# Patient Record
Sex: MALE | Race: WHITE | Employment: OTHER | URBAN - METROPOLITAN AREA
[De-identification: names, ages, dates, MRNs, and addresses within clinical notes are randomized per-mention and may not be internally consistent; named-entity substitution may affect disease eponyms.]

---

## 2017-11-06 PROBLEM — I12.9 BENIGN HYPERTENSIVE KIDNEY DISEASE WITH CHRONIC KIDNEY DISEASE: Chronic | Status: ACTIVE | Noted: 2017-11-06

## 2017-11-06 PROBLEM — R73.02 GLUCOSE INTOLERANCE (IMPAIRED GLUCOSE TOLERANCE): Chronic | Status: ACTIVE | Noted: 2017-11-06

## 2017-11-14 PROBLEM — R73.02 GLUCOSE INTOLERANCE (IMPAIRED GLUCOSE TOLERANCE): Chronic | Status: RESOLVED | Noted: 2017-11-06 | Resolved: 2017-11-14

## 2017-11-22 PROBLEM — N18.30 STAGE 3 CHRONIC KIDNEY DISEASE (HCC): Chronic | Status: ACTIVE | Noted: 2017-11-22

## 2017-12-07 ENCOUNTER — TELEPHONE (OUTPATIENT)
Dept: DIABETES SERVICES | Age: 72
End: 2017-12-07

## 2017-12-07 NOTE — TELEPHONE ENCOUNTER
Called about diabetes education. He is concerned about the money as living on social security. Asked if he can afford $20.00 month? He wants to talk to physician and his wife. Instructed if he wants education to let physician know when sees him in January. Currently it will be January before can be seen for education. We are scheduling into January now. Pt wants to talk to above before scheduling assessment. Diabtees file closed until patient notifies us or MD he wants the education.

## 2018-01-05 PROBLEM — E11.42 CONTROLLED TYPE 2 DIABETES MELLITUS WITH DIABETIC POLYNEUROPATHY, WITHOUT LONG-TERM CURRENT USE OF INSULIN (HCC): Chronic | Status: ACTIVE | Noted: 2018-01-05

## 2018-01-05 PROBLEM — E11.21 TYPE 2 DIABETES MELLITUS WITH NEPHROPATHY (HCC): Chronic | Status: ACTIVE | Noted: 2018-01-05

## 2018-01-05 PROBLEM — E11.21 TYPE 2 DIABETES MELLITUS WITH NEPHROPATHY (HCC): Status: ACTIVE | Noted: 2018-01-05

## 2018-01-05 PROBLEM — G62.89 MIXED AXONAL-DEMYELINATING POLYNEUROPATHY: Chronic | Status: ACTIVE | Noted: 2018-01-05

## 2018-04-10 PROBLEM — M21.619 BUNION OF UNSPECIFIED FOOT: Chronic | Status: ACTIVE | Noted: 2018-04-10

## 2018-04-10 PROBLEM — L84 CORNS AND CALLUS: Chronic | Status: ACTIVE | Noted: 2018-04-10

## 2018-04-10 PROBLEM — B35.1 ONYCHOMYCOSIS: Chronic | Status: ACTIVE | Noted: 2018-04-10

## 2018-04-10 PROBLEM — E78.2 MIXED HYPERLIPIDEMIA: Chronic | Status: ACTIVE | Noted: 2018-04-10

## 2018-10-10 PROBLEM — E66.01 SEVERE OBESITY (BMI 35.0-35.9 WITH COMORBIDITY) (HCC): Chronic | Status: ACTIVE | Noted: 2018-10-10

## 2020-06-11 PROBLEM — E66.9 OBESITY (BMI 30-39.9): Status: ACTIVE | Noted: 2020-06-11

## 2021-01-13 PROBLEM — E66.01 MORBID OBESITY WITH BMI OF 40.0-44.9, ADULT (HCC): Status: ACTIVE | Noted: 2020-06-11

## 2021-07-19 ENCOUNTER — APPOINTMENT (OUTPATIENT)
Dept: ULTRASOUND IMAGING | Age: 76
DRG: 308 | End: 2021-07-19
Attending: FAMILY MEDICINE
Payer: MEDICARE

## 2021-07-19 ENCOUNTER — APPOINTMENT (OUTPATIENT)
Dept: GENERAL RADIOLOGY | Age: 76
DRG: 308 | End: 2021-07-19
Attending: EMERGENCY MEDICINE
Payer: MEDICARE

## 2021-07-19 ENCOUNTER — HOSPITAL ENCOUNTER (INPATIENT)
Age: 76
LOS: 8 days | Discharge: HOME HEALTH | DRG: 308 | End: 2021-07-27
Attending: EMERGENCY MEDICINE | Admitting: FAMILY MEDICINE
Payer: MEDICARE

## 2021-07-19 DIAGNOSIS — N17.9 AKI (ACUTE KIDNEY INJURY) (HCC): ICD-10-CM

## 2021-07-19 DIAGNOSIS — I25.10 CAD IN NATIVE ARTERY: ICD-10-CM

## 2021-07-19 DIAGNOSIS — I25.5 ISCHEMIC CARDIOMYOPATHY: ICD-10-CM

## 2021-07-19 DIAGNOSIS — I48.91 ATRIAL FIBRILLATION WITH RVR (HCC): ICD-10-CM

## 2021-07-19 DIAGNOSIS — J96.02 ACUTE RESPIRATORY FAILURE WITH HYPOXIA AND HYPERCAPNIA (HCC): ICD-10-CM

## 2021-07-19 DIAGNOSIS — I48.91 ATRIAL FIBRILLATION, UNSPECIFIED TYPE (HCC): ICD-10-CM

## 2021-07-19 DIAGNOSIS — G93.41 ACUTE METABOLIC ENCEPHALOPATHY: ICD-10-CM

## 2021-07-19 DIAGNOSIS — B95.61 STAPHYLOCOCCUS AUREUS BACTEREMIA: ICD-10-CM

## 2021-07-19 DIAGNOSIS — I25.10 CORONARY ARTERY DISEASE INVOLVING NATIVE CORONARY ARTERY OF NATIVE HEART WITHOUT ANGINA PECTORIS: Chronic | ICD-10-CM

## 2021-07-19 DIAGNOSIS — N18.30 STAGE 3 CHRONIC KIDNEY DISEASE, UNSPECIFIED WHETHER STAGE 3A OR 3B CKD (HCC): Chronic | ICD-10-CM

## 2021-07-19 DIAGNOSIS — J96.01 ACUTE RESPIRATORY FAILURE WITH HYPOXIA AND HYPERCAPNIA (HCC): ICD-10-CM

## 2021-07-19 DIAGNOSIS — I48.91 NEW ONSET ATRIAL FIBRILLATION (HCC): ICD-10-CM

## 2021-07-19 DIAGNOSIS — N18.9 ACUTE KIDNEY INJURY SUPERIMPOSED ON CKD (HCC): ICD-10-CM

## 2021-07-19 DIAGNOSIS — J96.02 ACUTE RESPIRATORY FAILURE WITH HYPERCAPNIA (HCC): ICD-10-CM

## 2021-07-19 DIAGNOSIS — I50.21 ACUTE SYSTOLIC CONGESTIVE HEART FAILURE (HCC): ICD-10-CM

## 2021-07-19 DIAGNOSIS — E66.01 MORBID OBESITY WITH BMI OF 40.0-44.9, ADULT (HCC): ICD-10-CM

## 2021-07-19 DIAGNOSIS — R09.89 SUSPECTED CHF (CONGESTIVE HEART FAILURE): ICD-10-CM

## 2021-07-19 DIAGNOSIS — R78.81 STAPHYLOCOCCUS AUREUS BACTEREMIA: ICD-10-CM

## 2021-07-19 DIAGNOSIS — L03.115 CELLULITIS OF RIGHT FOOT: Primary | ICD-10-CM

## 2021-07-19 DIAGNOSIS — E87.6 HYPOKALEMIA: ICD-10-CM

## 2021-07-19 DIAGNOSIS — R09.02 HYPOXIA: ICD-10-CM

## 2021-07-19 DIAGNOSIS — N17.9 ACUTE KIDNEY INJURY SUPERIMPOSED ON CKD (HCC): ICD-10-CM

## 2021-07-19 PROBLEM — L03.90 CELLULITIS: Status: ACTIVE | Noted: 2021-07-19

## 2021-07-19 LAB
ALBUMIN SERPL-MCNC: 2.9 G/DL (ref 3.2–4.6)
ALBUMIN/GLOB SERPL: 0.6 {RATIO} (ref 1.2–3.5)
ALP SERPL-CCNC: 137 U/L (ref 50–136)
ALT SERPL-CCNC: 17 U/L (ref 12–65)
ANION GAP SERPL CALC-SCNC: 7 MMOL/L (ref 7–16)
APTT PPP: 31.7 SEC (ref 24.1–35.1)
ARTERIAL PATENCY WRIST A: POSITIVE
AST SERPL-CCNC: 23 U/L (ref 15–37)
ATRIAL RATE: 147 BPM
BACTERIA URNS QL MICRO: 0 /HPF
BASE EXCESS BLD CALC-SCNC: 5.3 MMOL/L
BASOPHILS # BLD: 0.1 K/UL (ref 0–0.2)
BASOPHILS # BLD: 0.1 K/UL (ref 0–0.2)
BASOPHILS NFR BLD: 0 % (ref 0–2)
BASOPHILS NFR BLD: 0 % (ref 0–2)
BDY SITE: ABNORMAL
BILIRUB SERPL-MCNC: 1.6 MG/DL (ref 0.2–1.1)
BNP SERPL-MCNC: 1279 PG/ML
BUN SERPL-MCNC: 33 MG/DL (ref 8–23)
CALCIUM SERPL-MCNC: 9.3 MG/DL (ref 8.3–10.4)
CALCULATED R AXIS, ECG10: -36 DEGREES
CALCULATED T AXIS, ECG11: 24 DEGREES
CASTS URNS QL MICRO: NORMAL /LPF
CHLORIDE SERPL-SCNC: 98 MMOL/L (ref 98–107)
CO2 BLD-SCNC: 32 MMOL/L (ref 13–23)
CO2 SERPL-SCNC: 32 MMOL/L (ref 21–32)
CREAT SERPL-MCNC: 2.34 MG/DL (ref 0.8–1.5)
CRP SERPL HS-MCNC: >15 MG/L
DIAGNOSIS, 93000: NORMAL
DIFFERENTIAL METHOD BLD: ABNORMAL
DIFFERENTIAL METHOD BLD: ABNORMAL
EOSINOPHIL # BLD: 0 K/UL (ref 0–0.8)
EOSINOPHIL # BLD: 0.1 K/UL (ref 0–0.8)
EOSINOPHIL NFR BLD: 0 % (ref 0.5–7.8)
EOSINOPHIL NFR BLD: 1 % (ref 0.5–7.8)
EPI CELLS #/AREA URNS HPF: NORMAL /HPF
ERYTHROCYTE [DISTWIDTH] IN BLOOD BY AUTOMATED COUNT: 15 % (ref 11.9–14.6)
ERYTHROCYTE [DISTWIDTH] IN BLOOD BY AUTOMATED COUNT: 15.2 % (ref 11.9–14.6)
ERYTHROCYTE [SEDIMENTATION RATE] IN BLOOD: 72 MM/HR (ref 0–20)
FIO2, L/MIN - FIO2P: 2
GAS FLOW.O2 O2 DELIVERY SYS: ABNORMAL L/MIN
GLOBULIN SER CALC-MCNC: 4.8 G/DL (ref 2.3–3.5)
GLUCOSE BLD STRIP.AUTO-MCNC: 158 MG/DL (ref 65–100)
GLUCOSE SERPL-MCNC: 101 MG/DL (ref 65–100)
HCO3 BLD-SCNC: 31.8 MMOL/L (ref 22–26)
HCT VFR BLD AUTO: 36.7 % (ref 41.1–50.3)
HCT VFR BLD AUTO: 40.5 % (ref 41.1–50.3)
HGB BLD-MCNC: 11.5 G/DL (ref 13.6–17.2)
HGB BLD-MCNC: 13 G/DL (ref 13.6–17.2)
IMM GRANULOCYTES # BLD AUTO: 0.1 K/UL (ref 0–0.5)
IMM GRANULOCYTES # BLD AUTO: 0.2 K/UL (ref 0–0.5)
IMM GRANULOCYTES NFR BLD AUTO: 1 % (ref 0–5)
IMM GRANULOCYTES NFR BLD AUTO: 1 % (ref 0–5)
LACTATE SERPL-SCNC: 1.8 MMOL/L (ref 0.4–2)
LACTATE SERPL-SCNC: 2 MMOL/L (ref 0.4–2)
LYMPHOCYTES # BLD: 1.8 K/UL (ref 0.5–4.6)
LYMPHOCYTES # BLD: 2.1 K/UL (ref 0.5–4.6)
LYMPHOCYTES NFR BLD: 11 % (ref 13–44)
LYMPHOCYTES NFR BLD: 11 % (ref 13–44)
MAGNESIUM SERPL-MCNC: 1.8 MG/DL (ref 1.8–2.4)
MCH RBC QN AUTO: 29.4 PG (ref 26.1–32.9)
MCH RBC QN AUTO: 29.6 PG (ref 26.1–32.9)
MCHC RBC AUTO-ENTMCNC: 31.3 G/DL (ref 31.4–35)
MCHC RBC AUTO-ENTMCNC: 32.1 G/DL (ref 31.4–35)
MCV RBC AUTO: 92.3 FL (ref 79.6–97.8)
MCV RBC AUTO: 93.9 FL (ref 79.6–97.8)
MONOCYTES # BLD: 1.9 K/UL (ref 0.1–1.3)
MONOCYTES # BLD: 2.4 K/UL (ref 0.1–1.3)
MONOCYTES NFR BLD: 12 % (ref 4–12)
MONOCYTES NFR BLD: 12 % (ref 4–12)
NEUTS SEG # BLD: 12.6 K/UL (ref 1.7–8.2)
NEUTS SEG # BLD: 15 K/UL (ref 1.7–8.2)
NEUTS SEG NFR BLD: 75 % (ref 43–78)
NEUTS SEG NFR BLD: 76 % (ref 43–78)
NRBC # BLD: 0 K/UL (ref 0–0.2)
NRBC # BLD: 0 K/UL (ref 0–0.2)
PCO2 BLD: 53.7 MMHG (ref 35–45)
PH BLD: 7.38 [PH] (ref 7.35–7.45)
PLATELET # BLD AUTO: 230 K/UL (ref 150–450)
PLATELET # BLD AUTO: 254 K/UL (ref 150–450)
PMV BLD AUTO: 11.2 FL (ref 9.4–12.3)
PMV BLD AUTO: 11.5 FL (ref 9.4–12.3)
PO2 BLD: 66 MMHG (ref 75–100)
POTASSIUM SERPL-SCNC: 3.7 MMOL/L (ref 3.5–5.1)
PROT SERPL-MCNC: 7.7 G/DL (ref 6.3–8.2)
Q-T INTERVAL, ECG07: 300 MS
QRS DURATION, ECG06: 132 MS
QTC CALCULATION (BEZET), ECG08: 427 MS
RBC # BLD AUTO: 3.91 M/UL (ref 4.23–5.6)
RBC # BLD AUTO: 4.39 M/UL (ref 4.23–5.6)
RBC #/AREA URNS HPF: NORMAL /HPF
SAO2 % BLD: 91.8 % (ref 95–98)
SERVICE CMNT-IMP: ABNORMAL
SERVICE CMNT-IMP: ABNORMAL
SODIUM SERPL-SCNC: 137 MMOL/L (ref 138–145)
SPECIMEN TYPE: ABNORMAL
T4 FREE SERPL-MCNC: 1.4 NG/DL (ref 0.78–1.46)
TROPONIN-HIGH SENSITIVITY: 26.6 PG/ML (ref 0–14)
TROPONIN-HIGH SENSITIVITY: 31.9 PG/ML (ref 0–14)
TSH SERPL DL<=0.005 MIU/L-ACNC: 0.66 UIU/ML (ref 0.36–3.74)
UFH PPP CHRO-ACNC: <0.1 IU/ML (ref 0.3–0.7)
VENTRICULAR RATE, ECG03: 122 BPM
WBC # BLD AUTO: 16.5 K/UL (ref 4.3–11.1)
WBC # BLD AUTO: 19.9 K/UL (ref 4.3–11.1)
WBC URNS QL MICRO: NORMAL /HPF

## 2021-07-19 PROCEDURE — 77010033678 HC OXYGEN DAILY

## 2021-07-19 PROCEDURE — 87150 DNA/RNA AMPLIFIED PROBE: CPT

## 2021-07-19 PROCEDURE — 96375 TX/PRO/DX INJ NEW DRUG ADDON: CPT

## 2021-07-19 PROCEDURE — 84439 ASSAY OF FREE THYROXINE: CPT

## 2021-07-19 PROCEDURE — 96365 THER/PROPH/DIAG IV INF INIT: CPT

## 2021-07-19 PROCEDURE — 86141 C-REACTIVE PROTEIN HS: CPT

## 2021-07-19 PROCEDURE — 83735 ASSAY OF MAGNESIUM: CPT

## 2021-07-19 PROCEDURE — 74011000250 HC RX REV CODE- 250: Performed by: EMERGENCY MEDICINE

## 2021-07-19 PROCEDURE — 93005 ELECTROCARDIOGRAM TRACING: CPT | Performed by: EMERGENCY MEDICINE

## 2021-07-19 PROCEDURE — 87040 BLOOD CULTURE FOR BACTERIA: CPT

## 2021-07-19 PROCEDURE — 74011000258 HC RX REV CODE- 258: Performed by: EMERGENCY MEDICINE

## 2021-07-19 PROCEDURE — 85520 HEPARIN ASSAY: CPT

## 2021-07-19 PROCEDURE — 84484 ASSAY OF TROPONIN QUANT: CPT

## 2021-07-19 PROCEDURE — 87205 SMEAR GRAM STAIN: CPT

## 2021-07-19 PROCEDURE — 65660000000 HC RM CCU STEPDOWN

## 2021-07-19 PROCEDURE — 36600 WITHDRAWAL OF ARTERIAL BLOOD: CPT

## 2021-07-19 PROCEDURE — 82803 BLOOD GASES ANY COMBINATION: CPT

## 2021-07-19 PROCEDURE — 87077 CULTURE AEROBIC IDENTIFY: CPT

## 2021-07-19 PROCEDURE — 99285 EMERGENCY DEPT VISIT HI MDM: CPT

## 2021-07-19 PROCEDURE — 73630 X-RAY EXAM OF FOOT: CPT

## 2021-07-19 PROCEDURE — 87186 SC STD MICRODIL/AGAR DIL: CPT

## 2021-07-19 PROCEDURE — 83605 ASSAY OF LACTIC ACID: CPT

## 2021-07-19 PROCEDURE — 71045 X-RAY EXAM CHEST 1 VIEW: CPT

## 2021-07-19 PROCEDURE — 74011250636 HC RX REV CODE- 250/636: Performed by: EMERGENCY MEDICINE

## 2021-07-19 PROCEDURE — 96366 THER/PROPH/DIAG IV INF ADDON: CPT

## 2021-07-19 PROCEDURE — 85652 RBC SED RATE AUTOMATED: CPT

## 2021-07-19 PROCEDURE — 85025 COMPLETE CBC W/AUTO DIFF WBC: CPT

## 2021-07-19 PROCEDURE — 82962 GLUCOSE BLOOD TEST: CPT

## 2021-07-19 PROCEDURE — 99223 1ST HOSP IP/OBS HIGH 75: CPT | Performed by: INTERNAL MEDICINE

## 2021-07-19 PROCEDURE — 93971 EXTREMITY STUDY: CPT

## 2021-07-19 PROCEDURE — 74011250637 HC RX REV CODE- 250/637: Performed by: INTERNAL MEDICINE

## 2021-07-19 PROCEDURE — 81003 URINALYSIS AUTO W/O SCOPE: CPT

## 2021-07-19 PROCEDURE — 83880 ASSAY OF NATRIURETIC PEPTIDE: CPT

## 2021-07-19 PROCEDURE — 81015 MICROSCOPIC EXAM OF URINE: CPT

## 2021-07-19 PROCEDURE — 80053 COMPREHEN METABOLIC PANEL: CPT

## 2021-07-19 PROCEDURE — 74011636637 HC RX REV CODE- 636/637: Performed by: FAMILY MEDICINE

## 2021-07-19 PROCEDURE — 94762 N-INVAS EAR/PLS OXIMTRY CONT: CPT

## 2021-07-19 PROCEDURE — 36415 COLL VENOUS BLD VENIPUNCTURE: CPT

## 2021-07-19 PROCEDURE — 85730 THROMBOPLASTIN TIME PARTIAL: CPT

## 2021-07-19 PROCEDURE — 84443 ASSAY THYROID STIM HORMONE: CPT

## 2021-07-19 RX ORDER — INSULIN LISPRO 100 [IU]/ML
INJECTION, SOLUTION INTRAVENOUS; SUBCUTANEOUS
Status: DISCONTINUED | OUTPATIENT
Start: 2021-07-19 | End: 2021-07-27 | Stop reason: HOSPADM

## 2021-07-19 RX ORDER — ONDANSETRON 2 MG/ML
4 INJECTION INTRAMUSCULAR; INTRAVENOUS
Status: DISCONTINUED | OUTPATIENT
Start: 2021-07-19 | End: 2021-07-27 | Stop reason: HOSPADM

## 2021-07-19 RX ORDER — HEPARIN SODIUM 5000 [USP'U]/100ML
12-25 INJECTION, SOLUTION INTRAVENOUS
Status: DISCONTINUED | OUTPATIENT
Start: 2021-07-19 | End: 2021-07-24

## 2021-07-19 RX ORDER — ACETAMINOPHEN 325 MG/1
650 TABLET ORAL
Status: DISCONTINUED | OUTPATIENT
Start: 2021-07-19 | End: 2021-07-27 | Stop reason: HOSPADM

## 2021-07-19 RX ORDER — VANCOMYCIN HYDROCHLORIDE
1250 EVERY 24 HOURS
Status: DISCONTINUED | OUTPATIENT
Start: 2021-07-20 | End: 2021-07-21

## 2021-07-19 RX ORDER — ONDANSETRON 4 MG/1
4 TABLET, ORALLY DISINTEGRATING ORAL
Status: DISCONTINUED | OUTPATIENT
Start: 2021-07-19 | End: 2021-07-27 | Stop reason: HOSPADM

## 2021-07-19 RX ORDER — SODIUM CHLORIDE 0.9 % (FLUSH) 0.9 %
5-10 SYRINGE (ML) INJECTION EVERY 8 HOURS
Status: DISCONTINUED | OUTPATIENT
Start: 2021-07-19 | End: 2021-07-27

## 2021-07-19 RX ORDER — FUROSEMIDE 40 MG/1
40 TABLET ORAL DAILY
Status: DISCONTINUED | OUTPATIENT
Start: 2021-07-20 | End: 2021-07-27 | Stop reason: HOSPADM

## 2021-07-19 RX ORDER — POLYETHYLENE GLYCOL 3350 17 G/17G
17 POWDER, FOR SOLUTION ORAL DAILY PRN
Status: DISCONTINUED | OUTPATIENT
Start: 2021-07-19 | End: 2021-07-27 | Stop reason: HOSPADM

## 2021-07-19 RX ORDER — FUROSEMIDE 10 MG/ML
40 INJECTION INTRAMUSCULAR; INTRAVENOUS
Status: COMPLETED | OUTPATIENT
Start: 2021-07-19 | End: 2021-07-19

## 2021-07-19 RX ORDER — ACETAMINOPHEN 650 MG/1
650 SUPPOSITORY RECTAL
Status: DISCONTINUED | OUTPATIENT
Start: 2021-07-19 | End: 2021-07-27 | Stop reason: HOSPADM

## 2021-07-19 RX ORDER — ASPIRIN 81 MG/1
81 TABLET ORAL DAILY
Status: DISCONTINUED | OUTPATIENT
Start: 2021-07-20 | End: 2021-07-27 | Stop reason: HOSPADM

## 2021-07-19 RX ORDER — PREGABALIN 100 MG/1
200 CAPSULE ORAL DAILY
Status: DISCONTINUED | OUTPATIENT
Start: 2021-07-20 | End: 2021-07-27 | Stop reason: HOSPADM

## 2021-07-19 RX ORDER — VANCOMYCIN 2 GRAM/500 ML IN 0.9 % SODIUM CHLORIDE INTRAVENOUS
2000 ONCE
Status: COMPLETED | OUTPATIENT
Start: 2021-07-19 | End: 2021-07-19

## 2021-07-19 RX ORDER — SODIUM CHLORIDE 0.9 % (FLUSH) 0.9 %
5-10 SYRINGE (ML) INJECTION AS NEEDED
Status: DISCONTINUED | OUTPATIENT
Start: 2021-07-19 | End: 2021-07-27 | Stop reason: HOSPADM

## 2021-07-19 RX ORDER — HYDROCHLOROTHIAZIDE 25 MG/1
25 TABLET ORAL DAILY
Status: DISCONTINUED | OUTPATIENT
Start: 2021-07-20 | End: 2021-07-27 | Stop reason: HOSPADM

## 2021-07-19 RX ORDER — HEPARIN SODIUM 5000 [USP'U]/ML
60 INJECTION, SOLUTION INTRAVENOUS; SUBCUTANEOUS ONCE
Status: COMPLETED | OUTPATIENT
Start: 2021-07-19 | End: 2021-07-20

## 2021-07-19 RX ORDER — METOPROLOL TARTRATE 25 MG/1
25 TABLET, FILM COATED ORAL 3 TIMES DAILY
Status: DISCONTINUED | OUTPATIENT
Start: 2021-07-19 | End: 2021-07-20

## 2021-07-19 RX ORDER — ENOXAPARIN SODIUM 100 MG/ML
40 INJECTION SUBCUTANEOUS DAILY
Status: DISCONTINUED | OUTPATIENT
Start: 2021-07-20 | End: 2021-07-19

## 2021-07-19 RX ORDER — DILTIAZEM HYDROCHLORIDE 5 MG/ML
20 INJECTION INTRAVENOUS ONCE
Status: COMPLETED | OUTPATIENT
Start: 2021-07-19 | End: 2021-07-19

## 2021-07-19 RX ADMIN — METOPROLOL TARTRATE 25 MG: 25 TABLET, FILM COATED ORAL at 22:34

## 2021-07-19 RX ADMIN — VANCOMYCIN HYDROCHLORIDE 2000 MG: 10 INJECTION, POWDER, LYOPHILIZED, FOR SOLUTION INTRAVENOUS at 12:38

## 2021-07-19 RX ADMIN — INSULIN LISPRO 2 UNITS: 100 INJECTION, SOLUTION INTRAVENOUS; SUBCUTANEOUS at 22:34

## 2021-07-19 RX ADMIN — FUROSEMIDE 40 MG: 10 INJECTION, SOLUTION INTRAMUSCULAR; INTRAVENOUS at 13:23

## 2021-07-19 RX ADMIN — Medication 10 ML: at 22:34

## 2021-07-19 RX ADMIN — CEFEPIME HYDROCHLORIDE 2 G: 2 INJECTION, POWDER, FOR SOLUTION INTRAVENOUS at 12:02

## 2021-07-19 RX ADMIN — DILTIAZEM HYDROCHLORIDE 20 MG: 5 INJECTION INTRAVENOUS at 13:21

## 2021-07-19 NOTE — ED PROVIDER NOTES
[facility]     Bernardino Murphy is a 77 y.o. male seen on 7/19/2021 in the [facility] EMERGENCY DEPT in room ERF/F. Chief Complaint   Patient presents with    Foot Pain     HPI: 71-year-old  male presented to the emergency department after being seen by his podiatrist this morning. Patient has been on antibiotics (clindamycin) for the past 4 days without improvement. Patient states his infection is actually getting worse. Patient states that he had a fever last night. Patient wears a boot on his foot that has rubbed the outside of his right foot resolving development of an ulcer. Patient is a diabetic. Patient denies any chest pain or shortness of breath. Patient was noted to be tachycardic with new onset A. fib with RVR upon arrival to the emergency department with lower oxygen levels as well. Patient has no history of atrial fibrillation and does not use oxygen at home. He denies any abdominal pain, changes in bowel or bladder habits or any other concerns. Historian: Patient    REVIEW OF SYSTEMS     Review of Systems   Constitutional: Positive for chills, fatigue and fever. HENT: Negative. Respiratory: Negative. Cardiovascular: Negative. Gastrointestinal: Negative. Genitourinary: Negative. Musculoskeletal: Positive for arthralgias. Skin: Positive for color change and wound. Neurological: Negative. Psychiatric/Behavioral: Negative. All other systems reviewed and are negative.       PAST MEDICAL HISTORY     Past Medical History:   Diagnosis Date    CAD (coronary artery disease)     Chronic kidney disease     stage 3    Hypercholesterolemia     Hypertension     Neuropathy      Past Surgical History:   Procedure Laterality Date    CA CARDIAC SURG PROCEDURE UNLIST  1999    stent placement     Social History     Socioeconomic History    Marital status:      Spouse name: Not on file    Number of children: Not on file    Years of education: Not on file    Highest education level: Not on file   Tobacco Use    Smoking status: Never Smoker    Smokeless tobacco: Never Used   Vaping Use    Vaping Use: Never used   Substance and Sexual Activity    Alcohol use: No    Drug use: No    Sexual activity: Not Currently     Partners: Female     Social Determinants of Health     Financial Resource Strain:     Difficulty of Paying Living Expenses:    Food Insecurity:     Worried About Running Out of Food in the Last Year:     Ran Out of Food in the Last Year:    Transportation Needs:     Lack of Transportation (Medical):  Lack of Transportation (Non-Medical):    Physical Activity:     Days of Exercise per Week:     Minutes of Exercise per Session:    Stress:     Feeling of Stress :    Social Connections:     Frequency of Communication with Friends and Family:     Frequency of Social Gatherings with Friends and Family:     Attends Hoahaoism Services:     Active Member of Clubs or Organizations:     Attends Club or Organization Meetings:     Marital Status:      Prior to Admission Medications   Prescriptions Last Dose Informant Patient Reported? Taking? Blood-Glucose Meter monitoring kit   No No   Sig: Check blood sugars BID. E11.9   Lancets misc   No No   Sig: Check blood sugars BID. E11.9   aspirin delayed-release 81 mg tablet   Yes No   atorvastatin (LIPITOR) 40 mg tablet   No No   Sig: TAKE 1 TABLET BY MOUTH DAILY  Indications: treatment to slow progression of coronary artery disease   cholecalciferol, VITAMIN D3, (VITAMIN D3) 5,000 unit tab tablet   Yes No   Sig: Take  by mouth daily. cyanocobalamin 1,000 mcg tablet   No No   Sig: Take 1 Tab by mouth daily.    glucose blood VI test strips (BLOOD GLUCOSE TEST) strip   No No   Sig: Check blood sugars BID. E11.9   hydroCHLOROthiazide (HYDRODIURIL) 25 mg tablet   No No   Sig: TAKE 1 TABLET BY MOUTH ONCE DAILY   metFORMIN ER (GLUCOPHAGE XR) 500 mg tablet   No No   Sig: Take 1 tablet by mouth daily   nystatin (MYCOSTATIN) powder   No No   Sig: Apply  to affected area three (3) times daily. pregabalin (LYRICA) 200 mg capsule   No No   Sig: TAKE ONE CAPSULE BY MOUTH daily      Facility-Administered Medications: None     No Known Allergies     PHYSICAL EXAM       Vitals:    07/19/21 1140 07/19/21 1145 07/19/21 1239 07/19/21 1321   BP: 111/63  118/70 115/76   Pulse: (!) 121 (!) 104 (!) 109 (!) 120   Resp:       Temp:       SpO2:  94% 90%     Vital signs were reviewed. Physical Exam  Vitals and nursing note reviewed. Constitutional:       General: He is not in acute distress. Appearance: Normal appearance. He is not ill-appearing or toxic-appearing. HENT:      Head: Normocephalic and atraumatic. Mouth/Throat:      Mouth: Mucous membranes are dry. Eyes:      Extraocular Movements: Extraocular movements intact. Pupils: Pupils are equal, round, and reactive to light. Cardiovascular:      Rate and Rhythm: Tachycardia present. Rhythm irregular. Pulses: Normal pulses. Heart sounds: Normal heart sounds. Pulmonary:      Effort: Pulmonary effort is normal.      Breath sounds: Normal breath sounds. Abdominal:      Palpations: Abdomen is soft. Tenderness: There is no abdominal tenderness. There is no guarding. Musculoskeletal:         General: Swelling and tenderness present. Cervical back: Normal range of motion. Comments: Diffuse erythema and swelling of right lower extremity to the mid calf with large ulceration to the base of the fifth right metatarsal   Skin:     Findings: Erythema present. Neurological:      General: No focal deficit present. Mental Status: He is alert and oriented to person, place, and time. Psychiatric:         Mood and Affect: Mood normal.         Behavior: Behavior normal.         Thought Content:  Thought content normal.         Judgment: Judgment normal.          MEDICAL DECISION MAKING     ED Course:    Orders Placed This Encounter    BLOOD CULTURE    BLOOD CULTURE    XR FOOT RT MIN 3 V    XR CHEST PORT    LACTIC ACID    LACTIC ACID    CBC WITH DIFF    CMP    CRP, HIGH SENSITIVITY    SED RATE, AUTOMATED    MAGNESIUM    Troponin High Sensitivity    Repeat Troponin at 2 hours    BLOOD GAS, ARTERIAL    NT-PRO BNP    URINE MICROSCOPIC    POC URINE DIPSTICK    NURSING-MISCELLANEOUS: IF PATIENT IS UNABLE TO PROVIDE URINE PLEASE PERFORM STRAIGHT CATHETERIZATION. IF PATIENT IS UNABLE TO PROVIDE URINE PLEASE PERFORM STRAIGHT CATHETERIZATION. ONE TIME    PULSE OXIMETRY CONTINUOUS    BLOOD GAS, ARTERIAL POC    EKG, 12 LEAD, INITIAL    SALINE LOCK IV ONE TIME Routine    sodium chloride (NS) flush 5-10 mL    sodium chloride (NS) flush 5-10 mL    DISCONTD: vancomycin (VANCOCIN) 1,000 mg in 0.9% sodium chloride 250 mL (VIAL-MATE)    cefepime (MAXIPIME) 2 g in 0.9% sodium chloride (MBP/ADV) 100 mL MBP    vancomycin (VANCOCIN) 2000 mg in  ml infusion    dilTIAZem (CARDIZEM) injection 20 mg    furosemide (LASIX) injection 40 mg     Recent Results (from the past 8 hour(s))   LACTIC ACID    Collection Time: 07/19/21 10:54 AM   Result Value Ref Range    Lactic acid 2.0 0.4 - 2.0 MMOL/L   CBC WITH AUTOMATED DIFF    Collection Time: 07/19/21 10:54 AM   Result Value Ref Range    WBC 19.9 (H) 4.3 - 11.1 K/uL    RBC 4.39 4.23 - 5.6 M/uL    HGB 13.0 (L) 13.6 - 17.2 g/dL    HCT 40.5 (L) 41.1 - 50.3 %    MCV 92.3 79.6 - 97.8 FL    MCH 29.6 26.1 - 32.9 PG    MCHC 32.1 31.4 - 35.0 g/dL    RDW 15.0 (H) 11.9 - 14.6 %    PLATELET 119 192 - 948 K/uL    MPV 11.5 9.4 - 12.3 FL    ABSOLUTE NRBC 0.00 0.0 - 0.2 K/uL    DF AUTOMATED      NEUTROPHILS 75 43 - 78 %    LYMPHOCYTES 11 (L) 13 - 44 %    MONOCYTES 12 4.0 - 12.0 %    EOSINOPHILS 1 0.5 - 7.8 %    BASOPHILS 0 0.0 - 2.0 %    IMMATURE GRANULOCYTES 1 0.0 - 5.0 %    ABS. NEUTROPHILS 15.0 (H) 1.7 - 8.2 K/UL    ABS. LYMPHOCYTES 2.1 0.5 - 4.6 K/UL    ABS.  MONOCYTES 2.4 (H) 0.1 - 1.3 K/UL    ABS. EOSINOPHILS 0.1 0.0 - 0.8 K/UL    ABS. BASOPHILS 0.1 0.0 - 0.2 K/UL    ABS. IMM. GRANS. 0.2 0.0 - 0.5 K/UL   METABOLIC PANEL, COMPREHENSIVE    Collection Time: 07/19/21 10:54 AM   Result Value Ref Range    Sodium 137 (L) 138 - 145 mmol/L    Potassium 3.7 3.5 - 5.1 mmol/L    Chloride 98 98 - 107 mmol/L    CO2 32 21 - 32 mmol/L    Anion gap 7 7 - 16 mmol/L    Glucose 101 (H) 65 - 100 mg/dL    BUN 33 (H) 8 - 23 MG/DL    Creatinine 2.34 (H) 0.8 - 1.5 MG/DL    GFR est AA 35 (L) >60 ml/min/1.73m2    GFR est non-AA 29 (L) >60 ml/min/1.73m2    Calcium 9.3 8.3 - 10.4 MG/DL    Bilirubin, total 1.6 (H) 0.2 - 1.1 MG/DL    ALT (SGPT) 17 12 - 65 U/L    AST (SGOT) 23 15 - 37 U/L    Alk. phosphatase 137 (H) 50 - 136 U/L    Protein, total 7.7 6.3 - 8.2 g/dL    Albumin 2.9 (L) 3.2 - 4.6 g/dL    Globulin 4.8 (H) 2.3 - 3.5 g/dL    A-G Ratio 0.6 (L) 1.2 - 3.5     SED RATE, AUTOMATED    Collection Time: 07/19/21 10:54 AM   Result Value Ref Range    Sed rate, automated 72 (H) 0 - 20 mm/hr   MAGNESIUM    Collection Time: 07/19/21 10:54 AM   Result Value Ref Range    Magnesium 1.8 1.8 - 2.4 mg/dL   TROPONIN-HIGH SENSITIVITY    Collection Time: 07/19/21 10:54 AM   Result Value Ref Range    Troponin-High Sensitivity 31.9 (H) 0 - 14 pg/mL   NT-PRO BNP    Collection Time: 07/19/21 10:54 AM   Result Value Ref Range    NT pro-BNP 1,279 (H) <450 PG/ML   EKG, 12 LEAD, INITIAL    Collection Time: 07/19/21 11:00 AM   Result Value Ref Range    Ventricular Rate 122 BPM    Atrial Rate 147 BPM    QRS Duration 132 ms    Q-T Interval 300 ms    QTC Calculation (Bezet) 427 ms    Calculated R Axis -36 degrees    Calculated T Axis 24 degrees    Diagnosis       !! AGE AND GENDER SPECIFIC ECG ANALYSIS !!   Atrial fibrillation with rapid ventricular response  Left axis deviation  Right bundle branch block  Anteroseptal infarct , age undetermined  Abnormal ECG  No previous ECGs available     BLOOD GAS, ARTERIAL POC    Collection Time: 07/19/21 12:08 PM   Result Value Ref Range    Device: NASAL CANNULA      pH (POC) 7.38 7.35 - 7.45      pCO2 (POC) 53.7 (H) 35 - 45 MMHG    pO2 (POC) 66 (L) 75 - 100 MMHG    HCO3 (POC) 31.8 (H) 22 - 26 MMOL/L    sO2 (POC) 91.8 (L) 95 - 98 %    Base excess (POC) 5.3 mmol/L    Allens test (POC) Positive      Site RIGHT RADIAL      Specimen type (POC) ARTERIAL      Performed by Mariella     CO2, POC 32 (H) 13 - 23 MMOL/L    FIO2, L/min 2       XR FOOT RT MIN 3 V    Result Date: 7/19/2021  EXAM:  XR FOOT RT MIN 3 V INDICATION:   wound COMPARISON:  None. FINDINGS:  3 views of the right foot demonstrate severe hammertoe deformity. Diffuse soft tissue swelling. Total destruction of the talus with abnormal navicular bone and anterior calcaneus. No radiographic evidence of osteomyelitis. .      No radiographic evidence of osteomyelitis. Diffuse soft tissue swelling. Severe hammertoe deformity. Total destruction of the talus with markedly abnormal hindfoot joints. XR CHEST PORT    Result Date: 7/19/2021  EXAM: XR CHEST PORT INDICATION: New onset A. fib with RVR with hypoxia COMPARISON: None. FINDINGS: A portable AP radiograph of the chest was obtained at 1252 hours. The patient is on a cardiac monitor. Minimal right basilar airspace disease. . The cardiac and mediastinal contours and pulmonary vascularity are normal.  The bones and soft tissues are grossly within normal limits. Minimal right basilar atelectasis or pneumonia. EKG interpretation: Rate 122. Atrial fibrillation with rapid ventricular response. Left axis deviation. Right bundle branch block.     MDM  Number of Diagnoses or Management Options  Acute systolic congestive heart failure (HCC)  TAMMY (acute kidney injury) (HCC)  Atrial fibrillation with RVR (HCC)  Cellulitis of right foot  Hypoxia  New onset atrial fibrillation [facility])  Diagnosis management comments: 71-year-old male presented to the emergency department with complaints of worsening right lower extremity cellulitis that has failed outpatient treatment with the right foot diabetic ulcer. Patient with elevated white blood cell count. He is also in acute on chronic renal failure. Patient was also noted to be in new onset A. fib with RVR with heart failure. Patient given Lasix and Cardizem as well as vancomycin and cefepime. Patient will be admitted to hospital for further treatment evaluation. Amount and/or Complexity of Data Reviewed  Clinical lab tests: ordered and reviewed  Tests in the radiology section of CPT®: ordered and reviewed  Decide to obtain previous medical records or to obtain history from someone other than the patient: yes  Review and summarize past medical records: yes  Discuss the patient with other providers: yes  Independent visualization of images, tracings, or specimens: yes    Patient Progress  Patient progress: stable        Disposition: Admitted  Diagnosis:     ICD-10-CM ICD-9-CM   1. Cellulitis of right foot  L03.115 682.7   2. New onset atrial fibrillation (HCC)  I48.91 427.31   3. Atrial fibrillation with RVR (HCC)  I48.91 427.31   4. TAMMY (acute kidney injury) ([facility]  N17.9 584.9   5. Hypoxia  R09.02 799.02   6. Acute systolic congestive heart failure (HCC)  I50.21 428.21     428.0     ____________________________________________________________________  A portion of this note was generated using voice recognition dictation software. While the note has been reviewed for accuracy, please note certain words and phrases may not be transcribed as intended and some grammatical and/or typographical errors may be present.

## 2021-07-19 NOTE — ED TRIAGE NOTES
Pt sent here after seeing podiatry this AM. States they were sent here for increasing infection to right foot. States started oral antibiotics on Tuesday but then last night fever 102.

## 2021-07-19 NOTE — PROGRESS NOTES
TRANSFER - IN REPORT:    Verbal report received from [facility] on David Rivas  being received from ED for routine progression of care      Report consisted of patients Situation, Background, Assessment and   Recommendations(SBAR). Information from the following report(s) SBAR was reviewed with the receiving nurse. Opportunity for questions and clarification was provided. Assessment completed upon patients arrival to unit and care assumed.

## 2021-07-19 NOTE — PROGRESS NOTES
07/19/21 1750   Dual Skin Pressure Injury Assessment   Dual Skin Pressure Injury Assessment WDL   Second Care Provider (Based on [address] [place] [address]) Ama GALVIN   Skin Integumentary   Skin Integumentary (WDL) WDL    Pressure  Injury Documentation No Pressure Injury Noted-Pressure Ulcer Prevention Initiated   Wound Prevention and Protection Methods   Orientation of Wound Prevention Posterior   Location of Wound Prevention Sacrum/Coccyx   Dressing Present  No   Wound Offloading (Prevention Methods) Bed, pressure reduction mattress;Pillows;Repositioning     No skin issues noted.

## 2021-07-19 NOTE — ED NOTES
TRANSFER - OUT REPORT:    Verbal report given to [facility], RN (name) on David Rivas  being transferred to 6(unit) for routine progression of care       Report consisted of patients Situation, Background, Assessment and   Recommendations(SBAR). Information from the following report(s) SBAR, Kardex, ED Summary, [facility] and Recent Results was reviewed with the receiving nurse. Lines:   Peripheral IV 07/19/21 Right Hand (Active)   Site Assessment Clean, dry, & intact 07/19/21 1111   Phlebitis Assessment 0 07/19/21 1111   Infiltration Assessment 0 07/19/21 1111   Dressing Status Clean, dry, & intact 07/19/21 1111       Peripheral IV 07/19/21 Anterior;Proximal;Right Forearm (Active)        Opportunity for questions and clarification was provided.       Patient transported with:   [employer]

## 2021-07-19 NOTE — PROGRESS NOTES
Pharmacokinetic Consult to Pharmacist    Michael Gonzalez is a 77 y.o. male being treated with Vancomycin. Height: 5' 11\" (180.3 cm)  Weight: 127 kg (280 lb)  Lab Results   Component Value Date/Time    BUN 33 (H) 07/19/2021 10:54 AM    Creatinine 2.34 (H) 07/19/2021 10:54 AM    WBC 19.9 (H) 07/19/2021 10:54 AM    Lactic acid 2.0 07/19/2021 10:54 AM      Estimated Creatinine Clearance: 37 mL/min (A) (based on SCr of 2.34 mg/dL (H)). No results found for: Daugherty Longs        Day 1 of Vancomycin. Goal Trough 10-20. Patient to be loaded with 2000mg IV x1 of vancomycin. Will initiate maintenance regimen of 1250mg IV q24h starting tomorrow. Will carefully monitor patient's renal function and clinical status. Will continue to follow patient and order levels when clinically indicated.      Thanks,   Oneita Both, PharmD  Clinical Pharmacist  [phone]

## 2021-07-19 NOTE — PROGRESS NOTES
Care Management Interventions  PCP Verified by CM: Yes  Mode of Transport at Discharge: Self  Transition of Care Consult (CM Consult): Discharge Planning  Discharge Durable Medical Equipment: No  Physical Therapy Consult: No  Occupational Therapy Consult: No  Speech Therapy Consult: No  Current Support Network: Lives with Spouse, Own Home  Confirm Follow Up Transport: Family  The Plan for Transition of Care is Related to the Following Treatment Goals : Home with family  The Patient and/or Patient Representative was Provided with a Choice of Provider and Agrees with the Discharge Plan?: Yes  Name of the Patient Representative Who was Provided with a Choice of Provider and Agrees with the Discharge Plan: Patient   Freedom of Choice List was Provided with Basic Dialogue that Supports the Patient's Individualized Plan of Care/Goals, Treatment Preferences and Shares the Quality Data Associated with the Providers?: Yes  Discharge Location  Discharge Placement: Home with family assistance      Pt awaiting bed assignment for admission. CM met with pt to discuss CM needs & DCP. Pt is A&Ox4. Pt is indep at home with all ADLS. Pt lives with spouse & daughter. Pt has cane; no further DME needs. Pt has no difficulty with obtaining medications or transport. Patient denies hx of HH/SNF. DCP home with spouse. CM to continue to monitor.

## 2021-07-19 NOTE — PROGRESS NOTES
07/19/21 1750   Dual Skin Pressure Injury Assessment   Dual Skin Pressure Injury Assessment X   Second Care Provider (Based on [address] [place] [address]) Ama GALVIN   Foot Right  (R foot ulcer)   Skin Integumentary   Skin Integumentary (WDL) X    Pressure  Injury Documentation Pressure Injury Noted-See Wound LDA to Document   Skin Color Appropriate for ethnicity; Red   Skin Condition/Temp Hot;Dry   Skin Integrity Wound (add Wound LDA)  (R foot ulcer)   Wound Prevention and Protection Methods   Orientation of Wound Prevention Posterior   Location of Wound Prevention Sacrum/Coccyx   Dressing Present  No   Wound Offloading (Prevention Methods) Bed, pressure reduction mattress;Pillows;Repositioning     Right foot ulcer with redness extending to R knee. No other skin issues noted.

## 2021-07-19 NOTE — H&P
Hospitalist Admission History and Physical     NAME:  Michael Gonzalez   Age:  77 y.o.  :   1945   MRN:   [id number]  PCP: Nighat Karimi MD  Consulting MD:  Treatment Team: Attending Provider: Jil Carey DO; Primary Nurse: Diane Campos RN    Chief Complaint   Patient presents with    Foot Pain         HPI:   Patient is a 77 y.o. male who presented to the ED from his podiatrist office for right foot cellulitis and edema. Has been on Clindamycin for the past 4 days without improvement. Hx of DM type II, HLD, HTN. Found to be tachcyardic with new onset a fib RVR and fluid overload on chest x ray. Received cardizem bolus and now HR controlled. Vitals - . Labs- WBC 19.9, creatine 2.34 from baseline 1.6. Total bili 1.6, pro BNP 1,279. Troponin 31.9. Minimal right basilar atelectasis or pneumonia. Past Medical History:   Diagnosis Date    CAD (coronary artery disease)     Chronic kidney disease     stage 3    Hypercholesterolemia     Hypertension     Neuropathy         Past Surgical History:   Procedure Laterality Date    [place] CARDIAC SURG PROCEDURE UNLIST      stent placement        Family History   Problem Relation Age of Onset    Cancer Mother     Cancer Father     No Known Problems Maternal Grandmother     No Known Problems Maternal Grandfather     No Known Problems Paternal Grandmother     No Known Problems Paternal Grandfather        Social History     Social History Narrative    Not on file        Social History     Tobacco Use    Smoking status: Never Smoker    Smokeless tobacco: Never Used   Substance Use Topics    Alcohol use: No        Social History     Substance and Sexual Activity   Drug Use No         No Known Allergies    Prior to Admission medications    Medication Sig Start Date End Date Taking?  Authorizing Provider   atorvastatin (LIPITOR) 40 mg tablet TAKE 1 TABLET BY MOUTH DAILY  Indications: treatment to slow progression of coronary artery disease 7/13/21   Joleen Bains MD   hydroCHLOROthiazide (HYDRODIURIL) 25 mg tablet TAKE 1 TABLET BY MOUTH ONCE DAILY 7/13/21   Joleen Bains MD   pregabalin (LYRICA) 200 mg capsule TAKE ONE CAPSULE BY MOUTH daily 7/13/21   Joleen Bains MD   metFORMIN ER (GLUCOPHAGE XR) 500 mg tablet Take 1 tablet by mouth daily 7/13/21   Joleen Bains MD   aspirin delayed-release 81 mg tablet     Provider, Historical   nystatin (MYCOSTATIN) powder Apply  to affected area three (3) times daily. 10/15/19   Joleen Bains MD   Blood-Glucose Meter monitoring kit Check blood sugars BID. E11.9 11/14/17   Homa Owens MD   Lancets misc Check blood sugars BID. E11.9 11/14/17   Homa Owens MD   glucose blood VI test strips (BLOOD GLUCOSE TEST) strip Check blood sugars BID. E11.9 11/14/17   Homa Owens MD   cyanocobalamin 1,000 mcg tablet Take 1 Tab by mouth daily. 10/10/16   Homa Owens MD   cholecalciferol, VITAMIN D3, (VITAMIN D3) 5,000 unit tab tablet Take  by mouth daily. Provider, Historical           Review of Systems    Constitutional: well nourished male in NAD  Eyes:  no change in visual acuity, no photophobia  Ears, nose, mouth, throat, and face: no  Odynphagia, dysphagia, no thrush or exudate, negative for chronic sinus congestion, recurrent headaches  Respiratory: negative for SOB, hemoptysis or cough  Cardiovascular: negative for CP, palpitations, or PND  Gastrointestinal: negative for abdominal pain, no hematemesis, hematochezia or BRBPR  Genitourinary: no urgency, frequency, or dysuria, no nocturia  Integument/breast: negative for skin rash or skin lesions  Hematologic/lymphatic: negative for known bleeding disorder  Musculoskeletal:chronic right leg wound with drainage.    Neurological: negative for lightheadedness, syncope or presyncopal events, no seizure or CVA history  Behavioral/Psych: negative for depression or chronic anxiety,   Endocrine: negative for polydyspia, polyuria or intolerance to heat or cold  Allergic/Immunologic: negative for chronic allergic rhinitis, or known connective tissue disorder      Objective:     Visit Vitals  BP (!) 103/57   Pulse 98   Temp 99.5 °F (37.5 °C)   Resp 16   Ht 5' 11\" (1.803 m)   Wt 127 kg (280 lb)   SpO2 93%   BMI 39.05 kg/m²        No intake/output data recorded. No intake/output data recorded. Data Review:   Recent Results (from the past 24 hour(s))   LACTIC ACID    Collection Time: 07/19/21 10:54 AM   Result Value Ref Range    Lactic acid 2.0 0.4 - 2.0 MMOL/L   CBC WITH AUTOMATED DIFF    Collection Time: 07/19/21 10:54 AM   Result Value Ref Range    WBC 19.9 (H) 4.3 - 11.1 K/uL    RBC 4.39 4.23 - 5.6 M/uL    HGB 13.0 (L) 13.6 - 17.2 g/dL    HCT 40.5 (L) 41.1 - 50.3 %    MCV 92.3 79.6 - 97.8 FL    MCH 29.6 26.1 - 32.9 PG    MCHC 32.1 31.4 - 35.0 g/dL    RDW 15.0 (H) 11.9 - 14.6 %    PLATELET 208 659 - 985 K/uL    MPV 11.5 9.4 - 12.3 FL    ABSOLUTE NRBC 0.00 0.0 - 0.2 K/uL    DF AUTOMATED      NEUTROPHILS 75 43 - 78 %    LYMPHOCYTES 11 (L) 13 - 44 %    MONOCYTES 12 4.0 - 12.0 %    EOSINOPHILS 1 0.5 - 7.8 %    BASOPHILS 0 0.0 - 2.0 %    IMMATURE GRANULOCYTES 1 0.0 - 5.0 %    ABS. NEUTROPHILS 15.0 (H) 1.7 - 8.2 K/UL    ABS. LYMPHOCYTES 2.1 0.5 - 4.6 K/UL    ABS. MONOCYTES 2.4 (H) 0.1 - 1.3 K/UL    ABS. EOSINOPHILS 0.1 0.0 - 0.8 K/UL    ABS. BASOPHILS 0.1 0.0 - 0.2 K/UL    ABS. IMM.  GRANS. 0.2 0.0 - 0.5 K/UL   METABOLIC PANEL, COMPREHENSIVE    Collection Time: 07/19/21 10:54 AM   Result Value Ref Range    Sodium 137 (L) 138 - 145 mmol/L    Potassium 3.7 3.5 - 5.1 mmol/L    Chloride 98 98 - 107 mmol/L    CO2 32 21 - 32 mmol/L    Anion gap 7 7 - 16 mmol/L    Glucose 101 (H) 65 - 100 mg/dL    BUN 33 (H) 8 - 23 MG/DL    Creatinine 2.34 (H) 0.8 - 1.5 MG/DL    GFR est AA 35 (L) >60 ml/min/1.73m2    GFR est non-AA 29 (L) >60 ml/min/1.73m2    Calcium 9.3 8.3 - 10.4 MG/DL    Bilirubin, total 1.6 (H) 0.2 - 1.1 MG/DL    ALT (SGPT) 17 12 - 65 U/L    AST (SGOT) 23 15 - 37 U/L    Alk. phosphatase 137 (H) 50 - 136 U/L    Protein, total 7.7 6.3 - 8.2 g/dL    Albumin 2.9 (L) 3.2 - 4.6 g/dL    Globulin 4.8 (H) 2.3 - 3.5 g/dL    A-G Ratio 0.6 (L) 1.2 - 3.5     SED RATE, AUTOMATED    Collection Time: 07/19/21 10:54 AM   Result Value Ref Range    Sed rate, automated 72 (H) 0 - 20 mm/hr   MAGNESIUM    Collection Time: 07/19/21 10:54 AM   Result Value Ref Range    Magnesium 1.8 1.8 - 2.4 mg/dL   TROPONIN-HIGH SENSITIVITY    Collection Time: 07/19/21 10:54 AM   Result Value Ref Range    Troponin-High Sensitivity 31.9 (H) 0 - 14 pg/mL   NT-PRO BNP    Collection Time: 07/19/21 10:54 AM   Result Value Ref Range    NT pro-BNP 1,279 (H) <450 PG/ML   EKG, 12 LEAD, INITIAL    Collection Time: 07/19/21 11:00 AM   Result Value Ref Range    Ventricular Rate 122 BPM    Atrial Rate 147 BPM    QRS Duration 132 ms    Q-T Interval 300 ms    QTC Calculation (Bezet) 427 ms    Calculated R Axis -36 degrees    Calculated T Axis 24 degrees    Diagnosis       !! AGE AND GENDER SPECIFIC ECG ANALYSIS !!   Atrial fibrillation with rapid ventricular response  Left axis deviation  Right bundle branch block  Anteroseptal infarct , age undetermined  Abnormal ECG  No previous ECGs available     BLOOD GAS, ARTERIAL POC    Collection Time: 07/19/21 12:08 PM   Result Value Ref Range    Device: NASAL CANNULA      pH (POC) 7.38 7.35 - 7.45      pCO2 (POC) 53.7 (H) 35 - 45 MMHG    pO2 (POC) 66 (L) 75 - 100 MMHG    HCO3 (POC) 31.8 (H) 22 - 26 MMOL/L    sO2 (POC) 91.8 (L) 95 - 98 %    Base excess (POC) 5.3 mmol/L    Allens test (POC) Positive      Site RIGHT RADIAL      Specimen type (POC) ARTERIAL      Performed by Mariella     CO2, POC 32 (H) 13 - 23 MMOL/L    FIO2, L/min 2     URINE MICROSCOPIC    Collection Time: 07/19/21  1:26 PM   Result Value Ref Range    WBC 0-3 0 /hpf    RBC 5-10 0 /hpf    Epithelial cells 0-3 0 /hpf    Bacteria 0 0 /hpf    Casts 10-20 0 /lpf       Physical Exam: General:  Alert, cooperative, no distress, appears stated age. Eyes:  Conjunctivae/corneas clear. Ears:  Normal TMs and external ear canals both ears. Nose: Nares normal. Septum midline. Mucosa normal. No drainage or sinus tenderness. Mouth/Throat: Lips, mucosa, and tongue normal. Teeth and gums normal.   Neck:  no JVD. Back:   deferred   Lungs:   Clear to auscultation bilaterally but limited breathe sounds. Heart:  Irregularly irregular    Abdomen:   Soft, non-tender. Bowel sounds normal. No masses,  No organomegaly. Extremities: Small ulcer to left plantar foot, no drainage or erythema. Right LE with 2+ edema and erythema. Draining wound noted. Pulses: 2+ and symmetric all extremities. Skin: As above    Lymph nodes: Cervical, supraclavicular, and axillary nodes normal.   Neurologic: CNII-XII intact. Assessment and Plan     Principal Problem:    Atrial fibrillation with RVR ([address] (7/19/2021)    Active Problems:    CAD (coronary artery disease) ()      Stage 3 chronic kidney disease ([address] (11/22/2017)      Controlled type 2 diabetes mellitus with diabetic polyneuropathy, without long-term current use of insulin ([address] (1/5/2018)      Cellulitis (7/19/2021)      Suspected CHF (congestive heart failure) (7/19/2021)    Right foot cellulitis - Start Vancomycin since failed outpatient clindamycin. No evidence of osteo on x ray. Order wound culture. Order US right leg to ensure no DVT    TAMMY- suspecting from volume overload. Lasix 40mg daily started. Strict Is and Os. HTN- HCTZ    New onset a fib with RVR- Possibly reactive to fluid overload. Consult cards. Check TSH/T4. ASA for now. S/p Cardizem in ER    Possible CHF- Order ECHO. Cards consult. Elevated total bili - Possibly reactive. Trend tomorrow, if still elevated may need RUQ US and hepatitis panel.  Holding statin    Atelectasis - IS    DVT prophylaxis - Lovenox  Signed By: Isiah Pizarro DO   July 19, 2021

## 2021-07-20 ENCOUNTER — APPOINTMENT (OUTPATIENT)
Dept: NON INVASIVE DIAGNOSTICS | Age: 76
DRG: 308 | End: 2021-07-20
Attending: FAMILY MEDICINE
Payer: MEDICARE

## 2021-07-20 LAB
ACC. NO. FROM MICRO ORDER, ACCP: ABNORMAL
ALBUMIN SERPL-MCNC: 2.5 G/DL (ref 3.2–4.6)
ALBUMIN/GLOB SERPL: 0.6 {RATIO} (ref 1.2–3.5)
ALP SERPL-CCNC: 117 U/L (ref 50–136)
ALT SERPL-CCNC: 17 U/L (ref 12–65)
ANION GAP SERPL CALC-SCNC: 6 MMOL/L (ref 7–16)
AST SERPL-CCNC: 20 U/L (ref 15–37)
BASOPHILS # BLD: 0.1 K/UL (ref 0–0.2)
BASOPHILS NFR BLD: 0 % (ref 0–2)
BILIRUB SERPL-MCNC: 1.2 MG/DL (ref 0.2–1.1)
BUN SERPL-MCNC: 43 MG/DL (ref 8–23)
CALCIUM SERPL-MCNC: 8.8 MG/DL (ref 8.3–10.4)
CHLORIDE SERPL-SCNC: 96 MMOL/L (ref 98–107)
CO2 SERPL-SCNC: 34 MMOL/L (ref 21–32)
CREAT SERPL-MCNC: 2.39 MG/DL (ref 0.8–1.5)
DIFFERENTIAL METHOD BLD: ABNORMAL
EOSINOPHIL # BLD: 0.1 K/UL (ref 0–0.8)
EOSINOPHIL NFR BLD: 1 % (ref 0.5–7.8)
ERYTHROCYTE [DISTWIDTH] IN BLOOD BY AUTOMATED COUNT: 15.1 % (ref 11.9–14.6)
GLOBULIN SER CALC-MCNC: 4.4 G/DL (ref 2.3–3.5)
GLUCOSE BLD STRIP.AUTO-MCNC: 114 MG/DL (ref 65–100)
GLUCOSE BLD STRIP.AUTO-MCNC: 139 MG/DL (ref 65–100)
GLUCOSE BLD STRIP.AUTO-MCNC: 183 MG/DL (ref 65–100)
GLUCOSE BLD STRIP.AUTO-MCNC: 234 MG/DL (ref 65–100)
GLUCOSE SERPL-MCNC: 140 MG/DL (ref 65–100)
HCT VFR BLD AUTO: 35.8 % (ref 41.1–50.3)
HGB BLD-MCNC: 11.3 G/DL (ref 13.6–17.2)
IMM GRANULOCYTES # BLD AUTO: 0.1 K/UL (ref 0–0.5)
IMM GRANULOCYTES NFR BLD AUTO: 0 % (ref 0–5)
INTERPRETATION: ABNORMAL
LYMPHOCYTES # BLD: 1.7 K/UL (ref 0.5–4.6)
LYMPHOCYTES NFR BLD: 12 % (ref 13–44)
MCH RBC QN AUTO: 29.4 PG (ref 26.1–32.9)
MCHC RBC AUTO-ENTMCNC: 31.6 G/DL (ref 31.4–35)
MCV RBC AUTO: 93 FL (ref 79.6–97.8)
MECA (METHICILLIN-RESISTANCE GENES), MRGP: NOT DETECTED
MONOCYTES # BLD: 1.6 K/UL (ref 0.1–1.3)
MONOCYTES NFR BLD: 11 % (ref 4–12)
NEUTS SEG # BLD: 10.6 K/UL (ref 1.7–8.2)
NEUTS SEG NFR BLD: 75 % (ref 43–78)
NRBC # BLD: 0 K/UL (ref 0–0.2)
PLATELET # BLD AUTO: 241 K/UL (ref 150–450)
PMV BLD AUTO: 11.6 FL (ref 9.4–12.3)
POTASSIUM SERPL-SCNC: 3.1 MMOL/L (ref 3.5–5.1)
PROT SERPL-MCNC: 6.9 G/DL (ref 6.3–8.2)
RBC # BLD AUTO: 3.85 M/UL (ref 4.23–5.6)
SERVICE CMNT-IMP: ABNORMAL
SODIUM SERPL-SCNC: 136 MMOL/L (ref 136–145)
STAPHYLOCOCCUS AUREUS: DETECTED
STAPHYLOCOCCUS, STAPP: DETECTED
UFH PPP CHRO-ACNC: 0.12 IU/ML (ref 0.3–0.7)
UFH PPP CHRO-ACNC: 0.23 IU/ML (ref 0.3–0.7)
UFH PPP CHRO-ACNC: 0.26 IU/ML (ref 0.3–0.7)
WBC # BLD AUTO: 14.2 K/UL (ref 4.3–11.1)

## 2021-07-20 PROCEDURE — 74011000258 HC RX REV CODE- 258: Performed by: INTERNAL MEDICINE

## 2021-07-20 PROCEDURE — 77030041974 HC CATH SYS PERIPH TELE -B

## 2021-07-20 PROCEDURE — 85520 HEPARIN ASSAY: CPT

## 2021-07-20 PROCEDURE — 99232 SBSQ HOSP IP/OBS MODERATE 35: CPT | Performed by: INTERNAL MEDICINE

## 2021-07-20 PROCEDURE — 80053 COMPREHEN METABOLIC PANEL: CPT

## 2021-07-20 PROCEDURE — 36415 COLL VENOUS BLD VENIPUNCTURE: CPT

## 2021-07-20 PROCEDURE — 97165 OT EVAL LOW COMPLEX 30 MIN: CPT

## 2021-07-20 PROCEDURE — 97162 PT EVAL MOD COMPLEX 30 MIN: CPT

## 2021-07-20 PROCEDURE — 85025 COMPLETE CBC W/AUTO DIFF WBC: CPT

## 2021-07-20 PROCEDURE — 74011250637 HC RX REV CODE- 250/637: Performed by: FAMILY MEDICINE

## 2021-07-20 PROCEDURE — 97530 THERAPEUTIC ACTIVITIES: CPT

## 2021-07-20 PROCEDURE — 74011250636 HC RX REV CODE- 250/636: Performed by: FAMILY MEDICINE

## 2021-07-20 PROCEDURE — 97535 SELF CARE MNGMENT TRAINING: CPT

## 2021-07-20 PROCEDURE — 82962 GLUCOSE BLOOD TEST: CPT

## 2021-07-20 PROCEDURE — 74011636637 HC RX REV CODE- 636/637: Performed by: FAMILY MEDICINE

## 2021-07-20 PROCEDURE — 76937 US GUIDE VASCULAR ACCESS: CPT

## 2021-07-20 PROCEDURE — 65660000000 HC RM CCU STEPDOWN

## 2021-07-20 PROCEDURE — 74011250637 HC RX REV CODE- 250/637: Performed by: INTERNAL MEDICINE

## 2021-07-20 PROCEDURE — 74011250636 HC RX REV CODE- 250/636: Performed by: INTERNAL MEDICINE

## 2021-07-20 PROCEDURE — 77030020847 HC STATLOK BARD -A

## 2021-07-20 RX ORDER — HEPARIN SODIUM 5000 [USP'U]/ML
20 INJECTION, SOLUTION INTRAVENOUS; SUBCUTANEOUS ONCE
Status: COMPLETED | OUTPATIENT
Start: 2021-07-21 | End: 2021-07-21

## 2021-07-20 RX ORDER — ROPINIROLE 0.5 MG/1
0.25 TABLET, FILM COATED ORAL
Status: DISCONTINUED | OUTPATIENT
Start: 2021-07-20 | End: 2021-07-25

## 2021-07-20 RX ORDER — HEPARIN SODIUM 5000 [USP'U]/ML
40 INJECTION, SOLUTION INTRAVENOUS; SUBCUTANEOUS ONCE
Status: COMPLETED | OUTPATIENT
Start: 2021-07-20 | End: 2021-07-20

## 2021-07-20 RX ORDER — METOPROLOL TARTRATE 25 MG/1
25 TABLET, FILM COATED ORAL 2 TIMES DAILY
Status: DISCONTINUED | OUTPATIENT
Start: 2021-07-21 | End: 2021-07-23

## 2021-07-20 RX ORDER — ATORVASTATIN CALCIUM 40 MG/1
40 TABLET, FILM COATED ORAL
Status: DISCONTINUED | OUTPATIENT
Start: 2021-07-20 | End: 2021-07-27 | Stop reason: HOSPADM

## 2021-07-20 RX ORDER — METOPROLOL TARTRATE 25 MG/1
12.5 TABLET, FILM COATED ORAL 3 TIMES DAILY
Status: DISCONTINUED | OUTPATIENT
Start: 2021-07-20 | End: 2021-07-20

## 2021-07-20 RX ORDER — HEPARIN SODIUM 5000 [USP'U]/ML
20 INJECTION, SOLUTION INTRAVENOUS; SUBCUTANEOUS ONCE
Status: COMPLETED | OUTPATIENT
Start: 2021-07-20 | End: 2021-07-20

## 2021-07-20 RX ORDER — POTASSIUM CHLORIDE 20 MEQ/1
40 TABLET, EXTENDED RELEASE ORAL
Status: COMPLETED | OUTPATIENT
Start: 2021-07-20 | End: 2021-07-20

## 2021-07-20 RX ADMIN — HYDROCHLOROTHIAZIDE 25 MG: 25 TABLET ORAL at 09:30

## 2021-07-20 RX ADMIN — POTASSIUM CHLORIDE 40 MEQ: 20 TABLET, EXTENDED RELEASE ORAL at 21:41

## 2021-07-20 RX ADMIN — HEPARIN SODIUM 12 UNITS/KG/HR: 5000 INJECTION, SOLUTION INTRAVENOUS at 00:43

## 2021-07-20 RX ADMIN — SODIUM CHLORIDE, POTASSIUM CHLORIDE, SODIUM LACTATE AND CALCIUM CHLORIDE 250 ML: 600; 310; 30; 20 INJECTION, SOLUTION INTRAVENOUS at 16:40

## 2021-07-20 RX ADMIN — PREGABALIN 200 MG: 100 CAPSULE ORAL at 09:30

## 2021-07-20 RX ADMIN — Medication 10 ML: at 15:08

## 2021-07-20 RX ADMIN — Medication 10 ML: at 05:32

## 2021-07-20 RX ADMIN — ROPINIROLE HYDROCHLORIDE 0.25 MG: 0.5 TABLET, FILM COATED ORAL at 21:41

## 2021-07-20 RX ADMIN — FUROSEMIDE 40 MG: 40 TABLET ORAL at 09:30

## 2021-07-20 RX ADMIN — HEPARIN SODIUM 1900 UNITS: 5000 INJECTION INTRAVENOUS; SUBCUTANEOUS at 16:31

## 2021-07-20 RX ADMIN — HEPARIN SODIUM 5750 UNITS: 5000 INJECTION INTRAVENOUS; SUBCUTANEOUS at 00:39

## 2021-07-20 RX ADMIN — ASPIRIN 81 MG: 81 TABLET ORAL at 09:30

## 2021-07-20 RX ADMIN — ATORVASTATIN CALCIUM 40 MG: 40 TABLET, FILM COATED ORAL at 21:42

## 2021-07-20 RX ADMIN — HEPARIN SODIUM 3850 UNITS: 5000 INJECTION INTRAVENOUS; SUBCUTANEOUS at 09:29

## 2021-07-20 RX ADMIN — METOPROLOL TARTRATE 12.5 MG: 25 TABLET, FILM COATED ORAL at 16:44

## 2021-07-20 RX ADMIN — Medication 10 ML: at 21:42

## 2021-07-20 RX ADMIN — CEFTRIAXONE 1 G: 1 INJECTION, POWDER, FOR SOLUTION INTRAMUSCULAR; INTRAVENOUS at 14:30

## 2021-07-20 RX ADMIN — INSULIN LISPRO 2 UNITS: 100 INJECTION, SOLUTION INTRAVENOUS; SUBCUTANEOUS at 09:30

## 2021-07-20 RX ADMIN — VANCOMYCIN HYDROCHLORIDE 1250 MG: 10 INJECTION, POWDER, LYOPHILIZED, FOR SOLUTION INTRAVENOUS at 14:30

## 2021-07-20 RX ADMIN — HEPARIN SODIUM 18 UNITS/KG/HR: 5000 INJECTION, SOLUTION INTRAVENOUS at 19:10

## 2021-07-20 RX ADMIN — INSULIN LISPRO 4 UNITS: 100 INJECTION, SOLUTION INTRAVENOUS; SUBCUTANEOUS at 21:42

## 2021-07-20 NOTE — PROGRESS NOTES
Throughout the night pt had no changes, needs met, no s/s of distress. Pt in bed low, locked position, call light and personal items in reach, no complaints at this time. Change of shift bedside report to be given to oncoming nurse. Heparin gtt infusion at ordered rate, two nurse hand off verification to be completed and documented in [facility]

## 2021-07-20 NOTE — CONSULTS
[facility]  CONSULTATION    Name:  Mable Covington  MR#:  [id number]  :  1945  ACCOUNT #:  [de-identified]  DATE OF SERVICE:  2021    REFERRING PHYSICIAN:  Cindi Schwab DO    CLINICAL INDICATION:  Atrial fibrillation/congestive heart failure. HISTORY OF PRESENT ILLNESS:  The patient is a 27-year-old  male who was admitted by the hospitalist service for evaluation of right foot cellulitis, atrial fibrillation and possible volume overload. The patient apparently was in his podiatrist office today for evaluation of right foot cellulitis and edema. He has been on clindamycin for the past 4 days without improvement. He was noted to have a rapid heart rate and was subsequently sent to the emergency room. He was found to be in atrial fibrillation with rapid ventricular response rate and the patient's medical record states that his admission chest x-ray showed volume overload. The patient was given IV Cardizem bolus in the emergency room as well as Lasix therapy. The patient's EKG shows atrial fibrillation with a right bundle-branch block and left anterior fascicular block. He has been placed on remote telemetry and his present heart rate is between 95 and 102 beats per minute. The patient states that he was unaware of his irregular rapid heart beat. The patient does admit to a 2-3 week history of increasing dyspnea with minimal exertion. He states he works at [employer] in the [employer] department and over the last 2-3 weeks he is becoming more, more short of breath performing normal routine activities at work. He reports no tachycardia, palpitations or chest pain. The patient states that he has had increasing right foot swelling, as well as pain and redness with increasing warmth. The patient does have a history of coronary artery disease. He states he experienced a myocardial infarction in  and received coronary artery stenting in [facility].   He states he has not seen a cardiologist in many years. He denies any associated history of valvular heart disease, congestive heart failure. He does have a history of hypertension and type 2 diabetes with diabetic neuropathy. ADDITIONAL PAST MEDICAL HISTORY:  1. Upper GI bleed secondary to gastric ulcer due to aspirin use in  requiring transfusion and conservative medical therapy. 2.  Hypertension. 3.  Type 2 diabetes with peripheral neuropathy. 4.  Dyslipidemia. 5.  Surgical history of right ankle surgery. SOCIAL HISTORY:  The patient does not smoke or drink. FAMILY HISTORY:  Mother  at 76years of age secondary to brain cancer. Father  at 66years of age secondary to liver cancer. ALLERGIES:  NO STATED ALLERGIES. PRESENT MEDICATIONS:  Aspirin 81 mg daily, Lasix 40 mg daily, Lovenox 40 mg subcu daily, hydrochlorothiazide 25 mg daily and sliding scale Humalog insulin daily. ADDITIONAL HOME MEDICATIONS:  Include Lipitor 40 mg daily, Glucophage 500 mg daily, HCTZ 25 mg daily, Lyrica 200 mg daily, aspirin 81 mg daily. REVIEW OF SYSTEMS:  SKIN:  The patient denies rash. NEURO:  The patient denies stroke, TIA or seizure. PSYCH:  The patient denies anxiety or depression. ENT:  The patient denies headache. PULMONARY:  The patient complains of increasing dyspnea with exertion over the last 3 weeks. He denies productive cough. CARDIOVASCULAR:  The patient denies chest pain, PND, orthopnea. He does admit to right lower extremity edema. GI:  The patient denies diarrhea or melena. :  The patient denies hematuria, dysuria. MUSCULOSKELETAL:  The patient denies fall or fracture. PHYSICAL EXAMINATION:  VITAL SIGNS:  Blood pressure 119/77, heart rate is 99, respirations 20, O2 saturation on room air is 97%. GENERAL:  The patient is a pleasant elderly obese  male in no acute distress. SKIN:  Warm and dry. NEURO:  Alert and oriented.   PSYCH:  Normal mood and affect. ENT:  Normocephalic, atraumatic. Pupils reactive. NECK:  Supple. 2+ carotid upstroke. CHEST:  Reveals minimal bibasilar crackles. CARDIAC:  Exam reveals an irregularly irregular rate and rhythm. ABDOMEN:  Soft, obese, positive bowel sounds. EXTREMITIES:  Shows 2-3+ edema of the right lower leg involving the ankle and foot with increased warmth and erythema. He also has an ulceration inferior to the right lateral malleolus. LABORATORY DATA:  1.  CBC:  WBC is 19.9, hemoglobin is 13.0, hematocrit is 40.5, platelet count is 830,930.  2.  Electrolytes:  Sodium is 137, potassium is 3.7, chloride is 90, CO2 is 32, creatinine is 2.34, BUN is 33. GFR is 29. NT-proBNP is 1279. High sensitivity troponin is 31.9 with a followup of 26.6. CRP is greater than 15. EKG shows atrial fibrillation with rapid ventricular response rate, right bundle branch block and left anterior fascicular block. Chest x-ray shows minimal right basilar atelectasis or pneumonia. Right foot x-ray shows severe soft tissue swelling, severe hammertoe deformity, total obstruction of the talus with marked abnormal hind foot joint. No evidence of osteomyelitis. IMPRESSION AND PLAN:  1. Atrial fibrillation:  Undetermined duration. Clinically, the patient has symptoms of unexplained dyspnea on exertion for 2-3 weeks which could represent symptomatic atrial fibrillation. He is unaware of his tachycardia or irregular heart rhythm. His duration of atrial fibrillation cannot be readily determined, but could represent greater than 48 hours duration. At this point, I would consider systemic anticoagulation. Given the patient's renal failure may consider heparin IV continuous infusion at this time with consideration of warfarin therapy verses a NOAC pending his renal function. I would also start beta blocker therapy for additional rate control. The patient's heart rate is not significantly elevated at this time. Echocardiogram to assess his left ventricular function and cardiac chamber size, as well as rule out thrombus and further evaluate valvular heart disease. If significant left ventricular dysfunction is noted, future considerations for possible ACE inhibitor therapy could be entertained. However, given the patient's degree of renal failure, I would not institute this therapy at the present time. The the patient's TSH is 0.655 and his T4 is 1.4 at the present time. The patient's atrial fibrillation could be secondary to his underlying right lower extremity cellulitis. Vancomycin has been started by the hospitalist.  A right leg ultrasound has been ordered to exclude deep venous thrombosis. May consider possible blood cultures to rule out occult sepsis/bacteremia per the hospitalist.  2.  Elevated proBNP:  The patient's chest x-ray could represent possible mild volume overload. I will defer adding additional diuretic therapy given the patient's renal failure until stability is assured. He does not appear to be in any acute distress. The patient is not hypoxic at the present time. Echocardiogram has been ordered to further assess left ventricular function. Additional recommendations forthcoming. 3.  Coronary artery disease with distant history of stent placement - presently asymptomatic. The patient has been on aspirin and Lipitor therapy at home. The patient has mild elevation of his total bilirubin and statins have been held at the present time by the hospitalist.  4.  History of hypertension - presently controlled. 5.  History of type 2 diabetes, presently on sliding scale insulin with a history of lower extremity neuropathy per the hospitalist.  6.  Right leg cellulitis per the hospitalist:  He presently had been started on vancomycin. Khai Saravia MD      [id number]  D:  07/19/2021 20:25  T:  07/20/2021 1:37  JOB #:  [id number]  CC:  Maxwell Hickman.  Fahad Stone, [address] [place] [address],[facility] Card[facility]

## 2021-07-20 NOTE — PROGRESS NOTES
Chart reviewed by CM for discharge planning. Per PT/OT eval this day, HH therapy has been recommended. CM met with patient to discuss [place] services. Patient is agreeable to [facility] [place]. CM provided list of [place] agencies. Patient to review [facility] [place] list with daughter. CM will follow up, to obtain [facility] [place] choice. CM continues to follow. Discharge Plan : Home with [place].

## 2021-07-20 NOTE — PROGRESS NOTES
ACUTE PHYSICAL THERAPY GOALS:  (Developed with and agreed upon by patient and/or caregiver.)    (1.) David Rob  will move from supine to sit and sit to supine , scoot up and down and roll side to side with MODIFIED INDEPENDENCE within 7 treatment day(s). (2.) David Rob will transfer from bed to chair and chair to bed with MODIFIED INDEPENDENCE using the least restrictive device within 7 treatment day(s). (3.) David Rivas will ambulate with MODIFIED INDEPENDENCE for 1000 feet with the least restrictive device within 7 treatment day(s). (4.) David Pack will perform standing static and dynamic balance activities x 20 minutes with MODIFIED INDEPENDENCE to improve safety within 7 treatment day(s). (5.) David Rob will ascend and descend 1 stairs using no hand rail(s) with MODIFIED INDEPENDENCE to improve functional mobility and safety within 7 treatment day(s). (6.) David Rob will perform bilateral lower extremity exercises x 20 min for HEP with MODIFIED INDEPENDENCE to improve strength, endurance, and functional mobility within 7 treatment day(s).      PHYSICAL THERAPY ASSESSMENT: Initial Assessment and AM PT Treatment Day # 1      David Rivas is a 77 y.o. male   PRIMARY DIAGNOSIS: Atrial fibrillation with RVR (HCC)  Cellulitis [L03.90]  Atrial fibrillation with RVR (HCC) [I48.91]  Suspected CHF (congestive heart failure) [R09.89]       Reason for Referral:    ICD-10: Treatment Diagnosis: Generalized Muscle Weakness (M62.81)  Other lack of cordination (R27.8)  Difficulty in walking, Not elsewhere classified (R26.2)  Other abnormalities of gait and mobility (R26.89)  Repeated Falls (R29.6)  History of falling (Z91.81)  Abnormal posture (R29.3)  INPATIENT: Payor: [employer] / Plan: Miguel Roy / Product Type: Managed Care Medicare /     ASSESSMENT:     REHAB RECOMMENDATIONS:   Recommendation to date pending progress:  [address] [place] [address] Therapy  Equipment:    Rolling Sarika Gill; pt has quad cane     PRIOR LEVEL OF FUNCTION:  (Prior to Hospitalization) INITIAL/CURRENT LEVEL OF FUNCTION:  (Most Recently Demonstrated)   Bed Mobility:   Modified Independent  Sit to Stand:   Modified Independent  Transfers:   Modified Independent  Gait/Mobility:   Modified Independent Bed Mobility:   Minimal Assistance  Sit to Stand:   min-mod x 2   Transfers:   min-mod x 2  Gait/Mobility:  [employer] Guard Assistance     ASSESSMENT:  Mr. Kaylen Saravia is a 77year old M who presents to hospital from podiatrist w/ new onset Afib RVR. Pt had been seeing his MD for R foot cellulitis and edema; pt has wounds on BLE. This date pt performs mobility including bed mobility, sit to stand, amb in hallway for 220 ft. W/ RW, and stand to sit with min-mod a x 2 for sit to stand and CGA for ambulation. Pt required increased assistance when standing from the chair versus the bed and was somewhat unsteady during ambulation, likely due to neuropathy. Pt works part time at [employer], so he is up on his feet often but is typically holding onto a shopping cart around the store. He reported no pain and was very willing to work w/ therapy. MD was present for 10 min towards th end of treatment session. Pt presents as functioning below his baseline, with deficits in mobility including transfers, gait, balance, and activity tolerance. Pt will benefit from skilled therapy services to address stated deficits to promote return to highest level of function, independence, and safety. Will continue to follow. SUBJECTIVE:   Mr. Kaylen Saravia states, \"I work at [employer]. \"    SOCIAL HISTORY/LIVING ENVIRONMENT: Lives in mobile home w/ spouse and daughter. 1 SARA w/ no HR. Works part time at [employer]. Uses quad cane in the community and no AD in the home. Hx of 5-6 falls in past 6 months. Tub shower w/ grab bars.   Home Environment: Private residence  One/Two Story Residence: One story  Living Alone: No  Support Systems: Child(pat)  OBJECTIVE:     PAIN: VITAL SIGNS: LINES/DRAINS:   Pre Treatment: Pain Screen  Pain Scale 1: Numeric (0 - 10)  Pain Intensity 1: 0  Post Treatment: 0/10   Pineda Catheter and IV  O2 Device: Nasal cannula     GROSS EVALUATION:  B LE Within Functional Limits Abnormal/ Functional Abnormal/ Non-Functional (see comments) Not Tested Comments:   AROM [x] [] [] []    PROM [x] [] [] []    Strength [] [x] [] []    Balance [] [x] [] []    Posture [] [x] [] []    Sensation [] [] [x] [] Neuropathy; decreased distal to knee B LE   Coordination [] [x] [] []    Tone [] [] [] [x]    Edema [] [] [x] [] Cellulitis in R LE   Activity Tolerance [] [x] [] []     [] [] [] []      COGNITION/  PERCEPTION: Intact Impaired   (see comments) Comments:   Orientation [x] []    Vision [x] []    Hearing [x] []    Command Following [x] []    Safety Awareness [x] []     [] []      MOBILITY: I Mod I S SBA CGA Min Mod Max Total  NT x2 Comments:   Bed Mobility    Rolling [] [] [] [] [] [x] [] [] [] [] []    Supine to Sit [] [] [] [] [] [x] [] [] [] [] []    Scooting [] [] [] [] [] [x] [] [] [] [] []    Sit to Supine [] [] [] [] [] [] [] [] [] [x] []    Transfers    Sit to Stand [] [] [] [] [] [x] [x] [] [] [] [x]    Bed to Chair [] [] [] [] [] [x] [x] [] [] [] [x]    Stand to Sit [] [] [] [] [] [x] [] [] [] [] []    I=Independent, Mod I=Modified Independent, S=Supervision, SBA=Standby Assistance, CGA=Contact Guard Assistance,   Min=Minimal Assistance, Mod=Moderate Assistance, Max=Maximal Assistance, Total=Total Assistance, NT=Not Tested  GAIT: I Mod I S SBA CGA Min Mod Max Total  NT x2 Comments:   Level of Assistance [] [] [] [] [x] [] [] [] [] [] []    Distance 220 ft.    DME Rolling Walker and Gait Belt    Gait Quality Decreased heel strike and toe off, some unsteadiness    Weightbearing Status N/A     I=Independent, Mod I=Modified Independent, S=Supervision, SBA=Standby Assistance, CGA=Contact Guard Assistance,   Min=Minimal Assistance, Mod=Moderate Assistance, Max=Maximal Assistance, Total=Total Assistance, NT=Not Tested    [address] [place] [id number] [facility] Form       How much difficulty does the patient currently have. .. Unable A Lot A Little None   1. Turning over in bed (including adjusting bedclothes, sheets and blankets)? [] 1   [] 2   [x] 3   [] 4   2. Sitting down on and standing up from a chair with arms ( e.g., wheelchair, bedside commode, etc.)   [] 1   [] 2   [x] 3   [] 4   3. Moving from lying on back to sitting on the side of the bed? [] 1   [] 2   [x] 3   [] 4   How much help from another person does the patient currently need. .. Total A Lot A Little None   4. Moving to and from a bed to a chair (including a wheelchair)? [] 1   [] 2   [x] 3   [] 4   5. Need to walk in hospital room? [] 1   [] 2   [x] 3   [] 4   6. Climbing 3-5 steps with a railing? [] 1   [x] 2   [] 3   [] 4   © 2007, Trustees of [address] [place] [id number], under license to [employer]. All rights reserved     Score:  Initial: 17 Most Recent: X (Date: -- )    Interpretation of Tool:  Represents activities that are increasingly more difficult (i.e. Bed mobility, Transfers, Gait). PLAN:   FREQUENCY/DURATION: PT Plan of Care: 3 times/week for duration of hospital stay or until stated goals are met, whichever comes first.    PROBLEM LIST:   (Skilled intervention is medically necessary to address:)  1. Decreased ADL/Functional Activities  2. Decreased Activity Tolerance  3. Decreased Balance  4. Decreased Coordination  5. Decreased Gait Ability  6. Decreased Strength  7. Decreased Transfer Abilities   INTERVENTIONS PLANNED:   (Benefits and precautions of physical therapy have been discussed with the patient.)  1. Therapeutic Activity  2. Therapeutic Exercise/HEP  3. Neuromuscular Re-education  4. Gait Training  5. Manual Therapy  6.  Education     TREATMENT:     EVALUATION: Moderate Complexity : (Untimed Charge)    TREATMENT:   ($$ Therapeutic Activity: 53-67 mins    )  Therapeutic Activity (53 Minutes): Therapeutic activity included Rolling, Supine to Sit, Scooting, Transfer Training, Ambulation on level ground, Sitting balance  and Standing balance to improve functional Mobility, Strength, ROM and Activity tolerance.     TREATMENT GRID:  N/A    AFTER TREATMENT POSITION/PRECAUTIONS:  Chair, Needs within reach, RN notified and Visitors at bedside    INTERDISCIPLINARY COLLABORATION:  MD/PA/NP, RN/PCT and PT/PTA    TOTAL TREATMENT DURATION:  PT Patient Time In/Time Out  Time In: 9593  Time Out: STANLEY Acuña

## 2021-07-20 NOTE — PROGRESS NOTES
Progress Note    Patient: Dina Gross MRN: [id number]  SSN: [id number]    YOB: 1945  Age: 77 y.o. Sex: male      Admit Date: 7/19/2021    LOS: 1 day     Assessment and Plan:   76 y/o M with PMH of DM type II, HLD, HTN that presented with R foot wound and swelling found to be on Afib RVR     1. Right foot wound infection with surrounding cellulitis that failed outpatient antibiotic therapy  - US negative for DVT  - Continue vancomycin  - Start ceftriaxone  - Follow wound cultures  - Wound care consult    2. New onset Afib RVR  - Continue metoprolol  - Continue heparin gtt  - Telemetry monitoring   - Cardiology recs    3. TAMMY likely prerenal   - Avoid nephrotoxic agents  - Monitor wagner function     3. ?HF  - Hold diuretics for now in the setting of AK  - Strict I&Os  - Monitor daily weight  - TTE  - Cardiology recs     4. HTN  - Continue metoprolol  - Hold HCTZ in the setting of TAMMY     DVT ppx heparin gtt       Subjective:   76 y/o M with PMH of DM type II, HLD, HTN that presented with R foot wound and swelling found to be on Afib RVR  Patient seen and examined at bedside. This morning still having right foot redness and swelling.      Objective:     Vitals:    07/20/21 0400 07/20/21 0513 07/20/21 0716 07/20/21 1142   BP:  (!) 105/58 90/69 100/80   Pulse: (!) 101 98 (!) 107 96   Resp:  18 20 20   Temp:  98.5 °F (36.9 °C) 98.1 °F (36.7 °C) 97.9 °F (36.6 °C)   SpO2:  94% 96% 96%   Weight:  127.4 kg (280 lb 12.8 oz)     Height:            Intake and Output:  Current Shift: 07/20 0701 - 07/20 1900  In: 240 [P.O.:240]  Out: -   Last three shifts: 07/18 1901 - 07/20 0700  In: -   Out: 1525 [Urine:1525]    ROS  10 ROS negative except from stated on subjective    Physical Exam:   General: Alert, oriented, NAD  HEENT: NC/AT, EOM are intact  Neck: supple, no JVD  Cardiovascular: RRR, S1, S2, no murmurs  Respiratory: Lungs are clear, no wheezes or rales  Abdomen: Soft, NT, ND  Back: No CVA tenderness, no paraspinal tenderness  Extremities: RLE edema and erythema   Neuro: A&O, CN are intact, no focal deficits  Skin: no rash or ulcers  Psych: good mood and affect    Lab/Data Review:  I have personally reviewed patients laboratory data showing  Recent Results (from the past 24 hour(s))   URINE MICROSCOPIC    Collection Time: 07/19/21  1:26 PM   Result Value Ref Range    WBC 0-3 0 /hpf    RBC 5-10 0 /hpf    Epithelial cells 0-3 0 /hpf    Bacteria 0 0 /hpf    Casts 10-20 0 /lpf   TROPONIN-HIGH SENSITIVITY    Collection Time: 07/19/21  1:46 PM   Result Value Ref Range    Troponin-High Sensitivity 26.6 (H) 0 - 14 pg/mL   T4, FREE    Collection Time: 07/19/21  1:46 PM   Result Value Ref Range    T4, Free 1.4 0.78 - 1.46 NG/DL   TSH 3RD GENERATION    Collection Time: 07/19/21  1:46 PM   Result Value Ref Range    TSH 0.655 0.358 - 3.740 uIU/mL   CULTURE, WOUND W GRAM STAIN    Collection Time: 07/19/21  2:45 PM    Specimen: Foot; Wound   Result Value Ref Range    Special Requests: NO SPECIAL REQUESTS      GRAM STAIN 25 TO 54 WBC'S/OIF     GRAM STAIN FEW GRAM POSITIVE COCCI      Culture result:        NO GROWTH AFTER SHORT PERIOD OF INCUBATION. FURTHER RESULTS TO FOLLOW AFTER OVERNIGHT INCUBATION.    LACTIC ACID    Collection Time: 07/19/21  6:27 PM   Result Value Ref Range    Lactic acid 1.8 0.4 - 2.0 MMOL/L   GLUCOSE, POC    Collection Time: 07/19/21  8:43 PM   Result Value Ref Range    Glucose (POC) 158 (H) 65 - 100 mg/dL    Performed by Emilee    PTT    Collection Time: 07/19/21  9:24 PM   Result Value Ref Range    aPTT 31.7 24.1 - 35.1 SEC   CBC WITH AUTOMATED DIFF    Collection Time: 07/19/21  9:24 PM   Result Value Ref Range    WBC 16.5 (H) 4.3 - 11.1 K/uL    RBC 3.91 (L) 4.23 - 5.6 M/uL    HGB 11.5 (L) 13.6 - 17.2 g/dL    HCT 36.7 (L) 41.1 - 50.3 %    MCV 93.9 79.6 - 97.8 FL    MCH 29.4 26.1 - 32.9 PG    MCHC 31.3 (L) 31.4 - 35.0 g/dL    RDW 15.2 (H) 11.9 - 14.6 %    PLATELET 407 886 - 830 K/uL    MPV 11.2 9.4 - 12.3 FL    ABSOLUTE NRBC 0.00 0.0 - 0.2 K/uL    DF AUTOMATED      NEUTROPHILS 76 43 - 78 %    LYMPHOCYTES 11 (L) 13 - 44 %    MONOCYTES 12 4.0 - 12.0 %    EOSINOPHILS 0 (L) 0.5 - 7.8 %    BASOPHILS 0 0.0 - 2.0 %    IMMATURE GRANULOCYTES 1 0.0 - 5.0 %    ABS. NEUTROPHILS 12.6 (H) 1.7 - 8.2 K/UL    ABS. LYMPHOCYTES 1.8 0.5 - 4.6 K/UL    ABS. MONOCYTES 1.9 (H) 0.1 - 1.3 K/UL    ABS. EOSINOPHILS 0.0 0.0 - 0.8 K/UL    ABS. BASOPHILS 0.1 0.0 - 0.2 K/UL    ABS. IMM. GRANS. 0.1 0.0 - 0.5 K/UL   HEPARIN XA UFH    Collection Time: 07/19/21  9:24 PM   Result Value Ref Range    Heparin Xa UFH <0.1 (L) 0.3 - 0.7 IU/mL   CBC WITH AUTOMATED DIFF    Collection Time: 07/20/21  7:06 AM   Result Value Ref Range    WBC 14.2 (H) 4.3 - 11.1 K/uL    RBC 3.85 (L) 4.23 - 5.6 M/uL    HGB 11.3 (L) 13.6 - 17.2 g/dL    HCT 35.8 (L) 41.1 - 50.3 %    MCV 93.0 79.6 - 97.8 FL    MCH 29.4 26.1 - 32.9 PG    MCHC 31.6 31.4 - 35.0 g/dL    RDW 15.1 (H) 11.9 - 14.6 %    PLATELET 662 306 - 376 K/uL    MPV 11.6 9.4 - 12.3 FL    ABSOLUTE NRBC 0.00 0.0 - 0.2 K/uL    DF AUTOMATED      NEUTROPHILS 75 43 - 78 %    LYMPHOCYTES 12 (L) 13 - 44 %    MONOCYTES 11 4.0 - 12.0 %    EOSINOPHILS 1 0.5 - 7.8 %    BASOPHILS 0 0.0 - 2.0 %    IMMATURE GRANULOCYTES 0 0.0 - 5.0 %    ABS. NEUTROPHILS 10.6 (H) 1.7 - 8.2 K/UL    ABS. LYMPHOCYTES 1.7 0.5 - 4.6 K/UL    ABS. MONOCYTES 1.6 (H) 0.1 - 1.3 K/UL    ABS. EOSINOPHILS 0.1 0.0 - 0.8 K/UL    ABS. BASOPHILS 0.1 0.0 - 0.2 K/UL    ABS. IMM.  GRANS. 0.1 0.0 - 0.5 K/UL   METABOLIC PANEL, COMPREHENSIVE    Collection Time: 07/20/21  7:06 AM   Result Value Ref Range    Sodium 136 136 - 145 mmol/L    Potassium 3.1 (L) 3.5 - 5.1 mmol/L    Chloride 96 (L) 98 - 107 mmol/L    CO2 34 (H) 21 - 32 mmol/L    Anion gap 6 (L) 7 - 16 mmol/L    Glucose 140 (H) 65 - 100 mg/dL    BUN 43 (H) 8 - 23 MG/DL    Creatinine 2.39 (H) 0.8 - 1.5 MG/DL    GFR est AA 34 (L) >60 ml/min/1.73m2    GFR est non-AA 28 (L) >60 ml/min/1.73m2    Calcium 8.8 8.3 - 10.4 MG/DL    Bilirubin, total 1.2 (H) 0.2 - 1.1 MG/DL    ALT (SGPT) 17 12 - 65 U/L    AST (SGOT) 20 15 - 37 U/L    Alk. phosphatase 117 50 - 136 U/L    Protein, total 6.9 6.3 - 8.2 g/dL    Albumin 2.5 (L) 3.2 - 4.6 g/dL    Globulin 4.4 (H) 2.3 - 3.5 g/dL    A-G Ratio 0.6 (L) 1.2 - 3.5     HEPARIN XA UFH    Collection Time: 07/20/21  7:06 AM   Result Value Ref Range    Heparin Xa UFH 0.12 (L) 0.3 - 0.7 IU/mL   GLUCOSE, POC    Collection Time: 07/20/21  7:17 AM   Result Value Ref Range    Glucose (POC) 183 (H) 65 - 100 mg/dL    Performed by Skyler    GLUCOSE, POC    Collection Time: 07/20/21 11:10 AM   Result Value Ref Range    Glucose (POC) 139 (H) 65 - 100 mg/dL    Performed by Skyler       All Micro Results     Procedure Component Value Units Date/Time    BLOOD CULTURE [[id number]] Collected: 07/19/21 1211    Order Status: Completed Specimen: Blood Updated: 07/20/21 1237     Special Requests: --        RIGHT  FOREARM       GRAM STAIN       GRAM POS COCCI IN CLUSTERS                  AEROBIC AND ANAEROBIC BOTTLES                  CRITICAL RESULT NOT CALLED DUE TO PREVIOUS NOTIFICATION OF CRITICAL RESULT WITHIN THE LAST 24 HOURS. Culture result:       CULTURE IN [facility] UPDATES TO FOLLOW          Wolfgang CUI STAIN [[id number]] Collected: 07/19/21 1445    Order Status: Completed Specimen: Wound from Foot Updated: 07/20/21 1047     Special Requests: NO SPECIAL REQUESTS        GRAM STAIN 25 TO 54 WBC'S/OIF      FEW GRAM POSITIVE COCCI        Culture result:       NO GROWTH AFTER SHORT PERIOD OF INCUBATION. FURTHER RESULTS TO FOLLOW AFTER OVERNIGHT INCUBATION.           BLOOD CULTURE [[id number]] Collected: 07/19/21 1054    Order Status: Completed Specimen: Blood Updated: 07/20/21 0514     Special Requests: --        RIGHT  HAND       GRAM STAIN       GRAM POS COCCI IN CLUSTERS                  AEROBIC AND ANAEROBIC BOTTLES                  RESULTS VERIFIED, PHONED TO AND READ BACK BY Gwenyth Dance AT 4429 ON 7/20/21, Ned Rodriguez           Culture result:       CULTURE IN [facility] UPDATES TO FOLLOW                  Refer to Blood Culture ID Panel Accession  [id number]      BLOOD CULTURE ID PANEL [[id number]]  (Abnormal) Collected: 07/19/21 1054    Order Status: Completed Specimen: Blood Updated: 07/20/21 0442     Acc. no. from Micro Order [id number]     Staphylococcus Detected        Comment: RESULTS VERIFIED, PHONED TO AND READ BACK BY  Gwenyth Dance AT 4285 ON 7/20/21, LANI          Staphylococcus aureus Detected        Comment: RESULTS VERIFIED, PHONED TO AND READ BACK BY  Gwenyth Dance AT 9424 ON 7/20/21, LANI          mecA (Methicillin-Resistance Genes) NOT DETECTED        INTERPRETATION       Gram positive cocci in clusters, identified in realtime PCR as probable MSSA. Comment: Recommend discontinuing IV vancomycin starting cefazolin or nafcillin if patient not on beta-lactam therapy. Infectious Diseases Consult recommended in adult patients. THIS TEST DOES NOT REPLACE SENSITIVITY TESTING. Image:  I have personally reviewed patients imaging showing  DUPLEX LOWER EXT VENOUS RIGHT   Final Result   No DVT in the deep veins of the right leg. XR FOOT RT MIN 3 V   Final Result   No radiographic evidence of osteomyelitis. Diffuse soft tissue swelling. Severe hammertoe deformity. Total destruction of the talus with markedly abnormal hindfoot joints. XR CHEST PORT   Final Result   Minimal right basilar atelectasis or pneumonia.            Hospital problems     Principal Problem:    Atrial fibrillation with RVR ([facility] [address] (7/19/2021)    Active Problems:    CAD (coronary artery disease) ()      Stage 3 chronic kidney disease ([address] (11/22/2017)      Controlled type 2 diabetes mellitus with diabetic polyneuropathy, without long-term current use of insulin ([address] (1/5/2018)      Cellulitis (7/19/2021)      Suspected CHF (congestive heart failure) (7/19/2021)        I have reviewed, updated, and verified this note's content and spent 38 minutes of my 42 minutes visit performing counseling and coordination of care regarding medical management.        Signed By: Holly Paz MD     July 20, 2021

## 2021-07-20 NOTE — PROGRESS NOTES
ACUTE OT GOALS:  (Developed with and agreed upon by patient and/or caregiver.)  1. Patient will complete lower body bathing and dressing with minimal assistance and adaptive equipment as needed. 2. Patient will complete toileting with modified independence. 3. Patient will tolerate 30 minutes of OT treatment with 1-2 rest breaks to increase activity tolerance for ADLs. 4. Patient will complete functional transfers with modified independence and adaptive equipment as needed. 5. Patient will complete functional mobility for household distances to increase independence for ADL/functional transfers.      Timeframe: 7 visits       OCCUPATIONAL THERAPY ASSESSMENT: Initial Assessment and Daily Note OT Treatment Day # 1    David Rivas is a 77 y.o. male   PRIMARY DIAGNOSIS: Atrial fibrillation with RVR (HCC)  Cellulitis [L03.90]  Atrial fibrillation with RVR (HCC) [I48.91]  Suspected CHF (congestive heart failure) [R09.89]       Reason for Referral:    ICD-10: Treatment Diagnosis: Generalized Muscle Weakness (M62.81)  Other lack of cordination (R27.8)  History of falling (Z91.81)  INPATIENT: Payor: Isiah Patient / Plan: Lamonte Vilchis / Product Type: Managed Care Medicare /   ASSESSMENT:     REHAB RECOMMENDATIONS:   Recommendation to date pending progress:  [address] [place] [address]  Equipment:    To Be Determined     PRIOR LEVEL OF FUNCTION:  (Prior to Hospitalization)  INITIAL/CURRENT LEVEL OF FUNCTION:  (Based on today's evaluation)   Bathing:   Modified Independent  Dressing:   Modified Independent  Feeding/Grooming:   Independent  Toileting:   Independent  Functional Mobility:   Modified Independent Bathing:   Moderate Assistance  Dressing:   Moderate Assistance  Feeding/Grooming:   Contact Guard Assistance  Toileting:   Minimal Assistance  Functional Mobility:   Minimal Assistance     ASSESSMENT:  Mr. Pratt [place] presents to the hospital with a-fib with RVR, suspected CHF, and cellulitis of R LE. Pt is very pleasant and denies any current pain. Daughter at bedside appears very supportive. Pt has increased edema to R foot with wound present on the lateral aspect of his foot. Pt tends to  inversion on the side of his foot due to an old ankle injury. Pt has a special shoe but it reportedly doesn't fit anymore. Pt's hx is significant for neuropathy in hands/legs. Pt very restless with lower body in the bed and is repeatedly rubbing the wound against the end of the bed. Pt encouraged to sit up and work on improving positioning. Pt agreeable to getting up to the bathroom for brushing teeth. Pt required total assistance for donning socks. Pt currently on 2 L of O2 but doesn't wear O2 at home. Pt complete functional mobility using the rolling walker with minimal assistance and tolerated standing sink side for brushing teeth. Pt returned to the bed at end of the session and positioned for comfort. Pt is currently functioning below baseline and will benefit from OT services to address stated goals and plan of care. SUBJECTIVE:   Mr. Korina Barlow states, \"I don't even feel my feet. \"    SOCIAL HISTORY/LIVING ENVIRONMENT: Lives with wife/daughter; typically uses a cane; 3 reported falls; one level home with one step to enter; tub/shower with grab bar   Home Environment: Private residence  One/Two Story Residence: One story  Living Alone: No  Support Systems: Child(pat)    OBJECTIVE:     PAIN: VITAL SIGNS: LINES/DRAINS:   Pre Treatment: Pain Screen  Pain Scale 1: Numeric (0 - 10)  Pain Intensity 1: 0  Post Treatment: 0   Pineda Catheter and IV  O2 Device: Nasal cannula     GROSS EVALUATION:  B UE Within Functional Limits Abnormal/ Functional Abnormal/ Non-Functional (see comments) Not Tested Comments:   AROM [x] [] [] []    PROM [x] [] [] []    Strength [] [x] [] [] B UE   Balance [] [x] [] [] Good sitting; fair standing with RW   Posture [] [x] [] []    Sensation [] [x] [] [] Neuropathy in hands/legs   Coordination [] [x] [] []    Tone [] [] [] [x]    Edema [] [x] [] [] R foot    Activity Tolerance [] [x] [] []     [] [] [] []      COGNITION/  PERCEPTION: Intact Impaired   (see comments) Comments:   Orientation [x] []    Vision [x] []    Hearing [x] []    Judgment/ Insight [] [x]  move quickly at times; a bit impulsive    Attention [] [x]    Memory [x] []    Command Following [x] []    Emotional Regulation [x] []     [] []      ACTIVITIES OF DAILY LIVING: I Mod I S SBA CGA Min Mod Max Total NT Comments   BASIC ADLs:              Bathing/ Showering [] [] [] [] [] [] [] [] [] [x]    Toileting [] [] [] [] [] [] [] [] [] [x]    Dressing [] [] [] [] [] [] [] [] [x] [] Donning socks   Feeding [] [] [] [] [] [] [] [] [] [x]    Grooming [] [] [] [] [x] [] [] [] [] [] Standing sink side for oral care   Personal Device Care [] [] [] [] [] [] [] [] [] [x]    Functional Mobility [] [] [] [] [] [x] [] [] [] []    I=Independent, Mod I=Modified Independent, S=Supervision, SBA=Standby Assistance, CGA=Contact Guard Assistance,   Min=Minimal Assistance, Mod=Moderate Assistance, Max=Maximal Assistance, Total=Total Assistance, NT=Not Tested    MOBILITY: I Mod I S SBA CGA Min Mod Max Total  NT x2 Comments:   Supine to sit [] [] [] [] [x] [] [] [] [] [] []    Sit to supine [] [] [] [] [x] [] [] [] [] [] []    Sit to stand [] [] [] [] [] [x] [] [] [] [] []    Bed to chair [] [] [] [] [] [x] [] [] [] [] []    I=Independent, Mod I=Modified Independent, S=Supervision, SBA=Standby Assistance, CGA=Contact Guard Assistance,   Min=Minimal Assistance, Mod=Moderate Assistance, Max=Maximal Assistance, Total=Total Assistance, NT=Not Tested    [address] [facility] [address] [address] AM-PAC 6 Clicks   Daily Activity Inpatient Short Form        How much help from another person does the patient currently need. .. Total A Lot A Little None   1. Putting on and taking off regular lower body clothing? [] 1   [x] 2   [] 3   [] 4   2.   Bathing (including washing, rinsing, drying)? [] 1   [x] 2   [] 3   [] 4   3. Toileting, which includes using toilet, bedpan or urinal?   [] 1   [] 2   [x] 3   [] 4   4. Putting on and taking off regular upper body clothing? [] 1   [] 2   [x] 3   [] 4   5. Taking care of personal grooming such as brushing teeth? [] 1   [] 2   [x] 3   [] 4   6. Eating meals? [] 1   [] 2   [x] 3   [] 4   © 2007, Trustees [address] [address], under license to [employer]. All rights reserved     Score:  Initial: 16 Most Recent: X (Date: -- )   Interpretation of Tool:  Represents activities that are increasingly more difficult (i.e. Bed mobility, Transfers, Gait). PLAN:   FREQUENCY/DURATION: OT Plan of Care: 3 times/week for duration of hospital stay or until stated goals are met, whichever comes first.    PROBLEM LIST:   (Skilled intervention is medically necessary to address:)  1. Decreased ADL/Functional Activities  2. Decreased Activity Tolerance  3. Decreased AROM/PROM  4. Decreased Balance  5. Decreased Coordination  6. Decreased Gait Ability  7. Decreased Strength  8. Decreased Transfer Abilities   INTERVENTIONS PLANNED:   (Benefits and precautions of occupational therapy have been discussed with the patient.)  1. Self Care Training  2. Therapeutic Activity  3. Therapeutic Exercise/HEP  4. Neuromuscular Re-education  5. Education     TREATMENT:     EVALUATION: Low Complexity : (Untimed Charge)    TREATMENT:   ($$ Self Care/Home Management: 8-22 mins    )  Self Care (10 Minutes): Self care including Lower Body Dressing and Grooming to increase independence and decrease level of assistance required.     TREATMENT GRID:  N/A    AFTER TREATMENT POSITION/PRECAUTIONS:  Bed, Needs within reach, RN notified and Visitors at bedside    INTERDISCIPLINARY COLLABORATION:  RN/PCT and OT/GRAYSON    TOTAL TREATMENT DURATION:  OT Patient Time In/Time Out  Time In: 1345  Time Out: 1645 Jovan Bradshaw OT

## 2021-07-21 ENCOUNTER — APPOINTMENT (OUTPATIENT)
Dept: NON INVASIVE DIAGNOSTICS | Age: 76
DRG: 308 | End: 2021-07-21
Attending: FAMILY MEDICINE
Payer: MEDICARE

## 2021-07-21 PROBLEM — R78.81 STAPHYLOCOCCUS AUREUS BACTEREMIA: Status: ACTIVE | Noted: 2021-07-21

## 2021-07-21 PROBLEM — B95.61 STAPHYLOCOCCUS AUREUS BACTEREMIA: Status: ACTIVE | Noted: 2021-07-21

## 2021-07-21 PROBLEM — N18.9 ACUTE KIDNEY INJURY SUPERIMPOSED ON CKD (HCC): Status: ACTIVE | Noted: 2021-07-21

## 2021-07-21 PROBLEM — N17.9 ACUTE KIDNEY INJURY SUPERIMPOSED ON CKD (HCC): Status: ACTIVE | Noted: 2021-07-21

## 2021-07-21 PROBLEM — R09.02 HYPOXIA: Status: ACTIVE | Noted: 2021-07-21

## 2021-07-21 LAB
ANION GAP SERPL CALC-SCNC: 6 MMOL/L (ref 7–16)
BASOPHILS # BLD: 0.1 K/UL (ref 0–0.2)
BASOPHILS NFR BLD: 0 % (ref 0–2)
BNP SERPL-MCNC: 2999 PG/ML
BUN SERPL-MCNC: 50 MG/DL (ref 8–23)
CALCIUM SERPL-MCNC: 8.6 MG/DL (ref 8.3–10.4)
CHLORIDE SERPL-SCNC: 96 MMOL/L (ref 98–107)
CO2 SERPL-SCNC: 33 MMOL/L (ref 21–32)
CREAT SERPL-MCNC: 2.56 MG/DL (ref 0.8–1.5)
DIFFERENTIAL METHOD BLD: ABNORMAL
ECHO AO ASC DIAM: 3.03 CM
ECHO AO SINUS VALSALVA DIAM: 3.43 CM
ECHO AV AREA PEAK VELOCITY: 2.74 CM2
ECHO AV AREA PEAK VELOCITY: 3.02 CM2
ECHO AV AREA PEAK VELOCITY: 3.12 CM2
ECHO AV AREA PEAK VELOCITY: 3.61 CM2
ECHO AV AREA VTI: 2.57 CM2
ECHO AV AREA VTI: 2.65 CM2
ECHO AV AREA VTI: 2.97 CM2
ECHO AV AREA VTI: 3.54 CM2
ECHO AV MEAN GRADIENT: 3.11 MMHG
ECHO AV PEAK GRADIENT: 5.15 MMHG
ECHO AV PEAK VELOCITY: 113.35 CM/S
ECHO AV VTI: 20.16 CM
ECHO IVC PROX: 1.7 CM
ECHO LA AREA 4C: 22.91 CM2
ECHO LA MAJOR AXIS: 3.5 CM
ECHO LA MINOR AXIS: 1.43 CM
ECHO LA VOL 2C: 76.6 ML (ref 18–58)
ECHO LA VOL 4C: 60.58 ML (ref 18–58)
ECHO LA VOL BP: 74.65 ML (ref 18–58)
ECHO LA VOL/BSA BIPLANE: 30.47 ML/M2 (ref 16–28)
ECHO LA VOLUME INDEX A2C: 31.27 ML/M2 (ref 16–28)
ECHO LA VOLUME INDEX A4C: 24.73 ML/M2 (ref 16–28)
ECHO LV EDV A4C: 120.77 ML
ECHO LV EDV INDEX A4C: 49.3 ML/M2
ECHO LV EJECTION FRACTION A4C: 34 PERCENT
ECHO LV ESV A4C: 79.18 ML
ECHO LV ESV INDEX A4C: 32.3 ML/M2
ECHO LV INTERNAL DIMENSION DIASTOLIC: 5.06 CM (ref 4.2–5.9)
ECHO LV INTERNAL DIMENSION SYSTOLIC: 3.95 CM
ECHO LV IVSD: 1.02 CM (ref 0.6–1)
ECHO LV MASS 2D: 181.9 G (ref 88–224)
ECHO LV MASS INDEX 2D: 74.2 G/M2 (ref 49–115)
ECHO LV POSTERIOR WALL DIASTOLIC: 0.95 CM (ref 0.6–1)
ECHO LVOT DIAM: 2.5 CM
ECHO LVOT PEAK GRADIENT: 2.07 MMHG
ECHO LVOT PEAK VELOCITY: 71.77 CM/S
ECHO LVOT SV: 59.8 ML
ECHO LVOT VTI: 12.16 CM
ECHO RV INTERNAL DIMENSION: 2.73 CM
ECHO RV TAPSE: 1.46 CM (ref 1.5–2)
ECHO TV REGURGITANT MAX VELOCITY: 234.07 CM/S
ECHO TV REGURGITANT PEAK GRADIENT: 22.19 MMHG
EOSINOPHIL # BLD: 0.1 K/UL (ref 0–0.8)
EOSINOPHIL NFR BLD: 1 % (ref 0.5–7.8)
ERYTHROCYTE [DISTWIDTH] IN BLOOD BY AUTOMATED COUNT: 14.6 % (ref 11.9–14.6)
GLUCOSE BLD STRIP.AUTO-MCNC: 130 MG/DL (ref 65–100)
GLUCOSE BLD STRIP.AUTO-MCNC: 135 MG/DL (ref 65–100)
GLUCOSE BLD STRIP.AUTO-MCNC: 139 MG/DL (ref 65–100)
GLUCOSE BLD STRIP.AUTO-MCNC: 141 MG/DL (ref 65–100)
GLUCOSE SERPL-MCNC: 131 MG/DL (ref 65–100)
HCT VFR BLD AUTO: 34.5 % (ref 41.1–50.3)
HGB BLD-MCNC: 11 G/DL (ref 13.6–17.2)
IMM GRANULOCYTES # BLD AUTO: 0.1 K/UL (ref 0–0.5)
IMM GRANULOCYTES NFR BLD AUTO: 1 % (ref 0–5)
LVOT MG: 1.25 MMHG
LYMPHOCYTES # BLD: 2.2 K/UL (ref 0.5–4.6)
LYMPHOCYTES NFR BLD: 17 % (ref 13–44)
MAGNESIUM SERPL-MCNC: 2 MG/DL (ref 1.8–2.4)
MCH RBC QN AUTO: 29.3 PG (ref 26.1–32.9)
MCHC RBC AUTO-ENTMCNC: 31.9 G/DL (ref 31.4–35)
MCV RBC AUTO: 91.8 FL (ref 79.6–97.8)
MONOCYTES # BLD: 1.5 K/UL (ref 0.1–1.3)
MONOCYTES NFR BLD: 12 % (ref 4–12)
NEUTS SEG # BLD: 8.8 K/UL (ref 1.7–8.2)
NEUTS SEG NFR BLD: 69 % (ref 43–78)
NRBC # BLD: 0 K/UL (ref 0–0.2)
PLATELET # BLD AUTO: 262 K/UL (ref 150–450)
PMV BLD AUTO: 11.2 FL (ref 9.4–12.3)
POTASSIUM SERPL-SCNC: 2.9 MMOL/L (ref 3.5–5.1)
RBC # BLD AUTO: 3.76 M/UL (ref 4.23–5.6)
SERVICE CMNT-IMP: ABNORMAL
SODIUM SERPL-SCNC: 135 MMOL/L (ref 138–145)
UFH PPP CHRO-ACNC: 0.4 IU/ML (ref 0.3–0.7)
UFH PPP CHRO-ACNC: 0.62 IU/ML (ref 0.3–0.7)
VANCOMYCIN TROUGH SERPL-MCNC: 14.2 UG/ML (ref 5–20)
WBC # BLD AUTO: 12.7 K/UL (ref 4.3–11.1)

## 2021-07-21 PROCEDURE — 99233 SBSQ HOSP IP/OBS HIGH 50: CPT | Performed by: INTERNAL MEDICINE

## 2021-07-21 PROCEDURE — 74011250636 HC RX REV CODE- 250/636: Performed by: FAMILY MEDICINE

## 2021-07-21 PROCEDURE — 74011250637 HC RX REV CODE- 250/637: Performed by: INTERNAL MEDICINE

## 2021-07-21 PROCEDURE — C8929 TTE W OR WO FOL WCON,DOPPLER: HCPCS

## 2021-07-21 PROCEDURE — 65660000000 HC RM CCU STEPDOWN

## 2021-07-21 PROCEDURE — 83735 ASSAY OF MAGNESIUM: CPT

## 2021-07-21 PROCEDURE — 74011000250 HC RX REV CODE- 250: Performed by: FAMILY MEDICINE

## 2021-07-21 PROCEDURE — 85520 HEPARIN ASSAY: CPT

## 2021-07-21 PROCEDURE — 74011250637 HC RX REV CODE- 250/637: Performed by: FAMILY MEDICINE

## 2021-07-21 PROCEDURE — 82962 GLUCOSE BLOOD TEST: CPT

## 2021-07-21 PROCEDURE — 85025 COMPLETE CBC W/AUTO DIFF WBC: CPT

## 2021-07-21 PROCEDURE — 80048 BASIC METABOLIC PNL TOTAL CA: CPT

## 2021-07-21 PROCEDURE — 74011250636 HC RX REV CODE- 250/636: Performed by: NURSE PRACTITIONER

## 2021-07-21 PROCEDURE — 36415 COLL VENOUS BLD VENIPUNCTURE: CPT

## 2021-07-21 PROCEDURE — 74011250636 HC RX REV CODE- 250/636: Performed by: INTERNAL MEDICINE

## 2021-07-21 PROCEDURE — 80202 ASSAY OF VANCOMYCIN: CPT

## 2021-07-21 PROCEDURE — 83880 ASSAY OF NATRIURETIC PEPTIDE: CPT

## 2021-07-21 PROCEDURE — 74011000250 HC RX REV CODE- 250: Performed by: NURSE PRACTITIONER

## 2021-07-21 PROCEDURE — 74011000258 HC RX REV CODE- 258: Performed by: INTERNAL MEDICINE

## 2021-07-21 RX ORDER — POTASSIUM CHLORIDE 20 MEQ/1
40 TABLET, EXTENDED RELEASE ORAL ONCE
Status: COMPLETED | OUTPATIENT
Start: 2021-07-21 | End: 2021-07-21

## 2021-07-21 RX ORDER — SODIUM CHLORIDE 9 MG/ML
50 INJECTION, SOLUTION INTRAVENOUS CONTINUOUS
Status: DISCONTINUED | OUTPATIENT
Start: 2021-07-21 | End: 2021-07-27 | Stop reason: HOSPADM

## 2021-07-21 RX ORDER — POTASSIUM CHLORIDE 20 MEQ/1
40 TABLET, EXTENDED RELEASE ORAL ONCE
Status: DISCONTINUED | OUTPATIENT
Start: 2021-07-21 | End: 2021-07-21

## 2021-07-21 RX ORDER — POTASSIUM CHLORIDE 20 MEQ/1
40 TABLET, EXTENDED RELEASE ORAL
Status: COMPLETED | OUTPATIENT
Start: 2021-07-21 | End: 2021-07-21

## 2021-07-21 RX ORDER — CEFAZOLIN SODIUM/WATER 2 G/20 ML
2 SYRINGE (ML) INTRAVENOUS EVERY 12 HOURS
Status: DISCONTINUED | OUTPATIENT
Start: 2021-07-21 | End: 2021-07-23

## 2021-07-21 RX ADMIN — ROPINIROLE HYDROCHLORIDE 0.25 MG: 0.5 TABLET, FILM COATED ORAL at 21:41

## 2021-07-21 RX ADMIN — CEFTRIAXONE 1 G: 1 INJECTION, POWDER, FOR SOLUTION INTRAMUSCULAR; INTRAVENOUS at 15:04

## 2021-07-21 RX ADMIN — VANCOMYCIN HYDROCHLORIDE 1250 MG: 10 INJECTION, POWDER, LYOPHILIZED, FOR SOLUTION INTRAVENOUS at 15:04

## 2021-07-21 RX ADMIN — HEPARIN SODIUM 20 UNITS/KG/HR: 5000 INJECTION, SOLUTION INTRAVENOUS at 18:45

## 2021-07-21 RX ADMIN — METOPROLOL TARTRATE 25 MG: 25 TABLET, FILM COATED ORAL at 08:42

## 2021-07-21 RX ADMIN — ASPIRIN 81 MG: 81 TABLET ORAL at 08:42

## 2021-07-21 RX ADMIN — Medication 10 ML: at 15:04

## 2021-07-21 RX ADMIN — SODIUM HYPOCHLORITE: 1.25 SOLUTION TOPICAL at 12:31

## 2021-07-21 RX ADMIN — Medication 10 ML: at 05:35

## 2021-07-21 RX ADMIN — Medication 10 ML: at 21:41

## 2021-07-21 RX ADMIN — CEFAZOLIN SODIUM 2 G: 100 INJECTION, POWDER, LYOPHILIZED, FOR SOLUTION INTRAVENOUS at 17:41

## 2021-07-21 RX ADMIN — POTASSIUM CHLORIDE 40 MEQ: 20 TABLET, EXTENDED RELEASE ORAL at 08:42

## 2021-07-21 RX ADMIN — ATORVASTATIN CALCIUM 40 MG: 40 TABLET, FILM COATED ORAL at 21:41

## 2021-07-21 RX ADMIN — POLYETHYLENE GLYCOL 3350 17 G: 17 POWDER, FOR SOLUTION ORAL at 21:41

## 2021-07-21 RX ADMIN — PREGABALIN 200 MG: 100 CAPSULE ORAL at 08:42

## 2021-07-21 RX ADMIN — SODIUM CHLORIDE 50 ML/HR: 900 INJECTION, SOLUTION INTRAVENOUS at 08:42

## 2021-07-21 RX ADMIN — METOPROLOL TARTRATE 25 MG: 25 TABLET, FILM COATED ORAL at 17:41

## 2021-07-21 RX ADMIN — HEPARIN SODIUM 1900 UNITS: 5000 INJECTION INTRAVENOUS; SUBCUTANEOUS at 00:01

## 2021-07-21 RX ADMIN — HEPARIN SODIUM 20 UNITS/KG/HR: 5000 INJECTION, SOLUTION INTRAVENOUS at 07:00

## 2021-07-21 RX ADMIN — POTASSIUM CHLORIDE 40 MEQ: 20 TABLET, EXTENDED RELEASE ORAL at 12:31

## 2021-07-21 NOTE — PROGRESS NOTES
Throughout the night pt needs met, no s/s of distress. During last round abnormal body movements not noted. Pt in bed low, locked position, call light and personal items in reach, no complaints at this time. Change of shift bedside report to be given to oncoming nurse. Heparin gtt infusing per order in right hand, no signs of bleeding.

## 2021-07-21 NOTE — PROGRESS NOTES
7/21/2021  Attempted to see patient for OT but patient reports he is about to go off the floor to MRI. Will re-attempt as time permits.   Thank you,  Julian Miller, OT

## 2021-07-21 NOTE — PROGRESS NOTES
CM met with patient and patient's daughter Sully Armenta [phone] at bedside to obtain [facility] choice. Daughter requested  CM follow up closer to discharge, to obtain this information. Daughter would to review list. CM was receptive to this request. CM remains available.

## 2021-07-21 NOTE — WOUND CARE
Pt seen for right foot wound documented as present on admission. Pt's daughter at bedside during assessment. Noted on right foot, diffuse swelling, erythema, warmth and deformity. Per pt's daughter pt has severe neuropathy and has broken that right ankle before and has had problems ever since. She states that at home he wears a boot that is poorly fitted to walk and basically walks on the outside of his foot due to deformity. Per daughter patient does not check his blood sugars at home but takes metformin, last A1C documented from 7/13/21 was 6.7. Noted wound on lateral portion of right foot measuring 4X 4 x 4 cm. Due to 4 cm depth and overall presentation and history concerned for possible osteomyelitis? Although no evidence from Xray. Would recommend dakin's packing daily. Due to foot deformity and pt's daughter wanting a new boot for pt upon discharge to help with mobility and offloading of wound may want to consider ortho's help? Pt also has chronic wound to left plantar foot he states has been there for years. No deformity noted to left foot but 1X1 X 0.4 cm wound present to plantar surface. Recommend iodoform gauze packing and bordered foam changed daily. Would also limit pt's walking in the halls due to deformity and wound to keep offloading as much as possible. Wound team will continue to follow while in acute care setting.

## 2021-07-21 NOTE — PROGRESS NOTES
[facility] CARDIOLOGY PROGRESS NOTE           7/21/2021 9:56 AM    Admit Date: 7/19/2021      Subjective: The patient does not report angina, shortness of breath at rest, or palpitations. ROS:  Constitutional:   Negative unexplained weight loss. Eyes:   Negative for unexplained blindness. ENT:   Negative for unexplained hearing loss. Respiratory:   Negative for unexplained hemoptysis. Cardiovascular:   Negative except as noted in HPI. Gastrointestinal:   Negative for unexplained vomiting. Genitourinary:   Negative for unexplained hematuria. Integumentary:   Negative for unexplained rash. Hematologic/Lymphatic:   Negative for unexplained excessive bleeding. Musculoskeletal:  Negative for unexplained joint pain. Neurological:   Negative for stroke. Behavioral/Psych:   Negative for suicidal ideations. Endocrine:   Negative for uncontrolled diabetic symptoms including polyuria, polydipsia. Objective:      Vitals:    07/21/21 0050 07/21/21 0216 07/21/21 0531 07/21/21 0746   BP: (!) 92/48 (!) 103/54 (!) 147/63 112/71   Pulse: (!) 111 100 96 99   Resp: 20 20 18   Temp: 99.1 °F (37.3 °C)  98.2 °F (36.8 °C) 97.9 °F (36.6 °C)   SpO2: 96%  97% 100%   Weight:   285 lb (129.3 kg)    Height:           Physical Exam:  General-No Acute Distress  Neck- supple, no JVD  CV- IRIR, no RG  Lung- clear bilaterally  Abd- soft, nontender, nondistended  Ext- right leg edema and erythema  Skin- warm and dry    Data Review:   Recent Labs     07/21/21  0540 07/20/21  0706 07/19/21  2124 07/19/21  1054   * 136  --  137*   K 2.9* 3.1*  --  3.7   MG 2.0  --   --  1.8   BUN 50* 43*  --  33*   CREA 2.56* 2.39*  --  2.34*   * 140*  --  101*   WBC 12.7* 14.2*   < > 19.9*   HGB 11.0* 11.3*   < > 13.0*   HCT 34.5* 35.8*   < > 40.5*    241   < > 254    < > = values in this interval not displayed.        Assessment/Plan:     Atrial fibrillation  - Heart rate driven by cellulitis  - HR currently <110, occasional borderline blood pressure, w/o symptoms, unknown EF:  Follow up echocardiogram  - Continue with IV UFH and PO Lopressor  - Replace potassium   - Goal K>4 and Mg>2 within normal limits    CAD in native artery  - Continue with baby aspirin and Lipitor    Cellulitis of the right foot  - Hospitalist note reviewed  - Currently on antibiotics    Evelina Vale MD

## 2021-07-21 NOTE — PROGRESS NOTES
Pt having difficulty sleeping d/t spastic/twitching body movements evolving entire body. Pt A&O x4 states movements are not new although not visualized by myself previous night shift. Charge nurse reassessed pt, MD notified, instructed to continue to monitor since this is a known issue for the patient.

## 2021-07-21 NOTE — PROGRESS NOTES
[facility] CARDIOLOGY PROGRESS NOTE           7/20/2021 3:06 PM    Admit Date: 7/19/2021      Subjective:     No overnight events. In atrial fibrillation with mostly controlled ventricular rates. States chronic issues with right lower extremity swelling due to prior reconstructive surgery; acutely worsened recently prior to recent presentation. ROS:  Cardiovascular:  As noted above    Objective:      Vitals:    07/20/21 1142 07/20/21 1509 07/20/21 1631 07/20/21 1929   BP: 100/80 (!) 103/57 118/68 133/67   Pulse: 96  (!) 121 (!) 111   Resp: 20 20 20   Temp: 97.9 °F (36.6 °C)  98.6 °F (37 °C) 99.8 °F (37.7 °C)   SpO2: 96%  95% 96%   Weight:  280 lb (127 kg)     Height:  5' 11\" (1.803 m)         Physical Exam:  General-No Acute Distress  Neck- supple, no JVD  CV- irregular rate and rhythm no MRG  Lung- clear bilaterally  Abd- soft, nontender, nondistended  Ext- 1+ edema RLE; indurated/erythematous. Skin- warm and dry    Data Review:   Recent Labs     07/20/21  0706 07/19/21  2124 07/19/21  1054 07/19/21  1054     --   --  137*   K 3.1*  --   --  3.7   MG  --   --   --  1.8   BUN 43*  --   --  33*   CREA 2.39*  --   --  2.34*   *  --   --  101*   WBC 14.2* 16.5*   < > 19.9*   HGB 11.3* 11.5*   < > 13.0*   HCT 35.8* 36.7*   < > 40.5*    230   < > 254    < > = values in this interval not displayed. Assessment/Plan:     Principal Problem:    Atrial fibrillation with RVR ([address] (0/77/2550)  -Uncertain duration and possibly triggered by underlying acute issues.  -Pending echocardiogram.  -Started on heparin and continue at this time.  -Target rate control therapy currently. Noted on Lopressor 12.5 mg 3 times daily and change over to 25 mg twice daily. Acceptable rate control  -[id number] score of ~5  -replete lytes    Active Problems:    CAD (coronary artery disease) ()  -Remote history of prior stenting per records.   Resume prior to admission high intensity statin      Stage 3 chronic kidney disease ([facility] (11/22/2017)  -Worsening; likely prerenal.  Exam with no significant volume overload. Agree with holding diuretics. Controlled type 2 diabetes mellitus with diabetic polyneuropathy, without long-term current use of insulin ([facility] (1/5/2018)      Cellulitis (7/19/2021)  -Management per primary      Suspected CHF (congestive heart failure) (7/19/2021)  -Exam not consistent with volume overload. Hold diuretics. Worsening renal function.           Mazin Arreola MD  7/20/2021 3:06 PM

## 2021-07-21 NOTE — PROGRESS NOTES
Hospitalist Progress Note     Name:  Lavinia Woodward  Age:76 y.o. Sex:male   :  1945    MRN:  [id number]   Admit Date:  2021    Presenting Complaint: Foot Pain    Initial Admission Diagnosis: Cellulitis [L03.90]  Atrial fibrillation with RVR (HCC) [I48.91]  Suspected CHF (congestive heart failure) [R09.89]     Hospital Course/Interval history:   76 y/o M with PMH of DM type II, HLD, HTN that presented with R foot wound and swelling found to be on Afib RVR and acute on ckd  Pt on oxygen , cxr atelectasis vs infiltrate. On heparin drip for afib. Cardiology follwoing          Subjective (21): Says doing ok  Daughter at bedside  On 3.5 lit/min  Wound care evaluated pt. Assessment & Plan    Right foot wound infection with surrounding cellulitis that failed outpatient antibiotic therapy  - US negative for DVT  - Continue vancomycin  - Start ceftriaxone  - Follow wound cultures  - Wound care consult  2021- Wound lateral aspect of rt foot  Ordered MRI rt foot  Staph aureus bacteremia      New onset Afib RVR  - Continue metoprolol  - Continue heparin gtt  - Telemetry monitoring   - Cardiology recs      TAMMY likely prerenal   - Avoid nephrotoxic agents  - Monitor wagner function   2021- added NS 50 cc/hr    Hypokalemia- replace    Hypoxemia- atelectasis vs infiltrate  cont incentive spirometry    DM type 2- cont sliding scale insulin      ?HF  - Hold diuretics for now in the setting of AK  - Strict I&Os  - Monitor daily weight  - TTE  - Cardiology recs    2021- echo ef 40-45%     HTN  - Continue metoprolol  - Hold HCTZ in the setting of TAMMY     Neuropathy- both lower extremities knee down. Cont lyrica    Chronic rt foot problems     DVT ppx heparin gtt          Diet:  ADULT DIET Regular; 4 carb choices (60 gm/meal);  Low Fat/Low Chol/High Fiber/2 gm Na; 1500 ml  DVT PPx: HEPARIN  Code status: Full Code    Objective:     Patient Vitals for the past 24 hrs:   Temp Pulse Resp BP SpO2 07/21/21 1238    (!) 103/57    07/21/21 1131 98.6 °F (37 °C) 92 18 94/67 99 %   07/21/21 1106    112/71    07/21/21 0746 97.9 °F (36.6 °C) 99 18 112/71 100 %   07/21/21 0531 98.2 °F (36.8 °C) 96 20 (!) 147/63 97 %   07/21/21 0216  100  (!) 103/54    07/21/21 0050 99.1 °F (37.3 °C) (!) 111 20 (!) 92/48 96 %   07/21/21 0000  (!) 112      07/20/21 1929 99.8 °F (37.7 °C) (!) 111 20 133/67 96 %   07/20/21 1631 98.6 °F (37 °C) (!) 121 20 118/68 95 %   07/20/21 1509    (!) 103/57      Oxygen Therapy  O2 Sat (%): 99 % (07/21/21 1131)  Pulse via Oximetry: 92 beats per minute (07/19/21 1640)  O2 Device: Nasal cannula (07/21/21 0249)  O2 Flow Rate (L/min): 3.5 l/min (07/21/21 0249)    Body mass index is 39.75 kg/m². Physical Exam:   General:  No acute distress, speaking in full sentences, no use of accessory muscles. On oxygen   heent- pupils equal reacting to light, neck supple throat normal  Lungs:  Clear to auscultation bilaterally   CV:  Regular rate and rhythm with normal S1 and S2   Abdomen:  Soft, nontender, nondistended, normoactive bowel sounds   Extremities:  No cyanosis clubbing . Rt leg mild swollen, mild erythema  Lower 1/3rd rt leg.  Non tender secondary to neuropathy  Neuro:  Nonfocal, A&O x3   Psych:  Normal affect     Data Review:  I have reviewed all labs, meds, and studies from the last 24 hours:    Labs:    Recent Results (from the past 24 hour(s))   HEPARIN XA UFH    Collection Time: 07/20/21  3:36 PM   Result Value Ref Range    Heparin Xa UFH 0.23 (L) 0.3 - 0.7 IU/mL   GLUCOSE, POC    Collection Time: 07/20/21  4:03 PM   Result Value Ref Range    Glucose (POC) 114 (H) 65 - 100 mg/dL    Performed by Skyler    GLUCOSE, POC    Collection Time: 07/20/21  9:04 PM   Result Value Ref Range    Glucose (POC) 234 (H) 65 - 100 mg/dL    Performed by Emilee    HEPARIN XA UFH    Collection Time: 07/20/21 10:18 PM   Result Value Ref Range    Heparin Xa UFH 0.26 (L) 0.3 - 0.7 IU/mL CBC WITH AUTOMATED DIFF    Collection Time: 07/21/21  5:40 AM   Result Value Ref Range    WBC 12.7 (H) 4.3 - 11.1 K/uL    RBC 3.76 (L) 4.23 - 5.6 M/uL    HGB 11.0 (L) 13.6 - 17.2 g/dL    HCT 34.5 (L) 41.1 - 50.3 %    MCV 91.8 79.6 - 97.8 FL    MCH 29.3 26.1 - 32.9 PG    MCHC 31.9 31.4 - 35.0 g/dL    RDW 14.6 11.9 - 14.6 %    PLATELET 753 006 - 901 K/uL    MPV 11.2 9.4 - 12.3 FL    ABSOLUTE NRBC 0.00 0.0 - 0.2 K/uL    DF AUTOMATED      NEUTROPHILS 69 43 - 78 %    LYMPHOCYTES 17 13 - 44 %    MONOCYTES 12 4.0 - 12.0 %    EOSINOPHILS 1 0.5 - 7.8 %    BASOPHILS 0 0.0 - 2.0 %    IMMATURE GRANULOCYTES 1 0.0 - 5.0 %    ABS. NEUTROPHILS 8.8 (H) 1.7 - 8.2 K/UL    ABS. LYMPHOCYTES 2.2 0.5 - 4.6 K/UL    ABS. MONOCYTES 1.5 (H) 0.1 - 1.3 K/UL    ABS. EOSINOPHILS 0.1 0.0 - 0.8 K/UL    ABS. BASOPHILS 0.1 0.0 - 0.2 K/UL    ABS. IMM.  GRANS. 0.1 0.0 - 0.5 K/UL   METABOLIC PANEL, BASIC    Collection Time: 07/21/21  5:40 AM   Result Value Ref Range    Sodium 135 (L) 138 - 145 mmol/L    Potassium 2.9 (L) 3.5 - 5.1 mmol/L    Chloride 96 (L) 98 - 107 mmol/L    CO2 33 (H) 21 - 32 mmol/L    Anion gap 6 (L) 7 - 16 mmol/L    Glucose 131 (H) 65 - 100 mg/dL    BUN 50 (H) 8 - 23 MG/DL    Creatinine 2.56 (H) 0.8 - 1.5 MG/DL    GFR est AA 32 (L) >60 ml/min/1.73m2    GFR est non-AA 26 (L) >60 ml/min/1.73m2    Calcium 8.6 8.3 - 10.4 MG/DL   HEPARIN XA UFH    Collection Time: 07/21/21  5:40 AM   Result Value Ref Range    Heparin Xa UFH 0.40 0.3 - 0.7 IU/mL   MAGNESIUM    Collection Time: 07/21/21  5:40 AM   Result Value Ref Range    Magnesium 2.0 1.8 - 2.4 mg/dL   GLUCOSE, POC    Collection Time: 07/21/21  7:46 AM   Result Value Ref Range    Glucose (POC) 130 (H) 65 - 100 mg/dL    [facility]    GLUCOSE, POC    Collection Time: 07/21/21 11:24 AM   Result Value Ref Range    Glucose (POC) 139 (H) 65 - 100 mg/dL    Performed by [facility]    ECHO ADULT COMPLETE    Collection Time: 07/21/21 12:39 PM   Result Value Ref Range IVSd 1.02 (A) 0.60 - 1.00 cm    LVIDd 5.06 4.20 - 5.90 cm    LVIDs 3.95 cm    LVOT d 2.50 cm    LVPWd 0.95 0.60 - 1.00 cm    LV Ejection Fraction MOD 4C 34 percent    LV ED Vol A4C 120.77 mL    LV ES Vol A4C 79.18 mL    LVOT Peak Gradient 2.07 mmHg    Left Ventricular Outflow Tract Mean Gradient 1.25 mmHg    LVOT SV 59.8 mL    LVOT Peak Velocity 71.77 cm/s    LVOT VTI 12.16 cm    RVIDd 2.73 cm    Left Atrium Major Axis 3.50 cm    LA Volume 74.65 18.0 - 58.0 mL    LA Area 4C 22.91 cm2    LA Vol 2C 76.60 (A) 18.00 - 58.00 mL    LA Vol 4C 60.58 (A) 18.00 - 58.00 mL    Aortic Valve Area by Continuity of Peak Velocity 3.12 cm2    Aortic Valve Area by Continuity of Peak Velocity 2.74 cm2    Aortic Valve Area by Continuity of Peak Velocity 3.61 cm2    Aortic Valve Area by Continuity of Peak Velocity 3.02 cm2    Aortic Valve Area by Continuity of VTI 2.97 cm2    Aortic Valve Area by Continuity of VTI 2.65 cm2    Aortic Valve Area by Continuity of VTI 3.54 cm2    Aortic Valve Area by Continuity of VTI 2.57 cm2    AoV PG 5.15 mmHg    Aortic Valve Systolic Mean Gradient 9.07 mmHg    Aortic Valve Systolic Peak Velocity 657.78 cm/s    AoV VTI 20.16 cm    Tapse 1.46 (A) 1.50 - 2.00 cm    Triscuspid Valve Regurgitation Peak Gradient 22.19 mmHg    TR Max Velocity 234.07 cm/s    AO ASC D 3.03 cm    IVC proximal 1.70 cm    Aortic Sinus Valsalva 3.43 cm    LV Mass .9 88.0 - 224.0 g    LV Mass AL Index 74.2 49.0 - 115.0 g/m2    Left Atrium Minor Axis 1.43 cm    LA Vol Index 30.47 16.00 - 28.00 ml/m2    LA Vol Index 31.27 16.00 - 28.00 ml/m2    LA Vol Index 24.73 16.00 - 28.00 ml/m2    LVED Vol Index A4C 49.3 mL/m2    LVES Vol Index A4C 32.3 mL/m2       All Micro Results     Procedure Component Value Units Date/Time    CULTURE, Pennington Lessen STAIN [[id number]]  (Abnormal) Collected: 07/19/21 1445    Order Status: Completed Specimen: Wound from Foot Updated: 07/21/21 0998     Special Requests: NO SPECIAL REQUESTS        GRAM STAIN 25 TO 55 WBC'S/OIF      FEW GRAM POSITIVE COCCI        Culture result:       MODERATE STAPHYLOCOCCUS AUREUS SENSITIVITY TO FOLLOW          BLOOD CULTURE [[id number]]  (Abnormal) Collected: 07/19/21 1211    Order Status: Completed Specimen: Blood Updated: 07/21/21 0818     Special Requests: --        RIGHT  FOREARM       GRAM STAIN       GRAM POS COCCI IN CLUSTERS                  AEROBIC AND ANAEROBIC BOTTLES                  CRITICAL RESULT NOT CALLED DUE TO PREVIOUS NOTIFICATION OF CRITICAL RESULT WITHIN THE LAST 24 HOURS. Culture result: STAPHYLOCOCCUS AUREUS               CULTURE IN PROGRESS,FURTHER UPDATES TO FOLLOW          BLOOD CULTURE [[id number]]  (Abnormal) Collected: 07/19/21 1054    Order Status: Completed Specimen: Blood Updated: 07/21/21 0815     Special Requests: --        RIGHT  HAND       GRAM STAIN       GRAM POS COCCI IN CLUSTERS                  AEROBIC AND ANAEROBIC BOTTLES                  RESULTS VERIFIED, PHONED TO AND READ BACK BY Mariaelena Blanco AT 3773 ON 7/20/21, [address]           Culture result:       STAPHYLOCOCCUS AUREUS SENSITIVITY TO FOLLOW                  Refer to Blood Culture ID Panel Accession  [id number]      BLOOD CULTURE ID PANEL [[id number]]  (Abnormal) Collected: 07/19/21 1054    Order Status: Completed Specimen: Blood Updated: 07/20/21 0442     Acc. no. from Micro Order [id number]     Staphylococcus Detected        Comment: RESULTS VERIFIED, PHONED TO AND READ BACK BY  Mariaelena Blanco AT 0148 ON 7/20/21, LANI          Staphylococcus aureus Detected        Comment: RESULTS VERIFIED, PHONED TO AND READ BACK BY  Mariaelena Blanco AT 0724 ON 7/20/21, LANI          mecA (Methicillin-Resistance Genes) NOT DETECTED        INTERPRETATION       Gram positive cocci in clusters, identified in realtime PCR as probable MSSA. Comment: Recommend discontinuing IV vancomycin starting cefazolin or nafcillin if patient not on beta-lactam therapy.  Infectious Diseases Consult recommended in adult patients. THIS TEST DOES NOT REPLACE SENSITIVITY TESTING. EKG Results     Procedure 720 Value Units Date/Time    EKG, 12 LEAD, INITIAL [[id number]] Collected: 07/19/21 1100    Order Status: Completed Updated: 07/19/21 1630     Ventricular Rate 122 BPM      Atrial Rate 147 BPM      QRS Duration 132 ms      Q-T Interval 300 ms      QTC Calculation (Bezet) 427 ms      Calculated R Axis -36 degrees      Calculated T Axis 24 degrees      Diagnosis --     !! AGE AND GENDER SPECIFIC ECG ANALYSIS !! Atrial fibrillation with rapid ventricular response  Left axis deviation  Right bundle branch block  Cannot rule out Anteroseptal infarct , age undetermined  Abnormal ECG  No previous ECGs available  Confirmed by Precious Bond MD (), Guillermo Cyr ([id number]) on 7/19/2021 4:29:33 PM            Other Studies:  No results found.     Current Meds:   Current Facility-Administered Medications   Medication Dose Route Frequency    0.9% sodium chloride infusion  50 mL/hr IntraVENous CONTINUOUS    sodium hypochlorite (QUARTER STRENGTH DAKIN'S) 0.125% irrigation (bottle)   Topical DAILY    cefTRIAXone (ROCEPHIN) 1 g in 0.9% sodium chloride (MBP/ADV) 50 mL MBP  1 g IntraVENous Q24H    rOPINIRole (REQUIP) tablet 0.25 mg  0.25 mg Oral QHS    metoprolol tartrate (LOPRESSOR) tablet 25 mg  25 mg Oral BID    atorvastatin (LIPITOR) tablet 40 mg  40 mg Oral QHS    sodium chloride (NS) flush 5-10 mL  5-10 mL IntraVENous Q8H    sodium chloride (NS) flush 5-10 mL  5-10 mL IntraVENous PRN    insulin lispro (HUMALOG) injection   SubCUTAneous AC&HS    aspirin delayed-release tablet 81 mg  81 mg Oral DAILY    [Held by provider] hydroCHLOROthiazide (HYDRODIURIL) tablet 25 mg  25 mg Oral DAILY    pregabalin (LYRICA) capsule 200 mg  200 mg Oral DAILY    acetaminophen (TYLENOL) tablet 650 mg  650 mg Oral Q6H PRN    Or    acetaminophen (TYLENOL) suppository 650 mg  650 mg Rectal Q6H PRN    polyethylene glycol (MIRALAX) packet 17 g  17 g Oral DAILY PRN    ondansetron (ZOFRAN ODT) tablet 4 mg  4 mg Oral Q8H PRN    Or    ondansetron (ZOFRAN) injection 4 mg  4 mg IntraVENous Q6H PRN    [Held by provider] furosemide (LASIX) tablet 40 mg  40 mg Oral DAILY    vancomycin (VANCOCIN) 1250 mg in  ml infusion  1,250 mg IntraVENous Q24H    heparin 25,000 units in dextrose 500 mL infusion  12-25 Units/kg/hr (Adjusted) IntraVENous TITRATE       Problem List:  Hospital Problems as of 7/21/2021 Date Reviewed: 1/13/2021        Codes Class Noted - Resolved POA    Hypoxia ICD-10-CM: R09.02  ICD-9-CM: 799.02  7/21/2021 - Present Unknown        Acute kidney injury superimposed on CKD ([facility] ICD-10-CM: N17.9, N18.9  ICD-9-CM: 866.00, 585.9  7/21/2021 - Present Unknown        Staphylococcus aureus bacteremia ICD-10-CM: R78.81, B95.61  ICD-9-CM: 790.7, 041.11  7/21/2021 - Present Unknown        Cellulitis ICD-10-CM: L03.90  ICD-9-CM: 682.9  7/19/2021 - Present Unknown        Suspected CHF (congestive heart failure) ICD-10-CM: R09.89  ICD-9-CM: 785.9  7/19/2021 - Present Unknown        * (Principal) Atrial fibrillation with RVR ([facility] ICD-10-CM: I48.91  ICD-9-CM: 427.31  7/19/2021 - Present Unknown        Controlled type 2 diabetes mellitus with diabetic polyneuropathy, without long-term current use of insulin (HCC) (Chronic) ICD-10-CM: E11.42  ICD-9-CM: 250.60, 357.2  1/5/2018 - Present Yes        Stage 3 chronic kidney disease (HCC) (Chronic) ICD-10-CM: N18.30  ICD-9-CM: 585.3  11/22/2017 - Present Yes        CAD (coronary artery disease) (Chronic) ICD-10-CM: I25.10  ICD-9-CM: 414.00  Unknown - Present Yes                   Signed By: Rupinder Shelton MD   [facility] Hospitalist Service    July 21, 2021

## 2021-07-21 NOTE — PROGRESS NOTES
Pharmacokinetic Consult to Pharmacist    Sarbjit Alana is a 77 y.o. male being treated with Vancomycin. Height: 5' 11\" (180.3 cm)  Weight: 129.3 kg (285 lb)  Lab Results   Component Value Date/Time    BUN 50 (H) 07/21/2021 05:40 AM    Creatinine 2.56 (H) 07/21/2021 05:40 AM    WBC 12.7 (H) 07/21/2021 05:40 AM    Lactic acid 1.8 07/19/2021 06:27 PM      Estimated Creatinine Clearance: 34.2 mL/min (A) (based on SCr of 2.56 mg/dL (H)). Lab Results   Component Value Date/Time    Vancomycin,trough 14.2 07/21/2021 02:21 PM           Day 3 of Vancomycin. Goal Trough 10-20. Vancomycin trough resulted at 14.2. As renal function appears slightly worse today, will reduce dose to 1000 mg Q24H. Will continue to follow patient and order levels when clinically indicated.      Thank you,  Hilda Kramer, PharmD, [address]  Clinical Pharmacist  [phone]

## 2021-07-21 NOTE — CONSULTS
Infectious Disease Consult    Today's Date: 7/21/2021   Admit Date: 7/19/2021    Impression:   · MSSA bacteremia (7/19), TTE without vegetation  · R foot wound, swab culture with staph aureus pending  · CHF with fluid overload    Plan:   · Discontinue vancomycin and ceftriaxone and start cefazolin. · Await MRI result and surgical plans. · Repeat BC in the morning. · Will give one dose lasix 20 mg to help with fluid. Anti-infectives:   · Vancomycin  · Ceftriaxone (7/19 -    Subjective:   Date of Consultation:  July 21, 2021  Referring Kelly Campbell Hospitalist    Patient is a 77 y.o. male with an underlying medical history that includes DM II with A1C 6.7, neuropathy, morbid obesity, CKD, and CAD. He has bilateral ankle instability and walks with a cane. He has a history of bilateral foot ulcers, and has had a R great toe wound that has improved. Per patient and family, he has had foot reconstruction surgery about 47 years ago after tearing ligaments, and he has neuropathy and cannot feel his feet. He has been walking on the lateral edge of his foot and developed a blister that opened up about ten days ago. He was seen by podiatry and started on clindamycin on 7/15/21. He spiked a fever at home 7/18/21. He was seen again by podiatry 7/19/21 and was sent to the ED due to worsening cellulitis and edema of the R foot and ankle. Admission blood cultures are positive with MSSA. Swab culture of the R great toe is also positive with staph aureus, pending. Foot xray shows severe bone destruction, but no evidence of osteomyelitis. TTE was done and shows aortic, mitral and tricuspid regurgitation but no mention of vegetation. MRI is pending, and Orthopedics has been consulted. He has been in atrial fibrillation and having increased oxygen requirements. CXR showed minimal R basilar atelectasis or pneumonia.  He has been started on ceftriaxone and vancomycin and ID has been asked for further recommendations. 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    Controlled type 2 diabetes mellitus with diabetic polyneuropathy, without long-term current use of insulin ([facility]) E11.42    Type 2 diabetes mellitus with nephropathy ([facility] E11.21    Bunion of unspecified foot M21.619    Onychomycosis B35.1    Corns and callus L84    Mixed hyperlipidemia E78.2    Severe obesity (BMI 35.0-35.9 with comorbidity) ([facility] E66.01, Z68.35    Morbid obesity with BMI of 40.0-44.9, adult ([facility] E66.01, Z68.41    Cellulitis L03.90    Suspected CHF (congestive heart failure) R09.89    Atrial fibrillation with RVR ([facility]) I48.91    Hypoxia R09.02    Acute kidney injury superimposed on CKD ([facility] N17.9, N18.9    Staphylococcus aureus bacteremia R78.81, B95.61     Past Medical History:   Diagnosis Date    CAD (coronary artery disease)     Chronic kidney disease     stage 3    Hypercholesterolemia     Hypertension     Neuropathy       Family History   Problem Relation Age of Onset    Cancer Mother     Cancer Father     No Known Problems Maternal Grandmother     No Known Problems Maternal Grandfather     No Known Problems Paternal Grandmother     No Known Problems Paternal Grandfather       Social History     Tobacco Use    Smoking status: Never Smoker    Smokeless tobacco: Never Used   Substance Use Topics    Alcohol use: No     Past Surgical History:   Procedure Laterality Date    RI CARDIAC SURG PROCEDURE UNLIST  1999    stent placement      Prior to Admission medications    Medication Sig Start Date End Date Taking?  Authorizing Provider   atorvastatin (LIPITOR) 40 mg tablet TAKE 1 TABLET BY MOUTH DAILY  Indications: treatment to slow progression of coronary artery disease 7/13/21  Yes Natasha Burroughs Aye Howard MD   hydroCHLOROthiazide (HYDRODIURIL) 25 mg tablet TAKE 1 TABLET BY MOUTH ONCE DAILY 21  Yes Jeanine Mehta MD   pregabalin (LYRICA) 200 mg capsule TAKE ONE CAPSULE BY MOUTH daily 21  Yes Jeanine Mehta MD   metFORMIN ER (GLUCOPHAGE XR) 500 mg tablet Take 1 tablet by mouth daily 21  Yes Jeanine Mehta MD   aspirin delayed-release 81 mg tablet    Yes Provider, Historical   Blood-Glucose Meter monitoring kit Check blood sugars BID. E11.17  Yes Sunita Aggarwal MD   Lancets misc Check blood sugars BID. E11.17  Yes Sunita Aggarwal MD   glucose blood VI test strips (BLOOD GLUCOSE TEST) strip Check blood sugars BID. E11.17  Yes Sunita Aggarwal MD   cyanocobalamin 1,000 mcg tablet Take 1 Tab by mouth daily. 10/10/16  Yes Sunita Aggarwal MD   cholecalciferol, VITAMIN D3, (VITAMIN D3) 5,000 unit tab tablet Take  by mouth daily. Yes Provider, Historical   nystatin (MYCOSTATIN) powder Apply  to affected area three (3) times daily. 10/15/19   Jeanine Mehta MD       No Known Allergies     Review of Systems:  Pertinent items are noted in the History of Present Illness.     Objective:     Visit Vitals  BP (!) 103/57   Pulse 92   Temp 98.6 °F (37 °C)   Resp 18   Ht 5' 11\" (1.803 m)   Wt 129.3 kg (285 lb)   SpO2 99%   BMI 39.75 kg/m²     Temp (24hrs), Av.7 °F (37.1 °C), Min:97.9 °F (36.6 °C), Max:99.8 °F (37.7 °C)       Lines:  Peripheral IV:       Physical Exam:    General:  Alert, cooperative, morbidly obese, appears stated age   Eyes:  Sclera anicteric, EOMI   Mouth/Throat: Mucous membranes normal, oral pharynx clear   Neck: Supple   Lungs:   Scattered wheezes throughout, not moving much air, poor inspiratory effort   CV:  Irregular rate and rhythm   Abdomen:   Soft, non-tender   Extremities: No cyanosis; R foot with cellulitis at the ankle, deformity, and lateral wound   Skin: Skin color, texture, turgor normal. no acute rash or lesions Musculoskeletal: Generally weak   Lines/Devices:  Intact, no erythema, drainage or tenderness   Psych: Alert and oriented, appropriate                Data Review:     CBC:  Recent Labs     07/21/21  0540 07/20/21  0706 07/19/21 2124 07/19/21 2124   WBC 12.7* 14.2*  --  16.5*   GRANS 69 75   < > 76   MONOS 12 11   < > 12   EOS 1 1   < > 0*   ANEU 8.8* 10.6*   < > 12.6*   ABL 2.2 1.7   < > 1.8   HGB 11.0* 11.3*  --  11.5*   HCT 34.5* 35.8*  --  36.7*    241  --  230    < > = values in this interval not displayed. BMP:  Recent Labs     07/21/21  0540 07/20/21  0706 07/19/21  1054   CREA 2.56* 2.39* 2.34*   BUN 50* 43* 33*   * 136 137*   K 2.9* 3.1* 3.7   CL 96* 96* 98   CO2 33* 34* 32   AGAP 6* 6* 7   * 140* 101*       LFTS:  Recent Labs     07/20/21  0706 07/19/21  1054   TBILI 1.2* 1.6*   ALT 17 17    137*   TP 6.9 7.7   ALB 2.5* 2.9*       Microbiology:     All Micro Results     Procedure Component Value Units Date/Time    CULTURE, Avinash Colon STAIN [[id number]]  (Abnormal) Collected: 07/19/21 1445    Order Status: Completed Specimen: Wound from Foot Updated: 07/21/21 0948     Special Requests: NO SPECIAL REQUESTS        GRAM STAIN 25 TO 55 WBC'S/OIF      FEW GRAM POSITIVE COCCI        Culture result:       MODERATE STAPHYLOCOCCUS AUREUS SENSITIVITY TO FOLLOW          BLOOD CULTURE [[id number]]  (Abnormal) Collected: 07/19/21 1211    Order Status: Completed Specimen: Blood Updated: 07/21/21 0818     Special Requests: --        RIGHT  FOREARM       GRAM STAIN       GRAM POS COCCI IN CLUSTERS                  AEROBIC AND ANAEROBIC BOTTLES                  CRITICAL RESULT NOT CALLED DUE TO PREVIOUS NOTIFICATION OF CRITICAL RESULT WITHIN THE LAST 24 HOURS.            Culture result: STAPHYLOCOCCUS AUREUS               CULTURE IN PROGRESS,FURTHER UPDATES TO FOLLOW          BLOOD CULTURE [[id number]]  (Abnormal) Collected: 07/19/21 1054    Order Status: Completed Specimen: Blood Updated: 07/21/21 0815     Special Requests: --        RIGHT  HAND       GRAM STAIN       GRAM POS COCCI IN CLUSTERS                  AEROBIC AND ANAEROBIC BOTTLES                  RESULTS VERIFIED, PHONED TO AND READ BACK BY Pia Edgar AT 3447 ON 7/20/21, [address]           Culture result:       STAPHYLOCOCCUS AUREUS SENSITIVITY TO FOLLOW                  Refer to Blood Culture ID Panel Accession  [id number]      BLOOD CULTURE ID PANEL [[id number]]  (Abnormal) Collected: 07/19/21 1054    Order Status: Completed Specimen: Blood Updated: 07/20/21 0442     Acc. no. from Micro Order [id number]     Staphylococcus Detected        Comment: RESULTS VERIFIED, PHONED TO AND READ BACK BY  Pia Edgar AT 9945 ON 7/20/21, LANI          Staphylococcus aureus Detected        Comment: RESULTS VERIFIED, PHONED TO AND READ BACK BY  Pia Edgar AT 7377 ON 7/20/21, J          mecA (Methicillin-Resistance Genes) NOT DETECTED        INTERPRETATION       Gram positive cocci in clusters, identified in realtime PCR as probable MSSA. Comment: Recommend discontinuing IV vancomycin starting cefazolin or nafcillin if patient not on beta-lactam therapy. Infectious Diseases Consult recommended in adult patients. THIS TEST DOES NOT REPLACE SENSITIVITY TESTING. Imaging:   R foot xray 7/19/21 with soft tissue swelling, severe hammertoe deformity and total destruction of the talus   Duplex LE without DVT  MRI pending.     Signed By: Anni Banks NP     July 21, 2021

## 2021-07-22 ENCOUNTER — APPOINTMENT (OUTPATIENT)
Dept: MRI IMAGING | Age: 76
DRG: 308 | End: 2021-07-22
Attending: FAMILY MEDICINE
Payer: MEDICARE

## 2021-07-22 LAB
ANION GAP SERPL CALC-SCNC: 5 MMOL/L (ref 7–16)
BACTERIA SPEC CULT: ABNORMAL
BASOPHILS # BLD: 0.1 K/UL (ref 0–0.2)
BASOPHILS NFR BLD: 1 % (ref 0–2)
BUN SERPL-MCNC: 49 MG/DL (ref 8–23)
CALCIUM SERPL-MCNC: 9.1 MG/DL (ref 8.3–10.4)
CHLORIDE SERPL-SCNC: 99 MMOL/L (ref 98–107)
CO2 SERPL-SCNC: 34 MMOL/L (ref 21–32)
CREAT SERPL-MCNC: 2.25 MG/DL (ref 0.8–1.5)
DIFFERENTIAL METHOD BLD: ABNORMAL
EOSINOPHIL # BLD: 0.2 K/UL (ref 0–0.8)
EOSINOPHIL NFR BLD: 2 % (ref 0.5–7.8)
ERYTHROCYTE [DISTWIDTH] IN BLOOD BY AUTOMATED COUNT: 14.7 % (ref 11.9–14.6)
GLUCOSE BLD STRIP.AUTO-MCNC: 117 MG/DL (ref 65–100)
GLUCOSE BLD STRIP.AUTO-MCNC: 120 MG/DL (ref 65–100)
GLUCOSE BLD STRIP.AUTO-MCNC: 126 MG/DL (ref 65–100)
GLUCOSE BLD STRIP.AUTO-MCNC: 134 MG/DL (ref 65–100)
GLUCOSE SERPL-MCNC: 116 MG/DL (ref 65–100)
GRAM STN SPEC: ABNORMAL
HCT VFR BLD AUTO: 36.7 % (ref 41.1–50.3)
HGB BLD-MCNC: 11.4 G/DL (ref 13.6–17.2)
IMM GRANULOCYTES # BLD AUTO: 0.1 K/UL (ref 0–0.5)
IMM GRANULOCYTES NFR BLD AUTO: 1 % (ref 0–5)
LYMPHOCYTES # BLD: 2.2 K/UL (ref 0.5–4.6)
LYMPHOCYTES NFR BLD: 21 % (ref 13–44)
MCH RBC QN AUTO: 29.4 PG (ref 26.1–32.9)
MCHC RBC AUTO-ENTMCNC: 31.1 G/DL (ref 31.4–35)
MCV RBC AUTO: 94.6 FL (ref 79.6–97.8)
MONOCYTES # BLD: 1.4 K/UL (ref 0.1–1.3)
MONOCYTES NFR BLD: 14 % (ref 4–12)
NEUTS SEG # BLD: 6.5 K/UL (ref 1.7–8.2)
NEUTS SEG NFR BLD: 62 % (ref 43–78)
NRBC # BLD: 0 K/UL (ref 0–0.2)
PLATELET # BLD AUTO: 310 K/UL (ref 150–450)
PMV BLD AUTO: 11.5 FL (ref 9.4–12.3)
POTASSIUM SERPL-SCNC: 3.8 MMOL/L (ref 3.5–5.1)
RBC # BLD AUTO: 3.88 M/UL (ref 4.23–5.6)
SERVICE CMNT-IMP: ABNORMAL
SODIUM SERPL-SCNC: 138 MMOL/L (ref 138–145)
UFH PPP CHRO-ACNC: 0.51 IU/ML (ref 0.3–0.7)
WBC # BLD AUTO: 10.4 K/UL (ref 4.3–11.1)

## 2021-07-22 PROCEDURE — 74011250637 HC RX REV CODE- 250/637: Performed by: FAMILY MEDICINE

## 2021-07-22 PROCEDURE — 74011250637 HC RX REV CODE- 250/637: Performed by: INTERNAL MEDICINE

## 2021-07-22 PROCEDURE — 80048 BASIC METABOLIC PNL TOTAL CA: CPT

## 2021-07-22 PROCEDURE — 99232 SBSQ HOSP IP/OBS MODERATE 35: CPT | Performed by: PHYSICIAN ASSISTANT

## 2021-07-22 PROCEDURE — 65660000000 HC RM CCU STEPDOWN

## 2021-07-22 PROCEDURE — 99233 SBSQ HOSP IP/OBS HIGH 50: CPT | Performed by: INTERNAL MEDICINE

## 2021-07-22 PROCEDURE — 74011000250 HC RX REV CODE- 250: Performed by: NURSE PRACTITIONER

## 2021-07-22 PROCEDURE — 74011250636 HC RX REV CODE- 250/636: Performed by: NURSE PRACTITIONER

## 2021-07-22 PROCEDURE — 73718 MRI LOWER EXTREMITY W/O DYE: CPT

## 2021-07-22 PROCEDURE — 36415 COLL VENOUS BLD VENIPUNCTURE: CPT

## 2021-07-22 PROCEDURE — 85025 COMPLETE CBC W/AUTO DIFF WBC: CPT

## 2021-07-22 PROCEDURE — 82962 GLUCOSE BLOOD TEST: CPT

## 2021-07-22 PROCEDURE — 74011250636 HC RX REV CODE- 250/636: Performed by: INTERNAL MEDICINE

## 2021-07-22 PROCEDURE — 85520 HEPARIN ASSAY: CPT

## 2021-07-22 PROCEDURE — 74011250636 HC RX REV CODE- 250/636: Performed by: FAMILY MEDICINE

## 2021-07-22 RX ADMIN — METOPROLOL TARTRATE 25 MG: 25 TABLET, FILM COATED ORAL at 10:18

## 2021-07-22 RX ADMIN — CEFAZOLIN SODIUM 2 G: 100 INJECTION, POWDER, LYOPHILIZED, FOR SOLUTION INTRAVENOUS at 05:38

## 2021-07-22 RX ADMIN — CEFAZOLIN SODIUM 2 G: 100 INJECTION, POWDER, LYOPHILIZED, FOR SOLUTION INTRAVENOUS at 16:39

## 2021-07-22 RX ADMIN — HEPARIN SODIUM 20 UNITS/KG/HR: 5000 INJECTION, SOLUTION INTRAVENOUS at 06:55

## 2021-07-22 RX ADMIN — Medication 10 ML: at 21:51

## 2021-07-22 RX ADMIN — SODIUM CHLORIDE 50 ML/HR: 900 INJECTION, SOLUTION INTRAVENOUS at 03:39

## 2021-07-22 RX ADMIN — Medication 10 ML: at 13:23

## 2021-07-22 RX ADMIN — Medication 10 ML: at 05:39

## 2021-07-22 RX ADMIN — SODIUM HYPOCHLORITE: 1.25 SOLUTION TOPICAL at 09:00

## 2021-07-22 RX ADMIN — METOPROLOL TARTRATE 25 MG: 25 TABLET, FILM COATED ORAL at 17:04

## 2021-07-22 RX ADMIN — PREGABALIN 200 MG: 100 CAPSULE ORAL at 10:18

## 2021-07-22 RX ADMIN — ASPIRIN 81 MG: 81 TABLET ORAL at 10:18

## 2021-07-22 RX ADMIN — HEPARIN SODIUM 20 UNITS/KG/HR: 5000 INJECTION, SOLUTION INTRAVENOUS at 21:21

## 2021-07-22 NOTE — INTERDISCIPLINARY ROUNDS
Interdisciplinary Rounds completed with Nursing, Case Management, Dietician, Pharmacy,  Physician and PT/OT present. Plan of care reviewed and updated. Consults include PT/OT.  Ortho, cardiology      Remote tele  Continue    Pending MRI    Expected discharge date: TBD     Location: [facility]

## 2021-07-22 NOTE — PROGRESS NOTES
Problem: Diabetes Self-Management  Goal: *Disease process and treatment process  Description: Define diabetes and identify own type of diabetes; list 3 options for treating diabetes. Outcome: Progressing Towards Goal  Goal: *Incorporating nutritional management into lifestyle  Description: Describe effect of type, amount and timing of food on blood glucose; list 3 methods for planning meals. Outcome: Progressing Towards Goal  Goal: *Incorporating physical activity into lifestyle  Description: State effect of exercise on blood glucose levels. Outcome: Progressing Towards Goal  Goal: *Developing strategies to promote health/change behavior  Description: Define the ABC's of diabetes; identify appropriate screenings, schedule and personal plan for screenings. Outcome: Progressing Towards Goal  Goal: *Using medications safely  Description: State effect of diabetes medications on diabetes; name diabetes medication taking, action and side effects. Outcome: Progressing Towards Goal  Goal: *Monitoring blood glucose, interpreting and using results  Description: Identify recommended blood glucose targets  and personal targets. Outcome: Progressing Towards Goal  Goal: *Prevention, detection, treatment of acute complications  Description: List symptoms of hyper- and hypoglycemia; describe how to treat low blood sugar and actions for lowering  high blood glucose level. Outcome: Progressing Towards Goal  Goal: *Prevention, detection and treatment of chronic complications  Description: Define the natural course of diabetes and describe the relationship of blood glucose levels to long term complications of diabetes.   Outcome: Progressing Towards Goal  Goal: *Developing strategies to address psychosocial issues  Description: Describe feelings about living with diabetes; identify support needed and support network  Outcome: Progressing Towards Goal  Goal: *Insulin pump training  Outcome: Progressing Towards Goal  Goal: *Sick day guidelines  Outcome: Progressing Towards Goal  Goal: *Patient Specific Goal (EDIT GOAL, INSERT TEXT)  Outcome: Progressing Towards Goal     Problem: Patient Education: Go to Patient Education Activity  Goal: Patient/Family Education  Outcome: Progressing Towards Goal     Problem: Falls - Risk of  Goal: *Absence of Falls  Description: Document [place] Fall Risk and appropriate interventions in the flowsheet. Outcome: Progressing Towards Goal  Note: Fall Risk Interventions:  Mobility Interventions: Bed/chair exit alarm, Communicate number of staff needed for ambulation/transfer, OT consult for ADLs, Patient to call before getting OOB, PT Consult for mobility concerns, PT Consult for assist device competence         Medication Interventions: Bed/chair exit alarm, Evaluate medications/consider consulting pharmacy, Patient to call before getting OOB, Teach patient to arise slowly    Elimination Interventions: Bed/chair exit alarm, Call light in reach, Patient to call for help with toileting needs, Stay With Me (per policy), Toilet paper/wipes in reach, Toileting schedule/hourly rounds, Urinal in reach    History of Falls Interventions: Bed/chair exit alarm, Consult care management for discharge planning, Door open when patient unattended, Evaluate medications/consider consulting pharmacy, Room close to nurse's station         Problem: Patient Education: Go to Patient Education Activity  Goal: Patient/Family Education  Outcome: Progressing Towards Goal     Problem: Pressure Injury - Risk of  Goal: *Prevention of pressure injury  Description: Document Julio Cesar Scale and appropriate interventions in the flowsheet.   Outcome: Progressing Towards Goal  Note: Pressure Injury Interventions:  Sensory Interventions: Assess changes in LOC, Avoid rigorous massage over bony prominences, Check visual cues for pain, Discuss PT/OT consult with provider, Float heels, Keep linens dry and wrinkle-free, Maintain/enhance activity level, Minimize linen layers, Pad between skin to skin    Moisture Interventions: Absorbent underpads, Apply protective barrier, creams and emollients, Check for incontinence Q2 hours and as needed, Contain wound drainage, Maintain skin hydration (lotion/cream), Minimize layers, Moisture barrier    Activity Interventions: Assess need for specialty bed, Increase time out of bed, Pressure redistribution bed/mattress(bed type), PT/OT evaluation    Mobility Interventions: Assess need for specialty bed, Float heels, HOB 30 degrees or less, Pressure redistribution bed/mattress (bed type), PT/OT evaluation    Nutrition Interventions: Document food/fluid/supplement intake                     Problem: Patient Education: Go to Patient Education Activity  Goal: Patient/Family Education  Outcome: Progressing Towards Goal     Problem: Patient Education: Go to Patient Education Activity  Goal: Patient/Family Education  Outcome: Progressing Towards Goal     Problem: Patient Education: Go to Patient Education Activity  Goal: Patient/Family Education  Outcome: Progressing Towards Goal

## 2021-07-22 NOTE — PROGRESS NOTES
Therapy is continuing to await MRI of RLE; ortho consult is appreciated. Will defer PT/mobiltiy until MRI is complete and resulted.     Thank you,  Devaughn Galeano, PT, DPT

## 2021-07-22 NOTE — PROGRESS NOTES
Infectious Disease Consult    Today's Date: 2021   Admit Date: 2021    Impression:   · MSSA bacteremia (), TTE without vegetation  · R foot wound, swab culture with MSSA  · CHF EF 30-35%    Plan:   · Cont cefazolin - plan for likley 6 weeks given likely osteomyelitis of R foot is source  · Await MRI result  · No ortho intervention planned currently  · Repeat blood cx tomorrow    Anti-infectives:   · Vancomycin  · Ceftriaxone ( -  · Ancef  - current    Subjective: Foot is less swollen and has a little erythema. Still with purulent drainage from ulcer    No Known Allergies     Review of Systems:  Pertinent items are noted in the History of Present Illness.     Objective:     Visit Vitals  /88 (BP 1 Location: Right upper arm, BP Patient Position: Supine)   Pulse (!) 104   Temp 98.4 °F (36.9 °C)   Resp 19   Ht 5' 11\" (1.803 m)   Wt 128.3 kg (282 lb 14.4 oz)   SpO2 97%   BMI 39.46 kg/m²     Temp (24hrs), Av.3 °F (36.8 °C), Min:98 °F (36.7 °C), Max:99 °F (37.2 °C)       Lines:  Peripheral IV:       Physical Exam:    General:  Alert, cooperative, morbidly obese, appears stated age   Eyes:  Sclera anicteric, EOMI   Mouth/Throat: Mucous membranes normal, oral pharynx clear       Lungs:   Did not examine today - pt sleeping   CV:  Irregular rate and rhythm   Abdomen:   Soft, non-tender   Extremities: No cyanosis; R foot with cellulitis at the ankle, deformity, and lateral wound- redness improving   Skin: Skin color, texture, turgor normal. no acute rash or lesions   Musculoskeletal: Generally weak   Lines/Devices:  Intact, no erythema, drainage or tenderness                    Data Review:     CBC:  Recent Labs     21  0433 21  0540 21  0706 21  0706   WBC 10.4 12.7*  --  14.2*   GRANS 62 69   < > 75   MONOS 14* 12   < > 11   EOS 2 1   < > 1   ANEU 6.5 8.8*   < > 10.6*   ABL 2.2 2.2   < > 1.7   HGB 11.4* 11.0*  --  11.3*   HCT 36.7* 34.5*  --  35.8*    262  -- 241    < > = values in this interval not displayed. BMP:  Recent Labs     07/22/21  0433 07/21/21  0540 07/20/21  0706   CREA 2.25* 2.56* 2.39*   BUN 49* 50* 43*    135* 136   K 3.8 2.9* 3.1*   CL 99 96* 96*   CO2 34* 33* 34*   AGAP 5* 6* 6*   * 131* 140*       LFTS:  Recent Labs     07/20/21  0706   TBILI 1.2*   ALT 17      TP 6.9   ALB 2.5*       Microbiology:     All Micro Results     Procedure Component Value Units Date/Time    CULTURE, Mina Pelt STAIN [[id number]]  (Abnormal)  (Susceptibility) Collected: 07/19/21 1445    Order Status: Completed Specimen: Wound from Foot Updated: 07/22/21 0806     Special Requests: NO SPECIAL REQUESTS        GRAM STAIN 25 TO 55 WBC'S/OIF      FEW GRAM POSITIVE COCCI        Culture result:       MODERATE STAPHYLOCOCCUS AUREUS          BLOOD CULTURE [[id number]]  (Abnormal) Collected: 07/19/21 1211    Order Status: Completed Specimen: Blood Updated: 07/22/21 0712     Special Requests: --        RIGHT  FOREARM       GRAM STAIN       GRAM POS COCCI IN CLUSTERS                  AEROBIC AND ANAEROBIC BOTTLES                  CRITICAL RESULT NOT CALLED DUE TO PREVIOUS NOTIFICATION OF CRITICAL RESULT WITHIN THE LAST 24 HOURS.            Culture result: STAPHYLOCOCCUS AUREUS               For Susceptibility Refer to Culture  Accession [id number]      BLOOD CULTURE [[id number]]  (Abnormal)  (Susceptibility) Collected: 07/19/21 1054    Order Status: Completed Specimen: Blood Updated: 07/22/21 0711     Special Requests: --        RIGHT  HAND       GRAM STAIN       GRAM POS COCCI IN CLUSTERS                  AEROBIC AND ANAEROBIC BOTTLES                  RESULTS VERIFIED, PHONED TO AND READ BACK BY Addie Lantigua AT 7094 ON 7/20/21, [id number]           Culture result: STAPHYLOCOCCUS AUREUS               Refer to Blood Culture ID Panel Accession  [id number]      BLOOD CULTURE ID PANEL [[id number]]  (Abnormal) Collected: 07/19/21 1054    Order Status: Completed Specimen: Blood Updated: 07/20/21 0442     Acc. no. from Micro Order [id number]     Staphylococcus Detected        Comment: RESULTS VERIFIED, PHONED TO AND READ BACK BY  Rosamaria Farah AT 2248 ON 7/20/21, LANI          Staphylococcus aureus Detected        Comment: RESULTS VERIFIED, PHONED TO AND READ BACK BY  Rosamaria Farah AT 6285 ON 7/20/21, LANI          mecA (Methicillin-Resistance Genes) NOT DETECTED        INTERPRETATION       Gram positive cocci in clusters, identified in realtime PCR as probable MSSA. Comment: Recommend discontinuing IV vancomycin starting cefazolin or nafcillin if patient not on beta-lactam therapy. Infectious Diseases Consult recommended in adult patients. THIS TEST DOES NOT REPLACE SENSITIVITY TESTING. Imaging:   R foot xray 7/19/21 with soft tissue swelling, severe hammertoe deformity and total destruction of the talus   Duplex LE without DVT  MRI pending.     Signed By: Regis Chin MD     July 22, 2021

## 2021-07-22 NOTE — PROGRESS NOTES
[facility] CARDIOLOGY PROGRESS NOTE           7/22/2021 3:06 PM    Admit Date: 7/19/2021      Subjective:     No overnight events. In atrial fibrillation with controlled ventricular rates. On 2L NC    ROS:  Cardiovascular:  As noted above    Objective:      Vitals:    07/22/21 0506 07/22/21 0733 07/22/21 1126 07/22/21 1553   BP:  116/75 129/88 105/62   Pulse:  (!) 57 (!) 104 98   Resp:  19 19    Temp:  98.1 °F (36.7 °C) 98.4 °F (36.9 °C) 98.3 °F (36.8 °C)   SpO2:  97% 97% 97%   Weight: 282 lb 14.4 oz (128.3 kg)      Height:           Physical Exam:  General-No Acute Distress  Neck- supple, no JVD  CV- irregular rate and rhythm no MRG  Lung- clear bilaterally  Abd- soft, nontender, nondistended  Ext- 1+ edema RLE; indurated/erythematous. Skin- warm and dry    Data Review:   Recent Labs     07/22/21  0433 07/21/21  0540    135*   K 3.8 2.9*   MG  --  2.0   BUN 49* 50*   CREA 2.25* 2.56*   * 131*   WBC 10.4 12.7*   HGB 11.4* 11.0*   HCT 36.7* 34.5*    262       Assessment/Plan:     Principal Problem:    Atrial fibrillation with RVR (HCC) (9/58/9811)  -Uncertain duration and possibly triggered by underlying acute issues.  -Echo with moderate left ventricular dysfunction with wall motion abnormality suggestive of coronary disease. Uncertain duration. Mild left atrial enlargement.  -Started on heparin and continue at this time. Can reassess transition to p.o. if no procedures planned.  -Target rate control therapy currently. On Lopressor 25 mg twice daily. Acceptable rate control; plan transition to Toprol-XL prior to discharge with LV dysfunction. -[id number] score of ~5      Cardiomyopathy  - appears ischemic possible component of tachycardic cardiomyopathy as well.  -See above with plans to transition to Toprol-XL during hospital course. Defer consideration for ACE inhibitor/ARB's with acute on chronic renal failure.   Reassess during hospital course.  -Some low BPs have limited therapy and closely monitor.  -Exam currently with no significant volume overload. Improving creatinine.  -Needs ischemic work-up which can be addressed on an outpatient basis once acute issues resolve. No acute indication for coronary angiogram.  Continue on telemetry. Bacteremia  -MSSA bacteremia with likely source right foot. Pending MRI. -Defer consideration for ANDRE at this time until MRI completed. If 6 weeks needed based on results, would have no ANDRE indication. Active Problems:    CAD (coronary artery disease) ()  -Remote history of prior stenting per records. Continue high intensity statin      Stage 3 chronic kidney disease ([facility] (11/22/2017)  -improving; likely prerenal.  Exam with no significant volume overload. Agree withholding diuretics. Controlled type 2 diabetes mellitus with diabetic polyneuropathy, without long-term current use of insulin ([facility] (1/5/2018)      Cellulitis (7/19/2021)  -Management per primary      Suspected CHF (congestive heart failure) (7/19/2021)  -Exam not consistent with volume overload. Held diuretics with worsening renal function.           Moris Brady MD  7/22/2021 3:06 PM

## 2021-07-22 NOTE — CONSULTS
[id number] [facility]  Consultation Note    Patient ID:  Name: Rani Rain  MRN: [id number]  AGE: 77 y.o.  : 1945    Date of Consultation:  2021  Referring Physician:  Hospitalist    Subjective: Pt complains of right foot wound that he says was from his old foot brace. He has seen a podiatrist prior to presenting to the ER. He was on outpatient oral antibiotics without improvement. They presented to the [address] [place] [address] Emergency Dept on  . They were found to have elevated WBC. They were admitted by the hospitalist. He denies any pain. He has been receiving IV antibiotics and his WBC have returned to normal limits. Patient has a history of CKD, DM.       Past Medical History Includes:   Past Medical History:   Diagnosis Date    CAD (coronary artery disease)     Chronic kidney disease     stage 3    Hypercholesterolemia     Hypertension     Neuropathy    ,   Past Surgical History:   Procedure Laterality Date    AL CARDIAC SURG PROCEDURE UNLIST      stent placement     Family History:   Family History   Problem Relation Age of Onset    Cancer Mother     Cancer Father     No Known Problems Maternal Grandmother     No Known Problems Maternal Grandfather     No Known Problems Paternal Grandmother     No Known Problems Paternal Grandfather       Social History:   Social History     Tobacco Use    Smoking status: Never Smoker    Smokeless tobacco: Never Used   Substance Use Topics    Alcohol use: No       ALLERGIES: No Known Allergies     Patient Medications    Current Facility-Administered Medications   Medication Dose Route Frequency    [Held by provider] 0.9% sodium chloride infusion  50 mL/hr IntraVENous CONTINUOUS    sodium hypochlorite (QUARTER STRENGTH DAKIN'S) 0.125% irrigation (bottle)   Topical DAILY    ceFAZolin (ANCEF) 2 g/20 mL in sterile water IV syringe  2 g IntraVENous Q12H    rOPINIRole (REQUIP) tablet 0.25 mg  0.25 mg Oral QHS    metoprolol tartrate (LOPRESSOR) tablet 25 mg  25 mg Oral BID    atorvastatin (LIPITOR) tablet 40 mg  40 mg Oral QHS    sodium chloride (NS) flush 5-10 mL  5-10 mL IntraVENous Q8H    sodium chloride (NS) flush 5-10 mL  5-10 mL IntraVENous PRN    insulin lispro (HUMALOG) injection   SubCUTAneous AC&HS    aspirin delayed-release tablet 81 mg  81 mg Oral DAILY    [Held by provider] hydroCHLOROthiazide (HYDRODIURIL) tablet 25 mg  25 mg Oral DAILY    pregabalin (LYRICA) capsule 200 mg  200 mg Oral DAILY    acetaminophen (TYLENOL) tablet 650 mg  650 mg Oral Q6H PRN    Or    acetaminophen (TYLENOL) suppository 650 mg  650 mg Rectal Q6H PRN    polyethylene glycol (MIRALAX) packet 17 g  17 g Oral DAILY PRN    ondansetron (ZOFRAN ODT) tablet 4 mg  4 mg Oral Q8H PRN    Or    ondansetron (ZOFRAN) injection 4 mg  4 mg IntraVENous Q6H PRN    [Held by provider] furosemide (LASIX) tablet 40 mg  40 mg Oral DAILY    heparin 25,000 units in dextrose 500 mL infusion  12-25 Units/kg/hr (Adjusted) IntraVENous TITRATE         Review of Systems:  A comprehensive review of systems was negative except for that written in the HPI. Physical Exam:      General: NAD, Alert, Oriented x 3   Mental Status: Appropriate   Psych: Normal Affect, Normal Mood    HEENT: Normal Cephalic/Atraumatic, PERRL   Lungs: Respirations even and unlabored, Breath Sounds were clear, no respiratory distress   Heart: Regular Rate and Rhythm   Vascular: Distal pulses intact, good capillary refill   Skin: quarter size ulcerated wound to the lateral right foot. No ascending redness. No flutuance. Unable to express any purulent fluid. Musculoskeletal: charcot deformity of the right foot. No pain on ROM of the right ankle. Good strength of the right right leg.  deminished sensation bilaterally   Lymphatic: No lympahdenopathy  Neuro: No gross deficits   Abdomen: Soft, Non tender, No distension      VITALS:   Patient Vitals for the past 8 hrs:   BP Temp Pulse Resp SpO2 Weight   21 0733 116/75 98.1 °F (36.7 °C) (!) 57 19 97 %    21 0506      282 lb 14.4 oz (128.3 kg)   21 0455 132/78 98 °F (36.7 °C) 100 20 94 %    21 0400   95       , Temp (24hrs), Av.3 °F (36.8 °C), Min:98 °F (36.7 °C), Max:99 °F (37.2 °C)           Diagnosis   Patient Active Problem List   Diagnosis Code    CAD (coronary artery disease) I25.10    Benign hypertensive kidney disease with chronic kidney disease I12.9    Stage 3 chronic kidney disease ([facility]) N18.30    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    Controlled type 2 diabetes mellitus with diabetic polyneuropathy, without long-term current use of insulin ([facility]) E11.42    Type 2 diabetes mellitus with nephropathy ([facility]) E11.21    Bunion of unspecified foot M21.619    Onychomycosis B35.1    Corns and callus L84    Mixed hyperlipidemia E78.2    Severe obesity (BMI 35.0-35.9 with comorbidity) ([facility]) E66.01, Z68.35    Morbid obesity with BMI of 40.0-44.9, adult ([facility]) E66.01, Z68.41    Cellulitis L03.90    Suspected CHF (congestive heart failure) R09.89    Atrial fibrillation with RVR ([facility]) I48.91    Hypoxia R09.02    Acute kidney injury superimposed on CKD ([facility]) N17.9, N18.9    Staphylococcus aureus bacteremia R78.81, B95.61          Assessment      Charcot arthropathy of the right foot  Diabetic foot wound likely related to the orthosis     Medical Decision Making:     X-rays and chart have been reviewed. The patient was discussed with Dr. Leonel Faye. The patient has Charcot deformity of the right foot. There is no signs of abscess but we are awaiting an MRI to confirm. He may have chronic osteomyeltiis of the right foot. The patient is not interested in a BKA at this time. I do think he would benefit from outpatient management with one of our foot and ankle specialists.  He will require wound care and will benefit from outpatient treatment at the [address] [place] [address] wound center upon discharge.           LATONYA Iglesias  7/22/2021,  10:19 AM

## 2021-07-22 NOTE — PROGRESS NOTES
Hospitalist Progress Note     Name:  Amy John  Age:76 y.o. Sex:male   :  1945    MRN:  [id number]   Admit Date:  2021    Presenting Complaint: Foot Pain    Initial Admission Diagnosis: Cellulitis [L03.90]  Atrial fibrillation with RVR (HCC) [I48.91]  Suspected CHF (congestive heart failure) [R09.89]     Hospital Course/Interval history:   78 y/o M with PMH of DM type II, HLD, HTN that presented with R foot wound and swelling found to be on Afib RVR and acute on ckd  Pt on oxygen , cxr atelectasis vs infiltrate. On heparin drip for afib. Cardiology follwoing          Subjective (21):  2021  Says doing ok  Daughter at bedside  On 3.5 lit/min  Wound care evaluated pt.    2021  Says doing ok  Orthopedic evaluated pt today  No intervention at this point. May need amputation later on. MRI rt foot pending          Assessment & Plan    Right foot wound infection with surrounding cellulitis that failed outpatient antibiotic therapy  - US negative for DVT  - Continue vancomycin  - Start ceftriaxone  - Follow wound cultures  - Wound care consult  2021- Wound lateral aspect of rt foot  Ordered MRI rt foot  Staph aureus bacteremia  2021- MRI rt foot pending      New onset Afib RVR  - Continue metoprolol  - Continue heparin gtt  - Telemetry monitoring   - Cardiology recs  2021- cont heparin for now until MRI rt foot      TAMMY likely prerenal   - Avoid nephrotoxic agents  - Monitor wagner function   2021- added NS 50 cc/hr  2021- did get lasix by ID yesterday.   Fluids held    Hypokalemia- replace  2021- resolved    Hypoxemia- atelectasis vs infiltrate  cont incentive spirometry    DM type 2- cont sliding scale insulin      ?HF  - Hold diuretics for now in the setting of AK  - Strict I&Os  - Monitor daily weight  - TTE  - Cardiology recs    2021- echo ef 40-45%     HTN  - Continue metoprolol  - Hold HCTZ in the setting of TAMMY     Neuropathy- both lower extremities knee down. Cont lyrica    Chronic rt foot problems     DVT ppx heparin gtt          Diet:  ADULT DIET Regular; 4 carb choices (60 gm/meal); Low Fat/Low Chol/High Fiber/2 gm Na; 1500 ml  DVT PPx: HEPARIN  Code status: Full Code    Objective:     Patient Vitals for the past 24 hrs:   Temp Pulse Resp BP SpO2   07/22/21 0733 98.1 °F (36.7 °C) (!) 57 19 116/75 97 %   07/22/21 0455 98 °F (36.7 °C) 100 20 132/78 94 %   07/22/21 0400  95      07/21/21 2343 98 °F (36.7 °C) (!) 102 12 127/89 98 %   07/21/21 1958 99 °F (37.2 °C) 97 26 (!) 99/57 98 %   07/21/21 1613 98.3 °F (36.8 °C) 79 19 107/83    07/21/21 1238    (!) 103/57    07/21/21 1131 98.6 °F (37 °C) 92 18 94/67 99 %   07/21/21 1106    112/71      Oxygen Therapy  O2 Sat (%): 97 % (07/22/21 0733)  Pulse via Oximetry: 92 beats per minute (07/19/21 1640)  O2 Device: Nasal cannula (07/21/21 0249)  O2 Flow Rate (L/min): 3.5 l/min (07/21/21 0249)    Body mass index is 39.46 kg/m². Physical Exam:   General:  No acute distress, speaking in full sentences, no use of accessory muscles. On oxygen   heent- pupils equal reacting to light, neck supple throat normal  Lungs:  Clear to auscultation bilaterally   CV:  Regular rate and rhythm with normal S1 and S2   Abdomen:  Soft, nontender, nondistended, normoactive bowel sounds   Extremities:  No cyanosis clubbing . Rt leg mild swollen, mild erythema  Lower 1/3rd rt leg.  Non tender secondary to neuropathy  Neuro:  Nonfocal, A&O x3   Psych:  Normal affect     Data Review:  I have reviewed all labs, meds, and studies from the last 24 hours:    Labs:    Recent Results (from the past 24 hour(s))   GLUCOSE, POC    Collection Time: 07/21/21 11:24 AM   Result Value Ref Range    Glucose (POC) 139 (H) 65 - 100 mg/dL    Performed by MarcelloLIBERTY    ECHO ADULT COMPLETE    Collection Time: 07/21/21 12:39 PM   Result Value Ref Range    IVSd 1.02 (A) 0.60 - 1.00 cm    LVIDd 5.06 4.20 - 5.90 cm    LVIDs 3.95 cm LVOT d 2.50 cm    LVPWd 0.95 0.60 - 1.00 cm    LV Ejection Fraction MOD 4C 34 percent    LV ED Vol A4C 120.77 mL    LV ES Vol A4C 79.18 mL    LVOT Peak Gradient 2.07 mmHg    Left Ventricular Outflow Tract Mean Gradient 1.25 mmHg    LVOT SV 59.8 mL    LVOT Peak Velocity 71.77 cm/s    LVOT VTI 12.16 cm    RVIDd 2.73 cm    Left Atrium Major Axis 3.50 cm    LA Volume 74.65 18.0 - 58.0 mL    LA Area 4C 22.91 cm2    LA Vol 2C 76.60 (A) 18.00 - 58.00 mL    LA Vol 4C 60.58 (A) 18.00 - 58.00 mL    Aortic Valve Area by Continuity of Peak Velocity 3.12 cm2    Aortic Valve Area by Continuity of Peak Velocity 2.74 cm2    Aortic Valve Area by Continuity of Peak Velocity 3.61 cm2    Aortic Valve Area by Continuity of Peak Velocity 3.02 cm2    Aortic Valve Area by Continuity of VTI 2.97 cm2    Aortic Valve Area by Continuity of VTI 2.65 cm2    Aortic Valve Area by Continuity of VTI 3.54 cm2    Aortic Valve Area by Continuity of VTI 2.57 cm2    AoV PG 5.15 mmHg    Aortic Valve Systolic Mean Gradient 9.52 mmHg    Aortic Valve Systolic Peak Velocity 264.85 cm/s    AoV VTI 20.16 cm    Tapse 1.46 (A) 1.50 - 2.00 cm    Triscuspid Valve Regurgitation Peak Gradient 22.19 mmHg    TR Max Velocity 234.07 cm/s    AO ASC D 3.03 cm    IVC proximal 1.70 cm    Aortic Sinus Valsalva 3.43 cm    LV Mass .9 88.0 - 224.0 g    LV Mass AL Index 74.2 49.0 - 115.0 g/m2    Left Atrium Minor Axis 1.43 cm    LA Vol Index 30.47 16.00 - 28.00 ml/m2    LA Vol Index 31.27 16.00 - 28.00 ml/m2    LA Vol Index 24.73 16.00 - 28.00 ml/m2    LVED Vol Index A4C 49.3 mL/m2    LVES Vol Index A4C 32.3 mL/m2   VANCOMYCIN, TROUGH    Collection Time: 07/21/21  2:21 PM   Result Value Ref Range    Vancomycin,trough 14.2 5 - 20 ug/mL   HEPARIN XA UFH    Collection Time: 07/21/21  2:21 PM   Result Value Ref Range    Heparin Xa UFH 0.62 0.3 - 0.7 IU/mL   NT-PRO BNP    Collection Time: 07/21/21  2:21 PM   Result Value Ref Range    NT pro-BNP 2,999 (H) <450 PG/ML   GLUCOSE, POC    Collection Time: 07/21/21  4:13 PM   Result Value Ref Range    Glucose (POC) 135 (H) 65 - 100 mg/dL    Performed by Sabrina    GLUCOSE, POC    Collection Time: 07/21/21  9:09 PM   Result Value Ref Range    Glucose (POC) 141 (H) 65 - 100 mg/dL    Performed by Elvia    CBC WITH AUTOMATED DIFF    Collection Time: 07/22/21  4:33 AM   Result Value Ref Range    WBC 10.4 4.3 - 11.1 K/uL    RBC 3.88 (L) 4.23 - 5.6 M/uL    HGB 11.4 (L) 13.6 - 17.2 g/dL    HCT 36.7 (L) 41.1 - 50.3 %    MCV 94.6 79.6 - 97.8 FL    MCH 29.4 26.1 - 32.9 PG    MCHC 31.1 (L) 31.4 - 35.0 g/dL    RDW 14.7 (H) 11.9 - 14.6 %    PLATELET 120 595 - 139 K/uL    MPV 11.5 9.4 - 12.3 FL    ABSOLUTE NRBC 0.00 0.0 - 0.2 K/uL    DF AUTOMATED      NEUTROPHILS 62 43 - 78 %    LYMPHOCYTES 21 13 - 44 %    MONOCYTES 14 (H) 4.0 - 12.0 %    EOSINOPHILS 2 0.5 - 7.8 %    BASOPHILS 1 0.0 - 2.0 %    IMMATURE GRANULOCYTES 1 0.0 - 5.0 %    ABS. NEUTROPHILS 6.5 1.7 - 8.2 K/UL    ABS. LYMPHOCYTES 2.2 0.5 - 4.6 K/UL    ABS. MONOCYTES 1.4 (H) 0.1 - 1.3 K/UL    ABS. EOSINOPHILS 0.2 0.0 - 0.8 K/UL    ABS. BASOPHILS 0.1 0.0 - 0.2 K/UL    ABS. IMM.  GRANS. 0.1 0.0 - 0.5 K/UL   METABOLIC PANEL, BASIC    Collection Time: 07/22/21  4:33 AM   Result Value Ref Range    Sodium 138 138 - 145 mmol/L    Potassium 3.8 3.5 - 5.1 mmol/L    Chloride 99 98 - 107 mmol/L    CO2 34 (H) 21 - 32 mmol/L    Anion gap 5 (L) 7 - 16 mmol/L    Glucose 116 (H) 65 - 100 mg/dL    BUN 49 (H) 8 - 23 MG/DL    Creatinine 2.25 (H) 0.8 - 1.5 MG/DL    GFR est AA 37 (L) >60 ml/min/1.73m2    GFR est non-AA 30 (L) >60 ml/min/1.73m2    Calcium 9.1 8.3 - 10.4 MG/DL   HEPARIN XA UFH    Collection Time: 07/22/21  4:33 AM   Result Value Ref Range    Heparin Xa UFH 0.51 0.3 - 0.7 IU/mL   GLUCOSE, POC    Collection Time: 07/22/21  7:33 AM   Result Value Ref Range    Glucose (POC) 117 (H) 65 - 100 mg/dL    Performed by [facility]        All Micro Results     Procedure Component Value Units Date/Time    CULTURE, Lindsay Haywood STAIN [[id number]]  (Abnormal)  (Susceptibility) Collected: 07/19/21 1445    Order Status: Completed Specimen: Wound from Foot Updated: 07/22/21 0806     Special Requests: NO SPECIAL REQUESTS        GRAM STAIN 25 TO 55 WBC'S/OIF      FEW GRAM POSITIVE COCCI        Culture result:       MODERATE STAPHYLOCOCCUS AUREUS          BLOOD CULTURE [[id number]]  (Abnormal) Collected: 07/19/21 1211    Order Status: Completed Specimen: Blood Updated: 07/22/21 0712     Special Requests: --        RIGHT  FOREARM       GRAM STAIN       GRAM POS COCCI IN CLUSTERS                  AEROBIC AND ANAEROBIC BOTTLES                  CRITICAL RESULT NOT CALLED DUE TO PREVIOUS NOTIFICATION OF CRITICAL RESULT WITHIN THE LAST 24 HOURS.            Culture result: STAPHYLOCOCCUS AUREUS               For Susceptibility Refer to Culture  Accession [id number]      BLOOD CULTURE [[id number]]  (Abnormal)  (Susceptibility) Collected: 07/19/21 1054    Order Status: Completed Specimen: Blood Updated: 07/22/21 0711     Special Requests: --        RIGHT  HAND       GRAM STAIN       GRAM POS COCCI IN CLUSTERS                  AEROBIC AND ANAEROBIC BOTTLES                  RESULTS VERIFIED, PHONED TO AND READ BACK BY Clarissa Sidhu AT 1008 ON 7/20/21, [address]           Culture result: STAPHYLOCOCCUS AUREUS               Refer to Blood Culture ID Panel Accession  [id number]      BLOOD CULTURE ID PANEL [[id number]]  (Abnormal) Collected: 07/19/21 1054    Order Status: Completed Specimen: Blood Updated: 07/20/21 0442     Acc. no. from Micro Order [id number]     Staphylococcus Detected        Comment: RESULTS VERIFIED, PHONED TO AND READ BACK BY  BRYN MCCALL AT 6997 ON 7/20/21, LANI          Staphylococcus aureus Detected        Comment: RESULTS VERIFIED, PHONED TO AND READ BACK BY  BRYN MCCALL AT 2748 ON 7/20/21, LAN[place]          mecA (Methicillin-Resistance Genes) NOT DETECTED        INTERPRETATION       Gram positive cocci in clusters, identified in realtime PCR as probable MSSA. Comment: Recommend discontinuing IV vancomycin starting cefazolin or nafcillin if patient not on beta-lactam therapy. Infectious Diseases Consult recommended in adult patients. THIS TEST DOES NOT REPLACE SENSITIVITY TESTING. EKG Results     Procedure 720 Value Units Date/Time    EKG, 12 LEAD, INITIAL [[id number]] Collected: 07/19/21 1100    Order Status: Completed Updated: 07/19/21 1630     Ventricular Rate 122 BPM      Atrial Rate 147 BPM      QRS Duration 132 ms      Q-T Interval 300 ms      QTC Calculation (Bezet) 427 ms      Calculated R Axis -36 degrees      Calculated T Axis 24 degrees      Diagnosis --     !! AGE AND GENDER SPECIFIC ECG ANALYSIS !! Atrial fibrillation with rapid ventricular response  Left axis deviation  Right bundle branch block  Cannot rule out Anteroseptal infarct , age undetermined  Abnormal ECG  No previous ECGs available  Confirmed by [facility]  MD (), Adryan Miramontes ([id number]) on 7/19/2021 4:29:33 PM            Other Studies:  ECHO ADULT COMPLETE    Result Date: 7/21/2021  · Image quality for this study was poor. · Contrast used: DEFINITY. · LV: Estimated LVEF is 30 - 35%. Normal wall thickness. Mildly dilated left ventricle. Moderate-to-severely reduced systolic function. Wall motion abnormalities with suggest coronary artery disease · LA: Mildly dilated left atrium. · RA: Mildly dilated right atrium. · MV: Mild mitral annular calcification. Mild mitral valve regurgitation is present. · TV: Mild tricuspid valve regurgitation is present.         Current Meds:   Current Facility-Administered Medications   Medication Dose Route Frequency    [Held by provider] 0.9% sodium chloride infusion  50 mL/hr IntraVENous CONTINUOUS    sodium hypochlorite (QUARTER STRENGTH DAKIN'S) 0.125% irrigation (bottle)   Topical DAILY    ceFAZolin (ANCEF) 2 g/20 mL in sterile water IV syringe  2 g IntraVENous Q12H    rOPINIRole (REQUIP) tablet 0.25 mg  0.25 mg Oral QHS    metoprolol tartrate (LOPRESSOR) tablet 25 mg  25 mg Oral BID    atorvastatin (LIPITOR) tablet 40 mg  40 mg Oral QHS    sodium chloride (NS) flush 5-10 mL  5-10 mL IntraVENous Q8H    sodium chloride (NS) flush 5-10 mL  5-10 mL IntraVENous PRN    insulin lispro (HUMALOG) injection   SubCUTAneous AC&HS    aspirin delayed-release tablet 81 mg  81 mg Oral DAILY    [Held by provider] hydroCHLOROthiazide (HYDRODIURIL) tablet 25 mg  25 mg Oral DAILY    pregabalin (LYRICA) capsule 200 mg  200 mg Oral DAILY    acetaminophen (TYLENOL) tablet 650 mg  650 mg Oral Q6H PRN    Or    acetaminophen (TYLENOL) suppository 650 mg  650 mg Rectal Q6H PRN    polyethylene glycol (MIRALAX) packet 17 g  17 g Oral DAILY PRN    ondansetron (ZOFRAN ODT) tablet 4 mg  4 mg Oral Q8H PRN    Or    ondansetron (ZOFRAN) injection 4 mg  4 mg IntraVENous Q6H PRN    [Held by provider] furosemide (LASIX) tablet 40 mg  40 mg Oral DAILY    heparin 25,000 units in dextrose 500 mL infusion  12-25 Units/kg/hr (Adjusted) IntraVENous TITRATE       Problem List:  Hospital Problems as of 7/22/2021 Date Reviewed: 1/13/2021        Codes Class Noted - Resolved POA    Hypoxia ICD-10-CM: R09.02  ICD-9-CM: 799.02  7/21/2021 - Present Unknown        [facility] ICD-10-CM: N17.9, N18.9  ICD-9-CM: 866.00, 585.9  7/21/2021 - Present Unknown        Staphylococcus aureus bacteremia ICD-10-CM: R78.81, B95.61  ICD-9-CM: 790.7, 041.11  7/21/2021 - Present Unknown        Cellulitis ICD-10-CM: L03.90  ICD-9-CM: 682.9  7/19/2021 - Present Unknown        Suspected CHF (congestive heart failure) ICD-10-CM: R09.89  ICD-9-CM: 785.9  7/19/2021 - Present Unknown        * (Principal) Atrial fibrillation with RVR (HCC) ICD-10-CM: I48.91  ICD-9-CM: 427.31  7/19/2021 - Present Unknown        Controlled type 2 diabetes mellitus with diabetic polyneuropathy, without long-term current use of insulin (HCC) (Chronic) ICD-10-CM: E11.42  ICD-9-CM: 250.60, 357.2  1/5/2018 - Present Yes        Stage 3 chronic kidney disease ([facility] (Chronic) ICD-10-CM: N18.30  ICD-9-CM: 585.3  11/22/2017 - Present Yes        CAD (coronary artery disease) (Chronic) ICD-10-CM: I25.10  ICD-9-CM: 414.00  Unknown - Present Yes                   Signed By: Katie England MD   [employer] [facility]    July 22, 2021

## 2021-07-22 NOTE — PROGRESS NOTES
Hourly rounds completed this shift. Pt currently off the floor for MRI. Will give report to night shift RN.

## 2021-07-23 ENCOUNTER — APPOINTMENT (OUTPATIENT)
Dept: CT IMAGING | Age: 76
DRG: 308 | End: 2021-07-23
Attending: FAMILY MEDICINE
Payer: MEDICARE

## 2021-07-23 ENCOUNTER — APPOINTMENT (OUTPATIENT)
Dept: GENERAL RADIOLOGY | Age: 76
DRG: 308 | End: 2021-07-23
Attending: FAMILY MEDICINE
Payer: MEDICARE

## 2021-07-23 PROBLEM — G93.41 ACUTE METABOLIC ENCEPHALOPATHY: Status: ACTIVE | Noted: 2021-07-23

## 2021-07-23 PROBLEM — J96.02 ACUTE RESPIRATORY FAILURE WITH HYPOXIA AND HYPERCAPNIA (HCC): Status: ACTIVE | Noted: 2021-07-23

## 2021-07-23 PROBLEM — J96.01 ACUTE RESPIRATORY FAILURE WITH HYPOXIA AND HYPERCAPNIA (HCC): Status: ACTIVE | Noted: 2021-07-23

## 2021-07-23 PROBLEM — J96.02 ACUTE RESPIRATORY FAILURE WITH HYPERCAPNIA (HCC): Status: ACTIVE | Noted: 2021-07-23

## 2021-07-23 LAB
ANION GAP SERPL CALC-SCNC: 4 MMOL/L (ref 7–16)
ARTERIAL PATENCY WRIST A: POSITIVE
ARTERIAL PATENCY WRIST A: POSITIVE
BASE EXCESS BLD CALC-SCNC: 7.5 MMOL/L
BASE EXCESS BLD CALC-SCNC: 7.7 MMOL/L
BASOPHILS # BLD: 0.1 K/UL (ref 0–0.2)
BASOPHILS NFR BLD: 1 % (ref 0–2)
BDY SITE: ABNORMAL
BDY SITE: ABNORMAL
BNP SERPL-MCNC: 3907 PG/ML
BUN SERPL-MCNC: 43 MG/DL (ref 8–23)
CALCIUM SERPL-MCNC: 9.2 MG/DL (ref 8.3–10.4)
CHLORIDE SERPL-SCNC: 102 MMOL/L (ref 98–107)
CO2 SERPL-SCNC: 34 MMOL/L (ref 21–32)
CREAT SERPL-MCNC: 1.9 MG/DL (ref 0.8–1.5)
DIFFERENTIAL METHOD BLD: ABNORMAL
EOSINOPHIL # BLD: 0.2 K/UL (ref 0–0.8)
EOSINOPHIL NFR BLD: 2 % (ref 0.5–7.8)
ERYTHROCYTE [DISTWIDTH] IN BLOOD BY AUTOMATED COUNT: 14.7 % (ref 11.9–14.6)
GAS FLOW.O2 O2 DELIVERY SYS: ABNORMAL L/MIN
GAS FLOW.O2 O2 DELIVERY SYS: ABNORMAL L/MIN
GLUCOSE BLD STRIP.AUTO-MCNC: 115 MG/DL (ref 65–100)
GLUCOSE BLD STRIP.AUTO-MCNC: 119 MG/DL (ref 65–100)
GLUCOSE BLD STRIP.AUTO-MCNC: 125 MG/DL (ref 65–100)
GLUCOSE BLD STRIP.AUTO-MCNC: 131 MG/DL (ref 65–100)
GLUCOSE SERPL-MCNC: 113 MG/DL (ref 65–100)
HCO3 BLD-SCNC: 36 MMOL/L (ref 22–26)
HCO3 BLD-SCNC: 36.8 MMOL/L (ref 22–26)
HCT VFR BLD AUTO: 33.8 % (ref 41.1–50.3)
HGB BLD-MCNC: 10.4 G/DL (ref 13.6–17.2)
IMM GRANULOCYTES # BLD AUTO: 0.1 K/UL (ref 0–0.5)
IMM GRANULOCYTES NFR BLD AUTO: 1 % (ref 0–5)
LYMPHOCYTES # BLD: 1.9 K/UL (ref 0.5–4.6)
LYMPHOCYTES NFR BLD: 26 % (ref 13–44)
MCH RBC QN AUTO: 29.5 PG (ref 26.1–32.9)
MCHC RBC AUTO-ENTMCNC: 30.8 G/DL (ref 31.4–35)
MCV RBC AUTO: 95.8 FL (ref 79.6–97.8)
MONOCYTES # BLD: 1 K/UL (ref 0.1–1.3)
MONOCYTES NFR BLD: 13 % (ref 4–12)
NEUTS SEG # BLD: 4.3 K/UL (ref 1.7–8.2)
NEUTS SEG NFR BLD: 58 % (ref 43–78)
NRBC # BLD: 0 K/UL (ref 0–0.2)
O2/TOTAL GAS SETTING VFR VENT: 33 %
O2/TOTAL GAS SETTING VFR VENT: 35 %
PCO2 BLD: 69.9 MMHG (ref 35–45)
PCO2 BLD: 78.6 MMHG (ref 35–45)
PEEP RESPIRATORY: 7 CMH2O
PH BLD: 7.28 [PH] (ref 7.35–7.45)
PH BLD: 7.32 [PH] (ref 7.35–7.45)
PLATELET # BLD AUTO: 333 K/UL (ref 150–450)
PMV BLD AUTO: 10.6 FL (ref 9.4–12.3)
PO2 BLD: 74 MMHG (ref 75–100)
PO2 BLD: 85 MMHG (ref 75–100)
POTASSIUM SERPL-SCNC: 3.8 MMOL/L (ref 3.5–5.1)
RBC # BLD AUTO: 3.53 M/UL (ref 4.23–5.6)
SAO2 % BLD: 92.6 % (ref 95–98)
SAO2 % BLD: 94.2 % (ref 95–98)
SERVICE CMNT-IMP: ABNORMAL
SODIUM SERPL-SCNC: 140 MMOL/L (ref 138–145)
SPECIMEN TYPE: ABNORMAL
SPECIMEN TYPE: ABNORMAL
UFH PPP CHRO-ACNC: 0.4 IU/ML (ref 0.3–0.7)
VENTILATION MODE VENT: ABNORMAL
WBC # BLD AUTO: 7.5 K/UL (ref 4.3–11.1)

## 2021-07-23 PROCEDURE — 97530 THERAPEUTIC ACTIVITIES: CPT

## 2021-07-23 PROCEDURE — 99232 SBSQ HOSP IP/OBS MODERATE 35: CPT | Performed by: INTERNAL MEDICINE

## 2021-07-23 PROCEDURE — 71045 X-RAY EXAM CHEST 1 VIEW: CPT

## 2021-07-23 PROCEDURE — 82803 BLOOD GASES ANY COMBINATION: CPT

## 2021-07-23 PROCEDURE — 74011250637 HC RX REV CODE- 250/637: Performed by: FAMILY MEDICINE

## 2021-07-23 PROCEDURE — 87040 BLOOD CULTURE FOR BACTERIA: CPT

## 2021-07-23 PROCEDURE — 74011250637 HC RX REV CODE- 250/637: Performed by: INTERNAL MEDICINE

## 2021-07-23 PROCEDURE — 80048 BASIC METABOLIC PNL TOTAL CA: CPT

## 2021-07-23 PROCEDURE — 97110 THERAPEUTIC EXERCISES: CPT

## 2021-07-23 PROCEDURE — 74011250636 HC RX REV CODE- 250/636: Performed by: INTERNAL MEDICINE

## 2021-07-23 PROCEDURE — 85025 COMPLETE CBC W/AUTO DIFF WBC: CPT

## 2021-07-23 PROCEDURE — 65660000000 HC RM CCU STEPDOWN

## 2021-07-23 PROCEDURE — 83880 ASSAY OF NATRIURETIC PEPTIDE: CPT

## 2021-07-23 PROCEDURE — 74011250636 HC RX REV CODE- 250/636: Performed by: FAMILY MEDICINE

## 2021-07-23 PROCEDURE — 70450 CT HEAD/BRAIN W/O DYE: CPT

## 2021-07-23 PROCEDURE — 74011250637 HC RX REV CODE- 250/637: Performed by: EMERGENCY MEDICINE

## 2021-07-23 PROCEDURE — 36415 COLL VENOUS BLD VENIPUNCTURE: CPT

## 2021-07-23 PROCEDURE — 99223 1ST HOSP IP/OBS HIGH 75: CPT | Performed by: INTERNAL MEDICINE

## 2021-07-23 PROCEDURE — 85520 HEPARIN ASSAY: CPT

## 2021-07-23 PROCEDURE — 74011000250 HC RX REV CODE- 250: Performed by: NURSE PRACTITIONER

## 2021-07-23 PROCEDURE — 36600 WITHDRAWAL OF ARTERIAL BLOOD: CPT

## 2021-07-23 PROCEDURE — 74011250636 HC RX REV CODE- 250/636: Performed by: NURSE PRACTITIONER

## 2021-07-23 PROCEDURE — 82962 GLUCOSE BLOOD TEST: CPT

## 2021-07-23 PROCEDURE — 97535 SELF CARE MNGMENT TRAINING: CPT

## 2021-07-23 RX ORDER — FUROSEMIDE 10 MG/ML
40 INJECTION INTRAMUSCULAR; INTRAVENOUS ONCE
Status: COMPLETED | OUTPATIENT
Start: 2021-07-23 | End: 2021-07-23

## 2021-07-23 RX ORDER — METOPROLOL SUCCINATE 25 MG/1
25 TABLET, EXTENDED RELEASE ORAL 2 TIMES DAILY
Status: DISCONTINUED | OUTPATIENT
Start: 2021-07-23 | End: 2021-07-27 | Stop reason: HOSPADM

## 2021-07-23 RX ORDER — CEFAZOLIN SODIUM/WATER 2 G/20 ML
2 SYRINGE (ML) INTRAVENOUS EVERY 8 HOURS
Status: DISCONTINUED | OUTPATIENT
Start: 2021-07-23 | End: 2021-07-27 | Stop reason: HOSPADM

## 2021-07-23 RX ORDER — DIAZEPAM 5 MG/1
5 TABLET ORAL ONCE
Status: COMPLETED | OUTPATIENT
Start: 2021-07-23 | End: 2021-07-23

## 2021-07-23 RX ADMIN — METOPROLOL SUCCINATE 25 MG: 25 TABLET, EXTENDED RELEASE ORAL at 12:20

## 2021-07-23 RX ADMIN — CEFAZOLIN SODIUM 2 G: 100 INJECTION, POWDER, LYOPHILIZED, FOR SOLUTION INTRAVENOUS at 15:44

## 2021-07-23 RX ADMIN — CEFAZOLIN SODIUM 2 G: 100 INJECTION, POWDER, LYOPHILIZED, FOR SOLUTION INTRAVENOUS at 05:44

## 2021-07-23 RX ADMIN — Medication 10 ML: at 21:50

## 2021-07-23 RX ADMIN — DIAZEPAM 5 MG: 5 TABLET ORAL at 03:31

## 2021-07-23 RX ADMIN — Medication 10 ML: at 15:44

## 2021-07-23 RX ADMIN — ROPINIROLE HYDROCHLORIDE 0.25 MG: 0.5 TABLET, FILM COATED ORAL at 21:50

## 2021-07-23 RX ADMIN — Medication 10 ML: at 05:44

## 2021-07-23 RX ADMIN — HEPARIN SODIUM 20 UNITS/KG/HR: 5000 INJECTION, SOLUTION INTRAVENOUS at 23:06

## 2021-07-23 RX ADMIN — METOPROLOL SUCCINATE 25 MG: 25 TABLET, EXTENDED RELEASE ORAL at 19:01

## 2021-07-23 RX ADMIN — PREGABALIN 200 MG: 100 CAPSULE ORAL at 09:50

## 2021-07-23 RX ADMIN — SODIUM HYPOCHLORITE: 1.25 SOLUTION TOPICAL at 09:47

## 2021-07-23 RX ADMIN — ASPIRIN 81 MG: 81 TABLET ORAL at 08:51

## 2021-07-23 RX ADMIN — CEFAZOLIN SODIUM 2 G: 100 INJECTION, POWDER, LYOPHILIZED, FOR SOLUTION INTRAVENOUS at 21:50

## 2021-07-23 RX ADMIN — FUROSEMIDE 40 MG: 10 INJECTION, SOLUTION INTRAMUSCULAR; INTRAVENOUS at 08:51

## 2021-07-23 RX ADMIN — ATORVASTATIN CALCIUM 40 MG: 40 TABLET, FILM COATED ORAL at 21:50

## 2021-07-23 RX ADMIN — HEPARIN SODIUM 20 UNITS/KG/HR: 5000 INJECTION, SOLUTION INTRAVENOUS at 10:57

## 2021-07-23 NOTE — PROGRESS NOTES
Rounding done every hour and PRN while patient in room. Patient resting in room. No signs or symptoms of distress. No changes in status. Patient denies any further needs or pain at this time. Patient got tearful again at shift change worried about messing up the machine last night. Family and nurse tried to comfort patient.

## 2021-07-23 NOTE — PROGRESS NOTES
ACUTE PHYSICAL THERAPY GOALS:  (Developed with and agreed upon by patient and/or caregiver.)  (1.) David Rob  will move from supine to sit and sit to supine , scoot up and down and roll side to side with MODIFIED INDEPENDENCE within 7 treatment day(s). (2.) David Pack will transfer from bed to chair and chair to bed with MODIFIED INDEPENDENCE using the least restrictive device within 7 treatment day(s). (3.) David Rob will ambulate with MODIFIED INDEPENDENCE for 1000 feet with the least restrictive device within 7 treatment day(s). (4.) David Pack will perform standing static and dynamic balance activities x 20 minutes with MODIFIED INDEPENDENCE to improve safety within 7 treatment day(s). (5.) David Pack will ascend and descend 1 stairs using no hand rail(s) with MODIFIED INDEPENDENCE to improve functional mobility and safety within 7 treatment day(s). (6.) David Pack will perform bilateral lower extremity exercises x 20 min for HEP with MODIFIED INDEPENDENCE to improve strength, endurance, and functional mobility within 7 treatment day(s). PHYSICAL THERAPY: Daily Note and PM Treatment Day # 2    David Rivas is a 77 y.o. male   PRIMARY DIAGNOSIS: Atrial fibrillation with RVR (HCC)  Cellulitis [L03.90]  Atrial fibrillation with RVR (HCC) [I48.91]  Suspected CHF (congestive heart failure) [R09.89]         ASSESSMENT:     REHAB RECOMMENDATIONS: CURRENT LEVEL OF FUNCTION:  (Most Recently Demonstrated)   Recommendation to date pending progress:  [address] [place] [address]  Equipment:    Rolling Walker; pt has quad cane Bed Mobility:   Minimal Assistance  Sit to Stand:   Contact Guard Assistance  Transfers:   Contact Guard Assistance  Gait/Mobility:   Contact Guard Assistance     ASSESSMENT:  Mr. Warden Navarrete is a 77year old M who presents to hospital from podiatrist w/ new onset Afib RVR.  Pt has been seen by ortho with no immediate plans for intervention on RLE; wound care recommends limiting weight bearing. Thus, any significant weight bearing/out of bed mobility was deferred this session. Today, pt performed bed mobility, sit to stand, amb. from bed to chair for 5 ft. and was instructed in performance of therapeutic exercise in chair. Pt demonstrated good activity tolerance as evidenced by his O2 remaining at 98% on 2 L throughout exercise. Son was present for treatment session and educated on limiting weight bearing on R LE. Pt presents as functioning below his baseline, with deficits in mobility including transfers, gait, balance, and activity tolerance. Pt will benefit from skilled therapy services to address stated deficits to promote return to highest level of function, independence, and safety. Will continue to follow. SUBJECTIVE:   Mr. Casper Herrera states, \"I feel good. \"    SOCIAL HISTORY/ LIVING ENVIRONMENT: see initial eval  Home Environment: Private residence  One/Two Story Residence: One story  Living Alone: No  Support Systems: Child(pat)  OBJECTIVE:     PAIN: VITAL SIGNS: LINES/DRAINS:   Pre Treatment: Pain Screen  Pain Scale 1: Numeric (0 - 10)  Pain Intensity 1: 0  Post Treatment: 0/10   IV  O2 Device: Nasal cannula     MOBILITY: I Mod I S SBA CGA Min Mod Max Total  NT x2 Comments:   Bed Mobility    Rolling [] [] [] [] [] [x] [] [] [] [] []    Supine to Sit [] [] [] [] [] [x] [] [] [] [] []    Scooting [] [] [] [] [] [x] [] [] [] [] []    Sit to Supine [] [] [] [] [] [] [] [] [] [x] []    Transfers    Sit to Stand [] [] [] [] [x] [] [] [] [] [] []    Bed to Chair [] [] [] [] [x] [] [] [] [] [] []    Stand to Sit [] [] [] [] [x] [] [] [] [] [] []    I=Independent, Mod I=Modified Independent, S=Supervision, SBA=Standby Assistance, CGA=Contact Guard Assistance,   Min=Minimal Assistance, Mod=Moderate Assistance, Max=Maximal Assistance, Total=Total Assistance, NT=Not Tested    BALANCE: Good Fair+ Fair Fair- Poor NT Comments   Sitting Static [x] [] [] [] [] []    Sitting Dynamic [] [x] [] [] [] []              Standing Static [] [x] [] [] [] []    Standing Dynamic [] [x] [x] [] [] []      GAIT: I Mod I S SBA CGA Min Mod Max Total  NT x2 Comments:   Level of Assistance [] [] [] [] [x] [] [] [] [] [] []    Distance 5 ft. DME Rolling Walker and Gait Belt    Gait Quality Small steps and slow pace    Weightbearing  Status Per wound care, limit RLE weight bearing     I=Independent, Mod I=Modified Independent, S=Supervision, SBA=Standby Assistance, CGA=Contact Guard Assistance,   Min=Minimal Assistance, Mod=Moderate Assistance, Max=Maximal Assistance, Total=Total Assistance, NT=Not Tested    PLAN:   FREQUENCY/DURATION: PT Plan of Care: 3 times/week for duration of hospital stay or until stated goals are met, whichever comes first.  TREATMENT:     TREATMENT:   (     )  Therapeutic Activity (29 Minutes): Therapeutic activity included Rolling, Supine to Sit, Scooting, Transfer Training, Ambulation on level ground, Sitting balance  and Standing balance to improve functional Mobility, Strength, ROM and Activity tolerance. Therapeutic Exercise (10 Minutes): Therapeutic exercises noted below to improve functional activity tolerance, AROM, strength and mobility.      TREATMENT GRID:   Date:  7-23-21 Date:   Date:     Activity/Exercise Parameters Parameters Parameters   Knee ext 15x B A     Seated hip flex 15x B A     Seated hip abd/add 15x B A                                   AFTER TREATMENT POSITION/PRECAUTIONS:  Chair, Needs within reach, RN notified and Visitors at bedside    INTERDISCIPLINARY COLLABORATION:  RN/PCT and PT/PTA    TOTAL TREATMENT DURATION:  PT Patient Time In/Time Out  Time In: 1332  Time Out: 302 [facility]

## 2021-07-23 NOTE — PROGRESS NOTES
[facility] CARDIOLOGY PROGRESS NOTE           7/23/2021 8:53 AM    Admit Date: 7/19/2021      Subjective:    no complaints     Review of Systems   Constitutional: Negative for fever. Cardiovascular: Negative for chest pain. Genitourinary: Negative for dysuria. Musculoskeletal: Positive for myalgias. Skin: Negative for rash. Neurological: Negative for dizziness. Psychiatric/Behavioral: Negative for depression. Objective:      Vitals:    07/23/21 0014 07/23/21 0437 07/23/21 0448 07/23/21 0728   BP: (!) 145/70 (!) 142/67  103/80   Pulse: 72 68  84   Resp: 18 16  18   Temp: 98.4 °F (36.9 °C) 98.2 °F (36.8 °C)  98.3 °F (36.8 °C)   SpO2: 95% 97%  98%   Weight:   282 lb (127.9 kg)    Height:             Physical Exam  Constitutional:       Appearance: He is well-developed. HENT:      Head: Normocephalic and atraumatic. Eyes:      Pupils: Pupils are equal, round, and reactive to light. Cardiovascular:      Rate and Rhythm: Normal rate. Heart sounds: No murmur heard. Pulmonary:      Effort: Pulmonary effort is normal.   Abdominal:      General: There is no distension. Palpations: Abdomen is soft. Tenderness: There is no abdominal tenderness. Musculoskeletal:      Cervical back: Normal range of motion. Right foot: Swelling and tenderness present. Skin:     General: Skin is warm and dry. Findings: No erythema or rash. Neurological:      Mental Status: He is alert and oriented to person, place, and time. Cranial Nerves: No cranial nerve deficit.    Psychiatric:         Behavior: Behavior normal.         Data Review:   Recent Labs     07/23/21  0627 07/22/21  0433 07/21/21  0540 07/21/21  0540    138   < > 135*   K 3.8 3.8   < > 2.9*   MG  --   --   --  2.0   BUN 43* 49*   < > 50*   CREA 1.90* 2.25*   < > 2.56*   * 116*   < > 131*   WBC 7.5 10.4   < > 12.7*   HGB 10.4* 11.4*   < > 11.0*   HCT 33.8* 36.7*   < > 34.5*    310   < > 262 < > = values in this interval not displayed. Intake/Output Summary (Last 24 hours) at 7/23/2021 0853  Last data filed at 7/23/2021 0851  Gross per 24 hour   Intake 0 ml   Output 525 ml   Net -525 ml     Current Facility-Administered Medications   Medication Dose Route Frequency    metoprolol succinate (TOPROL-XL) XL tablet 25 mg  25 mg Oral BID    [Held by provider] 0.9% sodium chloride infusion  50 mL/hr IntraVENous CONTINUOUS    sodium hypochlorite (QUARTER STRENGTH DAKIN'S) 0.125% irrigation (bottle)   Topical DAILY    ceFAZolin (ANCEF) 2 g/20 mL in sterile water IV syringe  2 g IntraVENous Q12H    rOPINIRole (REQUIP) tablet 0.25 mg  0.25 mg Oral QHS    atorvastatin (LIPITOR) tablet 40 mg  40 mg Oral QHS    sodium chloride (NS) flush 5-10 mL  5-10 mL IntraVENous Q8H    sodium chloride (NS) flush 5-10 mL  5-10 mL IntraVENous PRN    insulin lispro (HUMALOG) injection   SubCUTAneous AC&HS    aspirin delayed-release tablet 81 mg  81 mg Oral DAILY    [Held by provider] hydroCHLOROthiazide (HYDRODIURIL) tablet 25 mg  25 mg Oral DAILY    pregabalin (LYRICA) capsule 200 mg  200 mg Oral DAILY    acetaminophen (TYLENOL) tablet 650 mg  650 mg Oral Q6H PRN    Or    acetaminophen (TYLENOL) suppository 650 mg  650 mg Rectal Q6H PRN    polyethylene glycol (MIRALAX) packet 17 g  17 g Oral DAILY PRN    ondansetron (ZOFRAN ODT) tablet 4 mg  4 mg Oral Q8H PRN    Or    ondansetron (ZOFRAN) injection 4 mg  4 mg IntraVENous Q6H PRN    [Held by provider] furosemide (LASIX) tablet 40 mg  40 mg Oral DAILY    heparin 25,000 units in dextrose 500 mL infusion  12-25 Units/kg/hr (Adjusted) IntraVENous TITRATE       · LV: Estimated LVEF is 30 - 35%. Normal wall thickness. Mildly dilated left ventricle. Moderate-to-severely reduced systolic function. Wall motion abnormalities with suggest coronary artery disease  · LA: Mildly dilated left atrium. · RA: Mildly dilated right atrium.   · MV: Mild mitral annular calcification. Mild mitral valve regurgitation is present. · TV: Mild tricuspid valve regurgitation is present. Assessment/Plan:     77 y.o. male history of atrial fibrillation stage III chronic kidney disease diminished left ventricular systolic function by echocardiogram Staph bacteremia    Atrial fibrillation  · Currently rate controlled  · On anticoagulation therapy with heparin drip. · Transition to warfarin or novel anticoagulant as clinical status improves  · Metoprolol succinate 25 mg p.o. twice daily    Cardiomyopathy  · Etiology unknown  · Differential diagnosis includes ischemic heart disease idiopathic congestive heart failure or tachycardia induced cardiomyopathy.   · Holding ACE/ARB due to acute renal failure which is improving  · Change metoprolol tartrate to extended release metoprolol for CHF management and rate control  · Ischemic work-up discussed in previous notes planning for a later time due to acute illness  · Holding lasix at this time     CAD  · Remote history of coronary disease documented    Staph bacteremia  · Initial cultures positive  · Afebrile  · On antibiotics  · Infectious disease following  · ANDRE discussed in previous notes  · No vegetations noted on previous surface study  · Serial blood cultures pending          Jenni Salinas MD  7/23/2021 8:53 AM

## 2021-07-23 NOTE — PROGRESS NOTES
Consult reviewed, patient seen    RUQ pain radiating to back. Patient states pain is relieved with prayer and worse with rotation.   Never had pain like this before per her report to me  Labs essentially normal  US showing duct dilation and ?pancreatic cysts    Recs:  -HIDA will not conclusively clear duct, thus recommend MRCP for certain and if possible also obtain MRI abdomen for further characterization of pancreatic cysts.    Discussed with Dr. Chuckie Ríos  GI fellow   Hourly rounds completed this shift. All needs met at this time. Bed low/locked. Call light within reach. Will continue to monitor and give bedside report to oncoming nurse. Pt still wearing BiPAP at this time. ABG completed x2.

## 2021-07-23 NOTE — PROGRESS NOTES
Hospitalist Progress Note     Name:  Lavinia Woodward  Age:76 y.o. Sex:male   :  1945    MRN:  [id number]   Admit Date:  2021    Presenting Complaint: Foot Pain    Initial Admission Diagnosis: Cellulitis [L03.90]  Atrial fibrillation with RVR (HCC) [I48.91]  Suspected CHF (congestive heart failure) [R09.89]     Hospital Course/Interval history:   78 y/o M with PMH of DM type II, HLD, HTN that presented with R foot wound and swelling found to be on Afib RVR and acute on ckd  Pt on oxygen , cxr atelectasis vs infiltrate. On heparin drip for afib. Cardiology follwoing          Subjective (21):  2021  Says doing ok  Daughter at bedside  On 3.5 lit/min  Wound care evaluated pt.    2021  Says doing ok  Orthopedic evaluated pt today  No intervention at this point. May need amputation later on. MRI rt foot pending    2021  Mild disinhibition  Appears frustated, says only person who listens to him is his daughter  Mi Azar that his his oxygen was 30% last night and he unhooked the bipap. Spoke to daughter this am, said on her way      Assessment & Plan    Acute hypoxic and hypercapnic resp failure- was on bipap, presently on oxygen nasal cannula  Pulmonary consutled    Acute metabolic encephalopathy- prob sec to hypercapnia. Will r/o any intracranial etiology as pt is on heparin drip. Will order ct head        Right foot wound infection with surrounding cellulitis that failed outpatient antibiotic therapy  - US negative for DVT  - Continue vancomycin  - Start ceftriaxone  - Follow wound cultures  - Wound care consult  2021- Wound lateral aspect of rt foot  Ordered MRI rt foot  Staph aureus bacteremia  2021- MRI rt foot pending  2021- done by results pending      New onset Afib RVR  - Continue metoprolol  - Continue heparin gtt  - Telemetry monitoring   - Cardiology recs  2021- cont heparin for now until MRI rt foot  2021- afib rate controlled on heparin.  Still in afib      TAMMY likely prerenal   - Avoid nephrotoxic agents  - Monitor wagner function   7/21/2021- added NS 50 cc/hr  7/22/2021- did get lasix by ID yesterday. Fluids held  7/23/2021- improving creatinine      Hypokalemia- replace  7/22/2021- resolved    Hypoxemia- atelectasis vs infiltrate  cont incentive spirometry    DM type 2- cont sliding scale insulin      ?HF  - Hold diuretics for now in the setting of AK  - Strict I&Os  - Monitor daily weight  - TTE  - Cardiology recs    7/23/2021- echo ef 30-35%  Ordered a dose of lasix     HTN  - Continue metoprolol  - Hold HCTZ in the setting of TAMMY     Neuropathy- both lower extremities knee down. Cont lyrica    Chronic rt foot problems     DVT ppx heparin gtt          Diet:  ADULT DIET Regular; 4 carb choices (60 gm/meal); Low Fat/Low Chol/High Fiber/2 gm Na; 1500 ml  DVT PPx: HEPARIN  Code status: Full Code    Objective:     Patient Vitals for the past 24 hrs:   Temp Pulse Resp BP SpO2   07/23/21 0728 98.3 °F (36.8 °C) 84 18 103/80 98 %   07/23/21 0437 98.2 °F (36.8 °C) 68 16 (!) 142/67 97 %   07/23/21 0014 98.4 °F (36.9 °C) 72 18 (!) 145/70 95 %   07/22/21 2028 98.2 °F (36.8 °C) 88 21 (!) 152/105 93 %   07/22/21 1553 98.3 °F (36.8 °C) 98  105/62 97 %   07/22/21 1126 98.4 °F (36.9 °C) (!) 104 19 129/88 97 %     Oxygen Therapy  O2 Sat (%): 98 % (07/23/21 0728)  Pulse via Oximetry: 92 beats per minute (07/19/21 1640)  O2 Device: Nasal cannula (07/21/21 0249)  O2 Flow Rate (L/min): 3.5 l/min (07/21/21 0249)    Body mass index is 39.33 kg/m². Physical Exam:   General:  Awake, mild disinhibition, thinks that his oxygen is low though is oxygen saturation is 96% which I showed it to him. Fixated about low oxygen last night  Frustated.   Awake,alert, oriented timesx3  heent- pupils equal reacting to light, neck supple throat normal  Lungs:  Coarse breath sounds  CV:  Irregularly irregular normal S1 and S2   Abdomen:  Soft, nontender, nondistended, normoactive bowel sounds Extremities:  No cyanosis clubbing . Rt leg mild swollen, mild erythema  Lower 1/3rd rt leg.  Non tender secondary to neuropathy  Neuro:  Nonfocal, A&O x3   Psych:  Mild restless, anxious and appears frustated  Data Review:  I have reviewed all labs, meds, and studies from the last 24 hours:    Labs:    Recent Results (from the past 24 hour(s))   GLUCOSE, POC    Collection Time: 07/22/21 11:25 AM   Result Value Ref Range    Glucose (POC) 134 (H) 65 - 100 mg/dL    Performed by [facility]    GLUCOSE, POC    Collection Time: 07/22/21  4:12 PM   Result Value Ref Range    Glucose (POC) 120 (H) 65 - 100 mg/dL    Performed by [facility]    GLUCOSE, POC    Collection Time: 07/22/21  9:08 PM   Result Value Ref Range    Glucose (POC) 126 (H) 65 - 100 mg/dL    Performed by [facility]    BLOOD GAS, ARTERIAL POC    Collection Time: 07/23/21  3:20 AM   Result Value Ref Range    Device: NASAL CANNULA      FIO2 (POC) 33 %    pH (POC) 7.28 (L) 7.35 - 7.45      pCO2 (POC) 78.6 (HH) 35 - 45 MMHG    pO2 (POC) 85 75 - 100 MMHG    HCO3 (POC) 36.8 (H) 22 - 26 MMOL/L    sO2 (POC) 94.2 (L) 95 - 98 %    Base excess (POC) 7.5 mmol/L    Allens test (POC) Positive      Site RIGHT RADIAL      Specimen type (POC) ARTERIAL      Performed by [employer]     Critical value read back [facility]    BLOOD GAS, ARTERIAL POC    Collection Time: 07/23/21  5:48 AM   Result Value Ref Range    Device: BIPAP MASK      FIO2 (POC) 35 %    pH (POC) 7.32 (L) 7.35 - 7.45      pCO2 (POC) 69.9 (HH) 35 - 45 MMHG    pO2 (POC) 74 (L) 75 - 100 MMHG    HCO3 (POC) 36.0 (H) 22 - 26 MMOL/L    sO2 (POC) 92.6 (L) 95 - 98 %    Base excess (POC) 7.7 mmol/L    Mode CPAP/PS      PEEP/CPAP (POC) 7 cmH2O    Allens test (POC) Positive      Site RIGHT RADIAL      Specimen type (POC) ARTERIAL      Performed by [employer]     Critical value read back [facility]    CBC WITH AUTOMATED DIFF    Collection Time: 07/23/21  6:27 AM   Result Value Ref Range    WBC 7.5 4.3 - 11.1 K/uL    RBC 3.53 (L) 4.23 - 5.6 M/uL    HGB 10.4 (L) 13.6 - 17.2 g/dL    HCT 33.8 (L) 41.1 - 50.3 %    MCV 95.8 79.6 - 97.8 FL    MCH 29.5 26.1 - 32.9 PG    MCHC 30.8 (L) 31.4 - 35.0 g/dL    RDW 14.7 (H) 11.9 - 14.6 %    PLATELET 377 642 - 219 K/uL    MPV 10.6 9.4 - 12.3 FL    ABSOLUTE NRBC 0.00 0.0 - 0.2 K/uL    DF AUTOMATED      NEUTROPHILS 58 43 - 78 %    LYMPHOCYTES 26 13 - 44 %    MONOCYTES 13 (H) 4.0 - 12.0 %    EOSINOPHILS 2 0.5 - 7.8 %    BASOPHILS 1 0.0 - 2.0 %    IMMATURE GRANULOCYTES 1 0.0 - 5.0 %    ABS. NEUTROPHILS 4.3 1.7 - 8.2 K/UL    ABS. LYMPHOCYTES 1.9 0.5 - 4.6 K/UL    ABS. MONOCYTES 1.0 0.1 - 1.3 K/UL    ABS. EOSINOPHILS 0.2 0.0 - 0.8 K/UL    ABS. BASOPHILS 0.1 0.0 - 0.2 K/UL    ABS. IMM.  GRANS. 0.1 0.0 - 0.5 K/UL   METABOLIC PANEL, BASIC    Collection Time: 07/23/21  6:27 AM   Result Value Ref Range    Sodium 140 138 - 145 mmol/L    Potassium 3.8 3.5 - 5.1 mmol/L    Chloride 102 98 - 107 mmol/L    CO2 34 (H) 21 - 32 mmol/L    Anion gap 4 (L) 7 - 16 mmol/L    Glucose 113 (H) 65 - 100 mg/dL    BUN 43 (H) 8 - 23 MG/DL    Creatinine 1.90 (H) 0.8 - 1.5 MG/DL    GFR est AA 45 (L) >60 ml/min/1.73m2    GFR est non-AA 37 (L) >60 ml/min/1.73m2    Calcium 9.2 8.3 - 10.4 MG/DL   HEPARIN XA UFH    Collection Time: 07/23/21  6:27 AM   Result Value Ref Range    Heparin Xa UFH 0.40 0.3 - 0.7 IU/mL   GLUCOSE, POC    Collection Time: 07/23/21  8:10 AM   Result Value Ref Range    Glucose (POC) 119 (H) 65 - 100 mg/dL    Performed by [facility]        All Micro Results     Procedure Component Value Units Date/Time    CULTURE, BLOOD [[id number]] Collected: 07/23/21 0627    Order Status: Completed Specimen: Blood Updated: 07/23/21 0659    CULTURE, BLOOD [[id number]] Collected: 07/23/21 0633    Order Status: Completed Specimen: Blood Updated: 07/23/21 0659    CULTURE, Linh Escobar STAIN [[id number]]  (Abnormal)  (Susceptibility) Collected: 07/19/21 1445    Order Status: Completed Specimen: Wound from Foot Updated: 07/22/21 0806     Special Requests: NO SPECIAL REQUESTS        GRAM STAIN 25 TO 55 WBC'S/OIF      FEW GRAM POSITIVE COCCI        Culture result:       MODERATE STAPHYLOCOCCUS AUREUS          BLOOD CULTURE [[id number]]  (Abnormal) Collected: 07/19/21 1211    Order Status: Completed Specimen: Blood Updated: 07/22/21 0712     Special Requests: --        RIGHT  FOREARM       GRAM STAIN       GRAM POS COCCI IN CLUSTERS                  AEROBIC AND ANAEROBIC BOTTLES                  CRITICAL RESULT NOT CALLED DUE TO PREVIOUS NOTIFICATION OF CRITICAL RESULT WITHIN THE LAST 24 HOURS. Culture result: STAPHYLOCOCCUS AUREUS               For Susceptibility Refer to Culture  Accession [id number]      BLOOD CULTURE [[id number]]  (Abnormal)  (Susceptibility) Collected: 07/19/21 1054    Order Status: Completed Specimen: Blood Updated: 07/22/21 0711     Special Requests: --        RIGHT  HAND       GRAM STAIN       GRAM POS COCCI IN CLUSTERS                  AEROBIC AND ANAEROBIC BOTTLES                  RESULTS VERIFIED, PHONED TO AND READ BACK BY Deja Todd AT 3971 ON 7/20/21, [address]           Culture result: STAPHYLOCOCCUS AUREUS               Refer to Blood Culture ID Panel Accession  [id number]      BLOOD CULTURE ID PANEL [[id number]]  (Abnormal) Collected: 07/19/21 1054    Order Status: Completed Specimen: Blood Updated: 07/20/21 0442     Acc. no. from Micro Order [id number]     Staphylococcus Detected        Comment: RESULTS VERIFIED, PHONED TO AND READ BACK BY  Deja Todd AT 6947 ON 7/20/21, LANI          Staphylococcus aureus Detected        Comment: RESULTS VERIFIED, PHONED TO AND READ BACK BY  Deja Todd AT 6396 ON 7/20/21, JJC          mecA (Methicillin-Resistance Genes) NOT DETECTED        INTERPRETATION       Gram positive cocci in clusters, identified in realtime PCR as probable MSSA.            Comment: Recommend discontinuing IV vancomycin starting cefazolin or nafcillin if patient not on beta-lactam therapy. Infectious Diseases Consult recommended in adult patients. THIS TEST DOES NOT REPLACE SENSITIVITY TESTING. EKG Results     Procedure 720 Value Units Date/Time    EKG, 12 LEAD, INITIAL [[id number]] Collected: 07/19/21 1100    Order Status: Completed Updated: 07/19/21 1630     Ventricular Rate 122 BPM      Atrial Rate 147 BPM      QRS Duration 132 ms      Q-T Interval 300 ms      QTC Calculation (Bezet) 427 ms      Calculated R Axis -36 degrees      Calculated T Axis 24 degrees      Diagnosis --     !! AGE AND GENDER SPECIFIC ECG ANALYSIS !! Atrial fibrillation with rapid ventricular response  Left axis deviation  Right bundle branch block  Cannot rule out Anteroseptal infarct , age undetermined  Abnormal ECG  No previous ECGs available  Confirmed by Hollie Ray MD (), Viviane Armas ([id number]) on 7/19/2021 4:29:33 PM            Other Studies:  XR CHEST SNGL V    Result Date: 7/23/2021  EXAM: XR CHEST SNGL V INDICATION: RESP FAILURE ON BIPAP COMPARISON: 7/18/2021 FINDINGS: A portable AP radiograph of the chest was obtained at 0808 hours. The patient is on a cardiac monitor. Minimal right basilar airspace disease unchanged. . The cardiac and mediastinal contours and pulmonary vascularity are normal.  The bones and soft tissues are grossly within normal limits. Minimal right basilar atelectasis unchanged.       Current Meds:   Current Facility-Administered Medications   Medication Dose Route Frequency    furosemide (LASIX) injection 40 mg  40 mg IntraVENous ONCE    [Held by provider] 0.9% sodium chloride infusion  50 mL/hr IntraVENous CONTINUOUS    sodium hypochlorite (QUARTER STRENGTH DAKIN'S) 0.125% irrigation (bottle)   Topical DAILY    ceFAZolin (ANCEF) 2 g/20 mL in sterile water IV syringe  2 g IntraVENous Q12H    rOPINIRole (REQUIP) tablet 0.25 mg  0.25 mg Oral QHS    metoprolol tartrate (LOPRESSOR) tablet 25 mg  25 mg Oral BID    atorvastatin (LIPITOR) tablet 40 mg  40 mg Oral QHS    sodium chloride (NS) flush 5-10 mL  5-10 mL IntraVENous Q8H    sodium chloride (NS) flush 5-10 mL  5-10 mL IntraVENous PRN    insulin lispro (HUMALOG) injection   SubCUTAneous AC&HS    aspirin delayed-release tablet 81 mg  81 mg Oral DAILY    [Held by provider] hydroCHLOROthiazide (HYDRODIURIL) tablet 25 mg  25 mg Oral DAILY    pregabalin (LYRICA) capsule 200 mg  200 mg Oral DAILY    acetaminophen (TYLENOL) tablet 650 mg  650 mg Oral Q6H PRN    Or    acetaminophen (TYLENOL) suppository 650 mg  650 mg Rectal Q6H PRN    polyethylene glycol (MIRALAX) packet 17 g  17 g Oral DAILY PRN    ondansetron (ZOFRAN ODT) tablet 4 mg  4 mg Oral Q8H PRN    Or    ondansetron (ZOFRAN) injection 4 mg  4 mg IntraVENous Q6H PRN    [Held by provider] furosemide (LASIX) tablet 40 mg  40 mg Oral DAILY    heparin 25,000 units in dextrose 500 mL infusion  12-25 Units/kg/hr (Adjusted) IntraVENous TITRATE       Problem List:  Hospital Problems as of 7/23/2021 Date Reviewed: 1/13/2021        Codes Class Noted - Resolved POA    Acute respiratory failure with hypercapnia (HCC) ICD-10-CM: J96.02  ICD-9-CM: 518.81  7/23/2021 - Present Unknown        Acute respiratory failure with hypoxia and hypercapnia (HCC) ICD-10-CM: J96.01, J96.02  ICD-9-CM: 518.81  7/23/2021 - Present Unknown        Acute metabolic encephalopathy [id number]-[id number]: G93.41  ICD-9-CM: 348.31  7/23/2021 - Present Unknown        Hypoxia ICD-10-CM: R09.02  ICD-9-CM: 799.02  7/21/2021 - Present Unknown        Acute kidney injury superimposed on CKD (HCC) ICD-10-CM: N17.9, N18.9  ICD-9-CM: 866.00, 585.9  7/21/2021 - Present Unknown        Staphylococcus aureus bacteremia ICD-10-CM: R78.81, B95.61  ICD-9-CM: 790.7, 041.11  7/21/2021 - Present Unknown        Cellulitis ICD-10-CM: L03.90  ICD-9-CM: 682.9  7/19/2021 - Present Unknown        Suspected CHF (congestive heart failure) ICD-10-CM: R09.89  ICD-9-CM: 785.9  7/19/2021 - Present Unknown        * (Principal) Atrial fibrillation with RVR ([facility] ICD-10-CM: I48.91  ICD-9-CM: 427.31  7/19/2021 - Present Unknown        Controlled type 2 diabetes mellitus with diabetic polyneuropathy, without long-term current use of insulin (HCC) (Chronic) ICD-10-CM: E11.42  ICD-9-CM: 250.60, 357.2  1/5/2018 - Present Yes        Stage 3 chronic kidney disease ([facility] (Chronic) ICD-10-CM: N18.30  ICD-9-CM: 585.3  11/22/2017 - Present Yes        CAD (coronary artery disease) (Chronic) ICD-10-CM: I25.10  ICD-9-CM: 414.00  Unknown - Present Yes                   Signed By: Ignacio Barlow MD   [employer] [facility]    July 23, 2021

## 2021-07-23 NOTE — H&P
Pulmonary Initial Encounter     Lorena Rivas    7/23/2021    Date of Admission:  7/19/2021    The patient's chart is reviewed and the patient is discussed with the staff. Subjective:     Patient is a 77 y.o.  male seen and evaluated at the request of Dr. Kvng Garcia for hypercapneic respiratory failure on Bipap. Chronic medical problems include DM2, HLD, HTN. CAD with PCI x 1. Patient present to ER with right foot wound and edema. Wound culture and blood cultures with staph aureus. He was also in Afib RVR and TAMMY. He is treated with Ancef for staph bacteremia. Remains on heparin gtt for Afib. Echo 7/21 with EF 30-35 %, mildly dilated left ventricle, moderate to severely reduced systolic function, mildly dilated left atrium right atrium, milf MV and TV regurg. Cardiology following and planning ischemic work up outpatient. Pro BNP 2999 and he was diuresed with lasix. Creatinine now down to 1.90. Overnight, patient became confused. ABG revealed hypercapnia with pCO2 78. He was placed on Bipap for a few hours and then weaned back to 3L NC. Head CT completed this morning revealing no acute abnormalities. WBC 7.5. CXR with right basilar atelectasis vs pneumonia. His wife and daughter are at beside and assist with history as well. Patient is alert and oriented x 3. He denies any known lung disease, never smoker, never used inhalers. He reports wheezing a few days ago which resolved with diuresis. He reports some shortness of breath but feels this has improved since this morning. He is on 2L NC. He has no left extremity edema but right foot with edema and is wrapped. He has a cough with occasional yellow/green sputum. He does snore. Denies witnessed apneas, nocturnal gasping or choking. He has a narrow airway with Mallampati 4. He feels partially rested in the mornings. Needs outpatient sleep evaluation for ELAINA.           Review of Systems  A comprehensive review of systems was negative except for that written in the HPI. 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    Controlled type 2 diabetes mellitus with diabetic polyneuropathy, without long-term current use of insulin ([facility]) E11.42    Type 2 diabetes mellitus with nephropathy ([facility] E11.21    Bunion of unspecified foot M21.619    Onychomycosis B35.1    Corns and callus L84    Mixed hyperlipidemia E78.2    Severe obesity (BMI 35.0-35.9 with comorbidity) ([facility] E66.01, Z68.35    Morbid obesity with BMI of 40.0-44.9, adult ([facility]) E66.01, Z68.41    Cellulitis L03.90    Suspected CHF (congestive heart failure) R09.89    Atrial fibrillation with RVR ([facility]) I48.91    Hypoxia R09.02    Acute kidney injury superimposed on CKD ([facility]) N17.9, N18.9    Staphylococcus aureus bacteremia R78.81, B95.61    Acute respiratory failure with hypercapnia ([facility]) J96.02    Acute respiratory failure with hypoxia and hypercapnia ([facility]) J96.01, J96.02    Acute metabolic encephalopathy K67.49       Prior to Admission Medications   Prescriptions Last Dose Informant Patient Reported? Taking? Blood-Glucose Meter monitoring kit 7/19/2021 at Unknown time  No Yes   Sig: Check blood sugars BID. E11.9   Lancets misc 7/19/2021 at Unknown time  No Yes   Sig: Check blood sugars BID. E11.9   aspirin delayed-release 81 mg tablet 7/19/2021 at Unknown time  Yes Yes   atorvastatin (LIPITOR) 40 mg tablet 7/18/2021 at Unknown time  No Yes   Sig: TAKE 1 TABLET BY MOUTH DAILY  Indications: treatment to slow progression of coronary artery disease   cholecalciferol, VITAMIN D3, (VITAMIN D3) 5,000 unit tab tablet 7/19/2021 at Unknown time  Yes Yes   Sig: Take  by mouth daily.    cyanocobalamin 1,000 mcg tablet 7/19/2021 at Unknown time  No Yes   Sig: Take 1 Tab by mouth daily. glucose blood VI test strips (BLOOD GLUCOSE TEST) strip 7/19/2021 at Unknown time  No Yes   Sig: Check blood sugars BID. E11.9   hydroCHLOROthiazide (HYDRODIURIL) 25 mg tablet 7/19/2021 at Unknown time  No Yes   Sig: TAKE 1 TABLET BY MOUTH ONCE DAILY   metFORMIN ER (GLUCOPHAGE XR) 500 mg tablet 7/19/2021 at Unknown time  No Yes   Sig: Take 1 tablet by mouth daily   nystatin (MYCOSTATIN) powder Unknown at Unknown time  No No   Sig: Apply  to affected area three (3) times daily. pregabalin (LYRICA) 200 mg capsule 7/19/2021 at Unknown time  No Yes   Sig: TAKE ONE CAPSULE BY MOUTH daily      Facility-Administered Medications: None       Past Medical History:   Diagnosis Date    CAD (coronary artery disease)     Chronic kidney disease     stage 3    Hypercholesterolemia     Hypertension     Neuropathy      Past Surgical History:   Procedure Laterality Date    [facility] CARDIAC SURG PROCEDURE UNLIST  1999    stent placement     Social History     Socioeconomic History    Marital status:      Spouse name: Not on file    Number of children: Not on file    Years of education: Not on file    Highest education level: Not on file   Occupational History    Not on file   Tobacco Use    Smoking status: Never Smoker    Smokeless tobacco: Never Used   Vaping Use    Vaping Use: Never used   Substance and Sexual Activity    Alcohol use: No    Drug use: No    Sexual activity: Not Currently     Partners: Female   Other Topics Concern    Not on file   Social History Narrative    Not on file     Social Determinants of Health     Financial Resource Strain:     Difficulty of Paying Living Expenses:    Food Insecurity:     Worried About Running Out of Food in the Last Year:     Ran Out of Food in the Last Year:    Transportation Needs:     Lack of Transportation (Medical):      Lack of Transportation (Non-Medical):    Physical Activity:     Days of Exercise per Week:     Minutes of Exercise per Session:    Stress:     Feeling of Stress :    Social Connections:     Frequency of Communication with Friends and Family:     Frequency of Social Gatherings with Friends and Family:     Attends Christian Services:     Active Member of Clubs or Organizations:     Attends Club or Organization Meetings:     Marital Status:    Intimate Partner Violence:     Fear of Current or Ex-Partner:     Emotionally Abused:     Physically Abused:     Sexually Abused:      Family History   Problem Relation Age of Onset    Cancer Mother     Cancer Father     No Known Problems Maternal Grandmother     No Known Problems Maternal Grandfather     No Known Problems Paternal Grandmother     No Known Problems Paternal Grandfather      No Known Allergies    Current Facility-Administered Medications   Medication Dose Route Frequency    metoprolol succinate (TOPROL-XL) XL tablet 25 mg  25 mg Oral BID    [Held by provider] 0.9% sodium chloride infusion  50 mL/hr IntraVENous CONTINUOUS    sodium hypochlorite (QUARTER STRENGTH DAKIN'S) 0.125% irrigation (bottle)   Topical DAILY    ceFAZolin (ANCEF) 2 g/20 mL in sterile water IV syringe  2 g IntraVENous Q12H    rOPINIRole (REQUIP) tablet 0.25 mg  0.25 mg Oral QHS    atorvastatin (LIPITOR) tablet 40 mg  40 mg Oral QHS    sodium chloride (NS) flush 5-10 mL  5-10 mL IntraVENous Q8H    sodium chloride (NS) flush 5-10 mL  5-10 mL IntraVENous PRN    insulin lispro (HUMALOG) injection   SubCUTAneous AC&HS    aspirin delayed-release tablet 81 mg  81 mg Oral DAILY    [Held by provider] hydroCHLOROthiazide (HYDRODIURIL) tablet 25 mg  25 mg Oral DAILY    pregabalin (LYRICA) capsule 200 mg  200 mg Oral DAILY    acetaminophen (TYLENOL) tablet 650 mg  650 mg Oral Q6H PRN    Or    acetaminophen (TYLENOL) suppository 650 mg  650 mg Rectal Q6H PRN    polyethylene glycol (MIRALAX) packet 17 g  17 g Oral DAILY PRN    ondansetron (ZOFRAN ODT) tablet 4 mg  4 mg Oral Q8H PRN    Or    ondansetron (ZOFRAN) injection 4 mg  4 mg IntraVENous Q6H PRN    [Held by provider] furosemide (LASIX) tablet 40 mg  40 mg Oral DAILY    heparin 25,000 units in dextrose 500 mL infusion  12-25 Units/kg/hr (Adjusted) IntraVENous TITRATE         Objective:     Vitals:    07/23/21 0014 07/23/21 0437 07/23/21 0448 07/23/21 0728   BP: (!) 145/70 (!) 142/67  103/80   Pulse: 72 68  84   Resp: 18 16  18   Temp: 98.4 °F (36.9 °C) 98.2 °F (36.8 °C)  98.3 °F (36.8 °C)   SpO2: 95% 97%  98%   Weight:   282 lb (127.9 kg)    Height:           PHYSICAL EXAM     Constitutional:  the patient is well developed and in no acute distress, on 2L NC   EENMT:  Sclera clear, pupils equal, oral mucosa moist  Respiratory: R base crackles, otherwise clear, no wheezing  Cardiovascular:  RRR without M,G,R  Gastrointestinal: soft and non-tender; with positive bowel sounds. Musculoskeletal: warm without cyanosis. There is right foot edema, foot is wrapped. Skin:  no jaundice or rashes, right foot wound covered   Neurologic: no gross neuro deficits     Psychiatric:  alert and oriented x 3     CXR:    7/23 vs 7/19         CT Chest    Recent Labs     07/23/21  0627 07/22/21  0433 07/21/21  0540   WBC 7.5 10.4 12.7*   HGB 10.4* 11.4* 11.0*   HCT 33.8* 36.7* 34.5*    310 262     Recent Labs     07/23/21  0627 07/22/21  0433 07/21/21  0540    138 135*   K 3.8 3.8 2.9*    99 96*   * 116* 131*   CO2 34* 34* 33*   BUN 43* 49* 50*   CREA 1.90* 2.25* 2.56*   MG  --   --  2.0   CA 9.2 9.1 8.6     Recent Labs     07/23/21  0548 07/23/21  0320   PHI 7.32* 7.28*   PCO2I 69.9* 78.6*   PO2I 74* 85   HCO3I 36.0* 36.8*     No results for input(s): LCAD, LAC in the last 72 hours.     Cultures:  Blood x2 drawn today, 7/19 2/2 blood culture + staph aureus   Urine-  Sputum-  Wound- +staph aureus                Inpat Anti-Infectives (From admission, onward)     Start     Ordered Stop    07/21/21 1700  ceFAZolin (ANCEF) 2 g/20 mL in sterile water IV syringe  2 g,   IntraVENous,   EVERY 12 HOURS      07/21/21 1609 --                  Echo: 7/21          MRI right foot 7/22      Right venous duplex 7/19        Assessment:  (Medical Decision Making)     Hospital Problems  Date Reviewed: 1/13/2021        Codes Class Noted POA    Acute respiratory failure with hypercapnia ([facility] ICD-10-CM: J96.02  ICD-9-CM: 518.81  7/23/2021 Unknown    Improved with bipap this morning. Weaned to 2L NC and sats acceptable. Acute respiratory failure with hypoxia and hypercapnia (HCC) ICD-10-CM: J96.01, J96.02  ICD-9-CM: 518.81  7/23/2021 Unknown        Acute metabolic encephalopathy [facility]: G93.41  ICD-9-CM: 348.31  7/23/2021 Unknown        Hypoxia ICD-10-CM: R09.02  ICD-9-CM: 799.02  7/21/2021 Unknown    On 2L NC     Acute kidney injury superimposed on CKD ([facility] ICD-10-CM: N17.9, N18.9  ICD-9-CM: 866.00, 585.9  7/21/2021 Unknown        Staphylococcus aureus bacteremia ICD-10-CM: R78.81, B95.61  ICD-9-CM: 790.7, 041.11  7/21/2021 Unknown    On Ancef     Cellulitis ICD-10-CM: L03.90  ICD-9-CM: 682.9  7/19/2021 Unknown        Suspected CHF (congestive heart failure) ICD-10-CM: R09.89  ICD-9-CM: 785.9  7/19/2021 Unknown    EF 30-35 %     * (Principal) Atrial fibrillation with RVR (HCC) ICD-10-CM: I48.91  ICD-9-CM: 427.31  7/19/2021 Unknown    On heparin gtt     Controlled type 2 diabetes mellitus with diabetic polyneuropathy, without long-term current use of insulin (HCC) (Chronic) ICD-10-CM: E11.42  ICD-9-CM: 250.60, 357.2  1/5/2018 Yes        Stage 3 chronic kidney disease ([facility] (Chronic) ICD-10-CM: N18.30  ICD-9-CM: 585.3  11/22/2017 Yes        CAD (coronary artery disease) (Chronic) ICD-10-CM: I25.10  ICD-9-CM: 414.00  Unknown Yes            Patient admitted with staph bacteremia, right foot wound with staph aureus. We were consulted for acute respiratory failure with hypercapnia in the setting of bacteremia, Afib, cardiomyopathy.  He was placed on Bipap for a couple hours this morning and now weaned to 2L NC. Delirium has improved. He does have occasional sputum production with CXR with right basilar atelectasis vs pneumonia. Crackles in right base. Plan:  (Medical Decision Making)     --Continue oxygen, wean as able. Appears comfortable on 2L NC.   --Heparin gtt for AFib   --on Ancef    WBC down to 7.5   --high suspicion for ELAINA. Will need outpatient sleep study for evaluation. Can check DAVE inpatient once he is weaned to room air.   --check sputum culture given green/yellow sputum production  --recheck pro-BNP. Diuretics on hold due to CKD. Valerie Sanchez, LEIGHA     Lungs: decreased BS in the bases  Heart:  RRR with no Murmur/Rubs/Gallops    Additional Comments: Will wean O2 and consider empiric CPAP with sleep. Will arrange op PSG later and then sleep follow up. ID treating his MSSA bacteremia     I have spoken with and examined the patient. I agree with the above assessment and plan as documented. More than 50% of the time documented was spent in face-to-face contact with the patient and in the care of the patient on the floor/unit where the patient is located. Thank you very much for this referral.  We appreciate the opportunity to participate in this patient's care. Will follow along with above stated plan.       Celia Mahoney MD

## 2021-07-23 NOTE — PROGRESS NOTES
MD notified for patient's change in mental status. Patient has been changed over to 3L NC from bipap.   O2 sat 94-97

## 2021-07-23 NOTE — PROGRESS NOTES
MD put in orders for ABG and one time dose for valium for sleep and restlessness. Will notify MD of ABG results and continue to monitor pt.

## 2021-07-23 NOTE — PROGRESS NOTES
ACUTE OT GOALS:  (Developed with and agreed upon by patient and/or caregiver.)  1. Patient will complete lower body bathing and dressing with minimal assistance and adaptive equipment as needed. 2. Patient will complete toileting with modified independence. 3. Patient will tolerate 30 minutes of OT treatment with 1-2 rest breaks to increase activity tolerance for ADLs. 4. Patient will complete functional transfers with modified independence and adaptive equipment as needed. 5. Patient will complete functional mobility for household distances to increase independence for ADL/functional transfers.      Timeframe: 7 visits     OCCUPATIONAL THERAPY: Daily Note OT Treatment Day # 2    Angie Rivas is a 77 y.o. male   PRIMARY DIAGNOSIS: Atrial fibrillation with RVR (HCC)  Cellulitis [L03.90]  Atrial fibrillation with RVR (HCC) [I48.91]  Suspected CHF (congestive heart failure) [R09.89]       Payor: Vertell Claude / Plan: Vickye Half / Product Type: Lat[employer] Medicare /   ASSESSMENT:     REHAB RECOMMENDATIONS: CURRENT LEVEL OF FUNCTION:  (Most Recently Demonstrated)   Recommendation to date pending progress:  [address] [place] [address] Therapy  Equipment:    To Be Determined Bathing:   Minimal Assistance at seated level  Dressing:   Minimal Assistance  Feeding/Grooming:   Standby Assistance  Toileting:   Not tested  Functional Mobility:   Not tested     ASSESSMENT:  Mr. Deejay Taylor presented to the hospital with a-fib with RVR, suspected CHF, and cellulitis of R LE. Pt is pleasant and denies any current pain. Son, Reyes Braver, was present at bedside. Pt was agreeable to OT tx. At seated level, Pt completed bathing using bath pack and tooth brushing. Pt benefited from verbal cues to maintain attention on task. Completed hair washing using shower cap with therapist completing. Pt declined getting out of the recliner stating he just got here with PT.  Pt continues to benefit from skilled occupational therapy to address strength, coordination, ADLs, activity tolerance, and functional mobility. SUBJECTIVE:   Mr. Rhonda Hutchison states, \"I was trying to grow a beard to look like [facility]. \"    SOCIAL HISTORY/LIVING ENVIRONMENT: Lives with wife/daughter; typically uses a cane; 3 reported falls; one level home with one step to enter; tub/shower with grab bar   Home Environment: Private residence  One/Two Story Residence: One story  Living Alone: No  Support Systems: Child(pat)    OBJECTIVE:     PAIN: VITAL SIGNS: LINES/DRAINS:   Pre Treatment: Pain Screen  Pain Scale 1: Numeric (0 - 10)  Pain Intensity 1: 0  Post Treatment: 0   IV  O2 Device: Nasal cannula     ACTIVITIES OF DAILY LIVING: I Mod I S SBA CGA Min Mod Max Total NT Comments   BASIC ADLs:              Bathing/ Showering [] [] [] [] [] [x] [] [] [] [] secondary to verbal cues to maintain attention   Toileting [] [] [] [] [] [] [] [] [] [x]    Dressing [] [] [] [] [] [x] [] [] [] []    Feeding [] [] [] [] [] [] [] [] [] [x]    Grooming [] [] [] [x] [] [] [] [] [] [] At seated level   Personal Device Care [] [] [] [] [] [] [] [] [] [x]    Functional Mobility [] [] [] [] [] [] [] [] [] [x]    I=Independent, Mod I=Modified Independent, S=Supervision, SBA=Standby Assistance, CGA=Contact Guard Assistance,   Min=Minimal Assistance, Mod=Moderate Assistance, Max=Maximal Assistance, Total=Total Assistance, NT=Not Tested    MOBILITY: I Mod I S SBA CGA Min Mod Max Total  NT x2 Comments:   Supine to sit [] [] [] [] [] [] [] [] [] [x] []    Sit to supine [] [] [] [] [] [] [] [] [] [x] []    Sit to stand [] [] [] [] [] [] [] [] [] [x] []    Bed to chair [] [] [] [] [] [] [] [] [] [x] []    I=Independent, Mod I=Modified Independent, S=Supervision, SBA=Standby Assistance, CGA=Contact Guard Assistance,   Min=Minimal Assistance, Mod=Moderate Assistance, Max=Maximal Assistance, Total=Total Assistance, NT=Not Tested    BALANCE: Good Fair+ Fair Fair- Poor NT Comments   Sitting Static [] [x] [] [] [] []    Sitting Dynamic [] [] [x] [] [] []              Standing Static [] [] [] [] [] [x]    Standing Dynamic [] [] [] [] [] [x]      PLAN:   FREQUENCY/DURATION: OT Plan of Care: 3 times/week for duration of hospital stay or until stated goals are met, whichever comes first.    TREATMENT:   TREATMENT:   ($$ Self Care/Home Management: 23-37 mins    )  Self Care (32 Minutes): Self care including Upper Body Bathing, Lower Body Bathing, Upper Body Dressing and Grooming to increase independence.     TREATMENT GRID:  N/A    AFTER TREATMENT POSITION/PRECAUTIONS:  Chair, Needs within reach and Visitors at bedside    INTERDISCIPLINARY COLLABORATION:  RN/PCT and PT/PTA    TOTAL TREATMENT DURATION:  OT Patient Time In/Time Out  Time In: [id number]  Time Out: 2600 Bakari French MS OTR/L  7/23/2021

## 2021-07-23 NOTE — PROGRESS NOTES
Infectious Disease Progress Note    Today's Date: 2021   Admit Date: 2021    Impression:   · MSSA (R-clinda) bacteremia (), TTE without vegetation  · R foot wound, swab culture with MSSA  · CHF EF 30-35%    Plan:   · Will plan for six week course of cefazolin; creatinine clearance now 45 so will change dose to 2 g IV Q8 hrs. EOT will be decided once cultures clear. Follow pending cultures and any surgical plans. Per Ortho notes, patient has declined BKA. Anti-infectives:   · Vancomycin  · Ceftriaxone ( -  · Ancef  - current    Subjective: Interval History: MRI R foot with severe Charcot arthropathy, large amount of fluid in the midfoot and hindfoot; thick walled 2.7 cm fluid collection at the 5th MT head, small 1.4 cm fluid collection adjacent to lateral ulceration. Remains afebrile. Denies nausea, vomiting, diarrhea, fevers, chills, sweats. Family and PT bedside. No Known Allergies     Review of Systems:  Pertinent items are noted in the History of Present Illness.     Objective:     Visit Vitals  /83 (BP 1 Location: Right upper arm, BP Patient Position: Other (Comment))   Pulse (!) 108   Temp 98.4 °F (36.9 °C)   Resp 20   Ht 5' 11\" (1.803 m)   Wt 127.9 kg (282 lb)   SpO2 98%   BMI 39.33 kg/m²     Temp (24hrs), Av.3 °F (36.8 °C), Min:98.2 °F (36.8 °C), Max:98.4 °F (36.9 °C)     General:  Alert, no acute distress, appears stated age, obese  Head:    Normocephalic, atraumatic  Eyes:   Anicteric sclerae, no drainage, not injected, EOMI  Mouth:  Moist mucosa  Neck:   Supple, symmetrical, trachea midline, no JVD  Lungs:   Clear without increased work of breathing or audible wheezes  CV:   Regular rate and rhythm without audible murmur  Abdomen:  Soft, non tender, not distended, active bowel sounds  Extremities:  No cyanosis; R Charcot foot, edema, wound wrapped  Musculoskeletal: Moves all extremities with equal strength  Skin:   No acute rash   Psych:  Alert, oriented and appropriate without evidence of thought disorder  Lines:    benign      Data Review:     CBC:  Recent Labs     07/23/21 0627 07/22/21 0433 07/21/21  0540 07/21/21  0540   WBC 7.5 10.4  --  12.7*   GRANS 58 62   < > 69   MONOS 13* 14*   < > 12   EOS 2 2   < > 1   ANEU 4.3 6.5   < > 8.8*   ABL 1.9 2.2   < > 2.2   HGB 10.4* 11.4*  --  11.0*   HCT 33.8* 36.7*  --  34.5*    310  --  262    < > = values in this interval not displayed. BMP:  Recent Labs     07/23/21 0627 07/22/21 0433 07/21/21  0540   CREA 1.90* 2.25* 2.56*   BUN 43* 49* 50*    138 135*   K 3.8 3.8 2.9*    99 96*   CO2 34* 34* 33*   AGAP 4* 5* 6*   * 116* 131*       LFTS:  No results for input(s): TBILI, ALT, AP, TP, ALB in the last 72 hours.     No lab exists for component: SGOT    Microbiology:     All Micro Results     Procedure Component Value Units Date/Time    CULTURE, RESPIRATORY/SPUTUM/BRONCH Todd Blind [[id number]]     Order Status: Sent Specimen: Sputum     CULTURE, BLOOD [[id number]] Collected: 07/23/21 0627    Order Status: Completed Specimen: Blood Updated: 07/23/21 0659    CULTURE, BLOOD [[id number]] Collected: 07/23/21 0633    Order Status: Completed Specimen: Blood Updated: 07/23/21 0659    CULTURE, WOUND Pankaj Lull STAIN [[id number]]  (Abnormal)  (Susceptibility) Collected: 07/19/21 1445    Order Status: Completed Specimen: Wound from Foot Updated: 07/22/21 0806     Special Requests: NO SPECIAL REQUESTS        GRAM STAIN 25 TO 55 WBC'S/OIF      FEW GRAM POSITIVE COCCI        Culture result:       MODERATE STAPHYLOCOCCUS AUREUS          BLOOD CULTURE [[id number]]  (Abnormal) Collected: 07/19/21 1211    Order Status: Completed Specimen: Blood Updated: 07/22/21 0712     Special Requests: --        RIGHT  FOREARM       GRAM STAIN       GRAM POS COCCI IN CLUSTERS                  AEROBIC AND ANAEROBIC BOTTLES                  CRITICAL RESULT NOT CALLED DUE TO PREVIOUS NOTIFICATION OF CRITICAL RESULT WITHIN THE LAST 24 HOURS. Culture result: STAPHYLOCOCCUS AUREUS               For Susceptibility Refer to Culture  Accession [id number]      BLOOD CULTURE [[id number]]  (Abnormal)  (Susceptibility) Collected: 07/19/21 1054    Order Status: Completed Specimen: Blood Updated: 07/22/21 0711     Special Requests: --        RIGHT  HAND       GRAM STAIN       GRAM POS COCCI IN CLUSTERS                  AEROBIC AND ANAEROBIC BOTTLES                  RESULTS VERIFIED, PHONED TO AND READ BACK BY Lior Khan AT 1329 ON 7/20/21, [address]           Culture result: STAPHYLOCOCCUS AUREUS               Refer to Blood Culture ID Panel Accession  [id number]      BLOOD CULTURE ID PANEL [[id number]]  (Abnormal) Collected: 07/19/21 1054    Order Status: Completed Specimen: Blood Updated: 07/20/21 0442     Acc. no. from Micro Order [id number]     Staphylococcus Detected        Comment: RESULTS VERIFIED, PHONED TO AND READ BACK BY  Lior Khan AT 1440 ON 7/20/21, LANI          Staphylococcus aureus Detected        Comment: RESULTS VERIFIED, PHONED TO AND READ BACK BY  Lior Khan AT 1359 ON 7/20/21, ZEJC          mecA (Methicillin-Resistance Genes) NOT DETECTED        INTERPRETATION       Gram positive cocci in clusters, identified in realtime PCR as probable MSSA. Comment: Recommend discontinuing IV vancomycin starting cefazolin or nafcillin if patient not on beta-lactam therapy. Infectious Diseases Consult recommended in adult patients. THIS TEST DOES NOT REPLACE SENSITIVITY TESTING. Imaging:   R foot xray 7/19/21 with soft tissue swelling, severe hammertoe deformity and total destruction of the talus   Duplex LE without DVT  MRI pending.     Signed By: Linette Talavera NP     July 23, 2021

## 2021-07-23 NOTE — PROGRESS NOTES
Pt now on BiPAP per MD order. Will repeat ABG at 0700 to see if pt still requires BiPAP. Will continue to monitor.

## 2021-07-23 NOTE — PROGRESS NOTES
Pt is becoming restless and extremely fidgety trying to get OOB, continues to pull out his NC, confused at this time only oriented to person and place. O2 remains normal at 93-95%. MD notified via [email or web address] (formerly [email or web address]).

## 2021-07-23 NOTE — PROGRESS NOTES
Physical Therapy Note:    Attempted to see patient for physical therapy treatment session. At time of attempt patient off floor for CT; also of note had been on BiPAP this AM. Will follow and re-attempt as schedule permits/patient available pending pt medical status.     Thank you,  Levar Mckineny, PT, DPT

## 2021-07-24 LAB
ANION GAP SERPL CALC-SCNC: 2 MMOL/L (ref 7–16)
BASOPHILS # BLD: 0 K/UL (ref 0–0.2)
BASOPHILS NFR BLD: 1 % (ref 0–2)
BUN SERPL-MCNC: 41 MG/DL (ref 8–23)
CALCIUM SERPL-MCNC: 9.5 MG/DL (ref 8.3–10.4)
CHLORIDE SERPL-SCNC: 98 MMOL/L (ref 98–107)
CO2 SERPL-SCNC: 40 MMOL/L (ref 21–32)
CREAT SERPL-MCNC: 1.93 MG/DL (ref 0.8–1.5)
DIFFERENTIAL METHOD BLD: ABNORMAL
EOSINOPHIL # BLD: 0.3 K/UL (ref 0–0.8)
EOSINOPHIL NFR BLD: 3 % (ref 0.5–7.8)
ERYTHROCYTE [DISTWIDTH] IN BLOOD BY AUTOMATED COUNT: 14.7 % (ref 11.9–14.6)
GLUCOSE BLD STRIP.AUTO-MCNC: 106 MG/DL (ref 65–100)
GLUCOSE BLD STRIP.AUTO-MCNC: 108 MG/DL (ref 65–100)
GLUCOSE BLD STRIP.AUTO-MCNC: 123 MG/DL (ref 65–100)
GLUCOSE BLD STRIP.AUTO-MCNC: 126 MG/DL (ref 65–100)
GLUCOSE SERPL-MCNC: 124 MG/DL (ref 65–100)
HCT VFR BLD AUTO: 36.2 % (ref 41.1–50.3)
HGB BLD-MCNC: 11.1 G/DL (ref 13.6–17.2)
IMM GRANULOCYTES # BLD AUTO: 0.1 K/UL (ref 0–0.5)
IMM GRANULOCYTES NFR BLD AUTO: 1 % (ref 0–5)
LYMPHOCYTES # BLD: 2.1 K/UL (ref 0.5–4.6)
LYMPHOCYTES NFR BLD: 28 % (ref 13–44)
MCH RBC QN AUTO: 29.2 PG (ref 26.1–32.9)
MCHC RBC AUTO-ENTMCNC: 30.7 G/DL (ref 31.4–35)
MCV RBC AUTO: 95.3 FL (ref 79.6–97.8)
MONOCYTES # BLD: 1 K/UL (ref 0.1–1.3)
MONOCYTES NFR BLD: 14 % (ref 4–12)
NEUTS SEG # BLD: 4.2 K/UL (ref 1.7–8.2)
NEUTS SEG NFR BLD: 54 % (ref 43–78)
NRBC # BLD: 0 K/UL (ref 0–0.2)
PLATELET # BLD AUTO: 345 K/UL (ref 150–450)
PMV BLD AUTO: 10.1 FL (ref 9.4–12.3)
POTASSIUM SERPL-SCNC: 3.6 MMOL/L (ref 3.5–5.1)
RBC # BLD AUTO: 3.8 M/UL (ref 4.23–5.6)
SERVICE CMNT-IMP: ABNORMAL
SODIUM SERPL-SCNC: 140 MMOL/L (ref 138–145)
UFH PPP CHRO-ACNC: 0.52 IU/ML (ref 0.3–0.7)
WBC # BLD AUTO: 7.6 K/UL (ref 4.3–11.1)

## 2021-07-24 PROCEDURE — 74011250637 HC RX REV CODE- 250/637: Performed by: INTERNAL MEDICINE

## 2021-07-24 PROCEDURE — 99218 PR INITIAL OBSERVATION CARE/DAY 30 MINUTES: CPT | Performed by: PHYSICIAN ASSISTANT

## 2021-07-24 PROCEDURE — 65660000000 HC RM CCU STEPDOWN

## 2021-07-24 PROCEDURE — 74011250637 HC RX REV CODE- 250/637: Performed by: FAMILY MEDICINE

## 2021-07-24 PROCEDURE — 99232 SBSQ HOSP IP/OBS MODERATE 35: CPT | Performed by: INTERNAL MEDICINE

## 2021-07-24 PROCEDURE — 80048 BASIC METABOLIC PNL TOTAL CA: CPT

## 2021-07-24 PROCEDURE — 74011250636 HC RX REV CODE- 250/636: Performed by: INTERNAL MEDICINE

## 2021-07-24 PROCEDURE — 74011000250 HC RX REV CODE- 250: Performed by: NURSE PRACTITIONER

## 2021-07-24 PROCEDURE — 74011250636 HC RX REV CODE- 250/636: Performed by: NURSE PRACTITIONER

## 2021-07-24 PROCEDURE — 36415 COLL VENOUS BLD VENIPUNCTURE: CPT

## 2021-07-24 PROCEDURE — 85520 HEPARIN ASSAY: CPT

## 2021-07-24 PROCEDURE — 85025 COMPLETE CBC W/AUTO DIFF WBC: CPT

## 2021-07-24 PROCEDURE — 82962 GLUCOSE BLOOD TEST: CPT

## 2021-07-24 RX ADMIN — METOPROLOL SUCCINATE 25 MG: 25 TABLET, EXTENDED RELEASE ORAL at 08:30

## 2021-07-24 RX ADMIN — ASPIRIN 81 MG: 81 TABLET ORAL at 08:30

## 2021-07-24 RX ADMIN — SODIUM HYPOCHLORITE: 1.25 SOLUTION TOPICAL at 10:11

## 2021-07-24 RX ADMIN — ATORVASTATIN CALCIUM 40 MG: 40 TABLET, FILM COATED ORAL at 22:06

## 2021-07-24 RX ADMIN — ROPINIROLE HYDROCHLORIDE 0.25 MG: 0.5 TABLET, FILM COATED ORAL at 22:06

## 2021-07-24 RX ADMIN — APIXABAN 5 MG: 5 TABLET, FILM COATED ORAL at 13:37

## 2021-07-24 RX ADMIN — CEFAZOLIN SODIUM 2 G: 100 INJECTION, POWDER, LYOPHILIZED, FOR SOLUTION INTRAVENOUS at 06:36

## 2021-07-24 RX ADMIN — CEFAZOLIN SODIUM 2 G: 100 INJECTION, POWDER, LYOPHILIZED, FOR SOLUTION INTRAVENOUS at 22:11

## 2021-07-24 RX ADMIN — METOPROLOL SUCCINATE 25 MG: 25 TABLET, EXTENDED RELEASE ORAL at 17:32

## 2021-07-24 RX ADMIN — APIXABAN 5 MG: 5 TABLET, FILM COATED ORAL at 22:06

## 2021-07-24 RX ADMIN — Medication 10 ML: at 06:36

## 2021-07-24 RX ADMIN — Medication 10 ML: at 22:09

## 2021-07-24 RX ADMIN — Medication 10 ML: at 13:35

## 2021-07-24 RX ADMIN — HEPARIN SODIUM 20 UNITS/KG/HR: 5000 INJECTION, SOLUTION INTRAVENOUS at 12:15

## 2021-07-24 RX ADMIN — CEFAZOLIN SODIUM 2 G: 100 INJECTION, POWDER, LYOPHILIZED, FOR SOLUTION INTRAVENOUS at 13:37

## 2021-07-24 RX ADMIN — PREGABALIN 200 MG: 100 CAPSULE ORAL at 08:30

## 2021-07-24 NOTE — PROGRESS NOTES
[facility] CARDIOLOGY PROGRESS NOTE           7/24/2021 11:06 AM    Admit Date: 7/19/2021      Subjective:     No overnight events. In atrial fibrillation with controlled ventricular rates. On 3L NC    ROS:  Cardiovascular:  As noted above    Objective:      Vitals:    07/24/21 0443 07/24/21 0728 07/24/21 0800 07/24/21 1156   BP:  122/83  102/63   Pulse:  93 90 88   Resp:  18  19   Temp:  98.4 °F (36.9 °C)  98.2 °F (36.8 °C)   SpO2:  97%  95%   Weight: 278 lb 3.2 oz (126.2 kg)      Height:           Physical Exam:  General-No Acute Distress  Neck- supple, no JVD  CV- irregular rate and rhythm no MRG  Lung- min basilar rales  Abd- soft, nontender, nondistended  Ext- 1+ edema RLE; indurated/erythematous. Skin- warm and dry    Data Review:   Recent Labs     07/24/21  0640 07/23/21  0627    140   K 3.6 3.8   BUN 41* 43*   CREA 1.93* 1.90*   * 113*   WBC 7.6 7.5   HGB 11.1* 10.4*   HCT 36.2* 33.8*    333       Assessment/Plan:     Principal Problem:    Atrial fibrillation with RVR (HCC) (2/18/7936)  -Uncertain duration and possibly triggered by underlying acute issues.  -Echo with moderate left ventricular dysfunction with wall motion abnormality suggestive of coronary disease. Uncertain duration. Mild left atrial enlargement.  -Started on heparin and transition to po meds with no planned procedures per primary team after discussion with Ortho.  -Target rate control therapy currently. On Toprol-XL 25 mg twice daily. -[id number] score of ~5      Cardiomyopathy  - appears ischemic with possible component of tachycardic cardiomyopathy as well. - Defer consideration for ACE inhibitor/ARB's with acute on chronic renal failure. Reassess during hospital course.  -Some low BPs have limited therapy and closely monitor.  -Exam currently with no significant volume overload.   Improving creatinine.  -Needs ischemic work-up which can be addressed on an outpatient basis once acute issues resolve. No acute indication for coronary angiogram.  Continue on telemetry. Bacteremia  -MSSA bacteremia with likely source right foot. Reviewed MRI with no evidence of osteomyelitis. -Defer consideration for ANDRE with 6 weeks of antibiotics planned per ID    Active Problems:    CAD (coronary artery disease) ()  -Remote history of prior stenting per records. Continue high intensity statin      Stage 3 chronic kidney disease ([facility] [address] (11/22/2017)  -improving; likely prerenal.  Exam with no significant volume overload. -Resume p.o. Lasix      Controlled type 2 diabetes mellitus with diabetic polyneuropathy, without long-term current use of insulin ([facility] [address] (1/5/2018)      Cellulitis (7/19/2021)  -Management per primary      Suspected CHF (congestive heart failure) (7/19/2021)  -Exam not consistent with volume overload.   Held diuretics with worsening renal function; see above and resume po lasix in am          Enrique Meza MD  7/24/2021 11:06 AM

## 2021-07-24 NOTE — PROGRESS NOTES
HPI:   Pt with charcot foot deformity and right foot wound. Offered BKA by Dr. Leonel Faye but declined 2 days ago. MRI performed shows multiple fluid collections. Exam:   78 y/o male NAD, afebrile overnight, normal WBC  Charcot deformity right foot. moving foot spontaneously. On ancef    MRI report:     EXAMINATION: MRI of the right foot     INDICATION: rt foot wound- r/o osteomyelitis     COMPARISON: Right foot radiographs, 7/19/2021     TECHNIQUE: Multiplanar multisequence MRI of the right foot without contrast     FINDINGS:  There is an interest marker placed along the lateral plantar aspect of the  midfoot. At the marker, there is an area of skin ulceration with underlying  infiltration of the subcutaneous soft tissues which extends to the underlying  fifth metatarsal base and tuberosity. There is no abnormal marrow signal at this  location to suggest acute osteomyelitis.     There are findings of Charcot arthropathy with severe fragmentation and  disorganization involving the midfoot and hindfoot. There are extensive chronic  erosive and sclerotic changes throughout the midfoot and hindfoot and there is a  large fluid collection insinuating throughout the midfoot and hindfoot  articulations.      There is likely a small fluid collection in the soft tissues adjacent to the  lateral plantar ulceration, measuring 1.4 x 1.1 x 0.6 cm, though lack of  intravenous contrast limits full evaluation.      There is a thick-walled fluid collection measuring 2.7 x 0.9 x 1.9 cm along the  plantar aspect of the fifth metatarsal head.      There is near diffuse subcutaneous edema. Fatty infiltration and mild edema  throughout the intrinsic foot muscles compatible with denervation change.        IMPRESSION     1. Lateral plantar ulceration extending to contact the fifth metatarsal base  without evidence of acute osteomyelitis.  A small probable fluid collection in  the subcutaneous tissues adjacent to the ulceration measures up to 1.4 cm,  though lack of IV contrast limits full evaluation.     2. Severe Charcot arthropathy throughout the midfoot and hindfoot with a large  amount of fluid insinuating throughout the midfoot and hindfoot articulations. While possibly reactive sterile joint fluid to the severe arthropathy, sterility  is indeterminate based upon imaging alone.     3. Thick-walled 2.7 cm fluid collection along the plantar aspect of the fifth  metatarsal head, likely adventitial bursa formation. Plan:     Updated Dr. Eva Ovalles with MRI study, he states this reinforces recommendation for BKA. Pt's wife present states pt not ready for BKA, would like to see foot/ankle in follow up for possible brace. Pt can see Dr. Thu Solis or Dr. Danilo Costello in follow up.        LATONYA Doe

## 2021-07-24 NOTE — PROGRESS NOTES
Problem: Diabetes Self-Management  Goal: *Disease process and treatment process  Description: Define diabetes and identify own type of diabetes; list 3 options for treating diabetes. Outcome: Progressing Towards Goal  Goal: *Incorporating nutritional management into lifestyle  Description: Describe effect of type, amount and timing of food on blood glucose; list 3 methods for planning meals. Outcome: Progressing Towards Goal  Goal: *Incorporating physical activity into lifestyle  Description: State effect of exercise on blood glucose levels. Outcome: Progressing Towards Goal  Goal: *Developing strategies to promote health/change behavior  Description: Define the ABC's of diabetes; identify appropriate screenings, schedule and personal plan for screenings. Outcome: Progressing Towards Goal  Goal: *Using medications safely  Description: State effect of diabetes medications on diabetes; name diabetes medication taking, action and side effects. Outcome: Progressing Towards Goal  Goal: *Monitoring blood glucose, interpreting and using results  Description: Identify recommended blood glucose targets  and personal targets. Outcome: Progressing Towards Goal  Goal: *Prevention, detection, treatment of acute complications  Description: List symptoms of hyper- and hypoglycemia; describe how to treat low blood sugar and actions for lowering  high blood glucose level. Outcome: Progressing Towards Goal  Goal: *Prevention, detection and treatment of chronic complications  Description: Define the natural course of diabetes and describe the relationship of blood glucose levels to long term complications of diabetes.   Outcome: Progressing Towards Goal  Goal: *Developing strategies to address psychosocial issues  Description: Describe feelings about living with diabetes; identify support needed and support network  Outcome: Progressing Towards Goal  Goal: *Insulin pump training  Outcome: Progressing Towards Goal  Goal: *Sick day guidelines  Outcome: Progressing Towards Goal  Goal: *Patient Specific Goal (EDIT GOAL, INSERT TEXT)  Outcome: Progressing Towards Goal     Problem: Falls - Risk of  Goal: *Absence of Falls  Description: Document Beronica Fall Risk and appropriate interventions in the flowsheet. Outcome: Progressing Towards Goal  Note: Fall Risk Interventions:  Mobility Interventions: Bed/chair exit alarm    Mentation Interventions: Adequate sleep, hydration, pain control    Medication Interventions: Patient to call before getting OOB, Teach patient to arise slowly    Elimination Interventions: Patient to call for help with toileting needs    History of Falls Interventions: Bed/chair exit alarm, Investigate reason for fall         Problem: Pressure Injury - Risk of  Goal: *Prevention of pressure injury  Description: Document Julio Cesar Scale and appropriate interventions in the flowsheet.   Outcome: Progressing Towards Goal  Note: Pressure Injury Interventions:  Sensory Interventions: Assess changes in LOC    Moisture Interventions: Absorbent underpads    Activity Interventions: Increase time out of bed, PT/OT evaluation    Mobility Interventions: Assess need for specialty bed, PT/OT evaluation    Nutrition Interventions: Document food/fluid/supplement intake    Friction and Shear Interventions: Apply protective barrier, creams and emollients                Problem: Cellulitis Care Plan (Adult)  Goal: *Control of acute pain  Outcome: Progressing Towards Goal  Goal: *Skin integrity maintained  Outcome: Progressing Towards Goal  Goal: *Absence of infection signs and symptoms  Outcome: Progressing Towards Goal

## 2021-07-24 NOTE — PROGRESS NOTES
Hospitalist Progress Note     Name:  Melisa Etienne  Age:76 y.o. Sex:male   :  1945    MRN:  [id number]   Admit Date:  2021    Presenting Complaint: Foot Pain    Initial Admission Diagnosis: Cellulitis [L03.90]  Atrial fibrillation with RVR (HCC) [I48.91]  Suspected CHF (congestive heart failure) [R09.89]     Hospital Course/Interval history:   78 y/o M with PMH of DM type II, HLD, HTN that presented with R foot wound and swelling found to be on Afib RVR and acute on ckd  Pt on oxygen , cxr atelectasis vs infiltrate. On heparin drip for afib. Cardiology follwoing          Subjective (21):  2021  Says doing ok  Daughter at bedside  On 3.5 lit/min  Wound care evaluated pt.    2021  Says doing ok  Orthopedic evaluated pt today  No intervention at this point. May need amputation later on. MRI rt foot pending    2021  Mild disinhibition  Appears frustated, says only person who listens to him is his daughter  Azul Falling that his his oxygen was 30% last night and he unhooked the bipap. Spoke to daughter this am, said on her way    2021  Sitting in the chair, family at the bedside. On 3 days of oxygen nasal cannula. Says breathing okay. Did discuss with Ortho team regarding MRI right foot findings. Recommended amputation. Patient not ready for amputation at this point, would want to follow-up with the foot and ankle physician as an outpatient. Repeat blood cultures ordered on 2021, no growth till now. Assessment & Plan    Acute hypoxic and hypercapnic resp failure- was on bipap, presently on oxygen nasal cannula  Pulmonary consulted  2021  Presently on 3 days of oxygen nasal cannula    Acute metabolic encephalopathy- prob sec to hypercapnia. Will r/o any intracranial etiology as pt is on heparin drip. Will order ct head    Metabolic encephalopathy probably secondary to hypercapnia, resolved.   CT head no acute findings    Right foot wound infection with surrounding cellulitis that failed outpatient antibiotic therapy. Charcot deformity of foot. - US negative for DVT  - Continue vancomycin  - Start ceftriaxone  - Follow wound cultures  - Wound care consult  7/21/2021- Wound lateral aspect of rt foot  Ordered MRI rt foot  Staph aureus bacteremia  7/22/2021- MRI rt foot pending  7/23/2021- done by results pending  7/24/2021-MRI findings are reviewed by orthopedic physician, recommending amputation, patient not ready for amputation, would want to follow-up with foot ankle physician as an outpatient. Continue cefazolin for staph aureus bacteremia, repeat blood cultures done on 7/23/2021      New onset Afib RVR  - Continue metoprolol  - Continue heparin gtt  - Telemetry monitoring   - Cardiology recs  7/22/2021- cont heparin for now until MRI rt foot  7/23/2021- afib rate controlled on heparin. Still in afib  7/24/2021-patient on heparin drip, with no intervention planned by orthopedic, will DC heparin drip and start patient on Eliquis.      TAMMY likely prerenal   - Avoid nephrotoxic agents  - Monitor wagner function   7/21/2021- added NS 50 cc/hr  7/22/2021- did get lasix by ID yesterday. Fluids held  7/23/2021- improving creatinine  7/24/2021-mild increase in creatinine. Hypokalemia- replace  7/22/2021- resolved    Hypoxemia- atelectasis vs infiltrate  cont incentive spirometry  7/24/2021-patient on 3 L of oxygen nasal cannula    DM type 2- cont sliding scale insulin      ?HF  - Hold diuretics for now in the setting of AK  - Strict I&Os  - Monitor daily weight  - TTE  - Cardiology recs    7/23/2021- echo ef 30-35%  Ordered a dose of lasix  7/24/2021-with mild increase in creatinine, Lasix held. HTN  - Continue metoprolol  - Hold HCTZ in the setting of TAMMY     Neuropathy- both lower extremities knee down. Cont lyrica    DVT - dced heparin and changed to eliquis         Diet:  ADULT DIET Regular; 4 carb choices (60 gm/meal);  Low Fat/Low Chol/High Fiber/2 gm Na; 1500 ml  DVT PPx: HEPARIN  Code status: Full Code    Objective:     Patient Vitals for the past 24 hrs:   Temp Pulse Resp BP SpO2   07/24/21 0800  90      07/24/21 0728 98.4 °F (36.9 °C) 93 18 122/83 97 %   07/24/21 0416 98.1 °F (36.7 °C) 100 18 (!) 129/57 97 %   07/24/21 0400  78      07/23/21 2355  88      07/23/21 2340 98.1 °F (36.7 °C) 90 18 92/63 95 %   07/23/21 2056  100      07/23/21 1954 98.1 °F (36.7 °C) (!) 130 20 121/79 94 %   07/23/21 1901  94  123/73    07/23/21 1527 97.5 °F (36.4 °C) (!) 104 20 111/71 98 %   07/23/21 1215 98.4 °F (36.9 °C) (!) 108 20 113/83 98 %     Oxygen Therapy  O2 Sat (%): 97 % (07/24/21 0728)  Pulse via Oximetry: 92 beats per minute (07/19/21 1640)  O2 Device: Nasal cannula (07/24/21 0710)  O2 Flow Rate (L/min): 3 l/min (07/24/21 0710)    Body mass index is 38.8 kg/m². Physical Exam:   General:  Awake, alert oriented x3, mild respite distress, on 3 days of oxygen nasal cannula   HEENT- pupils equal reacting to light, neck supple throat normal  Lungs:  Bilateral coarse breath sounds  CV:  Irregularly irregular normal S1 and S2   Abdomen:  Soft, nontender, nondistended, normoactive bowel sounds   Extremities:  No cyanosis clubbing . Rt leg mild swollen, mild erythema  Lower 1/3rd rt leg.  Non tender secondary to neuropathy  Neuro:  Nonfocal, A&O x3   Psych:  Calm, cooperative  Data Review:  I have reviewed all labs, meds, and studies from the last 24 hours:    Labs:    Recent Results (from the past 24 hour(s))   GLUCOSE, POC    Collection Time: 07/23/21 12:12 PM   Result Value Ref Range    Glucose (POC) 131 (H) 65 - 100 mg/dL    Performed by Rupert    NT-PRO BNP    Collection Time: 07/23/21  2:16 PM   Result Value Ref Range    NT pro-BNP 3,907 (H) <450 PG/ML   GLUCOSE, POC    Collection Time: 07/23/21  3:23 PM   Result Value Ref Range    Glucose (POC) 125 (H) 65 - 100 mg/dL    Performed by Rupert    GLUCOSE, POC    Collection Time: 07/23/21  8:41 PM   Result Value Ref Range    Glucose (POC) 115 (H) 65 - 100 mg/dL    Performed by Isatu    CBC WITH AUTOMATED DIFF    Collection Time: 07/24/21  6:40 AM   Result Value Ref Range    WBC 7.6 4.3 - 11.1 K/uL    RBC 3.80 (L) 4.23 - 5.6 M/uL    HGB 11.1 (L) 13.6 - 17.2 g/dL    HCT 36.2 (L) 41.1 - 50.3 %    MCV 95.3 79.6 - 97.8 FL    MCH 29.2 26.1 - 32.9 PG    MCHC 30.7 (L) 31.4 - 35.0 g/dL    RDW 14.7 (H) 11.9 - 14.6 %    PLATELET 855 283 - 515 K/uL    MPV 10.1 9.4 - 12.3 FL    ABSOLUTE NRBC 0.00 0.0 - 0.2 K/uL    DF AUTOMATED      NEUTROPHILS 54 43 - 78 %    LYMPHOCYTES 28 13 - 44 %    MONOCYTES 14 (H) 4.0 - 12.0 %    EOSINOPHILS 3 0.5 - 7.8 %    BASOPHILS 1 0.0 - 2.0 %    IMMATURE GRANULOCYTES 1 0.0 - 5.0 %    ABS. NEUTROPHILS 4.2 1.7 - 8.2 K/UL    ABS. LYMPHOCYTES 2.1 0.5 - 4.6 K/UL    ABS. MONOCYTES 1.0 0.1 - 1.3 K/UL    ABS. EOSINOPHILS 0.3 0.0 - 0.8 K/UL    ABS. BASOPHILS 0.0 0.0 - 0.2 K/UL    ABS. IMM.  GRANS. 0.1 0.0 - 0.5 K/UL   HEPARIN XA UFH    Collection Time: 07/24/21  6:40 AM   Result Value Ref Range    Heparin Xa UFH 0.52 0.3 - 0.7 IU/mL   METABOLIC PANEL, BASIC    Collection Time: 07/24/21  6:40 AM   Result Value Ref Range    Sodium 140 138 - 145 mmol/L    Potassium 3.6 3.5 - 5.1 mmol/L    Chloride 98 98 - 107 mmol/L    CO2 40 (H) 21 - 32 mmol/L    Anion gap 2 (L) 7 - 16 mmol/L    Glucose 124 (H) 65 - 100 mg/dL    BUN 41 (H) 8 - 23 MG/DL    Creatinine 1.93 (H) 0.8 - 1.5 MG/DL    GFR est AA 44 (L) >60 ml/min/1.73m2    GFR est non-AA 36 (L) >60 ml/min/1.73m2    Calcium 9.5 8.3 - 10.4 MG/DL   GLUCOSE, POC    Collection Time: 07/24/21  7:28 AM   Result Value Ref Range    Glucose (POC) 123 (H) 65 - 100 mg/dL    Performed by MarcelloLIBERTY        All Micro Results     Procedure Component Value Units Date/Time    CULTURE, BLOOD [[id number]] Collected: 07/23/21 5337    Order Status: Completed Specimen: Blood Updated: 07/24/21 1051     Special Requests: --        RIGHT  HAND Culture result: NO GROWTH 1 DAY       CULTURE, BLOOD [[id number]] Collected: 07/23/21 0627    Order Status: Completed Specimen: Blood Updated: 07/24/21 1051     Special Requests: --        LEFT  HAND       Culture result: NO GROWTH 1 DAY       CULTURE, RESPIRATORY/SPUTUM/BRONCH Glennallen Snooks STAIN [[id number]]     Order Status: Sent Specimen: Sputum     CULTURE, Vista Lessen STAIN [[id number]]  (Abnormal)  (Susceptibility) Collected: 07/19/21 1445    Order Status: Completed Specimen: Wound from Foot Updated: 07/22/21 0806     Special Requests: NO SPECIAL REQUESTS        GRAM STAIN 25 TO 55 WBC'S/OIF      FEW GRAM POSITIVE COCCI        Culture result:       MODERATE STAPHYLOCOCCUS AUREUS          BLOOD CULTURE [[id number]]  (Abnormal) Collected: 07/19/21 1211    Order Status: Completed Specimen: Blood Updated: 07/22/21 0712     Special Requests: --        RIGHT  FOREARM       GRAM STAIN       GRAM POS COCCI IN CLUSTERS                  AEROBIC AND ANAEROBIC BOTTLES                  CRITICAL RESULT NOT CALLED DUE TO PREVIOUS NOTIFICATION OF CRITICAL RESULT WITHIN THE LAST 24 HOURS.            Culture result: STAPHYLOCOCCUS AUREUS               For Susceptibility Refer to Culture  Accession [id number]      BLOOD CULTURE [[id number]]  (Abnormal)  (Susceptibility) Collected: 07/19/21 1054    Order Status: Completed Specimen: Blood Updated: 07/22/21 0711     Special Requests: --        RIGHT  HAND       GRAM STAIN       GRAM POS COCCI IN CLUSTERS                  AEROBIC AND ANAEROBIC BOTTLES                  RESULTS VERIFIED, PHONED TO AND READ BACK BY Pia Edgar AT 5190 ON 7/20/21, [address]           Culture result: STAPHYLOCOCCUS AUREUS               Refer to Blood Culture ID Panel Accession  [id number]      BLOOD CULTURE ID PANEL [[id number]]  (Abnormal) Collected: 07/19/21 1054    Order Status: Completed Specimen: Blood Updated: 07/20/21 0442     Acc. no. from Micro Order [id number]     Staphylococcus Detected        Comment: RESULTS VERIFIED, PHONED TO AND READ BACK BY  Tucker Payne AT 1858 ON 7/20/21, LANI          Staphylococcus aureus Detected        Comment: RESULTS VERIFIED, PHONED TO AND READ BACK BY  Tucker Payne AT 8853 ON 7/20/21, LANI          mecA (Methicillin-Resistance Genes) NOT DETECTED        INTERPRETATION       Gram positive cocci in clusters, identified in realtime PCR as probable MSSA. Comment: Recommend discontinuing IV vancomycin starting cefazolin or nafcillin if patient not on beta-lactam therapy. Infectious Diseases Consult recommended in adult patients. THIS TEST DOES NOT REPLACE SENSITIVITY TESTING. EKG Results     Procedure 720 Value Units Date/Time    EKG, 12 LEAD, INITIAL [[id number]] Collected: 07/19/21 1100    Order Status: Completed Updated: 07/19/21 1630     Ventricular Rate 122 BPM      Atrial Rate 147 BPM      QRS Duration 132 ms      Q-T Interval 300 ms      QTC Calculation (Bezet) 427 ms      Calculated R Axis -36 degrees      Calculated T Axis 24 degrees      Diagnosis --     !! AGE AND GENDER SPECIFIC ECG ANALYSIS !! Atrial fibrillation with rapid ventricular response  Left axis deviation  Right bundle branch block  Cannot rule out Anteroseptal infarct , age undetermined  Abnormal ECG  No previous ECGs available  Confirmed by Terri Pierce MD (), Inez Medrano ([id number]) on 7/19/2021 4:29:33 PM            Other Studies:  No results found.     Current Meds:   Current Facility-Administered Medications   Medication Dose Route Frequency    metoprolol succinate (TOPROL-XL) XL tablet 25 mg  25 mg Oral BID    ceFAZolin (ANCEF) 2 g/20 mL in sterile water IV syringe  2 g IntraVENous Q8H    [Held by provider] 0.9% sodium chloride infusion  50 mL/hr IntraVENous CONTINUOUS    sodium hypochlorite (QUARTER STRENGTH DAKIN'S) 0.125% irrigation (bottle)   Topical DAILY    rOPINIRole (REQUIP) tablet 0.25 mg  0.25 mg Oral QHS    atorvastatin (LIPITOR) tablet 40 mg  40 mg Oral QHS    sodium chloride (NS) flush 5-10 mL  5-10 mL IntraVENous Q8H    sodium chloride (NS) flush 5-10 mL  5-10 mL IntraVENous PRN    insulin lispro (HUMALOG) injection   SubCUTAneous AC&HS    aspirin delayed-release tablet 81 mg  81 mg Oral DAILY    [Held by provider] hydroCHLOROthiazide (HYDRODIURIL) tablet 25 mg  25 mg Oral DAILY    pregabalin (LYRICA) capsule 200 mg  200 mg Oral DAILY    acetaminophen (TYLENOL) tablet 650 mg  650 mg Oral Q6H PRN    Or    acetaminophen (TYLENOL) suppository 650 mg  650 mg Rectal Q6H PRN    polyethylene glycol (MIRALAX) packet 17 g  17 g Oral DAILY PRN    ondansetron (ZOFRAN ODT) tablet 4 mg  4 mg Oral Q8H PRN    Or    ondansetron (ZOFRAN) injection 4 mg  4 mg IntraVENous Q6H PRN    [Held by provider] furosemide (LASIX) tablet 40 mg  40 mg Oral DAILY    heparin 25,000 units in dextrose 500 mL infusion  12-25 Units/kg/hr (Adjusted) IntraVENous TITRATE       Problem List:  Hospital Problems as of 7/24/2021 Date Reviewed: 1/13/2021        Codes Class Noted - Resolved POA    Acute respiratory failure with hypercapnia (HCC) ICD-10-CM: J96.02  ICD-9-CM: 518.81  7/23/2021 - Present Unknown        Acute respiratory failure with hypoxia and hypercapnia (HCC) ICD-10-CM: J96.01, J96.02  ICD-9-CM: 518.81  7/23/2021 - Present Unknown        Acute metabolic encephalopathy YNT-53-XD: G93.41  ICD-9-CM: 348.31  7/23/2021 - Present Unknown        Hypoxia ICD-10-CM: R09.02  ICD-9-CM: 799.02  7/21/2021 - Present Unknown        Acute kidney injury superimposed on CKD (HCC) ICD-10-CM: N17.9, N18.9  ICD-9-CM: 866.00, 585.9  7/21/2021 - Present Unknown        Staphylococcus aureus bacteremia ICD-10-CM: R78.81, B95.61  ICD-9-CM: 790.7, 041.11  7/21/2021 - Present Unknown        Cellulitis ICD-10-CM: L03.90  ICD-9-CM: 682.9  7/19/2021 - Present Unknown        Suspected CHF (congestive heart failure) ICD-10-CM: R09.89  ICD-9-CM: 785.9  7/19/2021 - Present Unknown        * (Principal) Atrial fibrillation with RVR (HCC) ICD-10-CM: I48.91  ICD-9-CM: 427.31  7/19/2021 - Present Unknown        Controlled type 2 diabetes mellitus with diabetic polyneuropathy, without long-term current use of insulin (HCC) (Chronic) ICD-10-CM: E11.42  ICD-9-CM: 250.60, 357.2  1/5/2018 - Present Yes        Stage 3 chronic kidney disease ([facility] [address] (Chronic) ICD-10-CM: N18.30  ICD-9-CM: 585.3  11/22/2017 - Present Yes        CAD (coronary artery disease) (Chronic) ICD-10-CM: I25.10  ICD-9-CM: 414.00  Unknown - Present Yes                   Signed By: Portillo Miramontes MD   [facility] Hospitalist Service    July 24, 2021

## 2021-07-24 NOTE — PROGRESS NOTES
Pulmonary follow-up visit Progress Note    Uday Perez Pack    7/24/2021    Date of Admission:  7/19/2021    The patient's chart is reviewed and the patient is discussed with the staff. Patient is a 77 y.o.  male seen and evaluated at the request of Dr. Sherif Brewer for hypercapneic respiratory failure on Bipap. Chronic medical problems include DM2, HLD, HTN. CAD with PCI x 1. Patient present to ER with right foot wound and edema. Wound culture and blood cultures with staph aureus. He was also in Afib RVR and TAMMY. He is treated with Ancef for staph bacteremia. Remains on heparin gtt for Afib. Echo 7/21 with EF 30-35 %, mildly dilated left ventricle, moderate to severely reduced systolic function, mildly dilated left atrium right atrium, milf MV and TV regurg. Cardiology following and planning ischemic work up outpatient. Pro BNP 2999 and he was diuresed with lasix. Creatinine now down to 1.90. Overnight, patient became confused. ABG revealed hypercapnia with pCO2 78. He was placed on Bipap for a few hours and then weaned back to 3L NC. Head CT completed this morning revealing no acute abnormalities. WBC 7.5. CXR with right basilar atelectasis vs pneumonia. His wife and daughter are at beside and assist with history as well. Patient is alert and oriented x 3. He denies any known lung disease, never smoker, never used inhalers. He reports wheezing a few days ago which resolved with diuresis. He reports some shortness of breath but feels this has improved since this morning. He is on 2L NC. He has no left extremity edema but right foot with edema and is wrapped. He has a cough with occasional yellow/green sputum. He does snore. Denies witnessed apneas, nocturnal gasping or choking. He has a narrow airway with Mallampati 4. He feels partially rested in the mornings. Needs outpatient sleep evaluation for ELAINA. Subjective:     Patient today is awake and alert.   Indicates he did not use his CPAP, did not like the humidity. Did not have a problem with the pressure. Currently on 2 L/min oxygen    Review of Systems  A comprehensive review of systems was negative except for that written in the HPI.     No Known Allergies    Current Facility-Administered Medications   Medication Dose Route Frequency    metoprolol succinate (TOPROL-XL) XL tablet 25 mg  25 mg Oral BID    ceFAZolin (ANCEF) 2 g/20 mL in sterile water IV syringe  2 g IntraVENous Q8H    [Held by provider] 0.9% sodium chloride infusion  50 mL/hr IntraVENous CONTINUOUS    sodium hypochlorite (QUARTER STRENGTH DAKIN'S) 0.125% irrigation (bottle)   Topical DAILY    rOPINIRole (REQUIP) tablet 0.25 mg  0.25 mg Oral QHS    atorvastatin (LIPITOR) tablet 40 mg  40 mg Oral QHS    sodium chloride (NS) flush 5-10 mL  5-10 mL IntraVENous Q8H    sodium chloride (NS) flush 5-10 mL  5-10 mL IntraVENous PRN    insulin lispro (HUMALOG) injection   SubCUTAneous AC&HS    aspirin delayed-release tablet 81 mg  81 mg Oral DAILY    [Held by provider] hydroCHLOROthiazide (HYDRODIURIL) tablet 25 mg  25 mg Oral DAILY    pregabalin (LYRICA) capsule 200 mg  200 mg Oral DAILY    acetaminophen (TYLENOL) tablet 650 mg  650 mg Oral Q6H PRN    Or    acetaminophen (TYLENOL) suppository 650 mg  650 mg Rectal Q6H PRN    polyethylene glycol (MIRALAX) packet 17 g  17 g Oral DAILY PRN    ondansetron (ZOFRAN ODT) tablet 4 mg  4 mg Oral Q8H PRN    Or    ondansetron (ZOFRAN) injection 4 mg  4 mg IntraVENous Q6H PRN    [Held by provider] furosemide (LASIX) tablet 40 mg  40 mg Oral DAILY    heparin 25,000 units in dextrose 500 mL infusion  12-25 Units/kg/hr (Adjusted) IntraVENous TITRATE         Objective:     Vitals:    07/24/21 0443 07/24/21 0728 07/24/21 0800 07/24/21 1156   BP:  122/83  102/63   Pulse:  93 90 88   Resp:  18  19   Temp:  98.4 °F (36.9 °C)  98.2 °F (36.8 °C)   SpO2:  97%  95%   Weight: 278 lb 3.2 oz (126.2 kg)      Height:           PHYSICAL EXAM     Constitutional:  the patient is well developed and in no acute distress, on 2L NC   EENMT:  Sclera clear, pupils equal, oral mucosa moist  Respiratory: R base crackles, otherwise clear, no wheezing  Cardiovascular:  RRR without M,G,R  Gastrointestinal: soft and non-tender; with positive bowel sounds. Musculoskeletal: warm without cyanosis. There is right foot edema, foot is wrapped. Restless feet, keeps moving them. Skin:  no jaundice or rashes, right foot wound covered   Neurologic: no gross neuro deficits     Psychiatric:  alert and oriented x 3     CXR:    7/23 vs 7/19         CT Chest    Recent Labs     07/24/21  0640 07/23/21  0627 07/22/21  0433   WBC 7.6 7.5 10.4   HGB 11.1* 10.4* 11.4*   HCT 36.2* 33.8* 36.7*    333 310     Recent Labs     07/24/21  0640 07/23/21  0627 07/22/21  0433    140 138   K 3.6 3.8 3.8   CL 98 102 99   * 113* 116*   CO2 40* 34* 34*   BUN 41* 43* 49*   CREA 1.93* 1.90* 2.25*   CA 9.5 9.2 9.1     Recent Labs     07/23/21  0548 07/23/21  0320   PHI 7.32* 7.28*   PCO2I 69.9* 78.6*   PO2I 74* 85   HCO3I 36.0* 36.8*     No results for input(s): LCAD, LAC in the last 72 hours. Cultures:  Blood x2 drawn today, 7/19 2/2 blood culture + staph aureus   Urine-  Sputum-  Wound- +staph aureus                Inpat Anti-Infectives (From admission, onward)     Start     Ordered Stop    07/21/21 1700  ceFAZolin (ANCEF) 2 g/20 mL in sterile water IV syringe  2 g,   IntraVENous,   EVERY 12 HOURS      07/21/21 1609 --                  Echo: 7/21          MRI right foot 7/22      Right venous duplex 7/19        Assessment:  (Medical Decision Making)     Hospital Problems  Date Reviewed: 1/13/2021        Codes Class Noted POA    Acute respiratory failure with hypercapnia ([facility] [address] ICD-10-CM: J96.02  ICD-9-CM: 518.81  7/23/2021 Unknown    Improved with bipap this morning. Weaned to 2L NC and sats acceptable.        Acute respiratory failure with hypoxia and hypercapnia (HCC) ICD-10-CM: J96.01, J96.02  ICD-9-CM: 518.81  7/23/2021 Unknown        Acute metabolic encephalopathy TGH-89-XD: G93.41  ICD-9-CM: 348.31  7/23/2021 Unknown        Hypoxia ICD-10-CM: R09.02  ICD-9-CM: 799.02  7/21/2021 Unknown    On 2L NC     Acute kidney injury superimposed on CKD ([facility] ICD-10-CM: N17.9, N18.9  ICD-9-CM: 866.00, 585.9  7/21/2021 Unknown        Staphylococcus aureus bacteremia ICD-10-CM: R78.81, B95.61  ICD-9-CM: 790.7, 041.11  7/21/2021 Unknown    On Ancef     Cellulitis ICD-10-CM: L03.90  ICD-9-CM: 682.9  7/19/2021 Unknown        Suspected CHF (congestive heart failure) ICD-10-CM: R09.89  ICD-9-CM: 785.9  7/19/2021 Unknown    EF 30-35 %     * (Principal) Atrial fibrillation with RVR (HCC) ICD-10-CM: I48.91  ICD-9-CM: 427.31  7/19/2021 Unknown    On heparin gtt     Controlled type 2 diabetes mellitus with diabetic polyneuropathy, without long-term current use of insulin (HCC) (Chronic) ICD-10-CM: E11.42  ICD-9-CM: 250.60, 357.2  1/5/2018 Yes        Stage 3 chronic kidney disease ([facility] (Chronic) ICD-10-CM: N18.30  ICD-9-CM: 585.3  11/22/2017 Yes        CAD (coronary artery disease) (Chronic) ICD-10-CM: I25.10  ICD-9-CM: 414.00  Unknown Yes            Patient admitted with staph bacteremia, right foot wound with staph aureus. We were consulted for acute respiratory failure with hypercapnia in the setting of bacteremia, Afib, cardiomyopathy. He was placed on Bipap for a couple hours this morning and now weaned to 2L NC. Delirium has improved. He does have occasional sputum production with CXR with right basilar atelectasis vs pneumonia. Crackles in right base. Plan:  (Medical Decision Making)     --Continue oxygen, wean as able. On 2 L and taper as tolerated. --Heparin gtt for AFib   --on Ancef and no fevers. WBC in normal range. .  Follow-up cultureswound culture with moderate staph aureus. Final sensitivity pending.  --high suspicion for ELAINA.   We will try CPAP and I adjusted the pressure to 8 with adding in a ramp. Will have staff try during the daytime to see if able to tolerate, otherwise doubt he will be able to do so because of claustrophobia. Also told respiratory see if they can decrease or eliminate humidity, which was another concern to his. --BNP elevated, in the setting of chronic kidney disease. Diuretics per PMD.  Continue remaining treatment.     Rsos Zaidi MD

## 2021-07-24 NOTE — PROGRESS NOTES
spoke with patient's daughter, offered support, empathetic presence, assurance of spiritual care staff's prayers.     Odalys STANLEY

## 2021-07-24 NOTE — PROGRESS NOTES
Rounding completed per protocol. Wife at bedside. No complaints at this time.   Will give report to oncoming nurse

## 2021-07-24 NOTE — PROGRESS NOTES
Hourly rounds complete this shift. Patient alert and oriented to person and place. No new complaints at this time. Bed in low, locked position, call light and bedside table within reach. Patient in bed resting. Family at bedside. BLE dressings changed. Prescribed medications given. IV clean, dry, and intact. Will continue to monitor. Report given to night shift nurse.

## 2021-07-24 NOTE — PROGRESS NOTES
Pt ordered to wear CPAP @ HS. Pt unable to tolerate, educated patient and wife on importance of being compliant with CPAP. Respiratory notified and CPAP removed and placed back on NC. Pt oxygen sats 97% on 2L. Will continue to monitor.

## 2021-07-25 LAB
ANION GAP SERPL CALC-SCNC: 3 MMOL/L (ref 7–16)
BASOPHILS # BLD: 0.1 K/UL (ref 0–0.2)
BASOPHILS NFR BLD: 1 % (ref 0–2)
BUN SERPL-MCNC: 33 MG/DL (ref 8–23)
CALCIUM SERPL-MCNC: 9.5 MG/DL (ref 8.3–10.4)
CHLORIDE SERPL-SCNC: 101 MMOL/L (ref 98–107)
CO2 SERPL-SCNC: 38 MMOL/L (ref 21–32)
CREAT SERPL-MCNC: 1.65 MG/DL (ref 0.8–1.5)
DIFFERENTIAL METHOD BLD: ABNORMAL
EOSINOPHIL # BLD: 0.3 K/UL (ref 0–0.8)
EOSINOPHIL NFR BLD: 3 % (ref 0.5–7.8)
ERYTHROCYTE [DISTWIDTH] IN BLOOD BY AUTOMATED COUNT: 14.6 % (ref 11.9–14.6)
GLUCOSE BLD STRIP.AUTO-MCNC: 120 MG/DL (ref 65–100)
GLUCOSE BLD STRIP.AUTO-MCNC: 127 MG/DL (ref 65–100)
GLUCOSE BLD STRIP.AUTO-MCNC: 133 MG/DL (ref 65–100)
GLUCOSE BLD STRIP.AUTO-MCNC: 137 MG/DL (ref 65–100)
GLUCOSE SERPL-MCNC: 119 MG/DL (ref 65–100)
HCT VFR BLD AUTO: 37.4 % (ref 41.1–50.3)
HGB BLD-MCNC: 11.5 G/DL (ref 13.6–17.2)
IMM GRANULOCYTES # BLD AUTO: 0.1 K/UL (ref 0–0.5)
IMM GRANULOCYTES NFR BLD AUTO: 1 % (ref 0–5)
LYMPHOCYTES # BLD: 1.9 K/UL (ref 0.5–4.6)
LYMPHOCYTES NFR BLD: 24 % (ref 13–44)
MCH RBC QN AUTO: 29.2 PG (ref 26.1–32.9)
MCHC RBC AUTO-ENTMCNC: 30.7 G/DL (ref 31.4–35)
MCV RBC AUTO: 94.9 FL (ref 79.6–97.8)
MONOCYTES # BLD: 1 K/UL (ref 0.1–1.3)
MONOCYTES NFR BLD: 12 % (ref 4–12)
NEUTS SEG # BLD: 4.6 K/UL (ref 1.7–8.2)
NEUTS SEG NFR BLD: 59 % (ref 43–78)
NRBC # BLD: 0 K/UL (ref 0–0.2)
PLATELET # BLD AUTO: 366 K/UL (ref 150–450)
PMV BLD AUTO: 10.1 FL (ref 9.4–12.3)
POTASSIUM SERPL-SCNC: 4 MMOL/L (ref 3.5–5.1)
RBC # BLD AUTO: 3.94 M/UL (ref 4.23–5.6)
SERVICE CMNT-IMP: ABNORMAL
SODIUM SERPL-SCNC: 142 MMOL/L (ref 136–145)
UFH PPP CHRO-ACNC: >1.1 IU/ML (ref 0.3–0.7)
WBC # BLD AUTO: 7.9 K/UL (ref 4.3–11.1)

## 2021-07-25 PROCEDURE — 80048 BASIC METABOLIC PNL TOTAL CA: CPT

## 2021-07-25 PROCEDURE — 65660000000 HC RM CCU STEPDOWN

## 2021-07-25 PROCEDURE — 85520 HEPARIN ASSAY: CPT

## 2021-07-25 PROCEDURE — 99232 SBSQ HOSP IP/OBS MODERATE 35: CPT | Performed by: INTERNAL MEDICINE

## 2021-07-25 PROCEDURE — 74011000250 HC RX REV CODE- 250: Performed by: NURSE PRACTITIONER

## 2021-07-25 PROCEDURE — 74011250637 HC RX REV CODE- 250/637: Performed by: FAMILY MEDICINE

## 2021-07-25 PROCEDURE — 74011250636 HC RX REV CODE- 250/636: Performed by: NURSE PRACTITIONER

## 2021-07-25 PROCEDURE — 82962 GLUCOSE BLOOD TEST: CPT

## 2021-07-25 PROCEDURE — 36415 COLL VENOUS BLD VENIPUNCTURE: CPT

## 2021-07-25 PROCEDURE — 85025 COMPLETE CBC W/AUTO DIFF WBC: CPT

## 2021-07-25 PROCEDURE — 74011250637 HC RX REV CODE- 250/637: Performed by: INTERNAL MEDICINE

## 2021-07-25 RX ORDER — CHOLECALCIFEROL (VITAMIN D3) 125 MCG
5 CAPSULE ORAL
Status: DISCONTINUED | OUTPATIENT
Start: 2021-07-25 | End: 2021-07-27 | Stop reason: HOSPADM

## 2021-07-25 RX ADMIN — METOPROLOL SUCCINATE 25 MG: 25 TABLET, EXTENDED RELEASE ORAL at 08:36

## 2021-07-25 RX ADMIN — ATORVASTATIN CALCIUM 40 MG: 40 TABLET, FILM COATED ORAL at 21:31

## 2021-07-25 RX ADMIN — CEFAZOLIN SODIUM 2 G: 100 INJECTION, POWDER, LYOPHILIZED, FOR SOLUTION INTRAVENOUS at 21:31

## 2021-07-25 RX ADMIN — ASPIRIN 81 MG: 81 TABLET ORAL at 08:36

## 2021-07-25 RX ADMIN — Medication 10 ML: at 21:32

## 2021-07-25 RX ADMIN — CEFAZOLIN SODIUM 2 G: 100 INJECTION, POWDER, LYOPHILIZED, FOR SOLUTION INTRAVENOUS at 13:57

## 2021-07-25 RX ADMIN — CEFAZOLIN SODIUM 2 G: 100 INJECTION, POWDER, LYOPHILIZED, FOR SOLUTION INTRAVENOUS at 05:55

## 2021-07-25 RX ADMIN — METOPROLOL SUCCINATE 25 MG: 25 TABLET, EXTENDED RELEASE ORAL at 17:19

## 2021-07-25 RX ADMIN — PREGABALIN 200 MG: 100 CAPSULE ORAL at 08:36

## 2021-07-25 RX ADMIN — APIXABAN 5 MG: 5 TABLET, FILM COATED ORAL at 08:36

## 2021-07-25 RX ADMIN — Medication 10 ML: at 13:57

## 2021-07-25 RX ADMIN — Medication 5 MG: at 21:31

## 2021-07-25 RX ADMIN — APIXABAN 5 MG: 5 TABLET, FILM COATED ORAL at 21:31

## 2021-07-25 RX ADMIN — Medication 10 ML: at 05:07

## 2021-07-25 RX ADMIN — SODIUM HYPOCHLORITE: 1.25 SOLUTION TOPICAL at 08:37

## 2021-07-25 RX ADMIN — FUROSEMIDE 40 MG: 40 TABLET ORAL at 08:36

## 2021-07-25 NOTE — PROGRESS NOTES
[facility] CARDIOLOGY PROGRESS NOTE           7/25/2021 1:06 PM    Admit Date: 7/19/2021      Subjective:     No overnight events. In atrial fibrillation with controlled ventricular rates. On 2L NC. ~3L net neg    ROS:  Cardiovascular:  As noted above    Objective:      Vitals:    07/25/21 0637 07/25/21 0758 07/25/21 0800 07/25/21 1118   BP:  100/70  136/77   Pulse:  95 84 85   Resp:  16  18   Temp:  97.9 °F (36.6 °C)  98.9 °F (37.2 °C)   SpO2:  94%  96%   Weight: 276 lb 1.6 oz (125.2 kg)      Height:           Physical Exam:  General-No Acute Distress  Neck- supple, no JVD  CV- irregular rate and rhythm no MRG  Lung- min basilar rales  Abd- soft, nontender, nondistended  Ext- 1+ edema RLE; indurated/erythematous. Skin- warm and dry    Data Review:   Recent Labs     07/25/21  0633 07/24/21  0640    140   K 4.0 3.6   BUN 33* 41*   CREA 1.65* 1.93*   * 124*   WBC 7.9 7.6   HGB 11.5* 11.1*   HCT 37.4* 36.2*    345       Assessment/Plan:     Principal Problem:    Atrial fibrillation with RVR (HCC) (8/30/6553)  -Uncertain duration and possibly triggered by underlying acute issues.  -Echo with moderate left ventricular dysfunction with wall motion abnormality suggestive of coronary disease. Uncertain duration. Mild left atrial enlargement.  -Transitioned to Eliquis with no planned procedures per primary team after discussion with Ortho.  -Target rate control therapy currently. On Toprol-XL 25 mg twice daily. -[id number] score of ~5      Cardiomyopathy  - appears ischemic with possible component of tachycardic cardiomyopathy as well. - Defer consideration for ACE inhibitor/ARB's with acute on chronic renal failure. Reassess during hospital course.  -Some low BPs have limited therapy and closely monitor.  -Exam currently with no significant volume overload. Improving creatinine.   Restarted maintenance p.o. diuretics  -Needs ischemic work-up which can be addressed on an outpatient basis once acute issues resolve. No acute indication for coronary angiogram.  Continue on telemetry. Bacteremia  -MSSA bacteremia with likely source right foot. Reviewed MRI with no evidence of osteomyelitis. -Defer consideration for ANDRE with 6 weeks of antibiotics planned per ID    Active Problems:    CAD (coronary artery disease) ()  -Remote history of prior stenting per records. Continue high intensity statin      Stage 3 chronic kidney disease ([facility] (11/22/2017)  -improving; likely prerenal.  Exam with no significant volume overload. -Resumed p.o. Lasix      Controlled type 2 diabetes mellitus with diabetic polyneuropathy, without long-term current use of insulin ([facility] (1/5/2018)      Cellulitis (7/19/2021)  -Management per primary      Suspected CHF (congestive heart failure) (7/19/2021)  -Exam not consistent with volume overload. Held diuretics initially with worsening renal function and maintenance dose restarted this a.m.       Sanjuanita Meek MD  7/25/2021 1:06 PM

## 2021-07-25 NOTE — PROGRESS NOTES
Hospitalist Progress Note     Name:  Jamie Dietrich  Age:76 y.o. Sex:male   :  1945    MRN:  [id number]   Admit Date:  2021    Presenting Complaint: Foot Pain    Initial Admission Diagnosis: Cellulitis [L03.90]  Atrial fibrillation with RVR (HCC) [I48.91]  Suspected CHF (congestive heart failure) [R09.89]     Hospital Course/Interval history:   78 y/o M with PMH of DM type II, HLD, HTN that presented with R foot wound and swelling found to be on Afib RVR and acute on ckd  Pt on oxygen , cxr atelectasis vs infiltrate. On heparin drip for afib. Cardiology follwoing          Subjective (21):  2021  Says doing ok  Daughter at bedside  On 3.5 lit/min  Wound care evaluated pt.    2021  Says doing ok  Orthopedic evaluated pt today  No intervention at this point. May need amputation later on. MRI rt foot pending    2021  Mild disinhibition  Appears frustated, says only person who listens to him is his daughter  Genet Minor that his his oxygen was 30% last night and he unhooked the bipap. Spoke to daughter this am, said on her way    2021  Sitting in the chair, family at the bedside. On 3 days of oxygen nasal cannula. Says breathing okay. Did discuss with Ortho team regarding MRI right foot findings. Recommended amputation. Patient not ready for amputation at this point, would want to follow-up with the foot and ankle physician as an outpatient. Repeat blood cultures ordered on 2021, no growth till now.    2021  Wife and daughter at the bedside, complaining of decreased sleep, patient awake alert oriented x3, on 3 L/min nasal cannula, improving creatinine. Assessment & Plan    Acute hypoxic and hypercapnic resp failure- was on bipap, presently on oxygen nasal cannula  Pulmonary consulted  2021  Presently on 3 li oxygen nasal cannula  2021-on 3 L of oxygen nasal cannula    Acute metabolic encephalopathy- prob sec to hypercapnia.  Will r/o any intracranial etiology as pt is on heparin drip. Will order ct head  2/67/1802  Metabolic encephalopathy probably secondary to hypercapnia, resolved. CT head no acute findings  7/25/2021-resolved    Right foot wound infection with surrounding cellulitis that failed outpatient antibiotic therapy. Charcot deformity of foot. - US negative for DVT  - Continue vancomycin  - Start ceftriaxone  - Follow wound cultures  - Wound care consult  7/21/2021- Wound lateral aspect of rt foot  Ordered MRI rt foot  Staph aureus bacteremia  7/22/2021- MRI rt foot pending  7/23/2021- done by results pending  7/24/2021-MRI findings are reviewed by orthopedic physician, recommending amputation, patient not ready for amputation, would want to follow-up with foot ankle physician as an outpatient. Continue cefazolin for staph aureus bacteremia, repeat blood cultures done on 7/23/2021      New onset Afib RVR  - Continue metoprolol  - Continue heparin gtt  - Telemetry monitoring   - Cardiology recs  7/22/2021- cont heparin for now until MRI rt foot  7/23/2021- afib rate controlled on heparin. Still in afib  7/24/2021-patient on heparin drip, with no intervention planned by orthopedic, will DC heparin drip and start patient on Eliquis. 7/25/2021-continue Eliquis      TAMMY likely prerenal   - Avoid nephrotoxic agents  - Monitor wagner function   7/21/2021- added NS 50 cc/hr  7/22/2021- did get lasix by ID yesterday. Fluids held  7/23/2021- improving creatinine  7/24/2021-mild increase in creatinine. 7/25/2021-improved creatinine      Hypokalemia- replace  7/22/2021- resolved    DM type 2- cont sliding scale insulin      ?HF  - Hold diuretics for now in the setting of AK  - Strict I&Os  - Monitor daily weight  - TTE  - Cardiology recs    7/23/2021- echo ef 30-35%  Ordered a dose of lasix  7/24/2021-with mild increase in creatinine, Lasix held.      HTN  - Continue metoprolol  - Hold HCTZ in the setting of TAMMY     Neuropathy- both lower extremities knee down.  Cont lyrica    Mild insomnia  will add melatonin    DVT -  eliquis         Diet:  ADULT DIET Regular; 4 carb choices (60 gm/meal); Low Fat/Low Chol/High Fiber/2 gm Na; 1500 ml  DVT PPx: HEPARIN  Code status: Full Code    Objective:     Patient Vitals for the past 24 hrs:   Temp Pulse Resp BP SpO2   07/25/21 1118 98.9 °F (37.2 °C) 85 18 136/77 96 %   07/25/21 0800  84      07/25/21 0758 97.9 °F (36.6 °C) 95 16 100/70 94 %   07/25/21 0530 98 °F (36.7 °C) 91 18 105/74 90 %   07/25/21 0018 98.6 °F (37 °C) 92 18 (!) 105/55 90 %   07/24/21 2056 97.8 °F (36.6 °C) 83 18 106/79 93 %   07/24/21 1518 98.3 °F (36.8 °C) 88 18 107/75 97 %     Oxygen Therapy  O2 Sat (%): 96 % (07/25/21 1118)  Pulse via Oximetry: 92 beats per minute (07/19/21 1640)  O2 Device: Nasal cannula (07/25/21 0725)  O2 Flow Rate (L/min): 3 l/min (07/25/21 0725)    Body mass index is 38.51 kg/m². Physical Exam:   General:  Awake, alert oriented x3, mild respite distress, on 3 days of oxygen nasal cannula   HEENT- pupils equal reacting to light, neck supple throat normal  Lungs:  Bilateral coarse breath sounds  CV:  Irregularly irregular normal S1 and S2   Abdomen:  Soft, nontender, nondistended, normoactive bowel sounds   Extremities:  No cyanosis clubbing . Rt leg mild swollen-improving, mild erythema  Lower 1/3rd rt leg.  Non tender secondary to neuropathy  Neuro:  Nonfocal, A&O x3   Psych:  Calm, cooperative  Data Review:  I have reviewed all labs, meds, and studies from the last 24 hours:    Labs:    Recent Results (from the past 24 hour(s))   GLUCOSE, POC    Collection Time: 07/24/21  4:40 PM   Result Value Ref Range    Glucose (POC) 106 (H) 65 - 100 mg/dL    Performed by [facility]    GLUCOSE, POC    Collection Time: 07/24/21  8:55 PM   Result Value Ref Range    Glucose (POC) 126 (H) 65 - 100 mg/dL    Performed by [facility]    CBC WITH AUTOMATED DIFF    Collection Time: 07/25/21  6:33 AM   Result Value Ref Range    WBC 7.9 4.3 - 11.1 K/uL    RBC 3.94 (L) 4.23 - 5.6 M/uL    HGB 11.5 (L) 13.6 - 17.2 g/dL    HCT 37.4 (L) 41.1 - 50.3 %    MCV 94.9 79.6 - 97.8 FL    MCH 29.2 26.1 - 32.9 PG    MCHC 30.7 (L) 31.4 - 35.0 g/dL    RDW 14.6 11.9 - 14.6 %    PLATELET 257 047 - 751 K/uL    MPV 10.1 9.4 - 12.3 FL    ABSOLUTE NRBC 0.00 0.0 - 0.2 K/uL    DF AUTOMATED      NEUTROPHILS 59 43 - 78 %    LYMPHOCYTES 24 13 - 44 %    MONOCYTES 12 4.0 - 12.0 %    EOSINOPHILS 3 0.5 - 7.8 %    BASOPHILS 1 0.0 - 2.0 %    IMMATURE GRANULOCYTES 1 0.0 - 5.0 %    ABS. NEUTROPHILS 4.6 1.7 - 8.2 K/UL    ABS. LYMPHOCYTES 1.9 0.5 - 4.6 K/UL    ABS. MONOCYTES 1.0 0.1 - 1.3 K/UL    ABS. EOSINOPHILS 0.3 0.0 - 0.8 K/UL    ABS. BASOPHILS 0.1 0.0 - 0.2 K/UL    ABS. IMM.  GRANS. 0.1 0.0 - 0.5 K/UL   HEPARIN XA UFH    Collection Time: 07/25/21  6:33 AM   Result Value Ref Range    Heparin Xa UFH >1.10 (H) 0.3 - 0.7 IU/mL   METABOLIC PANEL, BASIC    Collection Time: 07/25/21  6:33 AM   Result Value Ref Range    Sodium 142 136 - 145 mmol/L    Potassium 4.0 3.5 - 5.1 mmol/L    Chloride 101 98 - 107 mmol/L    CO2 38 (H) 21 - 32 mmol/L    Anion gap 3 (L) 7 - 16 mmol/L    Glucose 119 (H) 65 - 100 mg/dL    BUN 33 (H) 8 - 23 MG/DL    Creatinine 1.65 (H) 0.8 - 1.5 MG/DL    GFR est AA 53 (L) >60 ml/min/1.73m2    GFR est non-AA 43 (L) >60 ml/min/1.73m2    Calcium 9.5 8.3 - 10.4 MG/DL   GLUCOSE, POC    Collection Time: 07/25/21  7:58 AM   Result Value Ref Range    Glucose (POC) 137 (H) 65 - 100 mg/dL    Performed by Danyel Ellis    GLUCOSE, POC    Collection Time: 07/25/21 11:22 AM   Result Value Ref Range    Glucose (POC) 127 (H) 65 - 100 mg/dL    Performed by Danyel Ellis        All Micro Results     Procedure Component Value Units Date/Time    CULTURE, BLOOD [[id number]] Collected: 07/23/21 7331    Order Status: Completed Specimen: Blood Updated: 07/25/21 1102     Special Requests: --        LEFT  HAND       Culture result: NO GROWTH 2 DAYS       CULTURE, BLOOD [[id number]] Collected: 07/23/21 2058    Order Status: Completed Specimen: Blood Updated: 07/25/21 1102     Special Requests: --        RIGHT  HAND       Culture result: NO GROWTH 2 DAYS       CULTURE, RESPIRATORY/SPUTUM/BRONCH Destiny Little STAIN [[id number]]     Order Status: Sent Specimen: Sputum     CULTURE, Ulphillip Kramer STAIN [[id number]]  (Abnormal)  (Susceptibility) Collected: 07/19/21 1445    Order Status: Completed Specimen: Wound from Foot Updated: 07/22/21 0806     Special Requests: NO SPECIAL REQUESTS        GRAM STAIN 25 TO 55 WBC'S/OIF      FEW GRAM POSITIVE COCCI        Culture result:       MODERATE STAPHYLOCOCCUS AUREUS          BLOOD CULTURE [[id number]]  (Abnormal) Collected: 07/19/21 1211    Order Status: Completed Specimen: Blood Updated: 07/22/21 0712     Special Requests: --        RIGHT  FOREARM       GRAM STAIN       GRAM POS COCCI IN CLUSTERS                  AEROBIC AND ANAEROBIC BOTTLES                  CRITICAL RESULT NOT CALLED DUE TO PREVIOUS NOTIFICATION OF CRITICAL RESULT WITHIN THE LAST 24 HOURS.            Culture result: STAPHYLOCOCCUS AUREUS               For Susceptibility Refer to Culture  Accession [id number]      BLOOD CULTURE [[id number]]  (Abnormal)  (Susceptibility) Collected: 07/19/21 1054    Order Status: Completed Specimen: Blood Updated: 07/22/21 0711     Special Requests: --        RIGHT  HAND       GRAM STAIN       GRAM POS COCCI IN CLUSTERS                  AEROBIC AND ANAEROBIC BOTTLES                  RESULTS VERIFIED, PHONED TO AND READ BACK BY Marrian List AT 0072 ON 7/20/21, [address]           Culture result: STAPHYLOCOCCUS AUREUS               Refer to Blood Culture ID Panel Accession  [id number]      BLOOD CULTURE ID PANEL [[id number]]  (Abnormal) Collected: 07/19/21 1054    Order Status: Completed Specimen: Blood Updated: 07/20/21 0442     Acc. no. from Micro Order [id number]     Staphylococcus Detected        Comment: RESULTS VERIFIED, PHONED TO AND READ BACK BY  [employer] AT [address] ON 7/20/21, [place]          Staphylococcus aureus Detected        Comment: RESULTS VERIFIED, PHONED TO AND READ BACK BY  Guzman Arias AT 6760 ON 7/20/21, [place]          mecA (Methicillin-Resistance Genes) NOT DETECTED        INTERPRETATION       Gram positive cocci in clusters, identified in realtime PCR as probable MSSA. Comment: Recommend discontinuing IV vancomycin starting cefazolin or nafcillin if patient not on beta-lactam therapy. Infectious Diseases Consult recommended in adult patients. THIS TEST DOES NOT REPLACE SENSITIVITY TESTING. EKG Results     Procedure 720 Value Units Date/Time    EKG, 12 LEAD, INITIAL [[id number]] Collected: 07/19/21 1100    Order Status: Completed Updated: 07/19/21 1630     Ventricular Rate 122 BPM      Atrial Rate 147 BPM      QRS Duration 132 ms      Q-T Interval 300 ms      QTC Calculation (Bezet) 427 ms      Calculated R Axis -36 degrees      Calculated T Axis 24 degrees      Diagnosis --     !! AGE AND GENDER SPECIFIC ECG ANALYSIS !! Atrial fibrillation with rapid ventricular response  Left axis deviation  Right bundle branch block  Cannot rule out Anteroseptal infarct , age undetermined  Abnormal ECG  No previous ECGs available  Confirmed by Jose North MD (), Sharri Guillory ([id number]) on 7/19/2021 4:29:33 PM            Other Studies:  No results found.     Current Meds:   Current Facility-Administered Medications   Medication Dose Route Frequency    apixaban (ELIQUIS) tablet 5 mg  5 mg Oral BID    metoprolol succinate (TOPROL-XL) XL tablet 25 mg  25 mg Oral BID    ceFAZolin (ANCEF) 2 g/20 mL in sterile water IV syringe  2 g IntraVENous Q8H    [Held by provider] 0.9% sodium chloride infusion  50 mL/hr IntraVENous CONTINUOUS    sodium hypochlorite (QUARTER STRENGTH DAKIN'S) 0.125% irrigation (bottle)   Topical DAILY    atorvastatin (LIPITOR) tablet 40 mg  40 mg Oral QHS    sodium chloride (NS) flush 5-10 mL  5-10 mL IntraVENous Q8H    sodium chloride (NS) flush 5-10 mL  5-10 mL IntraVENous PRN    insulin lispro (HUMALOG) injection   SubCUTAneous AC&HS    aspirin delayed-release tablet 81 mg  81 mg Oral DAILY    [Held by provider] hydroCHLOROthiazide (HYDRODIURIL) tablet 25 mg  25 mg Oral DAILY    pregabalin (LYRICA) capsule 200 mg  200 mg Oral DAILY    acetaminophen (TYLENOL) tablet 650 mg  650 mg Oral Q6H PRN    Or    acetaminophen (TYLENOL) suppository 650 mg  650 mg Rectal Q6H PRN    polyethylene glycol (MIRALAX) packet 17 g  17 g Oral DAILY PRN    ondansetron (ZOFRAN ODT) tablet 4 mg  4 mg Oral Q8H PRN    Or    ondansetron (ZOFRAN) injection 4 mg  4 mg IntraVENous Q6H PRN    furosemide (LASIX) tablet 40 mg  40 mg Oral DAILY       Problem List:  Hospital Problems as of 7/25/2021 Date Reviewed: 1/13/2021        Codes Class Noted - Resolved POA    Acute respiratory failure with hypercapnia (HCC) ICD-10-CM: J96.02  ICD-9-CM: 518.81  7/23/2021 - Present Unknown        Acute respiratory failure with hypoxia and hypercapnia (HCC) ICD-10-CM: J96.01, J96.02  ICD-9-CM: 518.81  7/23/2021 - Present Unknown        Acute metabolic encephalopathy PIS-24-QH: G93.41  ICD-9-CM: 348.31  7/23/2021 - Present Unknown        Hypoxia ICD-10-CM: R09.02  ICD-9-CM: 799.02  7/21/2021 - Present Unknown        Acute kidney injury superimposed on CKD ([facility] ICD-10-CM: N17.9, N18.9  ICD-9-CM: 866.00, 585.9  7/21/2021 - Present Unknown        Staphylococcus aureus bacteremia ICD-10-CM: R78.81, B95.61  ICD-9-CM: 790.7, 041.11  7/21/2021 - Present Unknown        Cellulitis ICD-10-CM: L03.90  ICD-9-CM: 682.9  7/19/2021 - Present Unknown        Suspected CHF (congestive heart failure) ICD-10-CM: R09.89  ICD-9-CM: 785.9  7/19/2021 - Present Unknown        * (Principal) Atrial fibrillation with RVR (HCC) ICD-10-CM: I48.91  ICD-9-CM: 427.31  7/19/2021 - Present Unknown        Controlled type 2 diabetes mellitus with diabetic polyneuropathy, without long-term current use of insulin (HCC) (Chronic) ICD-10-CM: E11.42  ICD-9-CM: 250.60, 357.2  1/5/2018 - Present Yes        Stage 3 chronic kidney disease ([facility] (Chronic) ICD-10-CM: N18.30  ICD-9-CM: 585.3  11/22/2017 - Present Yes        CAD (coronary artery disease) (Chronic) ICD-10-CM: I25.10  ICD-9-CM: 414.00  Unknown - Present Yes                   Signed By: Katie England MD   [employer] [facility]    July 25, 2021

## 2021-07-25 NOTE — PROGRESS NOTES
Hourly rounding completed on this shift. All needs met. Pt did ambulate with bathroom. Pt educated on importance of staying off foot but continued to refused BSC and bedpan. Assistance to bathroom with reminder to keep pressure of effected wound. Pt voiced understanding. Pt is currently resting in bed. Will continue to monitor and give report to oncoming nurse.

## 2021-07-25 NOTE — PROGRESS NOTES
Problem: Diabetes Self-Management  Goal: *Disease process and treatment process  Description: Define diabetes and identify own type of diabetes; list 3 options for treating diabetes. Outcome: Progressing Towards Goal  Goal: *Incorporating nutritional management into lifestyle  Description: Describe effect of type, amount and timing of food on blood glucose; list 3 methods for planning meals. Outcome: Progressing Towards Goal  Goal: *Incorporating physical activity into lifestyle  Description: State effect of exercise on blood glucose levels. Outcome: Progressing Towards Goal  Goal: *Developing strategies to promote health/change behavior  Description: Define the ABC's of diabetes; identify appropriate screenings, schedule and personal plan for screenings. Outcome: Progressing Towards Goal  Goal: *Using medications safely  Description: State effect of diabetes medications on diabetes; name diabetes medication taking, action and side effects. Outcome: Progressing Towards Goal  Goal: *Monitoring blood glucose, interpreting and using results  Description: Identify recommended blood glucose targets  and personal targets. Outcome: Progressing Towards Goal  Goal: *Prevention, detection, treatment of acute complications  Description: List symptoms of hyper- and hypoglycemia; describe how to treat low blood sugar and actions for lowering  high blood glucose level. Outcome: Progressing Towards Goal  Goal: *Prevention, detection and treatment of chronic complications  Description: Define the natural course of diabetes and describe the relationship of blood glucose levels to long term complications of diabetes.   Outcome: Progressing Towards Goal  Goal: *Developing strategies to address psychosocial issues  Description: Describe feelings about living with diabetes; identify support needed and support network  Outcome: Progressing Towards Goal  Goal: *Insulin pump training  Outcome: Progressing Towards Goal  Goal: *Sick day guidelines  Outcome: Progressing Towards Goal  Goal: *Patient Specific Goal (EDIT GOAL, INSERT TEXT)  Outcome: Progressing Towards Goal     Problem: Falls - Risk of  Goal: *Absence of Falls  Description: Document Beronica Fall Risk and appropriate interventions in the flowsheet. Outcome: Progressing Towards Goal  Note: Fall Risk Interventions:  Mobility Interventions: Patient to call before getting OOB, Bed/chair exit alarm    Mentation Interventions: Bed/chair exit alarm, Adequate sleep, hydration, pain control    Medication Interventions: Patient to call before getting OOB, Teach patient to arise slowly    Elimination Interventions: Patient to call for help with toileting needs    History of Falls Interventions: Bed/chair exit alarm, Investigate reason for fall         Problem: Pressure Injury - Risk of  Goal: *Prevention of pressure injury  Description: Document Julio Cesar Scale and appropriate interventions in the flowsheet.   Outcome: Progressing Towards Goal  Note: Pressure Injury Interventions:  Sensory Interventions: Assess changes in LOC    Moisture Interventions: Absorbent underpads    Activity Interventions: Assess need for specialty bed    Mobility Interventions: Assess need for specialty bed    Nutrition Interventions: Document food/fluid/supplement intake    Friction and Shear Interventions: Apply protective barrier, creams and emollients                Problem: Cellulitis Care Plan (Adult)  Goal: *Control of acute pain  Outcome: Progressing Towards Goal  Goal: *Skin integrity maintained  Outcome: Progressing Towards Goal  Goal: *Absence of infection signs and symptoms  Outcome: Progressing Towards Goal

## 2021-07-25 NOTE — PROGRESS NOTES
Hourly rounds complete this shift. Patient alert and oriented to person and place. No new complaints at this time. Bed in low, locked position, call light and bedside table within reach. Patient in chair resting. Family at bedside. Prescribed medications given. Bilateral dressings changed. CPAP at bedside. IVs clean, dry, and intact. Will continue to monitor. Report given to night shift nurse.

## 2021-07-25 NOTE — PROGRESS NOTES
Pulmonary follow-up visit Progress Note    Tyson Rivas    7/25/2021    Date of Admission:  7/19/2021    The patient's chart is reviewed and the patient is discussed with the staff. Patient is a 77 y.o.  male seen and evaluated at the request of Dr. Jose Guadalupe Stubbs for hypercapneic respiratory failure on Bipap. Chronic medical problems include DM2, HLD, HTN. CAD with PCI x 1. Patient present to ER with right foot wound and edema. Wound culture and blood cultures with staph aureus. He was also in Afib RVR and TAMMY. He is treated with Ancef for staph bacteremia. Remains on heparin gtt for Afib. Echo 7/21 with EF 30-35 %, mildly dilated left ventricle, moderate to severely reduced systolic function, mildly dilated left atrium right atrium, milf MV and TV regurg. Cardiology following and planning ischemic work up outpatient. Pro BNP 2999 and he was diuresed with lasix. Creatinine now down to 1.90. Overnight, patient became confused. ABG revealed hypercapnia with pCO2 78. He was placed on Bipap for a few hours and then weaned back to 3L NC. Head CT completed this morning revealing no acute abnormalities. WBC 7.5. CXR with right basilar atelectasis vs pneumonia. His wife and daughter are at beside and assist with history as well. Patient is alert and oriented x 3. He denies any known lung disease, never smoker, never used inhalers. He reports wheezing a few days ago which resolved with diuresis. He reports some shortness of breath but feels this has improved since this morning. He is on 2L NC. He has no left extremity edema but right foot with edema and is wrapped. He has a cough with occasional yellow/green sputum. He does snore. Denies witnessed apneas, nocturnal gasping or choking. He has a narrow airway with Mallampati 4. He feels partially rested in the mornings. Needs outpatient sleep evaluation for ELAINA. Subjective:     Patient is awake and diuresing.   Leg edema in the right leg is coming down. Indicates no one put him on his CPAP yesterday, but respiratory told me that they tried yesterday afternoon as well yesterday evening. Patient's wife and daughter in the room and also indicated no one used it on him yesterday. Unclear exactly what is happening    Review of Systems  A comprehensive review of systems was negative except for that written in the HPI.     No Known Allergies    Current Facility-Administered Medications   Medication Dose Route Frequency    apixaban (ELIQUIS) tablet 5 mg  5 mg Oral BID    metoprolol succinate (TOPROL-XL) XL tablet 25 mg  25 mg Oral BID    ceFAZolin (ANCEF) 2 g/20 mL in sterile water IV syringe  2 g IntraVENous Q8H    [Held by provider] 0.9% sodium chloride infusion  50 mL/hr IntraVENous CONTINUOUS    sodium hypochlorite (QUARTER STRENGTH DAKIN'S) 0.125% irrigation (bottle)   Topical DAILY    atorvastatin (LIPITOR) tablet 40 mg  40 mg Oral QHS    sodium chloride (NS) flush 5-10 mL  5-10 mL IntraVENous Q8H    sodium chloride (NS) flush 5-10 mL  5-10 mL IntraVENous PRN    insulin lispro (HUMALOG) injection   SubCUTAneous AC&HS    aspirin delayed-release tablet 81 mg  81 mg Oral DAILY    [Held by provider] hydroCHLOROthiazide (HYDRODIURIL) tablet 25 mg  25 mg Oral DAILY    pregabalin (LYRICA) capsule 200 mg  200 mg Oral DAILY    acetaminophen (TYLENOL) tablet 650 mg  650 mg Oral Q6H PRN    Or    acetaminophen (TYLENOL) suppository 650 mg  650 mg Rectal Q6H PRN    polyethylene glycol (MIRALAX) packet 17 g  17 g Oral DAILY PRN    ondansetron (ZOFRAN ODT) tablet 4 mg  4 mg Oral Q8H PRN    Or    ondansetron (ZOFRAN) injection 4 mg  4 mg IntraVENous Q6H PRN    furosemide (LASIX) tablet 40 mg  40 mg Oral DAILY         Objective:     Vitals:    07/25/21 0530 07/25/21 0637 07/25/21 0758 07/25/21 0800   BP: 105/74  100/70    Pulse: 91  95 84   Resp: 18  16    Temp: 98 °F (36.7 °C)  97.9 °F (36.6 °C)    SpO2: 90%  94%    Weight:  276 lb 1.6 oz (125.2 kg)     Height:           PHYSICAL EXAM     Constitutional:  the patient is well developed and in no acute distress, on 2L NC   EENMT:  Sclera clear, pupils equal, oral mucosa moist  Respiratory: R base crackles, otherwise clear, no wheezing  Cardiovascular:  RRR without M,G,R  Gastrointestinal: soft and non-tender; with positive bowel sounds. Musculoskeletal: warm without cyanosis. There is right foot edema, foot is wrapped. Restless feet, keeps moving them. Skin:  no jaundice or rashes, right foot wound covered   Neurologic: no gross neuro deficits     Psychiatric:  alert and oriented x 3     CXR:    7/23 vs 7/19         CT Chest    Recent Labs     07/25/21  0633 07/24/21  0640 07/23/21  0627   WBC 7.9 7.6 7.5   HGB 11.5* 11.1* 10.4*   HCT 37.4* 36.2* 33.8*    345 333     Recent Labs     07/25/21  0633 07/24/21  0640 07/23/21  0627    140 140   K 4.0 3.6 3.8    98 102   * 124* 113*   CO2 38* 40* 34*   BUN 33* 41* 43*   CREA 1.65* 1.93* 1.90*   CA 9.5 9.5 9.2     Recent Labs     07/23/21  0548 07/23/21  0320   PHI 7.32* 7.28*   PCO2I 69.9* 78.6*   PO2I 74* 85   HCO3I 36.0* 36.8*     No results for input(s): LCAD, LAC in the last 72 hours. Cultures:  Blood x2 drawn today, 7/19 2/2 blood culture + staph aureus   Urine-  Sputum-  Wound- +staph aureus                Inpat Anti-Infectives (From admission, onward)     Start     Ordered Stop    07/21/21 1700  ceFAZolin (ANCEF) 2 g/20 mL in sterile water IV syringe  2 g,   IntraVENous,   EVERY 12 HOURS      07/21/21 1609 --                  Echo: 7/21          MRI right foot 7/22      Right venous duplex 7/19        Assessment:  (Medical Decision Making)     Hospital Problems  Date Reviewed: 1/13/2021        Codes Class Noted POA    Acute respiratory failure with hypercapnia ([facility] [address] ICD-10-CM: J96.02  ICD-9-CM: 518.81  7/23/2021 Unknown    Improved with bipap this morning. Weaned to 2L NC and sats acceptable.        Acute respiratory failure with hypoxia and hypercapnia (HCC) ICD-10-CM: J96.01, J96.02  ICD-9-CM: 518.81  7/23/2021 Unknown        Acute metabolic encephalopathy [facility] [place]: G93.41  ICD-9-CM: 348.31  7/23/2021 Unknown        Hypoxia ICD-10-CM: R09.02  ICD-9-CM: 799.02  7/21/2021 Unknown    On 2L NC     Acute kidney injury superimposed on CKD ([facility] ICD-10-CM: N17.9, N18.9  ICD-9-CM: 866.00, 585.9  7/21/2021 Unknown        Staphylococcus aureus bacteremia ICD-10-CM: R78.81, B95.61  ICD-9-CM: 790.7, 041.11  7/21/2021 Unknown    On Ancef     Cellulitis ICD-10-CM: L03.90  ICD-9-CM: 682.9  7/19/2021 Unknown        Suspected CHF (congestive heart failure) ICD-10-CM: R09.89  ICD-9-CM: 785.9  7/19/2021 Unknown    EF 30-35 %     * (Principal) Atrial fibrillation with RVR (HCC) ICD-10-CM: I48.91  ICD-9-CM: 427.31  7/19/2021 Unknown    On heparin gtt     Controlled type 2 diabetes mellitus with diabetic polyneuropathy, without long-term current use of insulin (HCC) (Chronic) ICD-10-CM: E11.42  ICD-9-CM: 250.60, 357.2  1/5/2018 Yes        Stage 3 chronic kidney disease ([facility] (Chronic) ICD-10-CM: N18.30  ICD-9-CM: 585.3  11/22/2017 Yes        CAD (coronary artery disease) (Chronic) ICD-10-CM: I25.10  ICD-9-CM: 414.00  Unknown Yes            Patient admitted with staph bacteremia, right foot wound with staph aureus. We were consulted for acute respiratory failure with hypercapnia in the setting of bacteremia, Afib, cardiomyopathy. He was placed on Bipap for a couple hours this morning and now weaned to 2L NC. Delirium has improved. He does have occasional sputum production with CXR with right basilar atelectasis vs pneumonia. Crackles in right base. Diuresing and leg edema decreasing. Patient also having some problems using CPAP    Plan:  (Medical Decision Making)     --Continue oxygen taper as tolerated. Down to 2 L/min. Did adjust his CPAP yesterday. See HPI. Less respiratory to make sure she can try to get on him.   Hopefully they can try it again at 2 PM today. He has a lot of family coming in now. He has high probability of sleep apnea. Continue Ancef no fevers. WBC normal range. Initial blood cultures and wound cultures with staph aureus with is pansensitive. New blood cultures negative. --Heparin gtt for AFib   Diuresis per PMD.  Leg edema is decreasing. Continue remaining treatment. All questions answered for the patient and his wife.     Renetta Rivera MD

## 2021-07-26 ENCOUNTER — APPOINTMENT (OUTPATIENT)
Dept: GENERAL RADIOLOGY | Age: 76
DRG: 308 | End: 2021-07-26
Attending: FAMILY MEDICINE
Payer: MEDICARE

## 2021-07-26 ENCOUNTER — APPOINTMENT (OUTPATIENT)
Dept: GENERAL RADIOLOGY | Age: 76
DRG: 308 | End: 2021-07-26
Attending: INTERNAL MEDICINE
Payer: MEDICARE

## 2021-07-26 LAB
ANION GAP SERPL CALC-SCNC: 1 MMOL/L (ref 7–16)
BASOPHILS # BLD: 0.1 K/UL (ref 0–0.2)
BASOPHILS NFR BLD: 1 % (ref 0–2)
BUN SERPL-MCNC: 35 MG/DL (ref 8–23)
CALCIUM SERPL-MCNC: 9.6 MG/DL (ref 8.3–10.4)
CHLORIDE SERPL-SCNC: 103 MMOL/L (ref 98–107)
CO2 SERPL-SCNC: 39 MMOL/L (ref 21–32)
CREAT SERPL-MCNC: 1.69 MG/DL (ref 0.8–1.5)
DIFFERENTIAL METHOD BLD: ABNORMAL
EOSINOPHIL # BLD: 0.3 K/UL (ref 0–0.8)
EOSINOPHIL NFR BLD: 3 % (ref 0.5–7.8)
ERYTHROCYTE [DISTWIDTH] IN BLOOD BY AUTOMATED COUNT: 14.7 % (ref 11.9–14.6)
FERRITIN SERPL-MCNC: 122 NG/ML (ref 8–388)
GLUCOSE BLD STRIP.AUTO-MCNC: 109 MG/DL (ref 65–100)
GLUCOSE BLD STRIP.AUTO-MCNC: 118 MG/DL (ref 65–100)
GLUCOSE BLD STRIP.AUTO-MCNC: 124 MG/DL (ref 65–100)
GLUCOSE BLD STRIP.AUTO-MCNC: 132 MG/DL (ref 65–100)
GLUCOSE SERPL-MCNC: 109 MG/DL (ref 65–100)
HCT VFR BLD AUTO: 36.8 % (ref 41.1–50.3)
HGB BLD-MCNC: 11.5 G/DL (ref 13.6–17.2)
IMM GRANULOCYTES # BLD AUTO: 0.1 K/UL (ref 0–0.5)
IMM GRANULOCYTES NFR BLD AUTO: 1 % (ref 0–5)
LYMPHOCYTES # BLD: 1.9 K/UL (ref 0.5–4.6)
LYMPHOCYTES NFR BLD: 20 % (ref 13–44)
MCH RBC QN AUTO: 29.6 PG (ref 26.1–32.9)
MCHC RBC AUTO-ENTMCNC: 31.3 G/DL (ref 31.4–35)
MCV RBC AUTO: 94.6 FL (ref 79.6–97.8)
MONOCYTES # BLD: 1 K/UL (ref 0.1–1.3)
MONOCYTES NFR BLD: 10 % (ref 4–12)
NEUTS SEG # BLD: 6.1 K/UL (ref 1.7–8.2)
NEUTS SEG NFR BLD: 65 % (ref 43–78)
NRBC # BLD: 0 K/UL (ref 0–0.2)
PLATELET # BLD AUTO: 376 K/UL (ref 150–450)
PMV BLD AUTO: 10 FL (ref 9.4–12.3)
POTASSIUM SERPL-SCNC: 4.1 MMOL/L (ref 3.5–5.1)
RBC # BLD AUTO: 3.89 M/UL (ref 4.23–5.6)
SERVICE CMNT-IMP: ABNORMAL
SODIUM SERPL-SCNC: 143 MMOL/L (ref 136–145)
UFH PPP CHRO-ACNC: >1.1 IU/ML (ref 0.3–0.7)
WBC # BLD AUTO: 9.4 K/UL (ref 4.3–11.1)

## 2021-07-26 PROCEDURE — 82728 ASSAY OF FERRITIN: CPT

## 2021-07-26 PROCEDURE — 36573 INSJ PICC RS&I 5 YR+: CPT | Performed by: NURSE PRACTITIONER

## 2021-07-26 PROCEDURE — 85520 HEPARIN ASSAY: CPT

## 2021-07-26 PROCEDURE — 74011000250 HC RX REV CODE- 250: Performed by: NURSE PRACTITIONER

## 2021-07-26 PROCEDURE — 71045 X-RAY EXAM CHEST 1 VIEW: CPT

## 2021-07-26 PROCEDURE — 02HV33Z INSERTION OF INFUSION DEVICE INTO SUPERIOR VENA CAVA, PERCUTANEOUS APPROACH: ICD-10-PCS | Performed by: INTERNAL MEDICINE

## 2021-07-26 PROCEDURE — 74011250637 HC RX REV CODE- 250/637: Performed by: FAMILY MEDICINE

## 2021-07-26 PROCEDURE — 74011250637 HC RX REV CODE- 250/637: Performed by: INTERNAL MEDICINE

## 2021-07-26 PROCEDURE — 80048 BASIC METABOLIC PNL TOTAL CA: CPT

## 2021-07-26 PROCEDURE — 99232 SBSQ HOSP IP/OBS MODERATE 35: CPT | Performed by: INTERNAL MEDICINE

## 2021-07-26 PROCEDURE — 36415 COLL VENOUS BLD VENIPUNCTURE: CPT

## 2021-07-26 PROCEDURE — 85025 COMPLETE CBC W/AUTO DIFF WBC: CPT

## 2021-07-26 PROCEDURE — 94660 CPAP INITIATION&MGMT: CPT

## 2021-07-26 PROCEDURE — C1751 CATH, INF, PER/CENT/MIDLINE: HCPCS

## 2021-07-26 PROCEDURE — B548ZZA ULTRASONOGRAPHY OF SUPERIOR VENA CAVA, GUIDANCE: ICD-10-PCS | Performed by: INTERNAL MEDICINE

## 2021-07-26 PROCEDURE — 97530 THERAPEUTIC ACTIVITIES: CPT

## 2021-07-26 PROCEDURE — 82962 GLUCOSE BLOOD TEST: CPT

## 2021-07-26 PROCEDURE — 74011250636 HC RX REV CODE- 250/636: Performed by: NURSE PRACTITIONER

## 2021-07-26 PROCEDURE — 97110 THERAPEUTIC EXERCISES: CPT

## 2021-07-26 PROCEDURE — 65660000000 HC RM CCU STEPDOWN

## 2021-07-26 RX ORDER — SODIUM CHLORIDE 0.9 % (FLUSH) 0.9 %
10 SYRINGE (ML) INJECTION EVERY 8 HOURS
Status: DISCONTINUED | OUTPATIENT
Start: 2021-07-26 | End: 2021-07-27 | Stop reason: HOSPADM

## 2021-07-26 RX ADMIN — CEFAZOLIN SODIUM 2 G: 100 INJECTION, POWDER, LYOPHILIZED, FOR SOLUTION INTRAVENOUS at 14:26

## 2021-07-26 RX ADMIN — APIXABAN 5 MG: 5 TABLET, FILM COATED ORAL at 09:24

## 2021-07-26 RX ADMIN — Medication 10 ML: at 13:58

## 2021-07-26 RX ADMIN — Medication 10 ML: at 05:21

## 2021-07-26 RX ADMIN — APIXABAN 5 MG: 5 TABLET, FILM COATED ORAL at 21:39

## 2021-07-26 RX ADMIN — Medication 10 ML: at 21:40

## 2021-07-26 RX ADMIN — ATORVASTATIN CALCIUM 40 MG: 40 TABLET, FILM COATED ORAL at 21:40

## 2021-07-26 RX ADMIN — ASPIRIN 81 MG: 81 TABLET ORAL at 09:24

## 2021-07-26 RX ADMIN — CEFAZOLIN SODIUM 2 G: 100 INJECTION, POWDER, LYOPHILIZED, FOR SOLUTION INTRAVENOUS at 05:21

## 2021-07-26 RX ADMIN — Medication 5 MG: at 21:39

## 2021-07-26 RX ADMIN — CEFAZOLIN SODIUM 2 G: 100 INJECTION, POWDER, LYOPHILIZED, FOR SOLUTION INTRAVENOUS at 21:39

## 2021-07-26 RX ADMIN — Medication 10 ML: at 17:37

## 2021-07-26 RX ADMIN — SODIUM HYPOCHLORITE: 1.25 SOLUTION TOPICAL at 09:24

## 2021-07-26 RX ADMIN — PREGABALIN 200 MG: 100 CAPSULE ORAL at 09:24

## 2021-07-26 RX ADMIN — Medication 10 ML: at 21:39

## 2021-07-26 RX ADMIN — METOPROLOL SUCCINATE 25 MG: 25 TABLET, EXTENDED RELEASE ORAL at 09:24

## 2021-07-26 RX ADMIN — METOPROLOL SUCCINATE 25 MG: 25 TABLET, EXTENDED RELEASE ORAL at 17:37

## 2021-07-26 NOTE — PROGRESS NOTES
[facility] PROGRESS NOTE           7/26/2021 2:06 PM    Admit Date: 7/19/2021      Subjective:     No overnight events. In atrial fibrillation with controlled ventricular rates. On 2L NC. ~2.3 L net neg. More alert    ROS:  Cardiovascular:  As noted above    Objective:      Vitals:    07/26/21 0459 07/26/21 0722 07/26/21 1112 07/26/21 1617   BP:  (!) 140/70 (!) 131/99 129/84   Pulse:  (!) 101 82 80   Resp:  18 18 18   Temp:  97.8 °F (36.6 °C) 98 °F (36.7 °C) 98.3 °F (36.8 °C)   SpO2:  95% 92% 94%   Weight: 274 lb 8 oz (124.5 kg)      Height:           Physical Exam:  General-No Acute Distress  Neck- supple, no JVD  CV- irregular rate and rhythm no MRG  Lung- min basilar rales  Abd- soft, nontender, nondistended  Ext- 1+ edema RLE; indurated/erythematous. Skin- warm and dry    Data Review:   Recent Labs     07/26/21  0601 07/25/21  0633    142   K 4.1 4.0   BUN 35* 33*   CREA 1.69* 1.65*   * 119*   WBC 9.4 7.9   HGB 11.5* 11.5*   HCT 36.8* 37.4*    366       Assessment/Plan:     Principal Problem:    Atrial fibrillation with RVR (HCC) (3/72/2599)  -Uncertain duration and possibly triggered by underlying acute issues.  -Echo with moderate left ventricular dysfunction with wall motion abnormality suggestive of coronary disease. Uncertain duration. Mild left atrial enlargement.  -Transitioned to Eliquis with no planned procedures per primary team after discussion with Ortho.  -Target rate control therapy currently. On Toprol-XL 25 mg twice daily. -[id number]-RENr score of ~5      Cardiomyopathy  - appears ischemic with possible component of tachycardic cardiomyopathy as well. - Defer consideration for ACE inhibitor/ARB's with acute on chronic renal failure. Consider adding low dose in am  -Some low BPs have limited therapy and closely monitor.  -Exam currently with no significant volume overload. Stable creatinine.   Restarted maintenance p.o. diuretics  -Needs ischemic work-up which can be addressed on an outpatient basis once acute issues resolve. No acute indication for coronary angiogram.  Continue on telemetry. Bacteremia  -MSSA bacteremia with likely source right foot. Reviewed MRI with no evidence of osteomyelitis. -Defer consideration for ANDRE with 6 weeks of antibiotics planned per ID    Active Problems:    CAD (coronary artery disease) ()  -Remote history of prior stenting per records. Continue high intensity statin      Stage 3 chronic kidney disease ([facility] [address] (11/22/2017)  -improving; likely prerenal.  Exam with no significant volume overload. -Resumed p.o. Lasix      Controlled type 2 diabetes mellitus with diabetic polyneuropathy, without long-term current use of insulin ([facility] [address] (1/5/2018)      Cellulitis (7/19/2021)  -Management per primary      Suspected CHF (congestive heart failure) (7/19/2021)  -Exam not consistent with volume overload. Held diuretics initially with worsening renal function and maintenance dose restarted.       Sanju Juárez MD  7/26/2021 2:06 PM

## 2021-07-26 NOTE — PROGRESS NOTES
Infectious Disease Progress Note    Today's Date: 2021   Admit Date: 2021    Impression:   · MSSA (R-clinda) bacteremia (), Blood CX () NGTD; TTE without vegetation  · R foot wound, swab culture with MSSA  · CHF EF 30-35%    Plan:   · Continue Ancef 2 g IV Q8 hrs  · BKA recommended by Ortho; patient declined  · Place PICC today; Reached out to Dr. Dori Hay pending reply  · ID OPAT Orders:  · Ancef 2 gm IV Q 8 hrs X 6 weeks with EOT 9/3/21  · Routine PICC care  · Q Monday CBC with diff, ESR, CRP, Scr, and LFT's  · Please fax results to [phone]  · ID office follow-up will be scheduled for mid and EOT; TBA    Anti-infectives:   · Vancomycin  · Ceftriaxone ( -  · Ancef  - current    Subjective: Interval History: Denies N/V/D/F/C. Wants to go home. No Known Allergies     Review of Systems:  Pertinent items are noted in the History of Present Illness.     Objective:     Visit Vitals  BP (!) 140/70 (BP 1 Location: Right arm, BP Patient Position: At rest)   Pulse (!) 101   Temp 97.8 °F (36.6 °C)   Resp 18   Ht 5' 11\" (1.803 m)   Wt 124.5 kg (274 lb 8 oz)   SpO2 95%   BMI 38.28 kg/m²     Temp (24hrs), Av °F (36.7 °C), Min:97.5 °F (36.4 °C), Max:98.9 °F (37.2 °C)     General:  Alert, no acute distress, appears stated age, obese  Head:    Normocephalic, atraumatic  Eyes:   Anicteric sclerae, no drainage, not injected, EOMI  Mouth:  Moist mucosa  Neck:   Supple, symmetrical, trachea midline, no JVD  Lungs:   Clear without increased work of breathing or audible wheezes  CV:   Regular rate and rhythm without audible murmur  Abdomen:  Soft, non tender, not distended, active bowel sounds  Extremities:  No cyanosis; R Charcot foot, edema, wound wrapped  Musculoskeletal: Moves all extremities with equal strength  Skin:   No acute rash   Psych:  Alert, oriented and appropriate without evidence of thought disorder  Lines:    benign      Data Review:     CBC:  Recent Labs     21  0601 07/25/21  5091 07/24/21  0640 07/24/21  0640   WBC 9.4 7.9  --  7.6   GRANS 65 59   < > 54   MONOS 10 12   < > 14*   EOS 3 3   < > 3   ANEU 6.1 4.6   < > 4.2   ABL 1.9 1.9   < > 2.1   HGB 11.5* 11.5*  --  11.1*   HCT 36.8* 37.4*  --  36.2*    366  --  345    < > = values in this interval not displayed. BMP:  Recent Labs     07/26/21  0601 07/25/21  0633 07/24/21  0640   CREA 1.69* 1.65* 1.93*   BUN 35* 33* 41*    142 140   K 4.1 4.0 3.6    101 98   CO2 39* 38* 40*   AGAP 1* 3* 2*   * 119* 124*       LFTS:  No results for input(s): TBILI, ALT, AP, TP, ALB in the last 72 hours.     No lab exists for component: SGOT    Microbiology:     All Micro Results     Procedure Component Value Units Date/Time    CULTURE, BLOOD [[id number]] Collected: 07/23/21 0627    Order Status: Completed Specimen: Blood Updated: 07/26/21 0715     Special Requests: --        LEFT  HAND       Culture result: NO GROWTH 3 DAYS       CULTURE, BLOOD [[id number]] Collected: 07/23/21 7129    Order Status: Completed Specimen: Blood Updated: 07/26/21 0715     Special Requests: --        RIGHT  HAND       Culture result: NO GROWTH 3 DAYS       CULTURE, RESPIRATORY/SPUTUM/BRONCH Marget Flicker STAIN [[id number]] Collected: 07/23/21 1215    Order Status: Canceled Specimen: Sputum     CULTURE, WOUND Marget Flicker STAIN [[id number]]  (Abnormal)  (Susceptibility) Collected: 07/19/21 1445    Order Status: Completed Specimen: Wound from Foot Updated: 07/22/21 0806     Special Requests: NO SPECIAL REQUESTS        GRAM STAIN 25 TO 55 WBC'S/OIF      FEW GRAM POSITIVE COCCI        Culture result:       MODERATE STAPHYLOCOCCUS AUREUS          BLOOD CULTURE [[id number]]  (Abnormal) Collected: 07/19/21 1211    Order Status: Completed Specimen: Blood Updated: 07/22/21 0712     Special Requests: --        RIGHT  FOREARM       GRAM STAIN       GRAM POS COCCI IN CLUSTERS                  AEROBIC AND ANAEROBIC BOTTLES                  CRITICAL RESULT NOT CALLED DUE TO PREVIOUS NOTIFICATION OF CRITICAL RESULT WITHIN THE LAST 24 HOURS. Culture result: STAPHYLOCOCCUS AUREUS               For Susceptibility Refer to Culture  Accession [id number]      BLOOD CULTURE [[id number]]  (Abnormal)  (Susceptibility) Collected: 07/19/21 1054    Order Status: Completed Specimen: Blood Updated: 07/22/21 0711     Special Requests: --        RIGHT  HAND       GRAM STAIN       GRAM POS COCCI IN CLUSTERS                  AEROBIC AND ANAEROBIC BOTTLES                  RESULTS VERIFIED, PHONED TO AND READ BACK BY Addie Lantigua AT 4039 ON 7/20/21, [id number]           Culture result: STAPHYLOCOCCUS AUREUS               Refer to Blood Culture ID Panel Accession  [id number]      BLOOD CULTURE ID PANEL [[id number]]  (Abnormal) Collected: 07/19/21 1054    Order Status: Completed Specimen: Blood Updated: 07/20/21 0442     Acc. no. from Micro Order [id number]     Staphylococcus Detected        Comment: RESULTS VERIFIED, PHONED TO AND READ BACK BY  Addie Lantigua AT 9409 ON 7/20/21, [facility]          Staphylococcus aureus Detected        Comment: RESULTS VERIFIED, PHONED TO AND READ BACK BY  Addie Lantigua AT 1752 ON 7/20/21, JJ[facility]          mecA (Methicillin-Resistance Genes) NOT DETECTED        INTERPRETATION       Gram positive cocci in clusters, identified in realtime PCR as probable MSSA. Comment: Recommend discontinuing IV vancomycin starting cefazolin or nafcillin if patient not on beta-lactam therapy. Infectious Diseases Consult recommended in adult patients. THIS TEST DOES NOT REPLACE SENSITIVITY TESTING. Imaging:   R foot xray 7/19/21 with soft tissue swelling, severe hammertoe deformity and total destruction of the talus   Duplex LE without DVT  MRI pending.     Signed By: Wilda De Leon NP     July 26, 2021

## 2021-07-26 NOTE — PROGRESS NOTES
David Rivas  Admission Date: 7/19/2021             Daily Progress Note: 7/26/2021    The patient's chart is reviewed and the patient is discussed with the staff. Pt is a 76 yo  male with a history of CAD s/p PCI x 1, HTN, HLD, and DM2. Pt presented to the ER on 7/19/21 with a R foot wound and LE edema. He was admitted by hospitalist service. His wound and BC x 2 grew out Staph aureus. He was started on Ancef. Pt developed afib RVR and TAMMY. He was started on heparin gtt and echo revealed EF 30-35%, mildly dilated LA/RA and mild TR and MR. Pt became confused on 7/22 and ABG revealed pCO2 78. He was placed on BiPAP and then weaned back to NC. His head CT was unremarkable. An APAP was ordered for bedside after family reported symptoms of ELAINA. Subjective:     Pt sitting up in bed. His NC was not in his nose and his RA sat 97%. His O2 was taken off and his PCT was asked to recheck him in about 10 mins. Pt admits to cough but reports that he is swallowing his phlegm. He denies shortness of breath. He denies pain.      Current Facility-Administered Medications   Medication Dose Route Frequency    melatonin tablet 5 mg  5 mg Oral QHS    apixaban (ELIQUIS) tablet 5 mg  5 mg Oral BID    metoprolol succinate (TOPROL-XL) XL tablet 25 mg  25 mg Oral BID    ceFAZolin (ANCEF) 2 g/20 mL in sterile water IV syringe  2 g IntraVENous Q8H    [Held by provider] 0.9% sodium chloride infusion  50 mL/hr IntraVENous CONTINUOUS    sodium hypochlorite (QUARTER STRENGTH DAKIN'S) 0.125% irrigation (bottle)   Topical DAILY    atorvastatin (LIPITOR) tablet 40 mg  40 mg Oral QHS    sodium chloride (NS) flush 5-10 mL  5-10 mL IntraVENous Q8H    sodium chloride (NS) flush 5-10 mL  5-10 mL IntraVENous PRN    insulin lispro (HUMALOG) injection   SubCUTAneous AC&HS    aspirin delayed-release tablet 81 mg  81 mg Oral DAILY    [Held by provider] hydroCHLOROthiazide (HYDRODIURIL) tablet 25 mg  25 mg Oral DAILY    pregabalin (LYRICA) capsule 200 mg  200 mg Oral DAILY    acetaminophen (TYLENOL) tablet 650 mg  650 mg Oral Q6H PRN    Or    acetaminophen (TYLENOL) suppository 650 mg  650 mg Rectal Q6H PRN    polyethylene glycol (MIRALAX) packet 17 g  17 g Oral DAILY PRN    ondansetron (ZOFRAN ODT) tablet 4 mg  4 mg Oral Q8H PRN    Or    ondansetron (ZOFRAN) injection 4 mg  4 mg IntraVENous Q6H PRN    [Held by provider] furosemide (LASIX) tablet 40 mg  40 mg Oral DAILY       Review of Systems  +cough  Constitutional: negative for fever, chills, sweats  Cardiovascular: negative for chest pain, palpitations, syncope, edema  Gastrointestinal:  negative for dysphagia, reflux, vomiting, diarrhea, abdominal pain, or melena  Neurologic:  negative for focal weakness, numbness, headache    Objective:     Vitals:    07/26/21 0400 07/26/21 0406 07/26/21 0459 07/26/21 0722   BP:  121/76  (!) 140/70   Pulse: 84 (!) 102  (!) 101   Resp:  18  18   Temp:  97.5 °F (36.4 °C)  97.8 °F (36.6 °C)   SpO2:  93%  95%   Weight:   274 lb 8 oz (124.5 kg)    Height:             Intake/Output Summary (Last 24 hours) at 7/26/2021 1042  Last data filed at 7/25/2021 1118  Gross per 24 hour   Intake    Output 250 ml   Net -250 ml       Physical Exam:   Constitution:  the patient is well developed and in no acute distress, on RA  EENMT:  Sclera clear, pupils equal, oral mucosa moist  Respiratory: diminished, few crackles but overall fairly clear  Cardiovascular:  RRR without M,G,R  Gastrointestinal: soft and non-tender; with positive bowel sounds. Musculoskeletal: warm without cyanosis. There is 1+ R>L lower extremity edema.   Skin:  no jaundice or rashes   Neurologic: no gross neuro deficits     Psychiatric:  alert and oriented x 3    CXR:           LAB  Recent Labs     07/26/21  0724 07/25/21  2020 07/25/21  1632 07/25/21  1122 07/25/21  0758   GLUCPOC 132* 120* 133* 127* 137*      Recent Labs     07/26/21  0601 07/25/21  8304 07/24/21  5954 WBC 9.4 7.9 7.6   HGB 11.5* 11.5* 11.1*   HCT 36.8* 37.4* 36.2*    366 345     Recent Labs     07/26/21  0601 07/25/21  0633 07/24/21  0640    142 140   K 4.1 4.0 3.6    101 98   CO2 39* 38* 40*   * 119* 124*   BUN 35* 33* 41*   CREA 1.69* 1.65* 1.93*   CA 9.6 9.5 9.5     No results for input(s): PH, PCO2, PO2, HCO3, PHI, PCO2I, PO2I, HCO3I in the last 72 hours. No results for input(s): LCAD, LAC in the last 72 hours.       Assessment:  (Medical Decision Making)     Hospital Problems  Date Reviewed: 7/26/2021        Codes Class Noted POA    Acute respiratory failure with hypercapnia (HCC) ICD-10-CM: J96.02  ICD-9-CM: 518.81  7/23/2021 Unknown    Wean O2 as tolerated     Acute respiratory failure with hypoxia and hypercapnia (HCC) ICD-10-CM: J96.01, J96.02  ICD-9-CM: 518.81  7/23/2021 Unknown    Weaning O2, continuing APAP with sleep    Acute metabolic encephalopathy KYLER-78-TC: G93.41  ICD-9-CM: 348.31  7/23/2021 Unknown    improved    Hypoxia ICD-10-CM: R09.02  ICD-9-CM: 799.02  7/21/2021 Unknown    Resolving     Acute kidney injury superimposed on CKD ([facility] [address] ICD-10-CM: N17.9, N18.9  ICD-9-CM: 866.00, 585.9  7/21/2021 Unknown    Improving, Cr 1.69 now    Staphylococcus aureus bacteremia ICD-10-CM: R78.81, B95.61  ICD-9-CM: 790.7, 041.11  7/21/2021 Unknown    On Ancef, ID consulted     Cellulitis ICD-10-CM: L03.90  ICD-9-CM: 682.9  7/19/2021 Unknown    Wound care following     Suspected CHF (congestive heart failure) ICD-10-CM: R09.89  ICD-9-CM: 785.9  7/19/2021 Unknown    Has been diuresed     * (Principal) Atrial fibrillation with RVR ([facility] [address] ICD-10-CM: I48.91  ICD-9-CM: 427.31  7/19/2021 Unknown    Now on Eliquis and BB    Controlled type 2 diabetes mellitus with diabetic polyneuropathy, without long-term current use of insulin (HCC) (Chronic) ICD-10-CM: E11.42  ICD-9-CM: 250.60, 357.2  1/5/2018 Yes    SSI    Stage 3 chronic kidney disease (HCC) (Chronic) ICD-10-CM: N18.30  ICD-9-CM: 585.3 11/22/2017 Yes    Chronic     CAD (coronary artery disease) (Chronic) ICD-10-CM: I25.10  ICD-9-CM: 414.00  Unknown Yes    Chronic          Plan:  (Medical Decision Making)     --wean O2 as tolerated  --continue Ancef 2g q 8*  --continue Eliquis 5mg BID  --pt will need a split night PSG ordered at discharge. Pt did wear APAP during the day yesterday but not at night. Unclear why he is not using at night. He reports that he does feel better after using it. --pt reports that he may go home tomorrow    More than 50% of the time documented was spent in face-to-face contact with the patient and in the care of the patient on the floor/unit where the patient is located. LATONYA Ndiaye     Lungs: decreased BS bilaterally  Heart:  RRR with no Murmur/Rubs/Gallops  MS: legs in constant motion    Additional Comments:  Slept better on APAP mode yesterday afternoon. Still not using at night. He has RLS and was taken off Requip due to AMS. Need to check ferritin and if low supplement with ferrous sulfate. He needs split night PSG soon after discharge. I have spoken with and examined the patient. I agree with the above assessment and plan as documented.     Timothy Chinchilla MD

## 2021-07-26 NOTE — PROGRESS NOTES
CM met with patient and patient's daughter Lanny Delacruz at bedside, to discuss discharge planning. Per ID, patient will require Ancef 2g IV Q 8 hrs x 6 weeks with EOT 9/3/2021. CM sent referral to [facility] to obtain pricing for home infusion services. Cary Hewitter with [facility] is aware of referral.  Daughter also requested SW CM identify, if attending MD would be willing to assist with patient ordering a wheelchair. CM will verify this information with attending MD.    Gaudencio Rogers with [place] [phone], confirmed staff can assist with order. CM was receptive. CM remains available.

## 2021-07-26 NOTE — PROGRESS NOTES
ACUTE PHYSICAL THERAPY GOALS:  (Developed with and agreed upon by patient and/or caregiver.)  (1.) David Pack  will move from supine to sit and sit to supine , scoot up and down and roll side to side with MODIFIED INDEPENDENCE within 7 treatment day(s). (2.) David Pack will transfer from bed to chair and chair to bed with MODIFIED INDEPENDENCE using the least restrictive device within 7 treatment day(s). (3.) David Pack will ambulate with MODIFIED INDEPENDENCE for 1000 feet with the least restrictive device within 7 treatment day(s). (4.) David Pack will perform standing static and dynamic balance activities x 20 minutes with MODIFIED INDEPENDENCE to improve safety within 7 treatment day(s). (5.) David Pack will ascend and descend 1 stairs using no hand rail(s) with MODIFIED INDEPENDENCE to improve functional mobility and safety within 7 treatment day(s). (6.) David Pack will perform bilateral lower extremity exercises x 20 min for HEP with MODIFIED INDEPENDENCE to improve strength, endurance, and functional mobility within 7 treatment day(s). PHYSICAL THERAPY: Daily Note and PM Treatment Day # 3    David Rivas is a 77 y.o. male   PRIMARY DIAGNOSIS: Atrial fibrillation with RVR (HCC)  Cellulitis [L03.90]  Atrial fibrillation with RVR (HCC) [I48.91]  Suspected CHF (congestive heart failure) [R09.89]     ASSESSMENT:     REHAB RECOMMENDATIONS: CURRENT LEVEL OF FUNCTION:  (Most Recently Demonstrated)   Recommendation to date pending progress:  [address] [place] [address]  Equipment:   [employer] for offloading for transfers  [facility] for mobility Bed Mobility:   Minimal Assistance  Sit to Stand:  [employer] Assistance  Transfers:   Contact Guard Assistance  Gait/Mobility:   Contact Guard Assistance     ASSESSMENT:  Mr. Ana Laura Keller is a 77year old M who presents to hospital from podiatrist w/ new onset Afib RVR.  Continuing to limit weight bearing RLE per ortho and wound care Today, pt performed bed mobility, sit to stand, amb. from bed to chair for 5 ft. and was instructed in performance of therapeutic exercise in chair. On room air, pt found to be 88%. Discussed with RN, 2 L of O2 via NC donned to maintain sats during mobility/exertion. Daughter was present at end of treatment session and educated on limiting weight bearing on R LE during transfers and ambulation. Pt was able to ambulate the same distance as last session and performed additional exercise. Same number of reps performed as last session w/ O2 monitoring throughout exercises. Pt presents as functioning below his baseline, with deficits in mobility including transfers, gait, balance, and activity tolerance. Pt will benefit from skilled therapy services to address stated deficits to promote return to highest level of function, independence, and safety. Will continue to follow. SUBJECTIVE:   Mr. Sarbjit Gates states, \"I got a PICC line today. \"    SOCIAL HISTORY/ LIVING ENVIRONMENT: see initial eval  Home Environment: Private residence  One/Two Story Residence: One story  Living Alone: No  Support Systems: Child(pat)  OBJECTIVE:     PAIN: VITAL SIGNS: LINES/DRAINS:   Pre Treatment: Pain Screen  Pain Scale 1: Numeric (0 - 10)  Pain Intensity 1: 0  Post Treatment: 0/10   PICC  O2 Device: Nasal cannula     MOBILITY: I Mod I S SBA CGA Min Mod Max Total  NT x2 Comments:   Bed Mobility    Rolling [] [] [] [] [] [x] [] [] [] [] []    Supine to Sit [] [] [] [] [] [x] [] [] [] [] []    Scooting [] [] [] [] [] [x] [] [] [] [] []    Sit to Supine [] [] [] [] [] [] [] [] [] [x] []    Transfers    Sit to Stand [] [] [] [] [x] [] [] [] [] [] []    Bed to Chair [] [] [] [] [x] [] [] [] [] [] []    Stand to Sit [] [] [] [] [x] [] [] [] [] [] []    I=Independent, Mod I=Modified Independent, S=Supervision, SBA=Standby Assistance, CGA=Contact Guard Assistance,   Min=Minimal Assistance, Mod=Moderate Assistance, Max=Maximal Assistance, Total=Total Assistance, NT=Not Tested    BALANCE: Good Fair+ Fair Fair- Poor NT Comments   Sitting Static [x] [] [] [] [] []    Sitting Dynamic [] [x] [] [] [] []              Standing Static [] [x] [] [] [] []    Standing Dynamic [] [x] [x] [] [] []      GAIT: I Mod I S SBA CGA Min Mod Max Total  NT x2 Comments:   Level of Assistance [] [] [] [] [x] [] [] [] [] [] []    Distance 5 ft. DME Rolling Walker and Gait Belt    Gait Quality Small steps and slow pace    Weightbearing  Status Per wound care and ortho, limit RLE weight bearing     I=Independent, Mod I=Modified Independent, S=Supervision, SBA=Standby Assistance, CGA=Contact Guard Assistance,   Min=Minimal Assistance, Mod=Moderate Assistance, Max=Maximal Assistance, Total=Total Assistance, NT=Not Tested    PLAN:   FREQUENCY/DURATION: PT Plan of Care: 3 times/week for duration of hospital stay or until stated goals are met, whichever comes first.  TREATMENT:     TREATMENT:   (     )  Therapeutic Activity (17 Minutes): Therapeutic activity included Rolling, Supine to Sit, Scooting, Transfer Training, Ambulation on level ground, Sitting balance  and Standing balance to improve functional Mobility, Strength, ROM and Activity tolerance. Therapeutic Exercise (16 Minutes): Therapeutic exercises noted below to improve functional activity tolerance, AROM, strength and mobility.      TREATMENT GRID:   Date:  7-23-21 Date:  7-26-21 Date:     Activity/Exercise Parameters Parameters Parameters   Knee ext 15x B A 15x B A    Seated hip flex 15x B A 15x B A    Seated hip abd/add 15x B A 15x B A    Ankle pumps  20x B A                            AFTER TREATMENT POSITION/PRECAUTIONS:  Chair, Needs within reach, RN notified and Visitors at bedside    INTERDISCIPLINARY COLLABORATION:  RN/PCT and PT/PTA    TOTAL TREATMENT DURATION:  PT Patient Time In/Time Out  Time In: 1444  Time Out: [address], [facility]

## 2021-07-26 NOTE — PROGRESS NOTES
Hourly rounds completed throughout this shift. Dressing change performed to right foot wound. Pt tolerated well. Pt resting in bed; denies needs at this time. Will continue to monitor and report to oncoming night shift nurse.

## 2021-07-26 NOTE — CONSULTS
David Rivas  Admission Date: 7/19/2021             Renal Daily Progress Note: 7/26/2021    The patient's chart and labs reviewed- ok to place PICC line from nephrology standpoint. Follow up with Nephrology outpt. Please call with additional Nephrology questions. Objective:     Vitals:    07/26/21 0406 07/26/21 0459 07/26/21 0722 07/26/21 1112   BP: 121/76  (!) 140/70 (!) 131/99   Pulse: (!) 102  (!) 101 82   Resp: 18  18 18   Temp: 97.5 °F (36.4 °C)  97.8 °F (36.6 °C) 98 °F (36.7 °C)   SpO2: 93%  95% 92%   Weight:  124.5 kg (274 lb 8 oz)     Height:         Intake and Output:   07/24 1901 - 07/26 0700  In: -   Out: 1050 [Urine:1050]  No intake/output data recorded.     LAB  Recent Labs     07/26/21  0601 07/25/21  0633 07/24/21  0640   WBC 9.4 7.9 7.6   HGB 11.5* 11.5* 11.1*   HCT 36.8* 37.4* 36.2*    366 345     Recent Labs     07/26/21  0601 07/25/21  0633 07/24/21  0640    142 140   K 4.1 4.0 3.6    101 98   CO2 39* 38* 40*   * 119* 124*   BUN 35* 33* 41*   CREA 1.69* 1.65* 1.93*         Tamsen Holter, NP

## 2021-07-26 NOTE — PROGRESS NOTES
Problem: Diabetes Self-Management  Goal: *Disease process and treatment process  Description: Define diabetes and identify own type of diabetes; list 3 options for treating diabetes. Outcome: Progressing Towards Goal  Goal: *Incorporating nutritional management into lifestyle  Description: Describe effect of type, amount and timing of food on blood glucose; list 3 methods for planning meals. Outcome: Progressing Towards Goal  Goal: *Incorporating physical activity into lifestyle  Description: State effect of exercise on blood glucose levels. Outcome: Progressing Towards Goal  Goal: *Developing strategies to promote health/change behavior  Description: Define the ABC's of diabetes; identify appropriate screenings, schedule and personal plan for screenings. Outcome: Progressing Towards Goal  Goal: *Using medications safely  Description: State effect of diabetes medications on diabetes; name diabetes medication taking, action and side effects. Outcome: Progressing Towards Goal  Goal: *Monitoring blood glucose, interpreting and using results  Description: Identify recommended blood glucose targets  and personal targets. Outcome: Progressing Towards Goal  Goal: *Prevention, detection, treatment of acute complications  Description: List symptoms of hyper- and hypoglycemia; describe how to treat low blood sugar and actions for lowering  high blood glucose level. Outcome: Progressing Towards Goal  Goal: *Prevention, detection and treatment of chronic complications  Description: Define the natural course of diabetes and describe the relationship of blood glucose levels to long term complications of diabetes.   Outcome: Progressing Towards Goal  Goal: *Developing strategies to address psychosocial issues  Description: Describe feelings about living with diabetes; identify support needed and support network  Outcome: Progressing Towards Goal  Goal: *Insulin pump training  Outcome: Progressing Towards Goal  Goal: *Sick day guidelines  Outcome: Progressing Towards Goal  Goal: *Patient Specific Goal (EDIT GOAL, INSERT TEXT)  Outcome: Progressing Towards Goal     Problem: Patient Education: Go to Patient Education Activity  Goal: Patient/Family Education  Outcome: Progressing Towards Goal     Problem: Falls - Risk of  Goal: *Absence of Falls  Description: Document Karla Paul Fall Risk and appropriate interventions in the flowsheet. Outcome: Progressing Towards Goal  Note: Fall Risk Interventions:  Mobility Interventions: Patient to call before getting OOB    Mentation Interventions: Adequate sleep, hydration, pain control, Door open when patient unattended    Medication Interventions: Patient to call before getting OOB, Teach patient to arise slowly    Elimination Interventions: Patient to call for help with toileting needs    History of Falls Interventions: Bed/chair exit alarm         Problem: Patient Education: Go to Patient Education Activity  Goal: Patient/Family Education  Outcome: Progressing Towards Goal     Problem: Pressure Injury - Risk of  Goal: *Prevention of pressure injury  Description: Document Julio Cesar Scale and appropriate interventions in the flowsheet.   Outcome: Progressing Towards Goal  Note: Pressure Injury Interventions:  Sensory Interventions: Assess changes in LOC    Moisture Interventions: Absorbent underpads    Activity Interventions: Chair cushion, Increase time out of bed    Mobility Interventions: Assess need for specialty bed, Chair cushion    Nutrition Interventions: Document food/fluid/supplement intake    Friction and Shear Interventions: Apply protective barrier, creams and emollients                Problem: Patient Education: Go to Patient Education Activity  Goal: Patient/Family Education  Outcome: Progressing Towards Goal     Problem: Patient Education: Go to Patient Education Activity  Goal: Patient/Family Education  Outcome: Progressing Towards Goal     Problem: Patient Education: Go to Patient Education Activity  Goal: Patient/Family Education  Outcome: Progressing Towards Goal     Problem: Cellulitis Care Plan (Adult)  Goal: *Control of acute pain  Outcome: Progressing Towards Goal  Goal: *Skin integrity maintained  Outcome: Progressing Towards Goal  Goal: *Absence of infection signs and symptoms  Outcome: Progressing Towards Goal     Problem: Patient Education: Go to Patient Education Activity  Goal: Patient/Family Education  Outcome: Progressing Towards Goal

## 2021-07-26 NOTE — PROGRESS NOTES
PICC Placement Note    PRE-PROCEDURE VERIFICATION  Correct Procedure: yes. Time out completed with assistant Aydee Sanon RN, VAT, and all persons present in agreement with time out. Correct Site:  yes  Temperature: Temp: 98 °F (36.7 °C), Temperature Source: Temp Source: Oral  Recent Labs     07/26/21  0601   BUN 35*   CREA 1.69*      WBC 9.4     Allergies: Patient has no known allergies. Education materials for [facility] given to patient or family. PROCEDURE DETAIL  Time out completed. All persons present in agreement with time out. A single lumen PICC line was started for long term antibiotics. The following documentation is in addition to the PICC properties in the lines/airways flowsheet :  Lot #: [id number]  xylocaine used: yes  Mid-Arm Circumference: 38 (cm)  Internal Catheter Length: 44 (cm)  Catheter Total Length: 44 (cm)  Vein Selection for PICC: Right basilic  Central Line Bundle followed yes  Complication Related to Insertion: none  Both the insertion guidewire and sherlock guidewire were removed intact all ports have positive blood return and were flushed well with normal saline. The placement was verified by X-ray: yes. \"Portable chest x-ray.     CLINICAL INDICATION: Status post right-sided PICC catheter placement.     FINDINGS: Single AP view the chest compared to similar exam dated 7/26/2021  shows interval placement of a right-sided PICC catheter with its terminus at the  cava atrial junction.  No pneumothorax noted.     IMPRESSION  Status post placement of a right-sided PICC catheter in satisfactory  position without obvious complication\"    Line is okay to use: yes    Diego Banegas RN, PCCN, VAT

## 2021-07-26 NOTE — PROGRESS NOTES
Hospitalist Progress Note     Name:  Jose Land  Age:76 y.o. Sex:male   :  1945    MRN:  [id number]   Admit Date:  2021    Presenting Complaint: Foot Pain    Initial Admission Diagnosis: Cellulitis [L03.90]  Atrial fibrillation with RVR (HCC) [I48.91]  Suspected CHF (congestive heart failure) [R09.89]     Hospital Course/Interval history:   78 y/o M with PMH of DM type II, HLD, HTN that presented with R foot wound and swelling found to be on Afib RVR and acute on ckd  Pt on oxygen , cxr atelectasis vs infiltrate. On heparin drip for afib. Cardiology follwoing          Subjective (21):  2021  Says doing ok  Daughter at bedside  On 3.5 lit/min  Wound care evaluated pt.    2021  Says doing ok  Orthopedic evaluated pt today  No intervention at this point. May need amputation later on. MRI rt foot pending    2021  Mild disinhibition  Appears frustated, says only person who listens to him is his daughter  Florencio Simpson that his his oxygen was 30% last night and he unhooked the bipap. Spoke to daughter this am, said on her way    2021  Sitting in the chair, family at the bedside. On 3 days of oxygen nasal cannula. Says breathing okay. Did discuss with Ortho team regarding MRI right foot findings. Recommended amputation. Patient not ready for amputation at this point, would want to follow-up with the foot and ankle physician as an outpatient. Repeat blood cultures ordered on 2021, no growth till now.    2021  Wife and daughter at the bedside, complaining of decreased sleep, patient awake alert oriented x3, on 3 L/min nasal cannula, improving creatinine. 2021  Wife and daughter at the bedside  Said patient did sleep until 4 AM today. Wife says patient was bit confused, felt that the whole place was robbed and the place has changed. Patient presently sleeping, arousable awake alert, not confused.       Assessment & Plan    Acute hypoxic and hypercapnic resp failure- was on bipap, presently on oxygen nasal cannula  Pulmonary consulted  7/24/2021  Presently on 3 li oxygen nasal cannula  7/26/2021-on 3 L of oxygen nasal cannula  Patient used to CPAP yesterday afternoon, family family felt patient was bit confused after being on CPAP. Explained family that it could be more than carbon dioxide causing him to have problems with confusion. May need a psychiatric evaluation as an outpatient if the symptoms persist.    Acute metabolic encephalopathy- prob sec to hypercapnia. Will r/o any intracranial etiology as pt is on heparin drip. Will order ct head  6/82/1975  Metabolic encephalopathy probably secondary to hypercapnia, resolved. CT head no acute findings  7/25/2021-resolved  7/26/2021-episode of confusion this morning, resolved at the time of my examination. Right foot wound infection with surrounding cellulitis that failed outpatient antibiotic therapy. Charcot deformity of foot. - US negative for DVT  - Continue vancomycin  - Start ceftriaxone  - Follow wound cultures  - Wound care consult  7/21/2021- Wound lateral aspect of rt foot  Ordered MRI rt foot  Staph aureus bacteremia  7/22/2021- MRI rt foot pending  7/23/2021- done by results pending  7/24/2021-MRI findings are reviewed by orthopedic physician, recommending amputation, patient not ready for amputation, would want to follow-up with foot ankle physician as an outpatient. Continue cefazolin for staph aureus bacteremia, repeat blood cultures done on 7/23/2021      New onset Afib RVR  - Continue metoprolol  - Continue heparin gtt  - Telemetry monitoring   - Cardiology recs  7/22/2021- cont heparin for now until MRI rt foot  7/23/2021- afib rate controlled on heparin. Still in afib  7/24/2021-patient on heparin drip, with no intervention planned by orthopedic, will DC heparin drip and start patient on Eliquis.   7/25/2021-continue Eliquis      TAMMY likely prerenal   - Avoid nephrotoxic agents  - Monitor wagner function   7/21/2021- added NS 50 cc/hr  7/22/2021- did get lasix by ID yesterday. Fluids held  7/23/2021- improving creatinine  7/24/2021-mild increase in creatinine. 7/25/2021-improved creatinine  7/26/2021-creatinine of 1.69, held Lasix      Hypokalemia- replace  7/22/2021- resolved    DM type 2- cont sliding scale insulin      ?HF  - Hold diuretics for now in the setting of AK  - Strict I&Os  - Monitor daily weight  - TTE  - Cardiology recs    7/23/2021- echo ef 30-35%  Ordered a dose of lasix  7/24/2021-with mild increase in creatinine, Lasix held. HTN  - Continue metoprolol  - Hold HCTZ in the setting of TAMMY     Neuropathy- both lower extremities knee down. Cont lyrica    Mild insomnia  Continue melatonin    DVT -  eliquis         Diet:  ADULT DIET Regular; 4 carb choices (60 gm/meal); Low Fat/Low Chol/High Fiber/2 gm Na; 1500 ml  DVT PPx: HEPARIN  Code status: Full Code    Objective:     Patient Vitals for the past 24 hrs:   Temp Pulse Resp BP SpO2   07/26/21 0722 97.8 °F (36.6 °C) (!) 101 18 (!) 140/70 95 %   07/26/21 0406 97.5 °F (36.4 °C) (!) 102 18 121/76 93 %   07/26/21 0400  84      07/26/21 0000  76      07/25/21 2259 98 °F (36.7 °C) 93 18 (!) 102/49 90 %   07/25/21 2000  74      07/25/21 1825 98.3 °F (36.8 °C) 84 20 114/74 94 %   07/25/21 1644     98 %   07/25/21 1628 97.6 °F (36.4 °C) 94 20 105/73 97 %   07/25/21 1600  85      07/25/21 1412     94 %   07/25/21 1118 98.9 °F (37.2 °C) 85 18 136/77 96 %     Oxygen Therapy  O2 Sat (%): 95 % (07/26/21 0722)  Pulse via Oximetry: 90 beats per minute (07/25/21 1412)  O2 Device: Nasal cannula (07/25/21 7359)  O2 Flow Rate (L/min): 3 l/min (07/25/21 1644)    Body mass index is 38.28 kg/m².     Physical Exam:   General:  Awake, alert oriented x3, mild respite distress, on 3 days of oxygen nasal cannula   HEENT- pupils equal reacting to light, neck supple throat normal  Lungs:  Bilateral coarse breath sounds  CV:  Irregularly irregular normal S1 and S2   Abdomen:  Soft, nontender, nondistended, normoactive bowel sounds   Extremities:  No cyanosis clubbing . Rt leg mild swollen-improving, mild erythema  Lower 1/3rd rt leg. Non tender secondary to neuropathy  Neuro:  Nonfocal, A&O x3   Psych:  Calm, cooperative  Data Review:  I have reviewed all labs, meds, and studies from the last 24 hours:    Labs:    Recent Results (from the past 24 hour(s))   GLUCOSE, POC    Collection Time: 07/25/21 11:22 AM   Result Value Ref Range    Glucose (POC) 127 (H) 65 - 100 mg/dL    Performed by [employer] Supportskylar GL 2ours    GLUCOSE, POC    Collection Time: 07/25/21  4:32 PM   Result Value Ref Range    Glucose (POC) 133 (H) 65 - 100 mg/dL    Performed by [employer]ar GL 2ours    GLUCOSE, POC    Collection Time: 07/25/21  8:20 PM   Result Value Ref Range    Glucose (POC) 120 (H) 65 - 100 mg/dL    Performed by Ekaterina    CBC WITH AUTOMATED DIFF    Collection Time: 07/26/21  6:01 AM   Result Value Ref Range    WBC 9.4 4.3 - 11.1 K/uL    RBC 3.89 (L) 4.23 - 5.6 M/uL    HGB 11.5 (L) 13.6 - 17.2 g/dL    HCT 36.8 (L) 41.1 - 50.3 %    MCV 94.6 79.6 - 97.8 FL    MCH 29.6 26.1 - 32.9 PG    MCHC 31.3 (L) 31.4 - 35.0 g/dL    RDW 14.7 (H) 11.9 - 14.6 %    PLATELET 539 601 - 116 K/uL    MPV 10.0 9.4 - 12.3 FL    ABSOLUTE NRBC 0.00 0.0 - 0.2 K/uL    DF AUTOMATED      NEUTROPHILS 65 43 - 78 %    LYMPHOCYTES 20 13 - 44 %    MONOCYTES 10 4.0 - 12.0 %    EOSINOPHILS 3 0.5 - 7.8 %    BASOPHILS 1 0.0 - 2.0 %    IMMATURE GRANULOCYTES 1 0.0 - 5.0 %    ABS. NEUTROPHILS 6.1 1.7 - 8.2 K/UL    ABS. LYMPHOCYTES 1.9 0.5 - 4.6 K/UL    ABS. MONOCYTES 1.0 0.1 - 1.3 K/UL    ABS. EOSINOPHILS 0.3 0.0 - 0.8 K/UL    ABS. BASOPHILS 0.1 0.0 - 0.2 K/UL    ABS. IMM.  GRANS. 0.1 0.0 - 0.5 K/UL   HEPARIN XA UFH    Collection Time: 07/26/21  6:01 AM   Result Value Ref Range    Heparin Xa UFH >1.1 (H) 0.3 - 0.7 IU/mL   METABOLIC PANEL, BASIC    Collection Time: 07/26/21  6:01 AM   Result Value Ref Range Sodium 143 136 - 145 mmol/L    Potassium 4.1 3.5 - 5.1 mmol/L    Chloride 103 98 - 107 mmol/L    CO2 39 (H) 21 - 32 mmol/L    Anion gap 1 (L) 7 - 16 mmol/L    Glucose 109 (H) 65 - 100 mg/dL    BUN 35 (H) 8 - 23 MG/DL    Creatinine 1.69 (H) 0.8 - 1.5 MG/DL    GFR est AA 51 (L) >60 ml/min/1.73m2    GFR est non-AA 42 (L) >60 ml/min/1.73m2    Calcium 9.6 8.3 - 10.4 MG/DL   GLUCOSE, POC    Collection Time: 07/26/21  7:24 AM   Result Value Ref Range    Glucose (POC) 132 (H) 65 - 100 mg/dL    Performed by Marilin Pedro        All Micro Results     Procedure Component Value Units Date/Time    CULTURE, BLOOD [[id number]] Collected: 07/23/21 7189    Order Status: Completed Specimen: Blood Updated: 07/26/21 0715     Special Requests: --        LEFT  HAND       Culture result: NO GROWTH 3 DAYS       CULTURE, BLOOD [[id number]] Collected: 07/23/21 2121    Order Status: Completed Specimen: Blood Updated: 07/26/21 0715     Special Requests: --        RIGHT  HAND       Culture result: NO GROWTH 3 DAYS       CULTURE, RESPIRATORY/SPUTUM/BRONCH Jessenia Men STAIN [[id number]] Collected: 07/23/21 1215    Order Status: Canceled Specimen: Sputum     CULTURE, WOUND Jessenia Men STAIN [[id number]]  (Abnormal)  (Susceptibility) Collected: 07/19/21 1445    Order Status: Completed Specimen: Wound from Foot Updated: 07/22/21 0806     Special Requests: NO SPECIAL REQUESTS        GRAM STAIN 25 TO 55 WBC'S/OIF      FEW GRAM POSITIVE COCCI        Culture result:       MODERATE STAPHYLOCOCCUS AUREUS          BLOOD CULTURE [[id number]]  (Abnormal) Collected: 07/19/21 1211    Order Status: Completed Specimen: Blood Updated: 07/22/21 0712     Special Requests: --        RIGHT  FOREARM       GRAM STAIN       GRAM POS COCCI IN CLUSTERS                  AEROBIC AND ANAEROBIC BOTTLES                  CRITICAL RESULT NOT CALLED DUE TO PREVIOUS NOTIFICATION OF CRITICAL RESULT WITHIN THE LAST 24 HOURS.            Culture result: STAPHYLOCOCCUS AUREUS               For Susceptibility Refer to Culture  Accession [id number]      BLOOD CULTURE [[id number]]  (Abnormal)  (Susceptibility) Collected: 07/19/21 1054    Order Status: Completed Specimen: Blood Updated: 07/22/21 0711     Special Requests: --        RIGHT  HAND       GRAM STAIN       GRAM POS COCCI IN CLUSTERS                  AEROBIC AND ANAEROBIC BOTTLES                  RESULTS VERIFIED, PHONED TO AND READ BACK BY Lior Khan AT 8695 ON 7/20/21, [id number]           Culture result: STAPHYLOCOCCUS AUREUS               Refer to Blood Culture ID Panel Accession  [id number]      BLOOD CULTURE ID PANEL [[id number]]  (Abnormal) Collected: 07/19/21 1054    Order Status: Completed Specimen: Blood Updated: 07/20/21 0442     Acc. no. from Micro Order [id number]     Staphylococcus Detected        Comment: RESULTS VERIFIED, PHONED TO AND READ BACK BY  Lior Khan AT 2242 ON 7/20/21, LANI          Staphylococcus aureus Detected        Comment: RESULTS VERIFIED, PHONED TO AND READ BACK BY  Lior Khan AT 1026 ON 7/20/21, LANI          mecA (Methicillin-Resistance Genes) NOT DETECTED        INTERPRETATION       Gram positive cocci in clusters, identified in realtime PCR as probable MSSA. Comment: Recommend discontinuing IV vancomycin starting cefazolin or nafcillin if patient not on beta-lactam therapy. Infectious Diseases Consult recommended in adult patients. THIS TEST DOES NOT REPLACE SENSITIVITY TESTING. EKG Results     Procedure 720 Value Units Date/Time    EKG, 12 LEAD, INITIAL [[id number]] Collected: 07/19/21 1100    Order Status: Completed Updated: 07/19/21 1630     Ventricular Rate 122 BPM      Atrial Rate 147 BPM      QRS Duration 132 ms      Q-T Interval 300 ms      QTC Calculation (Bezet) 427 ms      Calculated R Axis -36 degrees      Calculated T Axis 24 degrees      Diagnosis --     !! AGE AND GENDER SPECIFIC ECG ANALYSIS !!   Atrial fibrillation with rapid ventricular response  Left axis deviation  Right bundle branch block  Cannot rule out Anteroseptal infarct , age undetermined  Abnormal ECG  No previous ECGs available  Confirmed by Vivienne Pineda MD ()[email or web address] ([id number]) on 7/19/2021 4:29:33 PM            Other Studies:  XR CHEST SNGL V    Result Date: 7/26/2021  EXAM: Chest x-ray. INDICATION: Dyspnea. COMPARISON: Prior chest x-ray on July 23, 2021. TECHNIQUE: Frontal view chest x-ray. FINDINGS: Cardiomegaly and minimal right lung base atelectasis are unchanged. No pneumothorax or effusion is seen. Unchanged cardiomegaly and right basilar atelectasis.       Current Meds:   Current Facility-Administered Medications   Medication Dose Route Frequency    melatonin tablet 5 mg  5 mg Oral QHS    apixaban (ELIQUIS) tablet 5 mg  5 mg Oral BID    metoprolol succinate (TOPROL-XL) XL tablet 25 mg  25 mg Oral BID    ceFAZolin (ANCEF) 2 g/20 mL in sterile water IV syringe  2 g IntraVENous Q8H    [Held by provider] 0.9% sodium chloride infusion  50 mL/hr IntraVENous CONTINUOUS    sodium hypochlorite (QUARTER STRENGTH DAKIN'S) 0.125% irrigation (bottle)   Topical DAILY    atorvastatin (LIPITOR) tablet 40 mg  40 mg Oral QHS    sodium chloride (NS) flush 5-10 mL  5-10 mL IntraVENous Q8H    sodium chloride (NS) flush 5-10 mL  5-10 mL IntraVENous PRN    insulin lispro (HUMALOG) injection   SubCUTAneous AC&HS    aspirin delayed-release tablet 81 mg  81 mg Oral DAILY    [Held by provider] hydroCHLOROthiazide (HYDRODIURIL) tablet 25 mg  25 mg Oral DAILY    pregabalin (LYRICA) capsule 200 mg  200 mg Oral DAILY    acetaminophen (TYLENOL) tablet 650 mg  650 mg Oral Q6H PRN    Or    acetaminophen (TYLENOL) suppository 650 mg  650 mg Rectal Q6H PRN    polyethylene glycol (MIRALAX) packet 17 g  17 g Oral DAILY PRN    ondansetron (ZOFRAN ODT) tablet 4 mg  4 mg Oral Q8H PRN    Or    ondansetron (ZOFRAN) injection 4 mg  4 mg IntraVENous Q6H PRN    [Held by provider] furosemide (LASIX) tablet 40 mg  40 mg Oral DAILY       Problem List:  Hospital Problems as of 7/26/2021 Date Reviewed: 1/13/2021        Codes Class Noted - Resolved POA    Acute respiratory failure with hypercapnia (HCC) ICD-10-CM: J96.02  ICD-9-CM: 518.81  7/23/2021 - Present Unknown        Acute respiratory failure with hypoxia and hypercapnia (HCC) ICD-10-CM: J96.01, J96.02  ICD-9-CM: 518.81  7/23/2021 - Present Unknown        Acute metabolic encephalopathy ETJ-40-ZH: G93.41  ICD-9-CM: 348.31  7/23/2021 - Present Unknown        Hypoxia ICD-10-CM: R09.02  ICD-9-CM: 799.02  7/21/2021 - Present Unknown        Acute kidney injury superimposed on CKD ([facility] ICD-10-CM: N17.9, N18.9  ICD-9-CM: 866.00, 585.9  7/21/2021 - Present Unknown        Staphylococcus aureus bacteremia ICD-10-CM: R78.81, B95.61  ICD-9-CM: 790.7, 041.11  7/21/2021 - Present Unknown        Cellulitis ICD-10-CM: L03.90  ICD-9-CM: 682.9  7/19/2021 - Present Unknown        Suspected CHF (congestive heart failure) ICD-10-CM: R09.89  ICD-9-CM: 785.9  7/19/2021 - Present Unknown        * (Principal) Atrial fibrillation with RVR ([facility] ICD-10-CM: I48.91  ICD-9-CM: 427.31  7/19/2021 - Present Unknown        Controlled type 2 diabetes mellitus with diabetic polyneuropathy, without long-term current use of insulin (HCC) (Chronic) ICD-10-CM: E11.42  ICD-9-CM: 250.60, 357.2  1/5/2018 - Present Yes        Stage 3 chronic kidney disease ([facility] (Chronic) ICD-10-CM: N18.30  ICD-9-CM: 585.3  11/22/2017 - Present Yes        CAD (coronary artery disease) (Chronic) ICD-10-CM: I25.10  ICD-9-CM: 414.00  Unknown - Present Yes                   Signed By: Monique Welch MD   [facility] Hospitalist Service    July 26, 2021

## 2021-07-26 NOTE — PROGRESS NOTES
Hourly rounds performed throughout shift, pt denies needs at this time. Pt is alert and oriented x 4. Bed in low position, locked and call light/personal items within reach. Will continue to monitor and report to shift nurse.

## 2021-07-27 VITALS
BODY MASS INDEX: 38.56 KG/M2 | SYSTOLIC BLOOD PRESSURE: 100 MMHG | OXYGEN SATURATION: 96 % | WEIGHT: 275.4 LBS | DIASTOLIC BLOOD PRESSURE: 48 MMHG | HEART RATE: 80 BPM | HEIGHT: 71 IN | RESPIRATION RATE: 18 BRPM | TEMPERATURE: 98.3 F

## 2021-07-27 LAB
ARTERIAL PATENCY WRIST A: POSITIVE
BASE EXCESS BLD CALC-SCNC: 12.4 MMOL/L
BASOPHILS # BLD: 0.1 K/UL (ref 0–0.2)
BASOPHILS NFR BLD: 1 % (ref 0–2)
BDY SITE: ABNORMAL
DIFFERENTIAL METHOD BLD: ABNORMAL
EOSINOPHIL # BLD: 0.3 K/UL (ref 0–0.8)
EOSINOPHIL NFR BLD: 3 % (ref 0.5–7.8)
ERYTHROCYTE [DISTWIDTH] IN BLOOD BY AUTOMATED COUNT: 15 % (ref 11.9–14.6)
GAS FLOW.O2 O2 DELIVERY SYS: ABNORMAL L/MIN
GLUCOSE BLD STRIP.AUTO-MCNC: 103 MG/DL (ref 65–100)
GLUCOSE BLD STRIP.AUTO-MCNC: 115 MG/DL (ref 65–100)
GLUCOSE BLD STRIP.AUTO-MCNC: 99 MG/DL (ref 65–100)
HCO3 BLD-SCNC: 39.2 MMOL/L (ref 22–26)
HCT VFR BLD AUTO: 37.1 % (ref 41.1–50.3)
HGB BLD-MCNC: 11.3 G/DL (ref 13.6–17.2)
IMM GRANULOCYTES # BLD AUTO: 0.1 K/UL (ref 0–0.5)
IMM GRANULOCYTES NFR BLD AUTO: 1 % (ref 0–5)
LYMPHOCYTES # BLD: 2 K/UL (ref 0.5–4.6)
LYMPHOCYTES NFR BLD: 26 % (ref 13–44)
MCH RBC QN AUTO: 29.2 PG (ref 26.1–32.9)
MCHC RBC AUTO-ENTMCNC: 30.5 G/DL (ref 31.4–35)
MCV RBC AUTO: 95.9 FL (ref 79.6–97.8)
MONOCYTES # BLD: 0.8 K/UL (ref 0.1–1.3)
MONOCYTES NFR BLD: 10 % (ref 4–12)
NEUTS SEG # BLD: 4.5 K/UL (ref 1.7–8.2)
NEUTS SEG NFR BLD: 59 % (ref 43–78)
NRBC # BLD: 0 K/UL (ref 0–0.2)
O2/TOTAL GAS SETTING VFR VENT: 28 %
PCO2 BLD: 59.5 MMHG (ref 35–45)
PH BLD: 7.43 [PH] (ref 7.35–7.45)
PLATELET # BLD AUTO: 345 K/UL (ref 150–450)
PMV BLD AUTO: 9.8 FL (ref 9.4–12.3)
PO2 BLD: 77 MMHG (ref 75–100)
RBC # BLD AUTO: 3.87 M/UL (ref 4.23–5.6)
SAO2 % BLD: 95 % (ref 95–98)
SERVICE CMNT-IMP: ABNORMAL
SERVICE CMNT-IMP: NORMAL
SPECIMEN TYPE: ABNORMAL
WBC # BLD AUTO: 7.7 K/UL (ref 4.3–11.1)

## 2021-07-27 PROCEDURE — 94760 N-INVAS EAR/PLS OXIMETRY 1: CPT

## 2021-07-27 PROCEDURE — 82962 GLUCOSE BLOOD TEST: CPT

## 2021-07-27 PROCEDURE — 74011250637 HC RX REV CODE- 250/637: Performed by: INTERNAL MEDICINE

## 2021-07-27 PROCEDURE — 74011250636 HC RX REV CODE- 250/636: Performed by: NURSE PRACTITIONER

## 2021-07-27 PROCEDURE — 85025 COMPLETE CBC W/AUTO DIFF WBC: CPT

## 2021-07-27 PROCEDURE — 74011000250 HC RX REV CODE- 250: Performed by: NURSE PRACTITIONER

## 2021-07-27 PROCEDURE — 74011250637 HC RX REV CODE- 250/637: Performed by: FAMILY MEDICINE

## 2021-07-27 PROCEDURE — 82803 BLOOD GASES ANY COMBINATION: CPT

## 2021-07-27 PROCEDURE — 77010033678 HC OXYGEN DAILY

## 2021-07-27 PROCEDURE — 36600 WITHDRAWAL OF ARTERIAL BLOOD: CPT

## 2021-07-27 PROCEDURE — 97110 THERAPEUTIC EXERCISES: CPT

## 2021-07-27 PROCEDURE — 99232 SBSQ HOSP IP/OBS MODERATE 35: CPT | Performed by: INTERNAL MEDICINE

## 2021-07-27 RX ORDER — GLIPIZIDE 5 MG/1
2.5 TABLET ORAL DAILY
Qty: 30 TABLET | Refills: 0 | Status: SHIPPED | OUTPATIENT
Start: 2021-07-27 | End: 2021-09-15

## 2021-07-27 RX ORDER — METOPROLOL SUCCINATE 25 MG/1
25 TABLET, EXTENDED RELEASE ORAL 2 TIMES DAILY
Qty: 60 TABLET | Refills: 0 | Status: SHIPPED | OUTPATIENT
Start: 2021-07-27 | End: 2021-09-15

## 2021-07-27 RX ORDER — FUROSEMIDE 20 MG/1
20 TABLET ORAL AS NEEDED
Qty: 30 TABLET | Refills: 0 | Status: SHIPPED | OUTPATIENT
Start: 2021-07-27 | End: 2021-09-15

## 2021-07-27 RX ADMIN — Medication 10 ML: at 14:39

## 2021-07-27 RX ADMIN — APIXABAN 5 MG: 5 TABLET, FILM COATED ORAL at 08:50

## 2021-07-27 RX ADMIN — CEFAZOLIN SODIUM 2 G: 100 INJECTION, POWDER, LYOPHILIZED, FOR SOLUTION INTRAVENOUS at 05:22

## 2021-07-27 RX ADMIN — METOPROLOL SUCCINATE 25 MG: 25 TABLET, EXTENDED RELEASE ORAL at 08:50

## 2021-07-27 RX ADMIN — CEFAZOLIN SODIUM 2 G: 100 INJECTION, POWDER, LYOPHILIZED, FOR SOLUTION INTRAVENOUS at 14:39

## 2021-07-27 RX ADMIN — ASPIRIN 81 MG: 81 TABLET ORAL at 08:50

## 2021-07-27 RX ADMIN — METOPROLOL SUCCINATE 25 MG: 25 TABLET, EXTENDED RELEASE ORAL at 16:37

## 2021-07-27 RX ADMIN — PREGABALIN 200 MG: 100 CAPSULE ORAL at 08:50

## 2021-07-27 RX ADMIN — Medication 10 ML: at 05:21

## 2021-07-27 RX ADMIN — SODIUM HYPOCHLORITE: 1.25 SOLUTION TOPICAL at 08:49

## 2021-07-27 NOTE — PROGRESS NOTES
Pt woke up and threw the CPAP on floor was confused and did not know what happen. Wife stated he woke and just threw it off.pt had difficulty when I tried to replace and seemed to start panicking so I left it off for his comfort and put nasal cannula back on at 2L. He was visible upset and confused.

## 2021-07-27 NOTE — PROGRESS NOTES
Problem: Diabetes Self-Management  Goal: *Disease process and treatment process  Description: Define diabetes and identify own type of diabetes; list 3 options for treating diabetes. Outcome: Progressing Towards Goal  Goal: *Incorporating nutritional management into lifestyle  Description: Describe effect of type, amount and timing of food on blood glucose; list 3 methods for planning meals. Outcome: Progressing Towards Goal  Goal: *Incorporating physical activity into lifestyle  Description: State effect of exercise on blood glucose levels. Outcome: Progressing Towards Goal  Goal: *Developing strategies to promote health/change behavior  Description: Define the ABC's of diabetes; identify appropriate screenings, schedule and personal plan for screenings. Outcome: Progressing Towards Goal  Goal: *Using medications safely  Description: State effect of diabetes medications on diabetes; name diabetes medication taking, action and side effects. Outcome: Progressing Towards Goal  Goal: *Monitoring blood glucose, interpreting and using results  Description: Identify recommended blood glucose targets  and personal targets. Outcome: Progressing Towards Goal  Goal: *Prevention, detection, treatment of acute complications  Description: List symptoms of hyper- and hypoglycemia; describe how to treat low blood sugar and actions for lowering  high blood glucose level. Outcome: Progressing Towards Goal  Goal: *Prevention, detection and treatment of chronic complications  Description: Define the natural course of diabetes and describe the relationship of blood glucose levels to long term complications of diabetes.   Outcome: Progressing Towards Goal  Goal: *Developing strategies to address psychosocial issues  Description: Describe feelings about living with diabetes; identify support needed and support network  Outcome: Progressing Towards Goal  Goal: *Insulin pump training  Outcome: Progressing Towards Goal  Goal: *Sick day guidelines  Outcome: Progressing Towards Goal  Goal: *Patient Specific Goal (EDIT GOAL, INSERT TEXT)  Outcome: Progressing Towards Goal     Problem: Falls - Risk of  Goal: *Absence of Falls  Description: Document Beronica Fall Risk and appropriate interventions in the flowsheet. Outcome: Progressing Towards Goal  Note: Fall Risk Interventions:  Mobility Interventions: Patient to call before getting OOB    Mentation Interventions: Adequate sleep, hydration, pain control, Increase mobility    Medication Interventions: Patient to call before getting OOB, Teach patient to arise slowly    Elimination Interventions: Call light in reach    History of Falls Interventions: Investigate reason for fall, Bed/chair exit alarm         Problem: Pressure Injury - Risk of  Goal: *Prevention of pressure injury  Description: Document Julio Cesar Scale and appropriate interventions in the flowsheet.   Outcome: Progressing Towards Goal  Note: Pressure Injury Interventions:  Sensory Interventions: Assess changes in LOC    Moisture Interventions: Absorbent underpads    Activity Interventions: Increase time out of bed, PT/OT evaluation    Mobility Interventions: PT/OT evaluation    Nutrition Interventions: Document food/fluid/supplement intake    Friction and Shear Interventions: Apply protective barrier, creams and emollients                Problem: Cellulitis Care Plan (Adult)  Goal: *Control of acute pain  Outcome: Progressing Towards Goal  Goal: *Skin integrity maintained  Outcome: Progressing Towards Goal  Goal: *Absence of infection signs and symptoms  Outcome: Progressing Towards Goal

## 2021-07-27 NOTE — DISCHARGE SUMMARY
Hospitalist Discharge Summary     Admit Date:  2021 10:53 AM   Name:  Rosalba Resendiz   Age:  77 y.o.  :  1945   MRN:  [id number]   PCP:  Keena Huertas MD  Treatment Team: Attending Provider: Tone Hoffman MD; Consulting Provider: Andres Cordova MD; Care Manager: Tuyet Winston; Utilization Review: Cosme Montez, RN; Utilization Review: Jag Ni RN; Consulting Provider: Sisi Soliz MD; Consulting Provider: Altaf Guardado MD; Utilization Review: Carmen Acosta, GLENIS; Hospitalist: Tone Hoffman MD; Consulting Provider: Vinita Dominguez MD; Primary Nurse: Vladimir Barr RN; Occupational Therapy Assistant: Mariah Carvalho;  Therapy Student: BEATRIZ Santamaria    Problem List for this Hospitalization:  Hospital Problems as of 2021 Date Reviewed: 2021        Codes Class Noted - Resolved POA    Acute respiratory failure with hypercapnia ([facility] ICD-10-CM: J96.02  ICD-9-CM: 518.81  2021 - Present Unknown        Acute respiratory failure with hypoxia and hypercapnia (HCC) ICD-10-CM: J96.01, J96.02  ICD-9-CM: 518.81  2021 - Present Unknown        Acute metabolic encephalopathy ZFB-[id number]: G93.41  ICD-9-CM: 348.31  2021 - Present Unknown        Hypoxia ICD-10-CM: R09.02  ICD-9-CM: 799.02  2021 - Present Unknown        Acute kidney injury superimposed on CKD ([facility] ICD-10-CM: N17.9, N18.9  ICD-9-CM: 866.00, 585.9  2021 - Present Unknown        Staphylococcus aureus bacteremia ICD-10-CM: R78.81, B95.61  ICD-9-CM: 790.7, 041.11  2021 - Present Unknown        Cellulitis ICD-10-CM: L03.90  ICD-9-CM: 682.9  2021 - Present Unknown        Suspected CHF (congestive heart failure) ICD-10-CM: R09.89  ICD-9-CM: 785.9  2021 - Present Unknown        * (Principal) Atrial fibrillation with RVR (HCC) ICD-10-CM: I48.91  ICD-9-CM: 427.31  2021 - Present Unknown        Controlled type 2 diabetes mellitus with diabetic polyneuropathy, without long-term current use of insulin (HCC) (Chronic) ICD-10-CM: E11.42  ICD-9-CM: 250.60, 357.2  1/5/2018 - Present Yes        Stage 3 chronic kidney disease ([facility] (Chronic) ICD-10-CM: N18.30  ICD-9-CM: 585.3  11/22/2017 - Present Yes        CAD (coronary artery disease) (Chronic) ICD-10-CM: I25.10  ICD-9-CM: 414.00  Unknown - Present Yes                Admission HPI from 7/19/2021:    Patient is a 77 y.o. male who presented to the ED from his podiatrist office for right foot cellulitis and edema. Has been on Clindamycin for the past 4 days without improvement. Hx of DM type II, HLD, HTN. Found to be tachcyardic with new onset a fib RVR and fluid overload on chest x ray. Received cardizem bolus and now HR controlled.      Vitals - .      Labs- WBC 19.9, creatine 2.34 from baseline 1.6. Total bili 1.6, pro BNP 1,279. Troponin 31.9.      Minimal right basilar atelectasis or pneumonia. Hospital Course:  Pt was admitted for     New afib with rvr- was given cardizem in er. At discharge on metoprolol  Heparin drip changed to Eliquis. Systolic chf - echo ef of 30-35%. Did get few doses of lasix , held secondary to worsening creatinine. Discharge on lasix, take only if weight increase greater then 2lb/day or 5 lb/week. Other chf medications could not be added secondary to pts low noraml bp. Pt will follow up cardiology as out pt. Staph bacteremia/ left foot wound/ charcot foot. ID and Ortho consulted during the stay. Ortho recommended amputation which pt refused. Recommended to f/u with foot and ankle surgeon as out pt. ID - PICC line rt  Arm. · ID OPAT Orders:  · Ancef 2 gm IV Q 8 hrs X 6 weeks with EOT 9/3/21  · Routine PICC care  · Q Monday CBC with diff, ESR, CRP, Scr, and LFT's  · Please fax results to [phone]  · ID office follow-up will be scheduled for mid and EOT; TBA    Acute hypoxic and hypercapnic resp failure   Pt was placed in bipap. Pulmonary was consulted.   Pt was on cpap intermittently, would become confused after using it. Pulmonary recommended sleep study as out pt. Going home on oxygen 2 lit/min a rest and 3 lit/min on ambulation. Doesn't ambulate much- home health ordered. Acute on ckd- improving. At discharge creatinine of 1.69, gfr of 42- puts pt in ckd 3. DM type 2- on metformin at home. On insulin during the stay. Didnt require much insulin. A1c 6.7  Discharged on glipizide    Bilateral lower extremity neuropathy- below knee. Pt is clinically stable for discharge. Follow up instructions below. Plan was discussed with pt and daughter. All questions answered. Patient was stable at time of discharge and was instructed to call or return if there are any concerns or recurrence of symptoms. Diagnostic Imaging/Tests:   XR FOOT RT MIN 3 V    Result Date: 7/19/2021  EXAM:  XR FOOT RT MIN 3 V INDICATION:   wound COMPARISON:  None. FINDINGS:  3 views of the right foot demonstrate severe hammertoe deformity. Diffuse soft tissue swelling. Total destruction of the talus with abnormal navicular bone and anterior calcaneus. No radiographic evidence of osteomyelitis. .      No radiographic evidence of osteomyelitis. Diffuse soft tissue swelling. Severe hammertoe deformity. Total destruction of the talus with markedly abnormal hindfoot joints. XR CHEST PORT    Result Date: 7/19/2021  EXAM: XR CHEST PORT INDICATION: New onset A. fib with RVR with hypoxia COMPARISON: None. FINDINGS: A portable AP radiograph of the chest was obtained at 1252 hours. The patient is on a cardiac monitor. Minimal right basilar airspace disease. . The cardiac and mediastinal contours and pulmonary vascularity are normal.  The bones and soft tissues are grossly within normal limits. Minimal right basilar atelectasis or pneumonia.     DUPLEX LOWER EXT VENOUS RIGHT    Result Date: 7/19/2021  RIGHT LOWER EXTREMITY DOPPLER ULTRASOUND 7/19/2021  HISTORY:    edema to right leg with wound. . DVT? Technique: Sonographic imaging of the deep veins of the right leg was performed FINDINGS:    The common femoral vein, femoral vein, popliteal vein, posterior tibial veins and peroneal veins compress appropriately. There is normal color Doppler flow within these vessels. There is normal phasic, variable pulse wave Doppler flow from the popliteal vein to the common femoral vein. No DVT in the deep veins of the right leg. Echocardiogram results:  No results found for this visit on 07/19/21. All Micro Results     Procedure Component Value Units Date/Time    CULTURE, BLOOD [[id number]] Collected: 07/23/21 0627    Order Status: Completed Specimen: Blood Updated: 07/27/21 0654     Special Requests: --        LEFT  HAND       Culture result: NO GROWTH 4 DAYS       CULTURE, BLOOD [[id number]] Collected: 07/23/21 0633    Order Status: Completed Specimen: Blood Updated: 07/27/21 0654     Special Requests: --        RIGHT  HAND       Culture result: NO GROWTH 4 DAYS       CULTURE, RESPIRATORY/SPUTUM/BRONCH Derek Sensing STAIN [[id number]] Collected: 07/23/21 1215    Order Status: Canceled Specimen: Sputum     CULTURE, Miller Current STAIN [[id number]]  (Abnormal)  (Susceptibility) Collected: 07/19/21 1445    Order Status: Completed Specimen: Wound from Foot Updated: 07/22/21 0806     Special Requests: NO SPECIAL REQUESTS        GRAM STAIN 25 TO 55 WBC'S/OIF      FEW GRAM POSITIVE COCCI        Culture result:       MODERATE STAPHYLOCOCCUS AUREUS          BLOOD CULTURE [[id number]]  (Abnormal) Collected: 07/19/21 1211    Order Status: Completed Specimen: Blood Updated: 07/22/21 0712     Special Requests: --        RIGHT  FOREARM       GRAM STAIN       GRAM POS COCCI IN CLUSTERS                  AEROBIC AND ANAEROBIC BOTTLES                  CRITICAL RESULT NOT CALLED DUE TO PREVIOUS NOTIFICATION OF CRITICAL RESULT WITHIN THE LAST 24 HOURS.            Culture result: STAPHYLOCOCCUS AUREUS               For Susceptibility Refer to Culture  Accession [id number]      BLOOD CULTURE [[id number]]  (Abnormal)  (Susceptibility) Collected: 07/19/21 1054    Order Status: Completed Specimen: Blood Updated: 07/22/21 0711     Special Requests: --        RIGHT  HAND       GRAM STAIN       GRAM POS COCCI IN CLUSTERS                  AEROBIC AND ANAEROBIC BOTTLES                  RESULTS VERIFIED, PHONED TO AND READ BACK BY Diane Michel AT 1082 ON 7/20/21, [id number]           Culture result: STAPHYLOCOCCUS AUREUS               Refer to Blood Culture ID Panel Accession  [id number]      BLOOD CULTURE ID PANEL [[id number]]  (Abnormal) Collected: 07/19/21 1054    Order Status: Completed Specimen: Blood Updated: 07/20/21 0442     Acc. no. from Micro Order [id number]     Staphylococcus Detected        Comment: RESULTS VERIFIED, PHONED TO AND READ BACK BY  Diane Michel AT 1976 ON 7/20/21, LANI          Staphylococcus aureus Detected        Comment: RESULTS VERIFIED, PHONED TO AND READ BACK BY  Diane Michel AT 8287 ON 7/20/21, LANI          mecA (Methicillin-Resistance Genes) NOT DETECTED        INTERPRETATION       Gram positive cocci in clusters, identified in realtime PCR as probable MSSA. Comment: Recommend discontinuing IV vancomycin starting cefazolin or nafcillin if patient not on beta-lactam therapy. Infectious Diseases Consult recommended in adult patients. THIS TEST DOES NOT REPLACE SENSITIVITY TESTING.              Labs: Results:       BMP, Mg, Phos Recent Labs     07/26/21  0601 07/25/21  0633    142   K 4.1 4.0    101   CO2 39* 38*   AGAP 1* 3*   BUN 35* 33*   CREA 1.69* 1.65*   CA 9.6 9.5   * 119*      CBC Recent Labs     07/27/21  0625 07/26/21  0601 07/25/21  0633   WBC 7.7 9.4 7.9   RBC 3.87* 3.89* 3.94*   HGB 11.3* 11.5* 11.5*   HCT 37.1* 36.8* 37.4*    376 366   GRANS 59 65 59   LYMPH 26 20 24   EOS 3 3 3   MONOS 10 10 12   BASOS 1 1 1   IG 1 1 1   ANEU 4.5 6.1 4.6   ABL 2.0 1.9 1.9   TIERNEY 0.3 0.3 0.3 ABM 0.8 1.0 1.0   ABB 0.1 0.1 0.1   AIG 0.1 0.1 0.1      LFT No results for input(s): ALT, TBIL, AP, TP, ALB, GLOB, AGRAT in the last 72 hours.     No lab exists for component: SGOT, GPT   Cardiac Testing No results found for: BNPP, BNP, CPK, RCK1, RCK2, RCK3, RCK4, CKMB, CKNDX, CKND1, TROPT, TROIQ   Coagulation Tests Lab Results   Component Value Date/Time    aPTT 31.7 07/19/2021 09:24 PM      A1c Lab Results   Component Value Date/Time    Hemoglobin A1c 6.7 (H) 07/13/2021 08:34 AM    Hemoglobin A1c 6.2 (H) 01/13/2021 08:27 AM    Hemoglobin A1c 6.1 (H) 06/11/2020 09:16 AM      Lipid Panel Lab Results   Component Value Date/Time    Cholesterol, total 156 07/13/2021 08:34 AM    HDL Cholesterol 39 (L) 07/13/2021 08:34 AM    LDL, calculated 83 07/13/2021 08:34 AM    LDL, calculated 83 06/11/2020 09:16 AM    VLDL, calculated 34 07/13/2021 08:34 AM    VLDL, calculated 42 (H) 06/11/2020 09:16 AM    Triglyceride 202 (H) 07/13/2021 08:34 AM      Thyroid Panel Lab Results   Component Value Date/Time    TSH 0.655 07/19/2021 01:46 PM    TSH 2.230 01/05/2018 08:25 AM    TSH 2.090 10/03/2016 09:12 AM        Most Recent UA No results found for: COLOR, APPRN, REFSG, AGNES, PROTU, GLUCU, KETU, BILU, BLDU, UROU, MANE, LEUKU     No Known Allergies  Immunization History   Administered Date(s) Administered    COVID-19, PFIZER, MRNA, LNP-S, PF, 30MCG/0.3ML DOSE 02/27/2021, 03/02/2021    Influenza High Dose Vaccine PF 12/30/2015, 11/04/2016, 11/06/2017, 10/10/2018, 12/15/2020    Influenza Vaccine (Tri) Adjuvanted (>65 Yrs FLUAD TRI 01127) 10/15/2019    Pneumococcal Conjugate (PCV-13) 10/30/2015    Pneumococcal Polysaccharide (PPSV-23) 11/04/2016       All Labs from Last 24 Hrs:  Recent Results (from the past 24 hour(s))   GLUCOSE, POC    Collection Time: 07/26/21  4:19 PM   Result Value Ref Range    Glucose (POC) 109 (H) 65 - 100 mg/dL    Performed by Freida Rajput    GLUCOSE, POC    Collection Time: 07/26/21  8:49 PM   Result Value Ref Range    Glucose (POC) 124 (H) 65 - 100 mg/dL    Performed by Raquel    CBC WITH AUTOMATED DIFF    Collection Time: 07/27/21  6:25 AM   Result Value Ref Range    WBC 7.7 4.3 - 11.1 K/uL    RBC 3.87 (L) 4.23 - 5.6 M/uL    HGB 11.3 (L) 13.6 - 17.2 g/dL    HCT 37.1 (L) 41.1 - 50.3 %    MCV 95.9 79.6 - 97.8 FL    MCH 29.2 26.1 - 32.9 PG    MCHC 30.5 (L) 31.4 - 35.0 g/dL    RDW 15.0 (H) 11.9 - 14.6 %    PLATELET 633 125 - 740 K/uL    MPV 9.8 9.4 - 12.3 FL    ABSOLUTE NRBC 0.00 0.0 - 0.2 K/uL    DF AUTOMATED      NEUTROPHILS 59 43 - 78 %    LYMPHOCYTES 26 13 - 44 %    MONOCYTES 10 4.0 - 12.0 %    EOSINOPHILS 3 0.5 - 7.8 %    BASOPHILS 1 0.0 - 2.0 %    IMMATURE GRANULOCYTES 1 0.0 - 5.0 %    ABS. NEUTROPHILS 4.5 1.7 - 8.2 K/UL    ABS. LYMPHOCYTES 2.0 0.5 - 4.6 K/UL    ABS. MONOCYTES 0.8 0.1 - 1.3 K/UL    ABS. EOSINOPHILS 0.3 0.0 - 0.8 K/UL    ABS. BASOPHILS 0.1 0.0 - 0.2 K/UL    ABS. IMM.  GRANS. 0.1 0.0 - 0.5 K/UL   GLUCOSE, POC    Collection Time: 07/27/21  7:04 AM   Result Value Ref Range    Glucose (POC) 115 (H) 65 - 100 mg/dL    Performed by [facility]    BLOOD GAS, ARTERIAL POC    Collection Time: 07/27/21  9:56 AM   Result Value Ref Range    Device: NASAL CANNULA      FIO2 (POC) 28 %    pH (POC) 7.43 7.35 - 7.45      pCO2 (POC) 59.5 (H) 35 - 45 MMHG    pO2 (POC) 77 75 - 100 MMHG    HCO3 (POC) 39.2 (H) 22 - 26 MMOL/L    sO2 (POC) 95.0 95 - 98 %    Base excess (POC) 12.4 mmol/L    Allens test (POC) Positive      Site LEFT RADIAL      Specimen type (POC) ARTERIAL      Performed by Yady    GLUCOSE, POC    Collection Time: 07/27/21 11:16 AM   Result Value Ref Range    Glucose (POC) 103 (H) 65 - 100 mg/dL    Performed by Slim Contreras        Discharge Exam:  Patient Vitals for the past 24 hrs:   Temp Pulse Resp BP SpO2   07/27/21 1109 98.1 °F (36.7 °C) 78 18 101/63 95 %   07/27/21 0857     92 %   07/27/21 0800  76      07/27/21 0701 98.1 °F (36.7 °C) 95 18 103/60 92 %   07/27/21 0400  81    07/27/21 0344 98.3 °F (36.8 °C) 87 18 115/60 90 %   07/27/21 0000  72      07/26/21 2326     95 %   07/26/21 2251 98 °F (36.7 °C) (!) 101 18 (!) 106/55 94 %   07/26/21 2000  84      07/26/21 1835 98 °F (36.7 °C) 79 20 130/77 95 %   07/26/21 1617 98.3 °F (36.8 °C) 80 18 129/84 94 %     Oxygen Therapy  O2 Sat (%): 95 % (07/27/21 1109)  Pulse via Oximetry: 70 beats per minute (07/27/21 0857)  O2 Device: Nasal cannula (07/27/21 0857)  O2 Flow Rate (L/min): 2 l/min (07/27/21 0857)  FIO2 (%): 35 % (07/26/21 2326)    Intake/Output Summary (Last 24 hours) at 7/27/2021 1350  Last data filed at 7/27/2021 1318  Gross per 24 hour   Intake 840 ml   Output    Net 840 ml       General:    Well nourished. Alert. No distress. on oxygen 2lit/min  Eyes:   Normal sclera. Extraocular movements intact. ENT:  Normocephalic, atraumatic. Moist mucous membranes  CV:   Irregular irregular  No murmur, rub, or gallop. Lungs:  Clear to auscultation bilaterally. No wheezing, rhonchi, or rales. Abdomen: Soft, nontender, nondistended. Bowel sounds normal. obese  Extremities: Warm and dry. No cyanosis or edema. Neurologic: CN II-XII grossly intact. Neuropathy below knee both extremities  Skin:     Wound rt foot lateral aspect. Psych:  Normal mood and affect. Discharge Info:   Current Discharge Medication List      START taking these medications    Details   apixaban (ELIQUIS) 5 mg tablet Take 1 Tablet by mouth two (2) times a day. Qty: 60 Tablet, Refills: 0  Start date: 7/27/2021      metoprolol succinate (TOPROL-XL) 25 mg XL tablet Take 1 Tablet by mouth two (2) times a day. Qty: 60 Tablet, Refills: 0  Start date: 7/27/2021      furosemide (LASIX) 20 mg tablet Take 1 Tablet by mouth as needed for Other (WEIGHT INCREASE GREATER THEN 2LB/DAY OR 5LB/DAY). Qty: 30 Tablet, Refills: 0  Start date: 7/27/2021      glipiZIDE (GLUCOTROL) 5 mg tablet Take 0.5 Tablets by mouth daily.   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 E11.9  Qty: 1 Kit, Refills: 0    Associated Diagnoses: Controlled type 2 diabetes mellitus with complication, without long-term current use of insulin ([facility])      Lancets misc Check blood sugars BID. E11.9  Qty: 100 Each, Refills: 11    Associated Diagnoses: Controlled type 2 diabetes mellitus with complication, without long-term current use of insulin ([facility])      glucose blood VI test strips (BLOOD GLUCOSE TEST) strip Check blood sugars BID. E11.9  Qty: 100 Strip, Refills: 11    Associated Diagnoses: Controlled type 2 diabetes mellitus with complication, without long-term current use of insulin ([facility])      cyanocobalamin 1,000 mcg tablet Take 1 Tab by mouth daily. Qty: 30 Tab, Refills: 5    Associated Diagnoses: Peripheral polyneuropathy; Pernicious anemia      cholecalciferol, VITAMIN D3, (VITAMIN D3) 5,000 unit tab tablet Take  by mouth daily. nystatin (MYCOSTATIN) powder Apply  to affected area three (3) times daily. Qty: 60 g, Refills: 1    Associated Diagnoses: Intertrigo         STOP taking these medications       hydroCHLOROthiazide (HYDRODIURIL) 25 mg tablet Comments:   Reason for Stopping:         metFORMIN ER (GLUCOPHAGE XR) 500 mg tablet Comments:   Reason for Stopping:                 Disposition: Home with home health  Activity: As per physical therapy. Diet: ADULT DIET Regular; 4 carb choices (60 gm/meal);  Low Fat/Low Chol/High Fiber/2 gm Na; 1500 ml    Follow-up Appointments   Procedures    FOLLOW UP VISIT Appointment in: One Week Follow-up with PCP in a week with CBC BMP and BNP. Follow-up with cardiology in 2 weeks. Follow-up with foot and ankle orthopedic surgeon in 1 to 2 weeks. Follow-up with pulmonary in 2 weeks for sleep evalu. .. Follow-up with PCP in a week with CBC BMP and BNP. Follow-up with cardiology in 2 weeks. Follow-up with foot and ankle orthopedic surgeon in 1 to 2 weeks. Follow-up with pulmonary in 2 weeks for sleep evaluation/PSG. If any new joint swelling or persistent gum bleeding or hemoptysis or hematemesis or melena or rectal bleeding- Hold Eliquis and Come back to the emergency room or call primary care physician. Take Lasix only if weight gain more than 2 pounds per day or 5 pounds per week. Hold Lasix if systolic blood pressure less than 100 mmHg. Patient is going on 2 L of oxygen nasal cannula at rest and 3 L on ambulation. ID OPAT Orders: Ancef 2 gm IV Q 8 hrs X 6 weeks with EOT 9/3/21  Routine PICC care  Q Monday CBC with diff, ESR, CRP, Scr, and LFT's  Please fax results to [phone]     Standing Status:   Standing     Number of Occurrences:   1     Order Specific Question:   Appointment in     Answer: One Week         Follow-up Information     Follow up With Specialties Details Why Contact Info    Miguel Pérez MD Family Medicine On 8/4/2021 at [place] [address]  [phone]      Isabel Miller MD Cardiology On 8/23/2021 at 11:30 [address] [facility]      Seymour Aguilar MD Orthopedic Surgery On 8/13/2021 at 9:20 [facility] [address]  [phone]            Time spent in patient discharge planning and coordination 35 minutes.     Signed:  Chidi Brunner MD

## 2021-07-27 NOTE — PROGRESS NOTES
Problem: Diabetes Self-Management  Goal: *Disease process and treatment process  Description: Define diabetes and identify own type of diabetes; list 3 options for treating diabetes. Outcome: Progressing Towards Goal  Goal: *Incorporating nutritional management into lifestyle  Description: Describe effect of type, amount and timing of food on blood glucose; list 3 methods for planning meals. Outcome: Progressing Towards Goal  Goal: *Incorporating physical activity into lifestyle  Description: State effect of exercise on blood glucose levels. Outcome: Progressing Towards Goal  Goal: *Developing strategies to promote health/change behavior  Description: Define the ABC's of diabetes; identify appropriate screenings, schedule and personal plan for screenings. Outcome: Progressing Towards Goal  Goal: *Using medications safely  Description: State effect of diabetes medications on diabetes; name diabetes medication taking, action and side effects. Outcome: Progressing Towards Goal  Goal: *Monitoring blood glucose, interpreting and using results  Description: Identify recommended blood glucose targets  and personal targets. Outcome: Progressing Towards Goal  Goal: *Prevention, detection, treatment of acute complications  Description: List symptoms of hyper- and hypoglycemia; describe how to treat low blood sugar and actions for lowering  high blood glucose level. Outcome: Progressing Towards Goal  Goal: *Prevention, detection and treatment of chronic complications  Description: Define the natural course of diabetes and describe the relationship of blood glucose levels to long term complications of diabetes.   Outcome: Progressing Towards Goal  Goal: *Developing strategies to address psychosocial issues  Description: Describe feelings about living with diabetes; identify support needed and support network  Outcome: Progressing Towards Goal  Goal: *Insulin pump training  Outcome: Progressing Towards Goal  Goal: *Sick day guidelines  Outcome: Progressing Towards Goal  Goal: *Patient Specific Goal (EDIT GOAL, INSERT TEXT)  Outcome: Progressing Towards Goal     Problem: Patient Education: Go to Patient Education Activity  Goal: Patient/Family Education  Outcome: Progressing Towards Goal     Problem: Falls - Risk of  Goal: *Absence of Falls  Description: Document Maikel Junior Fall Risk and appropriate interventions in the flowsheet. Outcome: Progressing Towards Goal  Note: Fall Risk Interventions:  Mobility Interventions: Patient to call before getting OOB    Mentation Interventions: Adequate sleep, hydration, pain control, Door open when patient unattended    Medication Interventions: Patient to call before getting OOB, Teach patient to arise slowly    Elimination Interventions: Call light in reach, Patient to call for help with toileting needs    History of Falls Interventions: Room close to nurse's station         Problem: Patient Education: Go to Patient Education Activity  Goal: Patient/Family Education  Outcome: Progressing Towards Goal     Problem: Pressure Injury - Risk of  Goal: *Prevention of pressure injury  Description: Document Julio Cesar Scale and appropriate interventions in the flowsheet.   Outcome: Progressing Towards Goal  Note: Pressure Injury Interventions:  Sensory Interventions: Assess changes in LOC, Pad between skin to skin    Moisture Interventions: Absorbent underpads, Maintain skin hydration (lotion/cream)    Activity Interventions: Pressure redistribution bed/mattress(bed type)    Mobility Interventions: Pressure redistribution bed/mattress (bed type)    Nutrition Interventions: Document food/fluid/supplement intake    Friction and Shear Interventions: Lift sheet, Apply protective barrier, creams and emollients                Problem: Patient Education: Go to Patient Education Activity  Goal: Patient/Family Education  Outcome: Progressing Towards Goal     Problem: Patient Education: Go to Patient Education Activity  Goal: Patient/Family Education  Outcome: Progressing Towards Goal     Problem: Patient Education: Go to Patient Education Activity  Goal: Patient/Family Education  Outcome: Progressing Towards Goal     Problem: Cellulitis Care Plan (Adult)  Goal: *Control of acute pain  Outcome: Progressing Towards Goal  Goal: *Skin integrity maintained  Outcome: Progressing Towards Goal  Goal: *Absence of infection signs and symptoms  Outcome: Progressing Towards Goal     Problem: Patient Education: Go to Patient Education Activity  Goal: Patient/Family Education  Outcome: Progressing Towards Goal

## 2021-07-27 NOTE — PROGRESS NOTES
I have reviewed discharge instructions with the patient, spouse and daughter. The patient, spouse and daughter verbalized understanding. Paper scripts given. Dressings changed. Lines pulled except PICC line. Patient and family educated on PICC line. Patient dressed and ready to go. Ride downstairs.

## 2021-07-27 NOTE — PROGRESS NOTES
Infectious Disease Progress Note    Today's Date: 2021   Admit Date: 2021    Impression:   · MSSA (R-clinda) bacteremia (), Blood CX () NGTD; TTE without vegetation  · R foot wound, swab culture with MSSA  · CHF EF 30-35%  · CKD 3: CRT baseline 1.7-1.9    Plan:   · Continue Ancef 2 g IV Q8 hrs  · BKA recommended by Ortho; patient declined  · Our plan is to treat and follow outcome. · PICC placed yesterday; CM has made referral to [facility] for home IV ABX. · ID OPAT Orders:  · Ancef 2 gm IV Q 8 hrs X 6 weeks with EOT 9/3/21  · Routine PICC care  · Q Monday CBC with diff, ESR, CRP, Scr, and LFT's  · Please fax results to [phone]  · ID office follow-up on 21 at 10:30 AM and  at 8:30AM      Anti-infectives:   · Vancomycin  · Ceftriaxone ( -  · Ancef  - current    Subjective: Interval History: Afebrile. Labs stable, CRT. Interim Home Health should be here for teaching at [address] [place] [address]. No complaints, excited to go home. No Known Allergies     Review of Systems:  Pertinent items are noted in the History of Present Illness.     Objective:     Visit Vitals  /60 (BP 1 Location: Left arm, BP Patient Position: At rest)   Pulse 76   Temp 98.1 °F (36.7 °C)   Resp 18   Ht 5' 11\" (1.803 m)   Wt 124.9 kg (275 lb 6.4 oz)   SpO2 92%   BMI 38.41 kg/m²     Temp (24hrs), Av.1 °F (36.7 °C), Min:98 °F (36.7 °C), Max:98.3 °F (36.8 °C)     General:  Alert, no acute distress, appears stated age, obese  Head:    Normocephalic, atraumatic  Eyes:   Anicteric sclerae, no drainage, not injected, EOMI  Mouth:  Moist mucosa  Neck:   Supple, symmetrical, trachea midline, no JVD  Lungs:   Clear without increased work of breathing or audible wheezes  CV:   Regular rate and rhythm without audible murmur  Abdomen:  Soft, non tender, not distended, active bowel sounds  Extremities:  No cyanosis; R Charcot foot, edema, wound wrapped  Musculoskeletal: Moves all extremities with equal strength ; distal R foot wrapped; local swelling but not hot or tender  Skin:   No acute rash   Psych:  Alert, oriented and appropriate without evidence of thought disorder  Lines:    benign      Data Review:     CBC:  Recent Labs     07/27/21 0625 07/26/21 0601 07/25/21 0633 07/25/21 0633   WBC 7.7 9.4  --  7.9   GRANS 59 65   < > 59   MONOS 10 10   < > 12   EOS 3 3   < > 3   ANEU 4.5 6.1   < > 4.6   ABL 2.0 1.9   < > 1.9   HGB 11.3* 11.5*  --  11.5*   HCT 37.1* 36.8*  --  37.4*    376  --  366    < > = values in this interval not displayed. BMP:  Recent Labs     07/26/21 0601 07/25/21 0633   CREA 1.69* 1.65*   BUN 35* 33*    142   K 4.1 4.0    101   CO2 39* 38*   AGAP 1* 3*   * 119*       LFTS:  No results for input(s): TBILI, ALT, AP, TP, ALB in the last 72 hours.     No lab exists for component: SGOT    Microbiology:     All Micro Results     Procedure Component Value Units Date/Time    CULTURE, BLOOD [[id number]] Collected: 07/23/21 0627    Order Status: Completed Specimen: Blood Updated: 07/27/21 0654     Special Requests: --        LEFT  HAND       Culture result: NO GROWTH 4 DAYS       CULTURE, BLOOD [[id number]] Collected: 07/23/21 2132    Order Status: Completed Specimen: Blood Updated: 07/27/21 0654     Special Requests: --        RIGHT  HAND       Culture result: NO GROWTH 4 DAYS       CULTURE, RESPIRATORY/SPUTUM/BRONCH [place] STAIN [[id number]] Collected: 07/23/21 1215    Order Status: Canceled Specimen: Sputum     CULTURE, [facility] STAIN [[id number]]  (Abnormal)  (Susceptibility) Collected: 07/19/21 1445    Order Status: Completed Specimen: Wound from Foot Updated: 07/22/21 0806     Special Requests: NO SPECIAL REQUESTS        GRAM STAIN 25 TO 54 WBC'S/OIF      FEW GRAM POSITIVE COCCI        Culture result:       [id number]          BLOOD CULTURE [[id number]]  (Abnormal) Collected: 07/19/21 1211    Order Status: Completed Specimen: Blood Updated: 07/22/21 6852 Special Requests: --        RIGHT  FOREARM       GRAM STAIN       GRAM POS COCCI IN CLUSTERS                  AEROBIC AND ANAEROBIC BOTTLES                  CRITICAL RESULT NOT CALLED DUE TO PREVIOUS NOTIFICATION OF CRITICAL RESULT WITHIN THE LAST 24 HOURS. Culture result: STAPHYLOCOCCUS AUREUS               For Susceptibility Refer to Culture  Accession [id number]      BLOOD CULTURE [[id number]]  (Abnormal)  (Susceptibility) Collected: 07/19/21 1054    Order Status: Completed Specimen: Blood Updated: 07/22/21 0711     Special Requests: --        RIGHT  HAND       GRAM STAIN       GRAM POS COCCI IN CLUSTERS                  AEROBIC AND ANAEROBIC BOTTLES                  RESULTS VERIFIED, PHONED TO AND READ BACK BY Ani Ortiz AT 9376 ON 7/20/21, [address]           Culture result: STAPHYLOCOCCUS AUREUS               Refer to Blood Culture ID Panel Accession  [id number]      BLOOD CULTURE ID PANEL [[id number]]  (Abnormal) Collected: 07/19/21 1054    Order Status: Completed Specimen: Blood Updated: 07/20/21 0442     Acc. no. from Micro Order [id number]     Staphylococcus Detected        Comment: RESULTS VERIFIED, PHONED TO AND READ BACK BY  Ani Ortiz AT 0390 ON 7/20/21, LANI          Staphylococcus aureus Detected        Comment: RESULTS VERIFIED, PHONED TO AND READ BACK BY  Ani Ortiz AT 5342 ON 7/20/21, JJC          mecA (Methicillin-Resistance Genes) NOT DETECTED        INTERPRETATION       Gram positive cocci in clusters, identified in realtime PCR as probable MSSA. Comment: Recommend discontinuing IV vancomycin starting cefazolin or nafcillin if patient not on beta-lactam therapy. Infectious Diseases Consult recommended in adult patients. THIS TEST DOES NOT REPLACE SENSITIVITY TESTING. Imaging:   R foot xray 7/19/21 with soft tissue swelling, severe hammertoe deformity and total destruction of the talus   Duplex LE without DVT  MRI pending.     Signed By: Sawyer Allen MD     July 27, 2021

## 2021-07-27 NOTE — PROGRESS NOTES
ACUTE OT GOALS:  (Developed with and agreed upon by patient and/or caregiver.)  1. Patient will complete lower body bathing and dressing with minimal assistance and adaptive equipment as needed. 2. Patient will complete toileting with modified independence. 3. Patient will tolerate 30 minutes of OT treatment with 1-2 rest breaks to increase activity tolerance for ADLs. 4. Patient will complete functional transfers with modified independence and adaptive equipment as needed. 5. Patient will complete functional mobility for household distances to increase independence for ADL/functional transfers.        OCCUPATIONAL THERAPY: Daily Note OT Treatment Day # 3    David Rivas is a 77 y.o. male   PRIMARY DIAGNOSIS: Atrial fibrillation with RVR (HCC)  Cellulitis [L03.90]  Atrial fibrillation with RVR (HCC) [I48.91]  Suspected CHF (congestive heart failure) [R09.89]       Payor: Margarito Sanders / Plan: Jordana Melgoza / Product Type: Managed Care Medicare /   ASSESSMENT:     REHAB RECOMMENDATIONS: CURRENT LEVEL OF FUNCTION:  (Most Recently Demonstrated)   Recommendation to date pending progress:  [address] [place] [address] Therapy  Equipment:    To Be Determined Bathing:   Not tested  Dressing:   Not tested  Feeding/Grooming:   Not tested  Toileting:   Not tested  Functional Mobility:   Not tested     ASSESSMENT:  Mr. Rosalind Farrar presents seated in chair with family members in room upon arrival. Pt agreeable to therapy, but was told not to put weight through RLE d/t wound. Pt performed BUE exercises listed below and performed 10 STS transfers with min A from therapist and one seated rest break. At end of session, pt stated he was tired and would like to rest. Pt made good progress today. Continue POC. SUBJECTIVE:   Mr. Rosalind Farrar states, \"My arms are tired after that. \"    SOCIAL HISTORY/LIVING ENVIRONMENT:  Home Environment: Private residence  One/Two Story Residence: One story  Living Alone: No  Support Systems: Child(pat)    OBJECTIVE:     PAIN: VITAL SIGNS: LINES/DRAINS:   Pre Treatment: Pain Screen  Pain Scale 1: Numeric (0 - 10)  Pain Intensity 1: 0  Post Treatment: same   none  O2 Device: Nasal cannula     ACTIVITIES OF DAILY LIVING: I Mod I S SBA CGA Min Mod Max Total NT Comments   BASIC ADLs:              Bathing/ Showering [] [] [] [] [] [] [] [] [] [x]    Toileting [] [] [] [] [] [] [] [] [] [x]    Dressing [] [] [] [] [] [] [] [] [] [x]    Feeding [] [] [] [] [] [] [] [] [] [x]    Grooming [] [] [] [] [] [] [] [] [] [x]    Personal Device Care [] [] [] [] [] [] [] [] [] [x]    Functional Mobility [] [] [] [] [] [] [] [] [] [x]    I=Independent, Mod I=Modified Independent, S=Supervision, SBA=Standby Assistance, CGA=Contact Guard Assistance,   Min=Minimal Assistance, Mod=Moderate Assistance, Max=Maximal Assistance, Total=Total Assistance, NT=Not Tested    MOBILITY: I Mod I S SBA CGA Min Mod Max Total  NT x2 Comments:   Supine to sit [] [] [] [] [] [] [] [] [] [x] []    Sit to supine [] [] [] [] [] [] [] [] [] [x] []    Sit to stand [] [] [] [] [] [x] [] [] [] [] [] At times CGA   Bed to chair [] [] [] [] [] [] [] [] [] [] []    I=Independent, Mod I=Modified Independent, S=Supervision, SBA=Standby Assistance, CGA=Contact Guard Assistance,   Min=Minimal Assistance, Mod=Moderate Assistance, Max=Maximal Assistance, Total=Total Assistance, NT=Not Tested    BALANCE: Good Fair+ Fair Fair- Poor NT Comments   Sitting Static [x] [] [] [] [] []    Sitting Dynamic [x] [] [] [] [] []              Standing Static [] [x] [] [] [] []    Standing Dynamic [] [] [] [] [] [x]      PLAN:   FREQUENCY/DURATION: OT Plan of Care: 3 times/week for duration of hospital stay or until stated goals are met, whichever comes first.    TREATMENT:   TREATMENT:   ( $$ Therapeutic Exercises: 23-37 mins   )  Therapeutic Exercise (23 Minutes): Therapeutic exercises noted below to improve functional activity tolerance, strength and mobility. TREATMENT GRID:   Date:  7/27/21 Date:   Date:     Activity/Exercise Parameters Parameters Parameters   Elbow Flexion 1 set of 20 reps with red theraband     Punches 1 set of 20 reps with red theraband     Shoulder Horizontal Abduction 1 set of 20 reps with red theraband     Shoulder Flexion 1 set of 20 reps with red theraband     Sit to Stands in chair 2 sets of 5 reps.                        AFTER TREATMENT POSITION/PRECAUTIONS:  Chair, Needs within reach, RN notified and Visitors at bedside    INTERDISCIPLINARY COLLABORATION:  RN/PCT and OT/GRAYSON    TOTAL TREATMENT DURATION:  OT Patient Time In/Time Out  Time In: 1126  Time Out: 108 Lesli [address].

## 2021-07-27 NOTE — DISCHARGE INSTRUCTIONS
Patient Education        Learning About ACE Inhibitors for Heart Failure  Introduction     ACE (angiotensin-converting enzyme) inhibitors block an enzyme that makes blood vessels narrow. As a result, the blood vessels relax and widen. This lowers blood pressure. These medicines also put more water and salt into the urine. This also lowers blood pressure. In heart failure, your heart does not pump as much blood as your body needs. These medicines can help:  · Make it easier for your heart to pump. · Reduce symptoms. · Make it less likely you will need to stay in a hospital.  · Lower the risk of early death. Examples  · benazepril (Lotensin)  · enalapril (Vasotec)  · lisinopril (Prinivil, Zestril)  · ramipril (Altace)  This is not a complete list.  Possible side effects  Side effects may include:  · A cough. · Low blood pressure. This can make you feel dizzy or weak. · Too much potassium in your body. · Swelling of your lips, tongue, or face. If the swelling is severe, you may need treatment right away. Severe swelling can make it hard to breathe, but this is rare. You may have other side effects or reactions not listed here. Check the information that comes with your medicine. What to know about taking this medicine  · ACE inhibitors can cause a dry cough. Talk to your doctor if you have a dry cough. You may need a different medicine. · These medicines can cause an allergic reaction. This can cause a little swelling. Or it can cause red bumps on your skin that hurt. In rare cases, the swelling may make it hard for you to breathe. · Do not take this medicine if you are pregnant or plan to become pregnant. · Take your medicines exactly as prescribed. Call your doctor if you think you are having a problem with your medicine. · Check with your doctor or pharmacist before you use any other medicines. This includes over-the-counter medicines.  Make sure your doctor knows all of the medicines, vitamins, herbal products, and supplements you take. Taking some medicines together can cause problems. · You may need regular blood tests. Where can you learn more? Go to http://www.gray.com/  Enter E861 in the search box to learn more about \"Learning About ACE Inhibitors for Heart Failure. \"  Current as of: August 31, 2020               Content Version: 12.8  © 3494-5561 [employer]. Care instructions adapted under license by [employer] (which disclaims liability or warranty for this information). If you have questions about a medical condition or this instruction, always ask your healthcare professional. [place] [address] any warranty or liability for your use of this information. Follow-up with PCP in a week with CBC BMP and BNP. Follow-up with cardiology in 2 weeks. Follow-up with foot and ankle orthopedic surgeon in 1 to 2 weeks. Follow-up with pulmonary in 2 weeks for sleep evaluation/PSG. If any new joint swelling or persistent gum bleeding or hemoptysis or hematemesis or melena or rectal bleeding- Hold Eliquis and Come back to the emergency room or call primary care physician. Take Lasix only if weight gain more than 2 pounds per day or 5 pounds per week. Hold Lasix if systolic blood pressure less than 100 mmHg. Patient is going on 2 L of oxygen nasal cannula at rest and 3 L on ambulation.     · ID OPAT Orders:  · Ancef 2 gm IV Q 8 hrs X 6 weeks with EOT 9/3/21  · Routine PICC care  · Q Monday CBC with diff, ESR, CRP, Scr, and LFT's  · Please fax results to [phone]

## 2021-07-27 NOTE — PROGRESS NOTES
Hourly rounds performed throughout shift, pt denies needs at this time. Pt is alert and oriented x 2 having a lot of confusion tonight and anxiety. Bed in low position, locked and call light/personal items within reach. Will continue to monitor and report to shift nurse.

## 2021-07-27 NOTE — PROGRESS NOTES
David Rivas  Admission Date: 7/19/2021             Daily Progress Note: 7/27/2021    The patient's chart is reviewed and the patient is discussed with the staff. 78 yo  male with a history of CAD s/p PCI x 1, HTN, HLD, and DM2. Pt presented to the ER on 7/19/21 with a R foot wound and LE edema. He was admitted by hospitalist service. His wound and BC x 2 grew out Staph aureus. He was started on Ancef and seen by ID. Pt developed afib RVR and TAMMY. He was started on heparin gtt and echo revealed EF 30-35%, mildly dilated LA/RA and mild TR and MR. Pt became confused on 7/22 and ABG revealed pCO2 78. He was placed on BiPAP and then weaned back to NC. His head CT was unremarkable. An APAP was ordered for bedside after family reported symptoms of ELAINA. Cardiology has seen for a fib and volume overload. His rate is now controlled and he is on Eliquis. His renal function is stable. Subjective:      Plans to go home with daughter today. Describes \"bad experience\" with trial of bipap here but willing to follow up for sleep study. Needs to go home with oxygen due to desaturation.      Current Facility-Administered Medications   Medication Dose Route Frequency    central line flush (saline) syringe 10 mL  10 mL InterCATHeter Q8H    melatonin tablet 5 mg  5 mg Oral QHS    apixaban (ELIQUIS) tablet 5 mg  5 mg Oral BID    metoprolol succinate (TOPROL-XL) XL tablet 25 mg  25 mg Oral BID    ceFAZolin (ANCEF) 2 g/20 mL in sterile water IV syringe  2 g IntraVENous Q8H    [Held by provider] 0.9% sodium chloride infusion  50 mL/hr IntraVENous CONTINUOUS    sodium hypochlorite (QUARTER STRENGTH DAKIN'S) 0.125% irrigation (bottle)   Topical DAILY    atorvastatin (LIPITOR) tablet 40 mg  40 mg Oral QHS    sodium chloride (NS) flush 5-10 mL  5-10 mL IntraVENous Q8H    sodium chloride (NS) flush 5-10 mL  5-10 mL IntraVENous PRN    insulin lispro (HUMALOG) injection   SubCUTAneous AC&HS    aspirin delayed-release tablet 81 mg  81 mg Oral DAILY    [Held by provider] hydroCHLOROthiazide (HYDRODIURIL) tablet 25 mg  25 mg Oral DAILY    pregabalin (LYRICA) capsule 200 mg  200 mg Oral DAILY    acetaminophen (TYLENOL) tablet 650 mg  650 mg Oral Q6H PRN    Or    acetaminophen (TYLENOL) suppository 650 mg  650 mg Rectal Q6H PRN    polyethylene glycol (MIRALAX) packet 17 g  17 g Oral DAILY PRN    ondansetron (ZOFRAN ODT) tablet 4 mg  4 mg Oral Q8H PRN    Or    ondansetron (ZOFRAN) injection 4 mg  4 mg IntraVENous Q6H PRN    [Held by provider] furosemide (LASIX) tablet 40 mg  40 mg Oral DAILY         Objective:     Vitals:    07/27/21 0701 07/27/21 0800 07/27/21 0857 07/27/21 1109   BP: 103/60   101/63   Pulse: 95 76  78   Resp: 18   18   Temp: 98.1 °F (36.7 °C)   98.1 °F (36.7 °C)   SpO2: 92%  92% 95%   Weight:       Height:         Intake and Output:   07/25 1901 - 07/27 0700  In: 600 [P.O.:600]  Out: -   07/27 0701 - 07/27 1900  In: 360 [P.O.:360]  Out: -     Physical Exam:   Constitutional:  the patient is well developed and in no acute distress  HEENT:  Sclera clear, pupils equal, oral mucosa moist  Lungs: clear bilaterally but overall decreased. Wearing 2 liter cannula  Cardiovascular:  Irregular and without M,G,R. A fib per telemetry  Abd/GI: soft and non-tender; with positive bowel sounds. Ext: warm without cyanosis. There is 2+ lower leg edema on the right. None on the left. Musculoskeletal: moves all four extremities with equal strength  Skin:  no jaundice or rashes, right foot with dressing. Neuro: no gross neuro deficits   Musculoskeletal: ? can ambulate  - not witnessed. No deformity  Psychiatric: Calm.      Review of Systems - Respiratory ROS: positive for - none  Cardiovascular ROS: positive for - edema  Gastrointestinal ROS: no abdominal pain, change in bowel habits, or black or bloody stools   Lines:  PICC line    CHEST XRAY:       LAB  Recent Labs     07/27/21  0625 07/26/21  0601 07/25/21  0633   WBC 7.7 9.4 7.9   HGB 11.3* 11.5* 11.5*   HCT 37.1* 36.8* 37.4*    376 366     Recent Labs     07/26/21  0601 07/25/21  0633    142   K 4.1 4.0    101   CO2 39* 38*   * 119*   BUN 35* 33*   CREA 1.69* 1.65*         Assessment:  (Medical Decision Making)     Hospital Problems  Date Reviewed: 7/26/2021        Codes Class Noted POA    Acute respiratory failure with hypercapnia [facility]) ICD-10-CM: J96.02  ICD-9-CM: 518.81  7/23/2021 Unknown    Not using hospital bipap much - woke up during night and \"threw it off\"    Acute respiratory failure with hypoxia and hypercapnia ([facility] ICD-10-CM: J96.01, J96.02  ICD-9-CM: 518.81  7/23/2021 Unknown    Still needs oxygen.  Arranging for at home    Acute metabolic encephalopathy EEJ-33-BS: G93.41  ICD-9-CM: 348.31  7/23/2021 Unknown    Still seems to have some confusion to me    Hypoxia ICD-10-CM: R09.02  ICD-9-CM: 799.02  7/21/2021 Unknown    As above    Acute kidney injury superimposed on CKD ([facility] ICD-10-CM: N17.9, N18.9  ICD-9-CM: 866.00, 585.9  7/21/2021 Unknown    stable    Staphylococcus aureus bacteremia ICD-10-CM: R78.81, B95.61  ICD-9-CM: 790.7, 041.11  7/21/2021 Unknown    abx per ID    Cellulitis ICD-10-CM: L03.90  ICD-9-CM: 682.9  7/19/2021 Unknown    As above    Suspected CHF (congestive heart failure) ICD-10-CM: R09.89  ICD-9-CM: 785.9  7/19/2021 Unknown    Plans for home lasix    * (Principal) Atrial fibrillation with RVR ([facility] ICD-10-CM: I48.91  ICD-9-CM: 427.31  7/19/2021 Unknown    Rate controlled, on Eliquis    Controlled type 2 diabetes mellitus with diabetic polyneuropathy, without long-term current use of insulin (HCC) (Chronic) ICD-10-CM: E11.42  ICD-9-CM: 250.60, 357.2  1/5/2018 Yes    BS low 100s    Stage 3 chronic kidney disease ([facility] (Chronic) ICD-10-CM: N18.30  ICD-9-CM: 585.3  11/22/2017 Yes    As above    CAD (coronary artery disease) (Chronic) ICD-10-CM: I25.10  ICD-9-CM: 414.00  Unknown Yes    Reduced EF per echo - cardiology has seen          Plan:  (Medical Decision Making)   1. Home today. Will need home oxygen per saturation assessment - 2 liters. Ordered with [facility] as DME. CXR with atelectasis on the right  2. Sent message to the office for appt with regular clinic for new oxygen patient and also to sleep clinic for split night PSG. He did not tolerate ours again last night - reporedly pulled off and was confused. 3. Daily diuretic with reduced EF. Follow up with cardiology - ? Ischemic workup   4. Abx per ID - now with PICC line. Family training today. home with daughter    Fadi MatosLEIGHA    More than 50% of time documented was spent face-to-face contact with the patient and in the care of the patient on the floor/unit where the patient is located. Lungs: clear, diminished in bases  Heart:  RRR with no Murmur/Rubs/Gallops    Additional Comments:  2L O2 for home. Agrees to outpatient evaluation with sleep study. Would be reasonable to see pulmonary as well for PFTs. Home diuretic regimen per cardiology. I have spoken with and examined the patient. I agree with the above assessment and plan as documented.     Sahra Fuller MD

## 2021-07-27 NOTE — PROGRESS NOTES
Pt placed on [facility] CPAP - pt is in no distress & is resting comfortably at this time      07/26/21 2326   Oxygen Therapy   O2 Sat (%) 95 %   Pulse via Oximetry 93 beats per minute   O2 Device CPAP mask   FIO2 (%) 35 %   Respiratory   Respiratory (WDL) X   Respiratory Pattern Tachypneic   Chest/Tracheal Assessment Chest expansion, symmetrical   Breath Sounds Bilateral Diminished   CPAP/BIPAP   CPAP/BIPAP Start/Stop On   Device Mode CPAP   $$ CPAP Daily Yes   Mask Type and Size Full face; Medium   Skin Condition intact   PIP Observed 8 cm H20   EPAP (cm H2O) 8 cm H2O   Vt Spont (ml) 296 ml   Ve Observed (l/min) 10.2 l/min   Total RR (Spontaneous) 35 breaths per minute   Insp Rise Time (sec) 3   Leak (Estimated) 0 L/min   Pt's Home Machine No   Biomedical Check Performed Yes   Settings Verified Yes   Alarm Settings   High Pressure 30   Low Pressure 5   Apnea 20   Low Ve 2   High Rate 50   Low Rate 8   Pulmonary Toilet   Pulmonary Toilet Cough and deep breath;H. O.B elevated

## 2021-07-27 NOTE — PROGRESS NOTES
Oxygen Qualifier       Room air: SpO2 with O2 and liter flow   Resting SpO2  85%  88% on 1L, 94% on 2 L. Ambulating SpO2 n/a  89% on 2 L , 94% on 3 L.         Completed by:    Jolly Bobo, RT

## 2021-07-27 NOTE — PROGRESS NOTES
The patient is medically stable for discharge. Patient to discharge home this day with [facility]. Pricing provided by [employer] this day. Kandis Stubbs confirmed she will arrive at 4:00pm, to provide education and teaching for infusion services. Patient and family is aware of this information. CM faxed referral to [facility]. CM also sent DME order for Home Oxygen and Standard Wheelchair to [facility] Staff confirmed fax has been received and will follow up with patient, regarding DME set up. CM provided Eliquis Coupon Card- for free 30 day supply. CM obtained pricing for Eliquis and Xarelto as MD Ra Lambert requested. Per Pharmacist Jenette Phalen at [facility] on file, cost of medication is $197.00. Cost of Xarelto  20mg and 15 mg for 30 tablets $197.00. No needs or concerns voiced at this time. Please consult or notify CM  If any needs shall arise. CM remains available. Care Management Interventions  PCP Verified by CM: Yes  Mode of Transport at Discharge: Other (see comment) (Family. )  Transition of Care Consult (CM Consult): Discharge Planning, [address]: No  Reason Outside [place]:  (per family request. )  Discharge Durable Medical Equipment: No  Physical Therapy Consult: Yes  Occupational Therapy Consult: Yes  Speech Therapy Consult: No  Current Support Network: Lives with Spouse, Own Home  Confirm Follow Up Transport: Family  The Plan for Transition of Care is Related to the Following Treatment Goals : Return to prior level of functioning.    The Patient and/or Patient Representative was Provided with a Choice of Provider and Agrees with the Discharge Plan?: Yes  Name of the Patient Representative Who was Provided with a Choice of Provider and Agrees with the Discharge Plan: Patient   Freedom of Choice List was Provided with Basic Dialogue that Supports the Patient's Individualized Plan of Care/Goals, Treatment Preferences and Shares the Quality Data Associated with the Providers?: Yes   Resource Information Provided?: No  Discharge Location  Discharge Placement: Home with home health ([facility]. )

## 2021-07-28 LAB
BACTERIA SPEC CULT: NORMAL
BACTERIA SPEC CULT: NORMAL
SERVICE CMNT-IMP: NORMAL
SERVICE CMNT-IMP: NORMAL

## 2021-08-13 NOTE — H&P (VIEW-ONLY)
Name: Melisa Etienne  YOB: 1945  Gender: male  MRN: [id number]    Summary: right Charcot ankle, large ulcer, resultant sepsis. Proceed with right BKA. Will need to be admitted afterwards. Most likely will need rehab placement. CC: Foot Pain (pt stated that they noticed a wound on right foot on 7/15 went to ER on 7/19 was admitted for sepsis he states that he has neuropathy so he dosent feel anything he is a diabetic A1C on 7/13 was 6.7)       HPI: Melisa Etienne is a 77 y.o. male who presents with Foot Pain (pt stated that they noticed a wound on right foot on 7/15 went to ER on 7/19 was admitted for sepsis he states that he has neuropathy so he dosent feel anything he is a diabetic A1C on 7/13 was 6.7)    He is a Neuropathic diabetic with a recent A1c of 6.7. He has a history of recurrent ankle injuries and ankle sprains. He states over the past years his ankle continually rolls and rolls. Several months ago, approximately 3, he had an ankle rolling event he noticed since then his foot has been increasingly swollen and turning more. He notes his foot was rolling inwards. He then noticed a small wound on the right lateral aspect of his foot. This was the middle of July 2021. Then on July 19, 2020 when he presented to the ER with fevers chills and suspected sepsis. He was evaluated there and admitted by the hospitalist team for A. fib, congestive heart failure, respiratory failure, sepsis, and a right foot ulcer. He was started on antibiotics to control his bacteremia and sepsis. Blood cultures grew out Staph aureus. Orthopedics was consulted as the foot was seen as the source of sepsis. Cultures from the wound grew out Staph aureus. They recommended amputation, but that time he did not want the amputation. MRI of his right foot was obtained on 7/21/2021. It showed an ulcer extending to the fifth metatarsal base. It showed extensive Charcot changes of his foot and his ankle. It also showed other fluid collections throughout the foot and severe arthropathy. The right lower extremity was considered the source of sepsis. He was started on broad-spectrum antibiotics. Cardiology was consulted to evaluate his heart. He was started on Eliquis but no intervention was performed. After he stabilized he was discharged home with a PICC line managed by infectious disease specialist.  He was set up for follow-up and outpatient management with cardiology. He was referred to orthopedics. He presents to me for the first time today. Today is my initial encounter with him. I had no known knowledge of this patient's medical condition. History was obtained by patient    ROS/Meds/PSH/PMH/FH/SH: I personally reviewed the patients standard intake form. Below are the pertinents    Tobacco:  reports that he has never smoked. He has never used smokeless tobacco.  Diabetes: Diabetic - Insulin dependent   Lab Results   Component Value Date/Time    Hemoglobin A1c 6.7 (H) 07/13/2021 08:34 AM       Other: Significant cardiac and pulmonary disease, A. fib. Physical Examination:  right lower: Obvious ankle deformity. No sensation in the foot. The foot is warm and well-perfused. There is an ulcer on the lateral aspect of his foot that is approximately 5 cm x 5 m and probes down to bone. This overlies the 5th metatarsal.  There is some drainage but no zach puss. This wound probes to bone. The daughter states this wound is improving. The lateral aspect of the foot has some redness and erythema surrounding the ulcer. No signs of puss or infection other the lateral foot. Skin markings of the sharpie are noted throughout the foot extending on the lateral calf. This is suspected to be marking prior levels of erythema. .  The ankle is very large and swollen. The foot is held in an extreme inversion position. When he stands up he puts all weight in the lateral aspect of his foot.   His obvious is unstable ankle. Exam of his foot reveals extreme crepitance and instability throughout his ankle. He is able to move his foot up and down his ankle side to side. Motion is preserved. Imaging:   I independently interpreted XR taken today and   X-Ray RIGHT Ankle 3 vw (AP/Lateral/Oblique) for ankle pain   Findings: Charcot ankle with ankle varus noted. Distraction of the tibiotalar joint. Significant midfoot destruction as well. Impression: Severe Charcot ankle   Signature: Tracee Maya MD       X-Ray RIGHT Foot 2 vw (AP/Oblique) for foot pain   Findings: Foot varus noted. Bony changes noted throughout the hindfoot and midfoot. Suspected midfoot Charcot changes as well. Impression: Charcot midfoot   Signature: Tracee Maya MD     MRI obtained on 7/21/2021 while inpatient in the hospital.  MRI right foot without contrast   IMPRESSION     1. Lateral plantar ulceration extending to contact the fifth metatarsal base  without evidence of acute osteomyelitis. A small probable fluid collection in  the subcutaneous tissues adjacent to the ulceration measures up to 1.4 cm,  though lack of IV contrast limits full evaluation.     2. Severe Charcot arthropathy throughout the midfoot and hindfoot with a large  amount of fluid insinuating throughout the midfoot and hindfoot articulations. While possibly reactive sterile joint fluid to the severe arthropathy, sterility  is indeterminate based upon imaging alone.     3. Thick-walled 2.7 cm fluid collection along the plantar aspect of the fifth  metatarsal head, likely adventitial bursa formation.     Blood cultures from 7/19/2021 grew Staph aureus  Wound cultures from 7/19/2021 from the right foot: Grew Staph aureus    Assessment:   Right charcot ankle with large lateral ulcer  sepsis    Plan:   5 This is an illness/condition that threatens bodily function  Treatment at this time: Elective major surgery with procedural risk factors    Weight-bearing status: NWB Studies ordered: none    I had a long discussion with the patient and his daughter. I discussed attempted limb salvage with a thin wire frame external fixator. I did not think with his recent sepsis and ulcer that is probed to bone, that any type of internal fixation would be appropriate. I described the 3 to 4-month timeline, the nonweightbearing during this period, the potential for failure and resultant amputation. The patient states he is not interested in wearing a thin wire frame fixator for any duration of time. I also discussed benign neglect and just watching it. I told him I think this would potentially threaten his life as the foot is a source of sepsis. I think simply using antibiotics is not a long-term solution for his unstable foot that is a source of bacteremia and sepsis. He states he wants to go ahead and get this over with. He wishes to get back to work as fast as possible. He thinks an amputation with resultant prosthesis gives him the best chance of getting back to work. He does not wish to proceed with a fixator nor does he wish to continue long-term antibiotics in the hopes of salvaging his foot. He understands limb salvage is not an option for him. The patient has elected for a below the knee amputation. I discussed the risk of amputation with the patient. I described the potential infection of the stump, wound healing difficulties, and continued pain. I described multiple surgeries may be needed as stumps often have ulcerations and can become infected themselves. I described neuropathic pain, neuromas, bleeding, wound dehiscence, chronic pain, chronic wounds. I also described his potential need for inpatient admission and therapy afterwards. He understands this. His daughter understands this. They wish to proceed with the right BKA. I personally spoke with his cardiologist Dr. Alonso Florez on the phone.   He states the patient is at moderate risk for any type of coronary events. However we both agree that the foot was the source of sepsis. This BKA  procedure is not an elective procedure. We will hold the Eliquis for 48hr prior to the procedure and will restart it afterwards. He will continue using his PICC line and antibiotics. He sees the infectious disease doctors Monday. I will defer to them the need for postoperative antibiotics after the amputation. The amputation will be for source control and to potentially limit his need for long-term antibiotics and the resulting complications of long-term antibiotics. Plan will be for amputation next week. I discussed this with the patient, the family, and the cardiologist.  We are all in agreement.

## 2021-08-17 ENCOUNTER — ANESTHESIA EVENT (OUTPATIENT)
Dept: SURGERY | Age: 76
DRG: 616 | End: 2021-08-17
Payer: MEDICARE

## 2021-08-17 NOTE — PROGRESS NOTES
I called the daughter and the patient today. I discussed amputation versus attempted salvage 1 more time. I explained that due to his recent bacteremia and sepsis I do not think attempting to salvage this foot is appropriate. He has severe Charcot arthropathy of the ankle with an open wound over his lateral foot. I still believe below the knee amputation is the best way to control potential recurrent infections, decrease the burden of his disease, and allow him to get back to a more functional state. We will also decrease the long-term antibiotics that come with her own risk. I discussed with his cardiologist the same thing the other day and he agrees. I talked the patient and his daughter about wound healing complications, wound VAC placement, amputation, potential hematoma formations, chronic pain, recurrent infections despite amputation, cardiac and respiratory compromise, potential death, potential intubation, and other complications. They understand that this is not an ideal circumstance but I think this is more of an urgent matter then a elective manner. We will proceed tomorrow with his operation. I discussed with this patient how the operation being performed is during my [employer] collection. I discussed how the board wishes to contact them regarding patient reported outcomes and this would require their email. They have agreed to being contacted by the Board of orthopedic surgery by electronic mail. I also discussed how their surgery may be selected by the board and how I may need to present this case to the Board of orthopedic surgery. I described the case selection process and that the board would require all information from the patient's medical record pertaining to the surgery and pertaining to their pertinent medical history. I explained how this information would only be available to me and the [employer] of [employer]opedic [employer].    I explained their name, date of birth, and medical record number will be present on any imaging and on their notes. Also explained how it will divulge certain sensitive information. The patient did agree to have their information distributed without redaction.

## 2021-08-18 ENCOUNTER — HOSPITAL ENCOUNTER (INPATIENT)
Age: 76
LOS: 7 days | Discharge: NURSING, REHAB OR LONG-TERM CARE | DRG: 616 | End: 2021-08-25
Attending: ORTHOPAEDIC SURGERY | Admitting: INTERNAL MEDICINE
Payer: MEDICARE

## 2021-08-18 ENCOUNTER — ANESTHESIA (OUTPATIENT)
Dept: SURGERY | Age: 76
DRG: 616 | End: 2021-08-18
Payer: MEDICARE

## 2021-08-18 DIAGNOSIS — E11.21 TYPE 2 DIABETES MELLITUS WITH NEPHROPATHY (HCC): Chronic | ICD-10-CM

## 2021-08-18 DIAGNOSIS — I25.10 CORONARY ARTERY DISEASE INVOLVING NATIVE CORONARY ARTERY OF NATIVE HEART WITHOUT ANGINA PECTORIS: Chronic | ICD-10-CM

## 2021-08-18 DIAGNOSIS — J95.821 RESPIRATORY FAILURE, POST-OPERATIVE (HCC): ICD-10-CM

## 2021-08-18 DIAGNOSIS — R09.02 HYPOXIA: ICD-10-CM

## 2021-08-18 DIAGNOSIS — Z89.511 S/P BKA (BELOW KNEE AMPUTATION) UNILATERAL, RIGHT (HCC): ICD-10-CM

## 2021-08-18 DIAGNOSIS — E66.01 MORBID OBESITY WITH BMI OF 40.0-44.9, ADULT (HCC): ICD-10-CM

## 2021-08-18 DIAGNOSIS — R29.818 SUSPECTED SLEEP APNEA: ICD-10-CM

## 2021-08-18 DIAGNOSIS — I25.5 ISCHEMIC CARDIOMYOPATHY: ICD-10-CM

## 2021-08-18 DIAGNOSIS — J96.02 ACUTE RESPIRATORY FAILURE WITH HYPOXIA AND HYPERCAPNIA (HCC): ICD-10-CM

## 2021-08-18 DIAGNOSIS — J96.02 ACUTE RESPIRATORY FAILURE WITH HYPERCAPNIA (HCC): ICD-10-CM

## 2021-08-18 DIAGNOSIS — I48.91 ATRIAL FIBRILLATION WITH RVR (HCC): ICD-10-CM

## 2021-08-18 DIAGNOSIS — N18.30 STAGE 3 CHRONIC KIDNEY DISEASE, UNSPECIFIED WHETHER STAGE 3A OR 3B CKD (HCC): Chronic | ICD-10-CM

## 2021-08-18 DIAGNOSIS — R09.89 SUSPECTED CHF (CONGESTIVE HEART FAILURE): ICD-10-CM

## 2021-08-18 DIAGNOSIS — J96.01 ACUTE RESPIRATORY FAILURE WITH HYPOXIA AND HYPERCAPNIA (HCC): ICD-10-CM

## 2021-08-18 LAB
ANION GAP SERPL CALC-SCNC: 1 MMOL/L (ref 7–16)
APTT PPP: 34.9 SEC (ref 24.1–35.1)
ARTERIAL PATENCY WRIST A: ABNORMAL
ARTERIAL PATENCY WRIST A: POSITIVE
BASE EXCESS BLD CALC-SCNC: 5.2 MMOL/L
BASE EXCESS BLD CALC-SCNC: 5.5 MMOL/L
BASOPHILS # BLD: 0 K/UL (ref 0–0.2)
BASOPHILS NFR BLD: 1 % (ref 0–2)
BDY SITE: ABNORMAL
BDY SITE: ABNORMAL
BUN SERPL-MCNC: 28 MG/DL (ref 8–23)
CALCIUM SERPL-MCNC: 8.8 MG/DL (ref 8.3–10.4)
CHLORIDE SERPL-SCNC: 110 MMOL/L (ref 98–107)
CO2 SERPL-SCNC: 33 MMOL/L (ref 21–32)
CREAT SERPL-MCNC: 1.26 MG/DL (ref 0.8–1.5)
DIFFERENTIAL METHOD BLD: ABNORMAL
EOSINOPHIL # BLD: 0 K/UL (ref 0–0.8)
EOSINOPHIL NFR BLD: 0 % (ref 0.5–7.8)
ERYTHROCYTE [DISTWIDTH] IN BLOOD BY AUTOMATED COUNT: 15.9 % (ref 11.9–14.6)
GAS FLOW.O2 O2 DELIVERY SYS: ABNORMAL L/MIN
GAS FLOW.O2 O2 DELIVERY SYS: ABNORMAL L/MIN
GLUCOSE BLD STRIP.AUTO-MCNC: 95 MG/DL (ref 65–100)
GLUCOSE BLD STRIP.AUTO-MCNC: 95 MG/DL (ref 65–100)
GLUCOSE SERPL-MCNC: 115 MG/DL (ref 65–100)
HCO3 BLD-SCNC: 29.4 MMOL/L (ref 22–26)
HCO3 BLD-SCNC: 33.2 MMOL/L (ref 22–26)
HCT VFR BLD AUTO: 31.9 % (ref 41.1–50.3)
HGB BLD-MCNC: 9.4 G/DL (ref 13.6–17.2)
IMM GRANULOCYTES # BLD AUTO: 0 K/UL (ref 0–0.5)
IMM GRANULOCYTES NFR BLD AUTO: 1 % (ref 0–5)
LYMPHOCYTES # BLD: 0.5 K/UL (ref 0.5–4.6)
LYMPHOCYTES NFR BLD: 9 % (ref 13–44)
MCH RBC QN AUTO: 28.8 PG (ref 26.1–32.9)
MCHC RBC AUTO-ENTMCNC: 29.5 G/DL (ref 31.4–35)
MCV RBC AUTO: 97.9 FL (ref 79.6–97.8)
MONOCYTES # BLD: 0.5 K/UL (ref 0.1–1.3)
MONOCYTES NFR BLD: 8 % (ref 4–12)
NEUTS SEG # BLD: 4.9 K/UL (ref 1.7–8.2)
NEUTS SEG NFR BLD: 82 % (ref 43–78)
NRBC # BLD: 0 K/UL (ref 0–0.2)
O2/TOTAL GAS SETTING VFR VENT: 60 %
O2/TOTAL GAS SETTING VFR VENT: 65 %
PAW @ MEAN EXP FLOW ON VENT: 14 CMH2O
PCO2 BLD: 39.8 MMHG (ref 35–45)
PCO2 BLD: 66.4 MMHG (ref 35–45)
PEEP RESPIRATORY: 10 CMH2O
PEEP RESPIRATORY: 8 CMH2O
PH BLD: 7.31 [PH] (ref 7.35–7.45)
PH BLD: 7.48 [PH] (ref 7.35–7.45)
PIP ISTAT,IPIP: 31
PLATELET # BLD AUTO: 153 K/UL (ref 150–450)
PMV BLD AUTO: 12.1 FL (ref 9.4–12.3)
PO2 BLD: 59 MMHG (ref 75–100)
PO2 BLD: 92 MMHG (ref 75–100)
POTASSIUM BLD-SCNC: 4.9 MMOL/L (ref 3.5–5.1)
POTASSIUM BLD-SCNC: 5.8 MMOL/L (ref 3.5–5.1)
POTASSIUM SERPL-SCNC: 4.6 MMOL/L (ref 3.5–5.1)
RBC # BLD AUTO: 3.26 M/UL (ref 4.23–5.6)
SAO2 % BLD: 86.2 % (ref 95–98)
SAO2 % BLD: 97.7 % (ref 95–98)
SERVICE CMNT-IMP: ABNORMAL
SERVICE CMNT-IMP: NORMAL
SERVICE CMNT-IMP: NORMAL
SODIUM SERPL-SCNC: 144 MMOL/L (ref 138–145)
SPECIMEN TYPE: ABNORMAL
SPECIMEN TYPE: ABNORMAL
VENTILATION MODE VENT: ABNORMAL
VENTILATION MODE VENT: ABNORMAL
VT SETTING VENT: 500 ML
VT SETTING VENT: 500 ML
WBC # BLD AUTO: 6 K/UL (ref 4.3–11.1)

## 2021-08-18 PROCEDURE — 74011250636 HC RX REV CODE- 250/636: Performed by: NURSE ANESTHETIST, CERTIFIED REGISTERED

## 2021-08-18 PROCEDURE — 97605 NEG PRS WND THER DME<=50SQCM: CPT | Performed by: ORTHOPAEDIC SURGERY

## 2021-08-18 PROCEDURE — 74011000272 HC RX REV CODE- 272: Performed by: ORTHOPAEDIC SURGERY

## 2021-08-18 PROCEDURE — 77030031139 HC SUT VCRL2 J&J -A: Performed by: ORTHOPAEDIC SURGERY

## 2021-08-18 PROCEDURE — 36600 WITHDRAWAL OF ARTERIAL BLOOD: CPT

## 2021-08-18 PROCEDURE — 74011250636 HC RX REV CODE- 250/636: Performed by: INTERNAL MEDICINE

## 2021-08-18 PROCEDURE — 77030002916 HC SUT ETHLN J&J -A: Performed by: ORTHOPAEDIC SURGERY

## 2021-08-18 PROCEDURE — 74011250637 HC RX REV CODE- 250/637: Performed by: INTERNAL MEDICINE

## 2021-08-18 PROCEDURE — 74011000250 HC RX REV CODE- 250: Performed by: INTERNAL MEDICINE

## 2021-08-18 PROCEDURE — 77030003028 HC SUT VCRL J&J -A: Performed by: ORTHOPAEDIC SURGERY

## 2021-08-18 PROCEDURE — 84132 ASSAY OF SERUM POTASSIUM: CPT

## 2021-08-18 PROCEDURE — 80048 BASIC METABOLIC PNL TOTAL CA: CPT

## 2021-08-18 PROCEDURE — 74011250636 HC RX REV CODE- 250/636: Performed by: ORTHOPAEDIC SURGERY

## 2021-08-18 PROCEDURE — 85025 COMPLETE CBC W/AUTO DIFF WBC: CPT

## 2021-08-18 PROCEDURE — 88307 TISSUE EXAM BY PATHOLOGIST: CPT

## 2021-08-18 PROCEDURE — 77030006835 HC BLD SAW SAG STRY -B: Performed by: ORTHOPAEDIC SURGERY

## 2021-08-18 PROCEDURE — 74011000250 HC RX REV CODE- 250: Performed by: NURSE ANESTHETIST, CERTIFIED REGISTERED

## 2021-08-18 PROCEDURE — 0Y6H0Z1 DETACHMENT AT RIGHT LOWER LEG, HIGH, OPEN APPROACH: ICD-10-PCS | Performed by: ORTHOPAEDIC SURGERY

## 2021-08-18 PROCEDURE — 74011250637 HC RX REV CODE- 250/637: Performed by: NURSE ANESTHETIST, CERTIFIED REGISTERED

## 2021-08-18 PROCEDURE — 74011636637 HC RX REV CODE- 636/637: Performed by: INTERNAL MEDICINE

## 2021-08-18 PROCEDURE — 77030019908 HC STETH ESOPH SIMS -A: Performed by: ANESTHESIOLOGY

## 2021-08-18 PROCEDURE — 27880 AMPUTATION OF LOWER LEG: CPT | Performed by: ORTHOPAEDIC SURGERY

## 2021-08-18 PROCEDURE — 76060000040 HC ANESTHESIA 4.5 TO 5 HR: Performed by: ORTHOPAEDIC SURGERY

## 2021-08-18 PROCEDURE — 82803 BLOOD GASES ANY COMBINATION: CPT

## 2021-08-18 PROCEDURE — 77030019934 HC DRSG VAC ASST KCON -B: Performed by: ORTHOPAEDIC SURGERY

## 2021-08-18 PROCEDURE — 77030002996 HC SUT SLK J&J -A: Performed by: ORTHOPAEDIC SURGERY

## 2021-08-18 PROCEDURE — 77030002933 HC SUT MCRYL J&J -A: Performed by: ORTHOPAEDIC SURGERY

## 2021-08-18 PROCEDURE — 99223 1ST HOSP IP/OBS HIGH 75: CPT | Performed by: INTERNAL MEDICINE

## 2021-08-18 PROCEDURE — 77030003029 HC SUT VCRL J&J -B: Performed by: ORTHOPAEDIC SURGERY

## 2021-08-18 PROCEDURE — 76210000000 HC OR PH I REC 2 TO 2.5 HR: Performed by: ORTHOPAEDIC SURGERY

## 2021-08-18 PROCEDURE — 65270000029 HC RM PRIVATE

## 2021-08-18 PROCEDURE — 74011250636 HC RX REV CODE- 250/636: Performed by: ANESTHESIOLOGY

## 2021-08-18 PROCEDURE — 94002 VENT MGMT INPAT INIT DAY: CPT

## 2021-08-18 PROCEDURE — 77030008462 HC STPLR SKN PROX J&J -A: Performed by: ORTHOPAEDIC SURGERY

## 2021-08-18 PROCEDURE — 82962 GLUCOSE BLOOD TEST: CPT

## 2021-08-18 PROCEDURE — 2709999900 HC NON-CHARGEABLE SUPPLY: Performed by: ORTHOPAEDIC SURGERY

## 2021-08-18 PROCEDURE — 77030037088 HC TUBE ENDOTRACH ORAL NSL COVD-A: Performed by: ANESTHESIOLOGY

## 2021-08-18 PROCEDURE — 76010000175 HC OR TIME 4 TO 4.5 HR INTENSV-TIER 1: Performed by: ORTHOPAEDIC SURGERY

## 2021-08-18 PROCEDURE — 77030040922 HC BLNKT HYPOTHRM STRY -A: Performed by: ANESTHESIOLOGY

## 2021-08-18 PROCEDURE — 77030021678 HC GLIDESCP STAT DISP VERT -B: Performed by: ANESTHESIOLOGY

## 2021-08-18 PROCEDURE — 85730 THROMBOPLASTIN TIME PARTIAL: CPT

## 2021-08-18 RX ORDER — SODIUM CHLORIDE 0.9 % (FLUSH) 0.9 %
5-40 SYRINGE (ML) INJECTION AS NEEDED
Status: DISCONTINUED | OUTPATIENT
Start: 2021-08-18 | End: 2021-08-25 | Stop reason: HOSPADM

## 2021-08-18 RX ORDER — ONDANSETRON 2 MG/ML
INJECTION INTRAMUSCULAR; INTRAVENOUS AS NEEDED
Status: DISCONTINUED | OUTPATIENT
Start: 2021-08-18 | End: 2021-08-18 | Stop reason: HOSPADM

## 2021-08-18 RX ORDER — FENTANYL CITRATE 50 UG/ML
INJECTION, SOLUTION INTRAMUSCULAR; INTRAVENOUS AS NEEDED
Status: DISCONTINUED | OUTPATIENT
Start: 2021-08-18 | End: 2021-08-18 | Stop reason: HOSPADM

## 2021-08-18 RX ORDER — LIDOCAINE HYDROCHLORIDE 20 MG/ML
INJECTION, SOLUTION EPIDURAL; INFILTRATION; INTRACAUDAL; PERINEURAL AS NEEDED
Status: DISCONTINUED | OUTPATIENT
Start: 2021-08-18 | End: 2021-08-18 | Stop reason: HOSPADM

## 2021-08-18 RX ORDER — MIDAZOLAM HYDROCHLORIDE 1 MG/ML
2 INJECTION, SOLUTION INTRAMUSCULAR; INTRAVENOUS
Status: DISCONTINUED | OUTPATIENT
Start: 2021-08-18 | End: 2021-08-18 | Stop reason: HOSPADM

## 2021-08-18 RX ORDER — SODIUM CHLORIDE, SODIUM LACTATE, POTASSIUM CHLORIDE, CALCIUM CHLORIDE 600; 310; 30; 20 MG/100ML; MG/100ML; MG/100ML; MG/100ML
75 INJECTION, SOLUTION INTRAVENOUS CONTINUOUS
Status: DISCONTINUED | OUTPATIENT
Start: 2021-08-18 | End: 2021-08-19 | Stop reason: HOSPADM

## 2021-08-18 RX ORDER — CEFAZOLIN SODIUM/WATER 2 G/20 ML
2 SYRINGE (ML) INTRAVENOUS ONCE
Status: DISCONTINUED | OUTPATIENT
Start: 2021-08-18 | End: 2021-08-18 | Stop reason: DRUGHIGH

## 2021-08-18 RX ORDER — VANCOMYCIN HYDROCHLORIDE 1 G/20ML
INJECTION, POWDER, LYOPHILIZED, FOR SOLUTION INTRAVENOUS AS NEEDED
Status: DISCONTINUED | OUTPATIENT
Start: 2021-08-18 | End: 2021-08-18 | Stop reason: HOSPADM

## 2021-08-18 RX ORDER — ALBUTEROL SULFATE 0.83 MG/ML
2.5 SOLUTION RESPIRATORY (INHALATION)
Status: DISCONTINUED | OUTPATIENT
Start: 2021-08-18 | End: 2021-08-19 | Stop reason: HOSPADM

## 2021-08-18 RX ORDER — PROPOFOL 10 MG/ML
0-50 VIAL (ML) INTRAVENOUS
Status: DISCONTINUED | OUTPATIENT
Start: 2021-08-18 | End: 2021-08-19

## 2021-08-18 RX ORDER — METOPROLOL SUCCINATE 25 MG/1
25 TABLET, EXTENDED RELEASE ORAL 2 TIMES DAILY
Status: DISCONTINUED | OUTPATIENT
Start: 2021-08-18 | End: 2021-08-19

## 2021-08-18 RX ORDER — ALBUTEROL SULFATE 90 UG/1
AEROSOL, METERED RESPIRATORY (INHALATION) AS NEEDED
Status: DISCONTINUED | OUTPATIENT
Start: 2021-08-18 | End: 2021-08-18 | Stop reason: HOSPADM

## 2021-08-18 RX ORDER — HYDROMORPHONE HYDROCHLORIDE 2 MG/ML
0.5 INJECTION, SOLUTION INTRAMUSCULAR; INTRAVENOUS; SUBCUTANEOUS
Status: DISCONTINUED | OUTPATIENT
Start: 2021-08-18 | End: 2021-08-25 | Stop reason: HOSPADM

## 2021-08-18 RX ORDER — NALOXONE HYDROCHLORIDE 0.4 MG/ML
0.4 INJECTION, SOLUTION INTRAMUSCULAR; INTRAVENOUS; SUBCUTANEOUS AS NEEDED
Status: DISCONTINUED | OUTPATIENT
Start: 2021-08-18 | End: 2021-08-25 | Stop reason: HOSPADM

## 2021-08-18 RX ORDER — SODIUM CHLORIDE 9 MG/ML
75 INJECTION, SOLUTION INTRAVENOUS CONTINUOUS
Status: DISCONTINUED | OUTPATIENT
Start: 2021-08-18 | End: 2021-08-18 | Stop reason: HOSPADM

## 2021-08-18 RX ORDER — PREGABALIN 50 MG/1
200 CAPSULE ORAL 2 TIMES DAILY
Status: DISCONTINUED | OUTPATIENT
Start: 2021-08-18 | End: 2021-08-25 | Stop reason: HOSPADM

## 2021-08-18 RX ORDER — ATORVASTATIN CALCIUM 40 MG/1
40 TABLET, FILM COATED ORAL
Status: DISCONTINUED | OUTPATIENT
Start: 2021-08-18 | End: 2021-08-25 | Stop reason: HOSPADM

## 2021-08-18 RX ORDER — DEXTROSE 50 % IN WATER (D50W) INTRAVENOUS SYRINGE
25-50 AS NEEDED
Status: DISCONTINUED | OUTPATIENT
Start: 2021-08-18 | End: 2021-08-25 | Stop reason: HOSPADM

## 2021-08-18 RX ORDER — PROPOFOL 10 MG/ML
INJECTION, EMULSION INTRAVENOUS AS NEEDED
Status: DISCONTINUED | OUTPATIENT
Start: 2021-08-18 | End: 2021-08-18 | Stop reason: HOSPADM

## 2021-08-18 RX ORDER — SODIUM CHLORIDE 0.9 % (FLUSH) 0.9 %
5-40 SYRINGE (ML) INJECTION AS NEEDED
Status: DISCONTINUED | OUTPATIENT
Start: 2021-08-18 | End: 2021-08-18 | Stop reason: HOSPADM

## 2021-08-18 RX ORDER — SODIUM CHLORIDE, SODIUM LACTATE, POTASSIUM CHLORIDE, CALCIUM CHLORIDE 600; 310; 30; 20 MG/100ML; MG/100ML; MG/100ML; MG/100ML
75 INJECTION, SOLUTION INTRAVENOUS CONTINUOUS
Status: DISCONTINUED | OUTPATIENT
Start: 2021-08-18 | End: 2021-08-18

## 2021-08-18 RX ORDER — SODIUM CHLORIDE 0.9 % (FLUSH) 0.9 %
5-40 SYRINGE (ML) INJECTION EVERY 8 HOURS
Status: DISCONTINUED | OUTPATIENT
Start: 2021-08-18 | End: 2021-08-25 | Stop reason: HOSPADM

## 2021-08-18 RX ORDER — MIDAZOLAM HYDROCHLORIDE 1 MG/ML
4 INJECTION, SOLUTION INTRAMUSCULAR; INTRAVENOUS ONCE
Status: DISCONTINUED | OUTPATIENT
Start: 2021-08-18 | End: 2021-08-18 | Stop reason: HOSPADM

## 2021-08-18 RX ORDER — NALOXONE HYDROCHLORIDE 0.4 MG/ML
INJECTION, SOLUTION INTRAMUSCULAR; INTRAVENOUS; SUBCUTANEOUS AS NEEDED
Status: DISCONTINUED | OUTPATIENT
Start: 2021-08-18 | End: 2021-08-18 | Stop reason: HOSPADM

## 2021-08-18 RX ORDER — INSULIN LISPRO 100 [IU]/ML
INJECTION, SOLUTION INTRAVENOUS; SUBCUTANEOUS
Status: DISCONTINUED | OUTPATIENT
Start: 2021-08-18 | End: 2021-08-19

## 2021-08-18 RX ORDER — CEFAZOLIN SODIUM/WATER 2 G/20 ML
2 SYRINGE (ML) INTRAVENOUS EVERY 8 HOURS
Status: DISCONTINUED | OUTPATIENT
Start: 2021-08-18 | End: 2021-08-20

## 2021-08-18 RX ORDER — SODIUM CHLORIDE 0.9 % (FLUSH) 0.9 %
5-40 SYRINGE (ML) INJECTION EVERY 8 HOURS
Status: DISCONTINUED | OUTPATIENT
Start: 2021-08-18 | End: 2021-08-18 | Stop reason: HOSPADM

## 2021-08-18 RX ORDER — DEXTROSE 50 % IN WATER (D50W) INTRAVENOUS SYRINGE
25 ONCE
Status: COMPLETED | OUTPATIENT
Start: 2021-08-18 | End: 2021-08-19

## 2021-08-18 RX ORDER — PROPOFOL 10 MG/ML
0-50 VIAL (ML) INTRAVENOUS
Status: DISCONTINUED | OUTPATIENT
Start: 2021-08-18 | End: 2021-08-18

## 2021-08-18 RX ORDER — HYDROCODONE BITARTRATE AND ACETAMINOPHEN 5; 325 MG/1; MG/1
2 TABLET ORAL AS NEEDED
Status: DISCONTINUED | OUTPATIENT
Start: 2021-08-18 | End: 2021-08-19 | Stop reason: HOSPADM

## 2021-08-18 RX ORDER — EPHEDRINE SULFATE/0.9% NACL/PF 50 MG/5 ML
SYRINGE (ML) INTRAVENOUS AS NEEDED
Status: DISCONTINUED | OUTPATIENT
Start: 2021-08-18 | End: 2021-08-18 | Stop reason: HOSPADM

## 2021-08-18 RX ORDER — ROCURONIUM BROMIDE 10 MG/ML
INJECTION, SOLUTION INTRAVENOUS AS NEEDED
Status: DISCONTINUED | OUTPATIENT
Start: 2021-08-18 | End: 2021-08-18 | Stop reason: HOSPADM

## 2021-08-18 RX ORDER — FENTANYL CITRATE 50 UG/ML
100 INJECTION, SOLUTION INTRAMUSCULAR; INTRAVENOUS ONCE
Status: DISCONTINUED | OUTPATIENT
Start: 2021-08-18 | End: 2021-08-18 | Stop reason: HOSPADM

## 2021-08-18 RX ORDER — HYDROMORPHONE HYDROCHLORIDE 2 MG/ML
0.5 INJECTION, SOLUTION INTRAMUSCULAR; INTRAVENOUS; SUBCUTANEOUS
Status: DISCONTINUED | OUTPATIENT
Start: 2021-08-18 | End: 2021-08-19 | Stop reason: HOSPADM

## 2021-08-18 RX ORDER — TRAZODONE HYDROCHLORIDE 50 MG/1
50 TABLET ORAL
Status: DISCONTINUED | OUTPATIENT
Start: 2021-08-18 | End: 2021-08-25 | Stop reason: HOSPADM

## 2021-08-18 RX ORDER — NYSTATIN 100000 [USP'U]/G
POWDER TOPICAL AS NEEDED
Status: DISCONTINUED | OUTPATIENT
Start: 2021-08-18 | End: 2021-08-25 | Stop reason: HOSPADM

## 2021-08-18 RX ORDER — DEXTROSE 40 %
15 GEL (GRAM) ORAL AS NEEDED
Status: DISCONTINUED | OUTPATIENT
Start: 2021-08-18 | End: 2021-08-25 | Stop reason: HOSPADM

## 2021-08-18 RX ORDER — SODIUM BICARBONATE 1 MEQ/ML
50 SYRINGE (ML) INTRAVENOUS ONCE
Status: COMPLETED | OUTPATIENT
Start: 2021-08-18 | End: 2021-08-18

## 2021-08-18 RX ORDER — ENOXAPARIN SODIUM 150 MG/ML
120 INJECTION SUBCUTANEOUS EVERY 12 HOURS
Status: DISCONTINUED | OUTPATIENT
Start: 2021-08-18 | End: 2021-08-21

## 2021-08-18 RX ORDER — FUROSEMIDE 20 MG/1
20 TABLET ORAL AS NEEDED
Status: DISCONTINUED | OUTPATIENT
Start: 2021-08-18 | End: 2021-08-19

## 2021-08-18 RX ORDER — OXYCODONE HYDROCHLORIDE 5 MG/1
5 TABLET ORAL
Status: DISCONTINUED | OUTPATIENT
Start: 2021-08-18 | End: 2021-08-19 | Stop reason: HOSPADM

## 2021-08-18 RX ORDER — SODIUM CHLORIDE 0.9 G/100ML
IRRIGANT IRRIGATION AS NEEDED
Status: DISCONTINUED | OUTPATIENT
Start: 2021-08-18 | End: 2021-08-18 | Stop reason: HOSPADM

## 2021-08-18 RX ORDER — LIDOCAINE HYDROCHLORIDE 10 MG/ML
0.1 INJECTION INFILTRATION; PERINEURAL AS NEEDED
Status: DISCONTINUED | OUTPATIENT
Start: 2021-08-18 | End: 2021-08-18 | Stop reason: HOSPADM

## 2021-08-18 RX ADMIN — SODIUM ZIRCONIUM CYCLOSILICATE 15 G: 10 POWDER, FOR SUSPENSION ORAL at 10:12

## 2021-08-18 RX ADMIN — PROPOFOL 100 MG: 10 INJECTION, EMULSION INTRAVENOUS at 12:14

## 2021-08-18 RX ADMIN — PHENYLEPHRINE HYDROCHLORIDE 100 MCG: 10 INJECTION INTRAVENOUS at 12:36

## 2021-08-18 RX ADMIN — HYDROMORPHONE HYDROCHLORIDE 0.5 MG: 2 INJECTION INTRAMUSCULAR; INTRAVENOUS; SUBCUTANEOUS at 23:45

## 2021-08-18 RX ADMIN — DEXTROSE MONOHYDRATE 25 G: 25 INJECTION, SOLUTION INTRAVENOUS at 10:33

## 2021-08-18 RX ADMIN — SODIUM CHLORIDE: 900 INJECTION, SOLUTION INTRAVENOUS at 16:00

## 2021-08-18 RX ADMIN — CEFAZOLIN 3 G: 1 INJECTION, POWDER, FOR SOLUTION INTRAVENOUS at 12:32

## 2021-08-18 RX ADMIN — ENOXAPARIN SODIUM 120 MG: 120 INJECTION SUBCUTANEOUS at 21:48

## 2021-08-18 RX ADMIN — PHENYLEPHRINE HYDROCHLORIDE 200 MCG: 10 INJECTION INTRAVENOUS at 12:23

## 2021-08-18 RX ADMIN — Medication 10 MG: at 12:36

## 2021-08-18 RX ADMIN — NALXONE HYDROCHLORIDE 0.1 MG: 0.4 INJECTION INTRAMUSCULAR; INTRAVENOUS; SUBCUTANEOUS at 15:51

## 2021-08-18 RX ADMIN — Medication 10 MG: at 12:21

## 2021-08-18 RX ADMIN — PHENYLEPHRINE HYDROCHLORIDE 100 MCG: 10 INJECTION INTRAVENOUS at 14:20

## 2021-08-18 RX ADMIN — ROCURONIUM BROMIDE 50 MG: 10 INJECTION, SOLUTION INTRAVENOUS at 12:14

## 2021-08-18 RX ADMIN — HYDROMORPHONE HYDROCHLORIDE 0.5 MG: 2 INJECTION INTRAMUSCULAR; INTRAVENOUS; SUBCUTANEOUS at 17:40

## 2021-08-18 RX ADMIN — PHENYLEPHRINE HYDROCHLORIDE 100 MCG: 10 INJECTION INTRAVENOUS at 12:15

## 2021-08-18 RX ADMIN — SUGAMMADEX 200 MG: 100 INJECTION, SOLUTION INTRAVENOUS at 15:52

## 2021-08-18 RX ADMIN — INSULIN HUMAN 10 UNITS: 100 INJECTION, SOLUTION PARENTERAL at 10:29

## 2021-08-18 RX ADMIN — Medication 10 MG: at 12:32

## 2021-08-18 RX ADMIN — Medication 10 MG: at 14:33

## 2021-08-18 RX ADMIN — ALBUTEROL SULFATE 3 PUFF: 90 AEROSOL, METERED RESPIRATORY (INHALATION) at 16:00

## 2021-08-18 RX ADMIN — FAMOTIDINE 20 MG: 10 INJECTION INTRAVENOUS at 23:14

## 2021-08-18 RX ADMIN — PHENYLEPHRINE HYDROCHLORIDE 50 MCG: 10 INJECTION INTRAVENOUS at 12:14

## 2021-08-18 RX ADMIN — FENTANYL CITRATE 50 MCG: 50 INJECTION INTRAMUSCULAR; INTRAVENOUS at 12:14

## 2021-08-18 RX ADMIN — SODIUM CHLORIDE 75 ML/HR: 900 INJECTION, SOLUTION INTRAVENOUS at 08:15

## 2021-08-18 RX ADMIN — PHENYLEPHRINE HYDROCHLORIDE 100 MCG: 10 INJECTION INTRAVENOUS at 12:19

## 2021-08-18 RX ADMIN — SUGAMMADEX 200 MG: 100 INJECTION, SOLUTION INTRAVENOUS at 15:31

## 2021-08-18 RX ADMIN — Medication 10 ML: at 22:35

## 2021-08-18 RX ADMIN — CEFAZOLIN 2 G: 10 INJECTION, POWDER, FOR SOLUTION INTRAVENOUS at 22:59

## 2021-08-18 RX ADMIN — PHENYLEPHRINE HYDROCHLORIDE 100 MCG: 10 INJECTION INTRAVENOUS at 12:32

## 2021-08-18 RX ADMIN — SODIUM CHLORIDE: 900 INJECTION, SOLUTION INTRAVENOUS at 12:08

## 2021-08-18 RX ADMIN — LIDOCAINE HYDROCHLORIDE 60 MG: 20 INJECTION, SOLUTION EPIDURAL; INFILTRATION; INTRACAUDAL; PERINEURAL at 12:14

## 2021-08-18 RX ADMIN — PHENYLEPHRINE HYDROCHLORIDE 120 MCG: 10 INJECTION INTRAVENOUS at 13:53

## 2021-08-18 RX ADMIN — PHENYLEPHRINE HYDROCHLORIDE 100 MCG: 10 INJECTION INTRAVENOUS at 14:33

## 2021-08-18 RX ADMIN — PROPOFOL 15 MCG/KG/MIN: 10 INJECTION, EMULSION INTRAVENOUS at 17:49

## 2021-08-18 RX ADMIN — HYDROMORPHONE HYDROCHLORIDE 0.5 MG: 2 INJECTION INTRAMUSCULAR; INTRAVENOUS; SUBCUTANEOUS at 17:25

## 2021-08-18 RX ADMIN — PHENYLEPHRINE HYDROCHLORIDE 100 MCG: 10 INJECTION INTRAVENOUS at 15:00

## 2021-08-18 RX ADMIN — NALXONE HYDROCHLORIDE 0.1 MG: 0.4 INJECTION INTRAMUSCULAR; INTRAVENOUS; SUBCUTANEOUS at 15:58

## 2021-08-18 RX ADMIN — PHENYLEPHRINE HYDROCHLORIDE 100 MCG: 10 INJECTION INTRAVENOUS at 13:36

## 2021-08-18 RX ADMIN — SODIUM BICARBONATE 50 MEQ: 84 INJECTION, SOLUTION INTRAVENOUS at 10:22

## 2021-08-18 RX ADMIN — Medication 30 ML: at 22:00

## 2021-08-18 RX ADMIN — ONDANSETRON 4 MG: 2 INJECTION INTRAMUSCULAR; INTRAVENOUS at 15:42

## 2021-08-18 NOTE — INTERVAL H&P NOTE
Update History & Physical    The Patient's History and Physical was reviewed   I discussed the surgery and patients medical condition with the patient. The chart was reviewed with the patient and I examined the patient. After discussion with anesthesia and hospitalist we feel if potassium gets below 5.5 then we can proceed with R BKA. All physicians agree the sooner the better for the patients health. At 11:11 AM.  Potassium redraw at 4.9. It is safe to proceed. CV: Afib but NOT RVR  RESP: CTAB    Plan:  The risk, benefits, expected outcome, and alternative to the recommended procedure have been discussed with the patient. Patient understands and wants to proceed with the procedure.     Electronically signed by Aldo Bolanos MD on 08/18/21 11:10 AM

## 2021-08-18 NOTE — PROGRESS NOTES
Hospitalist (Dr. Dae Roman) called regarding hospital consult. Informed MD about patients potassium, medical history, and why patient was here and needed hospital admission. MD also aware that there are currently no orders to correct potassium. [facility] primary RN aware.

## 2021-08-18 NOTE — PROGRESS NOTES
Patient needs a Right BKA. He has recent sepsis with R foot ulcer and exposed bone. Today we planned to do the BKA however his pre op potassium is 5.8. Anesthesia feels it is unsafe to intubate and go under anesthesia. Thefore Patient will need medical admittance for optimization. He needs this potassium corrected. He has CKD stage III, CHF and coronary disease. Infectious disease will need to be involved as he has current PICC line. Cardiology should be involved as well. Plan is to resume anticoagulation (as he has recent afib w/ RVR). He stopped Eliquis 48 hours ago. Hopefully we can get him on Lovenox renal dosing so surgery can be scheduled with less delay. If we restarted Eliquis he would have be off for 48 hours again prior to surgery. This is logistically difficult. Heparin and lovenox provide an easier route to the OR. Hopefully he can improve and stabilize over the next 48 hours, we can hold is anticoagulation, and proceed with BKA next Monday. I discussed this plan with the family who understands. I discussed that my partner may have to perform the surgery as logistically I may not have time to fit his amputation into the time frame in which is cleared for surgery.      Ortho will follow while in the Hospital.

## 2021-08-18 NOTE — ANESTHESIA PREPROCEDURE EVALUATION
Relevant Problems   CARDIOVASCULAR   (+) Atrial fibrillation with RVR (HCC)   (+) CAD (coronary artery disease)      RENAL FAILURE   (+) Acute kidney injury superimposed on CKD (HCC)   (+) Benign hypertensive kidney disease with chronic kidney disease   (+) Stage 3 chronic kidney disease (HCC)      ENDOCRINE   (+) Controlled type 2 diabetes mellitus with diabetic polyneuropathy, without long-term current use of insulin (HCC)   (+) Severe obesity (BMI 35.0-35.9 with comorbidity) (HCC)   (+) Type 2 diabetes mellitus with nephropathy (HCC)       Anesthetic History   No history of anesthetic complications            Review of Systems / Medical History  Patient summary reviewed and pertinent labs reviewed    Pulmonary  Within defined limits              Comments: On home oxygen   Neuro/Psych   Within defined limits           Cardiovascular    Hypertension: well controlled      CHF  Dysrhythmias (on eliquis) : atrial fibrillation  CAD and cardiac stents (1999)    Exercise tolerance[de-identified] Limited by foot  Comments: Echo 7/21 - LV: Estimated LVEF is 30 - 35%. Normal wall thickness. Mildly dilated left ventricle. Moderate-to-severely reduced systolic function.  Wall motion abnormalities with suggest coronary artery disease   GI/Hepatic/Renal         Renal disease: CRI       Endo/Other    Diabetes: well controlled    Morbid obesity     Other Findings              Physical Exam    Airway  Mallampati: II  TM Distance: 4 - 6 cm  Neck ROM: normal range of motion   Mouth opening: Normal     Cardiovascular  Regular rate and rhythm,  S1 and S2 normal,  no murmur, click, rub, or gallop             Dental    Dentition: Poor dentition     Pulmonary  Breath sounds clear to auscultation               Abdominal         Other Findings            Anesthetic Plan    ASA: 4  Anesthesia type: general          Induction: Intravenous  Anesthetic plan and risks discussed with: Patient

## 2021-08-18 NOTE — CONSULTS
[facility] Hospitalist Consult   Admit Date:  2021  7:31 AM   Name:  Eleazar Gilliam   Age:  77 y.o. Sex:  male  :  1945   MRN:  [id number]     Presenting Complaint: No chief complaint on file. Reason(s) for Admission: Charcot's joint of foot, right [M14.671]  Infected wound [T14. Denice Thurston, L08.9]     Hospitalists consulted by Riddhi Hughes MD for: Medical management    History of Presenting Illness:   Eleazar Gilliam is a 77 y.o. male with history of diabetes type 2, CHF, atrial fibrillation, stage III chronic kidney disease and coronary artery disease with morbid obesity who presents to the hospital for elective BKA. Patient seen in the preoperative area of the hospital prior to BKA for hyperkalemia. Patient currently denies fever chills nausea vomiting chest pain or shortness of breath. Patient currently has no acute process and was consulted for medical management status post BKA. Review of Systems:  10 systems reviewed and negative except as noted in HPI. Assessment & Plan:   Hyperkalemia-potassium preop noted to be 5.7. IV insulin with D5W and local male 15 mg p.o. x1 as well as bicarbonate administered. Repeat potassium noted to be 4.9. Patient now immediate postoperative will check BMP. Right Charcot ankle with large lateral ulcer and sepsis-orthopedic surgery admitting and plans for BKA today. Patient previously receiving Ancef 2 g every 8 hours on an outpatient basis per recommendations of infectious disease. Infectious disease outpatient note reviewed and will consult infectious disease. Given BKA likely can discontinue antibiotics in 24 to 48 hours. No current cultures      Atrial fibrillation-patient's home metoprolol has been resumed. However this may be converted to IV metoprolol given the patient is currently intubated in recovery and having difficulty with extubation.   Cardiology notes on an outpatient reviewed and cardiology recommended the patient not have anticoagulation discontinued for more than 24 to 48 hours. Patient had anticoagulation held 48 hours prior to surgery. Spoke with orthopedic surgery regarding this matter and elected for heparin drip to be initiated. Will consult cardiology for monitoring of anticoagulation. CHF-patient had echo performed on 7/21/2021 demonstrating ejection fraction of 30 to 35%. Patient metoprolol and oral Lasix have been resumed. Patient may need conversion to IV if unable to wean from ventilator immediately postoperative. Cardiology consulted. Patient noted to not be on ACE inhibitor on an outpatient basis and may benefit from initiation provided patient's blood pressure and renal function will tolerate. Diabetes-patient noted to have a hemoglobin A1c of 6.7 and would be considered to goal given the patient's advanced age of 77. Patient's oral regimen of glipizide has been held during hospitalization and would recommend discontinuing at discharge as glipizide in the elderly is known to cause episodes of hypoglycemia and hypoglycemia would be a much more profound risk in this elderly gentleman. Will start patient on sliding scale. Morbid obesity-  Noted      4:45 PM-received call from Dr. Karthikeyan Thacker orthopedic surgeon stating anesthesiology having a difficult time with extubating patient postoperatively. Patient may require mechanical ventilation through the night. I spoken with hospitalist covering this evening and made them aware. There are no active hospital problems to display for this patient. Past Medical History:   Diagnosis Date    A-fib [facility]) 07/19/2021    developed AFID after hospital admission for wound infection- started on Eliquis    CAD (coronary artery disease)     [facility].     Chronic kidney disease     stage 3- improved    COVID-19 vaccine series completed     Moderna Vaccine completed X2 doses    Current use of long term anticoagulation     Eliquis and Aspirin  H/O heart artery stent     X1 placed in 1999    History of MI (myocardial infarction) 1999    stent placed X1    Hypercholesterolemia     Hypertension     Left ventricular dysfunction     echo revealed EF 30-35%, mildly dilated LA/RA and mild TR and MR.    Neuropathy     severe    Oxygen dependent     recently started on 2L NC continous- Sleep study to be scheduled-questionable ELAINA    PICC (peripherally inserted central catheter) in place     PICC line in place to R arm    Type 2 diabetes mellitus ([facility] [address]     oral agent/Pt does not monitor BS/no s.s of hypoglycemia/Last A1c: 6.7 on 7/13/21 per daughter      Past Surgical History:   Procedure Laterality Date    DC CARDIAC SURG PROCEDURE UNLIST  1999    stent placement      No Known Allergies   Social History     Tobacco Use    Smoking status: Never Smoker    Smokeless tobacco: Never Used   Substance Use Topics    Alcohol use: No      Family History   Problem Relation Age of Onset    Cancer Mother     Cancer Father     No Known Problems Maternal Grandmother     No Known Problems Maternal Grandfather     No Known Problems Paternal Grandmother     No Known Problems Paternal Grandfather       Family history reviewed and noncontributory to patient's acute condition; no relevant family history unless otherwise noted above.   Immunization History   Administered Date(s) Administered    COVID-19, PFIZER, MRNA, LNP-S, PF, 30MCG/0.3ML DOSE 02/27/2021, 03/02/2021    Influenza High Dose Vaccine PF 12/30/2015, 11/04/2016, 11/06/2017, 10/10/2018, 12/15/2020    Influenza Vaccine (Tri) Adjuvanted (>65 Yrs FLUAD TRI 45054) 10/15/2019    Pneumococcal Conjugate (PCV-13) 10/30/2015    Pneumococcal Polysaccharide (PPSV-23) 11/04/2016       Objective:     Patient Vitals for the past 24 hrs:   Temp Pulse Resp BP SpO2   08/18/21 0823 97.7 °F (36.5 °C) 82 18 110/63 100 %     Oxygen Therapy  O2 Sat (%): 100 % (08/18/21 0823)  O2 Device: Nasal cannula (08/18/21 0823)  O2 Flow Rate (L/min): 3 l/min (08/18/21 0823)    Estimated body mass index is 40.18 kg/m² as calculated from the following:    Height as of this encounter: 5' 10\" (1.778 m). Weight as of this encounter: 127 kg (280 lb). Intake/Output Summary (Last 24 hours) at 8/18/2021 1628  Last data filed at 8/18/2021 1600  Gross per 24 hour   Intake 1000 ml   Output 100 ml   Net 900 ml         Physical Exam:    General:    Well nourished. No overt distress  Head:  Normocephalic, atraumatic  Eyes:  Sclerae appear normal.  Pupils equally round. ENT:  Nares appear normal, no drainage. Moist oral mucosa   Neck:  No restricted ROM. Trachea midline   CV:   RRR. No m/r/g. No JVD. No edema  Lungs:   CTAB. No wheezing, rhonchi, or rales. Respirations even, unlabored  Abdomen: Bowel sounds present. Soft, nontender, nondistended. Extremities: Warm and dry. No cyanosis or clubbing. Right lower extremity edematous with wound currently not seen secondary to bandage  Skin:     No rashes. Normal coloration. Normal turgor. Neuro:  Cranial nerves II-XII grossly intact. Sensation intact  Psych:  Normal mood and affect. Alert and oriented x3    I have reviewed ordered lab tests and independently visualized imaging below:    Last 24hr Labs:  Recent Results (from the past 24 hour(s))   GLUCOSE, POC    Collection Time: 08/18/21  8:09 AM   Result Value Ref Range    Glucose (POC) 95 65 - 100 mg/dL    Performed by Sarah    POC SODIUM-POTASSIUM    Collection Time: 08/18/21  8:11 AM   Result Value Ref Range    Potassium, POC 5.8 (H) 3.5 - 5.1 MMOL/L   POC SODIUM-POTASSIUM    Collection Time: 08/18/21 11:07 AM   Result Value Ref Range    Potassium, POC 4.9 3.5 - 5.1 MMOL/L       All Micro Results     None          Other Studies:  No results found.     Current Meds:  Current Facility-Administered Medications   Medication Dose Route Frequency    lidocaine (XYLOCAINE) 10 mg/mL (1 %) injection 0.1 mL  0.1 mL SubCUTAneous PRN  fentaNYL citrate (PF) injection 100 mcg  100 mcg IntraVENous ONCE    midazolam (VERSED) injection 2 mg  2 mg IntraVENous ONCE PRN    midazolam (VERSED) injection 4 mg  4 mg IntraVENous ONCE    lactated Ringers infusion  75 mL/hr IntraVENous CONTINUOUS    oxyCODONE IR (ROXICODONE) tablet 5 mg  5 mg Oral ONCE PRN    HYDROcodone-acetaminophen (NORCO) 5-325 mg per tablet 2 Tablet  2 Tablet Oral PRN    HYDROmorphone (DILAUDID) injection 0.5 mg  0.5 mg IntraVENous Multiple    sodium chloride (NS) flush 5-40 mL  5-40 mL IntraVENous Q8H    sodium chloride (NS) flush 5-40 mL  5-40 mL IntraVENous PRN    sodium chloride (NS) flush 5-40 mL  5-40 mL IntraVENous Q8H    sodium chloride (NS) flush 5-40 mL  5-40 mL IntraVENous PRN    enoxaparin (LOVENOX) injection 120 mg  120 mg SubCUTAneous Q12H    0.9% sodium chloride infusion  75 mL/hr IntraVENous CONTINUOUS    vancomycin (VANCOCIN) injection    PRN    sodium chloride irrigation 0.9 % irrigation    PRN    atorvastatin (LIPITOR) tablet 40 mg  40 mg Oral QHS    furosemide (LASIX) tablet 20 mg  20 mg Oral PRN    metoprolol succinate (TOPROL-XL) XL tablet 25 mg  25 mg Oral BID    nystatin (MYCOSTATIN) 100,000 unit/gram powder   Topical PRN    pregabalin (LYRICA) capsule 200 mg  200 mg Oral BID    traZODone (DESYREL) tablet 50 mg  50 mg Oral QHS    insulin lispro (HUMALOG) injection   SubCUTAneous AC&HS    dextrose 40% (GLUTOSE) oral gel 1 Tube  15 g Oral PRN    glucagon (GLUCAGEN) injection 1 mg  1 mg IntraMUSCular PRN    dextrose (D50W) injection syrg 12.5-25 g  25-50 mL IntraVENous PRN     Facility-Administered Medications Ordered in Other Encounters   Medication Dose Route Frequency    PHENYLephrine 100 mcg/mL 10 mL syringe (ONE-STEP)   IntraVENous CONTINUOUS    ePHEDrine in NS (PF) (MISTOLE) 10 mg/mL in NS syringe   IntraVENous PRN    fentaNYL citrate (PF) injection   IntraVENous PRN    lidocaine (PF) (XYLOCAINE) 20 mg/mL (2 %) injection IntraVENous PRN    propofoL (DIPRIVAN) 10 mg/mL injection   IntraVENous PRN    rocuronium injection   IntraVENous PRN    sugammadex (BRIDION) 100 mg/mL injection   IntraVENous PRN    naloxone (NARCAN) injection   IntraVENous PRN    albuterol (PROVENTIL HFA, VENTOLIN HFA, PROAIR HFA) inhaler   Inhalation PRN       Signed:  Maxim Atkins DO    Part of this note may have been written by using a voice dictation software. The note has been proof read but may still contain some grammatical/other typographical errors.

## 2021-08-18 NOTE — PROGRESS NOTES
's pre-procedure visit requested by patient. Conveyed care and concern for patient and family. Offered prayer as requested for patient, family, and staff. Later, the procedure was cancelled.      Luis Hager MDiv, BS  Board Certified

## 2021-08-18 NOTE — PROGRESS NOTES
Ventilator check complete; patient has a #8. 0 ET tube secured at the 25 at the lip. Breath sounds are diminished. Trachea is midline, Negative for subcutaneous air, and chest excursion is symmetric. Patient is also Negative for cyanosis and is Negative for pitting edema. All alarms are set and audible. Resuscitation bag is at the head of the bed. Ventilator Settings  Mode FIO2 Rate Tidal Volume Pressure PEEP I:E Ratio   PRVC  60 %    500 ml     8 cm H20  1:3.3      Peak airway pressure: 27 cm H2O   Minute ventilation: 8 l/min     ABG: No results for input(s): PH, PCO2, PO2, HCO3 in the last 72 hours.       Makayla Span, RT

## 2021-08-18 NOTE — OP NOTES
Operative Note    Patient:David Dawson  MRN: [id number]    Date Of Surgery: 8/18/2021    Surgeon: Jamee Brooks MD    Assistant Surgeon: None    Procedure Performed:   Right Below the Knee (transtibial) amputation  Right Negative pressure wound therapy application    Pre Op Diagnosis:  Right charcot ankle with open wound    Post Op Diagnosis:   same    Implants:   * No implants in log *    Anesthesia:  General    Blood Loss:  100cc    Tourniquet:  Estimated thigh tourniquet @ 250 mmHg for 75 minutes    Complications:  none    Pre Operative Abx:   Ancef 3g    Specimens/Cultures:  Leg sent to path    Significant Findings:  Anterior compartment muscle with significant fatty degeneration. The lateral foot wound tracked to bone and the bone appeared diseased by direct examination. After amputation the wound edges and remaining tissue was viable with no signs of necrosis or infection. An incisional wound vac was placed at the end of the case. The length of the wound was 20 cm. Pre Operative Course:  Laurie Rutledge is a 77 y.o. male who is a neuropathic, diabetic with charcot ankle. He developed a deformity and ulcer on the lateral foot. This ulcer tracked to bone. This resulted in an infection, sepsis, bacteremia, Afib w/ RVR, CHF and respiratory distress. He was stabilized in the hospital.  He refused an amputation of the leg at the time. Therefor he was placed on long to antibiotics. He presented to my office last week. I discussed that salvage was not an option. I thought BKA was the best way to decrease his risk for future infections, to decrease the side effects of long term IV ABX, and to potentially get him back to activity. Any salvage would means multiple months of non weight bearing, multiple surgeries, and potential repeat infections. He and the family agreed to [facility]. Operation In Detail:    Patient was evaluated in the preoperative area. The Right  lower extremity was marked by me. We had a long discussion about the procedure and postoperative protocols. The patient was then brought back to the operating room suite and placed in the operating room table. A timeout was taken to identify the patient, procedure being performed, and laterality. I examined the lateral foot wound. It probed to bone with a q tip. .  I could see bone at the base of the ulcer after the necrotic tissue was brushed away. We then proceeded to prep and drape. After this the patient was prepped and draped in the normal sterile fashion using a Betadine solution and/or a ChloraPrep solution. A timeout was then taken to identify the patient his name, date of birth, laterality, and procedure being performed. We also identified allergies and any concerns about the operation. Attention was then placed to the operative extremity. Antibiotics, 3g Ancfef given Intravenously prior to incision. The thigh tourniquet had been placed prior to prepping. It was inflated to 250 mmHg. Identified the joint line of the right knee, tibial plateau. I also identified the tibial tubercle. I took a ruler and measured 15 cm below the tibial plateau . I marked this level of the anterior skin. I then made on the skin barron over the anterior two thirds of the calf at this level. This was  15 centimeters below the tibial plateau  I then willa a long posterior flap that extended down to the Achilles region. This was on the posterior 1/3 of the calf. I examined my potential incision and felt that this would provide adequate coverage and it was at a good level. I took my 15 blade knife and incised the transverse anterior skin marking. I went through the skin down to the fascia. I then took a knife and incised the anterior compartment fascia. I carefully spread with hemostats and identified the anterior neurovascular bundle. I first grabbed the artery and vein. I clamped both with 2 hemostats each.   I then took 2 separate 2-0 silk ties and tied off the vessel proximal to this level. I then removed the hemostats and cut the vessels distal to the silk ties. There is no signs of excessive bleeding. Next I identified the nerve,  I pulled traction on it and cut it proximally. It retracted to the soft tissues. I then worked over into the lateral compartment. I incised the fascia with a knife and then carefully dissected out the neurovascular bundle. Identified the peroneal nerve and artery. I clamped the peroneal artery with 2 hemostats. I then took 2 separate 2-0 silk ties and tied off the vessel just proximal to the hemostat. I then removed the hemostats and cut the vessels distal to the silks. There is no signs of excessive bleeding. I next identified the nerve and  pulled traction on it and cut it proximally. It retracted to the soft tissues. I then used the Bovie to cut to the musculature of the anterior lateral compartments down to the tibia itself. This exposed  the tibia bone. Any small vessels I cauterized. I then took a cob and elevated the soft tissues and periosteum off the anterior tibia circumferentially. I did this approximately 3 cm above the soft tissue envelope edge. I removed the soft tissues off the tibia bone using the Fair. I planned my tibial cut proximal to my skin cut. I took an oscillating saw to begin my tibia cut. I then angled 45 degrees distally, and cut the tibia. I cut the tibia starting the tibial crest and angled this distally at 45 degrees. I cut the tibia fully. I had placed a retractor behind the posterior tibia to protect the soft tissues and posterior neurovascular bundle. I then worked over to the fibula. I used a Fair to remove all the soft tissue off the fibula. I then worked proximally to 3 cm above the tibial cut. I cut my fibula at this level using the oscillating saw. I used danay retractors around the fibula to protect the soft tissues.   This was proximal to the  tibia cut by approximately 3 cm. I cut it more of a 30 degree angle angled towards the tibia. Now with both bone cuts completed I paid attention of the posterior compartment. I dissected out the posterior neurovascular bundle using a hemostat. The tibial nerve with associated artery and vein were identified. I exposed them very well. I first pulled traction of the tibial nerve, cut it with a 15 blade, and then watched it retract back to the soft tissues. I then dissected out the artery. I clamped it distally. I then used for 2-0 silk ties and tied off the artery 4 separate places. I then removed the clamps and severed the artery distal to the silk ties. I repeated the same step with the vein. There is no signs of bleeding. Now with the posterior neurovascular bundle controlled I worked through the posterior soft tissues. I then took my 10 blade and incised the skin over my posterior incision site. I took this down to the posterior fascia. I took a cake knife and starting behind the tibia and fibula cut all the soft tissue off the back of the tibia I one big cut. I then completed my cut and transected all the soft tissues at the same level as my posterior incision site. I removed all of the deep posterior compartment musculature and removed the leg. The leg was sent for a specimen. This left my stump, gastroc muscles and soleus muscle. I felt that there was too much muscle and would create too bulky of the stump. Therefore I debrided the soleus muscle off of the gastroc muscles. I left the fascia over the gastroc and the fascia over the soleus. Using a rasp and saw, I smooth the edges of the tibia and fibula to make the edges smoother. I then took a knife and scissor and debulked my soleus muscle, leaving the gastroc muscle. I then identified all vessels and bleeders circumferentially throughout the wound. I grabbed them with a pickup and cauterize them all.   I felt that this provided good hemostasis. I then placed packing into the wound with lap sponges. I with the tourniquet down held pressure for a few minutes with a lap sponge. A couple vessels were identified so I cauterized them. I held pressure for a few more minutes. There is no signs of excessive bleeding. The skin edges were well perfused. The muscle was viable. I then reinflated the tourniquet. He was down for approximately 15 minutes. I then took the gastroc muscle and folded it over the tibia from posterior to anterior. It provided good coverage and provided good bulky stump piece. I took the posterior skin flap that is associated with it and trimmed it using a 10 blade knife. I also trimmed some the gastroc muscle to debulk the stump end. I was able to get a good level of skin closure closure with the incision slightly anterior to the end of the stump. Therefore the incision would not be on the weightbearing portion of his leg. There were no dogears and I felt I can get good end in apposition without too much tension. I then irrigated out the wound copiously again. I placed 1g vancomycin powder in the deep wound. I took 0 vicryl sutures and sewed the posterior gastroc fascia to the anterior leg fascia. This provided a good deep layer without any dead space and with minimal tension. I then took 2-0 vicryl and closed the deeper layers of the posterior skin the anterior skin. I then closed the sub cutaneous skin with 3-0 monocryl. The top layer of skin was closed with a 3-0 nylon interrupted vertical mattress suture. I used a 15 blade knife too trim the skin edges. After a good closure I cleaned the wound off. I then measured the wound. It was 20cm. I cut a strip of a WVAC sponge the same length. I placed xeroform on the wound, the sponge on top of the xeroform, and then sealed the sponge with the adhesive. We hooked it up to the [facility] [place] machine.   We got a good seal with no leaks.  This was an incisional wound vac. All counts were correct. A very bulky and well padded sterile dressing was then applied to the leg and Posterior slab splint. The splint went above the knee. The patient was then awoken from anesthesia. He wound wake up and follow commands, however he had a difficult time breathing on his own. Therefore he was transferred to PACU on the ventilator. Post Operative Plan:   1- WB status: Non-Weight Bearing  RLE   2- Admit to medicine for medical management  3- PT/OT eval  4- DVT px: Lovenox  5- Pain Medication: Norco 5mg/325 q6 PRN pain #30  6 - He will be admitted for medical management and placement. 7- continue antibiotics x 24 hrs (ancef 2 doses)  8- wound vac to be removed prior to discharge. Hopefully a prevena can be placed at the time of discharge. WE can remove the splint and transition into a knee immobilizer at that time.

## 2021-08-18 NOTE — ANESTHESIA POSTPROCEDURE EVALUATION
Procedure(s):  Procedure Cancelled - Not Performed. general    Anesthesia Post Evaluation      Multimodal analgesia: multimodal analgesia used between 6 hours prior to anesthesia start to PACU discharge  Patient location during evaluation: PACU  Patient participation: complete - patient participated  Post-procedure mental status: sedated. Pain management: adequate  Airway patency: patent  Anesthetic complications: yes (failure to wean from Vent-)  Cardiovascular status: acceptable  Respiratory status: acceptable, ETT and ventilator  Hydration status: acceptable  Comments: Unable to extubate at the end of the case due to inadequate tidal volume and minute ventilation. Neuromuscular blockers fully reversed with 400 mg suggamadex. Only received 50mcg fentanyl >3hrs prior to emergence, but gave 0.2mg narcan anyway to rule out opioids as source of his respiratory failure. Would not consistently pull Vt >100 cc despite above measures, so he was left intubated and taken to PACU. Case was discussed and care relinquished to Dr. Jason Phillips with [facility]. Post anesthesia nausea and vomiting:  none      INITIAL Post-op Vital signs:   Vitals Value Taken Time   /61 08/18/21 1942   Temp     Pulse 92 08/18/21 1951   Resp     SpO2 97 % 08/18/21 1951   Vitals shown include unvalidated device data.

## 2021-08-18 NOTE — CONSULTS
CONSULT NOTE    Neel Rivas    8/18/2021    Date of Admission:  8/18/2021    The patient's chart is reviewed and the patient is discussed with the staff. Subjective: The patient is a 77 y.o.  male seen and evaluated at the request of Dr. Eric Wilson for respiratory management post op. He underwent a R BKA today and has not been able to be weaned from the ventilator in the PACU. He has a history of CHF, afib, respiratory failure, and sepsis felt due to a R foot ulcer. He was admitted on 7/19/21 with a R foot cellulitis on clindamycin therapy without improvement. Amputation was recommended but he initially  Refused. He was found to be in afib with RVR and volume overloaded. Blood cx revealed staph for which he was treated with Ancef after initial Vanc therapy. He is ordered Ancef 2 gm q 8rs with EOT scheduled for 9/3/21. An echo revealed a LVEF only 30-35%. He was also in renal failure with a creatinine up to 2.34 from a baseline of 1.6. By the time of discharge his creatinine had improved to 1.69. He required BIPAP therapy transiently and was changed to CPAP with variable compliance. He was felt to need a PSG as an OP. He was discharged on 2L at rest and 3L with exertion. He was also discharged on eliquis and metoprolol for afib. He was seen in OP follow up by Dr. Eric Wilson on 8/13 and he was given options for future management and he ultimately agree to proceed with amputation which was done today. Eliquis was held 48 Hrs. He still has a single lumen PICC Line. Review of Systems    Review of systems not obtained due to patient factors.     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    Controlled type 2 diabetes mellitus with diabetic polyneuropathy, without long-term current use of insulin ([facility]) E11.42    Type 2 diabetes mellitus with nephropathy ([facility]) E11.21    Bunion of unspecified foot M21.619    Onychomycosis B35.1    Corns and callus L84    Mixed hyperlipidemia E78.2    Severe obesity (BMI 35.0-35.9 with comorbidity) ([facility]) E66.01, Z68.35    Morbid obesity with BMI of 40.0-44.9, adult ([facility]) E66.01, Z68.41    Cellulitis L03.90    Suspected CHF (congestive heart failure) R09.89    Atrial fibrillation with RVR ([facility]) I48.91    Hypoxia R09.02    Acute kidney injury superimposed on CKD ([facility]) N17.9, N18.9    Staphylococcus aureus bacteremia R78.81, B95.61    Acute respiratory failure with hypercapnia ([facility]) J96.02    Acute respiratory failure with hypoxia and hypercapnia ([facility]) J96.01, J96.02    Acute metabolic encephalopathy J53.74    S/P BKA (below knee amputation) unilateral, right ([address] Z89.511         Prior to Admission Medications   Prescriptions Last Dose Informant Patient Reported? Taking?   aspirin delayed-release 81 mg tablet 2021 at Unknown time  Yes Yes   Sig: Take 81 mg by mouth daily. atorvastatin (LIPITOR) 40 mg tablet 2021 at Unknown time  No Yes   Sig: TAKE 1 TABLET BY MOUTH DAILY  Indications: treatment to slow progression of coronary artery disease   cefazolin sodium (CEFAZOLIN IV) 2021 at Unknown time  Yes Yes   Si g by IntraVENous route three (3) times daily. cholecalciferol, VITAMIN D3, (VITAMIN D3) 5,000 unit tab tablet 8/15/2021 at Unknown time  Yes Yes   Sig: Take  by mouth daily. cyanocobalamin 1,000 mcg tablet 8/15/2021 at Unknown time  No Yes   Sig: Take 1 Tab by mouth daily. furosemide (LASIX) 20 mg tablet 2021 at Unknown time  No Yes   Sig: Take 1 Tablet by mouth as needed for Other (WEIGHT INCREASE GREATER THEN 2LB/DAY OR 5LB/DAY). glipiZIDE (GLUCOTROL) 5 mg tablet 2021 at Unknown time  No Yes   Sig: Take 0.5 Tablets by mouth daily.    metoprolol succinate (TOPROL-XL) 25 mg XL tablet 8/18/2021 at 0530  No Yes   Sig: Take 1 Tablet by mouth two (2) times a day. multivitamin (ONE A DAY) tablet 8/11/2021 at Unknown time  Yes Yes   Sig: Take 1 Tablet by mouth daily. nystatin (MYCOSTATIN) powder Unknown at Unknown time  Yes No   Sig: Apply  to affected area as needed. pregabalin (Lyrica) 200 mg capsule 8/18/2021 at Unknown time  Yes Yes   Sig: Take 200 mg by mouth two (2) times a day. traZODone (DESYREL) 50 mg tablet 8/17/2021 at Unknown time  No Yes   Sig: Take 1 Tablet by mouth nightly. Facility-Administered Medications: None       Past Medical History:   Diagnosis Date    [facility]fib [facility] 07/19/2021    developed AFID after hospital admission for wound infection- started on Eliquis    CAD (coronary artery disease)     [facility]     Chronic kidney disease     stage 3- improved    COVID-19 vaccine series completed     Moderna Vaccine completed X2 doses    Current use of long term anticoagulation     Eliquis and Aspirin    H/O heart artery stent     X1 placed in 1999    History of MI (myocardial infarction) 1999    stent placed X1    Hypercholesterolemia     Hypertension     Left ventricular dysfunction     echo revealed EF 30-35%, mildly dilated LA/RA and mild TR and MR.    Neuropathy     severe    Oxygen dependent     recently started on 2L NC continous- Sleep study to be scheduled-questionable ELAINA    PICC (peripherally inserted central catheter) in place     PICC line in place to R arm    Type 2 diabetes mellitus ([facility]     oral agent/Pt does not monitor BS/no s.s of hypoglycemia/Last A1c: 6.7 on 7/13/21 per daughter     Past Surgical History:   Procedure Laterality Date    TX CARDIAC SURG PROCEDURE UNLIST  1999    stent placement     Social History     Socioeconomic History    Marital status:      Spouse name: Not on file    Number of children: Not on file    Years of education: Not on file    Highest education level: Not on file   Occupational History    Not on file   Tobacco Use    Smoking status: Never Smoker    Smokeless tobacco: Never Used   Vaping Use    Vaping Use: Never used   Substance and Sexual Activity    Alcohol use: No    Drug use: No    Sexual activity: Not Currently     Partners: Female   Other Topics Concern    Not on file   Social History Narrative    Not on file     Social Determinants of Health     Financial Resource Strain:     Difficulty of Paying Living Expenses:    Food Insecurity:     Worried About Running Out of Food in the Last Year:     Ran Out of Food in the Last Year:    Transportation Needs:     Lack of Transportation (Medical):      Lack of Transportation (Non-Medical):    Physical Activity:     Days of Exercise per Week:     Minutes of Exercise per Session:    Stress:     Feeling of Stress :    Social Connections:     Frequency of Communication with Friends and Family:     Frequency of Social Gatherings with Friends and Family:     Attends Mandaen Services:     Active Member of Clubs or Organizations:     Attends Club or Organization Meetings:     Marital Status:    Intimate Partner Violence:     Fear of Current or Ex-Partner:     Emotionally Abused:     Physically Abused:     Sexually Abused:      Family History   Problem Relation Age of Onset    Cancer Mother     Cancer Father     No Known Problems Maternal Grandmother     No Known Problems Maternal Grandfather     No Known Problems Paternal Grandmother     No Known Problems Paternal Grandfather      No Known Allergies    Current Facility-Administered Medications   Medication Dose Route Frequency    lactated Ringers infusion  75 mL/hr IntraVENous CONTINUOUS    oxyCODONE IR (ROXICODONE) tablet 5 mg  5 mg Oral ONCE PRN    HYDROcodone-acetaminophen (NORCO) 5-325 mg per tablet 2 Tablet  2 Tablet Oral PRN    HYDROmorphone (DILAUDID) injection 0.5 mg  0.5 mg IntraVENous Multiple    sodium chloride (NS) flush 5-40 mL  5-40 mL IntraVENous Q8H    sodium chloride (NS) flush 5-40 mL  5-40 mL IntraVENous PRN    enoxaparin (LOVENOX) injection 120 mg  120 mg SubCUTAneous Q12H    atorvastatin (LIPITOR) tablet 40 mg  40 mg Oral QHS    furosemide (LASIX) tablet 20 mg  20 mg Oral PRN    metoprolol succinate (TOPROL-XL) XL tablet 25 mg  25 mg Oral BID    nystatin (MYCOSTATIN) 100,000 unit/gram powder   Topical PRN    pregabalin (LYRICA) capsule 200 mg  200 mg Oral BID    traZODone (DESYREL) tablet 50 mg  50 mg Oral QHS    insulin lispro (HUMALOG) injection   SubCUTAneous AC&HS    dextrose 40% (GLUTOSE) oral gel 1 Tube  15 g Oral PRN    glucagon (GLUCAGEN) injection 1 mg  1 mg IntraMUSCular PRN    dextrose (D50W) injection syrg 12.5-25 g  25-50 mL IntraVENous PRN    ceFAZolin (ANCEF) 2 g/20 mL in sterile water IV syringe  2 g IntraVENous Q8H    propofol (DIPRIVAN) 10 mg/mL infusion  0-50 mcg/kg/min IntraVENous TITRATE         Objective:     Vitals:    08/18/21 1810 08/18/21 1827 08/18/21 1842 08/18/21 1857   BP:  (!) 109/59 (!) 97/53 (!) 111/58   Pulse: 79 85 81 94   Resp: 20 22 20 22   Temp:    98 °F (36.7 °C)   SpO2: 95% 94% 100% 90%   Weight:       Height:           PHYSICAL EXAM     Constitutional:  the patient is obese and in no acute distress  EENMT:  Sclera clear, pupils equal, oral mucosa moist  Respiratory: few scattered rhonchi  Cardiovascular:  RRR without M,G,R  Gastrointestinal: soft and non-tender; with positive bowel sounds. Musculoskeletal: warm without cyanosis. There is no L lower extremity edema.  R BKA with dry dressing in place  Skin:  no jaundice or rashes  Neurologic: no gross neuro deficits     Psychiatric:  Arouses a little on propofol    CXR:          CT Chest    Recent Labs     08/18/21  1856   WBC 6.0   HGB 9.4*   HCT 31.9*        Recent Labs     08/18/21  1827      K 4.6   *   *   CO2 33*   BUN 28*   CREA 1.26   CA 8.8     Recent Labs     08/18/21  1651   PHI 7.31*   PCO2I 66.4*   PO2I 59*   HCO3I 33.2* No results for input(s): LCAD, LAC in the last 72 hours. Cultures:  Blood x2  Urine-  Sputum-  Wound-  Pleural fluid  Abdominal fluid-    Inpat Anti-Infectives (From admission, onward)     Start     Ordered Stop    08/18/21 1700  ceFAZolin (ANCEF) 2 g/20 mL in sterile water IV syringe  2 g,   IntraVENous,   EVERY 8 HOURS      08/18/21 1635 --    08/18/21 1625  nystatin (MYCOSTATIN) 100,000 unit/gram powder  Topical,   AS NEEDED     Note to Pharmacy: OP SIG:Apply  to affected area as needed. 08/18/21 1626 --                  Assessment:  (Medical Decision Making)     Hospital Problems  Date Reviewed: 7/26/2021        Codes Class Noted POA    S/P BKA (below knee amputation) unilateral, right ([facility] ICD-10-CM: Z89.511  ICD-9-CM: V49.75  8/18/2021 Unknown    Per ortho    Acute respiratory failure with hypoxia and hypercapnia (HCC) ICD-10-CM: J96.01, J96.02  ICD-9-CM: 518.81  7/23/2021 Yes    Unable to be weaned from vent. He is retaining CO2 and may have an element of OHS. Needs current CXR. Suspected CHF (congestive heart failure) ICD-10-CM: R09.89  ICD-9-CM: 785.9  7/19/2021 Yes    LVEF 30-35% last admit.      Atrial fibrillation with RVR (HCC) ICD-10-CM: I48.91  ICD-9-CM: 427.31  7/19/2021 Yes    CVR at present    Morbid obesity with BMI of 40.0-44.9, adult [facility]) ICD-10-CM: E66.01, Z68.41  ICD-9-CM: 278.01, V85.41  6/11/2020 Yes        Mixed hyperlipidemia (Chronic) ICD-10-CM: F71.1  ICD-9-CM: 272.2  4/10/2018 Yes        Controlled type 2 diabetes mellitus with diabetic polyneuropathy, without long-term current use of insulin (HCC) (Chronic) ICD-10-CM: E11.42  ICD-9-CM: 250.60, 357.2  1/5/2018 Yes     this AM    Stage 3 chronic kidney disease ([facility] (Chronic) ICD-10-CM: N18.30  ICD-9-CM: 585.3  11/22/2017 Yes    Cr 1.26 on admit    CAD (coronary artery disease) (Chronic) ICD-10-CM: I25.10  ICD-9-CM: 414.00  Unknown Yes              Plan:  (Medical Decision Making)     Hospital Problems  Date Reviewed: 7/26/2021        Codes Class Noted POA    S/P BKA (below knee amputation) unilateral, right ([facility] ICD-10-CM: Z89.511  ICD-9-CM: V49.75  8/18/2021 Unknown    Pain control needed but cannot be too aggressive to depress respirations. Acute respiratory failure with hypoxia and hypercapnia (HCC) ICD-10-CM: J96.01, J96.02  ICD-9-CM: 518.81  7/23/2021 Yes    PCO2 66 with pH only 7.31. Now on vent and will need to be weaned gradually. Start bronchodilators. Needs to go to ICU for now. Suspected CHF (congestive heart failure) ICD-10-CM: R09.89  ICD-9-CM: 785.9  7/19/2021 Yes    Needs current CXR     Atrial fibrillation with RVR ([facility] ICD-10-CM: I48.91  ICD-9-CM: 427.31  7/19/2021 Yes    Follow VR    Morbid obesity with BMI of 40.0-44.9, adult [facility]) ICD-10-CM: E66.01, Z68.41  ICD-9-CM: 278.01, V85.41  6/11/2020 Yes        Mixed hyperlipidemia (Chronic) ICD-10-CM: C01.0  ICD-9-CM: 272.2  4/10/2018 Yes        Controlled type 2 diabetes mellitus with diabetic polyneuropathy, without long-term current use of insulin (HCC) (Chronic) ICD-10-CM: E11.42  ICD-9-CM: 250.60, 357.2  1/5/2018 Yes        Stage 3 chronic kidney disease ([facility] (Chronic) ICD-10-CM: N18.30  ICD-9-CM: 585.3  11/22/2017 Yes        CAD (coronary artery disease) (Chronic) ICD-10-CM: I25.10  ICD-9-CM: 414.00  Unknown Yes              --    More than 50% of the time documented was spent in face-to-face contact with the patient and in the care of the patient on the floor/unit where the patient is located. Thank you very much for this referral.  We appreciate the opportunity to participate in this patient's care. Will follow along with above stated plan.     Radha Serrano MD

## 2021-08-19 ENCOUNTER — APPOINTMENT (OUTPATIENT)
Dept: GENERAL RADIOLOGY | Age: 76
DRG: 616 | End: 2021-08-19
Attending: INTERNAL MEDICINE
Payer: MEDICARE

## 2021-08-19 PROBLEM — I50.23 SYSTOLIC CHF, ACUTE ON CHRONIC (HCC): Status: ACTIVE | Noted: 2021-07-19

## 2021-08-19 LAB
ANION GAP SERPL CALC-SCNC: 4 MMOL/L (ref 7–16)
ANION GAP SERPL CALC-SCNC: 6 MMOL/L (ref 7–16)
ARTERIAL PATENCY WRIST A: POSITIVE
BASE EXCESS BLD CALC-SCNC: 8 MMOL/L
BASOPHILS # BLD: 0 K/UL (ref 0–0.2)
BASOPHILS NFR BLD: 1 % (ref 0–2)
BDY SITE: ABNORMAL
BNP SERPL-MCNC: 1077 PG/ML
BUN SERPL-MCNC: 29 MG/DL (ref 8–23)
BUN SERPL-MCNC: 29 MG/DL (ref 8–23)
CALCIUM SERPL-MCNC: 8.6 MG/DL (ref 8.3–10.4)
CALCIUM SERPL-MCNC: 8.8 MG/DL (ref 8.3–10.4)
CHLORIDE SERPL-SCNC: 108 MMOL/L (ref 98–107)
CHLORIDE SERPL-SCNC: 109 MMOL/L (ref 98–107)
CO2 SERPL-SCNC: 28 MMOL/L (ref 21–32)
CO2 SERPL-SCNC: 32 MMOL/L (ref 21–32)
CREAT SERPL-MCNC: 1.36 MG/DL (ref 0.8–1.5)
CREAT SERPL-MCNC: 1.49 MG/DL (ref 0.8–1.5)
DIFFERENTIAL METHOD BLD: ABNORMAL
EOSINOPHIL # BLD: 0.1 K/UL (ref 0–0.8)
EOSINOPHIL NFR BLD: 1 % (ref 0.5–7.8)
ERYTHROCYTE [DISTWIDTH] IN BLOOD BY AUTOMATED COUNT: 16 % (ref 11.9–14.6)
GAS FLOW.O2 O2 DELIVERY SYS: ABNORMAL L/MIN
GLUCOSE BLD STRIP.AUTO-MCNC: 109 MG/DL (ref 65–100)
GLUCOSE BLD STRIP.AUTO-MCNC: 111 MG/DL (ref 65–100)
GLUCOSE BLD STRIP.AUTO-MCNC: 98 MG/DL (ref 65–100)
GLUCOSE SERPL-MCNC: 101 MG/DL (ref 65–100)
GLUCOSE SERPL-MCNC: 84 MG/DL (ref 65–100)
HCO3 BLD-SCNC: 30.5 MMOL/L (ref 22–26)
HCT VFR BLD AUTO: 32.3 % (ref 41.1–50.3)
HGB BLD-MCNC: 9.5 G/DL (ref 13.6–17.2)
IMM GRANULOCYTES # BLD AUTO: 0 K/UL (ref 0–0.5)
IMM GRANULOCYTES NFR BLD AUTO: 0 % (ref 0–5)
LYMPHOCYTES # BLD: 1.5 K/UL (ref 0.5–4.6)
LYMPHOCYTES NFR BLD: 25 % (ref 13–44)
MCH RBC QN AUTO: 28.6 PG (ref 26.1–32.9)
MCHC RBC AUTO-ENTMCNC: 29.4 G/DL (ref 31.4–35)
MCV RBC AUTO: 97.3 FL (ref 79.6–97.8)
MONOCYTES # BLD: 1 K/UL (ref 0.1–1.3)
MONOCYTES NFR BLD: 16 % (ref 4–12)
NEUTS SEG # BLD: 3.5 K/UL (ref 1.7–8.2)
NEUTS SEG NFR BLD: 57 % (ref 43–78)
NRBC # BLD: 0 K/UL (ref 0–0.2)
O2/TOTAL GAS SETTING VFR VENT: 50 %
PCO2 BLD: 33.2 MMHG (ref 35–45)
PEEP RESPIRATORY: 10 CMH2O
PH BLD: 7.57 [PH] (ref 7.35–7.45)
PLATELET # BLD AUTO: 130 K/UL (ref 150–450)
PMV BLD AUTO: 12.8 FL (ref 9.4–12.3)
PO2 BLD: 56 MMHG (ref 75–100)
POTASSIUM SERPL-SCNC: 4.1 MMOL/L (ref 3.5–5.1)
POTASSIUM SERPL-SCNC: 5.4 MMOL/L (ref 3.5–5.1)
RBC # BLD AUTO: 3.32 M/UL (ref 4.23–5.6)
SAO2 % BLD: 92.8 % (ref 95–98)
SERVICE CMNT-IMP: ABNORMAL
SERVICE CMNT-IMP: NORMAL
SODIUM SERPL-SCNC: 143 MMOL/L (ref 138–145)
SODIUM SERPL-SCNC: 144 MMOL/L (ref 138–145)
SPECIMEN TYPE: ABNORMAL
TOTAL RESP. RATE, ITRR: 20
VENTILATION MODE VENT: ABNORMAL
VT SETTING VENT: 500 ML
WBC # BLD AUTO: 6.2 K/UL (ref 4.3–11.1)

## 2021-08-19 PROCEDURE — 80048 BASIC METABOLIC PNL TOTAL CA: CPT

## 2021-08-19 PROCEDURE — 74011250637 HC RX REV CODE- 250/637: Performed by: INTERNAL MEDICINE

## 2021-08-19 PROCEDURE — P9047 ALBUMIN (HUMAN), 25%, 50ML: HCPCS | Performed by: INTERNAL MEDICINE

## 2021-08-19 PROCEDURE — 85025 COMPLETE CBC W/AUTO DIFF WBC: CPT

## 2021-08-19 PROCEDURE — 71045 X-RAY EXAM CHEST 1 VIEW: CPT

## 2021-08-19 PROCEDURE — 36415 COLL VENOUS BLD VENIPUNCTURE: CPT

## 2021-08-19 PROCEDURE — 74011250636 HC RX REV CODE- 250/636: Performed by: ANESTHESIOLOGY

## 2021-08-19 PROCEDURE — 87040 BLOOD CULTURE FOR BACTERIA: CPT

## 2021-08-19 PROCEDURE — 99233 SBSQ HOSP IP/OBS HIGH 50: CPT | Performed by: INTERNAL MEDICINE

## 2021-08-19 PROCEDURE — 74011250636 HC RX REV CODE- 250/636: Performed by: INTERNAL MEDICINE

## 2021-08-19 PROCEDURE — 74011000250 HC RX REV CODE- 250: Performed by: INTERNAL MEDICINE

## 2021-08-19 PROCEDURE — 82962 GLUCOSE BLOOD TEST: CPT

## 2021-08-19 PROCEDURE — 74011636637 HC RX REV CODE- 636/637: Performed by: HOSPITALIST

## 2021-08-19 PROCEDURE — 82803 BLOOD GASES ANY COMBINATION: CPT

## 2021-08-19 PROCEDURE — 36600 WITHDRAWAL OF ARTERIAL BLOOD: CPT

## 2021-08-19 PROCEDURE — 94003 VENT MGMT INPAT SUBQ DAY: CPT

## 2021-08-19 PROCEDURE — 74011000258 HC RX REV CODE- 258: Performed by: INTERNAL MEDICINE

## 2021-08-19 PROCEDURE — 99222 1ST HOSP IP/OBS MODERATE 55: CPT | Performed by: INTERNAL MEDICINE

## 2021-08-19 PROCEDURE — 74011000250 HC RX REV CODE- 250: Performed by: HOSPITALIST

## 2021-08-19 PROCEDURE — 65610000006 HC RM INTENSIVE CARE

## 2021-08-19 PROCEDURE — 83880 ASSAY OF NATRIURETIC PEPTIDE: CPT

## 2021-08-19 RX ORDER — FUROSEMIDE 10 MG/ML
40 INJECTION INTRAMUSCULAR; INTRAVENOUS EVERY 12 HOURS
Status: DISCONTINUED | OUTPATIENT
Start: 2021-08-19 | End: 2021-08-21

## 2021-08-19 RX ORDER — DEXMEDETOMIDINE HYDROCHLORIDE 4 UG/ML
.1-1.5 INJECTION, SOLUTION INTRAVENOUS
Status: DISCONTINUED | OUTPATIENT
Start: 2021-08-19 | End: 2021-08-20

## 2021-08-19 RX ORDER — INSULIN LISPRO 100 [IU]/ML
INJECTION, SOLUTION INTRAVENOUS; SUBCUTANEOUS EVERY 4 HOURS
Status: DISCONTINUED | OUTPATIENT
Start: 2021-08-19 | End: 2021-08-20

## 2021-08-19 RX ORDER — ALBUMIN HUMAN 250 G/1000ML
25 SOLUTION INTRAVENOUS EVERY 12 HOURS
Status: COMPLETED | OUTPATIENT
Start: 2021-08-19 | End: 2021-08-20

## 2021-08-19 RX ORDER — METOPROLOL TARTRATE 5 MG/5ML
2.5 INJECTION INTRAVENOUS EVERY 6 HOURS
Status: DISCONTINUED | OUTPATIENT
Start: 2021-08-19 | End: 2021-08-20

## 2021-08-19 RX ORDER — DEXTROSE 50 % IN WATER (D50W) INTRAVENOUS SYRINGE
25 ONCE
Status: COMPLETED | OUTPATIENT
Start: 2021-08-19 | End: 2021-08-19

## 2021-08-19 RX ADMIN — CEFAZOLIN 2 G: 10 INJECTION, POWDER, FOR SOLUTION INTRAVENOUS at 07:33

## 2021-08-19 RX ADMIN — FUROSEMIDE 40 MG: 10 INJECTION, SOLUTION INTRAMUSCULAR; INTRAVENOUS at 09:00

## 2021-08-19 RX ADMIN — DEXMEDETOMIDINE HYDROCHLORIDE 0.2 MCG/KG/HR: 100 INJECTION, SOLUTION INTRAVENOUS at 23:09

## 2021-08-19 RX ADMIN — CEFAZOLIN 2 G: 10 INJECTION, POWDER, FOR SOLUTION INTRAVENOUS at 13:17

## 2021-08-19 RX ADMIN — TRAZODONE HYDROCHLORIDE 50 MG: 50 TABLET ORAL at 21:33

## 2021-08-19 RX ADMIN — FAMOTIDINE 20 MG: 10 INJECTION INTRAVENOUS at 08:14

## 2021-08-19 RX ADMIN — INSULIN HUMAN 10 UNITS: 100 INJECTION, SOLUTION PARENTERAL at 13:15

## 2021-08-19 RX ADMIN — Medication 10 ML: at 21:33

## 2021-08-19 RX ADMIN — CEFAZOLIN 2 G: 10 INJECTION, POWDER, FOR SOLUTION INTRAVENOUS at 21:33

## 2021-08-19 RX ADMIN — HYDROMORPHONE HYDROCHLORIDE 0.5 MG: 2 INJECTION INTRAMUSCULAR; INTRAVENOUS; SUBCUTANEOUS at 07:39

## 2021-08-19 RX ADMIN — Medication 10 ML: at 07:28

## 2021-08-19 RX ADMIN — ALBUMIN (HUMAN) 25 G: 0.25 INJECTION, SOLUTION INTRAVENOUS at 08:39

## 2021-08-19 RX ADMIN — ENOXAPARIN SODIUM 120 MG: 120 INJECTION SUBCUTANEOUS at 08:18

## 2021-08-19 RX ADMIN — Medication 10 ML: at 13:17

## 2021-08-19 RX ADMIN — PREGABALIN 200 MG: 150 CAPSULE ORAL at 17:15

## 2021-08-19 RX ADMIN — FUROSEMIDE 40 MG: 10 INJECTION, SOLUTION INTRAMUSCULAR; INTRAVENOUS at 21:32

## 2021-08-19 RX ADMIN — ENOXAPARIN SODIUM 120 MG: 120 INJECTION SUBCUTANEOUS at 21:33

## 2021-08-19 RX ADMIN — METOPROLOL TARTRATE 2.5 MG: 5 INJECTION INTRAVENOUS at 17:15

## 2021-08-19 RX ADMIN — DEXMEDETOMIDINE HYDROCHLORIDE 0.2 MCG/KG/HR: 100 INJECTION, SOLUTION INTRAVENOUS at 08:47

## 2021-08-19 RX ADMIN — FAMOTIDINE 20 MG: 10 INJECTION INTRAVENOUS at 21:32

## 2021-08-19 RX ADMIN — DEXTROSE MONOHYDRATE 25 G: 25 INJECTION, SOLUTION INTRAVENOUS at 13:15

## 2021-08-19 RX ADMIN — ALBUMIN (HUMAN) 25 G: 0.25 INJECTION, SOLUTION INTRAVENOUS at 21:32

## 2021-08-19 RX ADMIN — Medication 10 ML: at 21:34

## 2021-08-19 RX ADMIN — ATORVASTATIN CALCIUM 40 MG: 40 TABLET, FILM COATED ORAL at 21:33

## 2021-08-19 RX ADMIN — PROPOFOL 15 MCG/KG/MIN: 10 INJECTION, EMULSION INTRAVENOUS at 01:18

## 2021-08-19 NOTE — PROGRESS NOTES
Ventilator check complete; patient has a #8. 0 ET tube secured at the 25 at the teeth. Patient is sedated. Patient is not able to follow commands. Breath sounds are clear and diminished. Trachea is midline, Negative for subcutaneous air, and chest excursion is symmetric. Patient is also Negative for cyanosis and is Negative for pitting edema. All alarms are set and audible. Resuscitation bag is at the head of the bed. Ventilator Settings  Mode FIO2 Rate Tidal Volume Pressure PEEP I:E Ratio   PRVC  60 %    500 ml     10 cm H20  1:3.3      Peak airway pressure: 26 cm H2O   Minute ventilation: 7.5 l/min     ABG: No results for input(s): PH, PCO2, PO2, HCO3 in the last 72 hours.       Krzysztof Martinez, RT

## 2021-08-19 NOTE — PROGRESS NOTES
[facility]  Admission Date: 8/18/2021             Daily Progress Note: 8/19/2021    The patient's chart is reviewed and the patient is discussed with the staff. Background: The patient is a 77 y.o.  male seen and evaluated at the request of Dr. Benjamin Dodson for respiratory management post op. He underwent a R BKA today and has not been able to be weaned from the ventilator in the PACU.     He has a history of CHF, afib, respiratory failure, and sepsis felt due to a R foot ulcer. He was admitted on 7/19/21 with a R foot cellulitis on clindamycin therapy without improvement. Amputation was recommended but he initially  Refused. He was found to be in afib with RVR and volume overloaded. Blood cx revealed staph for which he was treated with Ancef after initial Vanc therapy. He is ordered Ancef 2 gm q 8rs with EOT scheduled for 9/3/21. An echo revealed a LVEF only 30-35%. He was also in renal failure with a creatinine up to 2.34 from a baseline of 1.6. By the time of discharge his creatinine had improved to 1.69.      He required BIPAP therapy transiently and was changed to CPAP with variable compliance. He was felt to need a PSG as an OP. He was discharged on 2L at rest and 3L with exertion. He was also discharged on eliquis and metoprolol for afib.      He was seen in OP follow up by Dr. Benjamin Dodson on 8/13 and he was given options for future management and he ultimately agree to proceed with amputation which was done today. Eliquis was held 48 Hrs.      He still has a single lumen PICC Line. Assessment and Plan:  (Medical Decision Making)   Principal Problem:    S/P BKA (below knee amputation) unilateral, right ([address] (8/18/2021)    Post op per surgery    Active Problems:    Acute on Chronic respiratory failure with hypoxemia and hypercapnea:      FiO2 up to 60%, though sat is 100%, CXR suggests ongoing pulmonary edema and will try to diurese. Add albumin given soft blood pressures.  Change to PSV when more awake. Change propofol to precedex to facilitate Vent weaning. CAD (coronary artery disease) ()    Stable, no chest pain      Stage 3 chronic kidney disease ([facility] (11/22/2017)    Stable, will try to diurese, BPs are soft, but will give Lasix Q12, albumin x 2 and stop propofol      Controlled type 2 diabetes mellitus with diabetic polyneuropathy, without long-term current use of insulin ([facility] (1/5/2018)    SSI      Morbid obesity with BMI of 40.0-44.9, adult ([facility] (6/39/4959)    Complicates care      Suspected CHF (congestive heart failure) (7/19/2021)    FiO2 up to 60%, though sat is 100%, CXR suggests ongoing pulmonary edema and will try to diurese. Add albumin given soft blood pressures. Change to PSV when more awake. Change propofol to precedex to facilitate Vent weaning. Atrial fibrillation with RVR ([facility] (7/19/2021)    On Toprol BID, HR borderline around 100    More than 50% of the time documented was spent in face-to-face contact with the patient and in the care of the patient on the floor/unit where the patient is located. Sami Cowan MD    Subjective:     Left intubated overnight post op due to apnea and hypoxemia. He is on Propofol 10 currently and just received Dilaudid push as well. He is currently apneic when not stimulated on PSV. He does wake up and is redirectable and calm, nods head, but easily falls back to sleep.      Current Facility-Administered Medications   Medication Dose Route Frequency    sodium chloride (NS) flush 5-40 mL  5-40 mL IntraVENous Q8H    sodium chloride (NS) flush 5-40 mL  5-40 mL IntraVENous PRN    enoxaparin (LOVENOX) injection 120 mg  120 mg SubCUTAneous Q12H    atorvastatin (LIPITOR) tablet 40 mg  40 mg Oral QHS    furosemide (LASIX) tablet 20 mg  20 mg Oral PRN    metoprolol succinate (TOPROL-XL) XL tablet 25 mg  25 mg Oral BID    nystatin (MYCOSTATIN) 100,000 unit/gram powder   Topical PRN    pregabalin (LYRICA) capsule 200 mg  200 mg Oral BID    traZODone (DESYREL) tablet 50 mg  50 mg Oral QHS    insulin lispro (HUMALOG) injection   SubCUTAneous AC&HS    dextrose 40% (GLUTOSE) oral gel 1 Tube  15 g Oral PRN    glucagon (GLUCAGEN) injection 1 mg  1 mg IntraMUSCular PRN    dextrose (D50W) injection syrg 12.5-25 g  25-50 mL IntraVENous PRN    ceFAZolin (ANCEF) 2 g/20 mL in sterile water IV syringe  2 g IntraVENous Q8H    propofol (DIPRIVAN) 10 mg/mL infusion  0-50 mcg/kg/min IntraVENous TITRATE    sodium chloride (NS) flush 5-40 mL  5-40 mL IntraVENous Q8H    sodium chloride (NS) flush 5-40 mL  5-40 mL IntraVENous PRN    naloxone (NARCAN) injection 0.4 mg  0.4 mg IntraVENous PRN    HYDROmorphone (DILAUDID) injection 0.5 mg  0.5 mg IntraVENous Q4H PRN    famotidine (PF) (PEPCID) 20 mg in 0.9% sodium chloride 10 mL injection  20 mg IntraVENous Q12H     Review of Systems  Unobtainable due to patient status. Objective:     Vitals:    08/19/21 0630 08/19/21 0700 08/19/21 0739 08/19/21 0740   BP: 98/60 (!) 96/54 (!) 87/63    Pulse: 79 90 86 83   Resp: 16 16 16    Temp:       SpO2: 100% 98%  100%   Weight:       Height:           Intake/Output Summary (Last 24 hours) at 8/19/2021 0810  Last data filed at 8/19/2021 0630  Gross per 24 hour   Intake 1000 ml   Output 1025 ml   Net -25 ml     Physical Exam:   Constitution:  the patient is obese and in no acute distress  EENMT:  Sclera clear, pupils equal, oral mucosa moist  Respiratory: mild rales, 60% PRVC  Cardiovascular:  RRR without M,G,R  Gastrointestinal: soft and non-tender; with positive bowel sounds. Musculoskeletal: warm without cyanosis. There is trace lower extremity edema.   Skin:  no jaundice or rashes, BKA wounds   Neurologic: no gross neuro deficits     Psychiatric:  alert and follows commands, but sleepy    CXR: cardiomegaly, pulmonary edema      LAB  Recent Labs     08/19/21  0721 08/18/21  2142 08/18/21  0809   GLUCPOC 98 95 95      Recent Labs     08/19/21  0329 08/18/21  2591 WBC 6.2 6.0   HGB 9.5* 9.4*   HCT 32.3* 31.9*   * 153     Recent Labs     08/19/21  0329 08/18/21  1827    144   K 5.4* 4.6   * 110*   CO2 28 33*   GLU 84 115*   BUN 29* 28*   CREA 1.36 1.26   CA 8.8 8.8     Recent Labs     08/19/21  0534 08/18/21  2206 08/18/21  1651   PHI 7.57* 7.48* 7.31*   PCO2I 33.2* 39.8 66.4*   PO2I 56* 92 59*   HCO3I 30.5* 29.4* 33.2*     No results for input(s): LCAD, LAC in the last 72 hours.

## 2021-08-19 NOTE — PROGRESS NOTES
Patient now awake and following commands. Vent has been weaned to PSV 8/8 45% and RR looks great at 17. His RSBI is 40s. Will extubate to AirVo. Continue diuresis.      Allison Hill MD

## 2021-08-19 NOTE — H&P
[facility] Cardiology Initial Cardiac Evaluation                 Date of  Admission: 8/18/2021  7:31 AM     Primary Care Physician:  Dr. Bhupendra Howard  Primary Cardiologist:  None  Referring Physician:  Dr. Tony Fitzpatrick  Attending Physician:  Dr. Russell Bell    CC/Reason for consult:  Anticoagulation management       Yunior Arango is a 77 y.o. male with PMH of CAD with history of stent (no records available), chronic respiratory failure on 2L, a fib(on Eliquis), CKD st III, DM, and chronic systolic heart failure (Echo 7/21 EF 30-35%), who presented to [facility] for right BKA. Patient was hospitalized in July with right foot cellulitis and sepsis secondary to right foot ulcer. Amputation was recommended initially but he refused at that time and was treated with abx. During that hospitalization he was found to be in a fib with RVR for which toprol and Eliquis for [address] [place] [address] Road. Echo showed EF or 30-35%. After discharge he was seen in follow up by ortho and the decision to proceed with amputation was made. Ortho contacted us regarding [address], Eliquis stopped for 48hrs prior to surgery with plan to resume ASAP post OP. After surgery he had difficulty with extubated and remained on vent overnight. This AM he remains intubated. He is in a fib in 76s. Patient was started on therapeutic dose lovenox for [facility], [place]. Past Medical History:   Diagnosis Date    A-fib [facility]) 07/19/2021    developed AFID after hospital admission for wound infection- started on Eliquis    CAD (coronary artery disease)     [facility].     Chronic kidney disease     stage 3- improved    COVID-19 vaccine series completed     Moderna Vaccine completed X2 doses    Current use of long term anticoagulation     Eliquis and Aspirin    H/O heart artery stent     X1 placed in 1999    History of MI (myocardial infarction) 1999    stent placed X1    Hypercholesterolemia     Hypertension     Left ventricular dysfunction     echo revealed EF 30-35%, mildly dilated LA/RA and mild TR and MR.    Neuropathy     severe    Oxygen dependent     recently started on 2L NC continous- Sleep study to be scheduled-questionable ELAINA    PICC (peripherally inserted central catheter) in place     PICC line in place to R arm    Type 2 diabetes mellitus ([address]     oral agent/Pt does not monitor BS/no s.s of hypoglycemia/Last A1c: 6.7 on 7/13/21 per daughter      Past Surgical History:   Procedure Laterality Date    VT CARDIAC SURG PROCEDURE UNLIST  1999    stent placement     No Known Allergies   Family History   Problem Relation Age of Onset    Cancer Mother     Cancer Father     No Known Problems Maternal Grandmother     No Known Problems Maternal Grandfather     No Known Problems Paternal Grandmother     No Known Problems Paternal Grandfather         Current Facility-Administered Medications   Medication Dose Route Frequency    sodium chloride (NS) flush 5-40 mL  5-40 mL IntraVENous Q8H    sodium chloride (NS) flush 5-40 mL  5-40 mL IntraVENous PRN    enoxaparin (LOVENOX) injection 120 mg  120 mg SubCUTAneous Q12H    atorvastatin (LIPITOR) tablet 40 mg  40 mg Oral QHS    furosemide (LASIX) tablet 20 mg  20 mg Oral PRN    metoprolol succinate (TOPROL-XL) XL tablet 25 mg  25 mg Oral BID    nystatin (MYCOSTATIN) 100,000 unit/gram powder   Topical PRN    pregabalin (LYRICA) capsule 200 mg  200 mg Oral BID    traZODone (DESYREL) tablet 50 mg  50 mg Oral QHS    insulin lispro (HUMALOG) injection   SubCUTAneous AC&HS    dextrose 40% (GLUTOSE) oral gel 1 Tube  15 g Oral PRN    glucagon (GLUCAGEN) injection 1 mg  1 mg IntraMUSCular PRN    dextrose (D50W) injection syrg 12.5-25 g  25-50 mL IntraVENous PRN    ceFAZolin (ANCEF) 2 g/20 mL in sterile water IV syringe  2 g IntraVENous Q8H    propofol (DIPRIVAN) 10 mg/mL infusion  0-50 mcg/kg/min IntraVENous TITRATE    sodium chloride (NS) flush 5-40 mL  5-40 mL IntraVENous Q8H    sodium chloride (NS) flush 5-40 mL  5-40 mL IntraVENous PRN  naloxone (NARCAN) injection 0.4 mg  0.4 mg IntraVENous PRN    HYDROmorphone (DILAUDID) injection 0.5 mg  0.5 mg IntraVENous Q4H PRN    famotidine (PF) (PEPCID) 20 mg in 0.9% sodium chloride 10 mL injection  20 mg IntraVENous Q12H       Review of Systems   Unable to perform ROS: Intubated        Physical Exam  Vitals:    08/19/21 0630 08/19/21 0700 08/19/21 0739 08/19/21 0740   BP: 98/60 (!) 96/54 (!) 87/63    Pulse: 79 90 86 83   Resp: 16 16 16    Temp:       SpO2: 100% 98%  100%   Weight:       Height:           Physical Exam:  Physical Exam  Constitutional:       Interventions: He is sedated and intubated. Eyes:      Pupils: Pupils are equal, round, and reactive to light. Cardiovascular:      Rate and Rhythm: Normal rate. Rhythm irregular. Pulmonary:      Effort: Pulmonary effort is normal. He is intubated. Breath sounds: Normal breath sounds. Abdominal:      General: Bowel sounds are normal.   Musculoskeletal:         General: Normal range of motion. Right Lower Extremity: Right leg is amputated below knee. Skin:     General: Skin is warm and dry. Neurological:      Comments: Intubated and sedated    Psychiatric:      Comments: Intubated and sedated          Cardiographics    Telemetry: [facility]  Echocardiogram: 7/2021  · Image quality for this study was poor. · Contrast used: DEFINITY. · LV: Estimated LVEF is 30 - 35%. Normal wall thickness. Mildly dilated left ventricle. Moderate-to-severely reduced systolic function. Wall motion abnormalities with suggest coronary artery disease  · LA: Mildly dilated left atrium. · RA: Mildly dilated right atrium. · MV: Mild mitral annular calcification. Mild mitral valve regurgitation is present. · TV: Mild tricuspid valve regurgitation is present.         Labs:   Recent Labs     08/19/21  0329 08/18/21  1856 08/18/21  1827     --  144   K 5.4*  --  4.6   BUN 29*  --  28*   CREA 1.36  --  1.26   GLU 84  --  115*   WBC 6.2 6.0  --    HGB 9.5* 9.4*  --    HCT 32.3* 31.9*  --    * 153  --         Assessment/Plan:     Assessment:      Principal Problem:    S/P BKA (below knee amputation) unilateral, right -- per ortho    Active Problems:    CAD (coronary artery disease) -- continue BB and statin      Stage 3 chronic kidney disease -- stable. Controlled type 2 diabetes mellitus with diabetic polyneuropathy, without long-term current use of insulin -- SSI ordered       Mixed hyperlipidemia -- on statin      Morbid obesity with BMI of 40.0-44.9, adult       Chronic systolic heart failure -- chest xray with pulmonary edema and bilateral pleural effusions. Continue lasix 40 mg IVP. Continue BB, consider adding ACE/ARB when can tolerate from BP standpoint and if creatinine stable. Strict I/O and daily weight. Atrial fibrillation -- rate is controlled. If remains intubated will need to consider converting BB to IVP. On lovenox 1 mg/kg for AC. Respiratory failure, post-operative -- remains on vent this AM, pulmonary followng      Thank you very much for this referral. We appreciate the opportunity to participate in this patient's care. We will follow along with above stated plan. Louie Wood NP  Attending MD: Jose C Da Silva have personally seen and examined patient and agree with above assessment. I agree and confirm with findings with additional details/exceptions as listed below:    Prior history of coronary disease with no prior records, chronic respiratory failure on 2 L nasal cannula, persistent atrial fibrillation initially diagnosed on recent hospital admission from 7/2021. During that visit, patient hospitalized with right foot cellulitis and sepsis. An amputation was recommended at the time which patient declined. Uncertain duration of atrial fibrillation during that visit and patient was managed with rate control/anticoagulation.   Echo with moderate left ventricular dysfunction during that visit with ejection fraction of 30 to 35%. Patient was seen by orthopedic surgery in the outpatient setting and decision made to proceed with amputation. Cardiology currently consulted regarding management for anticoagulation. Some postop issues with extubation and has been maintained on the ventilator overnight. During the time of my evaluation, patient currently extubated and on Airvo. Has no complaints. In atrial fibrillation with mostly controlled ventricular rates. Physical exam-  Cardiovascular irregularly irregular with variable S1, lungs clear to oscillation bilaterally, extremities with right BKA    Plan-    Atrial fibrillation   -Uncertain duration and noted during last hospital stay with sepsis/cellulitis.  -Plan continued rate control/anticoagulation. Usually on Toprol-XL 25 mg twice daily and restart when oral intake hospital.  May need to use IV beta-blocker therapy in the interim if needed.  -Echo with moderate left ventricular dysfunction with wall motion abnormality suggestive of coronary disease. Uncertain duration. Mild left atrial enlargement.  -Currently on therapeutic Lovenox and resume oral anticoagulation when possible. -[id number] score of ~5       Cardiomyopathy  - appears ischemic with possible component of tachycardic cardiomyopathy as well.  -Prior deferred consideration for ACE inhibitor/ARB's with acute on chronic renal failure. Improved currently and add on as blood pressures tolerate.  -See above with planned low-dose Toprol-XL  -Needs ischemic work-up which can be addressed on an outpatient basis         CAD (coronary artery disease) ()  -Remote history of prior stenting per records.   Continue high intensity statin      Further recommendations pending clinical course    Simon Lan MD  8/19/2021

## 2021-08-19 NOTE — PERIOP NOTES
Bedside shift report received from Loretta Minor, Critical access hospital[facility] at this time. Patient currently resting comfortably on 15mcg/kg/min of propofol. Opens eyes to voice. Follows commands and nods appropriately. Currently on 65% FiO2 on the vent. Wound vac in place. Dressing for R BKA is clean, dry and intact. Currently in A fib - controlled rate. BP stable. Afebrile at this time.

## 2021-08-19 NOTE — PROGRESS NOTES
Pt trying to mouth words and getting very frustrated that he is not able to be understood. Pt is on PS and needs reminded to breath at times when vent alarms.

## 2021-08-19 NOTE — PROGRESS NOTES
Bedside and Verbal shift change report given to GLENIS Burkett  (oncoming nurse) by Jaswinder Hawkins RN  (offgoing nurse). Report included the following information SBAR, Kardex, ED Summary, Procedure Summary, Intake/Output, Recent Results, Cardiac Rhythm AFIB- 81 and Alarm Parameters .

## 2021-08-19 NOTE — WOUND CARE
Patient seen for left plantar foot wound. Has surrounding callous. Open wound portion to center 1x0.6x0.5 cm. Clean and dry. Cleansed with saline and covered with silicone foam for now. Is on vent in ICU overflow. Patient awake and updated. Primary nurse made aware as well. Will continue to monitor and adjust treatment as needed.

## 2021-08-19 NOTE — PROGRESS NOTES
Pt given ice chips. No difficulty with swallowing or coughing after swallowing. Pt given water both by cup and straw. Again, no difficulty noted. Knee immobilizer here. Metal bars removed per MD order. Immobilizer placed on right leg.

## 2021-08-19 NOTE — PERIOP NOTES
Patient moved from stretcher to bed with assistance from RT Sadi and other ICU RN. Patient tolerated well. VSS. Patient moved from [place] [address] #2 to PACU bay #11 (ICU Overflow bed #20).

## 2021-08-19 NOTE — CONSULTS
Infectious Disease Consult    Today's Date: 8/19/2021   Admit Date: 8/18/2021    Impression:   · MSSA R foot infection/ankle abscess from complicated Charcot foot  · S/p (8/18) R BKA with wound vac placement  · Recent MSSA bacteremia 7/19/21, negative BC 7/23/21. Source foot TTE negative, on IV antbx outpatient  · DM, well controlled  · Difficultly vent weaning post op  · L foot ulcer, appears benign  · CKD 3    Plan:   · Discontinue Cefazolin today, he has completed about 4 weeks and ~24hrs post op: he has been adequately treated for the bacteremia, and the R foot with a BKA  · Have the wound care team eval left foot ulcer  · Pull PICC before DC  · ID will sign off, please call with questions or concerns  · No ID follow up indicated, will cancel appts made during last admission      Anti-infectives:   · Cefazolin 7/21-8/19    Subjective:   Date of Consultation:  August 19, 2021  Referring Physician: Dr Keenan Choudhary    Patient is a 77 y.o. male who is well-known to infectious disease for recent admission for MSSA bacteremia and right foot abscess/osteomyelitis complicated by Charcot foot. At that time a BKA was recommended but the patient declined. His blood cultures were eventually negative on 7/23/2021, TTE negative, he was discharged to complete 6 weeks of cefazolin EOT 9/3/2021. He was admitted yesterday to undergo a right BKA with wound VAC placement, no path or cultures. He is currently on cefazolin and ID is consulted to make recommendations. He is presently intubated secondary to difficulty weaning off post anesthesia. He is alert and able to communicate to simple questions, and denies having any acute complaints. Past medical history significant for diabetes that is well controlled, CHF, atrial fibrillation, CKD 3, morbid obesity.        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    Controlled type 2 diabetes mellitus with diabetic polyneuropathy, without long-term current use of insulin ([facility]) E11.42    Type 2 diabetes mellitus with nephropathy ([facility]) E11.21    Bunion of unspecified foot M21.619    Onychomycosis B35.1    Corns and callus L84    Mixed hyperlipidemia E78.2    Severe obesity (BMI 35.0-35.9 with comorbidity) ([facility]) E66.01, Z68.35    Morbid obesity with BMI of 40.0-44.9, adult ([facility]) E66.01, Z68.41    Cellulitis L03.90    Suspected CHF (congestive heart failure) R09.89    Atrial fibrillation with RVR ([facility]) I48.91    Hypoxia R09.02    Acute kidney injury superimposed on CKD ([facility]) N17.9, N18.9    Staphylococcus aureus bacteremia R78.81, B95.61    Acute respiratory failure with hypercapnia ([facility]) J96.02    Acute respiratory failure with hypoxia and hypercapnia ([facility]) J96.01, J96.02    Acute metabolic encephalopathy B85.31    S/P BKA (below knee amputation) unilateral, right ([facility]) Z89.511    Respiratory failure, post-operative ([facility]) U30.160     Past Medical History:   Diagnosis Date    [facility]    developed AFID after hospital admission for wound infection- started on Eliquis    CAD (coronary artery disease)     [facility].     Chronic kidney disease     stage 3- improved    COVID-19 vaccine series completed     Moderna Vaccine completed X2 doses    Current use of long term anticoagulation     Eliquis and Aspirin    H/O heart artery stent     X1 placed in 1999    History of MI (myocardial infarction) 1999    stent placed X1    Hypercholesterolemia     Hypertension     Left ventricular dysfunction     echo revealed EF 30-35%, mildly dilated LA/RA and mild TR and MR.    Neuropathy     severe    Oxygen dependent     recently started on 2L NC continous- Sleep study to be scheduled-questionable ELAINA    PICC (peripherally inserted central catheter) in place PICC line in place to R arm    Type 2 diabetes mellitus ([facility]     oral agent/Pt does not monitor BS/no s.s of hypoglycemia/Last A1c: 6.7 on 7/13/21 per daughter      Family History   Problem Relation Age of Onset    Cancer Mother     Cancer Father     No Known Problems Maternal Grandmother     No Known Problems Maternal Grandfather     No Known Problems Paternal Grandmother     No Known Problems Paternal Grandfather       Social History     Tobacco Use    Smoking status: Never Smoker    Smokeless tobacco: Never Used   Substance Use Topics    Alcohol use: No     Past Surgical History:   Procedure Laterality Date    [place] CARDIAC SURG PROCEDURE UNLIST  1999    stent placement      Prior to Admission medications    Medication Sig Start Date End Date Taking? Authorizing Provider   pregabalin (Lyrica) 200 mg capsule Take 200 mg by mouth two (2) times a day. Yes Provider, Historical   multivitamin (ONE A DAY) tablet Take 1 Tablet by mouth daily. Yes Provider, Historical   cefazolin sodium (CEFAZOLIN IV) 2 g by IntraVENous route three (3) times daily. Yes Provider, Historical   traZODone (DESYREL) 50 mg tablet Take 1 Tablet by mouth nightly. 8/9/21  Yes Krysten Ceron PA-C   metoprolol succinate (TOPROL-XL) 25 mg XL tablet Take 1 Tablet by mouth two (2) times a day. 7/27/21  Yes Rosalind Rooney MD   furosemide (LASIX) 20 mg tablet Take 1 Tablet by mouth as needed for Other (WEIGHT INCREASE GREATER THEN 2LB/DAY OR 5LB/DAY). 7/27/21  Yes Rosalind Rooney MD   glipiZIDE (GLUCOTROL) 5 mg tablet Take 0.5 Tablets by mouth daily. 7/27/21  Yes Rosalind Rooney MD   atorvastatin (LIPITOR) 40 mg tablet TAKE 1 TABLET BY MOUTH DAILY  Indications: treatment to slow progression of coronary artery disease 7/13/21  Yes Mignon Cleary MD   aspirin delayed-release 81 mg tablet Take 81 mg by mouth daily. Yes Provider, Historical   cyanocobalamin 1,000 mcg tablet Take 1 Tab by mouth daily.  10/10/16  Yes Miryam Bland, Aubrie Belle MD   cholecalciferol, VITAMIN D3, (VITAMIN D3) 5,000 unit tab tablet Take  by mouth daily. Yes Provider, Historical   nystatin (MYCOSTATIN) powder Apply  to affected area as needed. Provider, Historical       No Known Allergies     Review of Systems:  Pertinent items are noted in the History of Present Illness. Objective:     Visit Vitals  BP (!) 100/58   Pulse 91   Temp 98.2 °F (36.8 °C)   Resp 16   Ht 5' 10\" (1.778 m)   Wt 127 kg (280 lb)   SpO2 100%   BMI 40.18 kg/m²     Temp (24hrs), Av.9 °F (36.6 °C), Min:97.5 °F (36.4 °C), Max:98.2 °F (36.8 °C)       Lines:  PICC:   RUE    Physical Exam:    General:  Sedated and on the ventilator. No acute distress. Eyes:  Sclera anicteric. Pupils equal, round, reactive to light. Mouth/Throat: Orotracheal tube in place. Neck: Supple. Lungs:   Clear to auscultation bilaterally, good effort. Cardiovascular:  Regular rate and rhythm, no murmur, click, rub, or gallop. Abdomen:   Soft, non-tender, bowel sounds normal, non-distended. Extremities: R BKA op site covered, Left foot ulcer appears benign, no cellulitis   Skin: No acute rash or lesions. Musculoskeletal:  No swelling or deformity. Lines/Devices:  Intact, no erythema, drainage, or tenderness. Psychiatric: Awake, interactive and appears comfortable on ventilator. Data Review:     CBC:  Recent Labs     21  0329 21  1856   WBC 6.2 6.0   GRANS 57 82*   MONOS 16* 8   EOS 1 0*   ANEU 3.5 4.9   ABL 1.5 0.5   HGB 9.5* 9.4*   HCT 32.3* 31.9*   * 153       BMP:  Recent Labs     21  0329 21  1827   CREA 1.36 1.26   BUN 29* 28*    144   K 5.4* 4.6   * 110*   CO2 28 33*   AGAP 6* 1*   GLU 84 115*       LFTS:  No results for input(s): TBILI, ALT, AP, TP, ALB in the last 72 hours.     No lab exists for component: SGOT    Microbiology:     All Micro Results     None          Imaging:   Reviewed     Signed By: Rosario Davila NP     , 2021

## 2021-08-19 NOTE — PROGRESS NOTES
Chart reviewed. Pt moved from PACU/ICU overflow to 330. BSN, CM met with pt at bedside with appropriate PPE and social distancing. Pt currently still intubated. Contacted daughter, Georgi Scott, and daughter verified demographic information. PCP verified as LATONYA Bliss and pt was last seen by PCP within last month. Dr. Annemarie Klein left office recently and pt now sees PA. Pt lives in 1 story home with spouse and daughter, 1 steps to enter into the home. Pt was independent with ADLs prior to admission on 7/19, working at [employer], and driving. Pt reports having DMEs such as walker, wheelchair, oxygen (from [employer]), glucometer, and shower chair. Pt family able to transport home, to doctor apt,  medications, and get groceries when pt able to be home. Pt primary pharmacy is [facility] [address] in [place]. Pt able to afford medications. Daughter reports plans for pt to go to [facility] when able and if recommended. Pt has never used HH or been to SNF. Readmission assessment complete. Once pt able will need PT/OT eval. CM to continue to follow and monitor for any needs that may occur. Care Management Interventions  PCP Verified by CM:  Yes Valencia Hay, [address])  Mode of Transport at Discharge: BLS  Transition of Care Consult (CM Consult): Discharge Planning  Discharge Durable Medical Equipment: No  Physical Therapy Consult: No  Occupational Therapy Consult: No  Speech Therapy Consult: No  Current Support Network: Lives with Spouse, Other, Own Home (lives with wife and daughter )  Confirm Follow Up Transport: Family  The Plan for Transition of Care is Related to the Following Treatment Goals : Be able to get around in w/c and regain fuctional care of self   The Patient and/or Patient Representative was Provided with a Choice of Provider and Agrees with the Discharge Plan?: Yes  Name of the Patient Representative Who was Provided with a Choice of Provider and Agrees with the Discharge Plan: daughterShashi of Choice List was Provided with Basic Dialogue that Supports the Patient's Individualized Plan of Care/Goals, Treatment Preferences and Shares the Quality Data Associated with the Providers?: Yes   Resource Information Provided?: No  Discharge Location  Discharge Placement: Unable to determine at this time (family hopeful for STR )     Readmission Assessment  Number of days since last admission?: 8-30 days  Previous disposition: Home with Family  Who is being interviewed?: Caregiver (daughter, Sully Armenta)  What was the patient's/caregiver's perception as to why they think they needed to return back to the hospital?: Other (Comment) (surgery, Right BKA, after no inprovement of cellulitis)  Did you visit your Primary Care Physician after you left the hospital, before you returned this time?: Yes  Did you see a specialist, such as Cardiac, Pulmonary, Orthopedic Physician, etc. after you left the hospital?: Yes  Who advised the patient to return to the hospital?: Physician  Does the patient report anything that got in the way of taking their medications?: No  In our efforts to provide the best possible care to you and others like you, can you think of anything that we could have done to help you after you left the hospital the first time, so that you might not have needed to return so soon?: Other (Comment) (daughter Rose Session in ampuattion discussion would have been discussed more \"compassionately\" pt may have been agreeable on last admission)

## 2021-08-19 NOTE — PROGRESS NOTES
Respiratory Mechanics completed and are as follows:  Weaning Parameters  Hubbard Agitation Sedation Scale (RASS): Drowsy  Patient extubated to optiflow 45lpm 45%L NC. Patient is able to communicate and is negative for stridor. Breath sounds are diminished. No complications with extubation.      Colt Adams, RT

## 2021-08-19 NOTE — PROGRESS NOTES
[facility] Hospitalist Consult   Admit Date:  2021  7:31 AM   Name:  Eleazar Gilliam   Age:  77 y.o. Sex:  male  :  1945   MRN:  [id number]     Presenting Complaint: No chief complaint on file. Reason(s) for Admission: Charcot's joint of foot, right [M14.671]  Infected wound [T14. 8XXA, L08.9]  S/P BKA (below knee amputation) unilateral, right [facility]) [Z89.511]  Respiratory failure, post-operative [facility] [M00.048]     Hospitalists consulted by Erlinda Johnson MD for: Medical management    History of Presenting Illness:   Eleazar Gilliam is a 77 y.o. male with history of CAD status post PCI, chronic respiratory failure on 2 L via NC, A. fib (on Eliquis), CKD stage III, DM 2, chronic systolic CHF EF 30 to 28%, status post right BKA on  seen by hospitalist team on  on request of Dr. More Miller for medical management. Patient is currently on vent with difficulty to wean him off. He was switched from propofol to Precedex this morning. Patient was hospitalized in July with right foot cellulitis and sepsis secondary to right foot ulcer. Ortho initially recommended amputation, however patient refused. Patient was discharged on long-term IV antibiotics with EOT 9/3/2021. Patient was readmitted due to worsening right lower leg wound and underwent right below-knee amputation on .    :  Patient is currently sedated on vent. He opens his eyes nods to questions but falls back asleep. No overnight events including fever, diarrhea. Review of Systems:  10 systems reviewed and negative except as noted above. Assessment & Plan:   Acute respiratory failure with hypoxia requiring intubation:  : Patient continues to be on vent, sedation switched from propofol to Precedex. Currently on FiO2 of 60%. Intensivist on board to assist with vent management. Continue diuretics IV Lasix 40 mg every 12 hours along with albumin. Monitor strict ANNA's.   Chest x-ray continues to show pulmonary edema.    Hyperkalemia-  8/19: 5.4 today. We will give 1 amp of D50 followed by 10 units of IV regular insulin. With recheck BMP at 1600 hrs. Status post right below-knee amputation:  8/19: POD #1  Patient is on empiric IV cefazolin, cultures negative so far. Patient likely has clear margins and will not require long-term IV antibiotics. PT and OT to evaluate once patient is stable from respiratory standpoint. Wound care consult. Ordered blood cultures today x2 sets      A. fib:  8/19: Heart rate is optimally controlled,we will switch Toprol-XL to IV Lopressor 2.5 mg every 6 hours as patient is currently n.p.o.  Eliquis has been held 2 days prior to surgery and is currently on therapeutic Lovenox. May consider switching back to Eliquis in the next 24 to 48 hours. Cardiology on board appreciate their recommendations. CHF-patient had echo performed on 7/21/2021 demonstrating ejection fraction of 30 to 35%. 8/19:  Check BNP  Respiratory status no change since yesterday, patient continues to be on vent. Chest x-ray this morning showed bilateral lung infiltrates versus pulmonary edema with small pleural effusions. IV Lasix 40 mg every 12 hours. Monitor intake and output. Diabetes-patient noted to have a hemoglobin A1c of 6.7  8/19:  Blood glucose is optimally controlled, continue Humalog sliding scale every 4 hours. Monitor POC glucose every 4 hours. Morbid obesity-  Noted    Disposition: Patient is currently critically ill, requiring mechanical ventilation to assist with respiratory failure. Patient continues to be at high risk for further complications.   We will continue to manage with intensivist and the primary care team.            Objective:     Patient Vitals for the past 24 hrs:   Temp Pulse Resp BP SpO2   08/19/21 0630  79 16 98/60 100 %   08/19/21 0540  78 16  96 %   08/19/21 0530  84 20 (!) 93/57 98 %   08/19/21 0430 98.2 °F (36.8 °C) 80 20 98/62 96 %   08/19/21 0330  69 20 105/71 96 %   08/19/21 0230  74 20 98/70 97 %   08/19/21 0211  72  (!) 111/56 96 %   08/19/21 0138  87 20 (!) 158/78 96 %   08/19/21 0128  76 20 96/60 96 %   08/19/21 0057  84 20 96/60 96 %   08/19/21 0027  79 20 98/62 96 %   08/19/21 0011  92 20  96 %   08/18/21 2356  87 20 98/65 97 %   08/18/21 2329 98.2 °F (36.8 °C) 92 20 (!) 100/56 100 %   08/18/21 2328  92 20 (!) 99/58 (!) 89 %   08/18/21 2257  93 20 (!) 99/58 96 %   08/18/21 2227  88 20 (!) 99/57 97 %   08/18/21 2157  (!) 101 20 112/63 (!) 80 %   08/18/21 2127  88 20 101/62 100 %   08/18/21 2115  92 20  97 %   08/18/21 2112  (!) 106 20 111/62 92 %   08/18/21 2057  92 20 (!) 99/52 100 %   08/18/21 2042  90 22 (!) 109/55 91 %   08/18/21 2027  90 20 100/66 100 %   08/18/21 2012  90 22 (!) 96/59 100 %   08/18/21 2003  (!) 103 20 101/64 (!) 85 %   08/18/21 1957  89 22 101/64 (!) 78 %   08/18/21 1942  76 20 100/61 97 %   08/18/21 1927  81 22 96/67 97 %   08/18/21 1912  90 20 106/62 100 %   08/18/21 1857 98 °F (36.7 °C) 94 22 (!) 111/58 90 %   08/18/21 1842  81 20 (!) 97/53 100 %   08/18/21 1827  85 22 (!) 109/59 94 %   08/18/21 1810  79 20  95 %   08/18/21 1808  87 20  98 %   08/18/21 1758  97 20 109/61 98 %   08/18/21 1748  89 20 (!) 143/69 98 %   08/18/21 1728  97 20 134/75 96 %   08/18/21 1724  100 20 134/75    08/18/21 1708  93 20 (!) 129/95 93 %   08/18/21 1646  (!) 113 17 (!) 115/56 94 %   08/18/21 1640  (!) 110 18 (!) 103/59 92 %   08/18/21 1639 97.5 °F (36.4 °C) 98 12 (!) 112/55 96 %   08/18/21 1636  100 18  91 %   08/18/21 1635  (!) 107 18 (!) 112/55 94 %   08/18/21 1630 97.5 °F (36.4 °C) (!) 101 18 108/62 98 %   08/18/21 0823 97.7 °F (36.5 °C) 82 18 110/63 100 %     Oxygen Therapy  O2 Sat (%): 100 % (08/19/21 0630)  Pulse via Oximetry: 80 beats per minute (08/19/21 0630)  O2 Device: Ventilator;Endotracheal tube (08/19/21 0630)  Skin Assessment: Clean, dry, & intact (08/19/21 0630)  Skin Protection for O2 Device: Yes (08/19/21 0630)  O2 Flow Rate (L/min): 3 l/min (08/18/21 0823)  FIO2 (%): 60 % (08/19/21 0630)    Estimated body mass index is 40.18 kg/m² as calculated from the following:    Height as of this encounter: 5' 10\" (1.778 m). Weight as of this encounter: 127 kg (280 lb). Intake/Output Summary (Last 24 hours) at 8/19/2021 0710  Last data filed at 8/19/2021 0630  Gross per 24 hour   Intake 1000 ml   Output 900 ml   Net 100 ml         Physical Exam:    General:    Obese BMI 40, sedated and on vent, nonverbal, would open eyes to verbal and physical stimuli. HEENT:           Head NCAT, PERRLA positive  CV:   RRR. No m/r/g. No JVD. No edema  Lungs:   Crackles bilaterally, on vent  Abdomen: Bowel sounds present. Soft, nontender, nondistended. Extremities: Warm and dry. No cyanosis or clubbing. Right BKA with surgical dressing on  Skin:     No rashes. Normal coloration. Normal turgor.    Neuro:  Unable to assess as patient is nonverbal and sedated  Psych:  Unable to assess as patient is sedated and on vent    I have reviewed ordered lab tests and independently visualized imaging below:    Last 24hr Labs:  Recent Results (from the past 24 hour(s))   GLUCOSE, POC    Collection Time: 08/18/21  8:09 AM   Result Value Ref Range    Glucose (POC) 95 65 - 100 mg/dL    Performed by Sarah    POC SODIUM-POTASSIUM    Collection Time: 08/18/21  8:11 AM   Result Value Ref Range    Potassium, POC 5.8 (H) 3.5 - 5.1 MMOL/L   POC SODIUM-POTASSIUM    Collection Time: 08/18/21 11:07 AM   Result Value Ref Range    Potassium, POC 4.9 3.5 - 5.1 MMOL/L   BLOOD GAS, ARTERIAL POC    Collection Time: 08/18/21  4:51 PM   Result Value Ref Range    Device: ET TUBE      FIO2 (POC) 60 %    pH (POC) 7.31 (L) 7.35 - 7.45      pCO2 (POC) 66.4 (HH) 35 - 45 MMHG    pO2 (POC) 59 (L) 75 - 100 MMHG    HCO3 (POC) 33.2 (H) 22 - 26 MMOL/L    sO2 (POC) 86.2 (L) 95 - 98 %    Base excess (POC) 5.2 mmol/L    Mode Pressure regulated volume control      Tidal volume 500 ml    PEEP/CPAP (POC) 8 cmH2O    Allens test (POC) Positive      Site RIGHT RADIAL      Specimen type (POC) ARTERIAL      Performed by Yoli Ayala     Critical value read back RN    METABOLIC PANEL, BASIC    Collection Time: 08/18/21  6:27 PM   Result Value Ref Range    Sodium 144 138 - 145 mmol/L    Potassium 4.6 3.5 - 5.1 mmol/L    Chloride 110 (H) 98 - 107 mmol/L    CO2 33 (H) 21 - 32 mmol/L    Anion gap 1 (L) 7 - 16 mmol/L    Glucose 115 (H) 65 - 100 mg/dL    BUN 28 (H) 8 - 23 MG/DL    Creatinine 1.26 0.8 - 1.5 MG/DL    GFR est AA >60 >60 ml/min/1.73m2    GFR est non-AA 59 (L) >60 ml/min/1.73m2    Calcium 8.8 8.3 - 10.4 MG/DL   PTT    Collection Time: 08/18/21  6:56 PM   Result Value Ref Range    aPTT 34.9 24.1 - 35.1 SEC   CBC WITH AUTOMATED DIFF    Collection Time: 08/18/21  6:56 PM   Result Value Ref Range    WBC 6.0 4.3 - 11.1 K/uL    RBC 3.26 (L) 4.23 - 5.6 M/uL    HGB 9.4 (L) 13.6 - 17.2 g/dL    HCT 31.9 (L) 41.1 - 50.3 %    MCV 97.9 (H) 79.6 - 97.8 FL    MCH 28.8 26.1 - 32.9 PG    MCHC 29.5 (L) 31.4 - 35.0 g/dL    RDW 15.9 (H) 11.9 - 14.6 %    PLATELET 170 355 - 618 K/uL    MPV 12.1 9.4 - 12.3 FL    ABSOLUTE NRBC 0.00 0.0 - 0.2 K/uL    DF AUTOMATED      NEUTROPHILS 82 (H) 43 - 78 %    LYMPHOCYTES 9 (L) 13 - 44 %    MONOCYTES 8 4.0 - 12.0 %    EOSINOPHILS 0 (L) 0.5 - 7.8 %    BASOPHILS 1 0.0 - 2.0 %    IMMATURE GRANULOCYTES 1 0.0 - 5.0 %    ABS. NEUTROPHILS 4.9 1.7 - 8.2 K/UL    ABS. LYMPHOCYTES 0.5 0.5 - 4.6 K/UL    ABS. MONOCYTES 0.5 0.1 - 1.3 K/UL    ABS. EOSINOPHILS 0.0 0.0 - 0.8 K/UL    ABS. BASOPHILS 0.0 0.0 - 0.2 K/UL    ABS. IMM.  GRANS. 0.0 0.0 - 0.5 K/UL   GLUCOSE, POC    Collection Time: 08/18/21  9:42 PM   Result Value Ref Range    Glucose (POC) 95 65 - 100 mg/dL    Performed by [facility]    BLOOD GAS, ARTERIAL POC    Collection Time: 08/18/21 10:06 PM   Result Value Ref Range    Device: ADULT VENT      FIO2 (POC) 65 %    pH (POC) 7.48 (H) 7.35 - 7.45      pCO2 (POC) 39.8 35 - 45 MMHG    pO2 (POC) 92 75 - 100 MMHG    HCO3 (POC) 29.4 (H) 22 - 26 MMOL/L    sO2 (POC) 97.7 95 - 98 %    Base excess (POC) 5.5 mmol/L    Mode Pressure regulated volume control      Tidal volume 500 ml    PEEP/CPAP (POC) 10 cmH2O    Mean Airway Pressure 14 cmH2O    PIP (POC) 31      Allens test (POC) NOT APPLICABLE      Site RIGHT RADIAL      Specimen type (POC) ARTERIAL      Performed by Yessi     Respiratory comment: [email or web address]    CBC WITH AUTOMATED DIFF    Collection Time: 08/19/21  3:29 AM   Result Value Ref Range    WBC 6.2 4.3 - 11.1 K/uL    RBC 3.32 (L) 4.23 - 5.6 M/uL    HGB 9.5 (L) 13.6 - 17.2 g/dL    HCT 32.3 (L) 41.1 - 50.3 %    MCV 97.3 79.6 - 97.8 FL    MCH 28.6 26.1 - 32.9 PG    MCHC 29.4 (L) 31.4 - 35.0 g/dL    RDW 16.0 (H) 11.9 - 14.6 %    PLATELET 347 (L) 145 - 450 K/uL    MPV 12.8 (H) 9.4 - 12.3 FL    ABSOLUTE NRBC 0.00 0.0 - 0.2 K/uL    DF AUTOMATED      NEUTROPHILS 57 43 - 78 %    LYMPHOCYTES 25 13 - 44 %    MONOCYTES 16 (H) 4.0 - 12.0 %    EOSINOPHILS 1 0.5 - 7.8 %    BASOPHILS 1 0.0 - 2.0 %    IMMATURE GRANULOCYTES 0 0.0 - 5.0 %    ABS. NEUTROPHILS 3.5 1.7 - 8.2 K/UL    ABS. LYMPHOCYTES 1.5 0.5 - 4.6 K/UL    ABS. MONOCYTES 1.0 0.1 - 1.3 K/UL    ABS. EOSINOPHILS 0.1 0.0 - 0.8 K/UL    ABS. BASOPHILS 0.0 0.0 - 0.2 K/UL    ABS. IMM.  GRANS. 0.0 0.0 - 0.5 K/UL   METABOLIC PANEL, BASIC    Collection Time: 08/19/21  3:29 AM   Result Value Ref Range    Sodium 143 138 - 145 mmol/L    Potassium 5.4 (H) 3.5 - 5.1 mmol/L    Chloride 109 (H) 98 - 107 mmol/L    CO2 28 21 - 32 mmol/L    Anion gap 6 (L) 7 - 16 mmol/L    Glucose 84 65 - 100 mg/dL    BUN 29 (H) 8 - 23 MG/DL    Creatinine 1.36 0.8 - 1.5 MG/DL    GFR est AA >60 >60 ml/min/1.73m2    GFR est non-AA 54 (L) >60 ml/min/1.73m2    Calcium 8.8 8.3 - 10.4 MG/DL   BLOOD GAS, ARTERIAL POC    Collection Time: 08/19/21  5:34 AM   Result Value Ref Range    Device: ADULT VENT      FIO2 (POC) 50 %    pH (POC) 7.57 (HH) 7.35 - 7.45      pCO2 (POC) 33.2 (L) 35 - 45 MMHG    pO2 (POC) 56 (L) 75 - 100 MMHG    HCO3 (POC) 30.5 (H) 22 - 26 MMOL/L    sO2 (POC) 92.8 (L) 95 - 98 %    Base excess (POC) 8.0 mmol/L    Mode Pressure regulated volume control      Tidal volume 500 ml    PEEP/CPAP (POC) 10 cmH2O    Allens test (POC) Positive      Total resp. rate 20      Site RIGHT RADIAL      Specimen type (POC) ARTERIAL      Performed by Sil     Critical value read back SAMAN     Respiratory comment: [id number]        All Micro Results     None          Other Studies:  XR CHEST SNGL V    Result Date: 8/19/2021  EXAM: Chest x-ray. INDICATION: Dyspnea. COMPARISON: July 26, 2021. TECHNIQUE: Frontal view chest x-ray. FINDINGS: There is new hazy bilateral lung edema or infiltrates and small bilateral pleural effusions. The heart is borderline enlarged. No pneumothorax is seen. The endotracheal tube tip lies 3.7 cm above the tayla. The right arm PICC line tip is at the cavoatrial junction. Bilateral lung edema or infiltrates and small pleural effusions.       Current Meds:  Current Facility-Administered Medications   Medication Dose Route Frequency    sodium chloride (NS) flush 5-40 mL  5-40 mL IntraVENous Q8H    sodium chloride (NS) flush 5-40 mL  5-40 mL IntraVENous PRN    enoxaparin (LOVENOX) injection 120 mg  120 mg SubCUTAneous Q12H    atorvastatin (LIPITOR) tablet 40 mg  40 mg Oral QHS    furosemide (LASIX) tablet 20 mg  20 mg Oral PRN    metoprolol succinate (TOPROL-XL) XL tablet 25 mg  25 mg Oral BID    nystatin (MYCOSTATIN) 100,000 unit/gram powder   Topical PRN    pregabalin (LYRICA) capsule 200 mg  200 mg Oral BID    traZODone (DESYREL) tablet 50 mg  50 mg Oral QHS    insulin lispro (HUMALOG) injection   SubCUTAneous AC&HS    dextrose 40% (GLUTOSE) oral gel 1 Tube  15 g Oral PRN    glucagon (GLUCAGEN) injection 1 mg  1 mg IntraMUSCular PRN    dextrose (D50W) injection syrg 12.5-25 g  25-50 mL IntraVENous PRN    ceFAZolin (ANCEF) 2 g/20 mL in sterile water IV syringe  2 g IntraVENous Q8H    propofol (DIPRIVAN) 10 mg/mL infusion  0-50 mcg/kg/min IntraVENous TITRATE    sodium chloride (NS) flush 5-40 mL  5-40 mL IntraVENous Q8H    sodium chloride (NS) flush 5-40 mL  5-40 mL IntraVENous PRN    naloxone (NARCAN) injection 0.4 mg  0.4 mg IntraVENous PRN    HYDROmorphone (DILAUDID) injection 0.5 mg  0.5 mg IntraVENous Q4H PRN    famotidine (PF) (PEPCID) 20 mg in 0.9% sodium chloride 10 mL injection  20 mg IntraVENous Q12H       Signed:  Mago Ochoa MD    Part of this note may have been written by using a voice dictation software. The note has been proof read but may still contain some grammatical/other typographical errors.

## 2021-08-19 NOTE — PERIOP NOTES
Report and transfer of care to [facility], ICU RN. Patient repositioned in the bed. Pillow replaced under bony prominences.

## 2021-08-19 NOTE — PROGRESS NOTES
Pt extubated per RT at Dr Rajiv Isaacs request. Pt transitioned to airvo 45 liters and 45%. Sat 94%.

## 2021-08-19 NOTE — PROGRESS NOTES
Problem: Falls - Risk of  Goal: *Absence of Falls  Description: Document Bella Kelley Fall Risk and appropriate interventions in the flowsheet. Outcome: Progressing Towards Goal  Note: Fall Risk Interventions:       Mentation Interventions: Adequate sleep, hydration, pain control, Bed/chair exit alarm, Door open when patient unattended, Evaluate medications/consider consulting pharmacy, Eyeglasses and hearing aids, Increase mobility, More frequent rounding, Reorient patient, Room close to nurse's station, Toileting rounds, Update white board    Medication Interventions: Assess postural VS orthostatic hypotension, Bed/chair exit alarm, Evaluate medications/consider consulting pharmacy, Patient to call before getting OOB, Teach patient to arise slowly    Elimination Interventions: Bed/chair exit alarm, Call light in reach, Patient to call for help with toileting needs, Stay With Me (per policy), Urinal in reach              Problem: Patient Education: Go to Patient Education Activity  Goal: Patient/Family Education  Outcome: Progressing Towards Goal     Problem: Pressure Injury - Risk of  Goal: *Prevention of pressure injury  Description: Document Julio Cesar Scale and appropriate interventions in the flowsheet. Outcome: Progressing Towards Goal  Note: Pressure Injury Interventions:  Sensory Interventions: Assess changes in LOC, Avoid rigorous massage over bony prominences, Check visual cues for pain, Discuss PT/OT consult with provider, Float heels, Keep linens dry and wrinkle-free, Maintain/enhance activity level, Minimize linen layers, Monitor skin under medical devices, Turn and reposition approx.  every two hours (pillows and wedges if needed)    Moisture Interventions: Absorbent underpads, Apply protective barrier, creams and emollients, Check for incontinence Q2 hours and as needed, Internal/External urinary devices, Moisture barrier, Maintain skin hydration (lotion/cream), Minimize layers    Activity Interventions: Increase time out of bed, Pressure redistribution bed/mattress(bed type), PT/OT evaluation    Mobility Interventions: Float heels, HOB 30 degrees or less, Pressure redistribution bed/mattress (bed type), PT/OT evaluation, Turn and reposition approx.  every two hours(pillow and wedges)    Nutrition Interventions: Document food/fluid/supplement intake    Friction and Shear Interventions: Apply protective barrier, creams and emollients, Feet elevated on foot rest, Foam dressings/transparent film/skin sealants, HOB 30 degrees or less, Minimize layers                Problem: Patient Education: Go to Patient Education Activity  Goal: Patient/Family Education  Outcome: Progressing Towards Goal     Problem: Ventilator Management  Goal: *Adequate oxygenation and ventilation  Outcome: Progressing Towards Goal  Goal: *Patient maintains clear airway/free of aspiration  Outcome: Progressing Towards Goal  Goal: *Absence of infection signs and symptoms  Outcome: Progressing Towards Goal  Goal: *Normal spontaneous ventilation  Outcome: Progressing Towards Goal     Problem: Patient Education: Go to Patient Education Activity  Goal: Patient/Family Education  Outcome: Progressing Towards Goal     Problem: Delirium Treatment  Goal: *Level of consciousness restored to baseline  Outcome: Progressing Towards Goal  Goal: *Level of environmental perceptions restored to baseline  Outcome: Progressing Towards Goal  Goal: *Sensory perception restored to baseline  Outcome: Progressing Towards Goal  Goal: *Emotional stability restored to baseline  Outcome: Progressing Towards Goal  Goal: *Functional assessment restored to baseline  Outcome: Progressing Towards Goal  Goal: *Absence of falls  Outcome: Progressing Towards Goal  Goal: *Will remain free of delirium, CAM Score negative  Outcome: Progressing Towards Goal  Goal: *Cognitive status will be restored to baseline  Outcome: Progressing Towards Goal  Goal: Interventions  Outcome: Progressing Towards Goal     Problem: Patient Education: Go to Patient Education Activity  Goal: Patient/Family Education  Outcome: Progressing Towards Goal

## 2021-08-19 NOTE — PROGRESS NOTES
TRANSFER - IN REPORT:    Verbal report received from Kianna Bridges RN (name) on Fuad Barnard  being received from PACU (unit) for routine progression of care      Report consisted of patients Situation, Background, Assessment and   Recommendations(SBAR). Information from the following report(s) SBAR, Kardex, Intake/Output, MAR, Recent Results, Cardiac Rhythm a fib, Alarm Parameters  and Quality Measures was reviewed with the receiving nurse. Opportunity for questions and clarification was provided. Assessment completed upon patients arrival to unit and care assumed.

## 2021-08-19 NOTE — PROGRESS NOTES
Dr Sahara Bass here to see pt. Unwrapped ace and removed splint. Rewrapped ace bandage to right leg. MD asked for knee immobilizer to be placed on right leg, but remove hard splints from brace before applying to pt.

## 2021-08-19 NOTE — PROGRESS NOTES
[facility]   Progress Note    Patient: Forrestine Sacks MRN: [id number]  SSN: [id number]    YOB: 1945  Age: 77 y.o. Sex: male      Admit Date: 8/18/2021    LOS: 1 day     Subjective:     Postop day 1 right below the knee amputation for open wound and Charcot ankle. He remains intubated in the stepdown ICU. Objective:     Vitals:    08/19/21 1201 08/19/21 1217 08/19/21 1232 08/19/21 1246   BP: 109/61 115/65 (!) 116/58 (!) 109/57   Pulse: (!) 101 96 (!) 107 (!) 103   Resp: 25 17 15 22   Temp:       SpO2: 93% 96% 99% 99%   Weight:       Height:            Intake and Output:  Current Shift: No intake/output data recorded. Last three shifts: 08/17 1901 - 08/19 0700  In: 1000 [I.V.:1000]  Out: 1025 [Urine:925]    Physical Exam:   Right lower extremity: I removed the fiberglass splint as I am concerned it may result in ulcer. Dressing clean dry and intact. Wound VAC with good seal. Nothing in the canister. No drainage.     Lab/Data Review:  CBC:   Lab Results   Component Value Date/Time    WBC 6.2 08/19/2021 03:29 AM    HGB 9.5 (L) 08/19/2021 03:29 AM    HCT 32.3 (L) 08/19/2021 03:29 AM     (L) 08/19/2021 03:29 AM          Assessment:     Principal Problem:    S/P BKA (below knee amputation) unilateral, right ([facility] [address] (8/18/2021)    Active Problems:    CAD (coronary artery disease) ()      Stage 3 chronic kidney disease ([facility] (11/22/2017)      Controlled type 2 diabetes mellitus with diabetic polyneuropathy, without long-term current use of insulin ([facility] [address] (1/5/2018)      Mixed hyperlipidemia (4/10/2018)      Morbid obesity with BMI of 40.0-44.9, adult ([facility] [address] (5/04/5430)      Systolic CHF, acute on chronic (HCC) (7/19/2021)      Atrial fibrillation with RVR ([facility] [address] (7/19/2021)      Acute respiratory failure with hypoxia and hypercapnia ([facility]) (7/23/2021)      Respiratory failure, post-operative ([facility] (8/18/2021)        Plan:     1: WB status - NWB RLE  2: DVT px - Lovenox as of now. I defer to cardiology the appropriate anticoagulant  3: ABX -Ancef 2 g every 8 hours  4: At this point his amputation is his final surgical procedure. Wound VAC with good seal. We will keep wound VAC on as long as necessary to aid in wound healing. 5: [address] cardiology, pulmonology, and medicine evaluation and management of his respiratory and medical problems. Hopefully we can extubate over the next 24 to 48 hours however this is pulmonology/medicine decision.     Signed By: Wil Hunter MD     August 19, 2021

## 2021-08-19 NOTE — PROGRESS NOTES
Ventilator check complete; patient has a #8. 0 ET tube secured at the 25 at the teeth. Patient is sedated. Patient is not able to follow commands. Breath sounds are clear and diminished. Trachea is midline, Negative for subcutaneous air, and chest excursion is symmetric. Patient is also Negative for cyanosis and is Negative for pitting edema. All alarms are set and audible. Resuscitation bag is at the head of the bed.       Ventilator Settings  Mode FIO2 Rate Tidal Volume Pressure PEEP I:E Ratio   PRVC  65 %   20 500 ml     8 cm H20  1:3.3      Peak airway pressure: 28 cm H2O   Minute ventilation: 8 l/min         Avinash Welsh RT

## 2021-08-19 NOTE — PROGRESS NOTES
Physician Progress Note      Alexandra Catalan  CSN #:                  [id number]  :                       1945  ADMIT DATE:       2021 7:31 AM  DISCH DATE:  RESPONDING  PROVIDER #:        ANG WILSON MD          QUERY TEXT:    Pt admitted with hyperkalemia and scheduled Right BKA. Noted documentation of post-operative respiratory failure in on going progress notes. Please document in progress notes and discharge summary if you are evaluating/treating any of the following: The medical record reflects the following:  Risk Factors: morbid obesity, CHF, \" recently started on 2L NC continuous- Sleep study to be scheduled-questionable ELAINA\"  Clinical Indicators: \"Left intubated overnight post op due to apnea and hypoxemia\". \"Only received 50mcg fentanyl >3hrs prior to emergence, but gave 0.2mg narcan anyway to rule out opioids as source of his respiratory failure\". CXR: \"Bilateral lung edema or infiltrates and small pleural effusions\". Treatment: vented, Lasix IV BID, Albumin Q 12 X 2 doses, labs, ABGS, CXR  Options provided:  -- Respiratory failure is due to chronic underlying condition and is not a complication of the procedure, please specify and is not a complication of the procedure. -- Respiratory failure is not a complication of the procedure but was due to, Please specify.   -- Acute Postoperative Pulmonary Insufficiency, Postoperative Respiratory failure is ruled out  -- Postoperative Respiratory failure is a complication of the procedure  -- Postoperative Respiratory failure ruled out after study  -- Other - I will add my own diagnosis  -- Disagree - Not applicable / Not valid  -- Disagree - Clinically unable to determine / Unknown  -- Refer to Clinical Documentation Reviewer    PROVIDER RESPONSE TEXT:    Patient with respiratory failure due to chronic underlying condition of decompensated CHF, COPD    Query created by: Almaz Freeman on 2021 11:51 AM      Electronically signed by:  Angus Jamison MD 8/19/2021 12:08 PM

## 2021-08-20 ENCOUNTER — APPOINTMENT (OUTPATIENT)
Dept: GENERAL RADIOLOGY | Age: 76
DRG: 616 | End: 2021-08-20
Attending: INTERNAL MEDICINE
Payer: MEDICARE

## 2021-08-20 PROBLEM — J96.21 ACUTE ON CHRONIC RESPIRATORY FAILURE WITH HYPOXIA AND HYPERCAPNIA (HCC): Status: ACTIVE | Noted: 2021-07-23

## 2021-08-20 PROBLEM — R29.818 SUSPECTED SLEEP APNEA: Status: ACTIVE | Noted: 2021-08-20

## 2021-08-20 PROBLEM — J96.22 ACUTE ON CHRONIC RESPIRATORY FAILURE WITH HYPOXIA AND HYPERCAPNIA (HCC): Status: ACTIVE | Noted: 2021-07-23

## 2021-08-20 LAB
ANION GAP SERPL CALC-SCNC: 3 MMOL/L (ref 7–16)
BASOPHILS # BLD: 0 K/UL (ref 0–0.2)
BASOPHILS NFR BLD: 1 % (ref 0–2)
BUN SERPL-MCNC: 29 MG/DL (ref 8–23)
CALCIUM SERPL-MCNC: 9 MG/DL (ref 8.3–10.4)
CHLORIDE SERPL-SCNC: 106 MMOL/L (ref 98–107)
CO2 SERPL-SCNC: 35 MMOL/L (ref 21–32)
CREAT SERPL-MCNC: 1.51 MG/DL (ref 0.8–1.5)
DIFFERENTIAL METHOD BLD: ABNORMAL
EOSINOPHIL # BLD: 0.1 K/UL (ref 0–0.8)
EOSINOPHIL NFR BLD: 2 % (ref 0.5–7.8)
ERYTHROCYTE [DISTWIDTH] IN BLOOD BY AUTOMATED COUNT: 16.3 % (ref 11.9–14.6)
GLUCOSE BLD STRIP.AUTO-MCNC: 106 MG/DL (ref 65–100)
GLUCOSE BLD STRIP.AUTO-MCNC: 108 MG/DL (ref 65–100)
GLUCOSE BLD STRIP.AUTO-MCNC: 108 MG/DL (ref 65–100)
GLUCOSE BLD STRIP.AUTO-MCNC: 111 MG/DL (ref 65–100)
GLUCOSE BLD STRIP.AUTO-MCNC: 95 MG/DL (ref 65–100)
GLUCOSE BLD STRIP.AUTO-MCNC: 95 MG/DL (ref 65–100)
GLUCOSE SERPL-MCNC: 96 MG/DL (ref 65–100)
HCT VFR BLD AUTO: 30 % (ref 41.1–50.3)
HGB BLD-MCNC: 8.9 G/DL (ref 13.6–17.2)
IMM GRANULOCYTES # BLD AUTO: 0 K/UL (ref 0–0.5)
IMM GRANULOCYTES NFR BLD AUTO: 0 % (ref 0–5)
LYMPHOCYTES # BLD: 1 K/UL (ref 0.5–4.6)
LYMPHOCYTES NFR BLD: 17 % (ref 13–44)
MCH RBC QN AUTO: 29.2 PG (ref 26.1–32.9)
MCHC RBC AUTO-ENTMCNC: 29.7 G/DL (ref 31.4–35)
MCV RBC AUTO: 98.4 FL (ref 79.6–97.8)
MONOCYTES # BLD: 0.8 K/UL (ref 0.1–1.3)
MONOCYTES NFR BLD: 14 % (ref 4–12)
NEUTS SEG # BLD: 3.8 K/UL (ref 1.7–8.2)
NEUTS SEG NFR BLD: 65 % (ref 43–78)
NRBC # BLD: 0 K/UL (ref 0–0.2)
PLATELET # BLD AUTO: 141 K/UL (ref 150–450)
PMV BLD AUTO: 11.7 FL (ref 9.4–12.3)
POTASSIUM SERPL-SCNC: 4.4 MMOL/L (ref 3.5–5.1)
RBC # BLD AUTO: 3.05 M/UL (ref 4.23–5.6)
SERVICE CMNT-IMP: ABNORMAL
SERVICE CMNT-IMP: NORMAL
SERVICE CMNT-IMP: NORMAL
SODIUM SERPL-SCNC: 144 MMOL/L (ref 136–145)
WBC # BLD AUTO: 5.8 K/UL (ref 4.3–11.1)

## 2021-08-20 PROCEDURE — 85025 COMPLETE CBC W/AUTO DIFF WBC: CPT

## 2021-08-20 PROCEDURE — 74011250637 HC RX REV CODE- 250/637: Performed by: INTERNAL MEDICINE

## 2021-08-20 PROCEDURE — 71045 X-RAY EXAM CHEST 1 VIEW: CPT

## 2021-08-20 PROCEDURE — 65660000000 HC RM CCU STEPDOWN

## 2021-08-20 PROCEDURE — 74011250636 HC RX REV CODE- 250/636: Performed by: HOSPITALIST

## 2021-08-20 PROCEDURE — 80048 BASIC METABOLIC PNL TOTAL CA: CPT

## 2021-08-20 PROCEDURE — 74011250636 HC RX REV CODE- 250/636: Performed by: INTERNAL MEDICINE

## 2021-08-20 PROCEDURE — 74011000250 HC RX REV CODE- 250: Performed by: ORTHOPAEDIC SURGERY

## 2021-08-20 PROCEDURE — 86580 TB INTRADERMAL TEST: CPT | Performed by: ORTHOPAEDIC SURGERY

## 2021-08-20 PROCEDURE — P9047 ALBUMIN (HUMAN), 25%, 50ML: HCPCS | Performed by: HOSPITALIST

## 2021-08-20 PROCEDURE — 36415 COLL VENOUS BLD VENIPUNCTURE: CPT

## 2021-08-20 PROCEDURE — 77030025869: Performed by: ANESTHESIOLOGY

## 2021-08-20 PROCEDURE — 82962 GLUCOSE BLOOD TEST: CPT

## 2021-08-20 PROCEDURE — 74011000250 HC RX REV CODE- 250: Performed by: INTERNAL MEDICINE

## 2021-08-20 PROCEDURE — 74011250637 HC RX REV CODE- 250/637: Performed by: HOSPITALIST

## 2021-08-20 PROCEDURE — 99232 SBSQ HOSP IP/OBS MODERATE 35: CPT | Performed by: INTERNAL MEDICINE

## 2021-08-20 PROCEDURE — 77010033711 HC HIGH FLOW OXYGEN

## 2021-08-20 PROCEDURE — 99233 SBSQ HOSP IP/OBS HIGH 50: CPT | Performed by: INTERNAL MEDICINE

## 2021-08-20 RX ORDER — ALBUMIN HUMAN 250 G/1000ML
25 SOLUTION INTRAVENOUS EVERY 12 HOURS
Status: COMPLETED | OUTPATIENT
Start: 2021-08-20 | End: 2021-08-20

## 2021-08-20 RX ORDER — INSULIN LISPRO 100 [IU]/ML
0-10 INJECTION, SOLUTION INTRAVENOUS; SUBCUTANEOUS
Status: DISCONTINUED | OUTPATIENT
Start: 2021-08-20 | End: 2021-08-25 | Stop reason: HOSPADM

## 2021-08-20 RX ORDER — METOPROLOL SUCCINATE 50 MG/1
50 TABLET, EXTENDED RELEASE ORAL 2 TIMES DAILY
Status: DISCONTINUED | OUTPATIENT
Start: 2021-08-20 | End: 2021-08-21

## 2021-08-20 RX ORDER — METOPROLOL SUCCINATE 25 MG/1
25 TABLET, EXTENDED RELEASE ORAL 2 TIMES DAILY
Status: DISCONTINUED | OUTPATIENT
Start: 2021-08-20 | End: 2021-08-20

## 2021-08-20 RX ORDER — METOPROLOL TARTRATE 5 MG/5ML
2.5 INJECTION INTRAVENOUS
Status: DISCONTINUED | OUTPATIENT
Start: 2021-08-20 | End: 2021-08-25 | Stop reason: HOSPADM

## 2021-08-20 RX ORDER — OXYCODONE HYDROCHLORIDE 5 MG/1
5 TABLET ORAL
Status: DISCONTINUED | OUTPATIENT
Start: 2021-08-20 | End: 2021-08-25 | Stop reason: HOSPADM

## 2021-08-20 RX ORDER — MIDODRINE HYDROCHLORIDE 5 MG/1
2.5 TABLET ORAL
Status: DISCONTINUED | OUTPATIENT
Start: 2021-08-20 | End: 2021-08-21

## 2021-08-20 RX ORDER — ACETAMINOPHEN 325 MG/1
650 TABLET ORAL
Status: DISCONTINUED | OUTPATIENT
Start: 2021-08-20 | End: 2021-08-25 | Stop reason: HOSPADM

## 2021-08-20 RX ADMIN — ATORVASTATIN CALCIUM 40 MG: 40 TABLET, FILM COATED ORAL at 21:19

## 2021-08-20 RX ADMIN — FAMOTIDINE 20 MG: 10 INJECTION INTRAVENOUS at 08:38

## 2021-08-20 RX ADMIN — TUBERCULIN PURIFIED PROTEIN DERIVATIVE 5 UNITS: 5 INJECTION, SOLUTION INTRADERMAL at 13:13

## 2021-08-20 RX ADMIN — PREGABALIN 200 MG: 150 CAPSULE ORAL at 18:53

## 2021-08-20 RX ADMIN — FUROSEMIDE 40 MG: 10 INJECTION, SOLUTION INTRAMUSCULAR; INTRAVENOUS at 08:38

## 2021-08-20 RX ADMIN — Medication 10 ML: at 21:25

## 2021-08-20 RX ADMIN — MIDODRINE HYDROCHLORIDE 2.5 MG: 5 TABLET ORAL at 18:53

## 2021-08-20 RX ADMIN — FAMOTIDINE 20 MG: 10 INJECTION INTRAVENOUS at 21:19

## 2021-08-20 RX ADMIN — TRAZODONE HYDROCHLORIDE 50 MG: 50 TABLET ORAL at 21:19

## 2021-08-20 RX ADMIN — ENOXAPARIN SODIUM 120 MG: 120 INJECTION SUBCUTANEOUS at 21:43

## 2021-08-20 RX ADMIN — Medication 10 ML: at 05:37

## 2021-08-20 RX ADMIN — ENOXAPARIN SODIUM 120 MG: 120 INJECTION SUBCUTANEOUS at 08:37

## 2021-08-20 RX ADMIN — OXYCODONE HYDROCHLORIDE 5 MG: 5 TABLET ORAL at 09:31

## 2021-08-20 RX ADMIN — PREGABALIN 200 MG: 150 CAPSULE ORAL at 08:37

## 2021-08-20 RX ADMIN — MIDODRINE HYDROCHLORIDE 2.5 MG: 5 TABLET ORAL at 08:38

## 2021-08-20 RX ADMIN — Medication 10 ML: at 13:14

## 2021-08-20 RX ADMIN — ALBUMIN (HUMAN) 25 G: 0.25 INJECTION, SOLUTION INTRAVENOUS at 21:18

## 2021-08-20 RX ADMIN — METOPROLOL SUCCINATE 25 MG: 25 TABLET, EXTENDED RELEASE ORAL at 08:38

## 2021-08-20 RX ADMIN — MIDODRINE HYDROCHLORIDE 2.5 MG: 5 TABLET ORAL at 13:12

## 2021-08-20 RX ADMIN — ALBUMIN (HUMAN) 25 G: 0.25 INJECTION, SOLUTION INTRAVENOUS at 08:37

## 2021-08-20 RX ADMIN — METOPROLOL SUCCINATE 50 MG: 50 TABLET, EXTENDED RELEASE ORAL at 18:53

## 2021-08-20 RX ADMIN — FUROSEMIDE 40 MG: 10 INJECTION, SOLUTION INTRAMUSCULAR; INTRAVENOUS at 21:19

## 2021-08-20 RX ADMIN — CEFAZOLIN 2 G: 10 INJECTION, POWDER, FOR SOLUTION INTRAVENOUS at 05:43

## 2021-08-20 NOTE — PROGRESS NOTES
[facility] Critical Care Note[de-identified] 8/20/2021  Margarito Common Pack  Admission Date: 8/18/2021     Length of Stay: 2 days    Background: 77 y.o. y/o male with a history of CAD s/p PC x 1, HTN, HLD, DM2, and obesity. Pt presented to the ER on 7/19 with R foot wound and LE edema. He was admitted by hospitalist service. His wound cx/blood cx x 2 grew out Staph aureus and ID was consulted. He was started on Ancef. Pt developed afib RVR and TAMMY. He was started on heparin gtt. Echo revealed EF 30-35%, mild LILY, mild TR/MR. He developed confusion and ABG revealed pCO2 78mmgHg and he was placed on BiPAP. He improved and was diuresed. He was discharged 7/27 with plans for outpt PSG and continued on Cefazolin x 4 weeks. Notable PMH: Pt was seen by Dr. Leslie Bustos, orthopedic surgery, 8/13 for R Charcot ankle and Eliquis was held. Pt was admitted on 8/18 for R BKA. Pt extubated on 8/19. He was seen by ID and Cefazolin was discontinued. 24 Hour events: Pt extubated on 8/19. Pt now on Airvo 45L/45%. Pt denies shortness of breath. He reports that he is breathing the best he has in a long time. He is coughing up clear phlegm. He complains of pain at surgical site. ROS: Review of Systems   Constitutional: Positive for malaise/fatigue. HENT: Negative. Eyes: Negative. Respiratory: Positive for cough and sputum production. Cardiovascular: Negative. Gastrointestinal: Negative. Musculoskeletal: Positive for back pain. Surgical site pain   Skin: Negative. Neurological: Positive for loss of consciousness. .     Lines: (insertion date)   Pineda: (8/18)  Peripheral L wrist: (8/18)      Drips none       Pertinent Exam:         Blood pressure (!) 110/54, pulse (!) 101, temperature 97.4 °F (36.3 °C), resp. rate 21, height 5' 10\" (1.778 m), weight 295 lb 9.6 oz (134.1 kg), SpO2 90 %.      Intake/Output Summary (Last 24 hours) at 8/20/2021 0854  Last data filed at 8/20/2021 0803  Gross per 24 hour Intake 478.23 ml   Output 5950 ml   Net -5471.77 ml     Constitutional:  no acute distress, on Airvo 45L @ 45%  EENMT:  Sclera clear, pupils equal, oral mucosa moist  Respiratory: fairly clear  Cardiovascular:  RRR  Gastrointestinal:  soft with no tenderness; positive bowel sounds present  Musculoskeletal:  warm with no cyanosis, R BKA, trace LLE edema  Skin:  no jaundice or ecchymosis  Neurologic:neurologically intact  Psychiatric: A&Ox3    CXR:     Pertinent Labs:   Recent Labs     08/20/21  0411 08/19/21  0329 08/18/21  1856   WBC 5.8 6.2 6.0   HGB 8.9* 9.5* 9.4*   HCT 30.0* 32.3* 31.9*   * 130* 153     Recent Labs     08/20/21  0411 08/19/21  1539 08/19/21  0329    144 143   K 4.4 4.1 5.4*    108* 109*   CO2 35* 32 28   GLU 96 101* 84   BUN 29* 29* 29*   CREA 1.51* 1.49 1.36   CA 9.0 8.6 8.8     Recent Labs     08/19/21  1156   BNPNT 1,077*     Recent Labs     08/20/21  0452 08/19/21  2117 08/19/21  1131   GLUCPOC 108* 111* 109*     ECHO: Results from [place] encounter on 07/19/21    ECHO ADULT COMPLETE    Interpretation Summary  · Image quality for this study was poor. · Contrast used: DEFINITY. · LV: Estimated LVEF is 30 - 35%. Normal wall thickness. Mildly dilated left ventricle. Moderate-to-severely reduced systolic function. Wall motion abnormalities with suggest coronary artery disease  · LA: Mildly dilated left atrium. · RA: Mildly dilated right atrium. · MV: Mild mitral annular calcification. Mild mitral valve regurgitation is present. · TV: Mild tricuspid valve regurgitation is present.      MICRO  Recent Labs     08/19/21  1227 08/19/21  1156   CULT NO GROWTH AFTER 18 HOURS NO GROWTH AFTER 18 HOURS       Anti-infectives (start date):    Ventilator Settings:  Ideal body weight: 73 kg (160 lb 15 oz)   Mode FIO2 Rate Tidal Volume Pressure PEEP   Pressure support (weaning)  45 %    500 ml  8 cm H2O  8 cm H20    Peak airway pressure: 16 cm H2O       Minute ventilation: 6.6 l/min  ABG:  Recent Labs     08/19/21  0534 08/18/21  2206 08/18/21  1651   PHI 7.57* 7.48* 7.31*   PCO2I 33.2* 39.8 66.4*   PO2I 56* 92 59*   HCO3I 30.5* 29.4* 33.2*     Assessment and Plan:  (Medical Decision Making)   Impression: 77 y.o. y/o male with probable ELAINA, CAD, HTN, HLD, and DM2. Pt recently admitted for foot wound and charcot's foot. Blood and wound cx grew out Staph and she was started on Cefazolin x 4 weeks. He did not improve and underwent R BKA on 8/18. Pt extubated on 8/19. He is now on Airvo with plans to transfer to the floor. NEURO:   Sedation: none  Analgesia: oxycodone, dilaudid  Paralytics: n/a  CV:   Volume Status: -5.496L   Septic shock: n/a  NSTEMI: na/  PULM:   Acute hypoxemic/hypercapneic respiratory failure: Wean Airvo as tolerated   Suspected ELAINA:  Use CPAP in hospital and refer to sleep center upon discharge. RENAL:  TAMMY: monitor   Lactic acidosis: n/a  Electrolytes: normal  GI: NPO, PPI prophy  Nutrition: cardiac/diabetic diet   HEME:   Anemia: monitor  Thrombocytopenia: monitor  Anticoagulation: lovenox   ID:   COVID-19: vaccinated  ENDO: not on chronic steroids  DM: SSI/LA insulin   Skin: no decub, turns, preventive care  Prophy: Pepcid/Lovenox      Full Code    LATONYA Rios     I have spoken with and examined the patient. I agree with the above assessment and plan as documented. Gen: awake, alert   Lungs:  CTA anteriorly  Heart:  RRR with no Murmur/Rubs/Gallops  Abd:NTND  Ext: + edema, wound painful    Wean to nasal cannula today  Add IS  Mobilize   Refer to sleep center for sleep study as outpatient please. CPAP at night while here  Transfer to floor  Will sign off. Let us know if there are further needs.       Jenni Devine MD

## 2021-08-20 NOTE — PROGRESS NOTES
[facility] Hospitalist Consult   Admit Date:  2021  7:31 AM   Name:  Eleazar Gilliam   Age:  77 y.o. Sex:  male  :  1945   MRN:  [id number]     Presenting Complaint: No chief complaint on file. Reason(s) for Admission: Charcot's joint of foot, right [M14.671]  Infected wound [T14. 8XXA, L08.9]  S/P BKA (below knee amputation) unilateral, right [facility]) [Z89.511]  Respiratory failure, post-operative [facility] [P55.713]     Hospitalists consulted by Page Cee MD for: Medical management    History of Presenting Illness:   Eleazar Gilliam is a 77 y.o. male with history of CAD status post PCI, chronic respiratory failure on 2 L via NC, A. fib (on Eliquis), CKD stage III, DM 2, chronic systolic CHF EF 30 to 50%, status post right BKA on  seen by hospitalist team on  on request of Dr. More Miller for medical management. Patient is currently on vent with difficulty to wean him off. He was switched from propofol to Precedex this morning. Patient was hospitalized in July with right foot cellulitis and sepsis secondary to right foot ulcer. Ortho initially recommended amputation, however patient refused. Patient was discharged on long-term IV antibiotics with EOT 9/3/2021. Patient was readmitted due to worsening right lower leg wound and underwent right below-knee amputation on .    :  Patient seen at bedside, resting in bed comfortably. Reports feeling tired and lethargic. He is currently on air Vo 45 L 45%. Reports left for pain control and right lower extremity. Denies any chest pain, coughing, shortness of breath, palpitations, abdominal pain. Review of Systems:  10 systems reviewed and negative except as noted above.         Assessment & Plan:  Hospital Problems as of 2021 Date Reviewed: 2021        Codes Class Noted - Resolved POA    Suspected sleep apnea ICD-10-CM: R29.818  ICD-9-CM: 781.99  2021 - Present Unknown        * (Principal) S/P BKA (below knee amputation) unilateral, right [facility]) ICD-10-CM: Z89.511  ICD-9-CM: V49.75  8/18/2021 - Present Unknown        Respiratory failure, post-operative ([email or web address] 75.) ICD-10-CM: J24.746  ICD-9-CM: 518.51  8/18/2021 - Present Unknown        Acute on chronic respiratory failure with hypoxia and hypercapnia (HCC) ICD-10-CM: J96.21, J96.22  ICD-9-CM: 518.84, 786.09, 799.02  7/23/2021 - Present Yes        Systolic CHF, acute on chronic [facility]) ICD-10-CM: I50.23  ICD-9-CM: 428.23, 428.0  7/19/2021 - Present Yes        Atrial fibrillation with RVR (HCC) ICD-10-CM: I48.91  ICD-9-CM: 427.31  7/19/2021 - Present Yes        Morbid obesity with BMI of 40.0-44.9, adult [facility]) ICD-10-CM: E66.01, Z68.41  ICD-9-CM: 278.01, V85.41  6/11/2020 - Present Yes        Mixed hyperlipidemia (Chronic) ICD-10-CM: M70.3  ICD-9-CM: 272.2  4/10/2018 - Present Yes        Controlled type 2 diabetes mellitus with diabetic polyneuropathy, without long-term current use of insulin (HCC) (Chronic) ICD-10-CM: E11.42  ICD-9-CM: 250.60, 357.2  1/5/2018 - Present Yes        Stage 3 chronic kidney disease ([facility] [[email or web address] 75.) (Chronic) ICD-10-CM: N18.30  ICD-9-CM: 585.3  11/22/2017 - Present Yes        CAD (coronary artery disease) (Chronic) ICD-10-CM: I25.10  ICD-9-CM: 414.00  Unknown - Present Yes             Acute on chronic respiratory failure with hypoxia/hypercapnia requiring intubation:  8/20: Status post extubation on 8/19. Currently on air Vo 45 L 45%. Continues to be with high oxygen requirement. At baseline patient uses 2 L during daytime and 3 L at bedtime. Titrate for SPO2 greater than 92%, wean off as able. Patient continues with high risk of rapid respiratory decompensation. IV Lasix 40 mg every 12 hours along with albumin. Net fluid balance -6.5 L  Repeat chest x-ray tomorrow morning. Hyperkalemia-  8/20: Improving, down to 4.4. Recheck BMP in morning. 8/19: 5.4 today. We will give 1 amp of D50 followed by 10 units of IV regular insulin.   With recheck BMP at 1600 hrs. Status post right below-knee amputation:  8/20: POD #2  Blood cultures negative so far stopped IV cefazolin today. Pain control with Dilaudid 0.5 mg every 4 hours as needed. Added oxycodone 5 mg p.o. every 4 hours for moderate pain. Adding MiraLAX to prevent with opioid-induced constipation. PT and OT on board        A. Fib:  8/20: Heart rate continues to be optimally controlled. Switch IV Lopressor to Toprol-XL 25 mg p.o. twice daily. Continue IV Lopressor 2.5 mg every 6 hours as needed for heart rate of greater than 115  Currently on therapeutic Lovenox, will switch to Eliquis tomorrow. CHF-patient had echo performed on 7/21/2021 demonstrating ejection fraction of 30 to 35%. 8/20: BNP 1077  Need to optimize BP. Continue Toprol-XL 25 mg p.o. twice daily, Lasix 40 mg every 12 hours. Patient is near euvolemic. Net fluid balance -6.5 L      Diabetes-patient noted to have a hemoglobin A1c of 6.7  8/20: Blood glucose is optimally controlled. Patient is on oral diet now. Monitor POC glucose QA CHS. Continue Humalog sliding scale q. ACH S per        Morbid obesity-  Noted    Disposition: Patient is on air Vo, attempting to be weaned off to nasal cannula (baseline 2 L at daytime and 3 L at nighttime).     Disposition: Defer to primary team.            Objective:     Patient Vitals for the past 24 hrs:   Temp Pulse Resp BP SpO2   08/20/21 0617  (!) 101 21 (!) 110/54 90 %   08/20/21 0601  90 19 109/61 93 %   08/20/21 0546  (!) 102 25 (!) 106/58 93 %   08/20/21 0531  (!) 105 23 108/63 92 %   08/20/21 0517  (!) 101 27 106/60 91 %   08/20/21 0501  (!) 102 19 (!) 111/54 96 %   08/20/21 0446  (!) 103 20 (!) 109/58 96 %   08/20/21 0431  (!) 102 22 (!) 104/58 93 %   08/20/21 0417  (!) 102 24 100/68 98 %   08/20/21 0401  94 18 (!) 96/59 91 %   08/20/21 0400  97      08/20/21 0346  100 19 (!) 108/57 98 %   08/20/21 0331 97.9 °F (36.6 °C) 97 21 (!) 115/57 97 %   08/20/21 0317  99 14 (!) 103/52 (!) 85 %   08/20/21 0301  94 19 (!) 104/58 91 %   08/20/21 0246  97 17 97/63 92 %   08/20/21 0231  (!) 101 19 102/60 99 %   08/20/21 0217  99 24 106/65 94 %   08/20/21 0201  100 19 110/61 92 %   08/20/21 0146  98 19 (!) 82/53 99 %   08/20/21 0131  97 17 (!) 89/57 92 %   08/20/21 0117  98 16 (!) 93/55 92 %   08/20/21 0101  96 16 (!) 93/52 93 %   08/20/21 0046  85 16 (!) 86/52 94 %   08/20/21 0031  91 16 (!) 87/54 93 %   08/20/21 0017  97 17 101/62 94 %   08/20/21 0001  93 22 (!) 86/53 93 %   08/20/21 0000  100      08/19/21 2346  95 16 (!) 94/51 93 %   08/19/21 2331  96 22 (!) 94/53 95 %   08/19/21 2317 98 °F (36.7 °C) 95 16 (!) 94/55 94 %   08/19/21 2301  88 16 (!) 96/53 93 %   08/19/21 2246  98 15 (!) 90/54 94 %   08/19/21 2231  (!) 102 18 (!) 84/51 93 %   08/19/21 2217  96 15 (!) 94/59 96 %   08/19/21 2201  91 16 (!) 91/57 97 %   08/19/21 2146  87 17 102/60 95 %   08/19/21 2131  88 20 95/64 99 %   08/19/21 2118  98 26 (!) 98/57 93 %   08/19/21 2101  92 16 (!) 96/56 96 %   08/19/21 2031  96 22 (!) 94/53 93 %   08/19/21 2018  95 16 (!) 99/51 94 %   08/19/21 2002  98 22 (!) 97/52 93 %   08/19/21 2000  85      08/19/21 1932  98 15 (!) 87/52 93 %   08/19/21 1916  98 21 (!) 90/51 94 %   08/19/21 1901  95 16 (!) 89/53 94 %   08/19/21 1832  99 15 (!) 89/55 95 %   08/19/21 1816  (!) 101 20 (!) 107/56 98 %   08/19/21 1801  (!) 102 16 (!) 99/56 95 %   08/19/21 1746  98 27 (!) 95/58 92 %   08/19/21 1732  99 18 (!) 97/55 90 %   08/19/21 1716  (!) 107 26 (!) 104/55 93 %   08/19/21 1701  (!) 104 18 (!) 117/58 92 %   08/19/21 1646  (!) 103 18 (!) 104/58 92 %   08/19/21 1632  (!) 116 15 (!) 107/56 90 %   08/19/21 1616  (!) 102 27 (!) 97/53 91 %   08/19/21 1601  (!) 105 14 (!) 98/58 91 %   08/19/21 1547  (!) 106 16 102/60 99 %   08/19/21 1532  (!) 105 16 (!) 99/58 (!) 87 %   08/19/21 1516  100 15 (!) 104/55 94 %   08/19/21 1501 97.7 °F (36.5 °C) (!) 103 27 (!) 104/53 93 %   08/19/21 1446  (!) 105 13 (!) 115/59 94 %   08/19/21 1432  (!) 104 17 (!) 109/55 94 %   08/19/21 1416  (!) 105 18 107/60 92 %   08/19/21 1401  (!) 107 (!) 35 (!) 118/57 92 %   08/19/21 1346  (!) 104 23 (!) 98/52 91 %   08/19/21 1333  98 17  96 %   08/19/21 1332  99 22 (!) 115/56 96 %   08/19/21 1316  (!) 102 17 (!) 94/56 98 %   08/19/21 1301  99 (!) 44 97/62 97 %   08/19/21 1246  (!) 103 22 (!) 109/57 99 %   08/19/21 1232  (!) 107 15 (!) 116/58 99 %   08/19/21 1217  96 17 115/65 96 %   08/19/21 1201  (!) 101 25 109/61 93 %   08/19/21 1200  (!) 102      08/19/21 1147  99 26 (!) 92/52 97 %   08/19/21 1133  87  102/66 96 %   08/19/21 1127  88   98 %   08/19/21 1126  81   97 %   08/19/21 1124  75   96 %   08/19/21 1117  78  (!) 103/50 97 %   08/19/21 1101 98.7 °F (37.1 °C) 89 15 (!) 90/50 97 %   08/19/21 1047  93  (!) 105/52 97 %   08/19/21 1045  81  (!) 105/59 96 %   08/19/21 1000  95   98 %   08/19/21 0923  93  112/65 97 %   08/19/21 0830  91  (!) 100/58    08/19/21 0800  83      08/19/21 0740  83   100 %   08/19/21 0739  86 16 (!) 87/63      Oxygen Therapy  O2 Sat (%): 90 % (08/20/21 0617)  Pulse via Oximetry: 112 beats per minute (08/20/21 0517)  O2 Device: Heated; Hi flow nasal cannula (08/20/21 0331)  Skin Assessment: Clean, dry, & intact (08/20/21 0331)  Skin Protection for O2 Device: No (08/20/21 0331)  O2 Flow Rate (L/min): 50 l/min (08/20/21 0331)  FIO2 (%): 45 % (08/20/21 0331)    Estimated body mass index is 42.41 kg/m² as calculated from the following:    Height as of this encounter: 5' 10\" (1.778 m). Weight as of this encounter: 134.1 kg (295 lb 9.6 oz). Intake/Output Summary (Last 24 hours) at 8/20/2021 0722  Last data filed at 8/20/2021 0602  Gross per 24 hour   Intake 478.23 ml   Output 5800 ml   Net -5321.77 ml         Physical Exam:    General:    Alert, awake, on air Vo, lethargic  HEENT:           Head NCAT, PERRLA positive  CV:   RRR. No m/r/g. No JVD. No edema  Lungs:   Clear to auscultation bilaterally  Abdomen: Bowel sounds present. Soft, nontender, nondistended. Extremities: Warm and dry. No cyanosis or clubbing. Right BKA with surgical dressing on  Skin:     No rashes. Normal coloration. Normal turgor. Neuro:  GCS 15, cranial nerves II 12 grossly intact  Psych:  AOx3, mood and affect appropriate.     I have reviewed ordered lab tests and independently visualized imaging below:    Last 24hr Labs:  Recent Results (from the past 24 hour(s))   GLUCOSE, POC    Collection Time: 08/19/21 11:31 AM   Result Value Ref Range    Glucose (POC) 109 (H) 65 - 100 mg/dL    Performed by Devyn    CULTURE, BLOOD    Collection Time: 08/19/21 11:56 AM    Specimen: Blood   Result Value Ref Range    Special Requests: LEFT  HAND        Culture result: NO GROWTH AFTER 18 HOURS     NT-PRO BNP    Collection Time: 08/19/21 11:56 AM   Result Value Ref Range    NT pro-BNP 1,077 (H) <450 PG/ML   CULTURE, BLOOD    Collection Time: 08/19/21 12:27 PM    Specimen: Blood   Result Value Ref Range    Special Requests: LEFT  HAND        Culture result: NO GROWTH AFTER 18 HOURS     METABOLIC PANEL, BASIC    Collection Time: 08/19/21  3:39 PM   Result Value Ref Range    Sodium 144 138 - 145 mmol/L    Potassium 4.1 3.5 - 5.1 mmol/L    Chloride 108 (H) 98 - 107 mmol/L    CO2 32 21 - 32 mmol/L    Anion gap 4 (L) 7 - 16 mmol/L    Glucose 101 (H) 65 - 100 mg/dL    BUN 29 (H) 8 - 23 MG/DL    Creatinine 1.49 0.8 - 1.5 MG/DL    GFR est AA 59 (L) >60 ml/min/1.73m2    GFR est non-AA 49 (L) >60 ml/min/1.73m2    Calcium 8.6 8.3 - 10.4 MG/DL   GLUCOSE, POC    Collection Time: 08/19/21  9:17 PM   Result Value Ref Range    Glucose (POC) 111 (H) 65 - 100 mg/dL    Performed by Irvin    CBC WITH AUTOMATED DIFF    Collection Time: 08/20/21  4:11 AM   Result Value Ref Range    WBC 5.8 4.3 - 11.1 K/uL    RBC 3.05 (L) 4.23 - 5.6 M/uL    HGB 8.9 (L) 13.6 - 17.2 g/dL HCT 30.0 (L) 41.1 - 50.3 %    MCV 98.4 (H) 79.6 - 97.8 FL    MCH 29.2 26.1 - 32.9 PG    MCHC 29.7 (L) 31.4 - 35.0 g/dL    RDW 16.3 (H) 11.9 - 14.6 %    PLATELET 657 (L) 607 - 450 K/uL    MPV 11.7 9.4 - 12.3 FL    ABSOLUTE NRBC 0.00 0.0 - 0.2 K/uL    DF AUTOMATED      NEUTROPHILS 65 43 - 78 %    LYMPHOCYTES 17 13 - 44 %    MONOCYTES 14 (H) 4.0 - 12.0 %    EOSINOPHILS 2 0.5 - 7.8 %    BASOPHILS 1 0.0 - 2.0 %    IMMATURE GRANULOCYTES 0 0.0 - 5.0 %    ABS. NEUTROPHILS 3.8 1.7 - 8.2 K/UL    ABS. LYMPHOCYTES 1.0 0.5 - 4.6 K/UL    ABS. MONOCYTES 0.8 0.1 - 1.3 K/UL    ABS. EOSINOPHILS 0.1 0.0 - 0.8 K/UL    ABS. BASOPHILS 0.0 0.0 - 0.2 K/UL    ABS. IMM. GRANS. 0.0 0.0 - 0.5 K/UL   METABOLIC PANEL, BASIC    Collection Time: 08/20/21  4:11 AM   Result Value Ref Range    Sodium 144 136 - 145 mmol/L    Potassium 4.4 3.5 - 5.1 mmol/L    Chloride 106 98 - 107 mmol/L    CO2 35 (H) 21 - 32 mmol/L    Anion gap 3 (L) 7 - 16 mmol/L    Glucose 96 65 - 100 mg/dL    BUN 29 (H) 8 - 23 MG/DL    Creatinine 1.51 (H) 0.8 - 1.5 MG/DL    GFR est AA 58 (L) >60 ml/min/1.73m2    GFR est non-AA 48 (L) >60 ml/min/1.73m2    Calcium 9.0 8.3 - 10.4 MG/DL   GLUCOSE, POC    Collection Time: 08/20/21  4:52 AM   Result Value Ref Range    Glucose (POC) 108 (H) 65 - 100 mg/dL    Performed by [facility]        All Micro Results     Procedure Component Value Units Date/Time    CULTURE, BLOOD [[id number]] Collected: 08/19/21 1156    Order Status: Completed Specimen: Blood Updated: 08/20/21 0712     Special Requests: --        LEFT  HAND       Culture result: NO GROWTH AFTER 18 HOURS       CULTURE, BLOOD [[id number]] Collected: 08/19/21 1227    Order Status: Completed Specimen: Blood Updated: 08/20/21 2264     Special Requests: --        LEFT  HAND       Culture result: NO GROWTH AFTER 18 HOURS             Other Studies:  XR CHEST SNGL V    Result Date: 8/20/2021  EXAM: Chest x-ray. INDICATION: Dyspnea. COMPARISON: Yesterday's chest x-ray.  TECHNIQUE: Frontal view chest x-ray. FINDINGS: Bilateral lung edema or infiltrates have mildly improved, and pleural effusions have resolved. The heart remains enlarged. No pneumothorax is seen. The endotracheal tube is been removed. A right arm PICC line remains in place, with its tip at the cavoatrial junction. 1. Interval extubation. 2. Improving bilateral lung edema or infiltrates and resolved small pleural effusions.       Current Meds:  Current Facility-Administered Medications   Medication Dose Route Frequency    albumin human 25% (BUMINATE) solution 25 g  25 g IntraVENous Q12H    furosemide (LASIX) injection 40 mg  40 mg IntraVENous Q12H    dexmedeTOMidine in 0.9 % NaCl (PRECEDEX) 400 mcg/100 mL (4 mcg/mL) infusion soln  0.1-1.5 mcg/kg/hr IntraVENous TITRATE    metoprolol (LOPRESSOR) injection 2.5 mg  2.5 mg IntraVENous Q6H    insulin lispro (HUMALOG) injection   SubCUTAneous Q4H    sodium chloride (NS) flush 5-40 mL  5-40 mL IntraVENous Q8H    sodium chloride (NS) flush 5-40 mL  5-40 mL IntraVENous PRN    enoxaparin (LOVENOX) injection 120 mg  120 mg SubCUTAneous Q12H    atorvastatin (LIPITOR) tablet 40 mg  40 mg Oral QHS    nystatin (MYCOSTATIN) 100,000 unit/gram powder   Topical PRN    pregabalin (LYRICA) capsule 200 mg  200 mg Oral BID    traZODone (DESYREL) tablet 50 mg  50 mg Oral QHS    dextrose 40% (GLUTOSE) oral gel 1 Tube  15 g Oral PRN    glucagon (GLUCAGEN) injection 1 mg  1 mg IntraMUSCular PRN    dextrose (D50W) injection syrg 12.5-25 g  25-50 mL IntraVENous PRN    ceFAZolin (ANCEF) 2 g/20 mL in sterile water IV syringe  2 g IntraVENous Q8H    sodium chloride (NS) flush 5-40 mL  5-40 mL IntraVENous Q8H    sodium chloride (NS) flush 5-40 mL  5-40 mL IntraVENous PRN    naloxone (NARCAN) injection 0.4 mg  0.4 mg IntraVENous PRN    HYDROmorphone (DILAUDID) injection 0.5 mg  0.5 mg IntraVENous Q4H PRN    famotidine (PF) (PEPCID) 20 mg in 0.9% sodium chloride 10 mL injection  20 mg IntraVENous Q12H       Signed:  Dana Johnson MD    Part of this note may have been written by using a voice dictation software. The note has been proof read but may still contain some grammatical/other typographical errors.

## 2021-08-20 NOTE — PROGRESS NOTES
[facility] CARDIOLOGY PROGRESS NOTE           8/20/2021 4:10 PM    Admit Date: 8/18/2021      Subjective:   No cp or sob    ROS:  Cardiovascular:  As noted above    Objective:      Vitals:    08/20/21 1115 08/20/21 1130 08/20/21 1140 08/20/21 1228   BP:  (!) 110/57  102/63   Pulse: (!) 101 (!) 103  79   Resp: 25 22  20   Temp:   99.2 °F (37.3 °C) 98.5 °F (36.9 °C)   SpO2: 96% 98%  93%   Weight:       Height:           Physical Exam:  General-No Acute Distress  Neck- supple, no JVD  CV- irregular rate and rhythm no MRG  Lung- clear bilaterally  Abd- soft, nontender, nondistended  Ext- no edema bilaterally. Skin- warm and dry    Data Review:   Recent Labs     08/20/21  0411 08/19/21  1539 08/19/21  0329 08/19/21  0329    144   < > 143   K 4.4 4.1   < > 5.4*   BUN 29* 29*   < > 29*   CREA 1.51* 1.49   < > 1.36   GLU 96 101*   < > 84   WBC 5.8  --   --  6.2   HGB 8.9*  --   --  9.5*   HCT 30.0*  --   --  32.3*   *  --   --  130*    < > = values in this interval not displayed.        Assessment/Plan:     Principal Problem:    S/P BKA (below knee amputation) unilateral, right ([facility] (8/18/2021)        Active Problems:    CAD (coronary artery disease) ()          Stage 3 chronic kidney disease ([facility] (11/22/2017)          Controlled type 2 diabetes mellitus with diabetic polyneuropathy, without long-term current use of insulin ([facility] (1/5/2018)          Mixed hyperlipidemia (4/10/2018)          Morbid obesity with BMI of 40.0-44.9, adult ([facility] (5/92/1564)          Systolic CHF, acute on chronic (HCC) (7/19/2021)          Atrial fibrillation with RVR ([facility] (7/19/2021)          Acute on chronic respiratory failure with hypoxia and hypercapnia (HCC) (7/23/2021)          Respiratory failure, post-operative ([address] (8/18/2021)          Suspected sleep apnea (8/20/2021)      ////    Stable  Toprol inc to 50 bid        Nena Quesada MD  8/20/2021 4:10 PM

## 2021-08-20 NOTE — PROGRESS NOTES
Care Management Interventions  PCP Verified by CM: Yes Yann TinocomaLeandro  Mode of Transport at Discharge: BLS  Transition of Care Consult (CM Consult): Discharge Planning  Discharge Durable Medical Equipment: No  Physical Therapy Consult: No  Occupational Therapy Consult: No  Speech Therapy Consult: No  Current Support Network: Lives with Spouse, Other, Own Home (lives with wife and daughter )  Confirm Follow Up Transport: Family  The Plan for Transition of Care is Related to the Following Treatment Goals : Be able to get around in w/c and regain fuctional care of self   The Patient and/or Patient Representative was Provided with a Choice of Provider and Agrees with the Discharge Plan?: Yes  Name of the Patient Representative Who was Provided with a Choice of Provider and Agrees with the Discharge Plan: Harsha serna of Choice List was Provided with Basic Dialogue that Supports the Patient's Individualized Plan of Care/Goals, Treatment Preferences and Shares the Quality Data Associated with the Providers?: Yes  The [employer] Information Provided?: No  Discharge Location  Discharge Placement: Unable to determine at this time (family hopeful for STR )  CM met with pt this AM to discuss DCP. Pt is s/p(R) BKA 8/18. Pt has wound vac in place. Per Dr José Trammell note, pt will need rehab placement. In anticipation of rehab, PPD,PT, OT ordered. Pt denies any preference for rehab and asks this CM to discuss with his daughter, Endy Jones. Pt's spouse, he said \"is too weak\". PTA pt worked in [employer] [facility] [employer]. Pt with orders to transfer to the floor. Update 1225: CM contacted pt's daughter, Endy Jones [phone] with preferences for  Rehab. Endy Jones prefers pt to go to rehab and she feels pt should be able to tolerate [facility] re[facility] ( location near pt's spouse) if recommended by PT/OT. Evaluations pending. Endy Jones informs this CM that pt \"doesn't do well\" be hospitalized.  Pt doesn't like to be left alone. Pt does better with explanations. CM past on this information to pt's primary nurse.

## 2021-08-20 NOTE — PROGRESS NOTES
TRANSFER - IN REPORT:    Verbal report received from [address] (name) on Forrestine Sacks  being received from ICU (unit) for routine progression of care      Report consisted of patients Situation, Background, Assessment and   Recommendations(SBAR). Information from the following report(s) SBAR, Intake/Output, MAR, Recent Results and Cardiac Rhythm a fib was reviewed with the receiving nurse. Opportunity for questions and clarification was provided. Assessment completed upon patients arrival to unit and care assumed.

## 2021-08-20 NOTE — PROGRESS NOTES
Pt transferred to 9th floor Rm 915 at this time via bed. Sent with belongings including glasses, cell phone, ipad and chargers. Report called to [facility] staff. Pt on 3L n/c, wound vac WNL and IV access patent prior to transfer.

## 2021-08-20 NOTE — PROGRESS NOTES
[facility]   Progress Note    Patient: Lee Benites MRN: [id number]  SSN: [id number]    YOB: 1945  Age: 77 y.o. Sex: male      Admit Date: 8/18/2021    LOS: 2 days     Subjective:     Postop day 2 right below the knee amputation for open wound and Charcot ankle. Now extubated in ICU. AAOx3 - mildly confused about the past couple days, but in good spirits  States he feels he can breathe better    Objective:     Vitals:    08/20/21 0531 08/20/21 0546 08/20/21 0601 08/20/21 0617   BP: 108/63 (!) 106/58 109/61 (!) 110/54   Pulse: (!) 105 (!) 102 90 (!) 101   Resp: 23 25 19 21   Temp:       SpO2: 92% 93% 93% 90%   Weight:       Height:            Intake and Output:  Current Shift: No intake/output data recorded. Last three shifts: 08/18 1901 - 08/20 0700  In: 478.2 [P.O.:60; I.V.:418.2]  Out: 3631 [Urine:6725]    Physical Exam:   Right lower extremity: Knee immobilizer in place. Metal stays have been removed. Dressing clean dry and intact. Wound VAC with good seal. Nothing in the canister.   Wound VAC at 125 mmHg continuous low pressure    Lab/Data Review:  CBC:   Lab Results   Component Value Date/Time    WBC 5.8 08/20/2021 04:11 AM    HGB 8.9 (L) 08/20/2021 04:11 AM    HCT 30.0 (L) 08/20/2021 04:11 AM     (L) 08/20/2021 04:11 AM          Assessment:     Principal Problem:    S/P BKA (below knee amputation) unilateral, right ([address] (8/18/2021)    Active Problems:    CAD (coronary artery disease) ()      Stage 3 chronic kidney disease ([facility] (11/22/2017)      Controlled type 2 diabetes mellitus with diabetic polyneuropathy, without long-term current use of insulin ([facility] (1/5/2018)      Mixed hyperlipidemia (4/10/2018)      Morbid obesity with BMI of 40.0-44.9, adult ([facility] (1/38/5599)      Systolic CHF, acute on chronic (HCC) (7/19/2021)      Atrial fibrillation with RVR ([facility] (7/19/2021)      Acute respiratory failure with hypoxia and hypercapnia ([facility] (7/23/2021)      Respiratory failure, post-operative ([address] (8/18/2021)        Plan:     1: WB status - NWB RLE  2: DVT px - Lovenox as of now. I defer to cardiology the appropriate anticoagulant  3: ABX -defer to medicine and infectious disease specialist concerning need for antibiotics. From my standpoint the amputation site does not need antibiotics. 4: At this point his amputation is his final surgical procedure. Wound VAC with good seal. We will keep wound VAC on as long as necessary to aid in wound healing. May need to transition to [address] [place] [address] prior to discharge  5: [address] cardiology, pulmonology, and medicine evaluation and management of his respiratory and medical problems. Hopefully we can extubate over the next 24 to 48 hours however this is pulmonology/medicine decision. 6: Once out of the ICU I want physical therapy to evaluate  7: We will need placement postoperatively.       Signed By: Frank Maddox MD     August 20, 2021

## 2021-08-21 ENCOUNTER — APPOINTMENT (OUTPATIENT)
Dept: GENERAL RADIOLOGY | Age: 76
DRG: 616 | End: 2021-08-21
Attending: INTERNAL MEDICINE
Payer: MEDICARE

## 2021-08-21 LAB
ANION GAP SERPL CALC-SCNC: 2 MMOL/L (ref 7–16)
BASOPHILS # BLD: 0 K/UL (ref 0–0.2)
BASOPHILS NFR BLD: 1 % (ref 0–2)
BUN SERPL-MCNC: 33 MG/DL (ref 8–23)
CALCIUM SERPL-MCNC: 8.7 MG/DL (ref 8.3–10.4)
CHLORIDE SERPL-SCNC: 100 MMOL/L (ref 98–107)
CO2 SERPL-SCNC: 39 MMOL/L (ref 21–32)
CREAT SERPL-MCNC: 1.99 MG/DL (ref 0.8–1.5)
DIFFERENTIAL METHOD BLD: ABNORMAL
EOSINOPHIL # BLD: 0.2 K/UL (ref 0–0.8)
EOSINOPHIL NFR BLD: 4 % (ref 0.5–7.8)
ERYTHROCYTE [DISTWIDTH] IN BLOOD BY AUTOMATED COUNT: 16 % (ref 11.9–14.6)
GLUCOSE BLD STRIP.AUTO-MCNC: 103 MG/DL (ref 65–100)
GLUCOSE BLD STRIP.AUTO-MCNC: 114 MG/DL (ref 65–100)
GLUCOSE BLD STRIP.AUTO-MCNC: 123 MG/DL (ref 65–100)
GLUCOSE BLD STRIP.AUTO-MCNC: 127 MG/DL (ref 65–100)
GLUCOSE SERPL-MCNC: 126 MG/DL (ref 65–100)
HCT VFR BLD AUTO: 29.2 % (ref 41.1–50.3)
HGB BLD-MCNC: 8.9 G/DL (ref 13.6–17.2)
IMM GRANULOCYTES # BLD AUTO: 0 K/UL (ref 0–0.5)
IMM GRANULOCYTES NFR BLD AUTO: 0 % (ref 0–5)
LYMPHOCYTES # BLD: 1.7 K/UL (ref 0.5–4.6)
LYMPHOCYTES NFR BLD: 27 % (ref 13–44)
MCH RBC QN AUTO: 29.4 PG (ref 26.1–32.9)
MCHC RBC AUTO-ENTMCNC: 30.5 G/DL (ref 31.4–35)
MCV RBC AUTO: 96.4 FL (ref 79.6–97.8)
MM INDURATION POC: 0 MM (ref 0–5)
MONOCYTES # BLD: 0.9 K/UL (ref 0.1–1.3)
MONOCYTES NFR BLD: 14 % (ref 4–12)
NEUTS SEG # BLD: 3.5 K/UL (ref 1.7–8.2)
NEUTS SEG NFR BLD: 55 % (ref 43–78)
NRBC # BLD: 0 K/UL (ref 0–0.2)
PLATELET # BLD AUTO: 143 K/UL (ref 150–450)
PMV BLD AUTO: 12.8 FL (ref 9.4–12.3)
POTASSIUM SERPL-SCNC: 3.3 MMOL/L (ref 3.5–5.1)
PPD POC: NEGATIVE NEGATIVE
RBC # BLD AUTO: 3.03 M/UL (ref 4.23–5.6)
SERVICE CMNT-IMP: ABNORMAL
SODIUM SERPL-SCNC: 141 MMOL/L (ref 138–145)
WBC # BLD AUTO: 6.3 K/UL (ref 4.3–11.1)

## 2021-08-21 PROCEDURE — 80048 BASIC METABOLIC PNL TOTAL CA: CPT

## 2021-08-21 PROCEDURE — 74011250636 HC RX REV CODE- 250/636: Performed by: INTERNAL MEDICINE

## 2021-08-21 PROCEDURE — 99232 SBSQ HOSP IP/OBS MODERATE 35: CPT | Performed by: INTERNAL MEDICINE

## 2021-08-21 PROCEDURE — 85025 COMPLETE CBC W/AUTO DIFF WBC: CPT

## 2021-08-21 PROCEDURE — 74011250637 HC RX REV CODE- 250/637: Performed by: HOSPITALIST

## 2021-08-21 PROCEDURE — 74011250637 HC RX REV CODE- 250/637: Performed by: INTERNAL MEDICINE

## 2021-08-21 PROCEDURE — 71045 X-RAY EXAM CHEST 1 VIEW: CPT

## 2021-08-21 PROCEDURE — 97530 THERAPEUTIC ACTIVITIES: CPT

## 2021-08-21 PROCEDURE — 97162 PT EVAL MOD COMPLEX 30 MIN: CPT

## 2021-08-21 PROCEDURE — 82962 GLUCOSE BLOOD TEST: CPT

## 2021-08-21 PROCEDURE — 65660000000 HC RM CCU STEPDOWN

## 2021-08-21 RX ORDER — MIDODRINE HYDROCHLORIDE 5 MG/1
5 TABLET ORAL
Status: DISCONTINUED | OUTPATIENT
Start: 2021-08-21 | End: 2021-08-22

## 2021-08-21 RX ORDER — PANTOPRAZOLE SODIUM 40 MG/1
40 TABLET, DELAYED RELEASE ORAL
Status: DISCONTINUED | OUTPATIENT
Start: 2021-08-21 | End: 2021-08-25 | Stop reason: HOSPADM

## 2021-08-21 RX ORDER — FUROSEMIDE 40 MG/1
40 TABLET ORAL DAILY
Status: DISCONTINUED | OUTPATIENT
Start: 2021-08-21 | End: 2021-08-25 | Stop reason: HOSPADM

## 2021-08-21 RX ORDER — METOPROLOL SUCCINATE 50 MG/1
50 TABLET, EXTENDED RELEASE ORAL 2 TIMES DAILY
Status: DISCONTINUED | OUTPATIENT
Start: 2021-08-21 | End: 2021-08-25 | Stop reason: HOSPADM

## 2021-08-21 RX ORDER — METOPROLOL SUCCINATE 25 MG/1
25 TABLET, EXTENDED RELEASE ORAL 2 TIMES DAILY
Status: DISCONTINUED | OUTPATIENT
Start: 2021-08-21 | End: 2021-08-21

## 2021-08-21 RX ADMIN — MIDODRINE HYDROCHLORIDE 5 MG: 5 TABLET ORAL at 08:55

## 2021-08-21 RX ADMIN — Medication 10 ML: at 05:33

## 2021-08-21 RX ADMIN — Medication 10 ML: at 22:20

## 2021-08-21 RX ADMIN — PREGABALIN 200 MG: 150 CAPSULE ORAL at 17:00

## 2021-08-21 RX ADMIN — METOPROLOL SUCCINATE 50 MG: 50 TABLET, EXTENDED RELEASE ORAL at 08:55

## 2021-08-21 RX ADMIN — MIDODRINE HYDROCHLORIDE 5 MG: 5 TABLET ORAL at 16:58

## 2021-08-21 RX ADMIN — PANTOPRAZOLE SODIUM 40 MG: 40 TABLET, DELAYED RELEASE ORAL at 08:55

## 2021-08-21 RX ADMIN — APIXABAN 5 MG: 5 TABLET, FILM COATED ORAL at 22:16

## 2021-08-21 RX ADMIN — PREGABALIN 200 MG: 150 CAPSULE ORAL at 08:55

## 2021-08-21 RX ADMIN — ATORVASTATIN CALCIUM 40 MG: 40 TABLET, FILM COATED ORAL at 22:16

## 2021-08-21 RX ADMIN — ENOXAPARIN SODIUM 120 MG: 120 INJECTION SUBCUTANEOUS at 08:59

## 2021-08-21 RX ADMIN — Medication 10 ML: at 14:26

## 2021-08-21 RX ADMIN — Medication 10 ML: at 22:19

## 2021-08-21 RX ADMIN — MIDODRINE HYDROCHLORIDE 5 MG: 5 TABLET ORAL at 12:22

## 2021-08-21 RX ADMIN — FUROSEMIDE 40 MG: 40 TABLET ORAL at 08:55

## 2021-08-21 RX ADMIN — TRAZODONE HYDROCHLORIDE 50 MG: 50 TABLET ORAL at 22:16

## 2021-08-21 NOTE — PROGRESS NOTES
Hourly rounds completed throughout this shift. Bed low and locked. Personal items and call light within reach. Pt resting in bed; denies needs at this time. Will continue to monitor and report to oncoming night shift nurse.

## 2021-08-21 NOTE — PROGRESS NOTES
[facility] Hospitalist Consult   Admit Date:  2021  7:31 AM   Name:  Olga Resendiz   Age:  77 y.o. Sex:  male  :  1945   MRN:  [id number]     Presenting Complaint: No chief complaint on file. Reason(s) for Admission: Charcot's joint of foot, right [M14.671]  Infected wound [T14. 8XXA, L08.9]  S/P BKA (below knee amputation) unilateral, right (HCC) [Z89.511]  Respiratory failure, post-operative [facility]) [H32.489]     Hospitalists consulted by Dewayne Nayak MD for: Medical management    History of Presenting Illness:   Olga Resendiz is a 77 y.o. male with history of CAD status post PCI, chronic respiratory failure on 2 L via NC, A. fib (on Eliquis), CKD stage III, DM 2, chronic systolic CHF EF 30 to 88%, status post right BKA on  seen by hospitalist team on  on request of Dr. Kelly Whittington for medical management. Patient is currently on vent with difficulty to wean him off. He was switched from propofol to Precedex this morning. Patient was hospitalized in July with right foot cellulitis and sepsis secondary to right foot ulcer. Ortho initially recommended amputation, however patient refused. Patient was discharged on long-term IV antibiotics with EOT 9/3/2021. Patient was readmitted due to worsening right lower leg wound and underwent right below-knee amputation on .    :  Patient seen at bedside, resting in bed comfortably. He is alert and awake/coherent. He is weaned off of air Vo to 2 L via NC which is his baseline. Patient denies having any chest pain, palpitations, cough, worsening shortness of breath. No overnight events including fever, diarrhea vomiting. Review of Systems:  10 systems reviewed and negative except as noted above.         Assessment & Plan:  Hospital Problems as of 2021 Date Reviewed: 2021        Codes Class Noted - Resolved POA    Suspected sleep apnea ICD-10-CM: R29.818  ICD-9-CM: 781.99  2021 - Present Unknown        * (Principal) S/P BKA (below knee amputation) unilateral, right ([facility] ICD-10-CM: Z89.511  ICD-9-CM: V49.75  8/18/2021 - Present Unknown        Respiratory failure, post-operative ([facility] ICD-10-CM: J41.789  ICD-9-CM: 518.51  8/18/2021 - Present Unknown        Acute on chronic respiratory failure with hypoxia and hypercapnia (HCC) ICD-10-CM: J96.21, J96.22  ICD-9-CM: 518.84, 786.09, 799.02  7/23/2021 - Present Yes        Systolic CHF, acute on chronic [facility]) ICD-10-CM: I50.23  ICD-9-CM: 428.23, 428.0  7/19/2021 - Present Yes        Atrial fibrillation with RVR (HCC) ICD-10-CM: I48.91  ICD-9-CM: 427.31  7/19/2021 - Present Yes        Morbid obesity with BMI of 40.0-44.9, adult [facility]) ICD-10-CM: E66.01, Z68.41  ICD-9-CM: 278.01, V85.41  6/11/2020 - Present Yes        Mixed hyperlipidemia (Chronic) ICD-10-CM: U72.2  ICD-9-CM: 272.2  4/10/2018 - Present Yes        Controlled type 2 diabetes mellitus with diabetic polyneuropathy, without long-term current use of insulin (HCC) (Chronic) ICD-10-CM: E11.42  ICD-9-CM: 250.60, 357.2  1/5/2018 - Present Yes        Stage 3 chronic kidney disease ([facility] (Chronic) ICD-10-CM: N18.30  ICD-9-CM: 585.3  11/22/2017 - Present Yes        CAD (coronary artery disease) (Chronic) ICD-10-CM: I25.10  ICD-9-CM: 414.00  Unknown - Present Yes             Acute on chronic respiratory failure with hypoxia/hypercapnia requiring intubation:  8/21: Status post extubation on 8/19  Patient weaned off of AirVo to nasal cannula, 2 L currently which is at his baseline. 3 L at bedtime. IV Lasix switched to oral, 40 mg daily from today. Net fluid balance - 8.3 L  Chest x-ray this morning showed bibasilar opacities likely atelectasis, patient encouraged to use incentive spirometry. Hyperkalemia-  8/21: Today's BMP is pending, last potassium from yesterday 4.4.  8/19: 5.4 today. We will give 1 amp of D50 followed by 10 units of IV regular insulin. With recheck BMP at 1600 hrs.       Status post right below-knee amputation:  8/21: POD #3  Dilaudid 0.5 mg every 4 hours as needed and oxycodone 5 mg p.o. every 4 hours for moderate pain. MiraLAX to prevent with opioid-induced constipation. PT and OT eval is pending, but patient may require short-term rehab on discharge    A. Fib:  8/21: Heart rate suboptimally controlled, increase Toprol XL from 25 mg to 50 mg p.o. twice daily however holding this morning dose in view of borderline BP. Continue IV Lopressor 2.5 mg every 6 hours as needed for heart rate of greater than 115  Switched from Lovenox to Eliquis today      CHF- pt had echo performed on 7/21/2021 demontrating ejection fraction of 30 to 35%. 8/21:  BNP 1077  Need to optimize BP. Increase Toprol-XL to 50 mg p.o. twice daily  Patient is euvolemic. Net fluid balance -8.3 L      Diabetes-patient noted to have a hemoglobin A1c of 6.7  8/21: Blood glucose is optimally controlled. Monitor POC glucose QA CHS. Continue Humalog sliding scale q. ACH S per        Morbid obesity-  Noted    Disposition: Patient is on air Vo, attempting to be weaned off to nasal cannula (baseline 2 L at daytime and 3 L at nighttime).     Disposition: Defer to primary team.            Objective:     Patient Vitals for the past 24 hrs:   Temp Pulse Resp BP SpO2   08/21/21 0320 99 °F (37.2 °C) 92 22 98/65 93 %   08/21/21 0010     91 %   08/21/21 0004 98.5 °F (36.9 °C) (!) 107 22 (!) 96/51 (!) 86 %   08/20/21 2112    (!) 100/51    08/20/21 2007  (!) 104      08/20/21 1933 99 °F (37.2 °C) (!) 111 22 96/61 94 %   08/20/21 1732 98.2 °F (36.8 °C) 62 20 (!) 101/59 97 %   08/20/21 1228 98.5 °F (36.9 °C) 79 20 102/63 93 %   08/20/21 1140 99.2 °F (37.3 °C)       08/20/21 1130  (!) 103 22 (!) 110/57 98 %   08/20/21 1115  (!) 101 25  96 %   08/20/21 1100  (!) 105 23 (!) 103/57 92 %   08/20/21 1045  (!) 108 (!) 35  98 %   08/20/21 1030  (!) 103 25 (!) 108/55 96 %   08/20/21 1015  (!) 107 19  97 %   08/20/21 1000  99 24 106/63 96 % 08/20/21 0945  (!) 107 (!) 44  99 %   08/20/21 0930  (!) 104 30 (!) 113/56 98 %   08/20/21 0915  (!) 106 27  97 %   08/20/21 0900  (!) 105 19 (!) 130/59 94 %   08/20/21 0845  (!) 104 24  96 %   08/20/21 0830  97 (!) 52 121/61 98 %   08/20/21 0815  100 20  96 %   08/20/21 0800  (!) 103 18 (!) 114/59 94 %   08/20/21 0745  100 (!) 31  98 %   08/20/21 0730 97.4 °F (36.3 °C) (!) 102 21 109/81 95 %     Oxygen Therapy  O2 Sat (%): 93 % (08/21/21 0320)  Pulse via Oximetry: 98 beats per minute (08/20/21 1130)  O2 Device: Nasal cannula (08/21/21 0010)  Skin Assessment: Clean, dry, & intact (08/21/21 0010)  Skin Protection for O2 Device: No (08/20/21 0331)  O2 Flow Rate (L/min): 3 l/min (08/21/21 0010)  FIO2 (%): 45 % (08/20/21 0836)    Estimated body mass index is 42.73 kg/m² as calculated from the following:    Height as of this encounter: 5' 10\" (1.778 m). Weight as of this encounter: 135.1 kg (297 lb 12.8 oz). Intake/Output Summary (Last 24 hours) at 8/21/2021 0720  Last data filed at 8/21/2021 0327  Gross per 24 hour   Intake 458 ml   Output 3450 ml   Net -2992 ml         Physical Exam:    General:    Alisa alert, awake, obese with BMI 42, currently on 2 L via NC, no increased work of breathing  HEENT:           Head NCAT, PERRLA positive  CV:   RRR. No m/r/g. No JVD. No edema  Lungs:   Clear to auscultation bilaterally  Abdomen: Bowel sounds present. Soft, nontender, nondistended. Extremities: Warm and dry. No cyanosis or clubbing. Right BKA with surgical dressing on  Skin:     No rashes. Normal coloration. Normal turgor. Neuro:  GCS 15, cranial nerves II 12 grossly intact  Psych:  AOx3, mood and affect appropriate.     I have reviewed ordered lab tests and independently visualized imaging below:    Last 24hr Labs:  Recent Results (from the past 24 hour(s))   GLUCOSE, POC    Collection Time: 08/20/21  8:44 AM   Result Value Ref Range    Glucose (POC) 95 65 - 100 mg/dL    Performed by Devyn    GLUCOSE, POC    Collection Time: 08/20/21 11:37 AM   Result Value Ref Range    Glucose (POC) 106 (H) 65 - 100 mg/dL    Performed by Fay    GLUCOSE, POC    Collection Time: 08/20/21 12:28 PM   Result Value Ref Range    Glucose (POC) 95 65 - 100 mg/dL    Performed by Rupert    GLUCOSE, POC    Collection Time: 08/20/21  5:29 PM   Result Value Ref Range    Glucose (POC) 108 (H) 65 - 100 mg/dL    Performed by Rupert    GLUCOSE, POC    Collection Time: 08/20/21  9:05 PM   Result Value Ref Range    Glucose (POC) 111 (H) 65 - 100 mg/dL    Performed by Elvia    GLUCOSE, POC    Collection Time: 08/21/21  7:00 AM   Result Value Ref Range    Glucose (POC) 114 (H) 65 - 100 mg/dL    Performed by [employer]        All Micro Results     Procedure Component Value Units Date/Time    CULTURE, BLOOD [[id number]] Collected: 08/19/21 1156    Order Status: Completed Specimen: Blood Updated: 08/20/21 0712     Special Requests: --        LEFT  HAND       Culture result: NO GROWTH AFTER 18 HOURS       CULTURE, BLOOD [[id number]] Collected: 08/19/21 1227    Order Status: Completed Specimen: Blood Updated: 08/20/21 6444     Special Requests: --        LEFT  HAND       Culture result: NO GROWTH AFTER 18 HOURS             Other Studies:  XR CHEST SNGL V    Result Date: 8/21/2021   Portable view of the chest COMPARISON: Yesterday CLINICAL HISTORY: Postop, respiratory failure. FINDINGS: Stable right-sided PICC. Cardiac mediastinal silhouette is prominent. Bibasilar opacities, likely atelectasis or consolidation. No pneumothorax. Surrounding bones are stable. 1. Bibasilar opacities, likely atelectasis or consolidation.  2. No significant change compared to prior exam.      Current Meds:  Current Facility-Administered Medications   Medication Dose Route Frequency    furosemide (LASIX) tablet 40 mg  40 mg Oral DAILY    metoprolol succinate (TOPROL-XL) XL tablet 25 mg  25 mg Oral BID    pantoprazole (PROTONIX) tablet 40 mg  40 mg Oral ACB    midodrine (PROAMATINE) tablet 2.5 mg  2.5 mg Oral TID WITH MEALS    metoprolol (LOPRESSOR) injection 2.5 mg  2.5 mg IntraVENous Q6H PRN    oxyCODONE IR (ROXICODONE) tablet 5 mg  5 mg Oral Q4H PRN    acetaminophen (TYLENOL) tablet 650 mg  650 mg Oral Q6H PRN    insulin lispro (HUMALOG) injection 0-10 Units  0-10 Units SubCUTAneous AC&HS    tuberculin injection 5 Units  5 Units IntraDERMal ONCE    sodium chloride (NS) flush 5-40 mL  5-40 mL IntraVENous Q8H    sodium chloride (NS) flush 5-40 mL  5-40 mL IntraVENous PRN    enoxaparin (LOVENOX) injection 120 mg  120 mg SubCUTAneous Q12H    atorvastatin (LIPITOR) tablet 40 mg  40 mg Oral QHS    nystatin (MYCOSTATIN) 100,000 unit/gram powder   Topical PRN    pregabalin (LYRICA) capsule 200 mg  200 mg Oral BID    traZODone (DESYREL) tablet 50 mg  50 mg Oral QHS    dextrose 40% (GLUTOSE) oral gel 1 Tube  15 g Oral PRN    glucagon (GLUCAGEN) injection 1 mg  1 mg IntraMUSCular PRN    dextrose (D50W) injection syrg 12.5-25 g  25-50 mL IntraVENous PRN    sodium chloride (NS) flush 5-40 mL  5-40 mL IntraVENous Q8H    sodium chloride (NS) flush 5-40 mL  5-40 mL IntraVENous PRN    naloxone (NARCAN) injection 0.4 mg  0.4 mg IntraVENous PRN    HYDROmorphone (DILAUDID) injection 0.5 mg  0.5 mg IntraVENous Q4H PRN       Signed:  Lindy Dailey MD    Part of this note may have been written by using a voice dictation software. The note has been proof read but may still contain some grammatical/other typographical errors.

## 2021-08-21 NOTE — PROGRESS NOTES
ACUTE PHYSICAL THERAPY GOALS:  (Developed with and agreed upon by patient and/or caregiver.)  1. Mr. Shirlene Otero will perform supine to sit and sit to supine with supervision in 7 days  2. Mr. Shirlene Otero will perform sit to stand and bed to chair with min assist using rolling walker in 7 days. 3.  Mr. Shirlene Otero will perform therex to right LE x 15 reps AROM in 7 days. PHYSICAL THERAPY ASSESSMENT: Initial Assessment and Daily Note PT Treatment Day # 1      David Spencer is a 77 y.o. male   PRIMARY DIAGNOSIS: S/P BKA (below knee amputation) unilateral, right (HCC)  Charcot's joint of foot, right [M14.671]  Infected wound [T14. 8XXA, L08.9]  S/P BKA (below knee amputation) unilateral, right (HCC) [Z89.511]  Respiratory failure, post-operative ([facility] [address] [address] [J95.821]  Procedure(s) (LRB):  RIGHT LEG AMPUTATION BELOW KNEE (Right)  3 Days Post-Op  Reason for Referral:    ICD-10: Treatment Diagnosis: Generalized Muscle Weakness (M62.81)  Difficulty in walking, Not elsewhere classified (R26.2)  INPATIENT: Payor: Rolanda Gibbs / Plan: Duane Torres / Product Type: [employer] /     ASSESSMENT:     REHAB RECOMMENDATIONS:   Recommendation to date pending progress:  [address] [place] [address]  Equipment:    To Be Determined     PRIOR LEVEL OF FUNCTION:  (Prior to Hospitalization) INITIAL/CURRENT LEVEL OF FUNCTION:  (Most Recently Demonstrated)   Bed Mobility:   Independent  Sit to Stand:   Independent  Transfers:   Independent  Gait/Mobility:   Modified Independent Bed Mobility:   Moderate Assistance x 2  Sit to Stand:   Moderate Assistance x 2  Transfers:   Maximal Assistance x 2  Gait/Mobility:   Not tested     ASSESSMENT:  Mr. Shirlene Otero presents after right BKA with decreased mobility and decreased gait. It was hard for him today to mobilize requiring significant assist of 2 people. Even to sit edge of bed was difficult. He was certain it was the bed that was messing with his balance.   He was so determined to try and stand though and he did with mod to max of 2. He did realize how weak he had gotten and that his left leg was not that strong to hold him. Mr. 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 Although today was hard his motivation and desire to get better is so strong and the fact that he lives with wife and daughter in 1 story house with 1 step to get in. He was driving before. He would benefit from inpatient rehab stay. Mr. Fahad Lopez is functioning below baseline and is therefore appropriate for skilled PT to maximize his rehab potential.       SUBJECTIVE:   Mr. Fahad Lopez states, \"I want to go back to work. \"    SOCIAL HISTORY/LIVING ENVIRONMENT: lives with wife, and daughter. 1 story house 1 step to enter. Has tub with seat in it. Has been using a walker lately to help reduce pressure off of foot.      OBJECTIVE:     PAIN: VITAL SIGNS: LINES/DRAINS:   Pre Treatment: Pain Screen  Pain Scale 1: Numeric (0 - 10)  Pain Intensity 1: 0  Post Treatment: 0   Wound Vac  O2 Device: Nasal cannula     GROSS EVALUATION:   Within Functional Limits Abnormal/ Functional Abnormal/ Non-Functional (see comments) Not Tested Comments:   AROM [] [x] [] [] Generally weak   PROM [] [] [] []    Strength [] [x] [] [] Generally weak   Balance [] [x] [] [] Poor sitting balance and poor standing balance   Posture [] [] [] []    Sensation [] [x] [] [] Decreased sensation left foot   Coordination [] [] [] []    Tone [] [] [] []    Edema [] [] [] []    Activity Tolerance [] [] [] []     [] [] [] []      COGNITION/  PERCEPTION: Intact Impaired   (see comments) Comments:   Orientation [x] []    Vision [x] [] glasses   Hearing [x] []    Command Following [x] []    Safety Awareness [x] []     [] []      MOBILITY: I Mod I S SBA CGA Min Mod Max Total  NT x2 Comments:   Bed Mobility    Rolling [] [] [] [] [] [x] [] [] [] [] []    Supine to Sit [] [] [] [] [] [] [x] [x] [] [] []    Scooting [] [] [] [] [] [] [x] [] [] [] []    Sit to Supine [] [] [] [] [] [] [x] [] [] [] [x]    Transfers    Sit to Stand [] [] [] [] [] [] [x] [x] [] [] [x]    Bed to Chair [] [] [] [] [] [] [] [] [] [x] []    Stand to Sit [] [] [] [] [] [] [x] [x] [] [] [x]    I=Independent, Mod I=Modified Independent, S=Supervision, SBA=Standby Assistance, CGA=Contact Guard Assistance,   Min=Minimal Assistance, Mod=Moderate Assistance, Max=Maximal Assistance, Total=Total Assistance, NT=Not Tested  GAIT: I Mod I S SBA CGA Min Mod Max Total  NT x2 Comments:   Level of Assistance [] [] [] [] [] [] [] [] [] [x] []    Distance     DME     Gait Quality     Weightbearing Status      I=Independent, Mod I=Modified Independent, S=Supervision, SBA=Standby Assistance, CGA=Contact Guard Assistance,   Min=Minimal Assistance, Mod=Moderate Assistance, Max=Maximal Assistance, Total=Total Assistance, NT=Not Tested    [address] [facility] [address] [address] AM-[facility]       How much difficulty does the patient currently have. .. Unable A Lot A Little None   1. Turning over in bed (including adjusting bedclothes, sheets and blankets)? [] 1   [x] 2   [] 3   [] 4   2. Sitting down on and standing up from a chair with arms ( e.g., wheelchair, bedside commode, etc.)   [] 1   [x] 2   [] 3   [] 4   3. Moving from lying on back to sitting on the side of the bed? [] 1   [x] 2   [] 3   [] 4   How much help from another person does the patient currently need. .. Total A Lot A Little None   4. Moving to and from a bed to a chair (including a wheelchair)? [x] 1   [] 2   [] 3   [] 4   5. Need to walk in hospital room? [x] 1   [] 2   [] 3   [] 4   6. Climbing 3-5 steps with a railing? [x] 1   [] 2   [] 3   [] 4   © 2007, Trustees of [address] [place] [id number] [address] [id number], under license to [facility].  All rights reserved     Score:  Initial: 9 Most Recent: X (Date: -- )    Interpretation of Tool:  Represents activities that are increasingly more difficult (i.e. Bed mobility, Transfers, Gait). PLAN:   FREQUENCY/DURATION: PT Plan of Care: 3 times/week for duration of hospital stay or until stated goals are met, whichever comes first.    PROBLEM LIST:   (Skilled intervention is medically necessary to address:)  1. Decreased AROM/PROM  2. Decreased Balance  3. Decreased Gait Ability  4. Decreased Strength  5. Decreased Transfer Abilities   INTERVENTIONS PLANNED:   (Benefits and precautions of physical therapy have been discussed with the patient.)  1. Therapeutic Activity  2. Therapeutic Exercise/HEP  3. Neuromuscular Re-education  4. Gait Training  5. Education     TREATMENT:     EVALUATION: Moderate Complexity : (Untimed Charge)    TREATMENT:   ($$ Therapeutic Activity: 23-37 mins    )  Therapeutic Activity (25 Minutes): Therapeutic activity included Supine to Sit, Sit to Supine, Scooting, Transfer Training, Sitting balance  and Standing balance to improve functional Mobility.     TREATMENT GRID:  N/A    AFTER TREATMENT POSITION/PRECAUTIONS:  Bed, Needs within reach and RN notified    INTERDISCIPLINARY COLLABORATION:  RN/PCT and PT/PTA    TOTAL TREATMENT DURATION:  PT Patient Time In/Time Out  Time In: 1140  Time Out: [address] PT

## 2021-08-21 NOTE — PROGRESS NOTES
[facility] CARDIOLOGY PROGRESS NOTE           8/21/2021 11:09 AM    Admit Date: 8/18/2021      Subjective:   Feeling better, rate better. No CP, SOB.      ROS:  Cardiovascular:  As noted above    Objective:      Vitals:    08/21/21 0010 08/21/21 0320 08/21/21 0638 08/21/21 0730   BP:  98/65  (!) 93/51   Pulse:  92  84   Resp:  22  20   Temp:  99 °F (37.2 °C)  98.1 °F (36.7 °C)   SpO2: 91% 93%  93%   Weight:   297 lb 12.8 oz (135.1 kg)    Height:           Physical Exam:  General-No Acute Distress  Neck- supple, no JVD  CV- regular rate and rhythm no MRG  Lung- clear bilaterally with dec BS in the bases  Abd- soft, nontender, nondistended  Ext- no edema bilaterally. Skin- warm and dry    Data Review:   Recent Labs     08/20/21  0411 08/19/21  1539 08/19/21  0329 08/19/21  0329    144   < > 143   K 4.4 4.1   < > 5.4*   BUN 29* 29*   < > 29*   CREA 1.51* 1.49   < > 1.36   GLU 96 101*   < > 84   WBC 5.8  --   --  6.2   HGB 8.9*  --   --  9.5*   HCT 30.0*  --   --  32.3*   *  --   --  130*    < > = values in this interval not displayed. Assessment/Plan:     Principal Problem:    S/P BKA (below knee amputation) unilateral, right ([address] (8/18/2021)  Per main team    Active Problems:    CAD (coronary artery disease) ()  Reported prior stent placement. No CP. Systolic CHF, acute on chronic ([address] (7/19/2021)    Remain on meds for GDMT. - appears ischemic with possible component of tachycardic cardiomyopathy as well.  -Prior deferred consideration for ACE inhibitor/ARB's with acute on chronic renal failure.      -Needs ischemic work-up which can be addressed on an outpatient basis        Atrial fibrillation with RVR ([address] (9/76/2762)  -Uncertain duration and noted during last hospital stay with sepsis/cellulitis. -BB increased and rate better now.   Plan for rate control strategy.   -Echo with moderate left ventricular dysfunction with wall motion abnormality suggestive of coronary disease.  Uncertain duration.  Mild left atrial enlargement.  -Currently on therapeutic Lovenox and resume oral anticoagulation when possible. Follow CBC and anemia. We will be on standby, please call as needed.   Thanks          Tamiko Isabel DO  8/21/2021 11:09 AM

## 2021-08-21 NOTE — PROGRESS NOTES
Hourly rounding completed on this shift. All needs met. Pt oxygen increased to 3L as oxygen dropped during night. Pt is currently resting in bed. Will continue to monitor and give report to oncoming nurse.

## 2021-08-22 LAB
ANION GAP SERPL CALC-SCNC: 4 MMOL/L (ref 7–16)
BASOPHILS # BLD: 0 K/UL (ref 0–0.2)
BASOPHILS NFR BLD: 1 % (ref 0–2)
BUN SERPL-MCNC: 35 MG/DL (ref 8–23)
CALCIUM SERPL-MCNC: 9 MG/DL (ref 8.3–10.4)
CHLORIDE SERPL-SCNC: 100 MMOL/L (ref 98–107)
CO2 SERPL-SCNC: 39 MMOL/L (ref 21–32)
CREAT SERPL-MCNC: 1.84 MG/DL (ref 0.8–1.5)
DIFFERENTIAL METHOD BLD: ABNORMAL
EOSINOPHIL # BLD: 0.2 K/UL (ref 0–0.8)
EOSINOPHIL NFR BLD: 4 % (ref 0.5–7.8)
ERYTHROCYTE [DISTWIDTH] IN BLOOD BY AUTOMATED COUNT: 16.1 % (ref 11.9–14.6)
GLUCOSE BLD STRIP.AUTO-MCNC: 117 MG/DL (ref 65–100)
GLUCOSE BLD STRIP.AUTO-MCNC: 132 MG/DL (ref 65–100)
GLUCOSE BLD STRIP.AUTO-MCNC: 140 MG/DL (ref 65–100)
GLUCOSE BLD STRIP.AUTO-MCNC: 98 MG/DL (ref 65–100)
GLUCOSE SERPL-MCNC: 95 MG/DL (ref 65–100)
HCT VFR BLD AUTO: 29.2 % (ref 41.1–50.3)
HGB BLD-MCNC: 8.8 G/DL (ref 13.6–17.2)
IMM GRANULOCYTES # BLD AUTO: 0 K/UL (ref 0–0.5)
IMM GRANULOCYTES NFR BLD AUTO: 0 % (ref 0–5)
LYMPHOCYTES # BLD: 1.6 K/UL (ref 0.5–4.6)
LYMPHOCYTES NFR BLD: 29 % (ref 13–44)
MCH RBC QN AUTO: 29.1 PG (ref 26.1–32.9)
MCHC RBC AUTO-ENTMCNC: 30.1 G/DL (ref 31.4–35)
MCV RBC AUTO: 96.7 FL (ref 79.6–97.8)
MONOCYTES # BLD: 0.9 K/UL (ref 0.1–1.3)
MONOCYTES NFR BLD: 16 % (ref 4–12)
NEUTS SEG # BLD: 2.9 K/UL (ref 1.7–8.2)
NEUTS SEG NFR BLD: 51 % (ref 43–78)
NRBC # BLD: 0.02 K/UL (ref 0–0.2)
PLATELET # BLD AUTO: 170 K/UL (ref 150–450)
PMV BLD AUTO: 11.3 FL (ref 9.4–12.3)
POTASSIUM SERPL-SCNC: 3.3 MMOL/L (ref 3.5–5.1)
RBC # BLD AUTO: 3.02 M/UL (ref 4.23–5.6)
SERVICE CMNT-IMP: ABNORMAL
SERVICE CMNT-IMP: NORMAL
SODIUM SERPL-SCNC: 143 MMOL/L (ref 136–145)
WBC # BLD AUTO: 5.7 K/UL (ref 4.3–11.1)

## 2021-08-22 PROCEDURE — 97530 THERAPEUTIC ACTIVITIES: CPT

## 2021-08-22 PROCEDURE — 74011250636 HC RX REV CODE- 250/636: Performed by: HOSPITALIST

## 2021-08-22 PROCEDURE — 97535 SELF CARE MNGMENT TRAINING: CPT

## 2021-08-22 PROCEDURE — 65660000000 HC RM CCU STEPDOWN

## 2021-08-22 PROCEDURE — 82962 GLUCOSE BLOOD TEST: CPT

## 2021-08-22 PROCEDURE — 80048 BASIC METABOLIC PNL TOTAL CA: CPT

## 2021-08-22 PROCEDURE — 97166 OT EVAL MOD COMPLEX 45 MIN: CPT

## 2021-08-22 PROCEDURE — 85025 COMPLETE CBC W/AUTO DIFF WBC: CPT

## 2021-08-22 PROCEDURE — 74011250637 HC RX REV CODE- 250/637: Performed by: HOSPITALIST

## 2021-08-22 PROCEDURE — 74011250637 HC RX REV CODE- 250/637: Performed by: INTERNAL MEDICINE

## 2021-08-22 PROCEDURE — 77010033678 HC OXYGEN DAILY

## 2021-08-22 RX ORDER — POTASSIUM CHLORIDE 20 MEQ/1
40 TABLET, EXTENDED RELEASE ORAL 2 TIMES DAILY
Status: DISCONTINUED | OUTPATIENT
Start: 2021-08-22 | End: 2021-08-24

## 2021-08-22 RX ORDER — MIDODRINE HYDROCHLORIDE 5 MG/1
10 TABLET ORAL
Status: DISCONTINUED | OUTPATIENT
Start: 2021-08-22 | End: 2021-08-23

## 2021-08-22 RX ORDER — FLUDROCORTISONE ACETATE 0.1 MG/1
0.1 TABLET ORAL DAILY
Status: DISCONTINUED | OUTPATIENT
Start: 2021-08-22 | End: 2021-08-25 | Stop reason: HOSPADM

## 2021-08-22 RX ORDER — HYDROCORTISONE SODIUM SUCCINATE 100 MG/2ML
100 INJECTION, POWDER, FOR SOLUTION INTRAMUSCULAR; INTRAVENOUS ONCE
Status: COMPLETED | OUTPATIENT
Start: 2021-08-22 | End: 2021-08-22

## 2021-08-22 RX ADMIN — MIDODRINE HYDROCHLORIDE 10 MG: 5 TABLET ORAL at 17:21

## 2021-08-22 RX ADMIN — Medication 10 ML: at 21:48

## 2021-08-22 RX ADMIN — POTASSIUM CHLORIDE 40 MEQ: 20 TABLET, EXTENDED RELEASE ORAL at 09:36

## 2021-08-22 RX ADMIN — Medication 10 ML: at 05:50

## 2021-08-22 RX ADMIN — Medication 10 ML: at 17:28

## 2021-08-22 RX ADMIN — HYDROCORTISONE SODIUM SUCCINATE 100 MG: 100 INJECTION, POWDER, FOR SOLUTION INTRAMUSCULAR; INTRAVENOUS at 09:39

## 2021-08-22 RX ADMIN — PREGABALIN 200 MG: 150 CAPSULE ORAL at 09:36

## 2021-08-22 RX ADMIN — TRAZODONE HYDROCHLORIDE 50 MG: 50 TABLET ORAL at 21:47

## 2021-08-22 RX ADMIN — APIXABAN 5 MG: 5 TABLET, FILM COATED ORAL at 09:37

## 2021-08-22 RX ADMIN — MIDODRINE HYDROCHLORIDE 10 MG: 5 TABLET ORAL at 12:10

## 2021-08-22 RX ADMIN — FLUDROCORTISONE ACETATE 0.1 MG: 0.1 TABLET ORAL at 12:10

## 2021-08-22 RX ADMIN — APIXABAN 5 MG: 5 TABLET, FILM COATED ORAL at 21:47

## 2021-08-22 RX ADMIN — PREGABALIN 200 MG: 150 CAPSULE ORAL at 17:20

## 2021-08-22 RX ADMIN — PANTOPRAZOLE SODIUM 40 MG: 40 TABLET, DELAYED RELEASE ORAL at 05:50

## 2021-08-22 RX ADMIN — FUROSEMIDE 40 MG: 40 TABLET ORAL at 09:39

## 2021-08-22 RX ADMIN — ATORVASTATIN CALCIUM 40 MG: 40 TABLET, FILM COATED ORAL at 21:47

## 2021-08-22 RX ADMIN — POTASSIUM CHLORIDE 40 MEQ: 20 TABLET, EXTENDED RELEASE ORAL at 17:21

## 2021-08-22 NOTE — PROGRESS NOTES
[facility] Hospitalist Consult   Admit Date:  2021  7:31 AM   Name:  Hermilo Oscar   Age:  77 y.o. Sex:  male  :  1945   MRN:  [id number]     Presenting Complaint: No chief complaint on file. Reason(s) for Admission: Charcot's joint of foot, right [M14.671]  Infected wound [T14. 8XXA, L08.9]  S/P BKA (below knee amputation) unilateral, right (HCC) [Z89.511]  Respiratory failure, post-operative [facility]) [R52.021]     Hospitalists consulted by Fili Abel MD for: Medical management    History of Presenting Illness:   Hermilo Oscar is a 77 y.o. male with history of CAD status post PCI, chronic respiratory failure on 2 L via NC, A. fib (on Eliquis), CKD stage III, DM 2, chronic systolic CHF EF 30 to 53%, status post right BKA on  seen by hospitalist team on  on request of Dr. Angelica Bowers for medical management. Patient is currently on vent with difficulty to wean him off. He was switched from propofol to Precedex this morning. Patient was hospitalized in July with right foot cellulitis and sepsis secondary to right foot ulcer. Ortho initially recommended amputation, however patient refused. Patient was discharged on long-term IV antibiotics with EOT 9/3/2021. Patient was readmitted due to worsening right lower leg wound and underwent right below-knee amputation on .    :  Patient at bedside, resting in bed comfortably. He is currently on 2 L via NC. Denies having any chest pain, worsening shortness of breath, palpitations, headache, nausea vomiting or diarrhea. Discussed about potential discharge planning to rehab. Review of Systems:  10 systems reviewed and negative except as noted above.         Assessment & Plan:  Hospital Problems as of 2021 Date Reviewed: 2021        Codes Class Noted - Resolved POA    Suspected sleep apnea ICD-10-CM: R29.818  ICD-9-CM: 781.99  2021 - Present Unknown        * (Principal) S/P BKA (below knee amputation) unilateral, right [facility]) ICD-10-CM: Z89.511  ICD-9-CM: V49.75  8/18/2021 - Present Unknown        Respiratory failure, post-operative ([facility] ICD-10-CM: F35.653  ICD-9-CM: 518.51  8/18/2021 - Present Unknown        Acute on chronic respiratory failure with hypoxia and hypercapnia (HCC) ICD-10-CM: J96.21, J96.22  ICD-9-CM: 518.84, 786.09, 799.02  7/23/2021 - Present Yes        Systolic CHF, acute on chronic [facility]) ICD-10-CM: I50.23  ICD-9-CM: 428.23, 428.0  7/19/2021 - Present Yes        Atrial fibrillation with RVR (HCC) ICD-10-CM: I48.91  ICD-9-CM: 427.31  7/19/2021 - Present Yes        Morbid obesity with BMI of 40.0-44.9, adult [facility]) ICD-10-CM: E66.01, Z68.41  ICD-9-CM: 278.01, V85.41  6/11/2020 - Present Yes        Mixed hyperlipidemia (Chronic) ICD-10-CM: E14.4  ICD-9-CM: 272.2  4/10/2018 - Present Yes        Controlled type 2 diabetes mellitus with diabetic polyneuropathy, without long-term current use of insulin (HCC) (Chronic) ICD-10-CM: E11.42  ICD-9-CM: 250.60, 357.2  1/5/2018 - Present Yes        Stage 3 chronic kidney disease ([facility] (Chronic) ICD-10-CM: N18.30  ICD-9-CM: 585.3  11/22/2017 - Present Yes        CAD (coronary artery disease) (Chronic) ICD-10-CM: I25.10  ICD-9-CM: 414.00  Unknown - Present Yes               Hypertension: BP of 86/56  8/22: Likely secondary to diuresis  Ordered for 1 dose of IV Solu-Cortef 100 mg along with one-time dose of Florinef 0.1 mg.  Holding Toprol-XL    Acute on chronic respiratory failure with hypoxia/hypercapnia requiring intubation:  8/22: Status post extubation on 8/19  Patient weaned off of AirVo to nasal cannula, 2 L currently which is at his baseline. 3 L at bedtime. Continue Lasix 40 mg p.o. daily  Net fluid balance - 10 L  Chest x-ray on 8/21 showed bibasilar opacities likely atelectasis, patient encouraged to use incentive spirometry.     Hypokalemia-  8/22: 3.3      Status post right below-knee amputation:  8/22: POD #4  Dilaudid 0.5 mg every 4 hours as needed and oxycodone 5 mg p.o. every 4 hours for moderate pain. MiraLAX to prevent with opioid-induced constipation. Inpatient rehab recommended by PT and OT. A. Fib:  8/22: Heart rate suboptimally controlled. Holding Toprol-XL in view of borderline BP  Continue IV Lopressor 2.5 mg every 6 hours as needed for heart rate of greater than 115  Eliquis 5 mg p.o. twice daily      CHF- pt had echo performed on 7/21/2021 demontrating ejection fraction of 30 to 35%. 8/22: BNP 1077  Need to optimize BP. BP is borderline, holding Toprol-XL for now. Patient is euvolemic. Net fluid balance -10 L      Diabetes-patient noted to have a hemoglobin A1c of 6.7  8/22: Blood glucose is optimally controlled. Monitor POC glucose QA CHS. Continue Humalog sliding scale q. ACH S per        Morbid obesity-  Noted    Disposition: Patient is on air Vo, attempting to be weaned off to nasal cannula (baseline 2 L at daytime and 3 L at nighttime). Disposition: Defer to primary team.            Objective:     Patient Vitals for the past 24 hrs:   Temp Pulse Resp BP SpO2   08/22/21 0715 97.9 °F (36.6 °C) 85 16 (!) 86/56 90 %   08/22/21 0406    104/60    08/22/21 0337 98.1 °F (36.7 °C) 95 16 (!) 87/57 96 %   08/21/21 2308 98.4 °F (36.9 °C) 90 20 (!) 107/51 95 %   08/21/21 2110 98.7 °F (37.1 °C) 84 22 (!) 99/53 94 %   08/21/21 1556 98.6 °F (37 °C) (!) 109 16 (!) 91/57 96 %   08/21/21 1139 98.3 °F (36.8 °C) 90 18 (!) 92/58 95 %     Oxygen Therapy  O2 Sat (%): 90 % (08/22/21 0715)  Pulse via Oximetry: 98 beats per minute (08/20/21 1130)  O2 Device: Nasal cannula (08/21/21 0010)  Skin Assessment: Clean, dry, & intact (08/21/21 0010)  Skin Protection for O2 Device: No (08/20/21 0331)  O2 Flow Rate (L/min): 3 l/min (08/21/21 0010)  FIO2 (%): 45 % (08/20/21 0836)    Estimated body mass index is 41.25 kg/m² as calculated from the following:    Height as of this encounter: 5' 10\" (1.778 m).     Weight as of this encounter: 130.4 kg (287 lb 8 oz).    Intake/Output Summary (Last 24 hours) at 8/22/2021 0824  Last data filed at 8/22/2021 0406  Gross per 24 hour   Intake 540 ml   Output 2300 ml   Net -1760 ml         Physical Exam:    General:    alert, awake, BMI 42, currently on 2 L via NC  HEENT:           Head NCAT, PERRLA positive  CV:   RRR. No m/r/g. No JVD. No edema  Lungs:   Clear to auscultation bilaterally  Abdomen: Bowel sounds present. Soft, nontender, nondistended. Extremities: Warm and dry. No cyanosis or clubbing. Right BKA with surgical dressing on  Skin:     No rashes. Normal coloration. Normal turgor. Neuro:  GCS 15, cranial nerves II 12 grossly intact  Psych:  AOx3, mood and affect appropriate. I have reviewed ordered lab tests and independently visualized imaging below:    Last 24hr Labs:  Recent Results (from the past 24 hour(s))   GLUCOSE, POC    Collection Time: 08/21/21 11:11 AM   Result Value Ref Range    Glucose (POC) 103 (H) 65 - 100 mg/dL    Performed by [employer]    PLEASE READ & DOCUMENT PPD TEST IN 24 HRS    Collection Time: 08/21/21 12:58 PM   Result Value Ref Range    PPD Negative Negative    mm Induration 0 0 - 5 mm   CBC WITH AUTOMATED DIFF    Collection Time: 08/21/21  2:10 PM   Result Value Ref Range    WBC 6.3 4.3 - 11.1 K/uL    RBC 3.03 (L) 4.23 - 5.6 M/uL    HGB 8.9 (L) 13.6 - 17.2 g/dL    HCT 29.2 (L) 41.1 - 50.3 %    MCV 96.4 79.6 - 97.8 FL    MCH 29.4 26.1 - 32.9 PG    MCHC 30.5 (L) 31.4 - 35.0 g/dL    RDW 16.0 (H) 11.9 - 14.6 %    PLATELET 121 (L) 572 - 450 K/uL    MPV 12.8 (H) 9.4 - 12.3 FL    ABSOLUTE NRBC 0.00 0.0 - 0.2 K/uL    DF AUTOMATED      NEUTROPHILS 55 43 - 78 %    LYMPHOCYTES 27 13 - 44 %    MONOCYTES 14 (H) 4.0 - 12.0 %    EOSINOPHILS 4 0.5 - 7.8 %    BASOPHILS 1 0.0 - 2.0 %    IMMATURE GRANULOCYTES 0 0.0 - 5.0 %    ABS. NEUTROPHILS 3.5 1.7 - 8.2 K/UL    ABS. LYMPHOCYTES 1.7 0.5 - 4.6 K/UL    ABS. MONOCYTES 0.9 0.1 - 1.3 K/UL    ABS. EOSINOPHILS 0.2 0.0 - 0.8 K/UL    ABS. BASOPHILS 0.0 0.0 - 0.2 K/UL    ABS. IMM. GRANS. 0.0 0.0 - 0.5 K/UL   METABOLIC PANEL, BASIC    Collection Time: 08/21/21  2:10 PM   Result Value Ref Range    Sodium 141 138 - 145 mmol/L    Potassium 3.3 (L) 3.5 - 5.1 mmol/L    Chloride 100 98 - 107 mmol/L    CO2 39 (H) 21 - 32 mmol/L    Anion gap 2 (L) 7 - 16 mmol/L    Glucose 126 (H) 65 - 100 mg/dL    BUN 33 (H) 8 - 23 MG/DL    Creatinine 1.99 (H) 0.8 - 1.5 MG/DL    GFR est AA 42 (L) >60 ml/min/1.73m2    GFR est non-AA 35 (L) >60 ml/min/1.73m2    Calcium 8.7 8.3 - 10.4 MG/DL   GLUCOSE, POC    Collection Time: 08/21/21  3:36 PM   Result Value Ref Range    Glucose (POC) 123 (H) 65 - 100 mg/dL    Performed by [employer]    GLUCOSE, POC    Collection Time: 08/21/21  9:30 PM   Result Value Ref Range    Glucose (POC) 127 (H) 65 - 100 mg/dL    Performed by [employer]    CBC WITH AUTOMATED DIFF    Collection Time: 08/22/21  4:29 AM   Result Value Ref Range    WBC 5.7 4.3 - 11.1 K/uL    RBC 3.02 (L) 4.23 - 5.6 M/uL    HGB 8.8 (L) 13.6 - 17.2 g/dL    HCT 29.2 (L) 41.1 - 50.3 %    MCV 96.7 79.6 - 97.8 FL    MCH 29.1 26.1 - 32.9 PG    MCHC 30.1 (L) 31.4 - 35.0 g/dL    RDW 16.1 (H) 11.9 - 14.6 %    PLATELET 812 800 - 924 K/uL    MPV 11.3 9.4 - 12.3 FL    ABSOLUTE NRBC 0.02 0.0 - 0.2 K/uL    DF AUTOMATED      NEUTROPHILS 51 43 - 78 %    LYMPHOCYTES 29 13 - 44 %    MONOCYTES 16 (H) 4.0 - 12.0 %    EOSINOPHILS 4 0.5 - 7.8 %    BASOPHILS 1 0.0 - 2.0 %    IMMATURE GRANULOCYTES 0 0.0 - 5.0 %    ABS. NEUTROPHILS 2.9 1.7 - 8.2 K/UL    ABS. LYMPHOCYTES 1.6 0.5 - 4.6 K/UL    ABS. MONOCYTES 0.9 0.1 - 1.3 K/UL    ABS. EOSINOPHILS 0.2 0.0 - 0.8 K/UL    ABS. BASOPHILS 0.0 0.0 - 0.2 K/UL    ABS. IMM.  GRANS. 0.0 0.0 - 0.5 K/UL   METABOLIC PANEL, BASIC    Collection Time: 08/22/21  4:29 AM   Result Value Ref Range    Sodium 143 136 - 145 mmol/L    Potassium 3.3 (L) 3.5 - 5.1 mmol/L    Chloride 100 98 - 107 mmol/L    CO2 39 (H) 21 - 32 mmol/L    Anion gap 4 (L) 7 - 16 mmol/L Glucose 95 65 - 100 mg/dL    BUN 35 (H) 8 - 23 MG/DL    Creatinine 1.84 (H) 0.8 - 1.5 MG/DL    GFR est AA 46 (L) >60 ml/min/1.73m2    GFR est non-AA 38 (L) >60 ml/min/1.73m2    Calcium 9.0 8.3 - 10.4 MG/DL   GLUCOSE, POC    Collection Time: 08/22/21  7:50 AM   Result Value Ref Range    Glucose (POC) 98 65 - 100 mg/dL    Performed by [employer]        All Micro Results     Procedure Component Value Units Date/Time    CULTURE, BLOOD [[id number]] Collected: 08/19/21 1156    Order Status: Completed Specimen: Blood Updated: 08/21/21 1144     Special Requests: --        LEFT  HAND       Culture result: NO GROWTH 2 DAYS       CULTURE, BLOOD [[id number]] Collected: 08/19/21 1227    Order Status: Completed Specimen: Blood Updated: 08/21/21 1144     Special Requests: --        LEFT  HAND       Culture result: NO GROWTH 2 DAYS             Other Studies:  No results found.     Current Meds:  Current Facility-Administered Medications   Medication Dose Route Frequency    midodrine (PROAMATINE) tablet 10 mg  10 mg Oral TID WITH MEALS    hydrocortisone Sod Succ (PF) (SOLU-CORTEF) injection 100 mg  100 mg IntraVENous ONCE    fludrocortisone (FLORINEF) tablet 0.1 mg  0.1 mg Oral DAILY    furosemide (LASIX) tablet 40 mg  40 mg Oral DAILY    pantoprazole (PROTONIX) tablet 40 mg  40 mg Oral ACB    [Held by provider] metoprolol succinate (TOPROL-XL) XL tablet 50 mg  50 mg Oral BID    apixaban (ELIQUIS) tablet 5 mg  5 mg Oral Q12H    metoprolol (LOPRESSOR) injection 2.5 mg  2.5 mg IntraVENous Q6H PRN    oxyCODONE IR (ROXICODONE) tablet 5 mg  5 mg Oral Q4H PRN    acetaminophen (TYLENOL) tablet 650 mg  650 mg Oral Q6H PRN    insulin lispro (HUMALOG) injection 0-10 Units  0-10 Units SubCUTAneous AC&HS    sodium chloride (NS) flush 5-40 mL  5-40 mL IntraVENous Q8H    sodium chloride (NS) flush 5-40 mL  5-40 mL IntraVENous PRN    atorvastatin (LIPITOR) tablet 40 mg  40 mg Oral QHS    nystatin (MYCOSTATIN) 100,000 unit/gram powder   Topical PRN    pregabalin (LYRICA) capsule 200 mg  200 mg Oral BID    traZODone (DESYREL) tablet 50 mg  50 mg Oral QHS    dextrose 40% (GLUTOSE) oral gel 1 Tube  15 g Oral PRN    glucagon (GLUCAGEN) injection 1 mg  1 mg IntraMUSCular PRN    dextrose (D50W) injection syrg 12.5-25 g  25-50 mL IntraVENous PRN    sodium chloride (NS) flush 5-40 mL  5-40 mL IntraVENous Q8H    sodium chloride (NS) flush 5-40 mL  5-40 mL IntraVENous PRN    naloxone (NARCAN) injection 0.4 mg  0.4 mg IntraVENous PRN    HYDROmorphone (DILAUDID) injection 0.5 mg  0.5 mg IntraVENous Q4H PRN       Signed:  Azra Davidson MD    Part of this note may have been written by using a voice dictation software. The note has been proof read but may still contain some grammatical/other typographical errors.

## 2021-08-22 NOTE — PROGRESS NOTES
ACUTE PHYSICAL THERAPY GOALS:  (Developed with and agreed upon by patient and/or caregiver.)  1. Mr. Rhonda Hutchison will perform supine to sit and sit to supine with supervision in 7 days  2. Mr. Rhonda Hutchison will perform sit to stand and bed to chair with min assist using rolling walker in 7 days. 3.  Mr. Rhonda Hutchison will perform therex to right LE x 15 reps AROM in 7 days    PHYSICAL THERAPY: Daily Note and PM Treatment Day # 2    Lisa Wood is a 77 y.o. male   PRIMARY DIAGNOSIS: S/P BKA (below knee amputation) unilateral, right ([address]  Charcot's joint of foot, right [M14.671]  Infected wound [T14. 8XXA, L08.9]  S/P BKA (below knee amputation) unilateral, right (HCC) [Z89.511]  Respiratory failure, post-operative ([address] [J95.821]  Procedure(s) (LRB):  RIGHT LEG AMPUTATION BELOW KNEE (Right)  4 Days Post-Op    ASSESSMENT:     REHAB RECOMMENDATIONS: CURRENT LEVEL OF FUNCTION:  (Most Recently Demonstrated)   Recommendation to date pending progress:  [address] [place] [address]  Equipment:    To Be Determined Bed Mobility:   Moderate Assistance x 2  Sit to Stand:   Maximal Assistance x 2  Transfers:   Unable to perform  Gait/Mobility:   Not tested     ASSESSMENT:  Mr. Rhonda Hutchison is eager for therapy. Saw him with OT today due to complexity of his case. Did not mention yesterday that he seems to have a lot of ataxic movement with all 4 extremities also he leans to the right when laying in the bed and significantly when standing. Seated balance was better today but still not great. He tends to lean back. Max of 2 to stand and very very hard. He leans a lot to the right and his left leg slides, he has no feeling in that foot so even when PT is trying to block it and to not let it slide he seems to move it all around and make positioning difficult. He wants so badly to be getting out of bed and going back to work. may need to try slide board. Stand pivot just not safe. He does have good range in his right knee.   Performed therex to right LE. SUBJECTIVE:   Mr. Chencho Honeycutt states, \"I thought I would do better than I did. \"    SOCIAL HISTORY/ LIVING ENVIRONMENT: see initial     OBJECTIVE:     PAIN: VITAL SIGNS: LINES/DRAINS:   Pre Treatment:  0  Post Treatment: 0     O2 Device: Nasal cannula     MOBILITY: I Mod I S SBA CGA Min Mod Max Total  NT x2 Comments:   Bed Mobility    Rolling [] [] [] [] [] [] [x] [] [] [] [x]    Supine to Sit [] [] [] [] [] [] [x] [] [] [] [x]    Scooting [] [] [] [] [] [] [x] [] [] [] []    Sit to Supine [] [] [] [] [] [] [x] [] [] [] [x]    Transfers    Sit to Stand [] [] [] [] [] [] [] [x] [] [] [x]    Bed to Chair [] [] [] [] [] [] [] [] [] [x] []    Stand to Sit [] [] [] [] [] [] [] [x] [] [] [x]    I=Independent, Mod I=Modified Independent, S=Supervision, SBA=Standby Assistance, CGA=Contact Guard Assistance,   Min=Minimal Assistance, Mod=Moderate Assistance, Max=Maximal Assistance, Total=Total Assistance, NT=Not Tested    BALANCE: Good Fair+ Fair Fair- Poor NT Comments   Sitting Static [] [] [] [x] [] []    Sitting Dynamic [] [] [] [x] [] []              Standing Static [] [] [] [] [x] []    Standing Dynamic [] [] [] [] [x] []      GAIT: I Mod I S SBA CGA Min Mod Max Total  NT x2 Comments:   Level of Assistance [] [] [] [] [] [] [] [] [] [x] []    Distance     DME     Gait Quality     Weightbearing  Status      I=Independent, Mod I=Modified Independent, S=Supervision, SBA=Standby Assistance, CGA=Contact Guard Assistance,   Min=Minimal Assistance, Mod=Moderate Assistance, Max=Maximal Assistance, Total=Total Assistance, NT=Not Tested    PLAN:   FREQUENCY/DURATION: PT Plan of Care: 3 times/week for duration of hospital stay or until stated goals are met, whichever comes first.  TREATMENT:     TREATMENT:   ($$ Therapeutic Activity: 23-37 mins    )  Co-Treatment PT/OT necessary due to patient's decreased overall endurance/tolerance levels, as well as need for high level skilled assistance to complete functional transfers/mobility and functional tasks  Therapeutic Activity (24 Minutes): Therapeutic activity included Rolling, Supine to Sit, Sit to Supine, Scooting, Transfer Training, Sitting balance  and Standing balance to improve functional Mobility.     TREATMENT GRID:  N/A    AFTER TREATMENT POSITION/PRECAUTIONS:  Bed, Needs within reach and RN notified    INTERDISCIPLINARY COLLABORATION:  RN/PCT, PT/PTA and OT/GRAYSON    TOTAL TREATMENT DURATION:  PT Patient Time In/Time Out  Time In: 1306  Time Out: [address], PT

## 2021-08-22 NOTE — PROGRESS NOTES
August 22, 2021         Post Op day: 4 Days Post-Op Procedure(s) (LRB):  RIGHT LEG AMPUTATION BELOW KNEE (Right)      Admit Date: 8/18/2021  Admit Diagnosis: Charcot's joint of foot, right [M14.671]  Infected wound [T14. 8XXA, L08.9]  S/P BKA (below knee amputation) unilateral, right ([facility]) [Z89.511]  Respiratory failure, post-operative ([facility] [C21.245]       Principle Problem: S/P BKA (below knee amputation) unilateral, right ([facility]            Subjective: Doing well, No complaints, No SOB, No Chest Pain, No Nausea or Vomiting     Objective:   Vital Signs are Stable, No Acute Distress, Alert,  Dressing is Dry,  Neurovascular exam is normal.     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    Controlled type 2 diabetes mellitus with diabetic polyneuropathy, without long-term current use of insulin ([facility]) E11.42    Type 2 diabetes mellitus with nephropathy ([facility]) E11.21    Bunion of unspecified foot M21.619    Onychomycosis B35.1    Corns and callus L84    Mixed hyperlipidemia E78.2    Severe obesity (BMI 35.0-35.9 with comorbidity) ([facility] E66.01, Z68.35    Morbid obesity with BMI of 40.0-44.9, adult ([facility]) E66.01, Z68.41    Cellulitis R68.96    Systolic CHF, acute on chronic ([facility]) I50.23    Atrial fibrillation with RVR ([facility]) I48.91    Hypoxia R09.02    Acute kidney injury superimposed on CKD ([facility]) N17.9, N18.9    Staphylococcus aureus bacteremia R78.81, B95.61    Acute respiratory failure with hypercapnia ([facility]) J96.02    Acute on chronic respiratory failure with hypoxia and hypercapnia ([facility]) J96.21, J96.22    Acute metabolic encephalopathy W31.10    S/P BKA (below knee amputation) unilateral, right ([facility]) Z89.511    Respiratory failure, post-operative ([facility] J95.821    Suspected sleep apnea R29.818      Patient Vitals for the past 8 hrs:   BP Temp Pulse Resp SpO2 Weight   21 0939 93/64  98      21 0715 (!) 86/56 97.9 °F (36.6 °C) 85 16 90 %    21 0456      287 lb 8 oz (130.4 kg)   21 0406 104/60        21 0337 (!) 87/57 98.1 °F (36.7 °C) 95 16 96 %     Temp (24hrs), Av.3 °F (36.8 °C), Min:97.9 °F (36.6 °C), Max:98.7 °F (37.1 °C)    Body mass index is 41.25 kg/m².     Lab Results   Component Value Date/Time    HGB 8.8 (L) 2021 04:29 AM          S/P Procedure(s) (LRB):  RIGHT LEG AMPUTATION BELOW KNEE (Right) by Dr. Natalia Polanco     Cardiology mgmt for CHF and AFIB w/ RVR: signed off, will work up as outpatient  Medical Mgmt per hospitalist  Anticoagulation plan: Eliquis 5mg  Continue PT  Fall Precautions  DC disp: rehab placement  F/U: 2 weeks postop for wound check and staple removal        Signed By: LATONYA Connors  2021,  10:17 AM

## 2021-08-22 NOTE — PROGRESS NOTES
ACUTE OT GOALS:  (Developed with and agreed upon by patient and/or caregiver.)  1. Patient will complete upper body bathing and dressing with set up and adaptive equipment as needed. 2. Patient will complete self-grooming tasks in unsupported sitting with set-up and adaptive equipment as needed. 3. Patient will tolerate 25 minutes of OT treatment with 1-2 rest breaks to increase activity tolerance for ADLs. 4. Patient will complete functional transfers with min A x2 and adaptive equipment as needed. 5. Patient will complete functional activity while seated edge of bed with set-up and adaptive equipment as needed. 6. Patient will tolerate 15 minutes unsupported sitting balance with SBA in preparation for ADL performance. 7. Patient will tolerate 15 minutes BUE therapeutic activities to increase use of BUE during ADL performance. Timeframe: 7 visits      OCCUPATIONAL THERAPY ASSESSMENT: Initial Assessment and Daily Note OT Treatment Day # 1    David Pink is a 77 y.o. male   PRIMARY DIAGNOSIS: S/P BKA (below knee amputation) unilateral, right (HCC)  Charcot's joint of foot, right [M14.671]  Infected wound [T14. 8XXA, L08.9]  S/P BKA (below knee amputation) unilateral, right (HCC) [Z89.511]  [facility], [facility] [J95.821]  Procedure(s) (LRB):  RIGHT LEG AMPUTATION BELOW KNEE (Right)  4 Days Post-Op  Reason for Referral:    ICD-10: Treatment Diagnosis: Difficulty in walking, Not elsewhere classified (R26.2)  Other abnormalities of gait and mobility (R26.89)  Other lack of cordination (R27.8)  INPATIENT: Payor: [employer] / Plan: [employer] / Product Type: Managed Care Medicare /   ASSESSMENT:     REHAB RECOMMENDATIONS:   Recommendation to date pending progress:  [address] [place] [address]  Equipment:    None (pt has RW, SC, BSC and SPC at home)     PRIOR LEVEL OF FUNCTION:  (Prior to Hospitalization)    Pt reported recent mod I with ADLs secondary to weeks of limited WB status prior to amputation. INITIAL/CURRENT LEVEL OF FUNCTION:  (Based on today's evaluation)   Bathing:   Independent  Dressing:   Independent  Feeding/Grooming:   Independent  Toileting:   Independent  Functional Mobility:   Independent Bathing:   Maximal Assistance  Dressing:   Moderate Assistance  Feeding/Grooming:   Set Up  Toileting:   Maximal Assistance x 2  Functional Mobility:   Maximal Assistance x 2     ASSESSMENT:  Mr. Jumana Rowe is a 78 y/o male presents with 4 days post op R BKA and has had a complicated hospital stay since sx with intubation. Pt is on 4L O2 NC today with sats at 90%. Today pt presents with decreased activity tolerance, balance, and strength impacting ADLs. Pt completed functional transfers in prep for ADLs and UE dressing EOB today. Pt requries mod A for sitting balance EOB with posterior lean noted. Pt attempted to stand x2 trials today and was max-total A x2 with heavy R lateral lean. Pt is currently functioning below baseline and would benefit from skilled OT services to address deficits and goals. SUBJECTIVE:   Mr. Jumana Rowe states, \"I was as strong as an ox before this. \"    SOCIAL HISTORY/LIVING ENVIRONMENT: lives with wife, one level home, tub shower w/ SC, has all DME needed at home, was working at [employer] in [place] and would like to get back to work       OBJECTIVE:     PAIN: VITAL SIGNS: LINES/DRAINS:   Pre Treatment: Pain Screen  Pain Scale 1: Numeric (0 - 10)  Pain Intensity 1: 0  Post Treatment: 0 Vital Signs  O2 Sat (%): 90 %  O2 Device: Nasal cannula  O2 Flow Rate (L/min): 4 l/min Wound Vac  O2 Device: Nasal cannula     GROSS EVALUATION:  BUE Within Functional Limits Abnormal/ Functional Abnormal/ Non-Functional (see comments) Not Tested Comments:   AROM [x] [] [] []    PROM [] [] [] [x]    Strength [x] [] [] []    Balance [] [] [x] [] Mod A sitting balance EOB   Posture [] [x] [] [] Rounded shoulders   Sensation [x] [] [] [] Coordination [x] [] [] []    Tone [] [] [] [x]    Edema [] [] [] [x]    Activity Tolerance [] [x] [] [] 4L O2 NC    [] [] [] []      COGNITION/  PERCEPTION: Intact Impaired   (see comments) Comments:   Orientation [x] []    Vision [x] []    Hearing [x] []    Judgment/ Insight [] [x] Slight decrease insight into deficits/level of assist needed to stand   Attention [x] []    Memory [x] []    Command Following [x] []    Emotional Regulation [x] []     [] []      ACTIVITIES OF DAILY LIVING: I Mod I S SBA CGA Min Mod Max Total NT Comments   BASIC ADLs:              Bathing/ Showering [] [] [] [] [] [] [] [] [] [x]    Toileting [] [] [] [] [] [] [] [] [] [x]    Dressing [] [] [] [] [] [x] [] [] [] [] Donning/doffing gown EOB   Feeding [] [] [] [] [] [] [] [] [] [x]    Grooming [] [] [] [] [] [] [] [] [] [x]    Personal Device Care [x] [] [] [] [] [] [] [] [] [] Phone use   Functional Mobility [] [] [] [] [] [] [] [x] [x] [] x2    I=Independent, Mod I=Modified Independent, S=Supervision, SBA=Standby Assistance, CGA=Contact Guard Assistance,   Min=Minimal Assistance, Mod=Moderate Assistance, Max=Maximal Assistance, Total=Total Assistance, NT=Not Tested    MOBILITY: I Mod I S SBA CGA Min Mod Max Total  NT x2 Comments:   Supine to sit [] [] [] [] [] [] [] [x] [] [] [x]    Sit to supine [] [] [] [] [] [] [] [x] [] [] [x]    Sit to stand [] [] [] [] [] [] [] [x] [x] [] [x] Blocking L knee and supporting R side, R lateral lean   Bed to chair [] [] [] [] [] [] [] [] [] [x] []    I=Independent, Mod I=Modified Independent, S=Supervision, SBA=Standby Assistance, CGA=Contact Guard Assistance,   Min=Minimal Assistance, Mod=Moderate Assistance, Max=Maximal Assistance, Total=Total Assistance, NT=Not Tested    [address] [facility] [address] [address] AM-PAC 6 Clicks   Daily Activity Inpatient Short Form        How much help from another person does the patient currently need. .. Total A Lot A Little None   1.   Putting on and taking off regular lower body clothing? [] 1   [x] 2   [] 3   [] 4   2. Bathing (including washing, rinsing, drying)? [] 1   [x] 2   [] 3   [] 4   3. Toileting, which includes using toilet, bedpan or urinal?   [] 1   [x] 2   [] 3   [] 4   4. Putting on and taking off regular upper body clothing? [] 1   [] 2   [x] 3   [] 4   5. Taking care of personal grooming such as brushing teeth? [] 1   [] 2   [x] 3   [] 4   6. Eating meals? [] 1   [] 2   [] 3   [x] 4   © 2007, Trustees of [facility] [employer], under license to [employer]. All rights reserved     Score:  Initial: 16 Most Recent: X (Date: -- )   Interpretation of Tool:  Represents activities that are increasingly more difficult (i.e. Bed mobility, Transfers, Gait). PLAN:   FREQUENCY/DURATION: OT Plan of Care: 3 times/week for duration of hospital stay or until stated goals are met, whichever comes first.    PROBLEM LIST:   (Skilled intervention is medically necessary to address:)  1. Decreased ADL/Functional Activities  2. Decreased Activity Tolerance  3. Decreased Balance  4. Decreased Coordination  5. Decreased Strength  6. Decreased Transfer Abilities   INTERVENTIONS PLANNED:   (Benefits and precautions of occupational therapy have been discussed with the patient.)  1. Self Care Training  2. Therapeutic Activity  3. Therapeutic Exercise/HEP  4. Neuromuscular Re-education  5. Education     TREATMENT:     EVALUATION: Moderate Complexity : (Untimed Charge)    TREATMENT:   ($$ Self Care/Home Management: 8-22 mins    )  Co-Treatment PT/OT necessary due to patient's decreased overall endurance/tolerance levels, as well as need for high level skilled assistance to complete functional transfers/mobility and functional tasks  Self Care (12 Minutes): Self care including Upper Body Dressing and functional transfers in prep for ADLs to increase independence and decrease level of assistance required.     TREATMENT GRID:  N/A    AFTER TREATMENT POSITION/PRECAUTIONS:  Bed, Needs within reach and RN notified    INTERDISCIPLINARY COLLABORATION:  RN/PCT, PT/PTA and OT/GRAYSON    TOTAL TREATMENT DURATION:  OT Patient Time In/Time Out  Time In: 1306  Time Out: [address], OT

## 2021-08-23 LAB
ANION GAP SERPL CALC-SCNC: ABNORMAL MMOL/L (ref 7–16)
BASOPHILS # BLD: 0 K/UL (ref 0–0.2)
BASOPHILS NFR BLD: 0 % (ref 0–2)
BUN SERPL-MCNC: 40 MG/DL (ref 8–23)
CALCIUM SERPL-MCNC: 9.1 MG/DL (ref 8.3–10.4)
CHLORIDE SERPL-SCNC: 103 MMOL/L (ref 98–107)
CO2 SERPL-SCNC: 38 MMOL/L (ref 21–32)
CREAT SERPL-MCNC: 1.97 MG/DL (ref 0.8–1.5)
DIFFERENTIAL METHOD BLD: ABNORMAL
EOSINOPHIL # BLD: 0.3 K/UL (ref 0–0.8)
EOSINOPHIL NFR BLD: 4 % (ref 0.5–7.8)
ERYTHROCYTE [DISTWIDTH] IN BLOOD BY AUTOMATED COUNT: 15.8 % (ref 11.9–14.6)
GLUCOSE BLD STRIP.AUTO-MCNC: 114 MG/DL (ref 65–100)
GLUCOSE BLD STRIP.AUTO-MCNC: 115 MG/DL (ref 65–100)
GLUCOSE BLD STRIP.AUTO-MCNC: 144 MG/DL (ref 65–100)
GLUCOSE SERPL-MCNC: 104 MG/DL (ref 65–100)
HCT VFR BLD AUTO: 30.6 % (ref 41.1–50.3)
HGB BLD-MCNC: 9.2 G/DL (ref 13.6–17.2)
IMM GRANULOCYTES # BLD AUTO: 0 K/UL (ref 0–0.5)
IMM GRANULOCYTES NFR BLD AUTO: 1 % (ref 0–5)
LYMPHOCYTES # BLD: 2.7 K/UL (ref 0.5–4.6)
LYMPHOCYTES NFR BLD: 37 % (ref 13–44)
MCH RBC QN AUTO: 28.5 PG (ref 26.1–32.9)
MCHC RBC AUTO-ENTMCNC: 30.1 G/DL (ref 31.4–35)
MCV RBC AUTO: 94.7 FL (ref 79.6–97.8)
MONOCYTES # BLD: 0.9 K/UL (ref 0.1–1.3)
MONOCYTES NFR BLD: 13 % (ref 4–12)
NEUTS SEG # BLD: 3.3 K/UL (ref 1.7–8.2)
NEUTS SEG NFR BLD: 45 % (ref 43–78)
NRBC # BLD: 0 K/UL (ref 0–0.2)
PLATELET # BLD AUTO: 198 K/UL (ref 150–450)
PMV BLD AUTO: 11.3 FL (ref 9.4–12.3)
POTASSIUM SERPL-SCNC: 3.7 MMOL/L (ref 3.5–5.1)
RBC # BLD AUTO: 3.23 M/UL (ref 4.23–5.6)
SERVICE CMNT-IMP: ABNORMAL
SODIUM SERPL-SCNC: 140 MMOL/L (ref 136–145)
WBC # BLD AUTO: 7.2 K/UL (ref 4.3–11.1)

## 2021-08-23 PROCEDURE — 97112 NEUROMUSCULAR REEDUCATION: CPT

## 2021-08-23 PROCEDURE — 85025 COMPLETE CBC W/AUTO DIFF WBC: CPT

## 2021-08-23 PROCEDURE — 99222 1ST HOSP IP/OBS MODERATE 55: CPT | Performed by: PHYSICAL MEDICINE & REHABILITATION

## 2021-08-23 PROCEDURE — 74011250637 HC RX REV CODE- 250/637: Performed by: NURSE PRACTITIONER

## 2021-08-23 PROCEDURE — 97605 NEG PRS WND THER DME<=50SQCM: CPT

## 2021-08-23 PROCEDURE — 82962 GLUCOSE BLOOD TEST: CPT

## 2021-08-23 PROCEDURE — 80048 BASIC METABOLIC PNL TOTAL CA: CPT

## 2021-08-23 PROCEDURE — 74011250637 HC RX REV CODE- 250/637: Performed by: INTERNAL MEDICINE

## 2021-08-23 PROCEDURE — 51798 US URINE CAPACITY MEASURE: CPT

## 2021-08-23 PROCEDURE — 77010033678 HC OXYGEN DAILY

## 2021-08-23 PROCEDURE — 65660000000 HC RM CCU STEPDOWN

## 2021-08-23 PROCEDURE — 2W1QX6Z COMPRESSION OF RIGHT LOWER LEG USING PRESSURE DRESSING: ICD-10-PCS | Performed by: ORTHOPAEDIC SURGERY

## 2021-08-23 PROCEDURE — 74011250637 HC RX REV CODE- 250/637: Performed by: HOSPITALIST

## 2021-08-23 PROCEDURE — 74011250636 HC RX REV CODE- 250/636: Performed by: HOSPITALIST

## 2021-08-23 PROCEDURE — 94760 N-INVAS EAR/PLS OXIMETRY 1: CPT

## 2021-08-23 RX ORDER — MIDODRINE HYDROCHLORIDE 5 MG/1
5 TABLET ORAL
Status: DISCONTINUED | OUTPATIENT
Start: 2021-08-23 | End: 2021-08-24

## 2021-08-23 RX ORDER — POLYETHYLENE GLYCOL 3350 17 G/17G
17 POWDER, FOR SOLUTION ORAL DAILY
Status: DISCONTINUED | OUTPATIENT
Start: 2021-08-23 | End: 2021-08-25 | Stop reason: HOSPADM

## 2021-08-23 RX ORDER — DOCUSATE SODIUM 100 MG/1
100 CAPSULE, LIQUID FILLED ORAL 2 TIMES DAILY
Status: DISCONTINUED | OUTPATIENT
Start: 2021-08-23 | End: 2021-08-25 | Stop reason: HOSPADM

## 2021-08-23 RX ADMIN — POTASSIUM CHLORIDE 40 MEQ: 20 TABLET, EXTENDED RELEASE ORAL at 09:42

## 2021-08-23 RX ADMIN — Medication 10 ML: at 15:34

## 2021-08-23 RX ADMIN — APIXABAN 5 MG: 5 TABLET, FILM COATED ORAL at 22:52

## 2021-08-23 RX ADMIN — MIDODRINE HYDROCHLORIDE 5 MG: 5 TABLET ORAL at 17:19

## 2021-08-23 RX ADMIN — MIDODRINE HYDROCHLORIDE 10 MG: 5 TABLET ORAL at 09:42

## 2021-08-23 RX ADMIN — PREGABALIN 200 MG: 150 CAPSULE ORAL at 17:19

## 2021-08-23 RX ADMIN — APIXABAN 5 MG: 5 TABLET, FILM COATED ORAL at 09:42

## 2021-08-23 RX ADMIN — PANTOPRAZOLE SODIUM 40 MG: 40 TABLET, DELAYED RELEASE ORAL at 04:49

## 2021-08-23 RX ADMIN — Medication 10 ML: at 07:37

## 2021-08-23 RX ADMIN — FLUDROCORTISONE ACETATE 0.1 MG: 0.1 TABLET ORAL at 09:42

## 2021-08-23 RX ADMIN — MIDODRINE HYDROCHLORIDE 10 MG: 5 TABLET ORAL at 12:31

## 2021-08-23 RX ADMIN — POLYETHYLENE GLYCOL 3350 17 G: 17 POWDER, FOR SOLUTION ORAL at 15:34

## 2021-08-23 RX ADMIN — DOCUSATE SODIUM 100 MG: 100 CAPSULE, LIQUID FILLED ORAL at 17:19

## 2021-08-23 RX ADMIN — ATORVASTATIN CALCIUM 40 MG: 40 TABLET, FILM COATED ORAL at 22:52

## 2021-08-23 RX ADMIN — SODIUM CHLORIDE, SODIUM LACTATE, POTASSIUM CHLORIDE, AND CALCIUM CHLORIDE 250 ML: 600; 310; 30; 20 INJECTION, SOLUTION INTRAVENOUS at 09:44

## 2021-08-23 RX ADMIN — TRAZODONE HYDROCHLORIDE 50 MG: 50 TABLET ORAL at 22:52

## 2021-08-23 RX ADMIN — POTASSIUM CHLORIDE 40 MEQ: 20 TABLET, EXTENDED RELEASE ORAL at 17:19

## 2021-08-23 RX ADMIN — PREGABALIN 200 MG: 150 CAPSULE ORAL at 09:42

## 2021-08-23 NOTE — PROGRESS NOTES
ACUTE OT GOALS:  (Developed with and agreed upon by patient and/or caregiver.)  1. Patient will complete upper body bathing and dressing with set up and adaptive equipment as needed. 2. Patient will complete self-grooming tasks in unsupported sitting with set-up and adaptive equipment as needed. 3. Patient will tolerate 25 minutes of OT treatment with 1-2 rest breaks to increase activity tolerance for ADLs. 4. Patient will complete functional transfers with min A x2 and adaptive equipment as needed. 5. Patient will complete functional activity while seated edge of bed with set-up and adaptive equipment as needed. 6. Patient will tolerate 15 minutes unsupported sitting balance with SBA in preparation for ADL performance. 7. Patient will tolerate 15 minutes BUE therapeutic activities to increase use of BUE during ADL performance.      Timeframe: 7 visits    OCCUPATIONAL THERAPY: Daily Note OT Treatment Day # 2    Ronnie Darrall Lefort is a 77 y.o. male   PRIMARY DIAGNOSIS: S/P BKA (below knee amputation) unilateral, right (HCC)  Charcot's joint of foot, right [M14.671]  Infected wound [T14. 8XXA, L08.9]  S/P BKA (below knee amputation) unilateral, right (HCC) [Z89.511]  Respiratory failure, [facility]-operative [facility] [J95.821]  Procedure(s) (LRB):  RIGHT LEG AMPUTATION BELOW KNEE (Right)  5 Days Post-Op   Reason for Referral:    ICD-10: Treatment Diagnosis: Difficulty in walking, Not elsewhere classified (R26.2)  Other abnormalities of gait and mobility (R26.89)  Other lack of cordination (R27.8)  Payor: [employer] / Plan: Magdiel Castaneda / Product Type: Managed Care Medicare /   ASSESSMENT:     REHAB RECOMMENDATIONS: CURRENT LEVEL OF FUNCTION:  (Most Recently Demonstrated)   Recommendation to date pending progress:  [address] [place] [address]  Equipment:    To Be Determined Bathing:   Moderate Assistance  Dressing:   Not tested  Feeding/Grooming:   Minimal Assistance  Toileting:   Maximal Assistance  Functional Mobility:   Moderate Assistance to MaxA     ASSESSMENT:  Mr. Rosalind Farrar presents s/p R BKA with wound vac placement. At baseline pt lives with wife, is fully independent with I/ADLs, ambulation, driving, and working at [employer]. Pt with new R BKA, decreased balance and endurance impacting mobility and ADLs. Pt practiced bed mobility with Min-ModA, multimodal cueing for technique. Pt with decreased sitting balance, posterior and R lean. Practiced midline positioning/balance, weight shifting, weightbearing through each elbow. Practiced forward and lateral cueing with ModA/cueing for sitting balance and trunk control. Practiced sit > stand x3 trials with bed elevated, heavy ModA/RW, cues to move trunk forward and L (pt with heavy R lean), assist extending LLE. Once pt upright he is able to stand for 10-20s trials, cues for posture and BUE support on RW. Grooming in sitting with Min-ModA for sitting balance. Pt with great effort and good progress towards goals. SUBJECTIVE:   Mr. Rosalind Farrar states, \"Everybody on 6th floor knows me. \"    SOCIAL HISTORY/LIVING ENVIRONMENT: lives with wife, one level home, tub shower w/ SC, has all DME needed at home, was working at [employer] in [place] and would like to get back to work       OBJECTIVE:     PAIN: VITAL SIGNS: LINES/DRAINS:   Pre Treatment: Pain Screen  Pain Scale 1: Numeric (0 - 10)  Pain Intensity 1: 0  Post Treatment: same Vital Signs  O2 Device: Hi flow nasal cannula  O2 Flow Rate (L/min): 3 l/min Wound Vac  O2 Device: Hi flow nasal cannula     ACTIVITIES OF DAILY LIVING: I Mod I S SBA CGA Min Mod Max Total NT Comments   BASIC ADLs:              Bathing/ Showering [] [] [] [] [] [] [] [] [] [x]    Toileting [] [] [] [] [] [] [] [] [x] [] Wash bottom standing   Dressing [] [] [] [] [] [] [] [] [] [x]    Feeding [] [] [] [] [] [] [] [] [] [x]    Grooming [] [] [] [] [] [x] [] [] [] [] Shave in sitting, poor sitting balance with ADLs requiring Max-total assist    Personal Device Care [] [] [] [] [] [] [] [] [] [x]    Functional Mobility [] [] [] [] [] [] [x] [x] [] []    I=Independent, Mod I=Modified Independent, S=Supervision, SBA=Standby Assistance, CGA=Contact Guard Assistance,   Min=Minimal Assistance, Mod=Moderate Assistance, Max=Maximal Assistance, Total=Total Assistance, NT=Not Tested    MOBILITY: I Mod I S SBA CGA Min Mod Max Total  NT x2 Comments:   Supine to sit [] [] [] [] [] [x] [x] [] [] [] []    Sit to supine [] [] [] [] [] [] [] [] [] [] []    Sit to stand [] [] [] [] [] [] [x] [] [] [] [] RW   Bed to chair [] [] [] [] [] [] [] [] [] [x] []    I=Independent, Mod I=Modified Independent, S=Supervision, SBA=Standby Assistance, CGA=Contact Guard Assistance,   Min=Minimal Assistance, Mod=Moderate Assistance, Max=Maximal Assistance, Total=Total Assistance, NT=Not Tested    BALANCE: Good Fair+ Fair Fair- Poor NT Comments   Sitting Static [] [] [x] [x] [] []    Sitting Dynamic [] [] [] [x] [] []              Standing Static [] [] [] [x] [] []    Standing Dynamic [] [] [] [] [] [x]      PLAN:   FREQUENCY/DURATION: OT Plan of Care: 3 times/week for duration of hospital stay or until stated goals are met, whichever comes first.    TREATMENT:   TREATMENT:   ( $$ Neuromuscular Re-Education: 53-67 mins   )  Neuromuscular Re-education (53 Minutes): Neuromuscular Re-education included Balance Training, Coordination training, Functional mobility with facilitation, Postural training, Sitting balance training and Standing balance training to improve Balance, Coordination, Functional Mobility, Postural Control and Proprioception.     TREATMENT GRID:  N/A    AFTER TREATMENT POSITION/PRECAUTIONS:  Bed and Needs within reach    INTERDISCIPLINARY COLLABORATION:  RN/PCT and OT/GRAYSON    TOTAL TREATMENT DURATION:  OT Patient Time In/Time Out  Time In: 9279  Time Out: 250 Marium Alvarez, OTR/L

## 2021-08-23 NOTE — PROGRESS NOTES
Problem: Falls - Risk of  Goal: *Absence of Falls  Description: Document Kriss Pillow Fall Risk and appropriate interventions in the flowsheet. Outcome: Progressing Towards Goal  Note: Fall Risk Interventions:       Mentation Interventions: Adequate sleep, hydration, pain control, Bed/chair exit alarm    Medication Interventions: Bed/chair exit alarm, Patient to call before getting OOB, Teach patient to arise slowly    Elimination Interventions: Bed/chair exit alarm, Call light in reach              Problem: Patient Education: Go to Patient Education Activity  Goal: Patient/Family Education  Outcome: Progressing Towards Goal     Problem: Pressure Injury - Risk of  Goal: *Prevention of pressure injury  Description: Document Julio Cesar Scale and appropriate interventions in the flowsheet.   Outcome: Progressing Towards Goal  Note: Pressure Injury Interventions:  Sensory Interventions: Assess changes in LOC, Discuss PT/OT consult with provider, Float heels, Minimize linen layers    Moisture Interventions: Absorbent underpads    Activity Interventions: Chair cushion, PT/OT evaluation    Mobility Interventions: Chair cushion, Float heels, HOB 30 degrees or less, PT/OT evaluation    Nutrition Interventions: Document food/fluid/supplement intake    Friction and Shear Interventions: Apply protective barrier, creams and emollients                Problem: Patient Education: Go to Patient Education Activity  Goal: Patient/Family Education  Outcome: Progressing Towards Goal     Problem: Ventilator Management  Goal: *Adequate oxygenation and ventilation  Outcome: Progressing Towards Goal  Goal: *Patient maintains clear airway/free of aspiration  Outcome: Progressing Towards Goal  Goal: *Absence of infection signs and symptoms  Outcome: Progressing Towards Goal  Goal: *Normal spontaneous ventilation  Outcome: Progressing Towards Goal     Problem: Patient Education: Go to Patient Education Activity  Goal: Patient/Family Education  Outcome: Progressing Towards Goal     Problem: Delirium Treatment  Goal: *Level of consciousness restored to baseline  Outcome: Progressing Towards Goal  Goal: *Level of environmental perceptions restored to baseline  Outcome: Progressing Towards Goal  Goal: *Sensory perception restored to baseline  Outcome: Progressing Towards Goal  Goal: *Emotional stability restored to baseline  Outcome: Progressing Towards Goal  Goal: *Functional assessment restored to baseline  Outcome: Progressing Towards Goal  Goal: *Absence of falls  Outcome: Progressing Towards Goal  Goal: *Will remain free of delirium, CAM Score negative  Outcome: Progressing Towards Goal  Goal: *Cognitive status will be restored to baseline  Outcome: Progressing Towards Goal  Goal: Interventions  Outcome: Progressing Towards Goal     Problem: Patient Education: Go to Patient Education Activity  Goal: Patient/Family Education  Outcome: Progressing Towards Goal     Problem: Patient Education: Go to Patient Education Activity  Goal: Patient/Family Education  Outcome: Progressing Towards Goal

## 2021-08-23 NOTE — PROGRESS NOTES
Hospitalist Progress Note   Admit Date:  2021  7:31 AM   Name:  Marcus Orona   Age:  77 y.o. Sex:  male  :  1945   MRN:  [id number]     Presenting Complaint: No chief complaint on file. Reason(s) for Admission: Charcot's joint of foot, right [M14.671]  Infected wound [T14. 8XXA, L08.9]  S/P BKA (below knee amputation) unilateral, right ([address] [Z89.511]  Respiratory failure, post-operative [facility] Course & Interval History:   Mr. Rosalind Farrar is a 77 y.o. CM with history of CAD status post PCI, chronic respiratory failure on 2 L via NC, A. fib (on Eliquis), CKD stage III, DM 2, chronic systolic CHF EF 30 to 32%, s/p right BKA on  seen by hospitalist team on  on request of Dr. Miguel Low for medical management. Patient was hospitalized in July with right foot cellulitis and sepsis secondary to right foot ulcer. Ortho initially recommended amputation, however patient refused. Patient was discharged on long-term IV antibiotics with EOT 9/3/2021. Patient was readmitted due to worsening right lower leg wound and underwent R BKA on . Subjective (21):  No AEO. Mr. Rosalind Farrar denies CP/SOB at rest/N/V/D/chills/sweats. Plan is for him to go to [facility]; Physiatrist has been consulted. F/u a.m. labs, monitor renal function. Assessment & Plan:     Status post right below-knee amputation  Aug/23: POD 5   - Analgesia per Ortho   - Physiatrist consulted    Acute on chronic respiratory failure with hypoxia/hypercapnia requiring intubation  : Status post extubation on   Patient weaned off of AirVo to nasal cannula, 2 L currently which is at his baseline. 3 L at bedtime. Continue Lasix 40 mg p.o. daily  Net fluid balance - 10 L  Chest x-ray on  showed bibasilar opacities likely atelectasis, patient encouraged to use incentive spirometry.   Aug/23: Stable   - Asked nursing to give patient a spirometer     Hypertension  Hypotension  : Likely secondary to diuresis  Ordered for 1 dose of IV Solu-Cortef 100 mg along with one-time dose of Florinef 0.1 mg.  Holding Toprol-XL  Aug/23: Reduce midodrine dosing today based on low LVEF and monitor response   - Look to re-start BB at lower dose as soon as able    HFrEF  - pt had echo performed on 7/21/2021 demontrating ejection fraction of 30 to 35%. 8/22: BNP 1077  Need to optimize BP. BP is borderline, holding Toprol-XL for now. Aug/23:  Look to re-start BB at lower dose as soon as able    CKD Stage III  Aug/23: Trend labs    Atrial fibrillation  8/22: Heart rate suboptimally controlled. Holding Toprol-XL in view of borderline BP  Continue IV Lopressor 2.5 mg every 6 hours as needed for heart rate of greater than 115  Eliquis 5 mg p.o. twice daily     DM Type 2  -patient noted to have a hemoglobin A1c of 6.7  8/22: Blood glucose is optimally controlled  Monitor POC glucose AC/HS  Continue Humalog correction scale    Constipation  Aug/23: Patient states no bowel mvmt since surgery   - Start docusate; Give MiraLAX today    Hypokalemia, resolved      Dispo/Discharge Planning: Inpatient Rehab; hopefully 1-2 midnights    Diet:  ADULT DIET Regular;  Low Sodium (2 gm); 1200 ml  DVT PPx: apixaban  Code status: Full Code    Active Hospital Problems    Diagnosis Date Noted    Suspected sleep apnea 08/20/2021    S/P BKA (below knee amputation) unilateral, right ([facility] 08/18/2021    Respiratory failure, post-operative ([facility] 08/18/2021    Acute on chronic respiratory failure with hypoxia and hypercapnia (HCC) 07/23/2021    Atrial fibrillation with RVR ([facility] 21/20/4058    Systolic CHF, acute on chronic ([facility] 07/19/2021    Morbid obesity with BMI of 40.0-44.9, adult ([facility] 06/11/2020    Mixed hyperlipidemia 04/10/2018    Controlled type 2 diabetes mellitus with diabetic polyneuropathy, without long-term current use of insulin ([facility] 01/05/2018    Stage 3 chronic kidney disease ([facility] 11/22/2017    CAD (coronary artery disease) Objective:     Patient Vitals for the past 24 hrs:   Temp Pulse Resp BP SpO2   08/23/21 1020 97.7 °F (36.5 °C) 92 20 98/68 95 %   08/23/21 0827 97.8 °F (36.6 °C) 100 20 (!) 99/56 95 %   08/23/21 0440 97.8 °F (36.6 °C)  18 101/70 92 %   08/22/21 2324 98.4 °F (36.9 °C) 85  (!) 96/42    08/22/21 1945 98 °F (36.7 °C) 98 16 (!) 92/53    08/22/21 1625 98.2 °F (36.8 °C) 96 16 102/71 96 %   08/22/21 1306     90 %     Oxygen Therapy  O2 Sat (%): 95 % (08/23/21 1020)  Pulse via Oximetry: 98 beats per minute (08/20/21 1130)  O2 Device: Nasal cannula (08/22/21 1306)  Skin Assessment: Clean, dry, & intact (08/21/21 0010)  Skin Protection for O2 Device: No (08/20/21 0331)  O2 Flow Rate (L/min): 4 l/min (08/22/21 1306)  FIO2 (%): 45 % (08/20/21 0836)    Estimated body mass index is 41.25 kg/m² as calculated from the following:    Height as of this encounter: 5' 10\" (1.778 m). Weight as of this encounter: 130.4 kg (287 lb 8 oz). Intake/Output Summary (Last 24 hours) at 8/23/2021 1248  Last data filed at 8/23/2021 0620  Gross per 24 hour   Intake    Output 1000 ml   Net -1000 ml         Physical Exam:   General:    No overt distress  Head:  Normocephalic, atraumatic  Eyes:  Sclerae appear normal.  Pupils equally round. ENT:  Nares appear normal, no drainage. Moist oral mucosa  Neck:  No restricted ROM. Trachea midline   CV:   RRR. No m/r/g. Lungs:   CTAB anteriorly. No wheezing, rhonchi, or rales. Respirations even, unlabored  Abdomen:  Soft, obese, non-tender to palpation, nondistended. Extremities: No cyanosis or clubbing. No edema  Skin:     No rashes and normal coloration. Warm and dry. Neuro:  Cranial nerves II-XII grossly intact. Psych:  Normal mood and affect.   Alert and oriented x3    I have reviewed ordered lab tests and independently visualized imaging below:    Last 24hr Labs:  Recent Results (from the past 24 hour(s))   GLUCOSE, POC    Collection Time: 08/22/21  5:20 PM   Result Value Ref Range    Glucose (POC) 132 (H) 65 - 100 mg/dL    Performed by Manoj    GLUCOSE, POC    Collection Time: 08/22/21 11:20 PM   Result Value Ref Range    Glucose (POC) 140 (H) 65 - 100 mg/dL    Performed by Kelly    CBC WITH AUTOMATED DIFF    Collection Time: 08/23/21  4:53 AM   Result Value Ref Range    WBC 7.2 4.3 - 11.1 K/uL    RBC 3.23 (L) 4.23 - 5.6 M/uL    HGB 9.2 (L) 13.6 - 17.2 g/dL    HCT 30.6 (L) 41.1 - 50.3 %    MCV 94.7 79.6 - 97.8 FL    MCH 28.5 26.1 - 32.9 PG    MCHC 30.1 (L) 31.4 - 35.0 g/dL    RDW 15.8 (H) 11.9 - 14.6 %    PLATELET 453 377 - 592 K/uL    MPV 11.3 9.4 - 12.3 FL    ABSOLUTE NRBC 0.00 0.0 - 0.2 K/uL    DF AUTOMATED      NEUTROPHILS 45 43 - 78 %    LYMPHOCYTES 37 13 - 44 %    MONOCYTES 13 (H) 4.0 - 12.0 %    EOSINOPHILS 4 0.5 - 7.8 %    BASOPHILS 0 0.0 - 2.0 %    IMMATURE GRANULOCYTES 1 0.0 - 5.0 %    ABS. NEUTROPHILS 3.3 1.7 - 8.2 K/UL    ABS. LYMPHOCYTES 2.7 0.5 - 4.6 K/UL    ABS. MONOCYTES 0.9 0.1 - 1.3 K/UL    ABS. EOSINOPHILS 0.3 0.0 - 0.8 K/UL    ABS. BASOPHILS 0.0 0.0 - 0.2 K/UL    ABS. IMM.  GRANS. 0.0 0.0 - 0.5 K/UL   METABOLIC PANEL, BASIC    Collection Time: 08/23/21  4:53 AM   Result Value Ref Range    Sodium 140 136 - 145 mmol/L    Potassium 3.7 3.5 - 5.1 mmol/L    Chloride 103 98 - 107 mmol/L    CO2 38 (H) 21 - 32 mmol/L    Anion gap Cannot be calculated 7 - 16 mmol/L    Glucose 104 (H) 65 - 100 mg/dL    BUN 40 (H) 8 - 23 MG/DL    Creatinine 1.97 (H) 0.8 - 1.5 MG/DL    GFR est AA 43 (L) >60 ml/min/1.73m2    GFR est non-AA 35 (L) >60 ml/min/1.73m2    Calcium 9.1 8.3 - 10.4 MG/DL   GLUCOSE, POC    Collection Time: 08/23/21 12:32 PM   Result Value Ref Range    Glucose (POC) 115 (H) 65 - 100 mg/dL    Performed by Malcolm        All Micro Results     Procedure Component Value Units Date/Time    CULTURE, BLOOD [[id number]] Collected: 08/19/21 1227    Order Status: Completed Specimen: Blood Updated: 08/23/21 0647     Special Requests: -- LEFT  HAND       Culture result: NO GROWTH 4 DAYS       CULTURE, BLOOD [[id number]] Collected: 08/19/21 1156    Order Status: Completed Specimen: Blood Updated: 08/23/21 0647     Special Requests: --        LEFT  HAND       Culture result: NO GROWTH 4 DAYS             Other Studies:  No results found.     Current Meds:  Current Facility-Administered Medications   Medication Dose Route Frequency    midodrine (PROAMATINE) tablet 5 mg  5 mg Oral TID WITH MEALS    fludrocortisone (FLORINEF) tablet 0.1 mg  0.1 mg Oral DAILY    potassium chloride (K-DUR, KLOR-CON) SR tablet 40 mEq  40 mEq Oral BID    [Held by provider] furosemide (LASIX) tablet 40 mg  40 mg Oral DAILY    pantoprazole (PROTONIX) tablet 40 mg  40 mg Oral ACB    [Held by provider] metoprolol succinate (TOPROL-XL) XL tablet 50 mg  50 mg Oral BID    apixaban (ELIQUIS) tablet 5 mg  5 mg Oral Q12H    metoprolol (LOPRESSOR) injection 2.5 mg  2.5 mg IntraVENous Q6H PRN    oxyCODONE IR (ROXICODONE) tablet 5 mg  5 mg Oral Q4H PRN    acetaminophen (TYLENOL) tablet 650 mg  650 mg Oral Q6H PRN    insulin lispro (HUMALOG) injection 0-10 Units  0-10 Units SubCUTAneous AC&HS    sodium chloride (NS) flush 5-40 mL  5-40 mL IntraVENous Q8H    sodium chloride (NS) flush 5-40 mL  5-40 mL IntraVENous PRN    atorvastatin (LIPITOR) tablet 40 mg  40 mg Oral QHS    nystatin (MYCOSTATIN) 100,000 unit/gram powder   Topical PRN    pregabalin (LYRICA) capsule 200 mg  200 mg Oral BID    traZODone (DESYREL) tablet 50 mg  50 mg Oral QHS    dextrose 40% (GLUTOSE) oral gel 1 Tube  15 g Oral PRN    glucagon (GLUCAGEN) injection 1 mg  1 mg IntraMUSCular PRN    dextrose (D50W) injection syrg 12.5-25 g  25-50 mL IntraVENous PRN    sodium chloride (NS) flush 5-40 mL  5-40 mL IntraVENous Q8H    sodium chloride (NS) flush 5-40 mL  5-40 mL IntraVENous PRN    naloxone (NARCAN) injection 0.4 mg  0.4 mg IntraVENous PRN    HYDROmorphone (DILAUDID) injection 0.5 mg  0.5 mg IntraVENous Q4H PRN       Signed:  David Browne NP    Part of this note may have been written by using a voice dictation software. The note has been proof read but may still contain some grammatical/other typographical errors.

## 2021-08-23 NOTE — PROGRESS NOTES
[facility]   Progress Note    Patient: Pola Contreras MRN: [id number]  SSN: [id number]    YOB: 1945  Age: 77 y.o. Sex: male      Admit Date: 8/18/2021    LOS: 5 days     Subjective:     Postop day 5 right below the knee amputation for open wound and Charcot ankle. Resting well on floor. States no pain. PT/OT has been working with him. Objective:     Vitals:    08/22/21 1945 08/22/21 2324 08/23/21 0440 08/23/21 0827   BP: (!) 92/53 (!) 96/42 101/70 (!) 99/56   Pulse: 98 85  100   Resp: 16  18 20   Temp: 98 °F (36.7 °C) 98.4 °F (36.9 °C) 97.8 °F (36.6 °C) 97.8 °F (36.6 °C)   SpO2:   92% 95%   Weight:       Height:            Intake and Output:  Current Shift: No intake/output data recorded. Last three shifts: 08/21 1901 - 08/23 0700  In: -   Out: 1600 [Urine:1600]    Physical Exam:   Right lower extremity: Knee immobilizer in place. Metal stays have been removed. Dressing clean dry and intact. Wound VAC with good seal. Nothing in the canister.   Wound VAC at 125 mmHg continuous low pressure    Lab/Data Review:  CBC:   Lab Results   Component Value Date/Time    WBC 7.2 08/23/2021 04:53 AM    HGB 9.2 (L) 08/23/2021 04:53 AM    HCT 30.6 (L) 08/23/2021 04:53 AM     08/23/2021 04:53 AM          Assessment:     Principal Problem:    S/P BKA (below knee amputation) unilateral, right ([facility] (8/18/2021)    Active Problems:    CAD (coronary artery disease) ()      Stage 3 chronic kidney disease ([facility] (11/22/2017)      Controlled type 2 diabetes mellitus with diabetic polyneuropathy, without long-term current use of insulin ([facility] [address] (1/5/2018)      Mixed hyperlipidemia (4/10/2018)      Morbid obesity with BMI of 40.0-44.9, adult ([facility] (8/57/0779)      Systolic CHF, acute on chronic (HCC) (7/19/2021)      Atrial fibrillation with RVR ([facility] (7/19/2021)      Acute on chronic respiratory failure with hypoxia and hypercapnia ([facility] (7/23/2021)      Respiratory failure, post-operative ([facility] (8/18/2021)      Suspected sleep apnea (8/20/2021)        Plan:     1: WB status - NWB RLE  2: DVT px - Eliquis 5mg BID  3: ABX - none   4: At this point his amputation is his final surgical procedure. Wound VAC with good seal. Will consult wound care nurses to change incisional wound vac to BOLD Guidance prior to discharge. Plan will be to keep BOLD Guidance on until first follow up.  5: [address] cardiology, pulmonology, and medicine evaluation and management of his respiratory and medical problems. 6:continue PT/OT -  We will need placement postoperatively. 7: f/u in 2 weeks with Me in office for wound check and dressing change.        Signed By: Frank Maddox MD     August 23, 2021

## 2021-08-23 NOTE — WOUND CARE
Nanette Pea obtained from [address] [place] [address]. Old bandage removed, staples x 2 removed to remove old bandage, wound vac. Prevena applied, connected to Nanette Pea setting in [place] machine. Prevena disposable machine in wound seal for connecting for discharge.  notified of plan for Nanette Pea to be in place with out changing until follow up visit. Once at rehab If the Nanette Pea alarms before then Dr. Raulito Noel would need to be called for dressing orders. Because the battery is intended to last only about a week and the follow up is about 2 weeks will use hospital machine until discharge to preserve Prevena battery. Will monitor.

## 2021-08-23 NOTE — PROGRESS NOTES
Chart reviewed. PT/OT recommending [facility]. Met with pt and pt agreeable and reports determination to get more independent to get home. Contacted daughter, Colette Calle, and daughter agreeable and would like [facility]. Reviewed choice list and pt would like [facility] as first choice and [facility] as second choice. Consult placed to [facility] and Dr. Boo Layyue aware of consult. Awaiting decision. CM to continue to follow and monitor for any further needs.

## 2021-08-23 NOTE — CONSULTS
Ban Colon MD  Medical Director  [address] [place] [address], [address] [facility]  Tel: [phone]       Physical Medicine & Rehabilitation Consult    Subjective:     Date of Consultation:  August 23, 2021  Referring Provider: Dr Latrell Barnard  Seen in consultation by Pulmonary anc Cards    Stefani Puentes is a 77 y.o. male who is being evaluated at the request of the Ortho service for consideration for inpatient rehabilitation following a R. BKA. HPI: The patient is a r-hand dominant, previously functionally independent 78yo male with a PMH of CHF, afib, peripheral neuropathy, DM2 with nephropathy, morbid obesity, chronic respiratory failure on 2-4L O2 at home and cellulitis of the RLE (charcot ankle )who has failed outpt long term abx who was recently hospitalized in July with sepsis due to a right foot ulcer. Amputation was recommended initially but he refused at that time and was treated with abx. During that hospitalization he was found to be in a fib with RVR for which toprol and Eliquis for [address] [place] [address]. Echo showed EF or 30-35%. After discharge he was seen in follow up by ortho and the decision to proceed with amputation was made. He underwent a R BKA on 8/18. Post op they had difficulty extubating in the PACU, thus he was admitted to the ICU. He was noted to be in afib, rate controlled. His Eliquis had been held for 48hr preop, and he was placed on therapeutic Lovenox post on. He was extubated to [place] on POD #1. The patient told Pulm Medicine that his breathing felt better than it ever had. He transferred to the floor on POD 2. He is currently on 3L O2 per [place]. He has blood loss anemia with hx of chronic anemia due to CKD. hgb 9.2 (11.9 preop), creat 1.97 today, up from 1.26 preop. Baseline appears to be about 1.4. ID has dc'd abx post op. He had received about 4weeks of Ancef.    Functionally, he requires moderate assist with bathing, maximal assist with toileting; mod to max assist for functional mobility. PT reqports pt is moderate assist of 2 for bed mobility, STS max assist of 2, unable to transfer. Principal Problem:    S/P BKA (below knee amputation) unilateral, right ([address] (8/18/2021)    Active Problems:    CAD (coronary artery disease) ()      Stage 3 chronic kidney disease ([address] (11/22/2017)      Controlled type 2 diabetes mellitus with diabetic polyneuropathy, without long-term current use of insulin ([address] (1/5/2018)      Mixed hyperlipidemia (4/10/2018)      Morbid obesity with BMI of 40.0-44.9, adult ([address] (3/34/3955)      Systolic CHF, acute on chronic (HCC) (7/19/2021)      Atrial fibrillation with RVR ([address] (7/19/2021)      Acute on chronic respiratory failure with hypoxia and hypercapnia ([facility]) (7/23/2021)      Respiratory failure, post-operative ([address] (8/18/2021)      Suspected sleep apnea (8/20/2021)      Past Medical History:   Diagnosis Date    A-fib ([address] 07/19/2021    developed AFID after hospital admission for wound infection- started on Eliquis    CAD (coronary artery disease)     [facility].     Chronic kidney disease     stage 3- improved    COVID-19 vaccine series completed     Moderna Vaccine completed X2 doses    Current use of long term anticoagulation     Eliquis and Aspirin    H/O heart artery stent     X1 placed in 1999    History of MI (myocardial infarction) 1999    stent placed X1    Hypercholesterolemia     Hypertension     Left ventricular dysfunction     echo revealed EF 30-35%, mildly dilated LA/RA and mild TR and MR.    Neuropathy     severe    Oxygen dependent     recently started on 2L NC continous- Sleep study to be scheduled-questionable ELAINA    PICC (peripherally inserted central catheter) in place     PICC line in place to R arm    Type 2 diabetes mellitus ([address]     oral agent/Pt does not monitor BS/no s.s of hypoglycemia/Last A1c: 6.7 on 7/13/21 per daughter      Past Surgical History:   Procedure Laterality Date    NE CARDIAC SURG PROCEDURE UNLIST  1999    stent placement      Family History   Problem Relation Age of Onset    Cancer Mother     Cancer Father     No Known Problems Maternal Grandmother     No Known Problems Maternal Grandfather     No Known Problems Paternal Grandmother     No Known Problems Paternal Grandfather       Social History     Tobacco Use    Smoking status: Never Smoker    Smokeless tobacco: Never Used   Substance Use Topics    Alcohol use: No   lives with wife, one level home, tub shower w/ SC, has all DME needed at home, 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 Authorizing Provider   pregabalin (Lyrica) 200 mg capsule Take 200 mg by mouth two (2) times a day. Yes Provider, Historical   multivitamin (ONE A DAY) tablet Take 1 Tablet by mouth daily. Yes Provider, Historical   cefazolin sodium (CEFAZOLIN IV) 2 g by IntraVENous route three (3) times daily. Yes Provider, Historical   traZODone (DESYREL) 50 mg tablet Take 1 Tablet by mouth nightly. 8/9/21  Yes Noah Becerra PA-C   metoprolol succinate (TOPROL-XL) 25 mg XL tablet Take 1 Tablet by mouth two (2) times a day. 7/27/21  Yes Lula Boswell MD   furosemide (LASIX) 20 mg tablet Take 1 Tablet by mouth as needed for Other (WEIGHT INCREASE GREATER THEN 2LB/DAY OR 5LB/DAY). 7/27/21  Yes Lula Boswell MD   glipiZIDE (GLUCOTROL) 5 mg tablet Take 0.5 Tablets by mouth daily. 7/27/21  Yes Lula Boswell MD   atorvastatin (LIPITOR) 40 mg tablet TAKE 1 TABLET BY MOUTH DAILY  Indications: treatment to slow progression of coronary artery disease 7/13/21  Yes Jose Alejandro Ball MD   aspirin delayed-release 81 mg tablet Take 81 mg by mouth daily. Yes Provider, Historical   cyanocobalamin 1,000 mcg tablet Take 1 Tab by mouth daily.  10/10/16  Yes Frida Rueda MD   cholecalciferol, VITAMIN D3, (VITAMIN D3) 5,000 unit tab tablet Take  by mouth daily. Yes Provider, Historical   nystatin (MYCOSTATIN) powder Apply  to affected area as needed. Provider, Historical     No Known Allergies     Review of Systems:  A comprehensive review of systems was negative except for: Constitutional: positive for fatigue  Respiratory: positive for cough  Cardiovascular: positive for irregular heart beats, fatigue  Gastrointestinal: positive for dyspepsia and constipation  Musculoskeletal: positive for back pain  Neurological: positive for paresthesia, coordination problems, gait problems and weakness    Objective:     Vitals:  Blood pressure 98/68, pulse 92, temperature 97.7 °F (36.5 °C), resp. rate 20, height 5' 10\" (1.778 m), weight 287 lb 8 oz (130.4 kg), SpO2 95 %. Temp (24hrs), Av °F (36.7 °C), Min:97.7 °F (36.5 °C), Max:98.4 °F (36.9 °C)      Intake and Output:   1901 -  0700  In: -   Out: 1600 [Urine:1600]    Physical Exam:  General:  Alert, oriented and mood affect appropriate   Lungs:   Clear to auscultation bilaterally. Heart:  Regular rate irreg rhythm, S1, S2 stable, no murmur, click, rub or gallop. Abdomen:   Soft, non-tender. Bowel sounds present. No masses,  No organomegaly. Morbidly obese   Genitourinary: defer   Neuro Muscular: UEs 5/5. RLE hip flexion3+, KI one, BKA ace wrapped with wound vac applies  LLE hip flexion 3-, distally 4+  Charcot ankle left foot with ulceration on plantar aspect; dry, no erythema  Pt lacks sensation and proprioception of the left foot. Skin:  No rashes, lesions, or signs/symptoms or infection.        Labs/Studies:  Recent Results (from the past 72 hour(s))   GLUCOSE, POC    Collection Time: 21  5:29 PM   Result Value Ref Range    Glucose (POC) 108 (H) 65 - 100 mg/dL    Performed by Rupert    GLUCOSE, POC    Collection Time: 21  9:05 PM   Result Value Ref Range    Glucose (POC) 111 (H) 65 - 100 mg/dL    Performed by Elvia PATEL, POC    Collection Time: 08/21/21  7:00 AM   Result Value Ref Range    Glucose (POC) 114 (H) 65 - 100 mg/dL    Performed by [employer]    GLUCOSE, POC    Collection Time: 08/21/21 11:11 AM   Result Value Ref Range    Glucose (POC) 103 (H) 65 - 100 mg/dL    Performed by [employer]    PLEASE READ & DOCUMENT PPD TEST IN 24 HRS    Collection Time: 08/21/21 12:58 PM   Result Value Ref Range    PPD Negative Negative    mm Induration 0 0 - 5 mm   CBC WITH AUTOMATED DIFF    Collection Time: 08/21/21  2:10 PM   Result Value Ref Range    WBC 6.3 4.3 - 11.1 K/uL    RBC 3.03 (L) 4.23 - 5.6 M/uL    HGB 8.9 (L) 13.6 - 17.2 g/dL    HCT 29.2 (L) 41.1 - 50.3 %    MCV 96.4 79.6 - 97.8 FL    MCH 29.4 26.1 - 32.9 PG    MCHC 30.5 (L) 31.4 - 35.0 g/dL    RDW 16.0 (H) 11.9 - 14.6 %    PLATELET 648 (L) 407 - 450 K/uL    MPV 12.8 (H) 9.4 - 12.3 FL    ABSOLUTE NRBC 0.00 0.0 - 0.2 K/uL    DF AUTOMATED      NEUTROPHILS 55 43 - 78 %    LYMPHOCYTES 27 13 - 44 %    MONOCYTES 14 (H) 4.0 - 12.0 %    EOSINOPHILS 4 0.5 - 7.8 %    BASOPHILS 1 0.0 - 2.0 %    IMMATURE GRANULOCYTES 0 0.0 - 5.0 %    ABS. NEUTROPHILS 3.5 1.7 - 8.2 K/UL    ABS. LYMPHOCYTES 1.7 0.5 - 4.6 K/UL    ABS. MONOCYTES 0.9 0.1 - 1.3 K/UL    ABS. EOSINOPHILS 0.2 0.0 - 0.8 K/UL    ABS. BASOPHILS 0.0 0.0 - 0.2 K/UL    ABS. IMM.  GRANS. 0.0 0.0 - 0.5 K/UL   METABOLIC PANEL, BASIC    Collection Time: 08/21/21  2:10 PM   Result Value Ref Range    Sodium 141 138 - 145 mmol/L    Potassium 3.3 (L) 3.5 - 5.1 mmol/L    Chloride 100 98 - 107 mmol/L    CO2 39 (H) 21 - 32 mmol/L    Anion gap 2 (L) 7 - 16 mmol/L    Glucose 126 (H) 65 - 100 mg/dL    BUN 33 (H) 8 - 23 MG/DL    Creatinine 1.99 (H) 0.8 - 1.5 MG/DL    GFR est AA 42 (L) >60 ml/min/1.73m2    GFR est non-AA 35 (L) >60 ml/min/1.73m2    Calcium 8.7 8.3 - 10.4 MG/DL   GLUCOSE, POC    Collection Time: 08/21/21  3:36 PM   Result Value Ref Range    Glucose (POC) 123 (H) 65 - 100 mg/dL    Performed by [employer] GLUCOSE, POC    Collection Time: 08/21/21  9:30 PM   Result Value Ref Range    Glucose (POC) 127 (H) 65 - 100 mg/dL    Performed by [facility]    CBC WITH AUTOMATED DIFF    Collection Time: 08/22/21  4:29 AM   Result Value Ref Range    WBC 5.7 4.3 - 11.1 K/uL    RBC 3.02 (L) 4.23 - 5.6 M/uL    HGB 8.8 (L) 13.6 - 17.2 g/dL    HCT 29.2 (L) 41.1 - 50.3 %    MCV 96.7 79.6 - 97.8 FL    MCH 29.1 26.1 - 32.9 PG    MCHC 30.1 (L) 31.4 - 35.0 g/dL    RDW 16.1 (H) 11.9 - 14.6 %    PLATELET 487 287 - 139 K/uL    MPV 11.3 9.4 - 12.3 FL    ABSOLUTE NRBC 0.02 0.0 - 0.2 K/uL    DF AUTOMATED      NEUTROPHILS 51 43 - 78 %    LYMPHOCYTES 29 13 - 44 %    MONOCYTES 16 (H) 4.0 - 12.0 %    EOSINOPHILS 4 0.5 - 7.8 %    BASOPHILS 1 0.0 - 2.0 %    IMMATURE GRANULOCYTES 0 0.0 - 5.0 %    ABS. NEUTROPHILS 2.9 1.7 - 8.2 K/UL    ABS. LYMPHOCYTES 1.6 0.5 - 4.6 K/UL    ABS. MONOCYTES 0.9 0.1 - 1.3 K/UL    ABS. EOSINOPHILS 0.2 0.0 - 0.8 K/UL    ABS. BASOPHILS 0.0 0.0 - 0.2 K/UL    ABS. IMM.  GRANS. 0.0 0.0 - 0.5 K/UL   METABOLIC PANEL, BASIC    Collection Time: 08/22/21  4:29 AM   Result Value Ref Range    Sodium 143 136 - 145 mmol/L    Potassium 3.3 (L) 3.5 - 5.1 mmol/L    Chloride 100 98 - 107 mmol/L    CO2 39 (H) 21 - 32 mmol/L    Anion gap 4 (L) 7 - 16 mmol/L    Glucose 95 65 - 100 mg/dL    BUN 35 (H) 8 - 23 MG/DL    Creatinine 1.84 (H) 0.8 - 1.5 MG/DL    GFR est AA 46 (L) >60 ml/min/1.73m2    GFR est non-AA 38 (L) >60 ml/min/1.73m2    Calcium 9.0 8.3 - 10.4 MG/DL   GLUCOSE, POC    Collection Time: 08/22/21  7:50 AM   Result Value Ref Range    Glucose (POC) 98 65 - 100 mg/dL    Performed by AllenRNMelissa    GLUCOSE, POC    Collection Time: 08/22/21 12:09 PM   Result Value Ref Range    Glucose (POC) 117 (H) 65 - 100 mg/dL    Performed by AllenRNMelissa    GLUCOSE, POC    Collection Time: 08/22/21  5:20 PM   Result Value Ref Range    Glucose (POC) 132 (H) 65 - 100 mg/dL    Performed by AllenRNMelissa    GLUCOSE, POC    Collection Time: 08/22/21 11:20 PM   Result Value Ref Range    Glucose (POC) 140 (H) 65 - 100 mg/dL    Performed by [facility]samia    CBC WITH AUTOMATED DIFF    Collection Time: 08/23/21  4:53 AM   Result Value Ref Range    WBC 7.2 4.3 - 11.1 K/uL    RBC 3.23 (L) 4.23 - 5.6 M/uL    HGB 9.2 (L) 13.6 - 17.2 g/dL    HCT 30.6 (L) 41.1 - 50.3 %    MCV 94.7 79.6 - 97.8 FL    MCH 28.5 26.1 - 32.9 PG    MCHC 30.1 (L) 31.4 - 35.0 g/dL    RDW 15.8 (H) 11.9 - 14.6 %    PLATELET 967 231 - 650 K/uL    MPV 11.3 9.4 - 12.3 FL    ABSOLUTE NRBC 0.00 0.0 - 0.2 K/uL    DF AUTOMATED      NEUTROPHILS 45 43 - 78 %    LYMPHOCYTES 37 13 - 44 %    MONOCYTES 13 (H) 4.0 - 12.0 %    EOSINOPHILS 4 0.5 - 7.8 %    BASOPHILS 0 0.0 - 2.0 %    IMMATURE GRANULOCYTES 1 0.0 - 5.0 %    ABS. NEUTROPHILS 3.3 1.7 - 8.2 K/UL    ABS. LYMPHOCYTES 2.7 0.5 - 4.6 K/UL    ABS. MONOCYTES 0.9 0.1 - 1.3 K/UL    ABS. EOSINOPHILS 0.3 0.0 - 0.8 K/UL    ABS. BASOPHILS 0.0 0.0 - 0.2 K/UL    ABS. IMM.  GRANS. 0.0 0.0 - 0.5 K/UL   METABOLIC PANEL, BASIC    Collection Time: 08/23/21  4:53 AM   Result Value Ref Range    Sodium 140 136 - 145 mmol/L    Potassium 3.7 3.5 - 5.1 mmol/L    Chloride 103 98 - 107 mmol/L    CO2 38 (H) 21 - 32 mmol/L    Anion gap Cannot be calculated 7 - 16 mmol/L    Glucose 104 (H) 65 - 100 mg/dL    BUN 40 (H) 8 - 23 MG/DL    Creatinine 1.97 (H) 0.8 - 1.5 MG/DL    GFR est AA 43 (L) >60 ml/min/1.73m2    GFR est non-AA 35 (L) >60 ml/min/1.73m2    Calcium 9.1 8.3 - 10.4 MG/DL   GLUCOSE, POC    Collection Time: 08/23/21 12:32 PM   Result Value Ref Range    Glucose (POC) 115 (H) 65 - 100 mg/dL    Performed by Malcolm                   Ambulation:  Activity and Safety  Activity Level: Bed Rest  Ambulate: No (Comment)  Sonia's Egress Test: Fail  Activity: In bed, Watching t.HihoCoder  Activity Assistance: Complete care  Weight Bearing Status: NWB (Non Weight Bearing)  NWB (Non Weight Bearing extremities: RLE (Right Lower Extremity)  Mode of Transportation: Stretcher, IV equipment  Repositioned: Head of bed elevated (degrees) (45)  Patient Turned: Supine  Head of Bed Elevated: Self regulated  Activity Response: Dyspnea at rest  Assistive Device: Fall prevention device  Safety Measures: Bed/Chair-Wheels locked, Bed in low position, Call light within reach, Side rails X2     Impression / Assessment:     Principal Problem:    S/P BKA (below knee amputation) unilateral, right ([facility]) (8/18/2021)    Active Problems:    CAD (coronary artery disease) ()      Stage 3 chronic kidney disease ([facility] (11/22/2017)      Controlled type 2 diabetes mellitus with diabetic polyneuropathy, without long-term current use of insulin ([facility] (1/5/2018)      Mixed hyperlipidemia (4/10/2018)      Morbid obesity with BMI of 40.0-44.9, adult ([facility] (5/70/1129)      Systolic CHF, acute on chronic ([facility]) (7/19/2021)      Atrial fibrillation with RVR ([facility] (7/19/2021)      Acute on chronic respiratory failure with hypoxia and hypercapnia ([facility]) (7/23/2021)      Respiratory failure, post-operative ([facility] (8/18/2021)      Suspected sleep apnea (8/20/2021)     Charcot Ankle with cellulitis/sepsis now s/p R BKA    Plan / Recommendations / Medical Decision Making:     Recommendations:   Continue Acute Rehab Program  Coordination of rehab / medical care  Counseling of PM&R care issues management  Monitoring and management of medical conditions per plan of care / orders   -Pt appropriate for IRC. 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 He was functionally independent. I believe that he could actively participate in and tolerate 3hr/therapy at least 5d/ week. I believe that he has the medical necessity to warrant rehab nursing and the involvement of a PM&R physician given multiple medical comorbidities and risk of decompensation.  -cont PT/OT  -will need wound care to continue to follow regarding wound vac.  Must f/u with Dr Trudi Shelby in 2 weeks  -Prosthetic candidate; difficult given severe neuropathy with loss of sensation and proprioception in the left foot  -will begin INSURANCE precert needed for admission  Discussion with pt/ Staff  Reviewed Therapies / Florencia Vogel / Stephani Soni / Records      Thank you very much for this referral. We appreciate the opportunity to participate in this patient's care. We will continue to follow. Eugenia Bender MD, Medical Director  [address] [place] [address]

## 2021-08-23 NOTE — PROGRESS NOTES
Patient has not voided since neil discontinued at 1300  . At present, patient voided 75 ml urine in his urinal. Bladder scan PVR was 0 ml urine. Patient has not drank much. Patient in no distress.

## 2021-08-24 LAB
ANION GAP SERPL CALC-SCNC: 1 MMOL/L (ref 7–16)
BACTERIA SPEC CULT: NORMAL
BACTERIA SPEC CULT: NORMAL
BUN SERPL-MCNC: 35 MG/DL (ref 8–23)
CALCIUM SERPL-MCNC: 9 MG/DL (ref 8.3–10.4)
CHLORIDE SERPL-SCNC: 106 MMOL/L (ref 98–107)
CO2 SERPL-SCNC: 36 MMOL/L (ref 21–32)
CREAT SERPL-MCNC: 1.57 MG/DL (ref 0.8–1.5)
ERYTHROCYTE [DISTWIDTH] IN BLOOD BY AUTOMATED COUNT: 15.7 % (ref 11.9–14.6)
GLUCOSE BLD STRIP.AUTO-MCNC: 107 MG/DL (ref 65–100)
GLUCOSE BLD STRIP.AUTO-MCNC: 111 MG/DL (ref 65–100)
GLUCOSE BLD STRIP.AUTO-MCNC: 134 MG/DL (ref 65–100)
GLUCOSE SERPL-MCNC: 96 MG/DL (ref 65–100)
HCT VFR BLD AUTO: 29.9 % (ref 41.1–50.3)
HGB BLD-MCNC: 8.9 G/DL (ref 13.6–17.2)
MCH RBC QN AUTO: 29.1 PG (ref 26.1–32.9)
MCHC RBC AUTO-ENTMCNC: 29.8 G/DL (ref 31.4–35)
MCV RBC AUTO: 97.7 FL (ref 79.6–97.8)
NRBC # BLD: 0 K/UL (ref 0–0.2)
PLATELET # BLD AUTO: 189 K/UL (ref 150–450)
PMV BLD AUTO: 11 FL (ref 9.4–12.3)
POTASSIUM SERPL-SCNC: 4.6 MMOL/L (ref 3.5–5.1)
RBC # BLD AUTO: 3.06 M/UL (ref 4.23–5.6)
SERVICE CMNT-IMP: ABNORMAL
SERVICE CMNT-IMP: NORMAL
SERVICE CMNT-IMP: NORMAL
SODIUM SERPL-SCNC: 143 MMOL/L (ref 136–145)
WBC # BLD AUTO: 6.1 K/UL (ref 4.3–11.1)

## 2021-08-24 PROCEDURE — 74011250637 HC RX REV CODE- 250/637: Performed by: HOSPITALIST

## 2021-08-24 PROCEDURE — 82962 GLUCOSE BLOOD TEST: CPT

## 2021-08-24 PROCEDURE — 74011250637 HC RX REV CODE- 250/637: Performed by: NURSE PRACTITIONER

## 2021-08-24 PROCEDURE — 65660000000 HC RM CCU STEPDOWN

## 2021-08-24 PROCEDURE — 74011250637 HC RX REV CODE- 250/637: Performed by: INTERNAL MEDICINE

## 2021-08-24 PROCEDURE — 80048 BASIC METABOLIC PNL TOTAL CA: CPT

## 2021-08-24 PROCEDURE — 97530 THERAPEUTIC ACTIVITIES: CPT

## 2021-08-24 PROCEDURE — 36415 COLL VENOUS BLD VENIPUNCTURE: CPT

## 2021-08-24 PROCEDURE — 97112 NEUROMUSCULAR REEDUCATION: CPT

## 2021-08-24 PROCEDURE — 85027 COMPLETE CBC AUTOMATED: CPT

## 2021-08-24 RX ORDER — MIDODRINE HYDROCHLORIDE 5 MG/1
2.5 TABLET ORAL
Status: DISCONTINUED | OUTPATIENT
Start: 2021-08-24 | End: 2021-08-25 | Stop reason: HOSPADM

## 2021-08-24 RX ADMIN — FLUDROCORTISONE ACETATE 0.1 MG: 0.1 TABLET ORAL at 09:08

## 2021-08-24 RX ADMIN — MIDODRINE HYDROCHLORIDE 2.5 MG: 5 TABLET ORAL at 12:50

## 2021-08-24 RX ADMIN — APIXABAN 5 MG: 5 TABLET, FILM COATED ORAL at 09:08

## 2021-08-24 RX ADMIN — APIXABAN 5 MG: 5 TABLET, FILM COATED ORAL at 21:31

## 2021-08-24 RX ADMIN — PANTOPRAZOLE SODIUM 40 MG: 40 TABLET, DELAYED RELEASE ORAL at 06:05

## 2021-08-24 RX ADMIN — POLYETHYLENE GLYCOL 3350 17 G: 17 POWDER, FOR SOLUTION ORAL at 09:08

## 2021-08-24 RX ADMIN — Medication 10 ML: at 15:06

## 2021-08-24 RX ADMIN — DOCUSATE SODIUM 100 MG: 100 CAPSULE, LIQUID FILLED ORAL at 17:39

## 2021-08-24 RX ADMIN — Medication 10 ML: at 22:35

## 2021-08-24 RX ADMIN — PREGABALIN 200 MG: 150 CAPSULE ORAL at 09:08

## 2021-08-24 RX ADMIN — PREGABALIN 200 MG: 150 CAPSULE ORAL at 17:39

## 2021-08-24 RX ADMIN — TRAZODONE HYDROCHLORIDE 50 MG: 50 TABLET ORAL at 21:31

## 2021-08-24 RX ADMIN — MIDODRINE HYDROCHLORIDE 5 MG: 5 TABLET ORAL at 09:08

## 2021-08-24 RX ADMIN — MIDODRINE HYDROCHLORIDE 2.5 MG: 5 TABLET ORAL at 17:39

## 2021-08-24 RX ADMIN — POTASSIUM CHLORIDE 40 MEQ: 20 TABLET, EXTENDED RELEASE ORAL at 09:08

## 2021-08-24 RX ADMIN — Medication 10 ML: at 15:07

## 2021-08-24 RX ADMIN — DOCUSATE SODIUM 100 MG: 100 CAPSULE, LIQUID FILLED ORAL at 09:08

## 2021-08-24 RX ADMIN — ATORVASTATIN CALCIUM 40 MG: 40 TABLET, FILM COATED ORAL at 21:31

## 2021-08-24 NOTE — PROGRESS NOTES
ACUTE OT GOALS:  (Developed with and agreed upon by patient and/or caregiver.)  1. Patient will complete upper body bathing and dressing with set up and adaptive equipment as needed. 2. Patient will complete self-grooming tasks in unsupported sitting with set-up and adaptive equipment as needed. 3. Patient will tolerate 25 minutes of OT treatment with 1-2 rest breaks to increase activity tolerance for ADLs. 4. Patient will complete functional transfers with min A x2 and adaptive equipment as needed. 5. Patient will complete functional activity while seated edge of bed with set-up and adaptive equipment as needed. 6. Patient will tolerate 15 minutes unsupported sitting balance with SBA in preparation for ADL performance. 7. Patient will tolerate 15 minutes BUE therapeutic activities to increase use of BUE during ADL performance.      Timeframe: 7 visits RLE KI on in bed     OCCUPATIONAL THERAPY: Daily Note OT Treatment Day # 3    David Killian is a 77 y.o. male   PRIMARY DIAGNOSIS: S/P BKA (below knee amputation) unilateral, right (HCC)  Charcot's joint of foot, right [M14.671]  Infected wound [T14. 8XXA, L08.9]  S/P BKA (below knee amputation) unilateral, right (HCC) [Z89.511]  Respiratory failure, post-operative ([facility] [J95.821]  Procedure(s) (LRB):  RIGHT LEG AMPUTATION BELOW KNEE (Right)  6 Days Post-Op   Reason for Referral:    ICD-10: Treatment Diagnosis: Difficulty in walking, Not elsewhere classified (R26.2)  Other abnormalities of gait and mobility (R26.89)  Other lack of cordination (R27.8)  Payor: [employer] / Plan: Shania Lat / Product Type: Managed Care Medicare /   ASSESSMENT:     REHAB RECOMMENDATIONS: CURRENT LEVEL OF FUNCTION:  (Most Recently Demonstrated)   Recommendation to date pending progress:  [address] [place] [address]  Equipment:    To Be Determined Bathing:   Moderate Assistance  Dressing:   Moderate Assistance  Feeding/Grooming:   Not tested  Toileting:   Maximal Assistance  Functional Mobility:   Minimal Assistance x 2 to ModAx2     ASSESSMENT:  Mr. Breanna Villa presents s/p R BKA with wound vac placement. At baseline pt lives with wife, is fully independent with I/ADLs, ambulation, driving, and working at [employer]. Pt with improved sitting balance and mobility this date. Practiced supine > sit with MinAx1-2, cues for technique. Practiced forward and lateral scooting with MinAx2, multimodal cueing for technique, weight shifting, balance, coordination. Practiced functional transfers with Min-ModAx2, cues for technique, hand/foot placement, weight shifting. Pt cued for R hand on walker and L on the bed for sit > stand, pt with less R lean during transfer this session. Practiced standing balance/tolearnce, cues for posture, breathing, BUE support on RW. Total assist to wash bottom in standing. Rest breaks throughout 2° high HR per RN. Good progress this date. SUBJECTIVE:   Mr. Breanna Villa states, \"I've bough [phone] of crickets. 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       OBJECTIVE:     PAIN: VITAL SIGNS: LINES/DRAINS:   Pre Treatment: Pain Screen  Pain Scale 1: Numeric (0 - 10)  Pain Intensity 1: 0  Post Treatment: same Vital Signs  O2 Device: Hi flow nasal cannula  O2 Flow Rate (L/min): 3 l/min Wound Vac   RLE KI  O2 Device: Hi flow nasal cannula     ACTIVITIES OF DAILY LIVING: I Mod I S SBA CGA Min Mod Max Total NT Comments   BASIC ADLs:              Bathing/ Showering [] [] [] [] [] [] [] [] [] [x]    Toileting [] [] [] [] [] [] [] [] [x] [] Wash bottom standing   Dressing [] [] [] [] [] [] [x] [] [] [] TA don L sock, pt with good effort    Feeding [] [] [] [] [] [] [] [] [] [x]    Grooming [] [] [] [] [] [] [] [] [] [x]      Personal Device Care [] [] [] [] [] [] [] [] [] [x]    Functional Mobility [] [] [] [] [] [x] [x] [] [] [] x2   I=Independent, Mod I=Modified Independent, S=Supervision, SBA=Standby Assistance, CGA=Contact Guard Assistance,   Min=Minimal Assistance, Mod=Moderate Assistance, Max=Maximal Assistance, Total=Total Assistance, NT=Not Tested    MOBILITY: I Mod I S SBA CGA Min Mod Max Total  NT x2 Comments:   Supine to sit [] [] [] [] [] [x] [] [] [] [] [x]    Sit to supine [] [] [] [x] [] [] [] [] [] [] []    Sit to stand [] [] [] [] [] [x] [x] [] [] [] [x] RW   Bed to chair [] [] [] [] [] [] [] [] [] [x] []    I=Independent, Mod I=Modified Independent, S=Supervision, SBA=Standby Assistance, CGA=Contact Guard Assistance,   Min=Minimal Assistance, Mod=Moderate Assistance, Max=Maximal Assistance, Total=Total Assistance, NT=Not Tested    BALANCE: Good Fair+ Fair Fair- Poor NT Comments   Sitting Static [] [x] [] [] [] []    Sitting Dynamic [] [x] [x] [] [] []              Standing Static [] [] [] [x] [] []    Standing Dynamic [] [] [] [] [] [x]      PLAN:   FREQUENCY/DURATION: OT Plan of Care: 3 times/week for duration of hospital stay or until stated goals are met, whichever comes first.    TREATMENT:   TREATMENT:   ( $$ Neuromuscular Re-Education: 23-37 mins   )  Co-Treatment PT/OT necessary due to patient's decreased overall endurance/tolerance levels, as well as need for high level skilled assistance to complete functional transfers/mobility and functional tasks  Neuromuscular Re-education (30 Minutes): Neuromuscular Re-education included Balance Training, Coordination training, Functional mobility with facilitation, Postural training, Sitting balance training and Standing balance training to improve Balance, Coordination, Functional Mobility, Postural Control and Proprioception.     TREATMENT GRID:  N/A    AFTER TREATMENT POSITION/PRECAUTIONS:  Bed and Needs within reach    INTERDISCIPLINARY COLLABORATION:  RN/PCT, PT/PTA and OT/GRAYSON    TOTAL TREATMENT DURATION:  OT Patient Time In/Time Out  Time In: 1108  Time Out: Deana Avilez 70, OTR/L

## 2021-08-24 NOTE — PROGRESS NOTES
Problem: Falls - Risk of  Goal: *Absence of Falls  Description: Document Trav Flores Fall Risk and appropriate interventions in the flowsheet. Outcome: Progressing Towards Goal  Note: Fall Risk Interventions:       Mentation Interventions: Adequate sleep, hydration, pain control, Bed/chair exit alarm    Medication Interventions: Assess postural VS orthostatic hypotension, Patient to call before getting OOB, Teach patient to arise slowly    Elimination Interventions: Call light in reach, Patient to call for help with toileting needs, Toileting schedule/hourly rounds              Problem: Pressure Injury - Risk of  Goal: *Prevention of pressure injury  Description: Document Julio Cesar Scale and appropriate interventions in the flowsheet.   Outcome: Progressing Towards Goal  Note: Pressure Injury Interventions:  Sensory Interventions: Assess changes in LOC, Assess need for specialty bed, Avoid rigorous massage over bony prominences, Check visual cues for pain    Moisture Interventions: Absorbent underpads    Activity Interventions: PT/OT evaluation, Pressure redistribution bed/mattress(bed type), Increase time out of bed    Mobility Interventions: PT/OT evaluation, Pressure redistribution bed/mattress (bed type)    Nutrition Interventions: Offer support with meals,snacks and hydration    Friction and Shear Interventions: Apply protective barrier, creams and emollients, Foam dressings/transparent film/skin sealants, Minimize layers, Feet elevated on foot rest                Problem: Delirium Treatment  Goal: *Level of consciousness restored to baseline  Outcome: Progressing Towards Goal  Goal: *Level of environmental perceptions restored to baseline  Outcome: Progressing Towards Goal  Goal: *Sensory perception restored to baseline  Outcome: Progressing Towards Goal  Goal: *Emotional stability restored to baseline  Outcome: Progressing Towards Goal  Goal: *Functional assessment restored to baseline  Outcome: Progressing Towards Goal  Goal: *Absence of falls  Outcome: Progressing Towards Goal  Goal: *Will remain free of delirium, CAM Score negative  Outcome: Progressing Towards Goal  Goal: *Cognitive status will be restored to baseline  Outcome: Progressing Towards Goal  Goal: Interventions  Outcome: Progressing Towards Goal

## 2021-08-24 NOTE — PROGRESS NOTES
Pt accepted to [facility] with [employer]. Pre-cert started 0/43 per Dr. Marcelino Conklin consult. Waiting decision. CM to continue to follow and monitor for any further needs. Update 1422: Will follow close with wound care as pt has Prevena wound vac and will stay on till follow up with Dr. Domingo Malone. Follow up needs to be 7 days from time Prevena battery starts as this wound vac is disposable and battery will only last 7 days. Will assure this follow up apt aligns with Tamera Keith if pt accepted to [facility].

## 2021-08-24 NOTE — PROGRESS NOTES
ORTH FRACTURE PROGRESS NOTE    2021  Admit Date:   2021    Post Op day: 6 Days Post-Op    Subjective:    Oris Kail Pack     PT/OT:   Gait:                    Vital Signs:    Patient Vitals for the past 8 hrs:   BP Temp Pulse Resp SpO2   21 1020 108/70 98.2 °F (36.8 °C) 86 20 92 %   21 0804 (!) 132/93 98 °F (36.7 °C) 91 20 97 %     Temp (24hrs), Av.9 °F (36.6 °C), Min:97.2 °F (36.2 °C), Max:98.4 °F (36.9 °C)      Pain Control:   Pain Assessment  Pain Scale 1: Numeric (0 - 10)  Pain Intensity 1: 0  Pain Onset 1: acute sx/incisional  Pain Location 1: Leg  Pain Orientation 1: Left, Right  Pain Description 1: Tingling, Burning (\"neuropathy\")  Pain Intervention(s) 1: Medication (see MAR), Rest, Repositioned    Meds:    Current Facility-Administered Medications   Medication Dose Route Frequency    midodrine (PROAMATINE) tablet 2.5 mg  2.5 mg Oral TID WITH MEALS    polyethylene glycol (MIRALAX) packet 17 g  17 g Oral DAILY    docusate sodium (COLACE) capsule 100 mg  100 mg Oral BID    fludrocortisone (FLORINEF) tablet 0.1 mg  0.1 mg Oral DAILY    [Held by provider] furosemide (LASIX) tablet 40 mg  40 mg Oral DAILY    pantoprazole (PROTONIX) tablet 40 mg  40 mg Oral ACB    [Held by provider] metoprolol succinate (TOPROL-XL) XL tablet 50 mg  50 mg Oral BID    apixaban (ELIQUIS) tablet 5 mg  5 mg Oral Q12H    metoprolol (LOPRESSOR) injection 2.5 mg  2.5 mg IntraVENous Q6H PRN    oxyCODONE IR (ROXICODONE) tablet 5 mg  5 mg Oral Q4H PRN    acetaminophen (TYLENOL) tablet 650 mg  650 mg Oral Q6H PRN    insulin lispro (HUMALOG) injection 0-10 Units  0-10 Units SubCUTAneous AC&HS    sodium chloride (NS) flush 5-40 mL  5-40 mL IntraVENous Q8H    sodium chloride (NS) flush 5-40 mL  5-40 mL IntraVENous PRN    atorvastatin (LIPITOR) tablet 40 mg  40 mg Oral QHS    nystatin (MYCOSTATIN) 100,000 unit/gram powder   Topical PRN    pregabalin (LYRICA) capsule 200 mg  200 mg Oral BID    traZODone (DESYREL) tablet 50 mg  50 mg Oral QHS    dextrose 40% (GLUTOSE) oral gel 1 Tube  15 g Oral PRN    glucagon (GLUCAGEN) injection 1 mg  1 mg IntraMUSCular PRN    dextrose (D50W) injection syrg 12.5-25 g  25-50 mL IntraVENous PRN    sodium chloride (NS) flush 5-40 mL  5-40 mL IntraVENous Q8H    sodium chloride (NS) flush 5-40 mL  5-40 mL IntraVENous PRN    naloxone (NARCAN) injection 0.4 mg  0.4 mg IntraVENous PRN    HYDROmorphone (DILAUDID) injection 0.5 mg  0.5 mg IntraVENous Q4H PRN       LAB:    Recent Labs     08/24/21  0422   HCT 29.9*   HGB 8.9*       24 Hour Assessment Issues:    Oriented    Discharge Planning: SNF    Transfuse PRBC's:      Assessment & Physician's Comment:  Dressing is clean, dry, and intact    Principal Problem:    S/P BKA (below knee amputation) unilateral, right ([address] (8/18/2021)    Active Problems:    CAD (coronary artery disease) ()      Stage 3 chronic kidney disease ([address] (11/22/2017)      Controlled type 2 diabetes mellitus with diabetic polyneuropathy, without long-term current use of insulin ([address] (1/5/2018)      Mixed hyperlipidemia (4/10/2018)      Morbid obesity with BMI of 40.0-44.9, adult ([address] (6/13/7173)      Systolic CHF, acute on chronic ([address] (7/19/2021)      Atrial fibrillation with RVR ([address] (7/19/2021)      Acute on chronic respiratory failure with hypoxia and hypercapnia ([address] (7/23/2021)      Respiratory failure, post-operative ([address] (8/18/2021)      Suspected sleep apnea (8/20/2021)        Plan:   To rehab when bed available    Gayatri Izaguirre MD

## 2021-08-24 NOTE — PROGRESS NOTES
Hospitalist Progress Note     Admit Date:  2021  7:31 AM   Name:  Jesus Fuller   Age:  77 y.o.  :  1945   MRN:  [id number]     Presenting Complaint: No chief complaint on file. Initial Admission Diagnosis: Charcot's joint of foot, right [M14.671]  Infected wound [T14. 8XXA, L08.9]  S/P BKA (below knee amputation) unilateral, right (HCC) [Z89.511]  Respiratory failure, post-operative ([address] [Q84.565]     Assessment and Plan:   # Acute on chronic hypoxemic respiratory failure   - Home O2 new at discharge last month. Uses 2L chronically since then, currently on 2L. Net negative fluid balance. D/w RN to get spirometer. Lasix held due to hypotension yesterday. Currently he is on his baseline dose of oxygen. # Hypotension   - Lasix held  and midodrine started. Improved today, wean midodrine to 2.5 TID. Resume lasix tomorrow if BPs can tolerate it. Other medications on hold. # R foot wound   - Failed outpatient abx, re-admitted and s/p R BKA on . No further abx warranted per ID. # AFib   - BB held as above, con't Eliquis. # HypoK   - Resolved. Stop supp. # Chronic sCHF   - BB held with HoTN as above. # DM2   - Con't insulin. # CKD3   - Stable. Discharge Planning: IRC, waiting on insurance approval.  Diet:  ADULT DIET Regular;  Low Sodium (2 gm); 1200 ml  DVT PPx:   Code status: Full Code    Active Hospital Problems    Diagnosis Date Noted    Suspected sleep apnea 2021    S/P BKA (below knee amputation) unilateral, right ([address] 2021    Respiratory failure, post-operative ([address] 2021    Acute on chronic respiratory failure with hypoxia and hypercapnia (HCC) 2021    Atrial fibrillation with RVR ([address]     Systolic CHF, acute on chronic ([address] 2021    Morbid obesity with BMI of 40.0-44.9, adult ([address] 2020    Mixed hyperlipidemia 04/10/2018    Controlled type 2 diabetes mellitus with diabetic polyneuropathy, without long-term current use of insulin ([facility] 01/05/2018    Stage 3 chronic kidney disease ([facility] 11/22/2017    CAD (coronary artery disease)        Hospital Course:   Mr. Fournier Flight a 76 y/o WM with a h/o CAD s/p PCI, chronic respiratory failure on 2 L via NC, A. fib (on Eliquis), CKD3, DM 2, chronic sCHF (EF 30 to 35%), s/p right BKA on 8/18 seen by our service in consultation on 8/18 for medical management. He was hospitalized in July with right foot cellulitis and sepsis secondary to right foot ulcer. Ortho initially recommended amputation, however he refused and was ultimately discharged on IV abx wit EOT 9/3/21. On 8/18 he was re-admitted due to worsening right lower leg wound. He underwent R BKA. He had difficulty weaning form the ventilator in PACU so was sent to ICU and Pulm was consulted. He was extubated on 8/19. Abx have been discontinued. Fluid balance is net negative. Repeat CXR 8/21 with atelectasis, needs spirometer. Lasix held and midodrine started 8/23 for HoTN which has improved. Stable on 2L NC O2. Plans for IRC at discharge pending insurance approval.    24hr Events/Subjective (08/24/21):   8/24: In bed, no complaints, breathing well, no pain to RLE, no chest pain, N/V/D. On 2L O2 (home dose). No complaints otherwise.      Objective:     Patient Vitals for the past 24 hrs:   Temp Pulse Resp BP SpO2   08/24/21 0804 98 °F (36.7 °C) 91 20 (!) 132/93 97 %   08/24/21 0429 97.2 °F (36.2 °C) 91 20 (!) 135/94 95 %   08/24/21 0028 98.4 °F (36.9 °C) 95 18 108/62 97 %   08/23/21 2040 97.7 °F (36.5 °C) 88 20 99/61 97 %   08/23/21 1513 98.1 °F (36.7 °C) 99 20 103/68 97 %   08/23/21 1050     98 %   08/23/21 1020 97.7 °F (36.5 °C) 92 20 98/68 95 %     Oxygen Therapy  O2 Sat (%): 97 % (08/24/21 0804)  Pulse via Oximetry: 94 beats per minute (08/23/21 1050)  O2 Device: Nasal cannula (08/23/21 2015)  Skin Assessment: Clean, dry, & intact (08/21/21 0010)  Skin Protection for O2 Device: No (08/20/21 0331)  O2 Flow Rate (L/min): 4 l/min (08/23/21 2015)  FIO2 (%): 45 % (08/20/21 0836)    Estimated body mass index is 41.25 kg/m² as calculated from the following:    Height as of this encounter: 5' 10\" (1.778 m). Weight as of this encounter: 130.4 kg (287 lb 8 oz). Intake/Output Summary (Last 24 hours) at 8/24/2021 1004  Last data filed at 8/24/2021 0949  Gross per 24 hour   Intake 347 ml   Output 2200 ml   Net -1853 ml         General:    Well nourished. No overt distress. Obes. Head:  Normocephalic, atraumatic  Eyes:  Sclerae appear normal.  Pupils equally round. HENT:  Nares appear normal, no drainage. Moist mucous membranes  Neck:  No restricted ROM. Trachea midline  CV:   Irreg irreg. No m/r/g. No JVD  Lungs:   Mild bb rales, 2L NC O2. No wheezes. Abdomen: Bowel sounds present. Soft, nontender, nondistended. Extremities: Warm and dry. No cyanosis or clubbing. No edema. R BKA. Skin:     No rashes. Normal turgor. Normal coloration  Neuro:  Cranial nerves II-XII grossly intact. Sensation intact  Psych:  Normal mood and affect.   Alert and oriented x3    Data Ordered and Personally Reviewed:    Last 24hr Labs:  Recent Results (from the past 24 hour(s))   GLUCOSE, POC    Collection Time: 08/23/21 12:32 PM   Result Value Ref Range    Glucose (POC) 115 (H) 65 - 100 mg/dL    Performed by [address] [place] [address], POC    Collection Time: 08/23/21  3:45 PM   Result Value Ref Range    Glucose (POC) 114 (H) 65 - 100 mg/dL    [facility]    GLUCOSE, POC    Collection Time: 08/23/21  9:04 PM   Result Value Ref Range    Glucose (POC) 144 (H) 65 - 100 mg/dL    Performed by [facility]    METABOLIC PANEL, BASIC    Collection Time: 08/24/21  4:22 AM   Result Value Ref Range    Sodium 143 136 - 145 mmol/L    Potassium 4.6 3.5 - 5.1 mmol/L    Chloride 106 98 - 107 mmol/L    CO2 36 (H) 21 - 32 mmol/L    Anion gap 1 (L) 7 - 16 mmol/L    Glucose 96 65 - 100 mg/dL    BUN 35 (H) 8 - 23 MG/DL    Creatinine 1.57 (H) 0.8 - 1.5 MG/DL    GFR est AA 56 (L) >60 ml/min/1.73m2    GFR est non-AA 46 (L) >60 ml/min/1.73m2    Calcium 9.0 8.3 - 10.4 MG/DL   CBC W/O DIFF    Collection Time: 08/24/21  4:22 AM   Result Value Ref Range    WBC 6.1 4.3 - 11.1 K/uL    RBC 3.06 (L) 4.23 - 5.6 M/uL    HGB 8.9 (L) 13.6 - 17.2 g/dL    HCT 29.9 (L) 41.1 - 50.3 %    MCV 97.7 79.6 - 97.8 FL    MCH 29.1 26.1 - 32.9 PG    MCHC 29.8 (L) 31.4 - 35.0 g/dL    RDW 15.7 (H) 11.9 - 14.6 %    PLATELET 402 124 - 388 K/uL    MPV 11.0 9.4 - 12.3 FL    ABSOLUTE NRBC 0.00 0.0 - 0.2 K/uL       All Micro Results     Procedure Component Value Units Date/Time    CULTURE, BLOOD [[id number]] Collected: 08/19/21 1156    Order Status: Completed Specimen: Blood Updated: 08/24/21 0656     Special Requests: --        LEFT  HAND       Culture result: NO GROWTH 5 DAYS       CULTURE, BLOOD [[id number]] Collected: 08/19/21 1227    Order Status: Completed Specimen: Blood Updated: 08/24/21 0656     Special Requests: --        LEFT  HAND       Culture result: NO GROWTH 5 DAYS             Current Meds:  Current Facility-Administered Medications   Medication Dose Route Frequency    midodrine (PROAMATINE) tablet 2.5 mg  2.5 mg Oral TID WITH MEALS    polyethylene glycol (MIRALAX) packet 17 g  17 g Oral DAILY    docusate sodium (COLACE) capsule 100 mg  100 mg Oral BID    fludrocortisone (FLORINEF) tablet 0.1 mg  0.1 mg Oral DAILY    potassium chloride (K-DUR, KLOR-CON) SR tablet 40 mEq  40 mEq Oral BID    [Held by provider] furosemide (LASIX) tablet 40 mg  40 mg Oral DAILY    pantoprazole (PROTONIX) tablet 40 mg  40 mg Oral ACB    [Held by provider] metoprolol succinate (TOPROL-XL) XL tablet 50 mg  50 mg Oral BID    apixaban (ELIQUIS) tablet 5 mg  5 mg Oral Q12H    metoprolol (LOPRESSOR) injection 2.5 mg  2.5 mg IntraVENous Q6H PRN    oxyCODONE IR (ROXICODONE) tablet 5 mg  5 mg Oral Q4H PRN    acetaminophen (TYLENOL) tablet 650 mg  650 mg Oral Q6H PRN    insulin lispro (HUMALOG) injection 0-10 Units  0-10 Units SubCUTAneous AC&HS    sodium chloride (NS) flush 5-40 mL  5-40 mL IntraVENous Q8H    sodium chloride (NS) flush 5-40 mL  5-40 mL IntraVENous PRN    atorvastatin (LIPITOR) tablet 40 mg  40 mg Oral QHS    nystatin (MYCOSTATIN) 100,000 unit/gram powder   Topical PRN    pregabalin (LYRICA) capsule 200 mg  200 mg Oral BID    traZODone (DESYREL) tablet 50 mg  50 mg Oral QHS    dextrose 40% (GLUTOSE) oral gel 1 Tube  15 g Oral PRN    glucagon (GLUCAGEN) injection 1 mg  1 mg IntraMUSCular PRN    dextrose (D50W) injection syrg 12.5-25 g  25-50 mL IntraVENous PRN    sodium chloride (NS) flush 5-40 mL  5-40 mL IntraVENous Q8H    sodium chloride (NS) flush 5-40 mL  5-40 mL IntraVENous PRN    naloxone (NARCAN) injection 0.4 mg  0.4 mg IntraVENous PRN    HYDROmorphone (DILAUDID) injection 0.5 mg  0.5 mg IntraVENous Q4H PRN       Other Studies:    No results found. Signed:  Silas Carey MD    Part of this note may have been written by using a voice dictation software. The note has been proof read but may still contain some grammatical/other typographical errors.

## 2021-08-24 NOTE — PROGRESS NOTES
ACUTE PHYSICAL THERAPY GOALS:  (Developed with and agreed upon by patient and/or caregiver.)  1. Mr. Rosalind Farrar will perform supine to sit and sit to supine with supervision in 7 days  2. Mr. Rosalind Farrar will perform sit to stand and bed to chair with min assist using rolling walker in 7 days. 3.  Mr. Rosalind Farrar will perform therex to right LE x 15 reps AROM in 7 days    PHYSICAL THERAPY: Daily Note and AM Treatment Day # 3    David Sky is a 77 y.o. male   PRIMARY DIAGNOSIS: S/P BKA (below knee amputation) unilateral, right (HCC)  Charcot's joint of foot, right [M14.671]  Infected wound [T14. 8XXA, L08.9]  S/P BKA (below knee amputation) unilateral, right (HCC) [Z89.511]  Respiratory failure, post-operative ([facility]) [J95.821]  Procedure(s) (LRB):  RIGHT LEG AMPUTATION BELOW KNEE (Right)  6 Days Post-Op    ASSESSMENT:     REHAB RECOMMENDATIONS: CURRENT LEVEL OF FUNCTION:  (Most Recently Demonstrated)   Recommendation to date pending progress:  [address] [place] [address]  Equipment:    To Be Determined Bed Mobility:   Minimal Assistance  Sit to Stand:   Moderate Assistance x 2  Transfers:   Not tested  Gait/Mobility:   Not tested     ASSESSMENT:  Mr. Rosalind Farrar is making steady progress towards PT goals. Patient is very motivated to participate with physical therapy. Patient performed supine to sit with min assist and cues for technique. Patient had difficulty scooting towards EOB needing max cues for proper technique. Patient performed 2 reps of sit to stand with mod assist x 2 and cues for hand placement technique. Once standing patient demonstrates poor standing balance and posture needing cues to improve. Patient returned to supine with min assist. Will continue efforts.      SUBJECTIVE:   Mr. Rosalind Farrar states, \"I was working 4 weeks ago\"    SOCIAL HISTORY/ LIVING ENVIRONMENT: see initial     OBJECTIVE:     PAIN: VITAL SIGNS: LINES/DRAINS:   Pre Treatment: Pain Screen  Pain Scale 1: FLACC  Pain Intensity 1: 00  Post Treatment: 0     O2 Device: Hi flow nasal cannula     MOBILITY: I Mod I S SBA CGA Min Mod Max Total  NT x2 Comments:   Bed Mobility    Rolling [] [] [] [] [] [] [] [] [] [] []    Supine to Sit [] [] [] [] [] [x] [] [] [] [] []    Scooting [] [] [] [] [] [] [] [] [] [] []    Sit to Supine [] [] [] [] [] [x] [] [] [] [] []    Transfers    Sit to Stand [] [] [] [] [] [] [x] [] [] [] []    Bed to Chair [] [] [] [] [] [] [] [] [] [x] []    Stand to Sit [] [] [] [] [] [] [x] [] [] [] []    I=Independent, Mod I=Modified Independent, S=Supervision, SBA=Standby Assistance, CGA=Contact Guard Assistance,   Min=Minimal Assistance, Mod=Moderate Assistance, Max=Maximal Assistance, Total=Total Assistance, NT=Not Tested    BALANCE: Good Fair+ Fair Fair- Poor NT Comments   Sitting Static [] [] [x] [] [] []    Sitting Dynamic [] [] [] [] [] []              Standing Static [] [] [] [] [x] []    Standing Dynamic [] [] [] [] [x] []      GAIT: I Mod I S SBA CGA Min Mod Max Total  NT x2 Comments:   Level of Assistance [] [] [] [] [] [] [] [] [] [x] []    Distance     DME     Gait Quality     Weightbearing  Status      I=Independent, Mod I=Modified Independent, S=Supervision, SBA=Standby Assistance, CGA=Contact Guard Assistance,   Min=Minimal Assistance, Mod=Moderate Assistance, Max=Maximal Assistance, Total=Total Assistance, NT=Not Tested    PLAN:   FREQUENCY/DURATION: PT Plan of Care: 3 times/week for duration of hospital stay or until stated goals are met, whichever comes first.  TREATMENT:     TREATMENT:   ($$ Therapeutic Activity: 23-37 mins    )  Co-Treatment PT/OT necessary due to patient's decreased overall endurance/tolerance levels, as well as need for high level skilled assistance to complete functional transfers/mobility and functional tasks  Therapeutic Activity (30 Minutes):  Therapeutic activity included Supine to Sit, Sit to Supine, Scooting, Lateral Scooting, Transfer Training, Sitting balance  and Standing balance to improve functional Mobility, Strength and Activity tolerance.     TREATMENT GRID:  N/A    AFTER TREATMENT POSITION/PRECAUTIONS:  Bed, Needs within reach and RN notified    INTERDISCIPLINARY COLLABORATION:  RN/PCT, PT/PTA and OT/GRAYSON    TOTAL TREATMENT DURATION:  PT Patient Time In/Time Out  Time In: 1108  Time Out: 1710 [facility]

## 2021-08-24 NOTE — PROGRESS NOTES
Problem: Falls - Risk of  Goal: *Absence of Falls  Description: Document Anthony Martinez Fall Risk and appropriate interventions in the flowsheet. Outcome: Progressing Towards Goal  Note: Fall Risk Interventions:       Mentation Interventions: Adequate sleep, hydration, pain control, Bed/chair exit alarm    Medication Interventions: Assess postural VS orthostatic hypotension, Bed/chair exit alarm, Patient to call before getting OOB, Teach patient to arise slowly    Elimination Interventions: Call light in reach              Problem: Patient Education: Go to Patient Education Activity  Goal: Patient/Family Education  Outcome: Progressing Towards Goal     Problem: Pressure Injury - Risk of  Goal: *Prevention of pressure injury  Description: Document Julio Cesar Scale and appropriate interventions in the flowsheet.   Outcome: Progressing Towards Goal  Note: Pressure Injury Interventions:  Sensory Interventions: Assess changes in LOC, Float heels, Minimize linen layers    Moisture Interventions: Absorbent underpads, Minimize layers    Activity Interventions: PT/OT evaluation, Increase time out of bed    Mobility Interventions: HOB 30 degrees or less, Float heels, PT/OT evaluation    Nutrition Interventions: Offer support with meals,snacks and hydration    Friction and Shear Interventions: Apply protective barrier, creams and emollients, HOB 30 degrees or less, Lift sheet, Minimize layers                Problem: Patient Education: Go to Patient Education Activity  Goal: Patient/Family Education  Outcome: Progressing Towards Goal     Problem: Ventilator Management  Goal: *Adequate oxygenation and ventilation  Outcome: Progressing Towards Goal  Goal: *Patient maintains clear airway/free of aspiration  Outcome: Progressing Towards Goal  Goal: *Absence of infection signs and symptoms  Outcome: Progressing Towards Goal  Goal: *Normal spontaneous ventilation  Outcome: Progressing Towards Goal     Problem: Patient Education: Go to Patient Education Activity  Goal: Patient/Family Education  Outcome: Progressing Towards Goal     Problem: Delirium Treatment  Goal: *Level of consciousness restored to baseline  Outcome: Progressing Towards Goal  Goal: *Level of environmental perceptions restored to baseline  Outcome: Progressing Towards Goal  Goal: *Sensory perception restored to baseline  Outcome: Progressing Towards Goal  Goal: *Emotional stability restored to baseline  Outcome: Progressing Towards Goal  Goal: *Functional assessment restored to baseline  Outcome: Progressing Towards Goal  Goal: *Absence of falls  Outcome: Progressing Towards Goal  Goal: *Will remain free of delirium, CAM Score negative  Outcome: Progressing Towards Goal  Goal: *Cognitive status will be restored to baseline  Outcome: Progressing Towards Goal  Goal: Interventions  Outcome: Progressing Towards Goal     Problem: Patient Education: Go to Patient Education Activity  Goal: Patient/Family Education  Outcome: Progressing Towards Goal     Problem: Patient Education: Go to Patient Education Activity  Goal: Patient/Family Education  Outcome: Progressing Towards Goal

## 2021-08-25 ENCOUNTER — HOSPITAL ENCOUNTER (INPATIENT)
Age: 76
LOS: 21 days | Discharge: NURSING, REHAB OR LONG-TERM CARE | DRG: 559 | End: 2021-09-15
Attending: PHYSICAL MEDICINE & REHABILITATION | Admitting: PHYSICAL MEDICINE & REHABILITATION
Payer: MEDICARE

## 2021-08-25 VITALS
HEIGHT: 70 IN | DIASTOLIC BLOOD PRESSURE: 52 MMHG | TEMPERATURE: 98.3 F | HEART RATE: 84 BPM | OXYGEN SATURATION: 95 % | WEIGHT: 287.5 LBS | SYSTOLIC BLOOD PRESSURE: 106 MMHG | RESPIRATION RATE: 20 BRPM | BODY MASS INDEX: 41.16 KG/M2

## 2021-08-25 DIAGNOSIS — E11.21 TYPE 2 DIABETES MELLITUS WITH NEPHROPATHY (HCC): Chronic | ICD-10-CM

## 2021-08-25 DIAGNOSIS — I48.91 ATRIAL FIBRILLATION WITH RVR (HCC): ICD-10-CM

## 2021-08-25 DIAGNOSIS — E66.01 MORBID OBESITY WITH BMI OF 40.0-44.9, ADULT (HCC): ICD-10-CM

## 2021-08-25 DIAGNOSIS — G93.41 ACUTE METABOLIC ENCEPHALOPATHY: ICD-10-CM

## 2021-08-25 DIAGNOSIS — Z89.511 S/P BKA (BELOW KNEE AMPUTATION) UNILATERAL, RIGHT (HCC): ICD-10-CM

## 2021-08-25 DIAGNOSIS — G62.89 MIXED AXONAL-DEMYELINATING POLYNEUROPATHY: Chronic | ICD-10-CM

## 2021-08-25 DIAGNOSIS — N18.9 ACUTE KIDNEY INJURY SUPERIMPOSED ON CKD (HCC): ICD-10-CM

## 2021-08-25 DIAGNOSIS — G47.00 INSOMNIA, UNSPECIFIED TYPE: ICD-10-CM

## 2021-08-25 DIAGNOSIS — J96.22 ACUTE ON CHRONIC RESPIRATORY FAILURE WITH HYPOXIA AND HYPERCAPNIA (HCC): ICD-10-CM

## 2021-08-25 DIAGNOSIS — N17.9 ACUTE KIDNEY INJURY SUPERIMPOSED ON CKD (HCC): ICD-10-CM

## 2021-08-25 DIAGNOSIS — I12.9 BENIGN HYPERTENSIVE KIDNEY DISEASE WITH CHRONIC KIDNEY DISEASE STAGE I THROUGH STAGE IV, OR UNSPECIFIED: Chronic | ICD-10-CM

## 2021-08-25 DIAGNOSIS — J96.21 ACUTE ON CHRONIC RESPIRATORY FAILURE WITH HYPOXIA AND HYPERCAPNIA (HCC): ICD-10-CM

## 2021-08-25 DIAGNOSIS — S88.111S UNILATERAL COMPLETE BKA, RIGHT, SEQUELA (HCC): ICD-10-CM

## 2021-08-25 LAB
ANION GAP SERPL CALC-SCNC: 0 MMOL/L (ref 7–16)
BUN SERPL-MCNC: 28 MG/DL (ref 8–23)
CALCIUM SERPL-MCNC: 9 MG/DL (ref 8.3–10.4)
CHLORIDE SERPL-SCNC: 106 MMOL/L (ref 98–107)
CO2 SERPL-SCNC: 35 MMOL/L (ref 21–32)
CREAT SERPL-MCNC: 1.49 MG/DL (ref 0.8–1.5)
GLUCOSE BLD STRIP.AUTO-MCNC: 105 MG/DL (ref 65–100)
GLUCOSE BLD STRIP.AUTO-MCNC: 123 MG/DL (ref 65–100)
GLUCOSE BLD STRIP.AUTO-MCNC: 142 MG/DL (ref 65–100)
GLUCOSE BLD STRIP.AUTO-MCNC: 95 MG/DL (ref 65–100)
GLUCOSE SERPL-MCNC: 98 MG/DL (ref 65–100)
POTASSIUM SERPL-SCNC: 5.1 MMOL/L (ref 3.5–5.1)
SERVICE CMNT-IMP: ABNORMAL
SERVICE CMNT-IMP: NORMAL
SODIUM SERPL-SCNC: 141 MMOL/L (ref 138–145)

## 2021-08-25 PROCEDURE — 74011250637 HC RX REV CODE- 250/637: Performed by: INTERNAL MEDICINE

## 2021-08-25 PROCEDURE — 65310000000 HC RM PRIVATE REHAB

## 2021-08-25 PROCEDURE — 82962 GLUCOSE BLOOD TEST: CPT

## 2021-08-25 PROCEDURE — 97162 PT EVAL MOD COMPLEX 30 MIN: CPT

## 2021-08-25 PROCEDURE — 99223 1ST HOSP IP/OBS HIGH 75: CPT | Performed by: PHYSICAL MEDICINE & REHABILITATION

## 2021-08-25 PROCEDURE — 80048 BASIC METABOLIC PNL TOTAL CA: CPT

## 2021-08-25 PROCEDURE — 36592 COLLECT BLOOD FROM PICC: CPT

## 2021-08-25 PROCEDURE — 74011250637 HC RX REV CODE- 250/637: Performed by: PHYSICIAN ASSISTANT

## 2021-08-25 PROCEDURE — 74011250637 HC RX REV CODE- 250/637: Performed by: HOSPITALIST

## 2021-08-25 PROCEDURE — 97166 OT EVAL MOD COMPLEX 45 MIN: CPT

## 2021-08-25 PROCEDURE — 74011250637 HC RX REV CODE- 250/637: Performed by: NURSE PRACTITIONER

## 2021-08-25 PROCEDURE — 97535 SELF CARE MNGMENT TRAINING: CPT

## 2021-08-25 PROCEDURE — 97530 THERAPEUTIC ACTIVITIES: CPT

## 2021-08-25 RX ORDER — TRAMADOL HYDROCHLORIDE 50 MG/1
50 TABLET ORAL
Status: CANCELLED | OUTPATIENT
Start: 2021-08-25

## 2021-08-25 RX ORDER — DOCUSATE SODIUM 100 MG/1
100 CAPSULE, LIQUID FILLED ORAL 2 TIMES DAILY
Qty: 60 CAPSULE | Refills: 2 | Status: SHIPPED | OUTPATIENT
Start: 2021-08-25 | End: 2021-11-23

## 2021-08-25 RX ORDER — ATORVASTATIN CALCIUM 40 MG/1
40 TABLET, FILM COATED ORAL
Status: DISCONTINUED | OUTPATIENT
Start: 2021-08-25 | End: 2021-09-15 | Stop reason: HOSPADM

## 2021-08-25 RX ORDER — TRAMADOL HYDROCHLORIDE 50 MG/1
50 TABLET ORAL
Status: DISCONTINUED | OUTPATIENT
Start: 2021-08-25 | End: 2021-09-15 | Stop reason: HOSPADM

## 2021-08-25 RX ORDER — DEXTROSE 40 %
15 GEL (GRAM) ORAL AS NEEDED
Status: DISCONTINUED | OUTPATIENT
Start: 2021-08-25 | End: 2021-09-03

## 2021-08-25 RX ORDER — DEXTROSE 50 % IN WATER (D50W) INTRAVENOUS SYRINGE
25-50 AS NEEDED
Status: DISCONTINUED | OUTPATIENT
Start: 2021-08-25 | End: 2021-09-03

## 2021-08-25 RX ORDER — MIDODRINE HYDROCHLORIDE 5 MG/1
2.5 TABLET ORAL
Status: CANCELLED | OUTPATIENT
Start: 2021-08-25

## 2021-08-25 RX ORDER — MIDODRINE HYDROCHLORIDE 2.5 MG/1
2.5 TABLET ORAL
Qty: 90 TABLET | Refills: 0 | Status: SHIPPED | OUTPATIENT
Start: 2021-08-25 | End: 2021-09-24

## 2021-08-25 RX ORDER — ATORVASTATIN CALCIUM 40 MG/1
40 TABLET, FILM COATED ORAL
Qty: 60 TABLET | Refills: 0 | Status: SHIPPED | OUTPATIENT
Start: 2021-08-25 | End: 2021-11-08 | Stop reason: SDUPTHER

## 2021-08-25 RX ORDER — TRAZODONE HYDROCHLORIDE 50 MG/1
50 TABLET ORAL
Status: CANCELLED | OUTPATIENT
Start: 2021-08-25

## 2021-08-25 RX ORDER — DOCUSATE SODIUM 100 MG/1
100 CAPSULE, LIQUID FILLED ORAL 2 TIMES DAILY
Status: CANCELLED | OUTPATIENT
Start: 2021-08-25

## 2021-08-25 RX ORDER — OXYCODONE HYDROCHLORIDE 5 MG/1
5 TABLET ORAL
Status: DISCONTINUED | OUTPATIENT
Start: 2021-08-25 | End: 2021-09-15 | Stop reason: HOSPADM

## 2021-08-25 RX ORDER — INSULIN LISPRO 100 [IU]/ML
INJECTION, SOLUTION INTRAVENOUS; SUBCUTANEOUS
Qty: 1 VIAL | Refills: 0 | Status: SHIPPED
Start: 2021-08-25 | End: 2021-09-15

## 2021-08-25 RX ORDER — PANTOPRAZOLE SODIUM 40 MG/1
40 TABLET, DELAYED RELEASE ORAL
Status: CANCELLED | OUTPATIENT
Start: 2021-08-26

## 2021-08-25 RX ORDER — NYSTATIN 100000 [USP'U]/G
POWDER TOPICAL AS NEEDED
Status: CANCELLED | OUTPATIENT
Start: 2021-08-25

## 2021-08-25 RX ORDER — NYSTATIN 100000 [USP'U]/G
POWDER TOPICAL AS NEEDED
Status: DISCONTINUED | OUTPATIENT
Start: 2021-08-25 | End: 2021-09-15 | Stop reason: HOSPADM

## 2021-08-25 RX ORDER — PANTOPRAZOLE SODIUM 40 MG/1
40 TABLET, DELAYED RELEASE ORAL
Qty: 30 TABLET | Refills: 0 | Status: SHIPPED | OUTPATIENT
Start: 2021-08-26 | End: 2021-09-27

## 2021-08-25 RX ORDER — ATORVASTATIN CALCIUM 40 MG/1
40 TABLET, FILM COATED ORAL
Status: CANCELLED | OUTPATIENT
Start: 2021-08-25

## 2021-08-25 RX ORDER — NALOXONE HYDROCHLORIDE 0.4 MG/ML
0.4 INJECTION, SOLUTION INTRAMUSCULAR; INTRAVENOUS; SUBCUTANEOUS AS NEEDED
Status: CANCELLED | OUTPATIENT
Start: 2021-08-25

## 2021-08-25 RX ORDER — NALOXONE HYDROCHLORIDE 0.4 MG/ML
0.4 INJECTION, SOLUTION INTRAMUSCULAR; INTRAVENOUS; SUBCUTANEOUS AS NEEDED
Status: DISCONTINUED | OUTPATIENT
Start: 2021-08-25 | End: 2021-09-15 | Stop reason: HOSPADM

## 2021-08-25 RX ORDER — FLUDROCORTISONE ACETATE 0.1 MG/1
0.1 TABLET ORAL DAILY
Status: CANCELLED | OUTPATIENT
Start: 2021-08-26

## 2021-08-25 RX ORDER — PREGABALIN 100 MG/1
200 CAPSULE ORAL 2 TIMES DAILY
Status: DISCONTINUED | OUTPATIENT
Start: 2021-08-25 | End: 2021-09-15 | Stop reason: HOSPADM

## 2021-08-25 RX ORDER — TRAZODONE HYDROCHLORIDE 50 MG/1
50 TABLET ORAL
Status: DISCONTINUED | OUTPATIENT
Start: 2021-08-25 | End: 2021-09-15 | Stop reason: HOSPADM

## 2021-08-25 RX ORDER — DOCUSATE SODIUM 100 MG/1
100 CAPSULE, LIQUID FILLED ORAL 2 TIMES DAILY
Status: DISCONTINUED | OUTPATIENT
Start: 2021-08-25 | End: 2021-09-15 | Stop reason: HOSPADM

## 2021-08-25 RX ORDER — POLYETHYLENE GLYCOL 3350 17 G/17G
17 POWDER, FOR SOLUTION ORAL DAILY
Status: CANCELLED | OUTPATIENT
Start: 2021-08-26

## 2021-08-25 RX ORDER — MIDODRINE HYDROCHLORIDE 5 MG/1
2.5 TABLET ORAL
Status: DISCONTINUED | OUTPATIENT
Start: 2021-08-25 | End: 2021-09-15 | Stop reason: HOSPADM

## 2021-08-25 RX ORDER — POLYETHYLENE GLYCOL 3350 17 G/17G
17 POWDER, FOR SOLUTION ORAL DAILY
Status: DISCONTINUED | OUTPATIENT
Start: 2021-08-26 | End: 2021-09-15 | Stop reason: HOSPADM

## 2021-08-25 RX ORDER — FLUDROCORTISONE ACETATE 0.1 MG/1
0.1 TABLET ORAL DAILY
Status: DISCONTINUED | OUTPATIENT
Start: 2021-08-26 | End: 2021-09-15 | Stop reason: HOSPADM

## 2021-08-25 RX ORDER — ACETAMINOPHEN 325 MG/1
650 TABLET ORAL
Status: CANCELLED | OUTPATIENT
Start: 2021-08-25

## 2021-08-25 RX ORDER — PANTOPRAZOLE SODIUM 40 MG/1
40 TABLET, DELAYED RELEASE ORAL
Status: DISCONTINUED | OUTPATIENT
Start: 2021-08-26 | End: 2021-09-15 | Stop reason: HOSPADM

## 2021-08-25 RX ORDER — INSULIN LISPRO 100 [IU]/ML
0-10 INJECTION, SOLUTION INTRAVENOUS; SUBCUTANEOUS
Status: CANCELLED | OUTPATIENT
Start: 2021-08-25

## 2021-08-25 RX ORDER — ACETAMINOPHEN 325 MG/1
650 TABLET ORAL
Status: DISCONTINUED | OUTPATIENT
Start: 2021-08-25 | End: 2021-09-15 | Stop reason: HOSPADM

## 2021-08-25 RX ORDER — OXYCODONE HYDROCHLORIDE 5 MG/1
5 TABLET ORAL
Status: CANCELLED | OUTPATIENT
Start: 2021-08-25

## 2021-08-25 RX ORDER — PREGABALIN 50 MG/1
200 CAPSULE ORAL 2 TIMES DAILY
Status: CANCELLED | OUTPATIENT
Start: 2021-08-25

## 2021-08-25 RX ORDER — DEXTROSE 40 %
15 GEL (GRAM) ORAL AS NEEDED
Status: CANCELLED | OUTPATIENT
Start: 2021-08-25

## 2021-08-25 RX ORDER — DEXTROSE 50 % IN WATER (D50W) INTRAVENOUS SYRINGE
25-50 AS NEEDED
Status: CANCELLED | OUTPATIENT
Start: 2021-08-25

## 2021-08-25 RX ORDER — FLUDROCORTISONE ACETATE 0.1 MG/1
0.1 TABLET ORAL DAILY
Qty: 60 TABLET | Refills: 0 | Status: SHIPPED | OUTPATIENT
Start: 2021-08-26 | End: 2022-03-30 | Stop reason: SDUPTHER

## 2021-08-25 RX ORDER — INSULIN LISPRO 100 [IU]/ML
0-10 INJECTION, SOLUTION INTRAVENOUS; SUBCUTANEOUS
Status: DISCONTINUED | OUTPATIENT
Start: 2021-08-25 | End: 2021-09-03

## 2021-08-25 RX ADMIN — MIDODRINE HYDROCHLORIDE 2.5 MG: 5 TABLET ORAL at 08:26

## 2021-08-25 RX ADMIN — ATORVASTATIN CALCIUM 40 MG: 40 TABLET, FILM COATED ORAL at 21:31

## 2021-08-25 RX ADMIN — DOCUSATE SODIUM 100 MG: 100 CAPSULE, LIQUID FILLED ORAL at 21:31

## 2021-08-25 RX ADMIN — FLUDROCORTISONE ACETATE 0.1 MG: 0.1 TABLET ORAL at 08:27

## 2021-08-25 RX ADMIN — POLYETHYLENE GLYCOL 3350 17 G: 17 POWDER, FOR SOLUTION ORAL at 08:27

## 2021-08-25 RX ADMIN — PREGABALIN 200 MG: 100 CAPSULE ORAL at 21:31

## 2021-08-25 RX ADMIN — PANTOPRAZOLE SODIUM 40 MG: 40 TABLET, DELAYED RELEASE ORAL at 05:33

## 2021-08-25 RX ADMIN — APIXABAN 5 MG: 5 TABLET, FILM COATED ORAL at 08:26

## 2021-08-25 RX ADMIN — Medication 10 ML: at 05:32

## 2021-08-25 RX ADMIN — DOCUSATE SODIUM 100 MG: 100 CAPSULE, LIQUID FILLED ORAL at 08:26

## 2021-08-25 RX ADMIN — APIXABAN 5 MG: 5 TABLET, FILM COATED ORAL at 21:31

## 2021-08-25 RX ADMIN — PREGABALIN 200 MG: 150 CAPSULE ORAL at 08:26

## 2021-08-25 RX ADMIN — MIDODRINE HYDROCHLORIDE 2.5 MG: 5 TABLET ORAL at 16:30

## 2021-08-25 RX ADMIN — TRAZODONE HYDROCHLORIDE 50 MG: 50 TABLET ORAL at 21:31

## 2021-08-25 NOTE — H&P
Denisa Sarmiento MD  Medical Director  [address] [facility], [address] [facility]  Tel: [phone]       Admission History and Physical Exam  Ericka Mccarthy Date: 8/25/2021  Surgeon: Dr Fatemeh Braga  Primary Care Provider: Aubrey Araujo MD  Specialty Group / Referring Service: orthopedics, cardiology, pulmonary, ID    Chief Complaint : \"the sooner I get started, the faster I will get better\"  Admitting Diagnosis:   Unilateral complete BKA, right, sequela ([facility] [Y39.757K]    Active Problems:    Unilateral complete BKA, right, sequela ([facility] (8/25/2021)    post op anemia secondary to blood loss  afib  CAD  CKD stg3  DM2  Diabetic polyneuropathy  Mixed HLD  HTN  sCHF  Hypoxia  Staph aureus wound infxn  Morbid obesity    Acute Rehab Dx:  Gait impairment / gait dysfunction  Debility  Deconditioning  Mobility and ambulation deficits  Self care / ADL deficits    Medical Dx:  Past Medical History:   Diagnosis Date    A-fib ([facility] 07/19/2021    developed AFID after hospital admission for wound infection- started on Eliquis    CAD (coronary artery disease)     [facility].     Chronic kidney disease     stage 3- improved    COVID-19 vaccine series completed     Moderna Vaccine completed X2 doses    Current use of long term anticoagulation     Eliquis and Aspirin    H/O heart artery stent     X1 placed in 1999    History of MI (myocardial infarction) 1999    stent placed X1    Hypercholesterolemia     Hypertension     Left ventricular dysfunction     echo revealed EF 30-35%, mildly dilated LA/RA and mild TR and MR.    Neuropathy     severe    Oxygen dependent     recently started on 2L NC continous- Sleep study to be scheduled-questionable ELAINA    PICC (peripherally inserted central catheter) in place     PICC line in place to R arm    Type 2 diabetes mellitus ([facility]     oral agent/Pt does not monitor BS/no s.s of hypoglycemia/Last A1c: 6.7 on 7/13/21 per daughter       Date of Evaluation: August 25, 2021    Candy Kiser is a 77 y.o. male patient at [facility] who was admitted to [address] [place] [address] on 8/25/2021 by Gisela Whiteside MD of Physical Medicine and Rehabilitation service with below-mentioned medical history. HPI: The patient is a r-hand dominant, previously functionally independent 78yo male with a PMH of CHF, afib, peripheral neuropathy, DM2 with nephropathy, morbid obesity, chronic respiratory failure on 2-4L O2 at home and cellulitis of the RLE (charcot ankle )who has failed outpt long term abx who was recently hospitalized in July with sepsis due to a right foot ulcer.   Amputation was recommended initially but he refused at that time and was treated with abx.  During that hospitalization he was found to be in a fib with RVR for which toprol and Eliquis for [facility].  Echo showed EF or 30-35%.  After discharge he was seen in follow up by ortho and the decision to proceed with amputation was made. He underwent a R BKA on 8/18. Post op they had difficulty extubating in the PACU, thus he was admitted to the ICU. He was noted to be in afib, rate controlled. His Eliquis had been held for 48hr preop, and he was placed on therapeutic Lovenox post on. He was extubated to [place] on POD #1. The patient told Pulm Medicine that his breathing felt better than it ever had. He transferred to the floor on POD 2. He is currently on 3L O2 per NC. He has blood loss anemia with hx of chronic anemia due to CKD. 8/23 hgb 9.2 (11.9 preop), creat 1.97 today, up from 1.26 preop. Baseline appears to be about 1.4. ID has dc'd abx post op. He had received about 4weeks of Ancef  Current labs; hgb 8.9, BUN 28, creat 1.49 from 1.57 yesterday    Physical Medicine and Rehabilitation service was consulted, and patient was initially evaluated by Dr. Shanae Christiansen on 8/23.      During reevaluation earlier today, patient shows improvement from initial consult but still shows significant functional deficits. We recommend inpatient hospital rehabilitation as reasonable and necessary due to ongoing acute medical issues which have not changed since initial pre-admission evaluation. We reviewed the preadmission screening and have approved the patient for admission to the [facility]. Attending service cleared patient for transfer to rehab today. Patient continues to have ongoing medical issues outlined above requiring physician / PA medical supervision and has functional deficits which would benefit from continued intensive therapies. Current Level of Function: (evaluated by acute therapy staff, with bed mobility, transfers, balance personally observed post-admission in the IRF setting minutes prior to submission of document)   OT; moderate assist with bathing/dressing; max assist with toileting and min/mod assist of 2 with functional mobility  PT; bed mobility min assist, mod assist of 2 STS. Poor standing balance. 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     Chronic kidney disease     stage 3- improved    COVID-19 vaccine series completed     Moderna Vaccine completed X2 doses    Current use of long term anticoagulation     Eliquis and Aspirin    H/O heart artery stent     X1 placed in 1999    History of MI (myocardial infarction) 1999    stent placed X1    Hypercholesterolemia     Hypertension     Left ventricular dysfunction     echo revealed EF 30-35%, mildly dilated LA/RA and mild TR and MR.    Neuropathy     severe    Oxygen dependent     recently started on 2L NC continous- Sleep study to be scheduled-questionable ELAINA    PICC (peripherally inserted central catheter) in place     PICC line in place to R arm    Type 2 diabetes mellitus ([facility] [address]     oral agent/Pt does not monitor BS/no s.s of hypoglycemia/Last A1c: 6.7 on 7/13/21 per daughter      Past Surgical History:   Procedure Laterality Date    NM CARDIAC SURG PROCEDURE UNLIST  1999    stent placement     No Known Allergies   Family History   Problem Relation Age of Onset    Cancer Mother     Cancer Father     No Known Problems Maternal Grandmother     No Known Problems Maternal Grandfather     No Known Problems Paternal Grandmother     No Known Problems Paternal Grandfather       Social History     Tobacco Use    Smoking status: Never Smoker    Smokeless tobacco: Never Used   Substance Use Topics    Alcohol use: No      Prior to Admission Medications   Prescriptions Last Dose Informant Patient Reported? Taking? apixaban (ELIQUIS) 5 mg tablet   No No   Sig: Take 1 Tablet by mouth every twelve (12) hours. atorvastatin (LIPITOR) 40 mg tablet   No No   Sig: Take 1 Tablet by mouth nightly. Indications: treatment to slow progression of coronary artery disease   cyanocobalamin 1,000 mcg tablet   No No   Sig: Take 1 Tab by mouth daily. docusate sodium (COLACE) 100 mg capsule   No No   Sig: Take 1 Capsule by mouth two (2) times a day for 90 days. fludrocortisone (FLORINEF) 0.1 mg tablet   No No   Sig: Take 1 Tablet by mouth daily. furosemide (LASIX) 20 mg tablet   No No   Sig: Take 1 Tablet by mouth as needed for Other (WEIGHT INCREASE GREATER THEN 2LB/DAY OR 5LB/DAY). glipiZIDE (GLUCOTROL) 5 mg tablet   No No   Sig: Take 0.5 Tablets by mouth daily. insulin lispro (HUMALOG) 100 unit/mL injection   No No   Sig: a  Indications: type 2 diabetes mellitus   metoprolol succinate (TOPROL-XL) 25 mg XL tablet   No No   Sig: Take 1 Tablet by mouth two (2) times a day.    midodrine (PROAMATINE) 2.5 mg tablet   No No Sig: Take 1 Tablet by mouth three (3) times daily (with meals) for 30 days. pantoprazole (PROTONIX) 40 mg tablet   No No   Sig: Take 1 Tablet by mouth Daily (before breakfast). pregabalin (Lyrica) 200 mg capsule   Yes No   Sig: Take 200 mg by mouth two (2) times a day.       Facility-Administered Medications: None     Current Facility-Administered Medications   Medication Dose Route Frequency    naloxone (NARCAN) injection 0.4 mg  0.4 mg IntraVENous PRN    dextrose (D50W) injection syrg 12.5-25 g  25-50 mL IntraVENous PRN    dextrose 40% (GLUTOSE) oral gel 1 Tube  15 g Oral PRN    glucagon (GLUCAGEN) injection 1 mg  1 mg IntraMUSCular PRN    insulin lispro (HUMALOG) injection 0-10 Units  0-10 Units SubCUTAneous AC&HS    acetaminophen (TYLENOL) tablet 650 mg  650 mg Oral Q6H PRN    apixaban (ELIQUIS) tablet 5 mg  5 mg Oral Q12H    atorvastatin (LIPITOR) tablet 40 mg  40 mg Oral QHS    docusate sodium (COLACE) capsule 100 mg  100 mg Oral BID    [START ON 8/26/2021] fludrocortisone (FLORINEF) tablet 0.1 mg  0.1 mg Oral DAILY    midodrine (PROAMATINE) tablet 2.5 mg  2.5 mg Oral TID WITH MEALS    nystatin (MYCOSTATIN) 100,000 unit/gram powder   Topical PRN    oxyCODONE IR (ROXICODONE) tablet 5 mg  5 mg Oral Q4H PRN    [START ON 8/26/2021] pantoprazole (PROTONIX) tablet 40 mg  40 mg Oral ACB    [START ON 8/26/2021] polyethylene glycol (MIRALAX) packet 17 g  17 g Oral DAILY    pregabalin (LYRICA) capsule 200 mg  200 mg Oral BID    traMADoL (ULTRAM) tablet 50 mg  50 mg Oral Q6H PRN    traZODone (DESYREL) tablet 50 mg  50 mg Oral QHS       Review of Systems:  General: Denies: fevers, chills, sweats, fatigue, malaise, anorexia, weight loss   Eyes:  Denies: blurry vision, loss of vision, eye pain, photophobia   HENT:  Denies: hearing loss, tinnitus, earache, epistaxis, sore throat, hoarseness  Lungs: Denies: cough, wheeze, asthma, hemoptysis, dyspnea + O2 dependence, + KAPADIA  CV:  Denies: chest pain, palpitations, syncope, orthopnea, paroxysmal nocturnal dyspnea, claudication   GI:  Denies: dysphagia, odynophagia, nausea, vomiting, diarrhea, constipation, abdominal pain, jaundice, melena   :  Denies: frequency, dysuria, nocturia, urinary incontinence, stones, hematuria   Endocrine: Denies: polydipsia/polyuria, skin changes, temperature intolerance, unexpected weight gain or loss  MSK:  Denies: back pain, joint pain, joint swelling, muscle pain, muscle weakness   Heme:  Denies: bleeding problems, blood transfusions, bruising, pallor, swollen lymph nodes   Neuro:  Denies: headache, dysarthria, blurred vision, diplopia, seizure, focal deficits      Objective: There were no vitals taken for this visit. Intake and Output:  No intake/output data recorded. Physical Exam:   General:  Alert, appears stated age. No acute distress. HEENT:  Normocephalic, EOM intact, facial symmetry noted. Nares patent, oral mucosa moist without lesions. Lungs:  Clear to auscultation bilaterally but dec at bases  Respirations even and unlabored. Heart:  Regular rate and underlying rhythm, S1, S2. No obvious murmur, click, rub or gallop. Genitourinary: Deferred. Abdomen:  Soft, non-tender, not distended. Bowel sounds normoactive. obese  No obvious masses or organomegaly palpated. Neuromuscular:  Conversant, tangential  UEs 4+  RLE hip flexion 3+, KI in place, residual limb wrapped in ACE wrap  LLE hip flexion 4, distal 5-  No lt touch sensation from distal LLE up to mid shin. Lack proprioception      Skin:  Intact, dry, good skin turgor, age related changes present   Edema: none   Incision:  covered, clean, dry, and intact     Psych: Patient was oriented to person, place, and time. Without hallucinations, abnormal affect or abnormal behaviors during the examination.   Pt with unrealistic goals     Recent Results (from the past 72 hour(s))   GLUCOSE, POC    Collection Time: 08/22/21  5:20 PM   Result Value Ref Range    Glucose (POC) 132 (H) 65 - 100 mg/dL    Performed by Manoj    GLUCOSE, POC    Collection Time: 08/22/21 11:20 PM   Result Value Ref Range    Glucose (POC) 140 (H) 65 - 100 mg/dL    Performed by Kelly    CBC WITH AUTOMATED DIFF    Collection Time: 08/23/21  4:53 AM   Result Value Ref Range    WBC 7.2 4.3 - 11.1 K/uL    RBC 3.23 (L) 4.23 - 5.6 M/uL    HGB 9.2 (L) 13.6 - 17.2 g/dL    HCT 30.6 (L) 41.1 - 50.3 %    MCV 94.7 79.6 - 97.8 FL    MCH 28.5 26.1 - 32.9 PG    MCHC 30.1 (L) 31.4 - 35.0 g/dL    RDW 15.8 (H) 11.9 - 14.6 %    PLATELET 427 337 - 585 K/uL    MPV 11.3 9.4 - 12.3 FL    ABSOLUTE NRBC 0.00 0.0 - 0.2 K/uL    DF AUTOMATED      NEUTROPHILS 45 43 - 78 %    LYMPHOCYTES 37 13 - 44 %    MONOCYTES 13 (H) 4.0 - 12.0 %    EOSINOPHILS 4 0.5 - 7.8 %    BASOPHILS 0 0.0 - 2.0 %    IMMATURE GRANULOCYTES 1 0.0 - 5.0 %    ABS. NEUTROPHILS 3.3 1.7 - 8.2 K/UL    ABS. LYMPHOCYTES 2.7 0.5 - 4.6 K/UL    ABS. MONOCYTES 0.9 0.1 - 1.3 K/UL    ABS. EOSINOPHILS 0.3 0.0 - 0.8 K/UL    ABS. BASOPHILS 0.0 0.0 - 0.2 K/UL    ABS. IMM.  GRANS. 0.0 0.0 - 0.5 K/UL   METABOLIC PANEL, BASIC    Collection Time: 08/23/21  4:53 AM   Result Value Ref Range    Sodium 140 136 - 145 mmol/L    Potassium 3.7 3.5 - 5.1 mmol/L    Chloride 103 98 - 107 mmol/L    CO2 38 (H) 21 - 32 mmol/L    Anion gap Cannot be calculated 7 - 16 mmol/L    Glucose 104 (H) 65 - 100 mg/dL    BUN 40 (H) 8 - 23 MG/DL    Creatinine 1.97 (H) 0.8 - 1.5 MG/DL    GFR est AA 43 (L) >60 ml/min/1.73m2    GFR est non-AA 35 (L) >60 ml/min/1.73m2    Calcium 9.1 8.3 - 10.4 MG/DL   GLUCOSE, POC    Collection Time: 08/23/21 12:32 PM   Result Value Ref Range    Glucose (POC) 115 (H) 65 - 100 mg/dL    Performed by Malcolm    GLUCOSE, POC    Collection Time: 08/23/21  3:45 PM   Result Value Ref Range    Glucose (POC) 114 (H) 65 - 100 mg/dL    Performed by Jean Claude    GLUCOSE, POC    Collection Time: 08/23/21  9:04 PM   Result Value Ref Range Glucose (POC) 144 (H) 65 - 100 mg/dL    Performed by [facility]    METABOLIC PANEL, BASIC    Collection Time: 08/24/21  4:22 AM   Result Value Ref Range    Sodium 143 136 - 145 mmol/L    Potassium 4.6 3.5 - 5.1 mmol/L    Chloride 106 98 - 107 mmol/L    CO2 36 (H) 21 - 32 mmol/L    Anion gap 1 (L) 7 - 16 mmol/L    Glucose 96 65 - 100 mg/dL    BUN 35 (H) 8 - 23 MG/DL    Creatinine 1.57 (H) 0.8 - 1.5 MG/DL    GFR est AA 56 (L) >60 ml/min/1.73m2    GFR est non-AA 46 (L) >60 ml/min/1.73m2    Calcium 9.0 8.3 - 10.4 MG/DL   CBC W/O DIFF    Collection Time: 08/24/21  4:22 AM   Result Value Ref Range    WBC 6.1 4.3 - 11.1 K/uL    RBC 3.06 (L) 4.23 - 5.6 M/uL    HGB 8.9 (L) 13.6 - 17.2 g/dL    HCT 29.9 (L) 41.1 - 50.3 %    MCV 97.7 79.6 - 97.8 FL    MCH 29.1 26.1 - 32.9 PG    MCHC 29.8 (L) 31.4 - 35.0 g/dL    RDW 15.7 (H) 11.9 - 14.6 %    PLATELET 807 692 - 787 K/uL    MPV 11.0 9.4 - 12.3 FL    ABSOLUTE NRBC 0.00 0.0 - 0.2 K/uL   GLUCOSE, POC    Collection Time: 08/24/21 12:31 PM   Result Value Ref Range    Glucose (POC) 111 (H) 65 - 100 mg/dL    Performed by Malcolm    GLUCOSE, POC    Collection Time: 08/24/21  5:46 PM   Result Value Ref Range    Glucose (POC) 134 (H) 65 - 100 mg/dL    Performed by Malcolm    GLUCOSE, POC    Collection Time: 08/24/21  8:34 PM   Result Value Ref Range    Glucose (POC) 107 (H) 65 - 100 mg/dL    Per[facility] by [facility]    METABOLIC PANEL, BASIC    Collection Time: 08/25/21  5:45 AM   Result Value Ref Range    Sodium 141 138 - 145 mmol/L    Potassium 5.1 3.5 - 5.1 mmol/L    Chloride 106 98 - 107 mmol/L    CO2 35 (H) 21 - 32 mmol/L    Anion gap 0 (L) 7 - 16 mmol/L    Glucose 98 65 - 100 mg/dL    BUN 28 (H) 8 - 23 MG/DL    Creatinine 1.49 0.8 - 1.5 MG/DL    GFR est AA 59 (L) >60 ml/min/1.73m2    GFR est non-AA 49 (L) >60 ml/min/1.73m2    Calcium 9.0 8.3 - 10.4 MG/DL   GLUCOSE, POC    Collection Time: 08/25/21  7:25 AM   Result Value Ref Range    Glucose (POC) 105 (H) 65 - 100 mg/dL    Performed by Earlene    GLUCOSE, POC    Collection Time: 08/25/21 11:39 AM   Result Value Ref Range    Glucose (POC) 123 (H) 65 - 100 mg/dL    Performed by Jude          Condition on Admission: Good        Assessment:     The Post Assessment Physician Evaluation (CHRISTIANO) found the current functional status to be comparable with the pre-admission screening. The patient is a good candidate for acute inpatient rehabilitation. Nothing since the pre-admission screen has changed that determination. Rehabilitation Plan  The patient has shown the ability to tolerate and benefit from 3 hours of therapy daily and is being admitted to a comprehensive acute inpatient rehabilitation program consisting of at least 3 hours of combined physical and occupational therapies. - Begin intensive Physical Therapy for a minimum of 1.5 hours a day, at least 5 out of 7 days per week to address bed mobility, transfers, ambulation, strengthening, balance, and endurance. - Begin intensive Occupational Therapy for a minimum of 1.5 hours a day, at least 5 out of 7 days per week to address ADLs (bathing, LE dressing, toileting) and adaptive equipment as needed. Each of these therapies will be continued as above for the duration of the inpatient rehab stay. The patient will also require 24-hour skilled rehabilitation nursing for bowel and bladder management, skin care for decubitus ulcer prevention, pain management and ongoing medication administration. S/p R BKA secondary to cellulitis     Plan / Recommendations / Medical Decision Making:     Daily physician / PA medical management:    Unilateral complete BKA, right, sequela ([facility] [S88.111S] -   -NWB RLE; prosthetics involved. Will be difficullt for pt to mobilize with a prosthesis given he lacks sensation and proprioception in the right foot , body habitus, chronic resp failure and CHF    Hypotension - BP fluctuating, managed medically.  Has been hypotensive and is on florinef and midodrine. Dehydration thught to be contributing as can diuretics    Acute on chronic respiratory failure. Cont 3L o2, wean to 2L if possible. Enc IS. Afib; cont eliquis 5mg bid. Rate controlled. toprol XL has loly held due to hypotensive at time. Acute on chronic anemia; anemia of chronic kidney dz and post op pain due to blood loss. hgb trending down. Follow closely. No evidence of active bleed    CKD stg 3; improving. Monitor. Creatine 1.49 from 1.57    Diabetes mellitus - uncontrolled / poor glycemic control. Will require daily, close fasting glucose monitoring and medication adjustment to optimize glycemic control in setting of acute illness and hospitalization.   -takes glucotrol 5mg at home. Currently on SSI; add glucotrol    Pain control - stable, mild-to-moderate joint symptoms intermittently, reasonably well controlled by PRN meds. Will require regular pain assessment and comprehensive pain management. Has crhronic mixed axonaldemyelinating poly neuropathy; on Lyrica 200mg bid. Denies residual limb pain but has tramadol and rikki ordered prn    Pneumonia prophylaxis - incentive spirometer every hour while awake. DVT risk / DVT prophylaxis - will require daily physician / PA exam to assess for signs and symptoms as patient is at increased risk for of thromboembolism. Mobilize as tolerated. Sequential pneumatic compression devices (SCDs) when in bed; thigh-high or knee-high thromboembolic deterrent hose when out of bed. ON ELIQUIS     CAD; cont eliquis and statin    GI prophylaxis - resume PPI. At times may need additional antacids, Maalox prn. .  General skin care / wound prevention - monitor BKA  incision and general skin wound status daily per staff and physician / PA. At risk for failure. Will require 24/7 rehab nursing.  Keep wound clean and dry, Reinforce dressing PRN and Ice to area for comfort; he is to fu with Dr Danilo Costello in 10-14 d for staple remove    Urinary retention / neurogenic bladder - schedule voids q 6-8 hrs. Check post-void residual every shift; in and out catheter if post-void residual is more than 400ml. Bowel program - at risk for constipation as a side effect of opioids, other medications, impaired mobility, etc. MiraLAX daily for regularity, Ivis-Colace for stool softener. PRN MOM, bisacodyl suppository or tablets for constipation. Disposition: The patient's prognosis for significant practical improvement within a reasonable period of time appears good and the estimated length of stay is 21 days; patient is expected to return home with spouse / family supervision and continued rehabilitation with home health therapy. Given the patient's complex neurologic / medical condition, risks of further medical complications include but are not limited to: thromboembolism / pulmonary embolism, skin breakdown, pneumonia due to decreased mobility, CVA, MI, cardiac arrhythmias due to HTN, postural hypotension. For these ongoing medical issues, rehabilitation services could not be safely provided at a lower level of care such as a skilled nursing facility or nursing home.         Signed By: Dimitrios Gil MD     August 25, 2021

## 2021-08-25 NOTE — PROGRESS NOTES
PHYSICAL THERAPY EXAMINATION  [id number]    Patient Name: Forrestine Sacks  Patient Age: 77 y.o. Past Medical History:   Past Medical History:   Diagnosis Date    A-fib [facility]) 07/19/2021    developed AFID after hospital admission for wound infection- started on Eliquis    CAD (coronary artery disease)     [facility].     Chronic kidney disease     stage 3- improved    COVID-19 vaccine series completed     Moderna Vaccine completed X2 doses    Current use of long term anticoagulation     Eliquis and Aspirin    H/O heart artery stent     X1 placed in 1999    History of MI (myocardial infarction) 1999    stent placed X1    Hypercholesterolemia     Hypertension     Left ventricular dysfunction     echo revealed EF 30-35%, mildly dilated LA/RA and mild TR and MR.    Neuropathy     severe    Oxygen dependent     recently started on 2L NC continous- Sleep study to be scheduled-questionable ELAINA    PICC (peripherally inserted central catheter) in place     PICC line in place to R arm    Type 2 diabetes mellitus ([facility] [address]     oral agent/Pt does not monitor BS/no s.s of hypoglycemia/Last A1c: 6.7 on 7/13/21 per daughter       Medical Diagnosis:  Unilateral complete BKA, right, sequela ([facility] [address] [S88.111S] <principal problem not specified>    Precautions at Admission: Other (comment) (fall)    Therapy Diagnosis:   Difficulty with bed mobility  [x]     Difficulty with functional transfers  [x]     Difficulty with ambulation  [x]     Difficulty with stair negotiations  [x]       Problem List:    Decreased strength B LE  [x]     Decreased strength trunk/core  [x]     Decreased AROM   [x]     Decreased PROM  []    Decreased endurance  [x]     Decreased balance sitting  [x]     Decreased balance standing  [x]     Pain   [x]     Slow ambulation velocity  [x]    Decreased coordination  [x]    Decreased safety awareness  [x]      Functional Limitations:   Decreased independence with bed mobility  [x]     Decreased independence with functional transfers  [x]     Decreased independence with ambulation  [x]     Decreased independence with stair negotiation  [x]       Previous Functional Level: 3 months ago pt was working and I with all tasks. Pt was hospitalized 2 months ago and put on oxygen. Pt has needed A for ADL since then. Pt was using a walker at home. Used w/c for community mobility. Home Environment: Home Environment: Private residence  # Steps to Enter: 1  Rails to Enter: No  Wheelchair Ramp: No  One/Two Story Residence: One story  Living Alone: No  Support Systems: Family member(s)  Patient Expects to be Discharged to[de-identified] [employer]  Current DME Used/Available at Home: Tub transfer bench, Walker, rolling, Wheelchair  Tub or Shower Type: Tub/Shower combination         Outcome Measures: Vital Signs: /61   Pulse 94   Temp 98.3 °F (36.8 °C)   Resp 18   SpO2 96%     Pain level: 3/10  Pain location: R residual limb    Patient education: role of PT, orientation to [facility], sliding board transfer, need for protection for L foot, expectations for d/c    Interdisciplinary Communication: collaborated w/ OT regarding pt's POC    Cognition: Pt. Alert & follows commands.   Decreased insight      MMT Initial Asssessment   Right Lower Extremity Left Lower Extremity   Hip Flexion 4 4   Knee Extension 5 5   Knee Flexion 4 (limited by bandages) 5   Ankle Dorsiflexion N/A 5   0/5 No palpable muscle contraction  1/5 Palpable muscle contraction, no joint movement  2-/5 Less than full range of motion in gravity eliminated position  2/5 Able to complete full range of motion in gravity eliminated position  2+/5 Able to initiate movement against gravity  3-/5 More than half but not full range of motion against gravity  3/5 Able to complete full range of motion against gravity  3+/5 Completes full range of motion against gravity with minimal resistance  4-/5 Completes full range of motion against gravity with minimal-moderate resistance  4/5 Completes full range of motion against gravity with moderate resistance  4+/5 Completes full range of motion against gravity with moderate-maximum resistance  5/5 Completes full range of motion against gravity with maximum resistance     AROM: R LE limited by post-op bandages;  L LE WFL    PRIMARY MODE OF LOCOMOTION: w/c      BED/CHAIR/WHEELCHAIR TRANSFERS Initial Assessment   Rolling Right 5 (Supervision)   Rolling Left 5 (Supervision)   Supine to Sit 4 (Minimal assistance)   Sit to Stand Unable at this time   Sit to Supine 5 (Supervision)   Transfer Type Lateral with transfer board   Comments Pt. required mod-max A x2 for sliding board transfer due to increased posterior lean & decreased UE strength to assist w/ scooting   Car Transfer Not tested   Car Type         WHEELCHAIR MOBILITY/MANAGEMENT Initial Assessment   Able to Propel 0 feet   W/C Assistance Dependent  Pt.  Too fatigues after transfer to attempt self-propulsion   Curbs/ramps assistance required NT   Wheelchair set up assistance required 1 (Dependent/total assistance)   Wheelchair management         WALKING INDEPENDENCE Initial Assessment   Assistive device NT   Ambulation assistance - level surface 0 (Not tested)   Distance 0 Feet (ft)   Comments Deferred standing or walking secondary to plantar ulcer on L LE   Ambulation assistance - unlevel surface 0 (Not tested)       STEPS/STAIRS Initial Assessment   Steps/Stairs ambulated 0   Stairs Assistance NT   Rail Use NT   Comments NT   Curbs/Ramps 0 (Not tested)       QUALITY INDICATOR ASSIST COMMENTS   Roll right (&return to back) 4: Supervision or touching A To assist w/ managing lines   Roll left (& return to back) 4: Supervision or touching A To assist w/ managing lines   Supine to sit 3: Partial/Moderate A    Sit to stand Not Tested: Not attempted due to medical concerns Deferred secondary to wound on L foot, plantar surface   Chair/bed-to-chair transfer 1: Dependent    Walk 10 feet Not Tested: Not attempted due to medical concerns Deferred secondary to wound on L foot, plantar surface   Walk 50 feet with 2 turns Not Tested: Not attempted due to medical concerns Deferred secondary to wound on L foot, plantar surface   Walk 150 feet Not Tested: Not attempted due to medical concerns Deferred secondary to wound on L foot, plantar surface   Walk 10 feet on uneven  Not Tested: Not attempted due to medical concerns    1 step/curb Not Tested: Not attempted due to medical concerns Deferred secondary to wound on L foot, plantar surface   4 steps Not Tested: Not attempted due to medical concerns and safety concerns    12 steps Not Tested: Not attempted due to medical concerns and safety concerns     object Not Tested: Not attempted due to safety concerns    Wheel 48' w/2 turns Not Tested: Not attempted due to medical concerns Pt. Too fatigued after transfer to attempt   Wheel 150' Not Tested: Not attempted due to medical concerns    Car Transfer Not Tested: Not attempted due to safety concerns      Pt. Left supine in NAD, call bell in reach. PHYSICAL THERAPY PLAN OF CARE    Therapy Diagnosis:   Please see table above    Order received from MD for physical therapy services and chart reviewed. Pt to be seen at least 5 times per week for at least 1.5 hours of physical therapy per day for 2 weeks. Thank you for the referral.    LTGs:  LTG 1. Patient transfer supine<>sit with mod I in 2 weeks  LTG 2. Patient transfer bed to/from w/c w/ supervision in 2 weeks  LTG 3. Patient demonstrate sit to/from stand w/ RW and min A in 2 weeks  LTG 4. Patient will be mod I w/ HEP in 2 weeks  LTG 5. Patient propel wheelchair 100ft with supervision & set up w/c for transfer w/ supervision in 2 weeks    Pt would benefit from skilled physical therapy in order to improve independent functional mobility within the home.  Interventions may include range of motion (AROM, PROM B LE/trunk), motor function (B LE/trunk strengthening/coordination), activity tolerance (vitals, oxygen saturation levels), bed mobility training, balance activities, gait training (progressive ambulation program), and functional transfer training. Please see IRC; Interdisciplinary Eval, Care Plan, and Patient Education for further information regarding physical therapy examination and plan of care.      Addis Irene, PT  8/25/2021

## 2021-08-25 NOTE — PROGRESS NOTES
[facility] OCCUPATIONAL THERAPY INITIAL EVALUATION    Time In: 1300  Time Out: 1400    Precautions: Falls and WB: NWB RLE    Pain: Patient had no complaint of pain. History of Presenting Illness (per previous reports): The patient is a r-hand dominant, previously functionally independent 76yo male with a PMH of CHF, afib, peripheral neuropathy, DM2 with nephropathy, morbid obesity, chronic respiratory failure on 2-4L O2 at home and cellulitis of the RLE (charcot ankle) who has failed outpt long term abx who was recently hospitalized in July with sepsis due to a right foot ulcer.   Amputation was recommended initially but he refused at that time and was treated with abx.  During that hospitalization he was found to be in a fib with RVR for which toprol and Eliquis for [facility].  Echo showed EF or 30-35%.  After discharge he was seen in follow up by ortho and the decision to proceed with amputation was [facility] underwent a R BKA on 8/18. Post op they had difficulty extubating in the PACU, thus he was admitted to the ICU. He was noted to be in afib, rate controlled. His Eliquis had been held for 48hr preop, and he was placed on therapeutic Lovenox post on. He was extubated to [place] on POD #1. The patient told Pulm Medicine that his breathing felt better than it ever had. He transferred to the floor on POD 2. He is currently on 3L O2 per NC. He has blood loss anemia with hx of chronic anemia due to CKD. 8/23 hgb 9.2 (11.9 preop), creat 1.97 today, up from 1.26 preop. Baseline appears to be about 1.4. ID has dc'd abx post op. He had received about 4weeks of Ancef. Past Medical History/ Co-morbidities:   Past Medical History:   Diagnosis Date    A-fib ([facility] [address] 07/19/2021    developed AFID after hospital admission for wound infection- started on Eliquis    CAD (coronary artery disease)     [facility].     Chronic kidney disease     stage 3- improved    COVID-19 vaccine series completed     Moderna Vaccine completed X2 doses  Current use of long term anticoagulation     Eliquis and Aspirin    H/O heart artery stent     X1 placed in 1999    History of MI (myocardial infarction) 1999    stent placed X1    Hypercholesterolemia     Hypertension     Left ventricular dysfunction     echo revealed EF 30-35%, mildly dilated LA/RA and mild TR and MR.    Neuropathy     severe    Oxygen dependent     recently started on 2L NC continous- Sleep study to be scheduled-questionable ELAINA    PICC (peripherally inserted central catheter) in place     PICC line in place to R arm    Type 2 diabetes mellitus ([facility] [address]     oral agent/Pt does not monitor BS/no s.s of hypoglycemia/Last A1c: 6.7 on 7/13/21 per daughter       Patient's Goal: \"get as far and good as I can get\"    Previous Level of Function: 3 months ago pt was working and I with all tasks. Pt was hospitalized 2 months ago and put on oxygen. Pt has needed A for ADL since then. Pt was using a walker at home. [address] Situation Private residence   Lives Alone No   Support Systems Family member(s)   Shower Situation Tub/Shower combination   Current DME Walker, rolling, Wheelchair, Tub transfer bench   Stairs to Enter 1      W/C Ramp No     Upper Extremity Function   LUE RUE   [address] Intact Intact   [facility] Intact Intact         Functional Mobility   Score Comments   Supine to Sit 4: Supervision or touching A S   Sit to Stand 1: Dependent Ax2   Transfer Assist 1: Dependent Transfer Type: SPT   Equipment: Rolling Walker   Comments: Ax2     Activities of Daily Living    Score Comments   Lower Body Dressing 1: Dependent Items Applied: Underwear  Position: Standing PRN and Unsupported Sitting  Adaptive Equipment: N/A  Comments: Ax2     Cognition: No issues noted    Vision/Perception: No deficits noted. Session: Pt was in bed and agreeable to tx. Pt's performance with ADL is reflected in above chart. Pt was transferred to w/c.  Pt was left in w/c with alarm on and daughter present. Interdisciplinary Communication: PT Elysia Singh on pt's performance     Patient/Family Education: Patient, Family and Caregivers was/were educated On the role of OT, On POC and On IRC expectations. Problem List: Activity Tolerance, Safety Awareness and Standing Balance    Functional Limitations: ADL, IADL, Functional Transfers and Functional Mobility    Goals: Please see Care Plan    OT order received and chart reviewed. OT orders have been acknowledged. Patient will benefit from skilled OT services to address ADL, functional transfers, UE strength, balance and activity tolerance to maximize functional performance with daily self-care tasks and functional mobility. Treatment is likely to include ADL, Balance, Strength, Activity tolerance, DME, AE, Family  and Safety awareness training to increase independence with self-care. Patient will be seen for 1.5-2 hours of skilled OT services 5-6 days a week as appropriate. Initate POC.      Gus Farrar OT  8/25/2021

## 2021-08-25 NOTE — PROGRESS NOTES
CM following pt while in acute care. Chart reviewed. BSN, DORIAN met with pt and daughter, Emerson Gomez, at bedside with appropriate PPE and social distancing. Pt sitting in wheelchair. Pt alert and oriented to person, place, time, and situation. Assessment completed on admission to [facility]. Pt verified demographic information. PCP verified as LATONYA Jin and pt was last seen by Alvin Amador last month. Dr. Kandi Wood left office recently and pt now sees PA. Pt lives in [facility] home with spouse and daughter, 1 steps to enter into the home. Pt was independent with ADLs prior to admission on 7/19, working at [employer], wheelchair, oxygen (from [facility]), glucometer, and shower chair. Pt family able to transport home, to doctor apt,  medications, and get groceries when pt able to be home. Pt primary pharmacy is [facility] on [address] in [facility]. Pt able to afford medications. Pt reports availability for family to be at home with pt 24 hours as needed. Daughter and pt reports plans for pt to return home when able. Pt has never used HH or been to SNF. Pt aware of Wednesday Team meetings and would like family updated about meeting. Daughter confirms all information. Pt has on Prevena wound vac. Pt needs to see Dr. Bayron Clifford in 7-14 days. Contacted [facility] with follow up scheduled for Tuesday September 7 at 1400 at [place] [address]. Will touch base with wound care tomorrow as wound vac battery will only last 7 days and will need to started on or around 8/31/21. Will transport pt to apt if pt still at [facility]. CM to continue to follow and monitor for any needs that may occur. Care Management Interventions  PCP Verified by CM: Yes John Negrete, [id number])  Mode of Transport at Discharge:  Other (see comment) (family )  Transition of Care Consult (CM Consult): Discharge Planning  Discharge Durable Medical Equipment: No  Physical Therapy Consult: Yes  Occupational Therapy Consult: Yes  Speech Therapy Consult: No  Current Support Network: Own Home, Lives with Spouse  Confirm Follow Up Transport: Family  The Plan for Transition of Care is Related to the Following Treatment Goals : Return to baseline   The Patient and/or Patient Representative was Provided with a Choice of Provider and Agrees with the Discharge Plan?: Yes  Name of the Patient Representative Who was Provided with a Choice of Provider and Agrees with the Discharge Plan: pt/daughter   Freedom of Choice List was Provided with Basic Dialogue that Supports the Patient's Individualized Plan of Care/Goals, Treatment Preferences and Shares the Quality Data Associated with the Providers?: Yes  [place] Resource Information Provided?: No  Discharge Location  Discharge Placement: Home with home health (anticiapted plan )

## 2021-08-25 NOTE — PROGRESS NOTES
Liaison with [facility] reports pt accepted to [facility] and can d/c today. Dr. Chelsea Nava aware and will d/c pt today. Transport set for [employer] with [facility]. Primary RN to call report [phone]. Primary RN getting clarification on need for PICC line as ancef d/c. If unable to clarify PICC can be removed at [facility]. Covering CM to complete transport packet for Jeny Roper. Pt and charles made aware of d/c today by [facility] liaison. Confirmed with DON no need for repeat COVID test. All need met. This CM will follow pt at [facility] as well. CM available if any new needs arise.

## 2021-08-25 NOTE — PROGRESS NOTES
Problem: Self Care Deficits Care Plan (Adult)  Goal: *Therapy Goal (Edit Goal, Insert Text)  Note: STG 1: Pt will be moderate A with toileting by 9/1/21 to prevent skin breakdown. LTG 1: Pt will be independent with toileting by 9/8/21 to prevent skin breakdown. Goal: *Therapy Goal (Edit Goal, Insert Text)  Note: STG 2: Pt will be moderate A with LB dressing by 9/1/21 to reduce risk of falls. LTG 2: Pt will be set-up with LB dressing by 9/8/21 to reduce risk of falls. Goal: *Therapy Goal (Edit Goal, Insert Text)  Note: STG 3: Pt will be touching A with bathing by 9/1/21 to promote good skin integrity. LTG 3: Pt will be set-up with bathing by 9/8/21 to promote good skin integrity. Goal: *Therapy Goal (Edit Goal, Insert Text)  Note: STG 4: Pt will be able to get a snack from the fridge with supervision  by 9/1/21 to promote proper nutrition and hydration. LTG 4: Pt will be able to get a snack from the fridge with independent by 9/8/21 to promote proper nutrition and hydration. Goal: *Therapy Goal (Edit Goal, Insert Text)  Note: LTG 5: Pt/caregiver will verbalize  understanding of OT recommendations regarding ADL status, functional transfer status, home safety, DME, AE, energy conservation techniques, safety awareness, activity tolerance, and/or follow-up therapy to increase safety with functional tasks upon discharge. Goal: *Therapy Goal (Edit Goal, Insert Text)  Note: LTG 6: Pt will complete amputation notebook by discharge to improve recovery outcomes.

## 2021-08-25 NOTE — PROGRESS NOTES
Problem: Falls - Risk of  Goal: *Absence of Falls  Description: Document Kraig Posey Fall Risk and appropriate interventions in the flowsheet. Outcome: Progressing Towards Goal  Note: Fall Risk Interventions:       Mentation Interventions: Adequate sleep, hydration, pain control, Bed/chair exit alarm, Door open when patient unattended    Medication Interventions: Bed/chair exit alarm, Evaluate medications/consider consulting pharmacy, Patient to call before getting OOB    Elimination Interventions: Bed/chair exit alarm, Call light in reach, Patient to call for help with toileting needs, Toileting schedule/hourly rounds, Urinal in reach              Problem: Pressure Injury - Risk of  Goal: *Prevention of pressure injury  Description: Document Julio Cesar Scale and appropriate interventions in the flowsheet.   Outcome: Progressing Towards Goal  Note: Pressure Injury Interventions:  Sensory Interventions: Assess changes in LOC, Assess need for specialty bed, Check visual cues for pain, Keep linens dry and wrinkle-free, Minimize linen layers    Moisture Interventions: Absorbent underpads, Apply protective barrier, creams and emollients, Check for incontinence Q2 hours and as needed    Activity Interventions: Chair cushion, Increase time out of bed, Pressure redistribution bed/mattress(bed type)    Mobility Interventions: HOB 30 degrees or less, Pressure redistribution bed/mattress (bed type)    Nutrition Interventions: Document food/fluid/supplement intake, Offer support with meals,snacks and hydration    Friction and Shear Interventions: Apply protective barrier, creams and emollients, HOB 30 degrees or less, Minimize layers

## 2021-08-25 NOTE — PROGRESS NOTES
Problem: Falls - Risk of  Goal: *Absence of Falls  Description: Document Maikel Junior Fall Risk and appropriate interventions in the flowsheet. Outcome: Progressing Towards Goal  Note: Fall Risk Interventions:       Mentation Interventions: Adequate sleep, hydration, pain control, Bed/chair exit alarm    Medication Interventions: Bed/chair exit alarm, Patient to call before getting OOB, Teach patient to arise slowly    Elimination Interventions: Call light in reach, Bed/chair exit alarm              Problem: Patient Education: Go to Patient Education Activity  Goal: Patient/Family Education  Outcome: Progressing Towards Goal     Problem: Pressure Injury - Risk of  Goal: *Prevention of pressure injury  Description: Document Julio Cesar Scale and appropriate interventions in the flowsheet.   Outcome: Progressing Towards Goal  Note: Pressure Injury Interventions:  Sensory Interventions: Assess changes in LOC, Discuss PT/OT consult with provider, Float heels    Moisture Interventions: Absorbent underpads    Activity Interventions: Increase time out of bed, PT/OT evaluation    Mobility Interventions: HOB 30 degrees or less, Float heels, PT/OT evaluation    Nutrition Interventions: Document food/fluid/supplement intake    Friction and Shear Interventions: Apply protective barrier, creams and emollients, Feet elevated on foot rest, HOB 30 degrees or less, Lift sheet, Minimize layers                Problem: Patient Education: Go to Patient Education Activity  Goal: Patient/Family Education  Outcome: Progressing Towards Goal     Problem: Ventilator Management  Goal: *Adequate oxygenation and ventilation  Outcome: Progressing Towards Goal  Goal: *Patient maintains clear airway/free of aspiration  Outcome: Progressing Towards Goal  Goal: *Absence of infection signs and symptoms  Outcome: Progressing Towards Goal  Goal: *Normal spontaneous ventilation  Outcome: Progressing Towards Goal     Problem: Patient Education: Go to Patient Education Activity  Goal: Patient/Family Education  Outcome: Progressing Towards Goal     Problem: Delirium Treatment  Goal: *Level of consciousness restored to baseline  Outcome: Progressing Towards Goal  Goal: *Level of environmental perceptions restored to baseline  Outcome: Progressing Towards Goal  Goal: *Sensory perception restored to baseline  Outcome: Progressing Towards Goal  Goal: *Emotional stability restored to baseline  Outcome: Progressing Towards Goal  Goal: *Functional assessment restored to baseline  Outcome: Progressing Towards Goal  Goal: *Absence of falls  Outcome: Progressing Towards Goal  Goal: *Will remain free of delirium, CAM Score negative  Outcome: Progressing Towards Goal  Goal: *Cognitive status will be restored to baseline  Outcome: Progressing Towards Goal  Goal: Interventions  Outcome: Progressing Towards Goal     Problem: Patient Education: Go to Patient Education Activity  Goal: Patient/Family Education  Outcome: Progressing Towards Goal     Problem: Patient Education: Go to Patient Education Activity  Goal: Patient/Family Education  Outcome: Progressing Towards Goal

## 2021-08-25 NOTE — PROGRESS NOTES
Hospitalist Progress Note   Admit Date:  2021  7:31 AM   Name:  Fuad Barnard   Age:  77 y.o. Sex:  male  :  1945   MRN:  [id number]     Presenting Complaint: No chief complaint on file. Reason(s) for Admission: Charcot's joint of foot, right [M14.671]  Infected wound [T14. 8XXA, L08.9]  S/P BKA (below knee amputation) unilateral, right ([facility] [address] [Z89.511]  Respiratory failure, post-operative [facility] Course & Interval History:   Mr. Rivas is a 78 y/o WM with a h/o CAD s/p PCI, chronic respiratory failure on 2 L via NC, A. fib (on Eliquis), CKD3, DM 2, chronic sCHF (EF 30 to 35%), s/p right BKA on  seen by our service in consultation on  for medical management. He was hospitalized in July with right foot cellulitis and sepsis secondary to right foot ulcer. Ortho initially recommended amputation, however he refused and was ultimately discharged on IV abx wit EOT 9/3/21. On  he was re-admitted due to worsening right lower leg wound. He underwent R BKA. He had difficulty weaning form the ventilator in PACU so was sent to ICU and Pulm was consulted. He was extubated on . Abx have been discontinued. Fluid balance is net negative. Repeat CXR  with atelectasis, needs spirometer. Lasix held and midodrine started  for HoTN which has improved. Stable on 2L NC O2. Plans for IRC at discharge pending insurance approval.    Subjective (21):  No AEO. Mr. Yesy Wood has no new complaints today. He expects to go to rehab later today. Expect his midodrine to be discontinued tomorrow as BP trend is improving. Can then look to resume metoprolol and furosemide if he tolerates. Renal function has improved.     Assessment & Plan:     Status post right below-knee amputation  Aug/25: POD 7              - Analgesia per Ortho              - Physiatrist consulted and hopeful discharge to [facility] later today     Acute on chronic respiratory failure with hypoxia/hypercapnia requiring intubation  8/22: Status post extubation on 8/19  Patient weaned off of AirVo to nasal cannula, 2 L currently which is at his baseline. 3 L at bedtime. Continue Lasix 40 mg p.o. daily  Net fluid balance - 10 L  Chest x-ray on 8/21 showed bibasilar opacities likely atelectasis, patient encouraged to use incentive spirometry. Aug/23: Stable              - Asked nursing to give patient a spirometer  Aug/25: Stable     Hypertension  Hypotension  8/22: Likely secondary to diuresis  Ordered for 1 dose of IV Solu-Cortef 100 mg along with one-time dose of Florinef 0.1 mg.  Holding Toprol-XL  Aug/23: Reduce midodrine dosing today based on low LVEF and monitor response              - Look to re-start BB at lower dose as soon as able  Aug/25: Improving trend; midodrine 2.5mg TID; look to stop in day or two and resume BB and loop.     HFrEF  - pt had echo performed on 7/21/2021 demontrating ejection fraction of 30 to 35%.    8/22: BNP 1077  Need to optimize BP.   BP is borderline, holding Toprol-XL for now. Aug/23:  Look to re-start BB at lower dose as soon as able     CKD Stage III  Aug/25: sCr 1.49 from 1.97 on Aug/23     Atrial fibrillation  8/22: Heart rate suboptimally controlled. Holding Toprol-XL in view of borderline BP  Continue IV Lopressor 2.5 mg every 6 hours as needed for heart rate of greater than 115  Eliquis 5 mg p.o. twice daily     DM Type 2  -patient noted to have a hemoglobin A1c of 6.7  8/22: Blood glucose is optimally controlled  Monitor POC glucose AC/HS  Continue Humalog correction scale     Constipation  Aug/25: MiraLAX daily     Hypokalemia, resolved      Dispo/Discharge Planning: Hopefully today to [facility]    Diet:  ADULT DIET Regular;  Low Sodium (2 gm); 1200 ml  DVT PPx: apixaban  Code status: Full Code    Active Hospital Problems    Diagnosis Date Noted    Suspected sleep apnea 08/20/2021    S/P BKA (below knee amputation) unilateral, right ([address] 08/18/2021    Respiratory failure, post-operative ([facility] 08/18/2021    Acute on chronic respiratory failure with hypoxia and hypercapnia ([facility]) 07/23/2021    Atrial fibrillation with RVR ([facility]) 31/40/2469    Systolic CHF, acute on chronic ([facility]) 07/19/2021    Morbid obesity with BMI of 40.0-44.9, adult ([facility] 06/11/2020    Mixed hyperlipidemia 04/10/2018    Controlled type 2 diabetes mellitus with diabetic polyneuropathy, without long-term current use of insulin ([facility] 01/05/2018    Stage 3 chronic kidney disease ([facility] 11/22/2017    CAD (coronary artery disease)        Objective:     Patient Vitals for the past 24 hrs:   Temp Pulse Resp BP SpO2   08/25/21 0727 98.3 °F (36.8 °C) 84 20 (!) 106/52 95 %   08/25/21 0410 98 °F (36.7 °C) 92 20 (!) 91/59 93 %   08/25/21 0014 97.7 °F (36.5 °C) (!) 101 18 102/63 92 %   08/24/21 2021 97.9 °F (36.6 °C) 99 18 97/64 96 %   08/24/21 1640 97.9 °F (36.6 °C) 87 20 (!) 92/53 94 %   08/24/21 1020 98.2 °F (36.8 °C) 86 20 108/70 92 %     Oxygen Therapy  O2 Sat (%): 95 % (08/25/21 0727)  Pulse via Oximetry: 94 beats per minute (08/23/21 1050)  O2 Device: Hi flow nasal cannula (08/24/21 1138)  Skin Assessment: Clean, dry, & intact (08/21/21 0010)  Skin Protection for O2 Device: No (08/20/21 0331)  O2 Flow Rate (L/min): 3 l/min (08/24/21 1138)  FIO2 (%): 45 % (08/20/21 0836)    Estimated body mass index is 41.25 kg/m² as calculated from the following:    Height as of this encounter: 5' 10\" (1.778 m). Weight as of this encounter: 130.4 kg (287 lb 8 oz). Intake/Output Summary (Last 24 hours) at 8/25/2021 1007  Last data filed at 8/25/2021 0800  Gross per 24 hour   Intake 840 ml   Output 805 ml   Net 35 ml         Physical Exam:   General:    No overt distress  Head:  Normocephalic, atraumatic  Eyes:  Sclerae appear normal.  Pupils equally round. ENT:  Nares appear normal, no drainage. Moist oral mucosa  Neck:  No restricted ROM. Trachea midline   CV:   RRR. No m/r/g. Lungs:   CTAB anteriorly.   No wheezing, rhonchi, or rales. Respirations even, unlabored on NC. Abdomen: Bowel sounds present. Soft, obese, nontender, nondistended. Extremities: No cyanosis or clubbing. R BKA  Skin:     No rashes and normal coloration. Warm and dry. Neuro:  Cranial nerves II-XII grossly intact. Psych:  Normal mood and affect.   Alert and oriented x3    I have reviewed ordered lab tests and independently visualized imaging below:    Last 24hr Labs:  Recent Results (from the past 24 hour(s))   GLUCOSE, POC    Collection Time: 08/24/21 12:31 PM   Result Value Ref Range    Glucose (POC) 111 (H) 65 - 100 mg/dL    Performed by [address] [place] [address], POC    Collection Time: 08/24/21  5:46 PM   Result Value Ref Range    Glucose (POC) 134 (H) 65 - 100 mg/dL    Performed by [address] [place] [address], POC    Collection Time: 08/24/21  8:34 PM   Result Value Ref Range    Glucose (POC) 107 (H) 65 - 100 mg/dL    Performed by [facility]    METABOLIC PANEL, BASIC    Collection Time: 08/25/21  5:45 AM   Result Value Ref Range    Sodium 141 138 - 145 mmol/L    Potassium 5.1 3.5 - 5.1 mmol/L    Chloride 106 98 - 107 mmol/L    CO2 35 (H) 21 - 32 mmol/L    Anion gap 0 (L) 7 - 16 mmol/L    Glucose 98 65 - 100 mg/dL    BUN 28 (H) 8 - 23 MG/DL    Creatinine 1.49 0.8 - 1.5 MG/DL    GFR est AA 59 (L) >60 ml/min/1.73m2    GFR est non-AA 49 (L) >60 ml/min/1.73m2    Calcium 9.0 8.3 - 10.4 MG/DL   GLUCOSE, POC    Collection Time: 08/25/21  7:25 AM   Result Value Ref Range    Glucose (POC) 105 (H) 65 - 100 mg/dL    Performed by [facility]        All Micro Results     Procedure Component Value Units Date/Time    CULTURE, BLOOD [[id number]] Collected: 08/19/21 1156    Order Status: Completed Specimen: Blood Updated: 08/24/21 0656     Special Requests: --        LEFT  HAND       Culture result: NO GROWTH 5 DAYS       CULTURE, BLOOD [[id number]] Collected: 08/19/21 1227    Order Status: Completed Specimen: Blood Updated: 08/24/21 0042 Special Requests: --        LEFT  HAND       Culture result: NO GROWTH 5 DAYS             Other Studies:  No results found. Current Meds:  Current Facility-Administered Medications   Medication Dose Route Frequency    midodrine (PROAMATINE) tablet 2.5 mg  2.5 mg Oral TID WITH MEALS    polyethylene glycol (MIRALAX) packet 17 g  17 g Oral DAILY    docusate sodium (COLACE) capsule 100 mg  100 mg Oral BID    fludrocortisone (FLORINEF) tablet 0.1 mg  0.1 mg Oral DAILY    [Held by provider] furosemide (LASIX) tablet 40 mg  40 mg Oral DAILY    pantoprazole (PROTONIX) tablet 40 mg  40 mg Oral ACB    [Held by provider] metoprolol succinate (TOPROL-XL) XL tablet 50 mg  50 mg Oral BID    apixaban (ELIQUIS) tablet 5 mg  5 mg Oral Q12H    metoprolol (LOPRESSOR) injection 2.5 mg  2.5 mg IntraVENous Q6H PRN    oxyCODONE IR (ROXICODONE) tablet 5 mg  5 mg Oral Q4H PRN    acetaminophen (TYLENOL) tablet 650 mg  650 mg Oral Q6H PRN    insulin lispro (HUMALOG) injection 0-10 Units  0-10 Units SubCUTAneous AC&HS    sodium chloride (NS) flush 5-40 mL  5-40 mL IntraVENous Q8H    sodium chloride (NS) flush 5-40 mL  5-40 mL IntraVENous PRN    atorvastatin (LIPITOR) tablet 40 mg  40 mg Oral QHS    nystatin (MYCOSTATIN) 100,000 unit/gram powder   Topical PRN    pregabalin (LYRICA) capsule 200 mg  200 mg Oral BID    traZODone (DESYREL) tablet 50 mg  50 mg Oral QHS    dextrose 40% (GLUTOSE) oral gel 1 Tube  15 g Oral PRN    glucagon (GLUCAGEN) injection 1 mg  1 mg IntraMUSCular PRN    dextrose (D50W) injection syrg 12.5-25 g  25-50 mL IntraVENous PRN    sodium chloride (NS) flush 5-40 mL  5-40 mL IntraVENous Q8H    sodium chloride (NS) flush 5-40 mL  5-40 mL IntraVENous PRN    naloxone (NARCAN) injection 0.4 mg  0.4 mg IntraVENous PRN    HYDROmorphone (DILAUDID) injection 0.5 mg  0.5 mg IntraVENous Q4H PRN       Signed:  Lucille Trotter NP    Part of this note may have been written by using a voice dictation software. The note has been proof read but may still contain some grammatical/other typographical errors.

## 2021-08-25 NOTE — PROGRESS NOTES
L plantar foot wound cleaned with wound cleanser, small amount of iodoform gauze packed into wound, dry 4x4 applied, kerlex wrap applied.

## 2021-08-25 NOTE — PROGRESS NOTES
Patient discharged to [facility] at this time via EMS. A&Ox4. No complaints of acute pain or discomfort. Peripheral IV taken out and no tele on. Discharge packet given to EMS and report called to Emili Gold RN at rehab. Dr. Paty Hendrickson just saw patient and stated ok to leave IV PICC line in. Safety precautions maintained. Discharged to rehab at this time.

## 2021-08-25 NOTE — PROGRESS NOTES
Pt is transferring to [facility] today at 1015. CM messaged the MD and was aware of pts transport. No other d/c needs were identified. Tx goals have been met. Care Management Interventions  PCP Verified by CM: Yes Gera Jalloh)  Mode of Transport at Discharge: [address]  [address] Time of Discharge: [address] (CM Consult):  Other JERRELL ROPER [facility])  Discharge Durable Medical Equipment: No  Physical Therapy Consult: Yes  Occupational Therapy Consult: Yes  Speech Therapy Consult: No  Current Support Network: Family Lives [place], Own Home  Confirm Follow Up Transport: Family  The Plan for Transition of Care is Related to the Following Treatment Goals : Return home and back to his baseline  The Patient and/or Patient Representative was Provided with a Choice of Provider and Agrees with the Discharge Plan?: Yes  Name of the Patient Representative Who was Provided with a Choice of Provider and Agrees with the Discharge Plan: Patient  Freedom of Choice List was Provided with Basic Dialogue that Supports the Patient's Individualized Plan of Care/Goals, Treatment Preferences and Shares the Quality Data Associated with the Providers?: Yes  [employer] Resource Information Provided?: No  Discharge Location  Discharge Placement: Other: (Transfering to [facility])

## 2021-08-25 NOTE — DISCHARGE INSTRUCTIONS
Patient Education   Below-the-Knee Leg Amputation: What to Expect at Home  Your Recovery  A below-the-knee amputation is surgery to remove your leg below the knee. Your doctor removed the leg while keeping as much healthy bone, skin, blood vessel, and nerve tissue as possible. After the surgery, you will probably have bandages, a rigid dressing, or a cast over the remaining part of your leg (remaining limb). The leg may be swollen for at least 4 weeks after your surgery. If you have a rigid dressing or cast, your doctor will set up regular visits to change the dressing or cast and check the healing. If you have elastic bandages, your doctor will tell you how to change them. You may have pain in your remaining limb. You also may think you have feeling or pain where your leg was. This is called phantom pain. It is common and may come and go for a year or longer. Your doctor can give you medicine for both types of pain. You may have already started a rehabilitation program (rehab). You will continue this under the guidance of your doctor or physical therapist. Darlin Gallardo will need to do a lot of work to recondition your muscles and relearn activities, balance, and coordination. The rehab can last as long as a year. You may have been fitted with a temporary artificial leg while you were still in the hospital. If this is the case, your doctor will teach you how to care for it. If you are getting an artificial leg, you may need to get used to it before you go back to work and your other activities. You will probably not wear it all the time, so you will need to learn how to use a wheelchair, crutches, or other device. You will have to make changes in your home. Your workplace may be able to make allowances for you. Having your leg amputated can be traumatic. And learning to live with new limits can be hard and frustrating. Many people feel depressed and may grieve for their former lifestyle.  It's important to understand these feelings. Talking with your family, friends, and health professionals about your frustrations is an important part of your recovery. You may also find that it helps to talk with a person who has had an amputation. Remember that even though you've lost a limb, it doesn't change who you are or prevent you from enjoying life. You'll have to adapt and learn new ways to do things. But you can still work and take part in sports and activities. And you can still learn, love, play, and live life to its fullest.  Many organizations can help you adjust to your new life. For example, you can go to amputee-coalition. org for information and support. This care sheet gives you a general idea about how long it will take for you to recover. But each person recovers at a different pace. Follow the steps below to get better as quickly as possible. How can you care for yourself at home? Activity    · Be active. Talk to your doctor about what you can do. If you are active and use your remaining limb, it will heal faster.     · You may shower when your doctor okays it. Wash the remaining limb with soap and water, and pat it dry. You may need help doing this at first.     · You may need to adapt your car to your situation before you drive.     · You will probably be able to return to work and your usual routine when your remaining limb heals. This can be as soon as 4 to 8 weeks after surgery, but it may take longer. Diet    · You can eat your normal diet. If your stomach is upset, try bland, low-fat foods like plain rice, broiled chicken, toast, and yogurt.     · You may notice that your bowel movements are not regular right after your surgery. This is common. Try to avoid constipation and straining with bowel movements. Take a fiber supplement every day. If you have not had a bowel movement after a couple of days, ask your doctor about taking a mild laxative.    Medicines    · Your doctor will tell you if and when you can restart your medicines. He or she will also give you instructions about taking any new medicines.     · If you take aspirin or some other blood thinner, ask your doctor if and when to start taking it again. Make sure that you understand exactly what your doctor wants you to do.     · Be safe with medicines. Take pain medicines exactly as directed. ? If the doctor gave you a prescription medicine for pain, take it as prescribed. ? If you are not taking a prescription pain medicine, ask your doctor if you can take an over-the-counter medicine.     · If you think your pain medicine is making you sick to your stomach:  ? Take your medicine after meals (unless your doctor has told you not to). ? Ask your doctor for a different pain medicine.     · If your doctor prescribed antibiotics, take them as directed. Do not stop taking them just because you feel better. You need to take the full course of antibiotics. Remaining limb care    · You may have bandages, a rigid dressing, or a cast on your remaining limb. Your doctor will tell you how to take care of it. Depending on your dressing and the doctor's instructions:  ? Check your remaining limb daily for irritation, skin breaks, and redness. Tell your doctor about any problems you see. ? Wash your remaining limb with mild soap and warm water every night. Pat it dry.     · If you have a temporary artificial leg, remove it before you go to sleep. Exercise    · Rehabilitation is a series of exercises you do after your surgery. This helps you learn to use your remaining limb and artificial leg. You will work with your doctor and physical therapist to plan this exercise program. To get the best results, you need to do the exercises correctly and as often and as long as your doctor tells you. Your rehab program will give you a number of exercises to do. Always do them as your therapist tells you. Other instructions    · Preventing contractures is very important.  A contracture occurs when a joint becomes stuck in one position. If this happens, it may be hard or impossible to straighten your remaining limb and use an artificial leg.  ? Make sure you put equal weight on both hips when you sit. Use firm chairs, and sit up straight. ? Keep your remaining limb flat with your knees straight and your legs together while you are lying on your back. ? Lie on your stomach as much as possible to stretch your hip joint. ? Do not sit for more than an hour or two. Stand, or lie on your stomach now and then. ? Do not put pillows under your hips or knees or between your thighs. ? Do not rest your remaining limb on crutch handles or a wheelchair. Follow-up care is a key part of your treatment and safety. Be sure to make and go to all appointments, and call your doctor if you are having problems. It's also a good idea to know your test results and keep a list of the medicines you take. When should you call for help? Call 911 anytime you think you may need emergency care. For example, call if:    · You passed out (lost consciousness).     · You have chest pain, are short of breath, or you cough up blood. Call your doctor now or seek immediate medical care if:    · You have pain that does not get better after you take pain medicine.     · You are sick to your stomach or cannot drink fluids.     · You have loose stitches, or your incision comes open.     · You have signs of a blood clot in your leg (called a deep vein thrombosis), such as:  ? Pain in your calf, back of the knee, thigh, or groin. ? Redness or swelling in your leg.     · You have signs of infection, such as:  ? Increased pain, swelling, warmth, or redness. ? Red streaks leading from the incision. ? Pus draining from the incision. ? A fever.     · You bleed through your bandage. Watch closely for any changes in your health, and be sure to contact your doctor if you have any problems. Where can you learn more?   Go to http://www.gray.com/  Enter R554 in the search box to learn more about \"Below-the-Knee Leg Amputation: What to Expect at Home. \"  Current as of: November 16, 2020               Content Version: 12.8  © 2006-2021 [employer]. Care instructions adapted under license by [employer] (which disclaims liability or warranty for this information). If you have questions about a medical condition or this instruction, always ask your healthcare professional. [address] any warranty or liability for your use of this information. Patient Education        Vacuum-Assisted Closure for Wound Healing: Care Instructions  Your Care Instructions  When you have a wound that is hard to close, your doctor may treat it with vacuum-assisted closure (VAC). VAC uses negative pressure (suction) to help bring the edges of your wound together. It also removes fluid and dead tissue from the wound area. A machine is used to do this. A special covering is put over the wound. Then a tube connects the covering to the machine. The machine creates the suction. VAC does not hurt. You may feel a mild pulling on the wound when treatment first starts. How long you need VAC depends on the size and type of wound you have and how well the Cloud[employer] works. You will be limited in what you can do while the wound heals. You will use VAC 24 hours a day. Follow-up care is a key part of your treatment and safety. Be sure to make and go to all appointments, and call your doctor if you are having problems. It's also a good idea to know your test results and keep a list of the medicines you take. How can you care for yourself at home? · A home health care worker will come to your home a few times a week to change the bandage and check the machine. You may need it changed more often if there is a lot of drainage. · Your doctor will give you information on what you can and can't do.  This depends on where your wound is located. Your activities may be limited during the time you're using vacuum-assisted closure. · You will be able to take sponge baths. Don't shower or take baths unless your doctor says it is okay. · Take pain medicines exactly as directed. ? If the doctor gave you a prescription medicine for pain, take it as prescribed. ? If you are not taking a prescription pain medicine, ask your doctor if you can take an over-the-counter medicine. · If your doctor prescribed antibiotics, take them as directed. Do not stop taking them just because you feel better. You need to take the full course of antibiotics. When should you call for help? Call 911 anytime you think you may need emergency care. For example, call if:    · You have a lot of bleeding or see a sudden change in the color or texture of the drainage.     · The wound splits open and organs under the skin can be seen (evisceration). Call your doctor now or seek immediate medical care if:    · The wound starts bleeding.     · The bandage comes off. Cover the area with a sterile bandage until you can see your doctor or your home health care worker comes by.     · You have signs of infection, such as:  ? Increased pain, swelling, warmth, or redness around the wound. ? Red streaks leading from the wound. ? Pus draining from the wound. ? A fever. Watch closely for changes in your health, and be sure to contact your doctor if:    · The noise the machine makes changes or gets very loud. This may mean the seal is broken or the machine is not producing enough suction. Where can you learn more? Go to http://www.gray.com/  Enter T444 in the search box to learn more about \"Vacuum-Assisted Closure for Wound Healing: Care Instructions. \"  Current as of: March 4, 2020               Content Version: 12.8  © 4830-7575 [employer], [employer]    Care instructions adapted under license by [employer] (which disclaims liability or warranty for this information). If you have questions about a medical condition or this instruction, always ask your healthcare professional. [address] any warranty or liability for your use of this information.

## 2021-08-25 NOTE — DISCHARGE SUMMARY
[address] [place] [address]  Orthopaedic Discharge Summary      Patient ID:  Moisés Hubbard  [id number]  54 y.o.  1945    Admit date: 8/18/2021  Discharge date and time:    Admitting Physician: Yvon Skinner MD  Surgeon: Same  Admission Diagnoses: Pre op diagnosis: Charcot's joint of foot, right [M14.671]  Infected wound [T14. 8XXA, L08.9]. Prior to surgery the patient was seen for consultation in the office and a complete history and physical was taken as it pertained to their condition. Mid July 2021 he was admitted for sepsis due to Right LE charcot ankle with ulcer. He developed severe cardiac and respiratory disease. He was discharged after not wanting an amputation. He was on IV abx per infectious disease physician. I saw him for the first time in my office. I felt an amputation was needed to control potential sepsis. Patient agreed and family agreed. The patient underwent Procedure(s) (LRB):  RIGHT LEG AMPUTATION BELOW KNEE (Right) for this. Principle Problem: S/P BKA (below knee amputation) unilateral, right ([address]      Discharge Diagnoses: Chronic and Acute problems addressed during this hospital stay include: Principal Problem:    S/P BKA (below knee amputation) unilateral, right ([address] (8/18/2021)    Active Problems:    CAD (coronary artery disease) ()      Stage 3 chronic kidney disease ([address] (11/22/2017)      Controlled type 2 diabetes mellitus with diabetic polyneuropathy, without long-term current use of insulin ([address] (1/5/2018)      Mixed hyperlipidemia (4/10/2018)      Morbid obesity with BMI of 40.0-44.9, adult ([address] (4/57/0865)      Systolic CHF, acute on chronic (HCC) (7/19/2021)      Atrial fibrillation with RVR ([address] (7/19/2021)      Acute on chronic respiratory failure with hypoxia and hypercapnia (HCC) (7/23/2021)      Respiratory failure, post-operative ([address] (8/18/2021)      Suspected sleep apnea (8/20/2021)                                Perioperative Antibiotics: Just prior to surgery Ancef 3g was given depending on patients Weight and allergies.  If allergic to Ancef or due to other indications, patient was given Vancomycin      Hospital Medications:   Current Facility-Administered Medications   Medication Dose Route Frequency    midodrine (PROAMATINE) tablet 2.5 mg  2.5 mg Oral TID WITH MEALS    polyethylene glycol (MIRALAX) packet 17 g  17 g Oral DAILY    docusate sodium (COLACE) capsule 100 mg  100 mg Oral BID    fludrocortisone (FLORINEF) tablet 0.1 mg  0.1 mg Oral DAILY    [Held by provider] furosemide (LASIX) tablet 40 mg  40 mg Oral DAILY    pantoprazole (PROTONIX) tablet 40 mg  40 mg Oral ACB    [Held by provider] metoprolol succinate (TOPROL-XL) XL tablet 50 mg  50 mg Oral BID    apixaban (ELIQUIS) tablet 5 mg  5 mg Oral Q12H    metoprolol (LOPRESSOR) injection 2.5 mg  2.5 mg IntraVENous Q6H PRN    oxyCODONE IR (ROXICODONE) tablet 5 mg  5 mg Oral Q4H PRN    acetaminophen (TYLENOL) tablet 650 mg  650 mg Oral Q6H PRN    insulin lispro (HUMALOG) injection 0-10 Units  0-10 Units SubCUTAneous AC&HS    sodium chloride (NS) flush 5-40 mL  5-40 mL IntraVENous Q8H    sodium chloride (NS) flush 5-40 mL  5-40 mL IntraVENous PRN    atorvastatin (LIPITOR) tablet 40 mg  40 mg Oral QHS    nystatin (MYCOSTATIN) 100,000 unit/gram powder   Topical PRN    pregabalin (LYRICA) capsule 200 mg  200 mg Oral BID    traZODone (DESYREL) tablet 50 mg  50 mg Oral QHS    dextrose 40% (GLUTOSE) oral gel 1 Tube  15 g Oral PRN    glucagon (GLUCAGEN) injection 1 mg  1 mg IntraMUSCular PRN    dextrose (D50W) injection syrg 12.5-25 g  25-50 mL IntraVENous PRN    sodium chloride (NS) flush 5-40 mL  5-40 mL IntraVENous Q8H    sodium chloride (NS) flush 5-40 mL  5-40 mL IntraVENous PRN    naloxone (NARCAN) injection 0.4 mg  0.4 mg IntraVENous PRN    HYDROmorphone (DILAUDID) injection 0.5 mg  0.5 mg IntraVENous Q4H PRN           Additional DVT Prophylaxis:  FRANCISCO Hose,Plexi-Pulse    Postoperative Blood Report: If the patient received blood products during their admission they are listed below:     No results found for: PCTEXX  No results found for: PCTABR, ABORH  No results found for: ABORH, ABORHEXT  No results found for: PCTUN  No results found for: PCTCT  No results found for: PCTUDIV  No results found for: PCTXM    Post Op complications: none    Hemoglobin at discharge:   Lab Results   Component Value Date/Time    HGB 8.9 (L) 08/24/2021 04:22 AM         Physical Therapy started on the day of surgery and progressed. PT/OT:       Activity Response: Dyspnea on exertion  Assistive Device: Fall prevention device                Discharged to: @ScancellLINK(EPT,[id number],,1)    Hospital Course: Patient underwent Right BKA on 8/18. Procedure was uneventful. However he was unable to be extubated after the procedure. He went to the ICU AAOx3 but intubated. The following day he was extubated. Medicine, pulmonology and cardiology all were consulted. After he stabilized all medical teams signed off stating his BKA was his main problem. He worked with PT/OT on the floor and was felt stable to go to inpatient rehab. The incisional wound vac remained in tact. From a Cardio-Respiratory standpoint he stabilized well. Physical Exam at discharge:   CV: Afib  Resp: diminished BS bilaterally  RLE: dsg c/d/i. [facility] [place] w[facility] good [facility]. No drainage in canister.      Discharge instructions:  - NWB RLE  - Continue Eliquis per cardiology - 5mg BID  - Krystyna Edge to remain on until follow up in office  - F/u with me in office 7-14 days for wound check  -  Needs f/u with cardiology, pulmonology and infectious disease    Signed:  Fercho Silverio MD  8/25/2021  9:54 AM

## 2021-08-25 NOTE — PROGRESS NOTES
08/25/21 1245   Dual Skin Pressure Injury Assessment   Dual Skin Pressure Injury Assessment WDL   Second Care Provider (Based on [address] [place] [address]) GLENIS Nelson   Skin Integumentary   Skin Integumentary (WDL) X  (wound vac R BKA intact)    Pressure  Injury Documentation No Pressure Injury Noted-Pressure Ulcer Prevention Initiated   Skin Color Appropriate for ethnicity; Ecchymosis (comment)  (Redness sacrum, blanchable)   Skin Condition/Temp Dry; Warm   Skin Integrity Abrasion; Incision (comment)  (RLE R BKA, abrasion LLE, L plantarr foot wound)   Wound Prevention and Protection Methods   Orientation of Wound Prevention Posterior   Location of Wound Prevention Sacrum/Coccyx   Dressing Present  No   Wound Offloading (Prevention Methods) Bed, pressure reduction mattress;Repositioning   Dual skin assessment with GLENIS Nelson, no pressure areas observed, red area sacrum, blanchable.

## 2021-08-25 NOTE — PROGRESS NOTES
Call to [facility], [place], informed of pt's daughter reporting dressing change home health was doing prior to admission, new order received to clean L plantar foot wound daily with wound cleanser, pack with iodoform guaze, wrap with carmen.  New order received for LLE abrasion, clean with wound cleanser, apply meplix daily,

## 2021-08-26 LAB
GLUCOSE BLD STRIP.AUTO-MCNC: 111 MG/DL (ref 65–100)
GLUCOSE BLD STRIP.AUTO-MCNC: 117 MG/DL (ref 65–100)
GLUCOSE BLD STRIP.AUTO-MCNC: 126 MG/DL (ref 65–100)
GLUCOSE BLD STRIP.AUTO-MCNC: 133 MG/DL (ref 65–100)
SERVICE CMNT-IMP: ABNORMAL

## 2021-08-26 PROCEDURE — 97110 THERAPEUTIC EXERCISES: CPT

## 2021-08-26 PROCEDURE — 99232 SBSQ HOSP IP/OBS MODERATE 35: CPT | Performed by: PHYSICAL MEDICINE & REHABILITATION

## 2021-08-26 PROCEDURE — 82962 GLUCOSE BLOOD TEST: CPT

## 2021-08-26 PROCEDURE — 65310000000 HC RM PRIVATE REHAB

## 2021-08-26 PROCEDURE — 74011250637 HC RX REV CODE- 250/637: Performed by: PHYSICIAN ASSISTANT

## 2021-08-26 PROCEDURE — 97530 THERAPEUTIC ACTIVITIES: CPT

## 2021-08-26 PROCEDURE — 97535 SELF CARE MNGMENT TRAINING: CPT

## 2021-08-26 RX ADMIN — MIDODRINE HYDROCHLORIDE 2.5 MG: 5 TABLET ORAL at 16:38

## 2021-08-26 RX ADMIN — FLUDROCORTISONE ACETATE 0.1 MG: 0.1 TABLET ORAL at 08:42

## 2021-08-26 RX ADMIN — POLYETHYLENE GLYCOL 3350 17 G: 17 POWDER, FOR SOLUTION ORAL at 08:43

## 2021-08-26 RX ADMIN — MIDODRINE HYDROCHLORIDE 2.5 MG: 5 TABLET ORAL at 08:42

## 2021-08-26 RX ADMIN — APIXABAN 5 MG: 5 TABLET, FILM COATED ORAL at 21:22

## 2021-08-26 RX ADMIN — PREGABALIN 200 MG: 100 CAPSULE ORAL at 08:41

## 2021-08-26 RX ADMIN — DOCUSATE SODIUM 100 MG: 100 CAPSULE, LIQUID FILLED ORAL at 08:40

## 2021-08-26 RX ADMIN — PANTOPRAZOLE SODIUM 40 MG: 40 TABLET, DELAYED RELEASE ORAL at 08:42

## 2021-08-26 RX ADMIN — APIXABAN 5 MG: 5 TABLET, FILM COATED ORAL at 08:42

## 2021-08-26 RX ADMIN — PREGABALIN 200 MG: 100 CAPSULE ORAL at 21:22

## 2021-08-26 RX ADMIN — DOCUSATE SODIUM 100 MG: 100 CAPSULE, LIQUID FILLED ORAL at 21:22

## 2021-08-26 RX ADMIN — MIDODRINE HYDROCHLORIDE 2.5 MG: 5 TABLET ORAL at 11:44

## 2021-08-26 RX ADMIN — TRAZODONE HYDROCHLORIDE 50 MG: 50 TABLET ORAL at 21:22

## 2021-08-26 RX ADMIN — ATORVASTATIN CALCIUM 40 MG: 40 TABLET, FILM COATED ORAL at 21:22

## 2021-08-26 NOTE — PROGRESS NOTES
Anthony Benjamin MD  Medical Director  [address] [facility], [address] [facility]  Tel: [phone] [facility] PROGRESS NOTE    Mago Rivas  Admit Date: 8/25/2021  Admit Diagnosis:   Unilateral complete BKA, right, sequela ([address] [S88.111S]    Subjective     Patient seen and examined. Patient with poor STM, cannot recall staff's names, whether his dressing was changed yesterday etc. Denies pain. Slept well    Objective:     Current Facility-Administered Medications   Medication Dose Route Frequency    naloxone (NARCAN) injection 0.4 mg  0.4 mg IntraVENous PRN    dextrose (D50W) injection syrg 12.5-25 g  25-50 mL IntraVENous PRN    dextrose 40% (GLUTOSE) oral gel 1 Tube  15 g Oral PRN    glucagon (GLUCAGEN) injection 1 mg  1 mg IntraMUSCular PRN    insulin lispro (HUMALOG) injection 0-10 Units  0-10 Units SubCUTAneous AC&HS    acetaminophen (TYLENOL) tablet 650 mg  650 mg Oral Q6H PRN    apixaban (ELIQUIS) tablet 5 mg  5 mg Oral Q12H    atorvastatin (LIPITOR) tablet 40 mg  40 mg Oral QHS    docusate sodium (COLACE) capsule 100 mg  100 mg Oral BID    fludrocortisone (FLORINEF) tablet 0.1 mg  0.1 mg Oral DAILY    midodrine (PROAMATINE) tablet 2.5 mg  2.5 mg Oral TID WITH MEALS    nystatin (MYCOSTATIN) 100,000 unit/gram powder   Topical PRN    oxyCODONE IR (ROXICODONE) tablet 5 mg  5 mg Oral Q4H PRN    pantoprazole (PROTONIX) tablet 40 mg  40 mg Oral ACB    polyethylene glycol (MIRALAX) packet 17 g  17 g Oral DAILY    pregabalin (LYRICA) capsule 200 mg  200 mg Oral BID    traMADoL (ULTRAM) tablet 50 mg  50 mg Oral Q6H PRN    traZODone (DESYREL) tablet 50 mg  50 mg Oral QHS       Review of Systems:   Denies chest pain, shortness of breath, cough, headache, visual problems, abdominal pain, dysuria, calf pain. Pertinent positives are as noted in the HPI, ROS unremarkable otherwise.      Visit Vitals  /71 (BP 1 Location: Left upper arm, BP Patient Position: Semi fowlers)   Pulse 94   Temp 98.1 °F (36.7 °C)   Resp 18   SpO2 95%        Physical Exam:   General: Alert and age appropriately oriented. No acute cardiorespiratory distress. HEENT: Normocephalic, no scleral icterus. Oral mucosa moist without cyanosis. Lungs: Clear to auscultation bilaterally, decreased at bases  Respiration even and unlabored. Heart: Regular rate and rhythm, S1, S2. No murmurs, clicks, rub or gallops. Abdomen: Soft, non-tender, not distended. Bowel sounds normoactive. No organomegaly. obese   Genitourinary: Deferred. Neuromuscular:      Conversant, tangential  UEs 4+  RLE hip flexion 3+, KI in place, residual limb wrapped in ACE wrap  LLE hip flexion 4, distal 5-  No lt touch sensation from distal LLE up to mid shin. Lack proprioception    Skin/extremity: No rashes, no erythema. No calf tenderness B LE. Wound covered R BKA  Left plantar wound calloused with center cavitation of about 2mm deep and diameter of pencil eraser. No drainage                                                                              Functional Assessment:          Balance  Sitting - Static: Fair (occasional) (08/25/21 1700)  Sitting - Dynamic: Poor (constant support) (08/25/21 1700)  Standing - Static: Not tested (08/25/21 1700)                     Leonard Poon Fall Risk Assessment:  Leonard Poon Fall Risk  Mobility: Unable to ambulate or transfer (08/26/21 0705)  Mentation: Alert, oriented x 3 (08/26/21 0705)  Mentation Interventions: Adequate sleep, hydration, pain control;Bed/chair exit alarm; Door open when patient unattended;Evaluate medications/consider consulting pharmacy; Toileting rounds;Update white board (08/26/21 4806)  Medication: Patient receiving anticonvulsants, sedatives(tranquilizers), psychotropics or hypnotics, hypoglycemics, narcotics, sleep aids, antihypertensives, laxatives, or diuretics (08/26/21 0705)  Medication Interventions: Bed/chair exit alarm; Patient to call before getting OOB; Teach patient to arise slowly (08/26/21 0866)  Elimination: Needs assistance with toileting (08/26/21 0705)  Elimination Interventions: Call light in reach; Patient to call for help with toileting needs;Urinal in reach (08/26/21 0705)  Prior Fall History: No (08/26/21 0705)  Total Score: 2 (08/26/21 0705)  Standard Fall Precautions: Yes (08/26/21 0705)     Speech Assessment:         Ambulation:  Gait  Distance (ft): 0 Feet (ft) (08/25/21 1700)     Labs/Studies:  Recent Results (from the past 72 hour(s))   GLUCOSE, POC    Collection Time: 08/23/21 12:32 PM   Result Value Ref Range    Glucose (POC) 115 (H) 65 - 100 mg/dL    Performed by Malcolm    GLUCOSE, POC    Collection Time: 08/23/21  3:45 PM   Result Value Ref Range    Glucose (POC) 114 (H) 65 - 100 mg/dL    Performed by Jean Claude    GLUCOSE, POC    Collection Time: 08/23/21  9:04 PM   Result Value Ref Range    Glucose (POC) 144 (H) 65 - 100 mg/dL    Performed by [facility]    METABOLIC PANEL, BASIC    Collection Time: 08/24/21  4:22 AM   Result Value Ref Range    Sodium 143 136 - 145 mmol/L    Potassium 4.6 3.5 - 5.1 mmol/L    Chloride 106 98 - 107 mmol/L    CO2 36 (H) 21 - 32 mmol/L    Anion gap 1 (L) 7 - 16 mmol/L    Glucose 96 65 - 100 mg/dL    BUN 35 (H) 8 - 23 MG/DL    Creatinine 1.57 (H) 0.8 - 1.5 MG/DL    GFR est AA 56 (L) >60 ml/min/1.73m2    GFR est non-AA 46 (L) >60 ml/min/1.73m2    Calcium 9.0 8.3 - 10.4 MG/DL   CBC W/O DIFF    Collection Time: 08/24/21  4:22 AM   Result Value Ref Range    WBC 6.1 4.3 - 11.1 K/uL    RBC 3.06 (L) 4.23 - 5.6 M/uL    HGB 8.9 (L) 13.6 - 17.2 g/dL    HCT 29.9 (L) 41.1 - 50.3 %    MCV 97.7 79.6 - 97.8 FL    MCH 29.1 26.1 - 32.9 PG    MCHC 29.8 (L) 31.4 - 35.0 g/dL    RDW 15.7 (H) 11.9 - 14.6 %    PLATELET 681 660 - 430 K/uL    MPV 11.0 9.4 - 12.3 FL    ABSOLUTE NRBC 0.00 0.0 - 0.2 K/uL   GLUCOSE, POC    Collection Time: 08/24/21 12:31 PM   Result Value Ref Range    Glucose (POC) 111 (H) 65 - 100 mg/dL    Performed by Havery Bamberger, POC    Collection Time: 08/24/21  5:46 PM   Result Value Ref Range    Glucose (POC) 134 (H) 65 - 100 mg/dL    Performed by Havery Bamberger, POC    Collection Time: 08/24/21  8:34 PM   Result Value Ref Range    Glucose (POC) 107 (H) 65 - 100 mg/dL    Performed by [facility]    METABOLIC PANEL, BASIC    Collection Time: 08/25/21  5:45 AM   Result Value Ref Range    Sodium 141 138 - 145 mmol/L    Potassium 5.1 3.5 - 5.1 mmol/L    Chloride 106 98 - 107 mmol/L    CO2 35 (H) 21 - 32 mmol/L    Anion gap 0 (L) 7 - 16 mmol/L    Glucose 98 65 - 100 mg/dL    BUN 28 (H) 8 - 23 MG/DL    Creatinine 1.49 0.8 - 1.5 MG/DL    GFR est AA 59 (L) >60 ml/min/1.73m2    GFR est non-AA 49 (L) >60 ml/min/1.73m2    Calcium 9.0 8.3 - 10.4 MG/DL   GLUCOSE, POC    Collection Time: 08/25/21  7:25 AM   Result Value Ref Range    Glucose (POC) 105 (H) 65 - 100 mg/dL    Performed by Earlene    GLUCOSE, POC    Collection Time: 08/25/21 11:39 AM   Result Value Ref Range    Glucose (POC) 123 (H) 65 - 100 mg/dL    Performed by BeesHao    GLUCOSE, POC    Collection Time: 08/25/21  4:20 PM   Result Value Ref Range    Glucose (POC) 95 65 - 100 mg/dL    Performed by BeesHao    GLUCOSE, POC    Collection Time: 08/25/21  8:51 PM   Result Value Ref Range    Glucose (POC) 142 (H) 65 - 100 mg/dL    Performed by [employer]    GLUCOSE, POC    Collection Time: 08/26/21  6:55 AM   Result Value Ref Range    Glucose (POC) 111 (H) 65 - 100 mg/dL    Performed by Sandee(Parris)CNA        Assessment:     Problem List as of 8/26/2021 Date Reviewed: 8/20/2021        Codes Class Noted - Resolved    Unilateral complete BKA, right, sequela ([email or web address] ICD-10-CM: R13.498J  ICD-9-CM: 905.9  8/25/2021 - Present        Suspected sleep apnea ICD-10-CM: R29.818  ICD-9-CM: 781.99  8/20/2021 - Present        S/P BKA (below knee amputation) unilateral, right (HCC) ICD-10-CM: Z89.511  ICD-9-CM: V49.75  8/18/2021 - Present        Respiratory failure, post-operative ([facility] ICD-10-CM: J53.699  ICD-9-CM: 518.51  8/18/2021 - Present        Acute respiratory failure with hypercapnia (HCC) ICD-10-CM: J96.02  ICD-9-CM: 518.81  7/23/2021 - Present        Acute on chronic respiratory failure with hypoxia and hypercapnia (HCC) ICD-10-CM: J96.21, J96.22  ICD-9-CM: 518.84, 786.09, 799.02  7/23/2021 - Present        Acute metabolic encephalopathy IVW-50-AG: G93.41  ICD-9-CM: 348.31  7/23/2021 - Present        Hypoxia ICD-10-CM: R09.02  ICD-9-CM: 799.02  7/21/2021 - Present        Acute kidney injury superimposed on CKD [facility]) ICD-10-CM: N17.9, N18.9  ICD-9-CM: 866.00, 585.9  7/21/2021 - Present        Staphylococcus aureus bacteremia ICD-10-CM: R78.81, B95.61  ICD-9-CM: 790.7, 041.11  7/21/2021 - Present        Cellulitis ICD-10-CM: L03.90  ICD-9-CM: 682.9  7/19/2021 - Present        Systolic CHF, acute on chronic (HCC) ICD-10-CM: I50.23  ICD-9-CM: 428.23, 428.0  7/19/2021 - Present        Atrial fibrillation with RVR (HCC) ICD-10-CM: I48.91  ICD-9-CM: 427.31  7/19/2021 - Present        Morbid obesity with BMI of 40.0-44.9, adult ([facility] ICD-10-CM: E66.01, Z68.41  ICD-9-CM: 278.01, V85.41  6/11/2020 - Present        Severe obesity (BMI 35.0-35.9 with comorbidity) (HCC) (Chronic) ICD-10-CM: E66.01, Z68.35  ICD-9-CM: 278.01, V85.35  10/10/2018 - Present        Bunion of unspecified foot (Chronic) ICD-10-CM: M21.619  ICD-9-CM: 727.1  4/10/2018 - Present        Onychomycosis (Chronic) ICD-10-CM: B35.1  ICD-9-CM: 110.1  4/10/2018 - Present        Corns and callus (Chronic) ICD-10-CM: L84  ICD-9-CM: 700  4/10/2018 - Present        Mixed hyperlipidemia (Chronic) ICD-10-CM: D30.7  ICD-9-CM: 272.2  4/10/2018 - Present        Mixed axonal-demyelinating polyneuropathy (Chronic) ICD-10-CM: G62.89  ICD-9-CM: 355.9  1/5/2018 - Present        Controlled type 2 diabetes mellitus with diabetic polyneuropathy, without long-term current use of insulin (HCC) (Chronic) ICD-10-CM: E11.42  ICD-9-CM: 250.60, 357.2  1/5/2018 - Present        Type 2 diabetes mellitus with nephropathy (HCC) (Chronic) ICD-10-CM: E11.21  ICD-9-CM: 250.40, 583.81  1/5/2018 - Present        Stage 3 chronic kidney disease ([facility] (Chronic) ICD-10-CM: N18.30  ICD-9-CM: 585.3  11/22/2017 - Present        Benign hypertensive kidney disease with chronic kidney disease (Chronic) ICD-10-CM: I12.9  ICD-9-CM: 403.10  11/6/2017 - Present        CAD (coronary artery disease) (Chronic) ICD-10-CM: I25.10  ICD-9-CM: 414.00  Unknown - Present        RESOLVED: Glucose intolerance (impaired glucose tolerance) (Chronic) ICD-10-CM: R73.02  ICD-9-CM: 790.22  11/6/2017 - 11/14/2017        RESOLVED: BMI 40.0-44.9, adult (HCC) (Chronic) ICD-10-CM: Z68.41  ICD-9-CM: V85.41  11/4/2016 - 10/10/2018        RESOLVED: Hypertension ICD-10-CM: I10  ICD-9-CM: 401.9  Unknown - 10/10/2016        RESOLVED: Hypercholesterolemia ICD-10-CM: E78.00  ICD-9-CM: 272.0  Unknown - 10/10/2016        RESOLVED: Chronic kidney disease ICD-10-CM: N18.9  ICD-9-CM: 954. 9  Unknown - 10/10/2016    Overview Signed 10/10/2016 11:28 AM by Merlin Sharp, MD     stage 3             RESOLVED: Neuropathy ICD-10-CM: G62.9  ICD-9-CM: 355.9  Unknown - 10/10/2016        RESOLVED: Peripheral polyneuropathy (Chronic) ICD-10-CM: G62.9  ICD-9-CM: 356.9  10/10/2016 - 1/5/2018        RESOLVED: CKD (chronic kidney disease) stage 3, GFR 30-59 ml/min (HCC) (Chronic) ICD-10-CM: N18.30  ICD-9-CM: 585.3  10/10/2016 - 11/6/2017        RESOLVED: Hyperlipidemia (Chronic) ICD-10-CM: E78.5  ICD-9-CM: 272.4  10/10/2016 - 4/10/2018        RESOLVED: Hyperkalemia ICD-10-CM: E87.5  ICD-9-CM: 276.7  10/10/2016 - 11/14/2017        RESOLVED: Essential hypertension (Chronic) ICD-10-CM: I10  ICD-9-CM: 401.9  10/10/2016 - 11/22/2017              S/p R BKA secondary to cellulitis      Plan / Recommendations / Medical Decision Making:    Daily physician / PA medical management:     Unilateral complete BKA, right, sequela ([address] [S88.111S] -   -NWB RLE; prosthetics involved. Will be difficullt for pt to mobilize with a prosthesis given he lacks sensation and proprioception in the right foot , body habitus, chronic resp failure and CHF     Hypotension - BP fluctuating, managed medically. Has been hypotensive and is on florinef and midodrine. Dehydration thught to be contributing as can diuretics     Acute on chronic respiratory failure. Cont 3L o2, wean to 2L if possible. Enc IS.      Afib; cont eliquis 5mg bid. Rate controlled. toprol XL has loly held due to hypotensive at time.      Acute on chronic anemia; anemia of chronic kidney dz and post op pain due to blood loss. hgb trending down. Follow closely. No evidence of active bleed     CKD stg 3; improving. Monitor. Creatine 1.49 from 1.57     Diabetes mellitus - uncontrolled / poor glycemic control. Will require daily, close fasting glucose monitoring and medication adjustment to optimize glycemic control in setting of acute illness and hospitalization.   -takes glucotrol 5mg at home. Currently on SSI; add glucotrol  -8/26 bs controlled     Pain control - stable, mild-to-moderate joint symptoms intermittently, reasonably well controlled by PRN meds. Will require regular pain assessment and comprehensive pain management. Has crhronic mixed axonaldemyelinating poly neuropathy; on Lyrica 200mg bid. Denies residual limb pain but has tramadol and rikki ordered prn     Pneumonia prophylaxis - incentive spirometer every hour while awake.     DVT risk / DVT prophylaxis - will require daily physician / PA exam to assess for signs and symptoms as patient is at increased risk for of thromboembolism. Mobilize as tolerated. Sequential pneumatic compression devices (SCDs) when in bed; thigh-high or knee-high thromboembolic deterrent hose when out of bed.  ON ELIQUIS     CAD; cont eliquis and statin     GI prophylaxis - resume PPI. At times may need additional antacids, Maalox prn. .  General skin care / wound prevention - monitor BKA  incision and general skin wound status daily per staff and physician / PA. At risk for failure. Will require 24/7 rehab nursing. Keep wound clean and dry, Reinforce dressing PRN and Ice to area for comfort; he is to fu with Dr Angelica Bowers in 10-14 d for staple remove  -will cont wound vac until f/u with dr Angelica Bowers     Urinary retention / neurogenic bladder - schedule voids q 6-8 hrs. Check post-void residual every shift; in and out catheter if post-void residual is more than 400ml.     Bowel program - at risk for constipation as a side effect of opioids, other medications, impaired mobility, etc. MiraLAX daily for regularity, Ivis-Colace for stool softener. PRN MOM, bisacodyl suppository or tablets for constipation.       Time spent was 25 minutes with over 1/2 in direct patient care/examination, consultation and coordination of care.      Signed By: David Rivera MD     August 26, 2021

## 2021-08-26 NOTE — PROGRESS NOTES
Problem: Falls - Risk of  Goal: *Absence of Falls  Description: Document Maikel Junior Fall Risk and appropriate interventions in the flowsheet. Outcome: Progressing Towards Goal  Note: Fall Risk Interventions:       Mentation Interventions: Adequate sleep, hydration, pain control, Bed/chair exit alarm, Door open when patient unattended, Evaluate medications/consider consulting pharmacy, Toileting rounds, Update white board    Medication Interventions: Bed/chair exit alarm, Patient to call before getting OOB, Teach patient to arise slowly    Elimination Interventions: Call light in reach, Patient to call for help with toileting needs, Urinal in reach              Problem: Patient Education: Go to Patient Education Activity  Goal: Patient/Family Education  Outcome: Progressing Towards Goal     Problem: Pressure Injury - Risk of  Goal: *Prevention of pressure injury  Description: Document Julio Cesar Scale and appropriate interventions in the flowsheet.   Outcome: Progressing Towards Goal  Note: Pressure Injury Interventions:  Sensory Interventions: Assess changes in LOC, Chair cushion, Check visual cues for pain, Discuss PT/OT consult with provider, Keep linens dry and wrinkle-free    Moisture Interventions: Absorbent underpads, Minimize layers, Moisture barrier, Offer toileting Q_hr    Activity Interventions: Increase time out of bed, Pressure redistribution bed/mattress(bed type), PT/OT evaluation    Mobility Interventions: Pressure redistribution bed/mattress (bed type), PT/OT evaluation    Nutrition Interventions: Document food/fluid/supplement intake    Friction and Shear Interventions: Apply protective barrier, creams and emollients, Lift sheet                Problem: Patient Education: Go to Patient Education Activity  Goal: Patient/Family Education  Outcome: Progressing Towards Goal     Problem: Patient Education: Go to Patient Education Activity  Goal: Patient/Family Education  Description: Patient / Patient's family will verbalize understanding of PT safety recommendations, demonstrate appropriate assist for current functional mobility status, safety, and home exercise program by time of discharge.    Outcome: Progressing Towards Goal

## 2021-08-26 NOTE — PROGRESS NOTES
Time In 0743   Time Out 0835     Mobility   Score Comments   Supine to Sit 4: Supervision or touching A S   Sit to Stand 1: Dependent Ax2   Transfer Assist 1: Dependent Transfer Type: Both  Equipment: Sliding Board   Comments: Ax2     Activities of Daily Living    Score Comments   Eating 6: Independent I   Oral Hygiene 6: Independent I   Bathing 3: Partial/Moderate A Type of Shower: Bath Pack  Position: Supported sitting   Adaptive  Equipment: N/A  Comments: A for LLE   Upper Body  Dressing 5: S/U or clean-up assist Items Applied: Pullover  Position: Supported Sitting  Comments: S/U   Lower Body Dressing 1: Dependent Items Applied: Underwear and Elastic pants  Position: Supported sitting and Standing PRN  Adaptive Equipment: Grab Bars  Comments: Ax2; A for all parts   Donning/[place] Footwear 1: Dependent Items Applied: Socks  Adaptive Equipment: N/A  Comments: A for all parts   Toilet Transfer 1: Dependent Transfer Type: Both  Equipment: Grab Bars and Sliding Board   Comments: Ax2   Toileting Hygiene 1: Dependent Output: Urine  Comments: Ax2   Education  Body mechanics for mobility     Pt was in bed and agreeable to tx. Pt's performance with ADL is reflected in above chart. Pt was left in w/c with all needs within reach.      Yara Aviles OT   8/26/2021

## 2021-08-26 NOTE — PROGRESS NOTES
PHYSICAL THERAPY DAILY NOTE  Time In: (P) 1004  Time Out: (P) 1119  Patient Seen For: (P) AM;Gait training; Other (see progress notes); Therapeutic exercise;Transfer training    Subjective: Patient had no complaints. Objective: O2 at 2 liters. O2 sats remained >90% during treatment. Other (comment) (fall)  GROSS ASSESSMENT Daily Assessment            COGNITION Daily Assessment           BED/MAT MOBILITY Daily Assessment    Supine to Sit : (P) 2 (Maximal assistance)  Sit to Supine : (P) 2 (Maximal assistance)       TRANSFERS Daily Assessment    Transfer Type: (P) Lateral with transfer board  Transfer Assistance : (P) 3 (Moderate assistance )  Sit to Stand Assistance: (P) Unable at this time  Car Transfers: (P) Not tested       GAIT Daily Assessment    Amount of Assistance: (P) NA (Not applicable)       STEPS or STAIRS Daily Assessment    Level of Assist : (P) NA (Not applicable)       BALANCE Daily Assessment            WHEELCHAIR MOBILITY Daily Assessment            LOWER EXTREMITY EXERCISES Daily Assessment    Extremity: (P) Both  Exercise Type #1: (P) Supine lower extremity strengthening  Sets Performed: (P) 1  Reps Performed: (P) 10  Level of Assist: (P) Maximum assistance          Assessment: Patient making good progress. Plan of Care: Continue with plan of care.     Jimmy Olivas, PTA  8/26/2021

## 2021-08-26 NOTE — PROGRESS NOTES
OT Daily Note  Time In 1121   Time Out 1202     Subjective: \"I really like this exercise session. \" Pt was agreeable to tx. Pain: No pain expressed. Patient Vitals for the past 8 hrs:   Temp Pulse Resp BP SpO2   08/26/21 0836 97.4 °F (36.3 °C) 89 24 (!) 114/59 94 %        Activity Tolerance, Coordination, and Strengthening   Pt engaged with medium soft (red) putty with x18 items to find and remove to increase bilateral hand strength and coordination for integration into IADL and ADL tasks. Pt demonstrated good quality during activity. Activity Tolerance and Strengthening   Pt completed the following exercises to promote UB strength, activity tolerance, and shoulder stabilization for integration into functional transfers and ADLs:  Exercise Sets/Reps Device Comments   Overhead Press 2x10 5lb dumbbell    Chest Press 2x10 3lb dumbbell    Bicep Curls 2x10 3lb dumbbell           Education   benefits of OT and energy conservation     Interdisciplinary Communication: Communicated with PT to ensure progress towards POC and goals. Summary of session: Pt demonstrated good participation in session. Pt demonstrated good UE strength and activity tolerance during the tasks. Pt was left seated in wheelchair with call bell within reach and all needs met. Plan: Continue with POC.      Andreia Chadwick MS OTR/L  8/26/2021

## 2021-08-26 NOTE — PROGRESS NOTES
PHYSICAL THERAPY DAILY NOTE  Time In: (P) 1408  Time Out: (P) 1516  Patient Seen For: (P) PM;Other (see progress notes);Gait training; Therapeutic exercise;Transfer training    Subjective: Patient complained of fatigue. Objective: No pain noted. Other (comment) (fall)  GROSS ASSESSMENT Daily Assessment            COGNITION Daily Assessment           BED/MAT MOBILITY Daily Assessment    Supine to Sit : (P) 3 (Moderate assistance)  Sit to Supine : (P) 3 (Moderate assistance)       TRANSFERS Daily Assessment    Transfer Type: (P) Lateral with transfer board  Transfer Assistance : (P) 3 (Moderate assistance )  Sit to Stand Assistance: (P) Unable at this time  Car Transfers: (P) Not tested       GAIT Daily Assessment    Amount of Assistance: (P) NA (Not applicable)       STEPS or STAIRS Daily Assessment    Level of Assist : (P) NA (Not applicable)       BALANCE Daily Assessment            WHEELCHAIR MOBILITY Daily Assessment            LOWER EXTREMITY EXERCISES Daily Assessment    Extremity: (P) Both  Exercise Type #1: (P) Supine lower extremity strengthening  Sets Performed: (P) 1  Reps Performed: (P) 10  Level of Assist: (P) Moderate assistance          Assessment: Patient seemed very motivated with therapy. Plan of Care: Continue with plan of care.     Jenet Osgood, PTA  8/26/2021

## 2021-08-27 LAB
GLUCOSE BLD STRIP.AUTO-MCNC: 111 MG/DL (ref 65–100)
GLUCOSE BLD STRIP.AUTO-MCNC: 114 MG/DL (ref 65–100)
GLUCOSE BLD STRIP.AUTO-MCNC: 115 MG/DL (ref 65–100)
GLUCOSE BLD STRIP.AUTO-MCNC: 94 MG/DL (ref 65–100)
SERVICE CMNT-IMP: ABNORMAL
SERVICE CMNT-IMP: NORMAL

## 2021-08-27 PROCEDURE — 65310000000 HC RM PRIVATE REHAB

## 2021-08-27 PROCEDURE — 97110 THERAPEUTIC EXERCISES: CPT

## 2021-08-27 PROCEDURE — 74011250637 HC RX REV CODE- 250/637: Performed by: PHYSICIAN ASSISTANT

## 2021-08-27 PROCEDURE — 97535 SELF CARE MNGMENT TRAINING: CPT

## 2021-08-27 PROCEDURE — 99232 SBSQ HOSP IP/OBS MODERATE 35: CPT | Performed by: PHYSICAL MEDICINE & REHABILITATION

## 2021-08-27 PROCEDURE — 97530 THERAPEUTIC ACTIVITIES: CPT

## 2021-08-27 PROCEDURE — 97150 GROUP THERAPEUTIC PROCEDURES: CPT

## 2021-08-27 PROCEDURE — 82962 GLUCOSE BLOOD TEST: CPT

## 2021-08-27 RX ADMIN — ATORVASTATIN CALCIUM 40 MG: 40 TABLET, FILM COATED ORAL at 21:33

## 2021-08-27 RX ADMIN — PREGABALIN 200 MG: 100 CAPSULE ORAL at 08:28

## 2021-08-27 RX ADMIN — DOCUSATE SODIUM 100 MG: 100 CAPSULE, LIQUID FILLED ORAL at 21:32

## 2021-08-27 RX ADMIN — APIXABAN 5 MG: 5 TABLET, FILM COATED ORAL at 08:30

## 2021-08-27 RX ADMIN — MIDODRINE HYDROCHLORIDE 2.5 MG: 5 TABLET ORAL at 08:29

## 2021-08-27 RX ADMIN — MIDODRINE HYDROCHLORIDE 2.5 MG: 5 TABLET ORAL at 16:18

## 2021-08-27 RX ADMIN — TRAZODONE HYDROCHLORIDE 50 MG: 50 TABLET ORAL at 21:33

## 2021-08-27 RX ADMIN — PANTOPRAZOLE SODIUM 40 MG: 40 TABLET, DELAYED RELEASE ORAL at 05:30

## 2021-08-27 RX ADMIN — POLYETHYLENE GLYCOL 3350 17 G: 17 POWDER, FOR SOLUTION ORAL at 08:30

## 2021-08-27 RX ADMIN — DOCUSATE SODIUM 100 MG: 100 CAPSULE, LIQUID FILLED ORAL at 08:29

## 2021-08-27 RX ADMIN — MIDODRINE HYDROCHLORIDE 2.5 MG: 5 TABLET ORAL at 11:20

## 2021-08-27 RX ADMIN — PREGABALIN 200 MG: 100 CAPSULE ORAL at 21:32

## 2021-08-27 RX ADMIN — APIXABAN 5 MG: 5 TABLET, FILM COATED ORAL at 21:33

## 2021-08-27 RX ADMIN — FLUDROCORTISONE ACETATE 0.1 MG: 0.1 TABLET ORAL at 08:30

## 2021-08-27 NOTE — PROGRESS NOTES
PHYSICAL THERAPY DAILY NOTE  Time In: 0915  Time Out: 1003  Patient Seen For: AM;Wheelchair mobility;Transfer training; Therapeutic exercise;Balance activities    Subjective: Pt. eager to get better & get back to his job @ [address] [place] [address]. Objective: Other (comment) (fall)  GROSS ASSESSMENT Daily Assessment    Removed outer ace wrap to inspect skin. Noticed area of non-blanchable redness over patellar tendon. Left out ace wrap off this AM.       COGNITION Daily Assessment    Pt. exhibits mild impulsivity & decreased insight into deficits. BED/MAT MOBILITY Daily Assessment    Rolling Right : 0 (Not tested)  Rolling Left : 0 (Not tested)  Supine to Sit : 4 (Minimal assistance)  Sit to Supine : 5 (Supervision)       TRANSFERS Daily Assessment   Min A to assist w/ balance as well as to guide transfer to prevent anterior translation on the board    Mat to w/c performed on unlevel surfaces & also performed w/ min A Transfer Type: Lateral with transfer board  Transfer Assistance : 4 (Minimal assistance)  Sit to Stand Assistance: Unable at this time  Car Transfers: Not tested       GAIT Daily Assessment   Deferred secondary to Elbert mensah not yet arrived. Amount of Assistance: 0 (Not tested)  Distance (ft): 0 Feet (ft)       STEPS or STAIRS Daily Assessment    Steps/Stairs Ambulated (#): 0  Level of Assist : NA (Not applicable)       BALANCE Daily Assessment   Worked on maintaining postural control during LE dynamic activities.   Pt. Eulogio Vasquez increased posterior lean w/ decreased inititaiton of correction Sitting - Static: Good (unsupported)  Sitting - Dynamic: Fair (occasional)  Standing - Static: Not tested  Standing - Dynamic : Impaired       WHEELCHAIR MOBILITY Daily Assessment    Able to Propel (ft): 50 feet  supervision  Curbs/Ramps Assist Required (FIM Score): 0 (Not tested)  Wheelchair Setup Assist Required : 0 (Not tested)       LOWER EXTREMITY EXERCISES Daily Assessment    Extremity: Both  Exercise Type #1: Supine lower extremity strengthening  Sets Performed: 1  Reps Performed: 10  Level of Assist: Contact guard assistance     SUPINE EXERCISES Sets Reps Comments   Quad Sets 1 10    Glut Sets 1 10    bridging 1 10    Short Arc Quad 1 10    LAQ 1 10    Seated isometric hip adduction 1 10    Straight Leg Raise 1 10 AAROM R LE     Vital Signs:/63 (BP 1 Location: Left upper arm)   Pulse 94   Temp 97.6 °F (36.4 °C)   Resp 18   SpO2 94%     Pain level: no c/o pain    Patient education: Pt. Educated on proper form & technique for exercises on residual limb    Interdisciplinary Communication: collaborated w/ OT regarding pt's progress    Pt. Left in w/c in room in NAD, call bell in reach. Assessment: Pt. Able to perform sliding board transfers w/ less assistance today & demonstrated improved control w/ LE exercises. Cont. To demonstrate decreased strength w/ rapid fatigue during exercises on both LEs. Pt. Also cont. To require assistance for mobility & was previously independently without device. Therefore, pt. Cont. To benefit from cont. PT to address. Plan of Care: Continue with POC and progress as tolerated.      Honorio Mccoy, PT  8/27/2021

## 2021-08-27 NOTE — PROGRESS NOTES
OT Daily Note  Time In 1120   Time Out 1214     Pain: Patient had no complaint of pain. Strengthening   Pt completed the following exercises with 10 lb alexander to promote UB strength, activity tolerance, and shoulder stabilization for integration into functional mobility:  Exercise Reps Comments   Protraction/Retraction 20    Abduction/Adduction 20    Circles 10 1/2 CW, 1/2 CCW   Vs 20    Pt demonstrated good quality during all exercises. Pt completed anterior alphabet with 3 lb dumbbell with RUE with fair quality. Education   Peer education completed with another pt in a similar situation     Interdisciplinary Communication: PT Kane Chavez and PTAS Adalgisa Pinto on pt's performance    Plan: Continue with POC. Pt was left in room with all needs within reach.      Erendira Appl OTR/L  8/27/2021

## 2021-08-27 NOTE — PROGRESS NOTES
Time In 0835   Time Out 0920     Mobility   Score Comments   Supine to Sit 4: Supervision or touching A S   Transfer Assist 1: Dependent Transfer Type: LPT  Equipment: Sliding Board   Comments: Ax2 for safety; pt demonstrates poor mechanics making him a high risk for falls; requires constant cueing     Activities of Daily Living    Score Comments   Oral Hygiene 6: Independent I   Bathing 4: Supervision or touching A Type of Shower: Bath Pack  Position: Unsupported Sitting   Adaptive  Equipment: N/A  Comments: Cueing for safety and to get all parts; poor pacing   Upper Body  Dressing 4: Supervision or touching A Items Applied: Pullover  Position: Unsupported Sitting  Comments: Cueing for O2   Lower Body Dressing 4: Supervision or touching A Items Applied: Underwear and Elastic pants  Position: Unsupported Sitting  Adaptive Equipment: N/A  Comments: Cueing for pacing and techniques    Donning/[place] Footwear 4: Supervision or touching A Items Applied: Socks  Adaptive Equipment: Sock Aide  Comments: Trained on soc aide with good demonstration    Education  O2 managament     Pt was in bed and agreeable to tx. Pt's performance with ADL is reflected in above chart. Pt was left with PTAS Suly Matos.      Joy Conde, OT   8/27/2021

## 2021-08-27 NOTE — PROGRESS NOTES
Kayden Ruiz MD  Medical Director  [address] [facility], [address] [facility]  Tel: [phone] Kwasi Frandy PROGRESS NOTE    [facility]  Admit Date: 8/25/2021  Admit Diagnosis:   Unilateral complete BKA, right, sequela ([address] [S88.111S]    Subjective     Patient seen and examined before AM therapies. No BM since BKA 8/18, patient admits to decreased appetite but denies abdominal pain, bloating or cramping. Otherwise, no complaints: denies CP, palpitations, dyspnea, dysuria. Discussed left foot plantar ulceration that family has been dressing; wound too shallow at this point for any packing. Wound quite calloused at base and rim; orthotics provider contacted for making off-loading boot.     Objective:     Current Facility-Administered Medications   Medication Dose Route Frequency    naloxone (NARCAN) injection 0.4 mg  0.4 mg IntraVENous PRN    dextrose (D50W) injection syrg 12.5-25 g  25-50 mL IntraVENous PRN    dextrose 40% (GLUTOSE) oral gel 1 Tube  15 g Oral PRN    glucagon (GLUCAGEN) injection 1 mg  1 mg IntraMUSCular PRN    insulin lispro (HUMALOG) injection 0-10 Units  0-10 Units SubCUTAneous AC&HS    acetaminophen (TYLENOL) tablet 650 mg  650 mg Oral Q6H PRN    apixaban (ELIQUIS) tablet 5 mg  5 mg Oral Q12H    atorvastatin (LIPITOR) tablet 40 mg  40 mg Oral QHS    docusate sodium (COLACE) capsule 100 mg  100 mg Oral BID    fludrocortisone (FLORINEF) tablet 0.1 mg  0.1 mg Oral DAILY    midodrine (PROAMATINE) tablet 2.5 mg  2.5 mg Oral TID WITH MEALS    nystatin (MYCOSTATIN) 100,000 unit/gram powder   Topical PRN    oxyCODONE IR (ROXICODONE) tablet 5 mg  5 mg Oral Q4H PRN    pantoprazole (PROTONIX) tablet 40 mg  40 mg Oral ACB    polyethylene glycol (MIRALAX) packet 17 g  17 g Oral DAILY    pregabalin (LYRICA) capsule 200 mg  200 mg Oral BID    traMADoL (ULTRAM) tablet 50 mg  50 mg Oral Q6H PRN    traZODone (DESYREL) tablet 50 mg  50 mg Oral QHS       Review of Systems:   Denies cough, headache, visual problems, calf pain. Pertinent positives are as noted in the HPI, ROS unremarkable otherwise. Visit Vitals  /63 (BP 1 Location: Left upper arm)   Pulse 94   Temp 97.6 °F (36.4 °C)   Resp 18   SpO2 94%        Physical Exam:   General: Alert, appropriately oriented. Sitting up in bed after breakfast.   HEENT: Normocephalic, EOM intact. Oral mucosa moist.   Lungs: Clear but decreased breath sounds at bases. Respirations even and unlabored. Heart: Regular rate, mostly regular underlying rhythm. No appreciable murmur, but heart sounds muffled. Abdomen: Soft, non-tender, obese and protuberant but not distended. Bowel sounds hypoactive but of normal pitch and character. Genitourinary: Deferred. Neuromuscular:      Conversant, tangential; limited insight into health deficits. UE strength generally 4+/5 and symmetric. R LE strength at HF 3+/5, KI, ACE wrap in place. L LE strength at HF 4/5, distally 5-/5. Absent L LE light touch sensation to mid shin, absent proprioception. Skin/extremity: No rashes, no erythema. Left calf soft, non-tender. Right BKA inspected yesterday while trouble-shooting Prevena wound vac (vac functioning correctly); skin surrounding without erythema / induration, no drainage in vac cassette. Left plantar wound calloused with central concavity ~2mm x 6mm diameter. Scant serosanguineous drainage on gauze.        Functional Assessment:  Balance  Sitting - Static: Fair (occasional) (08/25/21 1700)  Sitting - Dynamic: Poor (constant support) (08/25/21 1700)  Standing - Static: Not tested (08/25/21 1700)       [facility] Fall Risk Assessment:  [facility] Fall Risk  Mobility: Unable to ambulate or transfer (08/27/21 0705)  Mentation: Alert, oriented x 3 (08/27/21 0705)  Mentation Interventions: Adequate sleep, hydration, pain control;Door open when patient unattended;Evaluate medications/consider consulting pharmacy; Reorient patient;Room close to nurse's station;Update white board (08/27/21 0705)  Medication: Patient receiving anticonvulsants, sedatives(tranquilizers), psychotropics or hypnotics, hypoglycemics, narcotics, sleep aids, antihypertensives, laxatives, or diuretics (08/27/21 0705)  Medication Interventions: Evaluate medications/consider consulting pharmacy; Patient to call before getting OOB; Teach patient to arise slowly (08/27/21 4130)  Elimination: Needs assistance with toileting (08/27/21 0705)  Elimination Interventions: Call light in reach; Patient to call for help with toileting needs;Urinal in reach (08/27/21 0705)  Prior Fall History: No (08/27/21 0705)  Total Score: 2 (08/27/21 0705)  Standard Fall Precautions: Yes (08/27/21 0705)     Ambulation:  Gait  Distance (ft): 0 Feet (ft) (08/25/21 1700)     Labs/Studies:  Recent Results (from the past 72 hour(s))   GLUCOSE, POC    Collection Time: 08/24/21 12:31 PM   Result Value Ref Range    Glucose (POC) 111 (H) 65 - 100 mg/dL    Performed by [address] [place] [address], POC    Collection Time: 08/24/21  5:46 PM   Result Value Ref Range    Glucose (POC) 134 (H) 65 - 100 mg/dL    Performed by Malcolm    GLUCOSE, POC    Collection Time: 08/24/21  8:34 PM   Result Value Ref Range    Glucose (POC) 107 (H) 65 - 100 mg/dL    Performed by [facility]    METABOLIC PANEL, BASIC    Collection Time: 08/25/21  5:45 AM   Result Value Ref Range    Sodium 141 138 - 145 mmol/L    Potassium 5.1 3.5 - 5.1 mmol/L    Chloride 106 98 - 107 mmol/L    CO2 35 (H) 21 - 32 mmol/L    Anion gap 0 (L) 7 - 16 mmol/L    Glucose 98 65 - 100 mg/dL    BUN 28 (H) 8 - 23 MG/DL    Creatinine 1.49 0.8 - 1.5 MG/DL    GFR est AA 59 (L) >60 ml/min/1.73m2    GFR est non-AA 49 (L) >60 ml/min/1.73m2    Calcium 9.0 8.3 - 10.4 MG/DL   GLUCOSE, POC    Collection Time: 08/25/21  7:25 AM   Result Value Ref Range    Glucose (POC) 105 (H) 65 - 100 mg/dL    Performed by Earlene    GLUCOSE, POC    Collection Time: 08/25/21 11:39 AM   Result Value Ref Range    Glucose (POC) 123 (H) 65 - 100 mg/dL    Performed by Jude    GLUCOSE, POC    Collection Time: 08/25/21  4:20 PM   Result Value Ref Range    Glucose (POC) 95 65 - 100 mg/dL    Performed by Jdue    GLUCOSE, POC    Collection Time: 08/25/21  8:51 PM   Result Value Ref Range    Glucose (POC) 142 (H) 65 - 100 mg/dL    Performed by Wan    GLUCOSE, POC    Collection Time: 08/26/21  6:55 AM   Result Value Ref Range    Glucose (POC) 111 (H) 65 - 100 mg/dL    Performed by Chavarria ([place])    GLUCOSE, POC    Collection Time: 08/26/21 11:07 AM   Result Value Ref Range    Glucose (POC) 133 (H) 65 - 100 mg/dL    Performed by Chavarria ([place])    GLUCOSE, POC    Collection Time: 08/26/21  4:22 PM   Result Value Ref Range    Glucose (POC) 126 (H) 65 - 100 mg/dL    Performed by Julee    GLUCOSE, POC    Collection Time: 08/26/21  8:38 PM   Result Value Ref Range    Glucose (POC) 117 (H) 65 - 100 mg/dL    Performed by Clara    GLUCOSE, POC    Collection Time: 08/27/21  6:46 AM   Result Value Ref Range    Glucose (POC) 111 (H) 65 - 100 mg/dL    Performed by Julee        Assessment:     Problem List as of 8/27/2021 Date Reviewed: 8/27/2021        Codes Class Noted - Resolved    * (Principal) Unilateral complete BKA, right, sequela ([facility] ICD-10-CM: E26.737V  ICD-9-CM: 905.9  8/25/2021 - Present        Suspected sleep apnea ICD-10-CM: R29.818  ICD-9-CM: 781.99  8/20/2021 - Present        S/P BKA (below knee amputation) unilateral, right ([facility] ICD-10-CM: Z89.511  ICD-9-CM: V49.75  8/18/2021 - Present        Respiratory failure, post-operative ([address] ICD-10-CM: F01.946  ICD-9-CM: 518.51  8/18/2021 - Present        Acute respiratory failure with hypercapnia (HCC) ICD-10-CM: J96.02  ICD-9-CM: 518.81  7/23/2021 - Present        Acute on chronic respiratory failure with hypoxia and hypercapnia (HCC) ICD-10-CM: J96.21, J96.22  ICD-9-CM: 518.84, 786.09, 799.02  7/23/2021 - Present        Acute metabolic encephalopathy [facility]-CD: G93.41  ICD-9-CM: 348.31  7/23/2021 - Present        Hypoxia ICD-10-CM: R09.02  ICD-9-CM: 799.02  7/21/2021 - Present        Acute kidney injury superimposed on CKD [facility]) ICD-10-CM: N17.9, N18.9  ICD-9-CM: 866.00, 585.9  7/21/2021 - Present        Staphylococcus aureus bacteremia ICD-10-CM: R78.81, B95.61  ICD-9-CM: 790.7, 041.11  7/21/2021 - Present        Cellulitis ICD-10-CM: L03.90  ICD-9-CM: 682.9  7/19/2021 - Present        Systolic CHF, acute on chronic [facility]) ICD-10-CM: I50.23  ICD-9-CM: 428.23, 428.0  7/19/2021 - Present        Atrial fibrillation with RVR (HCC) ICD-10-CM: I48.91  ICD-9-CM: 427.31  7/19/2021 - Present        Morbid obesity with BMI of 40.0-44.9, adult ([facility] ICD-10-CM: E66.01, Z68.41  ICD-9-CM: 278.01, V85.41  6/11/2020 - Present        Severe obesity (BMI 35.0-35.9 with comorbidity) (HCC) (Chronic) ICD-10-CM: E66.01, Z68.35  ICD-9-CM: 278.01, V85.35  10/10/2018 - Present        Bunion of unspecified foot (Chronic) ICD-10-CM: M21.619  ICD-9-CM: 727.1  4/10/2018 - Present        Onychomycosis (Chronic) ICD-10-CM: B35.1  ICD-9-CM: 110.1  4/10/2018 - Present        Corns and callus (Chronic) ICD-10-CM: L84  ICD-9-CM: 700  4/10/2018 - Present        Mixed hyperlipidemia (Chronic) ICD-10-CM: N22.1  ICD-9-CM: 272.2  4/10/2018 - Present        Mixed axonal-demyelinating polyneuropathy (Chronic) ICD-10-CM: G62.89  ICD-9-CM: 355.9  1/5/2018 - Present        Controlled type 2 diabetes mellitus with diabetic polyneuropathy, without long-term current use of insulin (HCC) (Chronic) ICD-10-CM: E11.42  ICD-9-CM: 250.60, 357.2  1/5/2018 - Present        Type 2 diabetes mellitus with nephropathy (HCC) (Chronic) ICD-10-CM: E11.21  ICD-9-CM: 250.40, 583.81  1/5/2018 - Present        Stage 3 chronic kidney disease ([facility] (Chronic) ICD-10-CM: N18.30  ICD-9-CM: 585.3  11/22/2017 - Present        Benign hypertensive kidney disease with chronic kidney disease (Chronic) ICD-10-CM: I12.9  ICD-9-CM: 403.10  11/6/2017 - Present        CAD (coronary artery disease) (Chronic) ICD-10-CM: I25.10  ICD-9-CM: 414.00  Unknown - Present        RESOLVED: Glucose intolerance (impaired glucose tolerance) (Chronic) ICD-10-CM: R73.02  ICD-9-CM: 790.22  11/6/2017 - 11/14/2017        RESOLVED: BMI 40.0-44.9, adult (HCC) (Chronic) ICD-10-CM: Z68.41  ICD-9-CM: V85.41  11/4/2016 - 10/10/2018        RESOLVED: Hypertension ICD-10-CM: I10  ICD-9-CM: 401.9  Unknown - 10/10/2016        RESOLVED: Hypercholesterolemia ICD-10-CM: E78.00  ICD-9-CM: 272.0  Unknown - 10/10/2016        RESOLVED: Chronic kidney disease ICD-10-CM: N18.9  ICD-9-CM: 983. 9  Unknown - 10/10/2016    Overview Signed 10/10/2016 11:28 AM by Ayesha Parr MD     stage 3             RESOLVED: Neuropathy ICD-10-CM: G62.9  ICD-9-CM: 355.9  Unknown - 10/10/2016        RESOLVED: Peripheral polyneuropathy (Chronic) ICD-10-CM: G62.9  ICD-9-CM: 356.9  10/10/2016 - 1/5/2018        RESOLVED: CKD (chronic kidney disease) stage 3, GFR 30-59 ml/min (HCC) (Chronic) ICD-10-CM: N18.30  ICD-9-CM: 585.3  10/10/2016 - 11/6/2017        RESOLVED: Hyperlipidemia (Chronic) ICD-10-CM: E78.5  ICD-9-CM: 272.4  10/10/2016 - 4/10/2018        RESOLVED: Hyperkalemia ICD-10-CM: E87.5  ICD-9-CM: 276.7  10/10/2016 - 11/14/2017        RESOLVED: Essential hypertension (Chronic) ICD-10-CM: I10  ICD-9-CM: 401.9  10/10/2016 - 11/22/2017              S/p right below-knee amputation secondary to cellulitis (8/18/2021, Erin Ziegler MD)     Plan / Recommendations / Medical Decision Making:      Daily physician / PA medical management:     Unilateral complete BKA, right - NWB R LE; prosthetics involved.  Will be difficult for patient to mobilize with a prosthesis given he lacks sensation and proprioception in the right foot, has obese body habitus, chronic respiratory failure and CHF.     Pain control - stable, mild-to-moderate joint symptoms intermittently, reasonably well controlled by PRN meds. Will require regular pain assessment and comprehensive pain management. Has chronic mixed axonal-demyelinating polyneuropathy; on Lyrica 200mg BID. Denies residual limb pain but has tramadol and oxycodone ordered PRN. Hypotension - BP fluctuating, managed medically. Has been hypotensive and is on Florinef and midodrine. Dehydration thought to be contributing, also possibly diuretics. -8/27 /63, HR 94 this AM; patient denies lightheadedness during therapies or while resting in room     Acute-on-chronic respiratory failure - continue 3L O2, wean to 2L if possible. Encourage IS. Atrial fibrillation - continue Eliquis 5mg BID; rate controlled. Toprol XL has loly held due to hypotensive at times. Acute-on-chronic anemia - anemia of chronic kidney disease and post-op due to blood loss. Hgb trending down; follow closely. No evidence of active bleeding.  -8/27 CBC ordered for tomorrow     Chronic kidney disease, CKD3 - improving, monitor. Creatine 1.49 from 1.57.  -8/27 BMP ordered for tomorrow    Diabetes mellitus - HgbA1c 6.7% (7/13/2021), moderate glycemic control. Will require daily, close fasting glucose monitoring and medication adjustment to optimize glycemic control in setting of acute illness and hospitalization. Takes Glucotrol 5mg at home. Currently on SSI; add Glucotrol.  -8/26 BS controlled  -8/27 BS this , all BS since admission <145, most in low 100s     Pneumonia prophylaxis - incentive spirometer every hour while awake.     DVT risk / DVT prophylaxis - will require daily physician / PA exam to assess for signs and symptoms as patient is at increased risk for of thromboembolism. Mobilize as tolerated. Sequential pneumatic compression devices (SCDs) when in bed; thigh-high or knee-high thromboembolic deterrent hose when out of bed.  On Eliquis. CAD - continue Eliquis and statin.     GI prophylaxis - resume PPI. At times may need additional antacids, Maalox prn. General skin care / wound prevention - monitor BKA  incision and general skin wound status daily per staff and physician / PA. At risk for failure. Will require 24/7 rehab nursing. Keep BKA incision and left plantar foot wound clean and dry, reinforce dressing PRN and ice to area for comfort; he is to f/u with Dr Kathleen Gonzalez in 10-14d for staple removal.   -8/25 will cont wound vac until f/u with dr Tatianna Coker retention / neurogenic bladder - schedule voids q 6-8 hrs. Check post-void residual every shift; in and out catheter if post-void residual is more than 400ml.     Bowel program - at risk for constipation as a side effect of opioids, other medications, impaired mobility, etc. MiraLAX daily for regularity, Ivis-Colace for stool softener. PRN MOM, bisacodyl suppository or tablets for constipation.  -8/27 constipated, no BM with daily gentle agents (MiraLAX, Colace), will try more aggressively with lactulose after therapies; may require MOM, bisacodyl suppository or disimpaction        Time spent was 25 minutes with over 1/2 in direct patient care/examination, consultation and coordination of care. Signed By: Regis Hebert PA-C    August 27, 2021      Physician Assistant with [facility]  Eugenia Aguirre MD, Medical Director

## 2021-08-27 NOTE — PROGRESS NOTES
PHYSICAL THERAPY DAILY NOTE  Time In: 2630  Time Out: 0833  Patient Seen For: PM;Therapeutic exercise; Wheelchair mobility;Transfer training;Balance activities    Subjective: While performing standing trials, pt stated \"oh, I don't need to hold on. \"         Objective: Other (comment) (fall)  GROSS ASSESSMENT Daily Assessment            COGNITION Daily Assessment    Decreased insight, impaired judgement       BED/MAT MOBILITY Daily Assessment    Rolling Right : 0 (Not tested)  Rolling Left : 0 (Not tested)  Supine to Sit : 0 (Not tested)  Sit to Supine : 0 (Not tested)       TRANSFERS Daily Assessment   Mod a x2 for slide board transfer <>Nustep    Sit<>stand Max a x 2 with BUE support on grab bar Transfer Type: Lateral with transfer board  Transfer Assistance : 1 (Total assistance)  Sit to Stand Assistance: Maximum assistance  Car Transfers: Not tested       GAIT Daily Assessment    Amount of Assistance: 0 (Not tested)  Distance (ft): 0 Feet (ft)       STEPS or STAIRS Daily Assessment    Steps/Stairs Ambulated (#): 0  Level of Assist : NA (Not applicable)       BALANCE Daily Assessment   Pt stood with Elbert boot on LLE and BUE support and Mod Ax1/Min Ax 1 for 2 1 min intervals. Sitting - Static: Good (unsupported)  Sitting - Dynamic: Fair (occasional)  Standing - Static: Constant support  Standing - Dynamic : Impaired       WHEELCHAIR MOBILITY Daily Assessment    Able to Propel (ft): 50 feet  With Supervision  Curbs/Ramps Assist Required (FIM Score): 0 (Not tested)  Wheelchair Setup Assist Required : 0 (Not tested)       LOWER EXTREMITY EXERCISES Daily Assessment    Nustep for 5 min. Resistance level 1 to increase cardiovascular endurance     Vital Signs: HR ranged from  on the Nustep, SpO2 remainded at >92%. SpO2 dropped to 90% after first standing trial and did not recover in 1 min. Bumped O2 to 1.5. After 2nd standing trial, SpO2 dropped to 90% but recovered within 15 seconds.     Pain level: no complaint of pain    Patient education:I purpose and goal of the Nustep in rehab. Interdisciplinary Communication: Worked with orthotist regarding fitting of Elbert boot    Left pt seated in w/c with call bell and necessities in reach             Assessment: Pt demonstrates improved endurance as demonstrated by 5 minutes on the Nustep with 1 seated rest break. Pt able to progress to standing trials since Elbert boot arrived. Pt required more help for transfers this afternoon due to fatigue, needs continued PT. Plan of Care: Continue with POC and progress as tolerated.        Jacklyn Yusuf, PT  8/27/2021

## 2021-08-27 NOTE — PROGRESS NOTES
Chart reviewed. No new needs at this time. Will follow up with wound care on 8/31 to turn disposable Prevena on and return battery in place. CM to continue to follow and monitor for any further needs.

## 2021-08-27 NOTE — PROGRESS NOTES
Problem: Falls - Risk of  Goal: *Absence of Falls  Description: Document Leonard Poon Fall Risk and appropriate interventions in the flowsheet. Outcome: Progressing Towards Goal  Note: Fall Risk Interventions:       Mentation Interventions: Adequate sleep, hydration, pain control, Door open when patient unattended, Evaluate medications/consider consulting pharmacy, Reorient patient, Room close to nurse's station, Update white board    Medication Interventions: Evaluate medications/consider consulting pharmacy, Patient to call before getting OOB, Teach patient to arise slowly    Elimination Interventions: Call light in reach, Patient to call for help with toileting needs, Urinal in reach              Problem: Patient Education: Go to Patient Education Activity  Goal: Patient/Family Education  Outcome: Progressing Towards Goal     Problem: Pressure Injury - Risk of  Goal: *Prevention of pressure injury  Description: Document Julio Cesar Scale and appropriate interventions in the flowsheet.   Outcome: Progressing Towards Goal  Note: Pressure Injury Interventions:  Sensory Interventions: Assess changes in LOC, Chair cushion, Check visual cues for pain, Discuss PT/OT consult with provider, Keep linens dry and wrinkle-free    Moisture Interventions: Absorbent underpads, Maintain skin hydration (lotion/cream), Moisture barrier, Offer toileting Q_hr    Activity Interventions: Increase time out of bed, Pressure redistribution bed/mattress(bed type), PT/OT evaluation    Mobility Interventions: Chair cushion, Pressure redistribution bed/mattress (bed type), PT/OT evaluation    Nutrition Interventions: Document food/fluid/supplement intake    Friction and Shear Interventions: Apply protective barrier, creams and emollients, Foam dressings/transparent film/skin sealants, Lift sheet, Minimize layers                Problem: Patient Education: Go to Patient Education Activity  Goal: Patient/Family Education  Outcome: Progressing Towards Goal     Problem: Patient Education: Go to Patient Education Activity  Goal: Patient/Family Education  Description: Patient / Patient's family will verbalize understanding of PT safety recommendations, demonstrate appropriate assist for current functional mobility status, safety, and home exercise program by time of discharge.    Outcome: Progressing Towards Goal

## 2021-08-28 LAB
ANION GAP SERPL CALC-SCNC: ABNORMAL MMOL/L (ref 7–16)
BUN SERPL-MCNC: 23 MG/DL (ref 8–23)
CALCIUM SERPL-MCNC: 8.9 MG/DL (ref 8.3–10.4)
CHLORIDE SERPL-SCNC: 106 MMOL/L (ref 98–107)
CO2 SERPL-SCNC: 37 MMOL/L (ref 21–32)
CREAT SERPL-MCNC: 1.51 MG/DL (ref 0.8–1.5)
ERYTHROCYTE [DISTWIDTH] IN BLOOD BY AUTOMATED COUNT: 15.9 % (ref 11.9–14.6)
GLUCOSE BLD STRIP.AUTO-MCNC: 106 MG/DL (ref 65–100)
GLUCOSE BLD STRIP.AUTO-MCNC: 116 MG/DL (ref 65–100)
GLUCOSE BLD STRIP.AUTO-MCNC: 123 MG/DL (ref 65–100)
GLUCOSE BLD STRIP.AUTO-MCNC: 98 MG/DL (ref 65–100)
GLUCOSE SERPL-MCNC: 97 MG/DL (ref 65–100)
HCT VFR BLD AUTO: 22.2 % (ref 41.1–50.3)
HGB BLD-MCNC: 6.6 G/DL (ref 13.6–17.2)
MCH RBC QN AUTO: 29.3 PG (ref 26.1–32.9)
MCHC RBC AUTO-ENTMCNC: 29.7 G/DL (ref 31.4–35)
MCV RBC AUTO: 98.7 FL (ref 79.6–97.8)
NRBC # BLD: 0 K/UL (ref 0–0.2)
PLATELET # BLD AUTO: 250 K/UL (ref 150–450)
PMV BLD AUTO: 11.9 FL (ref 9.4–12.3)
POTASSIUM SERPL-SCNC: 4.7 MMOL/L (ref 3.5–5.1)
RBC # BLD AUTO: 2.25 M/UL (ref 4.23–5.6)
SERVICE CMNT-IMP: ABNORMAL
SERVICE CMNT-IMP: NORMAL
SODIUM SERPL-SCNC: 142 MMOL/L (ref 136–145)
WBC # BLD AUTO: 6.6 K/UL (ref 4.3–11.1)

## 2021-08-28 PROCEDURE — 74011250637 HC RX REV CODE- 250/637: Performed by: PHYSICIAN ASSISTANT

## 2021-08-28 PROCEDURE — 65310000000 HC RM PRIVATE REHAB

## 2021-08-28 PROCEDURE — 97110 THERAPEUTIC EXERCISES: CPT

## 2021-08-28 PROCEDURE — 99232 SBSQ HOSP IP/OBS MODERATE 35: CPT | Performed by: PHYSICAL MEDICINE & REHABILITATION

## 2021-08-28 PROCEDURE — 85027 COMPLETE CBC AUTOMATED: CPT

## 2021-08-28 PROCEDURE — 97535 SELF CARE MNGMENT TRAINING: CPT

## 2021-08-28 PROCEDURE — 82962 GLUCOSE BLOOD TEST: CPT

## 2021-08-28 PROCEDURE — 97530 THERAPEUTIC ACTIVITIES: CPT

## 2021-08-28 PROCEDURE — 80048 BASIC METABOLIC PNL TOTAL CA: CPT

## 2021-08-28 RX ADMIN — APIXABAN 5 MG: 5 TABLET, FILM COATED ORAL at 21:26

## 2021-08-28 RX ADMIN — APIXABAN 5 MG: 5 TABLET, FILM COATED ORAL at 08:28

## 2021-08-28 RX ADMIN — PANTOPRAZOLE SODIUM 40 MG: 40 TABLET, DELAYED RELEASE ORAL at 04:59

## 2021-08-28 RX ADMIN — DOCUSATE SODIUM 100 MG: 100 CAPSULE, LIQUID FILLED ORAL at 21:25

## 2021-08-28 RX ADMIN — ATORVASTATIN CALCIUM 40 MG: 40 TABLET, FILM COATED ORAL at 21:26

## 2021-08-28 RX ADMIN — POLYETHYLENE GLYCOL 3350 17 G: 17 POWDER, FOR SOLUTION ORAL at 08:28

## 2021-08-28 RX ADMIN — PREGABALIN 200 MG: 100 CAPSULE ORAL at 21:25

## 2021-08-28 RX ADMIN — DOCUSATE SODIUM 100 MG: 100 CAPSULE, LIQUID FILLED ORAL at 08:28

## 2021-08-28 RX ADMIN — MIDODRINE HYDROCHLORIDE 2.5 MG: 5 TABLET ORAL at 08:27

## 2021-08-28 RX ADMIN — PREGABALIN 200 MG: 100 CAPSULE ORAL at 08:27

## 2021-08-28 RX ADMIN — FLUDROCORTISONE ACETATE 0.1 MG: 0.1 TABLET ORAL at 08:28

## 2021-08-28 RX ADMIN — TRAZODONE HYDROCHLORIDE 50 MG: 50 TABLET ORAL at 21:25

## 2021-08-28 NOTE — PROGRESS NOTES
OT Daily Note    Time In 0714   Time Out 0740     Subjective: \"I slept pretty good. \" Pt was agreeable to tx. Pain:  No pain expressed. Patient Vitals for the past 8 hrs:   Temp Pulse Resp BP SpO2   08/28/21 0747 98 °F (36.7 °C) 97 18 (!) 95/53 94 %           Mobility   Score Comments   Supine to Sit 4: Supervision or touching A SBA   Transfer Assist 1: Dependent Transfer Type: Sliding Board  Equipment: Sliding Board   Comments: Ax2 for safety     Activities of Daily Living    Score Comments   Bathing Not Tested: Pt refused    Upper Body  Dressing 4: Supervision or touching A Items Applied: Pullover  Position: Supported Sitting  Comments: A to adjust shirt   Lower Body Dressing 3: Partial/Moderate A Items Applied: Underwear and Elastic pants  Position: Supported sitting  Adaptive Equipment: N/A  Comments: MOD A to pull up   Education  benefits of OT, energy conservation, safety awareness and functional transfers     Interdisciplinary Communication: NA  Summary of Session: Pt demonstrated good participation in OT tasks during this session. Pt's performance with ADL is reflected in above chart. Pt was left seated in wheelchair with call bell within reach and all needs met. Plan: Continue per OT POC.        Fidel Amaro MS OTR/L  8/28/2021

## 2021-08-28 NOTE — PROGRESS NOTES
Problem: Falls - Risk of  Goal: *Absence of Falls  Description: Document Alma Locket Fall Risk and appropriate interventions in the flowsheet. Outcome: Progressing Towards Goal  Note: Fall Risk Interventions:       Mentation Interventions: Adequate sleep, hydration, pain control, Bed/chair exit alarm, Door open when patient unattended, Evaluate medications/consider consulting pharmacy, Increase mobility    Medication Interventions: Bed/chair exit alarm, Evaluate medications/consider consulting pharmacy, Patient to call before getting OOB, Utilize gait belt for transfers/ambulation    Elimination Interventions: Bed/chair exit alarm, Call light in reach, Patient to call for help with toileting needs, Toileting schedule/hourly rounds, Urinal in reach              Problem: Pressure Injury - Risk of  Goal: *Prevention of pressure injury  Description: Document Julio Cesar Scale and appropriate interventions in the flowsheet.   Outcome: Progressing Towards Goal  Note: Pressure Injury Interventions:  Sensory Interventions: Assess changes in LOC, Assess need for specialty bed, Check visual cues for pain, Keep linens dry and wrinkle-free, Minimize linen layers    Moisture Interventions: Absorbent underpads, Apply protective barrier, creams and emollients, Check for incontinence Q2 hours and as needed, Minimize layers    Activity Interventions: Chair cushion, Increase time out of bed, Pressure redistribution bed/mattress(bed type)    Mobility Interventions: Assess need for specialty bed, Chair cushion, HOB 30 degrees or less, Pressure redistribution bed/mattress (bed type)    Nutrition Interventions: Document food/fluid/supplement intake, Offer support with meals,snacks and hydration    Friction and Shear Interventions: Apply protective barrier, creams and emollients, HOB 30 degrees or less, Minimize layers

## 2021-08-28 NOTE — PROGRESS NOTES
LLE wound cleaned with wound cleanser, small mepilex applied. No drainage observed. L plantar foot wound cleaned with wound cleanser, small amount of iodoform gauze placed into wound, dry 4x4 applied, wrapped with kerlex. No drainage observed.

## 2021-08-28 NOTE — REHAB NOTE
PHYSICAL THERAPY DAILY NOTE  Time In: 0232  Time Out: 4508  Patient Seen For: AM;Other (see progress notes); Patient education; Therapeutic exercise;Transfer training    Subjective: Pt agreeable for therapy. \"I'll try anything that will get me better. \"           Objective: Other (comment) (fall)  GROSS ASSESSMENT Daily Assessment            COGNITION Daily Assessment    Pt alert and able to follow all therapy commands. TRANSFERS Daily Assessment    Transfer Type: Lateral with transfer board  Transfer Assistance : 4 (Minimal assistance)  Car Transfers: Not tested       BALANCE Daily Assessment    Sitting - Static: Good (unsupported)  Sitting - Dynamic: Fair (occasional)  Standing - Static: Not tested       [facility] MOBILITY Daily Assessment    Wheelchair Setup Assist Required : 3 (Moderate assistance)  Wheelchair Management: Manages left brake;Manages right brake;Manages right footrest;Manages right armrest;Manages left footrest       LOWER EXTREMITY EXERCISES Daily Assessment    Extremity: Left  Exercise Type #1: Seated lower extremity strengthening  Sets Performed: 3  Reps Performed: 10  Level of Assist: Contact guard assistance     SEATED EXERCISES Sets Reps Comments   Ankle Pumps 3 10    Hip Flexion 3 10    Long Arc Quads 3 10    Hip Adduction/Ball Squeeze 3 10    Hip Abduction with green Theraband 3 10    Hamstring Curls with green Theraband 3 10      Pt returned to room and left seated in wc with needs and call light in reach. Elbert Nunes unavailable on this date due to having adjustments completed. Assessment:  Improving strength and endurance with transfers and functional mobility. Plan of Care: Continue with POC and progress as tolerated. Nereida Russell  8/28/2021

## 2021-08-28 NOTE — PROGRESS NOTES
Martha Garcia MD  Medical Director  [address] [facility], [address] [facility]  Tel: [phone] [facility] PROGRESS NOTE    Paloma Quintanilla Pack  Admit Date: 8/25/2021  Admit Diagnosis:   Unilateral complete BKA, right, sequela ([address] [S88.111S]    Subjective     Patient seen and examined. Feels well . In very good spirits. Denies pain . No cp, sob, dizziness, fatigue. Finally had large bm after 8d of constipation     Objective:     Current Facility-Administered Medications   Medication Dose Route Frequency    lactulose (CHRONULAC) 10 gram/15 mL solution 30 mL  30 mL Oral DAILY PRN    naloxone (NARCAN) injection 0.4 mg  0.4 mg IntraVENous PRN    dextrose (D50W) injection syrg 12.5-25 g  25-50 mL IntraVENous PRN    dextrose 40% (GLUTOSE) oral gel 1 Tube  15 g Oral PRN    glucagon (GLUCAGEN) injection 1 mg  1 mg IntraMUSCular PRN    insulin lispro (HUMALOG) injection 0-10 Units  0-10 Units SubCUTAneous AC&HS    acetaminophen (TYLENOL) tablet 650 mg  650 mg Oral Q6H PRN    apixaban (ELIQUIS) tablet 5 mg  5 mg Oral Q12H    atorvastatin (LIPITOR) tablet 40 mg  40 mg Oral QHS    docusate sodium (COLACE) capsule 100 mg  100 mg Oral BID    fludrocortisone (FLORINEF) tablet 0.1 mg  0.1 mg Oral DAILY    midodrine (PROAMATINE) tablet 2.5 mg  2.5 mg Oral TID WITH MEALS    nystatin (MYCOSTATIN) 100,000 unit/gram powder   Topical PRN    oxyCODONE IR (ROXICODONE) tablet 5 mg  5 mg Oral Q4H PRN    pantoprazole (PROTONIX) tablet 40 mg  40 mg Oral ACB    polyethylene glycol (MIRALAX) packet 17 g  17 g Oral DAILY    pregabalin (LYRICA) capsule 200 mg  200 mg Oral BID    traMADoL (ULTRAM) tablet 50 mg  50 mg Oral Q6H PRN    traZODone (DESYREL) tablet 50 mg  50 mg Oral QHS       Review of Systems:   Denies chest pain, shortness of breath, cough, headache, visual problems, abdominal pain, dysuria, calf pain.  Pertinent positives are as noted in the HPI, ROS unremarkable otherwise. Visit Vitals  BP (!) 95/53   Pulse 97   Temp 98 °F (36.7 °C)   Resp 18   SpO2 94%        Physical Exam:   General: Alert and age appropriately oriented. No acute cardiorespiratory distress. HEENT: Normocephalic, no scleral icterus. Oral mucosa moist without cyanosis. Lungs: Clear to auscultation bilaterally. Respiration even and unlabored. Heart: Regular rate and rhythm, S1, S2. No murmurs, clicks, rub or gallops. Abdomen: Soft, non-tender, not distended. Bowel sounds normoactive. No organomegaly. Genitourinary: Deferred. Neuromuscular:      UE strength generally 4+/5 and symmetric. R LE strength at HF 3+/5, KI, ACE wrap in place. L LE strength at HF 4/5, distally 5-/5. Absent L LE light touch sensation to mid shin, absent proprioception   Skin/extremity: No rashes, no erythema. No calf tenderness B LE. Right bka dressing. ace wrapped, wound vac  Left plantar wound calloused with central ulcer;small,. Scant serosanguineous drainage on gauze. Functional Assessment:          Balance  Sitting - Static: Good (unsupported) (08/27/21 1500)  Sitting - Dynamic: Fair (occasional) (08/27/21 1500)  Standing - Static: Constant support (08/27/21 1500)  Standing - Dynamic : Impaired (08/27/21 1500)                     Verneice Frock Fall Risk Assessment:  Verneice Frock Fall Risk  Mobility: Unable to ambulate or transfer (08/28/21 0318)  Mentation: Alert, oriented x 3 (08/28/21 0318)  Mentation Interventions: Adequate sleep, hydration, pain control;Door open when patient unattended;Evaluate medications/consider consulting pharmacy; Increase mobility (08/28/21 0318)  Medication: Patient receiving anticonvulsants, sedatives(tranquilizers), psychotropics or hypnotics, hypoglycemics, narcotics, sleep aids, antihypertensives, laxatives, or diuretics (08/28/21 0318)  Medication Interventions: Evaluate medications/consider consulting pharmacy; Patient to call before getting OOB; Teach patient to arise slowly (08/28/21 5253)  Elimination: Needs assistance with toileting (08/28/21 0318)  Elimination Interventions: Call light in reach; Patient to call for help with toileting needs; Toilet paper/wipes in reach (08/28/21 0318)  Prior Fall History: No (08/28/21 0318)  Total Score: 2 (08/28/21 0318)  Standard Fall Precautions: Yes (08/28/21 0318)     Speech Assessment:         Ambulation:  Gait  Distance (ft): 0 Feet (ft) (08/27/21 1500)     Labs/Studies:  Recent Results (from the past 72 hour(s))   GLUCOSE, POC    Collection Time: 08/25/21 11:39 AM   Result Value Ref Range    Glucose (POC) 123 (H) 65 - 100 mg/dL    Performed by Jude    GLUCOSE, POC    Collection Time: 08/25/21  4:20 PM   Result Value Ref Range    Glucose (POC) 95 65 - 100 mg/dL    Performed by Jude    GLUCOSE, POC    Collection Time: 08/25/21  8:51 PM   Result Value Ref Range    Glucose (POC) 142 (H) 65 - 100 mg/dL    Performed by [address], POC    Collection Time: 08/26/21  6:55 AM   Result Value Ref Range    Glucose (POC) 111 (H) 65 - 100 mg/dL    Performed by Chavarria ([place])    GLUCOSE, POC    Collection Time: 08/26/21 11:07 AM   Result Value Ref Range    Glucose (POC) 133 (H) 65 - 100 mg/dL    Performed by Chav[place]ria ([place])    GLUCOSE, POC    Collection Time: 08/26/21  4:22 PM   Result Value Ref Range    Glucose (POC) 126 (H) 65 - 100 mg/dL    Performed by Julee    GLUCOSE, POC    Collection Time: 08/26/21  8:38 PM   Result Value Ref Range    Glucose (POC) 117 (H) 65 - 100 mg/dL    Performed by Clara    GLUCOSE, POC    Collection Time: 08/27/21  6:46 AM   Result Value Ref Range    Glucose (POC) 111 (H) 65 - 100 mg/dL    Performed by Julee    GLUCOSE, POC    Collection Time: 08/27/21 11:10 AM   Result Value Ref Range    Glucose (POC) 114 (H) 65 - 100 mg/dL    Performed by [facility] GLUCOSE, POC    Collection Time: 08/27/21  4:09 PM   Result Value Ref Range    Glucose (POC) 94 65 - 100 mg/dL    Performed by Julee    GLUCOSE, POC    Collection Time: 08/27/21  9:37 PM   Result Value Ref Range    Glucose (POC) 115 (H) 65 - 100 mg/dL    Performed by Kelly    CBC W/O DIFF    Collection Time: 08/28/21  5:00 AM   Result Value Ref Range    WBC 6.6 4.3 - 11.1 K/uL    RBC 2.25 (L) 4.23 - 5.6 M/uL    HGB 6.6 (LL) 13.6 - 17.2 g/dL    HCT 22.2 (L) 41.1 - 50.3 %    MCV 98.7 (H) 79.6 - 97.8 FL    MCH 29.3 26.1 - 32.9 PG    MCHC 29.7 (L) 31.4 - 35.0 g/dL    RDW 15.9 (H) 11.9 - 14.6 %    PLATELET 542 434 - 239 K/uL    MPV 11.9 9.4 - 12.3 FL    ABSOLUTE NRBC 0.00 0.0 - 0.2 K/uL   GLUCOSE, POC    Collection Time: 08/28/21  7:04 AM   Result Value Ref Range    Glucose (POC) 106 (H) 65 - 100 mg/dL    Performed by [facility]CNA        Assessment:     Problem List as of 8/28/2021 Date Reviewed: 8/27/2021        Codes Class Noted - Resolved    * (Principal) Unilateral complete BKA, right, sequela ([facility] ICD-10-CM: Z90.390D  ICD-9-CM: 905.9  8/25/2021 - Present        Suspected sleep apnea ICD-10-CM: R29.818  ICD-9-CM: 781.99  8/20/2021 - Present        S/P BKA (below knee amputation) unilateral, right ([facility] ICD-10-CM: Z89.511  ICD-9-CM: V49.75  8/18/2021 - Present        Respiratory failure, post-operative ([facility] ICD-10-CM: F59.234  ICD-9-CM: 518.51  8/18/2021 - Present        Acute respiratory failure with hypercapnia (HCC) ICD-10-CM: J96.02  ICD-9-CM: 518.81  7/23/2021 - Present        Acute on chronic respiratory failure with hypoxia and hypercapnia (HCC) ICD-10-CM: J96.21, J96.22  ICD-9-CM: 518.84, 786.09, 799.02  7/23/2021 - Present        Acute metabolic encephalopathy YWE-31-TR: G93.41  ICD-9-CM: 348.31  7/23/2021 - Present        Hypoxia ICD-10-CM: R09.02  ICD-9-CM: 799.02  7/21/2021 - Present        Acute kidney injury superimposed on CKD ([facility] ICD-10-CM: N17.9, N18.9  ICD-9-CM: 866.00, 585.9  7/21/2021 - Present        Staphylococcus aureus bacteremia ICD-10-CM: R78.81, B95.61  ICD-9-CM: 790.7, 041.11  7/21/2021 - Present        Cellulitis ICD-10-CM: L03.90  ICD-9-CM: 682.9  7/19/2021 - Present        Systolic CHF, acute on chronic [facility]) ICD-10-CM: I50.23  ICD-9-CM: 428.23, 428.0  7/19/2021 - Present        Atrial fibrillation with RVR (HCC) ICD-10-CM: I48.91  ICD-9-CM: 427.31  7/19/2021 - Present        Morbid obesity with BMI of 40.0-44.9, adult [facility]) ICD-10-CM: E66.01, Z68.41  ICD-9-CM: 278.01, V85.41  6/11/2020 - Present        Severe obesity (BMI 35.0-35.9 with comorbidity) (HCC) (Chronic) ICD-10-CM: E66.01, Z68.35  ICD-9-CM: 278.01, V85.35  10/10/2018 - Present        Bunion of unspecified foot (Chronic) ICD-10-CM: M21.619  ICD-9-CM: 727.1  4/10/2018 - Present        Onychomycosis (Chronic) ICD-10-CM: B35.1  ICD-9-CM: 110.1  4/10/2018 - Present        Corns and callus (Chronic) ICD-10-CM: L84  ICD-9-CM: 700  4/10/2018 - Present        Mixed hyperlipidemia (Chronic) ICD-10-CM: V05.7  ICD-9-CM: 272.2  4/10/2018 - Present        Mixed axonal-demyelinating polyneuropathy (Chronic) ICD-10-CM: G62.89  ICD-9-CM: 355.9  1/5/2018 - Present        Controlled type 2 diabetes mellitus with diabetic polyneuropathy, without long-term current use of insulin (HCC) (Chronic) ICD-10-CM: E11.42  ICD-9-CM: 250.60, 357.2  1/5/2018 - Present        Type 2 diabetes mellitus with nephropathy (HCC) (Chronic) ICD-10-CM: E11.21  ICD-9-CM: 250.40, 583.81  1/5/2018 - Present        Stage 3 chronic kidney disease ([address] (Chronic) ICD-10-CM: N18.30  ICD-9-CM: 585.3  11/22/2017 - Present        Benign hypertensive kidney disease with chronic kidney disease (Chronic) ICD-10-CM: I12.9  ICD-9-CM: 403.10  11/6/2017 - Present        CAD (coronary artery disease) (Chronic) ICD-10-CM: I25.10  ICD-9-CM: 414.00  Unknown - Present        RESOLVED: Glucose intolerance (impaired glucose tolerance) (Chronic) ICD-10-CM: R73.02  ICD-9-CM: 790.22  11/6/2017 - 11/14/2017        RESOLVED: BMI 40.0-44.9, adult (HCC) (Chronic) ICD-10-CM: Z68.41  ICD-9-CM: V85.41  11/4/2016 - 10/10/2018        RESOLVED: Hypertension ICD-10-CM: I10  ICD-9-CM: 401.9  Unknown - 10/10/2016        RESOLVED: Hypercholesterolemia ICD-10-CM: E78.00  ICD-9-CM: 272.0  Unknown - 10/10/2016        RESOLVED: Chronic kidney disease ICD-10-CM: N18.9  ICD-9-CM: 611. 9  Unknown - 10/10/2016    Overview Signed 10/10/2016 11:28 AM by Ale Diggs MD     stage 3             RESOLVED: Neuropathy ICD-10-CM: G62.9  ICD-9-CM: 355.9  Unknown - 10/10/2016        RESOLVED: Peripheral polyneuropathy (Chronic) ICD-10-CM: G62.9  ICD-9-CM: 356.9  10/10/2016 - 1/5/2018        RESOLVED: CKD (chronic kidney disease) stage 3, GFR 30-59 ml/min (HCC) (Chronic) ICD-10-CM: N18.30  ICD-9-CM: 585.3  10/10/2016 - 11/6/2017        RESOLVED: Hyperlipidemia (Chronic) ICD-10-CM: E78.5  ICD-9-CM: 272.4  10/10/2016 - 4/10/2018        RESOLVED: Hyperkalemia ICD-10-CM: E87.5  ICD-9-CM: 276.7  10/10/2016 - 11/14/2017        RESOLVED: Essential hypertension (Chronic) ICD-10-CM: I10  ICD-9-CM: 401.9  10/10/2016 - 11/22/2017              S/p right below-knee amputation secondary to cellulitis (8/18/2021, Dayami Joe MD)     Plan / Recommendations / Medical Decision Making:      Daily physician / PA medical management:     Unilateral complete BKA, right - NWB R LE; prosthetics involved. Will be difficult for patient to mobilize with a prosthesis given he lacks sensation and proprioception in the right foot, has obese body habitus, chronic respiratory failure and CHF.     Pain control - stable, mild-to-moderate joint symptoms intermittently, reasonably well controlled by PRN meds. Will require regular pain assessment and comprehensive pain management. Has chronic mixed axonal-demyelinating polyneuropathy; on Lyrica 200mg BID.  Denies residual limb pain but has tramadol and oxycodone ordered PRN.     Hypotension - BP fluctuating, managed medically. Has been hypotensive and is on Florinef and midodrine. Dehydration thought to be contributing, also possibly diuretics. -8/27 /63, HR 94 this AM; patient denies lightheadedness during therapies or while resting in room  -8/28 95/53; asymptomatic. HR 97; on proamantine and florinef. Up to 117/65 max     Acute-on-chronic respiratory failure - continue 3L O2, wean to 2L if possible. Encourage IS.      Atrial fibrillation - continue Eliquis 5mg BID; rate controlled. Toprol XL has loly held due to hypotensive at times.     Acute-on-chronic anemia - anemia of chronic kidney disease and post-op due to blood loss. Hgb trending down; follow closely. No evidence of active bleeding.  -8/27 CBC ordered for tomorrow  -8/28 hgb 6.6 from 8.9 8/24; no sign of active bleeding source. Stool was not melenic. Little drainage from amputation ; hold Eliquis and recheck in a.m. If true value; will need to transfer downtown for a blood transfusion     Chronic kidney disease, CKD3 - improving, monitor. Creatine 1.49 from 1.57.  -8/27 BMP ordered for tomorrow; 8/28 creat 1.51, stable     Diabetes mellitus - HgbA1c 6.7% (7/13/2021), moderate glycemic control. Will require daily, close fasting glucose monitoring and medication adjustment to optimize glycemic control in setting of acute illness and hospitalization. Takes Glucotrol 5mg at home. Currently on SSI; add Glucotrol.  -8/26 BS controlled  -8/27 BS this , all BS since admission <145, most in low 100s  -bs controlled     Pneumonia prophylaxis - incentive spirometer every hour while awake.     DVT risk / DVT prophylaxis - will require daily physician / PA exam to assess for signs and symptoms as patient is at increased risk for of thromboembolism. Mobilize as tolerated.  Sequential pneumatic compression devices (SCDs) when in bed; thigh-high or knee-high thromboembolic deterrent hose when out of bed. On Eliquis.     CAD - continue Eliquis and statin.     GI prophylaxis - resume PPI. At times may need additional antacids, Maalox prn.     General skin care / wound prevention - monitor BKA  incision and general skin wound status daily per staff and physician / PA. At risk for failure. Will require 24/7 rehab nursing. Keep BKA incision and left plantar foot wound clean and dry, reinforce dressing PRN and ice to area for comfort; he is to f/u with Dr Eric Wilson in 10-14d for staple removal.   -8/25 will cont wound vac until f/u with dr Reyes Part  -8/28 wound looked good at time of wound vac change per notes     Urinary retention / neurogenic bladder - schedule voids q 6-8 hrs. Check post-void residual every shift; in and out catheter if post-void residual is more than 400ml.     Bowel program - at risk for constipation as a side effect of opioids, other medications, impaired mobility, etc. MiraLAX daily for regularity, Ivis-Colace for stool softener. PRN MOM, bisacodyl suppository or tablets for constipation.  -8/27 constipated, no BM with daily gentle agents (MiraLAX, Colace), will try more aggressively with lactulose after therapies; may require MOM, bisacodyl suppository or disimpaction  -8/28 excellent results yesterday          Time spent was 25 minutes with over 1/2 in direct patient care/examination, consultation and coordination of care.      Signed By: Gail Fitzgerald MD     August 28, 2021

## 2021-08-28 NOTE — PROGRESS NOTES
Hourly rounds completed this shift, will give report to oncoming nurse. Pt resting in bed, call light within reach, family at bedside.

## 2021-08-28 NOTE — PROGRESS NOTES
Problem: Falls - Risk of  Goal: *Absence of Falls  Description: Document [place] Fall Risk and appropriate interventions in the flowsheet. Outcome: Progressing Towards Goal  Note: Fall Risk Interventions:       Mentation Interventions: Adequate sleep, hydration, pain control, Bed/chair exit alarm, Door open when patient unattended, Evaluate medications/consider consulting pharmacy, Eyeglasses and hearing aids, Increase mobility    Medication Interventions: Evaluate medications/consider consulting pharmacy, Patient to call before getting OOB, Teach patient to arise slowly    Elimination Interventions: Call light in reach, Patient to call for help with toileting needs, Toilet paper/wipes in reach, Urinal in reach              Problem: Pressure Injury - Risk of  Goal: *Prevention of pressure injury  Description: Document Julio Cesar Scale and appropriate interventions in the flowsheet.   Outcome: Progressing Towards Goal  Note: Pressure Injury Interventions:  Sensory Interventions: Assess changes in LOC, Chair cushion, Check visual cues for pain, Float heels, Keep linens dry and wrinkle-free, Maintain/enhance activity level, Minimize linen layers, Pressure redistribution bed/mattress (bed type)    Moisture Interventions: Absorbent underpads, Apply protective barrier, creams and emollients, Check for incontinence Q2 hours and as needed, Maintain skin hydration (lotion/cream), Minimize layers, Moisture barrier    Activity Interventions: Increase time out of bed, Pressure redistribution bed/mattress(bed type)    Mobility Interventions: Chair cushion, HOB 30 degrees or less, Pressure redistribution bed/mattress (bed type)    Nutrition Interventions: Document food/fluid/supplement intake    Friction and Shear Interventions: Apply protective barrier, creams and emollients, Foam dressings/transparent film/skin sealants, HOB 30 degrees or less, Lift sheet, Minimize layers                Problem: Pressure Injury - Risk of  Goal: *Prevention of pressure injury  Description: Document Julio Cesar Scale and appropriate interventions in the flowsheet. Outcome: Progressing Towards Goal  Note: Pressure Injury Interventions:  Sensory Interventions: Assess changes in LOC, Chair cushion, Check visual cues for pain, Float heels, Keep linens dry and wrinkle-free, Maintain/enhance activity level, Minimize linen layers, Pressure redistribution bed/mattress (bed type)    Moisture Interventions: Absorbent underpads, Apply protective barrier, creams and emollients, Check for incontinence Q2 hours and as needed, Maintain skin hydration (lotion/cream), Minimize layers, Moisture barrier    Activity Interventions: Increase time out of bed, Pressure redistribution bed/mattress(bed type)    Mobility Interventions: Chair cushion, HOB 30 degrees or less, Pressure redistribution bed/mattress (bed type)    Nutrition Interventions: Document food/fluid/supplement intake    Friction and Shear Interventions: Apply protective barrier, creams and emollients, Foam dressings/transparent film/skin sealants, HOB 30 degrees or less, Lift sheet, Minimize layers                Problem: Pressure Injury - Risk of  Goal: *Prevention of pressure injury  Description: Document Julio Cesar Scale and appropriate interventions in the flowsheet.   Outcome: Progressing Towards Goal  Note: Pressure Injury Interventions:  Sensory Interventions: Assess changes in LOC, Chair cushion, Check visual cues for pain, Float heels, Keep linens dry and wrinkle-free, Maintain/enhance activity level, Minimize linen layers, Pressure redistribution bed/mattress (bed type)    Moisture Interventions: Absorbent underpads, Apply protective barrier, creams and emollients, Check for incontinence Q2 hours and as needed, Maintain skin hydration (lotion/cream), Minimize layers, Moisture barrier    Activity Interventions: Increase time out of bed, Pressure redistribution bed/mattress(bed type)    Mobility Interventions: Chair cushion, HOB 30 degrees or less, Pressure redistribution bed/mattress (bed type)    Nutrition Interventions: Document food/fluid/supplement intake    Friction and Shear Interventions: Apply protective barrier, creams and emollients, Foam dressings/transparent film/skin sealants, HOB 30 degrees or less, Lift sheet, Minimize layers                Problem: Pressure Injury - Risk of  Goal: *Prevention of pressure injury  Description: Document Julio Cesar Scale and appropriate interventions in the flowsheet.   Outcome: Progressing Towards Goal  Note: Pressure Injury Interventions:  Sensory Interventions: Assess changes in LOC, Chair cushion, Check visual cues for pain, Float heels, Keep linens dry and wrinkle-free, Maintain/enhance activity level, Minimize linen layers, Pressure redistribution bed/mattress (bed type)    Moisture Interventions: Absorbent underpads, Apply protective barrier, creams and emollients, Check for incontinence Q2 hours and as needed, Maintain skin hydration (lotion/cream), Minimize layers, Moisture barrier    Activity Interventions: Increase time out of bed, Pressure redistribution bed/mattress(bed type)    Mobility Interventions: Chair cushion, HOB 30 degrees or less, Pressure redistribution bed/mattress (bed type)    Nutrition Interventions: Document food/fluid/supplement intake    Friction and Shear Interventions: Apply protective barrier, creams and emollients, Foam dressings/transparent film/skin sealants, HOB 30 degrees or less, Lift sheet, Minimize layers

## 2021-08-29 LAB
GLUCOSE BLD STRIP.AUTO-MCNC: 100 MG/DL (ref 65–100)
GLUCOSE BLD STRIP.AUTO-MCNC: 103 MG/DL (ref 65–100)
GLUCOSE BLD STRIP.AUTO-MCNC: 110 MG/DL (ref 65–100)
GLUCOSE BLD STRIP.AUTO-MCNC: 96 MG/DL (ref 65–100)
HCT VFR BLD AUTO: 28.5 % (ref 41.1–50.3)
HGB BLD-MCNC: 8.5 G/DL (ref 13.6–17.2)
SERVICE CMNT-IMP: ABNORMAL
SERVICE CMNT-IMP: ABNORMAL
SERVICE CMNT-IMP: NORMAL
SERVICE CMNT-IMP: NORMAL

## 2021-08-29 PROCEDURE — 74011250637 HC RX REV CODE- 250/637: Performed by: PHYSICIAN ASSISTANT

## 2021-08-29 PROCEDURE — 85018 HEMOGLOBIN: CPT

## 2021-08-29 PROCEDURE — 65310000000 HC RM PRIVATE REHAB

## 2021-08-29 PROCEDURE — 82962 GLUCOSE BLOOD TEST: CPT

## 2021-08-29 RX ADMIN — MIDODRINE HYDROCHLORIDE 2.5 MG: 5 TABLET ORAL at 16:50

## 2021-08-29 RX ADMIN — APIXABAN 5 MG: 5 TABLET, FILM COATED ORAL at 21:50

## 2021-08-29 RX ADMIN — DOCUSATE SODIUM 100 MG: 100 CAPSULE, LIQUID FILLED ORAL at 21:51

## 2021-08-29 RX ADMIN — ACETAMINOPHEN 650 MG: 325 TABLET ORAL at 18:04

## 2021-08-29 RX ADMIN — PREGABALIN 200 MG: 100 CAPSULE ORAL at 08:15

## 2021-08-29 RX ADMIN — POLYETHYLENE GLYCOL 3350 17 G: 17 POWDER, FOR SOLUTION ORAL at 08:16

## 2021-08-29 RX ADMIN — APIXABAN 5 MG: 5 TABLET, FILM COATED ORAL at 08:16

## 2021-08-29 RX ADMIN — TRAZODONE HYDROCHLORIDE 50 MG: 50 TABLET ORAL at 21:50

## 2021-08-29 RX ADMIN — PANTOPRAZOLE SODIUM 40 MG: 40 TABLET, DELAYED RELEASE ORAL at 04:38

## 2021-08-29 RX ADMIN — FLUDROCORTISONE ACETATE 0.1 MG: 0.1 TABLET ORAL at 08:16

## 2021-08-29 RX ADMIN — PREGABALIN 200 MG: 100 CAPSULE ORAL at 21:50

## 2021-08-29 RX ADMIN — MIDODRINE HYDROCHLORIDE 2.5 MG: 5 TABLET ORAL at 11:40

## 2021-08-29 RX ADMIN — ACETAMINOPHEN 650 MG: 325 TABLET ORAL at 21:53

## 2021-08-29 RX ADMIN — DOCUSATE SODIUM 100 MG: 100 CAPSULE, LIQUID FILLED ORAL at 08:15

## 2021-08-29 RX ADMIN — ATORVASTATIN CALCIUM 40 MG: 40 TABLET, FILM COATED ORAL at 21:50

## 2021-08-29 NOTE — PROGRESS NOTES
Problem: Falls - Risk of  Goal: *Absence of Falls  Description: Document Gómez Peoples Fall Risk and appropriate interventions in the flowsheet. Outcome: Progressing Towards Goal  Note: Fall Risk Interventions:       Mentation Interventions: Adequate sleep, hydration, pain control, Bed/chair exit alarm, Door open when patient unattended    Medication Interventions: Bed/chair exit alarm, Evaluate medications/consider consulting pharmacy, Patient to call before getting OOB, Utilize gait belt for transfers/ambulation    Elimination Interventions: Bed/chair exit alarm, Call light in reach, Patient to call for help with toileting needs, Toileting schedule/hourly rounds, Urinal in reach              Problem: Pressure Injury - Risk of  Goal: *Prevention of pressure injury  Description: Document Julio Cesar Scale and appropriate interventions in the flowsheet.   Outcome: Progressing Towards Goal  Note: Pressure Injury Interventions:  Sensory Interventions: Assess changes in LOC, Assess need for specialty bed, Chair cushion, Check visual cues for pain, Keep linens dry and wrinkle-free, Minimize linen layers    Moisture Interventions: Absorbent underpads, Apply protective barrier, creams and emollients, Minimize layers    Activity Interventions: Chair cushion, Increase time out of bed, Pressure redistribution bed/mattress(bed type)    Mobility Interventions: Assess need for specialty bed, HOB 30 degrees or less, Pressure redistribution bed/mattress (bed type)    Nutrition Interventions: Document food/fluid/supplement intake, Offer support with meals,snacks and hydration    Friction and Shear Interventions: Apply protective barrier, creams and emollients, HOB 30 degrees or less, Minimize layers

## 2021-08-29 NOTE — PROGRESS NOTES
Dressing removed LLE, no drainage observed. Site cleaned with wound cleanser, mepilex applied. L plantar wound dressing removed, no drainage observed. Site cleaned with wound cleanser, small amount of iodoform gauze placed into wound, dry 4x4 applied, wrapped with kerlex. Pt c/o pain RLE, therapist in to see pt, knee immobilizer removed, pt voices decreased discomfort. Tylenol 650mg po adm.

## 2021-08-30 LAB
GLUCOSE BLD STRIP.AUTO-MCNC: 102 MG/DL (ref 65–100)
GLUCOSE BLD STRIP.AUTO-MCNC: 104 MG/DL (ref 65–100)
GLUCOSE BLD STRIP.AUTO-MCNC: 121 MG/DL (ref 65–100)
GLUCOSE BLD STRIP.AUTO-MCNC: 127 MG/DL (ref 65–100)
SERVICE CMNT-IMP: ABNORMAL

## 2021-08-30 PROCEDURE — 97110 THERAPEUTIC EXERCISES: CPT

## 2021-08-30 PROCEDURE — 74011250637 HC RX REV CODE- 250/637: Performed by: PHYSICIAN ASSISTANT

## 2021-08-30 PROCEDURE — 65310000000 HC RM PRIVATE REHAB

## 2021-08-30 PROCEDURE — 99232 SBSQ HOSP IP/OBS MODERATE 35: CPT | Performed by: PHYSICAL MEDICINE & REHABILITATION

## 2021-08-30 PROCEDURE — 97530 THERAPEUTIC ACTIVITIES: CPT

## 2021-08-30 PROCEDURE — 97542 WHEELCHAIR MNGMENT TRAINING: CPT

## 2021-08-30 PROCEDURE — 82962 GLUCOSE BLOOD TEST: CPT

## 2021-08-30 PROCEDURE — 97535 SELF CARE MNGMENT TRAINING: CPT

## 2021-08-30 RX ADMIN — PREGABALIN 200 MG: 100 CAPSULE ORAL at 08:47

## 2021-08-30 RX ADMIN — DOCUSATE SODIUM 100 MG: 100 CAPSULE, LIQUID FILLED ORAL at 08:48

## 2021-08-30 RX ADMIN — PANTOPRAZOLE SODIUM 40 MG: 40 TABLET, DELAYED RELEASE ORAL at 05:31

## 2021-08-30 RX ADMIN — FLUDROCORTISONE ACETATE 0.1 MG: 0.1 TABLET ORAL at 08:48

## 2021-08-30 RX ADMIN — POLYETHYLENE GLYCOL 3350 17 G: 17 POWDER, FOR SOLUTION ORAL at 08:47

## 2021-08-30 RX ADMIN — APIXABAN 5 MG: 5 TABLET, FILM COATED ORAL at 08:48

## 2021-08-30 RX ADMIN — ATORVASTATIN CALCIUM 40 MG: 40 TABLET, FILM COATED ORAL at 20:33

## 2021-08-30 RX ADMIN — TRAZODONE HYDROCHLORIDE 50 MG: 50 TABLET ORAL at 20:33

## 2021-08-30 RX ADMIN — DOCUSATE SODIUM 100 MG: 100 CAPSULE, LIQUID FILLED ORAL at 20:33

## 2021-08-30 RX ADMIN — APIXABAN 5 MG: 5 TABLET, FILM COATED ORAL at 20:34

## 2021-08-30 RX ADMIN — PREGABALIN 200 MG: 100 CAPSULE ORAL at 20:33

## 2021-08-30 RX ADMIN — MIDODRINE HYDROCHLORIDE 2.5 MG: 5 TABLET ORAL at 08:48

## 2021-08-30 RX ADMIN — MIDODRINE HYDROCHLORIDE 2.5 MG: 5 TABLET ORAL at 16:30

## 2021-08-30 RX ADMIN — MIDODRINE HYDROCHLORIDE 2.5 MG: 5 TABLET ORAL at 12:11

## 2021-08-30 NOTE — PROGRESS NOTES
[facility] PHYSICAL THERAPY DAILY NOTE  Time In: 1030  Time Out: 1120  Patient Seen For: AM;Balance activities; Wheelchair mobility;Transfer training; Therapeutic exercise;Equipment assessment    Subjective: Pt said \"I think I'm getting my wound vac out tomorrow\"         Objective: Other (comment) (fall)  GROSS ASSESSMENT Daily Assessment            COGNITION Daily Assessment    Decreased insight, impaired judgement       BED/MAT MOBILITY Daily Assessment    Rolling Right : 0 (Not tested)  Rolling Left : 0 (Not tested)  Supine to Sit : 0 (Not tested)  Sit to Supine : 0 (Not tested)       TRANSFERS Daily Assessment   Mod Ax2 for transfer to Nustep    Max Ax2 for standing trials with BUE support on back of emelyn ayon. Mirror used in front of pt to be able to self correct posture. Transfer Type: Lateral with transfer board  Transfer Assistance : Mod A for transfer with transfer board. Max Ax2 for standing  Car Transfers: Not tested         GAIT Daily Assessment            STEPS or STAIRS Daily Assessment            BALANCE Daily Assessment   Pt stood with Elbert boot on LLE and BUE support with Yeyo Muro stedy and Max Ax2 for 2 standing trials. First trial was 1 min. Second was 1min 45 sec. Sitting - Static: Good (unsupported)  Sitting - Dynamic: Fair (occasional)  Standing - Static: Fair       WHEELCHAIR MOBILITY Daily Assessment    Able to propel (ft): 50 feet with supervision  Wheelchair Setup Assist Required : 3 (Moderate assistance)       LOWER EXTREMITY EXERCISES Daily Assessment     Nustep for 5 min. Resistance level 1 to increase cardiovascular endurance     SpO2 taken on Nustep midway through. Remained above 95%.     Pain level: no complaint of pain     Patient education:I purpose and goal of the Nustep in rehab.     Interdisciplinary Communication: Worked with OT about wound vac.   Nursing will change the battery in wound vac and it will be taken off next week at follow up appointment.      Left pt seated in w/c ready for OT       Assessment: Pt showed improved standing balance as demonstrated by increased length of time. Pt continues to need improvement with transfers. Plan of Care: . Continue with POC to increase improvement with transfers and bed mobility.     Nilda Mehta, PTA  8/30/2021

## 2021-08-30 NOTE — PROGRESS NOTES
Problem: Falls - Risk of  Goal: *Absence of Falls  Description: Document Savannah Christie Fall Risk and appropriate interventions in the flowsheet. Outcome: Progressing Towards Goal  Note: Fall Risk Interventions:       Mentation Interventions: Adequate sleep, hydration, pain control, Bed/chair exit alarm, Door open when patient unattended, Evaluate medications/consider consulting pharmacy, Eyeglasses and hearing aids, Increase mobility    Medication Interventions: Bed/chair exit alarm, Evaluate medications/consider consulting pharmacy, Patient to call before getting OOB, Teach patient to arise slowly    Elimination Interventions: Bed/chair exit alarm, Call light in reach, Patient to call for help with toileting needs, Toilet paper/wipes in reach              Problem: Pressure Injury - Risk of  Goal: *Prevention of pressure injury  Description: Document Julio Cesar Scale and appropriate interventions in the flowsheet.   Outcome: Progressing Towards Goal  Note: Pressure Injury Interventions:  Sensory Interventions: Assess need for specialty bed    Moisture Interventions: Absorbent underpads, Apply protective barrier, creams and emollients, Check for incontinence Q2 hours and as needed    Activity Interventions: Chair cushion, Increase time out of bed, Pressure redistribution bed/mattress(bed type)    Mobility Interventions: Chair cushion, HOB 30 degrees or less, Pressure redistribution bed/mattress (bed type)    Nutrition Interventions: Document food/fluid/supplement intake    Friction and Shear Interventions: Apply protective barrier, creams and emollients, HOB 30 degrees or less, Lift sheet, Lift team/patient mobility team, Minimize layers

## 2021-08-30 NOTE — PROGRESS NOTES
Lorraine Madrigal MD  Medical Director  [address] [facility], [address] [facility]  Tel: [phone] [facility] PROGRESS NOTE    Madelin Rivas  Admit Date: 8/25/2021  Admit Diagnosis:   Unilateral complete BKA, right, sequela ([address] [S88.111S]    Subjective     Patient seen and examined before / during breakfast. Admits to R BKA discomfort with KI, has been wearing intermittently as tolerated. Large BM this AM, states he was constipated x 6mo before amputation. Admits to phantom sensation but denies phantom pain. Denies CP, palpitations, dyspnea or abdominal pain.      Objective:     Current Facility-Administered Medications   Medication Dose Route Frequency    lactulose (CHRONULAC) 10 gram/15 mL solution 30 mL  30 mL Oral DAILY PRN    naloxone (NARCAN) injection 0.4 mg  0.4 mg IntraVENous PRN    dextrose (D50W) injection syrg 12.5-25 g  25-50 mL IntraVENous PRN    dextrose 40% (GLUTOSE) oral gel 1 Tube  15 g Oral PRN    glucagon (GLUCAGEN) injection 1 mg  1 mg IntraMUSCular PRN    insulin lispro (HUMALOG) injection 0-10 Units  0-10 Units SubCUTAneous AC&HS    acetaminophen (TYLENOL) tablet 650 mg  650 mg Oral Q6H PRN    apixaban (ELIQUIS) tablet 5 mg  5 mg Oral Q12H    atorvastatin (LIPITOR) tablet 40 mg  40 mg Oral QHS    docusate sodium (COLACE) capsule 100 mg  100 mg Oral BID    fludrocortisone (FLORINEF) tablet 0.1 mg  0.1 mg Oral DAILY    midodrine (PROAMATINE) tablet 2.5 mg  2.5 mg Oral TID WITH MEALS    nystatin (MYCOSTATIN) 100,000 unit/gram powder   Topical PRN    oxyCODONE IR (ROXICODONE) tablet 5 mg  5 mg Oral Q4H PRN    pantoprazole (PROTONIX) tablet 40 mg  40 mg Oral ACB    polyethylene glycol (MIRALAX) packet 17 g  17 g Oral DAILY    pregabalin (LYRICA) capsule 200 mg  200 mg Oral BID    traMADoL (ULTRAM) tablet 50 mg  50 mg Oral Q6H PRN    traZODone (DESYREL) tablet 50 mg  50 mg Oral QHS       Review of Systems: Denies cough, headache, visual problems, dysuria, calf pain. Pertinent positives are as noted in the HPI, ROS unremarkable otherwise. Visit Vitals  /72 (BP 1 Location: Left upper arm, BP Patient Position: Semi fowlers)   Pulse 96   Temp 97.4 °F (36.3 °C)   Resp 18   Wt 275 lb 6.4 oz (124.9 kg)   SpO2 96%   BMI 39.52 kg/m²        Physical Exam:   General: Alert, appropriately oriented. OOB in wheelchair with R LE elevated. HEENT: Normocephalic, EOM intact. Oral mucosa moist.   Lungs: Clear to auscultation bilaterally. Respirations even and unlabored. Heart: Regular rate and rhythm. No appreciable murmur. Abdomen: Soft, non-tender, obese and protuberant but not distended. Bowel sounds normoactive, no organomegaly. Genitourinary: Deferred. Neuromuscular:      UE strength generally 4+/5 and symmetric. R LE strength at HF 3+/5, dressing removed down to 50 [place]. L LE strength at HF 4/5, distally 5-/5. Absent L LE light touch sensation to mid shin, absent proprioception. Skin/extremity: No rashes, no erythema. Left calf soft, non-tender. Right BKA dressing removed, wound vac in place and functioning, cannister empty. Left plantar wound calloused with small central ulcer. Functional Assessment:  Balance  Sitting - Static: Good (unsupported) (08/28/21 1216)  Sitting - Dynamic: Fair (occasional) (08/28/21 1216)  Standing - Static: Not tested (08/28/21 1216)  Standing - Dynamic : Impaired (08/27/21 1500)       [facility] Fall Risk Assessment:  [facility] Fall Risk  Mobility: Unable to ambulate or transfer (08/30/21 0301)  Mentation: Alert, oriented x 3 (08/30/21 0301)  Mentation Interventions: Adequate sleep, hydration, pain control;Bed/chair exit alarm; Door open when patient unattended;Evaluate medications/consider consulting pharmacy; Eyeglasses and hearing aids; Increase mobility (08/30/21 0301)  Medication: Patient receiving anticonvulsants, sedatives(tranquilizers), psychotropics or hypnotics, hypoglycemics, narcotics, sleep aids, antihypertensives, laxatives, or diuretics (08/30/21 0301)  Medication Interventions: Bed/chair exit alarm;Evaluate medications/consider consulting pharmacy; Patient to call before getting OOB; Teach patient to arise slowly (08/30/21 0301)  Elimination: Needs assistance with toileting (08/30/21 0301)  Elimination Interventions: Bed/chair exit alarm;Call light in reach; Patient to call for help with toileting needs; Toilet paper/wipes in reach (08/30/21 0301)  Prior Fall History: No (08/30/21 0301)  Total Score: 2 (08/30/21 0301)  Standard Fall Precautions: Yes (08/30/21 0301)     Ambulation:  Gait  Distance (ft): 0 Feet (ft) (08/27/21 1500)     Labs/Studies:  Recent Results (from the past 67 hour(s))   GLUCOSE, POC    Collection Time: 08/27/21 11:10 AM   Result Value Ref Range    Glucose (POC) 114 (H) 65 - 100 mg/dL    Performed by Julee    GLUCOSE, POC    Collection Time: 08/27/21  4:09 PM   Result Value Ref Range    Glucose (POC) 94 65 - 100 mg/dL    Performed by Julee    GLUCOSE, POC    Collection Time: 08/27/21  9:37 PM   Result Value Ref Range    Glucose (POC) 115 (H) 65 - 100 mg/dL    Performed by Kelly    CBC W/O DIFF    Collection Time: 08/28/21  5:00 AM   Result Value Ref Range    WBC 6.6 4.3 - 11.1 K/uL    RBC 2.25 (L) 4.23 - 5.6 M/uL    HGB 6.6 (LL) 13.6 - 17.2 g/dL    HCT 22.2 (L) 41.1 - 50.3 %    MCV 98.7 (H) 79.6 - 97.8 FL    MCH 29.3 26.1 - 32.9 PG    MCHC 29.7 (L) 31.4 - 35.0 g/dL    RDW 15.9 (H) 11.9 - 14.6 %    PLATELET 802 908 - 548 K/uL    MPV 11.9 9.4 - 12.3 FL    ABSOLUTE NRBC 0.00 0.0 - 0.2 K/uL   METABOLIC PANEL, BASIC    Collection Time: 08/28/21  5:00 AM   Result Value Ref Range    Sodium 142 136 - 145 mmol/L    Potassium 4.7 3.5 - 5.1 mmol/L    Chloride 106 98 - 107 mmol/L    CO2 37 (H) 21 - 32 mmol/L    Anion gap Cannot be calculated 7 - 16 mmol/L    Glucose 97 65 - 100 mg/dL    BUN 23 8 - 23 MG/DL    Creatinine 1.51 (H) 0.8 - 1.5 MG/DL    GFR est AA 58 (L) >60 ml/min/1.73m2    GFR est non-AA 48 (L) >60 ml/min/1.73m2    Calcium 8.9 8.3 - 10.4 MG/DL   GLUCOSE, POC    Collection Time: 08/28/21  7:04 AM   Result Value Ref Range    Glucose (POC) 106 (H) 65 - 100 mg/dL    Performed by Luis E)CNA    GLUCOSE, POC    Collection Time: 08/28/21 11:47 AM   Result Value Ref Range    Glucose (POC) 98 65 - 100 mg/dL    Performed by Luis E)CNA    GLUCOSE, POC    Collection Time: 08/28/21  4:40 PM   Result Value Ref Range    Glucose (POC) 116 (H) 65 - 100 mg/dL    Performed by Luis E)CNA    GLUCOSE, POC    Collection Time: 08/28/21  9:24 PM   Result Value Ref Range    Glucose (POC) 123 (H) 65 - 100 mg/dL    Performed by Kelly    HGB & HCT    Collection Time: 08/29/21  4:45 AM   Result Value Ref Range    HGB 8.5 (L) 13.6 - 17.2 g/dL    HCT 28.5 (L) 41.1 - 50.3 %   GLUCOSE, POC    Collection Time: 08/29/21  7:00 AM   Result Value Ref Range    Glucose (POC) 110 (H) 65 - 100 mg/dL    Performed by Shanell    GLUCOSE, POC    Collection Time: 08/29/21 11:35 AM   Result Value Ref Range    Glucose (POC) 96 65 - 100 mg/dL    Performed by Joanna Beatty [id number], POC    Collection Time: 08/29/21  4:22 PM   Result Value Ref Range    Glucose (POC) 103 (H) 65 - 100 mg/dL    Performed by Clara    GLUCOSE, POC    Collection Time: 08/29/21  8:40 PM   Result Value Ref Range    Glucose (POC) 100 65 - 100 mg/dL    Performed by Kirill Box    GLUCOSE, POC    Collection Time: 08/30/21  6:54 AM   Result Value Ref Range    Glucose (POC) 102 (H) 65 - 100 mg/dL    Performed by Luis E)SHEA        Assessment:     Problem List as of 8/30/2021 Date Reviewed: 8/27/2021        Codes Class Noted - Resolved    * (Principal) Unilateral complete BKA, right, sequela ([facility] ICD-10-CM: L96.878B  ICD-9-CM: 905.9  8/25/2021 - Present        Suspected sleep apnea ICD-10-CM: R29.818  ICD-9-CM: 781.99  8/20/2021 - Present        S/P BKA (below knee amputation) unilateral, right ([facility] ICD-10-CM: Z89.511  ICD-9-CM: V49.75  8/18/2021 - Present        Respiratory failure, post-operative ([facility] ICD-10-CM: J08.132  ICD-9-CM: 518.51  8/18/2021 - Present        Acute respiratory failure with hypercapnia (HCC) ICD-10-CM: J96.02  ICD-9-CM: 518.81  7/23/2021 - Present        Acute on chronic respiratory failure with hypoxia and hypercapnia (HCC) ICD-10-CM: J96.21, J96.22  ICD-9-CM: 518.84, 786.09, 799.02  7/23/2021 - Present        Acute metabolic encephalopathy X-96-FP: G93.41  ICD-9-CM: 348.31  7/23/2021 - Present        Hypoxia ICD-10-CM: R09.02  ICD-9-CM: 799.02  7/21/2021 - Present        Acute kidney injury superimposed on CKD [facility]) ICD-10-CM: N17.9, N18.9  ICD-9-CM: 866.00, 585.9  7/21/2021 - Present        Staphylococcus aureus bacteremia ICD-10-CM: R78.81, B95.61  ICD-9-CM: 790.7, 041.11  7/21/2021 - Present        Cellulitis ICD-10-CM: L03.90  ICD-9-CM: 682.9  7/19/2021 - Present        Systolic CHF, acute on chronic (HCC) ICD-10-CM: I50.23  ICD-9-CM: 428.23, 428.0  7/19/2021 - Present        Atrial fibrillation with RVR (HCC) ICD-10-CM: I48.91  ICD-9-CM: 427.31  7/19/2021 - Present        Morbid obesity with BMI of 40.0-44.9, adult ([facility] ICD-10-CM: E66.01, Z68.41  ICD-9-CM: 278.01, V85.41  6/11/2020 - Present        Severe obesity (BMI 35.0-35.9 with comorbidity) (HCC) (Chronic) ICD-10-CM: E66.01, Z68.35  ICD-9-CM: 278.01, V85.35  10/10/2018 - Present        Bunion of unspecified foot (Chronic) ICD-10-CM: M21.619  ICD-9-CM: 727.1  4/10/2018 - Present        Onychomycosis (Chronic) ICD-10-CM: B35.1  ICD-9-CM: 110.1  4/10/2018 - Present        Corns and callus (Chronic) ICD-10-CM: L84  ICD-9-CM: 700  4/10/2018 - Present        Mixed hyperlipidemia (Chronic) ICD-10-CM: T03.4  ICD-9-CM: 272.2  4/10/2018 - Present        Mixed axonal-demyelinating polyneuropathy (Chronic) ICD-10-CM: G62.89  ICD-9-CM: 355.9 1/5/2018 - Present        Controlled type 2 diabetes mellitus with diabetic polyneuropathy, without long-term current use of insulin (HCC) (Chronic) ICD-10-CM: E11.42  ICD-9-CM: 250.60, 357.2  1/5/2018 - Present        Type 2 diabetes mellitus with nephropathy (HCC) (Chronic) ICD-10-CM: E11.21  ICD-9-CM: 250.40, 583.81  1/5/2018 - Present        Stage 3 chronic kidney disease ([facility] (Chronic) ICD-10-CM: N18.30  ICD-9-CM: 585.3  11/22/2017 - Present        Benign hypertensive kidney disease with chronic kidney disease (Chronic) ICD-10-CM: I12.9  ICD-9-CM: 403.10  11/6/2017 - Present        CAD (coronary artery disease) (Chronic) ICD-10-CM: I25.10  ICD-9-CM: 414.00  Unknown - Present        RESOLVED: Glucose intolerance (impaired glucose tolerance) (Chronic) ICD-10-CM: R73.02  ICD-9-CM: 790.22  11/6/2017 - 11/14/2017        RESOLVED: BMI 40.0-44.9, adult (HCC) (Chronic) ICD-10-CM: Z68.41  ICD-9-CM: V85.41  11/4/2016 - 10/10/2018        RESOLVED: Hypertension ICD-10-CM: I10  ICD-9-CM: 401.9  Unknown - 10/10/2016        RESOLVED: Hypercholesterolemia ICD-10-CM: E78.00  ICD-9-CM: 272.0  Unknown - 10/10/2016        RESOLVED: Chronic kidney disease ICD-10-CM: N18.9  ICD-9-CM: 099. 9  Unknown - 10/10/2016    Overview Signed 10/10/2016 11:28 AM by Dallas Gallegos MD     stage 3             RESOLVED: Neuropathy ICD-10-CM: G62.9  ICD-9-CM: 355.9  Unknown - 10/10/2016        RESOLVED: Peripheral polyneuropathy (Chronic) ICD-10-CM: G62.9  ICD-9-CM: 356.9  10/10/2016 - 1/5/2018        RESOLVED: CKD (chronic kidney disease) stage 3, GFR 30-59 ml/min (HCC) (Chronic) ICD-10-CM: N18.30  ICD-9-CM: 585.3  10/10/2016 - 11/6/2017        RESOLVED: Hyperlipidemia (Chronic) ICD-10-CM: E78.5  ICD-9-CM: 272.4  10/10/2016 - 4/10/2018        RESOLVED: Hyperkalemia ICD-10-CM: E87.5  ICD-9-CM: 276.7  10/10/2016 - 11/14/2017        RESOLVED: Essential hypertension (Chronic) ICD-10-CM: I10  ICD-9-CM: 401.9  10/10/2016 - 11/22/2017 S/p right below-knee amputation secondary to cellulitis (8/18/2021, Ozzie Nicholas MD)     Plan / Recommendations / Medical Decision Making:      Daily physician / PA medical management:     Unilateral complete BKA, right - NWB R LE; prosthetics involved. Will be difficult for patient to mobilize with a prosthesis given he lacks sensation and proprioception in the right foot, has obese body habitus, chronic respiratory failure and CHF.     Pain control - stable, mild-to-moderate joint symptoms intermittently, reasonably well controlled by PRN meds. Will require regular pain assessment and comprehensive pain management. Has chronic mixed axonal-demyelinating polyneuropathy; on Lyrica 200mg BID. Denies residual limb pain but has tramadol and oxycodone ordered PRN.     Hypotension - BP fluctuating, managed medically. Has been hypotensive and is on Florinef and midodrine. Dehydration thought to be contributing, also possibly diuretics. -8/27 /63, HR 94 this AM; patient denies lightheadedness during therapies or while resting in room  -8/28 95/53; asymptomatic, HR 97; on Florinef and midodrine; up to 117/65 max  -8/30 /72, HR 85 this AM     Acute-on-chronic respiratory failure - continue 3L O2, wean to 2L if possible. Encourage IS.      Atrial fibrillation - continue Eliquis 5mg BID; rate controlled. Toprol XL has loly held due to hypotensive at times.     Acute-on-chronic anemia - anemia of chronic kidney disease and post-op due to blood loss. Hgb trending down; follow closely. No evidence of active bleeding.  -8/27 CBC ordered for tomorrow  -8/28 Hgb 6.6 from 8.9 (8/24); no sign of active bleeding source: stool was not melenic, little drainage from amputation; hold Eliquis and recheck in AM. If true value, will need to transfer downtown for a blood transfusion  -8/30 recheck H&H with Hgb 8.5; Eliquis restarted (yesterday)     Chronic kidney disease, CKD3 - improving, monitor.  Creatine 1.49 from 1.57.  -8/27 BMP ordered for tomorrow  -8/28 Cr 1.51, stable     Diabetes mellitus - HgbA1c 6.7% (7/13/2021), moderate glycemic control. Will require daily, close fasting glucose monitoring and medication adjustment to optimize glycemic control in setting of acute illness and hospitalization. Takes Glucotrol 5mg at home. Currently on SSI; add Glucotrol.  -8/26 BS controlled  -8/27 BS this , all BS since admission <145, most in low 100s  -8/28 BS controlled  -8/30  this AM, all BS yesterday <110     Pneumonia prophylaxis - incentive spirometer every hour while awake.     DVT risk / DVT prophylaxis - will require daily physician / PA exam to assess for signs and symptoms as patient is at increased risk for of thromboembolism. Mobilize as tolerated. Sequential pneumatic compression devices (SCDs) when in bed; thigh-high or knee-high thromboembolic deterrent hose when out of bed. On Eliquis.     CAD - continue Eliquis and statin.     GI prophylaxis - resume PPI. At times may need additional antacids, Maalox prn.     General skin care / wound prevention - monitor BKA  incision and general skin wound status daily per staff and physician / PA. At risk for failure. Will require 24/7 rehab nursing. Keep BKA incision and left plantar foot wound clean and dry, reinforce dressing PRN and ice to area for comfort; he is to f/u with Dr Benjamin Dodson in 10-14d for staple removal.   -8/25 will cont wound vac until f/u with Dr. Benjamin Dodson  -8/28 wound looked good at time of wound vac check per notes  -8/30 wound vac in place and functioning, skin surrounding without erythema or rash     Urinary retention / neurogenic bladder - schedule voids q 6-8 hrs. Check post-void residual every shift; in and out catheter if post-void residual is more than 400ml.     Bowel program - at risk for constipation as a side effect of opioids, other medications, impaired mobility, etc. MiraLAX daily for regularity, Ivis-Colace for stool softener.  PRN MOM, bisacodyl suppository or tablets for constipation.  -8/27 constipated, no BM with daily gentle agents (MiraLAX, Colace), will try more aggressively with lactulose after therapies; may require MOM, bisacodyl suppository or disimpaction  -8/28 excellent results yesterday  -8/30 large, normal BM this AM; continue daily MiraLAX and stool softener          Time spent was 25 minutes with over 1/2 in direct patient care/examination, consultation and coordination of care. Signed By: Mathew Urrutia PA-C    August 30, 2021      Physician Assistant with [facility]  Eugenia Waters MD, Medical Director

## 2021-08-30 NOTE — PROGRESS NOTES
Problem: Falls - Risk of  Goal: *Absence of Falls  Description: Document Gómez Peoples Fall Risk and appropriate interventions in the flowsheet. Outcome: Progressing Towards Goal  Note: Fall Risk Interventions:       Mentation Interventions: Adequate sleep, hydration, pain control, Door open when patient unattended, Evaluate medications/consider consulting pharmacy    Medication Interventions: Bed/chair exit alarm, Evaluate medications/consider consulting pharmacy, Patient to call before getting OOB, Teach patient to arise slowly    Elimination Interventions: Call light in reach, Patient to call for help with toileting needs, Urinal in reach              Problem: Patient Education: Go to Patient Education Activity  Goal: Patient/Family Education  Outcome: Progressing Towards Goal     Problem: Pressure Injury - Risk of  Goal: *Prevention of pressure injury  Description: Document Julio Cesar Scale and appropriate interventions in the flowsheet.   Outcome: Progressing Towards Goal  Note: Pressure Injury Interventions:  Sensory Interventions: Assess need for specialty bed, Chair cushion, Check visual cues for pain, Discuss PT/OT consult with provider, Keep linens dry and wrinkle-free    Moisture Interventions: Absorbent underpads, Apply protective barrier, creams and emollients    Activity Interventions: Chair cushion, Increase time out of bed, Pressure redistribution bed/mattress(bed type), PT/OT evaluation    Mobility Interventions: Chair cushion, Float heels, Pressure redistribution bed/mattress (bed type), PT/OT evaluation    Nutrition Interventions: Document food/fluid/supplement intake    Friction and Shear Interventions: Apply protective barrier, creams and emollients, HOB 30 degrees or less, Lift sheet, Lift team/patient mobility team, Minimize layers                Problem: Patient Education: Go to Patient Education Activity  Goal: Patient/Family Education  Outcome: Progressing Towards Goal     Problem: Patient Education: Go to Patient Education Activity  Goal: Patient/Family Education  Description: Patient / Patient's family will verbalize understanding of PT safety recommendations, demonstrate appropriate assist for current functional mobility status, safety, and home exercise program by time of discharge.    Outcome: Progressing Towards Goal

## 2021-08-30 NOTE — PROGRESS NOTES
OT Daily Note  Time In 1121   Time Out 1200     Pain: Patient had no complaint of pain. Strengthening   Pt completed the following exercises with 10 lb alexander to promote UB strength, activity tolerance, and shoulder stabilization for integration into transfers:  Exercise Reps Comments   Protraction/Retraction 20    Abduction/Adduction 20    Circles 40 1/2 CW, 1/2 CCW   Vs 20    Pt demonstrated good quality during all exercises. Weight could be increased next time. Pt engaged in lateral and anterior task while donning 1.5 B distal wrist to improve B shoulder stabilization for transfers. Pt demonstrated good activity tolerance, but required min cueing for sequencing. Education   Purpose of exercises     Interdisciplinary Communication: BEN Foster on pt's appointment. Plan: Continue with POC. Pt was left in room with all needs within reach.      Manish Loco OTR/L  8/30/2021

## 2021-08-30 NOTE — PROGRESS NOTES
2  Time In 0745   Time Out 0836     Mobility   Score Comments   Supine to Sit 6: Independent I   Sit to Stand 1: Dependent Ax2 for safety; mod A to stand   Transfer Assist 1: Dependent Transfer Type: SPT   Equipment: Grab Bars   Comments: Ax2 for safety     Activities of Daily Living    Score Comments   Eating 6: Independent I   Oral Hygiene 6: Independent I   Bathing 1: Dependent Type of Shower: Bath Pack  Position: Supported sitting and Standing PRN   Adaptive  Equipment: Grab Bars  Comments: Ax2 for buttocks   Upper Body  Dressing 5: S/U or clean-up assist Items Applied: Pullover  Position: Supported Sitting  Comments: S/U   Lower Body Dressing 1: Dependent Items Applied: Underwear and Elastic pants  Position: Supported sitting and Standing PRN  Adaptive Equipment: Grab Bars  Comments: Ax2 to pull over waist   Donning/[place] Footwear 1: Dependent Items Applied: Socks  Adaptive Equipment: Sock Aide  Comments: Attempted sock aide, but got stuck on dressing   Toilet Transfer 1: Dependent Transfer Type: SPT   Equipment: Grab Bars   Comments: Ax2 for safety   Toileting Hygiene 1: Dependent Output: Urine and BM  Comments: Ax2 for safety   Education  Hand placement for transfers     Pt was in bed and agreeable to tx. Pt's performance with ADL is reflected in above chart. Pt wanted to use the toilet vs BSC to better spread legs. Pt was left in w/c with all needs within reach.      Samara Castro, OT   8/30/2021

## 2021-08-30 NOTE — PROGRESS NOTES
PHYSICAL THERAPY DAILY NOTE  Time In: 6611  Time Out: 1544  Patient Seen For: AM;Patient education;Transfer training; Wheelchair mobility    Subjective: Pt repeatedly mentioned \"I am not a diabetic, they say I'm a diabetic, but I ain't. \"         Objective: Other (comment) (fall)  GROSS ASSESSMENT Daily Assessment            COGNITION Daily Assessment    Decreased insight, impaired judgement       BED/MAT MOBILITY Daily Assessment   Pt able to initiate and complete rolling with CGA. Supine<>sit required Min A to manage oxygen/wound vac lines and to improve posture. Rolling Right : 4 (Contact guard assistance)  Rolling Left : 4 (Contact guard assistance)  Supine to Sit : 4 (Minimal assistance)  Sit to Supine : 4 (Minimal assistance)       TRANSFERS Daily Assessment   Min A for line management and verbal cues to slow down Transfer Type: Lateral with transfer board  Transfer Assistance : 4 (Minimal assistance)  Car Transfers: Not tested       GAIT Daily Assessment            STEPS or STAIRS Daily Assessment            BALANCE Daily Assessment   Pt able to maintain sitting balance for 10 min. with verbal cues. Sitting - Static: Good (unsupported)  Sitting - Dynamic: Fair (occasional)  Standing - Static: Fair       WHEELCHAIR MOBILITY Daily Assessment    Wheelchair Setup Assist Required : 3 (Moderate assistance)       LOWER EXTREMITY EXERCISES Daily Assessment          SpO2 was on 1.5L throughout session     Pain level: no complaint of pain     Patient education: The importance of moving slowly and deliberately through the exercises and transfers. The importance of frequently checking skin, back of legs and feet with mirror.       Interdisciplinary Communication: Worked with Corky Leon with [employer] to measure/size new w/c     Left pt in bed with call bell in reach and nursing in room. Assessment: Pt showed improved ability with transfers.   Pt continues to need strengthening in BLE to facilitate transfers and bed mobility. Plan of Care: Continue with POC to increase improvement with transfers and bed mobility.     Momo Casper, PTA  8/30/2021

## 2021-08-31 LAB
GLUCOSE BLD STRIP.AUTO-MCNC: 105 MG/DL (ref 65–100)
GLUCOSE BLD STRIP.AUTO-MCNC: 109 MG/DL (ref 65–100)
GLUCOSE BLD STRIP.AUTO-MCNC: 119 MG/DL (ref 65–100)
GLUCOSE BLD STRIP.AUTO-MCNC: 98 MG/DL (ref 65–100)
SERVICE CMNT-IMP: ABNORMAL
SERVICE CMNT-IMP: NORMAL

## 2021-08-31 PROCEDURE — 97535 SELF CARE MNGMENT TRAINING: CPT

## 2021-08-31 PROCEDURE — 97530 THERAPEUTIC ACTIVITIES: CPT

## 2021-08-31 PROCEDURE — 97110 THERAPEUTIC EXERCISES: CPT

## 2021-08-31 PROCEDURE — 74011250637 HC RX REV CODE- 250/637: Performed by: PHYSICIAN ASSISTANT

## 2021-08-31 PROCEDURE — 65310000000 HC RM PRIVATE REHAB

## 2021-08-31 PROCEDURE — 99232 SBSQ HOSP IP/OBS MODERATE 35: CPT | Performed by: PHYSICAL MEDICINE & REHABILITATION

## 2021-08-31 PROCEDURE — 82962 GLUCOSE BLOOD TEST: CPT

## 2021-08-31 RX ADMIN — POLYETHYLENE GLYCOL 3350 17 G: 17 POWDER, FOR SOLUTION ORAL at 08:10

## 2021-08-31 RX ADMIN — APIXABAN 5 MG: 5 TABLET, FILM COATED ORAL at 08:13

## 2021-08-31 RX ADMIN — MIDODRINE HYDROCHLORIDE 2.5 MG: 5 TABLET ORAL at 12:02

## 2021-08-31 RX ADMIN — PREGABALIN 200 MG: 100 CAPSULE ORAL at 21:18

## 2021-08-31 RX ADMIN — DOCUSATE SODIUM 100 MG: 100 CAPSULE, LIQUID FILLED ORAL at 08:13

## 2021-08-31 RX ADMIN — MIDODRINE HYDROCHLORIDE 2.5 MG: 5 TABLET ORAL at 08:12

## 2021-08-31 RX ADMIN — APIXABAN 5 MG: 5 TABLET, FILM COATED ORAL at 21:18

## 2021-08-31 RX ADMIN — ATORVASTATIN CALCIUM 40 MG: 40 TABLET, FILM COATED ORAL at 21:19

## 2021-08-31 RX ADMIN — DOCUSATE SODIUM 100 MG: 100 CAPSULE, LIQUID FILLED ORAL at 21:19

## 2021-08-31 RX ADMIN — MIDODRINE HYDROCHLORIDE 2.5 MG: 5 TABLET ORAL at 16:05

## 2021-08-31 RX ADMIN — PREGABALIN 200 MG: 100 CAPSULE ORAL at 08:11

## 2021-08-31 RX ADMIN — TRAZODONE HYDROCHLORIDE 50 MG: 50 TABLET ORAL at 21:18

## 2021-08-31 RX ADMIN — PANTOPRAZOLE SODIUM 40 MG: 40 TABLET, DELAYED RELEASE ORAL at 05:41

## 2021-08-31 RX ADMIN — FLUDROCORTISONE ACETATE 0.1 MG: 0.1 TABLET ORAL at 08:13

## 2021-08-31 NOTE — PROGRESS NOTES
Faxed updated clinicals to insurance as requested. Contacted wound care about wound vac and some clarification needed on the machine pt currently on. Wound care to address and reach CM if any further assistance is needed. CM to continue to follow and monitor for any further needs.

## 2021-08-31 NOTE — PROGRESS NOTES
Coral Joseph MD  Medical Director  [address] [facility], [address] [facility]  Tel: [phone] [facility] PROGRESS NOTE    Ronaldo [place] [employer]  Admit Date: 8/25/2021  Admit Diagnosis:   Unilateral complete BKA, right, sequela ([address] [S88.111S]    Subjective     Patient seen and examined. Feeling well. Denies pain. sittiing EOB working with OT using small wts. Good spirits. + BM    Objective:     Current Facility-Administered Medications   Medication Dose Route Frequency    lactulose (CHRONULAC) 10 gram/15 mL solution 30 mL  30 mL Oral DAILY PRN    naloxone (NARCAN) injection 0.4 mg  0.4 mg IntraVENous PRN    dextrose (D50W) injection syrg 12.5-25 g  25-50 mL IntraVENous PRN    dextrose 40% (GLUTOSE) oral gel 1 Tube  15 g Oral PRN    glucagon (GLUCAGEN) injection 1 mg  1 mg IntraMUSCular PRN    insulin lispro (HUMALOG) injection 0-10 Units  0-10 Units SubCUTAneous AC&HS    acetaminophen (TYLENOL) tablet 650 mg  650 mg Oral Q6H PRN    apixaban (ELIQUIS) tablet 5 mg  5 mg Oral Q12H    atorvastatin (LIPITOR) tablet 40 mg  40 mg Oral QHS    docusate sodium (COLACE) capsule 100 mg  100 mg Oral BID    fludrocortisone (FLORINEF) tablet 0.1 mg  0.1 mg Oral DAILY    midodrine (PROAMATINE) tablet 2.5 mg  2.5 mg Oral TID WITH MEALS    nystatin (MYCOSTATIN) 100,000 unit/gram powder   Topical PRN    oxyCODONE IR (ROXICODONE) tablet 5 mg  5 mg Oral Q4H PRN    pantoprazole (PROTONIX) tablet 40 mg  40 mg Oral ACB    polyethylene glycol (MIRALAX) packet 17 g  17 g Oral DAILY    pregabalin (LYRICA) capsule 200 mg  200 mg Oral BID    traMADoL (ULTRAM) tablet 50 mg  50 mg Oral Q6H PRN    traZODone (DESYREL) tablet 50 mg  50 mg Oral QHS       Review of Systems:   Denies chest pain, shortness of breath, cough, headache, visual problems, abdominal pain, dysuria, calf pain. Pertinent positives are as noted in the HPI, ROS unremarkable otherwise. Visit Vitals  BP (!) 124/56 (BP 1 Location: Left upper arm, BP Patient Position: Semi fowlers)   Pulse 75   Temp 97.7 °F (36.5 °C)   Resp 18   Wt 275 lb 6.4 oz (124.9 kg)   SpO2 (!) 78%   BMI 39.52 kg/m²        Physical Exam:   General: Alert and age appropriately oriented. No acute cardiorespiratory distress. HEENT: Normocephalic, no scleral icterus. Oral mucosa moist without cyanosis. Lungs: Clear to auscultation bilaterally. Respiration even and unlabored. Heart: Regular rate and rhythm, S1, S2. No murmurs, clicks, rub or gallops. Abdomen: Soft, non-tender, not distended. Bowel sounds normoactive. No organomegaly. obese   Genitourinary: Deferred. Neuromuscular:      UE strength grossly 5- and symm  Right hip flexion  3+ 4- hindered by pain  LLE hip flexion 4/5, distally 5/5  Dec lt touch and proprioception distally LLE  R BKA dressing and wound vac in place   Skin/extremity: No rashes, no erythema. No calf tenderness B LE. Wound covered.                                                                               Functional Assessment:          Balance  Sitting - Static: Good (unsupported) (08/30/21 1600)  Sitting - Dynamic: Fair (occasional) (08/30/21 1600)  Standing - Static: Fair (08/30/21 1200)  Standing - Dynamic : Impaired (08/27/21 1500)                     Leyla Vargas Fall Risk Assessment:  Leyla Vargas Fall Risk  Mobility: Ambulates or transfers with assist devices or assistance (08/30/21 0710)  Mentation: Alert, oriented x 3 (08/30/21 0710)  Mentation Interventions: Adequate sleep, hydration, pain control;Door open when patient unattended;Evaluate medications/consider consulting pharmacy (08/30/21 0710)  Medication: Patient receiving anticonvulsants, sedatives(tranquilizers), psychotropics or hypnotics, hypoglycemics, narcotics, sleep aids, antihypertensives, laxatives, or diuretics (08/30/21 0710)  Medication Interventions: Bed/chair exit alarm;Evaluate medications/consider consulting pharmacy; Patient to call before getting OOB; Teach patient to arise slowly (08/30/21 0710)  Elimination: Needs assistance with toileting (08/30/21 0710)  Elimination Interventions: Call light in reach; Patient to call for help with toileting needs;Urinal in reach (08/30/21 0710)  Prior Fall History: No (08/30/21 0710)  Total Score: 3 (08/30/21 0710)  Standard Fall Precautions: Yes (08/30/21 0710)     Speech Assessment:         Ambulation:  Gait  Distance (ft): 0 Feet (ft) (08/27/21 1500)     Labs/Studies:  Recent Results (from the past 72 hour(s))   GLUCOSE, POC    Collection Time: 08/28/21 11:47 AM   Result Value Ref Range    Glucose (POC) 98 65 - 100 mg/dL    Performed by Luis E)KACYA    GLUCOSE, POC    Collection Time: 08/28/21  4:40 PM   Result Value Ref Range    Glucose (POC) 116 (H) 65 - 100 mg/dL    Performed by Luis E)CNA    GLUCOSE, POC    Collection Time: 08/28/21  9:24 PM   Result Value Ref Range    Glucose (POC) 123 (H) 65 - 100 mg/dL    Performed by Kelly    HGB & HCT    Collection Time: 08/29/21  4:45 AM   Result Value Ref Range    HGB 8.5 (L) 13.6 - 17.2 g/dL    HCT 28.5 (L) 41.1 - 50.3 %   GLUCOSE, POC    Collection Time: 08/29/21  7:00 AM   Result Value Ref Range    Glucose (POC) 110 (H) 65 - 100 mg/dL    Performed by Joanna Beatty [id number], POC    Collection Time: 08/29/21 11:35 AM   Result Value Ref Range    Glucose (POC) 96 65 - 100 mg/dL    Performed by [email or web address] Beatty [id number], POC    Collection Time: 08/29/21  4:22 PM   Result Value Ref Range    Glucose (POC) 103 (H) 65 - 100 mg/dL    Performed by Clara    GLUCOSE, POC    Collection Time: 08/29/21  8:40 PM   Result Value Ref Range    Glucose (POC) 100 65 - 100 mg/dL    Performed by Katharine Johnson    GLUCOSE, POC    Collection Time: 08/30/21  6:54 AM   Result Value Ref Range    Glucose (POC) 102 (H) 65 - 100 mg/dL    Performed by [facility]    GLUCOSE, POC    Collection Time: 08/30/21 11:24 AM   Result Value Ref Range    Glucose (POC) 104 (H) 65 - 100 mg/dL    Performed by Chavarria ([place])    GLUCOSE, POC    Collection Time: 08/30/21  4:14 PM   Result Value Ref Range    Glucose (POC) 127 (H) 65 - 100 mg/dL    Performed by Julee    GLUCOSE, POC    Collection Time: 08/30/21  8:31 PM   Result Value Ref Range    Glucose (POC) 121 (H) 65 - 100 mg/dL    Performed by Zi    GLUCOSE, POC    Collection Time: 08/31/21  6:59 AM   Result Value Ref Range    Glucose (POC) 109 (H) 65 - 100 mg/dL    Performed by Sunita        Assessment:     Problem List as of 8/31/2021 Date Reviewed: 8/27/2021        Codes Class Noted - Resolved    * (Principal) Unilateral complete BKA, right, sequela ([facility] ICD-10-CM: X15.281S  ICD-9-CM: 905.9  8/25/2021 - Present        Suspected sleep apnea ICD-10-CM: R29.818  ICD-9-CM: 781.99  8/20/2021 - Present        S/P BKA (below knee amputation) unilateral, right ([facility] ICD-10-CM: Z89.511  ICD-9-CM: V49.75  8/18/2021 - Present        Respiratory failure, post-operative ([facility] ICD-10-CM: F53.756  ICD-9-CM: 518.51  8/18/2021 - Present        Acute respiratory failure with hypercapnia (HCC) ICD-10-CM: J96.02  ICD-9-CM: 518.81  7/23/2021 - Present        Acute on chronic respiratory failure with hypoxia and hypercapnia (HCC) ICD-10-CM: J96.21, J96.22  ICD-9-CM: 518.84, 786.09, 799.02  7/23/2021 - Present        Acute metabolic encephalopathy [id number]: G93.41  ICD-9-CM: 348.31  7/23/2021 - Present        Hypoxia ICD-10-CM: R09.02  ICD-9-CM: 799.02  7/21/2021 - Present        Acute kidney injury superimposed on CKD ([facility] ICD-10-CM: N17.9, N18.9  ICD-9-CM: 866.00, 585.9  7/21/2021 - Present        Staphylococcus aureus bacteremia ICD-10-CM: R78.81, B95.61  ICD-9-CM: 790.7, 041.11  7/21/2021 - Present        Cellulitis ICD-10-CM: L03.90  ICD-9-CM: 682.9  7/19/2021 - Present        Systolic CHF, acute on chronic (HCC) ICD-10-CM: I50.23  ICD-9-CM: 428.23, 428.0  7/19/2021 - Present        Atrial fibrillation with RVR (HCC) ICD-10-CM: I48.91  ICD-9-CM: 427.31  7/19/2021 - Present        Morbid obesity with BMI of 40.0-44.9, adult [facility]) ICD-10-CM: E66.01, Z68.41  ICD-9-CM: 278.01, V85.41  6/11/2020 - Present        Severe obesity (BMI 35.0-35.9 with comorbidity) (HCC) (Chronic) ICD-10-CM: E66.01, Z68.35  ICD-9-CM: 278.01, V85.35  10/10/2018 - Present        Bunion of unspecified foot (Chronic) ICD-10-CM: M21.619  ICD-9-CM: 727.1  4/10/2018 - Present        Onychomycosis (Chronic) ICD-10-CM: B35.1  ICD-9-CM: 110.1  4/10/2018 - Present        Corns and callus (Chronic) ICD-10-CM: L84  ICD-9-CM: 700  4/10/2018 - Present        Mixed hyperlipidemia (Chronic) ICD-10-CM: B53.2  ICD-9-CM: 272.2  4/10/2018 - Present        Mixed axonal-demyelinating polyneuropathy (Chronic) ICD-10-CM: G62.89  ICD-9-CM: 355.9  1/5/2018 - Present        Controlled type 2 diabetes mellitus with diabetic polyneuropathy, without long-term current use of insulin (HCC) (Chronic) ICD-10-CM: E11.42  ICD-9-CM: 250.60, 357.2  1/5/2018 - Present        Type 2 diabetes mellitus with nephropathy (HCC) (Chronic) ICD-10-CM: E11.21  ICD-9-CM: 250.40, 583.81  1/5/2018 - Present        Stage 3 chronic kidney disease ([address] (Chronic) ICD-10-CM: N18.30  ICD-9-CM: 585.3  11/22/2017 - Present        Benign hypertensive kidney disease with chronic kidney disease (Chronic) ICD-10-CM: I12.9  ICD-9-CM: 403.10  11/6/2017 - Present        CAD (coronary artery disease) (Chronic) ICD-10-CM: I25.10  ICD-9-CM: 414.00  Unknown - Present        RESOLVED: Glucose intolerance (impaired glucose tolerance) (Chronic) ICD-10-CM: R73.02  ICD-9-CM: 790.22  11/6/2017 - 11/14/2017        RESOLVED: BMI 40.0-44.9, adult (HCC) (Chronic) ICD-10-CM: Z68.41  ICD-9-CM: V85.41  11/4/2016 - 10/10/2018        RESOLVED: Hypertension ICD-10-CM: I10  ICD-9-CM: 401.9  Unknown - 10/10/2016        RESOLVED: Hypercholesterolemia ICD-10-CM: E78.00  ICD-9-CM: 272.0  Unknown - 10/10/2016        RESOLVED: Chronic kidney disease ICD-10-CM: N18.9  ICD-9-CM: 724. 9  Unknown - 10/10/2016    Overview Signed 10/10/2016 11:28 AM by Ale Diggs MD     stage 3             RESOLVED: Neuropathy ICD-10-CM: G62.9  ICD-9-CM: 355.9  Unknown - 10/10/2016        RESOLVED: Peripheral polyneuropathy (Chronic) ICD-10-CM: G62.9  ICD-9-CM: 356.9  10/10/2016 - 1/5/2018        RESOLVED: CKD (chronic kidney disease) stage 3, GFR 30-59 ml/min (HCC) (Chronic) ICD-10-CM: N18.30  ICD-9-CM: 585.3  10/10/2016 - 11/6/2017        RESOLVED: Hyperlipidemia (Chronic) ICD-10-CM: E78.5  ICD-9-CM: 272.4  10/10/2016 - 4/10/2018        RESOLVED: Hyperkalemia ICD-10-CM: E87.5  ICD-9-CM: 276.7  10/10/2016 - 11/14/2017        RESOLVED: Essential hypertension (Chronic) ICD-10-CM: I10  ICD-9-CM: 401.9  10/10/2016 - 11/22/2017            S/p right below-knee amputation secondary to cellulitis (8/18/2021, Dayami Joe MD)     Plan / Recommendations / Medical Decision Making:      Daily physician / PA medical management:     Unilateral complete BKA, right - NWB R LE; prosthetics involved. Will be difficult for patient to mobilize with a prosthesis given he lacks sensation and proprioception in the right foot, has obese body habitus, chronic respiratory failure and CHF.     Pain control - stable, mild-to-moderate joint symptoms intermittently, reasonably well controlled by PRN meds. Will require regular pain assessment and comprehensive pain management. Has chronic mixed axonal-demyelinating polyneuropathy; on Lyrica 200mg BID. Denies residual limb pain but has tramadol and oxycodone ordered PRN.     Hypotension - BP fluctuating, managed medically. Has been hypotensive and is on Florinef and midodrine. Dehydration thought to be contributing, also possibly diuretics.   -8/27 /63, HR 94 this AM; patient denies lightheadedness during therapies or while resting in room  -8/28 95/53; asymptomatic, HR 97; on Florinef and midodrine; up to 117/65 max  -8/30 /72, HR 85 this AM  -8/31 VSS     Acute-on-chronic respiratory failure - continue 3L O2, wean to 2L if possible. Encourage IS.   -8/31 sats reportedly 78%, pt asymptomatic. Rechecked 98% 3L     Atrial fibrillation - continue Eliquis 5mg BID; rate controlled. Toprol XL has been held due to hypotensive at times.  -8/31 NSR this a.m.     Acute-on-chronic anemia - anemia of chronic kidney disease and post-op due to blood loss. Hgb trending down; follow closely. No evidence of active bleeding.  -8/27 CBC ordered for tomorrow  -8/28 Hgb 6.6 from 8.9 (8/24); no sign of active bleeding source: stool was not melenic, little drainage from amputation; hold Eliquis and recheck in AM. If true value, will need to transfer downtown for a blood transfusion  -8/30 recheck H&H with Hgb 8.5; Eliquis restarted (yesterday)     Chronic kidney disease, CKD3 - improving, monitor. Creatine 1.49 from 1.57.  -8/27 BMP ordered for tomorrow  -8/28 Cr 1.51, stable     Diabetes mellitus - HgbA1c 6.7% (7/13/2021), moderate glycemic control. Will require daily, close fasting glucose monitoring and medication adjustment to optimize glycemic control in setting of acute illness and hospitalization. Takes Glucotrol 5mg at home. Currently on SSI; add Glucotrol.  -8/26 BS controlled  -8/27 BS this , all BS since admission <145, most in low 100s  -8/28 BS controlled  -8/30  this AM, all BS yesterday <110  -well controlled     Pneumonia prophylaxis - incentive spirometer every hour while awake.     DVT risk / DVT prophylaxis - will require daily physician / PA exam to assess for signs and symptoms as patient is at increased risk for of thromboembolism. Mobilize as tolerated.  Sequential pneumatic compression devices (SCDs) when in bed; thigh-high or knee-high thromboembolic deterrent hose when out of bed. On Eliquis.     CAD - continue Eliquis and statin.     GI prophylaxis - resume PPI. At times may need additional antacids, Maalox prn.     General skin care / wound prevention - monitor BKA  incision and general skin wound status daily per staff and physician / PA. At risk for failure. Will require 24/7 rehab nursing. Keep BKA incision and left plantar foot wound clean and dry, reinforce dressing PRN and ice to area for comfort; he is to f/u with Dr Catrachito Obrien for staple removal.   -8/25 will cont wound vac until f/u with Dr. Ryne Owens  -8/28 wound looked good at time of wound vac check per notes  -8/30 wound vac in place and functioning, skin surrounding without erythema or rash     Urinary retention / neurogenic bladder - schedule voids q 6-8 hrs. Check post-void residual every shift; in and out catheter if post-void residual is more than 400ml.     Bowel program - at risk for constipation as a side effect of opioids, other medications, impaired mobility, etc. MiraLAX daily for regularity, Ivis-Colace for stool softener. PRN MOM, bisacodyl suppository or tablets for constipation.  -8/27 constipated, no BM with daily gentle agents (MiraLAX, Colace), will try more aggressively with lactulose after therapies; may require MOM, bisacodyl suppository or disimpaction  -8/28 excellent results yesterday  -8/30 large, normal BM this AM; continue daily MiraLAX and stool softener              Time spent was 25 minutes with over 1/2 in direct patient care/examination, consultation and coordination of care.      Signed By: Dominik Rahman MD     August 31, 2021

## 2021-08-31 NOTE — PROGRESS NOTES
OT Daily Note  Time In 0915   Time Out 1010     Pain: Patient had no complaint of pain. Functional Mobility      Score Comments   Supine to Sit 4: Supervision or touching A S   Transfer Assist 4: Supervision or touching A Transfer Type: LPT  Equipment: Sliding Board   Comments: S        Self-Care   Pt brushed teeth and hair I. Pt was educated on donning leg rest with successful demo of 1 leg rest.      Education   Completed amputation notebook with pt engaging in good conversation. Pt was unaware of several dx listed in chart. Pt does not believe he has DM, but did acknowledge that neuropathy was a contributing factor. Interdisciplinary Communication: BEN Pillai on amputation notebook    Plan: Continue with POC.  Pt was left awaiting PT.     Ana Claros OTR/L  8/31/2021

## 2021-08-31 NOTE — PROGRESS NOTES
Problem: Falls - Risk of  Goal: *Absence of Falls  Description: Document Marsha Escamilla Fall Risk and appropriate interventions in the flowsheet. Outcome: Progressing Towards Goal  Note: Fall Risk Interventions:       Mentation Interventions: Adequate sleep, hydration, pain control, Door open when patient unattended, Evaluate medications/consider consulting pharmacy    Medication Interventions: Bed/chair exit alarm, Evaluate medications/consider consulting pharmacy, Patient to call before getting OOB, Teach patient to arise slowly    Elimination Interventions: Call light in reach, Patient to call for help with toileting needs, Urinal in reach              Problem: Patient Education: Go to Patient Education Activity  Goal: Patient/Family Education  Outcome: Progressing Towards Goal     Problem: Pressure Injury - Risk of  Goal: *Prevention of pressure injury  Description: Document Julio Cesar Scale and appropriate interventions in the flowsheet.   Outcome: Progressing Towards Goal  Note: Pressure Injury Interventions:  Sensory Interventions: Assess need for specialty bed, Chair cushion, Check visual cues for pain, Discuss PT/OT consult with provider, Keep linens dry and wrinkle-free    Moisture Interventions: Absorbent underpads, Apply protective barrier, creams and emollients    Activity Interventions: Chair cushion, Increase time out of bed, Pressure redistribution bed/mattress(bed type), PT/OT evaluation    Mobility Interventions: Chair cushion, Float heels, Pressure redistribution bed/mattress (bed type), PT/OT evaluation    Nutrition Interventions: Document food/fluid/supplement intake    Friction and Shear Interventions: Apply protective barrier, creams and emollients, HOB 30 degrees or less, Lift sheet, Lift team/patient mobility team, Minimize layers                Problem: Patient Education: Go to Patient Education Activity  Goal: Patient/Family Education  Outcome: Progressing Towards Goal     Problem: Patient Education: Go to Patient Education Activity  Goal: Patient/Family Education  Description: Patient / Patient's family will verbalize understanding of PT safety recommendations, demonstrate appropriate assist for current functional mobility status, safety, and home exercise program by time of discharge.    Outcome: Progressing Towards Goal

## 2021-08-31 NOTE — PROGRESS NOTES
Time In 0658   Time Out 0745     Mobility   Score Comments   Supine to Sit 4: Supervision or touching A S   Sit to Supine 4: Supervision or touching A S     Activities of Daily Living    Score Comments   Bathing 4: Supervision or touching A Type of Shower: Shower  Position: Supported sitting   Adaptive  Equipment: N/A  Comments: S   Upper Body  Dressing 5: S/U or clean-up assist Items Applied: Pullover  Position: Unsupported Sitting  Comments: S/U   Lower Body Dressing 4: Supervision or touching A Items Applied: Underwear and Elastic pants  Position: Unsupported Sitting  Adaptive Equipment: N/A  Comments: S   Donning/[place] Footwear 3: Partial/Moderate A Items Applied: Socks and Shoes with fasteners   Adaptive Equipment: Sock Aide  Comments: A for [employer] of scooting     Pt was in bed and agreeable to tx. Pt's performance with ADL is reflected in above chart. Pt completed the following exercises B with 1 lb weights to promote UB strength, activity tolerance, and shoulder stabilization for integration into functional transfers:    Exercise Sets Reps   Lateral Raises 1 15   Front Raises 1 15   Anterior Alphabet 1     LUE stronger than RUE. Pt struggled to scoot towards head of bed. Pt was left in bed with all needs within reach.      Aminah Villarreal OT   8/31/2021

## 2021-08-31 NOTE — PROGRESS NOTES
PHYSICAL THERAPY DAILY NOTE  Time In: (P) 1436  Time Out: (P) 1532  Patient Seen For: (P) PM;Therapeutic exercise;Transfer training; Wheelchair mobility    Subjective: Patient had no complaints. Objective: No pain noted . Discussed pressure relief while in w/c . Instructed in pressure relief but required assistance with w/c push uos. Other (comment) (fall)  GROSS ASSESSMENT Daily Assessment            COGNITION Daily Assessment           BED/MAT MOBILITY Daily Assessment    Rolling Right : (P) 4 (Minimal assistance)  Rolling Left : (P) 4 (Minimal assistance)  Supine to Sit : (P) 4 (Minimal assistance)  Sit to Supine : (P) 4 (Minimal assistance)       TRANSFERS Daily Assessment    Transfer Type: (P) Lateral with transfer board  Transfer Assistance : (P) 4 (Minimal assistance)  Car Transfers: (P) Not tested       GAIT Daily Assessment    Amount of Assistance: (P) 0 (Not tested)  Distance (ft): 0 Feet (ft)       STEPS or STAIRS Daily Assessment    Steps/Stairs Ambulated (#): 0  Level of Assist : (P) 0 (Not tested)       BALANCE Daily Assessment    Sitting - Static: Good (unsupported)  Sitting - Dynamic: Fair (occasional)  Standing - Static: Not tested       WHEELCHAIR MOBILITY Daily Assessment    Able to Propel (ft): (P) 80 feet  Curbs/Ramps Assist Required (FIM Score): 0 (Not tested)  Wheelchair Setup Assist Required : (P) 5 (Stand-by assistance)  Wheelchair Management: (P) Manages left brake;Manages right brake;Manages left armrest       LOWER EXTREMITY EXERCISES Daily Assessment    Extremity: Both  Exercise Type #1: Other (comment) (worked on standing using emelyn life for a good height  sturdy object to hang on to [employer] on left LE)  Sets Performed: 2  Reps Performed: 0  Level of Assist: Moderate assistance          Assessment: Patient is impulsive. Plan of Care: Continue with plan of care.     Derek Marshall, PTA  8/31/2021

## 2021-08-31 NOTE — WOUND CARE
Patient is on Prevena bandage and machine for wound, may need a new machine if current machine battery runs out. Patient is to see Dr. More Miller next week, likely Doris Denton will be discontinued at that time. Working on plan to get new machine to the facility if needed.

## 2021-09-01 LAB
ANION GAP SERPL CALC-SCNC: 1 MMOL/L (ref 7–16)
BUN SERPL-MCNC: 21 MG/DL (ref 8–23)
CALCIUM SERPL-MCNC: 9.2 MG/DL (ref 8.3–10.4)
CHLORIDE SERPL-SCNC: 106 MMOL/L (ref 98–107)
CO2 SERPL-SCNC: 37 MMOL/L (ref 21–32)
CREAT SERPL-MCNC: 1.58 MG/DL (ref 0.8–1.5)
ERYTHROCYTE [DISTWIDTH] IN BLOOD BY AUTOMATED COUNT: 15.9 % (ref 11.9–14.6)
GLUCOSE BLD STRIP.AUTO-MCNC: 107 MG/DL (ref 65–100)
GLUCOSE BLD STRIP.AUTO-MCNC: 115 MG/DL (ref 65–100)
GLUCOSE BLD STRIP.AUTO-MCNC: 141 MG/DL (ref 65–100)
GLUCOSE BLD STRIP.AUTO-MCNC: 96 MG/DL (ref 65–100)
GLUCOSE SERPL-MCNC: 99 MG/DL (ref 65–100)
HCT VFR BLD AUTO: 31.8 % (ref 41.1–50.3)
HGB BLD-MCNC: 9.3 G/DL (ref 13.6–17.2)
MCH RBC QN AUTO: 29 PG (ref 26.1–32.9)
MCHC RBC AUTO-ENTMCNC: 29.2 G/DL (ref 31.4–35)
MCV RBC AUTO: 99.1 FL (ref 79.6–97.8)
NRBC # BLD: 0 K/UL (ref 0–0.2)
PLATELET # BLD AUTO: 229 K/UL (ref 150–450)
PMV BLD AUTO: 12 FL (ref 9.4–12.3)
POTASSIUM SERPL-SCNC: 4.7 MMOL/L (ref 3.5–5.1)
RBC # BLD AUTO: 3.21 M/UL (ref 4.23–5.6)
SERVICE CMNT-IMP: ABNORMAL
SERVICE CMNT-IMP: NORMAL
SODIUM SERPL-SCNC: 144 MMOL/L (ref 136–145)
WBC # BLD AUTO: 4.1 K/UL (ref 4.3–11.1)

## 2021-09-01 PROCEDURE — 97530 THERAPEUTIC ACTIVITIES: CPT

## 2021-09-01 PROCEDURE — 82962 GLUCOSE BLOOD TEST: CPT

## 2021-09-01 PROCEDURE — 65310000000 HC RM PRIVATE REHAB

## 2021-09-01 PROCEDURE — 85027 COMPLETE CBC AUTOMATED: CPT

## 2021-09-01 PROCEDURE — 97110 THERAPEUTIC EXERCISES: CPT

## 2021-09-01 PROCEDURE — 74011250637 HC RX REV CODE- 250/637: Performed by: PHYSICIAN ASSISTANT

## 2021-09-01 PROCEDURE — 97535 SELF CARE MNGMENT TRAINING: CPT

## 2021-09-01 PROCEDURE — 80048 BASIC METABOLIC PNL TOTAL CA: CPT

## 2021-09-01 PROCEDURE — 99232 SBSQ HOSP IP/OBS MODERATE 35: CPT | Performed by: PHYSICAL MEDICINE & REHABILITATION

## 2021-09-01 RX ADMIN — APIXABAN 5 MG: 5 TABLET, FILM COATED ORAL at 08:10

## 2021-09-01 RX ADMIN — PREGABALIN 200 MG: 100 CAPSULE ORAL at 08:10

## 2021-09-01 RX ADMIN — MIDODRINE HYDROCHLORIDE 2.5 MG: 5 TABLET ORAL at 16:45

## 2021-09-01 RX ADMIN — DOCUSATE SODIUM 100 MG: 100 CAPSULE, LIQUID FILLED ORAL at 08:10

## 2021-09-01 RX ADMIN — MIDODRINE HYDROCHLORIDE 2.5 MG: 5 TABLET ORAL at 08:10

## 2021-09-01 RX ADMIN — MIDODRINE HYDROCHLORIDE 2.5 MG: 5 TABLET ORAL at 11:59

## 2021-09-01 RX ADMIN — POLYETHYLENE GLYCOL 3350 17 G: 17 POWDER, FOR SOLUTION ORAL at 08:11

## 2021-09-01 RX ADMIN — PANTOPRAZOLE SODIUM 40 MG: 40 TABLET, DELAYED RELEASE ORAL at 05:53

## 2021-09-01 RX ADMIN — PREGABALIN 200 MG: 100 CAPSULE ORAL at 21:18

## 2021-09-01 RX ADMIN — FLUDROCORTISONE ACETATE 0.1 MG: 0.1 TABLET ORAL at 08:10

## 2021-09-01 RX ADMIN — TRAZODONE HYDROCHLORIDE 50 MG: 50 TABLET ORAL at 21:19

## 2021-09-01 RX ADMIN — APIXABAN 5 MG: 5 TABLET, FILM COATED ORAL at 21:19

## 2021-09-01 RX ADMIN — ATORVASTATIN CALCIUM 40 MG: 40 TABLET, FILM COATED ORAL at 21:19

## 2021-09-01 RX ADMIN — DOCUSATE SODIUM 100 MG: 100 CAPSULE, LIQUID FILLED ORAL at 21:19

## 2021-09-01 NOTE — WOUND CARE
for [employer] called, battery in [place] can be changed, 3  AA batteries, needed, nurse Ruthie Velasquez, RN updated to change batteries and call if seal is not maintained and the dressing/ machine would be obtained and changed.

## 2021-09-01 NOTE — PROGRESS NOTES
Marsha Lane MD  Medical Director  [address] [facility], [address] [facility]  Tel: [phone] [facility] PROGRESS NOTE    Rodolfo Rivas  Admit Date: 8/25/2021  Admit Diagnosis:   Unilateral complete BKA, right, sequela ([address] [S88.111S]    Subjective     Patient seen and examined. Feeling well. No pain. Feels like he can have a bm this morning. Objective:     Current Facility-Administered Medications   Medication Dose Route Frequency    lactulose (CHRONULAC) 10 gram/15 mL solution 30 mL  30 mL Oral DAILY PRN    naloxone (NARCAN) injection 0.4 mg  0.4 mg IntraVENous PRN    dextrose (D50W) injection syrg 12.5-25 g  25-50 mL IntraVENous PRN    dextrose 40% (GLUTOSE) oral gel 1 Tube  15 g Oral PRN    glucagon (GLUCAGEN) injection 1 mg  1 mg IntraMUSCular PRN    insulin lispro (HUMALOG) injection 0-10 Units  0-10 Units SubCUTAneous AC&HS    acetaminophen (TYLENOL) tablet 650 mg  650 mg Oral Q6H PRN    apixaban (ELIQUIS) tablet 5 mg  5 mg Oral Q12H    atorvastatin (LIPITOR) tablet 40 mg  40 mg Oral QHS    docusate sodium (COLACE) capsule 100 mg  100 mg Oral BID    fludrocortisone (FLORINEF) tablet 0.1 mg  0.1 mg Oral DAILY    midodrine (PROAMATINE) tablet 2.5 mg  2.5 mg Oral TID WITH MEALS    nystatin (MYCOSTATIN) 100,000 unit/gram powder   Topical PRN    oxyCODONE IR (ROXICODONE) tablet 5 mg  5 mg Oral Q4H PRN    pantoprazole (PROTONIX) tablet 40 mg  40 mg Oral ACB    polyethylene glycol (MIRALAX) packet 17 g  17 g Oral DAILY    pregabalin (LYRICA) capsule 200 mg  200 mg Oral BID    traMADoL (ULTRAM) tablet 50 mg  50 mg Oral Q6H PRN    traZODone (DESYREL) tablet 50 mg  50 mg Oral QHS       Review of Systems:   Denies chest pain, shortness of breath, cough, headache, visual problems, abdominal pain, dysuria, calf pain. Pertinent positives are as noted in the HPI, ROS unremarkable otherwise.      Visit Vitals  /64 (BP 1 Location: Left upper arm, BP Patient Position: Supine)   Pulse 60   Temp 97.5 °F (36.4 °C)   Resp 18   Wt 275 lb 6.4 oz (124.9 kg)   SpO2 95%   BMI 39.52 kg/m²        Physical Exam:   General: Alert and age appropriately oriented. No acute cardiorespiratory distress. HEENT: Normocephalic, no scleral icterus. Oral mucosa moist without cyanosis. Lungs: Clear to auscultation bilaterally. Respiration even and unlabored. Heart: Regular rate and rhythm, S1, S2. No murmurs, clicks, rub or gallops. Abdomen: Soft, non-tender, not distended. Bowel sounds normoactive. No organomegaly. obese   Genitourinary: Deferred. Neuromuscular:      Left hip flexion 4- KE 4, DF 4 PF 4+  RLE hip flexion 4; full extension at knee  Dec sensation and proprioception L distal LE   Skin/extremity: No rashes, no erythema. No calf tenderness B LE.   Wound covered; R BKA dressing and wound vac in place                                                                              Functional Assessment:          Balance  Sitting - Static: Good (unsupported) (08/31/21 1223)  Sitting - Dynamic: Fair (occasional) (08/31/21 1223)  Standing - Static: Not tested (08/31/21 1223)  Standing - Dynamic : Impaired (08/27/21 1500)                     Nataliia Salgado Fall Risk Assessment:  Nataliia Salgado Fall Risk  Mobility: Ambulates or transfers with assist devices or assistance (08/31/21 1949)  Mobility Interventions: Patient to call before getting OOB;Utilize walker, cane, or other assistive device;Utilize gait belt for transfers/ambulation (08/31/21 1949)  Mentation: Alert, oriented x 3 (08/30/21 0710)  Mentation Interventions: Adequate sleep, hydration, pain control;Door open when patient unattended;Evaluate medications/consider consulting pharmacy (08/30/21 0710)  Medication: Patient receiving anticonvulsants, sedatives(tranquilizers), psychotropics or hypnotics, hypoglycemics, narcotics, sleep aids, antihypertensives, laxatives, or diuretics (08/30/21 0710)  Medication Interventions: Bed/chair exit alarm;Evaluate medications/consider consulting pharmacy; Patient to call before getting OOB; Teach patient to arise slowly (08/30/21 0710)  Elimination: Needs assistance with toileting (08/30/21 0710)  Elimination Interventions: Call light in reach; Patient to call for help with toileting needs;Urinal in reach (08/30/21 0710)  Prior Fall History: No (08/30/21 0710)  Total Score: 3 (08/30/21 0710)  Standard Fall Precautions: Yes (08/30/21 0710)     Speech Assessment:         Ambulation:  Gait  Distance (ft): 0 Feet (ft) (08/31/21 1223)     Labs/Studies:  Recent Results (from the past 72 hour(s))   GLUCOSE, POC    Collection Time: 08/29/21  7:00 AM   Result Value Ref Range    Glucose (POC) 110 (H) 65 - 100 mg/dL    Performed by [employer] [id number], POC    Collection Time: 08/29/21 11:35 AM   Result Value Ref Range    Glucose (POC) 96 65 - 100 mg/dL    Performed by Pascollette [id number], POC    Collection Time: 08/29/21  4:22 PM   Result Value Ref Range    Glucose (POC) 103 (H) 65 - 100 mg/dL    Performed by Clara    GLUCOSE, POC    Collection Time: 08/29/21  8:40 PM   Result Value Ref Range    Glucose (POC) 100 65 - 100 mg/dL    Performed by Tanya Castellanos    GLUCOSE, POC    Collection Time: 08/30/21  6:54 AM   Result Value Ref Range    Glucose (POC) 102 (H) 65 - 100 mg/dL    Performed by Chavarria ([place])    GLUCOSE, POC    Collection Time: 08/30/21 11:24 AM   Result Value Ref Range    Glucose (POC) 104 (H) 65 - 100 mg/dL    Performed by Ignacio ([place])CNA    GLUCOSE, POC    Collection Time: 08/30/21  4:14 PM   Result Value Ref Range    Glucose (POC) 127 (H) 65 - 100 mg/dL    Performed by Julee    GLUCOSE, POC    Collection Time: 08/30/21  8:31 PM   Result Value Ref Range    Glucose (POC) 121 (H) 65 - 100 mg/dL    Performed by [address], POC    Collection Time: 08/31/21  6:59 AM   Result Value Ref Range Glucose (POC) 109 (H) 65 - 100 mg/dL    Performed by Sunita    GLUCOSE, POC    Collection Time: 08/31/21 11:45 AM   Result Value Ref Range    Glucose (POC) 105 (H) 65 - 100 mg/dL    Performed by Julee    GLUCOSE, POC    Collection Time: 08/31/21  3:29 PM   Result Value Ref Range    Glucose (POC) 98 65 - 100 mg/dL    Performed by Julee    GLUCOSE, POC    Collection Time: 08/31/21  9:18 PM   Result Value Ref Range    Glucose (POC) 119 (H) 65 - 100 mg/dL    Performed by Amie Helton        Assessment:     Problem List as of 9/1/2021 Date Reviewed: 8/27/2021        Codes Class Noted - Resolved    * (Principal) Unilateral complete BKA, right, sequela ([facility] ICD-10-CM: O26.343P  ICD-9-CM: 905.9  8/25/2021 - Present        Suspected sleep apnea ICD-10-CM: R29.818  ICD-9-CM: 781.99  8/20/2021 - Present        S/P BKA (below knee amputation) unilateral, right ([facility] ICD-10-CM: Z89.511  ICD-9-CM: V49.75  8/18/2021 - Present        Respiratory failure, post-operative ([facility] ICD-10-CM: Y85.178  ICD-9-CM: 518.51  8/18/2021 - Present        Acute respiratory failure with hypercapnia (HCC) ICD-10-CM: J96.02  ICD-9-CM: 518.81  7/23/2021 - Present        Acute on chronic respiratory failure with hypoxia and hypercapnia (HCC) ICD-10-CM: J96.21, J96.22  ICD-9-CM: 518.84, 786.09, 799.02  7/23/2021 - Present        Acute metabolic encephalopathy B-75-LA: G93.41  ICD-9-CM: 348.31  7/23/2021 - Present        Hypoxia ICD-10-CM: R09.02  ICD-9-CM: 799.02  7/21/2021 - Present        Acute kidney injury superimposed on CKD ([facility] ICD-10-CM: N17.9, N18.9  ICD-9-CM: 866.00, 585.9  7/21/2021 - Present        Staphylococcus aureus bacteremia ICD-10-CM: R78.81, B95.61  ICD-9-CM: 790.7, 041.11  7/21/2021 - Present        Cellulitis ICD-10-CM: L03.90  ICD-9-CM: 682.9  7/19/2021 - Present        Systolic CHF, acute on chronic (HCC) ICD-10-CM: I50.23  ICD-9-CM: 428.23, 428.0  7/19/2021 - Present        Atrial fibrillation with RVR ([facility] ICD-10-CM: I48.91  ICD-9-CM: 427.31  7/19/2021 - Present        Morbid obesity with BMI of 40.0-44.9, adult [facility]) ICD-10-CM: E66.01, Z68.41  ICD-9-CM: 278.01, V85.41  6/11/2020 - Present        Severe obesity (BMI 35.0-35.9 with comorbidity) (HCC) (Chronic) ICD-10-CM: E66.01, Z68.35  ICD-9-CM: 278.01, V85.35  10/10/2018 - Present        Bunion of unspecified foot (Chronic) ICD-10-CM: M21.619  ICD-9-CM: 727.1  4/10/2018 - Present        Onychomycosis (Chronic) ICD-10-CM: B35.1  ICD-9-CM: 110.1  4/10/2018 - Present        Corns and callus (Chronic) ICD-10-CM: L84  ICD-9-CM: 700  4/10/2018 - Present        Mixed hyperlipidemia (Chronic) ICD-10-CM: Z59.2  ICD-9-CM: 272.2  4/10/2018 - Present        Mixed axonal-demyelinating polyneuropathy (Chronic) ICD-10-CM: G62.89  ICD-9-CM: 355.9  1/5/2018 - Present        Controlled type 2 diabetes mellitus with diabetic polyneuropathy, without long-term current use of insulin (HCC) (Chronic) ICD-10-CM: E11.42  ICD-9-CM: 250.60, 357.2  1/5/2018 - Present        Type 2 diabetes mellitus with nephropathy (HCC) (Chronic) ICD-10-CM: E11.21  ICD-9-CM: 250.40, 583.81  1/5/2018 - Present        Stage 3 chronic kidney disease ([facility] (Chronic) ICD-10-CM: N18.30  ICD-9-CM: 585.3  11/22/2017 - Present        Benign hypertensive kidney disease with chronic kidney disease (Chronic) ICD-10-CM: I12.9  ICD-9-CM: 403.10  11/6/2017 - Present        CAD (coronary artery disease) (Chronic) ICD-10-CM: I25.10  ICD-9-CM: 414.00  Unknown - Present        RESOLVED: Glucose intolerance (impaired glucose tolerance) (Chronic) ICD-10-CM: R73.02  ICD-9-CM: 790.22  11/6/2017 - 11/14/2017        RESOLVED: BMI 40.0-44.9, adult (HCC) (Chronic) ICD-10-CM: Z68.41  ICD-9-CM: V85.41  11/4/2016 - 10/10/2018        RESOLVED: Hypertension ICD-10-CM: I10  ICD-9-CM: 401.9  Unknown - 10/10/2016        RESOLVED: Hypercholesterolemia ICD-10-CM: E78.00  ICD-9-CM: 272.0  Unknown - 10/10/2016        RESOLVED: Chronic kidney disease ICD-10-CM: N18.9  ICD-9-CM: 445. 9  Unknown - 10/10/2016    Overview Signed 10/10/2016 11:28 AM by Trav Christiansen MD     stage 3             RESOLVED: Neuropathy ICD-10-CM: G62.9  ICD-9-CM: 355.9  Unknown - 10/10/2016        RESOLVED: Peripheral polyneuropathy (Chronic) ICD-10-CM: G62.9  ICD-9-CM: 356.9  10/10/2016 - 1/5/2018        RESOLVED: CKD (chronic kidney disease) stage 3, GFR 30-59 ml/min (HCC) (Chronic) ICD-10-CM: N18.30  ICD-9-CM: 585.3  10/10/2016 - 11/6/2017        RESOLVED: Hyperlipidemia (Chronic) ICD-10-CM: E78.5  ICD-9-CM: 272.4  10/10/2016 - 4/10/2018        RESOLVED: Hyperkalemia ICD-10-CM: E87.5  ICD-9-CM: 276.7  10/10/2016 - 11/14/2017        RESOLVED: Essential hypertension (Chronic) ICD-10-CM: I10  ICD-9-CM: 401.9  10/10/2016 - 11/22/2017            S/p right below-knee amputation secondary to cellulitis (8/18/2021, Corinne Poole MD)     Plan / Recommendations / Medical Decision Making:      Daily physician / PA medical management:     Unilateral complete BKA, right - NWB R LE; prosthetics involved. Will be difficult for patient to mobilize with a prosthesis given he lacks sensation and proprioception in the right foot, has obese body habitus, chronic respiratory failure and CHF.     Pain control - stable, mild-to-moderate joint symptoms intermittently, reasonably well controlled by PRN meds. Will require regular pain assessment and comprehensive pain management. Has chronic mixed axonal-demyelinating polyneuropathy; on Lyrica 200mg BID. Denies residual limb pain but has tramadol and oxycodone ordered PRN.     Hypotension - BP fluctuating, managed medically. Has been hypotensive and is on Florinef and midodrine. Dehydration thought to be contributing, also possibly diuretics.   -8/27 /63, HR 94 this AM; patient denies lightheadedness during therapies or while resting in room  -8/28 95/53; asymptomatic, HR 97; on Florinef and midodrine; up to 117/65 max  -8/30 BP 110/72, HR 85 this AM  -8/31 VSS  -9/1 124/64 HR 60     Acute-on-chronic respiratory failure - continue 3L O2, wean to 2L if possible. Encourage IS.   -8/31 sats reportedly 78%, pt asymptomatic. Rechecked 98% 3L  -9/1 encouraged use of IS     Atrial fibrillation - continue Eliquis 5mg BID; rate controlled. Toprol XL has been held due to hypotensive at times.  -9/1 NSR this a.m.     Acute-on-chronic anemia - anemia of chronic kidney disease and post-op due to blood loss. Hgb trending down; follow closely. No evidence of active bleeding.  -8/27 CBC ordered for tomorrow  -8/28 Hgb 6.6 from 8.9 (8/24); no sign of active bleeding source: stool was not melenic, little drainage from amputation; hold Eliquis and recheck in AM. If true value, will need to transfer downtown for a blood transfusion  -8/30 recheck H&H with Hgb 8.5; Eliquis restarted (yesterday)  -9/1 labs pending     Chronic kidney disease, CKD3 - improving, monitor. Creatine 1.49 from 1.57.  -8/27 BMP ordered for tomorrow  -8/28 Cr 1.51, stable  -9/1 labs pending     Diabetes mellitus - HgbA1c 6.7% (7/13/2021), moderate glycemic control. Will require daily, close fasting glucose monitoring and medication adjustment to optimize glycemic control in setting of acute illness and hospitalization. Takes Glucotrol 5mg at home. Currently on SSI; add Glucotrol.  -8/26 BS controlled  -8/27 BS this , all BS since admission <145, most in low 100s  -8/28 BS controlled  -8/30  this AM, all BS yesterday <110  -well controlled     Pneumonia prophylaxis - incentive spirometer every hour while awake.     DVT risk / DVT prophylaxis - will require daily physician / PA exam to assess for signs and symptoms as patient is at increased risk for of thromboembolism. Mobilize as tolerated.  Sequential pneumatic compression devices (SCDs) when in bed; thigh-high or knee-high thromboembolic deterrent hose when out of bed. On Eliquis.     CAD - continue Eliquis and statin.     GI prophylaxis - resume PPI. At times may need additional antacids, Maalox prn.     General skin care / wound prevention - monitor BKA  incision and general skin wound status daily per staff and physician / PA. At risk for failure. Will require 24/7 rehab nursing. Keep BKA incision and left plantar foot wound clean and dry, reinforce dressing PRN and ice to area for comfort; he is to f/u with Dr Jose Manuel Cole for staple removal.   -8/25 will cont wound vac until f/u with Dr. Pendleton  -8/28 wound looked good at time of wound vac check per notes  -8/30 wound vac in place and functioning, skin surrounding without erythema or rash  -9/1 wound vac change today     Urinary retention / neurogenic bladder - schedule voids q 6-8 hrs. Check post-void residual every shift; in and out catheter if post-void residual is more than 400ml.     Bowel program - at risk for constipation as a side effect of opioids, other medications, impaired mobility, etc. MiraLAX daily for regularity, Ivis-Colace for stool softener. PRN MOM, bisacodyl suppository or tablets for constipation.  -8/27 constipated, no BM with daily gentle agents (MiraLAX, Colace), will try more aggressively with lactulose after therapies; may require MOM, bisacodyl suppository or disimpaction  -8/28 excellent results yesterday  -8/30 large, normal BM this AM; continue daily MiraLAX and stool softener             Time spent was 25 minutes with over 1/2 in direct patient care/examination, consultation and coordination of care.      Signed By: Allen Braxton MD     September 1, 2021

## 2021-09-01 NOTE — PROGRESS NOTES
OT Daily Note  Time In 1300   Time Out 1345     Pain: Patient had no complaint of pain. Functional Mobility      Score Comments   Sit to Stand 1: Dependent Ax2   Transfer Assist 1: Dependent Transfer Type: SPT   Equipment: Grab Bars   Comments: Ax2   Pt demonstrated poor set-up and ability to communicate care. Pt had a strong lean L and could not get upright. Pt is insistent on not using the bedside commode because he can't get his penis aimed down. Pt was told he needs to use BSC from now on so he can use sliding board. Pt was exhausted after wiping. Pt could not recall how to wipe from last time. Strengthening   Pt completed various core exercises to improve core strength for transfers. Pt completed 2x5 of w/c pushups with min A. Pt has poor problem solving with where to put his foot. When set-up correctly pt demonstrates good form. Education   Safety with toileting     Interdisciplinary Communication: GLENIS Pedraza on toileting    Plan: Continue with POC. Pt was left with PTA Dc Gómez with all needs within reach.      Quiana Ruiz OTR/L  9/1/2021

## 2021-09-01 NOTE — PROGRESS NOTES
OT WEEKLY PROGRESS NOTE    Time In: 9311  Time Out: 7300    Mobility   Score Comments   Supine to Sit 4: Supervision or touching A S   Sit to Supine 4: Supervision or touching A S; cueing for positioning   Sit to Stand 3: Partial/Moderate A Lifting A   Transfer Assist 4: Supervision or touching A Transfer Type: LPT  Equipment: Sliding Board   Comments: Cueing     Activities of Daily Living    Score Comments   Eating 6: Independent I   Oral Hyigene 6: Independent I   Bathing 4: Supervision or touching A Type of Shower: Bath Pack  Position: Unsupported Sitting   Adaptive  Equipment: N/A  Comments: S   Upper Body  Dressing 4: Supervision or touching A Items Applied: Pullover  Position: Unsupported Sitting  Comments: S/U   Lower Body Dressing 4: Supervision or touching A Items Applied: Underwear and Elastic pants  Position: Unsupported Sitting  Adaptive Equipment: N/A  Comments: Cueing for pacing and sequencing   Donning/Adin Footwear 5: S/U or clean-up assist Items Applied: Socks  Adaptive Equipment: Sock Aide  Comments: S/U   Toilet Transfer 1: Dependent Transfer Type: SPT   Equipment: [employer]   Comments: Ax2   Toileting Hygiene 1: Dependent Comments: Ax2   Education  Pacing       Plan of Care: Please see Care Plan for updated LTGs. Family Training: To be scheduled    Summary of Progress: Pt is making good progress with compensatory techniques, balance, and activity tolerance allowing for improvements with bathing, dressing, and toileting. Pt's biggest barrier is poor insight, core weakness, and decreased activity tolerance. Pt would benefit from ongoing therapy to address deficits. Summary of Session: Pt is in bed and agreeable to tx. Pt's performance with ADL is reflected in above chart. Pt is requiring cueing for sequencing, but is demonstrating fair carryover. Pt was left in bed with all needs within reach.      Can Troncoso OT  9/1/2021

## 2021-09-01 NOTE — PROGRESS NOTES
OT Daily Note  Time In 1115   Time Out 1200     Pain: Patient had no complaint of pain. Functional Mobility      Score Comments   Transfer Assist 4: Supervision or touching A Transfer Type: LPT  Equipment: Grab Bars   Comments: CGA; cueing for hand placement     Pt completed TTB transfer. Quality was poor. Pt has poor mechanics. Strengthening   Pt completed 5 minutes on the ergometer frontwards and backwards with mod resistance to increase UB strength and activity tolerance for integration into functional mobility. Cognition   Pt engaged with Blink. Pt demonstrated decreased processing speed. Pt reported his thinking was decreased. Education   Benefits of activity     Interdisciplinary Communication: BEN Chan on pt's performance. Plan: Continue with POC. Pt was left in room with all needs within reach.      Anoop Banda OTR/L  9/1/2021

## 2021-09-01 NOTE — PROGRESS NOTES
Problem: Falls - Risk of  Goal: *Absence of Falls  Description: Document Kate Sotelo Fall Risk and appropriate interventions in the flowsheet.   Outcome: Progressing Towards Goal  Note: Fall Risk Interventions:  Mobility Interventions: Patient to call before getting OOB, Utilize walker, cane, or other assistive device    Mentation Interventions: Door open when patient unattended, More frequent rounding, Toileting rounds    Medication Interventions: Bed/chair exit alarm, Patient to call before getting OOB    Elimination Interventions: Call light in reach, Patient to call for help with toileting needs, Toileting schedule/hourly rounds, Urinal in reach              Problem: Patient Education: Go to Patient Education Activity  Goal: Patient/Family Education  Outcome: Progressing Towards Goal

## 2021-09-01 NOTE — PROGRESS NOTES
PHYSICAL THERAPY DAILY NOTE  Time In: 0919  Time Out: 1010  Patient Seen For: AM;Balance activities; Equipment assessment;Transfer training; Wheelchair mobility    Subjective: Pt is eager to work hard, says \"I will do what I have to do, I want to get back to work\" and still denies having diabetes         Objective: Other (comment) (fall)  GROSS ASSESSMENT Daily Assessment            COGNITION Daily Assessment    Decreased insight and impulsive       BED/MAT MOBILITY Daily Assessment    Rolling Right : 0 (Not tested)  Rolling Left : 0 (Not tested)  Supine to Sit : Mod A   Sit to Supine : 0 (Not tested)       TRANSFERS Daily Assessment   Sit<>stand for standing trials. Mod A x2 to stand. Min A and verbal cues when standing. Sit to Stand Assistance: Moderate assistance  Car Transfers: Not tested       GAIT Daily Assessment    Amount of Assistance: 0 (Not tested)       STEPS or STAIRS Daily Assessment            BALANCE Daily Assessment   Mod A x 2 to stand, Min A and verbal cues to correct posture when standing. RW, Elbert boot and mirror used for standing trials. First and second for 30 sec. Third and forth for about 50 sec Standing - Static: Fair       WHEELCHAIR MOBILITY Daily Assessment   W/c trials of 30 ft.   1. In current chair: 20 strokes/16:80 sec  2. In blue chair: 18 strokes/16.05 sec Able to Propel (ft): 70 feet  Wheelchair Setup Assist Required : 4 (Minimal assistance)  Wheelchair Management: Manages left brake;Manages right brake       LOWER EXTREMITY EXERCISES Daily Assessment          Pain level: no pain indicated    Patient education: the importance of better habits at home, especially eating healthier and watching blood sugar. When standing patient seemed short of breath . Checked O2 sats on 1 1/2 liters and remained 95% thru out.   Interdisciplinary Communication:     Left pt in room in w/c with call bell and all needs in reach         Assessment: Pt continues to show improvement with standing trials and static standing balance. Pt needs continued PT services to improve transfers and standing balance. Plan of Care: Continue with POC and progress as tolerated.        Sindi Pena, SPT  9/1/2021

## 2021-09-01 NOTE — PROGRESS NOTES
STG 1: Pt will be moderate A with toileting by 9/1/21 to prevent skin breakdown. 9/1/2021 Met with urine, dep with bowel   LTG 1: Pt will be independent with toileting by 9/8/21 to prevent skin breakdown. 9/1/2021 Reduce to mod A with bowel movements    STG 2: Pt will be moderate A with LB dressing by 9/1/21 to reduce risk of falls. 9/1/2021 Goal met  LTG 2: Pt will be set-up with LB dressing by 9/8/21 to reduce risk of falls. 9/1/2021 Goal met     STG 3: Pt will be touching A with bathing by 9/1/21 to promote good skin integrity. 9/1/2021 Goal met  LTG 3: Pt will be set-up with bathing by 9/8/21 to promote good skin integrity. STG 4: Pt will be able to get a snack from the fridge with supervision  by 9/1/21 to promote proper nutrition and hydration. 9/1/2021 Goal met  LTG 4: Pt will be able to get a snack from the fridge with independent by 9/8/21 to promote proper nutrition and hydration. LTG 5: Pt/caregiver will verbalize  understanding of OT recommendations regarding ADL status, functional transfer status, home safety, DME, AE, energy conservation techniques, safety awareness, activity tolerance, and/or follow-up therapy to increase safety with functional tasks upon discharge. 9/1/2021 Goal ongoing    LTG 6: Pt will complete amputation notebook by discharge to improve recovery outcomes.    9/1/2021 Goal met

## 2021-09-01 NOTE — PROGRESS NOTES
PHYSICAL THERAPY DAILY NOTE  Time In: 1351  Time Out: 1450  Patient Seen For: PM;Balance activities; Therapeutic exercise;Transfer training; Wheelchair mobility    Subjective: Pt stated that he is tired/worn out from working out in [employer]. Objective: Other (comment) (fall)  GROSS ASSESSMENT Daily Assessment            COGNITION Daily Assessment    Decreased insight, impulsive       BED/MAT MOBILITY Daily Assessment   Min A for rolling to manage O2 line and wound vac. Mod A required for supine>sit at end of tx session.   Rolling Right : 4 (Minimal assistance)  Rolling Left : 4 (Minimal assistance)  Supine to Sit : 3 (Moderate assistance)  Sit to Supine : 5 (Supervision)       TRANSFERS Daily Assessment    Transfer Type: Lateral with transfer board  Transfer Assistance : 4 (Contact guard assistance)  Sit to Stand Assistance: NT  Car Transfers: Not tested       GAIT Daily Assessment    Amount of Assistance: 0 (Not tested)     STEPS or STAIRS Daily Assessment            BALANCE Daily Assessment   Requires BUE support for dynamic sitting  Sitting - Static: Good (unsupported)  Sitting - Dynamic: Fair (occasional)         WHEELCHAIR MOBILITY Daily Assessment   Min A to set up w/c before transfer to mat and for foot rest management Able to Propel (ft): 15 feet  Wheelchair Setup Assist Required : 4 (Minimal assistance)  Wheelchair Management: Manages left brake;Manages right brake       LOWER EXTREMITY EXERCISES Daily Assessment   See below       LE EXERCISES Sets Reps Comments   Prone hip extension 1 15    Prone knee flexion  1 15    Prone flexor stretch   Total time prone = 15 min    Supine Glut Sets 1 15    Supine Heel Slides 1 15 Heel on green ball   Supine Hip Abduction 1 15 with red resistance band   Supine Short Arc Quad 1 15    Supine Knee extension  1 15 With green ball under knee   Supine adduction 1 15 With green ball between knees   Supine Straight Leg Raise 1 15      Vital Signs: SpO2 remained above 92% on 1.5 L O2. Pt recovered within 1 min when dropped to 92%. Pain level: pt reported no pain    Patient education:I Worked through mygola with pt. Interdisciplinary Communication: Communicated with OT about pts progress. Pt left seated in w/c with son and daughter in law in room. Assessment: Pt able to tolerate 15 min laying prone and improved with LE control when performing LE exercises. Pt continues to need PT services to improve strength and endurance in BLE. Plan of Care: Continue with POC and progress as indicated.      Oren Deleon, SPT  9/1/2021

## 2021-09-02 LAB
GLUCOSE BLD STRIP.AUTO-MCNC: 104 MG/DL (ref 65–100)
GLUCOSE BLD STRIP.AUTO-MCNC: 115 MG/DL (ref 65–100)
GLUCOSE BLD STRIP.AUTO-MCNC: 118 MG/DL (ref 65–100)
GLUCOSE BLD STRIP.AUTO-MCNC: 89 MG/DL (ref 65–100)
SERVICE CMNT-IMP: ABNORMAL
SERVICE CMNT-IMP: NORMAL

## 2021-09-02 PROCEDURE — 97530 THERAPEUTIC ACTIVITIES: CPT

## 2021-09-02 PROCEDURE — 82962 GLUCOSE BLOOD TEST: CPT

## 2021-09-02 PROCEDURE — 74011250637 HC RX REV CODE- 250/637: Performed by: PHYSICIAN ASSISTANT

## 2021-09-02 PROCEDURE — 97110 THERAPEUTIC EXERCISES: CPT

## 2021-09-02 PROCEDURE — 65310000000 HC RM PRIVATE REHAB

## 2021-09-02 PROCEDURE — 99232 SBSQ HOSP IP/OBS MODERATE 35: CPT | Performed by: PHYSICAL MEDICINE & REHABILITATION

## 2021-09-02 PROCEDURE — 97535 SELF CARE MNGMENT TRAINING: CPT

## 2021-09-02 RX ADMIN — MIDODRINE HYDROCHLORIDE 2.5 MG: 5 TABLET ORAL at 08:25

## 2021-09-02 RX ADMIN — ATORVASTATIN CALCIUM 40 MG: 40 TABLET, FILM COATED ORAL at 21:05

## 2021-09-02 RX ADMIN — TRAZODONE HYDROCHLORIDE 50 MG: 50 TABLET ORAL at 21:05

## 2021-09-02 RX ADMIN — POLYETHYLENE GLYCOL 3350 17 G: 17 POWDER, FOR SOLUTION ORAL at 08:23

## 2021-09-02 RX ADMIN — DOCUSATE SODIUM 100 MG: 100 CAPSULE, LIQUID FILLED ORAL at 08:23

## 2021-09-02 RX ADMIN — MIDODRINE HYDROCHLORIDE 2.5 MG: 5 TABLET ORAL at 12:13

## 2021-09-02 RX ADMIN — PREGABALIN 200 MG: 100 CAPSULE ORAL at 08:24

## 2021-09-02 RX ADMIN — PREGABALIN 200 MG: 100 CAPSULE ORAL at 21:05

## 2021-09-02 RX ADMIN — MIDODRINE HYDROCHLORIDE 2.5 MG: 5 TABLET ORAL at 17:55

## 2021-09-02 RX ADMIN — APIXABAN 5 MG: 5 TABLET, FILM COATED ORAL at 21:05

## 2021-09-02 RX ADMIN — PANTOPRAZOLE SODIUM 40 MG: 40 TABLET, DELAYED RELEASE ORAL at 06:12

## 2021-09-02 RX ADMIN — FLUDROCORTISONE ACETATE 0.1 MG: 0.1 TABLET ORAL at 08:24

## 2021-09-02 RX ADMIN — APIXABAN 5 MG: 5 TABLET, FILM COATED ORAL at 08:24

## 2021-09-02 RX ADMIN — DOCUSATE SODIUM 100 MG: 100 CAPSULE, LIQUID FILLED ORAL at 21:05

## 2021-09-02 NOTE — PROGRESS NOTES
Dimitrios Gil MD  Medical Director  [address] [facility], [address] [facility]  Tel: [phone] [facility] PROGRESS NOTE    Idelia Minors Pack  Admit Date: 8/25/2021  Admit Diagnosis:   Unilateral complete BKA, right, sequela ([address] [S88.111S]    Subjective     Patient seen and examined. Good nights sleep. No cp, sob, n. No pain. Objective:     Current Facility-Administered Medications   Medication Dose Route Frequency    lactulose (CHRONULAC) 10 gram/15 mL solution 30 mL  30 mL Oral DAILY PRN    naloxone (NARCAN) injection 0.4 mg  0.4 mg IntraVENous PRN    dextrose (D50W) injection syrg 12.5-25 g  25-50 mL IntraVENous PRN    dextrose 40% (GLUTOSE) oral gel 1 Tube  15 g Oral PRN    glucagon (GLUCAGEN) injection 1 mg  1 mg IntraMUSCular PRN    insulin lispro (HUMALOG) injection 0-10 Units  0-10 Units SubCUTAneous AC&HS    acetaminophen (TYLENOL) tablet 650 mg  650 mg Oral Q6H PRN    apixaban (ELIQUIS) tablet 5 mg  5 mg Oral Q12H    atorvastatin (LIPITOR) tablet 40 mg  40 mg Oral QHS    docusate sodium (COLACE) capsule 100 mg  100 mg Oral BID    fludrocortisone (FLORINEF) tablet 0.1 mg  0.1 mg Oral DAILY    midodrine (PROAMATINE) tablet 2.5 mg  2.5 mg Oral TID WITH MEALS    nystatin (MYCOSTATIN) 100,000 unit/gram powder   Topical PRN    oxyCODONE IR (ROXICODONE) tablet 5 mg  5 mg Oral Q4H PRN    pantoprazole (PROTONIX) tablet 40 mg  40 mg Oral ACB    polyethylene glycol (MIRALAX) packet 17 g  17 g Oral DAILY    pregabalin (LYRICA) capsule 200 mg  200 mg Oral BID    traMADoL (ULTRAM) tablet 50 mg  50 mg Oral Q6H PRN    traZODone (DESYREL) tablet 50 mg  50 mg Oral QHS       Review of Systems:   Denies chest pain, shortness of breath, cough, headache, visual problems, abdominal pain, dysuria, calf pain. Pertinent positives are as noted in the HPI, ROS unremarkable otherwise.      Visit Vitals  /75 (BP 1 Location: Left upper arm)   Pulse 94   Temp 97.8 °F (36.6 °C)   Resp 18   Wt 275 lb 6.4 oz (124.9 kg)   SpO2 96%   BMI 39.52 kg/m²        Physical Exam:   General: Alert and age appropriately oriented. No acute cardiorespiratory distress. HEENT: Normocephalic, no scleral icterus. Oral mucosa moist without cyanosis. Lungs: Clear to auscultation bilaterally. Respiration even and unlabored. Heart: Regular rate and rhythm, S1, S2. No murmurs, clicks, rub or gallops. Abdomen: Soft, non-tender, not distended. Bowel sounds normoactive. No organomegaly. obese   Genitourinary: Deferred. Neuromuscular:      Left hip flexion 4- KE 4, DF 4 PF 4+  RLE hip flexion 4; full extension at knee  Dec sensation and proprioception L distal LE   Skin/extremity: No rashes, no erythema. No calf tenderness B LE.  R BKA dressing c/d/i                                                                              Functional Assessment:          Balance  Sitting - Static: Good (unsupported) (09/01/21 1500)  Sitting - Dynamic: Fair (occasional) (09/01/21 1500)  Standing - Static: Fair (09/01/21 1100)  Standing - Dynamic : Impaired (08/27/21 1500)                     [facility] Fall Risk Assessment:  [facility] Fall Risk  Mobility: Ambulates or transfers with assist devices or assistance (09/01/21 1943)  Mobility Interventions: Patient to call before getting OOB (09/01/21 1943)  Mentation: Alert, oriented x 3 (09/01/21 1943)  Mentation Interventions: Door open when patient unattended (09/01/21 1943)  Medication: Patient receiving anticonvulsants, sedatives(tranquilizers), psychotropics or hypnotics, hypoglycemics, narcotics, sleep aids, antihypertensives, laxatives, or diuretics (09/01/21 1943)  Medication Interventions: Patient to call before getting OOB (09/01/21 1943)  Elimination: Needs assistance with toileting (09/01/21 1943)  Elimination Interventions: Call light in reach; Patient to call for help with toileting needs (09/01/21 1943)  Prior Fall History: No (09/01/21 1943)  Total Score: 3 (09/01/21 1943)  Standard Fall Precautions: Yes (08/30/21 0710)  High Fall Risk: Yes (09/01/21 1943)     Speech Assessment:         Ambulation:  Gait  Distance (ft): 0 Feet (ft) (08/31/21 1223)     Labs/Studies:  Recent Results (from the past 72 hour(s))   GLUCOSE, POC    Collection Time: 08/30/21 11:24 AM   Result Value Ref Range    Glucose (POC) 104 (H) 65 - 100 mg/dL    Performed by Chavarria ([place])    GLUCOSE, POC    Collection Time: 08/30/21  4:14 PM   Result Value Ref Range    Glucose (POC) 127 (H) 65 - 100 mg/dL    Performed by Julee    GLUCOSE, POC    Collection Time: 08/30/21  8:31 PM   Result Value Ref Range    Glucose (POC) 121 (H) 65 - 100 mg/dL    Performed by Elen Bradshaw, POC    Collection Time: 08/31/21  6:59 AM   Result Value Ref Range    Glucose (POC) 109 (H) 65 - 100 mg/dL    Performed by Sunita    GLUCOSE, POC    Collection Time: 08/31/21 11:45 AM   Result Value Ref Range    Glucose (POC) 105 (H) 65 - 100 mg/dL    Performed by Julee    GLUCOSE, POC    Collection Time: 08/31/21  3:29 PM   Result Value Ref Range    Glucose (POC) 98 65 - 100 mg/dL    Performed by Julee    GLUCOSE, POC    Collection Time: 08/31/21  9:18 PM   Result Value Ref Range    Glucose (POC) 119 (H) 65 - 100 mg/dL    Performed by [facility]    METABOLIC PANEL, BASIC    Collection Time: 09/01/21  6:05 AM   Result Value Ref Range    Sodium 144 136 - 145 mmol/L    Potassium 4.7 3.5 - 5.1 mmol/L    Chloride 106 98 - 107 mmol/L    CO2 37 (H) 21 - 32 mmol/L    Anion gap 1 (L) 7 - 16 mmol/L    Glucose 99 65 - 100 mg/dL    BUN 21 8 - 23 MG/DL    Creatinine 1.58 (H) 0.8 - 1.5 MG/DL    GFR est AA 55 (L) >60 ml/min/1.73m2    GFR est non-AA 46 (L) >60 ml/min/1.73m2    Calcium 9.2 8.3 - 10.4 MG/DL   CBC W/O DIFF    Collection Time: 09/01/21  6:05 AM   Result Value Ref Range    WBC 4.1 (L) 4.3 - 11.1 K/uL    RBC 3.21 (L) 4.23 - 5.6 M/uL    HGB 9.3 (L) 13.6 - 17.2 g/dL    HCT 31.8 (L) 41.1 - 50.3 %    MCV 99.1 (H) 79.6 - 97.8 FL    MCH 29.0 26.1 - 32.9 PG    MCHC 29.2 (L) 31.4 - 35.0 g/dL    RDW 15.9 (H) 11.9 - 14.6 %    PLATELET 820 538 - 848 K/uL    MPV 12.0 9.4 - 12.3 FL    ABSOLUTE NRBC 0.00 0.0 - 0.2 K/uL   GLUCOSE, POC    Collection Time: 09/01/21  7:53 AM   Result Value Ref Range    Glucose (POC) 96 65 - 100 mg/dL    Performed by Luis E)CNA    GLUCOSE, POC    Collection Time: 09/01/21 11:06 AM   Result Value Ref Range    Glucose (POC) 141 (H) 65 - 100 mg/dL    Performed by Ignacio ([place])CNA    GLUCOSE, POC    Collection Time: 09/01/21  4:19 PM   Result Value Ref Range    Glucose (POC) 107 (H) 65 - 100 mg/dL    Performed by Jude    GLUCOSE, POC    Collection Time: 09/01/21  8:50 PM   Result Value Ref Range    Glucose (POC) 115 (H) 65 - 100 mg/dL    Performed by Bella        Assessment:     Problem List as of 9/2/2021 Date Reviewed: 8/27/2021        Codes Class Noted - Resolved    * (Principal) Unilateral complete BKA, right, sequela ([facility] ICD-10-CM: N32.259A  ICD-9-CM: 905.9  8/25/2021 - Present        Suspected sleep apnea ICD-10-CM: R29.818  ICD-9-CM: 781.99  8/20/2021 - Present        S/P BKA (below knee amputation) unilateral, right ([facility] ICD-10-CM: Z89.511  ICD-9-CM: V49.75  8/18/2021 - Present        Respiratory failure, post-operative ([facility] ICD-10-CM: A66.700  ICD-9-CM: 518.51  8/18/2021 - Present        Acute respiratory failure with hypercapnia (HCC) ICD-10-CM: J96.02  ICD-9-CM: 518.81  7/23/2021 - Present        Acute on chronic respiratory failure with hypoxia and hypercapnia (HCC) ICD-10-CM: J96.21, J96.22  ICD-9-CM: 518.84, 786.09, 799.02  7/23/2021 - Present        Acute metabolic encephalopathy MVV-37-NB: G93.41  ICD-9-CM: 348.31  7/23/2021 - Present        Hypoxia ICD-10-CM: R09.02  ICD-9-CM: 799.02  7/21/2021 - Present        Acute kidney injury superimposed on CKD ([facility] ICD-10-CM: N17.9, N18.9  ICD-9-CM: 866.00, 585.9  7/21/2021 - Present        Staphylococcus aureus bacteremia ICD-10-CM: R78.81, B95.61  ICD-9-CM: 790.7, 041.11  7/21/2021 - Present        Cellulitis ICD-10-CM: L03.90  ICD-9-CM: 682.9  7/19/2021 - Present        Systolic CHF, acute on chronic [facility]) ICD-10-CM: I50.23  ICD-9-CM: 428.23, 428.0  7/19/2021 - Present        Atrial fibrillation with RVR (HCC) ICD-10-CM: I48.91  ICD-9-CM: 427.31  7/19/2021 - Present        Morbid obesity with BMI of 40.0-44.9, adult [facility]) ICD-10-CM: E66.01, Z68.41  ICD-9-CM: 278.01, V85.41  6/11/2020 - Present        Severe obesity (BMI 35.0-35.9 with comorbidity) (HCC) (Chronic) ICD-10-CM: E66.01, Z68.35  ICD-9-CM: 278.01, V85.35  10/10/2018 - Present        Bunion of unspecified foot (Chronic) ICD-10-CM: M21.619  ICD-9-CM: 727.1  4/10/2018 - Present        Onychomycosis (Chronic) ICD-10-CM: B35.1  ICD-9-CM: 110.1  4/10/2018 - Present        Corns and callus (Chronic) ICD-10-CM: L84  ICD-9-CM: 700  4/10/2018 - Present        Mixed hyperlipidemia (Chronic) ICD-10-CM: M49.6  ICD-9-CM: 272.2  4/10/2018 - Present        Mixed axonal-demyelinating polyneuropathy (Chronic) ICD-10-CM: G62.89  ICD-9-CM: 355.9  1/5/2018 - Present        Controlled type 2 diabetes mellitus with diabetic polyneuropathy, without long-term current use of insulin (HCC) (Chronic) ICD-10-CM: E11.42  ICD-9-CM: 250.60, 357.2  1/5/2018 - Present        Type 2 diabetes mellitus with nephropathy (HCC) (Chronic) ICD-10-CM: E11.21  ICD-9-CM: 250.40, 583.81  1/5/2018 - Present        Stage 3 chronic kidney disease ([facility] (Chronic) ICD-10-CM: N18.30  ICD-9-CM: 585.3  11/22/2017 - Present        Benign hypertensive kidney disease with chronic kidney disease (Chronic) ICD-10-CM: I12.9  ICD-9-CM: 403.10  11/6/2017 - Present        CAD (coronary artery disease) (Chronic) ICD-10-CM: I25.10  ICD-9-CM: 414.00  Unknown - Present        RESOLVED: Glucose intolerance (impaired glucose tolerance) (Chronic) ICD-10-CM: R73.02  ICD-9-CM: 790.22  11/6/2017 - 11/14/2017        RESOLVED: BMI 40.0-44.9, adult (HCC) (Chronic) ICD-10-CM: Z68.41  ICD-9-CM: V85.41  11/4/2016 - 10/10/2018        RESOLVED: Hypertension ICD-10-CM: I10  ICD-9-CM: 401.9  Unknown - 10/10/2016        RESOLVED: Hypercholesterolemia ICD-10-CM: E78.00  ICD-9-CM: 272.0  Unknown - 10/10/2016        RESOLVED: Chronic kidney disease ICD-10-CM: N18.9  ICD-9-CM: 893. 9  Unknown - 10/10/2016    Overview Signed 10/10/2016 11:28 AM by Pablo Garsia MD     stage 3             RESOLVED: Neuropathy ICD-10-CM: G62.9  ICD-9-CM: 355.9  Unknown - 10/10/2016        RESOLVED: Peripheral polyneuropathy (Chronic) ICD-10-CM: G62.9  ICD-9-CM: 356.9  10/10/2016 - 1/5/2018        RESOLVED: CKD (chronic kidney disease) stage 3, GFR 30-59 ml/min (HCC) (Chronic) ICD-10-CM: N18.30  ICD-9-CM: 585.3  10/10/2016 - 11/6/2017        RESOLVED: Hyperlipidemia (Chronic) ICD-10-CM: E78.5  ICD-9-CM: 272.4  10/10/2016 - 4/10/2018        RESOLVED: Hyperkalemia ICD-10-CM: E87.5  ICD-9-CM: 276.7  10/10/2016 - 11/14/2017        RESOLVED: Essential hypertension (Chronic) ICD-10-CM: I10  ICD-9-CM: 401.9  10/10/2016 - 11/22/2017            S/p right below-knee amputation secondary to cellulitis (8/18/2021, Sudha Samuels MD)     Plan / Recommendations / Medical Decision Making:      Daily physician / PA medical management:     Unilateral complete BKA, right - NWB R LE; prosthetics involved. Will be difficult for patient to mobilize with a prosthesis given he lacks sensation and proprioception in the right foot, has obese body habitus, chronic respiratory failure and CHF.     Pain control - stable, mild-to-moderate joint symptoms intermittently, reasonably well controlled by PRN meds. Will require regular pain assessment and comprehensive pain management. Has chronic mixed axonal-demyelinating polyneuropathy; on Lyrica 200mg BID.  Denies residual limb pain but has tramadol and oxycodone ordered PRN.     Hypotension - BP fluctuating, managed medically. Has been hypotensive and is on Florinef and midodrine. Dehydration thought to be contributing, also possibly diuretics. -8/27 /63, HR 94 this AM; patient denies lightheadedness during therapies or while resting in room  -8/28 95/53; asymptomatic, HR 97; on Florinef and midodrine; up to 117/65 max  -8/30 /72, HR 85 this AM  -8/31 VSS  -9/1 124/64 HR 60     Acute-on-chronic respiratory failure - continue 3L O2, wean to 2L if possible. Encourage IS.    -8/31 sats reportedly 78%, pt asymptomatic. Rechecked 98% 3L  -9/1 encouraged use of IS     Atrial fibrillation - continue Eliquis 5mg BID; rate controlled. Toprol XL has been held due to hypotensive at times.  -9/1 NSR this a.m.     Acute-on-chronic anemia - anemia of chronic kidney disease and post-op due to blood loss. Hgb trending down; follow closely. No evidence of active bleeding.  -8/27 CBC ordered for tomorrow  -8/28 Hgb 6.6 from 8.9 (8/24); no sign of active bleeding source: stool was not melenic, little drainage from amputation; hold Eliquis and recheck in AM. If true value, will need to transfer downtown for a blood transfusion  -8/30 recheck H&H with Hgb 8.5; Eliquis restarted (yesterday)  -9/2 hgb 9.3 improved     Chronic kidney disease, CKD3 - improving, monitor. Creatine 1.49 from 1.57.  -8/27 BMP ordered for tomorrow  -8/28 Cr 1.51, stable  -9/2 Cr 1.58 , bun 21; baseline     Diabetes mellitus - HgbA1c 6.7% (7/13/2021), moderate glycemic control. Will require daily, close fasting glucose monitoring and medication adjustment to optimize glycemic control in setting of acute illness and hospitalization. Takes Glucotrol 5mg at home. Currently on SSI; add Glucotrol ? .  -8/26 BS controlled  -8/27 BS this , all BS since admission <145, most in low 100s  -8/28 BS controlled  -8/30  this AM, all BS yesterday <110  -well controlled  -9/2 bs well controlled.  Diet controlled     Pneumonia prophylaxis - incentive spirometer every hour while awake.     DVT risk / DVT prophylaxis - will require daily physician / PA exam to assess for signs and symptoms as patient is at increased risk for of thromboembolism. Mobilize as tolerated. Sequential pneumatic compression devices (SCDs) when in bed; thigh-high or knee-high thromboembolic deterrent hose when out of bed. On Eliquis.     CAD - continue Eliquis and statin.     GI prophylaxis - resume PPI. At times may need additional antacids, Maalox prn.     General skin care / wound prevention - monitor BKA  incision and general skin wound status daily per staff and physician / PA. At risk for failure. Will require 24/7 rehab nursing. Keep BKA incision and left plantar foot wound clean and dry, reinforce dressing PRN and ice to area for comfort; he is to f/u with Dr Tasha Beltran for staple removal.   -8/25 will cont wound vac until f/u with Dr. Pendleton  -8/28 wound looked good at time of wound vac check per notes  -8/30 wound vac in place and functioning, skin surrounding without erythema or rash  -9/2 wound vac will remain intact until f/u with Dr Franklin Rosario next week     Urinary retention / neurogenic bladder - schedule voids q 6-8 hrs. Check post-void residual every shift; in and out catheter if post-void residual is more than 400ml.     Bowel program - at risk for constipation as a side effect of opioids, other medications, impaired mobility, etc. MiraLAX daily for regularity, Ivis-Colace for stool softener.  PRN MOM, bisacodyl suppository or tablets for constipation.  -8/27 constipated, no BM with daily gentle agents (MiraLAX, Colace), will try more aggressively with lactulose after therapies; may require MOM, bisacodyl suppository or disimpaction  -8/28 excellent results yesterday  -8/30 large, normal BM this AM; continue daily MiraLAX and stool softener     Time spent was 25 minutes with over 1/2 in direct patient care/examination, consultation and coordination of care.      Signed By: Edgardo Monterroso MD     September 2, 2021

## 2021-09-02 NOTE — PROGRESS NOTES
Problem: Falls - Risk of  Goal: *Absence of Falls  Description: Document Jaelyn Haydenwil Fall Risk and appropriate interventions in the flowsheet. Outcome: Progressing Towards Goal  Note: Fall Risk Interventions:  Mobility Interventions: Patient to call before getting OOB    Mentation Interventions: Door open when patient unattended, Evaluate medications/consider consulting pharmacy, Toileting rounds, Update white board    Medication Interventions: Patient to call before getting OOB    Elimination Interventions: Call light in reach, Patient to call for help with toileting needs, Urinal in reach              Problem: Patient Education: Go to Patient Education Activity  Goal: Patient/Family Education  Outcome: Progressing Towards Goal     Problem: Pressure Injury - Risk of  Goal: *Prevention of pressure injury  Description: Document Julio Cesar Scale and appropriate interventions in the flowsheet.   Outcome: Progressing Towards Goal  Note: Pressure Injury Interventions:  Sensory Interventions: Assess changes in LOC, Check visual cues for pain, Discuss PT/OT consult with provider, Float heels, Keep linens dry and wrinkle-free, Minimize linen layers    Moisture Interventions: Absorbent underpads, Assess need for specialty bed, Moisture barrier, Offer toileting Q_hr    Activity Interventions: Increase time out of bed, Pressure redistribution bed/mattress(bed type), PT/OT evaluation    Mobility Interventions: Pressure redistribution bed/mattress (bed type), PT/OT evaluation    Nutrition Interventions: Document food/fluid/supplement intake    Friction and Shear Interventions: Lift sheet, Apply protective barrier, creams and emollients                Problem: Patient Education: Go to Patient Education Activity  Goal: Patient/Family Education  Outcome: Progressing Towards Goal     Problem: Patient Education: Go to Patient Education Activity  Goal: Patient/Family Education  Description: Patient / Patient's family will verbalize understanding of PT safety recommendations, demonstrate appropriate assist for current functional mobility status, safety, and home exercise program by time of discharge.    Outcome: Progressing Towards Goal

## 2021-09-02 NOTE — PROGRESS NOTES
PHYSICAL THERAPY DAILY NOTE  Time In: 1242  Time Out: 1200  Patient Seen For: AM;Balance activities; Therapeutic exercise; Wheelchair mobility    Subjective: Pt was talking about how he has had a good life and spent a while reminiscing          Objective: Other (comment) (fall)  GROSS ASSESSMENT Daily Assessment            COGNITION Daily Assessment    Impulsive, decreased insight, decreased recall/learning of previously used skills        BED/MAT MOBILITY Daily Assessment    Rolling Right : 0 (Not tested)  Rolling Left : 0 (Not tested)  Supine to Sit : 0 (Not tested)  Sit to Supine : 0 (Not tested)       TRANSFERS Daily Assessment   Mod Ax 2 for initial stand, Min A to CGA for standing trials with BUE support on porch rail and Elbert boot on LLE. Standing trails x 2 at 5 min each. As pt fatigues, pt leans to the left. Sit to Stand Assistance: Moderate assistance       GAIT Daily Assessment            STEPS or STAIRS Daily Assessment            BALANCE Daily Assessment    Sitting - Static: Good (unsupported)  Sitting - Dynamic: Fair (occasional)       WHEELCHAIR MOBILITY Daily Assessment   Able to self-propel outside to back porch Able to Propel (ft): 15 feet  Wheelchair Setup Assist Required : 4 (Minimal assistance)  Wheelchair Management: Manages left brake;Manages right brake       LOWER EXTREMITY EXERCISES Daily Assessment          SEATED EXERCISES Sets Reps Comments   Hip Flexion (each) 1 10 with 3 sec. hold    Long Arc Quads (each) 1 10 with 3 sec. hold    Hip Adduction 1 10 with 5 sec. hold Squeeze pillow   Hip Abduction  1 10 with 5 sec. hold With manual resistance     Vital Signs: SpO2 on 2 L with no SOB during session     Pain level: pt had no complaints of pain. Patient education:I Continue to educate pt about importance of building strength in BLE. Interdisciplinary Communication: Communicated with OT about pts progress.                 Assessment: Pt showed improved control of BLE through LE exercises and was able to tolerate standing trials with no SOB. Pt will benefit from continued skilled PT to improve endurance and balance when standing. Plan of Care: Continue with POC and progress as tolerated.        Dulcy Boeck, SPT  9/2/2021

## 2021-09-02 NOTE — PROGRESS NOTES
PHYSICAL THERAPY DAILY NOTE  Time In: 8491  Time Out: 6462  Patient Seen For: PM;Therapeutic exercise; Wheelchair mobility    Subjective: Pt stated \"I need to get stronger, this is good, I feel like I'm getting stronger\"         Objective: Other (comment) (fall)  GROSS ASSESSMENT Daily Assessment            COGNITION Daily Assessment    Decreased insight, decreased recall       BED/MAT MOBILITY Daily Assessment    Rolling Right : 0 (Not tested)  Rolling Left : 0 (Not tested)  Supine to Sit : 0 (Not tested)  Sit to Supine : 0 (Not tested)       TRANSFERS Daily Assessment    Sit to Stand Assistance: NT       BALANCE Daily Assessment    Sitting - Static: Good (unsupported)  Sitting - Dynamic: Fair (occasional)       WHEELCHAIR MOBILITY Daily Assessment   Performed 30ft x 4 with rest breaks between  15ft level surface with appx 15 ft of ramp. .  Able to Propel (ft): 30 feet  Curbs/Ramps Assist Required (FIM Score): 3 (Moderate assistance)  Wheelchair Setup Assist Required : 4 (Minimal assistance)  Wheelchair Management: Manages left brake;Manages right brake       LOWER EXTREMITY EXERCISES Daily Assessment          SEATED EXERCISES Sets Reps Comments   Marches 1 15 With red band   Hip Adduction 1 15 With folded pillow   Hip Abduction 1 15 With red band   Hamstring Curls with  1 15 With red band   Knee extensions 1 15 With red band   *All LE exercises performed with Elbert boot on. W/C trials:   Pt self-propelled appx 30 ft (15 ft on level surface and 15 ft on ramp), 4 times for a total of appx. 120ft with rest breaks in between. Self-propelled up/down ramp for first trial with Mod A and verbal cues. Pt educated on proper management of w/c on uneven surfaces. Second trial, pt able to maintain slightly more control by using foot as a break on the way down and propelled up the ramp in reverse with Min A.     Vital Signs: O2 on 1.5L.   Pt showed no signs of SOB even after w/c mobility trials    Pain level: No pain indicated      Patient education: Pressure relief and w/c management    Interdisciplinary Communication: Communicated with OT. Pt left seated in w/c with all needs and call bell in reach. Assessment: Pt was able to self-propel in w/c for multiple trials and longer distances with no noticeable SOB on 1.5L of O2. Pt will benefit from continued PT services to continue to build BLE strength and endurance. Removed Elbert boot after session and inspected L foot wound bandage (remained in tact) and heel. Heel is slightly pink but blanchable. Will continue to monitor after each session. Plan of Care: Continue with POC and progress as tolerated.        Shaneka Barlow, SPT  9/2/2021

## 2021-09-02 NOTE — PROGRESS NOTES
Time In 0832   Time Out 0957     Mobility   Score Comments   Supine to Sit 6: Independent I   Transfer Assist 1: Dependent Transfer Type: LPT  Equipment: Sliding Board   Comments: Ax2 for out of the shower     Activities of Daily Living    Score Comments   Oral Hygiene 6: Independent I   Bathing 4: Supervision or touching A Type of Shower: Shower  Position: Supported sitting   Adaptive  Equipment: Tub Transfer Bench, Grab Bars and W/C  Comments: Cueing to not stand   Upper Body  Dressing 5: S/U or clean-up assist Items Applied: Pullover  Position: Supported Sitting  Comments: S/U   Lower Body Dressing 1: Dependent Items Applied: Underwear and Elastic pants  Position: Supported sitting  Adaptive Equipment: N/A  Comments: Ax2 to pull over waist   Donning/[place] Footwear 3: Partial/Moderate A Items Applied: Socks and Shoes with fasteners   Adaptive Equipment: Sock Aide  Comments: A with [facility]  Body mechanics     Pt was in bed and agreeable to tx. Pt's performance with ADL is reflected in above chart. Pt took his first shower, but demonstrates decreased safety awareness. Pt completed woodchops, trunk rotation, and anterior alphabet to improve strength and activity tolerance. Pt is demonstrating improving quality with anterior alphabet, but has poor trunk rotation. Pt uses compensatory movement. Pt was left in room with all needs within reach.      Aminah Villarreal OT   9/2/2021

## 2021-09-03 LAB
GLUCOSE BLD STRIP.AUTO-MCNC: 118 MG/DL (ref 65–100)
SERVICE CMNT-IMP: ABNORMAL

## 2021-09-03 PROCEDURE — 97535 SELF CARE MNGMENT TRAINING: CPT

## 2021-09-03 PROCEDURE — 97110 THERAPEUTIC EXERCISES: CPT

## 2021-09-03 PROCEDURE — 99232 SBSQ HOSP IP/OBS MODERATE 35: CPT | Performed by: PHYSICAL MEDICINE & REHABILITATION

## 2021-09-03 PROCEDURE — 82962 GLUCOSE BLOOD TEST: CPT

## 2021-09-03 PROCEDURE — 65310000000 HC RM PRIVATE REHAB

## 2021-09-03 PROCEDURE — 97530 THERAPEUTIC ACTIVITIES: CPT

## 2021-09-03 PROCEDURE — 74011250637 HC RX REV CODE- 250/637: Performed by: PHYSICIAN ASSISTANT

## 2021-09-03 RX ADMIN — PANTOPRAZOLE SODIUM 40 MG: 40 TABLET, DELAYED RELEASE ORAL at 05:43

## 2021-09-03 RX ADMIN — PREGABALIN 200 MG: 100 CAPSULE ORAL at 21:01

## 2021-09-03 RX ADMIN — DOCUSATE SODIUM 100 MG: 100 CAPSULE, LIQUID FILLED ORAL at 21:02

## 2021-09-03 RX ADMIN — APIXABAN 5 MG: 5 TABLET, FILM COATED ORAL at 08:44

## 2021-09-03 RX ADMIN — FLUDROCORTISONE ACETATE 0.1 MG: 0.1 TABLET ORAL at 08:44

## 2021-09-03 RX ADMIN — APIXABAN 5 MG: 5 TABLET, FILM COATED ORAL at 21:02

## 2021-09-03 RX ADMIN — DOCUSATE SODIUM 100 MG: 100 CAPSULE, LIQUID FILLED ORAL at 08:44

## 2021-09-03 RX ADMIN — PREGABALIN 200 MG: 100 CAPSULE ORAL at 08:44

## 2021-09-03 RX ADMIN — TRAZODONE HYDROCHLORIDE 50 MG: 50 TABLET ORAL at 21:02

## 2021-09-03 RX ADMIN — ATORVASTATIN CALCIUM 40 MG: 40 TABLET, FILM COATED ORAL at 21:07

## 2021-09-03 NOTE — PROGRESS NOTES
PHYSICAL THERAPY DAILY NOTE  Time In: 1450  Time Out: 1543  Patient Seen For: PM;Balance activities; Wheelchair mobility    Subjective: Pt stated that he \"couldn't shift his weight\" to be able to stand upright with a RW. Objective: Other (comment) (fall)    COGNITION Daily Assessment    Decreased insight to ability, impulsive. BED/MAT MOBILITY Daily Assessment   Min A for management of wound vac and O2 line. Verbal cues needed for safety awareness Sit to Supine : 4 (Minimal assistance)       TRANSFERS Daily Assessment   Sit<>stand trials, Mod A on L side and Max A on R side with BUE support on RW. Transfer Type: Lateral with transfer board  Transfer Assistance : 5 (Stand-by assistance)  Sit to Stand Assistance: Total assistance  Car Transfers: Not tested       GAIT Daily Assessment    NT       STEPS or STAIRS Daily Assessment    NT       BALANCE Daily Assessment   Mod Ax1 & Max Ax1 required for standing trials, with BUE support on RW. Sitting - Static: Good (unsupported)  Sitting - Dynamic: Fair (occasional)  Standing - Static: Constant support;Poor       WHEELCHAIR MOBILITY Daily Assessment   Pt able to propel 40 ft x 3 with rest breaks in between. Able to Propel (ft): 40 feet   Manages L/R brakes  Able to manage raised doorway threshold       LOWER EXTREMITY EXERCISES Daily Assessment    Pt completed standing trials as follows: Attempted 3 standing trials of about 30 sec. Each with Elbert boot on LLE and BUE support on RW. Max Ax2 to move from sit<>stand, Mod A on L and Max A on R for standing. Pt was unable to stand erect, weight was shifted to the R and posteriorly at the pelvis. Vital Signs: SpO2 on 1.5 L with no SOB observed. Pain level: no pain indicated  Pain location: NA  Pain interventions: NA    Patient education: Talked about home measurements and the possibility for a temporary hospital bed and to re-measure height of car seat.       Interdisciplinary Communication: Worked with nursing to look at/change batteries in wound vac. It appeared to have lost suction. Assessment: Pt attempted standing trials with Elbert boot and BUE support on a RW with decreased ability than initial attempt earlier in the week. With fluctuating abilities throughout the day and week, stand pivot transfers may not be safe transfer method. Based on the home measurements returned, we recommend a hospital bed to be able to utilize sliding board lateral transfer safely and we need to establish a safe mode of transportation. Plan of Care: continue with POC and progress as necessary.      Rosalba Natarajan, SPT  9/3/2021

## 2021-09-03 NOTE — PROGRESS NOTES
Hourly rounds completed this shift, will give report to oncoming nurse, pt resting in bed, call light within reach.

## 2021-09-03 NOTE — PROGRESS NOTES
Time In 0750   Time Out 0825     Mobility   Score Comments   Supine to Sit 4: Supervision or touching A S   Transfer Assist 4: Supervision or touching A Transfer Type: LPT  Equipment: Sliding Board   Comments: Cueing     Activities of Daily Living    Score Comments   Eating 6: Independent I   Oral Hygiene 6: Independent I   Bathing 4: Supervision or touching A Type of Shower: Bath Pack  Position: Supported sitting   Adaptive  Equipment: N/A  Comments: Cueing to get all parts   Upper Body  Dressing 5: S/U or clean-up assist Items Applied: Pullover  Position: Unsupported Sitting  Comments: S/U   Lower Body Dressing 4: Supervision or touching A Items Applied: Underwear and Elastic pants  Position: Unsupported Sitting  Adaptive Equipment: N/A  Comments: S   Donning/[place] Footwear 3: Partial/Moderate A Items Applied: Socks and Shoes with fasteners   Adaptive Equipment: Sock Aide  Comments: A for [employer] for transfers     Pt was in bed and agreeable to tx. Pt's performance with ADL is reflected in above chart. Pt demonstrating better line management. Pt was left in w/c with all needs within reach.      Romana Rees OT   9/3/2021

## 2021-09-03 NOTE — PROGRESS NOTES
PHYSICAL THERAPY DAILY NOTE  Time In: 1125  Time Out: 7220  Patient Seen For: AM;Therapeutic exercise;Transfer training; Wheelchair mobility    Subjective: Pt stated \"I always have bad posture, I'm always a little twisted. \"         Objective: Other (comment) (fall)    COGNITION Daily Assessment    Decreased insight, decreased safety awareness of O2 line/wound vac       BED/MAT MOBILITY Daily Assessment   Min A needed to sit upright to improve position on mat/readjust pillows Sit to Supine: Min A   Supine to Sit: SBA       TRANSFERS Daily Assessment    Transfer Type: Lateral with transfer board  Transfer Assistance : 5 (Stand-by assistance)       GAIT Daily Assessment    NT       STEPS or STAIRS Daily Assessment    NT       BALANCE Daily Assessment    Sitting - Static: Good (unsupported)  Sitting - Dynamic: Fair (occasional)       WHEELCHAIR MOBILITY Daily Assessment    Pt able to propel 50' from room to mat w/ supervision  Managed both brakes independently  Min A needed to manage foot rests       LOWER EXTREMITY EXERCISES Daily Assessment     LE exercises performed in supine as follows:      SUPINE EXERCISES Sets Reps Comments   Isometric Hip extension  2 10 With green ball   Isometric Hip Adduction 2 10 With green ball   Short Arc Quad 2 10 With bolster under knees and red band for 2nd set   Straight Leg Raise 2 10 Red band for 2nd set   * Pt following/showing comprehension of HEP. Manual and verbal cues required to ensure correct motions being performed and no compensation or momentum being utilized. Vital Signs: O2 on 1.5L, no SOB observed    Pain level: pt reported no pain    Patient education: comprehension of HEP. Interdisciplinary Communication:  Collaboration with OT about pts progress. Assessment: Pt continues to show improved tolerance to LE exercises as demonstrated by increased sets this session. Pt will benefit from continued PT services to improve BLE strength and control. Plan of Care: Continue with POC and progress as tolerated.        Shaneka Barlow, SPT  9/3/2021

## 2021-09-03 NOTE — PROGRESS NOTES
Janna Almanza MD  Medical Director  [address] [facility], [address] [facility]  Tel: [phone] [facility] PROGRESS NOTE    [employer]  Admit Date: 8/25/2021  Admit Diagnosis:   Unilateral complete BKA, right, sequela ([address] [S88.111S]    Subjective     Patient seen and examined between AM therapies. No complaints, expressing mild regrets about not pursuing BKA sooner \"because I'd be further down the road to getting better. \" Denies incisional pain, generalized pain, CP or palpitations. Denies dyspnea but exhibits increased work of breathing during long conversation. Objective:     Current Facility-Administered Medications   Medication Dose Route Frequency    lactulose (CHRONULAC) 10 gram/15 mL solution 30 mL  30 mL Oral DAILY PRN    naloxone (NARCAN) injection 0.4 mg  0.4 mg IntraVENous PRN    acetaminophen (TYLENOL) tablet 650 mg  650 mg Oral Q6H PRN    apixaban (ELIQUIS) tablet 5 mg  5 mg Oral Q12H    atorvastatin (LIPITOR) tablet 40 mg  40 mg Oral QHS    docusate sodium (COLACE) capsule 100 mg  100 mg Oral BID    fludrocortisone (FLORINEF) tablet 0.1 mg  0.1 mg Oral DAILY    midodrine (PROAMATINE) tablet 2.5 mg  2.5 mg Oral TID WITH MEALS    nystatin (MYCOSTATIN) 100,000 unit/gram powder   Topical PRN    oxyCODONE IR (ROXICODONE) tablet 5 mg  5 mg Oral Q4H PRN    pantoprazole (PROTONIX) tablet 40 mg  40 mg Oral ACB    polyethylene glycol (MIRALAX) packet 17 g  17 g Oral DAILY    pregabalin (LYRICA) capsule 200 mg  200 mg Oral BID    traMADoL (ULTRAM) tablet 50 mg  50 mg Oral Q6H PRN    traZODone (DESYREL) tablet 50 mg  50 mg Oral QHS       Review of Systems:   Denies chest pain, shortness of breath, cough, headache, visual problems, abdominal pain, dysuria, calf pain. Pertinent positives are as noted in the HPI, ROS unremarkable otherwise.      Visit Vitals  /71 (BP 1 Location: Left upper arm)   Pulse 79   Temp 98.2 °F (36.8 °C)   Resp 18   Wt 275 lb 6.4 oz (124.9 kg)   SpO2 93%   BMI 39.52 kg/m²        Physical Exam:   General: Alert, appropriately oriented. OOB in wheelchair with R BKA supported, knee extended. HEENT: Normocephalic, EOM intact. Oral mucosa moist.   Lungs: Clear breath sounds on right, diminished on left. Respirations even, become mildly labored as he talks extensively. Heart: Regular rate, irregularly irregular rhythm, S1, S2. No appreciable murmur. Abdomen: Soft, non-tender, obese and protuberant but not distended. Bowel sounds normoactive. Genitourinary: Deferred. Neuromuscular:      UE strength generally 4+/5 and symmetric. R LE strength at HF 4-/5, remnant wrapped in ACE. L LE strength at HF 4/5, distally 5-/5. Absent L LE light touch sensation to mid shin, absent proprioception. Skin/extremity: No rashes, no erythema. Right BKA ACE intact, Prevena wound vac auto-terminated but dressing remains over incision. Left clamshell off-loading boot in place. Functional Assessment:  Balance  Sitting - Static: Good (unsupported) (09/02/21 1400)  Sitting - Dynamic: Fair (occasional) (09/02/21 1400)  Standing - Static: Fair (09/01/21 1100)  Standing - Dynamic : Impaired (08/27/21 1500)       Chip Armor Fall Risk Assessment:  Chip Armor Fall Risk  Mobility: Ambulates or transfers with assist devices or assistance (09/03/21 0706)  Mobility Interventions: Patient to call before getting OOB (09/03/21 0706)  Mentation: Alert, oriented x 3 (09/03/21 0706)  Mentation Interventions: Adequate sleep, hydration, pain control;Door open when patient unattended;Gait belt with transfers/ambulation (09/03/21 0706)  Medication: Patient receiving anticonvulsants, sedatives(tranquilizers), psychotropics or hypnotics, hypoglycemics, narcotics, sleep aids, antihypertensives, laxatives, or diuretics (09/03/21 0706)  Medication Interventions: Patient to call before getting OOB; Teach patient to arise slowly;Utilize gait belt for transfers/ambulation (09/03/21 0706)  Elimination: Needs assistance with toileting (09/03/21 0706)  Elimination Interventions: Call light in reach; Patient to call for help with toileting needs; Toileting schedule/hourly rounds;Urinal in reach (09/03/21 0706)  Prior Fall History: Unknown (09/03/21 0706)  Total Score: 3 (09/03/21 0706)  Standard Fall Precautions: Yes (08/30/21 0710)  High Fall Risk: Yes (09/03/21 0706)     Ambulation:  Gait  Distance (ft): 0 Feet (ft) (08/31/21 1223)     Labs/Studies:  Recent Results (from the past 72 hour(s))   GLUCOSE, POC    Collection Time: 08/31/21 11:45 AM   Result Value Ref Range    Glucose (POC) 105 (H) 65 - 100 mg/dL    Performed by Reeher    GLUCOSE, POC    Collection Time: 08/31/21  3:29 PM   Result Value Ref Range    Glucose (POC) 98 65 - 100 mg/dL    Performed by Reeher    GLUCOSE, POC    Collection Time: 08/31/21  9:18 PM   Result Value Ref Range    Glucose (POC) 119 (H) 65 - 100 mg/dL    Performed by [email or web address]    METABOLIC PANEL, BASIC    Collection Time: 09/01/21  6:05 AM   Result Value Ref Range    Sodium 144 136 - 145 mmol/L    Potassium 4.7 3.5 - 5.1 mmol/L    Chloride 106 98 - 107 mmol/L    CO2 37 (H) 21 - 32 mmol/L    Anion gap 1 (L) 7 - 16 mmol/L    Glucose 99 65 - 100 mg/dL    BUN 21 8 - 23 MG/DL    Creatinine 1.58 (H) 0.8 - 1.5 MG/DL    GFR est AA 55 (L) >60 ml/min/1.73m2    GFR est non-AA 46 (L) >60 ml/min/1.73m2    Calcium 9.2 8.3 - 10.4 MG/DL   CBC W/O DIFF    Collection Time: 09/01/21  6:05 AM   Result Value Ref Range    WBC 4.1 (L) 4.3 - 11.1 K/uL    RBC 3.21 (L) 4.23 - 5.6 M/uL    HGB 9.3 (L) 13.6 - 17.2 g/dL    HCT 31.8 (L) 41.1 - 50.3 %    MCV 99.1 (H) 79.6 - 97.8 FL    MCH 29.0 26.1 - 32.9 PG    MCHC 29.2 (L) 31.4 - 35.0 g/dL    RDW 15.9 (H) 11.9 - 14.6 %    PLATELET 664 205 - 029 K/uL    MPV 12.0 9.4 - 12.3 FL    ABSOLUTE NRBC 0.00 0.0 - 0.2 K/uL   GLUCOSE, POC    Collection Time: 09/01/21  7:53 AM Result Value Ref Range    Glucose (POC) 96 65 - 100 mg/dL    Performed by Luis E)CNA    GLUCOSE, POC    Collection Time: 09/01/21 11:06 AM   Result Value Ref Range    Glucose (POC) 141 (H) 65 - 100 mg/dL    Performed by Luis E)CNA    GLUCOSE, POC    Collection Time: 09/01/21  4:19 PM   Result Value Ref Range    Glucose (POC) 107 (H) 65 - 100 mg/dL    Performed by Jude    GLUCOSE, POC    Collection Time: 09/01/21  8:50 PM   Result Value Ref Range    Glucose (POC) 115 (H) 65 - 100 mg/dL    Performed by Bella    GLUCOSE, POC    Collection Time: 09/02/21  7:24 AM   Result Value Ref Range    Glucose (POC) 118 (H) 65 - 100 mg/dL    Performed by Jude    GLUCOSE, POC    Collection Time: 09/02/21 12:01 PM   Result Value Ref Range    Glucose (POC) 115 (H) 65 - 100 mg/dL    Performed by BeesHao    GLUCOSE, POC    Collection Time: 09/02/21  4:46 PM   Result Value Ref Range    Glucose (POC) 89 65 - 100 mg/dL    Performed by [facility]    GLUCOSE, POC    Collection Time: 09/02/21  9:04 PM   Result Value Ref Range    Glucose (POC) 104 (H) 65 - 100 mg/dL    Performed by Luis Segovia        Assessment:     Problem List as of 9/3/2021 Date Reviewed: 8/27/2021        Codes Class Noted - Resolved    * (Principal) Unilateral complete BKA, right, sequela ([facility] ICD-10-CM: R72.189N  ICD-9-CM: 905.9  8/25/2021 - Present        Suspected sleep apnea ICD-10-CM: R29.818  ICD-9-CM: 781.99  8/20/2021 - Present        S/P BKA (below knee amputation) unilateral, right ([facility] ICD-10-CM: Z89.511  ICD-9-CM: V49.75  8/18/2021 - Present        Respiratory failure, post-operative ([facility] ICD-10-CM: A37.789  ICD-9-CM: 518.51  8/18/2021 - Present        Acute respiratory failure with hypercapnia (HCC) ICD-10-CM: J96.02  ICD-9-CM: 518.81  7/23/2021 - Present        Acute on chronic respiratory failure with hypoxia and hypercapnia (HCC) ICD-10-CM: J96.21, J96.22  ICD-9-CM: 518.84, 786.09, 799.02  7/23/2021 - Present        Acute metabolic encephalopathy VKX-61-KL: G93.41  ICD-9-CM: 348.31  7/23/2021 - Present        Hypoxia ICD-10-CM: R09.02  ICD-9-CM: 799.02  7/21/2021 - Present        Acute kidney injury superimposed on CKD [facility]) ICD-10-CM: N17.9, N18.9  ICD-9-CM: 866.00, 585.9  7/21/2021 - Present        Staphylococcus aureus bacteremia ICD-10-CM: R78.81, B95.61  ICD-9-CM: 790.7, 041.11  7/21/2021 - Present        Cellulitis ICD-10-CM: L03.90  ICD-9-CM: 682.9  7/19/2021 - Present        Systolic CHF, acute on chronic [facility]) ICD-10-CM: I50.23  ICD-9-CM: 428.23, 428.0  7/19/2021 - Present        Atrial fibrillation with RVR (HCC) ICD-10-CM: I48.91  ICD-9-CM: 427.31  7/19/2021 - Present        Morbid obesity with BMI of 40.0-44.9, adult [facility]) ICD-10-CM: E66.01, Z68.41  ICD-9-CM: 278.01, V85.41  6/11/2020 - Present        Severe obesity (BMI 35.0-35.9 with comorbidity) (HCC) (Chronic) ICD-10-CM: E66.01, Z68.35  ICD-9-CM: 278.01, V85.35  10/10/2018 - Present        Bunion of unspecified foot (Chronic) ICD-10-CM: M21.619  ICD-9-CM: 727.1  4/10/2018 - Present        Onychomycosis (Chronic) ICD-10-CM: B35.1  ICD-9-CM: 110.1  4/10/2018 - Present        Corns and callus (Chronic) ICD-10-CM: L84  ICD-9-CM: 700  4/10/2018 - Present        Mixed hyperlipidemia (Chronic) ICD-10-CM: W52.3  ICD-9-CM: 272.2  4/10/2018 - Present        Mixed axonal-demyelinating polyneuropathy (Chronic) ICD-10-CM: G62.89  ICD-9-CM: 355.9  1/5/2018 - Present        Controlled type 2 diabetes mellitus with diabetic polyneuropathy, without long-term current use of insulin (HCC) (Chronic) ICD-10-CM: E11.42  ICD-9-CM: 250.60, 357.2  1/5/2018 - Present        Type 2 diabetes mellitus with nephropathy (HCC) (Chronic) ICD-10-CM: E11.21  ICD-9-CM: 250.40, 583.81  1/5/2018 - Present        Stage 3 chronic kidney disease ([facility] (Chronic) ICD-10-CM: N18.30  ICD-9-CM: 585.3  11/22/2017 - Present        Benign hypertensive kidney disease with chronic kidney disease (Chronic) ICD-10-CM: I12.9  ICD-9-CM: 403.10  11/6/2017 - Present        CAD (coronary artery disease) (Chronic) ICD-10-CM: I25.10  ICD-9-CM: 414.00  Unknown - Present        RESOLVED: Glucose intolerance (impaired glucose tolerance) (Chronic) ICD-10-CM: R73.02  ICD-9-CM: 790.22  11/6/2017 - 11/14/2017        RESOLVED: BMI 40.0-44.9, adult (HCC) (Chronic) ICD-10-CM: Z68.41  ICD-9-CM: V85.41  11/4/2016 - 10/10/2018        RESOLVED: Hypertension ICD-10-CM: I10  ICD-9-CM: 401.9  Unknown - 10/10/2016        RESOLVED: Hypercholesterolemia ICD-10-CM: E78.00  ICD-9-CM: 272.0  Unknown - 10/10/2016        RESOLVED: Chronic kidney disease ICD-10-CM: N18.9  ICD-9-CM: 311. 9  Unknown - 10/10/2016    Overview Signed 10/10/2016 11:28 AM by Erie Essex, MD     stage 3             RESOLVED: Neuropathy ICD-10-CM: G62.9  ICD-9-CM: 355.9  Unknown - 10/10/2016        RESOLVED: Peripheral polyneuropathy (Chronic) ICD-10-CM: G62.9  ICD-9-CM: 356.9  10/10/2016 - 1/5/2018        RESOLVED: CKD (chronic kidney disease) stage 3, GFR 30-59 ml/min (HCC) (Chronic) ICD-10-CM: N18.30  ICD-9-CM: 585.3  10/10/2016 - 11/6/2017        RESOLVED: Hyperlipidemia (Chronic) ICD-10-CM: E78.5  ICD-9-CM: 272.4  10/10/2016 - 4/10/2018        RESOLVED: Hyperkalemia ICD-10-CM: E87.5  ICD-9-CM: 276.7  10/10/2016 - 11/14/2017        RESOLVED: Essential hypertension (Chronic) ICD-10-CM: I10  ICD-9-CM: 401.9  10/10/2016 - 11/22/2017            S/p right below-knee amputation secondary to cellulitis (8/18/2021, Liseth Brandt MD)     Plan / Recommendations / Medical Decision Making:      Daily physician / PA medical management:     Unilateral complete BKA, right - NWB R LE; prosthetics involved.  Will be difficult for patient to mobilize with a prosthesis given he lacks sensation and proprioception in the right foot, has obese body habitus, chronic respiratory failure and CHF.     Pain control - stable, mild-to-moderate joint symptoms intermittently, reasonably well controlled by PRN meds. Will require regular pain assessment and comprehensive pain management. Has chronic mixed axonal-demyelinating polyneuropathy; on Lyrica 200mg BID. Denies residual limb pain but has tramadol and oxycodone ordered PRN.     Hypotension - BP fluctuating, managed medically. Has been hypotensive and is on Florinef and midodrine. Dehydration thought to be contributing, also possibly diuretics. -8/27 /63, HR 94 this AM; patient denies lightheadedness during therapies or while resting in room  -8/28 95/53; asymptomatic, HR 97; on Florinef and midodrine; up to 117/65 max  -8/30 /72, HR 85 this AM  -8/31 VSS  -9/1 124/64 HR 60  -9/3 /71, HR 79     Acute-on-chronic respiratory failure - continue 3L O2, wean to 2L if possible. Encourage IS.    -8/31 sats reportedly 78%, pt asymptomatic. Rechecked 98% 3L  -9/1 encouraged use of IS     Atrial fibrillation - continue Eliquis 5mg BID; rate controlled. Toprol XL has been held due to hypotensive at times.  -9/1 NSR this AM  -9/3 IIR this AM, rate controlled     Acute-on-chronic anemia - anemia of chronic kidney disease and post-op due to blood loss. Hgb trending down; follow closely. No evidence of active bleeding.  -8/27 CBC ordered for tomorrow  -8/28 Hgb 6.6 from 8.9 (8/24); no sign of active bleeding source: stool was not melenic, little drainage from amputation; hold Eliquis and recheck in AM. If true value, will need to transfer downtown for a blood transfusion  -8/30 recheck H&H with Hgb 8.5; Eliquis restarted (yesterday)  -9/2 Hgb 9.3 improved     Chronic kidney disease, CKD3 - improving, monitor. Creatine 1.49 from 1.57.  -8/27 BMP ordered for tomorrow  -8/28 Cr 1.51, stable  -9/2 Cr 1.58, BUN 21; baseline     Diabetes mellitus - HgbA1c 6.7% (7/13/2021), moderate glycemic control.  Will require daily, close fasting glucose monitoring and medication adjustment to optimize glycemic control in setting of acute illness and hospitalization. Takes Glucotrol 5mg at home. Currently on SSI; add Glucotrol ? .  -8/26 BS controlled  -8/27 BS this , all BS since admission <145, most in low 100s  -8/28 BS controlled  -8/30  this AM, all BS yesterday <110  -well controlled  -9/2 BS well controlled; diet controlled  -9/3  this AM, yesterday all values <120     Pneumonia prophylaxis - incentive spirometer every hour while awake.     DVT risk / DVT prophylaxis - will require daily physician / PA exam to assess for signs and symptoms as patient is at increased risk for of thromboembolism. Mobilize as tolerated. Sequential pneumatic compression devices (SCDs) when in bed; thigh-high or knee-high thromboembolic deterrent hose when out of bed. On Eliquis.     CAD - continue Eliquis and statin.     GI prophylaxis - resume PPI. At times may need additional antacids, Maalox prn.     General skin care / wound prevention - monitor BKA  incision and general skin wound status daily per staff and physician / PA. At risk for failure. Will require 24/7 rehab nursing. Keep BKA incision and left plantar foot wound clean and dry, reinforce dressing PRN and ice to area for comfort; he is to f/u with Dr Sweta Louie for staple removal.   -8/25 will cont wound vac until f/u with Dr. Pendleton  -8/28 wound looked good at time of wound vac check per notes  -8/30 wound vac in place and functioning, skin surrounding without erythema or rash  -9/2 wound vac will remain intact until f/u with Dr Kristy De Santiago next week  -9/3 [facility] wound vac has auto-terminated, incision remains covered with vac dressing     Urinary retention / neurogenic bladder - schedule voids q 6-8 hrs.  Check post-void residual every shift; in and out catheter if post-void residual is more than 400ml.     Bowel program - at risk for constipation as a side effect of opioids, other medications, impaired mobility, etc. MiraLAX daily for regularity, Ivis-Colace for stool softener. PRN MOM, bisacodyl suppository or tablets for constipation.  -8/27 constipated, no BM with daily gentle agents (MiraLAX, Colace), will try more aggressively with lactulose after therapies; may require MOM, bisacodyl suppository or disimpaction  -8/28 excellent results yesterday  -8/30 large, normal BM this AM; continue daily MiraLAX and stool softener         Time spent was 25 minutes with over 1/2 in direct patient care/examination, consultation and coordination of care. Signed By: Marta Chauhan PA-C    September 3, 2021      Physician Assistant with [facility]  Eugenia Macias MD, Medical Director

## 2021-09-03 NOTE — PROGRESS NOTES
Problem: Falls - Risk of  Goal: *Absence of Falls  Description: Document Dar Arcadio Fall Risk and appropriate interventions in the flowsheet. Outcome: Progressing Towards Goal  Note: Fall Risk Interventions:  Mobility Interventions: Patient to call before getting OOB    Mentation Interventions: Adequate sleep, hydration, pain control, Door open when patient unattended, Gait belt with transfers/ambulation    Medication Interventions: Patient to call before getting OOB, Teach patient to arise slowly, Utilize gait belt for transfers/ambulation    Elimination Interventions: Call light in reach, Patient to call for help with toileting needs, Toileting schedule/hourly rounds, Urinal in reach              Problem: Pressure Injury - Risk of  Goal: *Prevention of pressure injury  Description: Document Julio Cesar Scale and appropriate interventions in the flowsheet.   Outcome: Progressing Towards Goal  Note: Pressure Injury Interventions:  Sensory Interventions: Assess changes in LOC    Moisture Interventions: Absorbent underpads    Activity Interventions: Chair cushion, Increase time out of bed, Pressure redistribution bed/mattress(bed type)    Mobility Interventions: HOB 30 degrees or less, Pressure redistribution bed/mattress (bed type)    Nutrition Interventions: Document food/fluid/supplement intake    Friction and Shear Interventions: Feet elevated on foot rest, HOB 30 degrees or less, Minimize layers

## 2021-09-03 NOTE — PROGRESS NOTES
Verbal order received from Dr. Millie Good, d/c Humalog SSI, blood sugar checks and hypoglycemia order set d/c'd earlier this morning.

## 2021-09-03 NOTE — PROGRESS NOTES
OT Daily Note  Time In 1303   Time Out 1348   \"I feel these\"  Pain: Patient had no complaint of pain. Strengthening     Pt completed the following exercises B with 3 lb weights to promote UB strength, activity tolerance, and shoulder stabilization for integration into ADL:  Exercise Sets Reps Comments   Chest Press 1 15    Chest Pull 1 15    Lateral Raises 1 15    Front Raises 1 15    Upright Row 1 15    Biceps Curls 1 30    Overhead Press 1 15    Triceps Extension 1 15    Anterior Alphabet 1  Improved quality than on admission     Pt completed the following exercises to promote core strength and activity tolerance for integration into ADL:    Exercise Sets Reps   Woodchops 1 15   Twists 1 15   Cross Crawls 1 15   Lean and Reach 1 15      Pt completed 2x5 of w/c pushups with good quality. He did not recall doing them before. Education   Benefits of exercises     Interdisciplinary Communication: PTAS Nikko Briceño on pt's performance    Plan: Continue with POC. Pt was left in room with all needs within reach.      Carmine Motta OTR/L  9/3/2021

## 2021-09-04 LAB
GLUCOSE BLD STRIP.AUTO-MCNC: 94 MG/DL (ref 65–100)
SERVICE CMNT-IMP: NORMAL

## 2021-09-04 PROCEDURE — 97535 SELF CARE MNGMENT TRAINING: CPT

## 2021-09-04 PROCEDURE — 82962 GLUCOSE BLOOD TEST: CPT

## 2021-09-04 PROCEDURE — 97110 THERAPEUTIC EXERCISES: CPT

## 2021-09-04 PROCEDURE — 65310000000 HC RM PRIVATE REHAB

## 2021-09-04 PROCEDURE — 74011250637 HC RX REV CODE- 250/637: Performed by: PHYSICIAN ASSISTANT

## 2021-09-04 PROCEDURE — 97530 THERAPEUTIC ACTIVITIES: CPT

## 2021-09-04 PROCEDURE — 99232 SBSQ HOSP IP/OBS MODERATE 35: CPT | Performed by: PHYSICIAN ASSISTANT

## 2021-09-04 RX ADMIN — APIXABAN 5 MG: 5 TABLET, FILM COATED ORAL at 21:39

## 2021-09-04 RX ADMIN — FLUDROCORTISONE ACETATE 0.1 MG: 0.1 TABLET ORAL at 08:26

## 2021-09-04 RX ADMIN — TRAZODONE HYDROCHLORIDE 50 MG: 50 TABLET ORAL at 21:39

## 2021-09-04 RX ADMIN — ATORVASTATIN CALCIUM 40 MG: 40 TABLET, FILM COATED ORAL at 21:39

## 2021-09-04 RX ADMIN — MIDODRINE HYDROCHLORIDE 2.5 MG: 5 TABLET ORAL at 16:10

## 2021-09-04 RX ADMIN — APIXABAN 5 MG: 5 TABLET, FILM COATED ORAL at 08:26

## 2021-09-04 RX ADMIN — PREGABALIN 200 MG: 100 CAPSULE ORAL at 21:39

## 2021-09-04 RX ADMIN — MIDODRINE HYDROCHLORIDE 2.5 MG: 5 TABLET ORAL at 08:27

## 2021-09-04 RX ADMIN — MIDODRINE HYDROCHLORIDE 2.5 MG: 5 TABLET ORAL at 12:20

## 2021-09-04 RX ADMIN — PANTOPRAZOLE SODIUM 40 MG: 40 TABLET, DELAYED RELEASE ORAL at 06:18

## 2021-09-04 RX ADMIN — PREGABALIN 200 MG: 100 CAPSULE ORAL at 08:26

## 2021-09-04 NOTE — PROGRESS NOTES
Problem: Falls - Risk of  Goal: *Absence of Falls  Description: Document Savannah Soriano Fall Risk and appropriate interventions in the flowsheet. Outcome: Progressing Towards Goal  Note: Fall Risk Interventions:  Mobility Interventions: Communicate number of staff needed for ambulation/transfer, Patient to call before getting OOB, Strengthening exercises (ROM-active/passive), Utilize walker, cane, or other assistive device    Mentation Interventions: Adequate sleep, hydration, pain control, Door open when patient unattended, Evaluate medications/consider consulting pharmacy, Increase mobility    Medication Interventions: Evaluate medications/consider consulting pharmacy, Patient to call before getting OOB, Teach patient to arise slowly    Elimination Interventions: Call light in reach, Patient to call for help with toileting needs, Toilet paper/wipes in reach, Urinal in reach              Problem: Pressure Injury - Risk of  Goal: *Prevention of pressure injury  Description: Document Julio Cesar Scale and appropriate interventions in the flowsheet.   Outcome: Progressing Towards Goal  Note: Pressure Injury Interventions:  Sensory Interventions: Assess changes in LOC, Chair cushion, Check visual cues for pain, Keep linens dry and wrinkle-free, Maintain/enhance activity level, Minimize linen layers    Moisture Interventions: Absorbent underpads, Apply protective barrier, creams and emollients, Check for incontinence Q2 hours and as needed, Maintain skin hydration (lotion/cream), Minimize layers, Moisture barrier    Activity Interventions: Chair cushion, Increase time out of bed, Pressure redistribution bed/mattress(bed type)    Mobility Interventions: Chair cushion, Float heels, HOB 30 degrees or less, Pressure redistribution bed/mattress (bed type)    Nutrition Interventions: Document food/fluid/supplement intake    Friction and Shear Interventions: Apply protective barrier, creams and emollients, Feet elevated on foot rest, HOB 30 degrees or less, Minimize layers

## 2021-09-04 NOTE — PROGRESS NOTES
Problem: Falls - Risk of  Goal: *Absence of Falls  Description: Document Karla Paul Fall Risk and appropriate interventions in the flowsheet. Outcome: Progressing Towards Goal  Note: Fall Risk Interventions:  Mobility Interventions: Communicate number of staff needed for ambulation/transfer, Patient to call before getting OOB, PT Consult for assist device competence, Utilize walker, cane, or other assistive device    Mentation Interventions: Adequate sleep, hydration, pain control, Door open when patient unattended, Eyeglasses and hearing aids, Increase mobility, More frequent rounding, Room close to nurse's station, Toileting rounds, Update white board    Medication Interventions: Evaluate medications/consider consulting pharmacy, Patient to call before getting OOB, Teach patient to arise slowly    Elimination Interventions: Call light in reach, Patient to call for help with toileting needs, Urinal in reach              Problem: Patient Education: Go to Patient Education Activity  Goal: Patient/Family Education  Outcome: Progressing Towards Goal     Problem: Pressure Injury - Risk of  Goal: *Prevention of pressure injury  Description: Document Julio Cesar Scale and appropriate interventions in the flowsheet.   Outcome: Progressing Towards Goal  Note: Pressure Injury Interventions:  Sensory Interventions: Assess changes in LOC, Chair cushion, Check visual cues for pain, Discuss PT/OT consult with provider, Keep linens dry and wrinkle-free    Moisture Interventions: Absorbent underpads, Apply protective barrier, creams and emollients, Maintain skin hydration (lotion/cream), Moisture barrier    Activity Interventions: Chair cushion, Increase time out of bed, Pressure redistribution bed/mattress(bed type), PT/OT evaluation    Mobility Interventions: Chair cushion, Float heels, Pressure redistribution bed/mattress (bed type), PT/OT evaluation    Nutrition Interventions: Document food/fluid/supplement intake    Friction and Shear Interventions: Apply protective barrier, creams and emollients, Feet elevated on foot rest, HOB 30 degrees or less, Minimize layers                Problem: Patient Education: Go to Patient Education Activity  Goal: Patient/Family Education  Outcome: Progressing Towards Goal     Problem: Patient Education: Go to Patient Education Activity  Goal: Patient/Family Education  Description: Patient / Patient's family will verbalize understanding of PT safety recommendations, demonstrate appropriate assist for current functional mobility status, safety, and home exercise program by time of discharge.    Outcome: Progressing Towards Goal

## 2021-09-04 NOTE — PROGRESS NOTES
Jose Dykes MD  Medical Director  [address] [facility], [address] [facility] [facility]  Tel: [phone] [facility] PROGRESS NOTE    Bia Rivas  Admit Date: 8/25/2021  Admit Diagnosis:   Unilateral complete BKA, right, sequela ([address] [S88.111S]    Subjective     Patient seen and examined during AM therapy. No complaints, states he enjoys being here and working with therapy. Denies incisional pain, generalized pain, CP or palpitations. Sleeping well, appetite good, voiding / bowels moving without issue. Prevena wound vac auto-terminated and lost suction 3d ago. Maintaining BP, BS all <120. Objective:     Current Facility-Administered Medications   Medication Dose Route Frequency    lactulose (CHRONULAC) 10 gram/15 mL solution 30 mL  30 mL Oral DAILY PRN    naloxone (NARCAN) injection 0.4 mg  0.4 mg IntraVENous PRN    acetaminophen (TYLENOL) tablet 650 mg  650 mg Oral Q6H PRN    apixaban (ELIQUIS) tablet 5 mg  5 mg Oral Q12H    atorvastatin (LIPITOR) tablet 40 mg  40 mg Oral QHS    docusate sodium (COLACE) capsule 100 mg  100 mg Oral BID    fludrocortisone (FLORINEF) tablet 0.1 mg  0.1 mg Oral DAILY    midodrine (PROAMATINE) tablet 2.5 mg  2.5 mg Oral TID WITH MEALS    nystatin (MYCOSTATIN) 100,000 unit/gram powder   Topical PRN    oxyCODONE IR (ROXICODONE) tablet 5 mg  5 mg Oral Q4H PRN    pantoprazole (PROTONIX) tablet 40 mg  40 mg Oral ACB    polyethylene glycol (MIRALAX) packet 17 g  17 g Oral DAILY    pregabalin (LYRICA) capsule 200 mg  200 mg Oral BID    traMADoL (ULTRAM) tablet 50 mg  50 mg Oral Q6H PRN    traZODone (DESYREL) tablet 50 mg  50 mg Oral QHS       Review of Systems:   Denies chest pain, shortness of breath, cough, headache, visual problems, abdominal pain, dysuria, calf pain. Pertinent positives are as noted in the HPI, ROS unremarkable otherwise.      Visit Vitals  /76 (BP 1 Location: Left upper arm, BP Patient Position: At rest)   Pulse 98   Temp 98.1 °F (36.7 °C)   Resp 18   Wt 275 lb 6.4 oz (124.9 kg)   SpO2 97%   BMI 39.52 kg/m²        Physical Exam:   General: Alert, appropriately oriented. In gym on mat doing LB exercises. HEENT: Normocephalic, EOM intact. Oral mucosa moist.   Lungs: Clear breath sounds on right, diminished on left. Respirations even, breathing unlabored, maintaining sats in PT. Heart: Regular rate, irregularly irregular rhythm, S1, S2.  Muffles heart sounds, no appreciable murmur. Abdomen: Soft, non-tender, obese and protuberant but not distended. Bowel sounds normoactive. Genitourinary: Deferred. Neuromuscular:      UE strength generally 4+/5 and symmetric. R LE strength at HF 4-/5. L LE strength at HF 4/5, distally 5-/5. Absent L LE light touch sensation to mid shin, absent proprioception. Skin/extremity: No rashes, no erythema. Right BKA Prevena removed, incision intact with sutures, no erythema, induration or drainage; wound redressed. Functional Assessment:  Balance  Sitting - Static: Good (unsupported) (09/03/21 1500)  Sitting - Dynamic: Fair (occasional) (09/03/21 1500)  Standing - Static: Constant support;Poor (09/03/21 1500)  Standing - Dynamic : Impaired (08/27/21 1500)       Prudence Roth Fall Risk Assessment:  Prudence Roth Fall Risk  Mobility: Ambulates or transfers with assist devices or assistance (09/04/21 0327)  Mobility Interventions: Communicate number of staff needed for ambulation/transfer;Patient to call before getting OOB; Strengthening exercises (ROM-active/passive); Utilize walker, cane, or other assistive device (09/04/21 0327)  Mentation: Alert, oriented x 3 (09/04/21 0327)  Mentation Interventions: Adequate sleep, hydration, pain control;Door open when patient unattended;Evaluate medications/consider consulting pharmacy; Increase mobility (09/04/21 0327)  Medication: Patient receiving anticonvulsants, sedatives(tranquilizers), psychotropics or hypnotics, hypoglycemics, narcotics, sleep aids, antihypertensives, laxatives, or diuretics (09/04/21 0327)  Medication Interventions: Evaluate medications/consider consulting pharmacy; Patient to call before getting OOB; Teach patient to arise slowly (09/04/21 0327)  Elimination: Needs assistance with toileting (09/04/21 0327)  Elimination Interventions: Call light in reach; Patient to call for help with toileting needs; Toilet paper/wipes in reach;Urinal in reach (09/04/21 0327)  Prior Fall History: Unknown (09/04/21 0327)  Total Score: 3 (09/04/21 0327)  Standard Fall Precautions: Yes (08/30/21 0710)  High Fall Risk: Yes (09/04/21 0327)     Ambulation:  Gait  Distance (ft): 0 Feet (ft) (08/31/21 1223)     Labs/Studies:  Recent Results (from the past 72 hour(s))   GLUCOSE, POC    Collection Time: 09/01/21 11:06 AM   Result Value Ref Range    Glucose (POC) 141 (H) 65 - 100 mg/dL    Performed by Chavarria ([place])    GLUCOSE, POC    Collection Time: 09/01/21  4:19 PM   Result Value Ref Range    Glucose (POC) 107 (H) 65 - 100 mg/dL    Performed by Jude    GLUCOSE, POC    Collection Time: 09/01/21  8:50 PM   Result Value Ref Range    Glucose (POC) 115 (H) 65 - 100 mg/dL    Performed by Bella    GLUCOSE, POC    Collection Time: 09/02/21  7:24 AM   Result Value Ref Range    Glucose (POC) 118 (H) 65 - 100 mg/dL    Performed by Jude    GLUCOSE, POC    Collection Time: 09/02/21 12:01 PM   Result Value Ref Range    Glucose (POC) 115 (H) 65 - 100 mg/dL    Performed by Jude    GLUCOSE, POC    Collection Time: 09/02/21  4:46 PM   Result Value Ref Range    Glucose (POC) 89 65 - 100 mg/dL    Performed by Jude    GLUCOSE, POC    Collection Time: 09/02/21  9:04 PM   Result Value Ref Range    Glucose (POC) 104 (H) 65 - 100 mg/dL    Performed by TerahBSN    GLUCOSE, POC    Collection Time: 09/03/21  9:13 PM   Result Value Ref Range    Glucose (POC) 118 (H) 65 - 100 mg/dL    Performed by Raphael Burks POC    Collection Time: 09/04/21  7:08 AM   Result Value Ref Range    Glucose (POC) 94 65 - 100 mg/dL    Performed by Julee        Assessment:     Problem List as of 9/4/2021 Date Reviewed: 8/27/2021        Codes Class Noted - Resolved    * (Principal) Unilateral complete BKA, right, sequela ([facility] ICD-10-CM: L35.066A  ICD-9-CM: 905.9  8/25/2021 - Present        Suspected sleep apnea ICD-10-CM: R29.818  ICD-9-CM: 781.99  8/20/2021 - Present        S/P BKA (below knee amputation) unilateral, right ([facility] ICD-10-CM: Z89.511  ICD-9-CM: V49.75  8/18/2021 - Present        Respiratory failure, post-operative ([facility] ICD-10-CM: S67.880  ICD-9-CM: 518.51  8/18/2021 - Present        Acute respiratory failure with hypercapnia (HCC) ICD-10-CM: J96.02  ICD-9-CM: 518.81  7/23/2021 - Present        Acute on chronic respiratory failure with hypoxia and hypercapnia (HCC) ICD-10-CM: J96.21, J96.22  ICD-9-CM: 518.84, 786.09, 799.02  7/23/2021 - Present        Acute metabolic encephalopathy UUF-51-: G93.41  ICD-9-CM: 348.31  7/23/2021 - Present        Hypoxia ICD-10-CM: R09.02  ICD-9-CM: 799.02  7/21/2021 - Present        Acute kidney injury superimposed on CKD ([facility] ICD-10-CM: N17.9, N18.9  ICD-9-CM: 866.00, 585.9  7/21/2021 - Present        Staphylococcus aureus bacteremia ICD-10-CM: R78.81, B95.61  ICD-9-CM: 790.7, 041.11  7/21/2021 - Present        Cellulitis ICD-10-CM: L03.90  ICD-9-CM: 682.9  7/19/2021 - Present        Systolic CHF, acute on chronic (HCC) ICD-10-CM: I50.23  ICD-9-CM: 428.23, 428.0  7/19/2021 - Present        Atrial fibrillation with RVR (HCC) ICD-10-CM: I48.91  ICD-9-CM: 427.31  7/19/2021 - Present        Morbid obesity with BMI of 40.0-44.9, adult (HCC) ICD-10-CM: E66.01, Z68.41  ICD-9-CM: 278.01, V85.41  6/11/2020 - Present        Severe obesity (BMI 35.0-35.9 with comorbidity) (HCC) (Chronic) ICD-10-CM: E66.01, Z68.35  ICD-9-CM: 278.01, V85.35 10/10/2018 - Present        Bunion of unspecified foot (Chronic) ICD-10-CM: M21.619  ICD-9-CM: 727.1  4/10/2018 - Present        Onychomycosis (Chronic) ICD-10-CM: B35.1  ICD-9-CM: 110.1  4/10/2018 - Present        Corns and callus (Chronic) ICD-10-CM: L84  ICD-9-CM: 700  4/10/2018 - Present        Mixed hyperlipidemia (Chronic) ICD-10-CM: Y16.8  ICD-9-CM: 272.2  4/10/2018 - Present        Mixed axonal-demyelinating polyneuropathy (Chronic) ICD-10-CM: G62.89  ICD-9-CM: 355.9  1/5/2018 - Present        Controlled type 2 diabetes mellitus with diabetic polyneuropathy, without long-term current use of insulin (HCC) (Chronic) ICD-10-CM: E11.42  ICD-9-CM: 250.60, 357.2  1/5/2018 - Present        Type 2 diabetes mellitus with nephropathy (HCC) (Chronic) ICD-10-CM: E11.21  ICD-9-CM: 250.40, 583.81  1/5/2018 - Present        Stage 3 chronic kidney disease ([facility] (Chronic) ICD-10-CM: N18.30  ICD-9-CM: 585.3  11/22/2017 - Present        Benign hypertensive kidney disease with chronic kidney disease (Chronic) ICD-10-CM: I12.9  ICD-9-CM: 403.10  11/6/2017 - Present        CAD (coronary artery disease) (Chronic) ICD-10-CM: I25.10  ICD-9-CM: 414.00  Unknown - Present        RESOLVED: Glucose intolerance (impaired glucose tolerance) (Chronic) ICD-10-CM: R73.02  ICD-9-CM: 790.22  11/6/2017 - 11/14/2017        RESOLVED: BMI 40.0-44.9, adult (HCC) (Chronic) ICD-10-CM: Z68.41  ICD-9-CM: V85.41  11/4/2016 - 10/10/2018        RESOLVED: Hypertension ICD-10-CM: I10  ICD-9-CM: 401.9  Unknown - 10/10/2016        RESOLVED: Hypercholesterolemia ICD-10-CM: E78.00  ICD-9-CM: 272.0  Unknown - 10/10/2016        RESOLVED: Chronic kidney disease ICD-10-CM: N18.9  ICD-9-CM: 703. 9  Unknown - 10/10/2016    Overview Signed 10/10/2016 11:28 AM by Jorge Vidales MD     stage 3             RESOLVED: Neuropathy ICD-10-CM: G62.9  ICD-9-CM: 355.9  Unknown - 10/10/2016        RESOLVED: Peripheral polyneuropathy (Chronic) ICD-10-CM: G62.9  ICD-9-CM: 356.9  10/10/2016 - 1/5/2018        RESOLVED: CKD (chronic kidney disease) stage 3, GFR 30-59 ml/min (HCC) (Chronic) ICD-10-CM: N18.30  ICD-9-CM: 585.3  10/10/2016 - 11/6/2017        RESOLVED: Hyperlipidemia (Chronic) ICD-10-CM: E78.5  ICD-9-CM: 272.4  10/10/2016 - 4/10/2018        RESOLVED: Hyperkalemia ICD-10-CM: E87.5  ICD-9-CM: 276.7  10/10/2016 - 11/14/2017        RESOLVED: Essential hypertension (Chronic) ICD-10-CM: I10  ICD-9-CM: 401.9  10/10/2016 - 11/22/2017            S/p right below-knee amputation secondary to cellulitis (8/18/2021, Suhail French MD)     Plan / Recommendations / Medical Decision Making:      Daily physician / PA medical management:     Unilateral complete BKA, right - NWB R LE; prosthetics involved. Will be difficult for patient to mobilize with a prosthesis given he lacks sensation and proprioception in the right foot, has obese body habitus, chronic respiratory failure and CHF.     Pain control - stable, mild-to-moderate joint symptoms intermittently, reasonably well controlled by PRN meds. Will require regular pain assessment and comprehensive pain management. Has chronic mixed axonal-demyelinating polyneuropathy; on Lyrica 200mg BID. Denies residual limb pain but has tramadol and oxycodone ordered PRN.     Hypotension - BP fluctuating, managed medically. Has been hypotensive and is on Florinef and midodrine. Dehydration thought to be contributing, also possibly diuretics. -8/27 /63, HR 94 this AM; patient denies lightheadedness during therapies or while resting in room  -8/28 95/53; asymptomatic, HR 97; on Florinef and midodrine; up to 117/65 max  -8/30 /72, HR 85 this AM  -8/31 VSS  -9/1 124/64 HR 60  -9/3 /71, HR 79  -9/4 VSS     Acute-on-chronic respiratory failure - continue 3L O2, wean to 2L if possible. Encourage IS.    -8/31 sats reportedly 78%, pt asymptomatic.  Rechecked 98% 3L  -9/1 encouraged use of IS     Atrial fibrillation - continue Eliquis 5mg BID; rate controlled. Toprol XL has been held due to hypotensive at times.  -9/1 NSR this AM  -9/3 IIR this AM, rate controlled  -9/4 IIR in PT, rate controlled     Acute-on-chronic anemia - anemia of chronic kidney disease and post-op due to blood loss. Hgb trending down; follow closely. No evidence of active bleeding.  -8/27 CBC ordered for tomorrow  -8/28 Hgb 6.6 from 8.9 (8/24); no sign of active bleeding source: stool was not melenic, little drainage from amputation; hold Eliquis and recheck in AM. If true value, will need to transfer downtown for a blood transfusion  -8/30 recheck H&H with Hgb 8.5; Eliquis restarted (yesterday)  -9/2 Hgb 9.3 improved     Chronic kidney disease, CKD3 - improving, monitor. Creatine 1.49 from 1.57.  -8/27 BMP ordered for tomorrow  -8/28 Cr 1.51, stable  -9/2 Cr 1.58, BUN 21; baseline     Diabetes mellitus - HgbA1c 6.7% (7/13/2021), moderate glycemic control. Will require daily, close fasting glucose monitoring and medication adjustment to optimize glycemic control in setting of acute illness and hospitalization. Takes Glucotrol 5mg at home. Currently on SSI; add Glucotrol ? .  -8/26 BS controlled  -8/27 BS this , all BS since admission <145, most in low 100s  -8/28 BS controlled  -8/30  this AM, all BS yesterday <110  -well controlled  -9/2 BS well controlled; diet controlled  -9/3  this AM, yesterday all values <120  -9/4 BS 94 this AM, all BS <120 since 8/30     Pneumonia prophylaxis - incentive spirometer every hour while awake.     DVT risk / DVT prophylaxis - will require daily physician / PA exam to assess for signs and symptoms as patient is at increased risk for of thromboembolism. Mobilize as tolerated. Sequential pneumatic compression devices (SCDs) when in bed; thigh-high or knee-high thromboembolic deterrent hose when out of bed. On Eliquis.     CAD - continue Eliquis and statin.     GI prophylaxis - resume PPI.  At times may need additional antacids, Maalox prn.     General skin care / wound prevention - monitor BKA  incision and general skin wound status daily per staff and physician / PA. At risk for failure. Will require 24/7 rehab nursing. Keep BKA incision and left plantar foot wound clean and dry, reinforce dressing PRN and ice to area for comfort; he is to f/u with Dr Rolly Tiwari for staple removal.   -8/25 will cont wound vac until f/u with Dr. Pendleton  -8/28 wound looked good at time of wound vac check per notes  -8/30 wound vac in place and functioning, skin surrounding without erythema or rash  -9/2 wound vac will remain intact until f/u with Dr More Miller next week  -9/3 Doris Denton wound vac has auto-terminated, incision remains covered with vac dressing  -9/4 wound vac and dressing removed, incision intact with sutures, no erythema, induration or drainage; wound cleaned with wound cleanser and redressed with Telfa, 4x4s, Kerlix and ACE.     Urinary retention / neurogenic bladder - schedule voids q 6-8 hrs. Check post-void residual every shift; in and out catheter if post-void residual is more than 400ml.     Bowel program - at risk for constipation as a side effect of opioids, other medications, impaired mobility, etc. MiraLAX daily for regularity, Ivis-Colace for stool softener. PRN MOM, bisacodyl suppository or tablets for constipation.  -8/27 constipated, no BM with daily gentle agents (MiraLAX, Colace), will try more aggressively with lactulose after therapies; may require MOM, bisacodyl suppository or disimpaction  -8/28 excellent results yesterday  -8/30 large, normal BM this AM; continue daily MiraLAX and stool softener         Time spent was 25 minutes with over 1/2 in direct patient care/examination, consultation and coordination of care. Signed By: Virginie Montana PA-C    September 4, 2021      Physician Assistant with [facility]  Eugenia Mendoza MD, Medical Director

## 2021-09-04 NOTE — PROGRESS NOTES
Time In 0740   Time Out 0803     Mobility   Score Comments   Supine to Sit 4: Supervision or touching A S     Activities of Daily Living    Score Comments   Upper Body  Dressing 5: S/U or clean-up assist Items Applied: Pullover  Position: Unsupported Sitting  Comments: S/U   Lower Body Dressing 4: Supervision or touching A Items Applied: Underwear and Elastic pants  Position: Unsupported Sitting  Adaptive Equipment: N/A  Comments: S   Education  Managing oxygen     Pt was in bed and agreeable to tx. Pt's performance with ADL is reflected in above chart. Pt required cueing to manage wound vac today. Pt does not recall to reapply O2 after UB dressing. Educated pt on the difference when he does and does not have oxygen.       Toñito Medrano, OT   9/4/2021

## 2021-09-04 NOTE — PROGRESS NOTES
PHYSICAL THERAPY DAILY NOTE  Time In: 1185  Time Out: 6064  Patient Seen For: AM;Balance activities; Patient education; Therapeutic exercise;Transfer training; Wheelchair mobility; Other (see progress notes)    Subjective: patient reporting he feels OK. Reports no pain in RLE. Reports he goes to see his surgeon next week. Objective:Vital Signs:  Patient Vitals for the past 12 hrs:   Temp Pulse Resp BP SpO2   09/04/21 0710 98.1 °F (36.7 °C) 98 18 122/76 97 %     Pain level:No c/o pain during treatment  Pain location:NA  Pain interventions:NA    Patient education:Bed mobility training,transfer training, balance training,fall precautions, body mechanics,activity pacing, NWB RLE precautions, w/c mobility and parts management, Patient verbalizing understanding and demonstrating partial understanding of patient education. Recommend follow up education. Interdisciplinary Communication:PA in to assess R BKA during PT treatment. PA removed wound vac from BKA residual limb. Cleaned incisional area and redressed with telfa 4x4s and kerlix. Wrapped with ace wrap. PT assisting PA    Other (comment) (fall)  GROSS ASSESSMENT Daily Assessment     Dependent assist to DON/DOFF LLE CROW boot       COGNITION Daily Assessment    Impulsive at times with decreased safety awareness and full awareness of deficits during functional mobility       BED/MAT MOBILITY Daily Assessment   Increased time and effort to complete with cues for body mechanics   Rolling Right : 5 (Supervision)  Rolling Left : 5 (Supervision)  Supine to Sit : 5 (Supervision)  Sit to Supine : 5 (Supervision)       TRANSFERS Daily Assessment   Increased time and effort to complete with cues for body mechanics   Transfer Type: Lateral with transfer board bed to w/c and w/c<>mat level surfaces  Other: w/c to [facility] over commode using sliding board with min assist and cues. dependent assist with clothing.  RN and OT to assist patient out of bathroom  Transfer Assistance : 5 (Stand-by assistance)  Sit to Stand Assistance:  (NT)  Car Transfers: Not tested       GAIT Daily Assessment    Amount of Assistance: 0 (Not tested)  Distance (ft): 0 Feet (ft)       STEPS or STAIRS Daily Assessment    Steps/Stairs Ambulated (#): 0  Level of Assist : 0 (Not tested)       BALANCE Daily Assessment    Sitting - Static: Good (unsupported)  Sitting - Dynamic: Fair (occasional)  Standing - Static: Not tested       [facility] MOBILITY Daily Assessment   Increased time and effort to complete with SOB noted 02 sat 90 to 92% after propelling w/c 60 ft on 02 at 1.5 lpm Able to Propel (ft): 60 feet (60ft x 2)  Curbs/Ramps Assist Required (FIM Score): 0 (Not tested)  Wheelchair Setup Assist Required : 5 (Supervision/setup) (with cues)  Wheelchair Management: Manages left brake;Manages right brake;Manages left armrest;Manages right armrest;Manages left footrest;Manages right footrest       LOWER EXTREMITY EXERCISES Daily Assessment   Increased time and effort to complete with multiple and frequent rest breaks. Cues for correct form   Extremity: Both  Exercise Type #1: Supine lower extremity strengthening  Sets Performed: 3  Reps Performed: 10  Level of Assist: Supervision (set up assist with cues)  Exercise Type #2: Other (comment) (left sidelying RLE ABD)  Sets Performed: 3  Reps Performed: 10  Level of Assist: Supervision (with cues)  Exercise Type #3: Other (comment) (prone knee flex)  Sets Performed: 3  Reps Performed: 10  Level of Assist: Supervision (set up assist with cues)     SUPINE EXERCISES Sets Reps Comments   Ankle Pumps 3 10 LLE only   Quad Sets 3 10    Modified bridging with bolster 3 10    Hip+knee flex<>ext 3 10    Hip Abduction 3 10    Short Arc Quad 3 10    Straight Leg Raise 3 10             Assessment: Progressing towards goals. Improving with ability to perform BKA HEP.  Decreased functional endurance with SOB noted during all functional mobility, 02 sat stable on 02 at 1.5 LPM       Patient remained on [facility] over commode in bathroom at end of treatment and OT and RN to assist patient out of bathroom    Plan of Care: Continue with POC and progress as tolerated.      Sergo French, PT  9/4/2021

## 2021-09-05 PROCEDURE — 99232 SBSQ HOSP IP/OBS MODERATE 35: CPT | Performed by: PHYSICIAN ASSISTANT

## 2021-09-05 PROCEDURE — 74011250637 HC RX REV CODE- 250/637: Performed by: PHYSICIAN ASSISTANT

## 2021-09-05 PROCEDURE — 65310000000 HC RM PRIVATE REHAB

## 2021-09-05 RX ADMIN — APIXABAN 5 MG: 5 TABLET, FILM COATED ORAL at 08:21

## 2021-09-05 RX ADMIN — MIDODRINE HYDROCHLORIDE 2.5 MG: 5 TABLET ORAL at 11:14

## 2021-09-05 RX ADMIN — PREGABALIN 200 MG: 100 CAPSULE ORAL at 20:40

## 2021-09-05 RX ADMIN — MIDODRINE HYDROCHLORIDE 2.5 MG: 5 TABLET ORAL at 16:36

## 2021-09-05 RX ADMIN — FLUDROCORTISONE ACETATE 0.1 MG: 0.1 TABLET ORAL at 08:21

## 2021-09-05 RX ADMIN — APIXABAN 5 MG: 5 TABLET, FILM COATED ORAL at 20:41

## 2021-09-05 RX ADMIN — PREGABALIN 200 MG: 100 CAPSULE ORAL at 08:20

## 2021-09-05 RX ADMIN — ATORVASTATIN CALCIUM 40 MG: 40 TABLET, FILM COATED ORAL at 20:41

## 2021-09-05 RX ADMIN — TRAZODONE HYDROCHLORIDE 50 MG: 50 TABLET ORAL at 20:40

## 2021-09-05 RX ADMIN — MIDODRINE HYDROCHLORIDE 2.5 MG: 5 TABLET ORAL at 08:19

## 2021-09-05 RX ADMIN — PANTOPRAZOLE SODIUM 40 MG: 40 TABLET, DELAYED RELEASE ORAL at 05:06

## 2021-09-05 NOTE — PROGRESS NOTES
Problem: Falls - Risk of  Goal: *Absence of Falls  Description: Document Tab Sites Fall Risk and appropriate interventions in the flowsheet. Outcome: Progressing Towards Goal  Note: Fall Risk Interventions:  Mobility Interventions: Communicate number of staff needed for ambulation/transfer, OT consult for ADLs, Patient to call before getting OOB, Utilize walker, cane, or other assistive device    Mentation Interventions: Adequate sleep, hydration, pain control, Evaluate medications/consider consulting pharmacy, Toileting rounds, Update white board    Medication Interventions: Evaluate medications/consider consulting pharmacy, Teach patient to arise slowly    Elimination Interventions: Call light in reach, Patient to call for help with toileting needs              Problem: Patient Education: Go to Patient Education Activity  Goal: Patient/Family Education  Outcome: Progressing Towards Goal     Problem: Pressure Injury - Risk of  Goal: *Prevention of pressure injury  Description: Document Julio Cesar Scale and appropriate interventions in the flowsheet.   Outcome: Progressing Towards Goal  Note: Pressure Injury Interventions:  Sensory Interventions: Assess changes in LOC, Chair cushion, Check visual cues for pain, Discuss PT/OT consult with provider    Moisture Interventions: Absorbent underpads, Minimize layers, Moisture barrier, Offer toileting Q_hr    Activity Interventions: Assess need for specialty bed, Increase time out of bed, Pressure redistribution bed/mattress(bed type), PT/OT evaluation    Mobility Interventions: Float heels, HOB 30 degrees or less, Pressure redistribution bed/mattress (bed type), PT/OT evaluation    Nutrition Interventions: Document food/fluid/supplement intake    Friction and Shear Interventions: Apply protective barrier, creams and emollients, Feet elevated on foot rest, HOB 30 degrees or less, Minimize layers                Problem: Patient Education: Go to Patient Education Activity  Goal: Patient/Family Education  Outcome: Progressing Towards Goal     Problem: Patient Education: Go to Patient Education Activity  Goal: Patient/Family Education  Description: Patient / Patient's family will verbalize understanding of PT safety recommendations, demonstrate appropriate assist for current functional mobility status, safety, and home exercise program by time of discharge.    Outcome: Progressing Towards Goal

## 2021-09-05 NOTE — PROGRESS NOTES
Harsha Chu MD  Medical Director  [address] [facility], [address] [facility]  Tel: [phone] Kwasi Frandy PROGRESS NOTE    Arabella Rivas  Admit Date: 8/25/2021  Admit Diagnosis:   Unilateral complete BKA, right, sequela ([address] [S88.111S]    Subjective     Patient seen and examined after lunch, daughter in room. No complaints, denies all pain, dyspnea. States he's bored, ready to get back into therapies tomorrow. Maintaining BP, BS all <120. Objective:     Current Facility-Administered Medications   Medication Dose Route Frequency    lactulose (CHRONULAC) 10 gram/15 mL solution 30 mL  30 mL Oral DAILY PRN    naloxone (NARCAN) injection 0.4 mg  0.4 mg IntraVENous PRN    acetaminophen (TYLENOL) tablet 650 mg  650 mg Oral Q6H PRN    apixaban (ELIQUIS) tablet 5 mg  5 mg Oral Q12H    atorvastatin (LIPITOR) tablet 40 mg  40 mg Oral QHS    docusate sodium (COLACE) capsule 100 mg  100 mg Oral BID    fludrocortisone (FLORINEF) tablet 0.1 mg  0.1 mg Oral DAILY    midodrine (PROAMATINE) tablet 2.5 mg  2.5 mg Oral TID WITH MEALS    nystatin (MYCOSTATIN) 100,000 unit/gram powder   Topical PRN    oxyCODONE IR (ROXICODONE) tablet 5 mg  5 mg Oral Q4H PRN    pantoprazole (PROTONIX) tablet 40 mg  40 mg Oral ACB    polyethylene glycol (MIRALAX) packet 17 g  17 g Oral DAILY    pregabalin (LYRICA) capsule 200 mg  200 mg Oral BID    traMADoL (ULTRAM) tablet 50 mg  50 mg Oral Q6H PRN    traZODone (DESYREL) tablet 50 mg  50 mg Oral QHS       Review of Systems:   Denies chest pain, shortness of breath, cough, headache, visual problems, abdominal pain, dysuria, calf pain. Pertinent positives are as noted in the HPI, ROS unremarkable otherwise.      Visit Vitals  /79 (BP 1 Location: Left upper arm, BP Patient Position: At rest)   Pulse 80   Temp 97.6 °F (36.4 °C)   Resp 20   Wt 275 lb 6.4 oz (124.9 kg)   SpO2 96%   BMI 39.52 kg/m²        Physical Exam:   General: Alert, appropriately oriented. Resting in bed, visiting with family. HEENT: Normocephalic, EOM intact. Oral mucosa moist.   Lungs: Clear breath sounds on right, diminished on left. Respirations even, breathing unlabored. Heart: Regular rate, irregularly irregular rhythm, S1, S2.  Muffles heart sounds, no appreciable murmur. Abdomen: Soft, non-tender, obese and protuberant but not distended. Bowel sounds normoactive. Genitourinary: Deferred. Neuromuscular:      UE strength generally 4+/5 and symmetric. R LE strength at HF 4-/5. L LE strength at HF 4/5, distally 5-/5. Absent L LE light touch sensation to mid shin, absent proprioception. Skin/extremity: No rashes, no erythema. Right BKA dressing removed and incision cleaned with wound cleanser, intact with sutures, no erythema, induration or drainage; wound redressed. Functional Assessment:  Balance  Sitting - Static: Good (unsupported) (09/04/21 1200)  Sitting - Dynamic: Fair (occasional) (09/04/21 1200)  Standing - Static: Not tested (09/04/21 1200)  Standing - Dynamic : Impaired (08/27/21 1500)       [facility] Fall Risk Assessment:  [facility] Fall Risk  Mobility: Ambulates or transfers with assist devices or assistance (09/05/21 0710)  Mobility Interventions: Communicate number of staff needed for ambulation/transfer;OT consult for ADLs; Patient to call before getting OOB;Utilize walker, cane, or other assistive device (09/05/21 0710)  Mentation: Alert, oriented x 3 (09/05/21 0710)  Mentation Interventions: Adequate sleep, hydration, pain control;Evaluate medications/consider consulting pharmacy; Toileting rounds;Update white board (09/05/21 0710)  Medication: Patient receiving anticonvulsants, sedatives(tranquilizers), psychotropics or hypnotics, hypoglycemics, narcotics, sleep aids, antihypertensives, laxatives, or diuretics (09/05/21 0710)  Medication Interventions: Evaluate medications/consider consulting pharmacy; Teach patient to arise slowly (09/05/21 0710)  Elimination: Needs assistance with toileting (09/05/21 0710)  Elimination Interventions: Call light in reach; Patient to call for help with toileting needs (09/05/21 0710)  Prior Fall History: No (09/05/21 0710)  Total Score: 3 (09/05/21 0710)  Standard Fall Precautions: Yes (09/05/21 0710)  High Fall Risk: Yes (09/05/21 0710)     Ambulation:  Gait  Distance (ft): 0 Feet (ft) (09/04/21 1200)     Labs/Studies:  Recent Results (from the past 72 hour(s))   GLUCOSE, POC    Collection Time: 09/02/21  4:46 PM   Result Value Ref Range    Glucose (POC) 89 65 - 100 mg/dL    Performed by Jude    GLUCOSE, POC    Collection Time: 09/02/21  9:04 PM   Result Value Ref Range    Glucose (POC) 104 (H) 65 - 100 mg/dL    Performed by Wan    GLUCOSE, POC    Collection Time: 09/03/21  9:13 PM   Result Value Ref Range    Glucose (POC) 118 (H) 65 - 100 mg/dL    Performed by Zi    GLUCOSE, POC    Collection Time: 09/04/21  7:08 AM   Result Value Ref Range    Glucose (POC) 94 65 - 100 mg/dL    Performed by Julee        Assessment:     Problem List as of 9/5/2021 Date Reviewed: 8/27/2021        Codes Class Noted - Resolved    * (Principal) Unilateral complete BKA, right, sequela ([facility] ICD-10-CM: L61.187M  ICD-9-CM: 905.9  8/25/2021 - Present        Suspected sleep apnea ICD-10-CM: R29.818  ICD-9-CM: 781.99  8/20/2021 - Present        S/P BKA (below knee amputation) unilateral, right ([facility] ICD-10-CM: Z89.511  ICD-9-CM: V49.75  8/18/2021 - Present        Respiratory failure, post-operative ([facility] ICD-10-CM: S36.307  ICD-9-CM: 518.51  8/18/2021 - Present        Acute respiratory failure with hypercapnia (HCC) ICD-10-CM: J96.02  ICD-9-CM: 518.81  7/23/2021 - Present        Acute on chronic respiratory failure with hypoxia and hypercapnia (HCC) ICD-10-CM: J96.21, J96.22  ICD-9-CM: 518.84, 786.09, 799.02  7/23/2021 - Present        Acute metabolic encephalopathy ICD-10-CM: G93.41  ICD-9-CM: 348.31  7/23/2021 - Present        Hypoxia ICD-10-CM: R09.02  ICD-9-CM: 799.02  7/21/2021 - Present        Acute kidney injury superimposed on CKD [facility]) ICD-10-CM: N17.9, N18.9  ICD-9-CM: 866.00, 585.9  7/21/2021 - Present        Staphylococcus aureus bacteremia ICD-10-CM: R78.81, B95.61  ICD-9-CM: 790.7, 041.11  7/21/2021 - Present        Cellulitis ICD-10-CM: L03.90  ICD-9-CM: 682.9  7/19/2021 - Present        Systolic CHF, acute on chronic [facility]) ICD-10-CM: I50.23  ICD-9-CM: 428.23, 428.0  7/19/2021 - Present        Atrial fibrillation with RVR (HCC) ICD-10-CM: I48.91  ICD-9-CM: 427.31  7/19/2021 - Present        Morbid obesity with BMI of 40.0-44.9, adult [facility]) ICD-10-CM: E66.01, Z68.41  ICD-9-CM: 278.01, V85.41  6/11/2020 - Present        Severe obesity (BMI 35.0-35.9 with comorbidity) (HCC) (Chronic) ICD-10-CM: E66.01, Z68.35  ICD-9-CM: 278.01, V85.35  10/10/2018 - Present        Bunion of unspecified foot (Chronic) ICD-10-CM: M21.619  ICD-9-CM: 727.1  4/10/2018 - Present        Onychomycosis (Chronic) ICD-10-CM: B35.1  ICD-9-CM: 110.1  4/10/2018 - Present        Corns and callus (Chronic) ICD-10-CM: L84  ICD-9-CM: 700  4/10/2018 - Present        Mixed hyperlipidemia (Chronic) ICD-10-CM: V66.1  ICD-9-CM: 272.2  4/10/2018 - Present        Mixed axonal-demyelinating polyneuropathy (Chronic) ICD-10-CM: G62.89  ICD-9-CM: 355.9  1/5/2018 - Present        Controlled type 2 diabetes mellitus with diabetic polyneuropathy, without long-term current use of insulin (HCC) (Chronic) ICD-10-CM: E11.42  ICD-9-CM: 250.60, 357.2  1/5/2018 - Present        Type 2 diabetes mellitus with nephropathy (HCC) (Chronic) ICD-10-CM: E11.21  ICD-9-CM: 250.40, 583.81  1/5/2018 - Present        Stage 3 chronic kidney disease (HCC) (Chronic) ICD-10-CM: N18.30  ICD-9-CM: 585.3  11/22/2017 - Present        Benign hypertensive kidney disease with chronic kidney disease (Chronic) ICD-10-CM: I12.9  ICD-9-CM: 403.10 11/6/2017 - Present        CAD (coronary artery disease) (Chronic) ICD-10-CM: I25.10  ICD-9-CM: 414.00  Unknown - Present        RESOLVED: Glucose intolerance (impaired glucose tolerance) (Chronic) ICD-10-CM: R73.02  ICD-9-CM: 790.22  11/6/2017 - 11/14/2017        RESOLVED: BMI 40.0-44.9, adult (HCC) (Chronic) ICD-10-CM: Z68.41  ICD-9-CM: V85.41  11/4/2016 - 10/10/2018        RESOLVED: Hypertension ICD-10-CM: I10  ICD-9-CM: 401.9  Unknown - 10/10/2016        RESOLVED: Hypercholesterolemia ICD-10-CM: E78.00  ICD-9-CM: 272.0  Unknown - 10/10/2016        RESOLVED: Chronic kidney disease ICD-10-CM: N18.9  ICD-9-CM: 815. 9  Unknown - 10/10/2016    Overview Signed 10/10/2016 11:28 AM by Jacklyn Schaefer MD     stage 3             RESOLVED: Neuropathy ICD-10-CM: G62.9  ICD-9-CM: 355.9  Unknown - 10/10/2016        RESOLVED: Peripheral polyneuropathy (Chronic) ICD-10-CM: G62.9  ICD-9-CM: 356.9  10/10/2016 - 1/5/2018        RESOLVED: CKD (chronic kidney disease) stage 3, GFR 30-59 ml/min (HCC) (Chronic) ICD-10-CM: N18.30  ICD-9-CM: 585.3  10/10/2016 - 11/6/2017        RESOLVED: Hyperlipidemia (Chronic) ICD-10-CM: E78.5  ICD-9-CM: 272.4  10/10/2016 - 4/10/2018        RESOLVED: Hyperkalemia ICD-10-CM: E87.5  ICD-9-CM: 276.7  10/10/2016 - 11/14/2017        RESOLVED: Essential hypertension (Chronic) ICD-10-CM: I10  ICD-9-CM: 401.9  10/10/2016 - 11/22/2017            S/p right below-knee amputation secondary to cellulitis (8/18/2021, Cornelia Humphries MD)     Plan / Recommendations / Medical Decision Making:      Daily physician / PA medical management:     Unilateral complete BKA, right - NWB R LE; prosthetics involved. Will be difficult for patient to mobilize with a prosthesis given he lacks sensation and proprioception in the right foot, has obese body habitus, chronic respiratory failure and CHF.     Pain control - stable, mild-to-moderate joint symptoms intermittently, reasonably well controlled by PRN meds.  Will require regular pain assessment and comprehensive pain management. Has chronic mixed axonal-demyelinating polyneuropathy; on Lyrica 200mg BID. Denies residual limb pain but has tramadol and oxycodone ordered PRN.     Hypotension - BP fluctuating, managed medically. Has been hypotensive and is on Florinef and midodrine. Dehydration thought to be contributing, also possibly diuretics. -8/27 /63, HR 94 this AM; patient denies lightheadedness during therapies or while resting in room  -8/28 95/53; asymptomatic, HR 97; on Florinef and midodrine; up to 117/65 max  -8/30 /72, HR 85 this AM  -8/31 VSS  -9/1 124/64 HR 60  -9/3 /71, HR 79  -9/4 VSS  -9/5 /79, HR 80     Acute-on-chronic respiratory failure - continue 3L O2, wean to 2L if possible. Encourage IS.    -8/31 sats reportedly 78%, pt asymptomatic. Rechecked 98% 3L  -9/1 encouraged use of IS     Atrial fibrillation - continue Eliquis 5mg BID; rate controlled. Toprol XL has been held due to hypotensive at times.  -9/1 NSR this AM  -9/3 IIR this AM, rate controlled  -9/4 IIR in PT, rate controlled  -9/5 IIR today, rate controlled     Acute-on-chronic anemia - anemia of chronic kidney disease and post-op due to blood loss. Hgb trending down; follow closely. No evidence of active bleeding.  -8/27 CBC ordered for tomorrow  -8/28 Hgb 6.6 from 8.9 (8/24); no sign of active bleeding source: stool was not melenic, little drainage from amputation; hold Eliquis and recheck in AM. If true value, will need to transfer downtown for a blood transfusion  -8/30 recheck H&H with Hgb 8.5; Eliquis restarted (yesterday)  -9/2 Hgb 9.3 improved  -9/5 will check CBC tomorrow     Chronic kidney disease, CKD3 - improving, monitor. Creatine 1.49 from 1.57.  -8/27 BMP ordered for tomorrow  -8/28 Cr 1.51, stable  -9/2 Cr 1.58, BUN 21; baseline  -9/5 will check BMP tomorrow     Diabetes mellitus - HgbA1c 6.7% (7/13/2021), moderate glycemic control.  Will require daily, close fasting glucose monitoring and medication adjustment to optimize glycemic control in setting of acute illness and hospitalization. Takes Glucotrol 5mg at home. Currently on SSI; add Glucotrol ? .  -8/26 BS controlled  -8/27 BS this , all BS since admission <145, most in low 100s  -8/28 BS controlled  -8/30  this AM, all BS yesterday <110  -well controlled  -9/2 BS well controlled; diet controlled  -9/3  this AM, yesterday all values <120  -9/4 BS 94 this AM, all BS <120 since 8/30; d/c POC glucose     Pneumonia prophylaxis - incentive spirometer every hour while awake.     DVT risk / DVT prophylaxis - will require daily physician / PA exam to assess for signs and symptoms as patient is at increased risk for of thromboembolism. Mobilize as tolerated. Sequential pneumatic compression devices (SCDs) when in bed; thigh-high or knee-high thromboembolic deterrent hose when out of bed. On Eliquis.     CAD - continue Eliquis and statin.     GI prophylaxis - resume PPI. At times may need additional antacids, Maalox prn.     General skin care / wound prevention - monitor BKA  incision and general skin wound status daily per staff and physician / PA. At risk for failure. Will require 24/7 rehab nursing.  Keep BKA incision and left plantar foot wound clean and dry, reinforce dressing PRN and ice to area for comfort; he is to f/u with Dr Tasha Beltran for staple removal.   -8/25 will cont wound vac until f/u with Dr. Pendleton  -8/28 wound looked good at time of wound vac check per notes  -8/30 wound vac in place and functioning, skin surrounding without erythema or rash  -9/2 wound vac will remain intact until f/u with Dr Franklin Rosario next week  -9/3 [employer] wound vac has auto-terminated, incision remains covered with vac dressing  -9/4 wound vac and dressing removed, incision intact with sutures, no erythema, induration or drainage; wound cleaned with wound cleanser and redressed with Telfa, 4x4s, Kerlix and ACE  -9/5 BKA incision inspected and redressed; left foot plantar wound inspected, stable, Meplex replaced     Urinary retention / neurogenic bladder - schedule voids q 6-8 hrs. Check post-void residual every shift; in and out catheter if post-void residual is more than 400ml.     Bowel program - at risk for constipation as a side effect of opioids, other medications, impaired mobility, etc. MiraLAX daily for regularity, Ivis-Colace for stool softener. PRN MOM, bisacodyl suppository or tablets for constipation.  -8/27 constipated, no BM with daily gentle agents (MiraLAX, Colace), will try more aggressively with lactulose after therapies; may require MOM, bisacodyl suppository or disimpaction  -8/28 excellent results yesterday  -8/30 large, normal BM this AM; continue daily MiraLAX and stool softener         Time spent was 25 minutes with over 1/2 in direct patient care/examination, consultation and coordination of care. Signed By: La Yi PA-C    September 5, 2021      Physician Assistant with [facility]  Eugenia Gonzalez MD, Medical Director

## 2021-09-05 NOTE — PROGRESS NOTES
Problem: Falls - Risk of  Goal: *Absence of Falls  Description: Document Kate Englebernardo Fall Risk and appropriate interventions in the flowsheet. Outcome: Progressing Towards Goal  Note: Fall Risk Interventions:  Mobility Interventions: Communicate number of staff needed for ambulation/transfer, Patient to call before getting OOB, OT consult for ADLs, PT Consult for mobility concerns, PT Consult for assist device competence, Strengthening exercises (ROM-active/passive), Utilize walker, cane, or other assistive device    Mentation Interventions: Adequate sleep, hydration, pain control, Door open when patient unattended, Eyeglasses and hearing aids, Increase mobility, More frequent rounding, Room close to nurse's station, Toileting rounds, Update white board    Medication Interventions: Assess postural VS orthostatic hypotension, Evaluate medications/consider consulting pharmacy, Patient to call before getting OOB, Teach patient to arise slowly    Elimination Interventions: Call light in reach, Patient to call for help with toileting needs, Stay With Me (per policy), Urinal in reach              Problem: Pressure Injury - Risk of  Goal: *Prevention of pressure injury  Description: Document Julio Cesar Scale and appropriate interventions in the flowsheet. Outcome: Progressing Towards Goal  Note: Pressure Injury Interventions:  Sensory Interventions: Assess changes in LOC, Assess need for specialty bed, Avoid rigorous massage over bony prominences, Chair cushion, Check visual cues for pain, Discuss PT/OT consult with provider, Float heels, Keep linens dry and wrinkle-free, Maintain/enhance activity level, Minimize linen layers, Pressure redistribution bed/mattress (bed type), Turn and reposition approx.  every two hours (pillows and wedges if needed)    Moisture Interventions: Absorbent underpads, Check for incontinence Q2 hours and as needed    Activity Interventions: Assess need for specialty bed, Chair cushion, Increase time out of bed, Pressure redistribution bed/mattress(bed type), PT/OT evaluation    Mobility Interventions: Assess need for specialty bed, Float heels, Chair cushion, HOB 30 degrees or less, Pressure redistribution bed/mattress (bed type), PT/OT evaluation, Turn and reposition approx.  every two hours(pillow and wedges)    Nutrition Interventions: Document food/fluid/supplement intake    Friction and Shear Interventions: Apply protective barrier, creams and emollients, Feet elevated on foot rest, HOB 30 degrees or less, Minimize layers

## 2021-09-06 LAB
ANION GAP SERPL CALC-SCNC: 1 MMOL/L (ref 7–16)
BUN SERPL-MCNC: 22 MG/DL (ref 8–23)
CALCIUM SERPL-MCNC: 9.2 MG/DL (ref 8.3–10.4)
CHLORIDE SERPL-SCNC: 106 MMOL/L (ref 98–107)
CO2 SERPL-SCNC: 38 MMOL/L (ref 21–32)
CREAT SERPL-MCNC: 1.5 MG/DL (ref 0.8–1.5)
ERYTHROCYTE [DISTWIDTH] IN BLOOD BY AUTOMATED COUNT: 16 % (ref 11.9–14.6)
GLUCOSE BLD STRIP.AUTO-MCNC: 114 MG/DL (ref 65–100)
GLUCOSE SERPL-MCNC: 86 MG/DL (ref 65–100)
HCT VFR BLD AUTO: 34.6 % (ref 41.1–50.3)
HGB BLD-MCNC: 10.2 G/DL (ref 13.6–17.2)
MCH RBC QN AUTO: 29.5 PG (ref 26.1–32.9)
MCHC RBC AUTO-ENTMCNC: 29.5 G/DL (ref 31.4–35)
MCV RBC AUTO: 100 FL (ref 79.6–97.8)
NRBC # BLD: 0 K/UL (ref 0–0.2)
PLATELET # BLD AUTO: 195 K/UL (ref 150–450)
PMV BLD AUTO: 11.6 FL (ref 9.4–12.3)
POTASSIUM SERPL-SCNC: 4.6 MMOL/L (ref 3.5–5.1)
RBC # BLD AUTO: 3.46 M/UL (ref 4.23–5.6)
SERVICE CMNT-IMP: ABNORMAL
SODIUM SERPL-SCNC: 145 MMOL/L (ref 136–145)
WBC # BLD AUTO: 4.1 K/UL (ref 4.3–11.1)

## 2021-09-06 PROCEDURE — 65310000000 HC RM PRIVATE REHAB

## 2021-09-06 PROCEDURE — 99232 SBSQ HOSP IP/OBS MODERATE 35: CPT | Performed by: PHYSICIAN ASSISTANT

## 2021-09-06 PROCEDURE — 97110 THERAPEUTIC EXERCISES: CPT

## 2021-09-06 PROCEDURE — 97530 THERAPEUTIC ACTIVITIES: CPT

## 2021-09-06 PROCEDURE — 85027 COMPLETE CBC AUTOMATED: CPT

## 2021-09-06 PROCEDURE — 80048 BASIC METABOLIC PNL TOTAL CA: CPT

## 2021-09-06 PROCEDURE — 74011250637 HC RX REV CODE- 250/637: Performed by: PHYSICIAN ASSISTANT

## 2021-09-06 PROCEDURE — 97542 WHEELCHAIR MNGMENT TRAINING: CPT

## 2021-09-06 PROCEDURE — 97535 SELF CARE MNGMENT TRAINING: CPT

## 2021-09-06 PROCEDURE — 82962 GLUCOSE BLOOD TEST: CPT

## 2021-09-06 RX ADMIN — DOCUSATE SODIUM 100 MG: 100 CAPSULE, LIQUID FILLED ORAL at 20:55

## 2021-09-06 RX ADMIN — ATORVASTATIN CALCIUM 40 MG: 40 TABLET, FILM COATED ORAL at 20:54

## 2021-09-06 RX ADMIN — PREGABALIN 200 MG: 100 CAPSULE ORAL at 08:24

## 2021-09-06 RX ADMIN — PREGABALIN 200 MG: 100 CAPSULE ORAL at 20:54

## 2021-09-06 RX ADMIN — PANTOPRAZOLE SODIUM 40 MG: 40 TABLET, DELAYED RELEASE ORAL at 05:11

## 2021-09-06 RX ADMIN — APIXABAN 5 MG: 5 TABLET, FILM COATED ORAL at 08:24

## 2021-09-06 RX ADMIN — FLUDROCORTISONE ACETATE 0.1 MG: 0.1 TABLET ORAL at 08:24

## 2021-09-06 RX ADMIN — DOCUSATE SODIUM 100 MG: 100 CAPSULE, LIQUID FILLED ORAL at 08:24

## 2021-09-06 RX ADMIN — TRAZODONE HYDROCHLORIDE 50 MG: 50 TABLET ORAL at 20:54

## 2021-09-06 RX ADMIN — APIXABAN 5 MG: 5 TABLET, FILM COATED ORAL at 20:54

## 2021-09-06 RX ADMIN — MIDODRINE HYDROCHLORIDE 2.5 MG: 5 TABLET ORAL at 12:12

## 2021-09-06 NOTE — PROGRESS NOTES
PHYSICAL THERAPY DAILY NOTE  Time In: 4523  Time Out: 1210  Patient Seen For: AM;Balance activities;Transfer training; Wheelchair mobility    Subjective: Pt expressed concerns about his Dr visit tomorrow, he doesn't \"want to hear any bad news. \"         Objective: Other (comment) (fall)  GROSS ASSESSMENT Daily Assessment     None taken       COGNITION Daily Assessment    Decreased insight, impulsive, decreased awareness of O2 line. BED/MAT MOBILITY Daily Assessment    Rolling Right : 0 (Not tested)  Rolling Left : 0 (Not tested)  Supine to Sit : 0 (Not tested)  Sit to Supine : 0 (Not tested)       TRANSFERS Daily Assessment   Sit<>Stand trials: total assist due to   Mod A x 2 with BUE support with porch railing. Transfer Type: Lateral with transfer board  Transfer Assistance : 5 (Stand-by assistance)  Sit to Stand Assistance: Total assistance  Car Transfers: Not tested       GAIT Daily Assessment    Amount of Assistance: 0 (Not tested)  Distance (ft): 0 Feet (ft)       STEPS or STAIRS Daily Assessment    Steps/Stairs Ambulated (#): 0  Level of Assist : 0 (Not tested)       BALANCE Daily Assessment    Sitting - Static: Good (unsupported)  Sitting - Dynamic: Fair (occasional)  Standing - Static: Fair       WHEELCHAIR MOBILITY Daily Assessment   Propelled in/out around 4 bolsters set up in a 15' space     30 ft x 3 with rest breaks. Pt able to propel up w/c ramp backwards using LLE to assist.  Able to Propel (ft): 30 feet  Curbs/Ramps Assist Required (FIM Score): 4 (Minimal assistance)  Wheelchair Setup Assist Required : 5 (Supervision/setup)  Wheelchair Management: Manages left brake;Manages right brake;Manages left footrest;Manages right footrest     Vital Signs: SpO2 on 2 L, tank went empty during session, pt sats dropped to 79% but recovered to 100% when O2 reapplied within 1 min. Pain level: No pain reported.    Pain location: NA  Pain interventions: NA    Patient education: Importance of checking skin/incision when returns to home. Interdisciplinary Communication: Talked with RN about pt concerns with appointment tomorrow. Pt performed W/C mobility:   4 bolsters in 15' on level surface, maneuvering in and out of the bolsters there and back with Supervision. Pt able to propel 30' outside on uneven surface, rested, propelled another 30', rested, then propelled backwards up the ramp with LLE assistance 15' with Min A and verbal cues for safety. Standing trials: Mod A x 2 for initial sit>stand and Leydi for standing. BUE support required, pt able to perform reaching trials within RAVINDRA with BUE one at a time. 1st trial - 3 min 30 sec. 2nd trial - 2 min 30 sec. Assessment: Pt able to perform sit<>stand trials with improved ability this session. BUE support was required to pull up and gain upright posture, but pt able to perform alternating hand releases. Pt will benefit from continued PT to increase LE strength and endurance and transfer ability. Plan of Care: Continue with POC and progress as tolerated.      Trever Daugherty, SPT  9/6/2021

## 2021-09-06 NOTE — PROGRESS NOTES
Abdirahman Medina MD  Medical Director  [address] [facility], [address] [facility]  Tel: [phone] PROGRESS NOTE    [employer]  Admit Date: 8/25/2021  Admit Diagnosis:   Unilateral complete BKA, right, sequela ([address] [S88.111S]    Subjective     Patient seen and examined before lunch. Patient anxious about appt with Ortho surgeon tomorrow, worried that he is not progressing as he should and there will be delay in eventually obtaining his prosthesis. Reassurance given; his BKA is healing well and looks perfect. Reviewed labs from this AM, Hgb improved to 10.3, BS excellent at 86. Maintaining BP. Participating eagerly in therapies. Objective:     Current Facility-Administered Medications   Medication Dose Route Frequency    lactulose (CHRONULAC) 10 gram/15 mL solution 30 mL  30 mL Oral DAILY PRN    naloxone (NARCAN) injection 0.4 mg  0.4 mg IntraVENous PRN    acetaminophen (TYLENOL) tablet 650 mg  650 mg Oral Q6H PRN    apixaban (ELIQUIS) tablet 5 mg  5 mg Oral Q12H    atorvastatin (LIPITOR) tablet 40 mg  40 mg Oral QHS    docusate sodium (COLACE) capsule 100 mg  100 mg Oral BID    fludrocortisone (FLORINEF) tablet 0.1 mg  0.1 mg Oral DAILY    midodrine (PROAMATINE) tablet 2.5 mg  2.5 mg Oral TID WITH MEALS    nystatin (MYCOSTATIN) 100,000 unit/gram powder   Topical PRN    oxyCODONE IR (ROXICODONE) tablet 5 mg  5 mg Oral Q4H PRN    pantoprazole (PROTONIX) tablet 40 mg  40 mg Oral ACB    polyethylene glycol (MIRALAX) packet 17 g  17 g Oral DAILY    pregabalin (LYRICA) capsule 200 mg  200 mg Oral BID    traMADoL (ULTRAM) tablet 50 mg  50 mg Oral Q6H PRN    traZODone (DESYREL) tablet 50 mg  50 mg Oral QHS       Review of Systems:   Denies chest pain, shortness of breath, cough, headache, visual problems, abdominal pain, dysuria, calf pain. Pertinent positives are as noted in the HPI, ROS unremarkable otherwise. Visit Vitals  /73 (BP 1 Location: Left upper arm, BP Patient Position: At rest)   Pulse 62   Temp 97.8 °F (36.6 °C)   Resp 18   Wt 275 lb 6.4 oz (124.9 kg)   SpO2 94%   BMI 39.52 kg/m²        Physical Exam:   General: Alert, appropriately oriented. In wheelchair working with PT. HEENT: Normocephalic, EOM intact. Oral mucosa moist.   Lungs: Clear breath sounds to auscultation. Respirations even, breathing unlabored. Heart: Regular rate, irregularly irregular rhythm, S1, S2. No appreciable murmur. Abdomen: Soft, non-tender, obese and protuberant but not distended. Bowel sounds normoactive. Genitourinary: Deferred. Neuromuscular:      UE strength generally 4+/5 and symmetric. R LE strength at HF 4-/5. L LE strength at HF 4/5, distally 5-/5. Absent L LE light touch sensation to mid shin, absent proprioception. Skin/extremity: No rashes, no erythema. Right BKA ACE wrap intact. Functional Assessment:  Balance  Sitting - Static: Good (unsupported) (09/06/21 1000)  Sitting - Dynamic: Fair (occasional) (09/06/21 1000)  Standing - Static: Not tested (09/06/21 1000)  Standing - Dynamic : Impaired (08/27/21 1500)       Dinorah Gunter Fall Risk Assessment:  Dinorah Gunter Fall Risk  Mobility: Ambulates or transfers with assist devices or assistance (09/06/21 0725)  Mobility Interventions: Bed/chair exit alarm;Communicate number of staff needed for ambulation/transfer;Patient to call before getting OOB;PT Consult for mobility concerns;PT Consult for assist device competence;Strengthening exercises (ROM-active/passive); Utilize walker, cane, or other assistive device;Utilize gait belt for transfers/ambulation (09/06/21 0725)  Mentation: Alert, oriented x 3 (09/06/21 0725)  Mentation Interventions: Adequate sleep, hydration, pain control;Evaluate medications/consider consulting pharmacy; Toileting rounds;Update white board (09/05/21 0710)  Medication: Patient receiving anticonvulsants, sedatives(tranquilizers), psychotropics or hypnotics, hypoglycemics, narcotics, sleep aids, antihypertensives, laxatives, or diuretics (09/06/21 0725)  Medication Interventions: Bed/chair exit alarm;Evaluate medications/consider consulting pharmacy; Patient to call before getting OOB; Teach patient to arise slowly;Utilize gait belt for transfers/ambulation (09/06/21 0725)  Elimination: Needs assistance with toileting (09/06/21 0725)  Elimination Interventions: Call light in reach; Patient to call for help with toileting needs; Toileting schedule/hourly rounds;Urinal in reach (09/06/21 0725)  Prior Fall History: No (09/06/21 0725)  Total Score: 3 (09/06/21 0725)  Standard Fall Precautions: Yes (09/05/21 2047)  High Fall Risk: Yes (09/06/21 0725)     Ambulation:  Gait  Distance (ft): 0 Feet (ft) (09/06/21 1000)     Labs/Studies:  Recent Results (from the past 72 hour(s))   GLUCOSE, POC    Collection Time: 09/03/21  9:13 PM   Result Value Ref Range    Glucose (POC) 118 (H) 65 - 100 mg/dL    Performed by Elen Bradshaw, POC    Collection Time: 09/04/21  7:08 AM   Result Value Ref Range    Glucose (POC) 94 65 - 100 mg/dL    Performed by Julee    CBC W/O DIFF    Collection Time: 09/06/21  5:10 AM   Result Value Ref Range    WBC 4.1 (L) 4.3 - 11.1 K/uL    RBC 3.46 (L) 4.23 - 5.6 M/uL    HGB 10.2 (L) 13.6 - 17.2 g/dL    HCT 34.6 (L) 41.1 - 50.3 %    .0 (H) 79.6 - 97.8 FL    MCH 29.5 26.1 - 32.9 PG    MCHC 29.5 (L) 31.4 - 35.0 g/dL    RDW 16.0 (H) 11.9 - 14.6 %    PLATELET 207 465 - 318 K/uL    MPV 11.6 9.4 - 12.3 FL    ABSOLUTE NRBC 0.00 0.0 - 0.2 K/uL   METABOLIC PANEL, BASIC    Collection Time: 09/06/21  5:10 AM   Result Value Ref Range    Sodium 145 136 - 145 mmol/L    Potassium 4.6 3.5 - 5.1 mmol/L    Chloride 106 98 - 107 mmol/L    CO2 38 (H) 21 - 32 mmol/L    Anion gap 1 (L) 7 - 16 mmol/L    Glucose 86 65 - 100 mg/dL    BUN 22 8 - 23 MG/DL    Creatinine 1.50 0.8 - 1.5 MG/DL    GFR est AA 59 (L) >60 ml/min/1.73m2 GFR est non-AA 48 (L) >60 ml/min/1.73m2    Calcium 9.2 8.3 - 10.4 MG/DL       Assessment:     Problem List as of 9/6/2021 Date Reviewed: 8/27/2021        Codes Class Noted - Resolved    * (Principal) Unilateral complete BKA, right, sequela ([facility] ICD-10-CM: S67.699H  ICD-9-CM: 905.9  8/25/2021 - Present        Suspected sleep apnea ICD-10-CM: R29.818  ICD-9-CM: 781.99  8/20/2021 - Present        S/P BKA (below knee amputation) unilateral, right ([facility] ICD-10-CM: Z89.511  ICD-9-CM: V49.75  8/18/2021 - Present        Respiratory failure, post-operative ([facility] ICD-10-CM: E58.233  ICD-9-CM: 518.51  8/18/2021 - Present        Acute respiratory failure with hypercapnia (HCC) ICD-10-CM: J96.02  ICD-9-CM: 518.81  7/23/2021 - Present        Acute on chronic respiratory failure with hypoxia and hypercapnia (HCC) ICD-10-CM: J96.21, J96.22  ICD-9-CM: 518.84, 786.09, 799.02  7/23/2021 - Present        Acute metabolic encephalopathy CKX-13-YB: G93.41  ICD-9-CM: 348.31  7/23/2021 - Present        Hypoxia ICD-10-CM: R09.02  ICD-9-CM: 799.02  7/21/2021 - Present        Acute kidney injury superimposed on CKD ([facility] ICD-10-CM: N17.9, N18.9  ICD-9-CM: 866.00, 585.9  7/21/2021 - Present        Staphylococcus aureus bacteremia ICD-10-CM: R78.81, B95.61  ICD-9-CM: 790.7, 041.11  7/21/2021 - Present        Cellulitis ICD-10-CM: L03.90  ICD-9-CM: 682.9  7/19/2021 - Present        Systolic CHF, acute on chronic (HCC) ICD-10-CM: I50.23  ICD-9-CM: 428.23, 428.0  7/19/2021 - Present        Atrial fibrillation with RVR (HCC) ICD-10-CM: I48.91  ICD-9-CM: 427.31  7/19/2021 - Present        Morbid obesity with BMI of 40.0-44.9, adult ([facility] ICD-10-CM: E66.01, Z68.41  ICD-9-CM: 278.01, V85.41  6/11/2020 - Present        Severe obesity (BMI 35.0-35.9 with comorbidity) (HCC) (Chronic) ICD-10-CM: E66.01, Z68.35  ICD-9-CM: 278.01, V85.35  10/10/2018 - Present        Bunion of unspecified foot (Chronic) ICD-10-CM: M21.619  ICD-9-CM: 727.1  4/10/2018 - Present        Onychomycosis (Chronic) ICD-10-CM: B35.1  ICD-9-CM: 110.1  4/10/2018 - Present        Corns and callus (Chronic) ICD-10-CM: L84  ICD-9-CM: 700  4/10/2018 - Present        Mixed hyperlipidemia (Chronic) ICD-10-CM: D95.8  ICD-9-CM: 272.2  4/10/2018 - Present        Mixed axonal-demyelinating polyneuropathy (Chronic) ICD-10-CM: G62.89  ICD-9-CM: 355.9  1/5/2018 - Present        Controlled type 2 diabetes mellitus with diabetic polyneuropathy, without long-term current use of insulin (HCC) (Chronic) ICD-10-CM: E11.42  ICD-9-CM: 250.60, 357.2  1/5/2018 - Present        Type 2 diabetes mellitus with nephropathy (HCC) (Chronic) ICD-10-CM: E11.21  ICD-9-CM: 250.40, 583.81  1/5/2018 - Present        Stage 3 chronic kidney disease (HCC) (Chronic) ICD-10-CM: N18.30  ICD-9-CM: 585.3  11/22/2017 - Present        Benign hypertensive kidney disease with chronic kidney disease (Chronic) ICD-10-CM: I12.9  ICD-9-CM: 403.10  11/6/2017 - Present        CAD (coronary artery disease) (Chronic) ICD-10-CM: I25.10  ICD-9-CM: 414.00  Unknown - Present        RESOLVED: Glucose intolerance (impaired glucose tolerance) (Chronic) ICD-10-CM: R73.02  ICD-9-CM: 790.22  11/6/2017 - 11/14/2017        RESOLVED: BMI 40.0-44.9, adult (HCC) (Chronic) ICD-10-CM: Z68.41  ICD-9-CM: V85.41  11/4/2016 - 10/10/2018        RESOLVED: Hypertension ICD-10-CM: I10  ICD-9-CM: 401.9  Unknown - 10/10/2016        RESOLVED: Hypercholesterolemia ICD-10-CM: E78.00  ICD-9-CM: 272.0  Unknown - 10/10/2016        RESOLVED: Chronic kidney disease ICD-10-CM: N18.9  ICD-9-CM: 503. 9  Unknown - 10/10/2016    Overview Signed 10/10/2016 11:28 AM by Ayesha Parr MD     stage 3             RESOLVED: Neuropathy ICD-10-CM: G62.9  ICD-9-CM: 355.9  Unknown - 10/10/2016        RESOLVED: Peripheral polyneuropathy (Chronic) ICD-10-CM: G62.9  ICD-9-CM: 356.9  10/10/2016 - 1/5/2018        RESOLVED: CKD (chronic kidney disease) stage 3, GFR 30-59 ml/min (HCC) (Chronic) ICD-10-CM: N18.30  ICD-9-CM: 585.3  10/10/2016 - 11/6/2017        RESOLVED: Hyperlipidemia (Chronic) ICD-10-CM: E78.5  ICD-9-CM: 272.4  10/10/2016 - 4/10/2018        RESOLVED: Hyperkalemia ICD-10-CM: E87.5  ICD-9-CM: 276.7  10/10/2016 - 11/14/2017        RESOLVED: Essential hypertension (Chronic) ICD-10-CM: I10  ICD-9-CM: 401.9  10/10/2016 - 11/22/2017            S/p right below-knee amputation secondary to cellulitis (8/18/2021, Irma Zaidi MD)     Plan / Recommendations / Medical Decision Making:      Daily physician / PA medical management:     Unilateral complete BKA, right - NWB R LE; prosthetics involved. Will be difficult for patient to mobilize with a prosthesis given he lacks sensation and proprioception in the right foot, has obese body habitus, chronic respiratory failure and CHF.     Pain control - stable, mild-to-moderate joint symptoms intermittently, reasonably well controlled by PRN meds. Will require regular pain assessment and comprehensive pain management. Has chronic mixed axonal-demyelinating polyneuropathy; on Lyrica 200mg BID. Denies residual limb pain but has tramadol and oxycodone ordered PRN.     Hypotension - BP fluctuating, managed medically. Has been hypotensive and is on Florinef and midodrine. Dehydration thought to be contributing, also possibly diuretics. -8/27 /63, HR 94 this AM; patient denies lightheadedness during therapies or while resting in room  -8/28 95/53; asymptomatic, HR 97; on Florinef and midodrine; up to 117/65 max  -8/30 /72, HR 85 this AM  -8/31 VSS  -9/1 124/64 HR 60  -9/3 /71, HR 79  -9/4 VSS  -9/5 /79, HR 80  -9/6 /73, HR 62     Acute-on-chronic respiratory failure - continue 3L O2, wean to 2L if possible. Encourage IS.    -8/31 sats reportedly 78%, pt asymptomatic.  Rechecked 98% 3L  -9/1 encouraged use of IS     Atrial fibrillation - continue Eliquis 5mg BID; rate controlled. Toprol XL has been held due to hypotensive at times.  -9/1 NSR this AM  -9/3 IIR this AM, rate controlled  -9/4 IIR in PT, rate controlled  -9/5 IIR today, rate controlled  -9/6 IIR with additional respiratory irregularity, rate controlled     Acute-on-chronic anemia - anemia of chronic kidney disease and post-op due to blood loss. Hgb trending down; follow closely. No evidence of active bleeding.  -8/27 CBC ordered for tomorrow  -8/28 Hgb 6.6 from 8.9 (8/24); no sign of active bleeding source: stool was not melenic, little drainage from amputation; hold Eliquis and recheck in AM. If true value, will need to transfer downtown for a blood transfusion  -8/30 recheck H&H with Hgb 8.5; Eliquis restarted (yesterday)  -9/2 Hgb 9.3 improved  -9/5 will check CBC tomorrow  -9/6 Hgb 10.2, continues to rebound     Chronic kidney disease, CKD3 - improving, monitor. Creatine 1.49 from 1.57.  -8/27 BMP ordered for tomorrow  -8/28 Cr 1.51, stable  -9/2 Cr 1.58, BUN 21; baseline  -9/5 will check BMP tomorrow  -9/6 Cr 1.50, BUN 22 - normal     Diabetes mellitus - HgbA1c 6.7% (7/13/2021), moderate glycemic control. Will require daily, close fasting glucose monitoring and medication adjustment to optimize glycemic control in setting of acute illness and hospitalization. Takes Glucotrol 5mg at home. Currently on SSI; add Glucotrol ? .  -8/26 BS controlled  -8/27 BS this , all BS since admission <145, most in low 100s  -8/28 BS controlled  -8/30  this AM, all BS yesterday <110  -well controlled  -9/2 BS well controlled; diet controlled  -9/3  this AM, yesterday all values <120  -9/4 BS 94 this AM, all BS <120 since 8/30; d/c POC glucose  -9/6 BS 86 in BMP this AM     Pneumonia prophylaxis - incentive spirometer every hour while awake.     DVT risk / DVT prophylaxis - will require daily physician / PA exam to assess for signs and symptoms as patient is at increased risk for of thromboembolism. Mobilize as tolerated.  Sequential pneumatic compression devices (SCDs) when in bed; thigh-high or knee-high thromboembolic deterrent hose when out of bed. On Eliquis.     CAD - continue Eliquis and statin.     GI prophylaxis - resume PPI. At times may need additional antacids, Maalox prn.     General skin care / wound prevention - monitor BKA  incision and general skin wound status daily per staff and physician / PA. At risk for failure. Will require 24/7 rehab nursing. Keep BKA incision and left plantar foot wound clean and dry, reinforce dressing PRN and ice to area for comfort; he is to f/u with Dr Elvia Dalton for staple removal.   -8/25 will cont wound vac until f/u with Dr. Pendleton  -8/28 wound looked good at time of wound vac check per notes  -8/30 wound vac in place and functioning, skin surrounding without erythema or rash  -9/2 wound vac will remain intact until f/u with Dr Plascencia Shown next week  -9/3 Maximino Huff wound vac has auto-terminated, incision remains covered with vac dressing  -9/4 wound vac and dressing removed, incision intact with sutures, no erythema, induration or drainage; wound cleaned with wound cleanser and redressed with Telfa, 4x4s, Kerlix and ACE  -9/5 BKA incision inspected and redressed; left foot plantar wound inspected, stable, Meplex replaced     Urinary retention / neurogenic bladder - schedule voids q 6-8 hrs. Check post-void residual every shift; in and out catheter if post-void residual is more than 400ml.     Bowel program - at risk for constipation as a side effect of opioids, other medications, impaired mobility, etc. MiraLAX daily for regularity, Ivis-Colace for stool softener.  PRN MOM, bisacodyl suppository or tablets for constipation.  -8/27 constipated, no BM with daily gentle agents (MiraLAX, Colace), will try more aggressively with lactulose after therapies; may require MOM, bisacodyl suppository or disimpaction  -8/28 excellent results yesterday  -8/30 large, normal BM this AM; continue daily MiraLAX and stool softener         Time spent was 25 minutes with over 1/2 in direct patient care/examination, consultation and coordination of care. Signed By: Connor Mishra PA-C    September 6, 2021      Physician Assistant with [facility]  Eugenia Cano MD, Medical Director

## 2021-09-06 NOTE — PROGRESS NOTES
PHYSICAL THERAPY DAILY NOTE  Time In: 4317  Time Out: 8302  Patient Seen For: AM;Balance activities; Therapeutic exercise;Transfer training; Wheelchair mobility    Subjective: Pt was in a good mood and stated he was glad for it to be Monday to be able to workout. Objective: Other (comment) (fall)  GROSS ASSESSMENT Daily Assessment     NT       COGNITION Daily Assessment    Decreased insight, impulsive       BED/MAT MOBILITY Daily Assessment    Rolling Right : 0 (Not tested)  Rolling Left : 0 (Not tested)  Supine to Sit : 0 (Not tested)  Sit to Supine : 0 (Not tested)       TRANSFERS Daily Assessment   Attempted a SPT with RW from w/c to mat raised to 28\" (height of bed/car seat), pt able to stand with Mod A x2, but showed difficulty pivoting foot to turn to mat. Able to sit about USP across, then scoot the remaining distance. Sliding board transfer from mat back to w/c at level surface. Verbal cues needed for technique. Transfer Type: Lateral with transfer board  Transfer Assistance : 5 (Stand-by assistance)  Car Transfers: Not tested       GAIT Daily Assessment    Amount of Assistance: 0 (Not tested)  Distance (ft): 0 Feet (ft)       STEPS or STAIRS Daily Assessment    Steps/Stairs Ambulated (#): 0  Level of Assist : 0 (Not tested)       BALANCE Daily Assessment   LE exercises performed in seated position. BUE support required to maintain upright posture. Sitting - Static: Good (unsupported)  Sitting - Dynamic: Fair (occasional)  Standing - Static: Not tested       [facility] MOBILITY Daily Assessment    Able to Propel (ft): 50 feet  Curbs/Ramps Assist Required (FIM Score): 0 (Not tested)  Wheelchair Setup Assist Required : 5 (Supervision/setup)  Wheelchair Management: Manages left brake;Manages right brake;Manages left footrest;Manages right footrest       LOWER EXTREMITY EXERCISES Daily Assessment     Performed LE exercises as follows:       All exercises performed with Red theraband:  SEATED EXERCISES Sets Reps Comments   Hip Flexion 2 15    Long Arc Quads 2 15 Verbal cues needed to maintain upright posture   Hip Adduction/Ball Squeeze 1 15 With 5 sec LIBIA hold   Hip Abduction 2 15 Verbal cues needed to maintain upright posture   Hamstring Curls  2 15            Vital Signs: Sp02 on 2 L throughout session, no SOB observed. Pain level: no pain indicated   Pain location:NA  Pain interventions:NA    Patient education: The need for SPT due to height of bed/car. And the importance of performing controlled motions to help build strength. Interdisciplinary Communication: Communicated with OT about pts progress. Pt left in room in w/c with call bell and necessities within reach. Assessment: Pt continues to show improved LE strength and endurance as demonstrated by increased sets of LE exercises with red resistance band and no SOB. Pt will benefit from continued PT services to improve transfer ability due to w/c to bed/car height differences. Plan of Care: Continue with POC and progress as tolerated.      Isabela Woody, SPT  9/6/2021

## 2021-09-06 NOTE — PROGRESS NOTES
Time In 0740   Time Out 0813     Mobility   Score Comments   Supine to Sit 4: Supervision or touching A S   Transfer Assist 4: Supervision or touching A Transfer Type: LPT  Equipment: Sliding Board   Comments: Cueing     Activities of Daily Living    Score Comments   Eating 6: Independent I   Bathing 4: Supervision or touching A Type of Shower: Shower  Position: Unsupported Sitting   Adaptive  Equipment: N/A  Comments: Cueing   Upper Body  Dressing 5: S/U or clean-up assist Items Applied: Pullover  Position: Unsupported Sitting  Comments: S/U   Lower Body Dressing 4: Supervision or touching A Items Applied: Underwear and Elastic pants  Position: Unsupported Sitting  Adaptive Equipment: N/A  Comments: Cueing   Donning/[place] Footwear 3: Partial/Moderate A Items Applied: Socks and Shoes with fasteners   Adaptive Equipment: Sock Aide  Comments: A with [facility] with dressing     Pt was in bed and agreeable to tx. Pt's performance with ADL is reflected in above chart. Pt was left eating breakfast with all needs within reach.      Can Troncoso OT   9/6/2021

## 2021-09-06 NOTE — PROGRESS NOTES
OT Daily Note  Time In 1300   Time Out 1356     Pain: Patient had no complaint of pain. Functional Mobility      Score Comments   Sit to Supine 4: Supervision or touching A S   Transfer Assist 4: Supervision or touching A Transfer Type: LPT  Equipment: Sliding Board   Comments: Cueing; s/u   Pt need mod A to remove pants. Pt did not demonstrate good use of strategies. Cognition   Engaged with Skip-Oniel with pt demonstrating good learning of novel task needing occasional cueing, but no different than a typical person learning the new task. Pt demonstrated good strategic play. Education   Body mechanics with transfers     Plan: Continue with POC. Pt was left in bed with all needs within reach.      Erendira Appl OTR/L  9/6/2021

## 2021-09-06 NOTE — PROGRESS NOTES
Hourly rounds completed this shift, will give report to oncoming nurse. Pt resting in bed, call light within reach. Dressings c/d/i.

## 2021-09-06 NOTE — PROGRESS NOTES
Problem: Falls - Risk of  Goal: *Absence of Falls  Description: Document [place] Fall Risk and appropriate interventions in the flowsheet. Outcome: Progressing Towards Goal  Note: Fall Risk Interventions:  Mobility Interventions: Communicate number of staff needed for ambulation/transfer, OT consult for ADLs, Patient to call before getting OOB, PT Consult for mobility concerns, PT Consult for assist device competence, Utilize walker, cane, or other assistive device, Strengthening exercises (ROM-active/passive)    Mentation Interventions: Adequate sleep, hydration, pain control, Evaluate medications/consider consulting pharmacy, Toileting rounds, Update white board    Medication Interventions: Evaluate medications/consider consulting pharmacy, Patient to call before getting OOB, Teach patient to arise slowly, Assess postural VS orthostatic hypotension    Elimination Interventions: Call light in reach, Elevated toilet seat, Patient to call for help with toileting needs, Stay With Me (per policy), Urinal in reach              Problem: Pressure Injury - Risk of  Goal: *Prevention of pressure injury  Description: Document Julio Cesar Scale and appropriate interventions in the flowsheet. Outcome: Progressing Towards Goal  Note: Pressure Injury Interventions:  Sensory Interventions: Assess changes in LOC, Assess need for specialty bed, Avoid rigorous massage over bony prominences, Chair cushion, Check visual cues for pain, Discuss PT/OT consult with provider, Float heels, Keep linens dry and wrinkle-free, Maintain/enhance activity level, Minimize linen layers, Monitor skin under medical devices, Pad between skin to skin, Suspension boots, Turn and reposition approx.  every two hours (pillows and wedges if needed)    Moisture Interventions: Absorbent underpads, Minimize layers, Moisture barrier, Offer toileting Q_hr    Activity Interventions: Assess need for specialty bed, Chair cushion, Increase time out of bed, Pressure redistribution bed/mattress(bed type), PT/OT evaluation    Mobility Interventions: Assess need for specialty bed, Chair cushion, Float heels, HOB 30 degrees or less, Pressure redistribution bed/mattress (bed type), PT/OT evaluation, Suspension boots    Nutrition Interventions: Document food/fluid/supplement intake    Friction and Shear Interventions: Apply protective barrier, creams and emollients, Feet elevated on foot rest, HOB 30 degrees or less, Minimize layers

## 2021-09-06 NOTE — PROGRESS NOTES
OT Daily Note  Time In 1032   Time Out 1115     Pain: Patient had no complaint of pain. Strengthening   Pt completed the following exercises to promote core strength and activity tolerance for integration into ADL:  Exercise Sets Reps Comments   Woodchops 1 15 3 lb medicine ball   Twists 1 15 3 lb medicine ball   Cross Crawls 1 15    Lean and Reach 1 15 3 lb medicine ball      Pt completed 5 minutes on the ergometer frontwards and backwards with moderate resistance to increase UB strength and activity tolerance for integration into functional mobility. Pt demonstrated good activity tolerance. Pt completed prolonged shoulder stabilization task to promote UB strength and activity tolerance for integration into functional mobility. Education   Purpose of exercises     Interdisciplinary Communication: BEN Jane on pt's performance    Plan: Continue with POC. Pt was left with GIOVANI Menon.      Michael Zapata OTR/L  9/6/2021

## 2021-09-06 NOTE — PROGRESS NOTES
Problem: Falls - Risk of  Goal: *Absence of Falls  Description: Document Rhonda Cavanaugh Fall Risk and appropriate interventions in the flowsheet. Outcome: Progressing Towards Goal  Note: Fall Risk Interventions:  Mobility Interventions: Bed/chair exit alarm, Communicate number of staff needed for ambulation/transfer, Patient to call before getting OOB, PT Consult for mobility concerns, PT Consult for assist device competence, Strengthening exercises (ROM-active/passive), Utilize walker, cane, or other assistive device, Utilize gait belt for transfers/ambulation    Mentation Interventions: Adequate sleep, hydration, pain control, Evaluate medications/consider consulting pharmacy, Toileting rounds, Update white board    Medication Interventions: Bed/chair exit alarm, Evaluate medications/consider consulting pharmacy, Patient to call before getting OOB, Teach patient to arise slowly, Utilize gait belt for transfers/ambulation    Elimination Interventions: Call light in reach, Patient to call for help with toileting needs, Toileting schedule/hourly rounds, Urinal in reach              Problem: Pressure Injury - Risk of  Goal: *Prevention of pressure injury  Description: Document Julio Ecsar Scale and appropriate interventions in the flowsheet. Outcome: Progressing Towards Goal  Note: Pressure Injury Interventions:  Sensory Interventions: Assess changes in LOC, Keep linens dry and wrinkle-free, Minimize linen layers, Pressure redistribution bed/mattress (bed type), Turn and reposition approx.  every two hours (pillows and wedges if needed)    Moisture Interventions: Absorbent underpads, Minimize layers, Moisture barrier, Offer toileting Q_hr    Activity Interventions: Assess need for specialty bed, Chair cushion, Increase time out of bed, Pressure redistribution bed/mattress(bed type), PT/OT evaluation    Mobility Interventions: Assess need for specialty bed, HOB 30 degrees or less, Pressure redistribution bed/mattress (bed type), PT/OT evaluation    Nutrition Interventions: Document food/fluid/supplement intake, Offer support with meals,snacks and hydration    Friction and Shear Interventions: Apply protective barrier, creams and emollients, Feet elevated on foot rest, HOB 30 degrees or less, Minimize layers

## 2021-09-07 PROCEDURE — 97535 SELF CARE MNGMENT TRAINING: CPT

## 2021-09-07 PROCEDURE — 97110 THERAPEUTIC EXERCISES: CPT

## 2021-09-07 PROCEDURE — 99232 SBSQ HOSP IP/OBS MODERATE 35: CPT | Performed by: PHYSICAL MEDICINE & REHABILITATION

## 2021-09-07 PROCEDURE — 74011250637 HC RX REV CODE- 250/637: Performed by: PHYSICIAN ASSISTANT

## 2021-09-07 PROCEDURE — 65310000000 HC RM PRIVATE REHAB

## 2021-09-07 PROCEDURE — 97530 THERAPEUTIC ACTIVITIES: CPT

## 2021-09-07 RX ADMIN — DOCUSATE SODIUM 100 MG: 100 CAPSULE, LIQUID FILLED ORAL at 21:25

## 2021-09-07 RX ADMIN — FLUDROCORTISONE ACETATE 0.1 MG: 0.1 TABLET ORAL at 08:26

## 2021-09-07 RX ADMIN — PANTOPRAZOLE SODIUM 40 MG: 40 TABLET, DELAYED RELEASE ORAL at 06:19

## 2021-09-07 RX ADMIN — APIXABAN 5 MG: 5 TABLET, FILM COATED ORAL at 21:25

## 2021-09-07 RX ADMIN — TRAZODONE HYDROCHLORIDE 50 MG: 50 TABLET ORAL at 21:25

## 2021-09-07 RX ADMIN — DOCUSATE SODIUM 100 MG: 100 CAPSULE, LIQUID FILLED ORAL at 08:26

## 2021-09-07 RX ADMIN — MIDODRINE HYDROCHLORIDE 2.5 MG: 5 TABLET ORAL at 12:10

## 2021-09-07 RX ADMIN — PREGABALIN 200 MG: 100 CAPSULE ORAL at 21:25

## 2021-09-07 RX ADMIN — POLYETHYLENE GLYCOL 3350 17 G: 17 POWDER, FOR SOLUTION ORAL at 08:26

## 2021-09-07 RX ADMIN — ATORVASTATIN CALCIUM 40 MG: 40 TABLET, FILM COATED ORAL at 21:25

## 2021-09-07 RX ADMIN — APIXABAN 5 MG: 5 TABLET, FILM COATED ORAL at 08:26

## 2021-09-07 RX ADMIN — PREGABALIN 200 MG: 100 CAPSULE ORAL at 08:26

## 2021-09-07 NOTE — PROGRESS NOTES
Time In 0739   Time Out 0823     Mobility   Score Comments   Supine to Sit 4: Supervision or touching A S   Transfer Assist 4: Supervision or touching A Transfer Type: LPT  Equipment: N/A   Comments: CGA     Activities of Daily Living    Score Comments   Eating 6: Independent I   Bathing 4: Supervision or touching A Type of Shower: Bath Pack  Position: Supported sitting   Adaptive  Equipment: N/A  Comments: Cueing; CGA at times   Upper Body  Dressing 5: S/U or clean-up assist Items Applied: Pullover  Position: Supported Sitting  Comments: S/U   Lower Body Dressing 4: Supervision or touching A Items Applied: Underwear and Elastic pants  Position: Supported sitting  Adaptive Equipment: N/A  Comments: CGA; A to adjust waist   Donning/[place] Footwear 3: Partial/Moderate A Items Applied: Socks and Shoes with fasteners   Adaptive Equipment: Sock Aide  Comments: A for shoe   Toilet Transfer 4: Supervision or touching A Transfer Type: LPT  Equipment: Sliding Board   Comments: [address] Hygiene 4: Supervision or touching A Output: BM  Comments: Cueing   Education  Body mechanics for transfers     Pt was in bed and agreeable to tx. Pt's performance with ADL is reflected in above chart. Pt had poor safety awareness with movement today and needing max cueing to properly position. Pt was demonstrating improved sequencing with dressing today. Pt was left in room with all needs within reach.      David Blue OT   9/7/2021

## 2021-09-07 NOTE — PROGRESS NOTES
Faxed updated clinicals to insurance as requested. Pt to transport to follow up today with Ortho with transport set for 1300. CM to continue to follow and monitor for any further needs. Update 0906: DME sliding board ordered from [address] [place] [address] and to be delivered Friday 9/10 at pt bedside.

## 2021-09-07 NOTE — PROGRESS NOTES
OT Daily Note  Time In 0928   Time Out 1010     Pain: Patient had no complaint of pain. Functional Mobility   Pt required cueing for brake management of w/c. Pt was able to manage leg rests independently. Self-Care   Pt brushed teeth, brushed hair, and shaved I, but required increased time. Strengthening   Pt completed the following exercises with 10 lb alexander to promote UB strength, activity tolerance, and shoulder stabilization for integration into functional mobility:  Exercise Reps Comments   Protraction/Retraction 20    Abduction/Adduction 20    Circles 40 1/2 CW, 1/2 CCW   Vs 20    Pt demonstrated fair quality during all exercises. He required cueing for pace at times. Education   Brake management     Plan: Continue with POC. Pt was left in room with all needs within reach.      Anoop Banda OTR/L  9/7/2021

## 2021-09-07 NOTE — PROGRESS NOTES
Problem: Falls - Risk of  Goal: *Absence of Falls  Description: Document Cornell Taty Fall Risk and appropriate interventions in the flowsheet. Outcome: Progressing Towards Goal  Note: Fall Risk Interventions:  Mobility Interventions: Bed/chair exit alarm, Communicate number of staff needed for ambulation/transfer, Patient to call before getting OOB, Strengthening exercises (ROM-active/passive), Utilize walker, cane, or other assistive device, Utilize gait belt for transfers/ambulation    Mentation Interventions: Adequate sleep, hydration, pain control, Door open when patient unattended    Medication Interventions: Bed/chair exit alarm, Evaluate medications/consider consulting pharmacy, Patient to call before getting OOB, Teach patient to arise slowly, Utilize gait belt for transfers/ambulation    Elimination Interventions: Call light in reach, Patient to call for help with toileting needs, Stay With Me (per policy), Toilet paper/wipes in reach, Toileting schedule/hourly rounds, Urinal in reach              Problem: Pressure Injury - Risk of  Goal: *Prevention of pressure injury  Description: Document Julio Cesar Scale and appropriate interventions in the flowsheet.   Outcome: Progressing Towards Goal  Note: Pressure Injury Interventions:  Sensory Interventions: Assess changes in LOC, Assess need for specialty bed, Check visual cues for pain, Keep linens dry and wrinkle-free, Minimize linen layers    Moisture Interventions: Absorbent underpads, Minimize layers, Moisture barrier, Offer toileting Q_hr    Activity Interventions: Assess need for specialty bed, Increase time out of bed, Pressure redistribution bed/mattress(bed type), PT/OT evaluation    Mobility Interventions: Assess need for specialty bed, HOB 30 degrees or less, Pressure redistribution bed/mattress (bed type), PT/OT evaluation    Nutrition Interventions: Document food/fluid/supplement intake, Offer support with meals,snacks and hydration    Friction and Shear Interventions: Apply protective barrier, creams and emollients, Feet elevated on foot rest, HOB 30 degrees or less, Minimize layers

## 2021-09-07 NOTE — PROGRESS NOTES
Dressing removed L plantar wound, no drainage observed. Site cleaned with wound cleanser, unable to pack iodoform gauze per order, mepilex applied. Mepilex LLE removed, site clear.

## 2021-09-07 NOTE — PROGRESS NOTES
PHYSICAL THERAPY DAILY NOTE  Time In: 1030  Time Out: 1463  Patient Seen For: AM;Balance activities; Therapeutic exercise;Transfer training; Wheelchair mobility    Subjective: Pt stated he's \"anxious or something, you mentioned the doctor and I shivered\" in reference to his afternoon appointment with the surgeon. Objective: Other (comment) (fall)  GROSS ASSESSMENT Daily Assessment     Increased time required for w/c management       COGNITION Daily Assessment    Decreased insight, impulsive behaviors (especially with transfers)       BED/MAT MOBILITY Daily Assessment   Verbal cues needed to assist pt with rolling into side-lying and prone. Rolling Right : 5 (Stand-by assistance)  Rolling Left : 5 (Stand-by assistance)  Supine to Sit : 5 (Supervision)  Sit to Supine : 5 (Supervision)       TRANSFERS Daily Assessment   Total assist required for sit<>stand due to 2x Leydi. Pt performed standing trials with Elbert boot on LLE and RW for BUE support. Transfer Type: Lateral with transfer board  Transfer Assistance : 5 (Supervision/setup)  Sit to Stand Assistance:  Total assistance  Car Transfers: Not tested       GAIT Daily Assessment    Amount of Assistance: 0 (Not tested)  Distance (ft): 0 Feet (ft)       STEPS or STAIRS Daily Assessment    Steps/Stairs Ambulated (#): 0  Level of Assist : 0 (Not tested)       BALANCE Daily Assessment    Sitting - Static: Good (unsupported)  Sitting - Dynamic: Fair (occasional)  Standing - Static: Fair  Standing - Dynamic : Impaired       WHEELCHAIR MOBILITY Daily Assessment    Able to Propel (ft): 40 feet  Curbs/Ramps Assist Required (FIM Score): 0 (Not tested)  Wheelchair Setup Assist Required : 5 (Supervision/setup)  Wheelchair Management: Manages left brake;Manages right brake;Manages left footrest;Manages right footrest       LOWER EXTREMITY EXERCISES Daily Assessment     LE exercises performed as followed:      SUPINE EXERCISES Sets Reps Comments   Hip Flexion 1 15    Hip Abduction 1 15 With red resistance band and 5 sec LIBIA hold   Short Arc Quad 1 20    Straight Leg Raise 1 20          Side-lying Hip extension stretch 1 30 sec    Sidelying hip extension 1 10    Sidelying abduction 1 10 With support to prevent hips from rolling back         Prone lying    10 min   Prone hip extension 2 5    Prone Knee flexion/extension 1 15            Standing Trials:   2 x 1:30 min trials with RW and Elbert boot on LLE. Pt required Leydi x 2 to stand and CGA-Leydi with verbal cues to maintain upright posture. Pt attempted to perform a hop in each standing trial.  Pt able to perform squat but was unable to lift LLE with Elbert boot off the ground. 2 x Sliding board w/c <>bed transfers performed in room with bed at 23\" to mimic home setting. Pt able to complete the uneven transfer with SBA and verbal cues to safety. Vital Signs: SpO2 started at 1.5L and put on 2.0L when prone lying. Pain level: no pain indicated    Pain location: NA  Pain interventions: NA    Patient education: Talked with pt about doctor visit today and purpose of uneven transfers    Interdisciplinary Communication: Worked with nursing during session to change wound dressing on LLE as pt expressed concerns about it. Assessment: Pt was is showing improved strength as demonstrated by being able to perform standing trials with a RW for BUE support and Elbert boot on LLE with Leydi x. Pt will benefit from continued PT services to further improve LLE strength and transfer ability. Plan of Care: Continue with POC and progress as able.      Giselle Kevin, SPT  9/7/2021

## 2021-09-07 NOTE — PROGRESS NOTES
Problem: Falls - Risk of  Goal: *Absence of Falls  Description: Document Wilmer Moctezuma Fall Risk and appropriate interventions in the flowsheet. Outcome: Progressing Towards Goal  Note: Fall Risk Interventions:  Mobility Interventions: Bed/chair exit alarm, Communicate number of staff needed for ambulation/transfer, Patient to call before getting OOB, Strengthening exercises (ROM-active/passive), Utilize walker, cane, or other assistive device    Mentation Interventions: Adequate sleep, hydration, pain control, Bed/chair exit alarm, Door open when patient unattended, Evaluate medications/consider consulting pharmacy    Medication Interventions: Bed/chair exit alarm, Evaluate medications/consider consulting pharmacy, Patient to call before getting OOB, Teach patient to arise slowly    Elimination Interventions: Call light in reach, Patient to call for help with toileting needs, Toilet paper/wipes in reach, Urinal in reach              Problem: Pressure Injury - Risk of  Goal: *Prevention of pressure injury  Description: Document Julio Cesar Scale and appropriate interventions in the flowsheet. Outcome: Progressing Towards Goal  Note: Pressure Injury Interventions:  Sensory Interventions: Assess changes in LOC, Keep linens dry and wrinkle-free, Minimize linen layers, Pressure redistribution bed/mattress (bed type), Turn and reposition approx.  every two hours (pillows and wedges if needed)    Moisture Interventions: Absorbent underpads, Minimize layers, Moisture barrier, Offer toileting Q_hr    Activity Interventions: Assess need for specialty bed, Chair cushion, Increase time out of bed, Pressure redistribution bed/mattress(bed type), PT/OT evaluation    Mobility Interventions: Assess need for specialty bed, HOB 30 degrees or less, Pressure redistribution bed/mattress (bed type), PT/OT evaluation    Nutrition Interventions: Document food/fluid/supplement intake, Offer support with meals,snacks and hydration    Friction and Shear Interventions: Apply protective barrier, creams and emollients, Feet elevated on foot rest, HOB 30 degrees or less, Minimize layers

## 2021-09-07 NOTE — PROGRESS NOTES
[facility] here to transport pt to Dr. Charlie Avila office at [facility].

## 2021-09-07 NOTE — PROGRESS NOTES
Pt returned to room 129 from appointment with Dr. Kristy De Santiago at [facility].

## 2021-09-07 NOTE — PROGRESS NOTES
Hourly rounds completed this shift, will give report to oncoming nurse. Pt resting in bed, call light within reach. Dressing c/d/i R BKA site changed by Dr. Penny Pena today, dressing placed L plantar by Dr. Penny Pena today c/d/i.

## 2021-09-07 NOTE — PROGRESS NOTES
Cinthia Fournier MD  Medical Director  [address] [facility], [address] [facility]  Tel: [phone] [facility] PROGRESS NOTE    Angella Pineda Pack  Admit Date: 8/25/2021  Admit Diagnosis:   Unilateral complete BKA, right, sequela ([address] [S88.111S]    Subjective     Patient seen and examined. Feels well. No complaints. Denies pain. Has some anxiety about f/u with Dr Trudi Shelby today    Objective:     Current Facility-Administered Medications   Medication Dose Route Frequency    lactulose (CHRONULAC) 10 gram/15 mL solution 30 mL  30 mL Oral DAILY PRN    naloxone (NARCAN) injection 0.4 mg  0.4 mg IntraVENous PRN    acetaminophen (TYLENOL) tablet 650 mg  650 mg Oral Q6H PRN    apixaban (ELIQUIS) tablet 5 mg  5 mg Oral Q12H    atorvastatin (LIPITOR) tablet 40 mg  40 mg Oral QHS    docusate sodium (COLACE) capsule 100 mg  100 mg Oral BID    fludrocortisone (FLORINEF) tablet 0.1 mg  0.1 mg Oral DAILY    midodrine (PROAMATINE) tablet 2.5 mg  2.5 mg Oral TID WITH MEALS    nystatin (MYCOSTATIN) 100,000 unit/gram powder   Topical PRN    oxyCODONE IR (ROXICODONE) tablet 5 mg  5 mg Oral Q4H PRN    pantoprazole (PROTONIX) tablet 40 mg  40 mg Oral ACB    polyethylene glycol (MIRALAX) packet 17 g  17 g Oral DAILY    pregabalin (LYRICA) capsule 200 mg  200 mg Oral BID    traMADoL (ULTRAM) tablet 50 mg  50 mg Oral Q6H PRN    traZODone (DESYREL) tablet 50 mg  50 mg Oral QHS       Review of Systems:   Denies chest pain, shortness of breath, cough, headache, visual problems, abdominal pain, dysuria, calf pain. Pertinent positives are as noted in the HPI, ROS unremarkable otherwise. Visit Vitals  BP (!) 130/57 (BP 1 Location: Right upper arm)   Pulse 88   Temp 97.7 °F (36.5 °C)   Resp 18   Wt 280 lb 9.6 oz (127.3 kg)   SpO2 98%   BMI 40.26 kg/m²        Physical Exam:   General: Alert and age appropriately oriented. No acute cardiorespiratory distress.  Fidgety this morning   HEENT: Normocephalic, no scleral icterus. Oral mucosa moist without cyanosis. Lungs: Clear to auscultation bilaterally. Respiration even and unlabored. Heart: Regular rate w/ irreg rhythm, S1, S2. No murmurs, clicks, rub or gallops. Abdomen: Soft, non-tender, not distended. Bowel sounds normoactive. No organomegaly. obese   Genitourinary: Deferred. Neuromuscular:      Hip  Flexion RLE 4-, 4 on the left  Distally 5- on the left  Right knee -10 extension   Skin/extremity: No rashes, no erythema. No calf tenderness B LE. Wound covered. Functional Assessment:          Balance  Sitting - Static: Good (unsupported) (09/06/21 1200)  Sitting - Dynamic: Fair (occasional) (09/06/21 1200)  Standing - Static: Fair (09/06/21 1200)  Standing - Dynamic : Impaired (08/27/21 1500)                     Karla Paul Fall Risk Assessment:  Karla Paul Fall Risk  Mobility: Ambulates or transfers with assist devices or assistance (09/07/21 0724)  Mobility Interventions: Bed/chair exit alarm;Communicate number of staff needed for ambulation/transfer;Patient to call before getting OOB; Strengthening exercises (ROM-active/passive); Utilize walker, cane, or other assistive device;Utilize gait belt for transfers/ambulation (09/07/21 0724)  Mentation: Alert, oriented x 3 (09/07/21 0724)  Mentation Interventions: Adequate sleep, hydration, pain control;Door open when patient unattended (09/07/21 0724)  Medication: Patient receiving anticonvulsants, sedatives(tranquilizers), psychotropics or hypnotics, hypoglycemics, narcotics, sleep aids, antihypertensives, laxatives, or diuretics (09/07/21 0724)  Medication Interventions: Bed/chair exit alarm;Evaluate medications/consider consulting pharmacy; Patient to call before getting OOB; Teach patient to arise slowly;Utilize gait belt for transfers/ambulation (09/07/21 0724)  Elimination: Needs assistance with toileting (09/07/21 0724)  Elimination Interventions: Call light in reach; Patient to call for help with toileting needs;Stay With Me (per policy); Toilet paper/wipes in reach; Toileting schedule/hourly rounds;Urinal in reach (09/07/21 0724)  Prior Fall History: No (09/07/21 0724)  Total Score: 3 (09/07/21 0724)  Standard Fall Precautions: Yes (09/05/21 2047)  High Fall Risk: Yes (09/07/21 0724)     Speech Assessment:         Ambulation:  Gait  Distance (ft): 0 Feet (ft) (09/06/21 1200)     Labs/Studies:  Recent Results (from the past 72 hour(s))   CBC W/O DIFF    Collection Time: 09/06/21  5:10 AM   Result Value Ref Range    WBC 4.1 (L) 4.3 - 11.1 K/uL    RBC 3.46 (L) 4.23 - 5.6 M/uL    HGB 10.2 (L) 13.6 - 17.2 g/dL    HCT 34.6 (L) 41.1 - 50.3 %    .0 (H) 79.6 - 97.8 FL    MCH 29.5 26.1 - 32.9 PG    MCHC 29.5 (L) 31.4 - 35.0 g/dL    RDW 16.0 (H) 11.9 - 14.6 %    PLATELET 011 627 - 425 K/uL    MPV 11.6 9.4 - 12.3 FL    ABSOLUTE NRBC 0.00 0.0 - 0.2 K/uL   METABOLIC PANEL, BASIC    Collection Time: 09/06/21  5:10 AM   Result Value Ref Range    Sodium 145 136 - 145 mmol/L    Potassium 4.6 3.5 - 5.1 mmol/L    Chloride 106 98 - 107 mmol/L    CO2 38 (H) 21 - 32 mmol/L    Anion gap 1 (L) 7 - 16 mmol/L    Glucose 86 65 - 100 mg/dL    BUN 22 8 - 23 MG/DL    Creatinine 1.50 0.8 - 1.5 MG/DL    GFR est AA 59 (L) >60 ml/min/1.73m2    GFR est non-AA 48 (L) >60 ml/min/1.73m2    Calcium 9.2 8.3 - 10.4 MG/DL   GLUCOSE, POC    Collection Time: 09/06/21  8:56 PM   Result Value Ref Range    Glucose (POC) 114 (H) 65 - 100 mg/dL    Performed by Jude        Assessment:     Problem List as of 9/7/2021 Date Reviewed: 8/27/2021        Codes Class Noted - Resolved    * (Principal) Unilateral complete BKA, right, sequela ([facility] ICD-10-CM: B90.791U  ICD-9-CM: 905.9  8/25/2021 - Present        Suspected sleep apnea ICD-10-CM: R29.818  ICD-9-CM: 781.99  8/20/2021 - Present        S/P BKA (below knee amputation) unilateral, right [facility]) ICD-10-CM: Z89.511  ICD-9-CM: V49.75  8/18/2021 - Present        Respiratory failure, post-operative ([facility] ICD-10-CM: J01.045  ICD-9-CM: 518.51  8/18/2021 - Present        Acute respiratory failure with hypercapnia (HCC) ICD-10-CM: J96.02  ICD-9-CM: 518.81  7/23/2021 - Present        Acute on chronic respiratory failure with hypoxia and hypercapnia (HCC) ICD-10-CM: J96.21, J96.22  ICD-9-CM: 518.84, 786.09, 799.02  7/23/2021 - Present        Acute metabolic encephalopathy KEQ-52-LV: G93.41  ICD-9-CM: 348.31  7/23/2021 - Present        Hypoxia ICD-10-CM: R09.02  ICD-9-CM: 799.02  7/21/2021 - Present        Acute kidney injury superimposed on CKD [facility]) ICD-10-CM: N17.9, N18.9  ICD-9-CM: 866.00, 585.9  7/21/2021 - Present        Staphylococcus aureus bacteremia ICD-10-CM: R78.81, B95.61  ICD-9-CM: 790.7, 041.11  7/21/2021 - Present        Cellulitis ICD-10-CM: L03.90  ICD-9-CM: 682.9  7/19/2021 - Present        Systolic CHF, acute on chronic (HCC) ICD-10-CM: I50.23  ICD-9-CM: 428.23, 428.0  7/19/2021 - Present        Atrial fibrillation with RVR (HCC) ICD-10-CM: I48.91  ICD-9-CM: 427.31  7/19/2021 - Present        Morbid obesity with BMI of 40.0-44.9, adult ([facility] ICD-10-CM: E66.01, Z68.41  ICD-9-CM: 278.01, V85.41  6/11/2020 - Present        Severe obesity (BMI 35.0-35.9 with comorbidity) (HCC) (Chronic) ICD-10-CM: E66.01, Z68.35  ICD-9-CM: 278.01, V85.35  10/10/2018 - Present        Bunion of unspecified foot (Chronic) ICD-10-CM: M21.619  ICD-9-CM: 727.1  4/10/2018 - Present        Onychomycosis (Chronic) ICD-10-CM: B35.1  ICD-9-CM: 110.1  4/10/2018 - Present        Corns and callus (Chronic) ICD-10-CM: L84  ICD-9-CM: 700  4/10/2018 - Present        Mixed hyperlipidemia (Chronic) ICD-10-CM: W27.8  ICD-9-CM: 272.2  4/10/2018 - Present        Mixed axonal-demyelinating polyneuropathy (Chronic) ICD-10-CM: G62.89  ICD-9-CM: 355.9  1/5/2018 - Present        Controlled type 2 diabetes mellitus with diabetic polyneuropathy, without long-term current use of insulin (HCC) (Chronic) ICD-10-CM: E11.42  ICD-9-CM: 250.60, 357.2  1/5/2018 - Present        Type 2 diabetes mellitus with nephropathy (HCC) (Chronic) ICD-10-CM: E11.21  ICD-9-CM: 250.40, 583.81  1/5/2018 - Present        Stage 3 chronic kidney disease ([facility] (Chronic) ICD-10-CM: N18.30  ICD-9-CM: 585.3  11/22/2017 - Present        Benign hypertensive kidney disease with chronic kidney disease (Chronic) ICD-10-CM: I12.9  ICD-9-CM: 403.10  11/6/2017 - Present        CAD (coronary artery disease) (Chronic) ICD-10-CM: I25.10  ICD-9-CM: 414.00  Unknown - Present        RESOLVED: Glucose intolerance (impaired glucose tolerance) (Chronic) ICD-10-CM: R73.02  ICD-9-CM: 790.22  11/6/2017 - 11/14/2017        RESOLVED: BMI 40.0-44.9, adult (HCC) (Chronic) ICD-10-CM: Z68.41  ICD-9-CM: V85.41  11/4/2016 - 10/10/2018        RESOLVED: Hypertension ICD-10-CM: I10  ICD-9-CM: 401.9  Unknown - 10/10/2016        RESOLVED: Hypercholesterolemia ICD-10-CM: E78.00  ICD-9-CM: 272.0  Unknown - 10/10/2016        RESOLVED: Chronic kidney disease ICD-10-CM: N18.9  ICD-9-CM: 872. 9  Unknown - 10/10/2016    Overview Signed 10/10/2016 11:28 AM by Elizabeth Rosado MD     stage 3             RESOLVED: Neuropathy ICD-10-CM: G62.9  ICD-9-CM: 355.9  Unknown - 10/10/2016        RESOLVED: Peripheral polyneuropathy (Chronic) ICD-10-CM: G62.9  ICD-9-CM: 356.9  10/10/2016 - 1/5/2018        RESOLVED: CKD (chronic kidney disease) stage 3, GFR 30-59 ml/min (HCC) (Chronic) ICD-10-CM: N18.30  ICD-9-CM: 585.3  10/10/2016 - 11/6/2017        RESOLVED: Hyperlipidemia (Chronic) ICD-10-CM: E78.5  ICD-9-CM: 272.4  10/10/2016 - 4/10/2018        RESOLVED: Hyperkalemia ICD-10-CM: E87.5  ICD-9-CM: 276.7  10/10/2016 - 11/14/2017        RESOLVED: Essential hypertension (Chronic) ICD-10-CM: I10  ICD-9-CM: 401.9  10/10/2016 - 11/22/2017              S/p right below-knee amputation secondary to cellulitis (8/18/2021, Nomi Sweeney Maxcine Skiff, MD)     Plan / Recommendations / Medical Decision Making:      Daily physician / PA medical management:     Unilateral complete BKA, right - NWB R LE; prosthetics involved. Will be difficult for patient to mobilize with a prosthesis given he lacks sensation and proprioception in the right foot, has obese body habitus, chronic respiratory failure and CHF.     Pain control - stable, mild-to-moderate joint symptoms intermittently, reasonably well controlled by PRN meds. Will require regular pain assessment and comprehensive pain management. Has chronic mixed axonal-demyelinating polyneuropathy; on Lyrica 200mg BID. Denies residual limb pain but has tramadol and oxycodone ordered PRN.     Hypotension - BP fluctuating, managed medically. Has been hypotensive and is on Florinef and midodrine. Dehydration thought to be contributing, also possibly diuretics. -8/27 /63, HR 94 this AM; patient denies lightheadedness during therapies or while resting in room  -8/28 95/53; asymptomatic, HR 97; on Florinef and midodrine; up to 117/65 max  -8/30 /72, HR 85 this AM  -8/31 VSS  -9/1 124/64 HR 60  -9/3 /71, HR 79  -9/4 VSS  -9/5 /79, HR 80  -9/6 /73, HR 62  -9/7 /57 this a.m HR 88     Acute-on-chronic respiratory failure - continue 3L O2, wean to 2L if possible. Encourage IS.    -8/31 sats reportedly 78%, pt asymptomatic. Rechecked 98% 3L  -9/1 encouraged use of IS     Atrial fibrillation - continue Eliquis 5mg BID; rate controlled. Toprol XL has been held due to hypotensive at times.  -9/1 NSR this AM  -9/3 IIR this AM, rate controlled  -9/4 IIR in PT, rate controlled  -9/5 IIR today, rate controlled  -9/6 IIR with additional respiratory irregularity, rate controlled     Acute-on-chronic anemia - anemia of chronic kidney disease and post-op due to blood loss. Hgb trending down; follow closely.  No evidence of active bleeding.  -8/27 CBC ordered for tomorrow  -8/28 Hgb 6.6 from 8.9 (8/24); no sign of active bleeding source: stool was not melenic, little drainage from amputation; hold Eliquis and recheck in AM. If true value, will need to transfer downtown for a blood transfusion  -8/30 recheck H&H with Hgb 8.5; Eliquis restarted (yesterday)  -9/2 Hgb 9.3 improved  -9/5 will check CBC tomorrow  -9/6 Hgb 10.2, continues to rebound     Chronic kidney disease, CKD3 - improving, monitor. Creatine 1.49 from 1.57.  -8/27 BMP ordered for tomorrow  -8/28 Cr 1.51, stable  -9/2 Cr 1.58, BUN 21; baseline  -9/5 will check BMP tomorrow  -9/6 Cr 1.50, BUN 22 - normal     Diabetes mellitus - HgbA1c 6.7% (7/13/2021), moderate glycemic control. Will require daily, close fasting glucose monitoring and medication adjustment to optimize glycemic control in setting of acute illness and hospitalization. Takes Glucotrol 5mg at home. Currently on SSI; add Glucotrol ? .  -8/26 BS controlled  -8/27 BS this , all BS since admission <145, most in low 100s  -8/28 BS controlled  -8/30  this AM, all BS yesterday <110  -well controlled  -9/2 BS well controlled; diet controlled  -9/3  this AM, yesterday all values <120  -9/4 BS 94 this AM, all BS <120 since 8/30; d/c POC glucose  -9/6 BS 86 in BMP this AM     Pneumonia prophylaxis - incentive spirometer every hour while awake.     DVT risk / DVT prophylaxis - will require daily physician / PA exam to assess for signs and symptoms as patient is at increased risk for of thromboembolism. Mobilize as tolerated. Sequential pneumatic compression devices (SCDs) when in bed; thigh-high or knee-high thromboembolic deterrent hose when out of bed. On Eliquis.     CAD - continue Eliquis and statin.     GI prophylaxis - resume PPI. At times may need additional antacids, Maalox prn.     General skin care / wound prevention - monitor BKA  incision and general skin wound status daily per staff and physician / PA. At risk for failure. Will require 24/7 rehab nursing.  Keep BKA incision and left plantar foot wound clean and dry, reinforce dressing PRN and ice to area for comfort; he is to f/u with Dr Mitzy Andrade for staple removal.   -8/25 will cont wound vac until f/u with Dr. Pendleton  -8/28 wound looked good at time of wound vac check per notes  -8/30 wound vac in place and functioning, skin surrounding without erythema or rash  -9/2 wound vac will remain intact until f/u with Dr Nusrat Stanton next week  -9/3 Reymundo Shields wound vac has auto-terminated, incision remains covered with vac dressing  -9/4 wound vac and dressing removed, incision intact with sutures, no erythema, induration or drainage; wound cleaned with wound cleanser and redressed with Telfa, 4x4s, Kerlix and ACE  -9/5 BKA incision inspected and redressed; left foot plantar wound inspected, stable, Meplex replaced  -9/7 dressing not removed this a.m; will see Dr Nusrat Stanton today.      Urinary retention / neurogenic bladder - schedule voids q 6-8 hrs. Check post-void residual every shift; in and out catheter if post-void residual is more than 400ml.     Bowel program - at risk for constipation as a side effect of opioids, other medications, impaired mobility, etc. MiraLAX daily for regularity, Ivis-Colace for stool softener. PRN MOM, bisacodyl suppository or tablets for constipation.  -8/27 constipated, no BM with daily gentle agents (MiraLAX, Colace), will try more aggressively with lactulose after therapies; may require MOM, bisacodyl suppository or disimpaction  -8/28 excellent results yesterday  -8/30 large, normal BM this AM; continue daily MiraLAX and stool softener       Time spent was 25 minutes with over 1/2 in direct patient care/examination, consultation and coordination of care.      Signed By: Altagracia Brooks MD     September 7, 2021

## 2021-09-07 NOTE — PROGRESS NOTES
Nutrition Note (Disaster Mode)      Recommendations/Plan: Continue diet. Consider add NCS feature to diet d/t h/o elevated HgA1C     Assessment:   Pt unavailable at time of RD call. Out of room for MD visit. Pt eating % of 19 recorded meals during both [facility] and [facility] admissions      Reason for Visit: Initial, RD nutrition re-screen/LOS    ADULT DIET Regular; Low Sodium (2 gm); 1200 ml         Diagnosis: No nutrition diagnosis at this time. Monitoring and Evaluation: Patient will be monitored per nutrition standards of care. Consult Dietitian if nutrition intervention essential to patient care is needed.      Discharge Planning: No needs    Marcellus Alba RD on 9/7/2021 at 3:51 PM    Contact: [phone]

## 2021-09-08 PROCEDURE — 99232 SBSQ HOSP IP/OBS MODERATE 35: CPT | Performed by: PHYSICAL MEDICINE & REHABILITATION

## 2021-09-08 PROCEDURE — 65310000000 HC RM PRIVATE REHAB

## 2021-09-08 PROCEDURE — 74011250637 HC RX REV CODE- 250/637: Performed by: PHYSICIAN ASSISTANT

## 2021-09-08 PROCEDURE — 97530 THERAPEUTIC ACTIVITIES: CPT

## 2021-09-08 PROCEDURE — 97535 SELF CARE MNGMENT TRAINING: CPT

## 2021-09-08 PROCEDURE — 97110 THERAPEUTIC EXERCISES: CPT

## 2021-09-08 RX ADMIN — DOCUSATE SODIUM 100 MG: 100 CAPSULE, LIQUID FILLED ORAL at 08:42

## 2021-09-08 RX ADMIN — DOCUSATE SODIUM 100 MG: 100 CAPSULE, LIQUID FILLED ORAL at 21:05

## 2021-09-08 RX ADMIN — PREGABALIN 200 MG: 100 CAPSULE ORAL at 21:05

## 2021-09-08 RX ADMIN — ATORVASTATIN CALCIUM 40 MG: 40 TABLET, FILM COATED ORAL at 21:05

## 2021-09-08 RX ADMIN — MIDODRINE HYDROCHLORIDE 2.5 MG: 5 TABLET ORAL at 08:41

## 2021-09-08 RX ADMIN — PREGABALIN 200 MG: 100 CAPSULE ORAL at 08:40

## 2021-09-08 RX ADMIN — PANTOPRAZOLE SODIUM 40 MG: 40 TABLET, DELAYED RELEASE ORAL at 05:32

## 2021-09-08 RX ADMIN — APIXABAN 5 MG: 5 TABLET, FILM COATED ORAL at 08:41

## 2021-09-08 RX ADMIN — TRAZODONE HYDROCHLORIDE 50 MG: 50 TABLET ORAL at 21:06

## 2021-09-08 RX ADMIN — MIDODRINE HYDROCHLORIDE 2.5 MG: 5 TABLET ORAL at 16:06

## 2021-09-08 RX ADMIN — APIXABAN 5 MG: 5 TABLET, FILM COATED ORAL at 21:05

## 2021-09-08 RX ADMIN — FLUDROCORTISONE ACETATE 0.1 MG: 0.1 TABLET ORAL at 08:41

## 2021-09-08 RX ADMIN — MIDODRINE HYDROCHLORIDE 2.5 MG: 5 TABLET ORAL at 11:08

## 2021-09-08 NOTE — PROGRESS NOTES
PHYSICAL THERAPY DAILY NOTE  Time In: 4658  Time Out: 1125  Patient Seen For: AM;Balance activities; Therapeutic exercise;Transfer training    Subjective: Pt stated his appointment with the surgeon was \"better than good yesterday\". Objective: Other (comment) (fall)  GROSS ASSESSMENT Daily Assessment     Increased time required for w/c management       COGNITION Daily Assessment    Impulsive, decreased insight       BED/MAT MOBILITY Daily Assessment    Rolling Right : 0 (Not tested)  Rolling Left : 0 (Not tested)  Supine to Sit : 0 (Not tested)  Sit to Supine : 0 (Not tested)       TRANSFERS Daily Assessment    Transfer Type: Lateral with transfer board  Transfer Assistance : 5 (Supervision/setup)  Car Transfers: Not tested       GAIT Daily Assessment    Amount of Assistance: 0 (Not tested)  Distance (ft): 0 Feet (ft)       STEPS or STAIRS Daily Assessment    Steps/Stairs Ambulated (#): 0  Level of Assist : 0 (Not tested)       BALANCE Daily Assessment   BUE support required when performing LE exercises in seated position. Verbal cues needed to maintain posture Sitting - Static: Good (unsupported)  Sitting - Dynamic: Fair (occasional)       WHEELCHAIR MOBILITY Daily Assessment   Pt given new w/c, able to manage arm rests and foot rests easier than previous w/c Able to Propel (ft): 40 feet  Curbs/Ramps Assist Required (FIM Score): 0 (Not tested)  Wheelchair Setup Assist Required : 5 (Supervision/setup)  Wheelchair Management: Manages left brake;Manages right brake;Manages left armrest;Manages right armrest;Manages left footrest;Manages right footrest       LOWER EXTREMITY EXERCISES Daily Assessment     LE exercises performed on edge of mat as follows:       All seated exercises performed with red theraband:     SEATED EXERCISES Sets Reps Comments   Hip Flexion 2 10 BUE support required   Long Arc Quads 2 10    Hip Adduction/Ball Squeeze 2 10 With 5 sec LIBIA hold   Hip Abduction  2 10    Hamstring Curls  2 10 Vital Signs: SpO2 on 2.0L, no SOB noted    Pain level: no pain indicated  Pain location:NA  Pain interventions:NA    Patient education: The importance of slowing movements/transfers down for safety     Interdisciplinary Communication: Communicated with OT about new d/c plans and family training needs. Assessment: Pt is showing improvement in BLE strength and endurance as indicated by increased sets of exercises with improved posture and no SOB. Pt will benefit from continued PT to improve dynamic sitting balance for improved transfer ability. Plan of Care: Continue with POC and progress as able.      Almaz Keith, SPT  9/8/2021

## 2021-09-08 NOTE — PROGRESS NOTES
Team conference today with discharge changed to Tuesday, 9/14. Pt has follow up with Ortho at 9/14/2021 at 0900. Pt will d/c home after apt. BSN, CM met with pt and updated on d/c date. HH needs include RN/PT/OT/aide/CM. Pt and family would like [facility]. Pt has portable ramp. DME needs now include sliding board drop arm bedside commode, w/c, and possibly hospital bed. W/c being arranged by therapy with [facility]. Sliding board ordered with Raul. WIll add hospital bed and drop arm BSC after family training tomorrow to confirm need. Contacted daughter, Yola Chen, and updated on d/c date. Family training set for daughter and spouse on 9/9 at 5. Daughter has lots of concerns about taking care of pt at home and aware of pt limited options for STR is unlikely but can apply if needed. Daughter would like to see how training goes before making decision. Daughter aware LTC costs. Daughter would like to start medicaid application. Contacted [employer] for assistance. Pt does not own a home and only has 1 car with lease. Pt makes $1200 per month in SS benefits. Will arrange transport to apt and home if needed after family completes family training. Discussed possible need for hospital bed and decision to be made after family training. Will follow up.  CM to continue to follow and monitor for any further needs.

## 2021-09-08 NOTE — PROGRESS NOTES
Denisa Sarmiento MD  Medical Director  [address] [facility], [address] [facility]  Tel: [phone] Kwasi Frandy PROGRESS NOTE    Manpreet Rivas  Admit Date: 8/25/2021  Admit Diagnosis:   Unilateral complete BKA, right, sequela ([address] [S88.111S]    Subjective     Patient seen and examined. Feeling well. No  Pain. Eating well. No cp/sob/n/v. Very happy with his appt with Dr Fatemeh Braga yesterday. Says staples will come out in f/u next week. Objective:     Current Facility-Administered Medications   Medication Dose Route Frequency    lactulose (CHRONULAC) 10 gram/15 mL solution 30 mL  30 mL Oral DAILY PRN    naloxone (NARCAN) injection 0.4 mg  0.4 mg IntraVENous PRN    acetaminophen (TYLENOL) tablet 650 mg  650 mg Oral Q6H PRN    apixaban (ELIQUIS) tablet 5 mg  5 mg Oral Q12H    atorvastatin (LIPITOR) tablet 40 mg  40 mg Oral QHS    docusate sodium (COLACE) capsule 100 mg  100 mg Oral BID    fludrocortisone (FLORINEF) tablet 0.1 mg  0.1 mg Oral DAILY    midodrine (PROAMATINE) tablet 2.5 mg  2.5 mg Oral TID WITH MEALS    nystatin (MYCOSTATIN) 100,000 unit/gram powder   Topical PRN    oxyCODONE IR (ROXICODONE) tablet 5 mg  5 mg Oral Q4H PRN    pantoprazole (PROTONIX) tablet 40 mg  40 mg Oral ACB    polyethylene glycol (MIRALAX) packet 17 g  17 g Oral DAILY    pregabalin (LYRICA) capsule 200 mg  200 mg Oral BID    traMADoL (ULTRAM) tablet 50 mg  50 mg Oral Q6H PRN    traZODone (DESYREL) tablet 50 mg  50 mg Oral QHS       Review of Systems:   Denies chest pain, shortness of breath, cough, headache, visual problems, abdominal pain, dysuria, calf pain. Pertinent positives are as noted in the HPI, ROS unremarkable otherwise. Visit Vitals  /75   Pulse 89   Temp 97.8 °F (36.6 °C)   Resp 20   Wt 280 lb 9.6 oz (127.3 kg)   SpO2 100%   BMI 40.26 kg/m²        Physical Exam:   General: Alert and age appropriately oriented.   No acute cardiorespiratory distress. HEENT: Normocephalic, no scleral icterus. Oral mucosa moist without cyanosis. Lungs: Clear to auscultation bilaterally. Respiration even and unlabored. Heart: Regular rate and rhythm, S1, S2. No murmurs, clicks, rub or gallops. Abdomen: Soft, non-tender, not distended. Bowel sounds normoactive. No organomegaly. obese   Genitourinary: Deferred. Neuromuscular:      Right knee -10 extension  Hip  Flexion RLE 4-, 4 on the left  Distally 5- on the left   Skin/extremity: No rashes, no erythema. No calf tenderness LLE. Inc  Covered/ r BKA                                                                              Functional Assessment:          Balance  Sitting - Static: Good (unsupported) (09/07/21 1200)  Sitting - Dynamic: Fair (occasional) (09/07/21 1200)  Standing - Static: Fair (09/07/21 1200)  Standing - Dynamic : Impaired (09/07/21 1200)                     [facility] Fall Risk Assessment:  [facility]  Mobility: Ambulates or transfers with assist devices or assistance (09/07/21 1951)  Mobility Interventions: Bed/chair exit alarm; Patient to call before getting OOB (09/07/21 1951)  Mentation: Alert, oriented x 3 (09/07/21 1951)  Mentation Interventions: Adequate sleep, hydration, pain control;Door open when patient unattended (09/07/21 0724)  Medication: Patient receiving anticonvulsants, sedatives(tranquilizers), psychotropics or hypnotics, hypoglycemics, narcotics, sleep aids, antihypertensives, laxatives, or diuretics (09/07/21 1951)  Medication Interventions: Bed/chair exit alarm; Teach patient to arise slowly (09/07/21 1951)  Elimination: Needs assistance with toileting (09/07/21 1951)  Elimination Interventions: Call light in reach (09/07/21 1951)  Prior Fall History: No (09/07/21 1951)  Total Score: 3 (09/07/21 1951)  Standard Fall Precautions: Yes (09/05/21 2047)  High Fall Risk: Yes (09/07/21 1951)     Speech Assessment:         Ambulation:  Gait  Distance (ft): 0 Feet (ft) (09/07/21 1200)     Labs/Studies:  Recent Results (from the past 72 hour(s))   CBC W/O DIFF    Collection Time: 09/06/21  5:10 AM   Result Value Ref Range    WBC 4.1 (L) 4.3 - 11.1 K/uL    RBC 3.46 (L) 4.23 - 5.6 M/uL    HGB 10.2 (L) 13.6 - 17.2 g/dL    HCT 34.6 (L) 41.1 - 50.3 %    .0 (H) 79.6 - 97.8 FL    MCH 29.5 26.1 - 32.9 PG    MCHC 29.5 (L) 31.4 - 35.0 g/dL    RDW 16.0 (H) 11.9 - 14.6 %    PLATELET 464 049 - 404 K/uL    MPV 11.6 9.4 - 12.3 FL    ABSOLUTE NRBC 0.00 0.0 - 0.2 K/uL   METABOLIC PANEL, BASIC    Collection Time: 09/06/21  5:10 AM   Result Value Ref Range    Sodium 145 136 - 145 mmol/L    Potassium 4.6 3.5 - 5.1 mmol/L    Chloride 106 98 - 107 mmol/L    CO2 38 (H) 21 - 32 mmol/L    Anion gap 1 (L) 7 - 16 mmol/L    Glucose 86 65 - 100 mg/dL    BUN 22 8 - 23 MG/DL    Creatinine 1.50 0.8 - 1.5 MG/DL    GFR est AA 59 (L) >60 ml/min/1.73m2    GFR est non-AA 48 (L) >60 ml/min/1.73m2    Calcium 9.2 8.3 - 10.4 MG/DL   GLUCOSE, POC    Collection Time: 09/06/21  8:56 PM   Result Value Ref Range    Glucose (POC) 114 (H) 65 - 100 mg/dL    Performed by Jude        Assessment:     Problem List as of 9/8/2021 Date Reviewed: 8/27/2021        Codes Class Noted - Resolved    * (Principal) Unilateral complete BKA, right, sequela ([facility] ICD-10-CM: O45.385G  ICD-9-CM: 905.9  8/25/2021 - Present        Suspected sleep apnea ICD-10-CM: R29.818  ICD-9-CM: 781.99  8/20/2021 - Present        S/P BKA (below knee amputation) unilateral, right ([facility] ICD-10-CM: Z89.511  ICD-9-CM: V49.75  8/18/2021 - Present        Respiratory failure, post-operative ([facility] ICD-10-CM: W78.222  ICD-9-CM: 518.51  8/18/2021 - Present        Acute respiratory failure with hypercapnia (HCC) ICD-10-CM: J96.02  ICD-9-CM: 518.81  7/23/2021 - Present        Acute on chronic respiratory failure with hypoxia and hypercapnia (HCC) ICD-10-CM: J96.21, J96.22  ICD-9-CM: 518.84, 786.09, 799.02  7/23/2021 - Present        Acute metabolic encephalopathy [address]: G93.41  ICD-9-CM: 348.31  7/23/2021 - Present        Hypoxia ICD-10-CM: R09.02  ICD-9-CM: 799.02  7/21/2021 - Present        Acute kidney injury superimposed on CKD [facility]) ICD-10-CM: N17.9, N18.9  ICD-9-CM: 866.00, 585.9  7/21/2021 - Present        Staphylococcus aureus bacteremia ICD-10-CM: R78.81, B95.61  ICD-9-CM: 790.7, 041.11  7/21/2021 - Present        Cellulitis ICD-10-CM: L03.90  ICD-9-CM: 682.9  7/19/2021 - Present        Systolic CHF, acute on chronic [facility]) ICD-10-CM: I50.23  ICD-9-CM: 428.23, 428.0  7/19/2021 - Present        Atrial fibrillation with RVR (HCC) ICD-10-CM: I48.91  ICD-9-CM: 427.31  7/19/2021 - Present        Morbid obesity with BMI of 40.0-44.9, adult [facility]) ICD-10-CM: E66.01, Z68.41  ICD-9-CM: 278.01, V85.41  6/11/2020 - Present        Severe obesity (BMI 35.0-35.9 with comorbidity) (HCC) (Chronic) ICD-10-CM: E66.01, Z68.35  ICD-9-CM: 278.01, V85.35  10/10/2018 - Present        Bunion of unspecified foot (Chronic) ICD-10-CM: M21.619  ICD-9-CM: 727.1  4/10/2018 - Present        Onychomycosis (Chronic) ICD-10-CM: B35.1  ICD-9-CM: 110.1  4/10/2018 - Present        Corns and callus (Chronic) ICD-10-CM: L84  ICD-9-CM: 700  4/10/2018 - Present        Mixed hyperlipidemia (Chronic) ICD-10-CM: U07.8  ICD-9-CM: 272.2  4/10/2018 - Present        Mixed axonal-demyelinating polyneuropathy (Chronic) ICD-10-CM: G62.89  ICD-9-CM: 355.9  1/5/2018 - Present        Controlled type 2 diabetes mellitus with diabetic polyneuropathy, without long-term current use of insulin (HCC) (Chronic) ICD-10-CM: E11.42  ICD-9-CM: 250.60, 357.2  1/5/2018 - Present        Type 2 diabetes mellitus with nephropathy (HCC) (Chronic) ICD-10-CM: E11.21  ICD-9-CM: 250.40, 583.81  1/5/2018 - Present        Stage 3 chronic kidney disease (HCC) (Chronic) ICD-10-CM: N18.30  ICD-9-CM: 585.3  11/22/2017 - Present        Benign hypertensive kidney disease with chronic kidney disease (Chronic) ICD-10-CM: I12.9  ICD-9-CM: 403.10  11/6/2017 - Present        CAD (coronary artery disease) (Chronic) ICD-10-CM: I25.10  ICD-9-CM: 414.00  Unknown - Present        RESOLVED: Glucose intolerance (impaired glucose tolerance) (Chronic) ICD-10-CM: R73.02  ICD-9-CM: 790.22  11/6/2017 - 11/14/2017        RESOLVED: BMI 40.0-44.9, adult (HCC) (Chronic) ICD-10-CM: Z68.41  ICD-9-CM: V85.41  11/4/2016 - 10/10/2018        RESOLVED: Hypertension ICD-10-CM: I10  ICD-9-CM: 401.9  Unknown - 10/10/2016        RESOLVED: Hypercholesterolemia ICD-10-CM: E78.00  ICD-9-CM: 272.0  Unknown - 10/10/2016        RESOLVED: Chronic kidney disease ICD-10-CM: N18.9  ICD-9-CM: 915. 9  Unknown - 10/10/2016    Overview Signed 10/10/2016 11:28 AM by Varun García MD     stage 3             RESOLVED: Neuropathy ICD-10-CM: G62.9  ICD-9-CM: 355.9  Unknown - 10/10/2016        RESOLVED: Peripheral polyneuropathy (Chronic) ICD-10-CM: G62.9  ICD-9-CM: 356.9  10/10/2016 - 1/5/2018        RESOLVED: CKD (chronic kidney disease) stage 3, GFR 30-59 ml/min (HCC) (Chronic) ICD-10-CM: N18.30  ICD-9-CM: 585.3  10/10/2016 - 11/6/2017        RESOLVED: Hyperlipidemia (Chronic) ICD-10-CM: E78.5  ICD-9-CM: 272.4  10/10/2016 - 4/10/2018        RESOLVED: Hyperkalemia ICD-10-CM: E87.5  ICD-9-CM: 276.7  10/10/2016 - 11/14/2017        RESOLVED: Essential hypertension (Chronic) ICD-10-CM: I10  ICD-9-CM: 401.9  10/10/2016 - 11/22/2017            S/p right below-knee amputation secondary to cellulitis (8/18/2021, Lucia Mathis MD)     Plan / Recommendations / Medical Decision Making:      Daily physician / PA medical management:     Unilateral complete BKA, right - NWB R LE; prosthetics involved.  Will be difficult for patient to mobilize with a prosthesis given he lacks sensation and proprioception in the right foot, has obese body habitus, chronic respiratory failure and CHF.     Pain control - stable, mild-to-moderate joint symptoms intermittently, reasonably well controlled by PRN meds. Will require regular pain assessment and comprehensive pain management. Has chronic mixed axonal-demyelinating polyneuropathy; on Lyrica 200mg BID. Denies residual limb pain but has tramadol and oxycodone ordered PRN.     Hypotension - BP fluctuating, managed medically. Has been hypotensive and is on Florinef and midodrine. Dehydration thought to be contributing, also possibly diuretics. -8/27 /63, HR 94 this AM; patient denies lightheadedness during therapies or while resting in room  -8/28 95/53; asymptomatic, HR 97; on Florinef and midodrine; up to 117/65 max  -8/30 /72, HR 85 this AM  -8/31 VSS  -9/1 124/64 HR 60  -9/3 /71, HR 79  -9/4 VSS  -9/5 /79, HR 80  -9/6 /73, HR 62  -9/7 /57 this a.m HR 88  -9/8 /75, improved     Acute-on-chronic respiratory failure - continue 3L O2, wean to 2L if possible. Encourage IS.    -8/31 sats reportedly 78%, pt asymptomatic. Rechecked 98% 3L  -9/1 encouraged use of IS     Atrial fibrillation - continue Eliquis 5mg BID; rate controlled. Toprol XL has been held due to hypotensive at times.  -9/1 NSR this AM  -9/3 IIR this AM, rate controlled  -9/4 IIR in PT, rate controlled  -9/5 IIR today, rate controlled  -9/6 IIR with additional respiratory irregularity, rate controlled  -9/8 nsr this a.m.     Acute-on-chronic anemia - anemia of chronic kidney disease and post-op due to blood loss. Hgb trending down; follow closely. No evidence of active bleeding.  -8/27 CBC ordered for tomorrow  -8/28 Hgb 6.6 from 8.9 (8/24); no sign of active bleeding source: stool was not melenic, little drainage from amputation; hold Eliquis and recheck in AM. If true value, will need to transfer downtown for a blood transfusion  -8/30 recheck H&H with Hgb 8.5; Eliquis restarted (yesterday)  -9/2 Hgb 9.3 improved  -9/5 will check CBC tomorrow  -9/6 Hgb 10.2, continues to rebound  -9/8 f/u 9/10      Chronic kidney disease, CKD3 - improving, monitor. Creatine 1.49 from 1.57.  -8/27 BMP ordered for tomorrow  -8/28 Cr 1.51, stable  -9/2 Cr 1.58, BUN 21; baseline  -9/5 will check BMP tomorrow  -9/6 Cr 1.50, BUN 22 - normal     Diabetes mellitus - HgbA1c 6.7% (7/13/2021), moderate glycemic control. Will require daily, close fasting glucose monitoring and medication adjustment to optimize glycemic control in setting of acute illness and hospitalization. Takes Glucotrol 5mg at home. Currently on SSI; add Glucotrol ? .  -8/26 BS controlled  -8/27 BS this , all BS since admission <145, most in low 100s  -8/28 BS controlled  -8/30  this AM, all BS yesterday <110  -well controlled  -9/2 BS well controlled; diet controlled  -9/3  this AM, yesterday all values <120  -9/4 BS 94 this AM, all BS <120 since 8/30; d/c POC glucose  -9/6 BS 86 in BMP this AM     Pneumonia prophylaxis - incentive spirometer every hour while awake.     DVT risk / DVT prophylaxis - will require daily physician / PA exam to assess for signs and symptoms as patient is at increased risk for of thromboembolism. Mobilize as tolerated. Sequential pneumatic compression devices (SCDs) when in bed; thigh-high or knee-high thromboembolic deterrent hose when out of bed. On Eliquis.     CAD - continue Eliquis and statin.     GI prophylaxis - resume PPI. At times may need additional antacids, Maalox prn.     General skin care / wound prevention - monitor BKA  incision and general skin wound status daily per staff and physician / PA. At risk for failure. Will require 24/7 rehab nursing.  Keep BKA incision and left plantar foot wound clean and dry, reinforce dressing PRN and ice to area for comfort; he is to f/u with Dr Lidya Russo for staple removal.   -8/25 will cont wound vac until f/u with Dr. Pendleton  -8/28 wound looked good at time of wound vac check per notes  -8/30 wound vac in place and functioning, skin surrounding without erythema or rash  -9/2 wound vac will remain intact until f/u with Dr Jonas Shelton next week  -9/3 Prevena wound vac has auto-terminated, incision remains covered with vac dressing  -9/4 wound vac and dressing removed, incision intact with sutures, no erythema, induration or drainage; wound cleaned with wound cleanser and redressed with Telfa, 4x4s, Kerlix and ACE  -9/5 BKA incision inspected and redressed; left foot plantar wound inspected, stable, Meplex replaced  -9/7 dressing not removed this a.m; will see Dr Jonas Shelton today. 9/8 Dr Jonas Shelton pleased with inc and residual limb. He will continue off loading left foot ulcer with Crow walker boot; healing; need stump      Urinary retention / neurogenic bladder - schedule voids q 6-8 hrs. Check post-void residual every shift; in and out catheter if post-void residual is more than 400ml.     Bowel program - at risk for constipation as a side effect of opioids, other medications, impaired mobility, etc. MiraLAX daily for regularity, Ivis-Colace for stool softener. PRN MOM, bisacodyl suppository or tablets for constipation.  -8/27 constipated, no BM with daily gentle agents (MiraLAX, Colace), will try more aggressively with lactulose after therapies; may require MOM, bisacodyl suppository or disimpaction  -8/28 excellent results yesterday  -8/30 large, normal BM this AM; continue daily MiraLAX and stool softener     Time spent was 25 minutes with over 1/2 in direct patient care/examination, consultation and coordination of care.      Signed By: Yesi Gonzalez MD     September 8, 2021

## 2021-09-08 NOTE — PROGRESS NOTES
Problem: Falls - Risk of  Goal: *Absence of Falls  Description: Document [facility] Fall Risk and appropriate interventions in the flowsheet. Outcome: Progressing Towards Goal  Note: Fall Risk Interventions:  Mobility Interventions: Bed/chair exit alarm, OT consult for ADLs, Patient to call before getting OOB, PT Consult for mobility concerns, Utilize walker, cane, or other assistive device    Mentation Interventions: Adequate sleep, hydration, pain control, Evaluate medications/consider consulting pharmacy, Increase mobility, Reorient patient, Toileting rounds, Update white board    Medication Interventions: Evaluate medications/consider consulting pharmacy, Patient to call before getting OOB, Teach patient to arise slowly    Elimination Interventions: Call light in reach, Patient to call for help with toileting needs, Urinal in reach              Problem: Patient Education: Go to Patient Education Activity  Goal: Patient/Family Education  Outcome: Progressing Towards Goal     Problem: Pressure Injury - Risk of  Goal: *Prevention of pressure injury  Description: Document Julio Cesar Scale and appropriate interventions in the flowsheet.   Outcome: Progressing Towards Goal  Note: Pressure Injury Interventions:  Sensory Interventions: Assess changes in LOC, Avoid rigorous massage over bony prominences, Check visual cues for pain, Discuss PT/OT consult with provider, Keep linens dry and wrinkle-free, Minimize linen layers, Pressure redistribution bed/mattress (bed type)    Moisture Interventions: Absorbent underpads, Apply protective barrier, creams and emollients, Minimize layers, Moisture barrier, Offer toileting Q_hr    Activity Interventions: Chair cushion, Increase time out of bed, Pressure redistribution bed/mattress(bed type), PT/OT evaluation    Mobility Interventions: Pressure redistribution bed/mattress (bed type), PT/OT evaluation    Nutrition Interventions: Document food/fluid/supplement intake    Friction and Shear Interventions: Apply protective barrier, creams and emollients, Feet elevated on foot rest, HOB 30 degrees or less, Minimize layers                Problem: Patient Education: Go to Patient Education Activity  Goal: Patient/Family Education  Outcome: Progressing Towards Goal     Problem: Patient Education: Go to Patient Education Activity  Goal: Patient/Family Education  Description: Patient / Patient's family will verbalize understanding of PT safety recommendations, demonstrate appropriate assist for current functional mobility status, safety, and home exercise program by time of discharge.    Outcome: Progressing Towards Goal

## 2021-09-08 NOTE — PROGRESS NOTES
PHYSICAL THERAPY DAILY NOTE/WEEKLY PROGRESS NOTE  Time In: 7808  Time Out: 7449  Patient Seen For: PM;Balance activities;Transfer training; Wheelchair mobility    Subjective: Pt stated \"either I'm weaker or this chair is harder to push, but its good. \"         Objective: Other (comment) (fall)  GROSS ASSESSMENT Daily Assessment     Increased time for w/c mobility        COGNITION Daily Assessment    Decreased insight, impulsive with transfers       BED/MAT MOBILITY Daily Assessment   Pt able to return to bed with Supervision. Min A needed for Elbert boot removal. Rolling Right : 0 (Not tested)  Rolling Left : 0 (Not tested)  Supine to Sit : 0 (Not tested)  Sit to Supine : 5 (Supervision)       TRANSFERS Daily Assessment   Sit to stand trials, see below for assistance required for each. Transfer Type: Lateral with transfer board  Transfer Assistance : 5 (Supervision/setup)  Sit to Stand Assistance: Total assistance  Car Transfers: Not tested       GAIT Daily Assessment    Amount of Assistance: 0 (Not tested)  Distance (ft): 0 Feet (ft)       STEPS or STAIRS Daily Assessment    Steps/Stairs Ambulated (#): 0  Level of Assist : 0 (Not tested)       BALANCE Daily Assessment    Sitting - Static: Good (unsupported)  Sitting - Dynamic: Fair (occasional)       WHEELCHAIR MOBILITY Daily Assessment   Pt able to propel ~ [de-identified]' but at a slow scooby. Pt took rest break then continued another 20'. Able to Propel (ft): 80 feet  Curbs/Ramps Assist Required (FIM Score): 0 (Not tested)  Wheelchair Setup Assist Required : 5 (Supervision/setup)  Wheelchair Management: Manages left brake;Manages right brake;Manages left armrest;Manages right armrest;Manages left footrest;Manages right footrest     Pt able to perform w/c mobilty on level surface for 80', took a rest break, then completed 20' additional.    Pt performed sit<>stand trials with Elbert boot and RW for BUE support:   1. MaxAx2 for 30sec.   Pt unable to attain an upright posture during trial.   2. ModAx2 with 1:30 min. Pt able to attain upright posture/balance easier  3. ModAx2 with 2 min stand. Pt took ~20 sec. To stand upright. 4. MinAx2 with 2 min stand. Pt stood upright right away. 3x Tricep Dip with mini squat on LLE with 5 sec. Hold. Vital Signs: SpO2 on 2.0L - no SOB observed. Pain level: No pain indicated. Pain location: NA  Pain interventions: NA    Patient education: How to perform proper sit<>stands and options for car transfers to be practiced tomorrow during family training. Interdisciplinary Communication: Talked with OT about family training in the morning. Assessment: Pt showed improved strength and endurance during w/c mobility and sit<>stands with RW and Elbert boot on LLE. Based on standing trials, still some concerns with transfers at home due to the inconsistencies in performance. Family training tomorrow am will provide further insight. Pt will benefit from continued PT to improve LE strength and transfer ability. Plan of Care: Continue with POC and progress as tolerated. Leti Welch, STANLEY  9/8/2021    Addendum:   Pt. Observed during therapy session. Pt. Has met 2/5 STGs. Goals modified to reflect pt's progress. Pt. Cont. To mobilize below his baseline & benefits from cont. PT services to maximize functional mobility s/p BKA. Cont. PT per POC.

## 2021-09-08 NOTE — PROGRESS NOTES
OT Daily Note  Time In 0918   Time Out 1031     Subjective: \"I really like these exercises. \" Pt was agreeable to tx. Pain: No pain expressed. Patient Vitals for the past 8 hrs:   Temp Pulse Resp BP SpO2   09/08/21 0710 98 °F (36.7 °C) 66 20 (!) 126/56 98 %           Functional Mobility      Score Comments   Transfer Assist 4: Supervision or touching A Transfer Type: sliding board  Equipment: Sliding Board   Comments: CGA; verbal cues for speed and L foot placement         Strengthening   Pt completed the following exercises with 13 lb alexander to promote UB strength, activity tolerance, and shoulder stabilization for integration into transfers and ADLs:  Exercise Reps Comments   Protraction/Retraction 2x10    Abduction/Adduction 2x10    Circles 2x10 1/2 CW, 1/2 CCW   Vs 2x10    Pt demonstrated good quality during all exercises. Pt completed the following exercises to promote UB strength, activity tolerance, and shoulder stabilization for integration into functional transfers and ADLs:  Exercise Sets/Reps Device Comments   Chair Push Ups 1x2 1.5lb wrist weights    Anterior Punches 1x8 1.5lb wrist weights    Marches 1x13     Arm Circles 1x10          Activity Tolerance, Visual-Perceptual and Strengthening   While in seated position, pt engaged in sequence board game working on visual perceptual skills, visual scanning, functional reaching skills, attention to detail, and problem solving skills. Pt was able to visually scan board appropriately with minimal verbal cues, additionally able to follow rules of game no prompting. Pt required no assistance for problem solving skills while dual tasking throughout activity. Completed game within appropriate time parameters. Pt completed visual perceptual, reaching, and bilateral hand coordination task utilizing pipe tree while following visual design. Pt with good coordination skills noted while connecting pipe tree pieces onto design at midline.  Pt with good reaching skills as well. Pt required no verbal and visual cues throughout for accuracy in matching visual design to constructed design. Pt completed activity in seated position at table top. Education   benefits of OT     Interdisciplinary Communication: Team Conference  Summary of session: Pt demonstrated good participation in session. Pt demonstrated good strength and activity tolerance during the session. Pt was left seated in wheelchair with PT assuming care. Plan: Continue with POC.      Vipul Salmeron MS OTR/L  9/8/2021

## 2021-09-09 PROCEDURE — 65310000000 HC RM PRIVATE REHAB

## 2021-09-09 PROCEDURE — 97535 SELF CARE MNGMENT TRAINING: CPT

## 2021-09-09 PROCEDURE — 74011250637 HC RX REV CODE- 250/637: Performed by: PHYSICIAN ASSISTANT

## 2021-09-09 PROCEDURE — 97110 THERAPEUTIC EXERCISES: CPT

## 2021-09-09 PROCEDURE — 99232 SBSQ HOSP IP/OBS MODERATE 35: CPT | Performed by: PHYSICAL MEDICINE & REHABILITATION

## 2021-09-09 PROCEDURE — 97530 THERAPEUTIC ACTIVITIES: CPT

## 2021-09-09 RX ADMIN — APIXABAN 5 MG: 5 TABLET, FILM COATED ORAL at 21:06

## 2021-09-09 RX ADMIN — MIDODRINE HYDROCHLORIDE 2.5 MG: 5 TABLET ORAL at 12:13

## 2021-09-09 RX ADMIN — ATORVASTATIN CALCIUM 40 MG: 40 TABLET, FILM COATED ORAL at 21:06

## 2021-09-09 RX ADMIN — PREGABALIN 200 MG: 100 CAPSULE ORAL at 21:06

## 2021-09-09 RX ADMIN — DOCUSATE SODIUM 100 MG: 100 CAPSULE, LIQUID FILLED ORAL at 21:06

## 2021-09-09 RX ADMIN — PREGABALIN 200 MG: 100 CAPSULE ORAL at 08:13

## 2021-09-09 RX ADMIN — PANTOPRAZOLE SODIUM 40 MG: 40 TABLET, DELAYED RELEASE ORAL at 05:50

## 2021-09-09 RX ADMIN — MIDODRINE HYDROCHLORIDE 2.5 MG: 5 TABLET ORAL at 17:45

## 2021-09-09 RX ADMIN — DOCUSATE SODIUM 100 MG: 100 CAPSULE, LIQUID FILLED ORAL at 08:12

## 2021-09-09 RX ADMIN — FLUDROCORTISONE ACETATE 0.1 MG: 0.1 TABLET ORAL at 08:13

## 2021-09-09 RX ADMIN — APIXABAN 5 MG: 5 TABLET, FILM COATED ORAL at 08:14

## 2021-09-09 RX ADMIN — MIDODRINE HYDROCHLORIDE 2.5 MG: 5 TABLET ORAL at 08:13

## 2021-09-09 RX ADMIN — TRAZODONE HYDROCHLORIDE 50 MG: 50 TABLET ORAL at 21:06

## 2021-09-09 NOTE — PROGRESS NOTES
OT Daily Note    Time In 0920   Time Out 1002     Subjective: \"I get my sutures out on Tuesday. \" Pt was agreeable to tx. Pain:  No pain expressed. Patient Vitals for the past 8 hrs:   Temp Pulse Resp BP SpO2   09/09/21 0656 97.9 °F (36.6 °C) 62 20 113/68 98 %           Mobility   Score Comments   Supine to Sit 4: Supervision or touching A S   Transfer Assist 4: Supervision or touching A Transfer Type:  sliding board  Equipment: Sliding Board   Comments: SBA; verbal cues     Activities of Daily Living    Score Comments   Eating 6: Independent    Bathing Not Tested: Pt refused    Upper Body  Dressing 5: S/U or clean-up assist Items Applied: Pullover  Position: Unsupported Sitting   Lower Body Dressing 4: Supervision or touching A Items Applied: Underwear and Elastic pants  Position: Standing PRN and Unsupported Sitting  Adaptive Equipment: N/A  Comments: SBA   Donning/[place] Footwear 1: Dependent Items Applied: Shoes with fasteners   Adaptive Equipment:  [email or web address]   Education  benefits of OT, energy conservation, breathing techniques, family training, and safety awareness     Interdisciplinary Communication: Communicated with PT to ensure progress towards POC and goals. Summary of Session: Pt demonstrated good participation in OT tasks during this session. Pt's performance with ADL is reflected in above chart. Changed residual limb wrap and Kerlix. Taught daughter how to stump wrap. Pt was left seated in wheelchair with PT assuming care. Plan: Continue per OT POC.        Ghulam Bautista MS OTR/L  9/9/2021

## 2021-09-09 NOTE — PROGRESS NOTES
OT Daily Note  Time In 1116   Time Out 1158     Subjective: \"I am just so tired. \" Pt was agreeable to tx. Pain: No pain expressed. Patient Vitals for the past 8 hrs:   Temp Pulse Resp BP SpO2   09/09/21 0656 97.9 °F (36.6 °C) 62 20 113/68 98 %           Functional Mobility      Score Comments   Sit to Supine 6: Independent    Transfer Assist 3: Partial/Moderate A Transfer Type: LPT  Equipment: Sliding Board   Comments: A to place board         Self-Care and functional mobility   Pt's daughter was educated regarding BSC transfers using sliding board for increased safety and independence with toileting. Educated regarding setup and safe transfer technique. Patient and family verbalized and demonstrated understanding, transferred <> w/c and BSC. Strengthening   Pt completed the following exercises to promote UB strength, activity tolerance, and shoulder stabilization for integration into functional transfers and ADLs:  Exercise Sets/Reps Device Comments   Overhead Press 1x10 5lb weighted dowel    Chest Press 2x10 5lb weighted dowel    Bicep Curls 2x10 5lb weighted dowel          Education   benefits of OT     Interdisciplinary Communication: Collaborated with PT to ensure progress towards POC and goals. Summary of session: Pt demonstrated good participation in session. Pt was left supine in bed with call bell within reach and all needs met. Plan: Continue with POC.      Zoltan Sr MS OTR/JACQUELIN  9/9/2021

## 2021-09-09 NOTE — PROGRESS NOTES
Lara Nava MD  Medical Director  [address] [facility], [address] [facility]  Tel: [phone] [facility] PROGRESS NOTE    Floridalma Rivas  Admit Date: 8/25/2021  Admit Diagnosis:   Unilateral complete BKA, right, sequela ([address] [S88.111S]    Subjective     Patient seen and examined. No complaints. Bowels regular. Pain controlled; little if any pain. Overall feels he is progressing well. Spoke with pt regarding impulsiveness and safety. Expressed understanding    Objective:     Current Facility-Administered Medications   Medication Dose Route Frequency    lactulose (CHRONULAC) 10 gram/15 mL solution 30 mL  30 mL Oral DAILY PRN    naloxone (NARCAN) injection 0.4 mg  0.4 mg IntraVENous PRN    acetaminophen (TYLENOL) tablet 650 mg  650 mg Oral Q6H PRN    apixaban (ELIQUIS) tablet 5 mg  5 mg Oral Q12H    atorvastatin (LIPITOR) tablet 40 mg  40 mg Oral QHS    docusate sodium (COLACE) capsule 100 mg  100 mg Oral BID    fludrocortisone (FLORINEF) tablet 0.1 mg  0.1 mg Oral DAILY    midodrine (PROAMATINE) tablet 2.5 mg  2.5 mg Oral TID WITH MEALS    nystatin (MYCOSTATIN) 100,000 unit/gram powder   Topical PRN    oxyCODONE IR (ROXICODONE) tablet 5 mg  5 mg Oral Q4H PRN    pantoprazole (PROTONIX) tablet 40 mg  40 mg Oral ACB    polyethylene glycol (MIRALAX) packet 17 g  17 g Oral DAILY    pregabalin (LYRICA) capsule 200 mg  200 mg Oral BID    traMADoL (ULTRAM) tablet 50 mg  50 mg Oral Q6H PRN    traZODone (DESYREL) tablet 50 mg  50 mg Oral QHS       Review of Systems:   Denies chest pain, shortness of breath, cough, headache, visual problems, abdominal pain, dysuria, calf pain. Pertinent positives are as noted in the HPI, ROS unremarkable otherwise.      Visit Vitals  /68 (BP 1 Location: Left upper arm, BP Patient Position: At rest)   Pulse 62   Temp 97.9 °F (36.6 °C)   Resp 20   Wt 280 lb 9.6 oz (127.3 kg)   SpO2 98%   BMI 40.26 kg/m²        Physical Exam:   General: Alert and age appropriately oriented. No acute cardiorespiratory distress. HEENT: Normocephalic, no scleral icterus. Oral mucosa moist without cyanosis. Lungs: Clear to auscultation bilaterally. Respiration even and unlabored. Heart: Regular rate and rhythm, S1, S2. No murmurs, clicks, rub or gallops. Abdomen: Soft, non-tender, not distended. Bowel sounds normoactive. No organomegaly. Genitourinary: Deferred. Neuromuscular:      Bulky ACE wrap to right BKA; difficult to measure extension  Hip flexion 3+ 4- on the right  4/5 on the left   Skin/extremity: No rashes, no erythema. No calf tenderness B LE.  R BKA inc dressing in place, dry  L plantar foot ulcer, debrided by Dr Karthikeyan Thacker on9/7, callous removed, scabbed over, healing                                                                              Functional Assessment:          Balance  Sitting - Static: Good (unsupported) (09/08/21 1400)  Sitting - Dynamic: Fair (occasional) (09/08/21 1400)  Standing - Static: Fair (09/07/21 1200)  Standing - Dynamic : Impaired (09/07/21 1200)                     Chip Armor Fall Risk Assessment:  Chip Armor Fall Risk  Mobility: Ambulates or transfers with assist devices or assistance (09/08/21 1953)  Mobility Interventions: Bed/chair exit alarm; Patient to call before getting OOB (09/08/21 1953)  Mentation: Alert, oriented x 3 (09/08/21 1953)  Mentation Interventions: Adequate sleep, hydration, pain control;Evaluate medications/consider consulting pharmacy; Increase mobility; Reorient patient; Toileting rounds;Update white board (09/08/21 0710)  Medication: Patient receiving anticonvulsants, sedatives(tranquilizers), psychotropics or hypnotics, hypoglycemics, narcotics, sleep aids, antihypertensives, laxatives, or diuretics (09/08/21 1953)  Medication Interventions: Bed/chair exit alarm; Patient to call before getting OOB (09/08/21 1953)  Elimination: Needs assistance with toileting (09/08/21 1953)  Elimination Interventions: Call light in reach;Urinal in reach (09/08/21 1953)  Prior Fall History: No (09/08/21 1953)  Total Score: 3 (09/08/21 1953)  Standard Fall Precautions: Yes (09/05/21 2047)  High Fall Risk: Yes (09/08/21 1953)     Speech Assessment:         Ambulation:  Gait  Distance (ft): 0 Feet (ft) (09/08/21 1400)     Labs/Studies:  Recent Results (from the past 72 hour(s))   GLUCOSE, POC    Collection Time: 09/06/21  8:56 PM   Result Value Ref Range    Glucose (POC) 114 (H) 65 - 100 mg/dL    Performed by Jude        Assessment:     Problem List as of 9/9/2021 Date Reviewed: 8/27/2021        Codes Class Noted - Resolved    * (Principal) Unilateral complete BKA, right, sequela ([facility] ICD-10-CM: H68.858E  ICD-9-CM: 905.9  8/25/2021 - Present        Suspected sleep apnea ICD-10-CM: R29.818  ICD-9-CM: 781.99  8/20/2021 - Present        S/P BKA (below knee amputation) unilateral, right ([facility] ICD-10-CM: Z89.511  ICD-9-CM: V49.75  8/18/2021 - Present        Respiratory failure, post-operative ([facility] ICD-10-CM: U93.712  ICD-9-CM: 518.51  8/18/2021 - Present        Acute respiratory failure with hypercapnia (HCC) ICD-10-CM: J96.02  ICD-9-CM: 518.81  7/23/2021 - Present        Acute on chronic respiratory failure with hypoxia and hypercapnia (HCC) ICD-10-CM: J96.21, J96.22  ICD-9-CM: 518.84, 786.09, 799.02  7/23/2021 - Present        Acute metabolic encephalopathy FVB-48-WW: G93.41  ICD-9-CM: 348.31  7/23/2021 - Present        Hypoxia ICD-10-CM: R09.02  ICD-9-CM: 799.02  7/21/2021 - Present        Acute kidney injury superimposed on CKD ([facility] ICD-10-CM: N17.9, N18.9  ICD-9-CM: 866.00, 585.9  7/21/2021 - Present        Staphylococcus aureus bacteremia ICD-10-CM: R78.81, B95.61  ICD-9-CM: 790.7, 041.11  7/21/2021 - Present        Cellulitis ICD-10-CM: L03.90  ICD-9-CM: 682.9  7/19/2021 - Present        Systolic CHF, acute on chronic (HCC) ICD-10-CM: I50.23  ICD-9-CM: 428.23, 428.0 7/19/2021 - Present        Atrial fibrillation with RVR (HCC) ICD-10-CM: I48.91  ICD-9-CM: 427.31  7/19/2021 - Present        Morbid obesity with BMI of 40.0-44.9, adult [facility]) ICD-10-CM: E66.01, Z68.41  ICD-9-CM: 278.01, V85.41  6/11/2020 - Present        Severe obesity (BMI 35.0-35.9 with comorbidity) (HCC) (Chronic) ICD-10-CM: E66.01, Z68.35  ICD-9-CM: 278.01, V85.35  10/10/2018 - Present        Bunion of unspecified foot (Chronic) ICD-10-CM: M21.619  ICD-9-CM: 727.1  4/10/2018 - Present        Onychomycosis (Chronic) ICD-10-CM: B35.1  ICD-9-CM: 110.1  4/10/2018 - Present        Corns and callus (Chronic) ICD-10-CM: L84  ICD-9-CM: 700  4/10/2018 - Present        Mixed hyperlipidemia (Chronic) ICD-10-CM: L11.0  ICD-9-CM: 272.2  4/10/2018 - Present        Mixed axonal-demyelinating polyneuropathy (Chronic) ICD-10-CM: G62.89  ICD-9-CM: 355.9  1/5/2018 - Present        Controlled type 2 diabetes mellitus with diabetic polyneuropathy, without long-term current use of insulin (HCC) (Chronic) ICD-10-CM: E11.42  ICD-9-CM: 250.60, 357.2  1/5/2018 - Present        Type 2 diabetes mellitus with nephropathy (HCC) (Chronic) ICD-10-CM: E11.21  ICD-9-CM: 250.40, 583.81  1/5/2018 - Present        Stage 3 chronic kidney disease ([facility] (Chronic) ICD-10-CM: N18.30  ICD-9-CM: 585.3  11/22/2017 - Present        Benign hypertensive kidney disease with chronic kidney disease (Chronic) ICD-10-CM: I12.9  ICD-9-CM: 403.10  11/6/2017 - Present        CAD (coronary artery disease) (Chronic) ICD-10-CM: I25.10  ICD-9-CM: 414.00  Unknown - Present        RESOLVED: Glucose intolerance (impaired glucose tolerance) (Chronic) ICD-10-CM: R73.02  ICD-9-CM: 790.22  11/6/2017 - 11/14/2017        RESOLVED: BMI 40.0-44.9, adult (HCC) (Chronic) ICD-10-CM: Z68.41  ICD-9-CM: V85.41  11/4/2016 - 10/10/2018        RESOLVED: Hypertension ICD-10-CM: I10  ICD-9-CM: 401.9  Unknown - 10/10/2016        RESOLVED: Hypercholesterolemia ICD-10-CM: E78.00  ICD-9-CM: 272.0  Unknown - 10/10/2016        RESOLVED: Chronic kidney disease ICD-10-CM: N18.9  ICD-9-CM: 163. 9  Unknown - 10/10/2016    Overview Signed 10/10/2016 11:28 AM by Erie Essex, MD     stage 3             RESOLVED: Neuropathy ICD-10-CM: G62.9  ICD-9-CM: 355.9  Unknown - 10/10/2016        RESOLVED: Peripheral polyneuropathy (Chronic) ICD-10-CM: G62.9  ICD-9-CM: 356.9  10/10/2016 - 1/5/2018        RESOLVED: CKD (chronic kidney disease) stage 3, GFR 30-59 ml/min (HCC) (Chronic) ICD-10-CM: N18.30  ICD-9-CM: 585.3  10/10/2016 - 11/6/2017        RESOLVED: Hyperlipidemia (Chronic) ICD-10-CM: E78.5  ICD-9-CM: 272.4  10/10/2016 - 4/10/2018        RESOLVED: Hyperkalemia ICD-10-CM: E87.5  ICD-9-CM: 276.7  10/10/2016 - 11/14/2017        RESOLVED: Essential hypertension (Chronic) ICD-10-CM: I10  ICD-9-CM: 401.9  10/10/2016 - 11/22/2017              S/p right below-knee amputation secondary to cellulitis (8/18/2021, Liseth Brandt MD)     Plan / Recommendations / Medical Decision Making:      Daily physician / PA medical management:     Unilateral complete BKA, right - NWB R LE; prosthetics involved. Will be difficult for patient to mobilize with a prosthesis given he lacks sensation and proprioception in the right foot, has obese body habitus, chronic respiratory failure and CHF.     Pain control - stable, mild-to-moderate joint symptoms intermittently, reasonably well controlled by PRN meds. Will require regular pain assessment and comprehensive pain management. Has chronic mixed axonal-demyelinating polyneuropathy; on Lyrica 200mg BID. Denies residual limb pain but has tramadol and oxycodone ordered PRN.     Hypotension - BP fluctuating, managed medically. Has been hypotensive and is on Florinef and midodrine. Dehydration thought to be contributing, also possibly diuretics.   -8/27 /63, HR 94 this AM; patient denies lightheadedness during therapies or while resting in room  -8/28 95/53; asymptomatic, HR 97; on Florinef and midodrine; up to 117/65 max  -8/30 /72, HR 85 this AM  -8/31 VSS  -9/1 124/64 HR 60  -9/3 /71, HR 79  -9/4 VSS  -9/5 /79, HR 80  -9/6 /73, HR 62  -9/7 /57 this a.m HR 88  -9/8 /75, improved  -9/9 113/68      Acute-on-chronic respiratory failure - continue 3L O2, wean to 2L if possible. Encourage IS.    -8/31 sats reportedly 78%, pt asymptomatic. Rechecked 98% 3L  -9/1 encouraged use of IS     Atrial fibrillation - continue Eliquis 5mg BID; rate controlled. Toprol XL has been held due to hypotensive at times.  -9/1 NSR this AM  -9/3 IIR this AM, rate controlled  -9/4 IIR in PT, rate controlled  -9/5 IIR today, rate controlled  -9/6 IIR with additional respiratory irregularity, rate controlled  -9/9 nsr this a.m.     Acute-on-chronic anemia - anemia of chronic kidney disease and post-op due to blood loss. Hgb trending down; follow closely. No evidence of active bleeding.  -8/27 CBC ordered for tomorrow  -8/28 Hgb 6.6 from 8.9 (8/24); no sign of active bleeding source: stool was not melenic, little drainage from amputation; hold Eliquis and recheck in AM. If true value, will need to transfer downtown for a blood transfusion  -8/30 recheck H&H with Hgb 8.5; Eliquis restarted (yesterday)  -9/2 Hgb 9.3 improved  -9/5 will check CBC tomorrow  -9/6 Hgb 10.2, continues to rebound  -9/8 f/u 9/10      Chronic kidney disease, CKD3 - improving, monitor. Creatine 1.49 from 1.57.  -8/27 BMP ordered for tomorrow  -8/28 Cr 1.51, stable  -9/2 Cr 1.58, BUN 21; baseline  -9/5 will check BMP tomorrow  -9/6 Cr 1.50, BUN 22 - normal  -9/9 f/u labs in am     Diabetes mellitus - HgbA1c 6.7% (7/13/2021), moderate glycemic control. Will require daily, close fasting glucose monitoring and medication adjustment to optimize glycemic control in setting of acute illness and hospitalization. Takes Glucotrol 5mg at home. Currently on SSI; add Glucotrol ? .  -8/26 BS controlled  -8/27 BS this , all BS since admission <145, most in low 100s  -8/28 BS controlled  -8/30  this AM, all BS yesterday <110  -well controlled  -9/2 BS well controlled; diet controlled  -9/3  this AM, yesterday all values <120  -9/4 BS 94 this AM, all BS <120 since 8/30; d/c POC glucose  -9/6 BS 86 in BMP this AM  -well controlled     Pneumonia prophylaxis - incentive spirometer every hour while awake.     DVT risk / DVT prophylaxis - will require daily physician / PA exam to assess for signs and symptoms as patient is at increased risk for of thromboembolism. Mobilize as tolerated. Sequential pneumatic compression devices (SCDs) when in bed; thigh-high or knee-high thromboembolic deterrent hose when out of bed. On Eliquis.     CAD - continue Eliquis and statin.     GI prophylaxis - resume PPI. At times may need additional antacids, Maalox prn.     General skin care / wound prevention - monitor BKA  incision and general skin wound status daily per staff and physician / PA. At risk for failure. Will require 24/7 rehab nursing.  Keep BKA incision and left plantar foot wound clean and dry, reinforce dressing PRN and ice to area for comfort; he is to f/u with Dr Jaycob Yi for staple removal.   -8/25 will cont wound vac until f/u with Dr. Pendleton  -8/28 wound looked good at time of wound vac check per notes  -8/30 wound vac in place and functioning, skin surrounding without erythema or rash  -9/2 wound vac will remain intact until f/u with Dr Naima Johnson next week  -9/3 Mindy Young wound vac has auto-terminated, incision remains covered with vac dressing  -9/4 wound vac and dressing removed, incision intact with sutures, no erythema, induration or drainage; wound cleaned with wound cleanser and redressed with Telfa, 4x4s, Kerlix and ACE  -9/5 BKA incision inspected and redressed; left foot plantar wound inspected, stable, Meplex replaced  -9/7 dressing not removed this a.m; will see Dr Indira Johnson pleased with inc and residual limb. He will continue off loading left foot ulcer with Crow walker boot; healing; need stump      Urinary retention / neurogenic bladder - schedule voids q 6-8 hrs. Check post-void residual every shift; in and out catheter if post-void residual is more than 400ml.     Bowel program - at risk for constipation as a side effect of opioids, other medications, impaired mobility, etc. MiraLAX daily for regularity, Ivis-Colace for stool softener. PRN MOM, bisacodyl suppository or tablets for constipation.  -8/27 constipated, no BM with daily gentle agents (MiraLAX, Colace), will try more aggressively with lactulose after therapies; may require MOM, bisacodyl suppository or disimpaction  -8/28 excellent results yesterday  -8/30 large, normal BM this AM; continue daily MiraLAX and stool softener  -9/9 no bowel issues     Statement of Medical Necessity - K5 Wheelchair  Mr Casper Herrera has been evaluated face-to-face on a daily basis by the Physiatry MD / PA for the above-noted diagnoses since admission to our rehabilitation program. It is medically necessary for this patient to have a K5 wheelchair for home and community use at discharge from inpatient rehab. He is s/p R BKA and has diabetic ulcer on plantar aspect of his left foot. The patient has specific neurologic/orthopedic needs that will require its lightweight, adjustable to facilitate his mobility, enhance his safety and decrease the caregivers burden of care. This will allow him to mobilize easily within the home additionally. A standard wheelchair is too heavy and large, making home maneuvering difficult or impossible; standard wheelchair also would not provide support or proper positioning. With his right BKA, left plantar ulcer on his left foot and severe diabetic neuropathy with a charcot foot, he cannot utilize a walker or any other assist device for safe mobility.      Time spent was 25 minutes with over 1/2 in direct patient care/examination, consultation and coordination of care.      Signed By: Chicho Jeter MD     September 9, 2021

## 2021-09-09 NOTE — PROGRESS NOTES
PHYSICAL THERAPY DAILY NOTE  Time In: 1002  Time Out: 1115  Patient Seen For: AM;Balance activities; Family training;Patient education;Transfer training    Subjective: Pt eager to start family training, requesting \"what are we Laura Ink do\"         Objective: Daughter given gait belt for home and instructed in use to assist patient. Other (comment) (fall)  GROSS ASSESSMENT Daily Assessment     Impulsive        COGNITION Daily Assessment    Decreased insight and memory recall       BED/MAT MOBILITY Daily Assessment    Rolling Right : 0 (Not tested)  Rolling Left : 0 (Not tested)  Supine to Sit : 0 (Not tested)  Sit to Supine : 0 (Not tested)       TRANSFERS Daily Assessment   Mod A required for car transfer for safety of residual limb. Sit<>stand Max Ax2 due to safety and to achieve standing balance. Transfer Type: Lateral with transfer board  Transfer Assistance : 5 (Supervision/setup)  Sit to Stand Assistance: Total assistance  Car Transfers: Moderate assistance  Car Type: Ford Escape       GAIT Daily Assessment    Amount of Assistance: 0 (Not tested)  Distance (ft): 0 Feet (ft)       STEPS or STAIRS Daily Assessment    Steps/Stairs Ambulated (#): 0  Level of Assist : 0 (Not tested)       BALANCE Daily Assessment    Sitting - Static: Good (unsupported)  Sitting - Dynamic: Fair (occasional)  Standing - Static: Fair       WHEELCHAIR MOBILITY Daily Assessment     Pt able to manage both brakes, foot rests and arm rests. Pt participated in family training this morning with daughter. W/c <> Car transfers x2 performed with sliding board and gait belt and Elbert boot on LLE. Mod A was required. Verbal cues required to problem solve how to achieve the transfer due to height difference and safety of residual limb. Sit<>stand trial performed with BUE support on RW. Max Ax2 required. W/c<>mat sliding board transfers x2 with supervision with mat at level surface to w/c.         Vital Signs: SpO2 on 2.0L throughout session, SOB observed after sit<>stand trial and lateral transfer to mat. O2 dropped to 90% but recovered to 98% after 1 min. Pain level: No pain indicated   Pain location: NA  Pain interventions: NA    Patient education: Importance of protecting residual limb, pressure relief strategies/ulcer prevention. Interdisciplinary Communication: Communicated with OT about pts status after family training/car transfers. Pt left in w/c in gym with daughter, OT family training immediately following. Assessment: Pt was able to perform 2 successful car transfers with Mod A and verbal cues for safety. Pt will continue to benefit from PT services to continue to increase strength and endurance to improve transfer ability. Plan of Care: Continue with POC and progress as tolerated.      Indiana Mejia, SPT  9/9/2021

## 2021-09-10 LAB
ANION GAP SERPL CALC-SCNC: 1 MMOL/L (ref 7–16)
BUN SERPL-MCNC: 22 MG/DL (ref 8–23)
CALCIUM SERPL-MCNC: 9.4 MG/DL (ref 8.3–10.4)
CHLORIDE SERPL-SCNC: 106 MMOL/L (ref 98–107)
CO2 SERPL-SCNC: 38 MMOL/L (ref 21–32)
CREAT SERPL-MCNC: 1.48 MG/DL (ref 0.8–1.5)
ERYTHROCYTE [DISTWIDTH] IN BLOOD BY AUTOMATED COUNT: 15.8 % (ref 11.9–14.6)
FERRITIN SERPL-MCNC: 91 NG/ML (ref 8–388)
GLUCOSE SERPL-MCNC: 95 MG/DL (ref 65–100)
HCT VFR BLD AUTO: 30.3 % (ref 41.1–50.3)
HGB BLD-MCNC: 8.8 G/DL (ref 13.6–17.2)
IRON SATN MFR SERPL: 10 %
IRON SERPL-MCNC: 24 UG/DL (ref 35–150)
MCH RBC QN AUTO: 28.7 PG (ref 26.1–32.9)
MCHC RBC AUTO-ENTMCNC: 29 G/DL (ref 31.4–35)
MCV RBC AUTO: 98.7 FL (ref 79.6–97.8)
NRBC # BLD: 0 K/UL (ref 0–0.2)
PLATELET # BLD AUTO: 141 K/UL (ref 150–450)
PMV BLD AUTO: 12.4 FL (ref 9.4–12.3)
POTASSIUM SERPL-SCNC: 4.7 MMOL/L (ref 3.5–5.1)
RBC # BLD AUTO: 3.07 M/UL (ref 4.23–5.6)
SODIUM SERPL-SCNC: 145 MMOL/L (ref 136–145)
TIBC SERPL-MCNC: 240 UG/DL (ref 250–450)
TRANSFERRIN SERPL-MCNC: 181 MG/DL (ref 202–364)
WBC # BLD AUTO: 5 K/UL (ref 4.3–11.1)

## 2021-09-10 PROCEDURE — 74011250637 HC RX REV CODE- 250/637: Performed by: PHYSICIAN ASSISTANT

## 2021-09-10 PROCEDURE — 80048 BASIC METABOLIC PNL TOTAL CA: CPT

## 2021-09-10 PROCEDURE — 85027 COMPLETE CBC AUTOMATED: CPT

## 2021-09-10 PROCEDURE — 97530 THERAPEUTIC ACTIVITIES: CPT

## 2021-09-10 PROCEDURE — 97110 THERAPEUTIC EXERCISES: CPT

## 2021-09-10 PROCEDURE — 65310000000 HC RM PRIVATE REHAB

## 2021-09-10 PROCEDURE — 99232 SBSQ HOSP IP/OBS MODERATE 35: CPT | Performed by: PHYSICAL MEDICINE & REHABILITATION

## 2021-09-10 PROCEDURE — 97535 SELF CARE MNGMENT TRAINING: CPT

## 2021-09-10 PROCEDURE — 82728 ASSAY OF FERRITIN: CPT

## 2021-09-10 PROCEDURE — 83540 ASSAY OF IRON: CPT

## 2021-09-10 RX ADMIN — APIXABAN 5 MG: 5 TABLET, FILM COATED ORAL at 08:01

## 2021-09-10 RX ADMIN — APIXABAN 5 MG: 5 TABLET, FILM COATED ORAL at 21:19

## 2021-09-10 RX ADMIN — PREGABALIN 200 MG: 100 CAPSULE ORAL at 08:00

## 2021-09-10 RX ADMIN — TRAZODONE HYDROCHLORIDE 50 MG: 50 TABLET ORAL at 21:19

## 2021-09-10 RX ADMIN — MIDODRINE HYDROCHLORIDE 2.5 MG: 5 TABLET ORAL at 12:09

## 2021-09-10 RX ADMIN — PREGABALIN 200 MG: 100 CAPSULE ORAL at 21:19

## 2021-09-10 RX ADMIN — DOCUSATE SODIUM 100 MG: 100 CAPSULE, LIQUID FILLED ORAL at 21:18

## 2021-09-10 RX ADMIN — MIDODRINE HYDROCHLORIDE 2.5 MG: 5 TABLET ORAL at 08:00

## 2021-09-10 RX ADMIN — ATORVASTATIN CALCIUM 40 MG: 40 TABLET, FILM COATED ORAL at 21:19

## 2021-09-10 RX ADMIN — FLUDROCORTISONE ACETATE 0.1 MG: 0.1 TABLET ORAL at 08:01

## 2021-09-10 RX ADMIN — DOCUSATE SODIUM 100 MG: 100 CAPSULE, LIQUID FILLED ORAL at 08:01

## 2021-09-10 RX ADMIN — POLYETHYLENE GLYCOL 3350 17 G: 17 POWDER, FOR SOLUTION ORAL at 08:01

## 2021-09-10 RX ADMIN — MIDODRINE HYDROCHLORIDE 2.5 MG: 5 TABLET ORAL at 16:59

## 2021-09-10 RX ADMIN — PANTOPRAZOLE SODIUM 40 MG: 40 TABLET, DELAYED RELEASE ORAL at 05:07

## 2021-09-10 NOTE — PROGRESS NOTES
OT Daily Note  Time In 1301   Time Out 1349     Subjective: \"I can't feel that on my foot. \" Pt was agreeable to tx. Pain: No pain expressed. Patient Vitals for the past 8 hrs:   Temp Pulse Resp BP SpO2   09/10/21 0639 97.8 °F (36.6 °C) 95 20 121/70 94 %          Activity Tolerance and Strengthening   Pt completed the following exercises to promote UB strength, activity tolerance, and shoulder stabilization for integration into functional transfers and ADLs:  Exercise Sets/Reps Device Comments   Overhead Press 1x15 3lb dumbbell    Chest Press 1x15 3lb dumbbell    Bicep Curls 1x15 3lb dumbbell    Upward Row 1x15 3lb dumbbell          Activity Tolerance, Coordination and Strengthening   Pt engaged in game of [employer] while seated at table top to increase coordination, initiation, attention to task, problem solving, and activity tolerance. Pt loaded ring onto launcher and used one hand to push down on launcher to release ring into game board. Pt demonstrated good activity tolerance. Pt completed activity with 1.5 lb bilateral wrist weights donned and arm rests removed from w/c to increase core strength and BUE strength. Pt completed overhead arm punches, anterior punches, and shoulder adduction/abduction for approx. 2x15 of each with bilateral 1.5 lb wrist weights donned. Education   benefits of OT and energy conservation     Interdisciplinary Communication: Collaborated with PT to ensure progress towards POC and goals. Summary of session: Pt demonstrated good participation in session. Laurel Gates from [employer] was present to look at L foot blister to adjust [employer]. Pt had a skin tear on lateral side of L foot. RN notified and therapist cleaned with wound cleanser and placed a 3x3 mepilex on foot. Pt was left seated in wheelchair awaiting PT in therapy gym with Rehab Tech aware. Plan: Continue with POC.      Sukhdev Mark MS OTR/JACQUELIN  9/10/2021

## 2021-09-10 NOTE — PROGRESS NOTES
PHYSICAL THERAPY DAILY NOTE  Time In: 0920  Time Out: 9730  Patient Seen For: AM;Therapeutic exercise;Transfer training; Wheelchair mobility    Subjective: Pt feels \"a little concerned and worried\" about the blister on his L foot. Pt indicates that he notices himself putting pressure on his foot in bed when he wakes up or notices and he actively pulls himself up in bed. Objective: Other (comment) (fall)  GROSS ASSESSMENT Daily Assessment     L foot developed a blister distal/medial to previous pressure ulcer. COGNITION Daily Assessment    Decreased insight/impulsive       BED/MAT MOBILITY Daily Assessment   Pt required Min A to upright trunk to attain long sitting position, then able to scoot to edge of mat with supervision. Rolling Right : 5 (Supervision)  Rolling Left : 5 (Supervision)  Supine to Sit : 4 (Minimal assistance)  Sit to Supine : 5 (Supervision)       TRANSFERS Daily Assessment   Lateral sliding board transfer with Elbert boot on LLE - w/c to mat @ 23\" height, mat to w/c level surfaces Transfer Type: Lateral with transfer board  Transfer Assistance : 5 (Supervision/setup)  Car Transfers: Not tested       GAIT Daily Assessment    Amount of Assistance: 0 (Not tested)  Distance (ft): 0 Feet (ft)       STEPS or STAIRS Daily Assessment    Steps/Stairs Ambulated (#): 0  Level of Assist : 0 (Not tested)       BALANCE Daily Assessment   BUE support required for seated therex to prevent backwards lean. Sitting - Static: Good (unsupported)  Sitting - Dynamic: Fair (occasional)  Standing - Static: Not tested       WHEELCHAIR MOBILITY Daily Assessment     NT       LOWER EXTREMITY EXERCISES Daily Assessment     LE exercises performed as follows:      SEATED EXERCISES with RED theraband Sets Reps Comments   Knee flexion 3 10 Placed bolster standing behind pt to prevent backwards lean.    Hip Flexion 3 10    Long Arc Quads 3 10    SIDE-LYING       Hip Abduction  3 10    Hip Extension stretch 1 30 sec PRONE      Hip Extension 3 10      Pt able to remain in prone position for 7 min. Prone hip extension performed during. Vital Signs: SpO2 on 2.0L, no noticeable SOB observed    Pain level: no pain indicated  Pain location: NA  Pain interventions: NA    Patient education: Talked with pt about concerns of L foot, but need to continue with therapy to build strength. Interdisciplinary Communication: OT, nursing, RN, PT, prosthetist about blister on L foot. Assessment: Pt is showing improved transfer ability and LE strength. A blister was noticed distal and slightly medial to previous pressure ulcer on L foot. Elbert boot has been utilized whenever the pt is OOB, pt indicated he might be putting pressure on foot at night in bed. [facility] is coming this afternoon to possibly adjust Elbert boot. OT, RN and nursing have been notified. Pt services will continue to benefit the pt to continue to build LE strength. Plan of Care: Continue with POC and progress as able.      Renetta Hodges, SPT  9/10/2021

## 2021-09-10 NOTE — PROGRESS NOTES
Problem: Falls - Risk of  Goal: *Absence of Falls  Description: Document Dar Ervin Fall Risk and appropriate interventions in the flowsheet.   Outcome: Progressing Towards Goal  Note: Fall Risk Interventions:  Mobility Interventions: Bed/chair exit alarm, Patient to call before getting OOB, Utilize walker, cane, or other assistive device    Mentation Interventions: Adequate sleep, hydration, pain control, More frequent rounding, Toileting rounds    Medication Interventions: Patient to call before getting OOB    Elimination Interventions: Call light in reach, Patient to call for help with toileting needs, Toileting schedule/hourly rounds, Urinal in reach              Problem: Patient Education: Go to Patient Education Activity  Goal: Patient/Family Education  Outcome: Progressing Towards Goal

## 2021-09-10 NOTE — PROGRESS NOTES
Hourly rounding completed on this shift. No new complaints at this time. All needs met. Blood drawn as ordered and  aware to take to lab. Pt is currently resting in bed. Will continue to monitor and give report to oncoming nurse.

## 2021-09-10 NOTE — PROGRESS NOTES
Chart reviewed. Contacted daughter to follow up after family training and discharge planning. Daughter did not answer, v/m left. Will follow up on hospital bed need and transport home. Spoke to OT today and pt will likely need transport to follow up appointment and home but will speak to daughter before arranging if able to reach. OT also report daughter refused hospital bed but will confirm. Contacted [employer] and sent updated order for sliding board and drop arm BSC to be delivered on Monday, 9/13. CM to continue to follow and monitor for any further needs. Update 1213: Therapy now reports pt will need bariatric BSC for base around [facility] for pt to use sliding board. Contacted [employer] and per Medicare requirements pt must be 300 lbs to qualify for bariatric supplies. Pt is 280 lbs. Out of pocket cost at DME company is $200. Pt can order on own for about $95. Contacted daughter to address and no answer, v/m left. Update 1255: Received return call from daughter. Daughter refusing hospital bed. Daughter aware of out of pocket cost for bariatric BSC drop arm and daughter will order on own and talk to therapy to assure getting correct item. After discussion will sent pt to apt and home via transport service. Transport set with Lynita Gita for 0800 for Tuesday 9/14 for w/c  and pt will go home after this apt.

## 2021-09-10 NOTE — PROGRESS NOTES
OT Daily Note    Time In 0830   Time Out 0917     Subjective: \"I have to use the bathroom. \" Pt was agreeable to tx. Pain:  No pain expressed. Patient Vitals for the past 8 hrs:   Temp Pulse Resp BP SpO2   09/10/21 0639 97.8 °F (36.6 °C) 95 20 121/70 94 %           Mobility   Score Comments   Supine to Sit 4: Supervision or touching A S   Transfer Assist 4: Supervision or touching A Transfer Type: LPT  Equipment: Sliding Board   Comments: SBA; verbal cues for L foot placement     Activities of Daily Living    Score Comments   Eating 6: Independent    Upper Body  Dressing 5: S/U or clean-up assist Items Applied: Pullover  Position: Unsupported Sitting   Lower Body Dressing 4: Supervision or touching A Items Applied: Underwear and Elastic pants  Position: Unsupported Sitting  Adaptive Equipment: N/A  Comments: SBA   Donning/[place] Footwear 1: Dependent Items Applied: Elbert [employer] Equipment: N/A   Toilet Transfer 4: Supervision or touching A Transfer Type: LPT  Equipment: Sliding Board and BSC w/drop arm   Comments: SBA   Toileting Hygiene 2: Substantial/Maximal A Output: Large BM  Comments: A to remove boxers; A for thoroughness    Education  benefits of OT, energy conservation, breathing techniques, hand placement for transfers and AE/DME training     Interdisciplinary Communication: Collaborated with PT to ensure progress towards POC and goals. Summary of Session: Pt demonstrated good participation in OT tasks during this session. Pt's performance with ADL is reflected in above chart. Pt completed transfer from bed <> BSC and BSC <> bed and verbal cues for foot placement. Completed x6 chair push ups with x3 lifting butt up from chair approx 1-2 inches. Pt's BKA dressing was changed with 4x4 and kerlix and x2 ace wraps wrapped in a figure 8 pattern. Pt was left seated in wheelchair with PT assuming care. Plan: Continue per OT POC.        Jyada Reno MS OTR/L  9/10/2021

## 2021-09-11 PROCEDURE — 97530 THERAPEUTIC ACTIVITIES: CPT

## 2021-09-11 PROCEDURE — 65310000000 HC RM PRIVATE REHAB

## 2021-09-11 PROCEDURE — 74011250637 HC RX REV CODE- 250/637: Performed by: PHYSICAL MEDICINE & REHABILITATION

## 2021-09-11 PROCEDURE — 74011250637 HC RX REV CODE- 250/637: Performed by: PHYSICIAN ASSISTANT

## 2021-09-11 PROCEDURE — 97110 THERAPEUTIC EXERCISES: CPT

## 2021-09-11 PROCEDURE — 99232 SBSQ HOSP IP/OBS MODERATE 35: CPT | Performed by: PHYSICAL MEDICINE & REHABILITATION

## 2021-09-11 RX ORDER — LANOLIN ALCOHOL/MO/W.PET/CERES
1 CREAM (GRAM) TOPICAL 2 TIMES DAILY WITH MEALS
Status: DISCONTINUED | OUTPATIENT
Start: 2021-09-11 | End: 2021-09-15 | Stop reason: HOSPADM

## 2021-09-11 RX ADMIN — PREGABALIN 200 MG: 100 CAPSULE ORAL at 21:26

## 2021-09-11 RX ADMIN — PANTOPRAZOLE SODIUM 40 MG: 40 TABLET, DELAYED RELEASE ORAL at 06:19

## 2021-09-11 RX ADMIN — DOCUSATE SODIUM 100 MG: 100 CAPSULE, LIQUID FILLED ORAL at 21:26

## 2021-09-11 RX ADMIN — POLYETHYLENE GLYCOL 3350 17 G: 17 POWDER, FOR SOLUTION ORAL at 08:38

## 2021-09-11 RX ADMIN — ATORVASTATIN CALCIUM 40 MG: 40 TABLET, FILM COATED ORAL at 21:26

## 2021-09-11 RX ADMIN — APIXABAN 5 MG: 5 TABLET, FILM COATED ORAL at 08:38

## 2021-09-11 RX ADMIN — PREGABALIN 200 MG: 100 CAPSULE ORAL at 08:37

## 2021-09-11 RX ADMIN — FERROUS SULFATE TAB 325 MG (65 MG ELEMENTAL FE) 325 MG: 325 (65 FE) TAB at 11:58

## 2021-09-11 RX ADMIN — APIXABAN 5 MG: 5 TABLET, FILM COATED ORAL at 21:26

## 2021-09-11 RX ADMIN — MIDODRINE HYDROCHLORIDE 2.5 MG: 5 TABLET ORAL at 16:30

## 2021-09-11 RX ADMIN — MIDODRINE HYDROCHLORIDE 2.5 MG: 5 TABLET ORAL at 08:37

## 2021-09-11 RX ADMIN — DOCUSATE SODIUM 100 MG: 100 CAPSULE, LIQUID FILLED ORAL at 08:37

## 2021-09-11 RX ADMIN — TRAZODONE HYDROCHLORIDE 50 MG: 50 TABLET ORAL at 21:26

## 2021-09-11 RX ADMIN — FERROUS SULFATE TAB 325 MG (65 MG ELEMENTAL FE) 325 MG: 325 (65 FE) TAB at 16:30

## 2021-09-11 RX ADMIN — FLUDROCORTISONE ACETATE 0.1 MG: 0.1 TABLET ORAL at 08:37

## 2021-09-11 NOTE — PROGRESS NOTES
Dressing R BKA slid off pt during therapy. Sutures c/d/i, no drainage observed. Site wrapped with kerlex and ace wrap, pt tolerated with no c/o pain.

## 2021-09-11 NOTE — PROGRESS NOTES
Mepilex removed L lateral foot, small open area cleaned with wound cleanser, mepilex applied. L plantar wound with blister, malodorous, unable to pack wound with iodoform gauze per original dressing change order, site cleaned with wound cleanser and clean sock placed on L foot.

## 2021-09-11 NOTE — PROGRESS NOTES
Problem: Falls - Risk of  Goal: *Absence of Falls  Description: Document Leyla Vargas Fall Risk and appropriate interventions in the flowsheet. Outcome: Progressing Towards Goal  Note: Fall Risk Interventions:  Mobility Interventions: Bed/chair exit alarm, Communicate number of staff needed for ambulation/transfer, Patient to call before getting OOB, Strengthening exercises (ROM-active/passive), Utilize walker, cane, or other assistive device    Mentation Interventions: Adequate sleep, hydration, pain control, Bed/chair exit alarm, Door open when patient unattended, Evaluate medications/consider consulting pharmacy, Eyeglasses and hearing aids, Increase mobility    Medication Interventions: Evaluate medications/consider consulting pharmacy, Patient to call before getting OOB, Teach patient to arise slowly    Elimination Interventions: Call light in reach, Patient to call for help with toileting needs, Toilet paper/wipes in reach              Problem: Pressure Injury - Risk of  Goal: *Prevention of pressure injury  Description: Document Julio Cesar Scale and appropriate interventions in the flowsheet.   Outcome: Progressing Towards Goal  Note: Pressure Injury Interventions:  Sensory Interventions: Assess changes in LOC, Avoid rigorous massage over bony prominences, Check visual cues for pain, Float heels, Keep linens dry and wrinkle-free, Maintain/enhance activity level, Minimize linen layers, Pressure redistribution bed/mattress (bed type)    Moisture Interventions: Absorbent underpads, Apply protective barrier, creams and emollients, Check for incontinence Q2 hours and as needed, Maintain skin hydration (lotion/cream), Minimize layers, Moisture barrier    Activity Interventions: Chair cushion, Increase time out of bed, Pressure redistribution bed/mattress(bed type)    Mobility Interventions: Chair cushion, Float heels, HOB 30 degrees or less, Pressure redistribution bed/mattress (bed type)    Nutrition Interventions: Document food/fluid/supplement intake    Friction and Shear Interventions: Apply protective barrier, creams and emollients, Foam dressings/transparent film/skin sealants, HOB 30 degrees or less, Lift sheet, Minimize layers

## 2021-09-11 NOTE — PROGRESS NOTES
Syeda Roman MD  Medical Director  [facility], [address] [facility]  Tel: [phone] [facility] PROGRESS NOTE    Joleen Rivas  Admit Date: 8/25/2021  Admit Diagnosis:   Unilateral complete BKA, right, sequela ([address] [S88.111S]    Subjective     Patient seen and examined. Doing well. Minimal pain. + BM. Enjoys therapies. Remains impulsive with poor safety awareness. Will need 24hr supervision/assist at dc    Patient seen and examined. No pain at rest. Denies lightheadedness, palpations, SOB or nausea. Participating in therapy. Objective:     Current Facility-Administered Medications   Medication Dose Route Frequency    lactulose (CHRONULAC) 10 gram/15 mL solution 30 mL  30 mL Oral DAILY PRN    naloxone (NARCAN) injection 0.4 mg  0.4 mg IntraVENous PRN    acetaminophen (TYLENOL) tablet 650 mg  650 mg Oral Q6H PRN    apixaban (ELIQUIS) tablet 5 mg  5 mg Oral Q12H    atorvastatin (LIPITOR) tablet 40 mg  40 mg Oral QHS    docusate sodium (COLACE) capsule 100 mg  100 mg Oral BID    fludrocortisone (FLORINEF) tablet 0.1 mg  0.1 mg Oral DAILY    midodrine (PROAMATINE) tablet 2.5 mg  2.5 mg Oral TID WITH MEALS    nystatin (MYCOSTATIN) 100,000 unit/gram powder   Topical PRN    oxyCODONE IR (ROXICODONE) tablet 5 mg  5 mg Oral Q4H PRN    pantoprazole (PROTONIX) tablet 40 mg  40 mg Oral ACB    polyethylene glycol (MIRALAX) packet 17 g  17 g Oral DAILY    pregabalin (LYRICA) capsule 200 mg  200 mg Oral BID    traMADoL (ULTRAM) tablet 50 mg  50 mg Oral Q6H PRN    traZODone (DESYREL) tablet 50 mg  50 mg Oral QHS       Review of Systems:   Denies chest pain, shortness of breath, cough, headache, visual problems, abdominal pain, dysuria, calf pain. Pertinent positives are as noted in the HPI, ROS unremarkable otherwise.      Visit Vitals  /60 (BP 1 Location: Left arm, BP Patient Position: At rest)   Pulse 96   Temp 98.4 °F (36.9 °C)   Resp 18   Wt 127.3 kg (280 lb 9.6 oz)   SpO2 95%   BMI 40.26 kg/m²        Physical Exam:   General: Alert and age appropriately oriented. restless  No acute cardiorespiratory distress. HEENT: Normocephalic, no scleral icterus. Oral mucosa moist without cyanosis. Lungs: Clear to auscultation bilaterally. Respiration even and unlabored. Heart: Regular rate ,irreg rhythm, S1, S2. No murmurs, clicks, rub or gallops. Abdomen: Soft, non-tender, not distended. Bowel sounds normoactive. No organomegaly. obese   Genitourinary: Deferred. Neuromuscular:      Hip flexion 3+ 4- on the right  4/5 on the left  ACE wrap right bka and knee. Still with extension lag     Skin/extremity: No rashes, no erythema. No calf tenderness L LE.  R BKA with ace wrap   Left plantar foot ulcer with  large blood blister, no marginal erythema, no drainage lateral foot dressing intact                                                                               Functional Assessment:          Balance  Sitting - Static: Good (unsupported) (09/10/21 1500)  Sitting - Dynamic: Fair (occasional) (09/10/21 1500)  Standing - Static: Not tested (09/10/21 1500)  Standing - Dynamic : Impaired (09/07/21 1200)         Tuc[place] Batchelor Fall Risk Assessment:  [place] Batchelor Fall Risk  Mobility: Ambulates or transfers with assist devices or assistance (09/11/21 0730)  Mobility Interventions: Bed/chair exit alarm;Communicate number of staff needed for ambulation/transfer;Patient to call before getting OOB;PT Consult for mobility concerns;PT Consult for assist device competence;Strengthening exercises (ROM-active/passive); Utilize walker, cane, or other assistive device;Utilize gait belt for transfers/ambulation (09/11/21 0730)  Mentation: Alert, oriented x 3 (09/11/21 0730)  Mentation Interventions: Adequate sleep, hydration, pain control;Bed/chair exit alarm; Door open when patient unattended;Evaluate medications/consider consulting pharmacy; Increase mobility; Reorient patient (09/11/21 0730)  Medication: Patient receiving anticonvulsants, sedatives(tranquilizers), psychotropics or hypnotics, hypoglycemics, narcotics, sleep aids, antihypertensives, laxatives, or diuretics (09/11/21 0730)  Medication Interventions: Evaluate medications/consider consulting pharmacy; Patient to call before getting OOB; Teach patient to arise slowly (09/11/21 0730)  Elimination: Needs assistance with toileting (09/11/21 0730)  Elimination Interventions: Call light in reach; Patient to call for help with toileting needs;Stay With Me (per policy); Toilet paper/wipes in reach; Toileting schedule/hourly rounds;Urinal in reach (09/11/21 0730)  Prior Fall History: No (09/11/21 0730)  Total Score: 3 (09/11/21 0730)  Standard Fall Precautions: Yes (09/05/21 2047)  High Fall Risk: Yes (09/11/21 0730)     Ambulation:  Gait  Distance (ft): 0 Feet (ft) (09/10/21 1500)     Labs/Studies:  Recent Results (from the past 72 hour(s))   METABOLIC PANEL, BASIC    Collection Time: 09/10/21  5:09 AM   Result Value Ref Range    Sodium 145 136 - 145 mmol/L    Potassium 4.7 3.5 - 5.1 mmol/L    Chloride 106 98 - 107 mmol/L    CO2 38 (H) 21 - 32 mmol/L    Anion gap 1 (L) 7 - 16 mmol/L    Glucose 95 65 - 100 mg/dL    BUN 22 8 - 23 MG/DL    Creatinine 1.48 0.8 - 1.5 MG/DL    GFR est AA 60 (L) >60 ml/min/1.73m2    GFR est non-AA 49 (L) >60 ml/min/1.73m2    Calcium 9.4 8.3 - 10.4 MG/DL   CBC W/O DIFF    Collection Time: 09/10/21  5:09 AM   Result Value Ref Range    WBC 5.0 4.3 - 11.1 K/uL    RBC 3.07 (L) 4.23 - 5.6 M/uL    HGB 8.8 (L) 13.6 - 17.2 g/dL    HCT 30.3 (L) 41.1 - 50.3 %    MCV 98.7 (H) 79.6 - 97.8 FL    MCH 28.7 26.1 - 32.9 PG    MCHC 29.0 (L) 31.4 - 35.0 g/dL    RDW 15.8 (H) 11.9 - 14.6 %    PLATELET 820 (L) 665 - 450 K/uL    MPV 12.4 (H) 9.4 - 12.3 FL    ABSOLUTE NRBC 0.00 0.0 - 0.2 K/uL   FERRITIN    Collection Time: 09/10/21  5:09 AM   Result Value Ref Range    Ferritin 91 8 - 388 NG/ML   TRANSFERRIN SATURATION    Collection Time: 09/10/21  5:09 AM   Result Value Ref Range    Iron 24 (L) 35 - 150 ug/dL    TIBC 240 (L) 250 - 450 ug/dL    Transferrin Saturation 10 %   TRANSFERRIN    Collection Time: 09/10/21  5:09 AM   Result Value Ref Range    Transferrin 181 (L) 202 - 364 mg/dL       Assessment:     Problem List as of 9/11/2021 Date Reviewed: 8/27/2021        Codes Class Noted - Resolved    * (Principal) Unilateral complete BKA, right, sequela ([facility] ICD-10-CM: Q09.593A  ICD-9-CM: 905.9  8/25/2021 - Present        Suspected sleep apnea ICD-10-CM: R29.818  ICD-9-CM: 781.99  8/20/2021 - Present        S/P BKA (below knee amputation) unilateral, right ([facility] ICD-10-CM: Z89.511  ICD-9-CM: V49.75  8/18/2021 - Present        Respiratory failure, post-operative ([facility] ICD-10-CM: N31.770  ICD-9-CM: 518.51  8/18/2021 - Present        Acute respiratory failure with hypercapnia (HCC) ICD-10-CM: J96.02  ICD-9-CM: 518.81  7/23/2021 - Present        Acute on chronic respiratory failure with hypoxia and hypercapnia (HCC) ICD-10-CM: J96.21, J96.22  ICD-9-CM: 518.84, 786.09, 799.02  7/23/2021 - Present        Acute metabolic encephalopathy YGU-93-MI: G93.41  ICD-9-CM: 348.31  7/23/2021 - Present        Hypoxia ICD-10-CM: R09.02  ICD-9-CM: 799.02  7/21/2021 - Present        Acute kidney injury superimposed on CKD ([facility] ICD-10-CM: N17.9, N18.9  ICD-9-CM: 866.00, 585.9  7/21/2021 - Present        Staphylococcus aureus bacteremia ICD-10-CM: R78.81, B95.61  ICD-9-CM: 790.7, 041.11  7/21/2021 - Present        Cellulitis ICD-10-CM: L03.90  ICD-9-CM: 682.9  7/19/2021 - Present        Systolic CHF, acute on chronic ([facility]) ICD-10-CM: I50.23  ICD-9-CM: 428.23, 428.0  7/19/2021 - Present        Atrial fibrillation with RVR ([facility]) ICD-10-CM: I48.91  ICD-9-CM: 427.31  7/19/2021 - Present        Morbid obesity with BMI of 40.0-44.9, adult ([facility]) ICD-10-CM: E66.01, Z68.41  ICD-9-CM: 278.01, V85.41  6/11/2020 - Present        Severe obesity (BMI 35.0-35.9 with comorbidity) ([facility] (Chronic) ICD-10-CM: E66.01, Z68.35  ICD-9-CM: 278.01, V85.35  10/10/2018 - Present        Bunion of unspecified foot (Chronic) ICD-10-CM: M21.619  ICD-9-CM: 727.1  4/10/2018 - Present        Onychomycosis (Chronic) ICD-10-CM: B35.1  ICD-9-CM: 110.1  4/10/2018 - Present        Corns and callus (Chronic) ICD-10-CM: L84  ICD-9-CM: 700  4/10/2018 - Present        Mixed hyperlipidemia (Chronic) ICD-10-CM: Q26.6  ICD-9-CM: 272.2  4/10/2018 - Present        Mixed axonal-demyelinating polyneuropathy (Chronic) ICD-10-CM: G62.89  ICD-9-CM: 355.9  1/5/2018 - Present        Controlled type 2 diabetes mellitus with diabetic polyneuropathy, without long-term current use of insulin (HCC) (Chronic) ICD-10-CM: E11.42  ICD-9-CM: 250.60, 357.2  1/5/2018 - Present        Type 2 diabetes mellitus with nephropathy (HCC) (Chronic) ICD-10-CM: E11.21  ICD-9-CM: 250.40, 583.81  1/5/2018 - Present        Stage 3 chronic kidney disease (HCC) (Chronic) ICD-10-CM: N18.30  ICD-9-CM: 585.3  11/22/2017 - Present        Benign hypertensive kidney disease with chronic kidney disease (Chronic) ICD-10-CM: I12.9  ICD-9-CM: 403.10  11/6/2017 - Present        CAD (coronary artery disease) (Chronic) ICD-10-CM: I25.10  ICD-9-CM: 414.00  Unknown - Present        RESOLVED: Glucose intolerance (impaired glucose tolerance) (Chronic) ICD-10-CM: R73.02  ICD-9-CM: 790.22  11/6/2017 - 11/14/2017        RESOLVED: BMI 40.0-44.9, adult (HCC) (Chronic) ICD-10-CM: Z68.41  ICD-9-CM: V85.41  11/4/2016 - 10/10/2018        RESOLVED: Hypertension ICD-10-CM: I10  ICD-9-CM: 401.9  Unknown - 10/10/2016        RESOLVED: Hypercholesterolemia ICD-10-CM: E78.00  ICD-9-CM: 272.0  Unknown - 10/10/2016        RESOLVED: Chronic kidney disease ICD-10-CM: N18.9  ICD-9-CM: 810. 9  Unknown - 10/10/2016    Overview Signed 10/10/2016 11:28 AM by Elizabeth Rosado MD     stage 3             RESOLVED: Neuropathy ICD-10-CM: G62.9  ICD-9-CM: 355.9  Unknown - 10/10/2016        RESOLVED: Peripheral polyneuropathy (Chronic) ICD-10-CM: G62.9  ICD-9-CM: 356.9  10/10/2016 - 1/5/2018        RESOLVED: CKD (chronic kidney disease) stage 3, GFR 30-59 ml/min (HCC) (Chronic) ICD-10-CM: N18.30  ICD-9-CM: 585.3  10/10/2016 - 11/6/2017        RESOLVED: Hyperlipidemia (Chronic) ICD-10-CM: E78.5  ICD-9-CM: 272.4  10/10/2016 - 4/10/2018        RESOLVED: Hyperkalemia ICD-10-CM: E87.5  ICD-9-CM: 276.7  10/10/2016 - 11/14/2017        RESOLVED: Essential hypertension (Chronic) ICD-10-CM: I10  ICD-9-CM: 401.9  10/10/2016 - 11/22/2017              S/p right below-knee amputation secondary to cellulitis (8/18/2021, Tevin Pascual MD)     Plan / Recommendations / Medical Decision Making:      Daily physician / PA medical management:     Unilateral complete BKA, right - NWB R LE; prosthetics involved. Will be difficult for patient to mobilize with a prosthesis given he lacks sensation and proprioception in the right foot, has obese body habitus, chronic respiratory failure and CHF.     Pain control - stable, mild-to-moderate joint symptoms intermittently, reasonably well controlled by PRN meds. Will require regular pain assessment and comprehensive pain management. Has chronic mixed axonal-demyelinating polyneuropathy; on Lyrica 200mg BID. Denies residual limb pain but has tramadol and oxycodone ordered PRN. -9/10 only taking tylenol for pain     Hypotension - BP fluctuating, managed medically. Has been hypotensive and is on Florinef and midodrine. Dehydration thought to be contributing, also possibly diuretics.   -8/27 /63, HR 94 this AM; patient denies lightheadedness during therapies or while resting in room  -8/28 95/53; asymptomatic, HR 97; on Florinef and midodrine; up to 117/65 max  -8/30 /72, HR 85 this AM  -8/31 VSS  -9/1 124/64 HR 60  -9/3 /71, HR 79  -9/4 VSS  -9/5 /79, HR 80  -9/6 /73, HR 62  -9/7 /57 this a.m HR 88  -9/8 /75, improved  -9/9 113/68 ; 9/10 121/70. Low of 102/52, cont midodrine and florinef  - 9/11 BP - 103/60, HR - 96, asymptomatic     Acute-on-chronic respiratory failure - continue 3L O2, wean to 2L if possible. Encourage IS.    -8/31 sats reportedly 78%, pt asymptomatic. Rechecked 98% 3L  -9/10 encouraged use of IS; maintained on 2-3 O2, chronic use     Atrial fibrillation - continue Eliquis 5mg BID; rate controlled. Toprol XL has been held due to hypotensive at times.  -9/1 NSR this AM  -9/3 IIR this AM, rate controlled  -9/4 IIR in PT, rate controlled  -9/5 IIR today, rate controlled  -9/6 IIR with additional respiratory irregularity, rate controlled  -9/10 rate controlled, irreg rhythm this a.m  - 9/11 HRs 95-96 range in last 24 hours     Acute-on-chronic anemia - anemia of chronic kidney disease and post-op due to blood loss. Hgb trending down; follow closely. No evidence of active bleeding.  -8/27 CBC ordered for tomorrow  -8/28 Hgb 6.6 from 8.9 (8/24); no sign of active bleeding source: stool was not melenic, little drainage from amputation; hold Eliquis and recheck in AM. If true value, will need to transfer downtown for a blood transfusion  -8/30 recheck H&H with Hgb 8.5; Eliquis restarted (yesterday)  -9/2 Hgb 9.3 improved  -9/5 will check CBC tomorrow  -9/6 Hgb 10.2, continues to rebound  -9/8 f/u 9/10 ; hgb 8.8 from 10.2 ; check iron studies. - 9/10 low Fe - 24 and transferrin saturation, c/w Fe deficiency anemia, will begin Fe supplements bid    Chronic kidney disease, CKD3 - improving, monitor. Creatine 1.49 from 1.57.  -8/27 BMP ordered for tomorrow  -8/28 Cr 1.51, stable  -9/2 Cr 1.58, BUN 21; baseline  -9/5 will check BMP tomorrow  -9/6 Cr 1.50, BUN 22 - normal  -9/10 creat 1.48 stable     Diabetes mellitus - HgbA1c 6.7% (7/13/2021), moderate glycemic control.  Will require daily, close fasting glucose monitoring and medication adjustment to optimize glycemic control in setting of acute illness and hospitalization. Takes Glucotrol 5mg at home. Currently on SSI; add Glucotrol ? .  -8/26 BS controlled  -8/27 BS this , all BS since admission <145, most in low 100s  -8/28 BS controlled  -8/30  this AM, all BS yesterday <110  -well controlled  -9/2 BS well controlled; diet controlled  -9/3  this AM, yesterday all values <120  -9/4 BS 94 this AM, all BS <120 since 8/30; d/c POC glucose  -9/6 BS 86 in BMP this AM  -well controlled     Pneumonia prophylaxis - incentive spirometer every hour while awake.     DVT risk / DVT prophylaxis - will require daily physician / PA exam to assess for signs and symptoms as patient is at increased risk for of thromboembolism. Mobilize as tolerated. Sequential pneumatic compression devices (SCDs) when in bed; thigh-high or knee-high thromboembolic deterrent hose when out of bed. On Eliquis.     CAD - continue Eliquis and statin.     GI prophylaxis - resume PPI. At times may need additional antacids, Maalox prn.     General skin care / wound prevention - monitor BKA  incision and general skin wound status daily per staff and physician / PA. At risk for failure. Will require 24/7 rehab nursing.  Keep BKA incision and left plantar foot wound clean and dry, reinforce dressing PRN and ice to area for comfort; he is to f/u with Dr Nan Esteban for staple removal.   -8/25 will cont wound vac until f/u with Dr. Pendleton  -8/28 wound looked good at time of wound vac check per notes  -8/30 wound vac in place and functioning, skin surrounding without erythema or rash  -9/2 wound vac will remain intact until f/u with Dr Lyndsey Munroe next week  -9/3 Myriam Organ wound vac has auto-terminated, incision remains covered with vac dressing  -9/4 wound vac and dressing removed, incision intact with sutures, no erythema, induration or drainage; wound cleaned with wound cleanser and redressed with Telfa, 4x4s, Kerlix and ACE  -9/5 BKA incision inspected and redressed; left foot plantar wound inspected, stable, Meplex replaced  -9/7 dressing not removed this a.m; will see Dr Jake Miller pleased with inc and residual limb. He will continue off loading left foot ulcer with Maura Cheadle walker boot; healing; need stump   -9/10 plantar ulcer now with large blood blister? ? Was not present yesterday. Must watch closely. Must wear Maura Cheadle boot but may need to make Nwb. RLE amputation healing well     Urinary retention / neurogenic bladder - schedule voids q 6-8 hrs. Check post-void residual every shift; in and out catheter if post-void residual is more than 400ml.     Bowel program - at risk for constipation as a side effect of opioids, other medications, impaired mobility, etc. MiraLAX daily for regularity, Ivis-Colace for stool softener. PRN MOM, bisacodyl suppository or tablets for constipation.  -8/27 constipated, no BM with daily gentle agents (MiraLAX, Colace), will try more aggressively with lactulose after therapies; may require MOM, bisacodyl suppository or disimpaction  -8/28 excellent results yesterday  -8/30 large, normal BM this AM; continue daily MiraLAX and stool softener  -9/9 no bowel issues      Statement of Medical Necessity - K5 Wheelchair  Mr Jori Brunson has been evaluated face-to-face on a daily basis by the Physiatry MD / PA for the above-noted diagnoses since admission to our rehabilitation program. It is medically necessary for this patient to have a K5 wheelchair for home and community use at discharge from inpatient rehab. He is s/p R BKA and has diabetic ulcer on plantar aspect of his left foot. The patient has specific neurologic/orthopedic needs that will require its lightweight, adjustable to facilitate his mobility, enhance his safety and decrease the caregivers burden of care. This will allow him to mobilize easily within the home additionally.  A standard wheelchair is too heavy and large, making home maneuvering difficult or impossible; standard wheelchair also would not provide support or proper positioning. With his right BKA, left plantar ulcer on his left foot and severe diabetic neuropathy with a charcot foot, he cannot utilize a walker or any other assist device for safe mobility.      Time spent was 25 minutes with over 1/2 in direct patient care/examination, consultation and coordination of care.      Signed By: Enid Ruiz MD     September 11, 2021

## 2021-09-11 NOTE — PROGRESS NOTES
PHYSICAL THERAPY DAILY NOTE  Time In: 1046  Time Out: 6061  Patient Seen For: AM;Patient education; Therapeutic exercise;Transfer training; Wheelchair mobility    Subjective: Pt stated that he was excited to be going home on Tuesday. Objective: Other (comment) (fall)   Visit Vitals  /60 (BP 1 Location: Left arm, BP Patient Position: At rest)   Pulse 96   Temp 98.4 °F (36.9 °C)   Resp 18   Wt 127.3 kg (280 lb 9.6 oz)   SpO2 95%   BMI 40.26 kg/m²     Pain level: Pt stated that he had no pain. Patient education: Bed mobility training,transfer training, fall precautions, activity pacing, precautions, w/c mobility and parts management. Interdisciplinary Communication: RN checked BKA incision and reapplied bandage and ace wrap. COGNITION Daily Assessment    Alert and oriented to self, place and time. Pt was able to follow all simple commands. BED/MAT MOBILITY Daily Assessment   Pt required min assist for supine to sit for upper body. Rolling Right : 5 (Supervision)  Rolling Left : 5 (Supervision)  Supine to Sit : 4 (Minimal assistance)  Sit to Supine : 5 (Supervision)       TRANSFERS Daily Assessment   Pt was able to perform mat<>w/c & bed>w/c transfer with min assist using sliding board transfer with good safety awareness. All transfers were performed with grant boot.   Transfer Type: Lateral with transfer board  Transfer Assistance : 4 (Minimal assistance)  Car Transfers: Not tested       GAIT Daily Assessment    Amount of Assistance: 0 (Not tested)  Distance (ft): 0 Feet (ft)       STEPS or STAIRS Daily Assessment    Steps/Stairs Ambulated (#): 0  Level of Assist : 0 (Not tested)       BALANCE Daily Assessment    Sitting - Static: Good (unsupported)  Sitting - Dynamic: Fair (occasional)  Standing - Static: Not tested       [facility] MOBILITY Daily Assessment    Able to Propel (ft): 25 feet  Curbs/Ramps Assist Required (FIM Score): 0 (Not tested)  Wheelchair Setup Assist Required : 4 (Minimal assistance)  Wheelchair Management: Manages left brake;Manages right brake       LOWER EXTREMITY EXERCISES Daily Assessment    Extremity: Both  Exercise Type #1: Supine lower extremity strengthening  Sets Performed: 3  Reps Performed: 10     SUPINE/SIDELYING & PRONE EXERCISES Sets Reps Comments   Hip Abduction 3 10    Hip ext Stretch 3 10  30 sec stretch   Hip ext 3 10    Knee Flex 3 10  Red band   Hip Flex 3 10    Short Arc Quad 3 10             Assessment: Pt was able to tolerate PT treatment well. Pt is progressing toward goals and states that he will be going home on Tuesday. Pt demonstrates good safety awareness with all transfers. Pt will benefit from continued skilled PT at this time. Pt demonstrates good understanding of all exercises. Patient return to room at end of treatment and remained up in wheelchair with needs in reach. Plan of Care: Continue with POC and progress as tolerated.        Lexus Zamorano, PT  9/11/2021

## 2021-09-11 NOTE — PROGRESS NOTES
Problem: Falls - Risk of  Goal: *Absence of Falls  Description: Document Tab Sites Fall Risk and appropriate interventions in the flowsheet. Outcome: Progressing Towards Goal  Note: Fall Risk Interventions:  Mobility Interventions: Bed/chair exit alarm, Communicate number of staff needed for ambulation/transfer, Patient to call before getting OOB, PT Consult for mobility concerns, PT Consult for assist device competence, Strengthening exercises (ROM-active/passive), Utilize walker, cane, or other assistive device, Utilize gait belt for transfers/ambulation    Mentation Interventions: Adequate sleep, hydration, pain control, Bed/chair exit alarm, Door open when patient unattended, Evaluate medications/consider consulting pharmacy, Increase mobility, Reorient patient    Medication Interventions: Evaluate medications/consider consulting pharmacy, Patient to call before getting OOB, Teach patient to arise slowly    Elimination Interventions: Call light in reach, Patient to call for help with toileting needs, Stay With Me (per policy), Toilet paper/wipes in reach, Toileting schedule/hourly rounds, Urinal in reach              Problem: Pressure Injury - Risk of  Goal: *Prevention of pressure injury  Description: Document Julio Cesar Scale and appropriate interventions in the flowsheet.   Outcome: Progressing Towards Goal  Note: Pressure Injury Interventions:  Sensory Interventions: Assess changes in LOC, Assess need for specialty bed, Chair cushion, Check visual cues for pain, Float heels, Keep linens dry and wrinkle-free, Minimize linen layers    Moisture Interventions: Absorbent underpads, Assess need for specialty bed, Minimize layers    Activity Interventions: Increase time out of bed, Pressure redistribution bed/mattress(bed type), PT/OT evaluation    Mobility Interventions: Assess need for specialty bed, Float heels, Pressure redistribution bed/mattress (bed type), PT/OT evaluation    Nutrition Interventions: Document food/fluid/supplement intake, Offer support with meals,snacks and hydration    Friction and Shear Interventions: Apply protective barrier, creams and emollients, HOB 30 degrees or less, Minimize layers

## 2021-09-12 PROCEDURE — 65310000000 HC RM PRIVATE REHAB

## 2021-09-12 PROCEDURE — 74011250637 HC RX REV CODE- 250/637: Performed by: PHYSICAL MEDICINE & REHABILITATION

## 2021-09-12 PROCEDURE — 74011250637 HC RX REV CODE- 250/637: Performed by: PHYSICIAN ASSISTANT

## 2021-09-12 PROCEDURE — 99232 SBSQ HOSP IP/OBS MODERATE 35: CPT | Performed by: PHYSICAL MEDICINE & REHABILITATION

## 2021-09-12 RX ADMIN — MIDODRINE HYDROCHLORIDE 2.5 MG: 5 TABLET ORAL at 08:40

## 2021-09-12 RX ADMIN — TRAZODONE HYDROCHLORIDE 50 MG: 50 TABLET ORAL at 21:13

## 2021-09-12 RX ADMIN — PREGABALIN 200 MG: 100 CAPSULE ORAL at 08:40

## 2021-09-12 RX ADMIN — PREGABALIN 200 MG: 100 CAPSULE ORAL at 21:14

## 2021-09-12 RX ADMIN — FERROUS SULFATE TAB 325 MG (65 MG ELEMENTAL FE) 325 MG: 325 (65 FE) TAB at 17:06

## 2021-09-12 RX ADMIN — POLYETHYLENE GLYCOL 3350 17 G: 17 POWDER, FOR SOLUTION ORAL at 08:40

## 2021-09-12 RX ADMIN — FERROUS SULFATE TAB 325 MG (65 MG ELEMENTAL FE) 325 MG: 325 (65 FE) TAB at 08:40

## 2021-09-12 RX ADMIN — MIDODRINE HYDROCHLORIDE 2.5 MG: 5 TABLET ORAL at 13:00

## 2021-09-12 RX ADMIN — APIXABAN 5 MG: 5 TABLET, FILM COATED ORAL at 08:40

## 2021-09-12 RX ADMIN — DOCUSATE SODIUM 100 MG: 100 CAPSULE, LIQUID FILLED ORAL at 08:40

## 2021-09-12 RX ADMIN — PANTOPRAZOLE SODIUM 40 MG: 40 TABLET, DELAYED RELEASE ORAL at 04:36

## 2021-09-12 RX ADMIN — ATORVASTATIN CALCIUM 40 MG: 40 TABLET, FILM COATED ORAL at 21:13

## 2021-09-12 RX ADMIN — DOCUSATE SODIUM 100 MG: 100 CAPSULE, LIQUID FILLED ORAL at 21:13

## 2021-09-12 RX ADMIN — FLUDROCORTISONE ACETATE 0.1 MG: 0.1 TABLET ORAL at 08:40

## 2021-09-12 RX ADMIN — APIXABAN 5 MG: 5 TABLET, FILM COATED ORAL at 21:13

## 2021-09-12 NOTE — PROGRESS NOTES
Martha Garcia MD  Medical Director  [address] [facility], [address] [facility]  Tel: [phone] Kwasi Wise PROGRESS NOTE    Paloma Quintanilla Pack  Admit Date: 8/25/2021  Admit Diagnosis:   Unilateral complete BKA, right, sequela ([address] [S88.111S]    Subjective     Patient seen and examined. Doing well. Minimal pain. + BM. Enjoys therapies. Remains impulsive with poor safety awareness. Will need 24hr supervision/assist at dc    Patient seen and examined. Denies L foot pain, lightheadedness, palpations, SOB or nausea. Participating in therapy. Objective:     Current Facility-Administered Medications   Medication Dose Route Frequency    ferrous sulfate tablet 325 mg  1 Tablet Oral BID WITH MEALS    lactulose (CHRONULAC) 10 gram/15 mL solution 30 mL  30 mL Oral DAILY PRN    naloxone (NARCAN) injection 0.4 mg  0.4 mg IntraVENous PRN    acetaminophen (TYLENOL) tablet 650 mg  650 mg Oral Q6H PRN    apixaban (ELIQUIS) tablet 5 mg  5 mg Oral Q12H    atorvastatin (LIPITOR) tablet 40 mg  40 mg Oral QHS    docusate sodium (COLACE) capsule 100 mg  100 mg Oral BID    fludrocortisone (FLORINEF) tablet 0.1 mg  0.1 mg Oral DAILY    midodrine (PROAMATINE) tablet 2.5 mg  2.5 mg Oral TID WITH MEALS    nystatin (MYCOSTATIN) 100,000 unit/gram powder   Topical PRN    oxyCODONE IR (ROXICODONE) tablet 5 mg  5 mg Oral Q4H PRN    pantoprazole (PROTONIX) tablet 40 mg  40 mg Oral ACB    polyethylene glycol (MIRALAX) packet 17 g  17 g Oral DAILY    pregabalin (LYRICA) capsule 200 mg  200 mg Oral BID    traMADoL (ULTRAM) tablet 50 mg  50 mg Oral Q6H PRN    traZODone (DESYREL) tablet 50 mg  50 mg Oral QHS       Review of Systems:   Denies chest pain, shortness of breath, cough, headache, visual problems, abdominal pain, dysuria, calf pain. Pertinent positives are as noted in the HPI, ROS unremarkable otherwise.      Visit Vitals  /78 (BP 1 Location: Left upper arm)   Pulse 93   Temp 98.9 °F (37.2 °C)   Resp 18   Wt 127.3 kg (280 lb 9.6 oz)   SpO2 96%   BMI 40.26 kg/m²        Physical Exam:   General: Alert and age appropriately oriented. restless  No acute cardiorespiratory distress. HEENT: Normocephalic, no scleral icterus. Oral mucosa moist without cyanosis. Lungs: Clear to auscultation bilaterally. Respiration even and unlabored. Heart: Regular rate ,irreg rhythm, S1, S2. No murmurs, clicks, rub or gallops. Abdomen: Soft, non-tender, not distended. Bowel sounds normoactive. No organomegaly. obese   Genitourinary: Deferred. Neuromuscular:      Hip flexion 3+ 4- on the right  4/5 on the left  ACE wrap right bka and knee. Still with extension lag     Skin/extremity: No rashes, no erythema. No calf tenderness L LE.  R BKA with ace wrap   Left plantar foot ulcer with large blood blister, no marginal erythema, no drainage, unchanged,  lateral L foot dime sized wound - no drainage                                                                              Functional Assessment:          Balance  Sitting - Static: Good (unsupported) (09/11/21 1100)  Sitting - Dynamic: Fair (occasional) (09/11/21 1100)  Standing - Static: Not tested (09/11/21 1100)  Standing - Dynamic : Impaired (09/07/21 1200)         Karla Paul Fall Risk Assessment:  Karla Paul Fall Risk  Mobility: Ambulates or transfers with assist devices or assistance (09/12/21 1545)  Mobility Interventions: Bed/chair exit alarm; Patient to call before getting OOB; Strengthening exercises (ROM-active/passive); Utilize walker, cane, or other assistive device (09/11/21 2004)  Mentation: Alert, oriented x 3 (09/11/21 2004)  Mentation Interventions: Adequate sleep, hydration, pain control (09/11/21 2004)  Medication: Patient receiving anticonvulsants, sedatives(tranquilizers), psychotropics or hypnotics, hypoglycemics, narcotics, sleep aids, antihypertensives, laxatives, or diuretics (09/11/21 2004)  Medication Interventions: Evaluate medications/consider consulting pharmacy; Patient to call before getting OOB; Teach patient to arise slowly (09/11/21 2004)  Elimination: Needs assistance with toileting (09/11/21 2004)  Elimination Interventions: Call light in reach; Patient to call for help with toileting needs;Stay With Me (per policy); Toilet paper/wipes in reach; Toileting schedule/hourly rounds (09/11/21 2004)  Prior Fall History: No (09/11/21 2004)  Total Score: 3 (09/11/21 2004)  Standard Fall Precautions: Yes (09/05/21 2047)  High Fall Risk: Yes (09/11/21 2004)     Ambulation:  Gait  Distance (ft): 0 Feet (ft) (09/11/21 1100)     Labs/Studies:  Recent Results (from the past 72 hour(s))   METABOLIC PANEL, BASIC    Collection Time: 09/10/21  5:09 AM   Result Value Ref Range    Sodium 145 136 - 145 mmol/L    Potassium 4.7 3.5 - 5.1 mmol/L    Chloride 106 98 - 107 mmol/L    CO2 38 (H) 21 - 32 mmol/L    Anion gap 1 (L) 7 - 16 mmol/L    Glucose 95 65 - 100 mg/dL    BUN 22 8 - 23 MG/DL    Creatinine 1.48 0.8 - 1.5 MG/DL    GFR est AA 60 (L) >60 ml/min/1.73m2    GFR est non-AA 49 (L) >60 ml/min/1.73m2    Calcium 9.4 8.3 - 10.4 MG/DL   CBC W/O DIFF    Collection Time: 09/10/21  5:09 AM   Result Value Ref Range    WBC 5.0 4.3 - 11.1 K/uL    RBC 3.07 (L) 4.23 - 5.6 M/uL    HGB 8.8 (L) 13.6 - 17.2 g/dL    HCT 30.3 (L) 41.1 - 50.3 %    MCV 98.7 (H) 79.6 - 97.8 FL    MCH 28.7 26.1 - 32.9 PG    MCHC 29.0 (L) 31.4 - 35.0 g/dL    RDW 15.8 (H) 11.9 - 14.6 %    PLATELET 494 (L) 285 - 450 K/uL    MPV 12.4 (H) 9.4 - 12.3 FL    ABSOLUTE NRBC 0.00 0.0 - 0.2 K/uL   FERRITIN    Collection Time: 09/10/21  5:09 AM   Result Value Ref Range    Ferritin 91 8 - 388 NG/ML   TRANSFERRIN SATURATION    Collection Time: 09/10/21  5:09 AM   Result Value Ref Range    Iron 24 (L) 35 - 150 ug/dL    TIBC 240 (L) 250 - 450 ug/dL    Transferrin Saturation 10 %   TRANSFERRIN    Collection Time: 09/10/21  5:09 AM   Result Value Ref Range    Transferrin 181 (L) 202 - 364 mg/dL       Assessment:     Problem List as of 9/12/2021 Date Reviewed: 8/27/2021        Codes Class Noted - Resolved    * (Principal) Unilateral complete BKA, right, sequela ([facility] ICD-10-CM: S74.531Y  ICD-9-CM: 905.9  8/25/2021 - Present        Suspected sleep apnea ICD-10-CM: R29.818  ICD-9-CM: 781.99  8/20/2021 - Present        S/P BKA (below knee amputation) unilateral, right ([facility] ICD-10-CM: Z89.511  ICD-9-CM: V49.75  8/18/2021 - Present        Respiratory failure, post-operative ([place] [address].) ICD-10-CM: Q76.697  ICD-9-CM: 518.51  8/18/2021 - Present        Acute respiratory failure with hypercapnia (HCC) ICD-10-CM: J96.02  ICD-9-CM: 518.81  7/23/2021 - Present        Acute on chronic respiratory failure with hypoxia and hypercapnia (HCC) ICD-10-CM: J96.21, J96.22  ICD-9-CM: 518.84, 786.09, 799.02  7/23/2021 - Present        Acute metabolic encephalopathy DXM-05-CARDENAS: G93.41  ICD-9-CM: 348.31  7/23/2021 - Present        Hypoxia ICD-10-CM: R09.02  ICD-9-CM: 799.02  7/21/2021 - Present        Acute kidney injury superimposed on CKD ([facility] ICD-10-CM: N17.9, N18.9  ICD-9-CM: 866.00, 585.9  7/21/2021 - Present        Staphylococcus aureus bacteremia ICD-10-CM: R78.81, B95.61  ICD-9-CM: 790.7, 041.11  7/21/2021 - Present        Cellulitis ICD-10-CM: L03.90  ICD-9-CM: 682.9  7/19/2021 - Present        Systolic CHF, acute on chronic (HCC) ICD-10-CM: I50.23  ICD-9-CM: 428.23, 428.0  7/19/2021 - Present        Atrial fibrillation with RVR (HCC) ICD-10-CM: I48.91  ICD-9-CM: 427.31  7/19/2021 - Present        Morbid obesity with BMI of 40.0-44.9, adult ([facility] ICD-10-CM: E66.01, Z68.41  ICD-9-CM: 278.01, V85.41  6/11/2020 - Present        Severe obesity (BMI 35.0-35.9 with comorbidity) (HCC) (Chronic) ICD-10-CM: E66.01, Z68.35  ICD-9-CM: 278.01, V85.35  10/10/2018 - Present        Bunion of unspecified foot (Chronic) ICD-10-CM: M21.619  ICD-9-CM: 727.1  4/10/2018 - Present        Onychomycosis (Chronic) ICD-10-CM: B35.1  ICD-9-CM: 110.1  4/10/2018 - Present        Corns and callus (Chronic) ICD-10-CM: L84  ICD-9-CM: 700  4/10/2018 - Present        Mixed hyperlipidemia (Chronic) ICD-10-CM: X75.8  ICD-9-CM: 272.2  4/10/2018 - Present        Mixed axonal-demyelinating polyneuropathy (Chronic) ICD-10-CM: G62.89  ICD-9-CM: 355.9  1/5/2018 - Present        Controlled type 2 diabetes mellitus with diabetic polyneuropathy, without long-term current use of insulin (HCC) (Chronic) ICD-10-CM: E11.42  ICD-9-CM: 250.60, 357.2  1/5/2018 - Present        Type 2 diabetes mellitus with nephropathy (HCC) (Chronic) ICD-10-CM: E11.21  ICD-9-CM: 250.40, 583.81  1/5/2018 - Present        Stage 3 chronic kidney disease ([facility] (Chronic) ICD-10-CM: N18.30  ICD-9-CM: 585.3  11/22/2017 - Present        Benign hypertensive kidney disease with chronic kidney disease (Chronic) ICD-10-CM: I12.9  ICD-9-CM: 403.10  11/6/2017 - Present        CAD (coronary artery disease) (Chronic) ICD-10-CM: I25.10  ICD-9-CM: 414.00  Unknown - Present        RESOLVED: Glucose intolerance (impaired glucose tolerance) (Chronic) ICD-10-CM: R73.02  ICD-9-CM: 790.22  11/6/2017 - 11/14/2017        RESOLVED: BMI 40.0-44.9, adult (HCC) (Chronic) ICD-10-CM: Z68.41  ICD-9-CM: V85.41  11/4/2016 - 10/10/2018        RESOLVED: Hypertension ICD-10-CM: I10  ICD-9-CM: 401.9  Unknown - 10/10/2016        RESOLVED: Hypercholesterolemia ICD-10-CM: E78.00  ICD-9-CM: 272.0  Unknown - 10/10/2016        RESOLVED: Chronic kidney disease ICD-10-CM: N18.9  ICD-9-CM: 598. 9  Unknown - 10/10/2016    Overview Signed 10/10/2016 11:28 AM by Diana Delacruz MD     stage 3             RESOLVED: Neuropathy ICD-10-CM: G62.9  ICD-9-CM: 355.9  Unknown - 10/10/2016        RESOLVED: Peripheral polyneuropathy (Chronic) ICD-10-CM: G62.9  ICD-9-CM: 356.9  10/10/2016 - 1/5/2018        RESOLVED: CKD (chronic kidney disease) stage 3, GFR 30-59 ml/min (HCC) (Chronic) ICD-10-CM: N18.30  ICD-9-CM: 585.3  10/10/2016 - 11/6/2017 RESOLVED: Hyperlipidemia (Chronic) ICD-10-CM: E78.5  ICD-9-CM: 272.4  10/10/2016 - 4/10/2018        RESOLVED: Hyperkalemia ICD-10-CM: E87.5  ICD-9-CM: 276.7  10/10/2016 - 11/14/2017        RESOLVED: Essential hypertension (Chronic) ICD-10-CM: I10  ICD-9-CM: 401.9  10/10/2016 - 11/22/2017              S/p right below-knee amputation secondary to cellulitis (8/18/2021, Indu Vogel MD)     Plan / Recommendations / Medical Decision Making:      Daily physician / PA medical management:     Unilateral complete BKA, right - NWB R LE; prosthetics involved. Will be difficult for patient to mobilize with a prosthesis given he lacks sensation and proprioception in the right foot, has obese body habitus, chronic respiratory failure and CHF.     Pain control - stable, mild-to-moderate joint symptoms intermittently, reasonably well controlled by PRN meds. Will require regular pain assessment and comprehensive pain management. Has chronic mixed axonal-demyelinating polyneuropathy; on Lyrica 200mg BID. Denies residual limb pain but has tramadol and oxycodone ordered PRN. -9/10 only taking tylenol for pain     Hypotension - BP fluctuating, managed medically. Has been hypotensive and is on Florinef and midodrine. Dehydration thought to be contributing, also possibly diuretics. -8/27 /63, HR 94 this AM; patient denies lightheadedness during therapies or while resting in room  -8/28 95/53; asymptomatic, HR 97; on Florinef and midodrine; up to 117/65 max  -8/30 /72, HR 85 this AM  -8/31 VSS  -9/1 124/64 HR 60  -9/3 /71, HR 79  -9/4 VSS  -9/5 /79, HR 80  -9/6 /73, HR 62  -9/7 /57 this a.m HR 88  -9/8 /75, improved  -9/9 113/68 ; 9/10 121/70. Low of 102/52, cont midodrine and florinef  - 9/12 BP - 119/78, HR - 93      Acute-on-chronic respiratory failure - continue 3L O2, wean to 2L if possible. Encourage IS.    -8/31 sats reportedly 78%, pt asymptomatic.  Rechecked 98% 3L  -9/10 encouraged use of IS; maintained on 2-3 O2, chronic use     Atrial fibrillation - continue Eliquis 5mg BID; rate controlled. Toprol XL has been held due to hypotensive at times.  -9/1 NSR this AM  -9/3 IIR this AM, rate controlled  -9/4 IIR in PT, rate controlled  -9/5 IIR today, rate controlled  -9/6 IIR with additional respiratory irregularity, rate controlled  -9/10 rate controlled, irreg rhythm this a.m  - 9/12 HRs 83-96 range in last 24 hours     Acute-on-chronic anemia - anemia of chronic kidney disease and post-op due to blood loss. Hgb trending down; follow closely. No evidence of active bleeding.  -8/27 CBC ordered for tomorrow  -8/28 Hgb 6.6 from 8.9 (8/24); no sign of active bleeding source: stool was not melenic, little drainage from amputation; hold Eliquis and recheck in AM. If true value, will need to transfer downtown for a blood transfusion  -8/30 recheck H&H with Hgb 8.5; Eliquis restarted (yesterday)  -9/2 Hgb 9.3 improved  -9/5 will check CBC tomorrow  -9/6 Hgb 10.2, continues to rebound  -9/8 f/u 9/10 ; hgb 8.8 from 10.2 ; check iron studies. - 9/12 low Fe - 24 and transferrin saturation, c/w Fe deficiency anemia, continue Fe supplements bid    Chronic kidney disease, CKD3 - improving, monitor. Creatine 1.49 from 1.57.  -8/27 BMP ordered for tomorrow  -8/28 Cr 1.51, stable  -9/2 Cr 1.58, BUN 21; baseline  -9/5 will check BMP tomorrow  -9/6 Cr 1.50, BUN 22 - normal  -9/10 creat 1.48 stable     Diabetes mellitus - HgbA1c 6.7% (7/13/2021), moderate glycemic control. Will require daily, close fasting glucose monitoring and medication adjustment to optimize glycemic control in setting of acute illness and hospitalization. Takes Glucotrol 5mg at home. Currently on SSI; add Glucotrol ? .  -8/26 BS controlled  -8/27 BS this , all BS since admission <145, most in low 100s  -8/28 BS controlled  -8/30  this AM, all BS yesterday <110  -well controlled  -9/2 BS well controlled; diet controlled  -9/3  this AM, yesterday all values <120  -9/4 BS 94 this AM, all BS <120 since 8/30; d/c POC glucose  -9/6 BS 86 in BMP this AM  -well controlled     Pneumonia prophylaxis - incentive spirometer every hour while awake.     DVT risk / DVT prophylaxis - will require daily physician / PA exam to assess for signs and symptoms as patient is at increased risk for of thromboembolism. Mobilize as tolerated. Sequential pneumatic compression devices (SCDs) when in bed; thigh-high or knee-high thromboembolic deterrent hose when out of bed. On Eliquis.     CAD - continue Eliquis and statin.     GI prophylaxis - resume PPI. At times may need additional antacids, Maalox prn.     General skin care / wound prevention - monitor BKA  incision and general skin wound status daily per staff and physician / PA. At risk for failure. Will require 24/7 rehab nursing. Keep BKA incision and left plantar foot wound clean and dry, reinforce dressing PRN and ice to area for comfort; he is to f/u with Dr Nan Esteban for staple removal.   -8/25 will cont wound vac until f/u with Dr. Pendleton  -8/28 wound looked good at time of wound vac check per notes  -8/30 wound vac in place and functioning, skin surrounding without erythema or rash  -9/2 wound vac will remain intact until f/u with Dr Lyndsey Munroe next week  -9/3 Myriam Organ wound vac has auto-terminated, incision remains covered with vac dressing  -9/4 wound vac and dressing removed, incision intact with sutures, no erythema, induration or drainage; wound cleaned with wound cleanser and redressed with Telfa, 4x4s, Kerlix and ACE  -9/5 BKA incision inspected and redressed; left foot plantar wound inspected, stable, Meplex replaced  -9/7 dressing not removed this a.m; will see Dr Radha Munroe pleased with inc and residual limb. He will continue off loading left foot ulcer with Kota Frank walker boot; healing; need stump   -9/10 plantar ulcer now with large blood blister? ? Was not present yesterday.  Must watch closely. Must wear Rolena Hawk boot but may need to make Nwb. RLE amputation healing well  - 9/12 WOCN consulted, daughter concerned     Urinary retention / neurogenic bladder - schedule voids q 6-8 hrs. Check post-void residual every shift; in and out catheter if post-void residual is more than 400ml.     Bowel program - at risk for constipation as a side effect of opioids, other medications, impaired mobility, etc. MiraLAX daily for regularity, Ivis-Colace for stool softener. PRN MOM, bisacodyl suppository or tablets for constipation.  -8/27 constipated, no BM with daily gentle agents (MiraLAX, Colace), will try more aggressively with lactulose after therapies; may require MOM, bisacodyl suppository or disimpaction  -8/28 excellent results yesterday  -8/30 large, normal BM this AM; continue daily MiraLAX and stool softener  -9/9 no bowel issues      Statement of Medical Necessity - K5 Wheelchair  Mr Cullen Abdi has been evaluated face-to-face on a daily basis by the Physiatry MD / PA for the above-noted diagnoses since admission to our rehabilitation program. It is medically necessary for this patient to have a K5 wheelchair for home and community use at discharge from inpatient rehab. He is s/p R BKA and has diabetic ulcer on plantar aspect of his left foot. The patient has specific neurologic/orthopedic needs that will require its lightweight, adjustable to facilitate his mobility, enhance his safety and decrease the caregivers burden of care. This will allow him to mobilize easily within the home additionally. A standard wheelchair is too heavy and large, making home maneuvering difficult or impossible; standard wheelchair also would not provide support or proper positioning.  With his right BKA, left plantar ulcer on his left foot and severe diabetic neuropathy with a charcot foot, he cannot utilize a walker or any other assist device for safe mobility.      Time spent was 25 minutes with over 1/2 in direct patient care/examination, consultation and coordination of care.      Signed By: Randall Gautam MD     September 12, 2021

## 2021-09-12 NOTE — PROGRESS NOTES
Hourly rounds completed this shift, will give report to oncoming nurse. Pt resting in bed, call light within reach. Pt's daughter at bedside.

## 2021-09-12 NOTE — PROGRESS NOTES
Pt's daughter is stating she is calling  in am regarding wound consult and home health. Informed pt's daughter nursing staff will be in touch with wound care and can inform her of their wound care plan, pt has f/u with Dr. Raulito Noel Tuesday.

## 2021-09-12 NOTE — PROGRESS NOTES
Mepilex removed L lateral foot, no drainage obsered, site cleaned with wound cleanser, mepilex applied. L plantar wound/blister and surrounding foot cleaned with wound cleanser, no drainage observed, site malodorous, clean sock applied. Consult with wound nurse ordered for tomorrow.

## 2021-09-12 NOTE — PROGRESS NOTES
Problem: Falls - Risk of  Goal: *Absence of Falls  Description: Document Edgardo Altman Fall Risk and appropriate interventions in the flowsheet. Outcome: Progressing Towards Goal  Note: Fall Risk Interventions:  Mobility Interventions: Bed/chair exit alarm, Communicate number of staff needed for ambulation/transfer, OT consult for ADLs, Patient to call before getting OOB, PT Consult for mobility concerns, PT Consult for assist device competence, Strengthening exercises (ROM-active/passive), Utilize walker, cane, or other assistive device, Utilize gait belt for transfers/ambulation    Mentation Interventions: Adequate sleep, hydration, pain control    Medication Interventions: Evaluate medications/consider consulting pharmacy, Patient to call before getting OOB    Elimination Interventions: Bed/chair exit alarm, Call light in reach, Patient to call for help with toileting needs, Stay With Me (per policy), Toileting schedule/hourly rounds, Urinal in reach              Problem: Pressure Injury - Risk of  Goal: *Prevention of pressure injury  Description: Document Julio Cesar Scale and appropriate interventions in the flowsheet.   Note: Pressure Injury Interventions:  Sensory Interventions: Assess changes in LOC, Check visual cues for pain, Keep linens dry and wrinkle-free, Minimize linen layers    Moisture Interventions: Absorbent underpads, Apply protective barrier, creams and emollients, Limit adult briefs    Activity Interventions: Chair cushion, Increase time out of bed    Mobility Interventions: Assess need for specialty bed, HOB 30 degrees or less, Pressure redistribution bed/mattress (bed type)    Nutrition Interventions: Document food/fluid/supplement intake, Offer support with meals,snacks and hydration    Friction and Shear Interventions: HOB 30 degrees or less, Lift sheet, Minimize layers

## 2021-09-13 PROCEDURE — 86580 TB INTRADERMAL TEST: CPT | Performed by: PHYSICIAN ASSISTANT

## 2021-09-13 PROCEDURE — 97110 THERAPEUTIC EXERCISES: CPT

## 2021-09-13 PROCEDURE — 74011250637 HC RX REV CODE- 250/637: Performed by: PHYSICIAN ASSISTANT

## 2021-09-13 PROCEDURE — 99232 SBSQ HOSP IP/OBS MODERATE 35: CPT | Performed by: PHYSICAL MEDICINE & REHABILITATION

## 2021-09-13 PROCEDURE — 74011000250 HC RX REV CODE- 250: Performed by: PHYSICIAN ASSISTANT

## 2021-09-13 PROCEDURE — 65310000000 HC RM PRIVATE REHAB

## 2021-09-13 PROCEDURE — 97542 WHEELCHAIR MNGMENT TRAINING: CPT

## 2021-09-13 PROCEDURE — 74011250637 HC RX REV CODE- 250/637: Performed by: PHYSICAL MEDICINE & REHABILITATION

## 2021-09-13 PROCEDURE — 97530 THERAPEUTIC ACTIVITIES: CPT

## 2021-09-13 PROCEDURE — 97535 SELF CARE MNGMENT TRAINING: CPT

## 2021-09-13 RX ADMIN — MIDODRINE HYDROCHLORIDE 2.5 MG: 5 TABLET ORAL at 11:47

## 2021-09-13 RX ADMIN — PREGABALIN 200 MG: 100 CAPSULE ORAL at 21:12

## 2021-09-13 RX ADMIN — FERROUS SULFATE TAB 325 MG (65 MG ELEMENTAL FE) 325 MG: 325 (65 FE) TAB at 16:52

## 2021-09-13 RX ADMIN — APIXABAN 5 MG: 5 TABLET, FILM COATED ORAL at 21:13

## 2021-09-13 RX ADMIN — PREGABALIN 200 MG: 100 CAPSULE ORAL at 08:00

## 2021-09-13 RX ADMIN — DOCUSATE SODIUM 100 MG: 100 CAPSULE, LIQUID FILLED ORAL at 21:12

## 2021-09-13 RX ADMIN — POLYETHYLENE GLYCOL 3350 17 G: 17 POWDER, FOR SOLUTION ORAL at 08:00

## 2021-09-13 RX ADMIN — FERROUS SULFATE TAB 325 MG (65 MG ELEMENTAL FE) 325 MG: 325 (65 FE) TAB at 07:56

## 2021-09-13 RX ADMIN — MIDODRINE HYDROCHLORIDE 2.5 MG: 5 TABLET ORAL at 16:52

## 2021-09-13 RX ADMIN — MIDODRINE HYDROCHLORIDE 2.5 MG: 5 TABLET ORAL at 07:57

## 2021-09-13 RX ADMIN — FLUDROCORTISONE ACETATE 0.1 MG: 0.1 TABLET ORAL at 08:00

## 2021-09-13 RX ADMIN — TUBERCULIN PURIFIED PROTEIN DERIVATIVE 5 UNITS: 5 INJECTION, SOLUTION INTRADERMAL at 11:43

## 2021-09-13 RX ADMIN — APIXABAN 5 MG: 5 TABLET, FILM COATED ORAL at 08:00

## 2021-09-13 RX ADMIN — ATORVASTATIN CALCIUM 40 MG: 40 TABLET, FILM COATED ORAL at 21:12

## 2021-09-13 RX ADMIN — DOCUSATE SODIUM 100 MG: 100 CAPSULE, LIQUID FILLED ORAL at 08:00

## 2021-09-13 RX ADMIN — PANTOPRAZOLE SODIUM 40 MG: 40 TABLET, DELAYED RELEASE ORAL at 04:25

## 2021-09-13 RX ADMIN — TRAZODONE HYDROCHLORIDE 50 MG: 50 TABLET ORAL at 21:13

## 2021-09-13 NOTE — WOUND CARE
Patient consult received for left plantar foot wound. Upon arrival to [facility] house/rehab, staff reported visit no longer needed as patient going to Dr Maxcine Skiff for follow up for his foot tomorrow. Noted blood blister under old callous to foot. Continue to protect until it can be seen at his appointment tomorrow. Discussed with patient and primary nurse. To go home tomorrow.

## 2021-09-13 NOTE — PROGRESS NOTES
Problem: Falls - Risk of  Goal: *Absence of Falls  Description: Document Alyssa Spare Fall Risk and appropriate interventions in the flowsheet.   Outcome: Progressing Towards Goal  Note: Fall Risk Interventions:  Mobility Interventions: Patient to call before getting OOB, Utilize walker, cane, or other assistive device    Mentation Interventions: Door open when patient unattended, More frequent rounding, Toileting rounds    Medication Interventions: Patient to call before getting OOB    Elimination Interventions: Call light in reach, Patient to call for help with toileting needs, Toileting schedule/hourly rounds, Urinal in reach              Problem: Patient Education: Go to Patient Education Activity  Goal: Patient/Family Education  Outcome: Progressing Towards Goal

## 2021-09-13 NOTE — PROGRESS NOTES
OT Daily Note  Time In 1034   Time Out 1117     Subjective: \"I have some good strength. \" Pt was agreeable to tx. Pain: No pain expressed. Patient Vitals for the past 8 hrs:   Temp Pulse Resp BP SpO2   09/13/21 0646 97.9 °F (36.6 °C) 86 18 132/67 99 %          Activity Tolerance and Strengthening   Pt completed the following exercises to promote UB strength, activity tolerance, and shoulder stabilization for integration into functional transfers and ADLs:  Exercise Sets/Reps Device Comments   Overhead Press 2x15 3lb dumbbell    Dead Lift 2x15 3lb dumbbell    Chest Press 2x15 3lb dumbbell    Bicep Curls 2x15 3lb dumbbell    Upward Row 2x15 3lb dumbbell           Coordination, Visual-Perceptual and Cognition   Pt engaged in blink card game working on attention, visual perceptual skills, problem solving, and STM skills. Pt was to match cards in hand to two center cards based on similarities in either color, shape, or number. Pt required demonstration for initially starting game. Pt only required good vc throughout game. Education   benefits of OT     Interdisciplinary Communication: Collaborated with PT to ensure progress towards POC and goals. Summary of session: Pt demonstrated good participation in session. Pt demonstrated good UE strength. Pt was left seated in wheelchair with call bell within reach and all needs met. Plan: Continue with POC.      Ronit Diaz MS OTR/L  9/13/2021

## 2021-09-13 NOTE — PROGRESS NOTES
Hourly rounds performed. All needs met this shift. Report given to GLENIS Avalos. Will continue to monitor.

## 2021-09-13 NOTE — PROGRESS NOTES
OT Daily Note    Time In 0724   Time Out 0820     Subjective: \"I am nervous about going home. \" Pt was agreeable to tx. Pain:  Pt reported no pain. Patient Vitals for the past 8 hrs:   Temp Pulse Resp BP SpO2   09/13/21 0646 97.9 °F (36.6 °C) 86 18 132/67 99 %           Mobility   Score Comments   Rolling 6: Independent Side: Right   Supine to Sit 4: Supervision or touching A    Transfer Assist 4: Supervision or touching A Transfer Type: LPT  Equipment: Sliding Board   Comments: SBA     Activities of Daily Living    Score Comments   Eating 6: Independent    Upper Body  Dressing 5: S/U or clean-up assist Items Applied: Pullover  Position: Unsupported Sitting   Lower Body Dressing 4: Supervision or touching A Items Applied: Underwear and Elastic pants  Position: Unsupported Sitting  Adaptive Equipment: N/A  Comments: SBA   Donning/[place] Footwear 1: Dependent Items Applied: Socks and Elbert [employer] Equipment: N/A  Comments: A to don sock secondary to [employer] and [employer]. Education  benefits of OT, energy conservation, breathing techniques, safety awareness and functional transfers     Interdisciplinary Communication: Communicated with Dr. Orin Panda  about L foot wound  Summary of Session: Pt demonstrated good participation in OT tasks during this session. Pt's performance with ADL is reflected in above chart. Completed R residual limb dressing with kerlix and ace wrap. Pt was left seated in wheelchair with call bell within reach and all needs met. Plan: Continue per OT POC.      Lynda Stewart MS OTR/L  9/13/2021

## 2021-09-13 NOTE — PROGRESS NOTES
PHYSICAL THERAPY DAILY NOTE  Time In: 2730  Time Out: 0957  Patient Seen For: AM;Therapeutic exercise; Wheelchair mobility    Subjective: Pt gave 2 thumbs up when told he would be returning to [facility] after apt with Dr Raluito Noel tomorrow, ready for tx. Objective: Other (comment) (fall)  GROSS ASSESSMENT Daily Assessment     L foot        COGNITION Daily Assessment    Decreased insight, impulsive        BED/MAT MOBILITY Daily Assessment    Rolling Right : 0 (Not tested)  Rolling Left : 0 (Not tested)  Supine to Sit : 0 (Not tested)  Sit to Supine : 0 (Not tested)       TRANSFERS Daily Assessment    Transfer Type: Lateral with transfer board  Transfer Assistance : 5 (Stand-by assistance)  Car Transfers: Not tested       GAIT Daily Assessment    Amount of Assistance: 0 (Not tested)  Distance (ft): 0 Feet (ft)       STEPS or STAIRS Daily Assessment    Steps/Stairs Ambulated (#): 0  Level of Assist : 0 (Not tested)       BALANCE Daily Assessment    Sitting - Static: Good (unsupported)  Sitting - Dynamic: Fair (occasional)  Standing - Static: Not tested       [facility] MOBILITY Daily Assessment   Pt able to manage w/c for 100 ft before needing a rest break, then ~50 ft x 3 with rest breaks in between. Pt needed minimal verbal cues for safety awareness. Pt requires extra time.    Able to Propel (ft): 100 feet  Curbs/Ramps Assist Required (FIM Score): 0 (Not tested)  Wheelchair Setup Assist Required : 5 (Supervision/setup)  Wheelchair Management: Manages left brake;Manages right brake;Manages left armrest;Manages right armrest       LOWER EXTREMITY EXERCISES Daily Assessment     LE exercises seated in w/c as follows:      SEATED EXERCISES Sets Reps Comments   Hip Flexion 1 15    Long Arc Quads 1 15    Hip Adduction/Ball Squeeze 1 15    Hip Abduction with Red Theraband 1 15    Hamstring Curls with Red Theraband 1 15      Vital Signs: SpO2 on 2.0 L, no SOB noticed    Pain level: No pain indicated  Pain location: NA  Pain interventions: NA    Patient education: Body awareness. Interdisciplinary Communication: Collaborated with RN and OT. Pt returned to room, left in w/c with call bell and all needs in reach. Assessment: Pt showed improved endurance as demonstrated by ability to self propel w/c for 100ft before needing a rest break. Pt relieved about returning to [facility] after apt tomorrow. Pt will benefit from continue skilled PT to continue to improve LE strength for improved transfer ability and functional tasks. Plan of Care: Continue with POC and progress as able.      Madi Agudelo, SPT  9/13/2021

## 2021-09-13 NOTE — PROGRESS NOTES
PHYSICAL THERAPY DAILY NOTE  Time In: 4839  Time Out: 9163  Patient Seen For: AM;Balance activities; Therapeutic exercise;Transfer training    Subjective: Pt expressing concerns about going home early tomorrow and how he will be able to get his stuff from his room, but eager to participate in PT this am.          Objective: Other (comment) (fall)  GROSS ASSESSMENT Daily Assessment     Wound assessment of L foot (details below)       COGNITION Daily Assessment    Decreased insight, impulsive        BED/MAT MOBILITY Daily Assessment   Hand hold to go from supine >sit, for stability. Rolling Right : 0 (Not tested)  Rolling Left : 0 (Not tested)  Supine to Sit : 4 (Contact guard assistance)  Sit to Supine : 5 (Supervision)       TRANSFERS Daily Assessment   Verbal cues required during sliding board transfer for foot placement/safety  Elbert boot in place for lateral transfer Transfer Type: Lateral with transfer board  Transfer Assistance : 5 (Stand-by assistance)  Car Transfers: Not tested       GAIT Daily Assessment    Amount of Assistance: 0 (Not tested)  Distance (ft): 0 Feet (ft)       STEPS or STAIRS Daily Assessment    Steps/Stairs Ambulated (#): 0  Level of Assist : 0 (Not tested)       BALANCE Daily Assessment    Sitting - Static: Good (unsupported)  Sitting - Dynamic: Fair (occasional)  Standing - Static: Not tested       WHEELCHAIR MOBILITY Daily Assessment     NT       LOWER EXTREMITY EXERCISES Daily Assessment     LE exercises as listed below     SUPINE EXERCISES Sets Reps Comments   Knee to chest 2 15 2nd set with red band   Hip Abduction 1 15 With red band   Glute bridge 2 10    Straight Leg Raise 2 15 2nd set with red band     Wound inspection on L foot with onsite PA present. Redressed wounds on lateral edge of L foot and lateral side of lower leg with Optifoam silicone faced foam to protect. Wounds appear the same as last week. Measurements taken on blister/previous ulcer.     Ulcer measured 1.5cm L x 1.4 cm W  Surrounding blister measured: 5.0cm L x 3.6cm W  Blister is larger and more purple than Friday. Extends further distally toward toes. Dry 4x4 placed under big toe and 4x4 Optifoam placed over blister/ulcer as instructed by PA. Vital Signs: SpO2 on 2.0 L with no SOB observed     Pain level: No pain indicated  Pain location: NA  Pain interventions: NA    Patient education: NWB on L foot to prevent further damage to ulcer/blister     Interdisciplinary Communication: Discussion with OT and PA about L foot. Pt left in room in w/c with Elbert boot off, Prevlon boot on with L leg elevated to prevent additional pressure on bottom of foot. Call bell and needs within reach. Assessment: Pt able to continue to build strength in BLE as demonstrated by increased sets with red theraband. L foot blister looks larger and more purple this morning. Took measurements and notified onsite PA. Pt will continue to benefit from PT services to continue to build strength and increase functional abilities. Plan of Care: Continue with POC and progress as able to.      Shaneka Barlow, SPT  9/13/2021

## 2021-09-13 NOTE — PROGRESS NOTES
Chart reviewed. This CM received message from nursing staff over weekend that daughter has concerns about taking pt home as he has a \"worsening wound\" to left foot. Received message from [place], [place] that pt now wants to go to SNF as his daughter will be going back to work soon and worries his wife will not assist him with care. Contacted daughter, Tommy Pierce, and discussed concerns. Daughter aware pt wound does not require IRC. Wound care will not see pt today as pt has follow up with Dr. Ryne Owens tomorrow and Dr. Ryne Owens to decide on wound treatment. Daughter plans to attend appointment and will have a chance to discuss concern of wound with Dr. Ryne Owens. Pt will now return to [facility] after Apt with Dr. Rnye Owens and [facility] transport service aware. Daughter reports not being \"opposed\" to taking pt home but worried he needs more therapy before returning home. Referral sent to [facility], [address] [facility], and [place] [id number] for STR need at daughter request after reviewing choice list. Dr. Gayathri Farley aware and agreeable with plan. Primary RN aware. Waiting for acceptance. Pt will need pre-cert prior to d/c. CM to continue to follow and monitor for any further needs. Update 1413: Pt accepted to [facility] and bed accepted. Liaison aware to start pre-cert. Update 1517: COVID test ordered for am and good for 14 days for [facility]. PPD placed today.

## 2021-09-13 NOTE — PROGRESS NOTES
Altagracia Brooks MD  Medical Director  [address] [facility], [address] [facility]  Tel: [phone] Kwasi Frandy PROGRESS NOTE    Andreina Alpha Pack  Admit Date: 8/25/2021  Admit Diagnosis:   Unilateral complete BKA, right, sequela ([address] [S88.111S]    Subjective     Patient seen and examined in gym during PT, foot dressing change. Patient very anxious about going home, states his daughter is running out of PTO and that wife likely will be of limited help. Denies any sensation in left foot, denies right BKA incisional pain. Impulsive with poor safety awareness; will need 24/7 supervision / assistance. Participating well in therapies. Objective:     Current Facility-Administered Medications   Medication Dose Route Frequency    ferrous sulfate tablet 325 mg  1 Tablet Oral BID WITH MEALS    lactulose (CHRONULAC) 10 gram/15 mL solution 30 mL  30 mL Oral DAILY PRN    naloxone (NARCAN) injection 0.4 mg  0.4 mg IntraVENous PRN    acetaminophen (TYLENOL) tablet 650 mg  650 mg Oral Q6H PRN    apixaban (ELIQUIS) tablet 5 mg  5 mg Oral Q12H    atorvastatin (LIPITOR) tablet 40 mg  40 mg Oral QHS    docusate sodium (COLACE) capsule 100 mg  100 mg Oral BID    fludrocortisone (FLORINEF) tablet 0.1 mg  0.1 mg Oral DAILY    midodrine (PROAMATINE) tablet 2.5 mg  2.5 mg Oral TID WITH MEALS    nystatin (MYCOSTATIN) 100,000 unit/gram powder   Topical PRN    oxyCODONE IR (ROXICODONE) tablet 5 mg  5 mg Oral Q4H PRN    pantoprazole (PROTONIX) tablet 40 mg  40 mg Oral ACB    polyethylene glycol (MIRALAX) packet 17 g  17 g Oral DAILY    pregabalin (LYRICA) capsule 200 mg  200 mg Oral BID    traMADoL (ULTRAM) tablet 50 mg  50 mg Oral Q6H PRN    traZODone (DESYREL) tablet 50 mg  50 mg Oral QHS       Review of Systems:   Denies chest pain, shortness of breath, cough, headache, visual problems, abdominal pain, dysuria, calf pain.  Pertinent positives are as noted in the HPI, ROS unremarkable otherwise. Visit Vitals  /67   Pulse 86   Temp 97.9 °F (36.6 °C)   Resp 18   Wt 281 lb 12 oz (127.8 kg)   SpO2 99%   BMI 40.43 kg/m²        Physical Exam:   General: Alert, appropriately oriented; restless. Lying on gym mat during wound inspection / dressing change. HEENT: Normocephalic, EOM intact. Oral mucosa moist.   Lungs: Clear to auscultation bilaterally. Orthopneic, increased labor for respiration during conversation. Heart: Regular rate, irregularly irregular rhythm. Muffled heart sounds but no appreciable murmur. Abdomen: Soft, non-tender, massively obese and protuberant but not distended. Bowel sounds normoactive. Genitourinary: Deferred. Neuromuscular:      Poor safety deficits, impulsive, fidgety. UE strength generally 4+/5 and symmetric. LE strength at HF 4-/5 (R), 4/5 (L), distally 5-/5. Absent L LE light touch sensation to mid shin, absent proprioception. Skin/extremity: No rashes, no erythema. Calves soft, non-tender L LE.  R BKA with ACE wrap. Left plantar foot with crusted, dried ulcer, large blood blister distally toward toes, no marginal erythema, no drainage; lateral left foot shallow abrasion / ulceration <0.6mm, dried serosanguineous drainage. Functional Assessment:  Balance  Sitting - Static: Good (unsupported) (09/11/21 1100)  Sitting - Dynamic: Fair (occasional) (09/11/21 1100)  Standing - Static: Not tested (09/11/21 1100)  Standing - Dynamic : Impaired (09/07/21 1200)       Leyla Vargas Fall Risk Assessment:  Leyla Vargas Fall Risk  Mobility: Ambulates or transfers with assist devices or assistance (09/13/21 0710)  Mobility Interventions: Patient to call before getting OOB;Utilize walker, cane, or other assistive device (09/13/21 0710)  Mentation: Alert, oriented x 3 (09/13/21 0710)  Mentation Interventions: Door open when patient unattended;More frequent rounding; Toileting rounds (09/13/21 0710)  Medication: Patient receiving anticonvulsants, sedatives(tranquilizers), psychotropics or hypnotics, hypoglycemics, narcotics, sleep aids, antihypertensives, laxatives, or diuretics (09/13/21 0710)  Medication Interventions: Patient to call before getting OOB (09/13/21 0710)  Elimination: Needs assistance with toileting (09/13/21 0710)  Elimination Interventions: Call light in reach; Patient to call for help with toileting needs; Toileting schedule/hourly rounds;Urinal in reach (09/13/21 0710)  Prior Fall History: No (09/13/21 0710)  Total Score: 3 (09/13/21 0710)  Standard Fall Precautions: Yes (09/05/21 2047)  High Fall Risk: Yes (09/13/21 0710)     Ambulation:  Gait  Distance (ft): 0 Feet (ft) (09/11/21 1100)     Labs/Studies:  No results found for this or any previous visit (from the past 67 hour(s)).     Assessment:     Problem List as of 9/13/2021 Date Reviewed: 8/27/2021        Codes Class Noted - Resolved    * (Principal) Unilateral complete BKA, right, sequela ([facility] ICD-10-CM: Y46.775V  ICD-9-CM: 905.9  8/25/2021 - Present        Suspected sleep apnea ICD-10-CM: R29.818  ICD-9-CM: 781.99  8/20/2021 - Present        S/P BKA (below knee amputation) unilateral, right ([facility] ICD-10-CM: Z89.511  ICD-9-CM: V49.75  8/18/2021 - Present        Respiratory failure, post-operative ([facility] ICD-10-CM: J50.990  ICD-9-CM: 518.51  8/18/2021 - Present        Acute respiratory failure with hypercapnia (HCC) ICD-10-CM: J96.02  ICD-9-CM: 518.81  7/23/2021 - Present        Acute on chronic respiratory failure with hypoxia and hypercapnia (HCC) ICD-10-CM: J96.21, J96.22  ICD-9-CM: 518.84, 786.09, 799.02  7/23/2021 - Present        Acute metabolic encephalopathy MILIND-52-CX: G93.41  ICD-9-CM: 348.31  7/23/2021 - Present        Hypoxia ICD-10-CM: R09.02  ICD-9-CM: 799.02  7/21/2021 - Present        Acute kidney injury superimposed on CKD ([facility] ICD-10-CM: N17.9, N18.9  ICD-9-CM: 866.00, 585.9  7/21/2021 - Present        Staphylococcus aureus bacteremia ICD-10-CM: R78.81, B95.61  ICD-9-CM: 790.7, 041.11  7/21/2021 - Present        Cellulitis ICD-10-CM: L03.90  ICD-9-CM: 682.9  7/19/2021 - Present        Systolic CHF, acute on chronic [facility]) ICD-10-CM: I50.23  ICD-9-CM: 428.23, 428.0  7/19/2021 - Present        Atrial fibrillation with RVR (HCC) ICD-10-CM: I48.91  ICD-9-CM: 427.31  7/19/2021 - Present        Morbid obesity with BMI of 40.0-44.9, adult [facility]) ICD-10-CM: E66.01, Z68.41  ICD-9-CM: 278.01, V85.41  6/11/2020 - Present        Severe obesity (BMI 35.0-35.9 with comorbidity) (HCC) (Chronic) ICD-10-CM: E66.01, Z68.35  ICD-9-CM: 278.01, V85.35  10/10/2018 - Present        Bunion of unspecified foot (Chronic) ICD-10-CM: M21.619  ICD-9-CM: 727.1  4/10/2018 - Present        Onychomycosis (Chronic) ICD-10-CM: B35.1  ICD-9-CM: 110.1  4/10/2018 - Present        Corns and callus (Chronic) ICD-10-CM: L84  ICD-9-CM: 700  4/10/2018 - Present        Mixed hyperlipidemia (Chronic) ICD-10-CM: Z80.4  ICD-9-CM: 272.2  4/10/2018 - Present        Mixed axonal-demyelinating polyneuropathy (Chronic) ICD-10-CM: G62.89  ICD-9-CM: 355.9  1/5/2018 - Present        Controlled type 2 diabetes mellitus with diabetic polyneuropathy, without long-term current use of insulin (HCC) (Chronic) ICD-10-CM: E11.42  ICD-9-CM: 250.60, 357.2  1/5/2018 - Present        Type 2 diabetes mellitus with nephropathy (HCC) (Chronic) ICD-10-CM: E11.21  ICD-9-CM: 250.40, 583.81  1/5/2018 - Present        Stage 3 chronic kidney disease ([facility] (Chronic) ICD-10-CM: N18.30  ICD-9-CM: 585.3  11/22/2017 - Present        Benign hypertensive kidney disease with chronic kidney disease (Chronic) ICD-10-CM: I12.9  ICD-9-CM: 403.10  11/6/2017 - Present        CAD (coronary artery disease) (Chronic) ICD-10-CM: I25.10  ICD-9-CM: 414.00  Unknown - Present        RESOLVED: Glucose intolerance (impaired glucose tolerance) (Chronic) ICD-10-CM: R73.02  ICD-9-CM: 790.22  11/6/2017 - 11/14/2017        RESOLVED: BMI 40.0-44.9, adult ([facility] (Chronic) ICD-10-CM: Z68.41  ICD-9-CM: V85.41  11/4/2016 - 10/10/2018        RESOLVED: Hypertension ICD-10-CM: I10  ICD-9-CM: 401.9  Unknown - 10/10/2016        RESOLVED: Hypercholesterolemia ICD-10-CM: E78.00  ICD-9-CM: 272.0  Unknown - 10/10/2016        RESOLVED: Chronic kidney disease ICD-10-CM: N18.9  ICD-9-CM: 435. 9  Unknown - 10/10/2016    Overview Signed 10/10/2016 11:28 AM by Danilo Abel MD     stage 3             RESOLVED: Neuropathy ICD-10-CM: G62.9  ICD-9-CM: 355.9  Unknown - 10/10/2016        RESOLVED: Peripheral polyneuropathy (Chronic) ICD-10-CM: G62.9  ICD-9-CM: 356.9  10/10/2016 - 1/5/2018        RESOLVED: CKD (chronic kidney disease) stage 3, GFR 30-59 ml/min (HCC) (Chronic) ICD-10-CM: N18.30  ICD-9-CM: 585.3  10/10/2016 - 11/6/2017        RESOLVED: Hyperlipidemia (Chronic) ICD-10-CM: E78.5  ICD-9-CM: 272.4  10/10/2016 - 4/10/2018        RESOLVED: Hyperkalemia ICD-10-CM: E87.5  ICD-9-CM: 276.7  10/10/2016 - 11/14/2017        RESOLVED: Essential hypertension (Chronic) ICD-10-CM: I10  ICD-9-CM: 401.9  10/10/2016 - 11/22/2017              S/p right below-knee amputation secondary to cellulitis (8/18/2021, Roby Chiu MD)     Plan / Recommendations / Medical Decision Making:      Daily physician / PA medical management:     Unilateral complete BKA, right - NWB R LE; prosthetics involved. Will be difficult for patient to mobilize with a prosthesis given he lacks sensation and proprioception in the right foot, has obese body habitus, chronic respiratory failure and CHF.     Pain control - stable, mild-to-moderate joint symptoms intermittently, reasonably well controlled by PRN meds. Will require regular pain assessment and comprehensive pain management. Has chronic mixed axonal-demyelinating polyneuropathy; on Lyrica 200mg BID. Denies residual limb pain but has tramadol and oxycodone ordered PRN. -9/10 only taking Tylenol for pain     Hypotension - BP fluctuating, managed medically.  Has been hypotensive and is on Florinef and midodrine. Dehydration thought to be contributing, also possibly diuretics. -8/27 /63, HR 94 this AM; patient denies lightheadedness during therapies or while resting in room  -8/28 95/53; asymptomatic, HR 97; on Florinef and midodrine; up to 117/65 max  -8/30 /72, HR 85 this AM  -8/31 VSS  -9/1 124/64 HR 60  -9/3 /71, HR 79  -9/4 VSS  -9/5 /79, HR 80  -9/6 /73, HR 62  -9/7 /57 this a.m HR 88  -9/8 /75, improved  -9/9 113/68  -9/10 121/70, low of 102/52; continue midodrine and Florinef  -9/12 /78, HR 93   -9/13 /67, HR 86     Acute-on-chronic respiratory failure - continue 3L O2, wean to 2L if possible. Encourage IS.    -8/31 sats reportedly 78%, pt asymptomatic. Rechecked 98% 3L  -9/10 encouraged use of IS; maintained on 2-3 O2, chronic use     Atrial fibrillation - continue Eliquis 5mg BID; rate controlled. Toprol XL has been held due to hypotensive at times.  -9/1 NSR this AM  -9/3 IIR this AM, rate controlled  -9/4 IIR in PT, rate controlled  -9/5 IIR today, rate controlled  -9/6 IIR with additional respiratory irregularity, rate controlled  -9/10 rate controlled, irreg rhythm this a.m  -9/12 HR 83-96 range in last 24 hours  -9/13 IIR, rate 86-94 in last 24h    Acute-on-chronic anemia - anemia of chronic kidney disease and post-op due to blood loss. Hgb trending down; follow closely.  No evidence of active bleeding.  -8/27 CBC ordered for tomorrow  -8/28 Hgb 6.6 from 8.9 (8/24); no sign of active bleeding source: stool was not melenic, little drainage from amputation; hold Eliquis and recheck in AM. If true value, will need to transfer downtown for a blood transfusion  -8/30 recheck H&H with Hgb 8.5; Eliquis restarted (yesterday)  -9/2 Hgb 9.3 improved  -9/5 will check CBC tomorrow  -9/6 Hgb 10.2, continues to rebound  -9/10 Hgb 8.8 from 10.2; check iron studies  -9/12 low Fe 24 and transferrin saturation, c/w iron-deficiency anemia, continue Fe supplements BID    Chronic kidney disease, CKD3 - improving, monitor. Creatine 1.49 from 1.57.  -8/27 BMP ordered for tomorrow  -8/28 Cr 1.51, stable  -9/2 Cr 1.58, BUN 21; baseline  -9/5 will check BMP tomorrow  -9/6 Cr 1.50, BUN 22 - normal  -9/10 creatinine 1.48, stable     Diabetes mellitus - HgbA1c 6.7% (7/13/2021), moderate glycemic control. Will require daily, close fasting glucose monitoring and medication adjustment to optimize glycemic control in setting of acute illness and hospitalization. Takes Glucotrol 5mg at home. Currently on SSI; add Glucotrol ? .  -8/26 BS controlled  -8/27 BS this , all BS since admission <145, most in low 100s  -8/28 BS controlled  -8/30  this AM, all BS yesterday <110  -well controlled  -9/2 BS well controlled; diet controlled  -9/3  this AM, yesterday all values <120  -9/4 BS 94 this AM, all BS <120 since 8/30; d/c POC glucose  -9/6 BS 86 in BMP this AM  -9/10 well controlled; BS 95 in BMP this AM     Pneumonia prophylaxis - incentive spirometer every hour while awake.     DVT risk / DVT prophylaxis - will require daily physician / PA exam to assess for signs and symptoms as patient is at increased risk for of thromboembolism. Mobilize as tolerated. Sequential pneumatic compression devices (SCDs) when in bed; thigh-high or knee-high thromboembolic deterrent hose when out of bed. On Eliquis.     CAD - continue Eliquis and statin.     GI prophylaxis - resume PPI. At times may need additional antacids, Maalox prn.     General skin care / wound prevention - monitor BKA  incision and general skin wound status daily per staff and physician / PA. At risk for failure. Will require 24/7 rehab nursing.  Keep BKA incision and left plantar foot wound clean and dry, reinforce dressing PRN and ice to area for comfort; he is to f/u with Dr Jose Manuel Cole for staple removal.   -8/25 will cont wound vac until f/u with Dr. Pendleton  -8/28 wound looked good at time of wound vac check per notes  -8/30 wound vac in place and functioning, skin surrounding without erythema or rash  -9/2 wound vac will remain intact until f/u with Dr Kelly Whittington next week  -9/3 Prevena wound vac has auto-terminated, incision remains covered with vac dressing  -9/4 wound vac and dressing removed, incision intact with sutures, no erythema, induration or drainage; wound cleaned with wound cleanser and redressed with Telfa, 4x4s, Kerlix and ACE  -9/5 BKA incision inspected and redressed; left foot plantar wound inspected, stable, Meplex replaced  -9/7 dressing not removed this a.m; will see Dr Alfredo Whittington pleased with inc and residual limb. He will continue off loading left foot ulcer with Fatimah Slice walker boot; healing; need stump   -9/10 plantar ulcer now with large blood blister? ? Was not present yesterday. Must watch closely; must wear Crow boot but may need to make NWB. RLE amputation healing well  -9/12 WOC RN consulted, daughter concerned  -9/13 plantar ulcer, distal blood blister, lateral foot abrasion / ulceration evaluated and protected with Meplex     Urinary retention / neurogenic bladder - schedule voids q 6-8 hrs. Check post-void residual every shift; in and out catheter if post-void residual is more than 400ml.     Bowel program - at risk for constipation as a side effect of opioids, other medications, impaired mobility, etc. MiraLAX daily for regularity, Ivis-Colace for stool softener.  PRN MOM, bisacodyl suppository or tablets for constipation.  -8/27 constipated, no BM with daily gentle agents (MiraLAX, Colace), will try more aggressively with lactulose after therapies; may require MOM, bisacodyl suppository or disimpaction  -8/28 excellent results yesterday  -8/30 large, normal BM this AM; continue daily MiraLAX and stool softener  -9/9 no bowel issues             Time spent was 25 minutes with over 1/2 in direct patient care/examination, consultation and coordination of care.     Signed By: Olinda Sandoval PA-C    September 13, 2021      Physician Assistant with [facility]  Eugenia Nicole MD, Medical Director

## 2021-09-14 LAB
MM INDURATION POC: 0 MM (ref 0–5)
PPD POC: NEGATIVE NEGATIVE
SARS-COV-2, COV2: NORMAL
SARS-COV-2, COV2: NOT DETECTED
SPECIMEN SOURCE, FCOV2M: NORMAL

## 2021-09-14 PROCEDURE — 97110 THERAPEUTIC EXERCISES: CPT

## 2021-09-14 PROCEDURE — 97535 SELF CARE MNGMENT TRAINING: CPT

## 2021-09-14 PROCEDURE — 65310000000 HC RM PRIVATE REHAB

## 2021-09-14 PROCEDURE — 97542 WHEELCHAIR MNGMENT TRAINING: CPT

## 2021-09-14 PROCEDURE — 97530 THERAPEUTIC ACTIVITIES: CPT

## 2021-09-14 PROCEDURE — 74011250637 HC RX REV CODE- 250/637: Performed by: PHYSICAL MEDICINE & REHABILITATION

## 2021-09-14 PROCEDURE — 99232 SBSQ HOSP IP/OBS MODERATE 35: CPT | Performed by: PHYSICAL MEDICINE & REHABILITATION

## 2021-09-14 PROCEDURE — 74011250637 HC RX REV CODE- 250/637: Performed by: PHYSICIAN ASSISTANT

## 2021-09-14 PROCEDURE — U0005 INFEC AGEN DETEC AMPLI PROBE: HCPCS

## 2021-09-14 RX ORDER — LANOLIN ALCOHOL/MO/W.PET/CERES
325 CREAM (GRAM) TOPICAL 2 TIMES DAILY WITH MEALS
Qty: 60 TABLET | Refills: 1 | Status: SHIPPED | OUTPATIENT
Start: 2021-09-14 | End: 2021-11-08 | Stop reason: ALTCHOICE

## 2021-09-14 RX ORDER — TRAZODONE HYDROCHLORIDE 50 MG/1
50 TABLET ORAL
Qty: 30 TABLET | Refills: 0 | Status: SHIPPED | OUTPATIENT
Start: 2021-09-14 | End: 2021-11-08 | Stop reason: SDUPTHER

## 2021-09-14 RX ORDER — ACETAMINOPHEN 325 MG/1
650 TABLET ORAL
Qty: 40 TABLET | Status: SHIPPED | OUTPATIENT
Start: 2021-09-14

## 2021-09-14 RX ORDER — POLYETHYLENE GLYCOL 3350 17 G/17G
17 POWDER, FOR SOLUTION ORAL DAILY
Qty: 240 G | Status: ON HOLD | OUTPATIENT
Start: 2021-09-15 | End: 2022-01-08

## 2021-09-14 RX ADMIN — APIXABAN 5 MG: 5 TABLET, FILM COATED ORAL at 08:05

## 2021-09-14 RX ADMIN — DOCUSATE SODIUM 100 MG: 100 CAPSULE, LIQUID FILLED ORAL at 21:07

## 2021-09-14 RX ADMIN — PREGABALIN 200 MG: 100 CAPSULE ORAL at 21:07

## 2021-09-14 RX ADMIN — FERROUS SULFATE TAB 325 MG (65 MG ELEMENTAL FE) 325 MG: 325 (65 FE) TAB at 08:05

## 2021-09-14 RX ADMIN — FERROUS SULFATE TAB 325 MG (65 MG ELEMENTAL FE) 325 MG: 325 (65 FE) TAB at 16:10

## 2021-09-14 RX ADMIN — PREGABALIN 200 MG: 100 CAPSULE ORAL at 08:04

## 2021-09-14 RX ADMIN — MIDODRINE HYDROCHLORIDE 2.5 MG: 5 TABLET ORAL at 08:04

## 2021-09-14 RX ADMIN — MIDODRINE HYDROCHLORIDE 2.5 MG: 5 TABLET ORAL at 12:24

## 2021-09-14 RX ADMIN — ATORVASTATIN CALCIUM 40 MG: 40 TABLET, FILM COATED ORAL at 21:08

## 2021-09-14 RX ADMIN — PANTOPRAZOLE SODIUM 40 MG: 40 TABLET, DELAYED RELEASE ORAL at 06:00

## 2021-09-14 RX ADMIN — APIXABAN 5 MG: 5 TABLET, FILM COATED ORAL at 21:08

## 2021-09-14 RX ADMIN — POLYETHYLENE GLYCOL 3350 17 G: 17 POWDER, FOR SOLUTION ORAL at 08:03

## 2021-09-14 RX ADMIN — DOCUSATE SODIUM 100 MG: 100 CAPSULE, LIQUID FILLED ORAL at 08:05

## 2021-09-14 RX ADMIN — MIDODRINE HYDROCHLORIDE 2.5 MG: 5 TABLET ORAL at 16:11

## 2021-09-14 RX ADMIN — FLUDROCORTISONE ACETATE 0.1 MG: 0.1 TABLET ORAL at 08:05

## 2021-09-14 RX ADMIN — TRAZODONE HYDROCHLORIDE 50 MG: 50 TABLET ORAL at 21:07

## 2021-09-14 NOTE — PROGRESS NOTES
Faxed updated clinicals to insurance as requested. Insurance aware of need for STR. Waiting pre-cert for STR placement. CM to continue to follow and monitor for any further needs. Update 1335: Pt insurance approved for STR. Pt to d/c tomorrow to [facility]. Daughter aware and Primary RN aware. [facility] transport set for 1400 for PPD and COVID to result.

## 2021-09-14 NOTE — ADT AUTH CERT NOTES
Knee: Amputation Above or Below Knee - Care Day 5 (8/22/2021) by Gayle Ariza       Review Status Review Entered   Completed 8/24/2021 10:41      Criteria Review      Care Day: 5 Care Date: 8/22/2021 Level of Care: Telemetry    Guideline Day 2    Level Of Care    (X) Floor    8/24/2021 10:40:57 EDT by Gayle Ariza      REMOTE TELE    Clinical Status    (X) * Procedure completed    ( ) * Hemodynamic stability    8/24/2021 10:40:57 EDT by Gayle Ariza      BP /42-71, P 85-98, MAP down to 60    T 97.6, R 16, 02 SAT 90 & 96% 4L NC    Activity    (X) * Participation in physical therapy    8/24/2021 10:40:57 EDT by Gayle Ariza      SEE PT  NOTE    Routes    (X) * Advance diet    8/24/2021 10:40:57 EDT by Gayle Ariza      DIET Regular; Low Sodium (2 gm); 1200 ml    (X) Parenteral medications    8/24/2021 10:41:56 EDT by Gayle Ariza      SOLUCORTEF 100MG IV  X1    Interventions    (X) Physical therapy    Medications    (X) Possible antibiotics    * Milestone   Additional Notes   [employer] Hospitalist Consult      Patient at bedside, resting in bed comfortably. Yahaira Siddiqui is currently on 2 L via NC.  Denies having any chest pain, worsening shortness of breath, palpitations, headache, nausea vomiting or diarrhea.  Discussed about potential discharge planning to rehab. EXAM:  General:          alert, awake, BMI 42, currently on 2 L via NC   HEENT:           Head NCAT, PERRLA positive   CV:                  RRR.  No m/r/g.  No JVD.  No edema   Lungs:             Clear to auscultation bilaterally   Abdomen:        Bowel sounds present.  Soft, nontender, nondistended. Extremities:     Warm and dry.  No cyanosis or clubbing.  Right BKA with surgical dressing on   Skin:                No rashes.  Normal coloration.   Normal turgor. Neuro:             GCS 15, cranial nerves II 12 grossly intact   Psych:             AOx3, mood and affect appropriate.          RBC 3.02., HGB 8.8, HCT 29.2, K 3.3, C02 39, ANION GAP 4, BUN 35, CREAT 1.84, GFRNAA 38,          Assessment & Plan:      Hypertension: BP of 86/56   8/22: Likely secondary to diuresis   Ordered for 1 dose of IV Solu-Cortef 100 mg along with one-time dose of Florinef 0.1 mg.   Holding Toprol-XL       Acute on chronic respiratory failure with hypoxia/hypercapnia requiring intubation:   8/22: Status post extubation on 8/19   Patient weaned off of AirVo to nasal cannula, 2 L currently which is at his baseline. 3 L at bedtime. Continue Lasix 40 mg p.o. daily   Net fluid balance - 10 L   Chest x-ray on 8/21 showed bibasilar opacities likely atelectasis, patient encouraged to use incentive spirometry.       Hypokalemia-   8/22: 3.3           Status post right below-knee amputation:   8/22: POD #4   Dilaudid 0.5 mg every 4 hours as needed and oxycodone 5 mg p.o. every 4 hours for moderate pain. MiraLAX to prevent with opioid-induced constipation. Inpatient rehab recommended by PT and OT.       A. Fib:   8/22: Heart rate suboptimally controlled. Holding Toprol-XL in view of borderline BP   Continue IV Lopressor 2.5 mg every 6 hours as needed for heart rate of greater than 115   Eliquis 5 mg p.o. twice daily           CHF- pt had echo performed on 7/21/2021 demontrating ejection fraction of 30 to 35%.  8/22: BNP 1077   Need to optimize BP. BP is borderline, holding Toprol-XL for now. Patient is euvolemic.  Net fluid balance -10 L           Diabetes-patient noted to have a hemoglobin A1c of 6.7   8/22: Blood glucose is optimally controlled. Monitor POC glucose QA CHS.    Continue Humalog sliding scale q. ACH S per               Morbid obesity-   Noted       Disposition: Patient is on air Vo, attempting to be weaned off to nasal cannula (baseline 2 L at daytime and 3 L at nighttime).       Disposition: Defer to primary team.         ORTHO SURGERY NOTE:      Assessment / Plan :  S/P Procedure(s) (LRB):   RIGHT LEG AMPUTATION BELOW KNEE (Right) by Dr. Malissa Ugalde 8/18     Cardiology mgmt for CHF and AFIB w/ RVR: signed off, will work up as outpatient   Medical Mgmt per hospitalist   Anticoagulation plan: Eliquis 5mg   Continue PT   Fall Precautions   DC disp: rehab placement   F/U: 2 weeks postop for wound check and staple removal                  PHYSICAL THERAPY: Daily Note and PM Treatment Day # 2      Saw him with OT today due to complexity of his case.  Did not mention yesterday that he seems to have a lot of ataxic movement with all 4 extremities also he leans to the right when laying in the bed and significantly when standing.  Seated balance was better today but still not great.  He tends to lean back.  Max of 2 to stand and very very hard.   He leans a lot to the right and his left leg slides, he has no feeling in that foot so even when PT is trying to block it and to not let it slide he seems to move it all around and make positioning difficult. Laxmi Syed wants so badly to be getting out of bed and going back to work. Andrez Pritchard need to try slide board.  Stand pivot just not safe.  He does have good range in his right knee.  Performed therex to right LE.     FREQUENCY/DURATION: PT Plan of Care: 3 times/week for duration of hospital stay or until stated goals are met, whichever comes first         IV LR 250ML  BOLUS X1,  KCL 40 MEQ PO BID, PROAMITINE 10MG PO TID,  ELIQUIS 5MG PO Q 12 HR, LIPITOR 40MG PO Q HS,  FLORINEF 0.1MG PO QD, LASIX 40MG PO QD,  PROTONIX 40MG PO QD, LYRICA 200MG PO BID,  DESYREL 50MG PO Q HS                  Knee: Amputation Above or Below Knee - Care Day 2 (8/19/2021) by Benjamin Olivares       Review Status Review Entered   Completed 8/19/2021 11:56      Criteria Review      Care Day: 2 Care Date: 8/19/2021 Level of Care: ICU    Guideline Day 2    Clinical Status    (X) * Procedure completed    ( ) * Hemodynamic stability    8/19/2021 11:56:22 EDT by Benjamin Olivares      BP /50-78, P 84-95, MAP down to 63    Activity    ( ) * Participation in physical therapy 8/19/2021 11:56:22 EDT by Alessandra Frankel      BEDREST    Routes    ( ) * Advance diet    8/19/2021 11:56:22 EDT by Alessandra Frankel      NPO    (X) Parenteral medications    8/19/2021 11:56:22 EDT by Alessandra Frankel      BUMINATE 25G IV Q 12 HR, PROPOFOL DRIP DC'D, PRECEDEX DRIP TITRATE. D50W 25G IV X1, PEPCID 20MG IV Q 12 HR, LASIX 40MG IV Q 12 HR, DILAUDID 0.5MG IV Q 4 HR PRN X 1, REG INSULIN 10U IV X1, LOPRESSOR 2.5MG IV Q 6 HR,    Medications    (X) Possible antibiotics    8/19/2021 11:56:22 EDT by Alessandra Frankel      ANCEF 2G IV Q 8 HR    * Milestone   Additional Notes   [employer] Hospitalist Consult      Patient is currently sedated on vent.  He opens his eyes nods to questions but falls back asleep. No overnight events including fever, diarrhea. EXAM:  General:          Obese BMI 40, sedated and on vent, nonverbal, would open eyes to verbal and physical stimuli. HEENT:           Head NCAT, PERRLA positive   CV:                  RRR.  No m/r/g.  No JVD.  No edema   Lungs:             Crackles bilaterally, on vent   Abdomen:        Bowel sounds present.  Soft, nontender, nondistended. Extremities:     Warm and dry.  No cyanosis or clubbing.  Right BKA with surgical dressing on   Skin:                No rashes.  Normal coloration.   Normal turgor. Neuro:             Unable to assess as patient is nonverbal and sedated   Psych:             Unable to assess as patient is sedated and on vent       R 16-20, 02   SAT % VENT      RBC 3.32, HGB 9.5, HCT 32.3, , K 5.4, , ANION GAP 6, BUN 29, GFRNAA 54,    POC BLOOD GAS:  PH 7.57, PC02 33.2, P02 56, HC03 30.5, s02 92.8 on VENT      CXR:  Bilateral lung edema or infiltrates and small pleural effusions. Acute respiratory failure with hypoxia requiring intubation:   8/19: Patient continues to be on vent, sedation switched from propofol to Precedex. Currently on FiO2 of 60%. Intensivist on board to assist with vent management. Continue diuretics IV Lasix 40 mg every 12 hours along with albumin. Monitor strict ANNA's. Chest x-ray continues to show pulmonary edema.       Hyperkalemia-   8/19: 5.4 today. We will give 1 amp of D50 followed by 10 units of IV regular insulin.  With recheck BMP at 1600 hrs.           Status post right below-knee amputation:   8/19: POD #1   Patient is on empiric IV cefazolin, cultures negative so far. Patient likely has clear margins and will not require long-term IV antibiotics. PT and OT to evaluate once patient is stable from respiratory standpoint. Wound care consult. Ordered blood cultures today x2 sets           A. fib:   8/19: Heart rate is optimally controlled,we will switch Toprol-XL to IV Lopressor 2.5 mg every 6 hours as patient is currently n.p.o.   Eliquis has been held 2 days prior to surgery and is currently on therapeutic Lovenox. May consider switching back to Eliquis in the next 24 to 48 hours. Cardiology on board appreciate their recommendations.           CHF-patient had echo performed on 7/21/2021 demonstrating ejection fraction of 30 to 35%.  8/19:   Check BNP   Respiratory status no change since yesterday, patient continues to be on vent. Chest x-ray this morning showed bilateral lung infiltrates versus pulmonary edema with small pleural effusions. IV Lasix 40 mg every 12 hours. Monitor intake and output.           Diabetes-patient noted to have a hemoglobin A1c of 6.7   8/19:   Blood glucose is optimally controlled, continue Humalog sliding scale every 4 hours.    Monitor POC glucose every 4 hours.           Morbid obesity-   Noted         CARDIOLOGY CONSULT:   77 y.o. male with PMH of CAD with history of stent (no records available), chronic respiratory failure on 2L, a fib(on Eliquis), CKD st III, DM, and chronic systolic heart failure (Echo 7/21 EF 30-35%), who presented to [facility] for right BKA.  Patient was hospitalized in July with right foot cellulitis and sepsis secondary to right foot ulcer.  Amputation was recommended initially but he refused at that time and was treated with abx.  During that hospitalization he was found to be in a fib with RVR for which toprol and Eliquis for [facility]  Echo showed EF or 30-35%.  After discharge he was seen in follow up by ortho and the decision to proceed with amputation was made.  Ortho contacted us regarding [facility], Eliquis stopped for 48hrs prior to surgery with plan to resume ASAP post OP.  After surgery he had difficulty with extubated and remained on vent overnight.  This AM he remains intubated. [facility] is in a fib in 76s.  Patient was started on therapeutic dose lovenox for [facility] [place], operated by [facility].          EXAM: Cardiovascular:       Rate and Rhythm: Normal rate. Rhythm irregular      Telemetry: AFIB   Echocardiogram: 7/2021   \" Image quality for this study was poor. \" Contrast used: DEFINITY. \" LV: Estimated LVEF is 30 - 35%. Normal wall thickness. Mildly dilated left ventricle. Moderate-to-severely reduced systolic function. Wall motion abnormalities with suggest coronary artery disease   \" LA: Mildly dilated left atrium. \" RA: Mildly dilated right atrium. \" MV: Mild mitral annular calcification. Mild mitral valve regurgitation is present. TV: Mild tricuspid valve regurgitation is present.          Assessment:       Principal Problem:     S/P BKA (below knee amputation) unilateral, right -- per ortho       Active Problems:     CAD (coronary artery disease) -- continue BB and statin         Stage 3 chronic kidney disease -- stable.           Controlled type 2 diabetes mellitus with diabetic polyneuropathy, without long-term current use of insulin -- SSI ordered          Mixed hyperlipidemia -- on statin         Morbid obesity with BMI of 40.0-44.9, adult          Chronic systolic heart failure -- chest xray with pulmonary edema and bilateral pleural effusions.  Continue lasix 40 mg IVP.  Continue BB, consider adding ACE/ARB when can tolerate from BP standpoint and if creatinine stable.  Strict I/O and daily weight.           Atrial fibrillation -- rate is controlled.  If remains intubated will need to consider converting BB to IVP.  On lovenox 1 mg/kg for [facility].           Respiratory failure, post-operative -- remains on vent this AM, pulmonary followng      PULM NOTE:   Assessment and Plan:  (Medical Decision Making)   Principal Problem:     S/P BKA (below knee amputation) unilateral, right ([address] (8/18/2021)     Post op per surgery       Active Problems:     Acute on Chronic respiratory failure with hypoxemia and hypercapnea:       FiO2 up to 60%, though sat is 100%, CXR suggests ongoing pulmonary edema and will try to diurese. Add albumin given soft blood pressures. Change to PSV when more awake. Change propofol to precedex to facilitate Vent weaning.          CAD (coronary artery disease) ()     Stable, no chest pain         Stage 3 chronic kidney disease ([facility] (11/22/2017)     Stable, will try to diurese, BPs are soft, but will give Lasix Q12, albumin x 2 and stop propofol         Controlled type 2 diabetes mellitus with diabetic polyneuropathy, without long-term current use of insulin ([facility] [address] (1/5/2018)     SSI         Morbid obesity with BMI of 40.0-44.9, adult ([facility] [address] (7/04/4745)     Complicates care         Suspected CHF (congestive heart failure) (7/19/2021)     FiO2 up to 60%, though sat is 100%, CXR suggests ongoing pulmonary edema and will try to diurese. Add albumin given soft blood pressures. Change to PSV when more awake. Change propofol to precedex to facilitate Vent weaning.          Atrial fibrillation with RVR (HCC) (7/19/2021)     On Toprol BID, HR borderline around 100            Infectious Disease Consult      Impression:   \" MSSA R foot infection/ankle abscess from complicated Charcot foot   \" S/p (8/18) R BKA with wound vac placement   \" Recent MSSA bacteremia 7/19/21, negative BC 7/23/21.  Source foot TTE negative, on IV antbx outpatient   \" DM, well controlled   \" Difficultly vent weaning post op   \" L foot ulcer, appears benign   \" CKD 3       Plan:   \" Discontinue Cefazolin today, he has completed about 4 weeks and ~24hrs post op: he has been adequately treated for the bacteremia, and the R foot with a BKA   \" Have the wound care team eval left foot ulcer   \" Pull PICC before DC   \" ID will sign off, please call with questions or concerns         WOUND  CARE CONSULT, PAIRED BLOOD CULTURES,  LIPITOR 40MG PO Q HS,  LOVENOX 120MG SC Q  12 HR,  LYRICA 200MG PO BID,  SSI SC Q 4 HR

## 2021-09-14 NOTE — PROGRESS NOTES
Denisa Fernandez MD  Medical Director  [address] [facility], [address] [facility]  Tel: 1980 Kwasi Frandy PROGRESS NOTE    Keysha Rivas  Admit Date: 8/25/2021  Admit Diagnosis:   Unilateral complete BKA, right, sequela ([address] [S88.111S]    Subjective     Patient seen and examined. Feeling ok. Seems very down today. Wont eat breakfast. Nervous about appt with Dr Miguel Portillo Worried about having to go to a SNF. Very distraught about situation at home and relationship with wife. Knows that she will not help him and says there was never a time he felt loved. Tearful. Objective:     Current Facility-Administered Medications   Medication Dose Route Frequency    tuberculin injection 5 Units  5 Units IntraDERMal ONCE    ferrous sulfate tablet 325 mg  1 Tablet Oral BID WITH MEALS    lactulose (CHRONULAC) 10 gram/15 mL solution 30 mL  30 mL Oral DAILY PRN    naloxone (NARCAN) injection 0.4 mg  0.4 mg IntraVENous PRN    acetaminophen (TYLENOL) tablet 650 mg  650 mg Oral Q6H PRN    apixaban (ELIQUIS) tablet 5 mg  5 mg Oral Q12H    atorvastatin (LIPITOR) tablet 40 mg  40 mg Oral QHS    docusate sodium (COLACE) capsule 100 mg  100 mg Oral BID    fludrocortisone (FLORINEF) tablet 0.1 mg  0.1 mg Oral DAILY    midodrine (PROAMATINE) tablet 2.5 mg  2.5 mg Oral TID WITH MEALS    nystatin (MYCOSTATIN) 100,000 unit/gram powder   Topical PRN    oxyCODONE IR (ROXICODONE) tablet 5 mg  5 mg Oral Q4H PRN    pantoprazole (PROTONIX) tablet 40 mg  40 mg Oral ACB    polyethylene glycol (MIRALAX) packet 17 g  17 g Oral DAILY    pregabalin (LYRICA) capsule 200 mg  200 mg Oral BID    traMADoL (ULTRAM) tablet 50 mg  50 mg Oral Q6H PRN    traZODone (DESYREL) tablet 50 mg  50 mg Oral QHS       Review of Systems:   Denies chest pain, shortness of breath, cough, headache, visual problems, abdominal pain, dysuria, calf pain.  Pertinent positives are as noted in the HPI, ROS unremarkable otherwise. Visit Vitals  BP (!) 118/56 (BP 1 Location: Left upper arm, BP Patient Position: At rest)   Pulse 84   Temp 97.5 °F (36.4 °C)   Resp 18   Wt 281 lb 12 oz (127.8 kg)   SpO2 99%   BMI 40.43 kg/m²        Physical Exam:   General: Alert and age appropriately oriented. No acute cardiorespiratory distress. HEENT: Normocephalic, no scleral icterus. Oral mucosa moist without cyanosis. Lungs: Clear to auscultation bilaterally. Respiration even and unlabored. Heart: Regular rate and rhythm, S1, S2. No murmurs, clicks, rub or gallops. Abdomen: Soft, non-tender, not distended. Bowel sounds normoactive. No organomegaly. obese   Genitourinary: Deferred. Neuromuscular:      Restless. No new neuro deficits  No sensation left foot . Absent proprioception     Skin/extremity: No rashes, no erythema. No calf tenderness B LE. Right bka covered. Has boot on left leg in prep for transfer to ortho office                                                                              Functional Assessment:          Balance  Sitting - Static: Good (unsupported) (09/13/21 1200)  Sitting - Dynamic: Fair (occasional) (09/13/21 1200)  Standing - Static: Not tested (09/13/21 1200)  Standing - Dynamic : Impaired (09/07/21 1200)                     Cristian Briones Fall Risk Assessment:  Cristian Lucaskory Fall Risk  Mobility: Ambulates or transfers with assist devices or assistance (09/13/21 2030)  Mobility Interventions: Utilize gait belt for transfers/ambulation;Utilize walker, cane, or other assistive device; Patient to call before getting OOB (09/13/21 2030)  Mentation: Alert, oriented x 3 (09/13/21 2030)  Mentation Interventions: Door open when patient unattended;More frequent rounding; Toileting rounds (09/13/21 0710)  Medication: Patient receiving anticonvulsants, sedatives(tranquilizers), psychotropics or hypnotics, hypoglycemics, narcotics, sleep aids, antihypertensives, laxatives, or diuretics (09/13/21 2030)  Medication Interventions: Patient to call before getting OOB; Teach patient to arise slowly (09/13/21 2030)  Elimination: Needs assistance with toileting (09/13/21 2030)  Elimination Interventions: Call light in reach;Urinal in reach (09/13/21 2030)  Prior Fall History: No (09/13/21 2030)  Total Score: 3 (09/13/21 2030)  Standard Fall Precautions: Yes (09/05/21 2047)  High Fall Risk: Yes (09/13/21 2030)     Speech Assessment:         Ambulation:  Gait  Distance (ft): 0 Feet (ft) (09/13/21 1200)     Labs/Studies:  Recent Results (from the past 72 hour(s))   SARS-COV-2    Collection Time: 09/14/21  5:50 AM   Result Value Ref Range    SARS-CoV-2 Please find results under separate order         Assessment:     Problem List as of 9/14/2021 Date Reviewed: 8/27/2021        Codes Class Noted - Resolved    * (Principal) Unilateral complete BKA, right, sequela ([address] ICD-10-CM: Y56.080U  ICD-9-CM: 905.9  8/25/2021 - Present        Suspected sleep apnea ICD-10-CM: R29.818  ICD-9-CM: 781.99  8/20/2021 - Present        S/P BKA (below knee amputation) unilateral, right ([address] ICD-10-CM: Z89.511  ICD-9-CM: V49.75  8/18/2021 - Present        Respiratory failure, post-operative ([address] ICD-10-CM: M30.514  ICD-9-CM: 518.51  8/18/2021 - Present        Acute respiratory failure with hypercapnia (HCC) ICD-10-CM: J96.02  ICD-9-CM: 518.81  7/23/2021 - Present        Acute on chronic respiratory failure with hypoxia and hypercapnia (HCC) ICD-10-CM: J96.21, J96.22  ICD-9-CM: 518.84, 786.09, 799.02  7/23/2021 - Present        Acute metabolic encephalopathy LUIS: G93.41  ICD-9-CM: 348.31  7/23/2021 - Present        Hypoxia ICD-10-CM: R09.02  ICD-9-CM: 799.02  7/21/2021 - Present        Acute kidney injury superimposed on CKD ([address] ICD-10-CM: N17.9, N18.9  ICD-9-CM: 866.00, 585.9  7/21/2021 - Present        Staphylococcus aureus bacteremia ICD-10-CM: R78.81, B95.61  ICD-9-CM: 790.7, 041.11  7/21/2021 - Present        Cellulitis ICD-10-CM: L03.90  ICD-9-CM: 682.9  7/19/2021 - Present        Systolic CHF, acute on chronic [facility]) ICD-10-CM: I50.23  ICD-9-CM: 428.23, 428.0  7/19/2021 - Present        Atrial fibrillation with RVR (HCC) ICD-10-CM: I48.91  ICD-9-CM: 427.31  7/19/2021 - Present        Morbid obesity with BMI of 40.0-44.9, adult [facility]) ICD-10-CM: E66.01, Z68.41  ICD-9-CM: 278.01, V85.41  6/11/2020 - Present        Severe obesity (BMI 35.0-35.9 with comorbidity) (HCC) (Chronic) ICD-10-CM: E66.01, Z68.35  ICD-9-CM: 278.01, V85.35  10/10/2018 - Present        Bunion of unspecified foot (Chronic) ICD-10-CM: M21.619  ICD-9-CM: 727.1  4/10/2018 - Present        Onychomycosis (Chronic) ICD-10-CM: B35.1  ICD-9-CM: 110.1  4/10/2018 - Present        Corns and callus (Chronic) ICD-10-CM: L84  ICD-9-CM: 700  4/10/2018 - Present        Mixed hyperlipidemia (Chronic) ICD-10-CM: Z01.2  ICD-9-CM: 272.2  4/10/2018 - Present        Mixed axonal-demyelinating polyneuropathy (Chronic) ICD-10-CM: G62.89  ICD-9-CM: 355.9  1/5/2018 - Present        Controlled type 2 diabetes mellitus with diabetic polyneuropathy, without long-term current use of insulin (HCC) (Chronic) ICD-10-CM: E11.42  ICD-9-CM: 250.60, 357.2  1/5/2018 - Present        Type 2 diabetes mellitus with nephropathy (HCC) (Chronic) ICD-10-CM: E11.21  ICD-9-CM: 250.40, 583.81  1/5/2018 - Present        Stage 3 chronic kidney disease ([facility] (Chronic) ICD-10-CM: N18.30  ICD-9-CM: 585.3  11/22/2017 - Present        Benign hypertensive kidney disease with chronic kidney disease (Chronic) ICD-10-CM: I12.9  ICD-9-CM: 403.10  11/6/2017 - Present        CAD (coronary artery disease) (Chronic) ICD-10-CM: I25.10  ICD-9-CM: 414.00  Unknown - Present        RESOLVED: Glucose intolerance (impaired glucose tolerance) (Chronic) ICD-10-CM: R73.02  ICD-9-CM: 790.22  11/6/2017 - 11/14/2017        RESOLVED: BMI 40.0-44.9, adult ([facility] (Chronic) ICD-10-CM: Q09.93  ICD-9-CM: V85.41  11/4/2016 - 10/10/2018 RESOLVED: Hypertension ICD-10-CM: I10  ICD-9-CM: 401.9  Unknown - 10/10/2016        RESOLVED: Hypercholesterolemia ICD-10-CM: E78.00  ICD-9-CM: 272.0  Unknown - 10/10/2016        RESOLVED: Chronic kidney disease ICD-10-CM: N18.9  ICD-9-CM: 658. 9  Unknown - 10/10/2016    Overview Signed 10/10/2016 11:28 AM by Varun García MD     stage 3             RESOLVED: Neuropathy ICD-10-CM: G62.9  ICD-9-CM: 355.9  Unknown - 10/10/2016        RESOLVED: Peripheral polyneuropathy (Chronic) ICD-10-CM: G62.9  ICD-9-CM: 356.9  10/10/2016 - 1/5/2018        RESOLVED: CKD (chronic kidney disease) stage 3, GFR 30-59 ml/min (HCC) (Chronic) ICD-10-CM: N18.30  ICD-9-CM: 585.3  10/10/2016 - 11/6/2017        RESOLVED: Hyperlipidemia (Chronic) ICD-10-CM: E78.5  ICD-9-CM: 272.4  10/10/2016 - 4/10/2018        RESOLVED: Hyperkalemia ICD-10-CM: E87.5  ICD-9-CM: 276.7  10/10/2016 - 11/14/2017        RESOLVED: Essential hypertension (Chronic) ICD-10-CM: I10  ICD-9-CM: 401.9  10/10/2016 - 11/22/2017            S/p right below-knee amputation secondary to cellulitis (8/18/2021, Lucia Mathis MD)     Plan / Recommendations / Medical Decision Making:      Daily physician / PA medical management:     Unilateral complete BKA, right - NWB R LE; prosthetics involved. Will be difficult for patient to mobilize with a prosthesis given he lacks sensation and proprioception in the right foot, has obese body habitus, chronic respiratory failure and CHF.     Pain control - stable, mild-to-moderate joint symptoms intermittently, reasonably well controlled by PRN meds. Will require regular pain assessment and comprehensive pain management. Has chronic mixed axonal-demyelinating polyneuropathy; on Lyrica 200mg BID. Denies residual limb pain but has tramadol and oxycodone ordered PRN. -9/10 only taking Tylenol for pain     Hypotension - BP fluctuating, managed medically. Has been hypotensive and is on Florinef and midodrine.  Dehydration thought to be contributing, also possibly diuretics. -8/27 /63, HR 94 this AM; patient denies lightheadedness during therapies or while resting in room  -8/28 95/53; asymptomatic, HR 97; on Florinef and midodrine; up to 117/65 max  -8/30 /72, HR 85 this AM  -8/31 VSS  -9/1 124/64 HR 60  -9/3 /71, HR 79  -9/4 VSS  -9/5 /79, HR 80  -9/6 /73, HR 62  -9/7 /57 this a.m HR 88  -9/8 /75, improved  -9/9 113/68  -9/10 121/70, low of 102/52; continue midodrine and Florinef  -9/12 /78, HR 93   -9/13 /67, HR 86  -vss     Acute-on-chronic respiratory failure - continue 3L O2, wean to 2L if possible. Encourage IS.    -8/31 sats reportedly 78%, pt asymptomatic. Rechecked 98% 3L  -9/10 encouraged use of IS; maintained on 2-3 O2, chronic use     Atrial fibrillation - continue Eliquis 5mg BID; rate controlled. Toprol XL has been held due to hypotensive at times.  -9/1 NSR this AM  -9/3 IIR this AM, rate controlled  -9/4 IIR in PT, rate controlled  -9/5 IIR today, rate controlled  -9/6 IIR with additional respiratory irregularity, rate controlled  -9/10 rate controlled, irreg rhythm this a.m  -9/12 HR 83-96 range in last 24 hours  -9/13 IIR, rate 86-94 in last 24h  -9/14 NSR this a.m HR 84-98     Acute-on-chronic anemia - anemia of chronic kidney disease and post-op due to blood loss. Hgb trending down; follow closely.  No evidence of active bleeding.  -8/27 CBC ordered for tomorrow  -8/28 Hgb 6.6 from 8.9 (8/24); no sign of active bleeding source: stool was not melenic, little drainage from amputation; hold Eliquis and recheck in AM. If true value, will need to transfer downtown for a blood transfusion  -8/30 recheck H&H with Hgb 8.5; Eliquis restarted (yesterday)  -9/2 Hgb 9.3 improved  -9/5 will check CBC tomorrow  -9/6 Hgb 10.2, continues to rebound  -9/10 Hgb 8.8 from 10.2; check iron studies  -9/12 low Fe 24 and transferrin saturation, c/w iron-deficiency anemia, continue Fe supplements BID; 9/14 recheck in a.m.     Chronic kidney disease, CKD3 - improving, monitor. Creatine 1.49 from 1.57.  -8/27 BMP ordered for tomorrow  -8/28 Cr 1.51, stable  -9/2 Cr 1.58, BUN 21; baseline  -9/5 will check BMP tomorrow  -9/6 Cr 1.50, BUN 22 - normal  -9/10 creatinine 1.48, stable  -9/14 recheck in a.m.     Diabetes mellitus - HgbA1c 6.7% (7/13/2021), moderate glycemic control. Will require daily, close fasting glucose monitoring and medication adjustment to optimize glycemic control in setting of acute illness and hospitalization. Takes Glucotrol 5mg at home. Currently on SSI; add Glucotrol ? .  -8/26 BS controlled  -8/27 BS this , all BS since admission <145, most in low 100s  -8/28 BS controlled  -8/30  this AM, all BS yesterday <110  -well controlled  -9/2 BS well controlled; diet controlled  -9/3  this AM, yesterday all values <120  -9/4 BS 94 this AM, all BS <120 since 8/30; d/c POC glucose  -9/6 BS 86 in BMP this AM  -9/10 well controlled; BS 95 in BMP this AM  -well controlled     Pneumonia prophylaxis - incentive spirometer every hour while awake.     DVT risk / DVT prophylaxis - will require daily physician / PA exam to assess for signs and symptoms as patient is at increased risk for of thromboembolism. Mobilize as tolerated. Sequential pneumatic compression devices (SCDs) when in bed; thigh-high or knee-high thromboembolic deterrent hose when out of bed. On Eliquis.     CAD - continue Eliquis and statin.     GI prophylaxis - resume PPI. At times may need additional antacids, Maalox prn.     General skin care / wound prevention - monitor BKA  incision and general skin wound status daily per staff and physician / PA. At risk for failure. Will require 24/7 rehab nursing.  Keep BKA incision and left plantar foot wound clean and dry, reinforce dressing PRN and ice to area for comfort; he is to f/u with Dr Connie Agosto for staple removal.   -8/25 will cont wound vac until f/u with Dr. Pendleton  -8/28 wound looked good at time of wound vac check per notes  -8/30 wound vac in place and functioning, skin surrounding without erythema or rash  -9/2 wound vac will remain intact until f/u with Dr Sahara Bass next week  -9/3 Lowella Canal wound vac has auto-terminated, incision remains covered with vac dressing  -9/4 wound vac and dressing removed, incision intact with sutures, no erythema, induration or drainage; wound cleaned with wound cleanser and redressed with Telfa, 4x4s, Kerlix and ACE  -9/5 BKA incision inspected and redressed; left foot plantar wound inspected, stable, Meplex replaced  -9/7 dressing not removed this a.m; will see Dr Reza Bass pleased with inc and residual limb. 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 ? Was not present yesterday. Must watch closely; must wear Crow boot but may need to make NWB. RLE amputation healing well  -9/12 WOC RN consulted, daughter concerned  -9/13 plantar ulcer, distal blood blister, lateral foot abrasion / ulceration evaluated and protected with Meplex     Urinary retention / neurogenic bladder - schedule voids q 6-8 hrs. Check post-void residual every shift; in and out catheter if post-void residual is more than 400ml.     Bowel program - at risk for constipation as a side effect of opioids, other medications, impaired mobility, etc. MiraLAX daily for regularity, Ivis-Colace for stool softener. PRN MOM, bisacodyl suppository or tablets for constipation.  -8/27 constipated, no BM with daily gentle agents (MiraLAX, Colace), will try more aggressively with lactulose after therapies; may require MOM, bisacodyl suppository or disimpaction  -8/28 excellent results yesterday  -8/30 large, normal BM this AM; continue daily MiraLAX and stool softener  -9/9 no bowel issues     Dispo; family has decided on SNF placement.  They cannot provide physical assistance at home. Domestic issues concerning. [facility] to [facility]. Ppd/covid    Time spent was 25 minutes with over 1/2 in direct patient care/examination, consultation and coordination of care.      Signed By: Lakshmi Anaya MD     September 14, 2021

## 2021-09-14 NOTE — PROGRESS NOTES
Problem: Falls - Risk of  Goal: *Absence of Falls  Description: Document Karla Paul Fall Risk and appropriate interventions in the flowsheet. Outcome: Progressing Towards Goal  Note: Fall Risk Interventions:  Mobility Interventions: OT consult for ADLs, Patient to call before getting OOB, PT Consult for mobility concerns, PT Consult for assist device competence, Utilize walker, cane, or other assistive device    Mentation Interventions: Adequate sleep, hydration, pain control, Door open when patient unattended, Evaluate medications/consider consulting pharmacy, Increase mobility, More frequent rounding, Reorient patient, Toileting rounds, Update white board    Medication Interventions: Evaluate medications/consider consulting pharmacy, Patient to call before getting OOB, Teach patient to arise slowly    Elimination Interventions: Call light in reach, Patient to call for help with toileting needs, Urinal in reach              Problem: Patient Education: Go to Patient Education Activity  Goal: Patient/Family Education  Outcome: Progressing Towards Goal     Problem: Pressure Injury - Risk of  Goal: *Prevention of pressure injury  Description: Document Julio Cesar Scale and appropriate interventions in the flowsheet.   Outcome: Progressing Towards Goal  Note: Pressure Injury Interventions:  Sensory Interventions: Assess changes in LOC, Assess need for specialty bed, Chair cushion, Check visual cues for pain, Discuss PT/OT consult with provider, Keep linens dry and wrinkle-free    Moisture Interventions: Absorbent underpads, Assess need for specialty bed, Check for incontinence Q2 hours and as needed, Maintain skin hydration (lotion/cream), Moisture barrier, Offer toileting Q_hr    Activity Interventions: Increase time out of bed, Pressure redistribution bed/mattress(bed type), PT/OT evaluation    Mobility Interventions: Chair cushion, Pressure redistribution bed/mattress (bed type), PT/OT evaluation    Nutrition Interventions: Document food/fluid/supplement intake    Friction and Shear Interventions: Foam dressings/transparent film/skin sealants                Problem: Patient Education: Go to Patient Education Activity  Goal: Patient/Family Education  Outcome: Progressing Towards Goal     Problem: Patient Education: Go to Patient Education Activity  Goal: Patient/Family Education  Description: Patient / Patient's family will verbalize understanding of PT safety recommendations, demonstrate appropriate assist for current functional mobility status, safety, and home exercise program by time of discharge.    Outcome: Progressing Towards Goal

## 2021-09-14 NOTE — PROGRESS NOTES
Attempted to see Pt for OT treatment session this AM. Unable to see Pt secondary to being off unit for procedure. Will continue to follow.      Thank you,   Otto Cook MS OTR/L  9/14/2021

## 2021-09-14 NOTE — PROGRESS NOTES
PHYSICAL THERAPY DAILY NOTE  Time In: 2617  Time Out: 1448  Patient Seen For: PM;Balance activities; Therapeutic exercise;Transfer training    Subjective: Pt concerned about new WB status. Objective: Other (comment) (fall)  GROSS ASSESSMENT Daily Assessment     No        COGNITION Daily Assessment    Decreased insight and impulsive. Decreased recall of transfer techniques       BED/MAT MOBILITY Daily Assessment   Sit>supine into long sitting required Mod A  Rolling Right : 5 (Supervision)  Rolling Left : 5 (Supervision)  Supine to Sit : 5 (Supervision)  Sit to Supine : 3 (Moderate assistance)       TRANSFERS Daily Assessment   Pt is now dependent x 2 with sliding board transfers due to NWB status. Transfer Type: Lateral with transfer board  Sit to Stand Assistance: Unable at this time  Car Transfers: Not tested       GAIT Daily Assessment    Distance (ft): 0 Feet (ft)       STEPS or STAIRS Daily Assessment    Steps/Stairs Ambulated (#): 0       BALANCE Daily Assessment   BUE support required for dynamic sitting. Sitting - Static: Good (unsupported)  Sitting - Dynamic: Fair (occasional)  Standing - Static: Not tested       WHEELCHAIR MOBILITY Daily Assessment    Able to Propel (ft): 0 feet  Curbs/Ramps Assist Required (FIM Score): 0 (Not tested)  Wheelchair Setup Assist Required : 5 (Supervision/setup)  Wheelchair Management: Manages left brake;Manages right brake;Manages left armrest;Manages right armrest;Manages left footrest;Manages right footrest       LOWER EXTREMITY EXERCISES Daily Assessment     LE exercises performed as listed below:      SUPINE EXERCISES Sets Reps Comments   SLR 1 15    Hip Abduction 1 15    Hip/Knee flexion 2 15 Red theraband.    Glut Sets 1 15    Sidelying exercises      Hip extension stretch 2 30 sec    Hip abduction 2 15 Manual support to prevent posterior roll   Prone      Prone laying  10 min    Hip hyperextension 2 15    Knee flexion 2 15 With red theraband Rewrapped residual limb with smaller ace bandages - no drainage noted. Vital Signs: SpO2 on 2.0 L    Pain level: No pain indicated  Pain location: NA  Pain interventions: NA    Patient education: transfer training with sliding board and NWB status on BLE    Interdisciplinary Communication: Communicated with RN about pts progress and needs for the night    Pt returned to bed with call bell and all necessities in reach. Assessment: Pt continues to build strength in BLE with resistance exercises. Pt returned from Dr Eric Wilson with new NWB status on BLE, sliding board transfers are difficult (dependent x 2) without use of LLE. Pt is discouraged about his transfer ability this afternoon. Pt notified that he is to be d/c tomorrow at 1pm to [facility] for continued skilled rehab. Plan of Care: Continue POC and progress as able.      Juanita Santa, SPT  9/14/2021

## 2021-09-14 NOTE — PROGRESS NOTES
PHYSICAL THERAPY DISCHARGE SUMMARY  [id number]     Precautions at discharge: Limited or no weight-bearing (specify); Other (comment) (NWB B LE, falls)    Problem List:    Decreased strength B LE  [x]     Decreased strength trunk/core  [x]     Decreased AROM   [x]     Decreased PROM  []     Decreased balance sitting  [x]     Decreased balance standing  [x]     Decreased endurance  [x]     Pain  [x]       Functional Limitations:   Decreased independence with bed mobility  [x]     Decreased independence with functional transfers  [x]     Decreased independence with ambulation  [x]     Decreased independence with stair negotiation  [x]            Outcome Measures: Vital Signs: BP (!) 118/56 (BP 1 Location: Left upper arm, BP Patient Position: At rest)   Pulse 84   Temp 97.5 °F (36.4 °C)   Resp 18   Wt 127.8 kg (281 lb 12 oz)   SpO2 99%   BMI 40.43 kg/m²     Pain level: no c/o pain    Patient education: pt. Instructed in NWB status as well as how to manage sliding board transfers w/ NWB status    Interdisciplinary Communication: discussed pt's WB precautions w/ OT      Cognition: Pt. W/ depressed affect over new WB precautions. Poor insight into deficits & exhibits impulsivity.        MMT Initial Assessment   Right Lower Extremity Left Lower Extremity   Hip Flexion 4 4   Knee Extension 5 5   Knee Flexion 4 (limited by bandages) 5   Ankle Dorsiflexion   5      MMT Discharge Assessment   Right Lower Extremity Left Lower Extremity   Hip Flexion 4 4   Knee Extension 5 4+   Knee Flexion 4 4+   Ankle Dorsiflexion   4+   0/5 No palpable muscle contraction  1/5 Palpable muscle contraction, no joint movement  2-/5 Less than full range of motion in gravity eliminated position  2/5 Able to complete full range of motion in gravity eliminated position  2+/5 Able to initiate movement against gravity  3-/5 More than half but not full range of motion against gravity  3/5 Able to complete full range of motion against gravity  3+/5 Completes full range of motion against gravity with minimal resistance  4-/5 Completes full range of motion against gravity with minimal-moderate resistance  4/5 Completes full range of motion against gravity with moderate resistance  4+/5 Completes full range of motion against gravity with moderate-maximum resistance  5/5 Completes full range of motion against gravity with maximum resistance     AROM: WFL w/ tight L heel cord appreciated    PRIMARY MODE OF LOCOMOTION: w/c  Please see IRC Interdisciplinary Eval: Coordination/Balance Section for details regarding FIM score description.     BED/CHAIR/WHEELCHAIR TRANSFERS Initial Assessment Discharge Assessment   Rolling Right 5 (Supervision) 5 (Supervision)   Rolling Left 5 (Supervision) 5 (Supervision)   Supine to Sit 4 (Minimal assistance) 5 (Supervision)   Sit to Stand Unable at this time Unable at this time   Sit to Supine 5 (Supervision) 5 (Supervision)   Transfer Type Lateral with transfer board Lateral with transfer board   Comments Pt. required mod-max A x2 for sliding board transfer due to increased posterior lean & decreased UE strength to assist w/ scooting Ax2 w/ one assisting w/ maintaining NWB L LE & second assisting w/ scooting   Car Transfer Not tested Not tested   Car Type [facility] MOBILITY/MANAGEMENT Initial Assessment Discharge Assessment   Able to Propel 0 feet 100 feet   Functional Level   supervision   Curbs/ramps assistance required 0 (Not tested) 0 (Not tested)   Wheelchair set up assistance required 1 (Dependent/total assistance) 5 (Supervision/setup)   Wheelchair management Manages left brake, Manages right brake, Manages right footrest, Manages right armrest, Manages left footrest Manages left brake;Manages right brake;Manages right armrest;Manages left footrest       WALKING INDEPENDENCE Initial Assessment Discharge Assessment   Assistive device   NT   Ambulation assistance - level surface 0 (Not tested) 0 (Not tested)   Distance 0 Feet (ft) 0 Feet (ft)   Comments Deferred standing or walking secondary to plantar ulcer on L LE Pt. is non-ambulatory secondary to NWB B LEs   Ambulation assistance - unlevel surface 0 (Not tested) 0 (Not tested)       STEPS/STAIRS Initial Assessment Discharge Assessment   Steps/Stairs ambulated 0 0   Rail Use   NT   Functional Level   NT   Comments   NT   Curbs/Ramps 0 (Not tested) 0 (Not tested)       QUALITY INDICATOR ASSIST COMMENTS   Roll right (&return to back) 4: Supervision or touching A    Roll left (& return to back) 4: Supervision or touching A    Supine to sit 4: Supervision or touching A    Sit to stand Not Tested: Not attempted due to medical concerns    Chair/bed-to-chair transfer 1: Dependent    Walk 10 feet Not Tested: Not attempted due to medical concerns    Walk 50 feet with 2 turns Not Tested: Not attempted due to medical concerns    Walk 150 feet Not Tested: Not attempted due to medical concerns    Walk 10 feet on uneven  Not Tested: Not attempted due to medical concerns    1 step/curb Not Tested: Not attempted due to medical concerns    4 steps Not Tested: Not attempted due to medical concerns    12 steps Not Tested: Not attempted due to medical concerns     object Not Tested: Not attempted due to medical concerns    Wheel 50' w/2 turns 5: S/U or clean-up assist    Wheel 150' Not Tested: Not attempted due to medical concerns Pt. lacks endurance   Car Transfer Not Tested: Not attempted due to medical concerns        Pt. Left supine on mat for cont. PT session with PTA-S.         PHYSICAL THERAPY PLAN OF CARE    LTGs: Pt. Has not met LTGs secondary to an unanticipated change in WB status w/ developing wound R foot    Pt would benefit from continued skilled physical therapy in order to improve independent functional mobility within the home with use of least restrictive device.  Interventions may include range of motion (AROM, PROM B LE/trunk), motor function (B LE/trunk strengthening/coordination), activity tolerance (vitals, oxygen saturation levels), bed mobility training, balance activities, gait training (progressive ambulation program), and functional transfer training. HEP handout:  Fredonia Goltz 9/132021    Pt to be discharged to SNF for ongoing therapy. Therapy Recommendations upon discharge: Other (comment) (cont PT @ SNF)   Equipment needs at discharge: w/c    Please see IRC; Interdisciplinary Eval, Care Plan, and Patient Education for further information regarding physical therapy discharge summary and plan of care.      Angelo Hubbard, PT  9/14/2021

## 2021-09-14 NOTE — PROGRESS NOTES
Time In 0702   Time Out 0748     Mobility   Score Comments   Supine to Sit 6: Independent I   Transfer Assist 4: Supervision or touching A Transfer Type: LPT  Equipment: Sliding Board   Comments: A to remove board; cueing for w/c management and s/u     Activities of Daily Living    Score Comments   Oral Hygiene 6: Independent I   Bathing 3: Partial/Moderate A Type of Shower: Bath Pack  Position: Unsupported Sitting   Adaptive  Equipment: N/A  Comments: A for L foot   Upper Body  Dressing 5: S/U or clean-up assist Items Applied: Pullover  Position: Unsupported Sitting  Comments: S/U   Lower Body Dressing 5: S/U or clean-up assist Items Applied: Underwear and Elastic pants  Position: Unsupported Sitting  Adaptive Equipment: N/A  Comments: S/U   Donning/[place] Footwear 1: Dependent Items Applied: Socks and Shoes with fasteners   Adaptive Equipment: Sock Aide  Comments: Sock aide got stuck on foot dressing; A with Crow boot   Education  When to wear each boot     Pt was in bed and agreeable to tx. Pt's performance with ADL is reflected in above chart. Pt requiring cueing for sequencing still. GLENIS Hairston notified that wound on L foot had signs of bleeding. Pt has poor comprehension of which boot he wears when. Pt was educated. Pt was left in w/c with all needs within reach.      Malcolm Osborn OT   9/14/2021

## 2021-09-15 VITALS
SYSTOLIC BLOOD PRESSURE: 116 MMHG | HEART RATE: 81 BPM | TEMPERATURE: 97.7 F | WEIGHT: 281.75 LBS | BODY MASS INDEX: 40.43 KG/M2 | DIASTOLIC BLOOD PRESSURE: 67 MMHG | OXYGEN SATURATION: 100 % | RESPIRATION RATE: 18 BRPM

## 2021-09-15 LAB
ANION GAP SERPL CALC-SCNC: 2 MMOL/L (ref 7–16)
BUN SERPL-MCNC: 23 MG/DL (ref 8–23)
CALCIUM SERPL-MCNC: 9.2 MG/DL (ref 8.3–10.4)
CHLORIDE SERPL-SCNC: 105 MMOL/L (ref 98–107)
CO2 SERPL-SCNC: 38 MMOL/L (ref 21–32)
CREAT SERPL-MCNC: 1.4 MG/DL (ref 0.8–1.5)
ERYTHROCYTE [DISTWIDTH] IN BLOOD BY AUTOMATED COUNT: 15.2 % (ref 11.9–14.6)
GLUCOSE SERPL-MCNC: 101 MG/DL (ref 65–100)
HCT VFR BLD AUTO: 31.8 % (ref 41.1–50.3)
HGB BLD-MCNC: 9.3 G/DL (ref 13.6–17.2)
MCH RBC QN AUTO: 28.6 PG (ref 26.1–32.9)
MCHC RBC AUTO-ENTMCNC: 29.2 G/DL (ref 31.4–35)
MCV RBC AUTO: 97.8 FL (ref 79.6–97.8)
MM INDURATION POC: 0 MM (ref 0–5)
NRBC # BLD: 0 K/UL (ref 0–0.2)
PLATELET # BLD AUTO: 169 K/UL (ref 150–450)
PMV BLD AUTO: 11.5 FL (ref 9.4–12.3)
POTASSIUM SERPL-SCNC: 4.6 MMOL/L (ref 3.5–5.1)
PPD POC: NEGATIVE NEGATIVE
RBC # BLD AUTO: 3.25 M/UL (ref 4.23–5.6)
SODIUM SERPL-SCNC: 145 MMOL/L (ref 136–145)
WBC # BLD AUTO: 3.8 K/UL (ref 4.3–11.1)

## 2021-09-15 PROCEDURE — 99239 HOSP IP/OBS DSCHRG MGMT >30: CPT | Performed by: PHYSICAL MEDICINE & REHABILITATION

## 2021-09-15 PROCEDURE — 85027 COMPLETE CBC AUTOMATED: CPT

## 2021-09-15 PROCEDURE — 97530 THERAPEUTIC ACTIVITIES: CPT

## 2021-09-15 PROCEDURE — 74011250637 HC RX REV CODE- 250/637: Performed by: PHYSICAL MEDICINE & REHABILITATION

## 2021-09-15 PROCEDURE — 74011250637 HC RX REV CODE- 250/637: Performed by: PHYSICIAN ASSISTANT

## 2021-09-15 PROCEDURE — 97535 SELF CARE MNGMENT TRAINING: CPT

## 2021-09-15 PROCEDURE — 97110 THERAPEUTIC EXERCISES: CPT

## 2021-09-15 PROCEDURE — 80048 BASIC METABOLIC PNL TOTAL CA: CPT

## 2021-09-15 RX ADMIN — POLYETHYLENE GLYCOL 3350 17 G: 17 POWDER, FOR SOLUTION ORAL at 08:38

## 2021-09-15 RX ADMIN — MIDODRINE HYDROCHLORIDE 2.5 MG: 5 TABLET ORAL at 12:10

## 2021-09-15 RX ADMIN — FLUDROCORTISONE ACETATE 0.1 MG: 0.1 TABLET ORAL at 08:40

## 2021-09-15 RX ADMIN — DOCUSATE SODIUM 100 MG: 100 CAPSULE, LIQUID FILLED ORAL at 08:39

## 2021-09-15 RX ADMIN — PREGABALIN 200 MG: 100 CAPSULE ORAL at 08:39

## 2021-09-15 RX ADMIN — PANTOPRAZOLE SODIUM 40 MG: 40 TABLET, DELAYED RELEASE ORAL at 05:42

## 2021-09-15 RX ADMIN — FERROUS SULFATE TAB 325 MG (65 MG ELEMENTAL FE) 325 MG: 325 (65 FE) TAB at 08:40

## 2021-09-15 RX ADMIN — APIXABAN 5 MG: 5 TABLET, FILM COATED ORAL at 08:40

## 2021-09-15 RX ADMIN — MIDODRINE HYDROCHLORIDE 2.5 MG: 5 TABLET ORAL at 08:40

## 2021-09-15 NOTE — PROGRESS NOTES
Pt to discharge to [facility] for STR today. Insurance approved. Pt going to room 416 with report to be called to [phone]. Primary RN aware. Pt and daughter aware of d/c. Transport set for 1400 with [facility]. All needs met. Discharge summary sent to insurance as requested. CM available if any new needs arise. Care Management Interventions  PCP Verified by CM:  Yes James Keen  Mode of Transport at Discharge: BLS  Transition of Care Consult (CM Consult): Discharge Planning, SNF  Discharge Durable Medical Equipment: No  Physical Therapy Consult: Yes  Occupational Therapy Consult: Yes  Speech Therapy Consult: No  Support Systems: Family member(s)  Read Only, Retired: Current Support Network: Own Home, Lives with Spouse  Confirm Follow Up Transport: Family  The Plan for Transition of Care is Related to the Following Treatment Goals : Return to baseline   The Patient and/or Patient Representative was Provided with a Choice of Provider and Agrees with the Discharge Plan?: Yes  Name of the Patient Representative Who was Provided with a Choice of Provider and Agrees with the Discharge Plan: pt/daughter   Freedom of Choice List was Provided with Basic Dialogue that Supports the Patient's Individualized Plan of Care/Goals, Treatment Preferences and Shares the Quality Data Associated with the Providers?: Yes  [employer] Information Provided?: No  Discharge Location  Discharge Placement: Skilled nursing facility [place] )

## 2021-09-15 NOTE — PROGRESS NOTES
PHYSICAL THERAPY DAILY NOTE  Time In: 0916  Time Out: 1001  Patient Seen For: AM;Therapeutic exercise;Transfer training    Subjective: Pt discouraged about difficulty with transfers since NWB. Objective: Other (comment) (fall)  GROSS ASSESSMENT Daily Assessment     No       COGNITION Daily Assessment    Decreased insight/impulsive        BED/MAT MOBILITY Daily Assessment   Min A for supine to sit for HHA. Mod A for sit to supine to manage BLE Rolling Right : 0 (Not tested)  Rolling Left : 5 (Supervision)  Supine to Sit : 4 (Minimal assistance)  Sit to Supine : 3 (Moderate assistance)       TRANSFERS Daily Assessment   Pt required Max A x 1 for lateral transfer from bed to w/c with bed raised above w/c height due to NWB status. Max A required to return from w/c to bed and verbal cues for safety/technique. Transfer Type: Lateral with transfer board  Sit to Stand Assistance: Unable at this time  Car Transfers: Not tested       GAIT Daily Assessment   NWB Distance (ft): 0 Feet (ft)       STEPS or STAIRS Daily Assessment    Steps/Stairs Ambulated (#): 0       BALANCE Daily Assessment    Sitting - Static: Good (unsupported)  Sitting - Dynamic: Fair (occasional)  Standing - Static: Not tested       [facility] MOBILITY Daily Assessment    Able to Propel (ft): 20 feet  Curbs/Ramps Assist Required (FIM Score): 0 (Not tested)  Wheelchair Setup Assist Required : 5 (Supervision/setup)  Wheelchair Management: Manages left brake;Manages right brake;Manages left armrest;Manages right armrest;Manages left footrest;Manages right footrest       LOWER EXTREMITY EXERCISES Daily Assessment     LE exercises performed as listed:      SEATED EXERCISES with Red theraband Sets Reps Comments   Hip Flexion 2 15    Long Arc Quads 2 15    Hamstring Curls  2 15            Pt able to manage w/c outside across uneven surfaces with Supervision. Requires additional time to complete tasks.     Vital Signs: SpO2 on 2.0 L    Pain level: No pain indicated   Pain location: NA  Pain interventions: NA    Patient education: Importance of correct technique/posture when transferring. Interdisciplinary Communication: Collaborated with OT and RN. Pt left in bed with call bell and all needs met. Assessment: Pt participated in LE exercises and transfers this am.  Pt is showing increased ability to tolerate LE exercises. Pt requires Max A for lateral board transfers due to NWB status and verbal cues are required for safety. Transport is set up for 1400 to [place]. Plan of Care: Continue with POC and progress as tolerated while here.       Honor Theo, STANLEY  9/15/2021

## 2021-09-15 NOTE — DISCHARGE SUMMARY
Sheldon Barry MD  Medical Director  [facility], [address] [facility]  Tel: [phone]       PHYSICAL MEDICINE & REHABILITATION DISCHARGE SUMMARY     Date: 9/15/2021  Admission Date: 8/25/2021  Discharge Date: 9/15/2021    Surgeon: Dr Raulito Noel  Primary Care Provider: Didier Carrillo MD    Admission Condition: good  Discharged Condition: good    Admitting Diagnosis:   Unilateral complete BKA, right, sequela ([address] [B05.995A]     Active Problems:    Unilateral complete BKA, right, sequela ([address] (8/25/2021)     post op anemia secondary to blood loss  afib  CAD  CKD stg3  DM2  Diabetic polyneuropathy  Mixed HLD  HTN  sCHF  Hypoxia  Staph aureus wound infxn  Morbid obesity     Acute Rehab Dx:  Gait impairment / gait dysfunction  Debility  Deconditioning  Mobility and ambulation deficits  Self care / ADL deficits    Hospital Course: The patient was admitted to [address] [place] [address] by Eugenia Villalba MD on 8/25/2021 s/p R BKA    HPI: The patient is a r-hand dominant, previously functionally independent 78yo male with a PMH of CHF, afib, peripheral neuropathy, DM2 with nephropathy, morbid obesity, chronic respiratory failure on 2-4L O2 at home and cellulitis of the RLE (charcot ankle )who has failed outpt long term abx who was recently hospitalized in July with sepsis due to a right foot ulcer.   Amputation was recommended initially but he refused at that time and was treated with abx.  During that hospitalization he was found to be in a fib with RVR for which toprol and Eliquis for [address] [place] [address].  Echo showed EF or 30-35%.  After discharge he was seen in follow up by ortho and the decision to proceed with amputation was [facility] underwent a R BKA on 8/18. Post op they had difficulty extubating in the PACU, thus he was admitted to the ICU. He was noted to be in afib, rate controlled.  His Eliquis had been held for 48hr preop, and he was placed on therapeutic Lovenox post on. He was extubated to [place] on POD #1. The patient told Pulm Medicine that his breathing felt better than it ever had. He transferred to the floor on POD 2. He is currently on 3L O2 per NC. He has blood loss anemia with hx of chronic anemia due to CKD. 8/23 hgb 9.2 (11.9 preop), creat 1.97 today, up from 1.26 preop. Baseline appears to be about 1.4. ID has dc'd abx post op. He had received about 4weeks of Ancef  Current labs; hgb 8.9, BUN 28, creat 1.49 from 1.57 yesterday     Physical Medicine and Rehabilitation service was consulted, and patient was initially evaluated by Dr. Seda Garcia on 8/23. REHABILITATION COURSE:   S/p right below-knee amputation secondary to cellulitis (8/18/2021, Raul Samuel MD)     Plan / Recommendations / Medical Decision Making:      Daily physician / PA medical management:     Unilateral complete BKA, right - NWB R LE; prosthetics involved. Will be difficult for patient to mobilize with a prosthesis given he lacks sensation and proprioception in the right foot, has obese body habitus, chronic respiratory failure and CHF. -9/15 now NWB bilateral LEs due to worsening wound on dorsum of left foot and RBKA     Pain control - stable, mild-to-moderate joint symptoms intermittently, reasonably well controlled by PRN meds. Will require regular pain assessment and comprehensive pain management. Has chronic mixed axonal-demyelinating polyneuropathy; on Lyrica 200mg BID. Denies residual limb pain but has tramadol and oxycodone ordered PRN. -9/10 only taking Tylenol for pain     Hypotension - BP fluctuating, managed medically. Has been hypotensive and is on Florinef and midodrine. Dehydration thought to be contributing, also possibly diuretics.   -8/27 /63, HR 94 this AM; patient denies lightheadedness during therapies or while resting in room  -8/28 95/53; asymptomatic, HR 97; on Florinef and midodrine; up to 117/65 max  -8/30 /72, HR 85 this AM  -8/31 VSS  -9/1 124/64 HR 60  -9/3 /71, HR 79  -9/4 VSS  -9/5 /79, HR 80  -9/6 /73, HR 62  -9/7 /57 this a.m HR 88  -9/8 /75, improved  -9/9 113/68  -9/10 121/70, low of 102/52; continue midodrine and Florinef  -9/12 /78, HR 93   -9/13 /67, HR 86  -vss     Acute-on-chronic respiratory failure - continue 3L O2, wean to 2L if possible. Encourage IS.    -8/31 sats reportedly 78%, pt asymptomatic. Rechecked 98% 3L  -9/10 encouraged use of IS; maintained on 2-3 O2, chronic use     Atrial fibrillation - continue Eliquis 5mg BID; rate controlled. Toprol XL has been held due to hypotensive at times.  -9/1 NSR this AM  -9/3 IIR this AM, rate controlled  -9/4 IIR in PT, rate controlled  -9/5 IIR today, rate controlled  -9/6 IIR with additional respiratory irregularity, rate controlled  -9/10 rate controlled, irreg rhythm this a.m  -9/12 HR 83-96 range in last 24 hours  -9/13 IIR, rate 86-94 in last 24h  -9/14 NSR this a.m HR 84-98     Acute-on-chronic anemia - anemia of chronic kidney disease and post-op due to blood loss. Hgb trending down; follow closely. No evidence of active bleeding.  -8/27 CBC ordered for tomorrow  -8/28 Hgb 6.6 from 8.9 (8/24); no sign of active bleeding source: stool was not melenic, little drainage from amputation; hold Eliquis and recheck in AM. If true value, will need to transfer downtown for a blood transfusion  -8/30 recheck H&H with Hgb 8.5; Eliquis restarted (yesterday)  -9/2 Hgb 9.3 improved  -9/5 will check CBC tomorrow  -9/6 Hgb 10.2, continues to rebound  -9/10 Hgb 8.8 from 10.2; check iron studies  -9/12 low Fe 24 and transferrin saturation, c/w iron-deficiency anemia, continue Fe supplements BID; 9/14 recheck in a.m. 9.3 improved     Chronic kidney disease, CKD3 - improving, monitor.  Creatine 1.49 from 1.57.  -8/27 BMP ordered for tomorrow  -8/28 Cr 1.51, stable  -9/2 Cr 1.58, BUN 21; baseline  -9/5 will check BMP tomorrow  -9/6 Cr 1.50, BUN 22 - normal  -9/10 creatinine 1.48, stable  -9/14 recheck in a.m. 1.40 improved     Diabetes mellitus - HgbA1c 6.7% (7/13/2021), moderate glycemic control. Will require daily, close fasting glucose monitoring and medication adjustment to optimize glycemic control in setting of acute illness and hospitalization. Takes Glucotrol 5mg at home. Currently on SSI; add Glucotrol ? .  -8/26 BS controlled  -8/27 BS this , all BS since admission <145, most in low 100s  -8/28 BS controlled  -8/30  this AM, all BS yesterday <110  -well controlled  -9/2 BS well controlled; diet controlled  -9/3  this AM, yesterday all values <120  -9/4 BS 94 this AM, all BS <120 since 8/30; d/c POC glucose  -9/6 BS 86 in BMP this AM  -9/10 well controlled; BS 95 in BMP this AM  -well controlled  -9/15 bs controlled no longer checking them     Pneumonia prophylaxis - incentive spirometer every hour while awake.     DVT risk / DVT prophylaxis - will require daily physician / PA exam to assess for signs and symptoms as patient is at increased risk for of thromboembolism. Mobilize as tolerated. Sequential pneumatic compression devices (SCDs) when in bed; thigh-high or knee-high thromboembolic deterrent hose when out of bed. On Eliquis.     CAD - continue Eliquis and statin.     GI prophylaxis - resume PPI. At times may need additional antacids, Maalox prn.     General skin care / wound prevention - monitor BKA  incision and general skin wound status daily per staff and physician / PA. At risk for failure. Will require 24/7 rehab nursing.  Keep BKA incision and left plantar foot wound clean and dry, reinforce dressing PRN and ice to area for comfort; he is to f/u with Dr Madeline Polk for staple removal.   -8/25 will cont wound vac until f/u with Dr. Pendleton  -8/28 wound looked good at time of wound vac check per notes  -8/30 wound vac in place and functioning, skin surrounding without erythema or rash  -9/2 wound vac will remain intact until f/u with Dr Malissa Ugalde next week  -9/3 Prevena wound vac has auto-terminated, incision remains covered with vac dressing  -9/4 wound vac and dressing removed, incision intact with sutures, no erythema, induration or drainage; wound cleaned with wound cleanser and redressed with Telfa, 4x4s, Kerlix and ACE  -9/5 BKA incision inspected and redressed; left foot plantar wound inspected, stable, Meplex replaced  -9/7 dressing not removed this a.m; will see Dr Zoie Ugalde pleased with inc and residual limb. He will continue off loading left foot ulcer with [address] walker boot; healing; need stump   -9/10 plantar ulcer now with large blood blister? ? Was not present yesterday. Must watch closely; must wear Crow boot but may need to make NWB. RLE amputation healing well  -9/12 [facility] consulted, daughter concerned  -9/13 plantar ulcer, distal blood blister, lateral foot abrasion / ulceration evaluated and protected with Meplex  -9/15 saw Dr Malissa Ugalde yesterday. F/u 2 weeks. Left plantar wound; blood blister addressed by Dr Malissa Ugalde yesterday. RLE bka healing well. Sutures removed. May start wearing stump . Daily wound care to Left foot. Have started Keflex 500mg tid x 2weeks     Urinary retention / neurogenic bladder - schedule voids q 6-8 hrs. Check post-void residual every shift; in and out catheter if post-void residual is more than 400ml.     Bowel program - at risk for constipation as a side effect of opioids, other medications, impaired mobility, etc. MiraLAX daily for regularity, Ivis-Colace for stool softener.  PRN MOM, bisacodyl suppository or tablets for constipation.  -8/27 constipated, no BM with daily gentle agents (MiraLAX, Colace), will try more aggressively with lactulose after therapies; may require MOM, bisacodyl suppository or disimpaction  -8/28 excellent results yesterday  -8/30 large, normal BM this AM; continue daily MiraLAX and stool softener  -9/15 no bowel issues      Dispo; family has decided on SNF placement. They cannot provide physical assistance at home. Domestic issues concerning. [facility]. Ppd/covid      FUNCTIONAL LEVEL ON ADMISSION:  OT; moderate assist with bathing/dressing; max assist with toileting and min/mod assist of 2 with functional mobility  PT; bed mobility min assist, mod assist of 2 STS. Poor standing balance. FUNCTIONAL LEVEL AT DISCHARGE:    PHYSICAL THERAPY DISCHARGE SUMMARY  [id number]      Precautions at discharge: Limited or no weight-bearing (specify); Other (comment) (NWB B LE, falls)     Problem List:    Decreased strength B LE  [x]? Decreased strength trunk/core  [x]? Decreased AROM   [x]? Decreased PROM  []? Decreased balance sitting  [x]? Decreased balance standing  [x]? Decreased endurance  [x]? Pain  [x]?        Functional Limitations:   Decreased independence with bed mobility  [x]? Decreased independence with functional transfers  [x]? Decreased independence with ambulation  [x]? Decreased independence with stair negotiation  [x]?           Outcome Measures: Vital Signs: BP (!) 118/56 (BP 1 Location: Left upper arm, BP Patient Position: At rest)   Pulse 84   Temp 97.5 °F (36.4 °C)   Resp 18   Wt 127.8 kg (281 lb 12 oz)   SpO2 99%   BMI 40.43 kg/m²      Pain level: no c/o pain     Patient education: pt. Instructed in NWB status as well as how to manage sliding board transfers w/ NWB status     Interdisciplinary Communication: discussed pt's WB precautions w/ OT        Cognition: Pt. W/ depressed affect over new WB precautions.   Poor insight into deficits & exhibits impulsivity.                   MMT Initial Assessment    Right Lower Extremity Left Lower Extremity   Hip Flexion 4 4   Knee Extension 5 5   Knee Flexion 4 (limited by bandages) 5   Ankle Dorsiflexion  5                   MMT Discharge Assessment    Right Lower Extremity Left Lower Extremity   Hip Flexion 4 4   Knee Extension 5 4+   Knee Flexion 4 4+   Ankle Dorsiflexion  4+   0/5            No palpable muscle contraction  1/5            Palpable muscle contraction, no joint movement  2-/5          Less than full range of motion in gravity eliminated position  2/5            Able to complete full range of motion in gravity eliminated position  2+/5          Able to initiate movement against gravity  3-/5          More than half but not full range of motion against gravity  3/5            Able to complete full range of motion against gravity  3+/5          Completes full range of motion against gravity with minimal resistance  4-/5          Completes full range of motion against gravity with minimal-moderate resistance  4/5            Completes full range of motion against gravity with moderate resistance  4+/5          Completes full range of motion against gravity with moderate-maximum resistance  5/5            Completes full range of motion against gravity with maximum resistance                 AROM: WFL w/ tight L heel cord appreciated     PRIMARY MODE OF LOCOMOTION: w/c  Please see IRC Interdisciplinary Eval: Coordination/Balance Section for details regarding FIM score description.     BED/CHAIR/WHEELCHAIR TRANSFERS Initial Assessment Discharge Assessment   Rolling Right 5 (Supervision) 5 (Supervision)   Rolling Left 5 (Supervision) 5 (Supervision)   Supine to Sit 4 (Minimal assistance) 5 (Supervision)   Sit to Stand Unable at this time Unable at this time   Sit to Supine 5 (Supervision) 5 (Supervision)   Transfer Type Lateral with transfer board Lateral with transfer board   Comments Pt. required mod-max A x2 for sliding board transfer due to increased posterior lean & decreased UE strength to assist w/ scooting Ax2 w/ one assisting w/ maintaining NWB L LE & second assisting w/ scooting   Car Transfer Not tested Not tested   Car Type Ford Escape          [facility] MOBILITY/MANAGEMENT Initial Assessment Discharge Assessment   Able to Propel 0 feet 100 feet   Functional Level  supervision   Curbs/ramps assistance required 0 (Not tested) 0 (Not tested)   Wheelchair set up assistance required 1 (Dependent/total assistance) 5 (Supervision/setup)   Wheelchair management Manages left brake, Manages right brake, Manages right footrest, Manages right armrest, Manages left footrest Manages left brake;Manages right brake;Manages right armrest;Manages left footrest         WALKING INDEPENDENCE Initial Assessment Discharge Assessment   Assistive device  NT   Ambulation assistance - level surface 0 (Not tested) 0 (Not tested)   Distance 0 Feet (ft) 0 Feet (ft)   Comments Deferred standing or walking secondary to plantar ulcer on L LE Pt. is non-ambulatory secondary to NWB B LEs   Ambulation assistance - unlevel surface 0 (Not tested) 0 (Not tested)         STEPS/STAIRS Initial Assessment Discharge Assessment   Steps/Stairs ambulated 0 0   Rail Use  NT   Functional Level  NT   Comments  NT   Curbs/Ramps 0 (Not tested) 0 (Not tested)         QUALITY INDICATOR ASSIST COMMENTS   Roll right (&return to back) 4: Supervision or touching A     Roll left (& return to back) 4: Supervision or touching A     Supine to sit 4: Supervision or touching A     Sit to stand Not Tested: Not attempted due to medical concerns     Chair/bed-to-chair transfer 1: Dependent     Walk 10 feet Not Tested: Not attempted due to medical concerns     Walk 50 feet with 2 turns Not Tested: Not attempted due to medical concerns     Walk 150 feet Not Tested: Not attempted due to medical concerns     Walk 10 feet on uneven  Not Tested: Not attempted due to medical concerns     1 step/curb Not Tested: Not attempted due to medical concerns     4 steps Not Tested: Not attempted due to medical concerns     12 steps Not Tested: Not attempted due to medical concerns      object Not Tested: Not attempted due to medical concerns     Wheel 48' w/2 turns 5: S/U or clean-up assist     Wheel 150' Not Tested: Not attempted due to medical concerns Pt. lacks endurance   Car Transfer Not Tested: Not attempted due to medical concerns         Pt. Left supine on mat for cont. PT session with PTA-S.            PHYSICAL THERAPY PLAN OF CARE     LTGs: Pt. Has not met LTGs secondary to an unanticipated change in WB status w/ developing wound R foot     Pt would benefit from continued skilled physical therapy in order to improve independent functional mobility within the home with use of least restrictive device. Interventions may include range of motion (AROM, PROM B LE/trunk), motor function (B LE/trunk strengthening/coordination), activity tolerance (vitals, oxygen saturation levels), bed mobility training, balance activities, gait training (progressive ambulation program), and functional transfer training.      HEP handout:  Issued 9/132021     Pt to be discharged to SNF for ongoing therapy. Therapy Recommendations upon discharge: Other (comment) (cont PT @ SNF)   Equipment needs at discharge: w/c     Please see IRC; Interdisciplinary Eval, Care Plan, and Patient Education for further information regarding physical therapy discharge summary and plan of care.      Olinda Chatterjee, PT  9/14/2021        Time In 0702   Time Out 0748      Mobility    Score Comments   Supine to Sit 6: Independent I   Transfer Assist 4: Supervision or touching A Transfer Type: LPT  Equipment: Sliding Board   Comments: A to remove board; cueing for w/c management and s/u      Activities of Daily Living     Score Comments   Oral Hygiene 6: Independent I   Bathing 3: Partial/Moderate A Type of Shower: Bath Pack  Position: Unsupported Sitting   Adaptive  Equipment: N/A  Comments: A for L foot   Upper Body  Dressing 5: S/U or clean-up assist Items Applied: Pullover  Position: Unsupported Sitting  Comments: S/U   Lower Body Dressing 5: S/U or clean-up assist Items Applied: Underwear and Elastic pants  Position: Unsupported Sitting  Adaptive Equipment: N/A  Comments: S/U   Donning/[place] Footwear 1: Dependent Items Applied: Socks and Shoes with fasteners   Adaptive Equipment: Sock Aide  Comments: Sock aide got stuck on foot dressing; A with Crow boot   Education   When to wear each boot      Pt was in bed and agreeable to tx. Pt's performance with ADL is reflected in above chart. Pt requiring cueing for sequencing still. GLENIS Perez notified that wound on L foot had signs of bleeding. Pt has poor comprehension of which boot he wears when. Pt was educated. Pt was left in w/c with all needs within reach.      Melvina Cheng, OT   9/14/2021                   HOME ARCHITECTURE:  lives with wife, one level home, tub shower w/ SC, has all DME needed at home, was working at [employer] in [employer] and would like to get back to work    Past Medical History:   Diagnosis Date   Lizette Hairston [facility]) 07/19/2021    developed AFID after hospital admission for wound infection- started on Eliquis    CAD (coronary artery disease)     [facility] Card.     Chronic kidney disease     stage 3- improved    COVID-19 vaccine series completed     Moderna Vaccine completed X2 doses    Current use of long term anticoagulation     Eliquis and Aspirin    H/O heart artery stent     X1 placed in 1999    History of MI (myocardial infarction) 1999    stent placed X1    Hypercholesterolemia     Hypertension     Left ventricular dysfunction     echo revealed EF 30-35%, mildly dilated LA/RA and mild TR and MR.    Neuropathy     severe    Oxygen dependent     recently started on 2L NC continous- Sleep study to be scheduled-questionable ELAINA    PICC (peripherally inserted central catheter) in place     PICC line in place to R arm    Type 2 diabetes mellitus ([facility] [address]     oral agent/Pt does not monitor BS/no s.s of hypoglycemia/Last A1c: 6.7 on 7/13/21 per daughter      Past Surgical History:   Procedure Laterality Date    [place] CARDIAC SURG PROCEDURE UNLIST  1999    stent placement      Family History Problem Relation Age of Onset    Cancer Mother     Cancer Father     No Known Problems Maternal Grandmother     No Known Problems Maternal Grandfather     No Known Problems Paternal Grandmother     No Known Problems Paternal Grandfather       Social History     Tobacco Use    Smoking status: Never Smoker    Smokeless tobacco: Never Used   Substance Use Topics    Alcohol use: No     No Known Allergies   Prior to Admission medications    Medication Sig Start Date End Date Taking? Authorizing Provider   traZODone (DESYREL) 50 mg tablet Take 1 Tablet by mouth nightly. 9/14/21  Yes LATONYA Pearson   acetaminophen (TYLENOL) 325 mg tablet Take 2 Tablets by mouth every six (6) hours as needed (for amputation site pain). 9/14/21  Yes LATONYA Pearson   ferrous sulfate 325 mg (65 mg iron) tablet Take 1 Tablet by mouth two (2) times daily (with meals). 9/14/21  Yes Jackson, Marylee C, PA   polyethylene glycol (MIRALAX) 17 gram packet Take 1 Packet by mouth daily. Indications: constipation 9/15/21  Yes Jackson, Marylee C, PA   cephALEXin (KEFLEX) 500 mg capsule Take 1 Capsule by mouth three (3) times daily for 14 days. 9/14/21 9/28/21  Christie Saba MD   apixaban (ELIQUIS) 5 mg tablet Take 1 Tablet by mouth every twelve (12) hours. 8/25/21   Christie Saba MD   atorvastatin (LIPITOR) 40 mg tablet Take 1 Tablet by mouth nightly. Indications: treatment to slow progression of coronary artery disease 8/25/21   Christie Saba MD   docusate sodium (COLACE) 100 mg capsule Take 1 Capsule by mouth two (2) times a day for 90 days. 8/25/21 11/23/21  Christie Saba MD   fludrocortisone (FLORINEF) 0.1 mg tablet Take 1 Tablet by mouth daily. 8/26/21   Christie Saba MD   midodrine (PROAMATINE) 2.5 mg tablet Take 1 Tablet by mouth three (3) times daily (with meals) for 30 days.  8/25/21 9/24/21  Christie Saba MD   insulin lispro (HUMALOG) 100 unit/mL injection a  Indications: type 2 diabetes mellitus 8/25/21   Riddhi Hughes MD   pantoprazole (PROTONIX) 40 mg tablet Take 1 Tablet by mouth Daily (before breakfast). 8/26/21   Riddhi Hughes MD   pregabalin (Lyrica) 200 mg capsule Take 200 mg by mouth two (2) times a day. Provider, Historical   metoprolol succinate (TOPROL-XL) 25 mg XL tablet Take 1 Tablet by mouth two (2) times a day. 7/27/21   Wm Nation MD   furosemide (LASIX) 20 mg tablet Take 1 Tablet by mouth as needed for Other (WEIGHT INCREASE GREATER THEN 2LB/DAY OR 5LB/DAY). 7/27/21   Wm Nation MD   glipiZIDE (GLUCOTROL) 5 mg tablet Take 0.5 Tablets by mouth daily. 7/27/21   Wm Nation MD   cyanocobalamin 1,000 mcg tablet Take 1 Tab by mouth daily. 10/10/16   Tigist Bates MD       Lab Review:   Recent Results (from the past 67 hour(s))   SARS-COV-2    Collection Time: 09/14/21  5:50 AM   Result Value Ref Range    SARS-CoV-2 Please find results under separate order     SARS-COV-2, PCR    Collection Time: 09/14/21  5:50 AM    Specimen: Nasopharyngeal   Result Value Ref Range    Specimen source Nasopharyngeal      SARS-CoV-2 Not detected NOTD     PLEASE READ & DOCUMENT PPD TEST IN 24 HRS    Collection Time: 09/14/21 12:26 PM   Result Value Ref Range    PPD Negative Negative    mm Induration 0 0 - 5 mm       Functional Assessment:          Balance  Sitting - Static: Good (unsupported) (09/14/21 1522)  Sitting - Dynamic: Fair (occasional) (09/14/21 1522)  Standing - Static: Not tested (09/14/21 1522)  Standing - Dynamic : Impaired (09/07/21 1200)                     Emma Cade Fall Risk Assessment:  Emma Cade Fall Risk  Mobility: Ambulates or transfers with assist devices or assistance (09/14/21 1943)  Mobility Interventions: Patient to call before getting OOB (09/14/21 1943)  Mentation: Alert, oriented x 3 (09/14/21 1943)  Mentation Interventions: Adequate sleep, hydration, pain control;Door open when patient unattended;Evaluate medications/consider consulting pharmacy; Increase mobility;More frequent rounding;Reorient patient; Toileting rounds;Update white board (09/14/21 0710)  Medication: Patient receiving anticonvulsants, sedatives(tranquilizers), psychotropics or hypnotics, hypoglycemics, narcotics, sleep aids, antihypertensives, laxatives, or diuretics (09/14/21 1943)  Medication Interventions: Patient to call before getting OOB (09/14/21 1943)  Elimination: Needs assistance with toileting (09/14/21 1943)  Elimination Interventions: Bed/chair exit alarm (09/14/21 1943)  Prior Fall History: No (09/14/21 1943)  Total Score: 3 (09/14/21 1943)  Standard Fall Precautions: Yes (09/05/21 2047)  High Fall Risk: Yes (09/14/21 1943)     Speech Assessment:         Ambulation:  Gait  Distance (ft): 0 Feet (ft) (09/14/21 1522)     Visit Vitals  /78   Pulse 84   Temp 98.2 °F (36.8 °C)   Resp 20   Wt 281 lb 12 oz (127.8 kg)   SpO2 98%   BMI 40.43 kg/m²      Intake and Output:    09/13 0701 - 09/14 1900  In: -   Out: 400 [Urine:400]    Discharge Exam:  Physical Exam:   General: Alert and age appropriately oriented. No acute cardiorespiratory distress. HEENT: Normocephalic, no scleral icterus. Oral mucosa moist without cyanosis. Lungs: Clear to auscultation bilaterally. Respiration even and unlabored. Heart: Regular rate and rhythm, S1, S2. No murmurs, clicks, rub or gallops. Abdomen: Soft, non-tender, not distended. Bowel sounds normoactive. No organomegaly. obese   Genitourinary: Deferred. Neuromuscular:        Restless. No new neuro deficits  No sensation left foot . Absent proprioception      Skin/extremity: No rashes, no erythema. No calf tenderness B LE. Right bka sutures now removed. Well approximated, no drainage, no erythema or swelling  Left foot equinas contracture, charcot foot. Ulceration plantar aspect at 2nd metatarsal head; calloused, large blood blister has been trimmed with ulceration noted. no s/s of infxn  LLE insensate mid shin to toes         Problem List as of 9/15/2021 Date Reviewed: 9/14/2021        Codes Class Noted - Resolved    * (Principal) Unilateral complete BKA, right, sequela ([facility] ICD-10-CM: W50.047M  ICD-9-CM: 905.9  8/25/2021 - Present        Suspected sleep apnea ICD-10-CM: R29.818  ICD-9-CM: 781.99  8/20/2021 - Present        S/P BKA (below knee amputation) unilateral, right ([facility] ICD-10-CM: Z89.511  ICD-9-CM: V49.75  8/18/2021 - Present        Respiratory failure, post-operative ([facility] ICD-10-CM: W98.683  ICD-9-CM: 518.51  8/18/2021 - Present        Acute respiratory failure with hypercapnia (HCC) ICD-10-CM: J96.02  ICD-9-CM: 518.81  7/23/2021 - Present        Acute on chronic respiratory failure with hypoxia and hypercapnia (HCC) ICD-10-CM: J96.21, J96.22  ICD-9-CM: 518.84, 786.09, 799.02  7/23/2021 - Present        Acute metabolic encephalopathy [id number]-[id number]: G93.41  ICD-9-CM: 348.31  7/23/2021 - Present        Hypoxia ICD-10-CM: R09.02  ICD-9-CM: 799.02  7/21/2021 - Present        Acute kidney injury superimposed on CKD ([facility] ICD-10-CM: N17.9, N18.9  ICD-9-CM: 866.00, 585.9  7/21/2021 - Present        Staphylococcus aureus bacteremia ICD-10-CM: R78.81, B95.61  ICD-9-CM: 790.7, 041.11  7/21/2021 - Present        Cellulitis ICD-10-CM: L03.90  ICD-9-CM: 682.9  7/19/2021 - Present        Systolic CHF, acute on chronic (HCC) ICD-10-CM: I50.23  ICD-9-CM: 428.23, 428.0  7/19/2021 - Present        Atrial fibrillation with RVR (HCC) ICD-10-CM: I48.91  ICD-9-CM: 427.31  7/19/2021 - Present        Morbid obesity with BMI of 40.0-44.9, adult ([facility] ICD-10-CM: E66.01, Z68.41  ICD-9-CM: 278.01, V85.41  6/11/2020 - Present        Severe obesity (BMI 35.0-35.9 with comorbidity) (HCC) (Chronic) ICD-10-CM: E66.01, Z68.35  ICD-9-CM: 278.01, V85.35  10/10/2018 - Present        Bunion of unspecified foot (Chronic) ICD-10-CM: M21.619  ICD-9-CM: 727.1  4/10/2018 - Present        Onychomycosis (Chronic) ICD-10-CM: B35.1  ICD-9-CM: 110.1  4/10/2018 - Present        Corns and callus (Chronic) ICD-10-CM: L84  ICD-9-CM: 700  4/10/2018 - Present        Mixed hyperlipidemia (Chronic) ICD-10-CM: M53.1  ICD-9-CM: 272.2  4/10/2018 - Present        Mixed axonal-demyelinating polyneuropathy (Chronic) ICD-10-CM: G62.89  ICD-9-CM: 355.9  1/5/2018 - Present        Controlled type 2 diabetes mellitus with diabetic polyneuropathy, without long-term current use of insulin (HCC) (Chronic) ICD-10-CM: E11.42  ICD-9-CM: 250.60, 357.2  1/5/2018 - Present        Type 2 diabetes mellitus with nephropathy (HCC) (Chronic) ICD-10-CM: E11.21  ICD-9-CM: 250.40, 583.81  1/5/2018 - Present        Stage 3 chronic kidney disease (HCC) (Chronic) ICD-10-CM: N18.30  ICD-9-CM: 585.3  11/22/2017 - Present        Benign hypertensive kidney disease with chronic kidney disease (Chronic) ICD-10-CM: I12.9  ICD-9-CM: 403.10  11/6/2017 - Present        CAD (coronary artery disease) (Chronic) ICD-10-CM: I25.10  ICD-9-CM: 414.00  Unknown - Present        RESOLVED: Glucose intolerance (impaired glucose tolerance) (Chronic) ICD-10-CM: R73.02  ICD-9-CM: 790.22  11/6/2017 - 11/14/2017        RESOLVED: BMI 40.0-44.9, adult (HCC) (Chronic) ICD-10-CM: Z68.41  ICD-9-CM: V85.41  11/4/2016 - 10/10/2018        RESOLVED: Hypertension ICD-10-CM: I10  ICD-9-CM: 401.9  Unknown - 10/10/2016        RESOLVED: Hypercholesterolemia ICD-10-CM: E78.00  ICD-9-CM: 272.0  Unknown - 10/10/2016        RESOLVED: Chronic kidney disease ICD-10-CM: N18.9  ICD-9-CM: 859. 9  Unknown - 10/10/2016    Overview Signed 10/10/2016 11:28 AM by Jorge Vidales MD     stage 3             RESOLVED: Neuropathy ICD-10-CM: G62.9  ICD-9-CM: 355.9  Unknown - 10/10/2016        RESOLVED: Peripheral polyneuropathy (Chronic) ICD-10-CM: G62.9  ICD-9-CM: 356.9  10/10/2016 - 1/5/2018        RESOLVED: CKD (chronic kidney disease) stage 3, GFR 30-59 ml/min (HCC) (Chronic) ICD-10-CM: N18.30  ICD-9-CM: 585.3  10/10/2016 - 11/6/2017        RESOLVED: Hyperlipidemia (Chronic) ICD-10-CM: E78.5  ICD-9-CM: 272.4  10/10/2016 - 4/10/2018        RESOLVED: Hyperkalemia ICD-10-CM: E87.5  ICD-9-CM: 276.7  10/10/2016 - 11/14/2017        RESOLVED: Essential hypertension (Chronic) ICD-10-CM: I10  ICD-9-CM: 401.9  10/10/2016 - 11/22/2017              DISCHARGE INSTRUCTIONS:   1. Diet: Diabetic Diet  2. Activity: PT/OT Eval and Treat; may wt bear LLE with orthotic boot on.  3. Incision / wound care: Keep wound clean and dry; left foot ulcer; cleanse wound daily with wound cleanser, pat dry, wrap with Kerlix. Current Discharge Medication List      START taking these medications    Details   acetaminophen (TYLENOL) 325 mg tablet Take 2 Tablets by mouth every six (6) hours as needed (for amputation site pain). Qty: 40 Tablet, Refills: prn      ferrous sulfate 325 mg (65 mg iron) tablet Take 1 Tablet by mouth two (2) times daily (with meals). Qty: 60 Tablet, Refills: 1      polyethylene glycol (MIRALAX) 17 gram packet Take 1 Packet by mouth daily. Indications: constipation  Qty: 240 g, Refills: prn         CONTINUE these medications which have CHANGED    Details   traZODone (DESYREL) 50 mg tablet Take 1 Tablet by mouth nightly. Qty: 30 Tablet, Refills: 0    Associated Diagnoses: Insomnia, unspecified type         CONTINUE these medications which have NOT CHANGED    Details   cephALEXin (KEFLEX) 500 mg capsule Take 1 Capsule by mouth three (3) times daily for 14 days. Qty: 42 Capsule, Refills: 0      apixaban (ELIQUIS) 5 mg tablet Take 1 Tablet by mouth every twelve (12) hours. Qty: 60 Tablet, Refills: 0      atorvastatin (LIPITOR) 40 mg tablet Take 1 Tablet by mouth nightly. Indications: treatment to slow progression of coronary artery disease  Qty: 60 Tablet, Refills: 0      docusate sodium (COLACE) 100 mg capsule Take 1 Capsule by mouth two (2) times a day for 90 days. Qty: 60 Capsule, Refills: 2      fludrocortisone (FLORINEF) 0.1 mg tablet Take 1 Tablet by mouth daily.   Qty: 60 Tablet, Refills: 0 midodrine (PROAMATINE) 2.5 mg tablet Take 1 Tablet by mouth three (3) times daily (with meals) for 30 days. Qty: 90 Tablet, Refills: 0      pantoprazole (PROTONIX) 40 mg tablet Take 1 Tablet by mouth Daily (before breakfast). Qty: 30 Tablet, Refills: 0      pregabalin (Lyrica) 200 mg capsule Take 200 mg by mouth two (2) times a day. cyanocobalamin 1,000 mcg tablet Take 1 Tab by mouth daily. Qty: 30 Tab, Refills: 5    Associated Diagnoses: Peripheral polyneuropathy; Pernicious anemia         STOP taking these medications       insulin lispro (HUMALOG) 100 unit/mL injection Comments:   Reason for Stopping:         nystatin (MYCOSTATIN) powder Comments:   Reason for Stopping:         multivitamin (ONE A DAY) tablet Comments:   Reason for Stopping:         cefazolin sodium (CEFAZOLIN IV) Comments:   Reason for Stopping:         metoprolol succinate (TOPROL-XL) 25 mg XL tablet Comments:   Reason for Stopping:         furosemide (LASIX) 20 mg tablet Comments:   Reason for Stopping:         glipiZIDE (GLUCOTROL) 5 mg tablet Comments:   Reason for Stopping:         aspirin delayed-release 81 mg tablet Comments:   Reason for Stopping:         cholecalciferol, VITAMIN D3, (VITAMIN D3) 5,000 unit tab tablet Comments:   Reason for Stopping:               I have reviewed the patients controlled substance prescription history as maintained in the [place] prescription monitoring program () so that prescription(s) for controlled substance, if any provided, could be given. REHABILITATION MANAGEMENT:   1. Devices: wc  2.  Consult:PT/OT/CM/rehab nursing  DISPOSITION: SNF    Follow-up Information     Follow up With Specialties Details Why Contact Info    Thaddeus RIVERS [facility]   [address] [address]  [phone]    Robert Roland Physician Assistant Schedule an appointment as soon as possible for a visit in 1 week follow up with PCP after hospitalization and amputation; needs referral for sleep study (suspected ELAINA)) [address] [facility] [address]  [phone]      Otto Alvarez MD Orthopedic Surgery Schedule an appointment as soon as possible for a visit in 2 weeks for follow-up right BKA, left foot ulcer [facility]  [address] [facility], [facility], [address] [place] [address]  [phone]           Time spent in patient discharge planning and coordination: >35 minutes. Eugenia Larsen MD, Medical Director  [address] [place] [address]

## 2021-09-15 NOTE — PROGRESS NOTES
Problem: Falls - Risk of  Goal: *Absence of Falls  Description: Document Nataliia Salgado Fall Risk and appropriate interventions in the flowsheet. Outcome: Progressing Towards Goal  Note: Fall Risk Interventions:  Mobility Interventions: Patient to call before getting OOB    Mentation Interventions: Adequate sleep, hydration, pain control, Evaluate medications/consider consulting pharmacy, Toileting rounds, Update white board    Medication Interventions: Evaluate medications/consider consulting pharmacy, Patient to call before getting OOB, Teach patient to arise slowly    Elimination Interventions: Bed/chair exit alarm, Call light in reach, Patient to call for help with toileting needs              Problem: Patient Education: Go to Patient Education Activity  Goal: Patient/Family Education  Outcome: Progressing Towards Goal     Problem: Pressure Injury - Risk of  Goal: *Prevention of pressure injury  Description: Document Julio Cesar Scale and appropriate interventions in the flowsheet.   Outcome: Progressing Towards Goal  Note: Pressure Injury Interventions:  Sensory Interventions: Assess changes in LOC, Chair cushion, Check visual cues for pain, Discuss PT/OT consult with provider, Keep linens dry and wrinkle-free    Moisture Interventions: Absorbent underpads, Minimize layers, Moisture barrier, Offer toileting Q_hr    Activity Interventions: Increase time out of bed, Pressure redistribution bed/mattress(bed type), PT/OT evaluation    Mobility Interventions: Pressure redistribution bed/mattress (bed type), PT/OT evaluation    Nutrition Interventions: Document food/fluid/supplement intake    Friction and Shear Interventions: Foam dressings/transparent film/skin sealants                Problem: Patient Education: Go to Patient Education Activity  Goal: Patient/Family Education  Outcome: Progressing Towards Goal     Problem: Patient Education: Go to Patient Education Activity  Goal: Patient/Family Education  Description: Patient / Patient's family will verbalize understanding of PT safety recommendations, demonstrate appropriate assist for current functional mobility status, safety, and home exercise program by time of discharge.    Outcome: Progressing Towards Goal

## 2021-09-15 NOTE — PROGRESS NOTES
OT DISCHARGE REPORT    Time In: 0708  Time Out: 9754  Mobility   Usual Performance Score Comments   Rolling 6: Independent Side: Bilateral  I   Supine to Sit 6: Independent I   Sit to Supine 6: Independent I   Transfer Assist 4: Supervision or touching A Transfer Type: LPT  Equipment: Sliding Board   Comments: Cueing for body mechanics     Activities of Daily Living    Usual Performance Score Comments   Eating 6: Independent I   Oral Hygiene 6: Independent I   Bathing 4: Supervision or touching A Type of Shower: Bath Pack  Position: Unsupported Sitting   Adaptive  Equipment: N/A  Comments: S; cueing   Upper Body  Dressing 5: S/U or clean-up assist Items Applied: Pullover  Position: Unsupported Sitting  Comments: S/U   Lower Body Dressing 4: Supervision or touching A Items Applied: Underwear and Elastic pants  Position: Unsupported Sitting  Adaptive Equipment: N/A  Comments: S for safety   Donning/Scalp Level Footwear 1: Dependent Items Applied: Socks and Shoes with fasteners   Adaptive Equipment: N/A  Comments: Can't use sock aide with new dressing   Toilet Transfer 3: Partial/Moderate A Transfer Type: LPT  Equipment: Sliding Board   Comments: Lifting A; cueing for body mechanics   Toileting Hygiene 4: Supervision or touching A Output: BM and Urine  Comments: Cueing   Education  Body mechanics with transfers     Plan of Care: Please see Care Plan for progress towards goals during stay  Precautions at Discharge: Falls and WB: NWB RLE  Discharge Location: [facility]  Safety/Supervision Recommendations/Functional Level: Recommend continued therapy  Family Training: Completed with daughter and spouse    Recommended Continuing Therapy: Yes  Residual Deficits: Balance, core strength, safety awareness, activity tolerance, strength  Progress over LOS: Pt made improvements in activity tolerance, strength, and balance allowing for improvements in bathing, dressing, and transfers, but wound on LLE and decreased cognition were limiting factors to recovery. Summary of Session: Pt was in bed and agreeable to tx. Pt reported being nervous about going to [facility] and wound on foot. Pt was comforted. Pt was left in bed with all needs within reach.      Ruthann Estrada OT   9/15/2021

## 2021-09-27 ENCOUNTER — HOSPITAL ENCOUNTER (OUTPATIENT)
Dept: WOUND CARE | Age: 76
Discharge: HOME | End: 2021-09-27
Attending: SURGERY
Payer: MEDICARE

## 2021-09-27 VITALS
TEMPERATURE: 98.1 F | HEIGHT: 70 IN | RESPIRATION RATE: 18 BRPM | HEART RATE: 97 BPM | DIASTOLIC BLOOD PRESSURE: 81 MMHG | SYSTOLIC BLOOD PRESSURE: 123 MMHG | WEIGHT: 273 LBS | BODY MASS INDEX: 39.08 KG/M2

## 2021-09-27 DIAGNOSIS — E66.01 MORBID OBESITY WITH BMI OF 40.0-44.9, ADULT (HCC): ICD-10-CM

## 2021-09-27 DIAGNOSIS — L97.421 DIABETIC ULCER OF LEFT MIDFOOT ASSOCIATED WITH TYPE 2 DIABETES MELLITUS, LIMITED TO BREAKDOWN OF SKIN (HCC): ICD-10-CM

## 2021-09-27 DIAGNOSIS — E11.42 CONTROLLED TYPE 2 DIABETES MELLITUS WITH DIABETIC POLYNEUROPATHY, WITHOUT LONG-TERM CURRENT USE OF INSULIN (HCC): Chronic | ICD-10-CM

## 2021-09-27 DIAGNOSIS — M20.42 ACQUIRED HAMMER TOE OF LEFT FOOT: ICD-10-CM

## 2021-09-27 DIAGNOSIS — S88.111S UNILATERAL COMPLETE BKA, RIGHT, SEQUELA (HCC): Primary | ICD-10-CM

## 2021-09-27 DIAGNOSIS — E11.621 DIABETIC ULCER OF LEFT MIDFOOT ASSOCIATED WITH TYPE 2 DIABETES MELLITUS, LIMITED TO BREAKDOWN OF SKIN (HCC): ICD-10-CM

## 2021-09-27 DIAGNOSIS — Z89.511 S/P BKA (BELOW KNEE AMPUTATION) UNILATERAL, RIGHT (HCC): ICD-10-CM

## 2021-09-27 PROCEDURE — 99204 OFFICE O/P NEW MOD 45 MIN: CPT | Performed by: SURGERY

## 2021-09-27 PROCEDURE — 99213 OFFICE O/P EST LOW 20 MIN: CPT

## 2021-09-27 RX ORDER — MIDODRINE HYDROCHLORIDE 2.5 MG/1
2.5 TABLET ORAL
COMMUNITY
End: 2021-11-08 | Stop reason: ALTCHOICE

## 2021-09-27 NOTE — WOUND CARE
[address]  [address], [address]  Phone: [phone]  Fax: [phone]    Patient: Matthew Maldonado MRN: [id number]  SSN: [id number]    YOB: 1945  Age: 77 y.o. Sex: male       Return Appointment: Discharge with Rosalee Lagunas MD    Instructions: Left plantar foot:  Wound is healed. No dressing needed. Follow-up with ortho for weight bearing status. Continue to evaluate and fine tune footwear for left foot. Should you experience increased redness, swelling, pain, foul odor, size of wound(s), or have a temperature over 101 degrees please contact the [address] [place] [address] Road at [phone] or if after hours contact your primary care physician or go to the hospital emergency department.     Signed By: Lulú Spears RN     September 27, 2021

## 2021-09-27 NOTE — PROGRESS NOTES
[address] [facility] [address]    [address], [place]  Consult Note/H&P/Progress Note      Blaise Rivas  MEDICAL RECORD NUMBER:  [id number]  AGE: 77 y.o. RACE WHITE/NON-  GENDER: male  : 1945  EPISODE DATE:  2021       Subjective:      CC (Chief Complaint) and HISTORY of PRESENT ILLNESS (HPI):    Fuad Barnard is a 77 y.o. male who presents today for wound/ulcer evaluation. He comes in for evaluation of wound of the left plantar foot. He had a right BKA amputation by Dr. Jonas Shelton on 2021 for right Charcot foot deformity with chronic ulcer to bone that was nonsalvageable. He was discharged to rehab where he remained until 9/15. He is using the left leg for transfers and was using a [employer] [place]. He developed a large blister on the central portion of the plantar metatarsal region. This was unroofed by Dr. Jonas Shelton at his last visit. He has follow-up tomorrow. He is hoping to be cleared for weightbearing on that foot. He no longer has a podiatrist.  He is anticoagulated on Eliquis. This information was obtained from the patient  The following HPI elements were documented for the patient's wound:  Location: Left plantar foot metatarsal region  Duration: 2021  Severity:  +  Context: Pressure ulcer limited to breakdown of skin of transfer foot following right BKA  Modifying Factors:  Nothing makes better or worse  Quality: Mild  Timing: n/a  Associated Signs and Symptoms: Healing      Ulcer Identification:  Ulcer Type: diabetic and pressure    Contributing Factors: diabetes, chronic pressure, decreased mobility and obesity        PAST MEDICAL HISTORY  Past Medical History:   Diagnosis Date    A-fib ([facility] [address] 2021    developed AFID after hospital admission for wound infection- started on Eliquis    CAD (coronary artery disease)     [facility].     Chronic kidney disease     stage 3- improved    COVID-19 vaccine series completed     Moderna Vaccine completed X2 doses    Current use of long term anticoagulation     Eliquis and Aspirin    H/O heart artery stent     X1 placed in 1999    History of MI (myocardial infarction) 1999    stent placed X1    Hypercholesterolemia     Hypertension     Left ventricular dysfunction     echo revealed EF 30-35%, mildly dilated LA/RA and mild TR and MR.    Neuropathy     severe    Oxygen dependent     recently started on 2L NC continous- Sleep study to be scheduled-questionable ELAINA    PICC (peripherally inserted central catheter) in place     PICC line in place to R arm    Type 2 diabetes mellitus (HCC)     oral agent/Pt does not monitor BS/no s.s of hypoglycemia/Last A1c: 6.7 on 7/13/21 per daughter        PAST SURGICAL HISTORY  Past Surgical History:   Procedure Laterality Date    HX ORTHOPAEDIC  08/18/2021    BKA    MO CARDIAC SURG PROCEDURE UNLIST  1999    stent placement       FAMILY HISTORY  Family History   Problem Relation Age of Onset    Cancer Mother     Cancer Father     No Known Problems Maternal Grandmother     No Known Problems Maternal Grandfather     No Known Problems Paternal Grandmother     No Known Problems Paternal Grandfather        SOCIAL HISTORY  Social History     Tobacco Use    Smoking status: Never Smoker    Smokeless tobacco: Never Used   Vaping Use    Vaping Use: Never used   Substance Use Topics    Alcohol use: No    Drug use: No       ALLERGIES  No Known Allergies    MEDICATIONS  Current Outpatient Medications on File Prior to Encounter   Medication Sig Dispense Refill    midodrine (PROAMATINE) 2.5 mg tablet Take 2.5 mg by mouth three (3) times daily (with meals).  cephALEXin (KEFLEX) 500 mg capsule Take 1 Capsule by mouth three (3) times daily for 14 days. 42 Capsule 0    traZODone (DESYREL) 50 mg tablet Take 1 Tablet by mouth nightly.  30 Tablet 0    acetaminophen (TYLENOL) 325 mg tablet Take 2 Tablets by mouth every six (6) hours as needed (for amputation site pain). 40 Tablet prn    ferrous sulfate 325 mg (65 mg iron) tablet Take 1 Tablet by mouth two (2) times daily (with meals). 60 Tablet 1    polyethylene glycol (MIRALAX) 17 gram packet Take 1 Packet by mouth daily. Indications: constipation 240 g prn    apixaban (ELIQUIS) 5 mg tablet Take 1 Tablet by mouth every twelve (12) hours. 60 Tablet 0    atorvastatin (LIPITOR) 40 mg tablet Take 1 Tablet by mouth nightly. Indications: treatment to slow progression of coronary artery disease 60 Tablet 0    docusate sodium (COLACE) 100 mg capsule Take 1 Capsule by mouth two (2) times a day for 90 days. 60 Capsule 2    fludrocortisone (FLORINEF) 0.1 mg tablet Take 1 Tablet by mouth daily. 60 Tablet 0    pregabalin (Lyrica) 200 mg capsule Take 200 mg by mouth two (2) times a day.  cyanocobalamin 1,000 mcg tablet Take 1 Tab by mouth daily. 30 Tab 5     No current facility-administered medications on file prior to encounter. REVIEW OF SYSTEMS  The patient has no difficulty with chest pain or shortness of breath. No fever or chills. Comprehensive review of systems was otherwise unremarkable except as noted above. Objective:   Physical Exam:   Recent vitals (if inpt):  Patient Vitals for the past 24 hrs:   BP Temp Pulse Resp Height Weight   09/27/21 0812 123/81 98.1 °F (36.7 °C) 97 18     09/27/21 0809     5' 10\" (1.778 m) 273 lb (123.8 kg)       Visit Vitals  /81 (BP 1 Location: Left upper arm, BP Patient Position: At rest)   Pulse 97   Temp 98.1 °F (36.7 °C)   Resp 18   Ht 5' 10\" (1.778 m)   Wt 273 lb (123.8 kg)   BMI 39.17 kg/m²     Wt Readings from Last 3 Encounters:   09/27/21 273 lb (123.8 kg)   09/12/21 281 lb 12 oz (127.8 kg)   08/22/21 287 lb 8 oz (130.4 kg)     Constitutional: Alert, oriented, cooperative patient in no acute distress; appears stated age;  General appearance is within normal limits for wound care patient population.  See the today's recorded vitals signs and constitutional data. Eyes: Pupils equal; Sclera are clear. EOMs intact; The eyes appear to track and move normally. The sclera are not injected. The conjunctive are clear. The eyelids are normal. There is no scleral icterus. ENMT: There are no obvious external ear, nose, lip or mouth lesions. Nares normal; Neck: Overall contour of the neck is normal with no obvious neck masses. Gross hearing is within normal limits. No obvious neck masses  Resp:  Breathing is non-labored; normal rate and effort; no audible wheezing. CV: RRR;  no JVD; No evidence of cyanosis of the upper extremities. The extremities are perfused without embolic sign, splinter hemorrhages, or petechia. GI: soft and non-distended without acute abnormality noted. Musculoskeletal: unremarkable with normal function. No embolic signs or cyanosis. Neuro:  Oriented; moves all 4; no focal deficits  Psychiatric: Judgement and insight are within normal limits for the wound care population of patients. Patient is oriented to person, place, and time. Recent and remote memory are within normal limits. Mood and affect are within normal limits. Integumentary (Skin/Wounds)  See inspection of wound(s) below. There are no other skin areas of palpable concern. See wound center documentation including photos in the [address] Wound Template made part of this record by reference. Wounds:  Wound Foot Left;Plantar (Active)   Wound Image   09/27/21 0817   Wound Etiology Diabetic Wu 1 09/27/21 0817   Dressing Status Clean;Dry; Intact 09/27/21 0817   Cleansed Cleansed with saline 09/27/21 0817   Dressing/Treatment Gauze dressing/dressing sponge 09/27/21 0817   Offloading for Diabetic Foot Ulcers Offloading boot 09/27/21 0817   Wound Length (cm) 0 cm 09/27/21 0817   Wound Width (cm) 0 cm 09/27/21 0817   Wound Depth (cm) 0 cm 09/27/21 0817   Wound Surface Area (cm^2) 0 cm^2 09/27/21 0817   Change in Wound Size % 100 09/27/21 0817   Wound Volume (cm^3) 0 cm^3 09/27/21 0817   Wound Healing % 100 09/27/21 0817   Wound Assessment Epithelialization 09/27/21 0817   Drainage Amount None 09/27/21 0817   Wound Odor None 09/27/21 0817   Ivis-Wound/Incision Assessment Dry/flaky 09/27/21 0817   Number of days: 70       Wound Foot Anterior;Right;Lateral (Active)   Number of days: 70       Incision 08/18/21 Leg Right (Active)   Number of days: 40          Amount and/or Complexity of Data Reviewed and Analyzed:  I reviewed and analyzed all of the unique labs and radiologic studies that are shown below as well as any that are in the HPI, and any that are in the expanded problem list below  *Each unique test, order, or document contributes to the combination of 2 or combination of 3 in Category 1 below. I also independently reviewed radiology images for studies I described above in the HPI or studies I have ordered. For this visit I also reviewed old records and prior notes. No results for input(s): WBC, HGB, PLT, NA, K, CL, CO2, BUN, CREA, GLU, PTP, INR, APTT, TBIL, TBILI, CBIL, ALT, AP, AML, AML, LPSE, LCAD, NH4, TROPT, TROIQ, PCO2, PO2, HCO3, HGBEXT, PLTEXT, INREXT, HGBEXT, PLTEXT, INREXT in the last 72 hours. No lab exists for component: SGOT, GPT,  PH  No results for input(s): PTP, INR, APTT, TBIL, TBILI, CBIL, ALT, AP, AML, LPSE, INREXT, INREXT in the last 72 hours.     No lab exists for component: SGOT, GPT    Most recent blood counts (CBC) review  Lab Results   Component Value Date/Time    WBC 3.8 (L) 09/15/2021 05:39 AM    HGB 9.3 (L) 09/15/2021 05:39 AM    HCT 31.8 (L) 09/15/2021 05:39 AM    PLATELET 635 81/62/5123 05:39 AM    MCV 97.8 09/15/2021 05:39 AM     Most recent chemistry (BMP) test review   Lab Results   Component Value Date/Time    Sodium 145 09/15/2021 05:39 AM    Potassium 4.6 09/15/2021 05:39 AM    Chloride 105 09/15/2021 05:39 AM    CO2 38 (H) 09/15/2021 05:39 AM    Anion gap 2 (L) 09/15/2021 05:39 AM    Glucose 101 (H) 09/15/2021 05:39 AM    BUN 23 09/15/2021 05:39 AM    Creatinine 1.40 09/15/2021 05:39 AM    BUN/Creatinine ratio 21 08/09/2021 10:45 AM    GFR est AA >60 09/15/2021 05:39 AM    GFR est non-AA 53 (L) 09/15/2021 05:39 AM    Calcium 9.2 09/15/2021 05:39 AM     Most recent Liver enzyme test review  Lab Results   Component Value Date/Time    ALT (SGPT) <5 08/09/2021 10:45 AM    AST (SGOT) 21 08/09/2021 10:45 AM    Alk. phosphatase 131 (H) 08/09/2021 10:45 AM    Bilirubin, total 0.2 08/09/2021 10:45 AM     Most recent coagulation (coags) review  No results found for: INR, PTMR, PTP, PT1, PT2, INREXT, INREXT  Lab Results   Component Value Date/Time    aPTT 34.9 08/18/2021 06:56 PM       Diabetic assessment  Lab Results   Component Value Date/Time    Hemoglobin A1c 6.7 (H) 07/13/2021 08:34 AM       Nutritional assessment screen to assess wound healing ability:  Lab Results   Component Value Date/Time    Protein, total 6.8 08/09/2021 10:45 AM    Albumin 3.4 (L) 08/09/2021 10:45 AM     Lab Results   Component Value Date/Time    Protein, total 6.8 08/09/2021 10:45 AM    Albumin 3.4 (L) 08/09/2021 10:45 AM       XR Results (most recent):  Results from Hospital Encounter encounter on 08/18/21    XR CHEST SNGL V    Narrative  Portable view of the chest    COMPARISON: Yesterday    CLINICAL HISTORY: Postop, respiratory failure. FINDINGS:    Stable right-sided PICC. Cardiac mediastinal silhouette is prominent. Bibasilar  opacities, likely atelectasis or consolidation. No pneumothorax. Surrounding  bones are stable. Impression  1. Bibasilar opacities, likely atelectasis or consolidation. 2. No significant change compared to prior exam.      CT Results (most recent):  Results from Hospital Encounter encounter on 07/19/21    CT HEAD WO CONT    Narrative  CT HEAD WITHOUT CONTRAST    HISTORY:  Transient alteration of awareness. COMPARISON: None.     TECHNIQUE: Axial imaging was performed without intravenous contrast utilizing  5mm slice thickness. Sagittal and coronal reformats were performed. Radiation  dose reduction techniques were used for this study. Our CT scanner uses one or  all of the following:    Automated exposure control, adjustment of the MAS or KUB according to patient's  size and iterative reconstruction. FINDINGS:    *BRAIN:  -  There are no early signs of territorial or lacunar infarction by CT.  -  No intracranial mass, hematoma, or hydrocephalus. -  For patient's age, the scattered areas of white matter hypodensities may  represent a chronic small vessel white matter ischemia. However this is  nonspecific. *VISUALIZED PARANASAL SINUSES: Well aerated. *MASTOIDS:  Clear. *CALVARIUM AND SCALP: Unremarkable. Impression  No acute intracranial abnormalities.     Date of Dictation: 7/23/2021 10:21 AM        Admission date (for inpatients): 9/27/2021   * No surgery found *  * No surgery found *    Assessment:      Problem List Items Addressed This Visit     Controlled type 2 diabetes mellitus with diabetic polyneuropathy, without long-term current use of insulin (HCC) (Chronic)    Relevant Medications    midodrine (PROAMATINE) 2.5 mg tablet    Morbid obesity with BMI of 40.0-44.9, adult ([address]    Relevant Orders    REFERRAL TO PODIATRY    S/P BKA (below knee amputation) unilateral, right ([address]    Relevant Orders    REFERRAL TO PODIATRY    Unilateral complete BKA, right, sequela ([address] - Primary    Relevant Orders    REFERRAL TO PODIATRY    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 mellitus, limited to breakdown of skin ([address]    Relevant Orders    REFERRAL TO PODIATRY    Acquired hammer toe of left foot          Problem List  Date Reviewed: 9/14/2021        Codes Class Noted    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 mellitus, limited to breakdown of skin ([address] ICD-10-CM: L42.115, F93.205  ICD-9-CM: 250.80, 707.14  9/27/2021        Acquired hammer toe of left foot ICD-10-CM: M20.42  ICD-9-CM: 735.4  9/27/2021        Unilateral complete BKA, right, sequela ([facility] ICD-10-CM: L67.591J  ICD-9-CM: 905.9  8/25/2021        Suspected sleep apnea ICD-10-CM: R29.818  ICD-9-CM: 781.99  8/20/2021        S/P BKA (below knee amputation) unilateral, right ([facility] [address] ICD-10-CM: Z89.511  ICD-9-CM: V49.75  8/18/2021        Respiratory failure, post-operative ([facility] ICD-10-CM: D98.027  ICD-9-CM: 518.51  8/18/2021        Acute respiratory failure with hypercapnia (HCC) ICD-10-CM: J96.02  ICD-9-CM: 518.81  7/23/2021        Acute on chronic respiratory failure with hypoxia and hypercapnia (HCC) ICD-10-CM: J96.21, J96.22  ICD-9-CM: 518.84, 786.09, 799.02  7/23/2021        Acute metabolic encephalopathy ERU-13-ZX: G93.41  ICD-9-CM: 348.31  7/23/2021        Hypoxia ICD-10-CM: R09.02  ICD-9-CM: 799.02  7/21/2021        Acute kidney injury superimposed on CKD [facility]) ICD-10-CM: N17.9, N18.9  ICD-9-CM: 866.00, 585.9  7/21/2021        Staphylococcus aureus bacteremia ICD-10-CM: R78.81, B95.61  ICD-9-CM: 790.7, 041.11  7/21/2021        Cellulitis ICD-10-CM: L03.90  ICD-9-CM: 682.9  6/42/8258        Systolic CHF, acute on chronic [facility]) ICD-10-CM: I50.23  ICD-9-CM: 428.23, 428.0  7/19/2021        Atrial fibrillation with RVR (HCC) ICD-10-CM: I48.91  ICD-9-CM: 427.31  7/19/2021        Morbid obesity with BMI of 40.0-44.9, adult [facility]) ICD-10-CM: E66.01, Z68.41  ICD-9-CM: 278.01, V85.41  6/11/2020        Severe obesity (BMI 35.0-35.9 with comorbidity) (HCC) (Chronic) ICD-10-CM: E66.01, Z68.35  ICD-9-CM: 278.01, V85.35  10/10/2018        Bunion of unspecified foot (Chronic) ICD-10-CM: M21.619  ICD-9-CM: 727.1  4/10/2018        Onychomycosis (Chronic) ICD-10-CM: B35.1  ICD-9-CM: 110.1  4/10/2018        Corns and callus (Chronic) ICD-10-CM: R14  ICD-9-CM: 700  4/10/2018        Mixed hyperlipidemia (Chronic) ICD-10-CM: X95.4  ICD-9-CM: 272.2  4/10/2018        Mixed axonal-demyelinating polyneuropathy (Chronic) ICD-10-CM: G62.89  ICD-9-CM: 355.9  1/5/2018        Controlled type 2 diabetes mellitus with diabetic polyneuropathy, without long-term current use of insulin (HCC) (Chronic) ICD-10-CM: E11.42  ICD-9-CM: 250.60, 357.2  1/5/2018        Type 2 diabetes mellitus with nephropathy (HCC) (Chronic) ICD-10-CM: E11.21  ICD-9-CM: 250.40, 583.81  1/5/2018        Stage 3 chronic kidney disease ([facility] (Chronic) ICD-10-CM: N18.30  ICD-9-CM: 585.3  11/22/2017        Benign hypertensive kidney disease with chronic kidney disease (Chronic) ICD-10-CM: I12.9  ICD-9-CM: 403.10  11/6/2017        CAD (coronary artery disease) (Chronic) ICD-10-CM: I25.10  ICD-9-CM: 414.00  Unknown              Active Problems:    * No active hospital problems. *          Plan:     Number and Complexity of Problems addressed and   Risks of complications and/or morbidity of management    Treatment Note please see attached Discharge Instructions  Any procedures done today in the wound center are documented in a separate note in connect care made part of this record by reference  Written patient dismissal instructions given to patient or POA. 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 mellitus, limited to breakdown of skin (HCC)  Acquired hammer toe of left foot  S/P BKA (below knee amputation) unilateral, right ([facility]  Controlled type 2 diabetes mellitus with diabetic polyneuropathy, without long-term current use of insulin ([facility] [facility]  Morbid obesity with BMI of 40.0-44.9, adult [facility])    Patient presents to the wound center following recent discharge from rehab following right BKA performed on 8/18/2021 by Dr. Bayron Clifford. He presents to the wound center for plantar ulcer/blister of the left foot. He is using the foot only for transfer at this point, but developed a large blister in his [place]. This was modified and the blister was unroofed by Dr Bayron Clifford.   The wound currently appears closed but it is at high risk for recurrence given his weight distribution from hammertoe deformities. The wound was at the dependent pressure location of his foot that requires offloading. He does not have a podiatrist and we will refer him to Dr. French Orlando to assist with inserts and offloading shoes. He follows up with Dr. Tigist Schuster tomorrow as well. Since there is no open wound, I will see him back as needed. Wound Care  Orders Placed This Encounter    PODIATRY     Referral Priority:   Routine     Referral Type:   Consultation     Referral Reason:   Specialty Services Required     Referred to Provider:   Eliu Graff DPM     Requested Specialty:   Podiatry     Number of Visits Requested:   1    WOUND CARE, DRESSING CHANGE     Left plantar foot:  Wound is healed. No dressing needed. Follow-up with ortho for weight bearing status. Continue to evaluate and fine tune footwear for left foot. Standing Status:   Standing     Number of Occurrences:   1    midodrine (PROAMATINE) 2.5 mg tablet     Sig: Take 2.5 mg by mouth three (3) times daily (with meals). Follow-up Information     Follow up With Specialties Details Why Contact Info     [facility]  As needed Rigo Dixon [address] [place] [id number] [phone]                  Level of MDM (2/3 elements below)  Number and Complexity of Problems Addressed Amount and/or Complexity of Data to be Reviewed and Analyzed  *Each unique test, order, or document contributes to the combination of 2 or combination of 3 in Category 1 below.  Risk of Complications and/or Morbidity or Mortality of pt Management     05541  48245 SF Minimal  1self-limited or minor problem Minimal or none Minimal risk of morbidity from additional diagnostic testing or Rx   93516  82527 Low Low  2or more self-limited or minor problems;    or  1stable chronic illness;    or  7NDINS, uncomplicated illness or injury   Limited  (Must meet the requirements of at least 1 of the 2 categories)  Category 1: Tests and documents   Any combination of 2 from the following:  Review of prior external note(s) from each unique source*;  review of the result(s) of each unique test*;   ordering of each unique test*    or   Category 2: Assessment requiring an independent historian(s)  (For the categories of independent interpretation of tests and discussion of management or test interpretation, see moderate or high) Low risk of morbidity from additional diagnostic testing or treatment     31293  79161 Mod Moderate  1or more chronic illnesses with exacerbation, progression, or side effects of treatment;    or  2or more stable chronic illnesses;    or  1undiagnosed new problem with uncertain prognosis;    or  1acute illness with systemic symptoms;    or  0KHNAY complicated injury   Moderate  (Must meet the requirements of at least 1 out of 3 categories)  Category 1: Tests, documents, or independent historian(s)  Any combination of 3 from the following:   Review of prior external note(s) from each unique source*;  Review of the result(s) of each unique test*;  Ordering of each unique test*;  Assessment requiring an independent historian(s)    or  Category 2: Independent interpretation of tests   Independent interpretation of a test performed by another physician/other qualified health care professional (not separately reported);     or  Category 3: Discussion of management or test interpretation  Discussion of management or test interpretation with external physician/other qualified health care professional/appropriate source (not separately reported)   Moderate risk of morbidity from additional diagnostic testing or treatment  Examples only:  Prescription drug management   Decision regarding minor surgery with identified patient or procedure risk factors  Decision regarding elective major surgery without identified patient or procedure risk factors   Diagnosis or treatment significantly limited by social determinants of health       [id number] High High  1or more chronic illnesses with severe exacerbation, progression, or side effects of treatment;    or  1 acute or chronic illness or injury that poses a threat to life or bodily function   Extensive  (Must meet the requirements of at least 2 out of 3 categories)  Category 1: Tests, documents, or independent historian(s)  Any combination of 3 from the following:   Review of prior external note(s) from each unique source*;  Review of the result(s) of each unique test*;   Ordering of each unique test*;   Assessment requiring an independent historian(s)    or   Category 2: Independent interpretation of tests   Independent interpretation of a test performed by another physician/other qualified health care professional (not separately reported);     or  Category 3: Discussion of management or test interpretation  Discussion of management or test interpretation with external physician/other qualified health care professional/appropriate source (not separately reported)   High risk of morbidity from additional diagnostic testing or treatment  Examples only:  Drug therapy requiring intensive monitoring for toxicity  Decision regarding elective major surgery with identified patient or procedure risk factors  Decision regarding emergency major surgery  Decision regarding hospitalization  Decision not to resuscitate or to de-escalate care because of poor prognosis                 I have personally performed a face-to-face diagnostic evaluation and management  service on this patient. I have independently seen the patient. I have independently obtained the above history from the patient/family. I have independently examined the patient with above findings. I have independently reviewed data/labs for this patient and developed the above plan of care (MDM).   Electronically signed by Isabel Umanzor MD on 9/27/2021 at 12:39 PM

## 2021-09-27 NOTE — WOUND CARE
09/27/21 0817   Wound Foot Left;Plantar   Date First Assessed/Time First Assessed: 07/19/21 2000   Location: Foot  Wound Location Orientation: Left;Plantar   Wound Image    Wound Etiology Diabetic Wu 1   Dressing Status Clean;Dry; Intact   Cleansed Cleansed with saline   Dressing/Treatment Gauze dressing/dressing sponge   Offloading for Diabetic Foot Ulcers Offloading boot   Wound Length (cm) 0 cm   Wound Width (cm) 0 cm   Wound Depth (cm) 0 cm   Wound Surface Area (cm^2) 0 cm^2   Change in Wound Size % 100   Wound Volume (cm^3) 0 cm^3   Wound Healing % 100   Wound Assessment Epithelialization   Drainage Amount None   Wound Odor None   Ivis-Wound/Incision Assessment Dry/flaky

## 2021-11-15 ENCOUNTER — HOSPITAL ENCOUNTER (OUTPATIENT)
Dept: SLEEP MEDICINE | Age: 76
Discharge: HOME | End: 2021-11-15
Payer: MEDICARE

## 2021-11-15 PROCEDURE — 95806 SLEEP STUDY UNATT&RESP EFFT: CPT

## 2021-11-18 NOTE — PROGRESS NOTES
Last refill: 07/26/21  Last OV: 12/09/20   PHYSICAL THERAPY DAILY NOTE  Time In/Out: 7572-1235, 4039-4614    Patient Seen For: PM;Balance activities; Therapeutic exercise;Transfer training    Subjective:  \"What we working on today\", pt ready for therapy this afternoon. Objective: Other (comment) (fall)  GROSS ASSESSMENT Daily Assessment     Blister looks the same as it did this morning, small scrape noted on lateral surface of foot. COGNITION Daily Assessment    Decreased insight, impulsive       BED/MAT MOBILITY Daily Assessment    Rolling Right : 0 (Not tested)  Rolling Left : 0 (Not tested)  Supine to Sit : 0 (Not tested)  Sit to Supine : 5 (Supervision)       TRANSFERS Daily Assessment   Pt transferred across level surfaces this afternoon x5 with Min A to protect foot placement and ensure that weight was going through heel and not on blister that has developed on the forefoot. (No Elbert boot was used) Transfer Type: Lateral with transfer board  Transfer Assistance : Min A-Supervision  Car Transfers: Not tested       GAIT Daily Assessment    Amount of Assistance: 0 (Not tested)  Distance (ft): 0 Feet (ft)       STEPS or STAIRS Daily Assessment    Steps/Stairs Ambulated (#): 0  Level of Assist : 0 (Not tested)       BALANCE Daily Assessment    Sitting - Static: Good (unsupported)  Sitting - Dynamic: Fair (occasional)  Standing - Static: Not tested       WHEELCHAIR MOBILITY Daily Assessment     NT     Pt tolerated 6 min with no rest breaks on the NuStep Level 2 resistance. No overt SOB noticed. Transfers were across level surfaces with sliding board, w/c<>NuStep, w/c<>Mat and w/c>bed all with Leydi to protect L foot. Elbert boot was examined by [facility] to make adjustments for blister. On mat, pt performed scooting activities forwards and backwards with BUE and no weightbearing through LLE. Mod A required for scooting for technique and safety. Vital Signs: SpO2 on 2.0L - no SOB observed.      Pain level: No pain indicated  Pain location: NA  Pain interventions: NA    Patient education: Importance of being careful and checking skin. Interdisciplinary Communication: Worked with [facility], PT and OT to try and rework Elbert boot so we can continue making progress with transfers      Pt left in bed with all needs me at call bell in reach. Assessment: Pt tolerated the NuStep for 6 min and no rest breaks, showing improved cardiovascular endurance. Pt visibly concerned about status of L foot, but still working hard during session. Elbert boot should be returned this afternoon with alterations from [facility]. Pt will benefit from continued PT to improve strength and endurance for functional activities. Plan of Care: Continue with POC and progress as tolerated.     Bc Garcia, SPT  9/10/2021

## 2022-01-06 ENCOUNTER — APPOINTMENT (OUTPATIENT)
Dept: GENERAL RADIOLOGY | Age: 77
DRG: 208 | End: 2022-01-06
Attending: INTERNAL MEDICINE
Payer: MEDICARE

## 2022-01-06 ENCOUNTER — APPOINTMENT (OUTPATIENT)
Dept: GENERAL RADIOLOGY | Age: 77
DRG: 208 | End: 2022-01-06
Attending: EMERGENCY MEDICINE
Payer: MEDICARE

## 2022-01-06 ENCOUNTER — HOSPITAL ENCOUNTER (INPATIENT)
Age: 77
LOS: 12 days | Discharge: NURSING, REHAB OR LONG-TERM CARE | DRG: 208 | End: 2022-01-18
Attending: EMERGENCY MEDICINE | Admitting: INTERNAL MEDICINE
Payer: MEDICARE

## 2022-01-06 DIAGNOSIS — I25.10 CORONARY ARTERY DISEASE INVOLVING NATIVE CORONARY ARTERY OF NATIVE HEART WITHOUT ANGINA PECTORIS: Chronic | ICD-10-CM

## 2022-01-06 DIAGNOSIS — I25.5 ISCHEMIC CARDIOMYOPATHY: ICD-10-CM

## 2022-01-06 DIAGNOSIS — R93.1 DECREASED CARDIAC EJECTION FRACTION: Chronic | ICD-10-CM

## 2022-01-06 DIAGNOSIS — E66.2 OBESITY HYPOVENTILATION SYNDROME (HCC): ICD-10-CM

## 2022-01-06 DIAGNOSIS — E11.42 CONTROLLED TYPE 2 DIABETES MELLITUS WITH DIABETIC POLYNEUROPATHY, WITHOUT LONG-TERM CURRENT USE OF INSULIN (HCC): Chronic | ICD-10-CM

## 2022-01-06 DIAGNOSIS — M20.42 ACQUIRED HAMMER TOE OF LEFT FOOT: Chronic | ICD-10-CM

## 2022-01-06 DIAGNOSIS — N18.30 STAGE 3 CHRONIC KIDNEY DISEASE, UNSPECIFIED WHETHER STAGE 3A OR 3B CKD (HCC): Chronic | ICD-10-CM

## 2022-01-06 DIAGNOSIS — E66.01 SEVERE OBESITY (BMI 35.0-35.9 WITH COMORBIDITY) (HCC): Chronic | ICD-10-CM

## 2022-01-06 DIAGNOSIS — R29.818 SUSPECTED SLEEP APNEA: ICD-10-CM

## 2022-01-06 DIAGNOSIS — J96.02 ACUTE RESPIRATORY FAILURE WITH HYPOXIA AND HYPERCAPNIA (HCC): Primary | ICD-10-CM

## 2022-01-06 DIAGNOSIS — I95.9 HYPOTENSION, UNSPECIFIED HYPOTENSION TYPE: ICD-10-CM

## 2022-01-06 DIAGNOSIS — G47.00 INSOMNIA, UNSPECIFIED TYPE: ICD-10-CM

## 2022-01-06 DIAGNOSIS — G47.33 OBSTRUCTIVE SLEEP APNEA: ICD-10-CM

## 2022-01-06 DIAGNOSIS — E78.2 MIXED HYPERLIPIDEMIA: Chronic | ICD-10-CM

## 2022-01-06 DIAGNOSIS — I48.19 PERSISTENT ATRIAL FIBRILLATION (HCC): ICD-10-CM

## 2022-01-06 DIAGNOSIS — I95.0 IDIOPATHIC HYPOTENSION: ICD-10-CM

## 2022-01-06 DIAGNOSIS — G62.89 MIXED AXONAL-DEMYELINATING POLYNEUROPATHY: Chronic | ICD-10-CM

## 2022-01-06 DIAGNOSIS — J96.22 ACUTE ON CHRONIC RESPIRATORY FAILURE WITH HYPERCAPNIA (HCC): ICD-10-CM

## 2022-01-06 DIAGNOSIS — J96.01 ACUTE RESPIRATORY FAILURE WITH HYPOXIA AND HYPERCAPNIA (HCC): Primary | ICD-10-CM

## 2022-01-06 DIAGNOSIS — J96.02 ACUTE RESPIRATORY FAILURE WITH HYPERCAPNIA (HCC): ICD-10-CM

## 2022-01-06 DIAGNOSIS — G25.81 RLS (RESTLESS LEGS SYNDROME): ICD-10-CM

## 2022-01-06 DIAGNOSIS — Z89.511 S/P BKA (BELOW KNEE AMPUTATION) UNILATERAL, RIGHT (HCC): Chronic | ICD-10-CM

## 2022-01-06 PROBLEM — G93.41 ACUTE METABOLIC ENCEPHALOPATHY: Status: RESOLVED | Noted: 2021-07-23 | Resolved: 2022-01-06

## 2022-01-06 PROBLEM — S88.111S UNILATERAL COMPLETE BKA, RIGHT, SEQUELA (HCC): Status: RESOLVED | Noted: 2021-08-25 | Resolved: 2022-01-06

## 2022-01-06 PROBLEM — R09.02 HYPOXIA: Status: RESOLVED | Noted: 2021-07-21 | Resolved: 2022-01-06

## 2022-01-06 PROBLEM — J95.821 RESPIRATORY FAILURE, POST-OPERATIVE (HCC): Status: RESOLVED | Noted: 2021-08-18 | Resolved: 2022-01-06

## 2022-01-06 PROBLEM — N18.9 ACUTE KIDNEY INJURY SUPERIMPOSED ON CKD (HCC): Status: RESOLVED | Noted: 2021-07-21 | Resolved: 2022-01-06

## 2022-01-06 PROBLEM — N17.9 ACUTE KIDNEY INJURY SUPERIMPOSED ON CKD (HCC): Status: RESOLVED | Noted: 2021-07-21 | Resolved: 2022-01-06

## 2022-01-06 LAB
ALBUMIN SERPL-MCNC: 3.2 G/DL (ref 3.2–4.6)
ALBUMIN/GLOB SERPL: 0.7 {RATIO} (ref 1.2–3.5)
ALP SERPL-CCNC: 172 U/L (ref 50–136)
ALT SERPL-CCNC: 20 U/L (ref 12–65)
ANION GAP SERPL CALC-SCNC: 0 MMOL/L (ref 7–16)
ARTERIAL PATENCY WRIST A: POSITIVE
ARTERIAL PATENCY WRIST A: POSITIVE
AST SERPL-CCNC: 20 U/L (ref 15–37)
ATRIAL RATE: 0 BPM
BASE EXCESS BLD CALC-SCNC: 0.5 MMOL/L
BASE EXCESS BLD CALC-SCNC: 4.5 MMOL/L
BASOPHILS # BLD: 0 K/UL (ref 0–0.2)
BASOPHILS NFR BLD: 1 % (ref 0–2)
BDY SITE: ABNORMAL
BDY SITE: ABNORMAL
BILIRUB SERPL-MCNC: 0.3 MG/DL (ref 0.2–1.1)
BNP SERPL-MCNC: 2179 PG/ML
BUN SERPL-MCNC: 34 MG/DL (ref 8–23)
CA-I BLD-MCNC: 1.3 MMOL/L (ref 1.12–1.32)
CALCIUM SERPL-MCNC: 9.5 MG/DL (ref 8.3–10.4)
CALCULATED R AXIS, ECG10: 39 DEGREES
CALCULATED T AXIS, ECG11: 45 DEGREES
CHLORIDE SERPL-SCNC: 111 MMOL/L (ref 98–107)
CO2 BLD-SCNC: 30 MMOL/L (ref 13–23)
CO2 BLD-SCNC: 32 MMOL/L (ref 13–23)
CO2 SERPL-SCNC: 34 MMOL/L (ref 21–32)
CREAT SERPL-MCNC: 1.78 MG/DL (ref 0.8–1.5)
DIAGNOSIS, 93000: NORMAL
DIFFERENTIAL METHOD BLD: ABNORMAL
EOSINOPHIL # BLD: 0.2 K/UL (ref 0–0.8)
EOSINOPHIL NFR BLD: 2 % (ref 0.5–7.8)
ERYTHROCYTE [DISTWIDTH] IN BLOOD BY AUTOMATED COUNT: 16.4 % (ref 11.9–14.6)
FIO2 ON VENT: 60 %
GAS FLOW.O2 O2 DELIVERY SYS: ABNORMAL L/MIN
GAS FLOW.O2 O2 DELIVERY SYS: ABNORMAL L/MIN
GLOBULIN SER CALC-MCNC: 4.4 G/DL (ref 2.3–3.5)
GLUCOSE BLD STRIP.AUTO-MCNC: 88 MG/DL (ref 65–100)
GLUCOSE SERPL-MCNC: 92 MG/DL (ref 65–100)
HCO3 BLD-SCNC: 29.6 MMOL/L (ref 22–26)
HCO3 BLD-SCNC: 30.9 MMOL/L (ref 22–26)
HCT VFR BLD AUTO: 45.3 % (ref 41.1–50.3)
HGB BLD-MCNC: 12.8 G/DL (ref 13.6–17.2)
IMM GRANULOCYTES # BLD AUTO: 0.1 K/UL (ref 0–0.5)
IMM GRANULOCYTES NFR BLD AUTO: 1 % (ref 0–5)
INSPIRATION.DURATION SETTING TIME VENT: 0.9 SEC
INSPIRATION.DURATION SETTING TIME VENT: 0.9 SEC
IPAP/PIP, IPAPIP: 25
LACTATE SERPL-SCNC: 1 MMOL/L (ref 0.4–2)
LYMPHOCYTES # BLD: 3 K/UL (ref 0.5–4.6)
LYMPHOCYTES NFR BLD: 36 % (ref 13–44)
MAGNESIUM SERPL-MCNC: 2.4 MG/DL (ref 1.8–2.4)
MCH RBC QN AUTO: 27.6 PG (ref 26.1–32.9)
MCHC RBC AUTO-ENTMCNC: 28.3 G/DL (ref 31.4–35)
MCV RBC AUTO: 97.6 FL (ref 79.6–97.8)
MONOCYTES # BLD: 0.7 K/UL (ref 0.1–1.3)
MONOCYTES NFR BLD: 9 % (ref 4–12)
NEUTS SEG # BLD: 4.3 K/UL (ref 1.7–8.2)
NEUTS SEG NFR BLD: 52 % (ref 43–78)
NRBC # BLD: 0.03 K/UL (ref 0–0.2)
O2/TOTAL GAS SETTING VFR VENT: 60 %
PAW @ MEAN EXP FLOW ON VENT: 13 CMH2O
PAW @ MEAN EXP FLOW ON VENT: 16 CMH2O
PCO2 BLD: 44.8 MMHG (ref 35–45)
PCO2 BLD: 80 MMHG (ref 35–45)
PEEP RESPIRATORY: 8 CMH2O
PEEP RESPIRATORY: 8 CM[H2O]
PH BLD: 7.2 [PH] (ref 7.35–7.45)
PH BLD: 7.43 [PH] (ref 7.35–7.45)
PHOSPHATE SERPL-MCNC: 2.7 MG/DL (ref 2.3–3.7)
PIP ISTAT,IPIP: 29
PLATELET # BLD AUTO: 220 K/UL (ref 150–450)
PMV BLD AUTO: 12.5 FL (ref 9.4–12.3)
PO2 BLD: 119 MMHG (ref 75–100)
PO2 BLD: 59 MMHG (ref 75–100)
POTASSIUM BLD-SCNC: 5.2 MMOL/L (ref 3.5–5.1)
POTASSIUM SERPL-SCNC: 4.9 MMOL/L (ref 3.5–5.1)
PROT SERPL-MCNC: 7.6 G/DL (ref 6.3–8.2)
Q-T INTERVAL, ECG07: 427 MS
QRS DURATION, ECG06: 138 MS
QTC CALCULATION (BEZET), ECG08: 468 MS
RBC # BLD AUTO: 4.64 M/UL (ref 4.23–5.6)
RESPIRATORY RATE: 20 (ref 5–40)
SAO2 % BLD: 90.6 % (ref 95–98)
SAO2 % BLD: 97 %
SERVICE CMNT-IMP: ABNORMAL
SODIUM BLD-SCNC: 156 MMOL/L (ref 136–145)
SODIUM SERPL-SCNC: 145 MMOL/L (ref 138–145)
SPECIMEN SITE: ABNORMAL
SPECIMEN TYPE: ABNORMAL
TOTAL RESP. RATE, ITRR: 20
TOTAL RESP. RATE, ITRR: 24
TROPONIN-HIGH SENSITIVITY: 22 PG/ML (ref 0–14)
VENTILATION MODE VENT: ABNORMAL
VENTILATION MODE VENT: ABNORMAL
VENTRICULAR RATE, ECG03: 72 BPM
VT SETTING VENT: 450 ML
VT SETTING VENT: 500 ML
WBC # BLD AUTO: 8.2 K/UL (ref 4.3–11.1)

## 2022-01-06 PROCEDURE — 36600 WITHDRAWAL OF ARTERIAL BLOOD: CPT

## 2022-01-06 PROCEDURE — 74011000250 HC RX REV CODE- 250: Performed by: EMERGENCY MEDICINE

## 2022-01-06 PROCEDURE — 94002 VENT MGMT INPAT INIT DAY: CPT

## 2022-01-06 PROCEDURE — 0BH17EZ INSERTION OF ENDOTRACHEAL AIRWAY INTO TRACHEA, VIA NATURAL OR ARTIFICIAL OPENING: ICD-10-PCS | Performed by: EMERGENCY MEDICINE

## 2022-01-06 PROCEDURE — 85025 COMPLETE CBC W/AUTO DIFF WBC: CPT

## 2022-01-06 PROCEDURE — 74018 RADEX ABDOMEN 1 VIEW: CPT

## 2022-01-06 PROCEDURE — 80053 COMPREHEN METABOLIC PANEL: CPT

## 2022-01-06 PROCEDURE — 74011250636 HC RX REV CODE- 250/636: Performed by: INTERNAL MEDICINE

## 2022-01-06 PROCEDURE — 31500 INSERT EMERGENCY AIRWAY: CPT

## 2022-01-06 PROCEDURE — 74011250637 HC RX REV CODE- 250/637: Performed by: INTERNAL MEDICINE

## 2022-01-06 PROCEDURE — 65610000006 HC RM INTENSIVE CARE

## 2022-01-06 PROCEDURE — 83880 ASSAY OF NATRIURETIC PEPTIDE: CPT

## 2022-01-06 PROCEDURE — 82803 BLOOD GASES ANY COMBINATION: CPT

## 2022-01-06 PROCEDURE — 82947 ASSAY GLUCOSE BLOOD QUANT: CPT

## 2022-01-06 PROCEDURE — 99223 1ST HOSP IP/OBS HIGH 75: CPT | Performed by: INTERNAL MEDICINE

## 2022-01-06 PROCEDURE — 71045 X-RAY EXAM CHEST 1 VIEW: CPT

## 2022-01-06 PROCEDURE — 5A1945Z RESPIRATORY VENTILATION, 24-96 CONSECUTIVE HOURS: ICD-10-PCS | Performed by: EMERGENCY MEDICINE

## 2022-01-06 PROCEDURE — 93005 ELECTROCARDIOGRAM TRACING: CPT | Performed by: EMERGENCY MEDICINE

## 2022-01-06 PROCEDURE — 74011000258 HC RX REV CODE- 258: Performed by: INTERNAL MEDICINE

## 2022-01-06 PROCEDURE — 99285 EMERGENCY DEPT VISIT HI MDM: CPT

## 2022-01-06 PROCEDURE — 94003 VENT MGMT INPAT SUBQ DAY: CPT

## 2022-01-06 PROCEDURE — 94762 N-INVAS EAR/PLS OXIMTRY CONT: CPT

## 2022-01-06 PROCEDURE — 84484 ASSAY OF TROPONIN QUANT: CPT

## 2022-01-06 PROCEDURE — 84100 ASSAY OF PHOSPHORUS: CPT

## 2022-01-06 PROCEDURE — 74011000250 HC RX REV CODE- 250: Performed by: INTERNAL MEDICINE

## 2022-01-06 PROCEDURE — 83735 ASSAY OF MAGNESIUM: CPT

## 2022-01-06 PROCEDURE — 83605 ASSAY OF LACTIC ACID: CPT

## 2022-01-06 PROCEDURE — 74011250636 HC RX REV CODE- 250/636: Performed by: EMERGENCY MEDICINE

## 2022-01-06 RX ORDER — DEXMEDETOMIDINE HYDROCHLORIDE 4 UG/ML
.1-1.5 INJECTION, SOLUTION INTRAVENOUS
Status: DISCONTINUED | OUTPATIENT
Start: 2022-01-06 | End: 2022-01-10

## 2022-01-06 RX ORDER — HYDROCORTISONE SODIUM SUCCINATE 100 MG/2ML
100 INJECTION, POWDER, FOR SOLUTION INTRAMUSCULAR; INTRAVENOUS
Status: COMPLETED | OUTPATIENT
Start: 2022-01-06 | End: 2022-01-06

## 2022-01-06 RX ORDER — ROCURONIUM BROMIDE 10 MG/ML
INJECTION, SOLUTION INTRAVENOUS
Status: COMPLETED | OUTPATIENT
Start: 2022-01-06 | End: 2022-01-06

## 2022-01-06 RX ORDER — SODIUM CHLORIDE 0.9 % (FLUSH) 0.9 %
5-40 SYRINGE (ML) INJECTION EVERY 8 HOURS
Status: DISCONTINUED | OUTPATIENT
Start: 2022-01-06 | End: 2022-01-18 | Stop reason: HOSPADM

## 2022-01-06 RX ORDER — NOREPINEPHRINE BITARTRATE/D5W 4MG/250ML
.5-3 PLASTIC BAG, INJECTION (ML) INTRAVENOUS
Status: DISCONTINUED | OUTPATIENT
Start: 2022-01-06 | End: 2022-01-10

## 2022-01-06 RX ORDER — LORAZEPAM 2 MG/ML
1 INJECTION INTRAMUSCULAR
Status: DISCONTINUED | OUTPATIENT
Start: 2022-01-06 | End: 2022-01-10

## 2022-01-06 RX ORDER — ACETAMINOPHEN 325 MG/1
650 TABLET ORAL
Status: DISCONTINUED | OUTPATIENT
Start: 2022-01-06 | End: 2022-01-18 | Stop reason: HOSPADM

## 2022-01-06 RX ORDER — PREGABALIN 100 MG/1
200 CAPSULE ORAL 3 TIMES DAILY
Status: DISCONTINUED | OUTPATIENT
Start: 2022-01-06 | End: 2022-01-06 | Stop reason: SDUPTHER

## 2022-01-06 RX ORDER — HYDROCORTISONE SODIUM SUCCINATE 100 MG/2ML
INJECTION, POWDER, FOR SOLUTION INTRAMUSCULAR; INTRAVENOUS
Status: COMPLETED | OUTPATIENT
Start: 2022-01-06 | End: 2022-01-06

## 2022-01-06 RX ORDER — ATORVASTATIN CALCIUM 40 MG/1
40 TABLET, FILM COATED ORAL
Status: DISCONTINUED | OUTPATIENT
Start: 2022-01-07 | End: 2022-01-18 | Stop reason: HOSPADM

## 2022-01-06 RX ORDER — ATORVASTATIN CALCIUM 40 MG/1
40 TABLET, FILM COATED ORAL
Status: DISCONTINUED | OUTPATIENT
Start: 2022-01-06 | End: 2022-01-06 | Stop reason: SDUPTHER

## 2022-01-06 RX ORDER — PHENYLEPHRINE HCL IN 0.9% NACL 0.8MG/10ML
SYRINGE (ML) INTRAVENOUS
Status: COMPLETED | OUTPATIENT
Start: 2022-01-06 | End: 2022-01-06

## 2022-01-06 RX ORDER — FENTANYL CITRATE-0.9 % NACL/PF 25 MCG/ML
0-200 PLASTIC BAG, INJECTION (ML) INJECTION
Status: DISCONTINUED | OUTPATIENT
Start: 2022-01-06 | End: 2022-01-07

## 2022-01-06 RX ORDER — FLUDROCORTISONE ACETATE 0.1 MG/1
0.1 TABLET ORAL DAILY
Status: DISCONTINUED | OUTPATIENT
Start: 2022-01-07 | End: 2022-01-18 | Stop reason: HOSPADM

## 2022-01-06 RX ORDER — PREGABALIN 50 MG/1
200 CAPSULE ORAL 3 TIMES DAILY
Status: DISCONTINUED | OUTPATIENT
Start: 2022-01-07 | End: 2022-01-10

## 2022-01-06 RX ORDER — NOREPINEPHRINE BITARTRATE/D5W 4MG/250ML
PLASTIC BAG, INJECTION (ML) INTRAVENOUS
Status: DISCONTINUED
Start: 2022-01-06 | End: 2022-01-06

## 2022-01-06 RX ORDER — ETOMIDATE 2 MG/ML
INJECTION INTRAVENOUS
Status: COMPLETED | OUTPATIENT
Start: 2022-01-06 | End: 2022-01-06

## 2022-01-06 RX ORDER — MIDODRINE HYDROCHLORIDE 5 MG/1
10 TABLET ORAL EVERY 8 HOURS
Status: DISCONTINUED | OUTPATIENT
Start: 2022-01-07 | End: 2022-01-16

## 2022-01-06 RX ORDER — PROPOFOL 10 MG/ML
0-50 VIAL (ML) INTRAVENOUS
Status: DISCONTINUED | OUTPATIENT
Start: 2022-01-06 | End: 2022-01-07

## 2022-01-06 RX ORDER — NOREPINEPHRINE BITARTRATE/D5W 4MG/250ML
PLASTIC BAG, INJECTION (ML) INTRAVENOUS
Status: COMPLETED | OUTPATIENT
Start: 2022-01-06 | End: 2022-01-06

## 2022-01-06 RX ORDER — KETAMINE HYDROCHLORIDE 50 MG/ML
INJECTION, SOLUTION INTRAMUSCULAR; INTRAVENOUS
Status: COMPLETED | OUTPATIENT
Start: 2022-01-06 | End: 2022-01-06

## 2022-01-06 RX ORDER — MIDODRINE HYDROCHLORIDE 5 MG/1
10 TABLET ORAL EVERY 8 HOURS
Status: DISCONTINUED | OUTPATIENT
Start: 2022-01-06 | End: 2022-01-06 | Stop reason: SDUPTHER

## 2022-01-06 RX ORDER — POLYETHYLENE GLYCOL 3350 17 G/17G
17 POWDER, FOR SOLUTION ORAL DAILY
Status: DISCONTINUED | OUTPATIENT
Start: 2022-01-07 | End: 2022-01-18 | Stop reason: HOSPADM

## 2022-01-06 RX ORDER — KETAMINE HYDROCHLORIDE 50 MG/ML
INJECTION, SOLUTION INTRAMUSCULAR; INTRAVENOUS
Status: DISCONTINUED
Start: 2022-01-06 | End: 2022-01-06

## 2022-01-06 RX ORDER — SODIUM CHLORIDE 0.9 % (FLUSH) 0.9 %
5-40 SYRINGE (ML) INJECTION AS NEEDED
Status: DISCONTINUED | OUTPATIENT
Start: 2022-01-06 | End: 2022-01-18 | Stop reason: HOSPADM

## 2022-01-06 RX ADMIN — PROPOFOL 20 MCG/KG/MIN: 10 INJECTION, EMULSION INTRAVENOUS at 22:56

## 2022-01-06 RX ADMIN — DEXMEDETOMIDINE HYDROCHLORIDE 0.4 MCG/KG/HR: 400 INJECTION INTRAVENOUS at 14:36

## 2022-01-06 RX ADMIN — Medication 160 MCG: at 11:57

## 2022-01-06 RX ADMIN — SODIUM CHLORIDE, PRESERVATIVE FREE 10 ML: 5 INJECTION INTRAVENOUS at 22:00

## 2022-01-06 RX ADMIN — HYDROCORTISONE SODIUM SUCCINATE 100 MG: 100 INJECTION, POWDER, FOR SOLUTION INTRAMUSCULAR; INTRAVENOUS at 12:01

## 2022-01-06 RX ADMIN — KETAMINE HYDROCHLORIDE 150 MG: 50 INJECTION, SOLUTION INTRAMUSCULAR; INTRAVENOUS at 11:59

## 2022-01-06 RX ADMIN — NOREPINEPHRINE-DEXTROSE IV SOLUTION 4 MG/250ML-5% 10 MCG/MIN: 4-5/250 SOLUTION at 17:36

## 2022-01-06 RX ADMIN — ETOMIDATE 30 MG: 2 INJECTION, SOLUTION INTRAVENOUS at 11:49

## 2022-01-06 RX ADMIN — FENTANYL CITRATE 50 MCG/HR: 50 INJECTION, SOLUTION INTRAMUSCULAR; INTRAVENOUS at 13:55

## 2022-01-06 RX ADMIN — ROCURONIUM BROMIDE 100 MG: 10 INJECTION, SOLUTION INTRAVENOUS at 11:50

## 2022-01-06 RX ADMIN — MIDODRINE HYDROCHLORIDE 10 MG: 5 TABLET ORAL at 17:19

## 2022-01-06 RX ADMIN — HYDROCORTISONE SODIUM SUCCINATE 100 MG: 100 INJECTION, POWDER, FOR SOLUTION INTRAMUSCULAR; INTRAVENOUS at 12:02

## 2022-01-06 RX ADMIN — NOREPINEPHRINE-DEXTROSE IV SOLUTION 4 MG/250ML-5% 10 MCG/MIN: 4-5/250 SOLUTION at 12:01

## 2022-01-06 NOTE — ED TRIAGE NOTES
Patient arrive to ER with respiratory distress, family states patient increasing in SOB, foggy, not self. PT presents minimally responsive to painful stimuli, MD called to bedside at this time. Per ems pt became unresponsive \"a few blocks\" away. EMS did not call back for further orders at that time. No interventions performed by EMS when pt became unresponsive. 30 etomidate 100 manpreet for RSI at 1148    [facility]  o2% 80's on cpap  170/86.

## 2022-01-06 NOTE — PROGRESS NOTES
Care Management Interventions  PCP Verified by CM: Yes  Mode of Transport at Discharge: [address] (CM Consult): Discharge Planning  Discharge Durable Medical Equipment: No  Physical Therapy Consult: No  Occupational Therapy Consult: No  Speech Therapy Consult: No  Support Systems: Spouse/Significant Other,Child(pat)  Confirm Follow Up Transport: Family  The Plan for Transition of Care is Related to the Following Treatment Goals : TBD  The Patient and/or Patient Representative was Provided with a Choice of Provider and Agrees with the Discharge Plan?: Yes  Name of the Patient Representative Who was Provided with a Choice of Provider and Agrees with the Discharge Plan: Daughter  Freedom of Choice List was Provided with Basic Dialogue that Supports the Patient's Individualized Plan of Care/Goals, Treatment Preferences and Shares the Quality Data Associated with the Providers?: Yes  Discharge Location  Discharge Placement: Unable to determine at this time        Pt awaiting bed assignment. CM met with pt's children to discuss CM needs & DCP. Pt is intubated. Patient was hospitalized for amputation in 2021, transferred to [facility] for rehab and then dc to [facility]. Patient was dc home on October 30, 2021. Interim HH was arranged after dc from [place]. Prior to this hospitalization patient was partially dependent at home with all ADLS. Pt lives with spouse and daughter. Pt has walker, cane, WC, prosthetic SC, BSC; no further DME needs. Pt has no difficulty with obtaining medications or transport. Daughter assists with all transport needs. Patient will need PT OT evals once extubated. DCP TBD CM to continue to monitor.

## 2022-01-06 NOTE — PROGRESS NOTES
Ventilator check complete; patient has a #8. 0 ET tube secured at the 26 at the lip. Patient is not able to follow commands. Breath sounds are diminished. Trachea is midline, Negative for subcutaneous air, and chest excursion is symmetric. Patient is also Negative for cyanosis. All alarms are set and audible. Resuscitation bag is at the head of the bed.       Ventilator Settings  Mode FIO2 Rate Tidal Volume Pressure PEEP I:E Ratio   Assist control,VC+  70 % (increased per ABG)   24 500 ml     8 cm H20  1:1.78      Peak airway pressure: 28 cm H2O   Minute ventilation: 12.5 l/min       Thor Gal

## 2022-01-06 NOTE — H&P
Formerly [facility], later [facility] Critical Care Note[de-identified] 1/6/2022  Lucian Rivas  Admission Date: 1/6/2022     Length of Stay: 0 days    Background: 77 y.o. y/o male was transported to the ER via EMS after they were called for dyspnea. He was awake and alert and appeared comfortable when they arrived but en route he became unresponsive. He required intubation in the ER. ABG -> 7.2/CO2 80/PO2 119/HCO3 30. 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 He required bipap during that admission for hypercapnea. An outpatient PSG was recommended but he has not had this done yet. His daughter reports daytime somnolence and states that there are days that he gets up just to eat and otherwise sleeps. Sometimes he cannot hold his head up. She has noticed myoclonic jerking. He felt short of breath a week ago so went to urgent care who prescribed 20 mg of Lasix per day for 5 days. She felt like he looked better after taking the lasix but after he completed the course, the shortness of breath returned. He does not weigh himself and he has not noticed any edema. Echo last July showed an EF of 30 to 35%. He is not maintained on any lasix and he takes Florinef for a history of hypotension. His was hypotensive on admission but this worsened after intubation. He has received a fluid bolus, a dose of cortisone and he is now on Levophed. His WBC is normal. He has not had any fever. He was treated for bronchitis with a Z pack around Thanksgiving and his cough resolved.       Notable PMH:  has a past medical history of A-fib ([address] (07/19/2021), Acute on chronic respiratory failure with hypoxia and hypercapnia ([address] (7/23/2021), Acute respiratory failure with hypercapnia ([address] (7/23/2021), Atrial fibrillation with RVR ([address] (7/19/2021), CAD (coronary artery disease), Cellulitis (7/19/2021), Chronic kidney disease, COVID-19 vaccine series completed, Current use of long term anticoagulation, 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 mellitus, limited to breakdown of skin ([facility] (9/27/2021), H/O heart artery stent, History of MI (myocardial infarction) (1999), Hypercholesterolemia, Hypertension, Left ventricular dysfunction, Neuropathy, Oxygen dependent, PICC (peripherally inserted central catheter) in place, Staphylococcus aureus bacteremia (3/59/6279), Systolic CHF, acute on chronic ([facility] (7/19/2021), and Type 2 diabetes mellitus ([facility] 24 Hour events: NA    ROS: unable to obtain/negative except as listed elsewhere. Social History     Socioeconomic History    Marital status:      Spouse name: Not on file    Number of children: Not on file    Years of education: Not on file    Highest education level: Not on file   Occupational History    Not on file   Tobacco Use    Smoking status: Never Smoker    Smokeless tobacco: Never Used   Vaping Use    Vaping Use: Never used   Substance and Sexual Activity    Alcohol use: No    Drug use: No    Sexual activity: Not Currently     Partners: Female   Other Topics Concern     Service Not Asked    Blood Transfusions Not Asked    Caffeine Concern Not Asked    Occupational Exposure Not Asked    Hobby Hazards Not Asked    Sleep Concern Not Asked    Stress Concern Not Asked    Weight Concern Not Asked    Special Diet Not Asked    Back Care Not Asked    Exercise Not Asked    Bike Helmet Not Asked   [address] Not Asked    Self-Exams Not Asked   Social History Narrative    Lives with his daughter     Social Determinants of Health     Financial Resource Strain:     Difficulty of Paying Living Expenses: Not on file   Food Insecurity:     Worried About Running Out of Food in the Last Year: Not on file    Agueda of Food in the Last Year: Not on file   Transportation Needs:     Lack of Transportation (Medical): Not on file    Lack of Transportation (Non-Medical):  Not on file   Physical Activity:     Days of Exercise per Week: Not on file    Minutes of Exercise per Session: Not on file   Stress:     Feeling of Stress : Not on file   Social Connections:     Frequency of Communication with Friends and Family: Not on file    Frequency of Social Gatherings with Friends and Family: Not on file    Attends Taoist Services: Not on file    Active Member of [address] [place] [address] [employer] or Organizations: Not on file    Attends Club or Organization Meetings: Not on file    Marital Status: Not on file   Intimate Partner Violence:     Fear of Current or Ex-Partner: Not on file    Emotionally Abused: Not on file    Physically Abused: Not on file    Sexually Abused: Not on file   Housing Stability:     Unable to Pay for Housing in the Last Year: Not on file    Number of [place] in the Last Year: Not on file    Unstable Housing in the Last Year: Not on file     Family History   Problem Relation Age of Onset    Cancer Mother     Cancer Father     No Known Problems Maternal Grandmother     No Known Problems Maternal Grandfather     No Known Problems Paternal Grandmother     No Known Problems Paternal Grandfather      Lines: (insertion date)   ETT: (1/6)  Pineda: (1/6)  OGT: (1/6)  Central line: (NA)  Arterial Line (NA)    Drips: current dose (range)  Levophed Dose (mcg/kg/min): 0.0829 mcg/kg/min     Pertinent Exam:         Blood pressure (!) 136/110, pulse 70, temperature 97 °F (36.1 °C), resp. rate 21, weight 266 lb (120.7 kg), SpO2 98 %. No intake or output data in the 24 hours ending 01/06/22 1341  Constitutional:  intubated and mechanically ventilated. EENMT:  Sclera clear, pupils equal, orally intubated  Respiratory: clear from anterior -bilaterally. Respirations even and not labored  Cardiovascular:  RRR - sinus per telemetry  Gastrointestinal:  soft with no tenderness; positive bowel sounds present  Musculoskeletal:  warm with no cyanosis, no lower extremity edema.  Right prosthetic leg  Skin:  no jaundice or ecchymosis  Neurologic: unrespsonsive  Psychiatric: cannot assess    CXR:       Recent Labs     01/06/22  1159   WBC 8.2   HGB 12.8*   HCT 45.3        Recent Labs     01/06/22  1159      K 4.9   *   CO2 34*   GLU 92   BUN 34*   CREA 1.78*   MG 2.4   CA 9.5   ALB 3.2   AST 20   ALT 20   *     Recent Labs     01/06/22  1159   TROPHS 22.0*   BNPNT 2,179*     Recent Labs     01/06/22  1204   GLUCPOC 88     ECHO: Results from Hospital Encounter encounter on 07/19/21    ECHO ADULT COMPLETE    Interpretation Summary  · Image quality for this study was poor. · Contrast used: DEFINITY. · LV: Estimated LVEF is 30 - 35%. Normal wall thickness. Mildly dilated left ventricle. Moderate-to-severely reduced systolic function. Wall motion abnormalities with suggest coronary artery disease  · LA: Mildly dilated left atrium. · RA: Mildly dilated right atrium. · MV: Mild mitral annular calcification. Mild mitral valve regurgitation is present. · TV: Mild tricuspid valve regurgitation is present. Results     ** No results found for the last 336 hours. **        Inpat Anti-Infectives (From admission, onward)    None        Ventilator Settings:  Ideal body weight: 73 kg (160 lb 15 oz)   Mode FIO2 Rate Tidal Volume Pressure PEEP   Assist control,VC+  70 % (increased per ABG)    500 ml     8 cm H20    Peak airway pressure: 28 cm H2O       Minute ventilation: 12.5 l/min  ABG:  Recent Labs     01/06/22  1328 01/06/22  1204   PHI 7.43 7.20*   PCO2I 44.8 80.0*   PO2I 59* 119*   HCO3I 29.6* 30.9*     Assessment and Plan:  (Medical Decision Making)   Impression: 77 y.o. male with acute respiratory failure secondary to hypercapnea. Required intubation in the ER with a CO2 of 80, ph 7.2. CXR with bilateral infiltrates/cardiomyopathy. BNP 2179. Has severe ELAINA per recent at home sleep study (AHI 54.8 with desaturation to 68%). Formal outpatient study rcommeneded. Repeat ABG with CO2 correction. Hypotensive requiring pressor support. On Florinef at home for hypotension. EF 30 to 35% by history but no medical therapy due to hypotension. NEURO:   Sedation: will start on continuous sedation while intubated - try to limit as doubt he will need intubation for long  Analgesia: as above  CV:   Volume Status: no edema. BNP 2179. +hypotension with pressor support. Will plan to give some lasix when blood pressure better. Add Midodrine for blood pressure support  History of a fib: occurred summer 2021. On Eliquis. Now in SR. PULM:   Acute hypoxemic/hypercapneic respiratory failure:  Intubated due to hypercapnea which has corrected on follow up ABG. Will leave intubated for tonight and reassess in AM. Limit sedation and allow to wake up in the AM and then wean to CPAP as tolerated. Will need to extubate to bipap/AVAPS. Will send message to the office to get outpatient sleep study arranged. CXR with infiltrates/elevated BNP. Will plan to give lasix when blood pressure will allow. RENAL:  CKD:  Stable. Will plan to give lasix when blood pressure better. Monitor renal function  Lactic acidosis: pending  Electrolytes: no supplement needed today  GI:   Nutrition: none for now. Consider TF tomorrow if he cannot be extubated  HEME:   Anemia: mild  Anticoagulation: on Eliquis with history of a fib  ID:   No evidence of infection  ENDO: A1C 5.5  Skin: no decub, turns, preventive care  Prophy: eliquis and pepcid    Admit to ICU  Full code    Bev Barnett, NP     I have spoken with and examined the patient. I agree with the above assessment and plan as documented. Gen: sedated  Lungs:  Diminished, on Vent 70%, sat reading inconsistently, currently moving  Heart:  RRR with no Murmur/Rubs/Gallops  Abd: soft, obese  Ext: R AKA, no clear edema    Acute on chronic hypercapneic respiratory failure likely due to OHS, chronic systolic heart failure, undergoing sleep evaluation with inlap titration pending.  Will see if can get expedited for discharge. Keep intubated, sedated overnight, but will evaluate for extubation tomorrow. Add midodrine, if off levophed will try gentle diuresis.      Sri Rodriguez MD

## 2022-01-06 NOTE — ED PROVIDER NOTES
67-year-old  male with history of CAD, A. fib, CHF with an EF of 30 to 35%, HTN, hypercholesterolemia, and type 2 diabetes presents the emergency department via EMS for increased shortness of breath over the last 2 days. According to EMS when they arrived at the house, the patient was playing on his phone, and was able to answer all questions appropriately stand up and get on the stretcher. In route, the patient became less responsive approximately 3 blocks from the facility at arrival here is not responding to any questions. The history is provided by the patient and the EMS personnel. The history is limited by the condition of the patient. Respiratory Distress  This is a new problem. The average episode lasts 2 days. The problem occurs continuously. The current episode started more than 2 days ago. The problem has been rapidly worsening. It is unknown what precipitated the problem. The treatment provided no relief. Associated medical issues include CAD and heart failure. Past Medical History:   Diagnosis Date    A-fib [facility]) 07/19/2021    developed AFID after hospital admission for wound infection- started on Eliquis    CAD (coronary artery disease)     [facility].     Chronic kidney disease     stage 3- improved    COVID-19 vaccine series completed     Moderna Vaccine completed X2 doses    Current use of long term anticoagulation     Eliquis and Aspirin    H/O heart artery stent     X1 placed in 1999    History of MI (myocardial infarction) 1999    stent placed X1    Hypercholesterolemia     Hypertension     Left ventricular dysfunction     echo revealed EF 30-35%, mildly dilated LA/RA and mild TR and MR.    Neuropathy     severe    Oxygen dependent     recently started on 2L NC continous- Sleep study to be scheduled-questionable ELAINA    PICC (peripherally inserted central catheter) in place     PICC line in place to R arm    Type 2 diabetes mellitus ([facility] [address]     oral agent/Pt does not monitor BS/no s.s of hypoglycemia/Last A1c: 6.7 on 7/13/21 per daughter       Past Surgical History:   Procedure Laterality Date    HX ORTHOPAEDIC  08/18/2021    BKA    [place] CARDIAC SURG PROCEDURE UNLIST  1999    stent placement         Family History:   Problem Relation Age of Onset    Cancer Mother     Cancer Father     No Known Problems Maternal Grandmother     No Known Problems Maternal Grandfather     No Known Problems Paternal Grandmother     No Known Problems Paternal Grandfather        Social History     Socioeconomic History    Marital status:      Spouse name: Not on file    Number of children: Not on file    Years of education: Not on file    Highest education level: Not on file   Occupational History    Not on file   Tobacco Use    Smoking status: Never Smoker    Smokeless tobacco: Never Used   Vaping Use    Vaping Use: Never used   Substance and Sexual Activity    Alcohol use: No    Drug use: No    Sexual activity: Not Currently     Partners: Female   Other Topics Concern     Service Not Asked    Blood Transfusions Not Asked    Caffeine Concern Not Asked    Occupational Exposure Not Asked    Hobby Hazards Not Asked    Sleep Concern Not Asked    Stress Concern Not Asked    Weight Concern Not Asked    Special Diet Not Asked    Back Care Not Asked    Exercise Not Asked    Bike Helmet Not Asked    Seat Belt Not Asked    Self-Exams Not Asked   Social History Narrative    Not on file     Social Determinants of Health     Financial Resource Strain:     Difficulty of Paying Living Expenses: Not on file   Food Insecurity:     Worried About Running Out of Food in the Last Year: Not on file    Agueda of Food in the Last Year: Not on file   Transportation Needs:     Lack of Transportation (Medical): Not on file    Lack of Transportation (Non-Medical):  Not on file   Physical Activity:     Days of Exercise per Week: Not on file    Minutes of Exercise per Session: Not on file   Stress:     Feeling of Stress : Not on file   Social Connections:     Frequency of Communication with Friends and Family: Not on file    Frequency of Social Gatherings with Friends and Family: Not on file    Attends Mandaen Services: Not on file    Active Member of Clubs or Organizations: Not on file    Attends Club or Organization Meetings: Not on file    Marital Status: Not on file   Intimate Partner Violence:     Fear of Current or Ex-Partner: Not on file    Emotionally Abused: Not on file    Physically Abused: Not on file    Sexually Abused: Not on file   Housing Stability:     Unable to Pay for Housing in the Last Year: Not on file    Number of [place] in the Last Year: Not on file    Unstable Housing in the Last Year: Not on file         ALLERGIES: Patient has no known allergies. Review of Systems   Unable to perform ROS: Acuity of condition       Vitals:    01/06/22 1200 01/06/22 1201 01/06/22 1202 01/06/22 1203   BP: (!) 65/45      Pulse: (!) 118 (!) 121 (!) 108 (!) 114   Resp: 26 27 20 20   SpO2: 100% 99% 99% 99%   Weight:   120.7 kg (266 lb)             Physical Exam  Vitals and nursing note reviewed. Constitutional:       General: He is not in acute distress. Appearance: He is well-developed. He is morbidly obese. Comments: Patient essentially unresponsive except to painful stimuli, CPAP in place. HENT:      Head: Normocephalic and atraumatic. Right Ear: External ear normal.      Left Ear: External ear normal.   Eyes:      Conjunctiva/sclera: Conjunctivae normal.      Pupils: Pupils are equal, round, and reactive to light. Cardiovascular:      Rate and Rhythm: Normal rate. Rhythm irregularly irregular. Heart sounds: Normal heart sounds. No murmur heard. Pulmonary:      Effort: Tachypnea, accessory muscle usage and respiratory distress present. Breath sounds: Decreased air movement present.  Examination of the right-lower field reveals decreased breath sounds. Decreased breath sounds and rhonchi (Scattered) present. No wheezing or rales. Abdominal:      General: Bowel sounds are normal.      Palpations: Abdomen is soft. Tenderness: There is no abdominal tenderness. Musculoskeletal:      Cervical back: Normal range of motion and neck supple. Left lower leg: Edema (1+.) present. Comments: Patient with right BKA, currently wearing prosthesis. Skin:     General: Skin is warm and dry. Capillary Refill: Capillary refill takes less than 2 seconds. Neurological:      Mental Status: He is unresponsive. GCS: GCS eye subscore is 1. GCS verbal subscore is 2. GCS motor subscore is 3. Psychiatric:         Speech: He is noncommunicative. MDM  Number of Diagnoses or Management Options  Acute respiratory failure with hypoxia and hypercapnia ([address]): new and requires workup     Amount and/or Complexity of Data Reviewed  Clinical lab tests: ordered and reviewed  Tests in the radiology section of CPT®: ordered and reviewed  Tests in the medicine section of CPT®: ordered and reviewed (ECG interpretation for ECG dated 6 January 2022 at 11:41 AM: ECG reveals atrial fibrillation rate 72 bpm, right bundle branch block, with no evidence of acute ischemic change. Abnormal ECG.   Leslie Petersen MD  )  Obtain history from someone other than the patient: yes  Review and summarize past medical records: yes  Discuss the patient with other providers: yes    Risk of Complications, Morbidity, and/or Mortality  Presenting problems: high  Diagnostic procedures: moderate  Management options: high    Patient Progress  Patient progress: (Patient remains critical)         Intubation    Date/Time: 1/6/2022 12:18 PM  Performed by: Maryjane Rhodes MD  Authorized by: Maryjane Rhodes MD     Consent:     Consent obtained:  Emergent situation    Consent given by:  Patient  Pre-procedure details:     Patient status:  Unresponsive Mallampati score:  II    Pretreatment meds: Etomidate. Paralytics:  Rocuronium  Procedure details:     Preoxygenation:  Bag valve mask    CPR in progress: no      Intubation method:  Oral    Oral intubation technique:  Video-assisted    Laryngoscope blade: Mac 3    Tube size (mm):  8.0    Tube type:  Cuffed    Number of attempts:  1    Cricoid pressure: no      Tube visualized through cords: yes    Placement assessment:     ETT to lip:  26    ETT to teeth:  25    Tube secured with:  ETT tapia    Breath sounds:  Equal and absent over the epigastrium    Placement verification: chest rise, condensation, CXR verification, direct visualization, equal breath sounds, ETCO2 detector and tube exhalation      CXR findings:  ETT in proper place  Post-procedure details:     Patient tolerance of procedure: Tolerated well, no immediate complications        On presentation, the patient was unresponsive and  normotensive. Patient was in severe respiratory distress, and was emergently intubated on presentation. Subsequent to intubation, the patient was very hypotensive requiring 2 separate doses of stress dose Simba, and was subsequently started on Levophed. Patient was sedated with ketamine to help with blood pressure, given a fluid bolus, and due to the fact his medication list indicated he was on cortisone, he was given a stress dose of 100 mg of cortisone IV. Patient is now being titrated off the Levophed as his pressures are much improved. Initial ABG revealed respiratory acidosis and electrolytes revealed a very high sodium. Blood glucose was 100.       Results Reviewed:      Recent Results (from the past 24 hour(s))   CBC WITH AUTOMATED DIFF    Collection Time: 01/06/22 11:59 AM   Result Value Ref Range    WBC 8.2 4.3 - 11.1 K/uL    RBC 4.64 4.23 - 5.6 M/uL    HGB 12.8 (L) 13.6 - 17.2 g/dL    HCT 45.3 41.1 - 50.3 %    MCV 97.6 79.6 - 97.8 FL    MCH 27.6 26.1 - 32.9 PG    MCHC 28.3 (L) 31.4 - 35.0 g/dL    RDW 16.4 (H) 11.9 - 14.6 %    PLATELET 450 869 - 186 K/uL    MPV 12.5 (H) 9.4 - 12.3 FL    ABSOLUTE NRBC 0.03 0.0 - 0.2 K/uL    DF AUTOMATED      NEUTROPHILS 52 43 - 78 %    LYMPHOCYTES 36 13 - 44 %    MONOCYTES 9 4.0 - 12.0 %    EOSINOPHILS 2 0.5 - 7.8 %    BASOPHILS 1 0.0 - 2.0 %    IMMATURE GRANULOCYTES 1 0.0 - 5.0 %    ABS. NEUTROPHILS 4.3 1.7 - 8.2 K/UL    ABS. LYMPHOCYTES 3.0 0.5 - 4.6 K/UL    ABS. MONOCYTES 0.7 0.1 - 1.3 K/UL    ABS. EOSINOPHILS 0.2 0.0 - 0.8 K/UL    ABS. BASOPHILS 0.0 0.0 - 0.2 K/UL    ABS. IMM. GRANS. 0.1 0.0 - 0.5 K/UL   BLOOD GAS & LYTES, ARTERIAL    Collection Time: 01/06/22 12:04 PM   Result Value Ref Range    pH (POC) 7.20 (LL) 7.35 - 7.45      pCO2 (POC) 80.0 (HH) 35 - 45 MMHG    pO2 (POC) 119 (H) 75 - 100 MMHG    Sodium,  (H) 136 - 145 MMOL/L    Potassium, POC 5.2 (H) 3.5 - 5.1 MMOL/L    Calcium, ionized (POC) 1.30 1.12 - 1.32 mmol/L    Glucose, POC 88 65 - 100 MG/DL    Base excess (POC) 0.5 mmol/L    HCO3 (POC) 30.9 (H) 22 - 26 MMOL/L    CO2, POC 32 (H) 13 - 23 MMOL/L    O2 SAT 97 %    Sample source ARTERIAL      SITE RIGHT RADIAL      MANUELA'S TEST Positive      Device: ADULT VENT      MODE ASSIST CONTROL      FIO2 60 %    Tidal volume 450      Total resp. rate 20      PEEP/CPAP 8      Inspiratory Time 0.9 sec    IPAP/PIP 25      Mean Airway Pressure 13 cmH2O    Respiratory Rate 20      Performed by Mariella     GFRAA, POC Cannot be calculated >60 ml/min/1.73m2    GFRNA, POC Cannot be calculated >60 ml/min/1.73m2    Critical value read back KAROL     Respiratory comment: [id number]        CRITICAL CARE Documentation: This patient is critically ill and there is a high probability of of imminent or life threatening deterioration in the patient's condition without immediate management.     The nature of the patient's clinical problem is: Acute respiratory failure with hypoxia and hypercarbia    I have spent 1 hour in direct patient care, documentation, review of labs/xrays/old records, discussion with Family, Staff, Colleague, Nursing . The time involved in the performance of separately reportable procedures was not counted toward critical care time.      Juliano Ordaz MD; 1/6/2022 @1:00 PM

## 2022-01-07 ENCOUNTER — APPOINTMENT (OUTPATIENT)
Dept: GENERAL RADIOLOGY | Age: 77
DRG: 208 | End: 2022-01-07
Payer: MEDICARE

## 2022-01-07 LAB
ANION GAP SERPL CALC-SCNC: 6 MMOL/L (ref 7–16)
ARTERIAL PATENCY WRIST A: ABNORMAL
ARTERIAL PATENCY WRIST A: POSITIVE
BASE EXCESS BLD CALC-SCNC: 3.6 MMOL/L
BASE EXCESS BLD CALC-SCNC: 5.3 MMOL/L
BASOPHILS # BLD: 0.1 K/UL (ref 0–0.2)
BASOPHILS NFR BLD: 0 % (ref 0–2)
BDY SITE: ABNORMAL
BDY SITE: ABNORMAL
BUN SERPL-MCNC: 40 MG/DL (ref 8–23)
CALCIUM SERPL-MCNC: 9.5 MG/DL (ref 8.3–10.4)
CHLORIDE SERPL-SCNC: 109 MMOL/L (ref 98–107)
CO2 SERPL-SCNC: 29 MMOL/L (ref 21–32)
CREAT SERPL-MCNC: 1.84 MG/DL (ref 0.8–1.5)
DIFFERENTIAL METHOD BLD: ABNORMAL
EOSINOPHIL # BLD: 0.2 K/UL (ref 0–0.8)
EOSINOPHIL NFR BLD: 2 % (ref 0.5–7.8)
ERYTHROCYTE [DISTWIDTH] IN BLOOD BY AUTOMATED COUNT: 16.2 % (ref 11.9–14.6)
GAS FLOW.O2 O2 DELIVERY SYS: ABNORMAL L/MIN
GAS FLOW.O2 O2 DELIVERY SYS: ABNORMAL L/MIN
GLUCOSE SERPL-MCNC: 119 MG/DL (ref 65–100)
HCO3 BLD-SCNC: 27.4 MMOL/L (ref 22–26)
HCO3 BLD-SCNC: 29.4 MMOL/L (ref 22–26)
HCT VFR BLD AUTO: 41 % (ref 41.1–50.3)
HGB BLD-MCNC: 12.3 G/DL (ref 13.6–17.2)
IMM GRANULOCYTES # BLD AUTO: 0 K/UL (ref 0–0.5)
IMM GRANULOCYTES NFR BLD AUTO: 0 % (ref 0–5)
INSPIRATION.DURATION SETTING TIME VENT: 0.9 SEC
LYMPHOCYTES # BLD: 4.2 K/UL (ref 0.5–4.6)
LYMPHOCYTES NFR BLD: 38 % (ref 13–44)
MAGNESIUM SERPL-MCNC: 1.8 MG/DL (ref 1.8–2.4)
MCH RBC QN AUTO: 26.7 PG (ref 26.1–32.9)
MCHC RBC AUTO-ENTMCNC: 30 G/DL (ref 31.4–35)
MCV RBC AUTO: 89.1 FL (ref 79.6–97.8)
MONOCYTES # BLD: 1.1 K/UL (ref 0.1–1.3)
MONOCYTES NFR BLD: 10 % (ref 4–12)
NEUTS SEG # BLD: 5.7 K/UL (ref 1.7–8.2)
NEUTS SEG NFR BLD: 50 % (ref 43–78)
NRBC # BLD: 0.07 K/UL (ref 0–0.2)
O2/TOTAL GAS SETTING VFR VENT: 50 %
O2/TOTAL GAS SETTING VFR VENT: 70 %
PAW @ MEAN EXP FLOW ON VENT: 21 CMH2O
PCO2 BLD: 32.1 MMHG (ref 35–45)
PCO2 BLD: 48.1 MMHG (ref 35–45)
PEEP RESPIRATORY: 10 CMH2O
PEEP RESPIRATORY: 8 CMH2O
PH BLD: 7.39 [PH] (ref 7.35–7.45)
PH BLD: 7.54 [PH] (ref 7.35–7.45)
PIP ISTAT,IPIP: 36
PLATELET # BLD AUTO: 246 K/UL (ref 150–450)
PMV BLD AUTO: 12.7 FL (ref 9.4–12.3)
PO2 BLD: 158 MMHG (ref 75–100)
PO2 BLD: 78 MMHG (ref 75–100)
POTASSIUM SERPL-SCNC: 3.9 MMOL/L (ref 3.5–5.1)
PRESSURE SUPPORT SETTING VENT: 15 CMH2O
RBC # BLD AUTO: 4.6 M/UL (ref 4.23–5.6)
SAO2 % BLD: 95.1 % (ref 95–98)
SAO2 % BLD: 99.6 % (ref 95–98)
SERVICE CMNT-IMP: 10.7
SERVICE CMNT-IMP: ABNORMAL
SERVICE CMNT-IMP: ABNORMAL
SODIUM SERPL-SCNC: 144 MMOL/L (ref 136–145)
SPECIMEN TYPE: ABNORMAL
SPECIMEN TYPE: ABNORMAL
TOTAL RESP. RATE, ITRR: 26
VENTILATION MODE VENT: ABNORMAL
VENTILATION MODE VENT: ABNORMAL
VT SETTING VENT: 500 ML
WBC # BLD AUTO: 11.3 K/UL (ref 4.3–11.1)

## 2022-01-07 PROCEDURE — 80048 BASIC METABOLIC PNL TOTAL CA: CPT

## 2022-01-07 PROCEDURE — 36415 COLL VENOUS BLD VENIPUNCTURE: CPT

## 2022-01-07 PROCEDURE — 71045 X-RAY EXAM CHEST 1 VIEW: CPT

## 2022-01-07 PROCEDURE — 82803 BLOOD GASES ANY COMBINATION: CPT

## 2022-01-07 PROCEDURE — 74011000258 HC RX REV CODE- 258: Performed by: INTERNAL MEDICINE

## 2022-01-07 PROCEDURE — 36556 INSERT NON-TUNNEL CV CATH: CPT

## 2022-01-07 PROCEDURE — 74011000250 HC RX REV CODE- 250: Performed by: INTERNAL MEDICINE

## 2022-01-07 PROCEDURE — 99233 SBSQ HOSP IP/OBS HIGH 50: CPT | Performed by: INTERNAL MEDICINE

## 2022-01-07 PROCEDURE — 74011250636 HC RX REV CODE- 250/636: Performed by: INTERNAL MEDICINE

## 2022-01-07 PROCEDURE — 77030031476 HC EXCH HEAT MOISTW FLTR HALY -A

## 2022-01-07 PROCEDURE — 05HN33Z INSERTION OF INFUSION DEVICE INTO LEFT INTERNAL JUGULAR VEIN, PERCUTANEOUS APPROACH: ICD-10-PCS

## 2022-01-07 PROCEDURE — 94003 VENT MGMT INPAT SUBQ DAY: CPT

## 2022-01-07 PROCEDURE — 65610000006 HC RM INTENSIVE CARE

## 2022-01-07 PROCEDURE — 83735 ASSAY OF MAGNESIUM: CPT

## 2022-01-07 PROCEDURE — 74011250637 HC RX REV CODE- 250/637: Performed by: INTERNAL MEDICINE

## 2022-01-07 PROCEDURE — 36600 WITHDRAWAL OF ARTERIAL BLOOD: CPT

## 2022-01-07 PROCEDURE — 03HY32Z INSERTION OF MONITORING DEVICE INTO UPPER ARTERY, PERCUTANEOUS APPROACH: ICD-10-PCS

## 2022-01-07 PROCEDURE — 85025 COMPLETE CBC W/AUTO DIFF WBC: CPT

## 2022-01-07 PROCEDURE — 36620 INSERTION CATHETER ARTERY: CPT

## 2022-01-07 RX ADMIN — APIXABAN 5 MG: 5 TABLET, FILM COATED ORAL at 21:15

## 2022-01-07 RX ADMIN — FAMOTIDINE 20 MG: 10 INJECTION INTRAVENOUS at 09:01

## 2022-01-07 RX ADMIN — APIXABAN 5 MG: 5 TABLET, FILM COATED ORAL at 09:01

## 2022-01-07 RX ADMIN — POLYETHYLENE GLYCOL 3350 17 G: 17 POWDER, FOR SOLUTION ORAL at 09:01

## 2022-01-07 RX ADMIN — NOREPINEPHRINE-DEXTROSE IV SOLUTION 4 MG/250ML-5% 10 MCG/MIN: 4-5/250 SOLUTION at 00:30

## 2022-01-07 RX ADMIN — MIDODRINE HYDROCHLORIDE 10 MG: 5 TABLET ORAL at 14:09

## 2022-01-07 RX ADMIN — ATORVASTATIN CALCIUM 40 MG: 40 TABLET, FILM COATED ORAL at 21:15

## 2022-01-07 RX ADMIN — SODIUM CHLORIDE, PRESERVATIVE FREE 10 ML: 5 INJECTION INTRAVENOUS at 05:49

## 2022-01-07 RX ADMIN — PREGABALIN 200 MG: 100 CAPSULE ORAL at 16:33

## 2022-01-07 RX ADMIN — MIDODRINE HYDROCHLORIDE 10 MG: 5 TABLET ORAL at 09:00

## 2022-01-07 RX ADMIN — PROPOFOL 20 MCG/KG/MIN: 10 INJECTION, EMULSION INTRAVENOUS at 03:00

## 2022-01-07 RX ADMIN — PREGABALIN 200 MG: 100 CAPSULE ORAL at 21:15

## 2022-01-07 RX ADMIN — SODIUM CHLORIDE, PRESERVATIVE FREE 10 ML: 5 INJECTION INTRAVENOUS at 14:31

## 2022-01-07 RX ADMIN — SODIUM CHLORIDE, PRESERVATIVE FREE 40 ML: 5 INJECTION INTRAVENOUS at 21:16

## 2022-01-07 RX ADMIN — DEXMEDETOMIDINE HYDROCHLORIDE 0.4 MCG/KG/HR: 400 INJECTION INTRAVENOUS at 14:16

## 2022-01-07 RX ADMIN — SODIUM CHLORIDE, PRESERVATIVE FREE 10 ML: 5 INJECTION INTRAVENOUS at 14:20

## 2022-01-07 RX ADMIN — MIDODRINE HYDROCHLORIDE 10 MG: 5 TABLET ORAL at 21:15

## 2022-01-07 RX ADMIN — PREGABALIN 200 MG: 100 CAPSULE ORAL at 09:01

## 2022-01-07 RX ADMIN — FLUDROCORTISONE ACETATE 0.1 MG: 0.1 TABLET ORAL at 09:01

## 2022-01-07 RX ADMIN — LORAZEPAM 1 MG: 2 INJECTION INTRAMUSCULAR; INTRAVENOUS at 10:59

## 2022-01-07 RX ADMIN — NOREPINEPHRINE-DEXTROSE IV SOLUTION 4 MG/250ML-5% 12 MCG/MIN: 4-5/250 SOLUTION at 07:55

## 2022-01-07 NOTE — PROGRESS NOTES
Ventilator check complete; patient has a #8. 0 ET tube secured at the 26 at the lip. Patient is sedated. Patient is not able to follow commands. Breath sounds are diminished. Trachea is midline, Negative for subcutaneous air, and chest excursion is symmetric. Patient is also Negative for cyanosis and is Negative for pitting edema. All alarms are set and audible. Resuscitation bag is at the head of the bed. Ventilator Settings  Mode FIO2 Rate Tidal Volume Pressure PEEP I:E Ratio   VC+  50 %    450 ml     8 cm H20  1:2.0      Peak airway pressure: 39 cm H2O   Minute ventilation: 10.3 l/min     ABG: No results for input(s): PH, PCO2, PO2, HCO3 in the last 72 hours.       Megha Crowell, RT

## 2022-01-07 NOTE — PROGRESS NOTES
TRANSFER - IN REPORT:    Verbal report received from [address] [place] [address] (name) on Rollene Abo  being received from ED (unit) for routine progression of care      Report consisted of patients Situation, Background, Assessment and   Recommendations(SBAR). Information from the following report(s) SBAR and ED Summary was reviewed with the receiving nurse. Opportunity for questions and clarification was provided. Assessment completed upon patients arrival to unit and care assumed.

## 2022-01-07 NOTE — PROCEDURES
PROCEDURE NOTE  ARTERIAL LINE PLACEMENT  01/07/22  5:34 AM    Indication: shock in need of hemodynamic monitoring    A time-out was completed verifying correct patient, procedure, site, positioning, and special equipment if applicable. Salvadors test was performed to ensure adequate perfusion. The patients left wrist was prepped and draped in sterile fashion. A 20G Arrow arterial line was introduced into the  left radial artery. The catheter was threaded over the guide wire and the needle was removed with appropriate pulsatile blood return. The catheter was then sutured in place to the skin and a sterile dressing applied. Perfusion to the extremity distal to the point of catheter insertion was checked and found to be adequate. The patient tolerated the procedure well and there were no complications.     Genoveva Freedman MD
Procedure:   US GUIDED CENTRAL LINE PLACEMENT    Indication:   Respiratory failure hypertension  Summary:  Informed consent was obtained. A time-out was performed. Using the Sonosite ultrasound with the 5-10 MHz vascular probe transducer and sterile technique, the left IJ vein was identified and under direct real time visualization was cannulated. The CVC kit guide wire was then inserted and its position within the left IJ* vein verified in short and long axis views on vascular probe US. Using Seldinger technique, a 8.5 So Olp quad lumen catheter was then placed in the left IJ vein, after dilation of entry port without difficulty. There was no significant bleeding during the procedure and good flush and drawback was noted. The CVC was then affixed with supplied 2.0 silk sutures via the proximal and distal limiters, with the insertion point affixed at 20 cm. A biostatic disc was applied to the entry port followed by a transparent dressing. A chest x-ray is ordered to confirm proper placement. There were no immediate complications.     Dany De Los Santos MD
- - -

## 2022-01-07 NOTE — ED NOTES
TRANSFER - OUT REPORT:    Verbal report given to Dominik GALVIN on Ajith Antonio  being transferred to [facility] for routine progression of care       Report consisted of patients Situation, Background, Assessment and   Recommendations(SBAR). Information from the following report(s) SBAR, ED Summary, [facility] and Recent Results was reviewed with the receiving nurse. Lines:   Peripheral IV 01/06/22 Left;Posterior Hand (Active)       Peripheral IV 01/06/22 Right Antecubital (Active)   Site Assessment Clean, dry, & intact 01/06/22 1235   Phlebitis Assessment 0 01/06/22 1235   Infiltration Assessment 0 01/06/22 1235   Dressing Status Clean, dry, & intact 01/06/22 1235   Dressing Type 4 X 4 01/06/22 1235   Hub Color/Line Status Green 01/06/22 1235   Alcohol Cap Used Yes 01/06/22 1235        Opportunity for questions and clarification was provided.       Patient transported with:   Registered Nurse

## 2022-01-07 NOTE — ED NOTES
After starting Precedex patient became bradycardic. Yao Garcia, NP made aware. NP ordered to titrate to 0.2 mcg/kg/hr.

## 2022-01-07 NOTE — PROGRESS NOTES
[facility] Critical Care Note[de-identified] 1/7/2022  Keenan Rivas  Admission Date: 1/6/2022     Length of Stay: 1 days    Background: 77 y.o. y/o male was transported to the ER via EMS after they were called for dyspnea. He was awake and alert and appeared comfortable when they arrived but en route he became unresponsive. He required intubation in the ER. ABG -> 7.2/CO2 80/PO2 119/HCO3 30. 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 He required bipap during that admission for hypercapnea. An outpatient PSG was recommended but he has not had this done yet. His daughter reports daytime somnolence and states that there are days that he gets up just to eat and otherwise sleeps. Sometimes he cannot hold his head up. She has noticed myoclonic jerking. He felt short of breath a week ago so went to urgent care who prescribed 20 mg of Lasix per day for 5 days. She felt like he looked better after taking the lasix but after he completed the course, the shortness of breath returned. He does not weigh himself and he has not noticed any edema. Echo last July showed an EF of 30 to 35%. He is not maintained on any lasix and he takes Florinef for a history of hypotension. His was hypotensive on admission but this worsened after intubation. His WBC is normal. He has not had any fever. He was treated for bronchitis with a Z pack around [facility] and his cough resolved.       Notable PMH:  has a past medical history of A-fib ([address] (07/19/2021), Acute on chronic respiratory failure with hypoxia and hypercapnia ([address] (7/23/2021), Acute respiratory failure with hypercapnia ([address] (7/23/2021), Atrial fibrillation with RVR ([address] (7/19/2021), CAD (coronary artery disease), Cellulitis (7/19/2021), Chronic kidney disease, COVID-19 vaccine series completed, Current use of long term anticoagulation, 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 mellitus, limited to breakdown of skin ([facility] (9/27/2021), H/O heart artery stent, History of MI (myocardial infarction) (1999), Hypercholesterolemia, Hypertension, Left ventricular dysfunction, Neuropathy, Oxygen dependent, PICC (peripherally inserted central catheter) in place, Staphylococcus aureus bacteremia (7/01/9056), Systolic CHF, acute on chronic ([facility] (7/19/2021), and Type 2 diabetes mellitus ([facility] 24 Hour events:  Now on PS. Off precedex due to bradycardia. Now on Fentanyl infusion and using prn ativan. Now on PS but had to increase to 12 cm due to low TV. Still on levophed. ROS: unable to obtain/negative except as listed elsewhere.      Social History     Socioeconomic History    Marital status:      Spouse name: Not on file    Number of children: Not on file    Years of education: Not on file    Highest education level: Not on file   Occupational History    Not on file   Tobacco Use    Smoking status: Never Smoker    Smokeless tobacco: Never Used   Vaping Use    Vaping Use: Never used   Substance and Sexual Activity    Alcohol use: No    Drug use: No    Sexual activity: Not Currently     Partners: Female   Other Topics Concern     Service Not Asked    Blood Transfusions Not Asked    Caffeine Concern Not Asked    Occupational Exposure Not Asked    Hobby Hazards Not Asked    Sleep Concern Not Asked    Stress Concern Not Asked    Weight Concern Not Asked    Special Diet Not Asked    Back Care Not Asked    Exercise Not Asked    Bike Helmet Not Asked   [address] Not Asked    Self-Exams Not Asked   Social History Narrative    Lives with his daughter     Social Determinants of Health     Financial Resource Strain:     Difficulty of Paying Living Expenses: Not on file   Food Insecurity:     Worried About Running Out of Food in the Last Year: Not on file    Agueda of Food in the Last Year: Not on file   Transportation Needs:     Lack of Transportation (Medical): Not on file    Lack of Transportation (Non-Medical): Not on file   Physical Activity:     Days of Exercise per Week: Not on file    Minutes of Exercise per Session: Not on file   Stress:     Feeling of Stress : Not on file   Social Connections:     Frequency of Communication with Friends and Family: Not on file    Frequency of Social Gatherings with Friends and Family: Not on file    Attends Judaism Services: Not on file    Active Member of [address] [place] [address] [employer] or Organizations: Not on file    Attends Club or Organization Meetings: Not on file    Marital Status: Not on file   Intimate Partner Violence:     Fear of Current or Ex-Partner: Not on file    Emotionally Abused: Not on file    Physically Abused: Not on file    Sexually Abused: Not on file   Housing Stability:     Unable to Pay for Housing in the Last Year: Not on file    Number of [place] in the Last Year: Not on file    Unstable Housing in the Last Year: Not on file     Family History   Problem Relation Age of Onset    Cancer Mother     Cancer Father     No Known Problems Maternal Grandmother     No Known Problems Maternal Grandfather     No Known Problems Paternal Grandmother     No Known Problems Paternal Grandfather      Lines: (insertion date)   ETT: (1/6)  Pineda: (1/6)  OGT: (1/6)  Central line: (1/7)  Arterial Line (1/7)    Drips: current dose (range)  Diprivan Dose (mcg/kg/min): 0 mcg/kg/min (SEDATION VACATION)  Precedex Dose (mcg/kg/hr): 0 mcg/kg/hr  Levophed Dose (mcg/kg/min): 0.0166 mcg/kg/min ()   Fentanyl at 50 mcg/hour    Pertinent Exam:         Blood pressure (!) 97/56, pulse (!) 122, temperature (P) 98.2 °F (36.8 °C), resp. rate 24, height 5' 10\" (1.778 m), weight 266 lb 5.1 oz (120.8 kg), SpO2 (!) 86 %.      Intake/Output Summary (Last 24 hours) at 1/7/2022 1132  Last data filed at 1/7/2022 0921  Gross per 24 hour   Intake 150 ml   Output 1380 ml   Net -1230 ml     Constitutional:  intubated and mechanically ventilated. EENMT:  Sclera clear, pupils equal, orally intubated  Respiratory: clear from anterior -bilaterally. Respirations even and not labored  Cardiovascular:  RRR - sinus per telemetry  Gastrointestinal:  soft with no evidence of tenderness; positive bowel sounds present  Musculoskeletal:  warm with no cyanosis, no lower extremity edema. Right BKA. Skin:  no jaundice or ecchymosis  Neurologic: arousable but becomes restless. Restrained for safety  Psychiatric: cannot assess    CXR:   1/7 1/6      Recent Labs     01/07/22  0418 01/06/22  1318 01/06/22  1159   WBC 11.3*  --  8.2   HGB 12.3*  --  12.8*   HCT 41.0*  --  45.3     --  220   LAC  --  1.0  --      Recent Labs     01/07/22  0418 01/06/22  1413 01/06/22  1159     --  145   K 3.9  --  4.9   *  --  111*   CO2 29  --  34*   *  --  92   BUN 40*  --  34*   CREA 1.84*  --  1.78*   MG 1.8  --  2.4   CA 9.5  --  9.5   PHOS  --  2.7  --    ALB  --   --  3.2   AST  --   --  20   ALT  --   --  20   AP  --   --  172*     Recent Labs     01/06/22  1318 01/06/22  1159   LAC 1.0  --    TROPHS  --  22.0*   BNPNT  --  2,179*     Recent Labs     01/06/22  1204   GLUCPOC 88     ECHO: Results from Hospital Encounter encounter on 07/19/21    ECHO ADULT COMPLETE    Interpretation Summary  · Image quality for this study was poor. · Contrast used: DEFINITY. · LV: Estimated LVEF is 30 - 35%. Normal wall thickness. Mildly dilated left ventricle. Moderate-to-severely reduced systolic function. Wall motion abnormalities with suggest coronary artery disease  · LA: Mildly dilated left atrium. · RA: Mildly dilated right atrium. · MV: Mild mitral annular calcification. Mild mitral valve regurgitation is present. · TV: Mild tricuspid valve regurgitation is present. Results     ** No results found for the last 336 hours.  **        Inpat Anti-Infectives (From admission, onward)    None        Ventilator Settings:  Ideal body weight: 73 kg (160 lb 15 oz)   Mode FIO2 Rate Tidal Volume Pressure PEEP   VC+  50 %    450 ml     8 cm H20    Peak airway pressure: 39 cm H2O       Minute ventilation: 10.3 l/min  ABG:  Recent Labs     01/07/22  0352 01/06/22  1328 01/06/22  1204   PHI 7.54* 7.43 7.20*   PCO2I 32.1* 44.8 80.0*   PO2I 158* 59* 119*   HCO3I 27.4* 29.6* 30.9*     Assessment and Plan:  (Medical Decision Making)   Impression: 77 y.o. male with acute respiratory failure secondary to hypercapnea. Required intubation in the ER with a CO2 of 80, ph 7.2. CXR with bilateral infiltrates/cardiomyopathy. BNP 2179. Has severe ELAINA per recent at home sleep study (AHI 54.8 with desaturation to 68%). Formal outpatient study rcommeneded. Repeat ABG with CO2 correction. Hypotensive requiring pressor support. On Florinef at home for hypotension. EF 30 to 35% by history but no medical therapy due to hypotension. NEURO:   Sedation:  Fentanyl infusion with prn Ativan. Had bradycardia with Precedex. Analgesia: as above  CV:   Volume Status: no edema. BNP 2179. +hypotension with pressor support. No lasix due to hypotension. Fluid balance neg 1.3 liters without diuretic. History of a fib: occurred summer 2021. On Eliquis. Maintaining SR. PULM:   Acute hypoxemic/hypercapneic respiratory failure:  Intubated due to hypercapnea which has corrected on follow up ABG. Currently on PS but had to increase back to 12 cm due to low volumes. He is currently sedated with Fentanyl and got a dose of Ativan at 11 am.  Will need to extubate to bipap/AVAPS. Have sent message to the office to get outpatient sleep study arranged. RENAL:  CKD:  Stable. Lactic acidosis: NA  Electrolytes: no supplement needed today  GI:   Nutrition: none for now.  Consider TF if he cannot be extubated  HEME:   Anemia: mild, stable  Anticoagulation: on Eliquis with history of a fib  ID:   No evidence of infection  ENDO: A1C 5.5  Skin: no decub, turns, preventive care  Prophy: eliquis and pepcid    Daughter updated at bedside. Try to lessen sedation and proceed with extubation to AVAPS. Needs home equipment and message sent to the office for PSG. Bev Barnett, LEIGHA       Lungs: mild coarse sounds  Heart S1 and S2 audible, no murmers or rubs appreciated  Other    Off all sedatives. Did get ativan earlier. Needs to wake up and then can see if can extubate patient. On PS and did adjuste to 15/10 and tolerating better. Likely needs higher pressure given his size. Now TV about 400. Will get new aBG about 1:30 and do lung mechanics before we plan to extubate patient. Spoke with nursing/respiratory and daughter just now      I have spoken with and examined the patient. I have reviewed the history, examination, assessment, and plan and agree with the above. Tuyet Panda MD      This note was signed electronically. Errors are unfortunately her likely due to dictation software.

## 2022-01-07 NOTE — PROGRESS NOTES
CM reviewed medical record. Pt remains in OF ICU. Sedated/ventilator/pressor. CM will continue to follow for discharge needs.

## 2022-01-07 NOTE — PROGRESS NOTES
Respiratory Mechanics completed and are as follows:, RR 18, RSBI 27.90, NIF -22    Patient extubated to a BIPAP 18/14 Patient is not able to communicate and is negative for stridor. Breath sounds are coarse and diminished. No complications with extubation.      Narendra Crowell, RT

## 2022-01-08 ENCOUNTER — APPOINTMENT (OUTPATIENT)
Dept: GENERAL RADIOLOGY | Age: 77
DRG: 208 | End: 2022-01-08
Attending: INTERNAL MEDICINE
Payer: MEDICARE

## 2022-01-08 PROBLEM — R29.818 SUSPECTED SLEEP APNEA: Status: RESOLVED | Noted: 2021-08-20 | Resolved: 2022-01-08

## 2022-01-08 PROBLEM — G47.33 OBSTRUCTIVE SLEEP APNEA: Status: ACTIVE | Noted: 2022-01-08

## 2022-01-08 PROBLEM — E66.2 OBESITY HYPOVENTILATION SYNDROME (HCC): Status: ACTIVE | Noted: 2022-01-08

## 2022-01-08 LAB
ANION GAP SERPL CALC-SCNC: 3 MMOL/L (ref 7–16)
ARTERIAL PATENCY WRIST A: ABNORMAL
ATRIAL RATE: 60 BPM
BASE EXCESS BLD CALC-SCNC: 3.4 MMOL/L
BASOPHILS # BLD: 0 K/UL (ref 0–0.2)
BASOPHILS NFR BLD: 0 % (ref 0–2)
BDY SITE: ABNORMAL
BUN SERPL-MCNC: 37 MG/DL (ref 8–23)
CALCIUM SERPL-MCNC: 9.1 MG/DL (ref 8.3–10.4)
CALCULATED R AXIS, ECG10: -4 DEGREES
CALCULATED T AXIS, ECG11: 172 DEGREES
CHLORIDE SERPL-SCNC: 110 MMOL/L (ref 98–107)
CO2 SERPL-SCNC: 30 MMOL/L (ref 21–32)
CREAT SERPL-MCNC: 1.8 MG/DL (ref 0.8–1.5)
DIAGNOSIS, 93000: NORMAL
DIFFERENTIAL METHOD BLD: ABNORMAL
EOSINOPHIL # BLD: 0.1 K/UL (ref 0–0.8)
EOSINOPHIL NFR BLD: 2 % (ref 0.5–7.8)
ERYTHROCYTE [DISTWIDTH] IN BLOOD BY AUTOMATED COUNT: 16.8 % (ref 11.9–14.6)
GAS FLOW.O2 O2 DELIVERY SYS: ABNORMAL L/MIN
GLUCOSE SERPL-MCNC: 77 MG/DL (ref 65–100)
HCO3 BLD-SCNC: 30.7 MMOL/L (ref 22–26)
HCT VFR BLD AUTO: 37.7 % (ref 41.1–50.3)
HGB BLD-MCNC: 11.3 G/DL (ref 13.6–17.2)
IMM GRANULOCYTES # BLD AUTO: 0 K/UL (ref 0–0.5)
IMM GRANULOCYTES NFR BLD AUTO: 0 % (ref 0–5)
INSPIRATION.DURATION SETTING TIME VENT: 1 SEC
LYMPHOCYTES # BLD: 2.3 K/UL (ref 0.5–4.6)
LYMPHOCYTES NFR BLD: 35 % (ref 13–44)
MAGNESIUM SERPL-MCNC: 2.8 MG/DL (ref 1.8–2.4)
MCH RBC QN AUTO: 27.3 PG (ref 26.1–32.9)
MCHC RBC AUTO-ENTMCNC: 30 G/DL (ref 31.4–35)
MCV RBC AUTO: 91.1 FL (ref 79.6–97.8)
MONOCYTES # BLD: 0.6 K/UL (ref 0.1–1.3)
MONOCYTES NFR BLD: 9 % (ref 4–12)
NEUTS SEG # BLD: 3.5 K/UL (ref 1.7–8.2)
NEUTS SEG NFR BLD: 53 % (ref 43–78)
NRBC # BLD: 0 K/UL (ref 0–0.2)
O2/TOTAL GAS SETTING VFR VENT: 50 %
PCO2 BLD: 58.5 MMHG (ref 35–45)
PEEP RESPIRATORY: 14 CMH2O
PH BLD: 7.33 [PH] (ref 7.35–7.45)
PIP ISTAT,IPIP: 19
PLATELET # BLD AUTO: 166 K/UL (ref 150–450)
PMV BLD AUTO: 11.8 FL (ref 9.4–12.3)
PO2 BLD: 89 MMHG (ref 75–100)
POTASSIUM SERPL-SCNC: 4.4 MMOL/L (ref 3.5–5.1)
PRESSURE SUPPORT SETTING VENT: 18 CMH2O
Q-T INTERVAL, ECG07: 682 MS
QRS DURATION, ECG06: 126 MS
QTC CALCULATION (BEZET), ECG08: 616 MS
RBC # BLD AUTO: 4.14 M/UL (ref 4.23–5.6)
SAO2 % BLD: 95.8 % (ref 95–98)
SERVICE CMNT-IMP: ABNORMAL
SERVICE CMNT-IMP: ABNORMAL
SODIUM SERPL-SCNC: 143 MMOL/L (ref 136–145)
SPECIMEN TYPE: ABNORMAL
VENTRICULAR RATE, ECG03: 49 BPM
WBC # BLD AUTO: 6.5 K/UL (ref 4.3–11.1)

## 2022-01-08 PROCEDURE — 99233 SBSQ HOSP IP/OBS HIGH 50: CPT | Performed by: INTERNAL MEDICINE

## 2022-01-08 PROCEDURE — 82803 BLOOD GASES ANY COMBINATION: CPT

## 2022-01-08 PROCEDURE — 94660 CPAP INITIATION&MGMT: CPT

## 2022-01-08 PROCEDURE — 65610000006 HC RM INTENSIVE CARE

## 2022-01-08 PROCEDURE — 83735 ASSAY OF MAGNESIUM: CPT

## 2022-01-08 PROCEDURE — 74011250636 HC RX REV CODE- 250/636: Performed by: INTERNAL MEDICINE

## 2022-01-08 PROCEDURE — 85025 COMPLETE CBC W/AUTO DIFF WBC: CPT

## 2022-01-08 PROCEDURE — 80048 BASIC METABOLIC PNL TOTAL CA: CPT

## 2022-01-08 PROCEDURE — 92610 EVALUATE SWALLOWING FUNCTION: CPT

## 2022-01-08 PROCEDURE — 74011250637 HC RX REV CODE- 250/637: Performed by: INTERNAL MEDICINE

## 2022-01-08 PROCEDURE — 74011000250 HC RX REV CODE- 250: Performed by: INTERNAL MEDICINE

## 2022-01-08 PROCEDURE — 97530 THERAPEUTIC ACTIVITIES: CPT

## 2022-01-08 PROCEDURE — 36600 WITHDRAWAL OF ARTERIAL BLOOD: CPT

## 2022-01-08 PROCEDURE — 5A09457 ASSISTANCE WITH RESPIRATORY VENTILATION, 24-96 CONSECUTIVE HOURS, CONTINUOUS POSITIVE AIRWAY PRESSURE: ICD-10-PCS | Performed by: INTERNAL MEDICINE

## 2022-01-08 PROCEDURE — 97162 PT EVAL MOD COMPLEX 30 MIN: CPT

## 2022-01-08 PROCEDURE — 94760 N-INVAS EAR/PLS OXIMETRY 1: CPT

## 2022-01-08 PROCEDURE — 77010033711 HC HIGH FLOW OXYGEN

## 2022-01-08 PROCEDURE — 71045 X-RAY EXAM CHEST 1 VIEW: CPT

## 2022-01-08 RX ORDER — FUROSEMIDE 10 MG/ML
40 INJECTION INTRAMUSCULAR; INTRAVENOUS ONCE
Status: COMPLETED | OUTPATIENT
Start: 2022-01-08 | End: 2022-01-08

## 2022-01-08 RX ORDER — TAMSULOSIN HYDROCHLORIDE 0.4 MG/1
0.4 CAPSULE ORAL DAILY
COMMUNITY
End: 2022-02-14

## 2022-01-08 RX ADMIN — MIDODRINE HYDROCHLORIDE 10 MG: 5 TABLET ORAL at 05:02

## 2022-01-08 RX ADMIN — SODIUM CHLORIDE, PRESERVATIVE FREE 40 ML: 5 INJECTION INTRAVENOUS at 21:25

## 2022-01-08 RX ADMIN — SODIUM CHLORIDE, PRESERVATIVE FREE 40 ML: 5 INJECTION INTRAVENOUS at 05:03

## 2022-01-08 RX ADMIN — PREGABALIN 200 MG: 100 CAPSULE ORAL at 21:21

## 2022-01-08 RX ADMIN — PREGABALIN 200 MG: 100 CAPSULE ORAL at 09:56

## 2022-01-08 RX ADMIN — FLUDROCORTISONE ACETATE 0.1 MG: 0.1 TABLET ORAL at 09:56

## 2022-01-08 RX ADMIN — APIXABAN 5 MG: 5 TABLET, FILM COATED ORAL at 21:21

## 2022-01-08 RX ADMIN — FAMOTIDINE 20 MG: 10 INJECTION INTRAVENOUS at 09:56

## 2022-01-08 RX ADMIN — FUROSEMIDE 40 MG: 10 INJECTION, SOLUTION INTRAMUSCULAR; INTRAVENOUS at 10:33

## 2022-01-08 RX ADMIN — APIXABAN 5 MG: 5 TABLET, FILM COATED ORAL at 09:56

## 2022-01-08 RX ADMIN — ATORVASTATIN CALCIUM 40 MG: 40 TABLET, FILM COATED ORAL at 21:21

## 2022-01-08 RX ADMIN — PREGABALIN 200 MG: 100 CAPSULE ORAL at 16:37

## 2022-01-08 RX ADMIN — MIDODRINE HYDROCHLORIDE 10 MG: 5 TABLET ORAL at 13:12

## 2022-01-08 RX ADMIN — POLYETHYLENE GLYCOL 3350 17 G: 17 POWDER, FOR SOLUTION ORAL at 09:56

## 2022-01-08 RX ADMIN — SODIUM CHLORIDE, PRESERVATIVE FREE 10 ML: 5 INJECTION INTRAVENOUS at 13:13

## 2022-01-08 RX ADMIN — MIDODRINE HYDROCHLORIDE 10 MG: 5 TABLET ORAL at 21:22

## 2022-01-08 NOTE — PROGRESS NOTES
Problem: Ventilator Management  Goal: *Adequate oxygenation and ventilation  Outcome: Progressing Towards Goal  Goal: *Patient maintains clear airway/free of aspiration  Outcome: Progressing Towards Goal  Goal: *Absence of infection signs and symptoms  Outcome: Progressing Towards Goal  Goal: *Normal spontaneous ventilation  Outcome: Progressing Towards Goal     Problem: Patient Education: Go to Patient Education Activity  Goal: Patient/Family Education  Outcome: Progressing Towards Goal     Problem: Delirium Treatment  Goal: *Level of consciousness restored to baseline  Outcome: Progressing Towards Goal  Goal: *Level of environmental perceptions restored to baseline  Outcome: Progressing Towards Goal  Goal: *Sensory perception restored to baseline  Outcome: Progressing Towards Goal  Goal: *Emotional stability restored to baseline  Outcome: Progressing Towards Goal  Goal: *Functional assessment restored to baseline  Outcome: Progressing Towards Goal  Goal: *Absence of falls  Outcome: Progressing Towards Goal  Goal: *Will remain free of delirium, CAM Score negative  Outcome: Progressing Towards Goal  Goal: *Cognitive status will be restored to baseline  Outcome: Progressing Towards Goal  Goal: Interventions  Outcome: Progressing Towards Goal     Problem: Patient Education: Go to Patient Education Activity  Goal: Patient/Family Education  Outcome: Progressing Towards Goal     Problem: Falls - Risk of  Goal: *Absence of Falls  Description: Document Yamileth Sanches Fall Risk and appropriate interventions in the flowsheet.   Outcome: Progressing Towards Goal  Note: Fall Risk Interventions:  Mobility Interventions: Assess mobility with egress test    Mentation Interventions: Adequate sleep, hydration, pain control    Medication Interventions: Patient to call before getting OOB    Elimination Interventions: Call light in reach              Problem: Patient Education: Go to Patient Education Activity  Goal: Patient/Family Education  Outcome: Progressing Towards Goal     Problem: Non-Violent Restraints  Goal: Removal from restraints as soon as assessed to be safe  Outcome: Progressing Towards Goal  Goal: No harm/injury to patient while restraints in use  Outcome: Progressing Towards Goal  Goal: Patient's dignity will be maintained  Outcome: Progressing Towards Goal  Goal: Patient Interventions  Outcome: Progressing Towards Goal     Problem: Gas Exchange - Impaired  Goal: *Absence of hypoxia  Outcome: Progressing Towards Goal     Problem: Patient Education: Go to Patient Education Activity  Goal: Patient/Family Education  Outcome: Progressing Towards Goal

## 2022-01-08 NOTE — PROGRESS NOTES
Bedside and Verbal shift change report given to [facility] (oncoming nurse) by Amparo Barnhart RN (offgoing nurse). Report included the following information SBAR, Kardex, ED Summary, Intake/Output, MAR, Recent Results, Med Rec Status, Cardiac Rhythm Afib 91 and Alarm Parameters . On BIPAP 50% FiO2 SpO2 95%. No distress noted.

## 2022-01-08 NOTE — PROGRESS NOTES
Problem: Dysphagia (Adult)  Goal: *Acute Goals and Plan of Care (Insert Text)  Note: ST. Pt. Will tolerate regular diet/thin liquids with no overt s/sx of aspiration/penetration. 2. Pt. Will participate in modified barium swallow with 100% participation to fully evaluate swallow function if further indicated in plan of care. LTG: Pt. Will tolerate least restrictive diet without respiratory decline. SPEECH LANGUAGE PATHOLOGY: DYSPHAGIA- Initial Assessment    NAME/AGE/GENDER: Minoo Cunningham is a 77 y.o. male  DATE: 2022  PRIMARY DIAGNOSIS: Acute on chronic respiratory failure with hypercapnia (HCC) [J96.22]       ICD-10: Treatment Diagnosis: R13.12 Dysphagia, Oropharyngeal Phase    RECOMMENDATIONS   DIET:   Regular Consistency  Thin Liquids    MEDICATIONS: With liquid     PRECAUTIONS, MODIFICATIONS, AND STRATEGIES  Slow rate of intake  Small bites/sips  Upright at 90 degrees during meal  Alternate liquids/solids  Small sips and bites     RECOMMENDATIONS FOR CONTINUED SPEECH THERAPY:   YES: Anticipate need for ongoing speech therapy during this hospitalization. ASSESSMENT   Patient presents with oropharyngeal dysphagia characterized by slowed mastication and easily fatigued. Encouraged slow rate of intake. Verbalized and demonstrated understanding. Recommend continue regular diet/thin liquids, slow intakes. Follow up for diet tolerance. EDUCATION:  Recommendations discussed with Patient, Family, and RN    REHABILITATION POTENTIAL FOR STATED GOALS: Good    PLAN    FREQUENCY/DURATION:   Continue to follow patient 2 times a week for duration of hospital stay to address above goals. Recommendations for next treatment session: Next treatment session will address diet tolerance and modified barium swallow study if further indicated    CONTINUATION OF SKILLED SERVICES/MEDICAL NECESSITY:  Patient continues to require skilled intervention due to dysphagia.      SUBJECTIVE   Pleasant, eager for PO    Oxygen Device: airvo   Pain: Pain Scale 1: Numeric (0 - 10)  Pain Intensity 1: 1    History of Present Injury/Illness: Mr. Darren Hyde  has a past medical history of A-fib ([address] (07/19/2021), Acute on chronic respiratory failure with hypoxia and hypercapnia (HCC) (7/23/2021), Acute respiratory failure with hypercapnia (HCC) (7/23/2021), Atrial fibrillation with RVR ([address] (7/19/2021), CAD (coronary artery disease), Cellulitis (7/19/2021), Chronic kidney disease, COVID-19 vaccine series completed, Current use of long term anticoagulation, 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 mellitus, limited to breakdown of skin ([address] (9/27/2021), H/O heart artery stent, History of MI (myocardial infarction) (1999), Hypercholesterolemia, Hypertension, Left ventricular dysfunction, Neuropathy, Oxygen dependent, PICC (peripherally inserted central catheter) in place, Staphylococcus aureus bacteremia (7/32/4355), Systolic CHF, acute on chronic ([address] (7/19/2021), and Type 2 diabetes mellitus ([address] . He also  has a past surgical history that includes pr cardiac surg procedure unlist (1999) and hx orthopaedic (08/18/2021). PRECAUTIONS/ALLERGIES: Patient has no known allergies. Problem List:  (Impairments causing functional limitations):  Acute respiratory failure with hypercapnia     Previous Dysphagia: NONE REPORTED  Diet Prior to Evaluation: regular/thin    Orientation:  Person  Place  Time  Situation    Cognitive-Linguistic Screening:  Alertness  Alert  Speech Production:   Fully intelligible  Expressive Language:  Fluent speech  Receptive Language: Answers yes/no questions appropriately and Follows commands  Cognition:   No significant changes reported  Prior Level of Function: independent   Recommendations: Given results of screening, patient appears to be functioning at baseline. No acute assessment of speech, language, or cognition warranted.      OBJECTIVE   Oral Motor:   Labial: No impairment  Dentition: Intact  Oral Hygiene: Adequate  Lingual: No impairment    Dysphagia evaluation:   Patient consumed trials of thin liquids via cup/straw sip, puree, mixed consistency fruit and cracker. Mildly increased mastication time but adequate oral clearance. Minimal drops in O2 with thin liquids - improved with single sips. Able to demonstrate understanding of compensatory strategy. Encouraged frequent rest breaks. Family at bedside. Tool Used: Dysphagia Outcome and Severity Scale (MAURY)    Score Comments   Normal Diet  [] 7 With no strategies or extra time needed   Functional Swallow  [x] 6 May have mild oral or pharyngeal delay   Mild Dysphagia  [] 5 Which may require one diet consistency restricted    Mild-Moderate Dysphagia  [] 4 With 1-2 diet consistencies restricted   Moderate Dysphagia  [] 3 With 2 or more diet consistencies restricted   Moderate-Severe Dysphagia  [] 2 With partial PO strategies (trials with ST only)   Severe Dysphagia  [] 1 With inability to tolerate any PO safely      Score:  Initial: 6 Most Recent: x (Date 01/08/22 )   Interpretation of Tool: The Dysphagia Outcome and Severity Scale (MAURY) is a simple, easy-to-use, 7-point scale developed to systematically rate the functional severity of dysphagia based on objective assessment and make recommendations for diet level, independence level, and type of nutrition. Current Medications:   No current facility-administered medications on file prior to encounter. Current Outpatient Medications on File Prior to Encounter   Medication Sig Dispense Refill    azithromycin (Zithromax Z-Hill) 250 mg tablet Take two tablets today then one tablet daily 6 Tablet 0    fluticasone propionate (FLONASE) 50 mcg/actuation nasal spray 2 Sprays by Both Nostrils route daily. 1 Each 1    traZODone (DESYREL) 50 mg tablet Take 1 Tablet by mouth nightly. (Patient taking differently: Take 50 mg by mouth as needed.  Patient states only taking as needed.) 90 Tablet 0    atorvastatin (LIPITOR) 40 mg tablet Take 1 Tablet by mouth nightly. Indications: treatment to slow progression of coronary artery disease 90 Tablet 3    apixaban (ELIQUIS) 5 mg tablet Take 1 Tablet by mouth every twelve (12) hours. 180 Tablet 0    pregabalin (Lyrica) 200 mg capsule Take 1 Capsule by mouth two (2) times a day. Max Daily Amount: 400 mg. (Patient taking differently: Take 200 mg by mouth three (3) times daily. ) 180 Capsule 1    Wheel Chair jerry Use daily for mobility for s88.111, R26.89 1 Each 0    acetaminophen (TYLENOL) 325 mg tablet Take 2 Tablets by mouth every six (6) hours as needed (for amputation site pain). 40 Tablet prn    polyethylene glycol (MIRALAX) 17 gram packet Take 1 Packet by mouth daily. Indications: constipation 240 g prn    fludrocortisone (FLORINEF) 0.1 mg tablet Take 1 Tablet by mouth daily. 60 Tablet 0    cyanocobalamin 1,000 mcg tablet Take 1 Tab by mouth daily. 30 Tab 5        INTERDISCIPLINARY COLLABORATION: RN    After treatment position/precautions:  Upright in bed  family at bedside  RN notified    Total Treatment Duration:   Time In: 1218  Time Out: 550 [address].  Janusz Cha MS, CCC-SLP         Speech Language Pathologist          Acute Rehabilitation Services                   Contact: Lesly

## 2022-01-08 NOTE — PROGRESS NOTES
Physician Progress Note      [facility]  [facility]                  [id number]  :                       1945  ADMIT DATE:       2022 11:38 AM  DISCH DATE:  RESPONDING  PROVIDER #:        ANG WILSON MD        QUERY TEXT:    Stage of Chronic Kidney Disease: Please provide further specificity, if known. Clinical indicators include: bun, bnp, ckd, chronic kidney disease  Options provided:  -- Chronic kidney disease stage 1  -- Chronic kidney disease stage 2  -- Chronic kidney disease stage 3  -- Chronic kidney disease stage 3a  -- Chronic kidney disease stage 3b  -- Chronic kidney disease stage 4  -- Chronic kidney disease stage 5  -- Chronic kidney disease stage 5, requiring dialysis  -- End stage renal disease  -- Other - I will add my own diagnosis  -- Disagree - Not applicable / Not valid  -- Disagree - Clinically Unable to determine / Unknown        PROVIDER RESPONSE TEXT:    The patient has chronic kidney disease stage 3. QUERY TEXT:    Pt admitted with Acute Respiratory Failure hypoxic/hypercarvia. Pt noted to have hypotension, Bradycardia. If possible, please document in the progress notes and discharge summary if you are evaluating and/or treating any of the following: The medical record reflects the following:  Risk Factors: 77 YOM, Precedex, CKD, chronic sCHF, Cardiomyopathy, AF, CAD, old MI, O2 dependent,  Clinical Indicators: on admission: HR 81 RR 24 94/47 93% BMI 38.21. SOB,  responds to painful stimuli, Trop 2, BNP 2179, Cr 1.78---1.84   ED Note: Patient was in severe respiratory distress, and was emergently intubated on presentation. Subsequent to intubation, the patient was very hypotensive requiring 2 separate doses of stress dose Simba, and was subsequently started on Levophed.   After starting Precedex patient became bradycardic   EKG:  atrial fibrillation rate 72 bpm, right bundle branch block, with no evidence of acute ischemic change  1/7 PN: 7/19  LV: Estimated LVEF is 30 - 35%. Normal wall thickness. Mildly dilated left ventricle. Moderate-to-severely reduced systolic function. Wall motion abnormalities with suggest coronary artery disease   no edema. BNP 2179. +hypotension with pressor support. No lasix due to hypotension. Fluid balance neg 1.3 liters without diuretic. Treatment: ICU Monitoring, Levophed IV, Intubated on vent, Stress dose Simba, stress dose Cortisone, Art line, CVC line    Thank you,  Katelynn Johnson RN,C BSN  Clinical   [email or web address]  Options provided:  -- Cardiogenic Shock  -- Septic Shock  -- Hypovolemic Shock  -- Hypovolemia without Shock  -- Hypotension without Shock  -- Other - I will add my own diagnosis  -- Disagree - Not applicable / Not valid  -- Disagree - Clinically unable to determine / Unknown  -- Refer to Clinical Documentation Reviewer    PROVIDER RESPONSE TEXT:    This patient has hypotension without shock. Query created by:  Billy Good on 1/7/2022 5:30 PM      Electronically signed by:  Steven WILSON MD 1/8/2022 7:40 AM

## 2022-01-08 NOTE — PROGRESS NOTES
ACUTE PHYSICAL THERAPY GOALS:  (Developed with and agreed upon by patient and/or caregiver  STG:  (1.)Mr. Rivas will move from supine to sit and sit to supine , scoot up and down and roll side to side with CONTACT GUARD ASSIST within 4 treatment day(s). (2.)Mr. Rivas will transfer from bed to chair and chair to bed with CONTACT GUARD ASSIST using the least restrictive device within 4 treatment day(s). (3.)Mr. Rivas will ambulate with CONTACT GUARD ASSIST for 50 feet with the least restrictive device within 4 treatment day(s). LTG:  (1.)Mr. Rivas will move from supine to sit and sit to supine , scoot up and down and roll side to side in bed with STAND BY ASSIST within 7 treatment day(s). (2.)Mr. Rivas will transfer from bed to chair and chair to bed with STAND BY ASSIST using the least restrictive device within 7 treatment day(s). (3.)Mr. Rivas will ambulate with STAND BY ASSIST for 100 feet with the least restrictive device within 7 treatment day(s).   ________________________________________________________________________________________________    PHYSICAL THERAPY ASSESSMENT: Initial Assessment and PM PT Treatment Day # 1      David Antoine is a 77 y.o. male   PRIMARY DIAGNOSIS: Acute respiratory failure with hypercapnia (HCC)  Acute on chronic respiratory failure with hypercapnia (HCC) [J96.22]       Reason for Referral:    ICD-10: Treatment Diagnosis: Generalized Muscle Weakness (M62.81)  Other lack of cordination (R27.8)  Difficulty in walking, Not elsewhere classified (R26.2)  Other abnormalities of gait and mobility (R26.89)  INPATIENT: Payor: [employer] MEDICARE / Plan: [facility] / Product Type: Managed Care Medicare /     ASSESSMENT:     REHAB RECOMMENDATIONS:   Recommendation to date pending progress:  Setting:   Short-term Rehab per current status   Equipment:    To Be Determined     PRIOR LEVEL OF FUNCTION:  (Prior to Hospitalization) INITIAL/CURRENT LEVEL OF FUNCTION:  (Most Recently Demonstrated)   Bed Mobility:   Modified Independent  Sit to Stand:   Modified Independent  Transfers:   Modified Independent  Gait/Mobility:   Modified Independent Bed Mobility:   Minimal Assistance  Sit to Stand:   Minimal Assistance  Transfers:   Minimal Assistance  Gait/Mobility:   Minimal Assistance     ASSESSMENT:  Mr. Cristian Lemos is a pleasant frail elderly male with above diagnosis who demonstrates with decreased transfers, ambulation and mobility below his prior functional baseline. Right LE BKA and  All transfers are currently limited at minimal assistance x 1 out of bed to sit then donning right BKA with assistance followed by standing followed by ambulation with 2 wheel rolling walker for 5 feet with the deficits stated below. Trunk stability is good with MIN A x 1. David Rivas then is seated in the bedside chair with good static sitting balance on airvo 45 liters and 50% FI02. Skilled PT is indicated for this patient's functional mobility deficits. SUBJECTIVE:   Mr. Cristian Lemos states, \"I am ready to get up and sit. \"    SOCIAL HISTORY/LIVING ENVIRONMENT:  Support Systems: Spouse/Significant Other,Child(pat)  OBJECTIVE:     PAIN: VITAL SIGNS: LINES/DRAINS:   Pre Treatment: Pain Screen  Pain Scale 1: Numeric (0 - 10)  Pain Intensity 1: 0  Post Treatment: 0/10 no pain reported.       IV  O2 Device: Heated,Hi flow nasal cannula (air vo)     GROSS EVALUATION:   Within Functional Limits Abnormal/ Functional Abnormal/ Non-Functional (see comments) Not Tested Comments:   AROM [] [x] [] []    PROM [] [x] [] []    Strength [] [x] [] []    Balance [] [x] [] []    Posture [] [x] [] []    Sensation [] [x] [] []    Coordination [] [x] [] []    Tone [] [x] [] []    Edema [] [x] [] []    Activity Tolerance [] [x] [] []     [] [] [] []      COGNITION/  PERCEPTION: Intact Impaired   (see comments) Comments:   Orientation [x] []    Vision [x] []    Hearing [x] []    Command Following [x] []    Safety Awareness [x] []     [] []      MOBILITY: I Mod I S SBA CGA Min Mod Max Total  NT x2 Comments:   Bed Mobility    Rolling [] [] [] [] [] [x] [] [] [] [] []    Supine to Sit [] [] [] [] [] [x] [] [] [] [] []    Scooting [] [] [] [] [] [x] [] [] [] [] []    Sit to Supine [] [] [] [] [] [x] [] [] [] [] []    Transfers    Sit to Stand [] [] [] [] [] [x] [] [] [] [] []    Bed to Chair [] [] [] [] [] [x] [] [] [] [] []    Stand to Sit [] [] [] [] [] [x] [] [] [] [] []    I=Independent, Mod I=Modified Independent, S=Supervision, SBA=Standby Assistance, CGA=Contact Guard Assistance,   Min=Minimal Assistance, Mod=Moderate Assistance, Max=Maximal Assistance, Total=Total Assistance, NT=Not Tested  GAIT: I Mod I S SBA CGA Min Mod Max Total  NT x2 Comments:   Level of Assistance [] [] [] [] [] [x] [] [] [] [] [] Right BKA prosthesis . Distance 5 feet     DME Rolling Walker    Gait Quality Fair balance     Weightbearing Status WBAT     I=Independent, Mod I=Modified Independent, S=Supervision, SBA=Standby Assistance, CGA=Contact Guard Assistance,   Min=Minimal Assistance, Mod=Moderate Assistance, Max=Maximal Assistance, Total=Total Assistance, NT=Not Tested    [place] Form       How much difficulty does the patient currently have. .. Unable A Lot A Little None   1. Turning over in bed (including adjusting bedclothes, sheets and blankets)? [] 1   [] 2   [x] 3   [] 4   2. Sitting down on and standing up from a chair with arms ( e.g., wheelchair, bedside commode, etc.)   [] 1   [] 2   [x] 3   [] 4   3. Moving from lying on back to sitting on the side of the bed? [] 1   [] 2   [x] 3   [] 4   How much help from another person does the patient currently need. .. Total A Lot A Little None   4. Moving to and from a bed to a chair (including a wheelchair)? [] 1   [] 2   [x] 3   [] 4   5. Need to walk in hospital room?    [] 1   [] 2   [x] 3   [] 4 6.  Climbing 3-5 steps with a railing? [] 1   [] 2   [x] 3   [] 4   © 2007, Trustees of [address] [place] [address] [address] [id number], under license to [employer]. All rights reserved     Score:  Initial: 18  Most Recent: X (Date: -- )    Interpretation of Tool:  Represents activities that are increasingly more difficult (i.e. Bed mobility, Transfers, Gait). PLAN:   FREQUENCY/DURATION: PT Plan of Care: 3 times/week for duration of hospital stay or until stated goals are met, whichever comes first.    PROBLEM LIST:   (Skilled intervention is medically necessary to address:)  1. Decreased Activity Tolerance  2. Decreased AROM/PROM  3. Decreased Balance  4. Decreased Cognition  5. Decreased Coordination  6. Decreased Gait Ability  7. Decreased Strength  8. Decreased Transfer Abilities   INTERVENTIONS PLANNED:   (Benefits and precautions of physical therapy have been discussed with the patient.)  1. Therapeutic Activity  2. Therapeutic Exercise/HEP  3. Neuromuscular Re-education  4. Gait Training  5. Manual Therapy  6. Education     TREATMENT:     EVALUATION: Moderate Complexity : (Untimed Charge)    TREATMENT:   ($$ Therapeutic Activity: 23-37 mins    )  Therapeutic Activity (23 Minutes): Therapeutic activity included Rolling, Supine to Sit, Sit to Supine, Scooting, Lateral Scooting, Transfer Training, Ambulation on level ground, Sitting balance  and Standing balance to improve functional Mobility, Strength, ROM and Activity tolerance.     TREATMENT GRID:   Date:   Date:   Date:     Activity/Exercise Parameters Parameters Parameters                                               AFTER TREATMENT POSITION/PRECAUTIONS:  Bed, Chair and RN notified    INTERDISCIPLINARY COLLABORATION:  RN/PCT and PT/PTA    TOTAL TREATMENT DURATION:  PT Patient Time In/Time Out  Time In: 1330  Time Out: [address]

## 2022-01-08 NOTE — PROGRESS NOTES
[facility] hospital[facility] Critical Care Note[de-identified] 1/8/2022  Ananth Rivas  Admission Date: 1/6/2022     Length of Stay: 2 days    Background: 77 y.o. y/o male was transported to the ER via EMS after they were called for dyspnea. He was awake and alert and appeared comfortable when they arrived but en route he became unresponsive. He required intubation in the ER. ABG -> 7.2/CO2 80/PO2 119/HCO3 30. 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 He required bipap during that admission for hypercapnea. An outpatient PSG was recommended but he has not had this done yet. His daughter reports daytime somnolence and states that there are days that he gets up just to eat and otherwise sleeps. Sometimes he cannot hold his head up. She has noticed myoclonic jerking. He felt short of breath a week ago so went to urgent care who prescribed 20 mg of Lasix per day for 5 days. She felt like he looked better after taking the lasix but after he completed the course, the shortness of breath returned. He does not weigh himself and he has not noticed any edema. Echo last July showed an EF of 30 to 35%. He is not maintained on any lasix and he takes Florinef for a history of hypotension. His was hypotensive on admission but this worsened after intubation. His WBC is normal. He has not had any fever. He was treated for bronchitis with a Z pack around [facility] and his cough resolved.       Notable PMH:  has a past medical history of A-fib ([address] (07/19/2021), Acute on chronic respiratory failure with hypoxia and hypercapnia ([address] (7/23/2021), Acute respiratory failure with hypercapnia ([address] (7/23/2021), Atrial fibrillation with RVR ([address] (7/19/2021), CAD (coronary artery disease), Cellulitis (7/19/2021), Chronic kidney disease, COVID-19 vaccine series completed, Current use of long term anticoagulation, 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 mellitus, limited to breakdown of skin ([facility] (9/27/2021), H/O heart artery stent, History of MI (myocardial infarction) (1999), Hypercholesterolemia, Hypertension, Left ventricular dysfunction, Neuropathy, Oxygen dependent, PICC (peripherally inserted central catheter) in place, Staphylococcus aureus bacteremia (2/66/8567), Systolic CHF, acute on chronic ([facility] (7/19/2021), and Type 2 diabetes mellitus ([facility] 24 Hour events:  Off vent since yesterday and on BIPAP since then. off levophed.  Easily arousable and hungary    Review of Systems:  -Fever  -Headaches  -Chest pain  -Dyspnea, -wheezing,- cough  -Abdominal pain,- constipation  -Leg swelling  All other organ systems grossly normal.      Social History     Socioeconomic History    Marital status:      Spouse name: Not on file    Number of children: Not on file    Years of education: Not on file    Highest education level: Not on file   Occupational History    Not on file   Tobacco Use    Smoking status: Never Smoker    Smokeless tobacco: Never Used   Vaping Use    Vaping Use: Never used   Substance and Sexual Activity    Alcohol use: No    Drug use: No    Sexual activity: Not Currently     Partners: Female   Other Topics Concern     Service Not Asked    Blood Transfusions Not Asked    Caffeine Concern Not Asked    Occupational Exposure Not Asked    Hobby Hazards Not Asked    Sleep Concern Not Asked    Stress Concern Not Asked    Weight Concern Not Asked    Special Diet Not Asked    Back Care Not Asked    Exercise Not Asked    Bike Helmet Not Asked    Seat Belt Not Asked    Self-Exams Not Asked   Social History Narrative    Lives with his daughter     Social Determinants of Health     Financial Resource Strain:     Difficulty of Paying Living Expenses: Not on file   Food Insecurity:     Worried About Running Out of Food in the Last Year: Not on file    Agueda of Food in the Last Year: Not on LUCIA Senior Needs:     Lack of Transportation (Medical): Not on file    Lack of Transportation (Non-Medical): Not on file   Physical Activity:     Days of Exercise per Week: Not on file    Minutes of Exercise per Session: Not on file   Stress:     Feeling of Stress : Not on file   Social Connections:     Frequency of Communication with Friends and Family: Not on file    Frequency of Social Gatherings with Friends and Family: Not on file    Attends Faith Services: Not on file    Active Member of [address] [place] [address] [facility] or Organizations: Not on file    Attends Club or Organization Meetings: Not on file    Marital Status: Not on file   Intimate Partner Violence:     Fear of Current or Ex-Partner: Not on file    Emotionally Abused: Not on file    Physically Abused: Not on file    Sexually Abused: Not on file   Housing Stability:     Unable to Pay for Housing in the Last Year: Not on file    Number of [place] in the Last Year: Not on file    Unstable Housing in the Last Year: Not on file     Family History   Problem Relation Age of Onset    Cancer Mother     Cancer Father     No Known Problems Maternal Grandmother     No Known Problems Maternal Grandfather     No Known Problems Paternal Grandmother     No Known Problems Paternal Grandfather      Lines: (insertion date)   ETT: (1/6)  Pineda: (1/6)  OGT: (1/6)  Central line: (1/7)  Arterial Line (1/7)    Drips: current dose (range)  Diprivan Dose (mcg/kg/min): 0 mcg/kg/min  Precedex Dose (mcg/kg/hr): 0 mcg/kg/hr  Levophed Dose (mcg/kg/min): 0 mcg/kg/min   Fentanyl at 50 mcg/hour    Pertinent Exam:         Blood pressure 106/60, pulse 93, temperature (!) 96.7 °F (35.9 °C), resp. rate 29, height 5' 10\" (1.778 m), weight 266 lb 5.1 oz (120.8 kg), SpO2 97 %.      Intake/Output Summary (Last 24 hours) at 1/8/2022 0920  Last data filed at 1/8/2022 0457  Gross per 24 hour   Intake 1140.84 ml   Output 700 ml   Net 440.84 ml     Constitutional:  obese WM in bed and not in distress  EENMT:  Sclera clear, pupils equal, orally intubated  Respiratory: clear from anterior -bilaterally. Respirations even and not labored  Cardiovascular:  RRR - sinus per telemetry  Gastrointestinal:  soft with no evidence of tenderness; positive bowel sounds present  Musculoskeletal:  warm with no cyanosis, no lower extremity edema. Right BKA. Skin:  no jaundice or ecchymosis  Neurologic: no focal deficits  Psychiatric: awake and calm    CXR:     1/8/22  Less congestion on left chest today      1/7 1/6      Recent Labs     01/08/22  0305 01/07/22  0418 01/06/22  1318 01/06/22  1159   WBC 6.5 11.3*  --  8.2   HGB 11.3* 12.3*  --  12.8*   HCT 37.7* 41.0*  --  45.3    246  --  220   LAC  --   --  1.0  --      Recent Labs     01/08/22  0305 01/07/22  0418 01/06/22  1413 01/06/22  1159    144  --  145   K 4.4 3.9  --  4.9   * 109*  --  111*   CO2 30 29  --  34*   GLU 77 119*  --  92   BUN 37* 40*  --  34*   CREA 1.80* 1.84*  --  1.78*   MG 2.8* 1.8  --  2.4   CA 9.1 9.5  --  9.5   PHOS  --   --  2.7  --    ALB  --   --   --  3.2   AST  --   --   --  20   ALT  --   --   --  20   AP  --   --   --  172*     Recent Labs     01/06/22  1318 01/06/22  1159   LAC 1.0  --    TROPHS  --  22.0*   BNPNT  --  2,179*     Recent Labs     01/06/22  1204   GLUCPOC 88     ECHO: Results from Hospital Encounter encounter on 07/19/21    ECHO ADULT COMPLETE    Interpretation Summary  · Image quality for this study was poor. · Contrast used: DEFINITY. · LV: Estimated LVEF is 30 - 35%. Normal wall thickness. Mildly dilated left ventricle. Moderate-to-severely reduced systolic function. Wall motion abnormalities with suggest coronary artery disease  · LA: Mildly dilated left atrium. · RA: Mildly dilated right atrium. · MV: Mild mitral annular calcification. Mild mitral valve regurgitation is present.   · TV: Mild tricuspid valve regurgitation is present. Results     ** No results found for the last 336 hours. **        Inpat Anti-Infectives (From admission, onward)    None        Ventilator Settings:  Ideal body weight: 73 kg (160 lb 15 oz)   Mode FIO2 Rate Tidal Volume Pressure PEEP   VC+  50 %    450 ml     8 cm H20    Peak airway pressure: 39 cm H2O       Minute ventilation: 8.7 l/min  ABG:  Recent Labs     01/08/22  0331 01/07/22  1358 01/07/22  0352   PHI 7.33* 7.39 7.54*   PCO2I 58.5* 48.1* 32.1*   PO2I 89 78 158*   HCO3I 30.7* 29.4* 27.4*     Assessment and Plan:  (Medical Decision Making)   Impression: 77 y.o. male with acute respiratory failure secondary to hypercapnea. Required intubation in the ER with a CO2 of 80, ph 7.2. CXR with bilateral infiltrates/cardiomyopathy. BNP 2179. Has severe ELAINA per recent at home sleep study (AHI 54.8 with desaturation to 68%). Formal outpatient study recommended. Repeat ABG with CO2 correction. Hypotensive requiring pressor support. On Florinef at home for hypotension. EF 30 to 35% by history but no medical therapy due to hypotension. Off vent on 1/7 and awake and alert. NEURO:   Sedation:  Resolved off drip  Analgesia: as above  CV:   Volume Status:  and will give lasix. Was held due to edema  History of a fib: occurred summer 2021. On Eliquis. Maintaining SR. PULM:   Acute hypoxemic/hypercapneic respiratory failure: change from BIPAP to airvo for now. Continue BIPAP QHS  ELAINA -- home sleep study in November with severe elaina with AHI in 54.8 and over 240 minutes with saturaiton <89%. Given current evidence of PCO2 retainer likely with OHS and will see if can qualify for  BIPAP ST vs NIPPV. Last night on 18/14 with rate of 18. Will send request to  to talk to [employer] [phone]  RENAL:  CKD:  Stable.    Lactic acidosis: NA  Electrolytes: no supplement needed today  GI:   Nutrition: check swallowing and then start po if cleared by speech  HEME:   Anemia: mild, stable  Anticoagulation: on Eliquis with history of a fib  ID:   No evidence of infection  ENDO: A1C 5.5  Skin: no decub, turns, preventive care  Prophy: eliquis and pepcid    Debility  --get PT/OT today      Will get hospitalist to take over care tomorrow  Will still follow  Keep in ICU to see how he does on Airvo and if tolerating may be able to goto the floor later today. Luis Swift MD        This note was signed electronically. Errors are unfortunately her likely due to dictation software.

## 2022-01-09 LAB
ANION GAP SERPL CALC-SCNC: 5 MMOL/L (ref 7–16)
ARTERIAL PATENCY WRIST A: ABNORMAL
BASE EXCESS BLD CALC-SCNC: 6.1 MMOL/L
BASOPHILS # BLD: 0 K/UL (ref 0–0.2)
BASOPHILS NFR BLD: 1 % (ref 0–2)
BDY SITE: ABNORMAL
BUN SERPL-MCNC: 34 MG/DL (ref 8–23)
CALCIUM SERPL-MCNC: 9.3 MG/DL (ref 8.3–10.4)
CHLORIDE SERPL-SCNC: 107 MMOL/L (ref 98–107)
CO2 SERPL-SCNC: 33 MMOL/L (ref 21–32)
CREAT SERPL-MCNC: 1.74 MG/DL (ref 0.8–1.5)
DIFFERENTIAL METHOD BLD: ABNORMAL
EOSINOPHIL # BLD: 0.1 K/UL (ref 0–0.8)
EOSINOPHIL NFR BLD: 2 % (ref 0.5–7.8)
ERYTHROCYTE [DISTWIDTH] IN BLOOD BY AUTOMATED COUNT: 16.7 % (ref 11.9–14.6)
GAS FLOW.O2 O2 DELIVERY SYS: ABNORMAL L/MIN
GLUCOSE SERPL-MCNC: 84 MG/DL (ref 65–100)
HCO3 BLD-SCNC: 33.3 MMOL/L (ref 22–26)
HCT VFR BLD AUTO: 37.2 % (ref 41.1–50.3)
HGB BLD-MCNC: 11.5 G/DL (ref 13.6–17.2)
IMM GRANULOCYTES # BLD AUTO: 0 K/UL (ref 0–0.5)
IMM GRANULOCYTES NFR BLD AUTO: 0 % (ref 0–5)
LYMPHOCYTES # BLD: 2.1 K/UL (ref 0.5–4.6)
LYMPHOCYTES NFR BLD: 34 % (ref 13–44)
MAGNESIUM SERPL-MCNC: 2.8 MG/DL (ref 1.8–2.4)
MCH RBC QN AUTO: 28.2 PG (ref 26.1–32.9)
MCHC RBC AUTO-ENTMCNC: 30.9 G/DL (ref 31.4–35)
MCV RBC AUTO: 91.2 FL (ref 79.6–97.8)
MONOCYTES # BLD: 0.6 K/UL (ref 0.1–1.3)
MONOCYTES NFR BLD: 10 % (ref 4–12)
NEUTS SEG # BLD: 3.4 K/UL (ref 1.7–8.2)
NEUTS SEG NFR BLD: 54 % (ref 43–78)
NRBC # BLD: 0 K/UL (ref 0–0.2)
O2/TOTAL GAS SETTING VFR VENT: 40 %
PCO2 BLD: 58.9 MMHG (ref 35–45)
PEEP RESPIRATORY: 10 CMH2O
PH BLD: 7.36 [PH] (ref 7.35–7.45)
PIP ISTAT,IPIP: 16
PLATELET # BLD AUTO: 163 K/UL (ref 150–450)
PMV BLD AUTO: 11.7 FL (ref 9.4–12.3)
PO2 BLD: 70 MMHG (ref 75–100)
POTASSIUM SERPL-SCNC: 3.9 MMOL/L (ref 3.5–5.1)
RBC # BLD AUTO: 4.08 M/UL (ref 4.23–5.6)
SAO2 % BLD: 92.6 % (ref 95–98)
SERVICE CMNT-IMP: 11
SERVICE CMNT-IMP: ABNORMAL
SODIUM SERPL-SCNC: 145 MMOL/L (ref 136–145)
SPECIMEN TYPE: ABNORMAL
WBC # BLD AUTO: 6.2 K/UL (ref 4.3–11.1)

## 2022-01-09 PROCEDURE — 83735 ASSAY OF MAGNESIUM: CPT

## 2022-01-09 PROCEDURE — 80048 BASIC METABOLIC PNL TOTAL CA: CPT

## 2022-01-09 PROCEDURE — 74011000250 HC RX REV CODE- 250: Performed by: INTERNAL MEDICINE

## 2022-01-09 PROCEDURE — 82803 BLOOD GASES ANY COMBINATION: CPT

## 2022-01-09 PROCEDURE — 74011250637 HC RX REV CODE- 250/637: Performed by: INTERNAL MEDICINE

## 2022-01-09 PROCEDURE — 85025 COMPLETE CBC W/AUTO DIFF WBC: CPT

## 2022-01-09 PROCEDURE — 99233 SBSQ HOSP IP/OBS HIGH 50: CPT | Performed by: INTERNAL MEDICINE

## 2022-01-09 PROCEDURE — 77010033711 HC HIGH FLOW OXYGEN

## 2022-01-09 PROCEDURE — 83036 HEMOGLOBIN GLYCOSYLATED A1C: CPT

## 2022-01-09 PROCEDURE — 65610000006 HC RM INTENSIVE CARE

## 2022-01-09 PROCEDURE — 74011250636 HC RX REV CODE- 250/636: Performed by: INTERNAL MEDICINE

## 2022-01-09 RX ADMIN — FAMOTIDINE 20 MG: 10 INJECTION INTRAVENOUS at 08:13

## 2022-01-09 RX ADMIN — PREGABALIN 200 MG: 100 CAPSULE ORAL at 17:35

## 2022-01-09 RX ADMIN — MIDODRINE HYDROCHLORIDE 10 MG: 5 TABLET ORAL at 14:27

## 2022-01-09 RX ADMIN — PREGABALIN 200 MG: 100 CAPSULE ORAL at 08:13

## 2022-01-09 RX ADMIN — DEXMEDETOMIDINE HYDROCHLORIDE 1.5 MCG/KG/HR: 400 INJECTION INTRAVENOUS at 18:08

## 2022-01-09 RX ADMIN — SODIUM CHLORIDE, PRESERVATIVE FREE 10 ML: 5 INJECTION INTRAVENOUS at 14:28

## 2022-01-09 RX ADMIN — SODIUM CHLORIDE, PRESERVATIVE FREE 40 ML: 5 INJECTION INTRAVENOUS at 06:00

## 2022-01-09 RX ADMIN — MIDODRINE HYDROCHLORIDE 10 MG: 5 TABLET ORAL at 06:00

## 2022-01-09 RX ADMIN — MIDODRINE HYDROCHLORIDE 10 MG: 5 TABLET ORAL at 21:15

## 2022-01-09 RX ADMIN — POLYETHYLENE GLYCOL 3350 17 G: 17 POWDER, FOR SOLUTION ORAL at 08:16

## 2022-01-09 RX ADMIN — APIXABAN 5 MG: 5 TABLET, FILM COATED ORAL at 08:13

## 2022-01-09 RX ADMIN — PREGABALIN 200 MG: 100 CAPSULE ORAL at 21:15

## 2022-01-09 RX ADMIN — SODIUM CHLORIDE, PRESERVATIVE FREE 20 ML: 5 INJECTION INTRAVENOUS at 21:16

## 2022-01-09 RX ADMIN — ATORVASTATIN CALCIUM 40 MG: 40 TABLET, FILM COATED ORAL at 21:15

## 2022-01-09 RX ADMIN — APIXABAN 5 MG: 5 TABLET, FILM COATED ORAL at 21:15

## 2022-01-09 RX ADMIN — FLUDROCORTISONE ACETATE 0.1 MG: 0.1 TABLET ORAL at 08:13

## 2022-01-09 NOTE — PROGRESS NOTES
Pt placed on BIPAP - pt is in no distress at this time      01/08/22 2134   Oxygen Therapy   O2 Sat (%) 97 %   Pulse via Oximetry 94 beats per minute   O2 Device BIPAP   FIO2 (%) 35 %   Respiratory   Respiratory (WDL) X   Respiratory Pattern Tachypneic   Chest/Tracheal Assessment Chest expansion, symmetrical   Breath Sounds Bilateral Diminished   CPAP/BIPAP   CPAP/BIPAP Start/Stop On   Device Mode BIPAP   $$ Bipap Daily Yes   Mask Type and Size Full face; Large   Skin Condition intact   PIP Observed 14 cm H20   IPAP (cm H2O) 16 cm H2O   EPAP (cm H2O) 10 cm H2O   Inspiratory Time (sec) 1 seconds   Vt Spont (ml) 409 ml   Ve Observed (l/min) 12.7 l/min   Backup Rate 16   Total RR (Spontaneous) 31 breaths per minute   Insp Rise Time (sec) 5   Leak (Estimated) 17 L/min   Pt's Home Machine No   Biomedical Check Performed Yes   Settings Verified Yes   Alarm Settings   High Pressure 30   Low Pressure 5   Apnea 20   Low Ve 2   High Rate 50   Low Rate 5   Pulmonary Toilet   Pulmonary Toilet Cough and deep breath;H. O.B elevated

## 2022-01-09 NOTE — CONSULTS
[facility] Hospitalist Consult   Admit Date:  2022 11:38 AM   Name:  Marlene Carter   Age:  77 y.o. Sex:  male  :  1945   MRN:  [id number]   Room:  [facility]/    Presenting Complaint: Respiratory Distress    Reason(s) for Admission: Acute on chronic respiratory failure with hypercapnia [facility]) [J96.22]     Hospitalists consulted by Natalia Prince MD for: Transfer of care  History of Presenting Illness:   Marlene Carter is a 77 y.o. male with history of A. fib, acute on chronic respiratory failure with hypoxia and hypercapnia, acute respiratory failure with hypercapnia, atrial fibrillation with RVR, coronary artery disease, chronic kidney disease, on long-term anticoagulation, diabetes status post right BKA, hypertension, hypercholesterolemia, left ventricular dysfunction, neuropathy, systolic CHF and a history of Staph aureus bacteremia who was admitted 2022 secondary to acute hypoxic and hypercapnic respiratory failure requiring intubation. He was admitted to the pulmonology service. Of note the patient has been concerning for obstructive sleep apnea for some time and was recommended for outpatient sleep studies but had not had them done yet. As an outpatient he is on Florinef for his hypotension, and he had a echo in July that showed an EF of 30 to 35%. The patient was admitted by the intensivist service to the ICU. He was successfully extubated on 2021 he has been stable status post extubation with the pulmonology service working on getting him patient lives with outpatient. Uses BiPAP at night and air Vo during the day. Review of Systems:  10 systems reviewed and negative except as noted in HPI.   Assessment & Plan:   Principal Problem:    Acute respiratory failure with hypercapnia ([address] (2022)  Resolving patient is now on airvo during the day and BiPAP at night      Active Problems:    Stage 3 chronic kidney disease ([address] (2017)  No active issues will monitor      Controlled type 2 diabetes mellitus with diabetic polyneuropathy, without long-term current use of insulin ([facility] (1/5/2018)  Continue to monitor blood sugars and cover with insulin      Severe obesity (BMI 35.0-35.9 with comorbidity) ([facility] (96/39/6105)  Complicates the patient's care  Weight loss advised when feasible        Decreased cardiac ejection fraction (1/6/2022)  Most recent echo in July 2021 showed an EF of 30 to 35%  No active issues at this time  Continue current medical management  Low threshold for cardiology input if clinically indicated        Hypotension (1/6/2022)  Resolved continue Florinef      Acute on chronic respiratory failure with hypercapnia ([facility] (1/6/2022)  Continue supplemental oxygen and wean as tolerated towards baseline      Obstructive sleep apnea (1/8/2022)  Continue BiPAP at night as above  Pulmonology service working on getting him a machine from home prior to discharge        Obesity hypoventilation syndrome ([facility] (1/8/2022)  Weight loss advised  Complicates his care      Disposition: TBD. Patient is being weaned off airflow, obtain trilogy or BiPAP machine for home and be seen and evaluated by the therapy services.     We will assume care now that the patient is extubated more than 24 hours and being transferred out of the ICU      Hospital Problems as of 1/9/2022 Date Reviewed: 9/14/2021          Codes Class Noted - Resolved POA    Obstructive sleep apnea ICD-10-CM: G47.33  ICD-9-CM: 327.23  1/8/2022 - Present Unknown        Obesity hypoventilation syndrome ([facility] ICD-10-CM: B63.7  ICD-9-CM: 278.03  1/8/2022 - Present Unknown        * (Principal) Acute respiratory failure with hypercapnia ([facility] ICD-10-CM: J96.02  ICD-9-CM: 518.81  1/6/2022 - Present Yes        Decreased cardiac ejection fraction (Chronic) ICD-10-CM: R93.1  ICD-9-CM: 794.39  1/6/2022 - Present Yes        Hypotension ICD-10-CM: I95.9  ICD-9-CM: 458.9  1/6/2022 - Present Yes        Acute on chronic respiratory failure with hypercapnia (HCC) ICD-10-CM: X59.08  ICD-9-CM: 518.84  1/6/2022 - Present Unknown        Severe obesity (BMI 35.0-35.9 with comorbidity) (HCC) (Chronic) ICD-10-CM: E66.01, Z68.35  ICD-9-CM: 278.01, V85.35  10/10/2018 - Present Yes        Controlled type 2 diabetes mellitus with diabetic polyneuropathy, without long-term current use of insulin (HCC) (Chronic) ICD-10-CM: E11.42  ICD-9-CM: 250.60, 357.2  1/5/2018 - Present Yes        Stage 3 chronic kidney disease ([facility] (Chronic) ICD-10-CM: N18.30  ICD-9-CM: 585.3  11/22/2017 - Present Yes        RESOLVED: Suspected sleep apnea ICD-10-CM: R29.818  ICD-9-CM: 781.99  8/20/2021 - 1/8/2022 Yes              Past History:  Past Medical History:   Diagnosis Date    A-fib ([address] 07/19/2021    developed AFID after hospital admission for wound infection- started on Eliquis    Acute on chronic respiratory failure with hypoxia and hypercapnia ([address] 7/23/2021    Acute respiratory failure with hypercapnia ([facility] 7/23/2021    Atrial fibrillation with RVR ([address] 7/19/2021    CAD (coronary artery disease)     [facility] Card.     Cellulitis 7/19/2021    Chronic kidney disease     stage 3- improved    COVID-19 vaccine series completed     Moderna Vaccine completed X2 doses    Current use of long term anticoagulation     Eliquis and Aspirin    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 mellitus, limited to breakdown of skin ([address] 9/27/2021    H/O heart artery stent     X1 placed in 1999    History of MI (myocardial infarction) 1999    stent placed X1    Hypercholesterolemia     Hypertension     Left ventricular dysfunction     echo revealed EF 30-35%, mildly dilated LA/RA and mild TR and MR.    Neuropathy     severe    Oxygen dependent     recently started on 2L NC continous- Sleep study to be scheduled-questionable ELAINA    PICC (peripherally inserted central catheter) in place     PICC line in place to R arm    Staphylococcus aureus bacteremia 5/21/5886    Systolic CHF, acute on chronic ([facility] 7/19/2021    Type 2 diabetes mellitus ([facility]     oral agent/Pt does not monitor BS/no s.s of hypoglycemia/Last A1c: 6.7 on 7/13/21 per daughter      Past Surgical History:   Procedure Laterality Date    HX ORTHOPAEDIC  08/18/2021    BKA    ND CARDIAC SURG PROCEDURE UNLIST  1999    stent placement      No Known Allergies   Social History     Tobacco Use    Smoking status: Never Smoker    Smokeless tobacco: Never Used   Substance Use Topics    Alcohol use: No      Family History   Problem Relation Age of Onset    Cancer Mother     Cancer Father     No Known Problems Maternal Grandmother     No Known Problems Maternal Grandfather     No Known Problems Paternal Grandmother     No Known Problems Paternal Grandfather       Family history reviewed and negative except as otherwise noted.     Immunization History   Administered Date(s) Administered    COVID-19, PFIZER, MRNA, LNP-S, PF, 30MCG/0.3ML DOSE 02/27/2021, 03/02/2021    Influenza High Dose Vaccine PF 12/30/2015, 11/04/2016, 11/06/2017, 10/10/2018, 12/15/2020    Influenza Vaccine (Tri) Adjuvanted (>65 Yrs FLUAD TRI [id number]) 10/15/2019    Pneumococcal Conjugate (PCV-13) 10/30/2015    Pneumococcal Polysaccharide (PPSV-23) 11/04/2016    TB Skin Test (PPD) Intradermal 08/20/2021, 09/13/2021     Current Facility-Administered Medications   Medication Dose Route Frequency    NOREPINephrine (LEVOPHED) 4 mg in 5% dextrose 250 mL infusion  0.5-30 mcg/min IntraVENous TITRATE    dexmedeTOMidine in 0.9 % NaCl (PRECEDEX) 400 mcg/100 mL (4 mcg/mL) infusion soln  0.1-1.5 mcg/kg/hr IntraVENous TITRATE    LORazepam (ATIVAN) injection 1 mg  1 mg IntraVENous Q2H PRN    acetaminophen (TYLENOL) tablet 650 mg  650 mg Oral Q6H PRN    fludrocortisone (FLORINEF) tablet 0.1 mg  0.1 mg Oral DAILY    polyethylene glycol (MIRALAX) packet 17 g  17 g Oral DAILY    sodium chloride (NS) flush 5-40 mL  5-40 mL IntraVENous Q8H    sodium chloride (NS) flush 5-40 mL  5-40 mL IntraVENous PRN    famotidine (PF) (PEPCID) 20 mg in 0.9% sodium chloride 10 mL injection  20 mg IntraVENous DAILY    apixaban (ELIQUIS) tablet 5 mg  5 mg Oral Q12H    atorvastatin (LIPITOR) tablet 40 mg  40 mg Oral QHS    midodrine (PROAMATINE) tablet 10 mg  10 mg Oral Q8H    pregabalin (LYRICA) capsule 200 mg  200 mg Oral TID       Objective:     Patient Vitals for the past 24 hrs:   Temp Pulse Resp BP SpO2   01/09/22 1045  95   91 %   01/09/22 1030  97   92 %   01/09/22 1015  81   92 %   01/09/22 1000  79  106/72 90 %   01/09/22 0945  93   92 %   01/09/22 0930  95  132/63 97 %   01/09/22 0915  86  113/72 96 %   01/09/22 0900  (!) 102   95 %   01/09/22 0845  100   (!) 87 %   01/09/22 0837     95 %   01/09/22 0830  93   95 %   01/09/22 0815  97   93 %   01/09/22 0800  99   93 %   01/09/22 0745  (!) 103   94 %   01/09/22 0730 98.5 °F (36.9 °C) 99 26 (!) 114/52 95 %   01/09/22 0715  97   95 %   01/09/22 0700  (!) 101   92 %   01/09/22 0645  (!) 101   91 %   01/09/22 0630  (!) 104   94 %   01/09/22 0615  100   (!) 89 %   01/09/22 0600  92   94 %   01/09/22 0545  98   94 %   01/09/22 0530  82   93 %   01/09/22 0515  96   93 %   01/09/22 0500  81   93 %   01/09/22 0445  96   93 %   01/09/22 0430  86   94 %   01/09/22 0415  89   94 %   01/09/22 0400 97 °F (36.1 °C) 93 22  93 %   01/09/22 0300  83   94 %   01/09/22 0200  81   92 %   01/09/22 0100  71   92 %   01/09/22 0000 96.9 °F (36.1 °C) 90   94 %   01/08/22 2300  81   96 %   01/08/22 2200  91   91 %   01/08/22 2134     97 %   01/08/22 2126     96 %   01/08/22 2100  96   100 %   01/08/22 2000 97.9 °F (36.6 °C) 88   97 %   01/08/22 1930  94   93 %   01/08/22 1915  93   93 %   01/08/22 1900  100   (!) 89 %   01/08/22 1845  96   (!) 89 %   01/08/22 1830  85   95 % 01/08/22 1815  92   90 %   01/08/22 1800  97   (!) 88 %   01/08/22 1745  95   95 %   01/08/22 1730  92   98 %   01/08/22 1715  93   94 %   01/08/22 1700  96   92 %   01/08/22 1645  85   (!) 89 %   01/08/22 1630 (!) 96.5 °F (35.8 °C) 67 26 (!) 95/55 96 %   01/08/22 1615  83   95 %   01/08/22 1600  71   92 %   01/08/22 1545  80   96 %   01/08/22 1530  77   93 %   01/08/22 1515  96   95 %   01/08/22 1500  97   91 %   01/08/22 1445  77   98 %   01/08/22 1430  77   96 %   01/08/22 1415  94   94 %   01/08/22 1400  92   92 %   01/08/22 1357  98   91 %   01/08/22 1345  98   (!) 87 %   01/08/22 1330  93   95 %   01/08/22 1315  95      01/08/22 1300  94      01/08/22 1245  96      01/08/22 1230  98   (!) 79 %   01/08/22 1215  83   93 %   01/08/22 1200  98   99 %   01/08/22 1145 97.7 °F (36.5 °C) 97 24 124/68 92 %   01/08/22 1130  93   93 %     Oxygen Therapy  O2 Sat (%): 91 % (01/09/22 1045)  Pulse via Oximetry: 96 beats per minute (01/09/22 1045)  O2 Device: Heated; Hi flow nasal cannula (01/09/22 0837)  Skin Assessment: Clean, dry, & intact (01/08/22 0817)  Skin Protection for O2 Device: No (01/08/22 0800)  Orientation: Bilateral (01/07/22 0837)  Location: Cheek (01/07/22 4080)  Interventions: Mouth Care (01/07/22 0800)  O2 Flow Rate (L/min): 50 l/min (01/09/22 0837)  O2 Temperature: 87.8 °F (31 °C) (01/09/22 0730)  FIO2 (%): 50 % (01/09/22 0837)    Estimated body mass index is 38.21 kg/m² as calculated from the following:    Height as of this encounter: 5' 10\" (1.778 m). Weight as of this encounter: 120.8 kg (266 lb 5.1 oz). Intake/Output Summary (Last 24 hours) at 1/9/2022 1129  Last data filed at 1/9/2022 0837  Gross per 24 hour   Intake 720 ml   Output 4250 ml   Net -3530 ml         Physical Exam:    Blood pressure 106/72, pulse 95, temperature 98.5 °F (36.9 °C), resp.  rate 26, height 5' 10\" (1.778 m), weight 120.8 kg (266 lb 5.1 oz), SpO2 91 %. General:    Well nourished. On Airvo morbidly obese no overt distress  Head:  Normocephalic, atraumatic  Eyes:  Sclerae appear normal.  Pupils equally round. ENT:  Nares appear normal, no drainage. Moist oral mucosa  Neck:  No restricted ROM. Trachea midline   CV:   RRR. No m/r/g. No jugular venous distension. Lungs:   CTAB. No wheezing, rhonchi, or rales. Respirations even, unlabored  Abdomen: Bowel sounds present. Soft, nontender, nondistended. Extremities: No cyanosis or clubbing. No edema; right BKA  Skin:     No rashes and normal coloration. Warm and dry. Neuro:  CN II-XII grossly intact. Sensation intact. A&Ox3  Psych:  Normal mood and affect.       I have reviewed ordered lab tests and independently visualized imaging below:    Recent Labs:  Recent Results (from the past 48 hour(s))   BLOOD GAS, ARTERIAL POC    Collection Time: 01/07/22  1:58 PM   Result Value Ref Range    Device: ADULT VENT      FIO2 (POC) 50 %    pH (POC) 7.39 7.35 - 7.45      pCO2 (POC) 48.1 (H) 35 - 45 MMHG    pO2 (POC) 78 75 - 100 MMHG    HCO3 (POC) 29.4 (H) 22 - 26 MMOL/L    sO2 (POC) 95.1 95 - 98 %    Base excess (POC) 3.6 mmol/L    Mode Pressure Support      PEEP/CPAP (POC) 10 cmH2O    Pressure support 15 cmH2O    Allens test (POC) NOT APPLICABLE      Site DRAWN FROM ARTERIAL LINE      Specimen type (POC) ARTERIAL      Performed by Sarbjit Garcia    METABOLIC PANEL, BASIC    Collection Time: 01/08/22  3:05 AM   Result Value Ref Range    Sodium 143 136 - 145 mmol/L    Potassium 4.4 3.5 - 5.1 mmol/L    Chloride 110 (H) 98 - 107 mmol/L    CO2 30 21 - 32 mmol/L    Anion gap 3 (L) 7 - 16 mmol/L    Glucose 77 65 - 100 mg/dL    BUN 37 (H) 8 - 23 MG/DL    Creatinine 1.80 (H) 0.8 - 1.5 MG/DL    GFR est AA 48 (L) >60 ml/min/1.73m2    GFR est non-AA 39 (L) >60 ml/min/1.73m2    Calcium 9.1 8.3 - 10.4 MG/DL   MAGNESIUM    Collection Time: 01/08/22  3:05 AM   Result Value Ref Range    Magnesium 2.8 (H) 1.8 - 2.4 mg/dL   CBC WITH AUTOMATED DIFF    Collection Time: 01/08/22  3:05 AM   Result Value Ref Range    WBC 6.5 4.3 - 11.1 K/uL    RBC 4.14 (L) 4.23 - 5.6 M/uL    HGB 11.3 (L) 13.6 - 17.2 g/dL    HCT 37.7 (L) 41.1 - 50.3 %    MCV 91.1 79.6 - 97.8 FL    MCH 27.3 26.1 - 32.9 PG    MCHC 30.0 (L) 31.4 - 35.0 g/dL    RDW 16.8 (H) 11.9 - 14.6 %    PLATELET 572 216 - 424 K/uL    MPV 11.8 9.4 - 12.3 FL    ABSOLUTE NRBC 0.00 0.0 - 0.2 K/uL    DF AUTOMATED      NEUTROPHILS 53 43 - 78 %    LYMPHOCYTES 35 13 - 44 %    MONOCYTES 9 4.0 - 12.0 %    EOSINOPHILS 2 0.5 - 7.8 %    BASOPHILS 0 0.0 - 2.0 %    IMMATURE GRANULOCYTES 0 0.0 - 5.0 %    ABS. NEUTROPHILS 3.5 1.7 - 8.2 K/UL    ABS. LYMPHOCYTES 2.3 0.5 - 4.6 K/UL    ABS. MONOCYTES 0.6 0.1 - 1.3 K/UL    ABS. EOSINOPHILS 0.1 0.0 - 0.8 K/UL    ABS. BASOPHILS 0.0 0.0 - 0.2 K/UL    ABS. IMM.  GRANS. 0.0 0.0 - 0.5 K/UL   BLOOD GAS, ARTERIAL POC    Collection Time: 01/08/22  3:31 AM   Result Value Ref Range    Device: BIPAP MASK      FIO2 (POC) 50 %    pH (POC) 7.33 (L) 7.35 - 7.45      pCO2 (POC) 58.5 (H) 35 - 45 MMHG    pO2 (POC) 89 75 - 100 MMHG    HCO3 (POC) 30.7 (H) 22 - 26 MMOL/L    sO2 (POC) 95.8 95 - 98 %    Base excess (POC) 3.4 mmol/L    PEEP/CPAP (POC) 14 cmH2O    PIP (POC) 19      Pressure support 18 cmH2O    Allens test (POC) NOT APPLICABLE      Inspiratory Time 1 sec    Site DRAWN FROM ARTERIAL LINE      Specimen type (POC) ARTERIAL      Performed by Abbey     Respiratory comment: [email or web address]    BLOOD GAS, ARTERIAL POC    Collection Time: 01/09/22  2:21 AM   Result Value Ref Range    Device: BIPAP MASK      FIO2 (POC) 40 %    pH (POC) 7.36 7.35 - 7.45      pCO2 (POC) 58.9 (H) 35 - 45 MMHG    pO2 (POC) 70 (L) 75 - 100 MMHG    HCO3 (POC) 33.3 (H) 22 - 26 MMOL/L    sO2 (POC) 92.6 (L) 95 - 98 %    Base excess (POC) 6.1 mmol/L    PEEP/CPAP (POC) 10 cmH2O    PIP (POC) 16      Allens test (POC) NOT APPLICABLE      Site DRAWN FROM ARTERIAL LINE      Specimen type (POC) ARTERIAL Performed by Alexis     Respiratory comment: 11    METABOLIC PANEL, BASIC    Collection Time: 01/09/22  2:53 AM   Result Value Ref Range    Sodium 145 136 - 145 mmol/L    Potassium 3.9 3.5 - 5.1 mmol/L    Chloride 107 98 - 107 mmol/L    CO2 33 (H) 21 - 32 mmol/L    Anion gap 5 (L) 7 - 16 mmol/L    Glucose 84 65 - 100 mg/dL    BUN 34 (H) 8 - 23 MG/DL    Creatinine 1.74 (H) 0.8 - 1.5 MG/DL    GFR est AA 49 (L) >60 ml/min/1.73m2    GFR est non-AA 41 (L) >60 ml/min/1.73m2    Calcium 9.3 8.3 - 10.4 MG/DL   MAGNESIUM    Collection Time: 01/09/22  2:53 AM   Result Value Ref Range    Magnesium 2.8 (H) 1.8 - 2.4 mg/dL   CBC WITH AUTOMATED DIFF    Collection Time: 01/09/22  2:53 AM   Result Value Ref Range    WBC 6.2 4.3 - 11.1 K/uL    RBC 4.08 (L) 4.23 - 5.6 M/uL    HGB 11.5 (L) 13.6 - 17.2 g/dL    HCT 37.2 (L) 41.1 - 50.3 %    MCV 91.2 79.6 - 97.8 FL    MCH 28.2 26.1 - 32.9 PG    MCHC 30.9 (L) 31.4 - 35.0 g/dL    RDW 16.7 (H) 11.9 - 14.6 %    PLATELET 091 455 - 105 K/uL    MPV 11.7 9.4 - 12.3 FL    ABSOLUTE NRBC 0.00 0.0 - 0.2 K/uL    DF AUTOMATED      NEUTROPHILS 54 43 - 78 %    LYMPHOCYTES 34 13 - 44 %    MONOCYTES 10 4.0 - 12.0 %    EOSINOPHILS 2 0.5 - 7.8 %    BASOPHILS 1 0.0 - 2.0 %    IMMATURE GRANULOCYTES 0 0.0 - 5.0 %    ABS. NEUTROPHILS 3.4 1.7 - 8.2 K/UL    ABS. LYMPHOCYTES 2.1 0.5 - 4.6 K/UL    ABS. MONOCYTES 0.6 0.1 - 1.3 K/UL    ABS. EOSINOPHILS 0.1 0.0 - 0.8 K/UL    ABS. BASOPHILS 0.0 0.0 - 0.2 K/UL    ABS. IMM. GRANS. 0.0 0.0 - 0.5 K/UL       All Micro Results     None          Other Studies:  No results found. Signed:  Janice Flores MD    Part of this note may have been written by using a voice dictation software. The note has been proof read but may still contain some grammatical/other typographical errors.

## 2022-01-09 NOTE — PROGRESS NOTES
Bedside and Verbal shift change report given to [facility] (oncoming nurse) by Errol Johnson RN (offgoing nurse). Report included the following information SBAR, Kardex, Intake/Output, MAR, Recent Results, Med Rec Status, Cardiac Rhythm Afib 80 and Alarm Parameters . Resting in bed presently on AirVo 50 liters 48% FiO2 SpO2 94%. Watching TV.  No complaints voiced or distress noted

## 2022-01-09 NOTE — PROGRESS NOTES
Problem: Ventilator Management  Goal: *Adequate oxygenation and ventilation  Outcome: Progressing Towards Goal  Goal: *Patient maintains clear airway/free of aspiration  Outcome: Progressing Towards Goal  Goal: *Absence of infection signs and symptoms  Outcome: Progressing Towards Goal  Goal: *Normal spontaneous ventilation  Outcome: Progressing Towards Goal     Problem: Patient Education: Go to Patient Education Activity  Goal: Patient/Family Education  Outcome: Progressing Towards Goal     Problem: Delirium Treatment  Goal: *Level of consciousness restored to baseline  Outcome: Progressing Towards Goal  Goal: *Level of environmental perceptions restored to baseline  Outcome: Progressing Towards Goal  Goal: *Sensory perception restored to baseline  Outcome: Progressing Towards Goal  Goal: *Emotional stability restored to baseline  Outcome: Progressing Towards Goal  Goal: *Functional assessment restored to baseline  Outcome: Progressing Towards Goal  Goal: *Absence of falls  Outcome: Progressing Towards Goal  Goal: *Will remain free of delirium, CAM Score negative  Outcome: Progressing Towards Goal  Goal: *Cognitive status will be restored to baseline  Outcome: Progressing Towards Goal  Goal: Interventions  Outcome: Progressing Towards Goal     Problem: Patient Education: Go to Patient Education Activity  Goal: Patient/Family Education  Outcome: Progressing Towards Goal     Problem: Falls - Risk of  Goal: *Absence of Falls  Description: Document Russell Fall Risk and appropriate interventions in the flowsheet.   Outcome: Progressing Towards Goal  Note: Fall Risk Interventions:  Mobility Interventions: Communicate number of staff needed for ambulation/transfer    Mentation Interventions: Adequate sleep, hydration, pain control    Medication Interventions: Assess postural VS orthostatic hypotension    Elimination Interventions: Call light in reach              Problem: Patient Education: Go to Patient Education Activity  Goal: Patient/Family Education  Outcome: Progressing Towards Goal     Problem: Non-Violent Restraints  Goal: Removal from restraints as soon as assessed to be safe  Outcome: Progressing Towards Goal  Goal: No harm/injury to patient while restraints in use  Outcome: Progressing Towards Goal  Goal: Patient's dignity will be maintained  Outcome: Progressing Towards Goal  Goal: Patient Interventions  Outcome: Progressing Towards Goal     Problem: Gas Exchange - Impaired  Goal: *Absence of hypoxia  Outcome: Progressing Towards Goal     Problem: Patient Education: Go to Patient Education Activity  Goal: Patient/Family Education  Outcome: Progressing Towards Goal     Problem: Pressure Injury - Risk of  Goal: *Prevention of pressure injury  Description: Document Julio Cesar Scale and appropriate interventions in the flowsheet.   Outcome: Progressing Towards Goal  Note: Pressure Injury Interventions:  Sensory Interventions: Assess changes in LOC    Moisture Interventions: Absorbent underpads    Activity Interventions: Assess need for specialty bed    Mobility Interventions: Pressure redistribution bed/mattress (bed type)    Nutrition Interventions: Document food/fluid/supplement intake    Friction and Shear Interventions: Apply protective barrier, creams and emollients                Problem: Patient Education: Go to Patient Education Activity  Goal: Patient/Family Education  Outcome: Progressing Towards Goal     Problem: Patient Education: Go to Patient Education Activity  Goal: Patient/Family Education  Outcome: Progressing Towards Goal

## 2022-01-09 NOTE — PROGRESS NOTES
Bedside and verbal shift change report received from  [facility] (offgoing nurse). Report included the following information SBAR, Kardex, Procedure Summary, Intake/Output, MAR, Recent Results, Cardiac Rhythm CAF and Quality Measures.      Dual skin assessment completed at bedside: SKIN INTACT, NO PRESSURE INJURIES NOTED (list pertinent skin assessment findings)    Dual verification of gtts completed (name of gtts verified): N/A

## 2022-01-09 NOTE — PROGRESS NOTES
[facility] Critical Care Note[de-identified] 1/9/2022  Bishnu Rivas  Admission Date: 1/6/2022     Length of Stay: 3 days    Background: 77 y.o. y/o male was transported to the ER via EMS after they were called for dyspnea. He was awake and alert and appeared comfortable when they arrived but en route he became unresponsive. He required intubation in the ER. ABG -> 7.2/CO2 80/PO2 119/HCO3 30. 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 He required bipap during that admission for hypercapnea. An outpatient PSG was recommended but he has not had this done yet. His daughter reports daytime somnolence and states that there are days that he gets up just to eat and otherwise sleeps. Sometimes he cannot hold his head up. She has noticed myoclonic jerking. He felt short of breath a week ago so went to urgent care who prescribed 20 mg of Lasix per day for 5 days. She felt like he looked better after taking the lasix but after he completed the course, the shortness of breath returned. He does not weigh himself and he has not noticed any edema. Echo last July showed an EF of 30 to 35%. He is not maintained on any lasix and he takes Florinef for a history of hypotension. His was hypotensive on admission but this worsened after intubation. His WBC is normal. He has not had any fever. He was treated for bronchitis with a Z pack around [facility] and his cough resolved.       Notable PMH:  has a past medical history of A-fib ([address] (07/19/2021), Acute on chronic respiratory failure with hypoxia and hypercapnia ([address] (7/23/2021), Acute respiratory failure with hypercapnia ([address] (7/23/2021), Atrial fibrillation with RVR ([address] (7/19/2021), CAD (coronary artery disease), Cellulitis (7/19/2021), Chronic kidney disease, COVID-19 vaccine series completed, Current use of long term anticoagulation, 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 mellitus, limited to breakdown of skin ([facility] (9/27/2021), H/O heart artery stent, History of MI (myocardial infarction) (1999), Hypercholesterolemia, Hypertension, Left ventricular dysfunction, Neuropathy, Oxygen dependent, PICC (peripherally inserted central catheter) in place, Staphylococcus aureus bacteremia (4/28/9568), Systolic CHF, acute on chronic ([facility] (7/19/2021), and Type 2 diabetes mellitus ([facility] 24 Hour events:  No issues overnight. Did use BIPAP QHS on Airvo and tolerating.  Did eat all his breakfast.     Review of Systems:  -Fever  -Headaches  -Chest pain  -Dyspnea, -wheezing,- cough  -Abdominal pain,- constipation  -Leg swelling  All other organ systems grossly normal.      Social History     Socioeconomic History    Marital status:      Spouse name: Not on file    Number of children: Not on file    Years of education: Not on file    Highest education level: Not on file   Occupational History    Not on file   Tobacco Use    Smoking status: Never Smoker    Smokeless tobacco: Never Used   Vaping Use    Vaping Use: Never used   Substance and Sexual Activity    Alcohol use: No    Drug use: No    Sexual activity: Not Currently     Partners: Female   Other Topics Concern     Service Not Asked    Blood Transfusions Not Asked    Caffeine Concern Not Asked    Occupational Exposure Not Asked    Hobby Hazards Not Asked    Sleep Concern Not Asked    Stress Concern Not Asked    Weight Concern Not Asked    Special Diet Not Asked    Back Care Not Asked    Exercise Not Asked    Bike Helmet Not Asked    Seat Belt Not Asked    Self-Exams Not Asked   Social History Narrative    Lives with his daughter     Social Determinants of Health     Financial Resource Strain:     Difficulty of Paying Living Expenses: Not on file   Food Insecurity:     Worried About Running Out of Food in the Last Year: Not on file    Agueda of Food in the Last Year: Not on file   Transportation Needs:  Lack of Transportation (Medical): Not on file    Lack of Transportation (Non-Medical): Not on file   Physical Activity:     Days of Exercise per Week: Not on file    Minutes of Exercise per Session: Not on file   Stress:     Feeling of Stress : Not on file   Social Connections:     Frequency of Communication with Friends and Family: Not on file    Frequency of Social Gatherings with Friends and Family: Not on file    Attends [employer] Services: Not on file    Active Member of [address] [place] [address] [employer] or Organizations: Not on file    Attends Club or Organization Meetings: Not on file    Marital Status: Not on file   Intimate Partner Violence:     Fear of Current or Ex-Partner: Not on file    Emotionally Abused: Not on file    Physically Abused: Not on file    Sexually Abused: Not on file   Housing Stability:     Unable to Pay for Housing in the Last Year: Not on file    Number of [place] in the Last Year: Not on file    Unstable Housing in the Last Year: Not on file     Family History   Problem Relation Age of Onset    Cancer Mother     Cancer Father     No Known Problems Maternal Grandmother     No Known Problems Maternal Grandfather     No Known Problems Paternal Grandmother     No Known Problems Paternal Grandfather      Lines: (insertion date)   ETT: (1/6)  Pineda: (1/6)  OGT: (1/6)  Central line: (1/7)  Arterial Line (1/7)    Drips: current dose (range)  Diprivan Dose (mcg/kg/min): 0 mcg/kg/min  Precedex Dose (mcg/kg/hr): 0 mcg/kg/hr  Levophed Dose (mcg/kg/min): 0 mcg/kg/min   Fentanyl at 50 mcg/hour    Pertinent Exam:         Blood pressure 106/72, pulse 95, temperature 98.5 °F (36.9 °C), resp. rate 26, height 5' 10\" (1.778 m), weight 266 lb 5.1 oz (120.8 kg), SpO2 91 %.      Intake/Output Summary (Last 24 hours) at 1/9/2022 1120  Last data filed at 1/9/2022 0837  Gross per 24 hour   Intake 720 ml   Output 4250 ml   Net -3530 ml     Constitutional:  obese WM in bed and not in distress  EENMT: Sclera clear, pupils equal, orally intubated  Respiratory: clear from anterior -bilaterally. Respirations even and not labored on Airvo  Cardiovascular:  RRR - sinus per telemetry  Gastrointestinal:  soft with no evidence of tenderness; positive bowel sounds present  Musculoskeletal:  warm with no cyanosis, no lower extremity edema. Right BKA. Skin:  no jaundice or ecchymosis  Neurologic: no focal deficits  Psychiatric: awake and calm    CXR:     1/8/22  Less congestion on left chest        1/7 1/6      Recent Labs     01/09/22  0253 01/08/22  0305 01/07/22  0418 01/06/22  1318 01/06/22  1159   WBC 6.2 6.5 11.3*  --    < >   HGB 11.5* 11.3* 12.3*  --    < >   HCT 37.2* 37.7* 41.0*  --    < >    166 246  --    < >   LAC  --   --   --  1.0  --     < > = values in this interval not displayed. Recent Labs     01/09/22  0253 01/08/22  0305 01/07/22  0418 01/06/22  1413 01/06/22  1159 01/06/22  1159    143 144  --    < > 145   K 3.9 4.4 3.9  --    < > 4.9    110* 109*  --    < > 111*   CO2 33* 30 29  --    < > 34*   GLU 84 77 119*  --    < > 92   BUN 34* 37* 40*  --    < > 34*   CREA 1.74* 1.80* 1.84*  --    < > 1.78*   MG 2.8* 2.8* 1.8  --    < > 2.4   CA 9.3 9.1 9.5  --    < > 9.5   PHOS  --   --   --  2.7  --   --    ALB  --   --   --   --   --  3.2   AST  --   --   --   --   --  20   ALT  --   --   --   --   --  20   AP  --   --   --   --   --  172*    < > = values in this interval not displayed. Recent Labs     01/06/22  1318 01/06/22  1159   LAC 1.0  --    TROPHS  --  22.0*   BNPNT  --  2,179*     Recent Labs     01/06/22  1204   GLUCPOC 88     ECHO: Results from [facility] encounter on 07/19/21    ECHO ADULT COMPLETE    Interpretation Summary  · Image quality for this study was poor. · Contrast used: DEFINITY. · LV: Estimated LVEF is 30 - 35%. Normal wall thickness.  Mildly dilated left ventricle. Moderate-to-severely reduced systolic function. Wall motion abnormalities with suggest coronary artery disease  · LA: Mildly dilated left atrium. · RA: Mildly dilated right atrium. · MV: Mild mitral annular calcification. Mild mitral valve regurgitation is present. · TV: Mild tricuspid valve regurgitation is present. Results     ** No results found for the last 336 hours. **        Inpat Anti-Infectives (From admission, onward)    None        Ventilator Settings:  Ideal body weight: 73 kg (160 lb 15 oz)   Mode FIO2 Rate Tidal Volume Pressure PEEP   VC+  50 %    450 ml     8 cm H20    Peak airway pressure: 39 cm H2O       Minute ventilation: 14.2 l/min  ABG:  Recent Labs     01/09/22  0221 01/08/22  0331 01/07/22  1358   PHI 7.36 7.33* 7.39   PCO2I 58.9* 58.5* 48.1*   PO2I 70* 89 78   HCO3I 33.3* 30.7* 29.4*     Assessment and Plan:  (Medical Decision Making)   Impression: 77 y.o. male with acute respiratory failure secondary to hypercapnea. Required intubation in the ER with a CO2 of 80, ph 7.2. CXR with bilateral infiltrates/cardiomyopathy. BNP 2179. Has severe ELAINA per recent at home sleep study (AHI 54.8 with desaturation to 68%). Formal outpatient study recommended. Repeat ABG with CO2 correction. Hypotensive requiring pressor support. On Florinef at home for hypotension. EF 30 to 35% by history but no medical therapy due to hypotension. Off vent on 1/7 and awake and alert. NEURO:   Sedation:  Resolved off drip  Analgesia: as above  CV:   Volume Status:  and did diureses well with lasix given yesterday  History of a fib: occurred summer 2021. On Eliquis. Maintaining SR. PULM:   Acute hypoxemic/hypercapneic respiratory failure: change from BIPAP to airvo for now. Continue BIPAP QHS  ELAINA -- home sleep study in November with severe elaina with AHI in 54.8 and over 240 minutes with saturaiton <89%.   likely with [facility] and  - Adonay White [phone] form [facility] came and needs spirometry to qualify for trilogy since has severe janak and CO2 retainer. RENAL:  CKD:  Stable. Lactic acidosis: NA  Electrolytes: no supplement needed today  GI:   Nutrition: check swallowing and then start po if cleared by speech  HEME:   Anemia: mild, stable  Anticoagulation: on Eliquis with history of a fib  ID:   No evidence of infection  ENDO: A1C 5.5  Skin: no decub, turns, preventive care  Prophy: eliquis and pepcid    Debility  --get PT/OT today      hospitalist to take over care now  Will still follow  Can go out of ICU now and will transfer out. Moises Lorenz MD        This note was signed electronically. Errors are unfortunately her likely due to dictation software.

## 2022-01-10 LAB — MAGNESIUM SERPL-MCNC: 2 MG/DL (ref 1.8–2.4)

## 2022-01-10 PROCEDURE — 74011250636 HC RX REV CODE- 250/636: Performed by: NURSE PRACTITIONER

## 2022-01-10 PROCEDURE — 65660000000 HC RM CCU STEPDOWN

## 2022-01-10 PROCEDURE — 94660 CPAP INITIATION&MGMT: CPT

## 2022-01-10 PROCEDURE — 94760 N-INVAS EAR/PLS OXIMETRY 1: CPT

## 2022-01-10 PROCEDURE — 74011250637 HC RX REV CODE- 250/637: Performed by: INTERNAL MEDICINE

## 2022-01-10 PROCEDURE — 97165 OT EVAL LOW COMPLEX 30 MIN: CPT

## 2022-01-10 PROCEDURE — 77010033711 HC HIGH FLOW OXYGEN

## 2022-01-10 PROCEDURE — 74011250636 HC RX REV CODE- 250/636: Performed by: INTERNAL MEDICINE

## 2022-01-10 PROCEDURE — 2709999900 HC NON-CHARGEABLE SUPPLY

## 2022-01-10 PROCEDURE — 92526 ORAL FUNCTION THERAPY: CPT

## 2022-01-10 PROCEDURE — 83735 ASSAY OF MAGNESIUM: CPT

## 2022-01-10 PROCEDURE — 74011000250 HC RX REV CODE- 250: Performed by: INTERNAL MEDICINE

## 2022-01-10 PROCEDURE — 99232 SBSQ HOSP IP/OBS MODERATE 35: CPT | Performed by: INTERNAL MEDICINE

## 2022-01-10 PROCEDURE — 97530 THERAPEUTIC ACTIVITIES: CPT

## 2022-01-10 PROCEDURE — 36415 COLL VENOUS BLD VENIPUNCTURE: CPT

## 2022-01-10 RX ORDER — FUROSEMIDE 10 MG/ML
40 INJECTION INTRAMUSCULAR; INTRAVENOUS ONCE
Status: COMPLETED | OUTPATIENT
Start: 2022-01-10 | End: 2022-01-10

## 2022-01-10 RX ORDER — PREGABALIN 50 MG/1
200 CAPSULE ORAL DAILY
Status: DISCONTINUED | OUTPATIENT
Start: 2022-01-11 | End: 2022-01-18 | Stop reason: HOSPADM

## 2022-01-10 RX ORDER — LANOLIN ALCOHOL/MO/W.PET/CERES
1 CREAM (GRAM) TOPICAL
Status: DISCONTINUED | OUTPATIENT
Start: 2022-01-10 | End: 2022-01-18 | Stop reason: HOSPADM

## 2022-01-10 RX ORDER — FAMOTIDINE 20 MG/1
20 TABLET, FILM COATED ORAL DAILY
Status: DISCONTINUED | OUTPATIENT
Start: 2022-01-11 | End: 2022-01-18 | Stop reason: HOSPADM

## 2022-01-10 RX ADMIN — SODIUM CHLORIDE, PRESERVATIVE FREE 40 ML: 5 INJECTION INTRAVENOUS at 05:53

## 2022-01-10 RX ADMIN — FERROUS SULFATE TAB 325 MG (65 MG ELEMENTAL FE) 325 MG: 325 (65 FE) TAB at 15:19

## 2022-01-10 RX ADMIN — APIXABAN 5 MG: 5 TABLET, FILM COATED ORAL at 21:42

## 2022-01-10 RX ADMIN — SODIUM CHLORIDE, PRESERVATIVE FREE 10 ML: 5 INJECTION INTRAVENOUS at 15:05

## 2022-01-10 RX ADMIN — SODIUM CHLORIDE, PRESERVATIVE FREE 10 ML: 5 INJECTION INTRAVENOUS at 21:49

## 2022-01-10 RX ADMIN — FUROSEMIDE 40 MG: 10 INJECTION, SOLUTION INTRAMUSCULAR; INTRAVENOUS at 15:04

## 2022-01-10 RX ADMIN — FLUDROCORTISONE ACETATE 0.1 MG: 0.1 TABLET ORAL at 08:56

## 2022-01-10 RX ADMIN — POLYETHYLENE GLYCOL 3350 17 G: 17 POWDER, FOR SOLUTION ORAL at 08:57

## 2022-01-10 RX ADMIN — MIDODRINE HYDROCHLORIDE 10 MG: 5 TABLET ORAL at 21:46

## 2022-01-10 RX ADMIN — FAMOTIDINE 20 MG: 10 INJECTION INTRAVENOUS at 08:56

## 2022-01-10 RX ADMIN — PREGABALIN 200 MG: 100 CAPSULE ORAL at 15:04

## 2022-01-10 RX ADMIN — PREGABALIN 200 MG: 100 CAPSULE ORAL at 08:56

## 2022-01-10 RX ADMIN — MIDODRINE HYDROCHLORIDE 10 MG: 5 TABLET ORAL at 05:53

## 2022-01-10 RX ADMIN — MIDODRINE HYDROCHLORIDE 10 MG: 5 TABLET ORAL at 15:04

## 2022-01-10 RX ADMIN — APIXABAN 5 MG: 5 TABLET, FILM COATED ORAL at 08:56

## 2022-01-10 RX ADMIN — ATORVASTATIN CALCIUM 40 MG: 40 TABLET, FILM COATED ORAL at 21:43

## 2022-01-10 NOTE — PROGRESS NOTES
END OF SHIFT NOTE:    INTAKE/OUTPUT  01/09 0701 - 01/10 0700  In: 960 [P.O.:960]  Out: 450 [Urine:450]  Voiding: YES  Catheter: YES  Drain:              Flatus: Patient does have flatus present. Stool:  0 occurrences. Characteristics:    Emesis: 0 occurrences. Characteristics:        VITAL SIGNS  Patient Vitals for the past 12 hrs:   Temp Pulse Resp BP SpO2   01/10/22 1507     93 %   01/10/22 1501 98.3 °F (36.8 °C) (!) 104 22 114/84 95 %   01/10/22 1402  95  108/79 93 %   01/10/22 1333  98  (!) 105/52 94 %   01/10/22 1233  91  100/62 90 %   01/10/22 1203  93  103/64 94 %   01/10/22 1103 98.8 °F (37.1 °C) 96  134/63 (!) 88 %   01/10/22 0841     92 %   01/10/22 0752 97.4 °F (36.3 °C) (!) 102  122/72 94 %   01/10/22 0700  (!) 101   (!) 88 %       Pain Assessment  Pain Intensity 1: 0 (01/10/22 1501)        Patient Stated Pain Goal: 0    Ambulating  No    Shift report given to oncoming nurse at the bedside.     Vineet Espino RN

## 2022-01-10 NOTE — PROGRESS NOTES
CM received a consult for arranging Triology through [facility] for discharge need. DORIAN contacted Ray with [facility] who states he spoke with pt and his spouse on Sat. He will f/u PFTs results for required documentation requirements.

## 2022-01-10 NOTE — PROGRESS NOTES
Spiritual Care Visit, initial visit. Visited with patient and his daughter at bedside.  was told that patient was improving and was expected to be moved to another room. Prayed for patient's healing and health. Visit by Martine Sifuentes, Staff .  M.Jamar., Th.B., B.A.

## 2022-01-10 NOTE — PROGRESS NOTES
TRANSFER - OUT REPORT:    Verbal report given to [employer] (name) on Ryan Hunter  being transferred to [facility] for routine progression of care       Report consisted of patients Situation, Background, Assessment and   Recommendations(SBAR). Information from the following report(s) SBAR, ED Summary, Intake/Output, MAR, Cardiac Rhythm Afib , Alarm Parameters  and Quality Measures was reviewed with the receiving nurse. Lines:   Peripheral IV 01/08/22 Right Forearm (Active)   Site Assessment Clean, dry, & intact 01/10/22 1103   Phlebitis Assessment 0 01/10/22 1103   Infiltration Assessment 0 01/10/22 1103   Dressing Status Clean, dry, & intact 01/10/22 1103   Dressing Type Tape;Transparent 01/10/22 1103   Hub Color/Line Status Pink;Flushed;Patent 01/10/22 1103   Alcohol Cap Used No 01/09/22 0730        Opportunity for questions and clarification was provided.       Patient transported with:   O2 @ [facility] 50L liters

## 2022-01-10 NOTE — ROUTINE PROCESS
Bedside and verbal shift change report received from  [facility] . Report included the following information SBAR, ED Summary, Intake/Output, MAR, Recent Results, Cardiac Rhythm A fib , Alarm Parameters  and Quality Measures.      Dual skin assessment completed at bedside: R BKA Abrasions to skin Dual verification of gtts completed (name of gtts verified): N/A

## 2022-01-10 NOTE — PROGRESS NOTES
TRANSFER - IN REPORT:    Verbal report received from Shamir Grande RN on [address]  being received from  for routine progression of care      Report consisted of patients Situation, Background, Assessment and   Recommendations(SBAR). Information from the following report(s) SBAR, ED Summary, Intake/Output and Cardiac Rhythm A fib was reviewed with the receiving nurse. Opportunity for questions and clarification was provided. Assessment completed upon patients arrival to unit and care assumed.

## 2022-01-10 NOTE — PROGRESS NOTES
01/10/22 1501   Dual Skin Pressure Injury Assessment   Dual Skin Pressure Injury Assessment WDL   Second Care Provider (Based on [address] [place] [address]) GLENIS Rodas   Skin Integumentary   Skin Integumentary (WDL) X    Pressure  Injury Documentation No Pressure Injury Noted-Pressure Ulcer Prevention Initiated   Skin Color Appropriate for ethnicity; Ecchymosis (comment)   Skin Condition/Temp Dry;Fragile; Warm   Skin Integrity Abrasion   Turgor Epidermis thin w/ loss of subcut tissue   Hair Growth Present   Wound Prevention and Protection Methods   Orientation of Wound Prevention Posterior   Location of Wound Prevention Sacrum/Coccyx   Dressing Present  No   Wound Offloading (Prevention Methods) Bed, pressure reduction mattress

## 2022-01-10 NOTE — PROGRESS NOTES
ACUTE OT GOALS:  (Developed with and agreed upon by patient and/or caregiver.)  1. Ananth Foyer Pack will be modified independent with functional mobility for ADL in room within 4 - 7 visits. 2. Hilliards Foyer Pack will be modified independent with total body bathing and dressing within 4 - 7 visits. 3. Hilliards Foyer Pack will state and demonstrate at least 5 energy conservation techniques during ADL/therapeutic activities within 4 - 7 visits. 4. Ananth Foyer Pack will voice a plan for appropriate home modifications for home safety and fall prevention within 7 visits. 5. Ananth Foyer Pack will participate at least 30 minutes of ADL with 3 or less rest breaks within 7 visits. 6. Ananth Foyer Pack will complete a shower transfer with modified independence within 7 visits. OCCUPATIONAL THERAPY ASSESSMENT: Initial Assessment OT Treatment Day # 1    David Paulson is a 77 y.o. male   PRIMARY DIAGNOSIS: Acute respiratory failure with hypercapnia (HCC)  Acute on chronic respiratory failure with hypercapnia (HCC) [J96.22]        Reason for Referral:    ICD-10: Treatment Diagnosis: Generalized Muscle Weakness (M62.81)  INPATIENT: Payor: Jaycee Coffey / Plan: Pilar Memos / Product Type: Managed Care Medicare /   ASSESSMENT:     REHAB RECOMMENDATIONS:   Recommendation to date pending progress:  Setting:  Inpatient Rehab Facility  Equipment:   To Be Determined     PRIOR LEVEL OF FUNCTION:  (Prior to Hospitalization)  INITIAL/CURRENT LEVEL OF FUNCTION:  (Based on today's evaluation)   Bathing:  Set Up  Dressing:  Set Up  Feeding/Grooming:  Supervision  Toileting:  Supervision  Functional Mobility:  Supervision Bathing: Moderate Assistance  Dressing: Moderate Assistance  Feeding/Grooming:  Standby Assistance  Toileting: Moderate Assistance  Functional Mobility:  Minimal Assistance     ASSESSMENT:  Mr. Rosalind Echavarria is a pleasant gentleman with a history of R BKA in 2021 currently requiring oxygen via AirVo.   He received inpatient rehab following his BKA last year. At home, he required setup for dressing and uses built up utensils due to a history of neuropathy in his hands. Today, he presented up in the chair and required CGA/minimal assistance for sit to stand. Hector Spears presents with the following problems and would benefit from occupational therapy to maximize independence with self-care,instrumental activities of daily living, and functional transfers/mobility for activities of daily living/instrumental activities of daily living via the stated goals. SUBJECTIVE:   Mr. Leticia Johnson states, \"thank you. \"    SOCIAL HISTORY/LIVING ENVIRONMENT: Lives with daughter and wife. Has rolling walker and manual w/c (does not typically use) if needed. Home Environment: Private residence  # Steps to Enter: 1  One/Two Story Residence: One story  Living Alone: No  Support Systems: Child(pat),Spouse/Significant Other    OBJECTIVE:     PAIN: VITAL SIGNS: LINES/DRAINS:   Pre Treatment: Pain Screen  Pain Scale 1: Numeric (0 - 10)  Pain Intensity 1: 0  Post Treatment: no complaints of pain.    IV  O2 Device: Heated,Hi flow nasal cannula     GROSS EVALUATION:   Within Functional Limits Abnormal/ Functional Abnormal/ Non-Functional (see comments) Not Tested Comments:   AROM [] [x] [] []    PROM [] [] [] []    Strength [] [x] [] []    Balance [] [x] [] []    Posture [] [x] [] []    Sensation [] [] [] []    Coordination [] [] [] []    Tone [] [] [] []    Edema [] [] [] []    Activity Tolerance [] [x] [] []     [] [] [] []      COGNITION/  PERCEPTION: Intact Impaired   (see comments) Comments:   Orientation [x] []    Vision [] []    Hearing [] []    Judgment/ Insight [] []    Attention [] []    Memory [] []    Command Following [x] []    Emotional Regulation [] []     [] []      ACTIVITIES OF DAILY LIVING: I Mod I S SBA CGA Min Mod Max Total NT Comments   BASIC ADLs:              Bathing/ Showering [] [] [] [] [] [] [] [] [] [x] Toileting [] [] [] [] [] [] [] [] [] [x]    Dressing [] [] [] [] [] [] [] [] [] [x]    Feeding [] [] [] [] [] [] [] [] [] [x]    Grooming [] [] [] [] [] [] [] [] [] [x]    Personal Device Care [] [] [] [] [] [] [] [] [] [x]    Functional Mobility [] [] [] [] [x] [x] [] [] [] []    I=Independent, Mod I=Modified Independent, S=Supervision, SBA=Standby Assistance, CGA=Contact Guard Assistance,   Min=Minimal Assistance, Mod=Moderate Assistance, Max=Maximal Assistance, Total=Total Assistance, NT=Not Tested    MOBILITY: I Mod I S SBA CGA Min Mod Max Total  NT x2 Comments:   Supine to sit [] [] [] [] [] [] [] [] [] [x] []    Sit to supine [] [] [] [] [] [] [] [] [] [x] []    Sit to stand [] [] [] [] [x] [x] [] [] [] [] []    Bed to chair [] [] [] [] [] [] [] [] [] [x] []    I=Independent, Mod I=Modified Independent, S=Supervision, SBA=Standby Assistance, CGA=Contact Guard Assistance,   Min=Minimal Assistance, Mod=Moderate Assistance, Max=Maximal Assistance, Total=Total Assistance, NT=Not Tested    [facility] [address] [address] AM-PAC 6 Clicks   Daily Activity Inpatient Short Form        How much help from another person does the patient currently need. .. Total A Lot A Little None   1. Putting on and taking off regular lower body clothing? [] 1   [x] 2   [] 3   [] 4   2. Bathing (including washing, rinsing, drying)? [] 1   [x] 2   [] 3   [] 4   3. Toileting, which includes using toilet, bedpan or urinal?   [] 1   [x] 2   [] 3   [] 4   4. Putting on and taking off regular upper body clothing? [] 1   [] 2   [x] 3   [] 4   5. Taking care of personal grooming such as brushing teeth? [] 1   [] 2   [x] 3   [] 4   6. Eating meals? [] 1   [] 2   [x] 3   [] 4   © 2007, Trustees of [address] [place] [address] [address] [address], under license to [employer].  All rights reserved     Score:  Initial: 16 Most Recent: X (Date: -- )   Interpretation of Tool:  Represents activities that are increasingly more difficult (i.e. Bed mobility, Transfers, Gait).    PLAN:   FREQUENCY/DURATION: OT Plan of Care: 3 times/week for duration of hospital stay or until stated goals are met, whichever comes first.    PROBLEM LIST:   (Skilled intervention is medically necessary to address:)  Decreased ADL/Functional Activities  Decreased Activity Tolerance  Decreased Balance  Decreased Transfer Abilities   INTERVENTIONS PLANNED:   (Benefits and precautions of occupational therapy have been discussed with the patient.)  Self Care Training  Therapeutic Activity  Therapeutic Exercise/HEP  Neuromuscular Re-education  Education     TREATMENT:     EVALUATION: Low Complexity : (Untimed Charge)    TREATMENT:   (     )  No treatment rendered this date    TREATMENT GRID:  N/A    AFTER TREATMENT POSITION/PRECAUTIONS:  Chair, Needs within reach, and Visitors at bedside    INTERDISCIPLINARY COLLABORATION:  RN/PCT and OT/GRAYSON    TOTAL TREATMENT DURATION:  OT Patient Time In/Time Out  Time In: 1130  Time Out: [address] SHANTELL Weir, OTR/L

## 2022-01-10 NOTE — PROGRESS NOTES
Problem: Dysphagia (Adult)  Goal: *Acute Goals and Plan of Care (Insert Text)  Note: ST. Pt. Will tolerate  easy to chew diet/thin liquids with no overt s/sx of aspiration/penetration. 2. Pt. Will participate in modified barium swallow with 100% participation to fully evaluate swallow function if further indicated in plan of care. LTG: Pt. Will tolerate least restrictive diet without respiratory decline. SPEECH LANGUAGE PATHOLOGY: DYSPHAGIA- Daily Note 1    NAME/AGE/GENDER: Verna Huerta is a 77 y.o. male  DATE: 1/10/2022  PRIMARY DIAGNOSIS: Acute on chronic respiratory failure with hypercapnia (HCC) [J96.22]      ICD-10: Treatment Diagnosis: R13.12 Dysphagia, Oropharyngeal Phase    RECOMMENDATIONS   DIET:    Easy to Chew   Thin Liquids    MEDICATIONS: With liquid 1 at a time or float in puree     PRECAUTIONS, MODIFICATIONS, AND STRATEGIES  · Slow rate of intake  · Small bites/sips  · Upright at 90 degrees during meal  · Alternate liquids/solids  · Single small sips via straw      RECOMMENDATIONS FOR CONTINUED SPEECH THERAPY:   YES: Anticipate need for ongoing speech therapy during this hospitalization. ASSESSMENT   Patient presents with mild oropharyngeal dysphagia characterized by mildly labored mastication complicated by missing dentition and respiratory status, as well as inconsistent delayed throat clear/cough with cup sips. appeared to have better control and ability to control volume size via straw as no overt s/sx observed across ~8oz. Patient does report some complaints of stasis and liquids \"coming back up\", and reports this has been ongoing for ~1 year. Recommend easy to chew diet and thin liquids. Single small sips via straw. Will follow up for tolerance and if concerns persist, may benefit from modified barium swallow study once off airvo.        EDUCATION:  · Recommendations discussed with Patient and RN    REHABILITATION POTENTIAL FOR STATED GOALS: Good    PLAN FREQUENCY/DURATION:   Continue to follow patient 2 times a week for duration of hospital stay to address above goals. Recommendations for next treatment session: Next treatment session will address diet tolerance and modified barium swallow study if further indicated    CONTINUATION OF SKILLED SERVICES/MEDICAL NECESSITY:   Patient is expected to demonstrate progress in  diet tolerance in order to  improve swallow safety and decrease aspiration risk.  Patient continues to require skilled intervention due to dysphagia. SUBJECTIVE   Pleasant. Consumed entire breakfast tray     Oxygen Device: airvo  50L 50%  Pain: Pain Scale 1: Numeric (0 - 10)  Pain Intensity 1: 0    History of Present Injury/Illness: Mr. Javad Dior  has a past medical history of A-fib ([address] (07/19/2021), Acute on chronic respiratory failure with hypoxia and hypercapnia (HCC) (7/23/2021), Acute respiratory failure with hypercapnia (HCC) (7/23/2021), Atrial fibrillation with RVR ([address] (7/19/2021), CAD (coronary artery disease), Cellulitis (7/19/2021), Chronic kidney disease, COVID-19 vaccine series completed, Current use of long term anticoagulation, 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 mellitus, limited to breakdown of skin ([address] (9/27/2021), H/O heart artery stent, History of MI (myocardial infarction) (1999), Hypercholesterolemia, Hypertension, Left ventricular dysfunction, Neuropathy, Oxygen dependent, PICC (peripherally inserted central catheter) in place, Staphylococcus aureus bacteremia (1/10/9288), Systolic CHF, acute on chronic ([address] (7/19/2021), and Type 2 diabetes mellitus ([address] . He also  has a past surgical history that includes pr cardiac surg procedure unlist (1999) and hx orthopaedic (08/18/2021). PRECAUTIONS/ALLERGIES: Patient has no known allergies. Problem List:  (Impairments causing functional limitations):  1.  Acute respiratory failure with hypercapnia     Previous Dysphagia: NONE REPORTED  Diet Prior to Evaluation: regular/thin    Orientation:  Person  Place  Time  Situation      OBJECTIVE   Oral Motor:   · Labial: No impairment  · Dentition: Natural and missing back dentition  · Oral Hygiene: Adequate  · Lingual: No impairment    Dysphagia treatment: po trials/diet tolerance   Patient consumed meds with liquid rinse presented by RN. Delayed cough. Inconsistently delayed throat clear/cough with thin via cup gulp on ~3/6 sips. No overt s/sx aspiration with single sips/gulp of thin via straw across ~8oz. Tolerated puree, mixed fruit, and regular cracker with mildly labored prep due to respiratory status and missing dentition. Adequate oral clearance. Reports mild stasis and \"coming back up\" sensation with liquids that he reports has been occurring for ~1 year(pointing to cervical notch) Intermittent belching. Tool Used: Dysphagia Outcome and Severity Scale (MAURY)    Score Comments   Normal Diet  [] 7 With no strategies or extra time needed   Functional Swallow  [x] 6 May have mild oral or pharyngeal delay   Mild Dysphagia  [] 5 Which may require one diet consistency restricted    Mild-Moderate Dysphagia  [] 4 With 1-2 diet consistencies restricted   Moderate Dysphagia  [] 3 With 2 or more diet consistencies restricted   Moderate-Severe Dysphagia  [] 2 With partial PO strategies (trials with ST only)   Severe Dysphagia  [] 1 With inability to tolerate any PO safely      Score:  Initial: 6 Most Recent: 5 (Date 01/10/22 )   Interpretation of Tool: The Dysphagia Outcome and Severity Scale (MAURY) is a simple, easy-to-use, 7-point scale developed to systematically rate the functional severity of dysphagia based on objective assessment and make recommendations for diet level, independence level, and type of nutrition.          INTERDISCIPLINARY COLLABORATION: RN    After treatment position/precautions:  · Upright in bed  · Call light within reach  · RN notified    Total Treatment Duration:   Time In: 0523  Time Out: 1900 MOHAN Rangel, [facility], [facility] ANTONIO PEREYRA, CCC-SLP

## 2022-01-10 NOTE — PROGRESS NOTES
ACUTE PHYSICAL THERAPY GOALS:  (Developed with and agreed upon by patient and/or caregiver.)  STG:  (1.)Mr. Rivas will move from supine to sit and sit to supine , scoot up and down and roll side to side with CONTACT GUARD ASSIST within 4 treatment day(s). (2.)Mr. Rivas will transfer from bed to chair and chair to bed with CONTACT GUARD ASSIST using the least restrictive device within 4 treatment day(s). (3.)Mr. Rivas will ambulate with CONTACT GUARD ASSIST for 50 feet with the least restrictive device within 4 treatment day(s).      LTG:  (1.)Mr. Rivas will move from supine to sit and sit to supine , scoot up and down and roll side to side in bed with STAND BY ASSIST within 7 treatment day(s). (2.)Mr. Rivas will transfer from bed to chair and chair to bed with STAND BY ASSIST using the least restrictive device within 7 treatment day(s). (3.)Mr. Rivas will ambulate with STAND BY ASSIST for 100 feet with the least restrictive device within 7 treatment day(s). PHYSICAL THERAPY: Daily Note and AM Treatment Day # 2    David Maza is a 77 y.o. male   PRIMARY DIAGNOSIS: Acute respiratory failure with hypercapnia (HCC)  Acute on chronic respiratory failure with hypercapnia (HCC) [J96.22]         ASSESSMENT:     REHAB RECOMMENDATIONS: CURRENT LEVEL OF FUNCTION:  (Most Recently Demonstrated)   Recommendation to date pending progress:  [address] [place] [address]  Equipment:    To Be Determined Bed Mobility:   Minimal Assistance  Sit to Stand:   Minimal Assistance  Transfers:   Minimal Assistance  Gait/Mobility:   Minimal Assistance     ASSESSMENT:  Mr. Dru Cisneros presents supine with airvo in place at 50/50. 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 Patient needed min assist for all mobility including supine to sit, sit to stand, transfer to recliner chair and ambulation x 6' with rolling walker. Patient participated great in LE exercises. Will continue efforts.      SUBJECTIVE:    Pack states, \"I can do it myself. \"    SOCIAL HISTORY/ LIVING ENVIRONMENT: See initial evaluation  Home Environment: Private residence  # Steps to Enter: 1  One/Two Story Residence: One story  Living Alone: No  Support Systems: Child(pat),Spouse/Significant Other  OBJECTIVE:     PAIN: VITAL SIGNS: LINES/DRAINS:   Pre Treatment: Pain Screen  Pain Scale 1: FLACC  Pain Intensity 1: 0  Post Treatment: 0   ICU lines  O2 Device: Heated,Hi flow nasal cannula     MOBILITY: I Mod I S SBA CGA Min Mod Max Total  NT x2 Comments:   Bed Mobility    Rolling [] [] [] [] [] [] [] [] [] [] []    Supine to Sit [] [] [] [] [] [x] [] [] [] [] []    Scooting [] [] [] [] [] [] [] [] [] [] []    Sit to Supine [] [] [] [] [] [] [] [] [] [] []    Transfers    Sit to Stand [] [] [] [] [] [x] [] [] [] [] []    Bed to Chair [] [] [] [] [] [x] [] [] [] [] []    Stand to Sit [] [] [] [] [] [x] [] [] [] [] []    I=Independent, Mod I=Modified Independent, S=Supervision, SBA=Standby Assistance, CGA=Contact Guard Assistance,   Min=Minimal Assistance, Mod=Moderate Assistance, Max=Maximal Assistance, Total=Total Assistance, NT=Not Tested    BALANCE: Good Fair+ Fair Fair- Poor NT Comments   Sitting Static [x] [] [] [] [] []    Sitting Dynamic [] [x] [] [] [] []              Standing Static [] [x] [] [] [] []    Standing Dynamic [] [] [x] [] [] []      GAIT: I Mod I S SBA CGA Min Mod Max Total  NT x2 Comments:   Level of Assistance [] [] [] [] [] [x] [] [] [] [] []    Distance 6' forward and backward    DME Rolling Walker    Gait Quality     Weightbearing  Status N/A     I=Independent, Mod I=Modified Independent, S=Supervision, SBA=Standby Assistance, CGA=Contact Guard Assistance,   Min=Minimal Assistance, Mod=Moderate Assistance, Max=Maximal Assistance, Total=Total Assistance, NT=Not Tested    PLAN:   FREQUENCY/DURATION: PT Plan of Care: 3 times/week for duration of hospital stay or until stated goals are met, whichever comes first.  TREATMENT: TREATMENT:   ($$ Therapeutic Activity: 23-37 mins    )  Therapeutic Activity (25 Minutes): Therapeutic activity included Supine to Sit, Scooting, Transfer Training, Ambulation on level ground, Sitting balance , Standing balance and LE exercises to improve functional Mobility, Strength, ROM and Activity tolerance.     TREATMENT GRID:  N/A    AFTER TREATMENT POSITION/PRECAUTIONS:  Chair, Needs within reach, RN notified and Visitors at bedside    INTERDISCIPLINARY COLLABORATION:  RN/PCT and PT/PTA    TOTAL TREATMENT DURATION:  PT Patient Time In/Time Out  Time In: 1045  Time Out: [address] [facility]

## 2022-01-10 NOTE — PROGRESS NOTES
[facility] Critical Care Note[de-identified] 1/10/2022  Ananth Rivas  Admission Date: 1/6/2022     Length of Stay: 4 days    Background: 77 y.o. y/o male was transported to the ER via EMS after they were called for dyspnea. He was awake and alert and appeared comfortable when they arrived but en route he became unresponsive. He required intubation in the ER. ABG -> 7.2/CO2 80/PO2 119/HCO3 30. 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 He required bipap during that admission for hypercapnea. An outpatient PSG was recommended but he has not had this done yet. His daughter reports daytime somnolence and states that there are days that he gets up just to eat and otherwise sleeps. Sometimes he cannot hold his head up. She has noticed myoclonic jerking. He felt short of breath a week ago so went to urgent care who prescribed 20 mg of Lasix per day for 5 days. She felt like he looked better after taking the lasix but after he completed the course, the shortness of breath returned. He does not weigh himself and he has not noticed any edema. Echo last July showed an EF of 30 to 35%. He is not maintained on any lasix and he takes Florinef for a history of hypotension. His was hypotensive on admission but this worsened after intubation. His WBC is normal. He has not had any fever. He was treated for bronchitis with a Z pack around [facility] and his cough resolved.       Notable PMH:  has a past medical history of A-fib ([address] (07/19/2021), Acute on chronic respiratory failure with hypoxia and hypercapnia ([address] (7/23/2021), Acute respiratory failure with hypercapnia ([address] (7/23/2021), Atrial fibrillation with RVR ([address] (7/19/2021), CAD (coronary artery disease), Cellulitis (7/19/2021), Chronic kidney disease, COVID-19 vaccine series completed, Current use of long term anticoagulation, 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 mellitus, limited to breakdown [facility] (9/27/2021), H/O heart artery stent, History of MI (myocardial infarction) (1999), Hypercholesterolemia, Hypertension, Left ventricular dysfunction, Neuropathy, Oxygen dependent, PICC (peripherally inserted central catheter) in place, Staphylococcus aureus bacteremia (9/47/8430), Systolic CHF, acute on chronic ([address] (7/19/2021), and Type 2 diabetes mellitus ([facility] 24 Hour events:  Using hospital bipap at night and using airvo during the day. Has been up in the chair. Tolerated oral diet. + restless legs - no tx at home. Tried requip during admission last year but stopped as nurse told family that it made him \"act funny\". Review of Systems:   Constitutional: negative for fever, chills, sweats  Cardiovascular: negative for chest pain, palpitations, syncope, edema  Gastrointestinal:  negative for dysphagia, reflux, vomiting, diarrhea, abdominal pain, or melena. + stool this AM  Neurologic:  negative for focal weakness, numbness, headache    Lines: (insertion date)   ETT: (1/6)  Pineda: (1/6)  OGT: (1/6)  Central line: (1/7)  Arterial Line (1/7)    Drips: current dose (range)  none    Pertinent Exam:         Blood pressure 100/62, pulse 91, temperature 98.8 °F (37.1 °C), resp. rate 24, height 5' 10\" (1.778 m), weight 268 lb (121.6 kg), SpO2 90 %. Intake/Output Summary (Last 24 hours) at 1/10/2022 1417  Last data filed at 1/10/2022 1103  Gross per 24 hour   Intake 420 ml   Output 700 ml   Net -280 ml     Constitutional:  obese WM in bed and not in distress  EENMT:  Sclera clear, pupils equal, orally intubated  Respiratory: clear from anterior -bilaterally. Respirations even and not labored on Airvo  Cardiovascular:  RRR - sinus per telemetry  Gastrointestinal:  soft with no evidence of tenderness; positive bowel sounds present  Musculoskeletal:  warm with no cyanosis, no lower extremity edema. Right BKA.    Skin:  no jaundice or ecchymosis  Neurologic: no focal deficits  Psychiatric: awake and calm    CXR:   1/8/22  Less congestion on left chest        1/7 1/6      Recent Labs     01/09/22  0253 01/08/22  0305   WBC 6.2 6.5   HGB 11.5* 11.3*   HCT 37.2* 37.7*    166     Recent Labs     01/10/22  0437 01/09/22  0253 01/08/22  0305   NA  --  145 143   K  --  3.9 4.4   CL  --  107 110*   CO2  --  33* 30   GLU  --  84 77   BUN  --  34* 37*   CREA  --  1.74* 1.80*   MG 2.0 2.8* 2.8*   CA  --  9.3 9.1     No results for input(s): LAC, TROPHS, BNPNT, CRP in the last 72 hours. No lab exists for component: ESR  No results for input(s): GLUCPOC in the last 72 hours. No lab exists for component: A1C  ECHO: Results from Hospital Encounter encounter on 07/19/21    ECHO ADULT COMPLETE    Interpretation Summary  · Image quality for this study was poor. · Contrast used: DEFINITY. · LV: Estimated LVEF is 30 - 35%. Normal wall thickness. Mildly dilated left ventricle. Moderate-to-severely reduced systolic function. Wall motion abnormalities with suggest coronary artery disease  · LA: Mildly dilated left atrium. · RA: Mildly dilated right atrium. · MV: Mild mitral annular calcification. Mild mitral valve regurgitation is present. · TV: Mild tricuspid valve regurgitation is present. Results     ** No results found for the last 336 hours. **        Inpat Anti-Infectives (From admission, onward)    None        ABG:  Recent Labs     01/09/22  0221 01/08/22  0331   PHI 7.36 7.33*   PCO2I 58.9* 58.5*   PO2I 70* 89   HCO3I 33.3* 30.7*     Assessment and Plan:  (Medical Decision Making)   Impression: 77 y.o. male with acute respiratory failure secondary to hypercapnea. Required intubation in the ER with a CO2 of 80, ph 7.2. CXR with bilateral infiltrates/cardiomyopathy. BNP 2179. Has severe ELAIAN per recent at home sleep study (AHI 54.8 with desaturation to 68%).  Formal outpatient study recommended. Repeat ABG with CO2 correction. Hypotensive requiring pressor support on admission but this has resolved. On Midodrine and Florinef here for hypotension - took Florinef at home. EF 30 to 35% by history but no medical therapy due to hypotension. Off vent on 1/7 and awake and alert. Passed swallow study. Still hypoxic requiring airvo.  + restless legs. NEURO: awake, alert and interactive. Off sedation  CV:   Volume Status: BNP 2179 on admission. Has been diuresed 4.3 liters and CXR has improved. Low EF per echo July 2021. Will repeat lasix today and reassess oxygen needs. History of a fib: occurred summer 2021. On Eliquis. Maintaining SR. PULM:   Acute hypoxemic/hypercapneic respiratory failure: using bipap at night and airvo during the day. Still requiring 50% oxygen with sat in the upper 90s. Will try and wean oxygen more. LEAINA -- home sleep study in November with Severe ELAINA:  AHI in 54.8 and over 240 minutes with saturation <89%. Stew Edwards [phone] from [id number] came and needs spirometry to qualify for trilogy since has severe elaina and CO2 retainer. Otherwise, he will need office PSG at time of discharge. RENAL:  CKD:  Stable. Lactic acidosis: NA  Electrolytes: no supplement needed today  GI:   Nutrition: PO diet tolerated  HEME:   Anemia: mild, stable  Anticoagulation: on Eliquis with history of a fib  ID:   No evidence of infection  ENDO: A1C 5.5  Skin: no decub, turns, preventive care. Prophy: eliquis and pepcid    Debility  --OOB to chair today    Restless leg syndrome:  Was given Requip during an admission last year but daughter states that the nurse told her that it made patient \"act funny\". No tx at home. Consider retrying Requip again while here. To transfer to the floor. Continue with nocturnal bipap and either needs PSG or PFTs here to qualify for bipap or Trilogy. PFTs already ordered. Leave rashaad for today - repeating lasix.  Recheck labs and oxygen needs. Consider removing neil tomorrow      Miguelangel Walker NP     I have spoken with and examined the patient. I agree with the above assessment and plan as documented. Respiratory: clear from anterior -bilaterally. Respirations even and not labored on Airvo  Cardiovascular:  RRR - sinus per telemetry  Gastrointestinal:  soft with no evidence of tenderness; positive bowel sounds present  Neurologic: no focal deficits    Will restart Fe supplements and adjust Lyrica dosing to treat RLS. Wean O2 and cont.  BIPAP with sleep    Ezequiel Murray MD

## 2022-01-10 NOTE — PROGRESS NOTES
[facility] Hospitalist Progress Notes   Admit Date:  2022 11:38 AM   Name:  Marlene Carter   Age:  77 y.o. Sex:  male  :  1945   MRN:  [id number]   Room:  214/01    Presenting Complaint: Respiratory Distress    Reason(s) for Admission: Acute on chronic respiratory failure with hypercapnia [facility]) [J96.22]     Hospitalists consulted by Natalia Prince MD for: Transfer of care  History of Presenting Illness:   Marlene Carter is a 77 y.o. male with history of A. fib, acute on chronic respiratory failure with hypoxia and hypercapnia, acute respiratory failure with hypercapnia, atrial fibrillation with RVR, coronary artery disease, chronic kidney disease, on long-term anticoagulation, diabetes status post right BKA, hypertension, hypercholesterolemia, left ventricular dysfunction, neuropathy, systolic CHF and a history of Staph aureus bacteremia who was admitted 2022 secondary to acute hypoxic and hypercapnic respiratory failure requiring intubation. He was admitted to the pulmonology service. Of note the patient has been concerning for obstructive sleep apnea for some time and was recommended for outpatient sleep studies but had not had them done yet. As an outpatient he is on Florinef for his hypotension, and he had a echo in July that showed an EF of 30 to 35%. The patient was admitted by the intensivist service to the ICU. He was successfully extubated on 2021 he has been stable status post extubation with the pulmonology service working on getting him patient lives with outpatient. Uses BiPAP at night and air Vo during the day.       1/10/2022  Patient seen and evaluated  Afebrile overnight  Currently out of bed to chair  Denies chest pain shortness of breath nausea vomiting chills    Assessment & Plan:   1/10/2022  This remains unchanged specifically outlined below:    Principal Problem:    Acute respiratory failure with hypercapnia ([address] (2022)  Resolving patient is now on airvo during the day and BiPAP at night  Weaning him down slowly      Active Problems:    Stage 3 chronic kidney disease ([facility] (11/22/2017)  No active issues will monitor      Controlled type 2 diabetes mellitus with diabetic polyneuropathy, without long-term current use of insulin ([facility] (1/5/2018)  Continue to monitor blood sugars and cover with insulin      Severe obesity (BMI 35.0-35.9 with comorbidity) ([facility] (64/69/8191)  Complicates the patient's care  Weight loss advised when feasible        Decreased cardiac ejection fraction (1/6/2022)  Most recent echo in July 2021 showed an EF of 30 to 35%  No active issues at this time  Continue current medical management  Low threshold for cardiology input if clinically indicated        Hypotension (1/6/2022)  Resolved continue Florinef      Acute on chronic respiratory failure with hypercapnia ([facility] (1/6/2022)  Continue supplemental oxygen and wean as tolerated towards baseline      Obstructive sleep apnea (1/8/2022)  Continue BiPAP at night as above  Pulmonology service working on getting him a machine from home prior to discharge        Obesity hypoventilation syndrome ([facility] (1/8/2022)  Weight loss advised  Complicates his care      Disposition: TBD. Patient is being weaned off airflow, obtain trilogy or BiPAP machine for home and be seen and evaluated by the therapy services.           Hospital Problems as of 1/10/2022 Date Reviewed: 9/14/2021          Codes Class Noted - Resolved POA    Obstructive sleep apnea ICD-10-CM: G47.33  ICD-9-CM: 327.23  1/8/2022 - Present Unknown        Obesity hypoventilation syndrome ([facility] ICD-10-CM: E30.9  ICD-9-CM: 278.03  1/8/2022 - Present Unknown        * (Principal) Acute respiratory failure with hypercapnia (HCC) ICD-10-CM: J96.02  ICD-9-CM: 518.81  1/6/2022 - Present Yes        Decreased cardiac ejection fraction (Chronic) ICD-10-CM: R93.1  ICD-9-CM: 794.39  1/6/2022 - Present Yes        Hypotension ICD-10-CM: I95.9  ICD-9-CM: 458.9  1/6/2022 - Present Yes        Acute on chronic respiratory failure with hypercapnia (HCC) ICD-10-CM: N24.31  ICD-9-CM: 518.84  1/6/2022 - Present Unknown        Severe obesity (BMI 35.0-35.9 with comorbidity) (HCC) (Chronic) ICD-10-CM: E66.01, Z68.35  ICD-9-CM: 278.01, V85.35  10/10/2018 - Present Yes        Controlled type 2 diabetes mellitus with diabetic polyneuropathy, without long-term current use of insulin (HCC) (Chronic) ICD-10-CM: E11.42  ICD-9-CM: 250.60, 357.2  1/5/2018 - Present Yes        Stage 3 chronic kidney disease ([facility] (Chronic) ICD-10-CM: N18.30  ICD-9-CM: 585.3  11/22/2017 - Present Yes        RESOLVED: Suspected sleep apnea ICD-10-CM: R29.818  ICD-9-CM: 781.99  8/20/2021 - 1/8/2022 Yes              Past History:  Past Medical History:   Diagnosis Date    A-fib ([address] 07/19/2021    developed AFID after hospital admission for wound infection- started on Eliquis    Acute on chronic respiratory failure with hypoxia and hypercapnia ([address] 7/23/2021    Acute respiratory failure with hypercapnia ([facility] 7/23/2021    Atrial fibrillation with RVR ([facility] 7/19/2021    CAD (coronary artery disease)     [facility].     Cellulitis 7/19/2021    Chronic kidney disease     stage 3- improved    COVID-19 vaccine series completed     Moderna Vaccine completed X2 doses    Current use of long term anticoagulation     Eliquis and Aspirin    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 mellitus, limited to breakdown of skin ([address] 9/27/2021    H/O heart artery stent     X1 placed in 1999    History of MI (myocardial infarction) 1999    stent placed X1    Hypercholesterolemia     Hypertension     Left ventricular dysfunction     echo revealed EF 30-35%, mildly dilated LA/RA and mild TR and MR.    Neuropathy     severe    Oxygen dependent     recently started on 2L NC continous- Sleep study to be scheduled-questionable ELAINA    PICC (peripherally inserted central catheter) in place     PICC line in place to R arm    Staphylococcus aureus bacteremia 7/68/5181    Systolic CHF, acute on chronic ([facility] 7/19/2021    Type 2 diabetes mellitus ([facility]     oral agent/Pt does not monitor BS/no s.s of hypoglycemia/Last A1c: 6.7 on 7/13/21 per daughter      Past Surgical History:   Procedure Laterality Date    HX ORTHOPAEDIC  08/18/2021    BKA    MD CARDIAC SURG PROCEDURE UNLIST  1999    stent placement      No Known Allergies   Social History     Tobacco Use    Smoking status: Never Smoker    Smokeless tobacco: Never Used   Substance Use Topics    Alcohol use: No      Family History   Problem Relation Age of Onset    Cancer Mother     Cancer Father     No Known Problems Maternal Grandmother     No Known Problems Maternal Grandfather     No Known Problems Paternal Grandmother     No Known Problems Paternal Grandfather       Family history reviewed and negative except as otherwise noted.     Immunization History   Administered Date(s) Administered    COVID-19, PFIZER, MRNA, LNP-S, PF, 30MCG/0.3ML DOSE 02/27/2021, 03/02/2021    Influenza High Dose Vaccine PF 12/30/2015, 11/04/2016, 11/06/2017, 10/10/2018, 12/15/2020    Influenza Vaccine (Tri) Adjuvanted (>65 Yrs FLUAD TRI 74334) 10/15/2019    Pneumococcal Conjugate (PCV-13) 10/30/2015    Pneumococcal Polysaccharide (PPSV-23) 11/04/2016    TB Skin Test (PPD) Intradermal 08/20/2021, 09/13/2021     Current Facility-Administered Medications   Medication Dose Route Frequency    [START ON 1/11/2022] famotidine (PEPCID) tablet 20 mg  20 mg Oral DAILY    [START ON 1/11/2022] pregabalin (LYRICA) capsule 200 mg  200 mg Oral DAILY    [START ON 1/11/2022] pregabalin (LYRICA) capsule 400 mg  400 mg Oral QHS    ferrous sulfate tablet 325 mg  1 Tablet Oral DAILY WITH BREAKFAST    acetaminophen (TYLENOL) tablet 650 mg  650 mg Oral Q6H PRN    fludrocortisone (FLORINEF) tablet 0.1 mg  0.1 mg Oral DAILY    polyethylene glycol (MIRALAX) packet 17 g  17 g Oral DAILY    sodium chloride (NS) flush 5-40 mL  5-40 mL IntraVENous Q8H    sodium chloride (NS) flush 5-40 mL  5-40 mL IntraVENous PRN    apixaban (ELIQUIS) tablet 5 mg  5 mg Oral Q12H    atorvastatin (LIPITOR) tablet 40 mg  40 mg Oral QHS    midodrine (PROAMATINE) tablet 10 mg  10 mg Oral Q8H       Objective:     Patient Vitals for the past 24 hrs:   Temp Pulse Resp BP SpO2   01/10/22 1507     93 %   01/10/22 1501 98.3 °F (36.8 °C) (!) 104 22 114/84 95 %   01/10/22 1402  95  108/79 93 %   01/10/22 1333  98  (!) 105/52 94 %   01/10/22 1233  91  100/62 90 %   01/10/22 1203  93  103/64 94 %   01/10/22 1103 98.8 °F (37.1 °C) 96  134/63 (!) 88 %   01/10/22 0841     92 %   01/10/22 0752 97.4 °F (36.3 °C) (!) 102  122/72 94 %   01/10/22 0700  (!) 101   (!) 88 %   01/10/22 0600  87  124/77 94 %   01/10/22 0500  82  106/76 95 %   01/10/22 0400 97.4 °F (36.3 °C) 94  130/64 95 %   01/10/22 0300  97  112/64 92 %   01/10/22 0215     94 %   01/10/22 0200  89  123/60 93 %   01/10/22 0100  91  114/74 96 %   01/10/22 0000 97.8 °F (36.6 °C) (!) 101 24 115/61 90 %   01/09/22 2300  82  123/63 94 %   01/09/22 2200  79  102/60 91 %   01/09/22 2100  91  (!) 106/58 90 %   01/09/22 2000 97.5 °F (36.4 °C) 80 22 113/68 92 %   01/09/22 1900  96  (!) 105/58 91 %   01/09/22 1830  100  (!) 119/59 93 %   01/09/22 1800  94   94 %   01/09/22 1730 98.9 °F (37.2 °C) 99 24 96/63 92 %   01/09/22 1700  (!) 104  110/82 91 %     Oxygen Therapy  O2 Sat (%): 93 % (01/10/22 1507)  Pulse via Oximetry: 84 beats per minute (01/10/22 1507)  O2 Device: Heated; Hi flow nasal cannula (01/10/22 1507)  Skin Assessment: Clean, dry, & intact (01/10/22 0841)  Skin Protection for O2 Device: No (01/09/22 1130)  Orientation: Bilateral (01/07/22 0837)  Location: Cheek (01/07/22 6959)  Interventions: Mouth Care (01/07/22 0800)  O2 Flow Rate (L/min): 50 l/min (01/10/22 1507)  O2 Temperature: 87.8 °F (31 °C) (01/10/22 1507)  FIO2 (%): 50 % (01/10/22 1507)    Estimated body mass index is 38.45 kg/m² as calculated from the following:    Height as of this encounter: 5' 10\" (1.778 m). Weight as of this encounter: 121.6 kg (268 lb). Intake/Output Summary (Last 24 hours) at 1/10/2022 1638  Last data filed at 1/10/2022 1103  Gross per 24 hour   Intake 420 ml   Output 700 ml   Net -280 ml         Physical Exam:    Blood pressure 114/84, pulse (!) 104, temperature 98.3 °F (36.8 °C), resp. rate 22, height 5' 10\" (1.778 m), weight 121.6 kg (268 lb), SpO2 93 %. General:    Well nourished. On Airvo morbidly obese no overt distress  Head:  Normocephalic, atraumatic  Eyes:  Sclerae appear normal.  Pupils equally round. ENT:  Nares appear normal, no drainage. Moist oral mucosa  Neck:  No restricted ROM. Trachea midline   CV:   RRR. No m/r/g. No jugular venous distension. Lungs:   CTAB. No wheezing, rhonchi, or rales. Respirations even, unlabored  Abdomen: Bowel sounds present. Soft, nontender, nondistended. Extremities: No cyanosis or clubbing. No edema; right BKA  Skin:     No rashes and normal coloration. Warm and dry. Neuro:  CN II-XII grossly intact. Sensation intact. A&Ox3  Psych:  Normal mood and affect.       I have reviewed ordered lab tests and independently visualized imaging below:    Recent Labs:  Recent Results (from the past 48 hour(s))   BLOOD GAS, ARTERIAL POC    Collection Time: 01/09/22  2:21 AM   Result Value Ref Range    Device: BIPAP MASK      FIO2 (POC) 40 %    pH (POC) 7.36 7.35 - 7.45      pCO2 (POC) 58.9 (H) 35 - 45 MMHG    pO2 (POC) 70 (L) 75 - 100 MMHG    HCO3 (POC) 33.3 (H) 22 - 26 MMOL/L    sO2 (POC) 92.6 (L) 95 - 98 %    Base excess (POC) 6.1 mmol/L    PEEP/CPAP (POC) 10 cmH2O    PIP (POC) 16      Allens test (POC) NOT APPLICABLE      Site DRAWN FROM ARTERIAL LINE      Specimen type (POC) ARTERIAL      Performed by Alexis     Respiratory comment: 11 METABOLIC PANEL, BASIC    Collection Time: 01/09/22  2:53 AM   Result Value Ref Range    Sodium 145 136 - 145 mmol/L    Potassium 3.9 3.5 - 5.1 mmol/L    Chloride 107 98 - 107 mmol/L    CO2 33 (H) 21 - 32 mmol/L    Anion gap 5 (L) 7 - 16 mmol/L    Glucose 84 65 - 100 mg/dL    BUN 34 (H) 8 - 23 MG/DL    Creatinine 1.74 (H) 0.8 - 1.5 MG/DL    GFR est AA 49 (L) >60 ml/min/1.73m2    GFR est non-AA 41 (L) >60 ml/min/1.73m2    Calcium 9.3 8.3 - 10.4 MG/DL   MAGNESIUM    Collection Time: 01/09/22  2:53 AM   Result Value Ref Range    Magnesium 2.8 (H) 1.8 - 2.4 mg/dL   CBC WITH AUTOMATED DIFF    Collection Time: 01/09/22  2:53 AM   Result Value Ref Range    WBC 6.2 4.3 - 11.1 K/uL    RBC 4.08 (L) 4.23 - 5.6 M/uL    HGB 11.5 (L) 13.6 - 17.2 g/dL    HCT 37.2 (L) 41.1 - 50.3 %    MCV 91.2 79.6 - 97.8 FL    MCH 28.2 26.1 - 32.9 PG    MCHC 30.9 (L) 31.4 - 35.0 g/dL    RDW 16.7 (H) 11.9 - 14.6 %    PLATELET 395 672 - 332 K/uL    MPV 11.7 9.4 - 12.3 FL    ABSOLUTE NRBC 0.00 0.0 - 0.2 K/uL    DF AUTOMATED      NEUTROPHILS 54 43 - 78 %    LYMPHOCYTES 34 13 - 44 %    MONOCYTES 10 4.0 - 12.0 %    EOSINOPHILS 2 0.5 - 7.8 %    BASOPHILS 1 0.0 - 2.0 %    IMMATURE GRANULOCYTES 0 0.0 - 5.0 %    ABS. NEUTROPHILS 3.4 1.7 - 8.2 K/UL    ABS. LYMPHOCYTES 2.1 0.5 - 4.6 K/UL    ABS. MONOCYTES 0.6 0.1 - 1.3 K/UL    ABS. EOSINOPHILS 0.1 0.0 - 0.8 K/UL    ABS. BASOPHILS 0.0 0.0 - 0.2 K/UL    ABS. IMM. GRANS. 0.0 0.0 - 0.5 K/UL   MAGNESIUM    Collection Time: 01/10/22  4:37 AM   Result Value Ref Range    Magnesium 2.0 1.8 - 2.4 mg/dL       All Micro Results     None          Other Studies:  No results found. Signed:  Rusty Nam MD    Part of this note may have been written by using a voice dictation software. The note has been proof read but may still contain some grammatical/other typographical errors.

## 2022-01-11 ENCOUNTER — APPOINTMENT (OUTPATIENT)
Dept: NON INVASIVE DIAGNOSTICS | Age: 77
DRG: 208 | End: 2022-01-11
Attending: INTERNAL MEDICINE
Payer: MEDICARE

## 2022-01-11 PROBLEM — I95.9 HYPOTENSION: Status: RESOLVED | Noted: 2022-01-06 | Resolved: 2022-01-11

## 2022-01-11 PROBLEM — J96.02 ACUTE RESPIRATORY FAILURE WITH HYPERCAPNIA (HCC): Status: RESOLVED | Noted: 2022-01-06 | Resolved: 2022-01-11

## 2022-01-11 LAB
ANION GAP SERPL CALC-SCNC: 4 MMOL/L (ref 7–16)
BUN SERPL-MCNC: 31 MG/DL (ref 8–23)
CALCIUM SERPL-MCNC: 9.4 MG/DL (ref 8.3–10.4)
CHLORIDE SERPL-SCNC: 105 MMOL/L (ref 98–107)
CO2 SERPL-SCNC: 36 MMOL/L (ref 21–32)
CREAT SERPL-MCNC: 1.73 MG/DL (ref 0.8–1.5)
ECHO AO ASC DIAM: 3 CM
ECHO AO ASCENDING AORTA INDEX: 1.27 CM/M2
ECHO AO ROOT DIAM: 3.6 CM
ECHO AO ROOT INDEX: 1.53 CM/M2
ECHO AV AREA PEAK VELOCITY: 2.6 CM2
ECHO AV AREA VTI: 2.4 CM2
ECHO AV AREA/BSA PEAK VELOCITY: 1.1 CM2/M2
ECHO AV AREA/BSA VTI: 1 CM2/M2
ECHO AV MEAN GRADIENT: 3 MMHG
ECHO AV MEAN VELOCITY: 0.8 M/S
ECHO AV PEAK GRADIENT: 5 MMHG
ECHO AV PEAK VELOCITY: 1.1 M/S
ECHO AV VELOCITY RATIO: 0.82
ECHO AV VTI: 19.8 CM
ECHO EST RA PRESSURE: 3 MMHG
ECHO IVC PROX: 1.8 CM
ECHO LA AREA 2C: 26.9 CM2
ECHO LA AREA 4C: 24.9 CM2
ECHO LA MAJOR AXIS: 6.4 CM
ECHO LA MINOR AXIS: 6.6 CM
ECHO LA VOL BP: 84 ML (ref 18–58)
ECHO LA VOL/BSA BIPLANE: 36 ML/M2 (ref 16–34)
ECHO LV E' LATERAL VELOCITY: 10 CM/S
ECHO LV E' SEPTAL VELOCITY: 6 CM/S
ECHO LV EDV A2C: 155 ML
ECHO LV EDV A4C: 151 ML
ECHO LV EDV BP: 156 ML (ref 67–155)
ECHO LV EDV INDEX A4C: 64 ML/M2
ECHO LV EDV INDEX BP: 66 ML/M2
ECHO LV EDV NDEX A2C: 66 ML/M2
ECHO LV EJECTION FRACTION A2C: 31 %
ECHO LV EJECTION FRACTION A4C: 36 %
ECHO LV EJECTION FRACTION BIPLANE: 33 % (ref 55–100)
ECHO LV ESV A2C: 107 ML
ECHO LV ESV A4C: 97 ML
ECHO LV ESV INDEX A2C: 45 ML/M2
ECHO LV ESV INDEX A4C: 41 ML/M2
ECHO LV FRACTIONAL SHORTENING: 15 % (ref 28–44)
ECHO LV INTERNAL DIMENSION DIASTOLE INDEX: 2.33 CM/M2
ECHO LV INTERNAL DIMENSION DIASTOLIC: 5.5 CM (ref 4.2–5.9)
ECHO LV INTERNAL DIMENSION SYSTOLIC INDEX: 1.99 CM/M2
ECHO LV INTERNAL DIMENSION SYSTOLIC: 4.7 CM
ECHO LV IVSD: 1.1 CM (ref 0.6–1)
ECHO LV MASS 2D: 227.4 G (ref 88–224)
ECHO LV MASS INDEX 2D: 96.4 G/M2 (ref 49–115)
ECHO LV POSTERIOR WALL DIASTOLIC: 1 CM (ref 0.6–1)
ECHO LV RELATIVE WALL THICKNESS RATIO: 0.36
ECHO LVOT AREA: 3.1 CM2
ECHO LVOT AV VTI INDEX: 0.78
ECHO LVOT DIAM: 2 CM
ECHO LVOT MEAN GRADIENT: 2 MMHG
ECHO LVOT PEAK GRADIENT: 3 MMHG
ECHO LVOT PEAK VELOCITY: 0.9 M/S
ECHO LVOT STROKE VOLUME INDEX: 20.5 ML/M2
ECHO LVOT SV: 48.4 ML
ECHO LVOT VTI: 15.4 CM
ECHO MV E DECELERATION TIME (DT): 180 MS
ECHO MV E VELOCITY: 1.04 M/S
ECHO MV E/E' LATERAL: 10.4
ECHO MV E/E' RATIO (AVERAGED): 13.87
ECHO MV E/E' SEPTAL: 17.33
ECHO RIGHT VENTRICULAR SYSTOLIC PRESSURE (RVSP): 29 MMHG
ECHO RV BASAL DIMENSION: 3.7 CM
ECHO RV TAPSE: 1.8 CM (ref 1.5–2)
ECHO TV REGURGITANT MAX VELOCITY: 2.57 M/S
ECHO TV REGURGITANT PEAK GRADIENT: 26 MMHG
EST. AVERAGE GLUCOSE BLD GHB EST-MCNC: 131 MG/DL
GLUCOSE BLD STRIP.AUTO-MCNC: 126 MG/DL (ref 65–100)
GLUCOSE BLD STRIP.AUTO-MCNC: 145 MG/DL (ref 65–100)
GLUCOSE BLD STRIP.AUTO-MCNC: 94 MG/DL (ref 65–100)
GLUCOSE SERPL-MCNC: 89 MG/DL (ref 65–100)
HBA1C MFR BLD: 6.2 % (ref 4.2–6.3)
POTASSIUM SERPL-SCNC: 4.1 MMOL/L (ref 3.5–5.1)
SERVICE CMNT-IMP: ABNORMAL
SERVICE CMNT-IMP: ABNORMAL
SERVICE CMNT-IMP: NORMAL
SODIUM SERPL-SCNC: 145 MMOL/L (ref 136–145)

## 2022-01-11 PROCEDURE — 74011250637 HC RX REV CODE- 250/637: Performed by: INTERNAL MEDICINE

## 2022-01-11 PROCEDURE — 74011000250 HC RX REV CODE- 250: Performed by: INTERNAL MEDICINE

## 2022-01-11 PROCEDURE — 99232 SBSQ HOSP IP/OBS MODERATE 35: CPT | Performed by: INTERNAL MEDICINE

## 2022-01-11 PROCEDURE — 99223 1ST HOSP IP/OBS HIGH 75: CPT | Performed by: INTERNAL MEDICINE

## 2022-01-11 PROCEDURE — 92526 ORAL FUNCTION THERAPY: CPT

## 2022-01-11 PROCEDURE — 94010 BREATHING CAPACITY TEST: CPT

## 2022-01-11 PROCEDURE — 74011250636 HC RX REV CODE- 250/636: Performed by: INTERNAL MEDICINE

## 2022-01-11 PROCEDURE — 77010033711 HC HIGH FLOW OXYGEN

## 2022-01-11 PROCEDURE — C8929 TTE W OR WO FOL WCON,DOPPLER: HCPCS

## 2022-01-11 PROCEDURE — 97530 THERAPEUTIC ACTIVITIES: CPT

## 2022-01-11 PROCEDURE — 94660 CPAP INITIATION&MGMT: CPT

## 2022-01-11 PROCEDURE — 94760 N-INVAS EAR/PLS OXIMETRY 1: CPT

## 2022-01-11 PROCEDURE — 65660000000 HC RM CCU STEPDOWN

## 2022-01-11 PROCEDURE — 36415 COLL VENOUS BLD VENIPUNCTURE: CPT

## 2022-01-11 PROCEDURE — 82962 GLUCOSE BLOOD TEST: CPT

## 2022-01-11 PROCEDURE — 80048 BASIC METABOLIC PNL TOTAL CA: CPT

## 2022-01-11 RX ORDER — INSULIN LISPRO 100 [IU]/ML
INJECTION, SOLUTION INTRAVENOUS; SUBCUTANEOUS
Status: DISCONTINUED | OUTPATIENT
Start: 2022-01-11 | End: 2022-01-18 | Stop reason: HOSPADM

## 2022-01-11 RX ORDER — FUROSEMIDE 10 MG/ML
40 INJECTION INTRAMUSCULAR; INTRAVENOUS ONCE
Status: COMPLETED | OUTPATIENT
Start: 2022-01-11 | End: 2022-01-11

## 2022-01-11 RX ADMIN — MIDODRINE HYDROCHLORIDE 10 MG: 5 TABLET ORAL at 06:03

## 2022-01-11 RX ADMIN — PREGABALIN 400 MG: 150 CAPSULE ORAL at 21:09

## 2022-01-11 RX ADMIN — FUROSEMIDE 40 MG: 10 INJECTION, SOLUTION INTRAMUSCULAR; INTRAVENOUS at 13:26

## 2022-01-11 RX ADMIN — APIXABAN 5 MG: 5 TABLET, FILM COATED ORAL at 21:08

## 2022-01-11 RX ADMIN — FLUDROCORTISONE ACETATE 0.1 MG: 0.1 TABLET ORAL at 08:57

## 2022-01-11 RX ADMIN — APIXABAN 5 MG: 5 TABLET, FILM COATED ORAL at 08:56

## 2022-01-11 RX ADMIN — SODIUM CHLORIDE, PRESERVATIVE FREE 10 ML: 5 INJECTION INTRAVENOUS at 21:13

## 2022-01-11 RX ADMIN — MIDODRINE HYDROCHLORIDE 10 MG: 5 TABLET ORAL at 14:26

## 2022-01-11 RX ADMIN — MIDODRINE HYDROCHLORIDE 10 MG: 5 TABLET ORAL at 21:11

## 2022-01-11 RX ADMIN — FAMOTIDINE 20 MG: 20 TABLET ORAL at 08:57

## 2022-01-11 RX ADMIN — SODIUM CHLORIDE, PRESERVATIVE FREE 10 ML: 5 INJECTION INTRAVENOUS at 14:36

## 2022-01-11 RX ADMIN — SODIUM CHLORIDE, PRESERVATIVE FREE 10 ML: 5 INJECTION INTRAVENOUS at 05:29

## 2022-01-11 RX ADMIN — FERROUS SULFATE TAB 325 MG (65 MG ELEMENTAL FE) 325 MG: 325 (65 FE) TAB at 08:57

## 2022-01-11 RX ADMIN — PERFLUTREN 1 ML: 6.52 INJECTION, SUSPENSION INTRAVENOUS at 14:28

## 2022-01-11 RX ADMIN — PREGABALIN 200 MG: 50 CAPSULE ORAL at 08:57

## 2022-01-11 RX ADMIN — ATORVASTATIN CALCIUM 40 MG: 40 TABLET, FILM COATED ORAL at 21:08

## 2022-01-11 NOTE — PROGRESS NOTES
[facility] Hospitalist Progress Notes   Admit Date:  2022 11:38 AM   Name:  Sha De Paz   Age:  77 y.o. Sex:  male  :  1945   MRN:  [id number]   Room:  /    Presenting Complaint: Respiratory Distress    Reason(s) for Admission: Acute on chronic respiratory failure with hypercapnia [facility]) [J96.22]     Hospitalists consulted by Kayden Huitron MD for: Transfer of care  History of Presenting Illness:   Sha De Paz is a 77 y.o. male with history of A. fib, acute on chronic respiratory failure with hypoxia and hypercapnia, acute respiratory failure with hypercapnia, atrial fibrillation with RVR, coronary artery disease, chronic kidney disease, on long-term anticoagulation, diabetes status post right BKA, hypertension, hypercholesterolemia, left ventricular dysfunction, neuropathy, systolic CHF and a history of Staph aureus bacteremia who was admitted 2022 secondary to acute hypoxic and hypercapnic respiratory failure requiring intubation. He was admitted to the pulmonology service. Of note the patient has been concerning for obstructive sleep apnea for some time and was recommended for outpatient sleep studies but had not had them done yet. As an outpatient he is on Florinef for his hypotension, and he had a echo in July that showed an EF of 30 to 35%. The patient was admitted by the intensivist service to the ICU. He was successfully extubated on 2021 he has been stable status post extubation with the pulmonology service working on getting him patient lives with outpatient. Uses BiPAP at night and air Vo during the day. : Patient admitted with acute on chronic hypoxic and hypercapnic respiratory failure. Was treated with antibiotics briefly but major issue is thought to be obesity hypoventilation syndrome/ELAINA. Patient was extubated and currently on air Vo during daytime and BiPAP at night.   Transferred to medicine service as primary as patient is on medical floor. Patient is AOx3. As per patient he feels better than yesterday. Denies any nausea vomiting, chest pain or palpitations. Assessment & Plan:       #  Acute respiratory failure with hypercapnia ([facility] (1/6/2022)  Resolving patient is now on airvo during the day and BiPAP at night  Weaning him down slowly    January 11: Will give 1 dose of Lasix today. We will order echo. Pulmonary on board. Will eventually need noninvasive ventilator at home and pulmonary working on it. Aspiration and fall precautions. #  Stage 3 chronic kidney disease ([facility] (11/22/2017)  No active issues will monitor    January 11: Creatinine at baseline. Will monitor. #  Controlled type 2 diabetes mellitus with diabetic polyneuropathy, without long-term current use of insulin ([facility] (1/5/2018)  Continue to monitor blood sugars and cover with insulin    January 11: Sliding scale insulin ordered. HbA1c ordered. #Hypotension: Unclear etiology. Cortisol level in the morning ordered. TSH ordered continue fludrocortisone and midodrine. #Severe obesity (BMI 35.0-35.9 with comorbidity) ([facility] (10/10/2018)  Affected with abnormal weight. Will need education along the room. #Chronic systolic heart DDAANFB:(9/4/7322)  Most recent echo in July 2021 showed an EF of 30 to 35%  No active issues at this time  Continue current medical management  Low threshold for cardiology input if clinically indicated    January 11: We will check cardiac echo. 1 dose of Lasix. Remote telemetry. Cardiology consulted    Atrial fibrillation:  Per chart review, patient has atrial fibrillation. Rate controlled at present. Continue Eliquis. AOx3. Wants her daughter Nilesh Luevano to be power of  and wants to be full code. He verbalized understanding that. His condition may deteriorate in spite of treatment. I have called patient's daughter and updated her about patient condition.   She verbalized understanding that patient condition may deteriorate in spite of treatment. Next few days are critical for patient's recovery trend.         Hospital Problems as of 1/11/2022 Date Reviewed: 9/14/2021          Codes Class Noted - Resolved POA    Obstructive sleep apnea ICD-10-CM: G47.33  ICD-9-CM: 327.23  1/8/2022 - Present Unknown        Obesity hypoventilation syndrome ([facility] ICD-10-CM: Y95.7  ICD-9-CM: 278.03  1/8/2022 - Present Unknown        * (Principal) Acute respiratory failure with hypercapnia (HCC) ICD-10-CM: J96.02  ICD-9-CM: 518.81  1/6/2022 - Present Yes        Decreased cardiac ejection fraction (Chronic) ICD-10-CM: R93.1  ICD-9-CM: 794.39  1/6/2022 - Present Yes        Hypotension ICD-10-CM: I95.9  ICD-9-CM: 458.9  1/6/2022 - Present Yes        Acute on chronic respiratory failure with hypercapnia (HCC) ICD-10-CM: Y23.91  ICD-9-CM: 518.84  1/6/2022 - Present Unknown        Severe obesity (BMI 35.0-35.9 with comorbidity) (HCC) (Chronic) ICD-10-CM: E66.01, Z68.35  ICD-9-CM: 278.01, V85.35  10/10/2018 - Present Yes        Controlled type 2 diabetes mellitus with diabetic polyneuropathy, without long-term current use of insulin (HCC) (Chronic) ICD-10-CM: E11.42  ICD-9-CM: 250.60, 357.2  1/5/2018 - Present Yes        Stage 3 chronic kidney disease ([facility] (Chronic) ICD-10-CM: N18.30  ICD-9-CM: 585.3  11/22/2017 - Present Yes        RESOLVED: Suspected sleep apnea ICD-10-CM: R29.818  ICD-9-CM: 781.99  8/20/2021 - 1/8/2022 Yes              Current Facility-Administered Medications   Medication Dose Route Frequency    furosemide (LASIX) injection 40 mg  40 mg IntraVENous ONCE    famotidine (PEPCID) tablet 20 mg  20 mg Oral DAILY    pregabalin (LYRICA) capsule 200 mg  200 mg Oral DAILY    pregabalin (LYRICA) capsule 400 mg  400 mg Oral QHS    ferrous sulfate tablet 325 mg  1 Tablet Oral DAILY WITH BREAKFAST    acetaminophen (TYLENOL) tablet 650 mg  650 mg Oral Q6H PRN    fludrocortisone (FLORINEF) tablet 0.1 mg  0.1 mg Oral DAILY    polyethylene glycol (MIRALAX) packet 17 g  17 g Oral DAILY    sodium chloride (NS) flush 5-40 mL  5-40 mL IntraVENous Q8H    sodium chloride (NS) flush 5-40 mL  5-40 mL IntraVENous PRN    apixaban (ELIQUIS) tablet 5 mg  5 mg Oral Q12H    atorvastatin (LIPITOR) tablet 40 mg  40 mg Oral QHS    midodrine (PROAMATINE) tablet 10 mg  10 mg Oral Q8H       Objective:     Patient Vitals for the past 24 hrs:   Temp Pulse Resp BP SpO2   01/11/22 1130 97.2 °F (36.2 °C) (!) 102 20 (!) 138/90 95 %   01/11/22 0820     96 %   01/11/22 0739 98.7 °F (37.1 °C) 92 20 93/70 96 %   01/11/22 0603  95  110/85    01/11/22 0330 97.8 °F (36.6 °C) 84 18 122/72 99 %   01/11/22 0215    116/70    01/11/22 0136  87      01/10/22 2333 97.7 °F (36.5 °C) 88 19 (!) 129/96 97 %   01/10/22 2146  91  105/66    01/10/22 2145     95 %   01/10/22 1948 97.7 °F (36.5 °C) 94 19 115/65 96 %   01/10/22 1507     93 %   01/10/22 1501 98.3 °F (36.8 °C) (!) 104 22 114/84 95 %   01/10/22 1402  95  108/79 93 %   01/10/22 1333  98  (!) 105/52 94 %     Oxygen Therapy  O2 Sat (%): 95 % (01/11/22 1130)  Pulse via Oximetry: 85 beats per minute (01/10/22 2145)  O2 Device: Hi flow nasal cannula (AirVo to HFNC for PFT) (01/11/22 0820)  Skin Assessment: Clean, dry, & intact (01/10/22 1926)  Skin Protection for O2 Device: No (01/09/22 1130)  Orientation: Bilateral (01/07/22 0837)  Location: Cheek (01/07/22 9528)  Interventions: Mouth Care (01/07/22 0800)  O2 Flow Rate (L/min): 5 l/min (01/11/22 0820)  O2 Temperature: 87.8 °F (31 °C) (01/10/22 1507)  FIO2 (%): 54 % (01/11/22 0739)    Estimated body mass index is 38.45 kg/m² as calculated from the following:    Height as of this encounter: 5' 10\" (1.778 m). Weight as of this encounter: 121.6 kg (268 lb).     Intake/Output Summary (Last 24 hours) at 1/11/2022 1243  Last data filed at 1/11/2022 1130  Gross per 24 hour   Intake 120 ml   Output 3275 ml   Net -3155 ml         Physical Exam:    Blood pressure (!) 138/90, pulse (!) 102, temperature 97.2 °F (36.2 °C), resp. rate 20, height 5' 10\" (1.778 m), weight 121.6 kg (268 lb), SpO2 95 %. General:     . On Airvo morbidly obese no overt distress  Head:  Normocephalic, atraumatic  Eyes:  Sclerae appear normal.  Pupils equally round. ENT:  Nares appear normal, no drainage. Moist oral mucosa  Neck:  Supple   CV:   RRR. No m/r/g. JVD examination limited  Lungs:   Decreased entry bilateral lower lobe otherwise CTAB. No wheezing, rhonchi, or rales. Respirations even, unlabored  Abdomen: Bowel sounds present. Soft, nontender, nondistended. Extremities: No cyanosis or clubbing. No edema; right BKA [no issues with stump.]  Skin:     No rashes and normal coloration. Warm and dry. Neuro:  AOx3. Moving all 4 extremities. Speech normal.  Psych:  Normal mood and affect. I have reviewed ordered lab tests and independently visualized imaging below:    Recent Labs:    Procedures done this admission:  * No surgery found *    All Micro Results     None          SARS-CoV-2 Lab Results  \"Novel Coronavirus\" Test: No results found for: COV2NT   \"Emergent Disease\" Test: No results found for: EDPR  \"SARS-COV-2\" Test: No results found for: XGCOVT  \"Precision Labs\" Test: No results found for: RSLT  Rapid Test: No results found for: COVR         Labs: Results:       BMP, Mg, Phos Recent Labs     01/11/22  0524 01/10/22  0437 01/09/22  0253     --  145   K 4.1  --  3.9     --  107   CO2 36*  --  33*   AGAP 4*  --  5*   BUN 31*  --  34*   CREA 1.73*  --  1.74*   CA 9.4  --  9.3   GLU 89  --  84   MG  --  2.0 2.8*      CBC Recent Labs     01/09/22  0253   WBC 6.2   RBC 4.08*   HGB 11.5*   HCT 37.2*      GRANS 54   LYMPH 34   EOS 2   MONOS 10   BASOS 1   IG 0   ANEU 3.4   ABL 2.1   TIERNEY 0.1   ABM 0.6   ABB 0.0   AIG 0.0      LFT No results for input(s): ALT, TBIL, AP, TP, ALB, GLOB, AGRAT in the last 72 hours.     No lab exists for component: SGOT, GPT   Cardiac Testing No results found for: BNPP, BNP, CPK, RCK1, RCK2, RCK3, RCK4, CKMB, CKNDX, CKND1, TROPT, TROIQ   Coagulation Tests Lab Results   Component Value Date/Time    aPTT 34.9 08/18/2021 06:56 PM    aPTT 31.7 07/19/2021 09:24 PM      A1c Lab Results   Component Value Date/Time    Hemoglobin A1c 6.7 (H) 07/13/2021 08:34 AM    Hemoglobin A1c 6.2 (H) 01/13/2021 08:27 AM    Hemoglobin A1c 6.1 (H) 06/11/2020 09:16 AM      Lipid Panel Lab Results   Component Value Date/Time    Cholesterol, total 156 07/13/2021 08:34 AM    HDL Cholesterol 39 (L) 07/13/2021 08:34 AM    LDL, calculated 83 07/13/2021 08:34 AM    LDL, calculated 83 06/11/2020 09:16 AM    VLDL, calculated 34 07/13/2021 08:34 AM    VLDL, calculated 42 (H) 06/11/2020 09:16 AM    Triglyceride 202 (H) 07/13/2021 08:34 AM      Thyroid Panel Lab Results   Component Value Date/Time    TSH 0.655 07/19/2021 01:46 PM    TSH 2.230 01/05/2018 08:25 AM    TSH 2.090 10/03/2016 09:12 AM    T4, Free 1.4 07/19/2021 01:46 PM        Most Recent UA Lab Results   Component Value Date/Time    WBC 0-3 07/19/2021 01:26 PM    RBC 5-10 07/19/2021 01:26 PM    Epithelial cells 0-3 07/19/2021 01:26 PM    Bacteria 0 07/19/2021 01:26 PM    Casts 10-20 07/19/2021 01:26 PM            Other Studies:  No results found. Signed:  Perfecto Carrasquillo MD    Part of this note may have been written by using a voice dictation software. The note has been proof read but may still contain some grammatical/other typographical errors.

## 2022-01-11 NOTE — PROGRESS NOTES
END OF SHIFT NOTE:    INTAKE/OUTPUT  01/10 0701 - 01/11 0700  In: 300 [P.O.:300]  Out: 3250 [Urine:3250]  Voiding: NO  Catheter: YES  Drain:              Flatus: Patient does have flatus present. Stool:  0 occurrences. Characteristics:  Stool Assessment  Stool Color: Brown  Stool Appearance: Loose  Stool Amount: Small  Stool Source/Status: Rectum    Emesis: 0 occurrences. Characteristics:        VITAL SIGNS  Patient Vitals for the past 12 hrs:   Temp Pulse Resp BP SpO2   01/11/22 0603  95  110/85    01/11/22 0330 97.8 °F (36.6 °C) 84 18 122/72 99 %   01/11/22 0215    116/70    01/11/22 0136  87      01/10/22 2333 97.7 °F (36.5 °C) 88 19 (!) 129/96 97 %   01/10/22 2146  91  105/66    01/10/22 2145     95 %   01/10/22 1948 97.7 °F (36.5 °C) 94 19 115/65 96 %       Pain Assessment  Pain Intensity 1: 0 (01/10/22 1926)        Patient Stated Pain Goal: 0    Ambulating  No    Shift report to be given to oncoming nurse at the bedside.     Gonsalo Levine RN

## 2022-01-11 NOTE — PROGRESS NOTES
Problem: Gas Exchange - Impaired  Goal: *Absence of hypoxia  Outcome: Progressing Towards Goal     Problem: Patient Education: Go to Patient Education Activity  Goal: Patient/Family Education  Outcome: Progressing Towards Goal     Problem: Pressure Injury - Risk of  Goal: *Prevention of pressure injury  Description: Document Julio Cesar Scale and appropriate interventions in the flowsheet. Outcome: Progressing Towards Goal  Note: Pressure Injury Interventions:  Sensory Interventions: Assess changes in LOC,Minimize linen layers,Monitor skin under medical devices    Moisture Interventions: Absorbent underpads,Internal/External urinary devices,Minimize layers    Activity Interventions: Increase time out of bed,Pressure redistribution bed/mattress(bed type)    Mobility Interventions: Pressure redistribution bed/mattress (bed type)    Nutrition Interventions: Document food/fluid/supplement intake    Friction and Shear Interventions: Minimize layers                Problem: Patient Education: Go to Patient Education Activity  Goal: Patient/Family Education  Outcome: Progressing Towards Goal     Problem: Falls - Risk of  Goal: *Absence of Falls  Description: Document Beronica Fall Risk and appropriate interventions in the flowsheet.   Outcome: Progressing Towards Goal  Note: Fall Risk Interventions:  Mobility Interventions: Communicate number of staff needed for ambulation/transfer,Patient to call before getting OOB,Utilize walker, cane, or other assistive device    Mentation Interventions: Adequate sleep, hydration, pain control    Medication Interventions: Patient to call before getting OOB,Teach patient to arise slowly    Elimination Interventions: Call light in reach,Patient to call for help with toileting needs              Problem: Patient Education: Go to Patient Education Activity  Goal: Patient/Family Education  Outcome: Progressing Towards Goal     Problem: Patient Education: Go to Patient Education Activity  Goal: Patient/Family Education  Outcome: Progressing Towards Goal

## 2022-01-11 NOTE — PROGRESS NOTES
Problem: Dysphagia (Adult)  Goal: *Acute Goals and Plan of Care (Insert Text)  Note: ST. Pt. Will tolerate  easy to chew diet/thin liquids with no overt s/sx of aspiration/penetration. 2. Pt. Will participate in modified barium swallow with 100% participation to fully evaluate swallow function if further indicated in plan of care. LTG: Pt. Will tolerate least restrictive diet without respiratory decline. SPEECH LANGUAGE PATHOLOGY: DYSPHAGIA- Daily Note 2    NAME/AGE/GENDER: Vivian Antoine is a 77 y.o. male  DATE: 2022  PRIMARY DIAGNOSIS: Acute on chronic respiratory failure with hypercapnia (HCC) [J96.22]      ICD-10: Treatment Diagnosis: R13.12 Dysphagia, Oropharyngeal Phase    RECOMMENDATIONS   DIET:    Easy to Chew   Thin Liquids    MEDICATIONS: With liquid 1 at a time or float in puree     PRECAUTIONS, MODIFICATIONS, AND STRATEGIES  · Slow rate of intake  · Small bites/sips  · Upright at 90 degrees during meal  · Alternate liquids/solids  · Single small sips via straw      RECOMMENDATIONS FOR CONTINUED SPEECH THERAPY:   YES: Anticipate need for ongoing speech therapy during this hospitalization. ASSESSMENT   Patient continues to present with concerns for pharyngeal dysphagia. Functional oral prep and clearing with soft fruit. No overt s/sx airway compromise with thin by single straw sips or fruit; however, delayed cough following trials of thin by cup. Limited assessment of diet tolerance as staff member arrived at bedside to complete echo. Recommend easy to chew diet and thin liquids. Single small sips via straw. Patient now off airvo. Will plan for modified barium swallow study tomorrow, , to objectively assess oropharyngeal swallow function.        EDUCATION:  · Recommendations discussed with Patient and MD    REHABILITATION POTENTIAL FOR STATED GOALS: Good    PLAN    FREQUENCY/DURATION:   Continue to follow patient 2 times a week for duration of hospital stay to address above goals. Recommendations for next treatment session: Next treatment session will address Modified Barium Swallow Study     CONTINUATION OF SKILLED SERVICES/MEDICAL NECESSITY:   Patient is expected to demonstrate progress in  diet tolerance in order to  improve swallow safety and decrease aspiration risk.  Patient continues to require skilled intervention due to dysphagia. SUBJECTIVE   Alert upright in bedside chair. Denies any difficulty swallowing. Oxygen Device: 5L NC  Pain: Pain Scale 1: Numeric (0 - 10)  Pain Intensity 1: 0    History of Present Injury/Illness: Mr. Beth Small  has a past medical history of A-fib ([address] (07/19/2021), Acute on chronic respiratory failure with hypoxia and hypercapnia (HCC) (7/23/2021), Acute respiratory failure with hypercapnia (HCC) (7/23/2021), Atrial fibrillation with RVR ([address] (7/19/2021), CAD (coronary artery disease), Cellulitis (7/19/2021), Chronic kidney disease, COVID-19 vaccine series completed, Current use of long term anticoagulation, 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 mellitus, limited to breakdown of skin ([address] (9/27/2021), H/O heart artery stent, History of MI (myocardial infarction) (1999), Hypercholesterolemia, Hypertension, Left ventricular dysfunction, Neuropathy, Oxygen dependent, PICC (peripherally inserted central catheter) in place, Staphylococcus aureus bacteremia (1/18/0320), Systolic CHF, acute on chronic ([address] (7/19/2021), and Type 2 diabetes mellitus ([address] . He also  has a past surgical history that includes pr cardiac surg procedure unlist (1999) and hx orthopaedic (08/18/2021). PRECAUTIONS/ALLERGIES: Patient has no known allergies. Problem List:  (Impairments causing functional limitations):  1. Acute respiratory failure with hypercapnia   2.  Oropharyngeal dysphagia    Previous Dysphagia: NONE REPORTED  Diet Prior to Evaluation: regular/thin    Orientation:  Person  Place  Time  Situation      OBJECTIVE Dysphagia treatment:diet tolerance   Patient consumed trials of thin by cup/straw and soft fruit. Functional oral prep with soft fruit. Timely swlalows palpated with thin liquids. No overt s/sx airway compromise with thin by straw across 5 trials or mixed fruit across 7 trials. Given trial of thin by cup, patient with delayed cough post swallow. Further trials deferred as staff member arrived at bedside to complete echo. Tool Used: Dysphagia Outcome and Severity Scale (MAURY)    Score Comments   Normal Diet  [] 7 With no strategies or extra time needed   Functional Swallow  [x] 6 May have mild oral or pharyngeal delay   Mild Dysphagia  [] 5 Which may require one diet consistency restricted    Mild-Moderate Dysphagia  [] 4 With 1-2 diet consistencies restricted   Moderate Dysphagia  [] 3 With 2 or more diet consistencies restricted   Moderate-Severe Dysphagia  [] 2 With partial PO strategies (trials with ST only)   Severe Dysphagia  [] 1 With inability to tolerate any PO safely      Score:  Initial: 6 Most Recent: 5 (Date 01/11/22 )   Interpretation of Tool: The Dysphagia Outcome and Severity Scale (MAURY) is a simple, easy-to-use, 7-point scale developed to systematically rate the functional severity of dysphagia based on objective assessment and make recommendations for diet level, independence level, and type of nutrition.          INTERDISCIPLINARY COLLABORATION: RN    After treatment position/precautions:  · Upright in chair  · staff member notified    Total Treatment Duration:   Time In: 5031  Time Out: [address], [id number] [facility]

## 2022-01-11 NOTE — PROGRESS NOTES
ACUTE PHYSICAL THERAPY GOALS:  (Developed with and agreed upon by patient and/or caregiver.)  STG:  (1.)Mr. Rivas will move from supine to sit and sit to supine , scoot up and down and roll side to side with CONTACT GUARD ASSIST within 4 treatment day(s). (2.)Mr. Rivas will transfer from bed to chair and chair to bed with CONTACT GUARD ASSIST using the least restrictive device within 4 treatment day(s). (3.)Mr. Rivas will ambulate with CONTACT GUARD ASSIST for 50 feet with the least restrictive device within 4 treatment day(s).      LTG:  (1.)Mr. Rivas will move from supine to sit and sit to supine , scoot up and down and roll side to side in bed with STAND BY ASSIST within 7 treatment day(s). (2.)Mr. Rivas will transfer from bed to chair and chair to bed with STAND BY ASSIST using the least restrictive device within 7 treatment day(s). (3.)Mr. Rivas will ambulate with STAND BY ASSIST for 100 feet with the least restrictive device within 7 treatment day(s). PHYSICAL THERAPY: Daily Note and AM Treatment Day # 3    David Knowles is a 77 y.o. male   PRIMARY DIAGNOSIS: Acute respiratory failure with hypercapnia (HCC)  Acute on chronic respiratory failure with hypercapnia (HCC) [J96.22]         ASSESSMENT:     REHAB RECOMMENDATIONS: CURRENT LEVEL OF FUNCTION:  (Most Recently Demonstrated)   Recommendation to date pending progress:  [address] [place] [address]  Equipment:    To Be Determined Bed Mobility:   Standby Assistance  Sit to Stand:  [employer] Assistance  Transfers:   Minimal Assistance  Gait/Mobility:   Minimal Assistance     ASSESSMENT:  Mr. Rebbeca Lesch presents supine on 4L. 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 Patient is slightly impulsive throughout treatment needing cues for improved safety awareness. Patient performs supine to sit with SBA and transfer to standing with CGA.  Once standing patient ambulated 100' x 2 with rolling walker, chair follow and CGA-min assist. Patient participated great in LE exercises. Will continue efforts.      SUBJECTIVE:   Mr. Charity Ramirez states, \"I'm ready to walk\"    SOCIAL HISTORY/ LIVING ENVIRONMENT: See initial evaluation  Home Environment: Private residence  # Steps to Enter: 1  One/Two Story Residence: One story  Living Alone: No  Support Systems: Child(pat),Spouse/Significant Other  OBJECTIVE:     PAIN: VITAL SIGNS: LINES/DRAINS:   Pre Treatment: Pain Screen  Pain Scale 1: FLACC  Pain Intensity 1: 0  Post Treatment: 0   None  O2 Device: Hi flow nasal cannula (AirVo to HFNC for PFT)     MOBILITY: I Mod I S SBA CGA Min Mod Max Total  NT x2 Comments:   Bed Mobility    Rolling [] [] [] [] [] [] [] [] [] [] []    Supine to Sit [] [] [] [x] [] [] [] [] [] [] []    Scooting [] [] [] [] [] [] [] [] [] [] []    Sit to Supine [] [] [] [] [] [] [] [] [] [] []    Transfers    Sit to Stand [] [] [] [] [x] [] [] [] [] [] []    Bed to Chair [] [] [] [] [] [x] [] [] [] [] []    Stand to Sit [] [] [] [] [x] [] [] [] [] [] []    I=Independent, Mod I=Modified Independent, S=Supervision, SBA=Standby Assistance, CGA=Contact Guard Assistance,   Min=Minimal Assistance, Mod=Moderate Assistance, Max=Maximal Assistance, Total=Total Assistance, NT=Not Tested    BALANCE: Good Fair+ Fair Fair- Poor NT Comments   Sitting Static [x] [] [] [] [] []    Sitting Dynamic [] [x] [] [] [] []              Standing Static [] [x] [] [] [] []    Standing Dynamic [] [] [x] [] [] []      GAIT: I Mod I S SBA CGA Min Mod Max Total  NT x2 Comments:   Level of Assistance [] [] [] [] [] [x] [] [] [] [] []    Distance 100' x 2 with chair follow    DME Rolling Walker    Gait Quality     Weightbearing  Status N/A     I=Independent, Mod I=Modified Independent, S=Supervision, SBA=Standby Assistance, CGA=Contact Guard Assistance,   Min=Minimal Assistance, Mod=Moderate Assistance, Max=Maximal Assistance, Total=Total Assistance, NT=Not Tested    PLAN:   FREQUENCY/DURATION: PT Plan of Care: 3 times/week for duration of hospital stay or until stated goals are met, whichever comes first.  TREATMENT:     TREATMENT:   ($$ Therapeutic Activity: 23-37 mins    )  Therapeutic Activity (25 Minutes): Therapeutic activity included Supine to Sit, Scooting, Transfer Training, Ambulation on level ground, Sitting balance , Standing balance and LE exercises to improve functional Mobility, Strength, ROM and Activity tolerance.     TREATMENT GRID:  N/A    AFTER TREATMENT POSITION/PRECAUTIONS:  Chair, Needs within reach, RN notified and Visitors at bedside    INTERDISCIPLINARY COLLABORATION:  RN/PCT and PT/PTA    TOTAL TREATMENT DURATION:  PT Patient Time In/Time Out  Time In: 1035  Time Out: Chantal Lopez [address]

## 2022-01-11 NOTE — PROGRESS NOTES
Daily Progress Note: 1/11/2022    Gavin Rivas  Admission Date: 1/6/2022    Length of Stay: 5 days      Background: 77 y.o. male with acute respiratory failure secondary to hypercapnea. Required intubation in the ER with a CO2 of 80, ph 7.2. CXR with bilateral infiltrates/cardiomyopathy. BNP 2179. Has severe ELAINA per recent at home sleep study (AHI 54.8 with desaturation to 68%). Formal outpatient study recommended. Repeat ABG with CO2 correction. Hypotensive requiring pressor support on admission but this has resolved. On Midodrine and Florinef here for hypotension - took Florinef at home. EF 30 to 35% by history but no medical therapy due to hypotension. Off vent on 1/7 and awake and alert. Passed swallow study. Still hypoxic requiring airvo.  + restless legs    Subjective: On 5 lpm, echo today. Has his prosthesis on and has been walking around. Denies being SOB    Review of Systems  Constitutional: negative for fever, chills, sweats  Cardiovascular: negative for chest pain, palpitations, syncope, edema  Gastrointestinal:  negative for dysphagia, reflux, vomiting, diarrhea, abdominal pain, or melena  Neurologic:  negative for focal weakness, numbness, headache    Objective:     Vitals:    01/11/22 0603 01/11/22 0739 01/11/22 0820 01/11/22 1130   BP: 110/85 93/70  (!) 138/90   Pulse: 95 92  (!) 102   Resp:  20  20   Temp:  98.7 °F (37.1 °C)  97.2 °F (36.2 °C)   SpO2:  96% 96% 95%   Weight:       Height:           Intake/Output:    Physical Exam:          GEN: obese. HEENT:no alar flaring or epistaxis, oral mucosa moist without cyanosis,   NECK:  no JVD, no retractions, no thyromegaly or masses,   LUNGS:  Decreased bases on NC  HEART:  RRR with no M,G,R;  ABDOMEN:  soft with no tenderness; positive bowel sounds present  EXTREMITIES:  warm with no cyanosis, no lower leg edema on left, right prosthesis.   SKIN:  no jaundice or ecchymosis   NEURO: A & O X 3      ACTIVITY: limited  NUTRITION: cardiac    IMAGES: Results from Hospital Encounter encounter on 07/19/21    CT HEAD WO CONT    Impression  No acute intracranial abnormalities. Date of Dictation: 7/23/2021 10:21 AM    Results from [place] encounter on 07/19/21    MRI FOOT RT WO CONT    Impression  1. Lateral plantar ulceration extending to contact the fifth metatarsal base  without evidence of acute osteomyelitis. A small probable fluid collection in  the subcutaneous tissues adjacent to the ulceration measures up to 1.4 cm,  though lack of IV contrast limits full evaluation. 2. Severe Charcot arthropathy throughout the midfoot and hindfoot with a large  amount of fluid insinuating throughout the midfoot and hindfoot articulations. While possibly reactive sterile joint fluid to the severe arthropathy, sterility  is indeterminate based upon imaging alone. 3. Thick-walled 2.7 cm fluid collection along the plantar aspect of the fifth  metatarsal head, likely adventitial bursa formation. Results from Hospital Encounter encounter on 07/19/21    ECHO ADULT COMPLETE    Interpretation Summary  · Image quality for this study was poor. · Contrast used: DEFINITY. · LV: Estimated LVEF is 30 - 35%. Normal wall thickness. Mildly dilated left ventricle. Moderate-to-severely reduced systolic function. Wall motion abnormalities with suggest coronary artery disease  · LA: Mildly dilated left atrium. · RA: Mildly dilated right atrium. · MV: Mild mitral annular calcification. Mild mitral valve regurgitation is present. · TV: Mild tricuspid valve regurgitation is present. LAB  Recent Labs     01/09/22  0253   WBC 6.2   HGB 11.5*   HCT 37.2*        Recent Labs     01/11/22  0524 01/10/22  0437 01/09/22  0253     --  145   K 4.1  --  3.9     --  107   CO2 36*  --  33*   GLU 89  --  84   BUN 31*  --  34*   CREA 1.73*  --  1.74*   MG  --  2.0 2.8*     No results for input(s): LCAD, LAC in the last 72 hours.     ABG: Recent Labs     01/09/22  0221   PHI 7.36   PCO2I 58.9*   PO2I 70*   HCO3I 33.3*   FIO2I 40     Cultures:   No results for input(s): SDES, CULT in the last 72 hours. Assessment/Plan:       Acute respiratory failure with hypercapnia ([facility] (1/6/2022)  Improved. Stage 3 chronic kidney disease ([facility] (11/22/2017)      Severe obesity (BMI 35.0-35.9 with comorbidity) ([facility] (26/57/6477)   chronic,complicated medical care      Decreased cardiac ejection fraction (1/6/2022)  30-35%       Acute on chronic respiratory failure with hypercapnia ([facility] (1/6/2022)  On 5 lpm with 97%      Obstructive sleep apnea (1/8/2022)    Obesity hypoventilation syndrome ([facility] (1/8/2022)  ELAINA -- home sleep study in November with Severe ELAINA:  AHI in 54.8 and over 240 minutes with saturation <89%. Theador Laloer [phone] from [address] came and needs spirometry to qualify for trilogy since has severe elaina and CO2 retainer. Otherwise, he will need office PSG at time of discharge.   -he qualifies for a machine    Due to the severity and nature of this patient's chronic respiratory failure, patient requires noninvasive ventilation with volume support. Home Bipap insufficient due to severity of patient's condition. This patient requires access to ventilator therapy for nocturnal and daytime use to reduce risk of exacerbation. Absence of noninvasive ventilator therapy on a daily basis will quickly lead to severe injury or death from the resulting CO2 retention without this level of respiratory support. Supportive and Prophylactic Tx:   DVT PPx:eliquis, ambulating  GI (PUD) PPx: Pepcid  PT: OOB  Full Code    Stephanie Christensen NP    I have spoken with and examined the patient. I agree with the above assessment and plan as documented. Gen:  On NC  Lungs:  Diminished BS in the bases  Heart:  RRR with no Murmur/Rubs/Gallops  Abd: soft NT  Ext: s/p right BKA, has prosthesis     Will wean O2 and cont.  RLS  Meds, arrange Trilogy as Rubi Paiz MD

## 2022-01-11 NOTE — H&P
[facility] Cardiology Initial Cardiac Evaluation      Date of  Admission: 1/6/2022 11:38 AM     Primary Care Physician: Francisco Salinas MD  Primary Cardiologist: Dr Chirag Magana  Referring Physician: Dr Olga Zavaleta  Supervising Physician: Dr Rinku Anaya    CC/Reason for evaluation: CHF    HPI:  Marlene Carter is a 77 y.o. male PMH of CAD with history of stent (no records available), atrial fibrillation on Eliquis, chronic systolic heart failure (ECHO 7/21/21- EF 30-35%), HTN, HLD, DM, s/p right BKA, CKD, severe ELAINA/Obesity hypoventilation syndrome and chronic respiratory failure on 2L who presented to [facility] via EMS on 1/6 with complaint of dyspnea. Family called EMS for increasing SOB, foggy, and not self. EMS reports patient was initially alert and was able to answer all questions appropriately Per EMS patient become unresponsive \"a few blocks\" away. Patient was minimally responsive on arrival to ED. ABG showed CO2 80 and pH 7.2. CXR showed bilateral infiltrated/cardiomyopathy. pBNP was 2179. Patient was intubated in ED and admitted to ICU for acute respiratory failure secondary to hypercapnea. He was briefly treated with antibiotics and extubated on 1/7. Patient was transferred out to floor and transferred to hospitalist service on 1/9. Cardiology was consulted for CHF. Past Medical History:   Diagnosis Date    A-fib [facility]) 07/19/2021    developed AFID after hospital admission for wound infection- started on Eliquis    Acute on chronic respiratory failure with hypoxia and hypercapnia ([address] 7/23/2021    Acute respiratory failure with hypercapnia ([address] 7/23/2021    Atrial fibrillation with RVR ([address] 7/19/2021    CAD (coronary artery disease)     [facility].     Cellulitis 7/19/2021    Chronic kidney disease     stage 3- improved    COVID-19 vaccine series completed     Moderna Vaccine completed X2 doses    Current use of long term anticoagulation     Eliquis and Aspirin    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 mellitus, limited to breakdown of skin ([facility] 9/27/2021    H/O heart artery stent     X1 placed in 1999    History of MI (myocardial infarction) 1999    stent placed X1    Hypercholesterolemia     Hypertension     Left ventricular dysfunction     echo revealed EF 30-35%, mildly dilated LA/RA and mild TR and MR.    Neuropathy     severe    Oxygen dependent     recently started on 2L NC continous- Sleep study to be scheduled-questionable ELAINA    PICC (peripherally inserted central catheter) in place     PICC line in place to R arm    Staphylococcus aureus bacteremia 5/44/8725    Systolic CHF, acute on chronic ([facility] 7/19/2021    Type 2 diabetes mellitus ([facility]     oral agent/Pt does not monitor BS/no s.s of hypoglycemia/Last A1c: 6.7 on 7/13/21 per daughter      Past Surgical History:   Procedure Laterality Date    HX ORTHOPAEDIC  08/18/2021    BKA    [facility] CARDIAC SURG PROCEDURE UNLIST  1999    stent placement       No Known Allergies   Social History     Socioeconomic History    Marital status:      Spouse name: Not on file    Number of children: Not on file    Years of education: Not on file    Highest education level: Not on file   Occupational History    Not on file   Tobacco Use    Smoking status: Never Smoker    Smokeless tobacco: Never Used   Vaping Use    Vaping Use: Never used   Substance and Sexual Activity    Alcohol use: No    Drug use: No    Sexual activity: Not Currently     Partners: Female   Other Topics Concern     Service Not Asked    Blood Transfusions Not Asked    Caffeine Concern Not Asked    Occupational Exposure Not Asked    Hobby Hazards Not Asked    Sleep Concern Not Asked    Stress Concern Not Asked    Weight Concern Not Asked    Special Diet Not Asked    Back Care Not Asked    Exercise Not Asked    Bike Helmet Not Asked    Seat Belt Not Asked    Self-Exams Not Asked   Social History Narrative    Lives with his daughter     Social Determinants of Health     Financial Resource Strain:     Difficulty of Paying Living Expenses: Not on file   Food Insecurity:     Worried About Running Out of Food in the Last Year: Not on file    Agueda of Food in the Last Year: Not on file   Transportation Needs:     Lack of Transportation (Medical): Not on file    Lack of Transportation (Non-Medical):  Not on file   Physical Activity:     Days of Exercise per Week: Not on file    Minutes of Exercise per Session: Not on file   Stress:     Feeling of Stress : Not on file   Social Connections:     Frequency of Communication with Friends and Family: Not on file    Frequency of Social Gatherings with Friends and Family: Not on file    Attends Tenriism Services: Not on file    Active Member of Clubs or Organizations: Not on file    Attends Club or Organization Meetings: Not on file    Marital Status: Not on file   Intimate Partner Violence:     Fear of Current or Ex-Partner: Not on file    Emotionally Abused: Not on file    Physically Abused: Not on file    Sexually Abused: Not on file   Housing Stability:     Unable to Pay for Housing in the Last Year: Not on file    Number of Places Lived in the Last Year: Not on file    Unstable Housing in the Last Year: Not on file     Family History   Problem Relation Age of Onset    Cancer Mother     Cancer Father     No Known Problems Maternal Grandmother     No Known Problems Maternal Grandfather     No Known Problems Paternal Grandmother     No Known Problems Paternal Grandfather         Current Facility-Administered Medications   Medication Dose Route Frequency    insulin lispro (HUMALOG) injection   SubCUTAneous TIDAC    famotidine (PEPCID) tablet 20 mg  20 mg Oral DAILY    pregabalin (LYRICA) capsule 200 mg  200 mg Oral DAILY    pregabalin (LYRICA) capsule 400 mg  400 mg Oral QHS    ferrous sulfate tablet 325 mg  1 Tablet Oral DAILY WITH BREAKFAST    acetaminophen (TYLENOL) tablet 650 mg  650 mg Oral Q6H PRN    fludrocortisone (FLORINEF) tablet 0.1 mg  0.1 mg Oral DAILY    polyethylene glycol (MIRALAX) packet 17 g  17 g Oral DAILY    sodium chloride (NS) flush 5-40 mL  5-40 mL IntraVENous Q8H    sodium chloride (NS) flush 5-40 mL  5-40 mL IntraVENous PRN    apixaban (ELIQUIS) tablet 5 mg  5 mg Oral Q12H    atorvastatin (LIPITOR) tablet 40 mg  40 mg Oral QHS    midodrine (PROAMATINE) tablet 10 mg  10 mg Oral Q8H       Review of Symptoms:    General: No weight changes,  weakness, fever or chills  Skin: no rashes, lumps, or other skin changes  HEENT: no headache, dizziness, lightheadedness, vision changes, hearing changes, tinnitus, vertigo, sinus pressure/pain, bleeding gums, sore throat, or hoarseness  Neck: no swollen glands, goiter, pain or stiffness  Respiratory: No cough, sputum, hemoptysis, dyspnea, wheezing  Cardiovascular: + as per HPI  Gastrointestinal: no GERD, constipation, diarrhea, liver problems, or h/o GI bleed  Urinary: no frequency, urgency , hematuria, burning/pain with urination, recent flank pain, polyuria, nocturia, or difficulty urinating  Peripheral Vascular: no claudication, leg cramps, prior DVTs, swelling of calves, legs, or feet, color change, or swelling with redness or tenderness  Musculoskeletal: no muscle or joint pain/stiffness, joint swelling, erythema of joints, or back pain  Psychiatric: no depression or excessive stress  Neurological: no sensory or motor loss, seizures, syncope, tremors, numbness, no dementia  Hematologic: no anemia, easy bruising or bleeding  Endocrine: Positive for diabetes.  No thyroid problems, heat or cold intolerance, excessive sweating, polyuria, polydipsia       Subjective:     Physical Exam:    Vitals:    01/11/22 0603 01/11/22 0739 01/11/22 0820 01/11/22 1130   BP: 110/85 93/70  (!) 138/90   Pulse: 95 92  (!) 102   Resp:  20  20   Temp:  98.7 °F (37.1 °C)  97.2 °F (36.2 °C)   SpO2:  96% 96% 95%   Weight: Height:         General: Well Developed, Well Nourished, No Acute Distress  HEENT: pupils equal and round, no abnormalities noted  Neck: supple, no JVD, no carotid bruits  Heart: IRR without murmurs or gallops  Lungs: Clear throughout auscultation bilaterally without adventitious sounds  Abd: soft, nontender, nondistended, with good bowel sounds  Ext: warm, no edema, R BKA  Skin: warm and dry  Psychiatric: Normal mood and affect  Neurologic: Alert and oriented X 3    Cardiographics    Telemetry: AFIB  ECG: atrial fibrillation, rate 49  Echocardiogram: 7/21/2021  Left Ventricle Normal wall thickness. Mildly dilated left ventricle. The estimated EF is 30 - 35%. Moderate-to-severely reduced systolic function. Unable to assess diastolic function. Atrial fibrillation observed. Left Atrium Mildly dilated left atrium. Right Ventricle Normal cavity size, wall thickness and global systolic function. Right Atrium Mildly dilated right atrium. Aortic Valve No stenosis. Mild aortic valve sclerosis. Trace aortic valve regurgitation. Mitral Valve Normal valve structure and no stenosis. Mild mitral annular calcification. Mild regurgitation. Tricuspid Valve Normal valve structure and no stenosis. Mild regurgitation. Pulmonic Valve Normal valve structure, no stenosis and no regurgitation. Aorta Normal aortic root, ascending aortic, and aortic arch. Pericardium No evidence of pericardial effusion.          Labs:   Recent Results (from the past 24 hour(s))   METABOLIC PANEL, BASIC    Collection Time: 01/11/22  5:24 AM   Result Value Ref Range    Sodium 145 136 - 145 mmol/L    Potassium 4.1 3.5 - 5.1 mmol/L    Chloride 105 98 - 107 mmol/L    CO2 36 (H) 21 - 32 mmol/L    Anion gap 4 (L) 7 - 16 mmol/L    Glucose 89 65 - 100 mg/dL    BUN 31 (H) 8 - 23 MG/DL    Creatinine 1.73 (H) 0.8 - 1.5 MG/DL    GFR est AA 50 (L) >60 ml/min/1.73m2    GFR est non-AA 41 (L) >60 ml/min/1.73m2    Calcium 9.4 8.3 - 10.4 MG/DL   GLUCOSE, POC    Collection Time: 01/11/22  1:26 PM   Result Value Ref Range    Glucose (POC) 126 (H) 65 - 100 mg/dL    Performed by Sweetie      Pt has been seen and examined by Dr. Ignacia Pollard. He agrees with the following assessment and plan. Assessment/Plan:      Principal Problem:    Acute on chronic respiratory failure with hypercapnia (HCC) (1/6/2022)  - on 5L Hi flow NC  - CXR- bibasilar infiltrate/atelectasis  - treated with antibiotics briefly but major issue is thought to be obesity hypoventilation syndrome/ELAINA  - pulmonary following  - per primary     Active Problems:    Chronic systolic heart failure/Cardiomyopathy   - ECHO 7/2021- EF 30-35%  - appears euvolemic   - Prior deferred consideration for ACE inhibitor/ARB's with acute on chronic renal failure. Improved currently and add on as blood pressures tolerate.   - No BB secondary to hypotension. Add as blood pressure tolerated. - needs ischemic work-up which can be addressed on an outpatient basis  - ECHO ordered       Atrial fibrillation  - unknown duration  - rate currently controlled  -[id number] score of ~5  - on Eliqius for CVA protection       Stage 3 chronic kidney disease ([facility] (11/22/2017)  - per primary       Controlled type 2 diabetes mellitus with diabetic polyneuropathy, without long-term current use of insulin ([address] (1/5/2018)  - SSI   - per primary       Hypotension (1/6/2022)   - on midodrine and florinef    - monitor       Obstructive sleep apnea/Obesity hypoventilation syndrome  - BIPAP qhs  - per pulmonary        Severe obesity (BMI 35.0-35.9 with comorbidity) ([facility] (10/10/2018)         Thank you for requesting cardiac evaluation and allowing us to participate in the care of this patient. We will continue to follow along with you.       Tera Hashimoto, PA-C  Supervising Physician: Dr Ignacia Pollard

## 2022-01-11 NOTE — PROGRESS NOTES
END OF SHIFT NOTE:    INTAKE/OUTPUT  01/10 0701 - 01/11 0700  In: 300 [P.O.:300]  Out: 3250 [Urine:3250]  Voiding: NO  Catheter: YES  Drain:              Flatus: Patient does have flatus present. Stool:  0 occurrences. Characteristics:      Emesis: 0 occurrences. Characteristics:        VITAL SIGNS  Patient Vitals for the past 12 hrs:   Temp Pulse Resp BP SpO2   01/11/22 1558 97.4 °F (36.3 °C) 90 20 104/74 95 %   01/11/22 1427    (!) 138/90    01/11/22 1426  (!) 118  96/72    01/11/22 1130 97.2 °F (36.2 °C) (!) 102 20 (!) 138/90 95 %   01/11/22 0820     96 %   01/11/22 0739 98.7 °F (37.1 °C) 92 20 93/70 96 %       Pain Assessment  Pain Intensity 1: 0 (01/11/22 1412)        Patient Stated Pain Goal: 0    Ambulating  Yes    Shift report given to oncoming nurse at the bedside.     Bhupendra Zhu, RN

## 2022-01-11 NOTE — PROGRESS NOTES
Problem: Gas Exchange - Impaired  Goal: *Absence of hypoxia  Outcome: Progressing Towards Goal     Problem: Patient Education: Go to Patient Education Activity  Goal: Patient/Family Education  Outcome: Progressing Towards Goal     Problem: Pressure Injury - Risk of  Goal: *Prevention of pressure injury  Description: Document Julio Cesar Scale and appropriate interventions in the flowsheet. Outcome: Progressing Towards Goal  Note: Pressure Injury Interventions:  Sensory Interventions: Assess changes in LOC,Minimize linen layers,Monitor skin under medical devices    Moisture Interventions: Absorbent underpads,Internal/External urinary devices,Minimize layers    Activity Interventions: Increase time out of bed,Pressure redistribution bed/mattress(bed type),PT/OT evaluation    Mobility Interventions: Pressure redistribution bed/mattress (bed type)    Nutrition Interventions: Document food/fluid/supplement intake    Friction and Shear Interventions: Minimize layers                Problem: Patient Education: Go to Patient Education Activity  Goal: Patient/Family Education  Outcome: Progressing Towards Goal     Problem: Falls - Risk of  Goal: *Absence of Falls  Description: Document Beronica Fall Risk and appropriate interventions in the flowsheet.   Outcome: Progressing Towards Goal  Note: Fall Risk Interventions:  Mobility Interventions: Communicate number of staff needed for ambulation/transfer,Patient to call before getting OOB,Utilize walker, cane, or other assistive device    Mentation Interventions: Adequate sleep, hydration, pain control    Medication Interventions: Patient to call before getting OOB,Teach patient to arise slowly    Elimination Interventions: Call light in reach,Patient to call for help with toileting needs              Problem: Patient Education: Go to Patient Education Activity  Goal: Patient/Family Education  Outcome: Progressing Towards Goal     Problem: Patient Education: Go to Patient Education Activity  Goal: Patient/Family Education  Outcome: Progressing Towards Goal

## 2022-01-12 ENCOUNTER — APPOINTMENT (OUTPATIENT)
Dept: GENERAL RADIOLOGY | Age: 77
DRG: 208 | End: 2022-01-12
Attending: INTERNAL MEDICINE
Payer: MEDICARE

## 2022-01-12 LAB
CORTIS BS SERPL-MCNC: 12 UG/DL
GLUCOSE BLD STRIP.AUTO-MCNC: 106 MG/DL (ref 65–100)
GLUCOSE BLD STRIP.AUTO-MCNC: 195 MG/DL (ref 65–100)
GLUCOSE BLD STRIP.AUTO-MCNC: 95 MG/DL (ref 65–100)
GLUCOSE BLD STRIP.AUTO-MCNC: 99 MG/DL (ref 65–100)
SERVICE CMNT-IMP: ABNORMAL
SERVICE CMNT-IMP: ABNORMAL
SERVICE CMNT-IMP: NORMAL
SERVICE CMNT-IMP: NORMAL
T4 FREE SERPL-MCNC: 1 NG/DL (ref 0.78–1.46)
TSH SERPL DL<=0.005 MIU/L-ACNC: 1.24 UIU/ML (ref 0.36–3.74)

## 2022-01-12 PROCEDURE — 2709999900 HC NON-CHARGEABLE SUPPLY

## 2022-01-12 PROCEDURE — 74011000250 HC RX REV CODE- 250: Performed by: INTERNAL MEDICINE

## 2022-01-12 PROCEDURE — 92611 MOTION FLUOROSCOPY/SWALLOW: CPT

## 2022-01-12 PROCEDURE — 94660 CPAP INITIATION&MGMT: CPT

## 2022-01-12 PROCEDURE — 74011250637 HC RX REV CODE- 250/637: Performed by: INTERNAL MEDICINE

## 2022-01-12 PROCEDURE — 65660000000 HC RM CCU STEPDOWN

## 2022-01-12 PROCEDURE — 84443 ASSAY THYROID STIM HORMONE: CPT

## 2022-01-12 PROCEDURE — 82962 GLUCOSE BLOOD TEST: CPT

## 2022-01-12 PROCEDURE — 94760 N-INVAS EAR/PLS OXIMETRY 1: CPT

## 2022-01-12 PROCEDURE — 99232 SBSQ HOSP IP/OBS MODERATE 35: CPT | Performed by: INTERNAL MEDICINE

## 2022-01-12 PROCEDURE — 36415 COLL VENOUS BLD VENIPUNCTURE: CPT

## 2022-01-12 PROCEDURE — 97530 THERAPEUTIC ACTIVITIES: CPT

## 2022-01-12 PROCEDURE — 74011636637 HC RX REV CODE- 636/637: Performed by: INTERNAL MEDICINE

## 2022-01-12 PROCEDURE — 84439 ASSAY OF FREE THYROXINE: CPT

## 2022-01-12 PROCEDURE — 77010033678 HC OXYGEN DAILY

## 2022-01-12 PROCEDURE — 74230 X-RAY XM SWLNG FUNCJ C+: CPT

## 2022-01-12 PROCEDURE — 74011000250 HC RX REV CODE- 250: Performed by: HOSPITALIST

## 2022-01-12 PROCEDURE — 82533 TOTAL CORTISOL: CPT

## 2022-01-12 RX ADMIN — FLUDROCORTISONE ACETATE 0.1 MG: 0.1 TABLET ORAL at 08:26

## 2022-01-12 RX ADMIN — FAMOTIDINE 20 MG: 20 TABLET ORAL at 08:26

## 2022-01-12 RX ADMIN — SODIUM CHLORIDE, PRESERVATIVE FREE 10 ML: 5 INJECTION INTRAVENOUS at 05:12

## 2022-01-12 RX ADMIN — FERROUS SULFATE TAB 325 MG (65 MG ELEMENTAL FE) 325 MG: 325 (65 FE) TAB at 08:25

## 2022-01-12 RX ADMIN — MIDODRINE HYDROCHLORIDE 10 MG: 5 TABLET ORAL at 06:29

## 2022-01-12 RX ADMIN — PREGABALIN 200 MG: 50 CAPSULE ORAL at 08:26

## 2022-01-12 RX ADMIN — BARIUM SULFATE 15 ML: 400 PASTE ORAL at 10:33

## 2022-01-12 RX ADMIN — SODIUM CHLORIDE, PRESERVATIVE FREE 10 ML: 5 INJECTION INTRAVENOUS at 15:13

## 2022-01-12 RX ADMIN — ATORVASTATIN CALCIUM 40 MG: 40 TABLET, FILM COATED ORAL at 21:35

## 2022-01-12 RX ADMIN — APIXABAN 5 MG: 5 TABLET, FILM COATED ORAL at 08:25

## 2022-01-12 RX ADMIN — MIDODRINE HYDROCHLORIDE 10 MG: 5 TABLET ORAL at 15:12

## 2022-01-12 RX ADMIN — PREGABALIN 400 MG: 150 CAPSULE ORAL at 21:36

## 2022-01-12 RX ADMIN — INSULIN LISPRO 1 UNITS: 100 INJECTION, SOLUTION INTRAVENOUS; SUBCUTANEOUS at 12:19

## 2022-01-12 RX ADMIN — BARIUM SULFATE 45 ML: 980 POWDER, FOR SUSPENSION ORAL at 10:33

## 2022-01-12 RX ADMIN — MIDODRINE HYDROCHLORIDE 10 MG: 5 TABLET ORAL at 21:37

## 2022-01-12 RX ADMIN — APIXABAN 5 MG: 5 TABLET, FILM COATED ORAL at 20:16

## 2022-01-12 RX ADMIN — SODIUM CHLORIDE, PRESERVATIVE FREE 10 ML: 5 INJECTION INTRAVENOUS at 21:37

## 2022-01-12 NOTE — PROGRESS NOTES
END OF SHIFT NOTE:    INTAKE/OUTPUT  01/11 0701 - 01/12 0700  In: 0   Out: 9402 [Urine:2375]  Voiding: NO  Catheter: YES, draining  Drain:              Flatus: Patient does have flatus present. Stool:  0 occurrences. Characteristics:  Stool Assessment  Stool Color: Brown  Stool Appearance: Loose  Stool Amount: Small  Stool Source/Status: Rectum    Emesis: 0 occurrences. Characteristics:        VITAL SIGNS  Patient Vitals for the past 12 hrs:   Temp Pulse Resp BP SpO2   01/12/22 0419     98 %   01/12/22 0411  90      01/12/22 0409 97.9 °F (36.6 °C) 99 19 108/77 99 %   01/11/22 2355  99   98 %   01/11/22 2330 97.8 °F (36.6 °C) 91 19 116/72 98 %   01/11/22 2111  99  114/74    01/11/22 1931 97.4 °F (36.3 °C) 97 18 113/78 95 %       Pain Assessment  Pain Intensity 1: 0 (01/11/22 1910)        Patient Stated Pain Goal: 0    Ambulating  No    Shift report to be given to oncoming nurse at the bedside.     Kasi Brar RN

## 2022-01-12 NOTE — PROGRESS NOTES
[facility] Hospitalist Progress Notes   Admit Date:  2022 11:38 AM   Name:  Niki Green   Age:  77 y.o. Sex:  male  :  1945   MRN:  [id number]   Room:      Presenting Complaint: Respiratory Distress    Reason(s) for Admission: Acute on chronic respiratory failure with hypercapnia [facility]) [J96.22]     Hospitalists consulted by Intensivist for assuming care as pt transferred out of ICU. History of Presenting Illness:   Niki Green is a 77 y.o. male with history of A. fib, acute on chronic respiratory failure with hypoxia and hypercapnia, acute respiratory failure with hypercapnia, atrial fibrillation with RVR, coronary artery disease, chronic kidney disease, on long-term anticoagulation, diabetes status post right BKA, hypertension, hypercholesterolemia, left ventricular dysfunction, neuropathy, systolic CHF and a history of Staph aureus bacteremia who was admitted 2022 secondary to acute hypoxic and hypercapnic respiratory failure requiring intubation. He was admitted to the pulmonology service. Of note the patient has been concerning for obstructive sleep apnea for some time and was recommended for outpatient sleep studies but had not had them done yet. As an outpatient he is on Florinef for his hypotension, and he had a echo in July that showed an EF of 30 to 35%. The patient was admitted by the intensivist service to the ICU. He was successfully extubated on 2021 he has been stable status post extubation with the pulmonology service working on getting him patient lives with outpatient. Uses BiPAP at night and air Vo during the day. Patient admitted with acute on chronic hypoxic and hypercapnic respiratory failure. Was treated with antibiotics briefly but major issue is thought to be obesity hypoventilation syndrome/ELAINA. Patient was extubated and currently on air Vo during daytime and BiPAP at night.   Transferred to medicine service as primary as patient is on medical floor. 1/12 patient is still needing 6 L high flow nasal cannula this morning. Reports feeling better. No chest pain. No nausea no vomiting. Assessment & Plan: This is a 77y male with:    #  Acute respiratory failure with hypercapnia ([facility] (1/6/2022)  Patient is currently needing 6 L high flow nasal cannula during the daytime and BiPAP at night  Weaning him down slowly. Pulmonary on board. Appreciate recommendations. Patient qualified for trilogy at home. Aspiration and fall precautions. #  Stage 3 chronic kidney disease ([facility] (11/22/2017)  Creatinine at baseline. Will monitor. #  Controlled type 2 diabetes mellitus with diabetic polyneuropathy, without long-term current use of insulin ([facility] (1/5/2018)  Continue to monitor blood sugars and cover with insulin    #Hypotension: Unclear etiology. Cortisol level , TSH, wnl. Continue fludrocortisone and midodrine. #Severe obesity (BMI 35.0-35.9 with comorbidity) ([facility] (10/10/2018)  Affected with abnormal weight. Will need education along the room. #Chronic systolic heart [id number]:(5/9/9442)  ECHO repeated 1/11 shows EF of 30 to 35%  No active issues at this time  Continue current medical management  Cardiology on board. Appreciate recs. Cardiology recommends outpatient procedures- Cardiac cath. Paroxysmal Atrial fibrillation:  Per chart review, patient has atrial fibrillation. Rate controlled at present. Continue Eliquis. DISPO: Anticipate inpatient hospital stay for at least 24 to 48 hours. Home oxygen screen prior to discharge.     Hospital Problems as of 1/12/2022 Date Reviewed: 9/14/2021          Codes Class Noted - Resolved POA    Obstructive sleep apnea ICD-10-CM: G47.33  ICD-9-CM: 327.23  1/8/2022 - Present Yes        Obesity hypoventilation syndrome ([facility] ICD-10-CM: V62.1  ICD-9-CM: 278.03  1/8/2022 - Present Yes        Decreased cardiac ejection fraction (Chronic) ICD-10-CM: R93.1  ICD-9-CM: 794.39  1/6/2022 - Present Yes        Acute on chronic respiratory failure with hypercapnia (HCC) ICD-10-CM: Y48.80  ICD-9-CM: 518.84  1/6/2022 - Present Yes        Severe obesity (BMI 35.0-35.9 with comorbidity) (HCC) (Chronic) ICD-10-CM: E66.01, Z68.35  ICD-9-CM: 278.01, V85.35  10/10/2018 - Present Yes        Controlled type 2 diabetes mellitus with diabetic polyneuropathy, without long-term current use of insulin (HCC) (Chronic) ICD-10-CM: E11.42  ICD-9-CM: 250.60, 357.2  1/5/2018 - Present Yes        Stage 3 chronic kidney disease ([facility] (Chronic) ICD-10-CM: N18.30  ICD-9-CM: 585.3  11/22/2017 - Present Yes        * (Principal) RESOLVED: Acute respiratory failure with hypercapnia (HCC) ICD-10-CM: J96.02  ICD-9-CM: 518.81  1/6/2022 - 1/11/2022 Yes        RESOLVED: Hypotension ICD-10-CM: I95.9  ICD-9-CM: 458.9  1/6/2022 - 1/11/2022 Yes        RESOLVED: Suspected sleep apnea ICD-10-CM: L67.845  ICD-9-CM: 781.99  8/20/2021 - 1/8/2022 Yes              Current Facility-Administered Medications   Medication Dose Route Frequency    insulin lispro (HUMALOG) injection   SubCUTAneous TIDAC    famotidine (PEPCID) tablet 20 mg  20 mg Oral DAILY    pregabalin (LYRICA) capsule 200 mg  200 mg Oral DAILY    pregabalin (LYRICA) capsule 400 mg  400 mg Oral QHS    ferrous sulfate tablet 325 mg  1 Tablet Oral DAILY WITH BREAKFAST    acetaminophen (TYLENOL) tablet 650 mg  650 mg Oral Q6H PRN    fludrocortisone (FLORINEF) tablet 0.1 mg  0.1 mg Oral DAILY    polyethylene glycol (MIRALAX) packet 17 g  17 g Oral DAILY    sodium chloride (NS) flush 5-40 mL  5-40 mL IntraVENous Q8H    sodium chloride (NS) flush 5-40 mL  5-40 mL IntraVENous PRN    apixaban (ELIQUIS) tablet 5 mg  5 mg Oral Q12H    atorvastatin (LIPITOR) tablet 40 mg  40 mg Oral QHS    midodrine (PROAMATINE) tablet 10 mg  10 mg Oral Q8H       Objective:     Patient Vitals for the past 24 hrs:   Temp Pulse Resp BP SpO2   01/12/22 1507 98.2 °F (36.8 °C) 98 18 (!) 107/44 94 %   01/12/22 1124 98.3 °F (36.8 °C) 94 18 125/78 94 %   01/12/22 0743 97.9 °F (36.6 °C) 87 18 122/84 94 %   01/12/22 0629  98  104/73    01/12/22 0419     98 %   01/12/22 0411  90      01/12/22 0409 97.9 °F (36.6 °C) 99 19 108/77 99 %   01/11/22 2355  99   98 %   01/11/22 2330 97.8 °F (36.6 °C) 91 19 116/72 98 %   01/11/22 2111  99  114/74    01/11/22 1931 97.4 °F (36.3 °C) 97 18 113/78 95 %   01/11/22 1558 97.4 °F (36.3 °C) 90 20 104/74 95 %     Oxygen Therapy  O2 Sat (%): 94 % (01/12/22 1507)  Pulse via Oximetry: 85 beats per minute (01/12/22 0419)  O2 Device: Hi flow nasal cannula (01/12/22 1352)  Skin Assessment: Clean, dry, & intact (01/12/22 1352)  Skin Protection for O2 Device: No (01/12/22 1352)  Orientation: Bilateral (01/07/22 0837)  Location: Cheek (01/07/22 1535)  Interventions: Mouth Care (01/07/22 0800)  O2 Flow Rate (L/min): 5 l/min (01/12/22 1352)  O2 Temperature: 87.8 °F (31 °C) (01/10/22 1507)  FIO2 (%): 40 % (01/12/22 0419)    Estimated body mass index is 38.45 kg/m² as calculated from the following:    Height as of this encounter: 5' 10\" (1.778 m). Weight as of this encounter: 121.6 kg (268 lb). Intake/Output Summary (Last 24 hours) at 1/12/2022 1554  Last data filed at 1/12/2022 1357  Gross per 24 hour   Intake 480 ml   Output 2400 ml   Net -1920 ml         Physical Exam:    Blood pressure (!) 107/44, pulse 98, temperature 98.2 °F (36.8 °C), resp. rate 18, height 5' 10\" (1.778 m), weight 121.6 kg (268 lb), SpO2 94 %. General:     morbidly obese no overt distress on 6L Oxygen NC,   Head:  Normocephalic, atraumatic  Eyes:  Sclerae appear normal.  Pupils equally round. ENT:  Nares appear normal, no drainage. Moist oral mucosa  Neck:  Supple   CV:   RRR. No m/r/g. JVD examination limited  Lungs:   Decreased entry bilateral lower lobe otherwise CTAB. No wheezing, rhonchi, or rales. Respirations even, unlabored  Abdomen: Bowel sounds present.   Soft, nontender, nondistended. Extremities: No cyanosis or clubbing. No edema; right BKA [no issues with stump.]  Skin:     No rashes and normal coloration. Warm and dry. Neuro:  AOx3. Moving all 4 extremities. Speech normal.  Psych:  Normal mood and affect. I have reviewed ordered lab tests and independently visualized imaging below:    Recent Labs:    Procedures done this admission:  * No surgery found *    All Micro Results     None          SARS-CoV-2 Lab Results  \"Novel Coronavirus\" Test: No results found for: COV2NT   \"Emergent Disease\" Test: No results found for: EDPR  \"SARS-COV-2\" Test: No results found for: XGCOVT  \"Precision Labs\" Test: No results found for: RSLT  Rapid Test: No results found for: COVR         Labs: Results:       BMP, Mg, Phos Recent Labs     01/11/22  0524 01/10/22  0437     --    K 4.1  --      --    CO2 36*  --    AGAP 4*  --    BUN 31*  --    CREA 1.73*  --    CA 9.4  --    GLU 89  --    MG  --  2.0      CBC No results for input(s): WBC, RBC, HGB, HCT, PLT, GRANS, LYMPH, EOS, MONOS, BASOS, IG, ANEU, ABL, TIERNEY, ABM, ABB, AIG, HGBEXT, HCTEXT, PLTEXT, HGBEXT, HCTEXT, PLTEXT in the last 72 hours. LFT No results for input(s): ALT, TBIL, AP, TP, ALB, GLOB, AGRAT in the last 72 hours.     No lab exists for component: SGOT, GPT   Cardiac Testing No results found for: BNPP, BNP, CPK, RCK1, RCK2, RCK3, RCK4, CKMB, CKNDX, CKND1, TROPT, TROIQ   Coagulation Tests Lab Results   Component Value Date/Time    aPTT 34.9 08/18/2021 06:56 PM    aPTT 31.7 07/19/2021 09:24 PM      A1c Lab Results   Component Value Date/Time    Hemoglobin A1c 6.2 01/09/2022 02:53 AM    Hemoglobin A1c 6.7 (H) 07/13/2021 08:34 AM    Hemoglobin A1c 6.2 (H) 01/13/2021 08:27 AM      Lipid Panel Lab Results   Component Value Date/Time    Cholesterol, total 156 07/13/2021 08:34 AM    HDL Cholesterol 39 (L) 07/13/2021 08:34 AM    LDL, calculated 83 07/13/2021 08:34 AM    LDL, calculated 83 06/11/2020 09:16 AM VLDL, calculated 34 07/13/2021 08:34 AM    VLDL, calculated 42 (H) 06/11/2020 09:16 AM    Triglyceride 202 (H) 07/13/2021 08:34 AM      Thyroid Panel Lab Results   Component Value Date/Time    TSH 1.240 01/12/2022 05:40 AM    TSH 0.655 07/19/2021 01:46 PM    T4, Free 1.0 01/12/2022 05:40 AM    T4, Free 1.4 07/19/2021 01:46 PM        Most Recent UA Lab Results   Component Value Date/Time    WBC 0-3 07/19/2021 01:26 PM    RBC 5-10 07/19/2021 01:26 PM    Epithelial cells 0-3 07/19/2021 01:26 PM    Bacteria 0 07/19/2021 01:26 PM    Casts 10-20 07/19/2021 01:26 PM            Other Studies:  XR SWALLOW FUNC VIDEO    Result Date: 1/12/2022  MODIFIED BARIUM SWALLOW ESOPHAGRAM 1/12/2022 HISTORY: Oropharyngeal dysphagia. Acute respiratory failure. CHF. Obstructive sleep apnea. COPD. TECHNIQUE AND FINDINGS: 0 spot fluoroscopic images were saved. 0.57 minutes of fluoroscopy time was utilized. The patient ingested various consistencies of barium under direct fluoroscopic observation. There was minimal trace laryngeal penetration with thin liquids. The patient ingested other consistencies without difficulty. There was no aspiration of barium. Minimal trace laryngeal penetration with thin liquids. Otherwise unremarkable modified barium swallow esophagram.      Signed:  Esequiel Kirk MD    Part of this note may have been written by using a voice dictation software. The note has been proof read but may still contain some grammatical/other typographical errors.

## 2022-01-12 NOTE — PROGRESS NOTES
[facility] CARDIOLOGY PROGRESS NOTE           1/12/2022 9:07 AM    Admit Date: 1/6/2022      Subjective:   Patient reports that his breathing is much improved. Denies ongoing dyspnea, cough, wheezing, chest pain, abdominal pain, nausea, vomiting, swelling of his left leg. Minimal ambulation since admission to the hospital.  No other complaints at this time. ROS:  Cardiovascular:  As noted above    Objective:      Vitals:    01/12/22 0411 01/12/22 0419 01/12/22 0629 01/12/22 0743   BP:   104/73 122/84   Pulse: 90  98 87   Resp:    18   Temp:    97.9 °F (36.6 °C)   SpO2:  98%  94%   Weight:       Height:           Physical Exam:  General-No Acute Distress, awake, alert, interactive  Neck- supple, no JVD  CV-irregularly irregular rate and rhythm no MRG  Lung- clear bilaterally without wheezes, nonlabored  Abd- soft, nontender, nondistended  Ext- no edema bilaterally in the left leg or right leg (right leg s/p partial amputation.)  Skin- warm and dry    Data Review:   Recent Labs     01/11/22  0524 01/10/22  0437     --    K 4.1  --    MG  --  2.0   BUN 31*  --    CREA 1.73*  --    GLU 89  --        Assessment/Plan:     Active Problems:      Decreased cardiac ejection fraction (1/6/2022)   LVEF noted to be 30-35% dating back to 7/21/2021 echo   Hypotension limits ability to had CHF medical therapy  Does not appear volume overloaded on examination  Diuresis on as needed basis    Paroxysmal atrial fibrillation   On Eliquis, rates controlled, hemodynamically electrically stable    Reported CAD   No coronary angiogram is available for review, on atorvastatin 40 would continue      Acute on chronic respiratory failure with hypercapnia (HCC) (1/6/2022)    Obstructive sleep apnea (1/8/2022)    Obesity hypoventilation syndrome (HCC) (1/8/2022)  Positive pressure ventilation, oxygen as needed.       Stage 3 chronic kidney disease ([facility] (11/22/2017)  Creatinine 1.7, appears to be near baseline      Controlled type 2 diabetes mellitus with diabetic polyneuropathy, without long-term current use of insulin ([facility] (1/5/2018)  Per medicine      Severe obesity (BMI 35.0-35.9 with comorbidity) ([facility] (10/10/2018)    - continue efforts towards dietary changes in an effort to lose weight        Leon Crenshaw DO  1/12/2022 9:07 AM

## 2022-01-12 NOTE — PROGRESS NOTES
ACUTE PHYSICAL THERAPY GOALS:  (Developed with and agreed upon by patient and/or caregiver.)  STG:  (1.)Mr. Rivas will move from supine to sit and sit to supine , scoot up and down and roll side to side with CONTACT GUARD ASSIST within 4 treatment day(s). (2.)Mr. Rivas will transfer from bed to chair and chair to bed with CONTACT GUARD ASSIST using the least restrictive device within 4 treatment day(s). (3.)Mr. Rivas will ambulate with CONTACT GUARD ASSIST for 50 feet with the least restrictive device within 4 treatment day(s).      LTG:  (1.)Mr. Rivas will move from supine to sit and sit to supine , scoot up and down and roll side to side in bed with STAND BY ASSIST within 7 treatment day(s). (2.)Mr. Rivas will transfer from bed to chair and chair to bed with STAND BY ASSIST using the least restrictive device within 7 treatment day(s). (3.)Mr. Rivas will ambulate with STAND BY ASSIST for 100 feet with the least restrictive device within 7 treatment day(s). PHYSICAL THERAPY: Daily Note and AM Treatment Day # 4    David Maza is a 77 y.o. male   PRIMARY DIAGNOSIS: Acute respiratory failure with hypercapnia (HCC)  Acute on chronic respiratory failure with hypercapnia (HCC) [J96.22]         ASSESSMENT:     REHAB RECOMMENDATIONS: CURRENT LEVEL OF FUNCTION:  (Most Recently Demonstrated)   Recommendation to date pending progress:  [address] [place] [address]  Equipment:    To Be Determined Bed Mobility:   Standby Assistance  Sit to Stand:  [employer] Assistance  Transfers:   CGA-Min Assist  Gait/Mobility:   CGA-Min assist     ASSESSMENT:  Mr. Dru Cisneros presents supine on 4L. 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 Patient is slightly impulsive throughout treatment needing cues for improved safety awareness. Patient performs supine to sit with SBA and transfer to standing with CGA.  Once standing patient ambulated 250' with rolling walker, chair follow and CGA-min assist. Patient participated great in LE exercises. Will continue efforts.      SUBJECTIVE:   Mr. Fercho Pino states, \"Okay\"    SOCIAL HISTORY/ LIVING ENVIRONMENT: See initial evaluation  Home Environment: Private residence  # Steps to Enter: 1  One/Two Story Residence: One story  Living Alone: No  Support Systems: Child(pta),Spouse/Significant Other  OBJECTIVE:     PAIN: VITAL SIGNS: LINES/DRAINS:   Pre Treatment: Pain Screen  Pain Scale 1: FLACC  Pain Intensity 1: 0  Post Treatment: 0   None  O2 Device: Hi flow nasal cannula     MOBILITY: I Mod I S SBA CGA Min Mod Max Total  NT x2 Comments:   Bed Mobility    Rolling [] [] [] [] [] [] [] [] [] [] []    Supine to Sit [] [] [] [x] [] [] [] [] [] [] []    Scooting [] [] [] [] [] [] [] [] [] [] []    Sit to Supine [] [] [] [] [] [] [] [] [] [] []    Transfers    Sit to Stand [] [] [] [] [x] [] [] [] [] [] []    Bed to Chair [] [] [] [] [x] [x] [] [] [] [] []    Stand to Sit [] [] [] [] [x] [] [] [] [] [] []    I=Independent, Mod I=Modified Independent, S=Supervision, SBA=Standby Assistance, CGA=Contact Guard Assistance,   Min=Minimal Assistance, Mod=Moderate Assistance, Max=Maximal Assistance, Total=Total Assistance, NT=Not Tested    BALANCE: Good Fair+ Fair Fair- Poor NT Comments   Sitting Static [x] [] [] [] [] []    Sitting Dynamic [] [x] [] [] [] []              Standing Static [] [x] [] [] [] []    Standing Dynamic [] [] [x] [] [] []      GAIT: I Mod I S SBA CGA Min Mod Max Total  NT x2 Comments:   Level of Assistance [] [] [] [] [x] [x] [] [] [] [] []    Distance 250' with chair follow    DME Rolling Walker    Gait Quality     Weightbearing  Status N/A     I=Independent, Mod I=Modified Independent, S=Supervision, SBA=Standby Assistance, CGA=Contact Guard Assistance,   Min=Minimal Assistance, Mod=Moderate Assistance, Max=Maximal Assistance, Total=Total Assistance, NT=Not Tested    PLAN:   FREQUENCY/DURATION: PT Plan of Care: 3 times/week for duration of hospital stay or until stated goals are met, whichever comes first.  TREATMENT:     TREATMENT:   ($$ Therapeutic Activity: 23-37 mins    )  Therapeutic Activity (24 Minutes): Therapeutic activity included Supine to Sit, Scooting, Transfer Training, Ambulation on level ground, Sitting balance , Standing balance and LE exercises to improve functional Mobility, Strength, ROM and Activity tolerance.     TREATMENT GRID:  N/A    AFTER TREATMENT POSITION/PRECAUTIONS:  Chair, Needs within reach, RN notified and Visitors at bedside    INTERDISCIPLINARY COLLABORATION:  RN/PCT, PT/PTA and Rehab Attendant     TOTAL TREATMENT DURATION:  PT Patient Time In/Time Out  Time In: 0906  Time Out: 0930    Roxana Gutierrez PTA

## 2022-01-12 NOTE — PROGRESS NOTES
Daily Progress Note: 1/12/2022    Gavin Rivas  Admission Date: 1/6/2022    Length of Stay: 6 days      Background: 77 y.o. male with acute respiratory failure secondary to hypercapnea. Required intubation in the ER with a CO2 of 80, ph 7.2. CXR with bilateral infiltrates/cardiomyopathy. BNP 2179. Has severe ELAINA per recent at home sleep study (AHI 54.8 with desaturation to 68%). Formal outpatient study recommended. Repeat ABG with CO2 correction. Hypotensive requiring pressor support on admission but this has resolved. On Midodrine and Florinef here for hypotension - took Florinef at home. EF 30 to 35% by history but no medical therapy due to hypotension. Off vent on 1/7 and awake and alert. Passed swallow study. Still hypoxic requiring airvo.  + restless legs    Subjective: On 6 lpm, 94%. Went for MBS  NIPPV being arranged, ordered per [facility] [place]       Review of Systems  Constitutional: negative for fever, chills, sweats  Cardiovascular: negative for chest pain, palpitations, syncope, edema  Gastrointestinal:  negative for dysphagia, reflux, vomiting, diarrhea, abdominal pain, or melena  Neurologic:  negative for focal weakness, numbness, headache    Objective:     Vitals:    01/12/22 0411 01/12/22 0419 01/12/22 0629 01/12/22 0743   BP:   104/73 122/84   Pulse: 90  98 87   Resp:    18   Temp:    97.9 °F (36.6 °C)   SpO2:  98%  94%   Weight:       Height:           Intake/Output:    Physical Exam:          GEN: obese. HEENT:no alar flaring or epistaxis, oral mucosa moist without cyanosis,   NECK:  no JVD, no retractions, no thyromegaly or masses,   LUNGS:  Decreased bases on NC  HEART:  RRR with no M,G,R;  ABDOMEN:  soft with no tenderness; positive bowel sounds present  EXTREMITIES:  warm with no cyanosis, no lower leg edema on left, right prosthesis.   SKIN:  no jaundice or ecchymosis   NEURO: A & O X 3      ACTIVITY: limited  NUTRITION: cardiac    IMAGES: [facility] [place] [facility]    CT HEAD WO CONT    Impression  No acute intracranial abnormalities. Date of Dictation: 7/23/2021 10:21 AM    Results from [place] encounter on 07/19/21    MRI FOOT RT WO CONT    Impression  1. Lateral plantar ulceration extending to contact the fifth metatarsal base  without evidence of acute osteomyelitis. A small probable fluid collection in  the subcutaneous tissues adjacent to the ulceration measures up to 1.4 cm,  though lack of IV contrast limits full evaluation. 2. Severe Charcot arthropathy throughout the midfoot and hindfoot with a large  amount of fluid insinuating throughout the midfoot and hindfoot articulations. While possibly reactive sterile joint fluid to the severe arthropathy, sterility  is indeterminate based upon imaging alone. 3. Thick-walled 2.7 cm fluid collection along the plantar aspect of the fifth  metatarsal head, likely adventitial bursa formation. Results from [place] encounter on 01/06/22    ECHO ADULT COMPLETE    Interpretation Summary    Left Ventricle: Left ventricle is moderately dilated. Normal wall thickness. See diagram for wall motion findings. Severely reduced left ventricular systolic function with a visually estimated EF of 30 - 35%.   Mitral Valve: Mild transvalvular regurgitation.   Left Atrium: Left atrium is moderately dilated.   Right Atrium: Right atrium is mildly dilated.   Contrast used: Definity. LAB  No results for input(s): WBC, HGB, HCT, PLT, HGBEXT, HCTEXT, PLTEXT, HGBEXT, HCTEXT, PLTEXT in the last 72 hours. Recent Labs     01/11/22  0524 01/10/22  0437     --    K 4.1  --      --    CO2 36*  --    GLU 89  --    BUN 31*  --    CREA 1.73*  --    MG  --  2.0     No results for input(s): LCAD, LAC in the last 72 hours. ABG:     No results for input(s): PH, PCO2, PO2, HCO3, PHI, PCO2I, PO2I, HCO3I, FIO2I in the last 72 hours.   Cultures:   No results for input(s): SDES, CULT in the last 72 hours. Assessment/Plan:       Acute respiratory failure with hypercapnia ([facility] (1/6/2022)  Improved. Stage 3 chronic kidney disease ([facility] (11/22/2017)      Severe obesity (BMI 35.0-35.9 with comorbidity) ([facility] (82/85/9465)   chronic,complicated medical care      Decreased cardiac ejection fraction (1/6/2022)  30-35%       Acute on chronic respiratory failure with hypercapnia ([facility] (1/6/2022)  On 5 lpm with 97%      Obstructive sleep apnea (1/8/2022)    Obesity hypoventilation syndrome ([facility] (1/8/2022)  JANAK -- home sleep study in November with Severe JANAK:  AHI in 54.8 and over 240 minutes with saturation <89%. Shantanu Dock [phone] from [address] [place] [address] came and needs spirometry to qualify for trilogy since has severe janak and CO2 retainer. Otherwise, he will need office PSG at time of discharge.   -he qualifies for a machine    Due to the severity and nature of this patient's chronic respiratory failure, patient requires noninvasive ventilation with volume support. Home Bipap insufficient due to severity of patient's condition. This patient requires access to ventilator therapy for nocturnal and daytime use to reduce risk of exacerbation. Absence of noninvasive ventilator therapy on a daily basis will quickly lead to severe injury or death from the resulting CO2 retention without this level of respiratory support. Supportive and Prophylactic Tx:   DVT PPx:eliquis, ambulating  GI (PUD) PPx: Pepcid  PT: OOB  Full Code    Rogers Curran, NP    Lungs: CTA b/l  Heart S1 and S2 audible, no murmers or rubs appreciated  Other     Using BIPAP with rate at night at 18/10 and rate of 20 and doing well    Spirometry noted and severe restrictive deficit. Meets all the criteria to get his Triology/NIPPV now. F/u with Ray from [facility]. F/u with . Went down to [facility] and passed.  Will need diet with:  DIET:   · Easy to Chew  · Thin Liquids- by single straw sips       I have spoken with and examined the patient. I have reviewed the history, examination, assessment, and plan and agree with the above. Elizabeth Virgen MD      This note was signed electronically. Errors are unfortunately her likely due to dictation software.

## 2022-01-12 NOTE — PROGRESS NOTES
Problem: Gas Exchange - Impaired  Goal: *Absence of hypoxia  Outcome: Progressing Towards Goal     Problem: Patient Education: Go to Patient Education Activity  Goal: Patient/Family Education  Outcome: Progressing Towards Goal     Problem: Pressure Injury - Risk of  Goal: *Prevention of pressure injury  Description: Document Julio Cesar Scale and appropriate interventions in the flowsheet. Outcome: Progressing Towards Goal  Note: Pressure Injury Interventions:  Sensory Interventions: Assess changes in LOC,Minimize linen layers,Monitor skin under medical devices    Moisture Interventions: Absorbent underpads,Internal/External urinary devices,Minimize layers    Activity Interventions: Pressure redistribution bed/mattress(bed type)    Mobility Interventions: Pressure redistribution bed/mattress (bed type)    Nutrition Interventions: Document food/fluid/supplement intake    Friction and Shear Interventions: Minimize layers                Problem: Patient Education: Go to Patient Education Activity  Goal: Patient/Family Education  Outcome: Progressing Towards Goal     Problem: Falls - Risk of  Goal: *Absence of Falls  Description: Document Beronica Fall Risk and appropriate interventions in the flowsheet.   Outcome: Progressing Towards Goal  Note: Fall Risk Interventions:  Mobility Interventions: Communicate number of staff needed for ambulation/transfer,Patient to call before getting OOB,Utilize walker, cane, or other assistive device    Mentation Interventions: Adequate sleep, hydration, pain control,Door open when patient unattended,Evaluate medications/consider consulting pharmacy,Reorient patient    Medication Interventions: Evaluate medications/consider consulting pharmacy,Patient to call before getting OOB,Teach patient to arise slowly    Elimination Interventions: Call light in reach,Patient to call for help with toileting needs              Problem: Patient Education: Go to Patient Education Activity  Goal: Patient/Family Education  Outcome: Progressing Towards Goal     Problem: Patient Education: Go to Patient Education Activity  Goal: Patient/Family Education  Outcome: Progressing Towards Goal

## 2022-01-12 NOTE — PROGRESS NOTES
Chart reviewed and discussed by [facility]. Patient admitted with A/C respiratory failure with hypercapnia. Currently on 5LNCHF. S/p right BKA. Cardiology following for CHF. Speech following, MBS 01/12/22. PT/OT recommends IRC at discharge. RNCM spoke with Anny Bethea, patient's daughter in regards to choices; IRC vs HH at discharge. CM will continue to follow.

## 2022-01-12 NOTE — PROGRESS NOTES
END OF SHIFT NOTE:    INTAKE/OUTPUT  01/11 0701 - 01/12 0700  In: 0   Out: 2375 [Urine:2375]  Voiding: NO  Catheter: YES  Drain:              Flatus: Patient does have flatus present. Stool:  0 occurrences. Characteristics:      Emesis: 0 occurrences. Characteristics:        VITAL SIGNS  Patient Vitals for the past 12 hrs:   Temp Pulse Resp BP SpO2   01/12/22 1507 98.2 °F (36.8 °C) 98 18 (!) 107/44 94 %   01/12/22 1124 98.3 °F (36.8 °C) 94 18 125/78 94 %   01/12/22 0743 97.9 °F (36.6 °C) 87 18 122/84 94 %   01/12/22 0629  98  104/73        Pain Assessment  Pain Intensity 1: 0 (01/12/22 1352)        Patient Stated Pain Goal: 0    Ambulating  Yes    Shift report given to oncoming nurse at the bedside.     Philipp Michelle RN

## 2022-01-12 NOTE — PROGRESS NOTES
1. Pt. Will tolerate  easy to chew diet/thin liquids with no overt s/sx of aspiration/penetration. 2. Pt. Will participate in modified barium swallow with 100% participation to fully evaluate swallow function if further indicated in plan of care. LTG: Pt. Will tolerate least restrictive diet without respiratory decline. SPEECH LANGUAGE PATHOLOGY: MODIFIED BARIUM SWALLOW STUDY  Initial Assessment    NAME/AGE/GENDER: Hector Spears is a 77 y.o. male  DATE: 1/12/2022  PRIMARY DIAGNOSIS: Acute on chronic respiratory failure with hypercapnia (HCC) [J96.22]      ICD-10: Treatment Diagnosis: Oropharyngeal dysphagia (R13.12)  INTERDISCIPLINARY COLLABORATION: Radiologist, Dr. Cassie Hart  PRECAUTIONS/ALLERGIES: Patient has no known allergies. RECOMMENDATIONS/PLAN   DIET:    Easy to Chew   Thin Liquids- by single straw sips    MEDICATIONS: Whole with liquid or floated in puree or applesauce as needed     COMPENSATORY STRATEGIES/MODIFICATIONS INCLUDING:  · Fully awake/alert  · Upright for all PO  · Small single straw sips  · Remain upright at least 30 minutes after po intake     OTHER RECOMMENDATIONS (including follow up treatment recommendations):   · Training in laryngeal strengthening and coordination exercises  · Training in use of compensatory safe swallowing strategies/feeding guidelines  · Patient/family education     RECOMMENDATIONS for CONTINUED SPEECH THERAPY:   YES: Anticipate need for ongoing speech therapy during this hospitalization and at next level of care. FREQUENCY/DURATION: Continue to follow patient 2 times a week for duration of hospital stay to address above goals. Recommendations for next treatment session: Next treatment session will address diet tolerance and dysphagia exercises       ASSESSMENT   Mr. Leticia Johnson presents with mild oropharyngeal dysphagia.  Reduced tongue base retraction/oral containment and delayed swallow initiation resulted in trace nonclearing penetration with thin liquids by cup. Only trace flash penetration with thin by straw and mixed fruit. Adequate pharyngeal clearance post swallow with all textures. Recommend continue easy to chew diet and thin liquids by single straw sips. Meds floated in puree/applesauce. Will continue to follow for patient/caregiver education and laryngeal strengthening exercises. SUBJECTIVE   Patient alert upright in stretcher for assessment. Pleasant and friendly. History of Present Injury/Illness: Mr. Jaylin Hardin  has a past medical history of A-fib ([address] (07/19/2021), Acute on chronic respiratory failure with hypoxia and hypercapnia ([address] (7/23/2021), Acute respiratory failure with hypercapnia ([address] (7/23/2021), Atrial fibrillation with RVR ([address] (7/19/2021), CAD (coronary artery disease), Cellulitis (7/19/2021), Chronic kidney disease, COVID-19 vaccine series completed, Current use of long term anticoagulation, 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 mellitus, limited to breakdown of skin ([address] (9/27/2021), H/O heart artery stent, History of MI (myocardial infarction) (1999), Hypercholesterolemia, Hypertension, Left ventricular dysfunction, Neuropathy, Oxygen dependent, PICC (peripherally inserted central catheter) in place, Staphylococcus aureus bacteremia (4/45/2962), Systolic CHF, acute on chronic ([address] (7/19/2021), and Type 2 diabetes mellitus ([address] . He also  has a past surgical history that includes pr cardiac surg procedure unlist (1999) and hx orthopaedic (08/18/2021). Pain:  Pain Intensity 1: 0    Current dietary status prior to evaluation today:  Easy to chew/thin    Previous Modified Barium Swallow studies: n/a    Current Medications:   No current facility-administered medications on file prior to encounter. Current Outpatient Medications on File Prior to Encounter   Medication Sig Dispense Refill    tamsulosin (Flomax) 0.4 mg capsule Take 0.4 mg by mouth daily.       traZODone (DESYREL) 50 mg tablet Take 1 Tablet by mouth nightly. (Patient taking differently: Take 50 mg by mouth as needed. Patient states only taking as needed.) 90 Tablet 0    atorvastatin (LIPITOR) 40 mg tablet Take 1 Tablet by mouth nightly. Indications: treatment to slow progression of coronary artery disease 90 Tablet 3    apixaban (ELIQUIS) 5 mg tablet Take 1 Tablet by mouth every twelve (12) hours. 180 Tablet 0    pregabalin (Lyrica) 200 mg capsule Take 1 Capsule by mouth two (2) times a day. Max Daily Amount: 400 mg. (Patient taking differently: Take 200 mg by mouth three (3) times daily. ) 180 Capsule 1    Wheel Chair jerry Use daily for mobility for s88.111, R26.89 1 Each 0    acetaminophen (TYLENOL) 325 mg tablet Take 2 Tablets by mouth every six (6) hours as needed (for amputation site pain). 40 Tablet prn    fludrocortisone (FLORINEF) 0.1 mg tablet Take 1 Tablet by mouth daily. 60 Tablet 0    cyanocobalamin 1,000 mcg tablet Take 1 Tab by mouth daily. 30 Tab 5       OBJECTIVE   Orientation:    Person   Place   Time   Situation    Oral Assessment:  Labial: No impairment  Lingual: No impairment  Dentition: Natural  Oral Hygiene: Adequate  Vocal Quality: WFL    Modified barium swallow study was performed in the radiology suite using c-arm with Mr. Denise Pearson in the lateral plane upright 90° in a stretcher. To evaluate his swallow function, barium coated liquid and food was administered in the form of thin liquids (by spoon, cup sip and straw sip), pudding, mixed consistency and cracker. Oral phase of swallow:    reduced oral containment   reduced posterior tongue elevation   premature spillage to pharynx pre-swallow    Pharyngeal phase of swallow:    Thin by teaspoon- swallow triggered at posterior epiglottis. No aspiration/penetration.  Thin by cup- swallow triggered at pyriforms. Trace nonclearing penetration occurred during the swallow across 2 trials.  Thin by straw- trace amount spills to pyriforms pre-swallow.  Swallow triggered when bolus head reached pyriforms. No aspiration/pentration.  Pudding- swallow triggered at vallecula. No aspiration/penetration.  Mixed fruit- liquid portion spills to pyriforms pre-swallow. Swallow triggered when bolus head reached vallecula. Transient penetration occurred during the swallow.  Cracker- minimal amount spills to pyriforms during oral prep. Swallow triggered when bolus head reached vallecula. No aspiration/penetration.  Thin by straw- trace amount spills to pyriforms pre-swallow. Swallow triggered at posterior epiglottis. Transient penetration during swallow. Pharyngeal characteristics:   delayed and decreased retraction of base of tongue   delayed hyolaryngeal elevation/excursion   delayed epiglottic inversion   delayed constriction of posterior pharyngeal wall   delayed laryngeal closure  Attempted strategies:    none  Effective strategies:    none  Aspiration/Penetration Scale: 3 (Penetration/visible residue. Contrast remains above the folds/cords, but is not cleared.)    Cervical esophageal phase of swallow:    a limited view. Therefore, unable to rule out an esophageal component of dysphagia  **Distal esophagus not assessed due to limitations of MBS study. Assessment/Reassessment only, no treatment provided today.       Tool Used: Dysphagia Outcome and Severity Scale (MAURY)    Comments   Normal Diet With no strategies or extra time needed   Functional Swallow May have mild oral or pharyngeal delay   Mild Dysphagia Which may require one diet consistency restricted    Mild-Moderate Dysphagia With 1-2 diet consistencies restricted   Moderate Dysphagia With 2 or more diet consistencies restricted   Moderately Severe Dysphagia With partial PO strategies (trials with ST only)   Severe Dysphagia With inability to tolerate any PO safely      Score:  Initial: 5 Most Recent: x (Date:1/12/2022)   Interpretation of Tool: The Dysphagia Outcome and Severity Scale (MAURY) is a simple, easy-to-use, 7-point scale developed to systematically rate the functional severity of dysphagia based on objective assessment and make recommendations for diet level, independence level, and type of nutrition. Payor: Gerard Teague / Plan: Lynda Hernandez / Product Type: Managed Care Medicare /     Education:  · Recommendations discussed with patient. Safety:   After treatment position/precautions:  · Patient waiting in radiology holding bay for transport back to room.     Total Treatment Duration:  Time In: 1015   Time Out: 2[address] WOlya Ramirez [facility], [address] [facility]

## 2022-01-13 LAB
ANION GAP SERPL CALC-SCNC: 4 MMOL/L (ref 7–16)
BASOPHILS # BLD: 0.1 K/UL (ref 0–0.2)
BASOPHILS NFR BLD: 1 % (ref 0–2)
BUN SERPL-MCNC: 36 MG/DL (ref 8–23)
CALCIUM SERPL-MCNC: 9.6 MG/DL (ref 8.3–10.4)
CHLORIDE SERPL-SCNC: 104 MMOL/L (ref 98–107)
CO2 SERPL-SCNC: 37 MMOL/L (ref 21–32)
CREAT SERPL-MCNC: 1.82 MG/DL (ref 0.8–1.5)
DIFFERENTIAL METHOD BLD: ABNORMAL
EOSINOPHIL # BLD: 0.2 K/UL (ref 0–0.8)
EOSINOPHIL NFR BLD: 3 % (ref 0.5–7.8)
ERYTHROCYTE [DISTWIDTH] IN BLOOD BY AUTOMATED COUNT: 16.2 % (ref 11.9–14.6)
GLUCOSE BLD STRIP.AUTO-MCNC: 109 MG/DL (ref 65–100)
GLUCOSE BLD STRIP.AUTO-MCNC: 88 MG/DL (ref 65–100)
GLUCOSE BLD STRIP.AUTO-MCNC: 93 MG/DL (ref 65–100)
GLUCOSE BLD STRIP.AUTO-MCNC: 95 MG/DL (ref 65–100)
GLUCOSE SERPL-MCNC: 89 MG/DL (ref 65–100)
HCT VFR BLD AUTO: 42.8 % (ref 41.1–50.3)
HGB BLD-MCNC: 12.7 G/DL (ref 13.6–17.2)
IMM GRANULOCYTES # BLD AUTO: 0 K/UL (ref 0–0.5)
IMM GRANULOCYTES NFR BLD AUTO: 0 % (ref 0–5)
LYMPHOCYTES # BLD: 2.2 K/UL (ref 0.5–4.6)
LYMPHOCYTES NFR BLD: 33 % (ref 13–44)
MCH RBC QN AUTO: 27.9 PG (ref 26.1–32.9)
MCHC RBC AUTO-ENTMCNC: 29.7 G/DL (ref 31.4–35)
MCV RBC AUTO: 93.9 FL (ref 79.6–97.8)
MONOCYTES # BLD: 0.8 K/UL (ref 0.1–1.3)
MONOCYTES NFR BLD: 12 % (ref 4–12)
NEUTS SEG # BLD: 3.4 K/UL (ref 1.7–8.2)
NEUTS SEG NFR BLD: 51 % (ref 43–78)
NRBC # BLD: 0 K/UL (ref 0–0.2)
PLATELET # BLD AUTO: 171 K/UL (ref 150–450)
PMV BLD AUTO: 12.3 FL (ref 9.4–12.3)
POTASSIUM SERPL-SCNC: 4.1 MMOL/L (ref 3.5–5.1)
RBC # BLD AUTO: 4.56 M/UL (ref 4.23–5.6)
SERVICE CMNT-IMP: ABNORMAL
SERVICE CMNT-IMP: NORMAL
SODIUM SERPL-SCNC: 145 MMOL/L (ref 138–145)
WBC # BLD AUTO: 6.7 K/UL (ref 4.3–11.1)

## 2022-01-13 PROCEDURE — 74011000250 HC RX REV CODE- 250: Performed by: INTERNAL MEDICINE

## 2022-01-13 PROCEDURE — 65660000000 HC RM CCU STEPDOWN

## 2022-01-13 PROCEDURE — 74011250637 HC RX REV CODE- 250/637: Performed by: INTERNAL MEDICINE

## 2022-01-13 PROCEDURE — 80048 BASIC METABOLIC PNL TOTAL CA: CPT

## 2022-01-13 PROCEDURE — 85025 COMPLETE CBC W/AUTO DIFF WBC: CPT

## 2022-01-13 PROCEDURE — 82962 GLUCOSE BLOOD TEST: CPT

## 2022-01-13 PROCEDURE — 99232 SBSQ HOSP IP/OBS MODERATE 35: CPT | Performed by: INTERNAL MEDICINE

## 2022-01-13 PROCEDURE — 2709999900 HC NON-CHARGEABLE SUPPLY

## 2022-01-13 PROCEDURE — 36415 COLL VENOUS BLD VENIPUNCTURE: CPT

## 2022-01-13 PROCEDURE — 97535 SELF CARE MNGMENT TRAINING: CPT

## 2022-01-13 PROCEDURE — 77010033678 HC OXYGEN DAILY

## 2022-01-13 RX ADMIN — APIXABAN 5 MG: 5 TABLET, FILM COATED ORAL at 21:26

## 2022-01-13 RX ADMIN — FAMOTIDINE 20 MG: 20 TABLET ORAL at 08:49

## 2022-01-13 RX ADMIN — FLUDROCORTISONE ACETATE 0.1 MG: 0.1 TABLET ORAL at 08:49

## 2022-01-13 RX ADMIN — FERROUS SULFATE TAB 325 MG (65 MG ELEMENTAL FE) 325 MG: 325 (65 FE) TAB at 08:49

## 2022-01-13 RX ADMIN — SODIUM CHLORIDE, PRESERVATIVE FREE 10 ML: 5 INJECTION INTRAVENOUS at 21:26

## 2022-01-13 RX ADMIN — ATORVASTATIN CALCIUM 40 MG: 40 TABLET, FILM COATED ORAL at 21:26

## 2022-01-13 RX ADMIN — MIDODRINE HYDROCHLORIDE 10 MG: 5 TABLET ORAL at 06:06

## 2022-01-13 RX ADMIN — APIXABAN 5 MG: 5 TABLET, FILM COATED ORAL at 08:49

## 2022-01-13 RX ADMIN — PREGABALIN 200 MG: 50 CAPSULE ORAL at 08:48

## 2022-01-13 RX ADMIN — SODIUM CHLORIDE, PRESERVATIVE FREE 10 ML: 5 INJECTION INTRAVENOUS at 06:08

## 2022-01-13 RX ADMIN — PREGABALIN 400 MG: 150 CAPSULE ORAL at 21:26

## 2022-01-13 RX ADMIN — MIDODRINE HYDROCHLORIDE 10 MG: 5 TABLET ORAL at 14:58

## 2022-01-13 RX ADMIN — SODIUM CHLORIDE, PRESERVATIVE FREE 10 ML: 5 INJECTION INTRAVENOUS at 14:59

## 2022-01-13 NOTE — PROGRESS NOTES
[facility] Hospitalist Progress Notes   Admit Date:  2022 11:38 AM   Name:  Ann Barragan   Age:  77 y.o. Sex:  male  :  1945   MRN:  [id number]   Room:      Presenting Complaint: Respiratory Distress    Reason(s) for Admission: Acute on chronic respiratory failure with hypercapnia [facility]) [J96.22]     Hospitalists consulted by Intensivist for assuming care as pt transferred out of ICU. History of Presenting Illness:   Ann Barragan is a 77 y.o. male with history of A. fib, acute on chronic respiratory failure with hypoxia and hypercapnia, acute respiratory failure with hypercapnia, atrial fibrillation with RVR, coronary artery disease, chronic kidney disease, on long-term anticoagulation, diabetes status post right BKA, hypertension, hypercholesterolemia, left ventricular dysfunction, neuropathy, systolic CHF and a history of Staph aureus bacteremia who was admitted 2022 secondary to acute hypoxic and hypercapnic respiratory failure requiring intubation. He was admitted to the pulmonology service. Of note the patient has been concerning for obstructive sleep apnea for some time and was recommended for outpatient sleep studies but had not had them done yet. As an outpatient he is on Florinef for his hypotension, and he had a echo in July that showed an EF of 30 to 35%. The patient was admitted by the intensivist service to the ICU. He was successfully extubated on 2021 he has been stable status post extubation with the pulmonology service working on getting him patient lives with outpatient. Uses BiPAP at night and air Vo during the day. Patient admitted with acute on chronic hypoxic and hypercapnic respiratory failure. Was treated with antibiotics briefly but major issue is thought to be obesity hypoventilation syndrome/ELAINA. Patient was extubated and currently on air Vo during daytime and BiPAP at night.   Transferred to medicine service as primary as patient is on medical floor. 1/13 patient is still needing 5L high flow nasal cannula this morning. Pt and family prefers to go home with home health rather than to rehab. No chest pain. No fever. Assessment & Plan: This is a 77y male with:    #  Acute respiratory failure with hypercapnia ([facility] (1/6/2022)  Patient is currently needing 5 L high flow nasal cannula during the daytime and BiPAP at night  Weaning him down slowly. Pulmonary on board. Appreciate recommendations. Patient qualified for trilogy at home. Aspiration and fall precautions. Plan to obtain Home Oxygen screen tomorrow. #  Stage 3 chronic kidney disease ([facility] (11/22/2017)  Creatinine at baseline 1.8. Will monitor. #  Controlled type 2 diabetes mellitus with diabetic polyneuropathy, without long-term current use of insulin ([facility] (1/5/2018)  Continue to monitor blood sugars and cover with insulin    #Hypotension: Unclear etiology. Cortisol level , TSH, wnl. Continue fludrocortisone. Hold midodrine as BP improved. #Severe obesity (BMI 35.0-35.9 with comorbidity) ([facility] (10/10/2018)  Affected with abnormal weight. Will need education along the room. #Chronic systolic heart RQQAFGE:(4/1/1940)  ECHO repeated 1/11 shows EF of 30 to 35%  No active issues at this time  Continue current medical management  Cardiology on board. Appreciate recs. Cardiology recommends outpatient evaluation- ischemic work up. Paroxysmal Atrial fibrillation:      Rate controlled at present. Continue Eliquis. DISPO: Home with home health vs rehab. Anticipate discharge ? tomorrow.   Home oxygen screen prior to discharge if going home with home health    Hospital Problems as of 1/13/2022 Date Reviewed: 9/14/2021          Codes Class Noted - Resolved POA    Obstructive sleep apnea ICD-10-CM: G47.33  ICD-9-CM: 327.23  1/8/2022 - Present Yes        Obesity hypoventilation syndrome ([facility] ICD-10-CM: Q13.0  ICD-9-CM: 278.03  1/8/2022 - Present Yes        Decreased cardiac ejection fraction (Chronic) ICD-10-CM: R93.1  ICD-9-CM: 794.39  1/6/2022 - Present Yes        Acute on chronic respiratory failure with hypercapnia (HCC) ICD-10-CM: O20.93  ICD-9-CM: 518.84  1/6/2022 - Present Yes        Severe obesity (BMI 35.0-35.9 with comorbidity) (HCC) (Chronic) ICD-10-CM: E66.01, Z68.35  ICD-9-CM: 278.01, V85.35  10/10/2018 - Present Yes        Controlled type 2 diabetes mellitus with diabetic polyneuropathy, without long-term current use of insulin (HCC) (Chronic) ICD-10-CM: E11.42  ICD-9-CM: 250.60, 357.2  1/5/2018 - Present Yes        Stage 3 chronic kidney disease ([facility] (Chronic) ICD-10-CM: N18.30  ICD-9-CM: 585.3  11/22/2017 - Present Yes        * (Principal) RESOLVED: Acute respiratory failure with hypercapnia (HCC) ICD-10-CM: J96.02  ICD-9-CM: 518.81  1/6/2022 - 1/11/2022 Yes        RESOLVED: Hypotension ICD-10-CM: I95.9  ICD-9-CM: 458.9  1/6/2022 - 1/11/2022 Yes        RESOLVED: Suspected sleep apnea ICD-10-CM: M78.412  ICD-9-CM: 781.99  8/20/2021 - 1/8/2022 Yes              Current Facility-Administered Medications   Medication Dose Route Frequency    insulin lispro (HUMALOG) injection   SubCUTAneous TIDAC    famotidine (PEPCID) tablet 20 mg  20 mg Oral DAILY    pregabalin (LYRICA) capsule 200 mg  200 mg Oral DAILY    pregabalin (LYRICA) capsule 400 mg  400 mg Oral QHS    ferrous sulfate tablet 325 mg  1 Tablet Oral DAILY WITH BREAKFAST    acetaminophen (TYLENOL) tablet 650 mg  650 mg Oral Q6H PRN    fludrocortisone (FLORINEF) tablet 0.1 mg  0.1 mg Oral DAILY    polyethylene glycol (MIRALAX) packet 17 g  17 g Oral DAILY    sodium chloride (NS) flush 5-40 mL  5-40 mL IntraVENous Q8H    sodium chloride (NS) flush 5-40 mL  5-40 mL IntraVENous PRN    apixaban (ELIQUIS) tablet 5 mg  5 mg Oral Q12H    atorvastatin (LIPITOR) tablet 40 mg  40 mg Oral QHS    [Held by provider] midodrine (PROAMATINE) tablet 10 mg  10 mg Oral Q8H       Objective: Patient Vitals for the past 24 hrs:   Temp Pulse Resp BP SpO2   01/13/22 1517 98.1 °F (36.7 °C) 98 20 123/86 95 %   01/13/22 1159  98  133/65    01/13/22 1157 98.3 °F (36.8 °C) (!) 41 18 (!) 145/125 90 %   01/13/22 1118     93 %   01/13/22 0736 98.5 °F (36.9 °C) 96 18 110/83 94 %   01/13/22 0606  99  118/80    01/13/22 0352 98.6 °F (37 °C) 85 18 138/86 98 %   01/12/22 2325     93 %   01/12/22 2316 97.7 °F (36.5 °C) 86 18 113/69 96 %   01/12/22 2137  100  112/80    01/12/22 2016     95 %   01/12/22 1922 97.6 °F (36.4 °C) 92 18 119/83 96 %     Oxygen Therapy  O2 Sat (%): 95 % (01/13/22 1517)  Pulse via Oximetry: 102 beats per minute (01/12/22 2325)  O2 Device: Nasal cannula (01/13/22 1118)  Skin Assessment:  (nose dry, RT to place pt on humidified O2) (01/12/22 1926)  Skin Protection for O2 Device: No (01/12/22 1352)  Orientation: Bilateral (01/07/22 0837)  Location: Cheek (01/07/22 1974)  Interventions: Mouth Care (01/07/22 0800)  O2 Flow Rate (L/min): 5 l/min (01/13/22 1118)  O2 Temperature: 87.8 °F (31 °C) (01/10/22 1507)  FIO2 (%): 40 % (01/12/22 2325)    Estimated body mass index is 38.88 kg/m² as calculated from the following:    Height as of this encounter: 5' 10\" (1.778 m). Weight as of this encounter: 122.9 kg (271 lb). Intake/Output Summary (Last 24 hours) at 1/13/2022 1532  Last data filed at 1/13/2022 1353  Gross per 24 hour   Intake 720 ml   Output 1150 ml   Net -430 ml         Physical Exam:    Blood pressure 123/86, pulse 98, temperature 98.1 °F (36.7 °C), resp. rate 20, height 5' 10\" (1.778 m), weight 122.9 kg (271 lb), SpO2 95 %. General:     morbidly obese, no overt distress on 5L Oxygen NC,   Head:  Normocephalic, atraumatic  Eyes:  Sclerae appear normal.  Pupils equally round. ENT:  Nares appear normal, no drainage. Moist oral mucosa  Neck:  Supple   CV:   RRR. No m/r/g. JVD examination limited  Lungs:   Decreased entry bilateral lower lobe otherwise CTAB.   No wheezing, rhonchi, or rales. Respirations even, unlabored  Abdomen: Bowel sounds present. Soft, nontender, nondistended. Extremities: No cyanosis or clubbing. No edema; right BKA [no issues with stump.]  Skin:     No rashes and normal coloration. Warm and dry. Neuro:  AOx3. Moving all 4 extremities. Speech normal.  Psych:  Normal mood and affect. I have reviewed ordered lab tests and independently visualized imaging below:    Recent Labs:    Procedures done this admission:  * No surgery found *    All Micro Results     None          SARS-CoV-2 Lab Results  \"Novel Coronavirus\" Test: No results found for: COV2NT   \"Emergent Disease\" Test: No results found for: EDPR  \"SARS-COV-2\" Test: No results found for: XGCOVT  \"Precision Labs\" Test: No results found for: RSLT  Rapid Test: No results found for: COVR         Labs: Results:       BMP, Mg, Phos Recent Labs     01/13/22  0547 01/11/22  0524    145   K 4.1 4.1    105   CO2 37* 36*   AGAP 4* 4*   BUN 36* 31*   CREA 1.82* 1.73*   CA 9.6 9.4   GLU 89 89      CBC Recent Labs     01/13/22  1336   WBC 6.7   RBC 4.56   HGB 12.7*   HCT 42.8      GRANS 51   LYMPH 33   EOS 3   MONOS 12   BASOS 1   IG 0   ANEU 3.4   ABL 2.2   TIERNEY 0.2   ABM 0.8   ABB 0.1   AIG 0.0      LFT No results for input(s): ALT, TBIL, AP, TP, ALB, GLOB, AGRAT in the last 72 hours.     No lab exists for component: SGOT, GPT   Cardiac Testing No results found for: BNPP, BNP, CPK, RCK1, RCK2, RCK3, RCK4, CKMB, CKNDX, CKND1, TROPT, TROIQ   Coagulation Tests Lab Results   Component Value Date/Time    aPTT 34.9 08/18/2021 06:56 PM    aPTT 31.7 07/19/2021 09:24 PM      A1c Lab Results   Component Value Date/Time    Hemoglobin A1c 6.2 01/09/2022 02:53 AM    Hemoglobin A1c 6.7 (H) 07/13/2021 08:34 AM    Hemoglobin A1c 6.2 (H) 01/13/2021 08:27 AM      Lipid Panel Lab Results   Component Value Date/Time    Cholesterol, total 156 07/13/2021 08:34 AM    HDL Cholesterol 39 (L) 07/13/2021 08:34 AM    LDL, calculated 83 07/13/2021 08:34 AM    LDL, calculated 83 06/11/2020 09:16 AM    VLDL, calculated 34 07/13/2021 08:34 AM    VLDL, calculated 42 (H) 06/11/2020 09:16 AM    Triglyceride 202 (H) 07/13/2021 08:34 AM      Thyroid Panel Lab Results   Component Value Date/Time    TSH 1.240 01/12/2022 05:40 AM    TSH 0.655 07/19/2021 01:46 PM    T4, Free 1.0 01/12/2022 05:40 AM    T4, Free 1.4 07/19/2021 01:46 PM        Most Recent UA Lab Results   Component Value Date/Time    WBC 0-3 07/19/2021 01:26 PM    RBC 5-10 07/19/2021 01:26 PM    Epithelial cells 0-3 07/19/2021 01:26 PM    Bacteria 0 07/19/2021 01:26 PM    Casts 10-20 07/19/2021 01:26 PM            Other Studies:  No results found. Signed:  David Rudolph MD    Part of this note may have been written by using a voice dictation software. The note has been proof read but may still contain some grammatical/other typographical errors.

## 2022-01-13 NOTE — PROGRESS NOTES
[facility] CARDIOLOGY PROGRESS NOTE           1/13/2022 11:13 AM    Admit Date: 1/6/2022    Subjective:   Patient reports breathing is better, however, on 5 L nasal cannula this morning. Denies cough, wheeze, chest pain, abdominal pain, left leg swelling. No other complaints at this time.     ROS:  Cardiovascular:  As noted above    Objective:      Vitals:    01/13/22 0352 01/13/22 0606 01/13/22 0631 01/13/22 0736   BP: 138/86 118/80  110/83   Pulse: 85 99  96   Resp: 18   18   Temp: 98.6 °F (37 °C)   98.5 °F (36.9 °C)   SpO2: 98%   94%   Weight:   271 lb (122.9 kg)    Height:           Physical Exam:  General-No Acute Distress  Neck- supple, no JVD  CV- regular rate and rhythm no MRG  Lung- decreased at bases bilaterally  Abd- soft, nontender, nondistended  Ext- no edema bilaterally in legs, Right leg post amputation   Skin- warm and dry    Data Review:   Recent Labs     01/13/22  0547 01/11/22  0524    145   K 4.1 4.1   BUN 36* 31*   CREA 1.82* 1.73*   GLU 89 89       Assessment/Plan:       Decreased cardiac ejection fraction (1/6/2022)   LVEF noted to be 30-35% dating back to 7/21/2021 echo   Hypotension limits ability to had CHF medical therapy  Does not appear volume overloaded on examination  Diuresis on as needed basis  - outpatient ischemic evaluation pending improvement in renal function  - negative 9 L for admission      Paroxysmal atrial fibrillation   On Eliquis, rates controlled, hemodynamically electrically stable     Reported CAD   No coronary angiogram is available for review, on atorvastatin 40 would continue  -Outpatient ischemic evaluation as above pending renal function improvement       Acute on chronic respiratory failure with hypercapnia (HCC) (1/6/2022)    Obstructive sleep apnea (1/8/2022)    Obesity hypoventilation syndrome (HCC) (1/8/2022)  Positive pressure ventilation, oxygen as needed.       Stage 3 chronic kidney disease ([facility] (11/22/2017)  Creatinine 1.8, appears to be near baseline       Controlled type 2 diabetes mellitus with diabetic polyneuropathy, without long-term current use of insulin ([facility] (1/5/2018)  Per medicine       Severe obesity (BMI 35.0-35.9 with comorbidity) ([facility] (10/10/2018)    - continue efforts towards dietary changes in an effort to lose weight        Maira Leggett DO  1/13/2022 11:13 AM

## 2022-01-13 NOTE — PROGRESS NOTES
Daily Progress Note: 1/13/2022    Ron Rivas  Admission Date: 1/6/2022    Length of Stay: 7 days      Background: 77 y.o. male with acute respiratory failure secondary to hypercapnea. Required intubation in the ER with a CO2 of 80, ph 7.2. CXR with bilateral infiltrates/cardiomyopathy. BNP 2179. Has severe ELAINA per recent at home sleep study (AHI 54.8 with desaturation to 68%). Formal outpatient study recommended. Repeat ABG with CO2 correction. Hypotensive requiring pressor support on admission but this has resolved. On Midodrine and Florinef here for hypotension - took Florinef at home. EF 30 to 35% by history but no medical therapy due to hypotension. Off vent on 1/7 and awake and alert. Passed swallow study. Still hypoxic requiring airvo.  + restless legs    Subjective:     Equipment arranged for him and is ready    Review of Systems  Constitutional: negative for fever, chills, sweats  Cardiovascular: negative for chest pain, palpitations, syncope, edema  Gastrointestinal:  negative for dysphagia, reflux, vomiting, diarrhea, abdominal pain, or melena  Neurologic:  negative for focal weakness, numbness, headache    Objective:     Vitals:    01/13/22 0352 01/13/22 0606 01/13/22 0631 01/13/22 0736   BP: 138/86 118/80  110/83   Pulse: 85 99  96   Resp: 18   18   Temp: 98.6 °F (37 °C)   98.5 °F (36.9 °C)   SpO2: 98%   94%   Weight:   271 lb (122.9 kg)    Height:           Intake/Output:    Physical Exam:          GEN: obese. HEENT:no alar flaring or epistaxis, oral mucosa moist without cyanosis,   NECK:  no JVD, no retractions, no thyromegaly or masses,   LUNGS:  Decreased bases on NC  HEART:  RRR with no M,G,R;  ABDOMEN:  soft with no tenderness; positive bowel sounds present  EXTREMITIES:  warm with no cyanosis, no lower leg edema on left, right prosthesis.   SKIN:  no jaundice or ecchymosis   NEURO: A & O X 3      ACTIVITY: limited  NUTRITION: cardiac    IMAGES: Results from [facility] Encounter encounter on 07/19/21    CT HEAD WO CONT    Impression  No acute intracranial abnormalities. Date of Dictation: 7/23/2021 10:21 AM    [facility]    MRI FOOT RT WO CONT    Impression  1. Lateral plantar ulceration extending to contact the fifth metatarsal base  without evidence of acute osteomyelitis. A small probable fluid collection in  the subcutaneous tissues adjacent to the ulceration measures up to 1.4 cm,  though lack of IV contrast limits full evaluation. 2. Severe Charcot arthropathy throughout the midfoot and hindfoot with a large  amount of fluid insinuating throughout the midfoot and hindfoot articulations. While possibly reactive sterile joint fluid to the severe arthropathy, sterility  is indeterminate based upon imaging alone. 3. Thick-walled 2.7 cm fluid collection along the plantar aspect of the fifth  metatarsal head, likely adventitial bursa formation. Results from [place] encounter on 01/06/22    ECHO ADULT COMPLETE    Interpretation Summary    Left Ventricle: Left ventricle is moderately dilated. Normal wall thickness. See diagram for wall motion findings. Severely reduced left ventricular systolic function with a visually estimated EF of 30 - 35%.   Mitral Valve: Mild transvalvular regurgitation.   Left Atrium: Left atrium is moderately dilated.   Right Atrium: Right atrium is mildly dilated.   Contrast used: Definity. LAB  No results for input(s): WBC, HGB, HCT, PLT, HGBEXT, HCTEXT, PLTEXT, HGBEXT, HCTEXT, PLTEXT in the last 72 hours. Recent Labs     01/13/22  0547 01/11/22  0524    145   K 4.1 4.1    105   CO2 37* 36*   GLU 89 89   BUN 36* 31*   CREA 1.82* 1.73*     No results for input(s): LCAD, LAC in the last 72 hours. ABG:     No results for input(s): PH, PCO2, PO2, HCO3, PHI, PCO2I, PO2I, HCO3I, FIO2I in the last 72 hours.   Cultures:   No results for input(s): SDES, CULT in the last 72 hours.      Assessment/Plan:       Acute respiratory failure with hypercapnia ([facility] (1/6/2022)  Improved. Stage 3 chronic kidney disease ([facility] (11/22/2017)      Severe obesity (BMI 35.0-35.9 with comorbidity) ([facility] (88/79/1454)   chronic,complicated medical care      Decreased cardiac ejection fraction (1/6/2022)  30-35%       Acute on chronic respiratory failure with hypercapnia ([facility] (1/6/2022)  On 5 lpm with 97%- now approved for trilogy      Obstructive sleep apnea (1/8/2022)    Obesity hypoventilation syndrome ([facility] (1/8/2022)  ELAINA -- home sleep study in November with Severe ELAINA:  AHI in 54.8 and over 240 minutes with saturation <89%. - Trilogy has been approved for patient    Can go home with trilogy from our stand point  Arrange O2 for daytime if needed - needs qualification    Supportive and Prophylactic Tx:   DVT PPx:eliquis, ambulating  GI (PUD) PPx: Pepcid  PT: OOB  Full Code    Nothing to add- pulmonary will sign off- can follow up with sleep clinic. Maryjane Coles MD      This note was signed electronically. Errors are unfortunately her likely due to dictation software.

## 2022-01-13 NOTE — PROGRESS NOTES
Pt placed on BIPAP - pt is in no distress at this time      01/12/22 2325   Oxygen Therapy   O2 Sat (%) 93 %   Pulse via Oximetry 102 beats per minute   O2 Device BIPAP   FIO2 (%) 40 %   Respiratory   Respiratory (WDL) X   Respiratory Pattern Tachypneic   Chest/Tracheal Assessment Chest expansion, symmetrical   Breath Sounds Bilateral Clear   Cough Non-productive   CPAP/BIPAP   CPAP/BIPAP Start/Stop On   Device Mode BIPAP   $$ Bipap Daily Yes   Mask Type and Size Full face; Large   Skin Condition intact   PIP Observed 18 cm H20   IPAP (cm H2O) 18 cm H2O   EPAP (cm H2O) 10 cm H2O   Inspiratory Time (sec) 1 seconds   Vt Spont (ml) 633 ml   Ve Observed (l/min) 15.1 l/min   Backup Rate 20   Total RR (Spontaneous) 24 breaths per minute   Insp Rise Time (sec) 5   Leak (Estimated) 10 L/min   Pt's Home Machine No   Biomedical Check Performed Yes   Settings Verified Yes   Alarm Settings   High Pressure 30   Low Pressure 5   Apnea 20   Low Ve 2   High Rate 50   Low Rate 8   Pulmonary Toilet   Pulmonary Toilet H. O.B elevated;Cough and deep breath

## 2022-01-13 NOTE — PROGRESS NOTES
Problem: Gas Exchange - Impaired  Goal: *Absence of hypoxia  Outcome: Progressing Towards Goal     Problem: Patient Education: Go to Patient Education Activity  Goal: Patient/Family Education  Outcome: Progressing Towards Goal     Problem: Pressure Injury - Risk of  Goal: *Prevention of pressure injury  Description: Document Julio Cesar Scale and appropriate interventions in the flowsheet. Outcome: Progressing Towards Goal  Note: Pressure Injury Interventions:  Sensory Interventions: Assess changes in LOC,Minimize linen layers,Monitor skin under medical devices    Moisture Interventions: Absorbent underpads,Internal/External urinary devices,Minimize layers    Activity Interventions: Increase time out of bed,Pressure redistribution bed/mattress(bed type)    Mobility Interventions: Pressure redistribution bed/mattress (bed type)    Nutrition Interventions: Document food/fluid/supplement intake    Friction and Shear Interventions: Minimize layers                Problem: Patient Education: Go to Patient Education Activity  Goal: Patient/Family Education  Outcome: Progressing Towards Goal     Problem: Falls - Risk of  Goal: *Absence of Falls  Description: Document Beronica Fall Risk and appropriate interventions in the flowsheet.   Outcome: Progressing Towards Goal  Note: Fall Risk Interventions:  Mobility Interventions: Communicate number of staff needed for ambulation/transfer,Patient to call before getting OOB,Utilize walker, cane, or other assistive device    Mentation Interventions: Adequate sleep, hydration, pain control,Door open when patient unattended,Evaluate medications/consider consulting pharmacy,Reorient patient    Medication Interventions: Evaluate medications/consider consulting pharmacy,Patient to call before getting OOB,Teach patient to arise slowly    Elimination Interventions: Call light in reach,Patient to call for help with toileting needs              Problem: Patient Education: Go to Patient Education Activity  Goal: Patient/Family Education  Outcome: Progressing Towards Goal     Problem: Patient Education: Go to Patient Education Activity  Goal: Patient/Family Education  Outcome: Progressing Towards Goal

## 2022-01-13 NOTE — PROGRESS NOTES
ACUTE OT GOALS:  (Developed with and agreed upon by patient and/or caregiver.)  1. Alexandr Din Pack will be modified independent with functional mobility for ADL in room within 4 - 7 visits. 2. Alexandr Din Pack will be modified independent with total body bathing and dressing within 4 - 7 visits. 3. Alexandr Din Pack will state and demonstrate at least 5 energy conservation techniques during ADL/therapeutic activities within 4 - 7 visits. 4. Alexandr Din Pack will voice a plan for appropriate home modifications for home safety and fall prevention within 7 visits. 5. Alexandr Din Pack will participate at least 30 minutes of ADL with 3 or less rest breaks within 7 visits. 6. Alexandr Din Pack will complete a shower transfer with modified independence within 7 visits. OCCUPATIONAL THERAPY: Daily Note and AM OT Treatment Day # 2    David Ellis is a 77 y.o. male   PRIMARY DIAGNOSIS: Acute respiratory failure with hypercapnia (HCC)  Acute on chronic respiratory failure with hypercapnia (HCC) [J96.22]       Payor: [employer] / Plan: Katey [employer]iner[employer]madison / Product Type: [employer] Medicare /   ASSESSMENT:     REHAB RECOMMENDATIONS: CURRENT LEVEL OF FUNCTION:  (Most Recently Demonstrated)   Recommendation to date pending progress:  [address] [place] [address] vs [facility] pending progress. Equipment:    To Be Determined Bathing:   Minimal Assistance  Dressing:   Minimal Assistance  Feeding/Grooming:   Contact Guard Assistance standing at sinkside. Toileting:   Minimal Assistance  Functional Mobility:   CGA to Min A     ASSESSMENT:  Mr. Lucas Ko continues to present with deficits in overall strength, balance, and activity tolerance for performance of ADLs and functional mobility, but does make good progress towards acute care OT goals this treatment session. Requires SBA to complete supine <> sit. Requires Set Up to wash face. Requires Set Up to complete UB bathing and gown management. Requires CGA and additional time to doff LLE sock. Requires SBA to bathe RLE and Min A to bathe LLE with assist needed to wash L foot. Requires Total A to don L sock and Min A to don L shoe. Requires SBA to don RLE prosthetic. Requires CGA x RW to complete sit <> stand and Min A for standing balance to complete genital and perineal bathing. Provided with seated rest breaks throughout performance of total body bathing ADL as pt is on 4L O2 and is observed to desaturate to 86%. Pt cued to use pursed-lip breathing and able to improve O2 sats to 90% with rest break. Requires CGA x RW to complete sit <> stand and CGA to walk from bed <> sink. Requires CGA to brush teeth at sinkside. Requires Min A to return to recliner chair and complete stand <> sit. Once again, O2 sats improved to 90% with seated rest break. Requires Set Up in seated to don shampoo cap, lather leave in shampoo, towel dry hair, and comb hair. RN notified of O2 sats at 90% and increased pt O2 to 5L with O2 sats 93% at rest. Pt would benefit from continued skilled OT to increase independence and safety for performance of ADLs and functional mobility. SUBJECTIVE:   Mr. Dru Cisneros states, Celestine Cabrera you again, I really appreciate you. \"    SOCIAL HISTORY/LIVING ENVIRONMENT: Lives with daughter and wife. Has rolling walker and manual w/c (does not typically use) if needed.    Home Environment: Private residence  # Steps to Enter: 1  One/Two Story Residence: One story  Living Alone: No  Support Systems: Child(pat),Spouse/Significant Other    OBJECTIVE:     PAIN: VITAL SIGNS: LINES/DRAINS:   Pre Treatment: Pain Screen  Pain Scale 1: Numeric (0 - 10)  Pain Intensity 1: 0  Post Treatment: Unchanged Vital Signs  O2 Sat (%): 93 %  O2 Device: Nasal cannula  O2 Flow Rate (L/min): 5 l/min Pineda Catheter and RLE prosthetic  O2 Device: Nasal cannula     ACTIVITIES OF DAILY LIVING: I Mod I S SBA CGA Min Mod Max Total NT Comments   BASIC ADLs:              Bathing/ Showering [] [] [] [] [] [x] [] [] [] [] Min A LB bathing in standing; CGA LB bathing in seated. Toileting [] [] [] [] [] [x] [] [] [] [] Min A standing balance to complete genital and perineal wiping. Dressing [] [] [] [] [] [x] [] [] [] [] Assist needed to don L sock. Feeding [] [] [] [] [] [] [] [] [] [x]    Grooming [] [] [x] [] [x] [] [] [] [] [] Set Up to complete grooming ADLs in seated and CGA to complete grooming ADLs in standing.     Personal Device Care [] [] [] [] [] [] [] [] [] [x]    Functional Mobility [] [] [] [] [x] [x] [] [] [] [] X RW   I=Independent, Mod I=Modified Independent, S=Supervision, SBA=Standby Assistance, CGA=Contact Guard Assistance,   Min=Minimal Assistance, Mod=Moderate Assistance, Max=Maximal Assistance, Total=Total Assistance, NT=Not Tested    MOBILITY: I Mod I S SBA CGA Min Mod Max Total  NT x2 Comments:   Supine to sit [] [] [] [x] [] [] [] [] [] [] []    Sit to supine [] [] [] [] [] [] [] [] [] [x] []    Sit to stand [] [] [] [] [x] [] [] [] [] [] [] X RW   Bed to chair [] [] [] [] [] [x] [] [] [] [] [] X RW   I=Independent, Mod I=Modified Independent, S=Supervision, SBA=Standby Assistance, CGA=Contact Guard Assistance,   Min=Minimal Assistance, Mod=Moderate Assistance, Max=Maximal Assistance, Total=Total Assistance, NT=Not Tested    BALANCE: Good Fair+ Fair Fair- Poor NT Comments   Sitting Static [] [x] [] [] [] []    Sitting Dynamic [] [] [x] [] [] []              Standing Static [] [] [x] [] [] []    Standing Dynamic [] [] [] [x] [] []      PLAN:   FREQUENCY/DURATION: OT Plan of Care: 3 times/week for duration of hospital stay or until stated goals are met, whichever comes first.    TREATMENT:   TREATMENT:   ($$ Self Care/Home Management: 38-52 mins    )  Self Care (52 Minutes): Self care including Upper Body Bathing, Lower Body Bathing, Toileting, Upper Body Dressing, Lower Body Dressing and Grooming to increase independence, decrease level of assistance required and increase activity tolerance. .    TREATMENT GRID:  N/A    AFTER TREATMENT POSITION/PRECAUTIONS:  Chair, Needs within reach and RN notified    INTERDISCIPLINARY COLLABORATION:  RN/PCT, PT/PTA and OT/GRAYSON    TOTAL TREATMENT DURATION:  OT Patient Time In/Time Out  Time In: 1118  Time Out: [address] Ravi, OTR/L

## 2022-01-13 NOTE — PROGRESS NOTES
END OF SHIFT NOTE:    INTAKE/OUTPUT  01/12 0701 - 01/13 0700  In: 720 [P.O.:720]  Out: 950 [Urine:950]  Voiding: NO  Catheter: YES, draining  Drain:              Flatus: Patient does have flatus present. Stool:  0 occurrences. Characteristics:  Stool Assessment  Stool Color: Brown  Stool Appearance: Watery  Stool Amount: Large  Stool Source/Status: Rectum    Emesis: 0 occurrences. Characteristics:        VITAL SIGNS  Patient Vitals for the past 12 hrs:   Temp Pulse Resp BP SpO2   01/13/22 0606  99  118/80    01/13/22 0352 98.6 °F (37 °C) 85 18 138/86 98 %   01/12/22 2325     93 %   01/12/22 2316 97.7 °F (36.5 °C) 86 18 113/69 96 %   01/12/22 2137  100  112/80    01/12/22 2016     95 %   01/12/22 1922 97.6 °F (36.4 °C) 92 18 119/83 96 %       Pain Assessment  Pain Intensity 1: 0 (01/12/22 1926)        Patient Stated Pain Goal: 0    Ambulating  No    Shift report to be given to oncoming nurse at the bedside.     Rickey Bae RN

## 2022-01-13 NOTE — PROGRESS NOTES
Problem: Gas Exchange - Impaired  Goal: *Absence of hypoxia  Outcome: Progressing Towards Goal     Problem: Patient Education: Go to Patient Education Activity  Goal: Patient/Family Education  Outcome: Progressing Towards Goal     Problem: Pressure Injury - Risk of  Goal: *Prevention of pressure injury  Description: Document Julio Cesar Scale and appropriate interventions in the flowsheet. Outcome: Progressing Towards Goal  Note: Pressure Injury Interventions:  Sensory Interventions: Assess changes in LOC,Minimize linen layers,Monitor skin under medical devices    Moisture Interventions: Absorbent underpads,Internal/External urinary devices,Minimize layers    Activity Interventions: Increase time out of bed,Pressure redistribution bed/mattress(bed type)    Mobility Interventions: Pressure redistribution bed/mattress (bed type),Turn and reposition approx. every two hours(pillow and wedges)    Nutrition Interventions: Document food/fluid/supplement intake,Offer support with meals,snacks and hydration    Friction and Shear Interventions: Minimize layers                Problem: Patient Education: Go to Patient Education Activity  Goal: Patient/Family Education  Outcome: Progressing Towards Goal     Problem: Falls - Risk of  Goal: *Absence of Falls  Description: Document Beronica Fall Risk and appropriate interventions in the flowsheet.   Outcome: Progressing Towards Goal  Note: Fall Risk Interventions:  Mobility Interventions: Communicate number of staff needed for ambulation/transfer,Patient to call before getting OOB    Mentation Interventions: Adequate sleep, hydration, pain control,Door open when patient unattended,Evaluate medications/consider consulting pharmacy,Reorient patient    Medication Interventions: Patient to call before getting OOB,Teach patient to arise slowly    Elimination Interventions: Call light in reach,Patient to call for help with toileting needs              Problem: Patient Education: Go to Patient Education Activity  Goal: Patient/Family Education  Outcome: Progressing Towards Goal     Problem: Patient Education: Go to Patient Education Activity  Goal: Patient/Family Education  Outcome: Progressing Towards Goal

## 2022-01-14 ENCOUNTER — APPOINTMENT (OUTPATIENT)
Dept: GENERAL RADIOLOGY | Age: 77
DRG: 208 | End: 2022-01-14
Attending: INTERNAL MEDICINE
Payer: MEDICARE

## 2022-01-14 LAB
GLUCOSE BLD STRIP.AUTO-MCNC: 105 MG/DL (ref 65–100)
GLUCOSE BLD STRIP.AUTO-MCNC: 116 MG/DL (ref 65–100)
GLUCOSE BLD STRIP.AUTO-MCNC: 80 MG/DL (ref 65–100)
SERVICE CMNT-IMP: ABNORMAL
SERVICE CMNT-IMP: ABNORMAL
SERVICE CMNT-IMP: NORMAL

## 2022-01-14 PROCEDURE — 74011250637 HC RX REV CODE- 250/637: Performed by: INTERNAL MEDICINE

## 2022-01-14 PROCEDURE — 65660000000 HC RM CCU STEPDOWN

## 2022-01-14 PROCEDURE — 71045 X-RAY EXAM CHEST 1 VIEW: CPT

## 2022-01-14 PROCEDURE — 99232 SBSQ HOSP IP/OBS MODERATE 35: CPT | Performed by: INTERNAL MEDICINE

## 2022-01-14 PROCEDURE — 74011000250 HC RX REV CODE- 250: Performed by: INTERNAL MEDICINE

## 2022-01-14 PROCEDURE — 82962 GLUCOSE BLOOD TEST: CPT

## 2022-01-14 PROCEDURE — 77010033678 HC OXYGEN DAILY

## 2022-01-14 PROCEDURE — 86580 TB INTRADERMAL TEST: CPT | Performed by: HOSPITALIST

## 2022-01-14 PROCEDURE — 92526 ORAL FUNCTION THERAPY: CPT

## 2022-01-14 PROCEDURE — 94761 N-INVAS EAR/PLS OXIMETRY MLT: CPT

## 2022-01-14 PROCEDURE — 94760 N-INVAS EAR/PLS OXIMETRY 1: CPT

## 2022-01-14 PROCEDURE — 94660 CPAP INITIATION&MGMT: CPT

## 2022-01-14 PROCEDURE — 97530 THERAPEUTIC ACTIVITIES: CPT

## 2022-01-14 PROCEDURE — 74011000250 HC RX REV CODE- 250: Performed by: HOSPITALIST

## 2022-01-14 RX ORDER — TAMSULOSIN HYDROCHLORIDE 0.4 MG/1
0.4 CAPSULE ORAL DAILY
Status: DISCONTINUED | OUTPATIENT
Start: 2022-01-15 | End: 2022-01-18 | Stop reason: HOSPADM

## 2022-01-14 RX ADMIN — ATORVASTATIN CALCIUM 40 MG: 40 TABLET, FILM COATED ORAL at 21:18

## 2022-01-14 RX ADMIN — APIXABAN 5 MG: 5 TABLET, FILM COATED ORAL at 21:18

## 2022-01-14 RX ADMIN — PREGABALIN 200 MG: 50 CAPSULE ORAL at 08:17

## 2022-01-14 RX ADMIN — POLYETHYLENE GLYCOL 3350 17 G: 17 POWDER, FOR SOLUTION ORAL at 08:17

## 2022-01-14 RX ADMIN — FLUDROCORTISONE ACETATE 0.1 MG: 0.1 TABLET ORAL at 08:17

## 2022-01-14 RX ADMIN — TUBERCULIN PURIFIED PROTEIN DERIVATIVE 5 UNITS: 5 INJECTION, SOLUTION INTRADERMAL at 11:41

## 2022-01-14 RX ADMIN — FERROUS SULFATE TAB 325 MG (65 MG ELEMENTAL FE) 325 MG: 325 (65 FE) TAB at 08:17

## 2022-01-14 RX ADMIN — APIXABAN 5 MG: 5 TABLET, FILM COATED ORAL at 08:17

## 2022-01-14 RX ADMIN — SODIUM CHLORIDE, PRESERVATIVE FREE 10 ML: 5 INJECTION INTRAVENOUS at 13:43

## 2022-01-14 RX ADMIN — FAMOTIDINE 20 MG: 20 TABLET ORAL at 08:17

## 2022-01-14 RX ADMIN — SODIUM CHLORIDE, PRESERVATIVE FREE 10 ML: 5 INJECTION INTRAVENOUS at 21:19

## 2022-01-14 RX ADMIN — PREGABALIN 400 MG: 150 CAPSULE ORAL at 21:18

## 2022-01-14 RX ADMIN — SODIUM CHLORIDE, PRESERVATIVE FREE 10 ML: 5 INJECTION INTRAVENOUS at 06:15

## 2022-01-14 NOTE — PROGRESS NOTES
END OF SHIFT NOTE:    INTAKE/OUTPUT  01/13 0701 - 01/14 0700  In: 26 [P. O.:702]  Out: 1300 [Urine:1300]  Voiding: NO  Catheter: YES  Drain:              Flatus: Patient does have flatus present. Stool:  0 occurrences. Characteristics:  Stool Assessment  Stool Color: Brown  Stool Appearance: Watery  Stool Amount: Large  Stool Source/Status: Rectum    Emesis: 0 occurrences. Characteristics:        VITAL SIGNS  Patient Vitals for the past 12 hrs:   Temp Pulse Resp BP SpO2   01/14/22 0331 97.9 °F (36.6 °C) 97 18 94/68 94 %   01/14/22 0207     90 %   01/13/22 2340 97.7 °F (36.5 °C) 88 20 116/64 90 %   01/13/22 2244     98 %   01/13/22 1927 97.4 °F (36.3 °C) 75 20 90/61 97 %       Pain Assessment  Pain Intensity 1: 0 (01/13/22 2020)        Patient Stated Pain Goal: 0    Ambulating  Yes    Shift report given to oncoming nurse at the bedside.     Ev Vega RN

## 2022-01-14 NOTE — PROGRESS NOTES
Oxygen Qualifier       Room air: SpO2 with O2 and liter flow   Resting SpO2  82%  87% on 1L   90% on 2L   Ambulating SpO2  80%  85% on 1L   87% on 2L   89% on 3L   90% on 4L       Completed by:    Marylene Blind

## 2022-01-14 NOTE — PROGRESS NOTES
END OF SHIFT NOTE:    INTAKE/OUTPUT  01/12 0701 - 01/13 0700  In: 720 [P.O.:720]  Out: 950 [Urine:950]  Voiding: NO  Catheter: YES  Drain:              Flatus: Patient does have flatus present. Stool:  0 occurrences. Characteristics:  Stool Assessment  Stool Color: Brown  Stool Appearance: Watery  Stool Amount: Large  Stool Source/Status: Rectum    Emesis: 0 occurrences. Characteristics:        VITAL SIGNS  Patient Vitals for the past 12 hrs:   Temp Pulse Resp BP SpO2   01/13/22 1927 97.4 °F (36.3 °C) 75 20 90/61 97 %   01/13/22 1517 98.1 °F (36.7 °C) 98 20 123/86 95 %   01/13/22 1159  98  133/65    01/13/22 1157 98.3 °F (36.8 °C) (!) 41 18 (!) 145/125 90 %   01/13/22 1118     93 %   01/13/22 0736 98.5 °F (36.9 °C) 96 18 110/83 94 %       Pain Assessment  Pain Intensity 1: 0 (01/13/22 1118)        Patient Stated Pain Goal: 0    Ambulating  Yes    Shift report given to oncoming nurse at the bedside.     Charmayne Shall

## 2022-01-14 NOTE — PROGRESS NOTES
Problem: Falls - Risk of  Goal: *Absence of Falls  Description: Document Cherylle Cockayne Fall Risk and appropriate interventions in the flowsheet. Outcome: Progressing Towards Goal  Note: Fall Risk Interventions:  Mobility Interventions: Communicate number of staff needed for ambulation/transfer,Patient to call before getting OOB    Mentation Interventions: Adequate sleep, hydration, pain control,Bed/chair exit alarm,Door open when patient unattended,Evaluate medications/consider consulting pharmacy,Eyeglasses and hearing aids    Medication Interventions: Patient to call before getting OOB,Teach patient to arise slowly    Elimination Interventions: Call light in reach              Problem: Patient Education: Go to Patient Education Activity  Goal: Patient/Family Education  Outcome: Progressing Towards Goal     Problem: Gas Exchange - Impaired  Goal: *Absence of hypoxia  Outcome: Progressing Towards Goal     Problem: Patient Education: Go to Patient Education Activity  Goal: Patient/Family Education  Outcome: Progressing Towards Goal     Problem: Pressure Injury - Risk of  Goal: *Prevention of pressure injury  Description: Document Julio Cesar Scale and appropriate interventions in the flowsheet.   Outcome: Progressing Towards Goal  Note: Pressure Injury Interventions:  Sensory Interventions: Assess changes in LOC,Minimize linen layers,Monitor skin under medical devices    Moisture Interventions: Absorbent underpads,Internal/External urinary devices,Minimize layers    Activity Interventions: Increase time out of bed,Pressure redistribution bed/mattress(bed type),PT/OT evaluation    Mobility Interventions: Pressure redistribution bed/mattress (bed type),HOB 30 degrees or less    Nutrition Interventions: Document food/fluid/supplement intake    Friction and Shear Interventions: Apply protective barrier, creams and emollients,Feet elevated on foot rest,Foam dressings/transparent film/skin sealants,HOB 30 degrees or less Problem: Patient Education: Go to Patient Education Activity  Goal: Patient/Family Education  Outcome: Progressing Towards Goal     Problem: Patient Education: Go to Patient Education Activity  Goal: Patient/Family Education  Outcome: Progressing Towards Goal

## 2022-01-14 NOTE — PROGRESS NOTES
[facility] CARDIOLOGY PROGRESS NOTE           1/14/2022 1:08 PM    Admit Date: 1/6/2022      Subjective:   -No complaints of chest pain or any worsening dyspnea. Still on 2 L of oxygen by nasal cannula but breathing comfortably lying fairly flat in bed. ROS:  Cardiovascular:  As noted above    Objective:      Vitals:    01/14/22 0753 01/14/22 0804 01/14/22 1124 01/14/22 1127   BP:   99/79 109/64   Pulse: 88  96 (!) 101   Resp:   20    Temp:   98.1 °F (36.7 °C)    SpO2:  97% 93%    Weight:       Height:           Physical Exam:  General-No Acute Distress, obese  Neck- supple, mild JVD  CV- regular rate and rhythm no MRG  Lung- clear bilaterally  Abd- soft, nontender, nondistended  Ext- no edema bilaterally.   Skin- warm and dry    Data Review:   Recent Labs     01/13/22  1336 01/13/22  0547   NA  --  145   K  --  4.1   BUN  --  36*   CREA  --  1.82*   GLU  --  89   WBC 6.7  --    HGB 12.7*  --    HCT 42.8  --      --        Assessment/Plan:     Dilated cardiomyopathy   LVEF noted to be 30-35% dating back to 7/21/2021 echo   Hypotension limits ability to add CHF medical therapy  Does not appear volume overloaded on examination  Diuresis on as needed basis  - outpatient ischemic evaluation pending improvement in renal function  - negative 9 L for admission      Paroxysmal atrial fibrillation   On Eliquis, rates controlled, hemodynamically  Stable-currently in atrial fibrillation with heart rates in the 80s.     Reported CAD-said he had a previous stent in the late 80s   No coronary angiogram is available for review, on atorvastatin 40 would continue  -Outpatient ischemic evaluation as above pending renal function improvement       Acute on chronic respiratory failure with hypercapnia (HCC) (1/6/2022)    Obstructive sleep apnea (1/8/2022)    Obesity hypoventilation syndrome (HCC) (1/8/2022)  Positive pressure ventilation, oxygen as needed.       Stage 3 chronic kidney disease ([facility] (11/22/2017)  Creatinine 1.8, appears to be near baseline       Controlled type 2 diabetes mellitus with diabetic polyneuropathy, without long-term current use of insulin ([facility] (1/5/2018)  Per medicine       Severe obesity (BMI 35.0-35.9 with comorbidity) ([facility] (10/10/2018)    - continue efforts towards dietary changes in an effort to lose weight    -We will check lab work for tomorrow morning including BMP and magnesium level. Borderline low blood pressures and already on Florinef so not in a position to be able to start cardiomyopathy meds including beta-blocker/ACE inhibitor/ARB/spironolactone etc. especially with additional chronic kidney disease.  -Checking chest x-ray especially since he is still on oxygen requirements although improved compared to when he first came in. Intake and output is reportedly matched.       Pat Phillips MD  1/14/2022 1:08 PM

## 2022-01-14 NOTE — PROGRESS NOTES
ACUTE PHYSICAL THERAPY GOALS:  (Developed with and agreed upon by patient and/or caregiver.)  STG:  (1.)Mr. Rivas will move from supine to sit and sit to supine , scoot up and down and roll side to side with CONTACT GUARD ASSIST within 4 treatment day(s). (2.)Mr. Rivas will transfer from bed to chair and chair to bed with CONTACT GUARD ASSIST using the least restrictive device within 4 treatment day(s). met 2022   (3.)Mr. Rivas will ambulate with CONTACT GUARD ASSIST for 50 feet with the least restrictive device within 4 treatment day(s).      LTG:  (1.)Mr. Rivas will move from supine to sit and sit to supine , scoot up and down and roll side to side in bed with STAND BY ASSIST within 7 treatment day(s). (2.)Mr. Rivas will transfer from bed to chair and chair to bed with STAND BY ASSIST using the least restrictive device within 7 treatment day(s). (3.)Mr. Rivas will ambulate with STAND BY ASSIST for 100 feet with the least restrictive device within 7 treatment day(s). PHYSICAL THERAPY: Daily Note and AM Treatment Day # 5    David Rodriguez is a 77 y.o. male   PRIMARY DIAGNOSIS: Acute respiratory failure with hypercapnia (HCC)  Acute on chronic respiratory failure with hypercapnia (HCC) [J96.22]         ASSESSMENT:     REHAB RECOMMENDATIONS: CURRENT LEVEL OF FUNCTION:  (Most Recently Demonstrated)   Recommendation to date pending progress:  [address] [place] [address]  Equipment:    To Be Determined Bed Mobility:   Not tested  Sit to Stand:   Contact Guard Assistance  Transfers:  [employer] Guard Assistance  Gait/Mobility:   CGA-Min assist     ASSESSMENT:  Mr. Chary Gil presents sitting EOB on 4L. 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 Patient is slightly impulsive throughout treatment needing cues for improved safety awareness. Patient relies heavily on arms for balance during gait/transfers.   Patient ambulates with flexed hips/knees-worked on quality of gait and standing balance. Patient progressing slowly, met one goal.  He had been working on progressing to gait with cane at home, but needs much work on standing balance now. Unable to obtain reading on pulse ox due to cold fingers? ?? No dyspnea noted. Will continue efforts.      SUBJECTIVE:   Mr. Javad Dior states, \"Okay\"    SOCIAL HISTORY/ LIVING ENVIRONMENT: See initial evaluation  Home Environment: Private residence  # Steps to Enter: 1  One/Two Story Residence: One story  Living Alone: No  Support Systems: Child(pat),Spouse/Significant Other  OBJECTIVE:     PAIN: VITAL SIGNS: LINES/DRAINS:   Pre Treatment: Pain Screen  Pain Scale 1: Numeric (0 - 10)  Pain Intensity 1: 0  Post Treatment: 0   None  O2 Device: Nasal cannula     MOBILITY: I Mod I S SBA CGA Min Mod Max Total  NT x2 Comments:   Bed Mobility    Rolling [] [] [] [] [] [] [] [] [] [] []    Supine to Sit [] [] [] [] [] [] [] [] [] [] []    Scooting [] [] [] [] [] [] [] [] [] [] []    Sit to Supine [] [] [] [] [] [] [] [] [] [] []    Transfers    Sit to Stand [] [] [] [] [x] [] [] [] [] [] []    Bed to Chair [] [] [] [] [x] [] [] [] [] [] []    Stand to Sit [] [] [] [] [x] [] [] [] [] [] []    I=Independent, Mod I=Modified Independent, S=Supervision, SBA=Standby Assistance, CGA=Contact Guard Assistance,   Min=Minimal Assistance, Mod=Moderate Assistance, Max=Maximal Assistance, Total=Total Assistance, NT=Not Tested    BALANCE: Good Fair+ Fair Fair- Poor NT Comments   Sitting Static [x] [] [] [] [] []    Sitting Dynamic [] [x] [] [] [] []              Standing Static [] [] [] [x] [] []    Standing Dynamic [] [] [] [] [x] []      GAIT: I Mod I S SBA CGA Min Mod Max Total  NT x2 Comments:   Level of Assistance [] [] [] [] [x] [x] [] [] [] [] []    Distance 76'     DME Rolling Walker and gait belt    Gait Quality Flexed trunk, flexed hips/knees, wide RAVINDRA, short steps    Weightbearing  Status N/A     I=Independent, Mod I=Modified Independent, S=Supervision, SBA=Standby Assistance, CGA=Contact Guard Assistance,   Min=Minimal Assistance, Mod=Moderate Assistance, Max=Maximal Assistance, Total=Total Assistance, NT=Not Tested    PLAN:   FREQUENCY/DURATION: PT Plan of Care: 3 times/week for duration of hospital stay or until stated goals are met, whichever comes first.  TREATMENT:     TREATMENT:   ($$ Therapeutic Activity: 38-52 mins    )  Therapeutic Activity (38 Minutes): Therapeutic activity included Transfer Training, Ambulation on level ground, Sitting balance , Standing balance and education to improve functional Mobility, Strength and Activity tolerance. Worked on extending knees/hips in standing, weight shift Shandra in standing, trunk rotation in stand, and sit to stand x10.     TREATMENT GRID:  N/A    AFTER TREATMENT POSITION/PRECAUTIONS:  Chair, Needs within reach, RN notified and Visitors at bedside    INTERDISCIPLINARY COLLABORATION:  RN/PCT, PT/PTA and RN Case Manager/     TOTAL TREATMENT DURATION:  PT Patient Time In/Time Out  Time In: 0930  Time Out: 33 Ghassane Jass Snyder, PT, DPT

## 2022-01-14 NOTE — PROGRESS NOTES
[facility] Hospitalist Progress Notes   Admit Date:  2022 11:38 AM   Name:  Washington Morilol   Age:  77 y.o. Sex:  male  :  1945   MRN:  [id number]   Room:      Presenting Complaint: Respiratory Distress    Reason(s) for Admission: Acute on chronic respiratory failure with hypercapnia [facility]) [J96.22]     Hospitalists consulted by Intensivist for assuming care as pt transferred out of ICU. History of Presenting Illness:   Washington Morillo is a 77 y.o. male with history of A. fib, acute on chronic respiratory failure with hypoxia and hypercapnia, acute respiratory failure with hypercapnia, atrial fibrillation with RVR, coronary artery disease, chronic kidney disease, on long-term anticoagulation, diabetes status post right BKA, hypertension, hypercholesterolemia, left ventricular dysfunction, neuropathy, systolic CHF and a history of Staph aureus bacteremia who was admitted 2022 secondary to acute hypoxic and hypercapnic respiratory failure requiring intubation. He was admitted to the pulmonology service. Of note the patient has been concerning for obstructive sleep apnea for some time and was recommended for outpatient sleep studies but had not had them done yet. As an outpatient he is on Florinef for his hypotension, and he had a echo in July that showed an EF of 30 to 35%. The patient was admitted by the intensivist service to the ICU. He was successfully extubated on 2021 he has been stable status post extubation with the pulmonology service working on getting him patient lives with outpatient. Uses BiPAP at night and air Vo during the day. Patient admitted with acute on chronic hypoxic and hypercapnic respiratory failure. Was treated with antibiotics briefly but major issue is thought to be obesity hypoventilation syndrome/ELAINA. Patient was extubated and currently on air Vo during daytime and BiPAP at night.   Transferred to medicine service as primary as patient is on medical floor. 1/14 patient is needing 4L Oxygen with ambulation. Pt and family agreeable to rehab. No chest pain. No fever. Assessment & Plan: This is a 77y male with:    #  Acute respiratory failure with hypercapnia ([facility] (1/6/2022)  Patient is currently needing 4 L Oxygen NC with ambulation and BiPAP at night  Weaning him down slowly. Pulmonary on board. Appreciate recommendations. Patient qualified for trilogy at home. Aspiration and fall precautions. #  Stage 3 chronic kidney disease ([facility] (11/22/2017)  Creatinine at baseline 1.8. Will monitor. #  Controlled type 2 diabetes mellitus with diabetic polyneuropathy, without long-term current use of insulin ([facility] (1/5/2018)  Continue to monitor blood sugars and cover with insulin    #Hypotension: Unclear etiology. Cortisol level , TSH, wnl. Continue fludrocortisone. Midodrine dced 1/13    #Severe obesity (BMI 35.0-35.9 with comorbidity) ([facility] (10/10/2018)  Affected with abnormal weight. Will need education along the room. #Chronic systolic heart KWMIWEO:(6/6/8747)  ECHO repeated 1/11 shows EF of 30 to 35%  No active issues at this time  Continue current medical management  Cardiology on board. Appreciate recs. Cardiology recommends outpatient evaluation- ischemic work up. Hypotension/Borderline BP limiting other treatment options. Paroxysmal Atrial fibrillation:      Rate controlled at present. Continue Eliquis. Acute urinary retention:  Flomax. Attempt to remove Pineda catheter today. Monitor for urinary retention. DISPO: STR pending acceptance. Daughter at bedside updated regarding current plan of care.     Hospital Problems as of 1/14/2022 Date Reviewed: 9/14/2021          Codes Class Noted - Resolved POA    Obstructive sleep apnea ICD-10-CM: G47.33  ICD-9-CM: 327.23  1/8/2022 - Present Yes        Obesity hypoventilation syndrome ([facility] ICD-10-CM: W26.2  ICD-9-CM: 278.03  1/8/2022 - Present Yes Decreased cardiac ejection fraction (Chronic) ICD-10-CM: R93.1  ICD-9-CM: 794.39  1/6/2022 - Present Yes        Acute on chronic respiratory failure with hypercapnia (HCC) ICD-10-CM: X70.23  ICD-9-CM: 518.84  1/6/2022 - Present Yes        Severe obesity (BMI 35.0-35.9 with comorbidity) (HCC) (Chronic) ICD-10-CM: E66.01, Z68.35  ICD-9-CM: 278.01, V85.35  10/10/2018 - Present Yes        Controlled type 2 diabetes mellitus with diabetic polyneuropathy, without long-term current use of insulin (HCC) (Chronic) ICD-10-CM: E11.42  ICD-9-CM: 250.60, 357.2  1/5/2018 - Present Yes        Stage 3 chronic kidney disease ([facility] (Chronic) ICD-10-CM: N18.30  ICD-9-CM: 585.3  11/22/2017 - Present Yes        * (Principal) RESOLVED: Acute respiratory failure with hypercapnia (HCC) ICD-10-CM: J96.02  ICD-9-CM: 518.81  1/6/2022 - 1/11/2022 Yes        RESOLVED: Hypotension ICD-10-CM: I95.9  ICD-9-CM: 458.9  1/6/2022 - 1/11/2022 Yes        RESOLVED: Suspected sleep apnea ICD-10-CM: A04.980  ICD-9-CM: 781.99  8/20/2021 - 1/8/2022 Yes              Current Facility-Administered Medications   Medication Dose Route Frequency    tuberculin injection 5 Units  5 Units IntraDERMal ONCE    [START ON 1/15/2022] tamsulosin (FLOMAX) capsule 0.4 mg  0.4 mg Oral DAILY    insulin lispro (HUMALOG) injection   SubCUTAneous TIDAC    famotidine (PEPCID) tablet 20 mg  20 mg Oral DAILY    pregabalin (LYRICA) capsule 200 mg  200 mg Oral DAILY    pregabalin (LYRICA) capsule 400 mg  400 mg Oral QHS    ferrous sulfate tablet 325 mg  1 Tablet Oral DAILY WITH BREAKFAST    acetaminophen (TYLENOL) tablet 650 mg  650 mg Oral Q6H PRN    fludrocortisone (FLORINEF) tablet 0.1 mg  0.1 mg Oral DAILY    polyethylene glycol (MIRALAX) packet 17 g  17 g Oral DAILY    sodium chloride (NS) flush 5-40 mL  5-40 mL IntraVENous Q8H    sodium chloride (NS) flush 5-40 mL  5-40 mL IntraVENous PRN    apixaban (ELIQUIS) tablet 5 mg  5 mg Oral Q12H    atorvastatin (LIPITOR) tablet 40 mg  40 mg Oral QHS    [Held by provider] midodrine (PROAMATINE) tablet 10 mg  10 mg Oral Q8H       Objective:     Patient Vitals for the past 24 hrs:   Temp Pulse Resp BP SpO2   01/14/22 1512 97.2 °F (36.2 °C) 98 18 126/81 96 %   01/14/22 1127  (!) 101  109/64    01/14/22 1124 98.1 °F (36.7 °C) 96 20 99/79 93 %   01/14/22 0804     97 %   01/14/22 0753  88      01/14/22 0725 97.2 °F (36.2 °C) 95 20 (!) 106/92 97 %   01/14/22 0331 97.9 °F (36.6 °C) 97 18 94/68 94 %   01/14/22 0207     90 %   01/13/22 2340 97.7 °F (36.5 °C) 88 20 116/64 90 %   01/13/22 2244     98 %   01/13/22 1927 97.4 °F (36.3 °C) 75 20 90/61 97 %     Oxygen Therapy  O2 Sat (%): 96 % (01/14/22 1512)  Pulse via Oximetry: 87 beats per minute (01/14/22 0804)  O2 Device: Nasal cannula (01/14/22 0804)  Skin Assessment: Clean, dry, & intact (01/14/22 0207)  Skin Protection for O2 Device: No (01/13/22 2244)  Orientation: Bilateral (01/07/22 0837)  Location: Cheek (01/07/22 7841)  Interventions: Mouth Care (01/07/22 0800)  O2 Flow Rate (L/min): 5 l/min (01/14/22 0804)  O2 Temperature: 87.8 °F (31 °C) (01/10/22 1507)  FIO2 (%): 40 % (01/14/22 0207)    Estimated body mass index is 38.88 kg/m² as calculated from the following:    Height as of this encounter: 5' 10\" (1.778 m). Weight as of this encounter: 122.9 kg (271 lb). Intake/Output Summary (Last 24 hours) at 1/14/2022 1605  Last data filed at 1/14/2022 1343  Gross per 24 hour   Intake 822 ml   Output 1125 ml   Net -303 ml         Physical Exam:    Blood pressure 126/81, pulse 98, temperature 97.2 °F (36.2 °C), resp. rate 18, height 5' 10\" (1.778 m), weight 122.9 kg (271 lb), SpO2 96 %. General:     morbidly obese, no overt distress on 4-5L Oxygen NC,   Head:  Normocephalic, atraumatic  Eyes:  Sclerae appear normal.  Pupils equally round. ENT:  Nares appear normal, no drainage. Moist oral mucosa  Neck:  Supple   CV:   RRR. No m/r/g.   JVD examination limited  Lungs: Decreased entry bilateral lower lobe otherwise CTAB. No wheezing, rhonchi, or rales. Respirations even, unlabored  Abdomen: Bowel sounds present. Soft, nontender, nondistended. Extremities: No cyanosis or clubbing. No edema; right BKA [no issues with stump.]  Skin:     No rashes and normal coloration. Warm and dry. Neuro:  AOx3. Moving all 4 extremities. Speech normal.  Psych:  Normal mood and affect. I have reviewed ordered lab tests and independently visualized imaging below:    Recent Labs:    Procedures done this admission:  * No surgery found *    All Micro Results     None          SARS-CoV-2 Lab Results  \"Novel Coronavirus\" Test: No results found for: COV2NT   \"Emergent Disease\" Test: No results found for: EDPR  \"SARS-COV-2\" Test: No results found for: XGCOVT  \"Precision Labs\" Test: No results found for: RSLT  Rapid Test: No results found for: COVR         Labs: Results:       BMP, Mg, Phos Recent Labs     01/13/22  0547      K 4.1      CO2 37*   AGAP 4*   BUN 36*   CREA 1.82*   CA 9.6   GLU 89      CBC Recent Labs     01/13/22  1336   WBC 6.7   RBC 4.56   HGB 12.7*   HCT 42.8      GRANS 51   LYMPH 33   EOS 3   MONOS 12   BASOS 1   IG 0   ANEU 3.4   ABL 2.2   TIERNEY 0.2   ABM 0.8   ABB 0.1   AIG 0.0      LFT No results for input(s): ALT, TBIL, AP, TP, ALB, GLOB, AGRAT in the last 72 hours.     No lab exists for component: SGOT, GPT   Cardiac Testing No results found for: BNPP, BNP, CPK, RCK1, RCK2, RCK3, RCK4, CKMB, CKNDX, CKND1, TROPT, TROIQ   Coagulation Tests Lab Results   Component Value Date/Time    aPTT 34.9 08/18/2021 06:56 PM    aPTT 31.7 07/19/2021 09:24 PM      A1c Lab Results   Component Value Date/Time    Hemoglobin A1c 6.2 01/09/2022 02:53 AM    Hemoglobin A1c 6.7 (H) 07/13/2021 08:34 AM    Hemoglobin A1c 6.2 (H) 01/13/2021 08:27 AM      Lipid Panel Lab Results   Component Value Date/Time    Cholesterol, total 156 07/13/2021 08:34 AM    HDL Cholesterol 39 (L) 07/13/2021 08:34 AM    LDL, calculated 83 07/13/2021 08:34 AM    LDL, calculated 83 06/11/2020 09:16 AM    VLDL, calculated 34 07/13/2021 08:34 AM    VLDL, calculated 42 (H) 06/11/2020 09:16 AM    Triglyceride 202 (H) 07/13/2021 08:34 AM      Thyroid Panel Lab Results   Component Value Date/Time    TSH 1.240 01/12/2022 05:40 AM    TSH 0.655 07/19/2021 01:46 PM    T4, Free 1.0 01/12/2022 05:40 AM    T4, Free 1.4 07/19/2021 01:46 PM        Most Recent UA Lab Results   Component Value Date/Time    WBC 0-3 07/19/2021 01:26 PM    RBC 5-10 07/19/2021 01:26 PM    Epithelial cells 0-3 07/19/2021 01:26 PM    Bacteria 0 07/19/2021 01:26 PM    Casts 10-20 07/19/2021 01:26 PM            Other Studies:  No results found. Signed:  Finesse Grant MD    Part of this note may have been written by using a voice dictation software. The note has been proof read but may still contain some grammatical/other typographical errors.

## 2022-01-14 NOTE — ADT AUTH CERT NOTES
INPATIENT CONCURRENT STAY by Lina Denney Entered Review Status   1/11/2022 14:48 In Primary      Criteria Review   Daily Progress Note: 1/11/2022     On 5 lpm, echo today. Has his prosthesis on and has been walking around. Denies being SOB     EXAM:  GEN: obese.   HEENT:no alar flaring or epistaxis, oral mucosa moist without cyanosis,   NECK:  no JVD, no retractions, no thyromegaly or masses,   LUNGS:  Decreased bases on NC  HEART:  RRR with no M,G,R;  ABDOMEN:  soft with no tenderness; positive bowel sounds present  EXTREMITIES:  warm with no cyanosis, no lower leg edema on left, right prosthesis. SKIN:  no jaundice or ecchymosis   NEURO: A & O X 3     T 97.2, BP 93/70, P , R 20, 02 SAT 96% 5L HFNC     C02 36, ANION GAP 4, BUN 31, CREAT 1.73, GFRNAA 41     ECHO:  Left Ventricle: Left ventricle is moderately dilated. Normal wall thickness. See diagram for wall motion findings. Severely reduced left ventricular systolic function with a visually estimated EF of 30 - 35%.   Mitral Valve: Mild transvalvular regurgitation.   Left Atrium: Left atrium is moderately dilated.   Right Atrium: Right atrium is mildly dilated.     Contrast used: Definity.     Assessment/Plan:        Acute respiratory failure with hypercapnia ([address] (1/6/2022)  Improved.   Stage 3 chronic kidney disease ([address] (11/22/2017)       Severe obesity (BMI 35.0-35.9 with comorbidity) ([address] (15/53/8269)   chronic,complicated medical care       Decreased cardiac ejection fraction (1/6/2022)  30-35%       Acute on chronic respiratory failure with hypercapnia ([address] (1/6/2022)  On 5 lpm with 97%       Obstructive sleep apnea (1/8/2022)    Obesity hypoventilation syndrome ([address] (1/8/2022)  ELAINA -- home sleep study in November with Severe ELAINA:  AHI in 54.8 and over 240 minutes with saturation <89%.  - Fayrene Miguelas [phone] from [facility] came and needs spirometry to qualify for trilogy since has severe elaina and CO2 Margy Fears, he will need office PSG at time of discharge.   -he qualifies for a machine     Due to the severity and nature of this patient's chronic respiratory failure, patient requires noninvasive ventilation with volume support.  Home Bipap insufficient due to severity of patient's condition. This patient requires access to ventilator therapy for nocturnal and daytime use to reduce risk of exacerbation.    Absence of noninvasive ventilator therapy on a daily basis will quickly lead to severe injury or death from the resulting CO2 retention without this level of respiratory support.         [employer] Hospitalist Progress Notes  Patient was extubated and currently on air Vo during daytime and BiPAP at night.  Transferred to medicine service as primary as patient is on medical floor.     Patient is AOx3.  As per patient he feels better than yesterday.  Denies any nausea vomiting, chest pain or palpitations.     Assessment & Plan:         #  Acute respiratory failure with hypercapnia ([facility] (1/6/2022)  Resolving patient is now on airvo during the day and BiPAP at night  Weaning him down slowly     January 11: Will give 1 dose of Lasix today. Susanne Palm will order echo.  Pulmonary on board.  Will eventually need noninvasive ventilator at home and pulmonary working on it.   Aspiration and fall precautions.     #  Stage 3 chronic kidney disease ([facility] (11/22/2017)  No active issues will monitor     January 11: Creatinine at baseline.  Will monitor.     #  Controlled type 2 diabetes mellitus with diabetic polyneuropathy, without long-term current use of insulin ([facility] (1/5/2018)  Continue to monitor blood sugars and cover with insulin     January 11: Sliding scale insulin ordered.  HbA1c ordered.     #Hypotension: Unclear etiology.  Cortisol level in the morning ordered.  TSH ordered continue fludrocortisone and midodrine.        #Severe obesity (BMI 35.0-35.9 with comorbidity) ([facility] (10/10/2018)  Affected with abnormal weight.  Will need education along the room.     #Chronic systolic heart [id number]:(5/1/6233)  Most recent echo in July 2021 showed an EF of 30 to 35%  No active issues at this time  Continue current medical management  Low threshold for cardiology input if clinically indicated     January 11: We will check cardiac echo.  1 dose of Lasix.  Remote telemetry.  Cardiology consulted     Atrial fibrillation:  Per chart review, patient has atrial fibrillation.  Rate controlled at present.  Continue Eliquis.        CARDIOLOGY CONSULT:     Assessment/Plan:      Principal Problem:    Acute on chronic respiratory failure with hypercapnia ([facility] (1/6/2022)  - on 5L Hi flow NC  - CXR- bibasilar infiltrate/atelectasis  - treated with antibiotics briefly but major issue is thought to be obesity hypoventilation syndrome/ELAINA  - pulmonary following  - per primary      Active Problems:    Chronic systolic heart failure/Cardiomyopathy   - ECHO 7/2021- EF 30-35%  - appears euvolemic   - Prior deferred consideration for ACE inhibitor/ARB's with acute on chronic renal failure. Improved currently and add on as blood pressures tolerate.   - No BB secondary to hypotension.  Add as blood pressure tolerated.   - needs ischemic work-up which can be addressed on an outpatient basis  - ECHO ordered        Atrial fibrillation  - unknown duration  - rate currently controlled  -[id number] score of ~5  - on Eliqius for CVA protection        Stage 3 chronic kidney disease ([facility] (11/22/2017)  - per primary        Controlled type 2 diabetes mellitus with diabetic polyneuropathy, without long-term current use of insulin ([facility] (1/5/2018)  - SSI   - per primary        Hypotension (1/6/2022)   - on midodrine and florinef    - monitor        Obstructive sleep apnea/Obesity hypoventilation syndrome  - BIPAP qhs  - per pulmonary         Severe obesity (BMI 35.0-35.9 with comorbidity) ([facility] (10/10/2018)        SPEECH LANGUAGE PATHOLOGY: DYSPHAGIA- Daily Note 2     Patient continues to present with concerns for pharyngeal dysphagia. Functional oral prep and clearing with soft fruit. No overt s/sx airway compromise with thin by single straw sips or fruit; however, delayed cough following trials of thin by cup. Limited assessment of diet tolerance as staff member arrived at bedside to complete echo.       Recommend easy to chew diet and thin liquids. Single small sips via straw. Patient now off airvo.  Will plan for modified barium swallow study tomorrow, 1/12, to objectively assess oropharyngeal swallow function.            ELIQUIS 5MG PO Q  12 HR, LIPITOR 40MG PO Q HS, PEPCID 20MG PO QD, FERROUS SULFATE 325MG PO QD, FLORINEF 0.1MG PO QD, PROAMITINE 10MG PO Q 8 HR, LYRICA 200MG PO QD, LASIX 40MG IV X1,

## 2022-01-14 NOTE — PROGRESS NOTES
LOS 7d    Chart reviewed by [facility] and discussed in IDR. Patient remains on [facility]. RNCM met with patient's daughter to discuss goals of care. Family and patient in agreement to pursue SNF at discharge. Referral made to [facility]. PPD ordered. CM will continue to follow for needs.

## 2022-01-14 NOTE — PROGRESS NOTES
END OF SHIFT NOTE:    INTAKE/OUTPUT  01/13 0701 - 01/14 0700  In: 26 [P. O.:702]  Out: 1300 [Urine:1300]  Voiding: NO  Catheter: NO, neil removed @ 1343  Drain:              Flatus: Patient does have flatus present. Stool:  0 occurrences. Characteristics:    Emesis: 0 occurrences. Characteristics:        VITAL SIGNS  Patient Vitals for the past 12 hrs:   Temp Pulse Resp BP SpO2   01/14/22 1512 97.2 °F (36.2 °C) 98 18 126/81 96 %   01/14/22 1127  (!) 101  109/64    01/14/22 1124 98.1 °F (36.7 °C) 96 20 99/79 93 %   01/14/22 0804     97 %   01/14/22 0753  88      01/14/22 0725 97.2 °F (36.2 °C) 95 20 (!) 106/92 97 %       Pain Assessment  Pain Intensity 1: 0 (01/14/22 1328)        Patient Stated Pain Goal: 0    Ambulating  Yes    Shift report given to oncoming nurse at the bedside.     Genoveva Li RN

## 2022-01-14 NOTE — PROGRESS NOTES
Problem: Falls - Risk of  Goal: *Absence of Falls  Description: Document Cantil Fall Risk and appropriate interventions in the flowsheet. 1/14/2022 4762 by Demian Ware RN  Outcome: Progressing Towards Goal  Note: Fall Risk Interventions:  Mobility Interventions: Communicate number of staff needed for ambulation/transfer,Patient to call before getting OOB    Mentation Interventions: Adequate sleep, hydration, pain control,Bed/chair exit alarm,Door open when patient unattended,Evaluate medications/consider consulting pharmacy,Eyeglasses and hearing aids    Medication Interventions: Patient to call before getting OOB,Teach patient to arise slowly    Elimination Interventions: Call light in reach           1/14/2022 0630 by Demian Ware RN  Outcome: Progressing Towards Goal  Note: Fall Risk Interventions:  Mobility Interventions: Communicate number of staff needed for ambulation/transfer,Patient to call before getting OOB    Mentation Interventions: Adequate sleep, hydration, pain control,Bed/chair exit alarm,Door open when patient unattended,Evaluate medications/consider consulting pharmacy,Eyeglasses and hearing aids    Medication Interventions: Patient to call before getting OOB,Teach patient to arise slowly    Elimination Interventions: Call light in reach              Problem: Gas Exchange - Impaired  Goal: *Absence of hypoxia  1/14/2022 0632 by Demian Ware RN  Outcome: Progressing Towards Goal  1/14/2022 0630 by Demian Ware RN  Outcome: Progressing Towards Goal     Problem: Patient Education: Go to Patient Education Activity  Goal: Patient/Family Education  1/14/2022 0632 by Demian Ware RN  Outcome: Progressing Towards Goal  1/14/2022 0630 by Demian Ware RN  Outcome: Progressing Towards Goal     Problem: Pressure Injury - Risk of  Goal: *Prevention of pressure injury  Description: Document Julio Cesar Scale and appropriate interventions in the flowsheet.   1/14/2022 2516 by June Castelan RN  Outcome: Progressing Towards Goal  Note: Pressure Injury Interventions:  Sensory Interventions: Assess changes in LOC,Minimize linen layers,Monitor skin under medical devices    Moisture Interventions: Absorbent underpads,Internal/External urinary devices,Minimize layers    Activity Interventions: Increase time out of bed,Pressure redistribution bed/mattress(bed type),PT/OT evaluation    Mobility Interventions: Pressure redistribution bed/mattress (bed type),HOB 30 degrees or less    Nutrition Interventions: Document food/fluid/supplement intake    Friction and Shear Interventions: Apply protective barrier, creams and emollients,Feet elevated on foot rest,Foam dressings/transparent film/skin sealants,HOB 30 degrees or less             1/14/2022 0630 by June Castelan RN  Outcome: Progressing Towards Goal  Note: Pressure Injury Interventions:  Sensory Interventions: Assess changes in LOC,Minimize linen layers,Monitor skin under medical devices    Moisture Interventions: Absorbent underpads,Internal/External urinary devices,Minimize layers    Activity Interventions: Increase time out of bed,Pressure redistribution bed/mattress(bed type),PT/OT evaluation    Mobility Interventions: Pressure redistribution bed/mattress (bed type),HOB 30 degrees or less    Nutrition Interventions: Document food/fluid/supplement intake    Friction and Shear Interventions: Apply protective barrier, creams and emollients,Feet elevated on foot rest,Foam dressings/transparent film/skin sealants,HOB 30 degrees or less                Problem: Patient Education: Go to Patient Education Activity  Goal: Patient/Family Education  1/14/2022 0632 by June Castelan RN  Outcome: Progressing Towards Goal  1/14/2022 0630 by June Castelan RN  Outcome: Progressing Towards Goal     Problem: Patient Education: Go to Patient Education Activity  Goal: Patient/Family Education  1/14/2022 0632 by June Castelan RN  Outcome: Progressing Towards Goal  1/14/2022 0630 by June Castelan RN  Outcome: Progressing Towards Goal

## 2022-01-14 NOTE — PROGRESS NOTES
Problem: Gas Exchange - Impaired  Goal: *Absence of hypoxia  Outcome: Progressing Towards Goal     Problem: Patient Education: Go to Patient Education Activity  Goal: Patient/Family Education  Outcome: Progressing Towards Goal     Problem: Pressure Injury - Risk of  Goal: *Prevention of pressure injury  Description: Document Julio Cesar Scale and appropriate interventions in the flowsheet. Outcome: Progressing Towards Goal  Note: Pressure Injury Interventions:  Sensory Interventions: Assess changes in LOC,Minimize linen layers,Monitor skin under medical devices    Moisture Interventions: Absorbent underpads,Internal/External urinary devices,Minimize layers    Activity Interventions: Increase time out of bed,Pressure redistribution bed/mattress(bed type)    Mobility Interventions: Pressure redistribution bed/mattress (bed type)    Nutrition Interventions: Document food/fluid/supplement intake,Offer support with meals,snacks and hydration    Friction and Shear Interventions: Apply protective barrier, creams and emollients,Feet elevated on foot rest,Foam dressings/transparent film/skin sealants,HOB 30 degrees or less                Problem: Patient Education: Go to Patient Education Activity  Goal: Patient/Family Education  Outcome: Progressing Towards Goal     Problem: Falls - Risk of  Goal: *Absence of Falls  Description: Document Beronica Fall Risk and appropriate interventions in the flowsheet.   Outcome: Progressing Towards Goal  Note: Fall Risk Interventions:  Mobility Interventions: Communicate number of staff needed for ambulation/transfer,Patient to call before getting OOB,Utilize walker, cane, or other assistive device    Mentation Interventions: Adequate sleep, hydration, pain control,Bed/chair exit alarm,Door open when patient unattended,Evaluate medications/consider consulting pharmacy,Eyeglasses and hearing aids    Medication Interventions: Patient to call before getting OOB,Teach patient to arise slowly    Elimination Interventions: Call light in reach,Patient to call for help with toileting needs,Urinal in reach              Problem: Patient Education: Go to Patient Education Activity  Goal: Patient/Family Education  Outcome: Progressing Towards Goal     Problem: Patient Education: Go to Patient Education Activity  Goal: Patient/Family Education  Outcome: Progressing Towards Goal

## 2022-01-15 LAB
ANION GAP SERPL CALC-SCNC: 3 MMOL/L (ref 7–16)
BUN SERPL-MCNC: 32 MG/DL (ref 8–23)
CALCIUM SERPL-MCNC: 9.5 MG/DL (ref 8.3–10.4)
CHLORIDE SERPL-SCNC: 104 MMOL/L (ref 98–107)
CO2 SERPL-SCNC: 38 MMOL/L (ref 21–32)
CREAT SERPL-MCNC: 1.49 MG/DL (ref 0.8–1.5)
GLUCOSE BLD STRIP.AUTO-MCNC: 111 MG/DL (ref 65–100)
GLUCOSE BLD STRIP.AUTO-MCNC: 86 MG/DL (ref 65–100)
GLUCOSE BLD STRIP.AUTO-MCNC: 97 MG/DL (ref 65–100)
GLUCOSE SERPL-MCNC: 98 MG/DL (ref 65–100)
MAGNESIUM SERPL-MCNC: 2.2 MG/DL (ref 1.8–2.4)
MM INDURATION POC: 0 MM (ref 0–5)
POTASSIUM SERPL-SCNC: 4 MMOL/L (ref 3.5–5.1)
PPD POC: NEGATIVE NEGATIVE
SERVICE CMNT-IMP: ABNORMAL
SERVICE CMNT-IMP: NORMAL
SERVICE CMNT-IMP: NORMAL
SODIUM SERPL-SCNC: 145 MMOL/L (ref 138–145)

## 2022-01-15 PROCEDURE — 65660000000 HC RM CCU STEPDOWN

## 2022-01-15 PROCEDURE — 74011250637 HC RX REV CODE- 250/637: Performed by: INTERNAL MEDICINE

## 2022-01-15 PROCEDURE — 83735 ASSAY OF MAGNESIUM: CPT

## 2022-01-15 PROCEDURE — 94660 CPAP INITIATION&MGMT: CPT

## 2022-01-15 PROCEDURE — 36415 COLL VENOUS BLD VENIPUNCTURE: CPT

## 2022-01-15 PROCEDURE — 99232 SBSQ HOSP IP/OBS MODERATE 35: CPT | Performed by: INTERNAL MEDICINE

## 2022-01-15 PROCEDURE — 80048 BASIC METABOLIC PNL TOTAL CA: CPT

## 2022-01-15 PROCEDURE — 82962 GLUCOSE BLOOD TEST: CPT

## 2022-01-15 PROCEDURE — 74011250637 HC RX REV CODE- 250/637: Performed by: HOSPITALIST

## 2022-01-15 PROCEDURE — 74011000250 HC RX REV CODE- 250: Performed by: INTERNAL MEDICINE

## 2022-01-15 RX ORDER — FUROSEMIDE 40 MG/1
40 TABLET ORAL DAILY
Status: DISCONTINUED | OUTPATIENT
Start: 2022-01-15 | End: 2022-01-18

## 2022-01-15 RX ADMIN — FAMOTIDINE 20 MG: 20 TABLET ORAL at 08:48

## 2022-01-15 RX ADMIN — APIXABAN 5 MG: 5 TABLET, FILM COATED ORAL at 21:23

## 2022-01-15 RX ADMIN — TAMSULOSIN HYDROCHLORIDE 0.4 MG: 0.4 CAPSULE ORAL at 08:48

## 2022-01-15 RX ADMIN — PREGABALIN 400 MG: 150 CAPSULE ORAL at 21:22

## 2022-01-15 RX ADMIN — ATORVASTATIN CALCIUM 40 MG: 40 TABLET, FILM COATED ORAL at 21:23

## 2022-01-15 RX ADMIN — SODIUM CHLORIDE, PRESERVATIVE FREE 10 ML: 5 INJECTION INTRAVENOUS at 21:23

## 2022-01-15 RX ADMIN — FLUDROCORTISONE ACETATE 0.1 MG: 0.1 TABLET ORAL at 08:48

## 2022-01-15 RX ADMIN — FERROUS SULFATE TAB 325 MG (65 MG ELEMENTAL FE) 325 MG: 325 (65 FE) TAB at 08:48

## 2022-01-15 RX ADMIN — FUROSEMIDE 40 MG: 40 TABLET ORAL at 17:02

## 2022-01-15 RX ADMIN — SODIUM CHLORIDE, PRESERVATIVE FREE 10 ML: 5 INJECTION INTRAVENOUS at 13:52

## 2022-01-15 RX ADMIN — POLYETHYLENE GLYCOL 3350 17 G: 17 POWDER, FOR SOLUTION ORAL at 08:47

## 2022-01-15 RX ADMIN — SODIUM CHLORIDE, PRESERVATIVE FREE 10 ML: 5 INJECTION INTRAVENOUS at 06:01

## 2022-01-15 RX ADMIN — APIXABAN 5 MG: 5 TABLET, FILM COATED ORAL at 08:48

## 2022-01-15 RX ADMIN — PREGABALIN 200 MG: 50 CAPSULE ORAL at 08:48

## 2022-01-15 NOTE — PROGRESS NOTES
Comprehensive Nutrition Assessment    Type and Reason for Visit: Initial,RD nutrition re-screen/LOS  LOS Day 9    Nutrition Recommendations/Plan:    Continue with current diet   Start Glucerna twice daily     Nutrition Assessment:   Nutrition History: Patient reports he has mostly been in hospitals or rehab for last 6 months and starting to get tried of the food. 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 Patient presented with acute respiratory failure and needed intubation in ER, extubated 1/7. Daily Update:  Spoke with patient who reports that when he first came to hospital he was eating good but then started to get tried of the food and disliked the options and easy to chew texture for certain items. He reports he has been working with the dining associate to help pick more items he likes and will eat. Discussed that at this time I can not change the texture restriction that must come from MD or SLP. He was open to glucerna at this time.      Nutrition Related Findings:   Last BM 1/12, watery      Current Nutrition Therapies:  ADULT DIET Easy to Chew; 3 carb choices (45 gm/meal)    Current Intake:   Average Meal Intake: 26-50% Average Supplement Intake: None ordered      Anthropometric Measures:  Height: 5' 10\" (177.8 cm)  Current Body Wt: 122.7 kg (270 lb 8.1 oz) (1/15), Weight source: Bed scale  BMI: 38.8, Obese class 3 (BMI 40.0 or greater)  Admission Body Weight: 266 lb 5.1 oz (bed)  Ideal Body Weight (lbs) (Calculated): 166 lbs (75 kg), 163 %  Usual Body Wt:  , Percent weight change:    Adjusted BMI (Calculated): 41.1 Percent Weight Adjustment: 5.9 - BKA  Adjusted Ideal Body Weight (Calculated): 156.2  Edema: No data recorded   Estimated Daily Nutrient Needs:  Energy (kcal/day): 2112-2695 (Kcal/kg (25-30), Weight Used: Ideal (75 kg))  Protein (g/day): 75-90 (1-1.2 g/kg) Weight Used: (Ideal)  Fluid (ml/day):   (1 ml/kcal)    Nutrition Diagnosis:   · Inadequate oral intake related to biting/chewing (masticatory) difficulty (reduced appetite) as evidenced by intake 26-50% (food texture and preferences)    Nutrition Interventions:   Food and/or Nutrient Delivery: Continue current diet,Start oral nutrition supplement     Coordination of Nutrition Care: Continue to monitor while inpatient    Goals: Active Goal: Intake >75% of nutritional needs by follow up    Nutrition Monitoring and Evaluation:      Food/Nutrient Intake Outcomes: Food and nutrient intake,Supplement intake  Physical Signs/Symptoms Outcomes: Chewing or swallowing    Discharge Planning:     Too soon to determine    Electronically signed by Sirena Mckeon MS, RD, LD on 1/15/2022 at 11:15 AM.    Disaster Mode Active

## 2022-01-15 NOTE — PROGRESS NOTES
Problem: Gas Exchange - Impaired  Goal: *Absence of hypoxia  Outcome: Progressing Towards Goal     Problem: Patient Education: Go to Patient Education Activity  Goal: Patient/Family Education  Outcome: Progressing Towards Goal     Problem: Pressure Injury - Risk of  Goal: *Prevention of pressure injury  Description: Document Julio Cesar Scale and appropriate interventions in the flowsheet. Outcome: Progressing Towards Goal  Note: Pressure Injury Interventions:  Sensory Interventions: Assess changes in LOC,Minimize linen layers,Monitor skin under medical devices    Moisture Interventions: Absorbent underpads,Internal/External urinary devices,Minimize layers    Activity Interventions: Increase time out of bed,Pressure redistribution bed/mattress(bed type)    Mobility Interventions: Pressure redistribution bed/mattress (bed type),PT/OT evaluation    Nutrition Interventions: Document food/fluid/supplement intake,Offer support with meals,snacks and hydration    Friction and Shear Interventions: Apply protective barrier, creams and emollients                Problem: Patient Education: Go to Patient Education Activity  Goal: Patient/Family Education  Outcome: Progressing Towards Goal     Problem: Falls - Risk of  Goal: *Absence of Falls  Description: Document Beronica Fall Risk and appropriate interventions in the flowsheet.   Outcome: Progressing Towards Goal  Note: Fall Risk Interventions:  Mobility Interventions: Communicate number of staff needed for ambulation/transfer,Patient to call before getting OOB    Mentation Interventions: Adequate sleep, hydration, pain control,Door open when patient unattended,Evaluate medications/consider consulting pharmacy,Eyeglasses and hearing aids    Medication Interventions: Patient to call before getting OOB,Teach patient to arise slowly    Elimination Interventions: Call light in reach,Patient to call for help with toileting needs              Problem: Patient Education: Go to Patient Education Activity  Goal: Patient/Family Education  Outcome: Progressing Towards Goal     Problem: Patient Education: Go to Patient Education Activity  Goal: Patient/Family Education  Outcome: Progressing Towards Goal

## 2022-01-15 NOTE — PROGRESS NOTES
[facility] Hospitalist Progress Notes   Admit Date:  2022 11:38 AM   Name:  Corey Castillo   Age:  77 y.o. Sex:  male  :  1945   MRN:  [id number]   Room:      Presenting Complaint: Respiratory Distress    Reason(s) for Admission: Acute on chronic respiratory failure with hypercapnia [facility]) [J96.22]     Hospitalists consulted by Intensivist for assuming care as pt transferred out of ICU. History of Presenting Illness:   Corey Castillo is a 77 y.o. male with history of A. fib, acute on chronic respiratory failure with hypoxia and hypercapnia, acute respiratory failure with hypercapnia, atrial fibrillation with RVR, coronary artery disease, chronic kidney disease, on long-term anticoagulation, diabetes status post right BKA, hypertension, hypercholesterolemia, left ventricular dysfunction, neuropathy, systolic CHF and a history of Staph aureus bacteremia who was admitted 2022 secondary to acute hypoxic and hypercapnic respiratory failure requiring intubation. He was admitted to the pulmonology service. Of note the patient has been concerning for obstructive sleep apnea for some time and was recommended for outpatient sleep studies but had not had them done yet. As an outpatient he is on Florinef for his hypotension, and he had a echo in July that showed an EF of 30 to 35%. The patient was admitted by the intensivist service to the ICU. He was successfully extubated on 2021 he has been stable status post extubation with the pulmonology service working on getting him patient lives with outpatient. Uses BiPAP at night and air Vo during the day. Patient admitted with acute on chronic hypoxic and hypercapnic respiratory failure. Was treated with antibiotics briefly but major issue is thought to be obesity hypoventilation syndrome/ELAINA. Patient was extubated and currently on air Vo during daytime and BiPAP at night.   Transferred to medicine service as primary as patient is on medical floor. 1/15 Patient is doing well this morning. No fever no chills. No chest pain. No nausea no vomiting. Assessment & Plan: This is a 77y male with:    #  Acute respiratory failure with hypercapnia ([facility] (1/6/2022)  Patient is currently needing 5 L Oxygen NC with ambulation and BiPAP at night  Weaning him down slowly. Pulmonary on board. Appreciate recommendations. Patient qualified for trilogy at home. Aspiration and fall precautions. 1/15 Start PO lasix. #  Stage 3 chronic kidney disease ([facility] (11/22/2017)  Creatinine at baseline 1.4. Will monitor. #  Controlled type 2 diabetes mellitus with diabetic polyneuropathy, without long-term current use of insulin ([facility] (1/5/2018)  Continue to monitor blood sugars and cover with insulin    #Hypotension: Unclear etiology. Cortisol level , TSH, wnl. Continue fludrocortisone. Midodrine dced 1/13    #Severe obesity (BMI 35.0-35.9 with comorbidity) ([facility] (10/10/2018)  Adding to complexity. #Chronic systolic heart [id number]:(7/0/5137)  ECHO repeated 1/11 shows EF of 30 to 35%  No active issues at this time  Continue current medical management  Cardiology on board. Appreciate recs. Cardiology recommends outpatient evaluation- ischemic work up. Hypotension/Borderline BP limiting other treatment options. Paroxysmal Atrial fibrillation:      Rate controlled at present. Continue Eliquis. Acute urinary retention:  Flomax. DISPO: STR pending acceptance. Daughter updated regarding current plan of care.     Hospital Problems as of 1/15/2022 Date Reviewed: 9/14/2021          Codes Class Noted - Resolved POA    Obstructive sleep apnea ICD-10-CM: G47.33  ICD-9-CM: 327.23  1/8/2022 - Present Yes        Obesity hypoventilation syndrome ([facility] ICD-10-CM: E05.4  ICD-9-CM: 278.03  1/8/2022 - Present Yes        Decreased cardiac ejection fraction (Chronic) ICD-10-CM: R93.1  ICD-9-CM: 794.39  1/6/2022 - Present Yes Acute on chronic respiratory failure with hypercapnia (HCC) ICD-10-CM: V96.13  ICD-9-CM: 518.84  1/6/2022 - Present Yes        Severe obesity (BMI 35.0-35.9 with comorbidity) (HCC) (Chronic) ICD-10-CM: E66.01, Z68.35  ICD-9-CM: 278.01, V85.35  10/10/2018 - Present Yes        Controlled type 2 diabetes mellitus with diabetic polyneuropathy, without long-term current use of insulin (HCC) (Chronic) ICD-10-CM: E11.42  ICD-9-CM: 250.60, 357.2  1/5/2018 - Present Yes        Stage 3 chronic kidney disease ([facility] (Chronic) ICD-10-CM: N18.30  ICD-9-CM: 585.3  11/22/2017 - Present Yes        * (Principal) RESOLVED: Acute respiratory failure with hypercapnia (HCC) ICD-10-CM: J96.02  ICD-9-CM: 518.81  1/6/2022 - 1/11/2022 Yes        RESOLVED: Hypotension ICD-10-CM: I95.9  ICD-9-CM: 458.9  1/6/2022 - 1/11/2022 Yes        RESOLVED: Suspected sleep apnea ICD-10-CM: S28.755  ICD-9-CM: 781.99  8/20/2021 - 1/8/2022 Yes              Current Facility-Administered Medications   Medication Dose Route Frequency    furosemide (LASIX) tablet 40 mg  40 mg Oral DAILY    tamsulosin (FLOMAX) capsule 0.4 mg  0.4 mg Oral DAILY    insulin lispro (HUMALOG) injection   SubCUTAneous TIDAC    famotidine (PEPCID) tablet 20 mg  20 mg Oral DAILY    pregabalin (LYRICA) capsule 200 mg  200 mg Oral DAILY    pregabalin (LYRICA) capsule 400 mg  400 mg Oral QHS    ferrous sulfate tablet 325 mg  1 Tablet Oral DAILY WITH BREAKFAST    acetaminophen (TYLENOL) tablet 650 mg  650 mg Oral Q6H PRN    fludrocortisone (FLORINEF) tablet 0.1 mg  0.1 mg Oral DAILY    polyethylene glycol (MIRALAX) packet 17 g  17 g Oral DAILY    sodium chloride (NS) flush 5-40 mL  5-40 mL IntraVENous Q8H    sodium chloride (NS) flush 5-40 mL  5-40 mL IntraVENous PRN    apixaban (ELIQUIS) tablet 5 mg  5 mg Oral Q12H    atorvastatin (LIPITOR) tablet 40 mg  40 mg Oral QHS    [Held by provider] midodrine (PROAMATINE) tablet 10 mg  10 mg Oral Q8H       Objective:     Patient Vitals for the past 24 hrs:   Temp Pulse Resp BP SpO2   01/15/22 1544 97.2 °F (36.2 °C) 97 18 109/75 97 %   01/15/22 1126 98.3 °F (36.8 °C) 96 18 100/69 94 %   01/15/22 0746 98 °F (36.7 °C) 99 18 92/64 100 %   01/15/22 0720  91      01/15/22 0402 98 °F (36.7 °C) 90 18 120/66 94 %   01/15/22 0259     92 %   01/14/22 2330 98 °F (36.7 °C) 85 18 126/86 98 %   01/14/22 2247     94 %   01/14/22 1947 97.5 °F (36.4 °C) 93 18 118/80 93 %     Oxygen Therapy  O2 Sat (%): 97 % (01/15/22 1544)  Pulse via Oximetry: 96 beats per minute (01/15/22 0259)  O2 Device: Hi flow nasal cannula (01/15/22 0259)  Skin Assessment: Clean, dry, & intact (01/14/22 0207)  Skin Protection for O2 Device: No (01/13/22 2244)  Orientation: Bilateral (01/07/22 0837)  Location: Cheek (01/07/22 5394)  Interventions: Mouth Care (01/07/22 0800)  O2 Flow Rate (L/min): 5 l/min (01/15/22 0259)  O2 Temperature: 87.8 °F (31 °C) (01/10/22 1507)  FIO2 (%): 40 % (01/14/22 0207)    Estimated body mass index is 38.83 kg/m² as calculated from the following:    Height as of this encounter: 5' 10\" (1.778 m). Weight as of this encounter: 122.7 kg (270 lb 9.6 oz). Intake/Output Summary (Last 24 hours) at 1/15/2022 1641  Last data filed at 1/15/2022 1126  Gross per 24 hour   Intake 120 ml   Output 825 ml   Net -705 ml         Physical Exam:    Blood pressure 109/75, pulse 97, temperature 97.2 °F (36.2 °C), resp. rate 18, height 5' 10\" (1.778 m), weight 122.7 kg (270 lb 9.6 oz), SpO2 97 %. General:     morbidly obese, no overt distress on 4-5L Oxygen NC,   Head:  Normocephalic, atraumatic  Eyes:  Sclerae appear normal.  Pupils equally round. ENT:  Nares appear normal, no drainage. Moist oral mucosa  Neck:  Supple   CV:   RRR. No m/r/g. JVD examination limited  Lungs:   Decreased entry bilateral lower lobe otherwise CTAB. No wheezing, rhonchi, or rales. Respirations even, unlabored  Abdomen: Bowel sounds present.   Soft, nontender, nondistended. Extremities: No cyanosis or clubbing. No edema; right BKA [no issues with stump.]  Skin:     No rashes and normal coloration. Warm and dry. Neuro:  AOx3. Moving all 4 extremities. Speech normal.  Psych:  Normal mood and affect. I have reviewed ordered lab tests and independently visualized imaging below:    Recent Labs:    Procedures done this admission:  * No surgery found *    All Micro Results     None          SARS-CoV-2 Lab Results  \"Novel Coronavirus\" Test: No results found for: COV2NT   \"Emergent Disease\" Test: No results found for: EDPR  \"SARS-COV-2\" Test: No results found for: XGCOVT  \"Precision Labs\" Test: No results found for: RSLT  Rapid Test: No results found for: COVR         Labs: Results:       BMP, Mg, Phos Recent Labs     01/15/22  0756 01/13/22  0547    145   K 4.0 4.1    104   CO2 38* 37*   AGAP 3* 4*   BUN 32* 36*   CREA 1.49 1.82*   CA 9.5 9.6   GLU 98 89   MG 2.2  --       CBC Recent Labs     01/13/22  1336   WBC 6.7   RBC 4.56   HGB 12.7*   HCT 42.8      GRANS 51   LYMPH 33   EOS 3   MONOS 12   BASOS 1   IG 0   ANEU 3.4   ABL 2.2   TIERNEY 0.2   ABM 0.8   ABB 0.1   AIG 0.0      LFT No results for input(s): ALT, TBIL, AP, TP, ALB, GLOB, AGRAT in the last 72 hours.     No lab exists for component: SGOT, GPT   Cardiac Testing No results found for: BNPP, BNP, CPK, RCK1, RCK2, RCK3, RCK4, CKMB, CKNDX, CKND1, TROPT, TROIQ   Coagulation Tests Lab Results   Component Value Date/Time    aPTT 34.9 08/18/2021 06:56 PM    aPTT 31.7 07/19/2021 09:24 PM      A1c Lab Results   Component Value Date/Time    Hemoglobin A1c 6.2 01/09/2022 02:53 AM    Hemoglobin A1c 6.7 (H) 07/13/2021 08:34 AM    Hemoglobin A1c 6.2 (H) 01/13/2021 08:27 AM      Lipid Panel Lab Results   Component Value Date/Time    Cholesterol, total 156 07/13/2021 08:34 AM    HDL Cholesterol 39 (L) 07/13/2021 08:34 AM    LDL, calculated 83 07/13/2021 08:34 AM    LDL, calculated 83 06/11/2020 09:16 AM VLDL, calculated 34 07/13/2021 08:34 AM    VLDL, calculated 42 (H) 06/11/2020 09:16 AM    Triglyceride 202 (H) 07/13/2021 08:34 AM      Thyroid Panel Lab Results   Component Value Date/Time    TSH 1.240 01/12/2022 05:40 AM    TSH 0.655 07/19/2021 01:46 PM    T4, Free 1.0 01/12/2022 05:40 AM    T4, Free 1.4 07/19/2021 01:46 PM        Most Recent UA Lab Results   Component Value Date/Time    WBC 0-3 07/19/2021 01:26 PM    RBC 5-10 07/19/2021 01:26 PM    Epithelial cells 0-3 07/19/2021 01:26 PM    Bacteria 0 07/19/2021 01:26 PM    Casts 10-20 07/19/2021 01:26 PM            Other Studies:  No results found. Signed:  Moses Phelps MD    Part of this note may have been written by using a voice dictation software. The note has been proof read but may still contain some grammatical/other typographical errors.

## 2022-01-15 NOTE — PROGRESS NOTES
END OF SHIFT NOTE:    INTAKE/OUTPUT  01/14 0701 - 01/15 0700  In: 600 [P.O.:600]  Out: 625 [Urine:625]  Voiding: YES  Catheter: NO  Drain:              Flatus: Patient does have flatus present. Stool:  0 occurrences. Characteristics:  Stool Assessment  Stool Color: Brown  Stool Appearance: Watery  Stool Amount: Large  Stool Source/Status: Rectum    Emesis: 0 occurrences. Characteristics:        VITAL SIGNS  Patient Vitals for the past 12 hrs:   Temp Pulse Resp BP SpO2   01/15/22 0402 98 °F (36.7 °C) 90 18 120/66 94 %   01/15/22 0259     92 %   01/14/22 2330 98 °F (36.7 °C) 85 18 126/86 98 %   01/14/22 2247     94 %   01/14/22 1947 97.5 °F (36.4 °C) 93 18 118/80 93 %       Pain Assessment  Pain Intensity 1: 0 (01/15/22 0212)        Patient Stated Pain Goal: 0    Ambulating  Yes    Shift report given to oncoming nurse at the bedside.     Freedom Kent RN

## 2022-01-15 NOTE — PROGRESS NOTES
[facility] CARDIOLOGY PROGRESS NOTE           1/15/2022 12:28 PM    Admit Date: 1/6/2022      Subjective:   -net negative 500cc. On Lasix 40 mg daily and his breathing is fairly stable. On 4 L of oxygen by nasal cannula this morning. ROS:  Cardiovascular:  As noted above    Objective:      Vitals:    01/15/22 0720 01/15/22 0746 01/15/22 1106 01/15/22 1126   BP:  92/64  100/69   Pulse: 91 99  96   Resp:  18  18   Temp:  98 °F (36.7 °C)  98.3 °F (36.8 °C)   SpO2:  100%  94%   Weight:       Height:   5' 10\" (1.778 m)        Physical Exam:  General-No Acute Distress  Neck- supple, no JVD  CV- regular rate and rhythm no MRG  Lung-diminished breath sounds both bases with few bibasilar rales that improved with coughing. Rales greater on the right side than the left. Abd- soft, nontender, nondistended  Ext- no edema bilaterally.   Skin- warm and dry    Data Review:   Recent Labs     01/15/22  0756 01/13/22  1336 01/13/22  0547     --  145   K 4.0  --  4.1   MG 2.2  --   --    BUN 32*  --  36*   CREA 1.49  --  1.82*   GLU 98  --  89   WBC  --  6.7  --    HGB  --  12.7*  --    HCT  --  42.8  --    PLT  --  171  --        Assessment/Plan:     Dilated cardiomyopathy   LVEF noted to be 30-35% dating back to 7/21/2021 echo   Hypotension limits ability to add CHF medical therapy  Does not appear volume overloaded on examination  Diuresis on as needed basis  - outpatient ischemic evaluation pending improvement in renal function  - negative 10 L for this admission      Paroxysmal atrial fibrillation   On Eliquis, rates controlled, hemodynamically  Stable-currently in atrial fibrillation with heart rates in the 70s to 80s range.       Reported CAD-said he had a previous stent in the late 80s   No coronary angiogram is available for review, on atorvastatin 40 would continue  -Outpatient ischemic evaluation as above pending renal function improvement       Acute on chronic respiratory failure with hypercapnia ([facility] (1/6/2022)    Obstructive sleep apnea (1/8/2022)    Obesity hypoventilation syndrome ([facility] (1/8/2022)  Positive pressure ventilation, oxygen as needed.       Stage 3 chronic kidney disease ([facility] (11/22/2017)  Creatinine 1.8, appears to be near baseline       Controlled type 2 diabetes mellitus with diabetic polyneuropathy, without long-term current use of insulin ([facility] (1/5/2018)  Per medicine       Severe obesity (BMI 35.0-35.9 with comorbidity) ([facility] (10/10/2018)    - continue efforts towards dietary changes in an effort to lose weight       Borderline low blood pressures and already on Florinef so not in a position to be able to start cardiomyopathy meds including beta-blocker/ACE inhibitor/ARB/spironolactone etc. especially with additional chronic kidney disease.  -Chest x-ray reviewed and if anything appears stable and somewhat improved compared to his last study.  -He would benefit from incentive spirometry. We will order this. -Needs to continue to work with physical therapy with fall precautions.           Augusto Leung MD  1/15/2022 12:28 PM

## 2022-01-15 NOTE — PROGRESS NOTES
END OF SHIFT NOTE:    INTAKE/OUTPUT  01/14 0701 - 01/15 0700  In: 600 [P.O.:600]  Out: 625 [Urine:625]  Voiding: YES  Catheter: NO  Drain:              Flatus: Patient does have flatus present. Stool:  0 occurrences. Characteristics:    Emesis: 0 occurrences. Characteristics:        VITAL SIGNS  Patient Vitals for the past 12 hrs:   Temp Pulse Resp BP SpO2   01/15/22 1544 97.2 °F (36.2 °C) 97 18 109/75 97 %   01/15/22 1126 98.3 °F (36.8 °C) 96 18 100/69 94 %   01/15/22 0746 98 °F (36.7 °C) 99 18 92/64 100 %   01/15/22 0720  91          Pain Assessment  Pain Intensity 1: 0 (01/15/22 0750)        Patient Stated Pain Goal: 0    Ambulating  Yes    Shift report given to oncoming nurse at the bedside.     Roxanne Meraz RN

## 2022-01-16 LAB
GLUCOSE BLD STRIP.AUTO-MCNC: 105 MG/DL (ref 65–100)
GLUCOSE BLD STRIP.AUTO-MCNC: 116 MG/DL (ref 65–100)
GLUCOSE BLD STRIP.AUTO-MCNC: 94 MG/DL (ref 65–100)
MM INDURATION POC: 0 MM (ref 0–5)
PPD POC: NEGATIVE NEGATIVE
SERVICE CMNT-IMP: ABNORMAL
SERVICE CMNT-IMP: ABNORMAL
SERVICE CMNT-IMP: NORMAL

## 2022-01-16 PROCEDURE — 74011000250 HC RX REV CODE- 250: Performed by: INTERNAL MEDICINE

## 2022-01-16 PROCEDURE — 74011250637 HC RX REV CODE- 250/637: Performed by: HOSPITALIST

## 2022-01-16 PROCEDURE — 65660000000 HC RM CCU STEPDOWN

## 2022-01-16 PROCEDURE — 94660 CPAP INITIATION&MGMT: CPT

## 2022-01-16 PROCEDURE — 2709999900 HC NON-CHARGEABLE SUPPLY

## 2022-01-16 PROCEDURE — 74011250637 HC RX REV CODE- 250/637: Performed by: INTERNAL MEDICINE

## 2022-01-16 PROCEDURE — 82962 GLUCOSE BLOOD TEST: CPT

## 2022-01-16 PROCEDURE — 99232 SBSQ HOSP IP/OBS MODERATE 35: CPT | Performed by: INTERNAL MEDICINE

## 2022-01-16 RX ORDER — LISINOPRIL 5 MG/1
2.5 TABLET ORAL DAILY
Status: DISCONTINUED | OUTPATIENT
Start: 2022-01-16 | End: 2022-01-18 | Stop reason: HOSPADM

## 2022-01-16 RX ORDER — MIDODRINE HYDROCHLORIDE 5 MG/1
10 TABLET ORAL EVERY 8 HOURS
Status: DISCONTINUED | OUTPATIENT
Start: 2022-01-16 | End: 2022-01-18 | Stop reason: HOSPADM

## 2022-01-16 RX ORDER — METOPROLOL SUCCINATE 25 MG/1
25 TABLET, EXTENDED RELEASE ORAL DAILY
Status: DISCONTINUED | OUTPATIENT
Start: 2022-01-16 | End: 2022-01-18 | Stop reason: HOSPADM

## 2022-01-16 RX ADMIN — ATORVASTATIN CALCIUM 40 MG: 40 TABLET, FILM COATED ORAL at 21:15

## 2022-01-16 RX ADMIN — LISINOPRIL 2.5 MG: 5 TABLET ORAL at 09:02

## 2022-01-16 RX ADMIN — SODIUM CHLORIDE, PRESERVATIVE FREE 10 ML: 5 INJECTION INTRAVENOUS at 06:16

## 2022-01-16 RX ADMIN — SODIUM CHLORIDE, PRESERVATIVE FREE 10 ML: 5 INJECTION INTRAVENOUS at 15:00

## 2022-01-16 RX ADMIN — TAMSULOSIN HYDROCHLORIDE 0.4 MG: 0.4 CAPSULE ORAL at 09:03

## 2022-01-16 RX ADMIN — PREGABALIN 200 MG: 50 CAPSULE ORAL at 09:01

## 2022-01-16 RX ADMIN — PREGABALIN 400 MG: 150 CAPSULE ORAL at 21:14

## 2022-01-16 RX ADMIN — APIXABAN 5 MG: 5 TABLET, FILM COATED ORAL at 21:15

## 2022-01-16 RX ADMIN — APIXABAN 5 MG: 5 TABLET, FILM COATED ORAL at 09:02

## 2022-01-16 RX ADMIN — FAMOTIDINE 20 MG: 20 TABLET ORAL at 09:03

## 2022-01-16 RX ADMIN — METOPROLOL SUCCINATE 25 MG: 25 TABLET, FILM COATED, EXTENDED RELEASE ORAL at 09:02

## 2022-01-16 RX ADMIN — FUROSEMIDE 40 MG: 40 TABLET ORAL at 09:02

## 2022-01-16 RX ADMIN — FERROUS SULFATE TAB 325 MG (65 MG ELEMENTAL FE) 325 MG: 325 (65 FE) TAB at 09:03

## 2022-01-16 RX ADMIN — FLUDROCORTISONE ACETATE 0.1 MG: 0.1 TABLET ORAL at 09:03

## 2022-01-16 RX ADMIN — SODIUM CHLORIDE, PRESERVATIVE FREE 10 ML: 5 INJECTION INTRAVENOUS at 21:15

## 2022-01-16 RX ADMIN — POLYETHYLENE GLYCOL 3350 17 G: 17 POWDER, FOR SOLUTION ORAL at 09:01

## 2022-01-16 NOTE — PROGRESS NOTES
END OF SHIFT NOTE:    INTAKE/OUTPUT  01/15 0701 - 01/16 0700  In: 120 [P.O.:120]  Out: 1750 [Urine:1750]  Voiding: YES  Catheter: NO  Drain:              Flatus: Patient does have flatus present. Stool:  0 occurrences. Characteristics:  Stool Assessment  Stool Color: Brown  Stool Appearance: Watery  Stool Amount: Large  Stool Source/Status: Rectum    Emesis: 0 occurrences. Characteristics:        VITAL SIGNS  Patient Vitals for the past 12 hrs:   Temp Pulse Resp BP SpO2   01/16/22 0410 97.6 °F (36.4 °C) 92 18 112/81 97 %   01/15/22 2353 97.5 °F (36.4 °C) 96 18 102/65 94 %   01/15/22 2203     93 %   01/15/22 2010 98.6 °F (37 °C) 92 18 118/69 94 %       Pain Assessment  Pain Intensity 1: 0 (01/16/22 0248)        Patient Stated Pain Goal: 0    Ambulating  Yes    Shift report given to oncoming nurse at the bedside.     Polina Ware RN

## 2022-01-16 NOTE — PROGRESS NOTES
END OF SHIFT NOTE:    INTAKE/OUTPUT  01/15 0701 - 01/16 0700  In: 120 [P.O.:120]  Out: 1750 [Urine:1750]  Voiding: YES  Catheter: NO  Drain:              Flatus: Patient does have flatus present. Stool:  0 occurrences. Characteristics:    Emesis: 0 occurrences. Characteristics:        VITAL SIGNS  Patient Vitals for the past 12 hrs:   Temp Pulse Resp BP SpO2   01/16/22 1533    91/66    01/16/22 1512  94  (!) 62/47 96 %   01/16/22 1509 98.3 °F (36.8 °C) 82 20 (!) 83/55 91 %   01/16/22 1149  70  92/64 96 %   01/16/22 1147 98.2 °F (36.8 °C) 95 20 92/65 95 %   01/16/22 0817 98.2 °F (36.8 °C) 98 18 131/83 95 %       Pain Assessment  Pain Intensity 1: 0 (01/16/22 0730)        Patient Stated Pain Goal: 0    Ambulating  Yes    Shift report given to oncoming nurse at the bedside.     Mali Hobbs, RN

## 2022-01-16 NOTE — PROGRESS NOTES
[facility] CARDIOLOGY PROGRESS NOTE           1/16/2022 9:32 AM    Admit Date: 1/6/2022      Subjective:   No complaints of any chest pain. He feels that he is diuresing fairly well overall. Negative greater than 1500 cc. ROS:  Cardiovascular:  As noted above    Objective:      Vitals:    01/15/22 2353 01/16/22 0410 01/16/22 0626 01/16/22 0817   BP: 102/65 112/81  131/83   Pulse: 96 92  98   Resp: 18 18  18   Temp: 97.5 °F (36.4 °C) 97.6 °F (36.4 °C)  98.2 °F (36.8 °C)   SpO2: 94% 97%  95%   Weight:   264 lb (119.7 kg)    Height:           Physical Exam:  General-No Acute Distress  Neck- supple, no significant JVD  CV-irregularly irregular heart rhythm, rate controlled,   no MRG  Lung- clear bilaterally anteriorly, diminished breath sounds in the right base noted. Abd- soft, nontender, nondistended  Ext- no edema bilaterally.   Skin- warm and dry    Data Review:   Recent Labs     01/15/22  0756 01/13/22  1336     --    K 4.0  --    MG 2.2  --    BUN 32*  --    CREA 1.49  --    GLU 98  --    WBC  --  6.7   HGB  --  12.7*   HCT  --  42.8   PLT  --  171       Assessment/Plan:   Dilated cardiomyopathy   LVEF noted to be 30-35% dating back to 7/21/2021 echo   Hypotension limits ability to add CHF medical therapy  Does not appear volume overloaded on examination  On Lasix 40 mg daily  - outpatient ischemic evaluation pending improvement in renal function      Paroxysmal atrial fibrillation   On Eliquis, rates controlled, hemodynamically  Stable-currently in atrial fibrillation with heart rates in the 70s to 80s range.       Reported CAD-said he had a previous stent in the late 80s   No coronary angiogram is available for review, on atorvastatin 40 would continue  -Outpatient ischemic evaluation as above pending renal function improvement       Acute on chronic respiratory failure with hypercapnia (HCC) (1/6/2022)    Obstructive sleep apnea (1/8/2022)    Obesity hypoventilation syndrome ([facility] (1/8/2022)  Positive pressure ventilation, oxygen as needed-requirements have clearly improved.       Stage 3 chronic kidney disease ([facility] (11/22/2017)  Creatinine improved.       Controlled type 2 diabetes mellitus with diabetic polyneuropathy, without long-term current use of insulin ([facility] (1/5/2018)  Per medicine       Severe obesity (BMI 35.0-35.9 with comorbidity) ([facility] (10/10/2018)    - continue efforts towards dietary changes in an effort to lose weight       Borderline low blood pressures and already on Florinef so not in a position to be able to start aggressive cardiomyopathy meds including beta-blocker/ACE inhibitor/ARB/spironolactone etc. especially with additional chronic kidney disease.  -Chest x-ray reviewed and if anything appears stable and somewhat improved compared to his last study.  -He would benefit from incentive spirometry. We will order this. -Needs to continue to work with physical therapy with fall precautions.  -Blood pressure is better, agree with starting low-dose cardiomyopathy meds with parameters-started on tiny dose of lisinopril along with metoprolol succinate. Needs to be monitored. -Ischemia work-up can be done on an outpatient basis.                 Pat Phillips MD  1/16/2022 9:32 AM

## 2022-01-16 NOTE — PROGRESS NOTES
Problem: Gas Exchange - Impaired  Goal: *Absence of hypoxia  Outcome: Progressing Towards Goal     Problem: Patient Education: Go to Patient Education Activity  Goal: Patient/Family Education  Outcome: Progressing Towards Goal     Problem: Pressure Injury - Risk of  Goal: *Prevention of pressure injury  Description: Document Julio Cesar Scale and appropriate interventions in the flowsheet. Outcome: Progressing Towards Goal  Note: Pressure Injury Interventions:  Sensory Interventions: Assess changes in LOC,Minimize linen layers,Monitor skin under medical devices    Moisture Interventions: Absorbent underpads,Internal/External urinary devices,Minimize layers    Activity Interventions: Increase time out of bed,Pressure redistribution bed/mattress(bed type),PT/OT evaluation    Mobility Interventions: Pressure redistribution bed/mattress (bed type),PT/OT evaluation    Nutrition Interventions: Document food/fluid/supplement intake,Offer support with meals,snacks and hydration    Friction and Shear Interventions: Apply protective barrier, creams and emollients                Problem: Patient Education: Go to Patient Education Activity  Goal: Patient/Family Education  Outcome: Progressing Towards Goal     Problem: Falls - Risk of  Goal: *Absence of Falls  Description: Document Beronica Fall Risk and appropriate interventions in the flowsheet.   Outcome: Progressing Towards Goal  Note: Fall Risk Interventions:  Mobility Interventions: Communicate number of staff needed for ambulation/transfer,Patient to call before getting OOB    Mentation Interventions: Adequate sleep, hydration, pain control,Door open when patient unattended,Evaluate medications/consider consulting pharmacy,Eyeglasses and hearing aids    Medication Interventions: Patient to call before getting OOB    Elimination Interventions: Call light in reach,Patient to call for help with toileting needs,Urinal in reach              Problem: Patient Education: Go to Patient Education Activity  Goal: Patient/Family Education  Outcome: Progressing Towards Goal     Problem: Patient Education: Go to Patient Education Activity  Goal: Patient/Family Education  Outcome: Progressing Towards Goal     Problem: General Medical Care Plan  Goal: *Vital signs within specified parameters  Outcome: Progressing Towards Goal  Goal: *Labs within defined limits  Outcome: Progressing Towards Goal  Goal: *Absence of infection signs and symptoms  Outcome: Progressing Towards Goal  Goal: *Optimal pain control at patient's stated goal  Outcome: Progressing Towards Goal  Goal: *Skin integrity maintained  Outcome: Progressing Towards Goal  Goal: *Fluid volume balance  Outcome: Progressing Towards Goal  Goal: *Optimize nutritional status  Outcome: Progressing Towards Goal  Goal: *Anxiety reduced or absent  Outcome: Progressing Towards Goal  Goal: *Progressive mobility and function (eg: ADL's)  Outcome: Progressing Towards Goal     Problem: Patient Education: Go to Patient Education Activity  Goal: Patient/Family Education  Outcome: Progressing Towards Goal     Problem: Tissue Perfusion - Cardiopulmonary, Altered  Goal: *Optimize tissue perfusion  Outcome: Progressing Towards Goal  Goal: *Absence of hypoxia  Outcome: Progressing Towards Goal     Problem: Patient Education: Go to Patient Education Activity  Goal: Patient/Family Education  Outcome: Progressing Towards Goal

## 2022-01-16 NOTE — PROGRESS NOTES
[facility] Hospitalist Progress Notes   Admit Date:  2022 11:38 AM   Name:  Minoo Cunningham   Age:  77 y.o. Sex:  male  :  1945   MRN:  [id number]   Room:      Presenting Complaint: Respiratory Distress    Reason(s) for Admission: Acute on chronic respiratory failure with hypercapnia [facility]) [J96.22]     Hospitalists consulted by Intensivist for assuming care as pt transferred out of ICU. History of Presenting Illness:   Minoo Cunningham is a 77 y.o. male with history of A. fib, acute on chronic respiratory failure with hypoxia and hypercapnia, acute respiratory failure with hypercapnia, atrial fibrillation with RVR, coronary artery disease, chronic kidney disease, on long-term anticoagulation, diabetes status post right BKA, hypertension, hypercholesterolemia, left ventricular dysfunction, neuropathy, systolic CHF and a history of Staph aureus bacteremia who was admitted 2022 secondary to acute hypoxic and hypercapnic respiratory failure requiring intubation. He was admitted to the pulmonology service. Of note the patient has been concerning for obstructive sleep apnea for some time and was recommended for outpatient sleep studies but had not had them done yet. As an outpatient he is on Florinef for his hypotension, and he had a echo in July that showed an EF of 30 to 35%. The patient was admitted by the intensivist service to the ICU. He was successfully extubated on 2021 he has been stable status post extubation with the pulmonology service working on getting him patient lives with outpatient. Uses BiPAP at night and air Vo during the day. Patient admitted with acute on chronic hypoxic and hypercapnic respiratory failure. Was treated with antibiotics briefly but major issue is thought to be obesity hypoventilation syndrome/ELAINA. Patient was extubated and currently on air Vo during daytime and BiPAP at night.   Transferred to medicine service as primary as patient is on medical floor. 1/16 Patient was sitting up in chair this morning. Denies any dizziness, chest pain, palpitations. No fever no chills. Assessment & Plan: This is a 77y male with:    #  Acute respiratory failure with hypercapnia ([facility] (1/6/2022)  Patient is currently needing 5 L Oxygen NC with ambulation and BiPAP at night  Weaning him down slowly. Pulmonary on board. Appreciate recommendations. Patient qualified for trilogy at home. Aspiration and fall precautions. 1/16 patient still needing 5 L oxygen nasal cannula. Currently on p.o. Lasix held this afternoon due to hypotension. #  Stage 3 chronic kidney disease ([facility] (11/22/2017)  Creatinine at baseline 1.49. Will monitor. #  Controlled type 2 diabetes mellitus with diabetic polyneuropathy, without long-term current use of insulin ([facility] (1/5/2018)  Continue to monitor blood sugars and cover with insulin    #Hypotension: Unclear etiology. Cortisol level , TSH, wnl. Continue fludrocortisone. Blood pressure low this afternoon. Restarted midodrine. #Severe obesity (BMI 35.0-35.9 with comorbidity) ([facility] (10/10/2018)  Adding to complexity. #Chronic systolic heart [id number]:(5/4/7262)  ECHO repeated 1/11 shows EF of 30 to 35%  No active issues at this time  Continue current medical management  Cardiology on board. Appreciate recs. Cardiology recommends outpatient evaluation- ischemic work up. Blood pressure initially improved this morning and therefore was started on metoprolol and low-dose lisinopril. However this afternoon, blood pressure dropped and therefore lisinopril and metoprolol held. Hypotension/Borderline BP limiting other treatment options. Paroxysmal Atrial fibrillation:    Rate controlled at present. Continue Eliquis. Acute urinary retention:  Pineda catheter was removed. Patient is able to urinate after starting Flomax. Flomax will be held for now due to hypotension.     DISPO: STR pending acceptance. Daughter updated regarding current plan of care.     Hospital Problems as of 1/16/2022 Date Reviewed: 9/14/2021          Codes Class Noted - Resolved POA    Obstructive sleep apnea ICD-10-CM: G47.33  ICD-9-CM: 327.23  1/8/2022 - Present Yes        Obesity hypoventilation syndrome ([facility] ICD-10-CM: W25.6  ICD-9-CM: 278.03  1/8/2022 - Present Yes        Decreased cardiac ejection fraction (Chronic) ICD-10-CM: R93.1  ICD-9-CM: 794.39  1/6/2022 - Present Yes        Acute on chronic respiratory failure with hypercapnia (HCC) ICD-10-CM: Q15.47  ICD-9-CM: 518.84  1/6/2022 - Present Yes        Severe obesity (BMI 35.0-35.9 with comorbidity) ([facility] (Chronic) ICD-10-CM: E66.01, Z68.35  ICD-9-CM: 278.01, V85.35  10/10/2018 - Present Yes        Controlled type 2 diabetes mellitus with diabetic polyneuropathy, without long-term current use of insulin (HCC) (Chronic) ICD-10-CM: E11.42  ICD-9-CM: 250.60, 357.2  1/5/2018 - Present Yes        Stage 3 chronic kidney disease ([facility] (Chronic) ICD-10-CM: N18.30  ICD-9-CM: 585.3  11/22/2017 - Present Yes        * (Principal) RESOLVED: Acute respiratory failure with hypercapnia ([facility] ICD-10-CM: J96.02  ICD-9-CM: 518.81  1/6/2022 - 1/11/2022 Yes        RESOLVED: Hypotension ICD-10-CM: I95.9  ICD-9-CM: 458.9  1/6/2022 - 1/11/2022 Yes        RESOLVED: Suspected sleep apnea ICD-10-CM: C23.939  ICD-9-CM: 781.99  8/20/2021 - 1/8/2022 Yes              Current Facility-Administered Medications   Medication Dose Route Frequency    [Held by provider] metoprolol succinate (TOPROL-XL) XL tablet 25 mg  25 mg Oral DAILY    [Held by provider] lisinopriL (PRINIVIL, ZESTRIL) tablet 2.5 mg  2.5 mg Oral DAILY    midodrine (PROAMATINE) tablet 10 mg  10 mg Oral Q8H    [Held by provider] furosemide (LASIX) tablet 40 mg  40 mg Oral DAILY    [Held by provider] tamsulosin (FLOMAX) capsule 0.4 mg  0.4 mg Oral DAILY    insulin lispro (HUMALOG) injection   SubCUTAneous TIDAC    famotidine (PEPCID) tablet 20 mg  20 mg Oral DAILY    pregabalin (LYRICA) capsule 200 mg  200 mg Oral DAILY    pregabalin (LYRICA) capsule 400 mg  400 mg Oral QHS    ferrous sulfate tablet 325 mg  1 Tablet Oral DAILY WITH BREAKFAST    acetaminophen (TYLENOL) tablet 650 mg  650 mg Oral Q6H PRN    fludrocortisone (FLORINEF) tablet 0.1 mg  0.1 mg Oral DAILY    polyethylene glycol (MIRALAX) packet 17 g  17 g Oral DAILY    sodium chloride (NS) flush 5-40 mL  5-40 mL IntraVENous Q8H    sodium chloride (NS) flush 5-40 mL  5-40 mL IntraVENous PRN    apixaban (ELIQUIS) tablet 5 mg  5 mg Oral Q12H    atorvastatin (LIPITOR) tablet 40 mg  40 mg Oral QHS       Objective:     Patient Vitals for the past 24 hrs:   Temp Pulse Resp BP SpO2   01/16/22 1512  94  (!) 62/47 96 %   01/16/22 1509 98.3 °F (36.8 °C) 82 20 (!) 83/55 91 %   01/16/22 1149  70  92/64 96 %   01/16/22 1147 98.2 °F (36.8 °C) 95 20 92/65 95 %   01/16/22 0817 98.2 °F (36.8 °C) 98 18 131/83 95 %   01/16/22 0410 97.6 °F (36.4 °C) 92 18 112/81 97 %   01/15/22 2353 97.5 °F (36.4 °C) 96 18 102/65 94 %   01/15/22 2203     93 %   01/15/22 2010 98.6 °F (37 °C) 92 18 118/69 94 %   01/15/22 1544 97.2 °F (36.2 °C) 97 18 109/75 97 %     Oxygen Therapy  O2 Sat (%): 96 % (01/16/22 1512)  Pulse via Oximetry: 94 beats per minute (01/15/22 2203)  O2 Device: Other (comment) (01/16/22 0053)  Skin Assessment: Clean, dry, & intact (01/14/22 0207)  Skin Protection for O2 Device: No (01/13/22 3100)  Orientation: Bilateral (01/07/22 1931)  Location: Cheek (01/07/22 4211)  Interventions: Mouth Care (01/07/22 0800)  O2 Flow Rate (L/min): 5 l/min (01/15/22 2203)  O2 Temperature: 87.8 °F (31 °C) (01/10/22 1507)  FIO2 (%): 40 % (01/14/22 0207)    Estimated body mass index is 37.88 kg/m² as calculated from the following:    Height as of this encounter: 5' 10\" (1.778 m). Weight as of this encounter: 119.7 kg (264 lb).     Intake/Output Summary (Last 24 hours) at 1/16/2022 6275  Last data filed at 1/16/2022 1425  Gross per 24 hour   Intake 717 ml   Output 2275 ml   Net -1558 ml         Physical Exam:    Blood pressure (!) 62/47, pulse 94, temperature 98.3 °F (36.8 °C), resp. rate 20, height 5' 10\" (1.778 m), weight 119.7 kg (264 lb), SpO2 96 %. General:     morbidly obese, no overt distress on 4-5L Oxygen NC,   Head:  Normocephalic, atraumatic  Eyes:  Sclerae appear normal.  Pupils equally round. ENT:  Nares appear normal, no drainage. Moist oral mucosa  Neck:  Supple   CV:   RRR. No m/r/g. JVD examination limited  Lungs:   Decreased entry bilateral lower lobe otherwise CTAB. No wheezing, rhonchi, or rales. Respirations even, unlabored  Abdomen: Bowel sounds present. Soft, nontender, nondistended. Extremities: No cyanosis or clubbing. No edema; right BKA [no issues with stump.]  Skin:     No rashes and normal coloration. Warm and dry. Neuro:  AOx3. Moving all 4 extremities. Speech normal.  Psych:  Normal mood and affect. I have reviewed ordered lab tests and independently visualized imaging below:    Recent Labs:    Procedures done this admission:  * No surgery found *    All Micro Results     None          SARS-CoV-2 Lab Results  \"Novel Coronavirus\" Test: No results found for: COV2NT   \"Emergent Disease\" Test: No results found for: EDPR  \"SARS-COV-2\" Test: No results found for: XGCOVT  \"Precision Labs\" Test: No results found for: RSLT  Rapid Test: No results found for: COVR         Labs: Results:       BMP, Mg, Phos Recent Labs     01/15/22  0756      K 4.0      CO2 38*   AGAP 3*   BUN 32*   CREA 1.49   CA 9.5   GLU 98   MG 2.2      CBC No results for input(s): WBC, RBC, HGB, HCT, PLT, GRANS, LYMPH, EOS, MONOS, BASOS, IG, ANEU, ABL, TIERNEY, ABM, ABB, AIG, HGBEXT, HCTEXT, PLTEXT, HGBEXT, HCTEXT, PLTEXT in the last 72 hours. LFT No results for input(s): ALT, TBIL, AP, TP, ALB, GLOB, AGRAT in the last 72 hours.     No lab exists for component: SGOT, GPT   Cardiac Testing No results found for: BNPP, BNP, CPK, RCK1, RCK2, RCK3, RCK4, CKMB, CKNDX, CKND1, TROPT, TROIQ   Coagulation Tests Lab Results   Component Value Date/Time    aPTT 34.9 08/18/2021 06:56 PM    aPTT 31.7 07/19/2021 09:24 PM      A1c Lab Results   Component Value Date/Time    Hemoglobin A1c 6.2 01/09/2022 02:53 AM    Hemoglobin A1c 6.7 (H) 07/13/2021 08:34 AM    Hemoglobin A1c 6.2 (H) 01/13/2021 08:27 AM      Lipid Panel Lab Results   Component Value Date/Time    Cholesterol, total 156 07/13/2021 08:34 AM    HDL Cholesterol 39 (L) 07/13/2021 08:34 AM    LDL, calculated 83 07/13/2021 08:34 AM    LDL, calculated 83 06/11/2020 09:16 AM    VLDL, calculated 34 07/13/2021 08:34 AM    VLDL, calculated 42 (H) 06/11/2020 09:16 AM    Triglyceride 202 (H) 07/13/2021 08:34 AM      Thyroid Panel Lab Results   Component Value Date/Time    TSH 1.240 01/12/2022 05:40 AM    TSH 0.655 07/19/2021 01:46 PM    T4, Free 1.0 01/12/2022 05:40 AM    T4, Free 1.4 07/19/2021 01:46 PM        Most Recent UA Lab Results   Component Value Date/Time    WBC 0-3 07/19/2021 01:26 PM    RBC 5-10 07/19/2021 01:26 PM    Epithelial cells 0-3 07/19/2021 01:26 PM    Bacteria 0 07/19/2021 01:26 PM    Casts 10-20 07/19/2021 01:26 PM            Other Studies:  No results found. Signed:  Murtaza Ayala MD    Part of this note may have been written by using a voice dictation software. The note has been proof read but may still contain some grammatical/other typographical errors.

## 2022-01-16 NOTE — PROGRESS NOTES
Blood pressure was repeated 3 times and it was 95/60. We will hold antihypertensive medications and p.o. Lasix. Will hold off on restarting midodrine.

## 2022-01-17 LAB
ANION GAP SERPL CALC-SCNC: 2 MMOL/L (ref 7–16)
BASOPHILS # BLD: 0 K/UL (ref 0–0.2)
BASOPHILS NFR BLD: 1 % (ref 0–2)
BUN SERPL-MCNC: 46 MG/DL (ref 8–23)
CALCIUM SERPL-MCNC: 9.5 MG/DL (ref 8.3–10.4)
CHLORIDE SERPL-SCNC: 103 MMOL/L (ref 98–107)
CO2 SERPL-SCNC: 38 MMOL/L (ref 21–32)
CREAT SERPL-MCNC: 1.96 MG/DL (ref 0.8–1.5)
DIFFERENTIAL METHOD BLD: ABNORMAL
EOSINOPHIL # BLD: 0.2 K/UL (ref 0–0.8)
EOSINOPHIL NFR BLD: 3 % (ref 0.5–7.8)
ERYTHROCYTE [DISTWIDTH] IN BLOOD BY AUTOMATED COUNT: 16 % (ref 11.9–14.6)
GLUCOSE BLD STRIP.AUTO-MCNC: 109 MG/DL (ref 65–100)
GLUCOSE BLD STRIP.AUTO-MCNC: 114 MG/DL (ref 65–100)
GLUCOSE BLD STRIP.AUTO-MCNC: 121 MG/DL (ref 65–100)
GLUCOSE BLD STRIP.AUTO-MCNC: 86 MG/DL (ref 65–100)
GLUCOSE SERPL-MCNC: 91 MG/DL (ref 65–100)
HCT VFR BLD AUTO: 41.4 % (ref 41.1–50.3)
HGB BLD-MCNC: 12.1 G/DL (ref 13.6–17.2)
IMM GRANULOCYTES # BLD AUTO: 0 K/UL (ref 0–0.5)
IMM GRANULOCYTES NFR BLD AUTO: 0 % (ref 0–5)
LYMPHOCYTES # BLD: 2.7 K/UL (ref 0.5–4.6)
LYMPHOCYTES NFR BLD: 41 % (ref 13–44)
MCH RBC QN AUTO: 26.9 PG (ref 26.1–32.9)
MCHC RBC AUTO-ENTMCNC: 29.2 G/DL (ref 31.4–35)
MCV RBC AUTO: 92.2 FL (ref 79.6–97.8)
MONOCYTES # BLD: 0.6 K/UL (ref 0.1–1.3)
MONOCYTES NFR BLD: 10 % (ref 4–12)
NEUTS SEG # BLD: 3 K/UL (ref 1.7–8.2)
NEUTS SEG NFR BLD: 46 % (ref 43–78)
NRBC # BLD: 0 K/UL (ref 0–0.2)
PLATELET # BLD AUTO: 212 K/UL (ref 150–450)
PMV BLD AUTO: 12 FL (ref 9.4–12.3)
POTASSIUM SERPL-SCNC: 4.3 MMOL/L (ref 3.5–5.1)
RBC # BLD AUTO: 4.49 M/UL (ref 4.23–5.6)
SERVICE CMNT-IMP: ABNORMAL
SERVICE CMNT-IMP: NORMAL
SODIUM SERPL-SCNC: 143 MMOL/L (ref 138–145)
WBC # BLD AUTO: 6.6 K/UL (ref 4.3–11.1)

## 2022-01-17 PROCEDURE — 80048 BASIC METABOLIC PNL TOTAL CA: CPT

## 2022-01-17 PROCEDURE — 85025 COMPLETE CBC W/AUTO DIFF WBC: CPT

## 2022-01-17 PROCEDURE — 36415 COLL VENOUS BLD VENIPUNCTURE: CPT

## 2022-01-17 PROCEDURE — 2709999900 HC NON-CHARGEABLE SUPPLY

## 2022-01-17 PROCEDURE — 99232 SBSQ HOSP IP/OBS MODERATE 35: CPT | Performed by: INTERNAL MEDICINE

## 2022-01-17 PROCEDURE — 74011000250 HC RX REV CODE- 250: Performed by: INTERNAL MEDICINE

## 2022-01-17 PROCEDURE — 65660000000 HC RM CCU STEPDOWN

## 2022-01-17 PROCEDURE — 82962 GLUCOSE BLOOD TEST: CPT

## 2022-01-17 PROCEDURE — 74011250637 HC RX REV CODE- 250/637: Performed by: INTERNAL MEDICINE

## 2022-01-17 PROCEDURE — 94660 CPAP INITIATION&MGMT: CPT

## 2022-01-17 RX ADMIN — SODIUM CHLORIDE, PRESERVATIVE FREE 10 ML: 5 INJECTION INTRAVENOUS at 05:41

## 2022-01-17 RX ADMIN — FLUDROCORTISONE ACETATE 0.1 MG: 0.1 TABLET ORAL at 08:11

## 2022-01-17 RX ADMIN — APIXABAN 5 MG: 5 TABLET, FILM COATED ORAL at 21:06

## 2022-01-17 RX ADMIN — PREGABALIN 400 MG: 150 CAPSULE ORAL at 21:06

## 2022-01-17 RX ADMIN — APIXABAN 5 MG: 5 TABLET, FILM COATED ORAL at 08:11

## 2022-01-17 RX ADMIN — PREGABALIN 200 MG: 50 CAPSULE ORAL at 08:10

## 2022-01-17 RX ADMIN — FAMOTIDINE 20 MG: 20 TABLET ORAL at 08:11

## 2022-01-17 RX ADMIN — ATORVASTATIN CALCIUM 40 MG: 40 TABLET, FILM COATED ORAL at 21:06

## 2022-01-17 RX ADMIN — FERROUS SULFATE TAB 325 MG (65 MG ELEMENTAL FE) 325 MG: 325 (65 FE) TAB at 08:11

## 2022-01-17 RX ADMIN — SODIUM CHLORIDE, PRESERVATIVE FREE 10 ML: 5 INJECTION INTRAVENOUS at 21:06

## 2022-01-17 NOTE — PROGRESS NOTES
Problem: Falls - Risk of  Goal: *Absence of Falls  Description: Document Yamileth Sanches Fall Risk and appropriate interventions in the flowsheet. Outcome: Progressing Towards Goal  Note: Fall Risk Interventions:  Mobility Interventions: Communicate number of staff needed for ambulation/transfer,Patient to call before getting OOB    Mentation Interventions: Adequate sleep, hydration, pain control,Door open when patient unattended,Evaluate medications/consider consulting pharmacy,Eyeglasses and hearing aids    Medication Interventions: Patient to call before getting OOB,Evaluate medications/consider consulting pharmacy,Teach patient to arise slowly    Elimination Interventions: Call light in reach              Problem: Patient Education: Go to Patient Education Activity  Goal: Patient/Family Education  Outcome: Progressing Towards Goal     Problem: Gas Exchange - Impaired  Goal: *Absence of hypoxia  Outcome: Progressing Towards Goal     Problem: Pressure Injury - Risk of  Goal: *Prevention of pressure injury  Description: Document Julio Cesar Scale and appropriate interventions in the flowsheet.   Outcome: Progressing Towards Goal  Note: Pressure Injury Interventions:  Sensory Interventions: Assess changes in LOC,Minimize linen layers,Monitor skin under medical devices    Moisture Interventions: Absorbent underpads,Internal/External urinary devices,Minimize layers    Activity Interventions: Increase time out of bed,Pressure redistribution bed/mattress(bed type),PT/OT evaluation    Mobility Interventions: Pressure redistribution bed/mattress (bed type),HOB 30 degrees or less,PT/OT evaluation    Nutrition Interventions: Document food/fluid/supplement intake    Friction and Shear Interventions: Apply protective barrier, creams and emollients                Problem: Patient Education: Go to Patient Education Activity  Goal: Patient/Family Education  Outcome: Progressing Towards Goal     Problem: Patient Education: Go to Patient Education Activity  Goal: Patient/Family Education  Outcome: Progressing Towards Goal     Problem: General Medical Care Plan  Goal: *Vital signs within specified parameters  Outcome: Progressing Towards Goal  Goal: *Labs within defined limits  Outcome: Progressing Towards Goal  Goal: *Absence of infection signs and symptoms  Outcome: Progressing Towards Goal  Goal: *Optimal pain control at patient's stated goal  Outcome: Progressing Towards Goal  Goal: *Skin integrity maintained  Outcome: Progressing Towards Goal  Goal: *Fluid volume balance  Outcome: Progressing Towards Goal  Goal: *Optimize nutritional status  Outcome: Progressing Towards Goal  Goal: *Anxiety reduced or absent  Outcome: Progressing Towards Goal  Goal: *Progressive mobility and function (eg: ADL's)  Outcome: Progressing Towards Goal     Problem: Patient Education: Go to Patient Education Activity  Goal: Patient/Family Education  Outcome: Progressing Towards Goal     Problem: Tissue Perfusion - Cardiopulmonary, Altered  Goal: *Optimize tissue perfusion  Outcome: Progressing Towards Goal  Goal: *Absence of hypoxia  Outcome: Progressing Towards Goal     Problem: Patient Education: Go to Patient Education Activity  Goal: Patient/Family Education  Outcome: Progressing Towards Goal

## 2022-01-17 NOTE — PROGRESS NOTES
END OF SHIFT NOTE:    INTAKE/OUTPUT  01/16 0701 - 01/17 0700  In: 939 [P.O.:939]  Out: 1450 [Urine:1450]  Voiding: YES  Catheter: NO  Drain:              Flatus: Patient does have flatus present. Stool:  0 occurrences. Characteristics:  Stool Assessment  Stool Color: Brown  Stool Appearance: Watery  Stool Amount: Large  Stool Source/Status: Rectum    Emesis: 0 occurrences. Characteristics:        VITAL SIGNS  Patient Vitals for the past 12 hrs:   Temp Pulse Resp BP SpO2   01/17/22 1527  84  (!) 89/63 98 %   01/17/22 1523 97.5 °F (36.4 °C) 73 18 90/72 98 %   01/17/22 1123  80  107/69 97 %   01/17/22 1121 97.5 °F (36.4 °C) (!) 103 20 (!) 94/56 95 %   01/17/22 0731 97.6 °F (36.4 °C) 87 20 113/65 100 %       Pain Assessment  Pain Intensity 1: 0 (01/17/22 0714)        Patient Stated Pain Goal: 0    Ambulating  Yes, used prosthetic for R BKA to ambulate to chair. Shift report given to oncoming nurse at the bedside.     Janeen Arceo RN

## 2022-01-17 NOTE — PROGRESS NOTES
[facility] Hospitalist Progress Notes   Admit Date:  2022 11:38 AM   Name:  Irene Almendarez   Age:  77 y.o. Sex:  male  :  1945   MRN:  [id number]   Room:      Presenting Complaint: Respiratory Distress    Reason(s) for Admission: Acute on chronic respiratory failure with hypercapnia [facility]) [J96.22]     Hospitalists consulted by Intensivist for assuming care as pt transferred out of ICU. History of Presenting Illness:   Irene Almendarez is a 77 y.o. male with history of A. fib, acute on chronic respiratory failure with hypoxia and hypercapnia, acute respiratory failure with hypercapnia, atrial fibrillation with RVR, coronary artery disease, chronic kidney disease, on long-term anticoagulation, diabetes status post right BKA, hypertension, hypercholesterolemia, left ventricular dysfunction, neuropathy, systolic CHF and a history of Staph aureus bacteremia who was admitted 2022 secondary to acute hypoxic and hypercapnic respiratory failure requiring intubation. He was admitted to the pulmonology service. Of note the patient has been concerning for obstructive sleep apnea for some time and was recommended for outpatient sleep studies but had not had them done yet. As an outpatient he is on Florinef for his hypotension, and he had a echo in July that showed an EF of 30 to 35%. The patient was admitted by the intensivist service to the ICU. He was successfully extubated on 2021 he has been stable status post extubation with the pulmonology service working on getting him patient lives with outpatient. Uses BiPAP at night and air Vo during the day. Patient admitted with acute on chronic hypoxic and hypercapnic respiratory failure. Was treated with antibiotics briefly but major issue is thought to be obesity hypoventilation syndrome/ELAINA. Patient was extubated and currently on air Vo during daytime and BiPAP at night.   Transferred to medicine service as primary as patient is on medical floor. 1/17 Patient still needing oxygen 6 L high flow nasal cannula. No fever no chills. No chest pain. He was hypotensive yesterday and will follow antihypertensive medications are held. Assessment & Plan: This is a 77y male with:    #  Acute respiratory failure with hypercapnia ([facility] (1/6/2022)  Patient is currently needing 6 L Oxygen HFNC with ambulation and BiPAP at night  Weaning him down slowly. Pulmonary on board. Appreciate recommendations. Patient qualified for trilogy at home. Aspiration and fall precautions. 1/17 patient still needing 56L oxygen nasal cannula. Currently on p.o. Lasix held this afternoon due to hypotension. #  Stage 3 chronic kidney disease ([facility] (11/22/2017)  Creatinine at baseline 1.96. Will monitor. #  Controlled type 2 diabetes mellitus with diabetic polyneuropathy, without long-term current use of insulin ([facility] (1/5/2018)  Continue to monitor blood sugars and cover with insulin. #Hypotension: Unclear etiology. Cortisol level , TSH, wnl. Continue fludrocortisone. Blood pressure borderline. Intermittently low blood pressures. Antihypertensive medications held. Plan to start midodrine if blood pressure still low. #Severe obesity (BMI 35.0-35.9 with comorbidity) ([facility] (10/10/2018)  Adding to complexity. #Chronic systolic heart LYWJBIV:(2/1/4164)  ECHO repeated 1/11 shows EF of 30 to 35%  No active issues at this time  Continue current medical management  Cardiology on board. Appreciate recs. Cardiology recommends outpatient evaluation- ischemic work up. Hypotension limiting treatment for systolic heart failure. Paroxysmal Atrial fibrillation:    Rate controlled at present. Continue Eliquis. Acute urinary retention: resolved  Flomax on hold due to borderline BP.     DISPO: STR pending acceptance. Daughter updated regarding current plan of care.     Hospital Problems as of 1/17/2022 Date Reviewed: 9/14/2021 Codes Class Noted - Resolved POA    Obstructive sleep apnea ICD-10-CM: G47.33  ICD-9-CM: 327.23  1/8/2022 - Present Yes        Obesity hypoventilation syndrome ([facility] ICD-10-CM: T64.8  ICD-9-CM: 278.03  1/8/2022 - Present Yes        Decreased cardiac ejection fraction (Chronic) ICD-10-CM: R93.1  ICD-9-CM: 794.39  1/6/2022 - Present Yes        Acute on chronic respiratory failure with hypercapnia (HCC) ICD-10-CM: K06.04  ICD-9-CM: 518.84  1/6/2022 - Present Yes        Severe obesity (BMI 35.0-35.9 with comorbidity) ([facility] (Chronic) ICD-10-CM: E66.01, Z68.35  ICD-9-CM: 278.01, V85.35  10/10/2018 - Present Yes        Controlled type 2 diabetes mellitus with diabetic polyneuropathy, without long-term current use of insulin (HCC) (Chronic) ICD-10-CM: E11.42  ICD-9-CM: 250.60, 357.2  1/5/2018 - Present Yes        Stage 3 chronic kidney disease ([facility] (Chronic) ICD-10-CM: N18.30  ICD-9-CM: 585.3  11/22/2017 - Present Yes        * (Principal) RESOLVED: Acute respiratory failure with hypercapnia ([facility] ICD-10-CM: J96.02  ICD-9-CM: 518.81  1/6/2022 - 1/11/2022 Yes        RESOLVED: Hypotension ICD-10-CM: I95.9  ICD-9-CM: 458.9  1/6/2022 - 1/11/2022 Yes        RESOLVED: Suspected sleep apnea ICD-10-CM: K51.665  ICD-9-CM: 781.99  8/20/2021 - 1/8/2022 Yes              Current Facility-Administered Medications   Medication Dose Route Frequency    [Held by provider] metoprolol succinate (TOPROL-XL) XL tablet 25 mg  25 mg Oral DAILY    [Held by provider] lisinopriL (PRINIVIL, ZESTRIL) tablet 2.5 mg  2.5 mg Oral DAILY    [Held by provider] midodrine (PROAMATINE) tablet 10 mg  10 mg Oral Q8H    [Held by provider] furosemide (LASIX) tablet 40 mg  40 mg Oral DAILY    [Held by provider] tamsulosin (FLOMAX) capsule 0.4 mg  0.4 mg Oral DAILY    insulin lispro (HUMALOG) injection   SubCUTAneous TIDAC    famotidine (PEPCID) tablet 20 mg  20 mg Oral DAILY    pregabalin (LYRICA) capsule 200 mg  200 mg Oral DAILY    pregabalin (LYRICA) capsule 400 mg  400 mg Oral QHS    ferrous sulfate tablet 325 mg  1 Tablet Oral DAILY WITH BREAKFAST    acetaminophen (TYLENOL) tablet 650 mg  650 mg Oral Q6H PRN    fludrocortisone (FLORINEF) tablet 0.1 mg  0.1 mg Oral DAILY    polyethylene glycol (MIRALAX) packet 17 g  17 g Oral DAILY    sodium chloride (NS) flush 5-40 mL  5-40 mL IntraVENous Q8H    sodium chloride (NS) flush 5-40 mL  5-40 mL IntraVENous PRN    apixaban (ELIQUIS) tablet 5 mg  5 mg Oral Q12H    atorvastatin (LIPITOR) tablet 40 mg  40 mg Oral QHS       Objective:     Patient Vitals for the past 24 hrs:   Temp Pulse Resp BP SpO2   01/17/22 1527  84  (!) 89/63 98 %   01/17/22 1523 97.5 °F (36.4 °C) 73 18 90/72 98 %   01/17/22 1123  80  107/69 97 %   01/17/22 1121 97.5 °F (36.4 °C) (!) 103 20 (!) 94/56 95 %   01/17/22 0731 97.6 °F (36.4 °C) 87 20 113/65 100 %   01/17/22 0300 98.1 °F (36.7 °C) 87 18 (!) 97/53 95 %   01/16/22 2325     95 %   01/16/22 2315 97.7 °F (36.5 °C) 94 18 102/67 94 %   01/16/22 2020  98  99/66    01/16/22 1940  97  (!) 61/50 93 %   01/16/22 1934 98.5 °F (36.9 °C) 95 18 92/67 95 %     Oxygen Therapy  O2 Sat (%): 98 % (01/17/22 1527)  Pulse via Oximetry: 94 beats per minute (01/15/22 2203)  O2 Device: Other (comment) (Trilogy) (01/16/22 2325)  Skin Assessment: Clean, dry, & intact (01/14/22 0207)  Skin Protection for O2 Device: No (01/13/22 2244)  Orientation: Bilateral (01/07/22 0837)  Location: Cheek (01/07/22 1568)  Interventions: Mouth Care (01/07/22 0800)  O2 Flow Rate (L/min): 7 l/min (01/16/22 3550)  O2 Temperature: 87.8 °F (31 °C) (01/10/22 1507)  FIO2 (%): 40 % (01/14/22 0207)    Estimated body mass index is 37.88 kg/m² as calculated from the following:    Height as of this encounter: 5' 10\" (1.778 m). Weight as of this encounter: 119.7 kg (264 lb).     Intake/Output Summary (Last 24 hours) at 1/17/2022 1659  Last data filed at 1/17/2022 1330  Gross per 24 hour   Intake 1286 ml   Output 900 ml   Net 386 ml         Physical Exam:    Blood pressure (!) 89/63, pulse 84, temperature 97.5 °F (36.4 °C), resp. rate 18, height 5' 10\" (1.778 m), weight 119.7 kg (264 lb), SpO2 98 %. General:     morbidly obese, no overt distress on 6L Oxygen HFNC,   Head:  Normocephalic, atraumatic  Eyes:  Sclerae appear normal.  Pupils equally round. ENT:  Nares appear normal, no drainage. Moist oral mucosa  Neck:  Supple   CV:   RRR. No m/r/g. JVD examination limited  Lungs:   Decreased entry bilateral lower lobe otherwise CTAB. No wheezing, rhonchi, or rales. Respirations even, unlabored  Abdomen: Bowel sounds present. Soft, nontender, nondistended. Extremities: No cyanosis or clubbing. No edema; right BKA [no issues with stump.]  Skin:     No rashes and normal coloration. Warm and dry. Neuro:  AOx3. Moving all 4 extremities. Speech normal.  Psych:  Normal mood and affect. I have reviewed ordered lab tests and independently visualized imaging below:    Recent Labs:    Procedures done this admission:  * No surgery found *    All Micro Results     None          SARS-CoV-2 Lab Results  \"Novel Coronavirus\" Test: No results found for: COV2NT   \"Emergent Disease\" Test: No results found for: EDPR  \"SARS-COV-2\" Test: No results found for: XGCOVT  \"Precision Labs\" Test: No results found for: RSLT  Rapid Test: No results found for: COVR         Labs: Results:       BMP, Mg, Phos Recent Labs     01/17/22  0759 01/15/22  0756    145   K 4.3 4.0    104   CO2 38* 38*   AGAP 2* 3*   BUN 46* 32*   CREA 1.96* 1.49   CA 9.5 9.5   GLU 91 98   MG  --  2.2      CBC Recent Labs     01/17/22  0759   WBC 6.6   RBC 4.49   HGB 12.1*   HCT 41.4      GRANS 46   LYMPH 41   EOS 3   MONOS 10   BASOS 1   IG 0   ANEU 3.0   ABL 2.7   TIERNEY 0.2   ABM 0.6   ABB 0.0   AIG 0.0      LFT No results for input(s): ALT, TBIL, AP, TP, ALB, GLOB, AGRAT in the last 72 hours.     No lab exists for component: SGOT, GPT   Cardiac Testing No results found for: BNPP, BNP, CPK, RCK1, RCK2, RCK3, RCK4, CKMB, CKNDX, CKND1, TROPT, TROIQ   Coagulation Tests Lab Results   Component Value Date/Time    aPTT 34.9 08/18/2021 06:56 PM    aPTT 31.7 07/19/2021 09:24 PM      A1c Lab Results   Component Value Date/Time    Hemoglobin A1c 6.2 01/09/2022 02:53 AM    Hemoglobin A1c 6.7 (H) 07/13/2021 08:34 AM    Hemoglobin A1c 6.2 (H) 01/13/2021 08:27 AM      Lipid Panel Lab Results   Component Value Date/Time    Cholesterol, total 156 07/13/2021 08:34 AM    HDL Cholesterol 39 (L) 07/13/2021 08:34 AM    LDL, calculated 83 07/13/2021 08:34 AM    LDL, calculated 83 06/11/2020 09:16 AM    VLDL, calculated 34 07/13/2021 08:34 AM    VLDL, calculated 42 (H) 06/11/2020 09:16 AM    Triglyceride 202 (H) 07/13/2021 08:34 AM      Thyroid Panel Lab Results   Component Value Date/Time    TSH 1.240 01/12/2022 05:40 AM    TSH 0.655 07/19/2021 01:46 PM    T4, Free 1.0 01/12/2022 05:40 AM    T4, Free 1.4 07/19/2021 01:46 PM        Most Recent UA Lab Results   Component Value Date/Time    WBC 0-3 07/19/2021 01:26 PM    RBC 5-10 07/19/2021 01:26 PM    Epithelial cells 0-3 07/19/2021 01:26 PM    Bacteria 0 07/19/2021 01:26 PM    Casts 10-20 07/19/2021 01:26 PM            Other Studies:  No results found. Signed:  Lawyer Andreia MD    Part of this note may have been written by using a voice dictation software. The note has been proof read but may still contain some grammatical/other typographical errors.

## 2022-01-17 NOTE — PROGRESS NOTES
END OF SHIFT NOTE:    INTAKE/OUTPUT  01/16 0701 - 01/17 0700  In: 939 [P.O.:939]  Out: 1450 [Urine:1450]  Voiding: YES  Catheter: NO  Drain:              Flatus: Patient does have flatus present. Stool:  0 occurrences. Characteristics:  Stool Assessment  Stool Color: Brown  Stool Appearance: Watery  Stool Amount: Large  Stool Source/Status: Rectum    Emesis: 0 occurrences. Characteristics:        VITAL SIGNS  Patient Vitals for the past 12 hrs:   Temp Pulse Resp BP SpO2   01/17/22 0300 98.1 °F (36.7 °C) 87 18 (!) 97/53 95 %   01/16/22 2325     95 %   01/16/22 2315 97.7 °F (36.5 °C) 94 18 102/67 94 %   01/16/22 2020  98  99/66    01/16/22 1940  97  (!) 61/50 93 %   01/16/22 1934 98.5 °F (36.9 °C) 95 18 92/67 95 %       Pain Assessment  Pain Intensity 1: 0 (01/17/22 0227)        Patient Stated Pain Goal: 0    Ambulating  Yes    Shift report given to oncoming nurse at the bedside.     Itzel Wray RN

## 2022-01-17 NOTE — PROGRESS NOTES
LOS 10d      Chart reviewed by [facility]. Patient O2 demands increased, now requiring 6LNCHF, Patient had periods of hypotension requiring PO Lasix and antihypertensive meds to be held. Cardiology following. Patient has bed acceptance at [facility] when stable for discharge. CM will continue to follow for needs.

## 2022-01-17 NOTE — PROGRESS NOTES
01/16/22 2325   Oxygen Therapy   O2 Sat (%) 95 %   O2 Device Other (comment)  (Trilogy)   O2 Flow Rate (L/min) 7 l/min   Respiratory   Respiratory (WDL) X   Respiratory Pattern Regular   Chest/Tracheal Assessment Chest expansion, symmetrical   CPAP/BIPAP   CPAP/BIPAP Start/Stop On   Device Mode AVAP   $$ Bipap Daily Yes   AVAP Set Resp Rate 5   AVAP Set VT (ml) 500   Mask Type and Size Full face   Skin Condition intact   PIP Observed 16 cm H20   EPAP (cm H2O)   (5-15 cmH2O)   Vt Spont (ml) 660 ml   Ve Observed (l/min) 11.1 l/min   Total RR (Spontaneous) 21 breaths per minute   Insp Rise Time (sec) 3   Leak (Estimated) 14 L/min   Pt's Home Machine Yes (type/vendor)   Pulmonary Toilet   Pulmonary Toilet Cough and deep breath;H. O.B elevated

## 2022-01-17 NOTE — PROGRESS NOTES
[facility] CARDIOLOGY PROGRESS NOTE         1/17/2022 10:26 AM    Admit Date: 1/6/2022    Subjective:   Patient reports that his breathing is better, still on 6 L NC. Denies leg swelling. No chest pain, abdominal pain. Tolerating PO intake. No other complaints at this time.      ROS:  Cardiovascular:  As noted above    Objective:      Vitals:    01/16/22 2315 01/16/22 2325 01/17/22 0300 01/17/22 0731   BP: 102/67  (!) 97/53 113/65   Pulse: 94  87 87   Resp: 18  18 20   Temp: 97.7 °F (36.5 °C)  98.1 °F (36.7 °C) 97.6 °F (36.4 °C)   SpO2: 94% 95% 95% 100%   Weight:       Height:         Physical Exam:  General-No Acute Distress, awake, alert   Neck- supple, no JVD  CV- irregular rate and rhythm no MRG  Lung- distant lower fields, no wheezes bilaterally  Abd- soft, nontender, nondistended  Ext- no edema in left leg, R leg post partial amputation with prosthesis  Skin- warm and dry    Data Review:   Recent Labs     01/17/22  0759 01/15/22  0756    145   K 4.3 4.0   MG  --  2.2   BUN 46* 32*   CREA 1.96* 1.49   GLU 91 98   WBC 6.6  --    HGB 12.1*  --    HCT 41.4  --      --        Assessment/Plan:     Active Problems:  Dilated cardiomyopathy   LVEF noted to be 30-35% dating back to 7/21/2021 echo   Hypotension limits ability to add CHF medical therapy   Does not appear volume overloaded on examination   On Lasix 40 mg daily, held RE: increased Cr   - outpatient ischemic evaluation pending improvement in renal function   - lifevest candidate on DC     Paroxysmal atrial fibrillation   On Eliquis, rates controlled, hemodynamically   - hemodynamics stable      Reported CAD-said he had a previous stent in the late 90s   No coronary angiogram is available for review, on atorvastatin 40 would continue  -Outpatient ischemic evaluation as above pending renal function improvement    Stage 3 chronic kidney disease ([facility] (11/22/2017)    - Cr elvated today, diuretics held       Controlled type 2 diabetes mellitus with diabetic polyneuropathy, without long-term current use of insulin ([facility] (1/5/2018)    - per medicine       Severe obesity (BMI 35.0-35.9 with comorbidity) ([address] (10/10/2018)    - continue efforts towards dietary changes in an effort to lose weight      Acute on chronic respiratory failure with hypercapnia ([address] (1/6/2022)    Obstructive sleep apnea (1/8/2022)    Obesity hypoventilation syndrome ([facility] (1/8/2022)    - per primary     Raymond Lundberg DO  1/17/2022 10:26 AM

## 2022-01-18 VITALS
SYSTOLIC BLOOD PRESSURE: 103 MMHG | HEIGHT: 70 IN | HEART RATE: 94 BPM | OXYGEN SATURATION: 98 % | TEMPERATURE: 97.5 F | BODY MASS INDEX: 38.05 KG/M2 | DIASTOLIC BLOOD PRESSURE: 73 MMHG | RESPIRATION RATE: 18 BRPM | WEIGHT: 265.8 LBS

## 2022-01-18 LAB
ANION GAP SERPL CALC-SCNC: 2 MMOL/L (ref 7–16)
BASOPHILS # BLD: 0 K/UL (ref 0–0.2)
BASOPHILS NFR BLD: 1 % (ref 0–2)
BUN SERPL-MCNC: 47 MG/DL (ref 8–23)
CALCIUM SERPL-MCNC: 9.3 MG/DL (ref 8.3–10.4)
CHLORIDE SERPL-SCNC: 104 MMOL/L (ref 98–107)
CO2 SERPL-SCNC: 37 MMOL/L (ref 21–32)
COVID-19 RAPID TEST, COVR: NOT DETECTED
CREAT SERPL-MCNC: 1.81 MG/DL (ref 0.8–1.5)
DIFFERENTIAL METHOD BLD: ABNORMAL
EOSINOPHIL # BLD: 0.2 K/UL (ref 0–0.8)
EOSINOPHIL NFR BLD: 4 % (ref 0.5–7.8)
ERYTHROCYTE [DISTWIDTH] IN BLOOD BY AUTOMATED COUNT: 15.8 % (ref 11.9–14.6)
GLUCOSE BLD STRIP.AUTO-MCNC: 124 MG/DL (ref 65–100)
GLUCOSE BLD STRIP.AUTO-MCNC: 125 MG/DL (ref 65–100)
GLUCOSE BLD STRIP.AUTO-MCNC: 91 MG/DL (ref 65–100)
GLUCOSE SERPL-MCNC: 106 MG/DL (ref 65–100)
HCT VFR BLD AUTO: 38.6 % (ref 41.1–50.3)
HGB BLD-MCNC: 11.4 G/DL (ref 13.6–17.2)
IMM GRANULOCYTES # BLD AUTO: 0 K/UL (ref 0–0.5)
IMM GRANULOCYTES NFR BLD AUTO: 0 % (ref 0–5)
LYMPHOCYTES # BLD: 2.5 K/UL (ref 0.5–4.6)
LYMPHOCYTES NFR BLD: 41 % (ref 13–44)
MCH RBC QN AUTO: 26.8 PG (ref 26.1–32.9)
MCHC RBC AUTO-ENTMCNC: 29.5 G/DL (ref 31.4–35)
MCV RBC AUTO: 90.8 FL (ref 79.6–97.8)
MONOCYTES # BLD: 0.5 K/UL (ref 0.1–1.3)
MONOCYTES NFR BLD: 8 % (ref 4–12)
NEUTS SEG # BLD: 2.8 K/UL (ref 1.7–8.2)
NEUTS SEG NFR BLD: 46 % (ref 43–78)
NRBC # BLD: 0 K/UL (ref 0–0.2)
PLATELET # BLD AUTO: 173 K/UL (ref 150–450)
PMV BLD AUTO: 11.9 FL (ref 9.4–12.3)
POTASSIUM SERPL-SCNC: 4.3 MMOL/L (ref 3.5–5.1)
RBC # BLD AUTO: 4.25 M/UL (ref 4.23–5.6)
SERVICE CMNT-IMP: ABNORMAL
SERVICE CMNT-IMP: ABNORMAL
SERVICE CMNT-IMP: NORMAL
SODIUM SERPL-SCNC: 143 MMOL/L (ref 138–145)
SOURCE, COVRS: NORMAL
WBC # BLD AUTO: 6.2 K/UL (ref 4.3–11.1)

## 2022-01-18 PROCEDURE — 97530 THERAPEUTIC ACTIVITIES: CPT

## 2022-01-18 PROCEDURE — 74011000250 HC RX REV CODE- 250: Performed by: INTERNAL MEDICINE

## 2022-01-18 PROCEDURE — 36415 COLL VENOUS BLD VENIPUNCTURE: CPT

## 2022-01-18 PROCEDURE — 99232 SBSQ HOSP IP/OBS MODERATE 35: CPT | Performed by: INTERNAL MEDICINE

## 2022-01-18 PROCEDURE — 74011250637 HC RX REV CODE- 250/637: Performed by: HOSPITALIST

## 2022-01-18 PROCEDURE — 74011250637 HC RX REV CODE- 250/637: Performed by: INTERNAL MEDICINE

## 2022-01-18 PROCEDURE — 87635 SARS-COV-2 COVID-19 AMP PRB: CPT

## 2022-01-18 PROCEDURE — 85025 COMPLETE CBC W/AUTO DIFF WBC: CPT

## 2022-01-18 PROCEDURE — 94660 CPAP INITIATION&MGMT: CPT

## 2022-01-18 PROCEDURE — 80048 BASIC METABOLIC PNL TOTAL CA: CPT

## 2022-01-18 PROCEDURE — 82962 GLUCOSE BLOOD TEST: CPT

## 2022-01-18 RX ORDER — FUROSEMIDE 20 MG/1
20 TABLET ORAL DAILY
Status: DISCONTINUED | OUTPATIENT
Start: 2022-01-18 | End: 2022-01-18 | Stop reason: HOSPADM

## 2022-01-18 RX ORDER — FUROSEMIDE 20 MG/1
20 TABLET ORAL 2 TIMES DAILY
Qty: 60 TABLET | Refills: 0 | Status: SHIPPED
Start: 2022-01-18 | End: 2022-02-17

## 2022-01-18 RX ORDER — PREGABALIN 200 MG/1
200 CAPSULE ORAL DAILY
Qty: 30 CAPSULE | Refills: 0 | Status: SHIPPED | OUTPATIENT
Start: 2022-01-19 | End: 2022-02-18

## 2022-01-18 RX ORDER — LANOLIN ALCOHOL/MO/W.PET/CERES
325 CREAM (GRAM) TOPICAL
Qty: 30 TABLET | Refills: 0 | Status: SHIPPED
Start: 2022-01-19 | End: 2022-02-18

## 2022-01-18 RX ORDER — FAMOTIDINE 20 MG/1
20 TABLET, FILM COATED ORAL DAILY
Qty: 30 TABLET | Refills: 0 | Status: SHIPPED
Start: 2022-01-19 | End: 2022-02-18

## 2022-01-18 RX ORDER — PREGABALIN 200 MG/1
400 CAPSULE ORAL
Qty: 60 CAPSULE | Refills: 0 | Status: SHIPPED | OUTPATIENT
Start: 2022-01-18 | End: 2022-02-17

## 2022-01-18 RX ORDER — POLYETHYLENE GLYCOL 3350 17 G/17G
17 POWDER, FOR SOLUTION ORAL DAILY
Qty: 240 G | Status: SHIPPED | OUTPATIENT
Start: 2022-01-18

## 2022-01-18 RX ORDER — TRAZODONE HYDROCHLORIDE 50 MG/1
50 TABLET ORAL AS NEEDED
Qty: 5 TABLET | Refills: 0 | Status: SHIPPED | OUTPATIENT
Start: 2022-01-18 | End: 2022-01-18 | Stop reason: SDUPTHER

## 2022-01-18 RX ORDER — TRAZODONE HYDROCHLORIDE 50 MG/1
50 TABLET ORAL AS NEEDED
Qty: 5 TABLET | Refills: 0 | Status: SHIPPED | OUTPATIENT
Start: 2022-01-18 | End: 2022-01-24

## 2022-01-18 RX ADMIN — APIXABAN 5 MG: 5 TABLET, FILM COATED ORAL at 08:30

## 2022-01-18 RX ADMIN — SODIUM CHLORIDE, PRESERVATIVE FREE 10 ML: 5 INJECTION INTRAVENOUS at 05:42

## 2022-01-18 RX ADMIN — FLUDROCORTISONE ACETATE 0.1 MG: 0.1 TABLET ORAL at 08:30

## 2022-01-18 RX ADMIN — FUROSEMIDE 20 MG: 20 TABLET ORAL at 09:56

## 2022-01-18 RX ADMIN — FAMOTIDINE 20 MG: 20 TABLET ORAL at 08:30

## 2022-01-18 RX ADMIN — POLYETHYLENE GLYCOL 3350 17 G: 17 POWDER, FOR SOLUTION ORAL at 08:30

## 2022-01-18 RX ADMIN — SODIUM CHLORIDE, PRESERVATIVE FREE 10 ML: 5 INJECTION INTRAVENOUS at 15:49

## 2022-01-18 RX ADMIN — PREGABALIN 200 MG: 50 CAPSULE ORAL at 08:30

## 2022-01-18 RX ADMIN — FERROUS SULFATE TAB 325 MG (65 MG ELEMENTAL FE) 325 MG: 325 (65 FE) TAB at 08:30

## 2022-01-18 NOTE — PROGRESS NOTES
Report called Tobias Poole RN at [facility] at [phone]. Dr. Darshan Boudreaux notified via [employer] serve that patient needs prescription for Lyrica 200 mg, Lyrica 400 mg, and Trazadone 50 mg.

## 2022-01-18 NOTE — PROGRESS NOTES
Patient with discharge orders today. Ray with [facility]  notified in regards to deliver Trilogy to SNF. [facility] vest referral sent via [phone]. No further needs identified to CM. RNCM notified patient's daughter, Francy/[phone] of bed number for SNF. Azul Jaramillo to transport patient via stretcher;  scheduled for 1700. Patient has met all treatment goals and milestones. CM will continue to follow until discharged today. Care Management Interventions  PCP Verified by CM:  Yes  Mode of Transport at Discharge: [address] (CM Consult): Discharge Planning,SNF  Discharge Durable Medical Equipment: No  Physical Therapy Consult: Yes  Occupational Therapy Consult: Yes  Speech Therapy Consult: No  Support Systems: Child(pat),Spouse/Significant Other  Confirm Follow Up Transport: Family  The Plan for Transition of Care is Related to the Following Treatment Goals : return to safe level of independence by participating in skilled therapies  The Patient and/or Patient Representative was Provided with a Choice of Provider and Agrees with the Discharge Plan?: Yes  Name of the Patient Representative Who was Provided with a Choice of Provider and Agrees with the Discharge Plan: Daughter  Freedom of Choice List was Provided with Basic Dialogue that Supports the Patient's Individualized Plan of Care/Goals, Treatment Preferences and Shares the Quality Data Associated with the Providers?: Yes  Discharge Location  Discharge Placement: Skilled nursing facility

## 2022-01-18 NOTE — PROGRESS NOTES
Fatemeh akbar referral faxed to [facility], [phone]. Patient to be fitted as outpatient at [facility].

## 2022-01-18 NOTE — PROGRESS NOTES
Patient has bed assignment at [place]. [facility], report line: Onesimo Hackett  time at 1700. Primary RN made aware.

## 2022-01-18 NOTE — DISCHARGE SUMMARY
Hospitalist Discharge Summary   Admit Date:  2022 11:38 AM   DC Note date: 2022  Name:  An Bledsoe   Age:  77 y.o.   Sex:  male  :  1945   MRN:  [id number]   Room:  [facility]  PCP:  Faustino Martins MD    Presenting Complaint: Respiratory Distress    Initial Admission Diagnosis: Acute on chronic respiratory failure with hypercapnia ([facility] [address] [J96.22]     Problem List for this Hospitalization:  Hospital Problems as of 2022 Date Reviewed: 2021          Codes Class Noted - Resolved POA    Obstructive sleep apnea ICD-10-CM: G47.33  ICD-9-CM: 327.23  2022 - Present Yes        Obesity hypoventilation syndrome ([facility] [address] ICD-10-CM: Q83.5  ICD-9-CM: 278.03  2022 - Present Yes        Decreased cardiac ejection fraction (Chronic) ICD-10-CM: R93.1  ICD-9-CM: 794.39  2022 - Present Yes        Acute on chronic respiratory failure with hypercapnia (HCC) ICD-10-CM: A52.60  ICD-9-CM: 518.84  2022 - Present Yes        Severe obesity (BMI 35.0-35.9 with comorbidity) (HCC) (Chronic) ICD-10-CM: E66.01, Z68.35  ICD-9-CM: 278.01, V85.35  10/10/2018 - Present Yes        Controlled type 2 diabetes mellitus with diabetic polyneuropathy, without long-term current use of insulin (HCC) (Chronic) ICD-10-CM: E11.42  ICD-9-CM: 250.60, 357.2  2018 - Present Yes        Stage 3 chronic kidney disease ([facility] [address] (Chronic) ICD-10-CM: N18.30  ICD-9-CM: 585.3  2017 - Present Yes        * (Principal) RESOLVED: Acute respiratory failure with hypercapnia ([facility] [address] ICD-10-CM: J96.02  ICD-9-CM: 518.81  2022 - 2022 Yes        RESOLVED: Hypotension ICD-10-CM: I95.9  ICD-9-CM: 458.9  2022 - 2022 Yes        RESOLVED: Suspected sleep apnea ICD-10-CM: M69.569  ICD-9-CM: 781.99  2021 - 2022 Yes            Did Patient have Sepsis (YES OR NO): NO    Acute hypoxemic hypercapnic respiratory failure  CKD stage III  Diabetes mellitus type 2 with diabetic neuropathy  Chronic hypotension  Severe obesity with BMI of 38  Chronic systolic congestive heart failure present on admission needs outpatient follow-up  Paroxysmal atrial fibrillation  Acute urinary retention resolved      Hospital Course: This is a 77 y.o. male with history of A. fib, acute on chronic respiratory failure with hypoxia and hypercapnia, acute respiratory failure with hypercapnia, atrial fibrillation with RVR, coronary artery disease, chronic kidney disease, on long-term anticoagulation, diabetes status post right BKA, hypertension, hypercholesterolemia, left ventricular dysfunction, neuropathy, systolic CHF and a history of Staph aureus bacteremia who was admitted January 6, 2022 secondary to acute hypoxic and hypercapnic respiratory failure requiring intubation. He was admitted to the pulmonology service. Of note the patient has been concerning for obstructive sleep apnea for some time and was recommended for outpatient sleep studies but had not had them done yet. As an outpatient he is on Florinef for his hypotension, and he had a echo in July that showed an EF of 30 to 35%.    The patient was admitted by the intensivist service to the ICU. He was successfully extubated on January 7, 2021 he has been stable status post extubation with the pulmonology service working on getting him patient lives with outpatient. Patient's oxygen requirements gradually improved. He had a home oxygen screen done few days prior and then qualified for 4 L oxygen. He is now going to rehab and will need 4 to 5 L oxygen nasal cannula. He has Obesity Hypoventilation syndrome and qualified for Trilogy. He will need to use Trilogy at bedtime. Patient is currently euvolemic. He is being discharged on oral Lasix 20 mg twice daily. For his systolic congestive heart failure, cardiology is consulted. They recommend ischemic work-up as an outpatient. He will need LifeVest.  Case management on board.   Unfortunately for his congestive heart failure, treatment options are limited due to hypotension. He has CKD 3 and Cr is 1.8 on discharge at baseline. Acute urinary retention resolved. Patient is being discharged to rehab today in a stable condition. Disposition: Skilled Nursing Facility  Diet: ADULT DIET Easy to Chew; 3 carb choices (45 gm/meal)  ADULT ORAL NUTRITION SUPPLEMENT Breakfast, Lunch; Diabetic Supplement  Code Status: Full Code    Follow Up Orders: Follow-up Appointments   Procedures    FOLLOW UP VISIT Appointment in: 3 - 5 Days F/U WITH PCP IN 3-5 DAYS F/U WITH CARDIOLOGY IN 1 WEEK. F/U WITH PCP IN 3-5 DAYS  F/U WITH CARDIOLOGY IN 1 WEEK. Standing Status:   Standing     Number of Occurrences:   1     Order Specific Question:   Appointment in     Answer:   3 - 5 Days       Follow-up Information     Follow up With Specialties Details Why Contact Info    [facility]   [address]  [phone]    Anjelica Duncan MD Family Medicine   [address] [facility] [address]  [phone]      CARDIOLOGY   FOLLOW UP WITH YOUR CARDIOLOGIST IN 1 WEEK           Follow up labs/diagnostics (ultimately defer to outpatient provider):  CBC, BMP in 3-5 days    Time spent in patient discharge and coordination 46 minutes. Plan was discussed with the pt. All questions answered. Patient was stable at time of discharge. Instructions given to call a physician or return if any concerns. Discharge Info:   Current Discharge Medication List      START taking these medications    Details   ferrous sulfate 325 mg (65 mg iron) tablet Take 1 Tablet by mouth daily (with breakfast) for 30 days. Qty: 30 Tablet, Refills: 0  Start date: 1/19/2022, End date: 2/18/2022      famotidine (PEPCID) 20 mg tablet Take 1 Tablet by mouth daily for 30 days.   Qty: 30 Tablet, Refills: 0  Start date: 1/19/2022, End date: 2/18/2022      furosemide (LASIX) 20 mg tablet Take 1 Tablet by mouth two (2) times a day for 30 days. Qty: 60 Tablet, Refills: 0  Start date: 1/18/2022, End date: 2/17/2022         CONTINUE these medications which have CHANGED    Details   polyethylene glycol (MIRALAX) 17 gram packet Take 1 Packet by mouth daily. Indications: constipation  Qty: 240 g, Refills: prn  Start date: 1/18/2022      traZODone (DESYREL) 50 mg tablet Take 1 Tablet by mouth as needed for Sleep for up to 5 days. Patient states only taking as needed. Qty: 5 Tablet, Refills: 0  Start date: 1/18/2022, End date: 1/23/2022    Associated Diagnoses: Insomnia, unspecified type      !! pregabalin (LYRICA) 200 mg capsule Take 1 Capsule by mouth daily for 30 days. Max Daily Amount: 200 mg. 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 pregabalin (LYRICA) 200 mg capsule Take 2 Capsules by mouth nightly for 30 days. Max Daily Amount: 400 mg. 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 - Potential duplicate medications found. Please discuss with provider. CONTINUE these medications which have NOT CHANGED    Details   tamsulosin (Flomax) 0.4 mg capsule Take 0.4 mg by mouth daily. atorvastatin (LIPITOR) 40 mg tablet Take 1 Tablet by mouth nightly. Indications: treatment to slow progression of coronary artery disease  Qty: 90 Tablet, Refills: 3    Associated Diagnoses: Controlled type 2 diabetes mellitus with complication, without long-term current use of insulin ([facility] [address] Coronary artery disease involving native coronary artery of native heart without angina pectoris; Mixed hyperlipidemia      apixaban (ELIQUIS) 5 mg tablet Take 1 Tablet by mouth every twelve (12) hours. Qty: 180 Tablet, Refills: 0    Associated Diagnoses: Atrial fibrillation, unspecified type ([address]  Coronary artery disease involving native coronary artery of native heart without angina pectoris      Wheel Chair jerry Use daily for mobility for s88.111, R26.89  Qty: 1 Each, Refills: 0    Associated Diagnoses: Below-knee amputation of right lower extremity ([address] Decreased functional mobility      acetaminophen (TYLENOL) 325 mg tablet Take 2 Tablets by mouth every six (6) hours as needed (for amputation site pain). Qty: 40 Tablet, Refills: prn      fludrocortisone (FLORINEF) 0.1 mg tablet Take 1 Tablet by mouth daily. Qty: 60 Tablet, Refills: 0      cyanocobalamin 1,000 mcg tablet Take 1 Tab by mouth daily. Qty: 30 Tab, Refills: 5    Associated Diagnoses: Peripheral polyneuropathy; Pernicious anemia             Procedures done this admission:  * No surgery found *    Consults this admission:  IP CONSULT TO HOSPITALIST  IP CONSULT TO CARDIOLOGY    Echocardiogram/EKG results:  Results from Hospital Encounter encounter on 01/06/22    ECHO ADULT COMPLETE    Interpretation Summary    Left Ventricle: Left ventricle is moderately dilated. Normal wall thickness. See diagram for wall motion findings. Severely reduced left ventricular systolic function with a visually estimated EF of 30 - 35%.   Mitral Valve: Mild transvalvular regurgitation.   Left Atrium: Left atrium is moderately dilated.   Right Atrium: Right atrium is mildly dilated.   Contrast used: Definity.        EKG Results     Procedure 720 Value Units Date/Time    EKG, 12 LEAD, INITIAL [[id number]] Collected: 01/07/22 0005    Order Status: Completed Updated: 01/08/22 1534     Ventricular Rate 49 BPM      Atrial Rate 60 BPM      QRS Duration 126 ms      Q-T Interval 682 ms      QTC Calculation (Bezet) 616 ms      Calculated R Axis -4 degrees      Calculated T Axis 172 degrees      Diagnosis --     Atrial fibrillation with slow ventricular response with premature ventricular   or aberrantly conducted complexes and with  ventricular escape complexes  Right bundle branch block  Septal infarct , age undetermined  T wave abnormality, consider inferolateral ischemia  Abnormal ECG  When compared with ECG of 06-JAN-2022 11:41,  PREVIOUS ECG IS PRESENT  Confirmed by Yann Castillo ([id number]) on 1/8/2022 3:34:36 PM      EKG [[id number]] Collected: 01/06/22 1141    Order Status: Completed Updated: 01/06/22 1414     Ventricular Rate 72 BPM      Atrial Rate 0 BPM      QRS Duration 138 ms      Q-T Interval 427 ms      QTC Calculation (Bezet) 468 ms      Calculated R Axis 39 degrees      Calculated T Axis 45 degrees      Diagnosis --     Atrial fibrillation  Right bundle branch block    Confirmed by Guillermina Ham MD (), JUVE CHEATHAM ([id number]) on 1/6/2022 2:14:24 PM            Diagnostic Imaging/Tests:   XR CHEST SNGL V    Result Date: 1/7/2022  Chest X-ray INDICATION: Central line placement A portable AP view of the chest was obtained. FINDINGS: Support tubing is in good position. Left-sided central line is in place with the tip overlying the superior vena cava. There is no pneumothorax. There is infiltrate/atelectasis in both lung bases. The overall improved aeration of the right lower lobe. There is stable cardiomegaly. The bony thorax is intact. 1.  No evidence of complication post line placement. 2.  Bibasilar infiltrate/atelectasis. XR CHEST SNGL V    Result Date: 1/6/2022  CHEST RADIOGRAPH, 1 views, 1/6/2022 History: Respiratory failure. Increasing shortness of breath. Foggy headed. Technique: Portable frontal view of the chest. Comparison: Chest radiograph 1/6/2022 at 12:02 PM Findings: An endotracheal tube is present. This appears slightly retracted with the tip remaining in good position located 3 cm above the tayla. Some of the perceived change in position could be due to technical differences. There is now a feeding tube which extends through the gastroesophageal junction and off the inferior aspect of the image. Slightly worsened aeration is seen of the lungs.  Stable mild-to-moderate cardiomegaly seen. There is no pneumothorax. Stable left basilar and slightly worsened right basilar airspace changes are seen. 1.  Worsening aeration and right basilar airspace changes. Right basilar airspace changes may represent atelectasis given their location and worsening aeration. Additional etiologies such as pneumonia are not excluded if suggested by clinical findings. This report was made using voice transcription. Despite my best efforts to avoid any, transcription errors may persist. If there is any question about the accuracy of the report or need for clarification, then please call [phone], or text me through perfectserv for clarification or correction. XR ABD (KUB)    Result Date: 1/6/2022  XR ABD (KUB) 1/6/2022 12:22 PM HISTORY: Evaluate orogastric tube placement. Single semiupright portable view of the abdomen is performed. Nasogastric tube is present and extends below the left hemidiaphragm. The tube appears to terminate in the region of the gastric antrum. Bowel gas pattern is unremarkable where imaged. XR CHEST PORT    Result Date: 1/6/2022  Chest X-ray INDICATION: Shortness of breath. Evaluate endotracheal tube placement. COMPARISON: Chest x-ray 8/21/2021. A portable AP view of the chest was obtained. FINDINGS: Bilateral interstitial changes are noted in both lungs with possibly a new right pleural effusion or elevation right hemidiaphragm. No evidence of left pleural fluid or pneumothorax. Endotracheal tube has been placed terminating 3 cm above the tayla. The heart size is normal.  The bony thorax is intact. Interstitial changes with possible new right pleural effusion or elevation right hemidiaphragm. Endotracheal tube appears in good position within the tracheal airway.        All Micro Results     Procedure Component Value Units Date/Time    COVID-19 RAPID TEST [[id number]] Collected: 01/18/22 8455    Order Status: Completed Specimen: Nasopharyngeal Updated: 01/18/22 9593     Specimen source Nasopharyngeal        COVID-19 rapid test Not detected        Comment:      The specimen is NEGATIVE for SARS-CoV-2, the novel coronavirus associated with COVID-19. A negative result does not rule out COVID-19. This test has been authorized by the FDA under an Emergency Use Authorization (EUA) for use by authorized laboratories. Fact sheet for Healthcare Providers: ConventionConkwestdate.co.nz  Fact sheet for Patients: ConventionUpdate.co.nz       Methodology: Isothermal Nucleic Acid Amplification               Labs: Results:       BMP, Mg, Phos Recent Labs     01/18/22 0538 01/17/22  0759    143   K 4.3 4.3    103   CO2 37* 38*   AGAP 2* 2*   BUN 47* 46*   CREA 1.81* 1.96*   CA 9.3 9.5   * 91      CBC Recent Labs     01/18/22 0538 01/17/22  0759   WBC 6.2 6.6   RBC 4.25 4.49   HGB 11.4* 12.1*   HCT 38.6* 41.4    212   GRANS 46 46   LYMPH 41 41   EOS 4 3   MONOS 8 10   BASOS 1 1   IG 0 0   ANEU 2.8 3.0   ABL 2.5 2.7   TIERNEY 0.2 0.2   ABM 0.5 0.6   ABB 0.0 0.0   AIG 0.0 0.0      LFT No results for input(s): ALT, TBIL, AP, TP, ALB, GLOB, AGRAT in the last 72 hours.     No lab exists for component: SGOT, GPT   Cardiac Testing No results found for: BNPP, BNP, CPK, RCK1, RCK2, RCK3, RCK4, CKMB, CKNDX, CKND1, TROPT, TROIQ   Coagulation Tests Lab Results   Component Value Date/Time    aPTT 34.9 08/18/2021 06:56 PM    aPTT 31.7 07/19/2021 09:24 PM      A1c Lab Results   Component Value Date/Time    Hemoglobin A1c 6.2 01/09/2022 02:53 AM    Hemoglobin A1c 6.7 (H) 07/13/2021 08:34 AM    Hemoglobin A1c 6.2 (H) 01/13/2021 08:27 AM      Lipid Panel Lab Results   Component Value Date/Time    Cholesterol, total 156 07/13/2021 08:34 AM    HDL Cholesterol 39 (L) 07/13/2021 08:34 AM    LDL, calculated 83 07/13/2021 08:34 AM    LDL, calculated 83 06/11/2020 09:16 AM    VLDL, calculated 34 07/13/2021 08:34 AM VLDL, calculated 42 (H) 06/11/2020 09:16 AM    Triglyceride 202 (H) 07/13/2021 08:34 AM      Thyroid Panel Lab Results   Component Value Date/Time    TSH 1.240 01/12/2022 05:40 AM    TSH 0.655 07/19/2021 01:46 PM    T4, Free 1.0 01/12/2022 05:40 AM    T4, Free 1.4 07/19/2021 01:46 PM        Most Recent UA Lab Results   Component Value Date/Time    WBC 0-3 07/19/2021 01:26 PM    RBC 5-10 07/19/2021 01:26 PM    Epithelial cells 0-3 07/19/2021 01:26 PM    Bacteria 0 07/19/2021 01:26 PM    Casts 10-20 07/19/2021 01:26 PM          All Labs from Last 24 Hrs:  Recent Results (from the past 24 hour(s))   GLUCOSE, POC    Collection Time: 01/17/22  4:29 PM   Result Value Ref Range    Glucose (POC) 109 (H) 65 - 100 mg/dL    Performed by MinorCanMonkimunCT    GLUCOSE, POC    Collection Time: 01/17/22  8:28 PM   Result Value Ref Range    Glucose (POC) 121 (H) 65 - 100 mg/dL    Performed by Jennifer    CBC WITH AUTOMATED DIFF    Collection Time: 01/18/22  5:38 AM   Result Value Ref Range    WBC 6.2 4.3 - 11.1 K/uL    RBC 4.25 4.23 - 5.6 M/uL    HGB 11.4 (L) 13.6 - 17.2 g/dL    HCT 38.6 (L) 41.1 - 50.3 %    MCV 90.8 79.6 - 97.8 FL    MCH 26.8 26.1 - 32.9 PG    MCHC 29.5 (L) 31.4 - 35.0 g/dL    RDW 15.8 (H) 11.9 - 14.6 %    PLATELET 431 376 - 844 K/uL    MPV 11.9 9.4 - 12.3 FL    ABSOLUTE NRBC 0.00 0.0 - 0.2 K/uL    DF AUTOMATED      NEUTROPHILS 46 43 - 78 %    LYMPHOCYTES 41 13 - 44 %    MONOCYTES 8 4.0 - 12.0 %    EOSINOPHILS 4 0.5 - 7.8 %    BASOPHILS 1 0.0 - 2.0 %    IMMATURE GRANULOCYTES 0 0.0 - 5.0 %    ABS. NEUTROPHILS 2.8 1.7 - 8.2 K/UL    ABS. LYMPHOCYTES 2.5 0.5 - 4.6 K/UL    ABS. MONOCYTES 0.5 0.1 - 1.3 K/UL    ABS. EOSINOPHILS 0.2 0.0 - 0.8 K/UL    ABS. BASOPHILS 0.0 0.0 - 0.2 K/UL    ABS. IMM.  GRANS. 0.0 0.0 - 0.5 K/UL   METABOLIC PANEL, BASIC    Collection Time: 01/18/22  5:38 AM   Result Value Ref Range    Sodium 143 138 - 145 mmol/L    Potassium 4.3 3.5 - 5.1 mmol/L    Chloride 104 98 - 107 mmol/L    CO2 37 (H) 21 - 32 mmol/L    Anion gap 2 (L) 7 - 16 mmol/L    Glucose 106 (H) 65 - 100 mg/dL    BUN 47 (H) 8 - 23 MG/DL    Creatinine 1.81 (H) 0.8 - 1.5 MG/DL    GFR est AA 47 (L) >60 ml/min/1.73m2    GFR est non-AA 39 (L) >60 ml/min/1.73m2    Calcium 9.3 8.3 - 10.4 MG/DL   GLUCOSE, POC    Collection Time: 01/18/22  7:32 AM   Result Value Ref Range    Glucose (POC) 91 65 - 100 mg/dL    Performed by Makayla Milner    COVID-19 RAPID TEST    Collection Time: 01/18/22  9:30 AM   Result Value Ref Range    Specimen source Nasopharyngeal      COVID-19 rapid test Not detected NOTD     GLUCOSE, POC    Collection Time: 01/18/22 11:36 AM   Result Value Ref Range    Glucose (POC) 125 (H) 65 - 100 mg/dL    Performed by Makayla Milner        Current Med List in Hospital:   Current Facility-Administered Medications   Medication Dose Route Frequency    furosemide (LASIX) tablet 20 mg  20 mg Oral DAILY    [Held by provider] metoprolol succinate (TOPROL-XL) XL tablet 25 mg  25 mg Oral DAILY    [Held by provider] lisinopriL (PRINIVIL, ZESTRIL) tablet 2.5 mg  2.5 mg Oral DAILY    [Held by provider] midodrine (PROAMATINE) tablet 10 mg  10 mg Oral Q8H    [Held by provider] tamsulosin (FLOMAX) capsule 0.4 mg  0.4 mg Oral DAILY    insulin lispro (HUMALOG) injection   SubCUTAneous TIDAC    famotidine (PEPCID) tablet 20 mg  20 mg Oral DAILY    pregabalin (LYRICA) capsule 200 mg  200 mg Oral DAILY    pregabalin (LYRICA) capsule 400 mg  400 mg Oral QHS    ferrous sulfate tablet 325 mg  1 Tablet Oral DAILY WITH BREAKFAST    acetaminophen (TYLENOL) tablet 650 mg  650 mg Oral Q6H PRN    fludrocortisone (FLORINEF) tablet 0.1 mg  0.1 mg Oral DAILY    polyethylene glycol (MIRALAX) packet 17 g  17 g Oral DAILY    sodium chloride (NS) flush 5-40 mL  5-40 mL IntraVENous Q8H    sodium chloride (NS) flush 5-40 mL  5-40 mL IntraVENous PRN    apixaban (ELIQUIS) tablet 5 mg  5 mg Oral Q12H    atorvastatin (LIPITOR) tablet 40 mg  40 mg Oral QHS No Known Allergies  Immunization History   Administered Date(s) Administered    COVID-19, PFIZER, MRNA, LNP-S, PF, 30MCG/0.3ML DOSE 02/27/2021, 03/02/2021    Influenza High Dose Vaccine PF 12/30/2015, 11/04/2016, 11/06/2017, 10/10/2018, 12/15/2020    Influenza Vaccine (Tri) Adjuvanted (>65 Yrs FLUAD TRI 11103) 10/15/2019    Pneumococcal Conjugate (PCV-13) 10/30/2015    Pneumococcal Polysaccharide (PPSV-23) 11/04/2016    TB Skin Test (PPD) Intradermal 08/20/2021, 09/13/2021, 01/14/2022       Recent Vital Data:  Patient Vitals for the past 24 hrs:   Temp Pulse Resp BP SpO2   01/18/22 1135 98.2 °F (36.8 °C) 90  107/64 98 %   01/18/22 0956  90  113/73    01/18/22 0710 97.6 °F (36.4 °C) 93 18 100/68 94 %   01/18/22 0400     92 %   01/18/22 0323 98.8 °F (37.1 °C) 76 19 (!) 94/59 92 %   01/17/22 2315    93/66    01/17/22 2313 98.1 °F (36.7 °C) 93 18 (!) 85/58 91 %   01/17/22 1934 97.3 °F (36.3 °C) 85 18 (!) 90/58 97 %   01/17/22 1527  84  (!) 89/63 98 %   01/17/22 1523 97.5 °F (36.4 °C) 73 18 90/72 98 %     Oxygen Therapy  O2 Sat (%): 98 % (01/18/22 1135)  Pulse via Oximetry: 94 beats per minute (01/15/22 2203)  O2 Device: Hi flow nasal cannula (01/18/22 0622)  Skin Assessment: Clean, dry, & intact (01/17/22 1915)  Skin Protection for O2 Device: No (01/13/22 2244)  Orientation: Bilateral (01/07/22 0837)  Location: Cheek (01/07/22 0986)  Interventions: Mouth Care (01/07/22 0800)  O2 Flow Rate (L/min): 5 l/min (01/18/22 0622)  O2 Temperature: 87.8 °F (31 °C) (01/10/22 1507)  FIO2 (%): 40 % (01/14/22 0207)    Estimated body mass index is 38.14 kg/m² as calculated from the following:    Height as of this encounter: 5' 10\" (1.778 m). Weight as of this encounter: 120.6 kg (265 lb 12.8 oz). Intake/Output Summary (Last 24 hours) at 1/18/2022 1516  Last data filed at 1/18/2022 0323  Gross per 24 hour   Intake 342 ml   Output 700 ml   Net -358 ml         Physical Exam:    General:    Well nourished. No overt distress on 4L Oxygen NC,   Head:  Normocephalic, atraumatic  Eyes:  Sclerae appear normal.  Pupils equally round. HENT:  Nares appear normal, no drainage. Moist mucous membranes  Neck:  No restricted ROM. Trachea midline  CV:   RRR. No m/r/g. No JVD  Lungs:   CTAB. No wheezing, rhonchi, or rales. Even, unlabored  Abdomen:   Soft, nontender, nondistended. Extremities: Warm and dry. No cyanosis or clubbing. No edema. Skin:     No rashes. Normal coloration  Neuro:  CN II-XII grossly intact. Psych:  Normal mood and affect. Signed:  Dillan Bland MD    Part of this note may have been written by using a voice dictation software. The note has been proof read but may still contain some grammatical/other typographical errors.

## 2022-01-18 NOTE — PROGRESS NOTES
ACUTE PHYSICAL THERAPY GOALS:  (Developed with and agreed upon by patient and/or caregiver.)  STG:  (1.)Mr. Rivas will move from supine to sit and sit to supine , scoot up and down and roll side to side with CONTACT GUARD ASSIST within 4 treatment day(s). (2.)Mr. Rivas will transfer from bed to chair and chair to bed with CONTACT GUARD ASSIST using the least restrictive device within 4 treatment day(s). met 1/14/2022   (3.)Mr. Rivas will ambulate with CONTACT GUARD ASSIST for 50 feet with the least restrictive device within 4 treatment day(s).      LTG:  (1.)Mr. Rivas will move from supine to sit and sit to supine , scoot up and down and roll side to side in bed with STAND BY ASSIST within 7 treatment day(s). (2.)Mr. Rivas will transfer from bed to chair and chair to bed with STAND BY ASSIST using the least restrictive device within 7 treatment day(s). (3.)Mr. Rivas will ambulate with STAND BY ASSIST for 100 feet with the least restrictive device within 7 treatment day(s). PHYSICAL THERAPY: Daily Note and AM Treatment Day # 6    David Kennedy is a 77 y.o. male   PRIMARY DIAGNOSIS: Acute respiratory failure with hypercapnia (HCC)  Acute on chronic respiratory failure with hypercapnia (HCC) [J96.22]         ASSESSMENT:     REHAB RECOMMENDATIONS: CURRENT LEVEL OF FUNCTION:  (Most Recently Demonstrated)   Recommendation to date pending progress:  Setting:   Short-term Rehab  Equipment:    To Be Determined Bed Mobility:   Not tested  Sit to Stand:  [employer] Assistance  Transfers:  [employer] Guard Assistance  Gait/Mobility:   Contact Guard Assistance     ASSESSMENT:  Mr. Katharine Irby presents sitting EOB on 4L. 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 Patient is  Agreeable to therapy. Gait trainig with rolling walker x 250 feet with slow but steady scooby. Patient is returned to the room and to the recliner. Good session. Sautrations 97%. Slight SOB noted. Patient relies heavily on arms for balance during gait/transfers. Patient ambulates with flexed hips/knees-worked on quality of gait and standing balance. Patient progressing slowly. Pleasant and a jase to work with. Will continue efforts. Needs rehab at discharge.      SUBJECTIVE:   Mr. Keegan Grady states, \"Hello\"    SOCIAL HISTORY/ LIVING ENVIRONMENT: See initial evaluation  Home Environment: Private residence  # Steps to Enter: 1  One/Two Story Residence: One story  Living Alone: No  Support Systems: Child(pat),Spouse/Significant Other  OBJECTIVE:     PAIN: VITAL SIGNS: LINES/DRAINS:   Pre Treatment: Pain Screen  Pain Scale 1: Numeric (0 - 10)  Pain Intensity 1: 0  Post Treatment: 0   O2  O2 Device: Hi flow nasal cannula     MOBILITY: I Mod I S SBA CGA Min Mod Max Total  NT x2 Comments:   Bed Mobility    Rolling [] [] [] [] [] [] [] [] [] [] []    Supine to Sit [] [] [] [] [] [] [] [] [] [] []    Scooting [] [] [] [] [] [] [] [] [] [] []    Sit to Supine [] [] [] [] [] [] [] [] [] [] []    Transfers    Sit to Stand [] [] [] [] [x] [] [] [] [] [] []    Bed to Chair [] [] [] [] [x] [] [] [] [] [] []    Stand to Sit [] [] [] [] [x] [] [] [] [] [] []    I=Independent, Mod I=Modified Independent, S=Supervision, SBA=Standby Assistance, CGA=Contact Guard Assistance,   Min=Minimal Assistance, Mod=Moderate Assistance, Max=Maximal Assistance, Total=Total Assistance, NT=Not Tested    BALANCE: Good Fair+ Fair Fair- Poor NT Comments   Sitting Static [x] [] [] [] [] []    Sitting Dynamic [x] [] [] [] [] []              Standing Static [] [x] [] [] [] []    Standing Dynamic [] [x] [] [] [] []      GAIT: I Mod I S SBA CGA Min Mod Max Total  NT x2 Comments:   Level of Assistance [] [] [] [] [x] [] [] [] [] [] []    Distance 250 feet     DME Rolling Walker and gait belt    Gait Quality Flexed trunk, flexed hips/knees, wide RAVINDRA, short steps    Weightbearing  Status N/A     I=Independent, Mod I=Modified Independent, S=Supervision, SBA=Standby Assistance, CGA=Contact Guard Assistance,   Min=Minimal Assistance, Mod=Moderate Assistance, Max=Maximal Assistance, Total=Total Assistance, NT=Not Tested    PLAN:   FREQUENCY/DURATION: PT Plan of Care: 3 times/week for duration of hospital stay or until stated goals are met, whichever comes first.  TREATMENT:     TREATMENT:   ($$ Therapeutic Activity: 23-37 mins    )  Therapeutic Activity (23 Minutes): Therapeutic activity included Transfer Training, Ambulation on level ground, Sitting balance , Standing balance and education to improve functional Mobility, Strength and Activity tolerance. Worked on extending knees/hips in standing, weight shift Shandra in standing, trunk rotation in stand, and sit to stand x10.     TREATMENT GRID:  N/A    AFTER TREATMENT POSITION/PRECAUTIONS:  Chair, Needs within reach, RN notified and Visitors at bedside    INTERDISCIPLINARY COLLABORATION:  RN/PCT and PT/PTA    TOTAL TREATMENT DURATION:  PT Patient Time In/Time Out  Time In: 1043  Time Out: [address] [facility], \[facility]\""

## 2022-01-18 NOTE — PROGRESS NOTES
[facility] CARDIOLOGY PROGRESS NOTE           1/18/2022 8:19 AM    Admit Date: 1/6/2022    Subjective:   Patient reports breathing stable, denies chest pain, abdominal pain, nausea, leg swelling. He is tolerating PO intake at this time. No other complaints at this time.      ROS:  Cardiovascular:  As noted above    Objective:      Vitals:    01/17/22 2315 01/18/22 0323 01/18/22 0400 01/18/22 0710   BP: 93/66 (!) 94/59  100/68   Pulse:  76  93   Resp:  19 18   Temp:  98.8 °F (37.1 °C)  97.6 °F (36.4 °C)   SpO2:  92% 92% 94%   Weight:       Height:           Physical Exam:  General-No Acute Distress, awake, alert   Neck- supple, no JVD  CV- regular rate and rhythm no MRG  Lung- decreased at bases bilaterally, no wheezes, non labored, on 6 L NC   Abd- soft, nontender, nondistended, obese   Ext- no edema in left leg, R leg post partial amputation with prosthesis  Skin- warm and dry    Data Review:   Recent Labs     01/18/22  0538 01/17/22  0759    143   K 4.3 4.3   BUN 47* 46*   CREA 1.81* 1.96*   * 91   WBC 6.2 6.6   HGB 11.4* 12.1*   HCT 38.6* 41.4    212       Assessment/Plan:     Active Problems:  Dilated cardiomyopathy   LVEF noted to be 30-35% dating back to 7/21/2021 echo   Hypotension limits ability to add CHF medical therapy   Does not appear volume overloaded on examination   On Lasix 40 mg daily, held RE: increased Cr / hypotension, look to resume as hemodynamics tolerate   - outpatient ischemic evaluation pending improvement in renal function   - lifevest candidate on DC     Paroxysmal atrial fibrillation   On Eliquis, rates controlled   - hemodynamics stable      Reported CAD  - reports previous stent in the late 1990s   No coronary angiogram is available for review, on atorvastatin 40 would continue  - Outpatient ischemic evaluation as above pending renal function improvement      Stage 3 chronic kidney disease ([facility] [address] (11/22/2017)    - Cr minimally improved today, but remains elevated .  ACE inhibitor and diuretics held        Controlled type 2 diabetes mellitus with diabetic polyneuropathy, without long-term current use of insulin ([facility] (1/5/2018)    - per medicine        Severe obesity (BMI 35.0-35.9 with comorbidity) ([facility] (10/10/2018)    - continue efforts towards dietary changes in an effort to lose weight       Acute on chronic respiratory failure with hypercapnia ([facility] (1/6/2022)    Obstructive sleep apnea (1/8/2022)    Obesity hypoventilation syndrome ([facility] [address] (1/8/2022)    - per primary         Leon Crenshaw DO  1/18/2022 8:19 AM

## 2022-01-18 NOTE — PROGRESS NOTES
Patient with bed acceptance at [facility]. Per conversation with Mindi Barajas [phone]; facility full. Possible bed available 01/19/22. CM will continue to follow.

## 2022-01-18 NOTE — PROGRESS NOTES
The chart is being review including labs, recent test, and H and P for the purpose of qualification of Life vest.  The last echocardiogram is included below. Indications: Primary prevention (EF =35% and MI, NICM, or other DCM)    Last Echo:    Left Ventricle: Left ventricle is moderately dilated. Normal wall thickness. See diagram for wall motion findings. Severely reduced left ventricular systolic function with a visually estimated EF of 30 - 35%.   Mitral Valve: Mild transvalvular regurgitation.   Left Atrium: Left atrium is moderately dilated.   Right Atrium: Right atrium is mildly dilated.   Contrast used: Definity.       Paul Hilton NP  01/18/22  1:13 PM

## 2022-01-18 NOTE — PROGRESS NOTES
END OF SHIFT NOTE:    INTAKE/OUTPUT  01/17 0701 - 01/18 0700  In: 1406 [P.O.:1406]  Out: 1200 [Urine:1200]  Voiding: YES  Catheter: NO  Drain:              Flatus: Patient does have flatus present. Stool:  0 occurrences. Characteristics:    Emesis: 0 occurrences. Characteristics:        VITAL SIGNS  Patient Vitals for the past 12 hrs:   Temp Pulse Resp BP SpO2   01/18/22 0323 98.8 °F (37.1 °C) 76 19 (!) 94/59 92 %   01/17/22 2315    93/66    01/17/22 2313 98.1 °F (36.7 °C) 93 18 (!) 85/58 91 %   01/17/22 1934 97.3 °F (36.3 °C) 85 18 (!) 90/58 97 %       Pain Assessment  Pain Intensity 1: 0 (pt denies pain) (01/18/22 0105)        Patient Stated Pain Goal: 0    Ambulating  No    Shift report given to oncoming nurse at the bedside.     Rae Mireles RN

## 2022-01-22 NOTE — ADT AUTH CERT NOTES
Respiratory Failure GRG - Care Day 11 (1/16/2022) by Ji Rossi       Review Status Review Entered   Completed 1/17/2022 16:00      Criteria Review      Care Day: 11 Care Date: 1/16/2022 Level of Care: Telemetry    Guideline Day 3    Level Of Care    ( ) * Activity level acceptable    1/17/2022 16:00:36 EST by Ji Rossi      NOT DOC'D    Clinical Status    ( ) * Ventilation status appropriate    (X) * Airway status acceptable    ( ) * Respiratory status acceptable    1/17/2022 16:00:09 EST by Ji Rossi      02 SAT 95% 7L NC    R 18-20    (X) * Stable chest findings    (X) * Neurologic status acceptable    (X) * Temperature status acceptable    1/17/2022 16:00:09 EST by Ji Rossi      T 98.2    (X) * No infection, or status acceptable    ( ) * General Discharge Criteria met    Interventions    (X) * No chest tube, or status acceptable    (X) * Intake acceptable    ( ) * No inpatient interventions needed    * Milestone   Additional Notes   [employer] Hospitalist Progress Notes      Patient was sitting up in chair this morning.  Denies any dizziness, chest pain, palpitations.  No fever no chills. EXAM:  General:           morbidly obese, no overt distress on 4-5L Oxygen NC,    Head:               Normocephalic, atraumatic   Eyes:               Sclerae appear normal.  Pupils equally round. ENT:                Nares appear normal, no drainage.  Moist oral mucosa   Neck:               Supple    CV:                  RRR.  No m/r/g.  JVD examination limited   Lungs:             Decreased entry bilateral lower lobe otherwise CTAB.  No wheezing, rhonchi, or rales.  Respirations even, unlabored   Abdomen:        Bowel sounds present.  Soft, nontender, nondistended.    Extremities:     No cyanosis or clubbing.  No edema; right BKA [no issues with stump.]   Skin:                No rashes and normal coloration.   Warm and dry.     Neuro:             AOx3.  GXLKEZ all 4 extremities.  Speech normal.   Psych:             Normal mood and affect.        BP /47-83, MAP down to 52      POC GLUC       Assessment & Plan:      #  Acute respiratory failure with hypercapnia- 1/16 patient still needing 5 L oxygen nasal cannula.  Currently on p.o. Lasix held this afternoon due to hypotension.       #  Stage 3 chronic kidney disease ([facility] (11/22/2017)   Creatinine at baseline 1.49.  Will monitor.       #  Controlled type 2 diabetes mellitus with diabetic polyneuropathy, without long-term current use of insulin ([facility] (1/5/2018)   Continue to monitor blood sugars and cover with insulin       #Hypotension: Unclear etiology.  Cortisol level , TSH, wnl.    Continue fludrocortisone. Blood pressure low this afternoon.  Restarted midodrine.       #Severe obesity (BMI 35.0-35.9 with comorbidity) ([facility] (10/10/2018)   Adding to complexity.       #Chronic systolic heart RCAHHUA:(8/6/1955)   ECHO repeated 1/11 shows EF of 30 to 35%   No active issues at this time   Continue current medical management   Cardiology on board. Appreciate recs. Cardiology recommends outpatient evaluation- ischemic work up. Blood pressure initially improved this morning and therefore was started on metoprolol and low-dose lisinopril.  However this afternoon, blood pressure dropped and therefore lisinopril and metoprolol held. Hypotension/Borderline BP limiting other treatment options.         Paroxysmal Atrial fibrillation:     Rate controlled at present.  Continue Eliquis.       Acute urinary retention:   Pineda catheter was removed.  Patient is able to urinate after starting Flomax. Flomax will be held for now due to hypotension.       DISPO: STR pending acceptance. [facility] CARDIOLOGY PROGRESS NOTE      No complaints of any chest pain.  He feels that he is diuresing fairly well overall.  Negative greater than 1500 cc.       Assessment/Plan:   Dilated cardiomyopathy   - LVEF noted to be 30-35% dating back to 7/21/2021 echo   - Hypotension limits ability to add CHF medical therapy   -Does not appear volume overloaded on examination   -On Lasix 40 mg daily   - outpatient ischemic evaluation pending improvement in renal function           Paroxysmal atrial fibrillation   - On Eliquis, rates controlled, hemodynamically  Stable-currently in atrial fibrillation with heart rates in the 70s to 80s range.         Reported CAD-said he had a previous stent in the late 90s   - No coronary angiogram is available for review, on atorvastatin 40 would continue   -Outpatient ischemic evaluation as above pending renal function improvement         Acute on chronic respiratory failure with hypercapnia (HCC) (1/6/2022)     Obstructive sleep apnea (1/8/2022)     Obesity hypoventilation syndrome ([facility] [address] (1/8/2022)   -Positive pressure ventilation, oxygen as needed-requirements have clearly improved.         Stage 3 chronic kidney disease ([facility] [address] (11/22/2017)   -Creatinine improved.         Controlled type 2 diabetes mellitus with diabetic polyneuropathy, without long-term current use of insulin ([facility] [address] (1/5/2018)   -Per medicine         Severe obesity (BMI 35.0-35.9 with comorbidity) ([facility] [address] (10/10/2018)     - continue efforts towards dietary changes in an effort to lose weight         Borderline low blood pressures and already on Florinef so not in a position to be able to start aggressive cardiomyopathy meds including beta-blocker/ACE inhibitor/ARB/spironolactone etc. especially with additional chronic kidney disease.   -Chest x-ray reviewed and if anything appears stable and somewhat improved compared to his last study.   -He would benefit from incentive spirometry.  We will order this. -Needs to continue to work with physical therapy with fall precautions.   -Blood pressure is better, agree with starting low-dose cardiomyopathy meds with parameters-started on tiny dose of lisinopril along with metoprolol succinate.  Needs to be monitored.    -Ischemia work-up can be done on an outpatient basis. MD ADDENDUM 1533:  Blood pressure was repeated 3 times and it was 95/60.  We will hold antihypertensive medications and p.o. Lasix.  Will hold off on restarting midodrine.                       Respiratory Failure GRG - Care Day 8 (1/13/2022) by Ousmane Tariq       Review Status Review Entered   Completed 1/13/2022 13:16      Criteria Review      Care Day: 8 Care Date: 1/13/2022 Level of Care: Telemetry    Guideline Day 3    Level Of Care    (X) * Activity level acceptable    1/13/2022 13:16:06 EST by Ousmane Tariq      AMB TO BR    Clinical Status    ( ) * Ventilation status appropriate    (X) * Airway status acceptable    ( ) * Respiratory status acceptable    1/13/2022 13:16:06 EST by Ousmane Tariq      LUNGS:  Decreased bases on NC    (X) * Stable chest findings    1/13/2022 13:16:06 EST by Ousmane Tariq      1/8/22 CXR: 1. Interval extubation. 2. Improving bibasilar lung atelectasis or infiltrates, with minimal residual.    (X) * Neurologic status acceptable    1/13/2022 13:16:06 EST by Ousmane Tariq      A & O X 3    (X) * Temperature status acceptable    1/13/2022 13:16:06 EST by Ousmane Tariq      T 98.5    (X) * No infection, or status acceptable    ( ) * General Discharge Criteria met    Interventions    (X) * No chest tube, or status acceptable    (X) * Intake acceptable    1/13/2022 13:16:06 EST by Ousmane Tariq      DIET Easy to Chew; 3 carb choices (45 gm/meal)    ( ) * No inpatient interventions needed    * Milestone   Additional Notes   PULM NOTE:      Off vent on 1/7 and awake and alert. Passed swallow study.  Still hypoxic requiring airvo.  + restless legs       Subjective:       Equipment arranged for him and is ready       EXAM:  GEN: obese.     HEENT:no alar flaring or epistaxis, oral mucosa moist without cyanosis,    NECK:  no JVD, no retractions, no thyromegaly or masses,    LUNGS:  Decreased bases on NC   HEART:  RRR with no M,G,R;   ABDOMEN:  soft with no tenderness; positive bowel sounds present   EXTREMITIES:  warm with no cyanosis, no lower leg edema on left, right prosthesis. SKIN:  no jaundice or ecchymosis    NEURO: A & O X 3         T  98.5, /83, P 41-99, R 18, 02  SAT 98% BIPAP HS, 93% 5L NC      C02 37, ANION GAP 4, BUN 36, CREAT 1.82, GFRNAA 39      Assessment/Plan:         Acute respiratory failure with hypercapnia (HCC) (1/6/2022)   Improved.   Stage 3 chronic kidney disease ([facility] [address] (11/22/2017)         Severe obesity (BMI 35.0-35.9 with comorbidity) ([facility] [address] (10/10/2018)    chronic,complicated medical care         Decreased cardiac ejection fraction (1/6/2022)   30-35%         Acute on chronic respiratory failure with hypercapnia ([facility] [address] (1/6/2022)   On 5 lpm with 97%- now approved for trilogy         Obstructive sleep apnea (1/8/2022)     Obesity hypoventilation syndrome ([facility] [address] [address] (1/8/2022)   ELAINA -- home sleep study in November with Severe ELAINA:  AHI in 54.8 and over 240 minutes with saturation <89%.  - Trilogy has been approved for patient       Can go home with trilogy from our stand point   Arrange O2 for daytime if needed - needs qualification       Supportive and Prophylactic Tx:    DVT PPx:eliquis, ambulating   GI (PUD) PPx: Pepcid   PT: OOB   Full Code       Nothing to add- pulmonary will sign off- can follow up with sleep clinic.          [facility] C[facility] PROGRESS NOTE         Patient reports breathing is better, however, on 5 L nasal cannula this morning.  Denies cough, wheeze, chest pain, abdominal pain, left leg swelling.  No other complaints at this time.       Assessment/Plan:         Decreased cardiac ejection fraction (1/6/2022)   - LVEF noted to be 30-35% dating back to 7/21/2021 echo   - Hypotension limits ability to had CHF medical therapy   -Does not appear volume overloaded on examination   -Diuresis on as needed basis   - outpatient ischemic evaluation pending improvement in renal function   - negative 9 L for admission      Paroxysmal atrial fibrillation   - On Eliquis, rates controlled, hemodynamically electrically stable       Reported CAD   - No coronary angiogram is available for review, on atorvastatin 40 would continue   -Outpatient ischemic evaluation as above pending renal function improvement         Acute on chronic respiratory failure with hypercapnia ([facility] [address] (1/6/2022)     Obstructive sleep apnea (1/8/2022)     Obesity hypoventilation syndrome ([facility] [address] (1/8/2022)   -Positive pressure ventilation, oxygen as needed.         Stage 3 chronic kidney disease ([facility] [address] (11/22/2017)   -Creatinine 1.8, appears to be near baseline         Controlled type 2 diabetes mellitus with diabetic polyneuropathy, without long-term current use of insulin ([facility] [address] (1/5/2018)   -Per medicine         Severe obesity (BMI 35.0-35.9 with comorbidity) ([facility] (10/10/2018)     - continue efforts towards dietary changes in an effort to lose weight      1/12/22 CM NOTE: Per Michael Carrasco with [facility] Encarnacion has been approved for patient.           ELIQUIS 5MG PO Q 12 HR, LIPITOR 40MG PO Q HS, PEPCID 20MG PO QD, FERROUS SULFATE 325MG PO QD, FLORINEF 0.1MG PO QD, PROAMITINE 10MG PO Q 8 HR, LYRICA 200 MG PO QD & 400 MG PO Q HS,

## 2022-01-24 PROBLEM — R93.1 DECREASED CARDIAC EJECTION FRACTION: Chronic | Status: RESOLVED | Noted: 2022-01-06 | Resolved: 2022-01-24

## 2022-01-24 PROBLEM — I48.91 ATRIAL FIBRILLATION (HCC): Status: ACTIVE | Noted: 2022-01-24

## 2022-01-24 PROBLEM — I42.0 DILATED CARDIOMYOPATHY (HCC): Status: ACTIVE | Noted: 2022-01-24

## 2022-01-24 PROBLEM — J96.11 CHRONIC RESPIRATORY FAILURE WITH HYPOXIA (HCC): Status: ACTIVE | Noted: 2021-09-18

## 2022-01-24 PROBLEM — I50.20 SYSTOLIC CONGESTIVE HEART FAILURE (HCC): Status: ACTIVE | Noted: 2022-01-24

## 2022-03-09 ENCOUNTER — HOSPITAL ENCOUNTER (OUTPATIENT)
Dept: GENERAL RADIOLOGY | Age: 77
End: 2022-03-09
Payer: MEDICARE

## 2022-03-09 DIAGNOSIS — R06.09 DOE (DYSPNEA ON EXERTION): ICD-10-CM

## 2022-03-09 DIAGNOSIS — E66.2 OBESITY HYPOVENTILATION SYNDROME (HCC): ICD-10-CM

## 2022-03-09 DIAGNOSIS — J96.11 CHRONIC RESPIRATORY FAILURE WITH HYPOXIA (HCC): ICD-10-CM

## 2022-03-09 PROCEDURE — 71046 X-RAY EXAM CHEST 2 VIEWS: CPT

## 2022-03-18 PROBLEM — E66.01 SEVERE OBESITY (BMI 35.0-35.9 WITH COMORBIDITY) (HCC): Status: ACTIVE | Noted: 2018-10-10

## 2022-03-18 PROBLEM — J96.11 CHRONIC RESPIRATORY FAILURE WITH HYPOXIA (HCC): Status: ACTIVE | Noted: 2021-09-18

## 2022-03-18 PROBLEM — I50.20 SYSTOLIC CONGESTIVE HEART FAILURE (HCC): Status: ACTIVE | Noted: 2022-01-24

## 2022-03-18 PROBLEM — N18.30 STAGE 3 CHRONIC KIDNEY DISEASE (HCC): Status: ACTIVE | Noted: 2017-11-22

## 2022-03-18 PROBLEM — I48.91 ATRIAL FIBRILLATION (HCC): Status: ACTIVE | Noted: 2022-01-24

## 2022-03-18 PROBLEM — B35.1 ONYCHOMYCOSIS: Status: ACTIVE | Noted: 2018-04-10

## 2022-03-19 PROBLEM — G62.89 MIXED AXONAL-DEMYELINATING POLYNEUROPATHY: Status: ACTIVE | Noted: 2018-01-05

## 2022-03-19 PROBLEM — I12.9 BENIGN HYPERTENSIVE KIDNEY DISEASE WITH CHRONIC KIDNEY DISEASE: Status: ACTIVE | Noted: 2017-11-06

## 2022-03-19 PROBLEM — E78.2 MIXED HYPERLIPIDEMIA: Status: ACTIVE | Noted: 2018-04-10

## 2022-03-19 PROBLEM — M21.619 BUNION OF UNSPECIFIED FOOT: Status: ACTIVE | Noted: 2018-04-10

## 2022-03-19 PROBLEM — E11.42 CONTROLLED TYPE 2 DIABETES MELLITUS WITH DIABETIC POLYNEUROPATHY, WITHOUT LONG-TERM CURRENT USE OF INSULIN (HCC): Status: ACTIVE | Noted: 2018-01-05

## 2022-03-19 PROBLEM — L84 CORNS AND CALLUS: Status: ACTIVE | Noted: 2018-04-10

## 2022-03-19 PROBLEM — E11.21 TYPE 2 DIABETES MELLITUS WITH NEPHROPATHY (HCC): Status: ACTIVE | Noted: 2018-01-05

## 2022-03-19 PROBLEM — M20.42 ACQUIRED HAMMER TOE OF LEFT FOOT: Status: ACTIVE | Noted: 2021-09-27

## 2022-03-19 PROBLEM — E66.2 OBESITY HYPOVENTILATION SYNDROME (HCC): Status: ACTIVE | Noted: 2022-01-08

## 2022-03-19 PROBLEM — Z89.511 S/P BKA (BELOW KNEE AMPUTATION) UNILATERAL, RIGHT (HCC): Status: ACTIVE | Noted: 2021-08-18

## 2022-03-20 PROBLEM — I42.0 DILATED CARDIOMYOPATHY (HCC): Status: ACTIVE | Noted: 2022-01-24

## 2022-03-20 PROBLEM — J96.22 ACUTE ON CHRONIC RESPIRATORY FAILURE WITH HYPERCAPNIA (HCC): Status: ACTIVE | Noted: 2022-01-06

## 2022-03-20 PROBLEM — G47.33 OBSTRUCTIVE SLEEP APNEA: Status: ACTIVE | Noted: 2022-01-08

## 2022-05-23 ENCOUNTER — OFFICE VISIT (OUTPATIENT)
Dept: FAMILY MEDICINE CLINIC | Facility: CLINIC | Age: 77
End: 2022-05-23
Payer: MEDICARE

## 2022-05-23 VITALS
HEART RATE: 104 BPM | BODY MASS INDEX: 41.44 KG/M2 | SYSTOLIC BLOOD PRESSURE: 112 MMHG | HEIGHT: 71 IN | OXYGEN SATURATION: 99 % | WEIGHT: 296 LBS | TEMPERATURE: 98 F | DIASTOLIC BLOOD PRESSURE: 78 MMHG

## 2022-05-23 DIAGNOSIS — N18.30 STAGE 3 CHRONIC KIDNEY DISEASE, UNSPECIFIED WHETHER STAGE 3A OR 3B CKD (HCC): ICD-10-CM

## 2022-05-23 DIAGNOSIS — J96.11 CHRONIC RESPIRATORY FAILURE WITH HYPOXIA (HCC): Primary | ICD-10-CM

## 2022-05-23 DIAGNOSIS — G62.89 MIXED AXONAL-DEMYELINATING POLYNEUROPATHY: ICD-10-CM

## 2022-05-23 DIAGNOSIS — E11.42 CONTROLLED TYPE 2 DIABETES MELLITUS WITH DIABETIC POLYNEUROPATHY, WITHOUT LONG-TERM CURRENT USE OF INSULIN (HCC): ICD-10-CM

## 2022-05-23 PROCEDURE — 99214 OFFICE O/P EST MOD 30 MIN: CPT | Performed by: STUDENT IN AN ORGANIZED HEALTH CARE EDUCATION/TRAINING PROGRAM

## 2022-05-23 PROCEDURE — 3044F HG A1C LEVEL LT 7.0%: CPT | Performed by: STUDENT IN AN ORGANIZED HEALTH CARE EDUCATION/TRAINING PROGRAM

## 2022-05-23 PROCEDURE — 1123F ACP DISCUSS/DSCN MKR DOCD: CPT | Performed by: STUDENT IN AN ORGANIZED HEALTH CARE EDUCATION/TRAINING PROGRAM

## 2022-05-23 RX ORDER — ATORVASTATIN CALCIUM 40 MG/1
40 TABLET, FILM COATED ORAL DAILY
Qty: 30 TABLET | Refills: 5 | Status: SHIPPED | OUTPATIENT
Start: 2022-05-23

## 2022-05-23 RX ORDER — TAMSULOSIN HYDROCHLORIDE 0.4 MG/1
0.4 CAPSULE ORAL DAILY
Qty: 30 CAPSULE | Refills: 5 | Status: SHIPPED | OUTPATIENT
Start: 2022-05-23

## 2022-05-23 RX ORDER — PREGABALIN 200 MG/1
200 CAPSULE ORAL 2 TIMES DAILY
Qty: 60 CAPSULE | Refills: 5 | Status: SHIPPED | OUTPATIENT
Start: 2022-05-23 | End: 2022-09-12

## 2022-05-23 RX ORDER — METOPROLOL SUCCINATE 25 MG/1
25 TABLET, EXTENDED RELEASE ORAL EVERY EVENING
Qty: 30 TABLET | Refills: 5 | Status: SHIPPED | OUTPATIENT
Start: 2022-05-23

## 2022-05-23 RX ORDER — FUROSEMIDE 20 MG/1
20 TABLET ORAL 2 TIMES DAILY PRN
Qty: 60 TABLET | Refills: 5 | Status: SHIPPED | OUTPATIENT
Start: 2022-05-23

## 2022-05-23 RX ORDER — LANOLIN ALCOHOL/MO/W.PET/CERES
325 CREAM (GRAM) TOPICAL
Qty: 90 TABLET | Refills: 5 | Status: SHIPPED | OUTPATIENT
Start: 2022-05-23

## 2022-05-23 ASSESSMENT — PATIENT HEALTH QUESTIONNAIRE - PHQ9
2. FEELING DOWN, DEPRESSED OR HOPELESS: 0
SUM OF ALL RESPONSES TO PHQ QUESTIONS 1-9: 0
SUM OF ALL RESPONSES TO PHQ9 QUESTIONS 1 & 2: 0
SUM OF ALL RESPONSES TO PHQ QUESTIONS 1-9: 0
1. LITTLE INTEREST OR PLEASURE IN DOING THINGS: 0
SUM OF ALL RESPONSES TO PHQ QUESTIONS 1-9: 0
SUM OF ALL RESPONSES TO PHQ QUESTIONS 1-9: 0

## 2022-05-23 NOTE — PROGRESS NOTES
[facility], [address]  Phone [phone]  Fax:  [phone]    Ryanne Merchant (:  1945) is a 77 y.o. male here for evaluation of the following chief complaint(s):  Establish Care (NTP), Discuss Labs, and Medication Refill (med refills)       ASSESSMENT/PLAN:  1. Chronic respiratory failure with hypoxia (HCC)  2. Mixed axonal-demyelinating polyneuropathy  -     pregabalin (LYRICA) 200 MG capsule; Take 1 capsule by mouth 2 times daily for 30 days. , Disp-60 capsule, R-5Normal  3. Stage 3 chronic kidney disease, unspecified whether stage 3a or 3b CKD ([facility] [address] [address]  -     Basic Metabolic Panel; Future  4. Controlled type 2 diabetes mellitus with diabetic polyneuropathy, without long-term current use of insulin ([facility] [address]    Patient has chronic respiratory failure, on oxygen most the time at home. Also has systolic CHF and A. fib for which he is followed by cardiology. Patient reports weight gain over the past few months, bibasilar crackles heard on exam.  Will have patient increase his Lasix to 40 mg daily and check BMP. Diabetes well controlled with recent hemoglobin A1c of 6.3. Return in about 3 months (around 2022) for routine f/u. Subjective   SUBJECTIVE/OBJECTIVE:  HPI   80-year-old male with PMH of systolic CHF, CAD, A. fib, HLD, SASHA, CKD3, DM2, orthostatic hypotension, chronic respiratory failure who presents for regular 3-month follow-up.   Daughter here.  -wears 2L O2 at home most of the time, uses trelegy at night   -sees pulm   -followed by Cardiology, had ST in March  -tapered off Florinef in April  -up 13lbs since February  -has been taking Lasix 20mg and 40mg alternating days, increased from 20mg daily about 1-1.5 weeks ago  -no left leg swelling, has right leg prosthesis  -breathing stable per patient    Review of Systems       Objective     Vitals:    22 1135 22 1138   BP: 112/78    Pulse: 104    Temp: 98 °F (36.7 °C)    TempSrc: Skin    SpO2: 90% 99%   Weight: 296 lb (134.3 kg)    Height: 5' 11\" (1.803 m)        Physical Exam  Vitals reviewed. Constitutional:       General: He is not in acute distress. Appearance: He is obese. HENT:      Head: Normocephalic and atraumatic. Cardiovascular:      Rate and Rhythm: Normal rate and regular rhythm. Pulmonary:      Effort: Pulmonary effort is normal. No respiratory distress. Breath sounds: No wheezing. Comments: Bibasilar crackles  Musculoskeletal:      Right lower leg: No edema. Left lower leg: No edema. Skin:     General: Skin is warm and dry. Neurological:      General: No focal deficit present. Mental Status: He is alert and oriented to person, place, and time. An electronic signature was used to authenticate this note.     --Maru Corral MD

## 2022-05-24 LAB
ANION GAP SERPL CALC-SCNC: 2 MMOL/L (ref 7–16)
BUN SERPL-MCNC: 33 MG/DL (ref 8–23)
CALCIUM SERPL-MCNC: 10.2 MG/DL (ref 8.3–10.4)
CHLORIDE SERPL-SCNC: 104 MMOL/L (ref 98–107)
CO2 SERPL-SCNC: 37 MMOL/L (ref 21–32)
CREAT SERPL-MCNC: 2.1 MG/DL (ref 0.8–1.5)
GLUCOSE SERPL-MCNC: 102 MG/DL (ref 65–100)
POTASSIUM SERPL-SCNC: 5 MMOL/L (ref 3.5–5.1)
SODIUM SERPL-SCNC: 143 MMOL/L (ref 136–145)

## 2022-05-25 ENCOUNTER — TELEPHONE (OUTPATIENT)
Dept: FAMILY MEDICINE CLINIC | Facility: CLINIC | Age: 77
End: 2022-05-25

## 2022-05-25 NOTE — TELEPHONE ENCOUNTER
Yamilka for [facility] wanted to let you know patient's progress is good with PT & that he fell last week with no injurys

## 2022-05-27 DIAGNOSIS — N18.30 STAGE 3 CHRONIC KIDNEY DISEASE, UNSPECIFIED WHETHER STAGE 3A OR 3B CKD (HCC): Primary | ICD-10-CM

## 2022-06-07 ENCOUNTER — TELEPHONE (OUTPATIENT)
Dept: FAMILY MEDICINE CLINIC | Facility: CLINIC | Age: 77
End: 2022-06-07

## 2022-06-07 DIAGNOSIS — Z89.511 S/P BKA (BELOW KNEE AMPUTATION) UNILATERAL, RIGHT (HCC): Primary | ICD-10-CM

## 2022-06-07 DIAGNOSIS — R26.89 BALANCE DISORDER: ICD-10-CM

## 2022-06-07 DIAGNOSIS — R26.2 DIFFICULTY IN WALKING: ICD-10-CM

## 2022-06-07 NOTE — TELEPHONE ENCOUNTER
Kaylene Mcgowan stated patient was discharged from in home PT last week. She thinks he would benefit from outpatient PT to help improve balance, walking with his prosthetic & get him out of the home as she feels he is getting depressed.  They are requesting a referral to [address] [place] [address],[address] in [place]

## 2022-06-13 ENCOUNTER — HOSPITAL ENCOUNTER (OUTPATIENT)
Dept: PHYSICAL THERAPY | Age: 77
Setting detail: RECURRING SERIES
Discharge: HOME | End: 2022-06-16
Payer: MEDICARE

## 2022-06-13 ENCOUNTER — OFFICE VISIT (OUTPATIENT)
Dept: CARDIOLOGY CLINIC | Age: 77
End: 2022-06-13
Payer: MEDICARE

## 2022-06-13 VITALS
BODY MASS INDEX: 41.72 KG/M2 | HEART RATE: 72 BPM | HEIGHT: 71 IN | WEIGHT: 298 LBS | DIASTOLIC BLOOD PRESSURE: 70 MMHG | SYSTOLIC BLOOD PRESSURE: 102 MMHG

## 2022-06-13 DIAGNOSIS — N18.32 STAGE 3B CHRONIC KIDNEY DISEASE (HCC): ICD-10-CM

## 2022-06-13 DIAGNOSIS — E78.2 MIXED HYPERLIPIDEMIA: ICD-10-CM

## 2022-06-13 DIAGNOSIS — I25.10 CORONARY ARTERY DISEASE INVOLVING NATIVE CORONARY ARTERY OF NATIVE HEART WITHOUT ANGINA PECTORIS: Primary | ICD-10-CM

## 2022-06-13 DIAGNOSIS — I50.22 CHRONIC SYSTOLIC CONGESTIVE HEART FAILURE (HCC): ICD-10-CM

## 2022-06-13 DIAGNOSIS — I48.11 LONGSTANDING PERSISTENT ATRIAL FIBRILLATION (HCC): ICD-10-CM

## 2022-06-13 DIAGNOSIS — I42.0 DILATED CARDIOMYOPATHY (HCC): ICD-10-CM

## 2022-06-13 PROCEDURE — 97110 THERAPEUTIC EXERCISES: CPT

## 2022-06-13 PROCEDURE — 1123F ACP DISCUSS/DSCN MKR DOCD: CPT | Performed by: INTERNAL MEDICINE

## 2022-06-13 PROCEDURE — 97161 PT EVAL LOW COMPLEX 20 MIN: CPT

## 2022-06-13 PROCEDURE — 99214 OFFICE O/P EST MOD 30 MIN: CPT | Performed by: INTERNAL MEDICINE

## 2022-06-13 ASSESSMENT — ENCOUNTER SYMPTOMS
HEMATEMESIS: 0
HEMOPTYSIS: 0
DOUBLE VISION: 0
HEMATOCHEZIA: 0
WHEEZING: 0
ABDOMINAL PAIN: 0
HOARSE VOICE: 0
EYE REDNESS: 0
STRIDOR: 0

## 2022-06-13 NOTE — PROGRESS NOTES
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 Restrictions/Precautions:  Restrictions/Precautions: Cardiac; Fall Risk  No data recorded   Interventions Planned (Treatment may consist of any combination of the following):    Current Treatment Recommendations: Strengthening; ROM; Balance training; Functional mobility training; Transfer training; Endurance training; Gait training; Stair training; Neuromuscular re-education; Manual Therapy - Soft Tissue Mobilization; Manual Therapy - Joint Manipulation; Pain management; Return to work related activity; Home exercise program; Safety education & training; Patient/Caregiver education & training; Modalities; Therapeutic activities     Subjective Comments:  Patient stating he is eager to continue making gains and work part time at [employer] again if possible.   Initial:}Right Leg 6/10Post Session:  Right  Leg 6/10  Medications Last Reviewed:  2022  Updated Objective Findings:  See evaluation note from today  Treatment   THERAPEUTIC EXERCISE: (10 minutes):    Exercises per grid below to improve mobility, strength, balance and coordination. Required mod visual, verbal, manual and tactile cues to promote proper body alignment, promote proper body posture, promote proper body mechanics and promote proper body breathing techniques. Progressed resistance, range, repetitions and complexity of movement as indicated. Date:  06/13/22 Date:   Date:     Activity/Exercise Parameters Parameters Parameters   Patient education  See communication/consultation. Discussed walking program and resting in intervals as well as sit to stands. MANUAL THERAPY: (0 minutes):   Joint mobilization and Soft tissue mobilization was utilized and necessary because of the patient's restricted joint motion, painful spasm, loss of articular motion and restricted motion of soft tissue. MODALITIES: (0 minutes):        None today       Treatment/Session Summary:    · Treatment Assessment:  Patient and family member demonstrating good understanding of HEP and plan of care  · Communication/Consultation:  Spoke with patient and daughter in regards to exam findings, plan of care, initiating HEP and indoor walking program, rationale for therapy as a means for addressing all body systems involved in balance. .  · Equipment provided today:  None today   · Recommendations/Intent for next treatment session: Next visit will focus on exercise program focused on improving cardiopulmonary and muscular endurance.      Total Treatment Billable Duration:  50 minutes: 40 evaluation; 10 therapeutic exercise     Time In: 1538  Time Out: [facility], PT       Charge Capture  }Post Session Pain  PT Visit Info  [facility]  MD Guidelines  Scanned Media  Benefits  MyChart    Future Appointments   Date Time Provider Rachel Sanchez   6/20/2022  9:00 AM Sukhdeep Varela PTA [facility]   6/20/2022  3:15 PM PP PFT LAB [facility]   6/24/2022  1:30 PM Jose To PT [facility] 6/27/2022  1:30 PM Alba Samo, PTA [facility]   6/29/2022 10:00 AM Janeerita Ng, PT [facility]   6/30/2022  3:20 PM Gomez Hassan MD [facility]   7/8/2022 10:00 AM Alba Hill, PTA [facility]   7/11/2022 11:00 AM Alba Hill, PTA [facility]   7/15/2022 10:00 AM Janeerita Ng, PT [facility]   7/18/2022  1:30 PM Janeerita Ng, PT [facility]   7/20/2022 10:00 AM Janeece Ng, PT [facility]   7/25/2022 10:00 AM Janeece Ng, PT [facility]   7/27/2022 10:00 AM Janeece Ng, PT [facility]   8/1/2022 10:00 AM Janeerita Ng, PT [facility]   8/3/2022 10:00 AM Janeece Ng, PT [facility]   8/8/2022 10:00 AM Janeece Ng, PT [facility]   8/11/2022 10:00 AM Janeece Ng, PT [facility]   9/12/2022 10:00 AM Tawana Geronimo MD [facility]

## 2022-06-13 NOTE — THERAPY EVALUATION
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 Sophia Grimes is a 77year old male presenting with an overall decline in balance, functional mobility, transfers, ability to ambulate, and overall function following R below knee amputation 2021 after spraining his ankle at work and then acquiring a wound from altered gait. His daughter is present with him today. He underwent an ankle reconstruction in the past but otherwise had no issues until spraining the ankle. 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 He completed six week of inpatient rehab and was making excellent progress. He states by the end of October he was discharged to home and had been practicing well with his prosthesis for about one week.  Home health has been treating him but he states he has been limited due to other medical issues and was hospitalized in January for fluid retention on his lungs. He states he went to [facility] for rehab following this and then was discharged home again. He states home health just ended a week or so ago and he had practiced with a straight cane a few times with his therapist. He reports he is still determined to make improvements and improve his mobility overall; and to return to part time work at [employer] if that is possible from a mobility standpoint. He and his daughter report that his cardiopulmonary endurance and balance are limiting factors for him as well and that he has fallen a few times in the last six months. He also reports having had a few falls while still working at [employer] due to tripping over objects. He does report a feeling of occasionally losing his balance and falling backward at random times. He also reports low back pain that is chronic but no other significant orthopedic complaints at this time. Patient presents with decreased balance, decreased proprioception, decreased functional strength, decreased cardiopulmonary endurance, decreased gait and transfer ability. Patient will benefit from skilled PT to build on gains and provide exercise progressions, a progressive resistance exercise program, balance exercises, gait and transfer training, manual therapy and modalities as needed to work towards therapeutic goals.      Patient Stated Goal(s):  \"Patient stating he is feeling discouraged lately as he has not made more progress recently and is eager to return to part time work if possible\"  Initial: Right Leg 6/10 Post Session: Right Leg 6/10  Past Medical History/Comorbidities:   Mr. Lam Francois  has a past medical history of A-fib ([address] Acute on chronic respiratory failure with hypoxia and hypercapnia ([address] Acute respiratory failure with hypercapnia ([address] Atrial fibrillation with RVR ([address] CAD (coronary artery disease), Cellulitis, Chronic kidney disease, COVID-19 vaccine series completed, Current use of long term anticoagulation, 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 mellitus, limited to breakdown of skin ([facility] H/O heart artery stent, History of MI (myocardial infarction), Hypercholesterolemia, Hypertension, Left ventricular dysfunction, Neuropathy, Oxygen dependent, PICC (peripherally inserted central catheter) in place, Staphylococcus aureus bacteremia, Systolic CHF, acute on chronic ([facility] [address] and Type 2 diabetes mellitus ([facility] [address] [facility] Mr. Mariella Sorenson  has a past surgical history that includes orthopedic surgery (08/18/2021); pr cardiac surg procedure unlist (1999); and amputation (Right, 2022). Social History/Living Environment:   Lives With: Family; Spouse  Type of Home: House  Home Layout: One level  Home Access: Stairs to enter without rails  Entrance Stairs - Number of Steps: 1     Prior Level of Function/Work/Activity:   Prior level of function: Independent  Occupation: Full time employment  Type of Occupation: [employer]  No data recordedNo data recorded   Learning:   Does the patient/guardian have any barriers to learning?: No barriers  Will there be a co-learner?: Yes  Does the co-learner have any barriers to learning?: No barriers  What is the preferred language of the patient/guardian?: English  What is the preferred language of the co-learner?: English  Is an  required?: No  How does the patient/guardian prefer to learn new concepts?: Listening; Reading; Demonstration; Pictures/Videos  How does the co-learner prefer to learn new concepts?: Listening; Reading; Demonstration; Pictures/Videos     Fall Risk Scale: Total Score: 65  Lam Fall Risk: High (45 and higher)     Dominant Side:  right handed        OBJECTIVE     Observation/Postural and Gait Assessment: Patient has tendency to stand and ambulate with significant external rotation and out toeing especially on the R.     Palpation:  To be performed at future session as indicated. AROM: To be tested further at future visit as indicated. Strength:  Manual Muscle Test (out of 5) Left Right   Knee extension 5 5   Knee flexion 5 5   Hip flexion 5 4+   Ankle DF 5 ---   Ankle PF 4 ---     Passive Accessory Motion: Not performed due to nature of impairments; will be performed in the future as indicated. Balance Tests:  Modified CTSIB: 30/120 seconds conditions II-IV failure. 30 second sit to stand: plinth at 49cm with BUEs 7 repetitions. Four Stage Balance Test: Semi tandem stance bilaterally     Neurological Screen:  Myotomes: Key muscle strength testing through bilateral LE is intact. Dermatomes: Sensation testing through bilateral lower quadrants for light touch is intact. Functional Mobility:  Patient ambulates with prosthesis and rolling front wheeled walker independently. Performs sit to and from stand transfers independently with effort. Demonstrates significant out toeing and externally rotated hip especially on the R side with forward flexed trunk. Patient able to ambulate one lap in the gym safely and independently; SpO2 dropped to 88% after one lap on portable oxygen tank, recovered to baseline within five minute rest period. ASSESSMENT   Initial Assessment:  Mr. Spear is a 77year old male presenting with an overall decline in balance, functional mobility, transfers, ability to ambulate, and overall function following R below knee amputation August 2021 after spraining his ankle at work and then acquiring a wound from altered gait. His daughter is present with him today. He underwent an ankle reconstruction in the past but otherwise had no issues until spraining the ankle. 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 He completed six week of inpatient rehab and was making excellent progress.  He states by the end of October he was discharged to home and had been practicing well with his prosthesis for about one week. Home health has been treating him but he states he has been limited due to other medical issues and was hospitalized in January for fluid retention on his lungs. He states he went to [facility] for rehab following this and then was discharged home again. He states home health just ended a week or so ago and he had practiced with a straight cane a few times with his therapist. He reports he is still determined to make improvements and improve his mobility overall; and to return to part time work at [employer] if that is possible from a mobility standpoint. He and his daughter report that his cardiopulmonary endurance and balance are limiting factors for him as well and that he has fallen a few times in the last six months. He also reports having had a few falls while still working at [employer] due to tripping over objects. He does report a feeling of occasionally losing his balance and falling backward at random times. He also reports low back pain that is chronic but no other significant orthopedic complaints at this time. Patient presents with decreased balance, decreased proprioception, decreased functional strength, decreased cardiopulmonary endurance, decreased gait and transfer ability. Patient will benefit from skilled PT to build on gains and provide exercise progressions, a progressive resistance exercise program, balance exercises, gait and transfer training, manual therapy and modalities as needed to work towards therapeutic goals. Problem List: (Impacting functional limitations): Body Structures, Functions, Activity Limitations Requiring Skilled Therapeutic Intervention: Decreased functional mobility ; Decreased ADL status; Decreased ROM; Decreased tolerance to work activity; Decreased strength; Decreased endurance; Decreased sensation; Decreased balance; Decreased coordination;  Increased pain; Decreased posture     Therapy Prognosis: Therapy Prognosis: Good     Assessment Complexity:   Low Complexity  PLAN   Effective Dates: 06/13/2022 TO No data recorded   Frequency/Duration: Plan Frequency: Twice per week for 8 eight weeks (Twice per week for eight weeks)     Interventions Planned (Treatment may consist of any combination of the following):    Current Treatment Recommendations: Strengthening; ROM; Balance training; Functional mobility training; Transfer training; Endurance training; Gait training; Stair training; Neuromuscular re-education; Manual Therapy - Soft Tissue Mobilization; Manual Therapy - Joint Manipulation; Pain management; Return to work related activity; Home exercise program; Safety education & training; Patient/Caregiver education & training; Modalities; Therapeutic activities     Goals: (Goals have been discussed and agreed upon with patient.)  Short-Term Functional Goals: Time Frame: 06/13/2022 to 07/11/2022  1. Patient will ambulate for six minutes using front wheeled walker without requiring a seated rest period. 2. Patient will maintain eyes closed static standing for 30 seconds on even surface safely with guarding. 3. Patient will perform sit to stand transfer from standard chair height without BUEs for one repetition. Discharge Goals: Time Frame: 06/13/2022 to 08/08/2022   1. Patient will ambulate with straight cane safely and independently over even surfaces. 2. Patient will ambulate with rolling walker over uneven surfaces or when ambulating for prolonged distances. 3. Patient will perform sit to stand and sit to and from supine transfers safely and independently without cues. 4. Patient will improve TUG score to 14 seconds or less to indicate improved gait speed and decreased fall risk. 5. Patient will improve modified CTSIB to 120/120 to indicate improved proprioception and vestibular function and a decreased fall risk.   6. Patient will improve 30 second sit to stand score to 10 repetitions without Jenny.   7. Patient will return to working at "[employer]" part time duty with modifications. Outcome Measure: Tool Used: Lower Extremity Functional Scale (LEFS)  Score:  Initial: 34/80 Most Recent: X/80 (Date: -- )   Interpretation of Score: 20 questions each scored on a 5 point scale with 0 representing \"extreme difficulty or unable to perform\" and 4 representing \"no difficulty\". The lower the score, the greater the functional disability. 80/80 represents no disability. Minimal detectable change is 9 points. Tool Used: Timed Up and Go (TUG)  Score:  Initial: 20.4 seconds Most Recent: X seconds (Date: -- )   Interpretation of Score: The test measures, in seconds, the time taken by an individual to stand up from a standard arm chair (seat height 46 cm [18 in], arm height 65 cm [25.6 in]), walk a distance of 3 meters (118 in, approx 10 ft), turn, walk back to the chair and sit down. If the individual takes longer than 14 seconds to complete TUG, this indicates risk for falls. Medical Necessity:    Patient is expected to demonstrate progress in strength, range of motion, balance, coordination and functional technique to increase independence with functional mobility and ADLs.  Skilled intervention continues to be required due to need for exercise progressions tailored to patient needs, goals, and impairments . Reason For Services/Other Comments:   Patient continues to require skilled intervention due to need for exercise progressions, manual therapy, plan of care modifications and exceptions tailored to patient presentation. Total Duration:  Time In: 1538  Time Out: 36    Regarding Mario Merchant's therapy, I certify that the treatment plan above will be carried out by a therapist or under their direction.   Thank you for this referral,  Kevin Madison, PT     Referring Physician Signature: Delilah Grimm PA-C _______________________________ Date _____________        Post Session Pain Charge Capture  PT Visit Info  POC Link  Treatment Note Link  MD Guidelines  MyChart

## 2022-06-13 NOTE — PROGRESS NOTES
[facility] CARDIOLOGY  [address] [facility], [address]  PHONE: [phone]          22    NAME:  Dulce Maria Merchant  : 1945  MRN: [id number]         SUBJECTIVE:   Quynh Nicole is a 77 y.o. male seen for a visit regarding the following:     Chief Complaint   Patient presents with    Congestive Heart Failure    Follow-up           HPI:    Cardio problem list:   1.  Dilated cardiomyopathy   - LVEF noted to be 30-35% dating back to 2021 echo- Hypotension limits ability to add CHF medical therapy.   -Echo from 2022 showed an EF at 3035%, moderately dilated left ventricle, mild MR, moderate left atrial enlargement.   -Echo from 3/8/2022 showed an EF of 30 to 35% with moderate global hypokinesis   2 Persistent atrial fibrillation   - On Eliquis, rates controlled   3. CAD-said he had a previous stent in the late 80s   - No coronary angiogram is available for review, on atorvastatin 40    --Nuclear stress test on 3/28/2022 showed large inferior defect-fixed. Additional moderate-sized defect of severe intensity involving the mid to distal anterior wall and apex also noted    4 Obstructive sleep apnea      Obesity hypoventilation syndrome      5Stage 3 chronic kidney disease      6 Controlled type 2 diabetes mellitus with diabetic polyneuropathy   7 Borderline hypotension on Florinef   8.  S/p right below-knee amputation.               Dear Dr. Ulises Casanova, I saw Mr. Lily Hodgkin who is a pleasant 81-year-old gentleman in cardiovascular follow-up on 2022. We last saw him in follow-up on 2022 for CAD, cardiomyopathy-likely combination of ischemic and nonischemic, congestive heart failure, orthostatic hypotension, at which time we no significant changes with his medical therapy  due to borderline hypotension with him still being on fludrocortisone.   We had continued metoprolol for better rate control of his A. fib to see if this would help with his cardiomyopathy and we set him up for a follow-up echo which continued to show moderately  to severely reduced LV systolic function with an EF of 30 to 35% with additional apical wall hypokinesis. Because of this cardiomyopathy without improvement, we set him up for a nuclear stress test which showed evidence of a large apical and inferior  myocardial infarction. He has known coronary artery disease with previous stenting but the details are unknown but based on these findings especially with no recovery in LV function despite adequate rate control for A. fib, his cardiomyopathy may be  associated with an ischemic etiology.    -When we last met with him decided to hold off on coronary angiography in the absence of angina and decided to wean off his Florinef completely. CAD: Has no complaints of any angina whatsoever. Dyspnea on exertion has been stable and unchanged compared to when we last met with him.      cardiomyopathy: Has some chronic dyspnea on exertion-NYHA class II-III but most of his activity levels a also limited by his right below-knee amputation. He is using his prosthetic limb and ambulating with a walker. He feels that his breathing has improved  overall. Oxygen level improved substantially with him not needing oxygen even with exertion now.   -He denies significant lower extremity edema orthopnea PND. Using his BiPAP therapy when he sleeps.    -his Lasix was increased to 20 mg twice daily by Dr. Young Francis and I agree with this. We are struggling to manage his chronic kidney disease also at the same point. Sleep apnea-using his trilogy CPAP mask especially at night but also needs to use it during the day when he naps.       Hypotension: Doing well at this time and denies significant lightheadedness or dizziness with standing and he is now off Florinef completely.       Hyperlipidemia: On atorvastatin therapy and tolerating this well. Denies myalgias.       Past Medical History, Past Surgical History, Family history, Social History, and Medications were all reviewed with the patient today and updated as necessary. No Known Allergies  Patient Active Problem List   Diagnosis    Severe obesity (BMI 35.0-35.9 with comorbidity) ([address]    Stage 3 chronic kidney disease (HCC)    Systolic congestive heart failure (HCC)    Onychomycosis    Chronic respiratory failure with hypoxia (HCC)    Atrial fibrillation (HCC)    Controlled type 2 diabetes mellitus with diabetic polyneuropathy, without long-term current use of insulin (HCC)    Type 2 diabetes mellitus with nephropathy (HCC)    Mixed axonal-demyelinating polyneuropathy    Corns and callus    Mixed hyperlipidemia    Obesity hypoventilation syndrome (HCC)    Benign hypertensive kidney disease with chronic kidney disease    CAD (coronary artery disease)    Bunion of unspecified foot    S/P BKA (below knee amputation) unilateral, right (HCC)    Acquired hammer toe of left foot    Obstructive sleep apnea    Dilated cardiomyopathy ([address]    Acute on chronic respiratory failure with hypercapnia ([address]     Past Medical History:   Diagnosis Date    A-fib ([address] 07/19/2021    developed AFID after hospital admission for wound infection- started on Eliquis    Acute on chronic respiratory failure with hypoxia and hypercapnia ([address] 7/23/2021    Acute respiratory failure with hypercapnia ([address] 7/23/2021    Atrial fibrillation with RVR ([address] 7/19/2021    CAD (coronary artery disease)     [facility].     Cellulitis 7/19/2021    Chronic kidney disease     stage 3- improved    COVID-19 vaccine series completed     Moderna Vaccine completed X2 doses    Current use of long term anticoagulation     Eliquis and Aspirin    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 mellitus, limited to breakdown of skin ([address] 9/27/2021    H/O heart artery stent     X1 placed in 1999    History of MI (myocardial infarction) 1999    stent placed X1    Hypercholesterolemia     Hypertension     Left ventricular dysfunction     echo revealed EF 30-35%, mildly dilated LA/RA and mild TR and MR.    Neuropathy     severe    Oxygen dependent     recently started on 2L NC continous- Sleep study to be scheduled-questionable SASHA    PICC (peripherally inserted central catheter) in place     PICC line in place to R arm    Staphylococcus aureus bacteremia 9/54/7133    Systolic CHF, acute on chronic ([facility] 7/19/2021    Type 2 diabetes mellitus ([facility]     oral agent/Pt does not monitor BS/no s.s of hypoglycemia/Last A1c: 6.7 on 7/13/21 per daughter     Past Surgical History:   Procedure Laterality Date    AMPUTATION Right 2022    below knee    ORTHOPEDIC SURGERY  08/18/2021    BKA    OK CARDIAC SURG PROCEDURE UNLIST  1999    stent placement     Family History   Problem Relation Age of Onset    No Known Problems Paternal Grandmother     No Known Problems Paternal Grandfather     No Known Problems Maternal Grandfather     No Known Problems Maternal Grandmother     Cancer Father     Cancer Mother      Social History     Tobacco Use    Smoking status: Never Smoker    Smokeless tobacco: Never Used   Substance Use Topics    Alcohol use: No     Current Outpatient Medications   Medication Sig Dispense Refill    furosemide (LASIX) 20 MG tablet Take 1 tablet by mouth 2 times daily as needed (swelling) (Patient taking differently: Take 20 mg by mouth 2 times daily ) 60 tablet 5    apixaban (ELIQUIS) 5 MG TABS tablet Take 1 tablet by mouth every 12 hours 60 tablet 5    atorvastatin (LIPITOR) 40 MG tablet Take 1 tablet by mouth daily 30 tablet 5    ferrous sulfate (FE TABS 325) 325 (65 Fe) MG EC tablet Take 1 tablet by mouth 3 times daily (with meals) (Patient taking differently: Take 325 mg by mouth daily ) 90 tablet 5    metoprolol succinate (TOPROL XL) 25 MG extended release tablet Take 1 tablet by mouth every evening 30 tablet 5    pregabalin (LYRICA) 200 MG capsule Take 1 capsule by mouth 2 times daily for 30 days. 60 capsule 5    tamsulosin (FLOMAX) 0.4 MG capsule Take 1 capsule by mouth daily 30 capsule 5    OXYGEN 2 lpm cont      acetaminophen (TYLENOL) 325 MG tablet Take 650 mg by mouth every 6 hours as needed      cyanocobalamin 1000 MCG tablet Take 1,000 mcg by mouth daily      polyethylene glycol (GLYCOLAX) 17 GM/SCOOP powder Take 17 g by mouth daily       No current facility-administered medications for this visit. Review of Systems   Constitutional: Negative for chills and fever. HENT: Negative for ear discharge, hoarse voice and stridor. Eyes: Negative for double vision and redness. Cardiovascular: Positive for dyspnea on exertion. Negative for cyanosis and syncope. Respiratory: Negative for hemoptysis and wheezing. Endocrine: Negative for polydipsia and polyphagia. Hematologic/Lymphatic: Negative for adenopathy. Skin: Negative for itching and rash. Musculoskeletal: Negative for joint swelling and muscle weakness. Gastrointestinal: Negative for abdominal pain, hematemesis and hematochezia. Genitourinary: Negative for flank pain and nocturia. Neurological: Negative for focal weakness and seizures. Psychiatric/Behavioral: Negative for altered mental status and suicidal ideas. Allergic/Immunologic: Negative for hives. PHYSICAL EXAM:    /70   Pulse 72   Ht 5' 11\" (1.803 m)   Wt 298 lb (135.2 kg)   BMI 41.56 kg/m²      Physical Exam    General: Alert and oriented in no acute distress  HEENT: Head is normocephalic, atraumatic, pupils are equal bilaterally, throat appears to be clear  Neck: No significant jugular venous distention no cervical bruits  Cardiovascular: S1 and S2 heard, regular rate and rhythm, no significant murmurs rubs or gallops. Respiratory: Clear to auscultation bilaterally with no adventitious sounds, respirations are normal  Abdomen: Soft, nontender, nondistended, bowel sounds present.   Extremities: No cyanosis clubbing or edema  Peripheral pulses: Bilateral radial artery pulses are palpated. Bilateral pedal pulses are well felt. Neuro: No facial droop and no gross focal motor deficits  Lymphatic: No significant cervical lymphadenopathy noted. Musculoskeletal: No significant redness or swelling noted in all exposed joints. Skin: No significant rashes noted the of the exposed regions. Medical problems and test results were reviewed with the patient today. Recent Results (from the past 672 hour(s))   Basic Metabolic Panel    Collection Time: 05/23/22 12:17 PM   Result Value Ref Range    Sodium 143 136 - 145 mmol/L    Potassium 5.0 3.5 - 5.1 mmol/L    Chloride 104 98 - 107 mmol/L    CO2 37 (H) 21 - 32 mmol/L    Anion Gap 2 (L) 7 - 16 mmol/L    Glucose 102 (H) 65 - 100 mg/dL    BUN 33 (H) 8 - 23 MG/DL    CREATININE 2.10 (H) 0.8 - 1.5 MG/DL    GFR African American 40 (L) >60 ml/min/1.73m2    GFR Non- 33 (L) >60 ml/min/1.73m2    Calcium 10.2 8.3 - 10.4 MG/DL     Lab Results   Component Value Date    CHOL 175 05/16/2022    HDL 42 05/16/2022    VLDL 40 05/16/2022   ,hemoglobin, basic metabolic panel,   Lab Results   Component Value Date    TSH 1.240 01/12/2022    ,  Lab Results   Component Value Date     05/23/2022    K 5.0 05/23/2022     05/23/2022    CO2 37 05/23/2022    BUN 33 05/23/2022    GFRAA 40 05/23/2022    GLOB 4.4 01/06/2022    ALT 11 05/16/2022    AST 15 05/16/2022      Lab Results   Component Value Date    LDLCALC 93 05/16/2022 03/07/22    TRANSTHORACIC ECHOCARDIOGRAM (TTE) LIMITED (CONTRAST/BUBBLE/3D PRN) 03/08/2022 12:47 PM (Final)        Left Ventricle: Left ventricle size is normal. Moderate global hypokinesis present. Moderately reduced left ventricular systolic function with a visually estimated EF of 30 - 35%.   Contrast used: Definity.     Signed by: Maty Delaney DO on 3/8/2022 12:47 PM       ASSESSMENT and PLAN      Coronary artery disease involving native coronary artery of native heart without angina pectoris  -Stable no complaints of any angina at this point. Nuclear stress test showed previous apical myocardial infarction but with no significant inducible ischemia. Being managed medically. On beta-blocker therapy. No angina. Also on statin therapy. Dilated cardiomyopathy (HCC)  - On metoprolol succinate 25 mg once daily. Not on an ACE inhibitor/Arni/aldosterone blocker because of low blood pressures. Also has chronic kidney disease    Chronic systolic congestive heart failure ([facility]  -Euvolemic currently today. Few scattered left-sided basal rales but improved with coughing. Longstanding persistent atrial fibrillation (HCC)  -Rate controlled with metoprolol and anticoagulated with Eliquis for thromboembolic prophylaxis. Mixed hyperlipidemia  -Continue atorvastatin therapy. Stage 3b chronic kidney disease ([facility]  -Being monitored-last GFR was below 35. Overall Impression  -He has done well from a cardiac perspective overall and at least is stable. He has ischemic cardiomyopathy with an EF around 35%. We have held off on an ICD at this point. We managed to discontinue Florinef completely and he on Lasix 20 mg twice daily. We will try and cut it back to 20 mg twice daily 4 days a week and 20 mg once daily 3 days a week in view of his worsening chronic kidney disease. Blood pressures are too low to consider Entresto/ARB therapy. Continue low-dose beta-blocker therapy. Heart rates are well controlled and he is appropriately anticoagulated with Eliquis for thromboembolic prophylaxis. We will continue to keep a close watch on him and see him in follow-up in about 3 months time. Encouraged him to consider using his incentive spirometry. Return in about 3 months (around 9/13/2022) for cad, chf, hld, afib. Thank you for allowing us to participate in the care of your patient.   If you have any further questions, please do not hesitate to contact us.   Sincerely,        Fatoumata Mccloud MD   6/13/2022

## 2022-06-15 ASSESSMENT — PAIN DESCRIPTION - ORIENTATION: ORIENTATION: RIGHT

## 2022-06-15 ASSESSMENT — PAIN DESCRIPTION - LOCATION: LOCATION: LEG

## 2022-06-15 ASSESSMENT — PAIN DESCRIPTION - DESCRIPTORS: DESCRIPTORS: TINGLING;OTHER (COMMENT)

## 2022-06-15 ASSESSMENT — PAIN SCALES - GENERAL: PAINLEVEL_OUTOF10: 6

## 2022-06-15 NOTE — PROGRESS NOTES
Physical Therapy  [address] [place] [address],[address] Floor at [facility] 6/15/2022     Patient only coming for one session this week due to patient schedule and limited availability.      Hoa Kamara PT, DPT

## 2022-06-20 ENCOUNTER — HOSPITAL ENCOUNTER (OUTPATIENT)
Dept: PHYSICAL THERAPY | Age: 77
Setting detail: RECURRING SERIES
Discharge: HOME | End: 2022-06-23
Payer: MEDICARE

## 2022-06-20 ENCOUNTER — NURSE ONLY (OUTPATIENT)
Dept: PULMONOLOGY | Age: 77
End: 2022-06-20
Payer: MEDICARE

## 2022-06-20 DIAGNOSIS — R06.09 DOE (DYSPNEA ON EXERTION): Primary | ICD-10-CM

## 2022-06-20 LAB
FEV 1 , POC: 1.48 L
FEV1 % PRED, POC: 43 %
FEV1/FVC, POC: NORMAL
FVC % PRED, POC: 39 %
FVC, POC: 1.77

## 2022-06-20 PROCEDURE — 94060 EVALUATION OF WHEEZING: CPT | Performed by: INTERNAL MEDICINE

## 2022-06-20 PROCEDURE — 94729 DIFFUSING CAPACITY: CPT | Performed by: INTERNAL MEDICINE

## 2022-06-20 PROCEDURE — 94726 PLETHYSMOGRAPHY LUNG VOLUMES: CPT | Performed by: INTERNAL MEDICINE

## 2022-06-20 PROCEDURE — 97110 THERAPEUTIC EXERCISES: CPT

## 2022-06-20 ASSESSMENT — PULMONARY FUNCTION TESTS
FVC_PERCENT_PREDICTED_POC: 39
FVC_POC: 1.77
FEV1_PERCENT_PREDICTED_POC: 43

## 2022-06-20 ASSESSMENT — PAIN SCALES - GENERAL: PAINLEVEL_OUTOF10: 5

## 2022-06-20 ASSESSMENT — PAIN DESCRIPTION - DESCRIPTORS: DESCRIPTORS: TINGLING

## 2022-06-20 ASSESSMENT — PAIN DESCRIPTION - LOCATION: LOCATION: LEG

## 2022-06-20 ASSESSMENT — PAIN DESCRIPTION - ORIENTATION: ORIENTATION: RIGHT

## 2022-06-20 NOTE — PROGRESS NOTES
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 Restrictions/Precautions:  Restrictions/Precautions: Cardiac; Fall Risk  No data recorded   Interventions Planned (Treatment may consist of any combination of the following):    Current Treatment Recommendations: Strengthening; ROM; Balance training; Functional mobility training; Transfer training; Endurance training; Gait training; Stair training; Neuromuscular re-education; Manual Therapy - Soft Tissue Mobilization; Manual Therapy - Joint Manipulation; Pain management; Return to work related activity; Home exercise program; Safety education & training; Patient/Caregiver education & training; Modalities;  Therapeutic activities     Subjective Comments:  Patient eager to improve strength and mobility  Initial:}Right Leg 5/10Post Session:  Right  Leg 5/10  Medications Last Reviewed:  2022  Updated Objective Findings:  See evaluation note from today  Treatment   THERAPEUTIC EXERCISE: (10 minutes):    Exercises per grid below to improve mobility, strength, balance and coordination. Required mod visual, verbal, manual and tactile cues to promote proper body alignment, promote proper body posture, promote proper body mechanics and promote proper body breathing techniques. Progressed resistance, range, repetitions and complexity of movement as indicated. Date:  06/13/22 Date:  6/20/22 Date:     Activity/Exercise Parameters Parameters Parameters   Patient education  See communication/consultation. Discussed walking program and resting in intervals as well as sit to stands. NU step  8 minutes level 2    Ambulation   100 ft  75 ft  200 ft RW and gait belt    Sit to stand  4 x 5  1 x 3 elevated mat    Hip abduction  Blue 2 x 20 seated    Hip adduction  Ball 2 x 20 seated               MANUAL THERAPY: (0 minutes):   Joint mobilization and Soft tissue mobilization was utilized and necessary because of the patient's restricted joint motion, painful spasm, loss of articular motion and restricted motion of soft tissue. MODALITIES: (0 minutes):        None today       Treatment/Session Summary:    · Treatment Assessment:  Fatigued after session SPO2 97-99% throughout   · Communication/Consultation:  Safety with walker, HEP and transfers  · Equipment provided today:  None today   · Recommendations/Intent for next treatment session: Next visit will focus on exercise program focused on improving cardiopulmonary and muscular endurance.      Total Treatment Billable Duration:  53 minutes:        SIXTO PEREIRA PTA       Charge Capture  }Post Session Pain  PT Visit Info  [facility] Portal  MD Guidelines  Scanned Media  Benefits  MyChart    Future Appointments   Date Time Provider Rachel Sanchez   6/20/2022  3:15 PM PP PFT LAB [facility]PS [facility]   6/24/2022  1:30 PM Ariadne Diaz, PT [facility]   6/27/2022  1:30 PM LUIS Munoz [facility]   6/29/2022 10:00 AM Ariadne Diaz PT [facility]   6/30/2022  3:20 PM Linda Schultz MD PPS [facility]   7/8/2022 10:00 AM Angie Sharif Froy Gonzales, PTA [facility]   7/11/2022 11:00 AM Reginaldo Palm, PTA [facility]   7/15/2022 10:00 AM Ro Grover, PT [facility]   7/18/2022  1:30 PM Ro Grover, PT [facility]   7/20/2022 10:00 AM Ro Grover, PT [facility]   7/25/2022 10:00 AM Ro Grover, PT [facility]   7/27/2022 10:00 AM Ro Grover, PT [facility]   8/1/2022 10:00 AM Ro Grover, PT [facility]   8/3/2022 10:00 AM Ro Grover, PT [facility]   8/8/2022 10:00 AM Ro Grover, PT [facility]   8/11/2022 10:00 AM Ro Grover, PT [facility]   9/12/2022 10:00 AM Lee Bhatia MD [facility]

## 2022-06-24 ENCOUNTER — HOSPITAL ENCOUNTER (OUTPATIENT)
Dept: PHYSICAL THERAPY | Age: 77
Setting detail: RECURRING SERIES
Discharge: HOME | End: 2022-06-27
Payer: MEDICARE

## 2022-06-24 PROCEDURE — 97110 THERAPEUTIC EXERCISES: CPT

## 2022-06-24 ASSESSMENT — PAIN SCALES - GENERAL: PAINLEVEL_OUTOF10: 0

## 2022-06-24 NOTE — PROGRESS NOTES
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 Restrictions/Precautions:  Restrictions/Precautions: Cardiac; Fall Risk  No data recorded   Interventions Planned (Treatment may consist of any combination of the following):    Current Treatment Recommendations: Strengthening; ROM; Balance training; Functional mobility training; Transfer training; Endurance training; Gait training; Stair training; Neuromuscular re-education; Manual Therapy - Soft Tissue Mobilization; Manual Therapy - Joint Manipulation; Pain management; Return to work related activity; Home exercise program; Safety education & training; Patient/Caregiver education & training; Modalities; Therapeutic activities     Subjective Comments:  Patient stating he feels good today.   Initial:}    0/10Post Session:       0/10  Medications Last Reviewed:  2022  Updated Objective Findings:  Baseline Vital Signs: 130/67mmHg; 90bpm; 95% SpO2    Treatment   THERAPEUTIC EXERCISE: (53 minutes):    Exercises per grid below to improve mobility, strength, balance and coordination. Required mod visual, verbal, manual and tactile cues to promote proper body alignment, promote proper body posture, promote proper body mechanics and promote proper body breathing techniques. Progressed resistance, range, repetitions and complexity of movement as indicated. Date:  06/13/22 Date:  6/20/22 Date:  06/24/22   Activity/Exercise Parameters Parameters Parameters   Patient education  See communication/consultation. Discussed walking program and resting in intervals as well as sit to stands. ---   NU step  8 minutes level 2 7 minutes level 4    Ambulation   100 ft  75 ft  200 ft RW and gait belt ---   Sit to stand  4 x 5  1 x 3 elevated mat 3 x 5 from low plinth with BUEs    Hip abduction  Blue 2 x 20 seated Black 3 x 10 unilateral    Hip adduction  Ball 2 x 20 seated  ---   Long Arc Quad    3 x 10 5#    Hamstring Curl    3 x 10 5# black band    Anti Rotation Press    3 x 10 black band    Row    2 x 20 black alternating    Hip Abduction Standing    3 x 10    Hip Flexion Standing    3 x 10        MANUAL THERAPY: (0 minutes):   Joint mobilization and Soft tissue mobilization was utilized and necessary because of the patient's restricted joint motion, painful spasm, loss of articular motion and restricted motion of soft tissue. MODALITIES: (0 minutes):        None today       Treatment/Session Summary:    · Treatment Assessment:  Tolerated session well; did require seated rest periods due to shortness of breath but recovered quickly. · Communication/Consultation:  Discussed buidling a base strength and endurance program and then progressing towards more complex balance workouts  · Equipment provided today:  None today   · Recommendations/Intent for next treatment session: Next visit will focus on exercise program focused on improving cardiopulmonary and muscular endurance.      Total Treatment Billable Duration:  53 minutes   Time In: 3282  Time Out: [address], PT Charge Capture  }Post Session Pain  PT Visit Info  [facility] Portal  MD Guidelines  Scanned Media  Benefits  MyChart    Future Appointments   Date Time Provider Rachel Sanchez   6/27/2022  1:30 PM Darrall Keto, PT [facility]   6/29/2022 10:00 AM Darrall Keto, PT [facility]   6/30/2022  3:20 PM Shefali Lipscomb MD PPS [facility]   7/8/2022 10:00 AM Raffy Jeffrey, PTA [facility]   7/11/2022 11:00 AM Raffy Jeffrey, PTA [facility]   7/15/2022 10:00 AM Darrall Keto, PT [facility]   7/18/2022  1:30 PM Darrall Keto, PT [facility]   7/20/2022 10:00 AM Darrall Keto, PT [facility]   7/25/2022 10:00 AM Darrall Keto, PT [facility]   7/27/2022 10:00 AM Darrall Keto, PT [facility]   8/1/2022 10:00 AM Darrall Keto, PT [facility]   8/3/2022 10:00 AM Darrall Keto, PT [facility]   8/8/2022 10:00 AM Darrall Keto, PT [facility]   8/11/2022 10:00 AM Darrall Keto, PT [facility]   9/12/2022 10:00 AM May Villareal MD [facility]

## 2022-06-27 ENCOUNTER — HOSPITAL ENCOUNTER (OUTPATIENT)
Dept: PHYSICAL THERAPY | Age: 77
Setting detail: RECURRING SERIES
Discharge: HOME | End: 2022-06-30
Payer: MEDICARE

## 2022-06-27 PROCEDURE — 97110 THERAPEUTIC EXERCISES: CPT

## 2022-06-27 ASSESSMENT — PAIN SCALES - GENERAL: PAINLEVEL_OUTOF10: 0

## 2022-06-27 NOTE — PROGRESS NOTES
strength, balance and coordination. Required mod visual, verbal, manual and tactile cues to promote proper body alignment, promote proper body posture, promote proper body mechanics and promote proper body breathing techniques. Progressed resistance, range, repetitions and complexity of movement as indicated. Date:  06/27/22 Date:  6/20/22 Date:  06/24/22   Activity/Exercise Parameters Parameters Parameters   NU step 8 minutes level 3  8 minutes level 2 7 minutes level 4    Ambulation  --- 100 ft  75 ft  200 ft RW and gait belt ---   Sit to stand 3 x 5 from low plinth with BUEs  4 x 5  1 x 3 elevated mat 3 x 5 from low plinth with BUEs    Hip abduction Black 3 x 10 bilateral  Blue 2 x 20 seated Black 3 x 10 unilateral    Hip adduction --- Ball 2 x 20 seated  ---   Long Arc Quad  3 x 10 5#  3 x 10 5#    Hamstring Curl  3 x 10 5# black band  3 x 10 5# black band    Anti Rotation Press  2 x 15 black + blue bands seated   3 x 10 black band    Row  ---  2 x 20 black alternating    Hip Abduction Standing  3 x 10   3 x 10    Hip Flexion Standing  3 x 10   3 x 10        MANUAL THERAPY: (0 minutes):   Joint mobilization and Soft tissue mobilization was utilized and necessary because of the patient's restricted joint motion, painful spasm, loss of articular motion and restricted motion of soft tissue. MODALITIES: (0 minutes):        None today     Treatment/Session Summary:    · Treatment Assessment:  Patient tolerated session well; improving in overall stamina. · Communication/Consultation:  Spoke with patient in regards to HEP and setup . · Equipment provided today:  HEP handout; blue and black therabands    · Recommendations/Intent for next treatment session: Next visit will focus on exercise program focused on improving cardiopulmonary and muscular endurance.      Total Treatment Billable Duration:  53 minutes   Time In: 1330  Time Out: [address] Douglas, PT       Charge Capture  }Post Session Pain  PT Visit [address] Portal  MD Guidelines  Scanned Media  Benefits  MyChart    Future Appointments   Date Time Provider Rachel Laura   6/29/2022 10:00 AM Al Hoose, PT [facility]   6/30/2022  3:20 PM Cayetano Anderson MD PPS [facility]   7/8/2022 10:00 AM Robert Maxwell, PTA [facility]   7/11/2022 11:00 AM Robert Maxwell, PTA [facility]   7/15/2022 10:00 AM Al Hoose, PT [facility]   7/18/2022  1:30 PM Al Hoose, PT [facility]   7/20/2022 10:00 AM Al Hoose, PT [facility]   7/25/2022 10:00 AM Al Hoose, PT [facility]   7/27/2022 10:00 AM Al Hoose, PT [facility]   8/1/2022 10:00 AM Al Hoose, PT [facility]   8/3/2022 10:00 AM Al Hoose, PT [facility]   8/8/2022 10:00 AM Al Hoose, PT [facility]   8/11/2022 10:00 AM Al Hoose, PT [facility]   9/12/2022 10:00 AM Jada Valadez MD [facility]

## 2022-06-29 ENCOUNTER — HOSPITAL ENCOUNTER (OUTPATIENT)
Dept: PHYSICAL THERAPY | Age: 77
Setting detail: RECURRING SERIES
Discharge: HOME | End: 2022-07-02
Payer: MEDICARE

## 2022-06-29 PROCEDURE — 97110 THERAPEUTIC EXERCISES: CPT

## 2022-06-29 ASSESSMENT — PAIN SCALES - GENERAL: PAINLEVEL_OUTOF10: 0

## 2022-06-29 NOTE — PROGRESS NOTES
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 Restrictions/Precautions:  Restrictions/Precautions: Cardiac; Fall Risk  No data recorded   Interventions Planned (Treatment may consist of any combination of the following):    Current Treatment Recommendations: Strengthening; ROM; Balance training; Functional mobility training; Transfer training; Endurance training; Gait training; Stair training; Neuromuscular re-education; Manual Therapy - Soft Tissue Mobilization; Manual Therapy - Joint Manipulation; Pain management; Return to work related activity; Home exercise program; Safety education & training; Patient/Caregiver education & training; Modalities; Therapeutic activities     Subjective Comments:  Patient stating he was a bit sore after last session but no pain.   Initial:}    0/10Post Session:       0/10  Medications Last Reviewed:  2022  Updated Objective Findings:  Baseline Vital Signs: 126/80mmHg; 90bpm; 95% SpO2    Treatment   THERAPEUTIC EXERCISE: (53 minutes):    Exercises per grid below to improve mobility, strength, balance and coordination. Required mod visual, verbal, manual and tactile cues to promote proper body alignment, promote proper body posture, promote proper body mechanics and promote proper body breathing techniques. Progressed resistance, range, repetitions and complexity of movement as indicated. Date:  06/27/22 Date:  6/29/22 Date:  06/24/22   Activity/Exercise Parameters Parameters Parameters   NU step 8 minutes level 3  10 minutes level 3 7 minutes level 4    Ambulation  --- --- ---   Sit to stand 3 x 5 from low plinth with BUEs  4 x 5 with BUEs    3 x 5 from low plinth with BUEs    Hip abduction Black 3 x 10 bilateral  --- Black 3 x 10 unilateral    Hip adduction --- --- ---   Long Arc Quad  3 x 10 5# 3 x 10 5#  3 x 10 5#    Hamstring Curl  3 x 10 5# black band 3 x 10 5# blue band  3 x 10 5# black band    Anti Rotation Press  2 x 15 black + blue bands seated  --- 3 x 10 black band    Row  --- 2 x 20 alternating blue and black unsupported sitting  2 x 20 black alternating    Hip Abduction Standing  3 x 10  1 x 10  3 x 10    Hip Flexion Standing  3 x 10  1 x 10  3 x 10    Toe Tapping   NEXT SESSION    Ankle Plantarflexion   L only 3 x 10 black     Ankle Dorsiflexion   L only 3 x 10 blue               MANUAL THERAPY: (0 minutes):   Joint mobilization and Soft tissue mobilization was utilized and necessary because of the patient's restricted joint motion, painful spasm, loss of articular motion and restricted motion of soft tissue. MODALITIES: (0 minutes):        None today     Treatment/Session Summary:    · Treatment Assessment:  Patient stating he is tired but feels okay otherwise. · Communication/Consultation:  Spoke with patient in regards to HEP and setup . · Equipment provided today:  None today   · Recommendations/Intent for next treatment session: Next visit will focus on exercise program focused on improving cardiopulmonary and muscular endurance.      Total Treatment Billable Duration:  53 minutes   Time In: 1000  Time Out: Darwin Hernandez, PT       Charge Capture  }Post Session Pain  PT Visit Info  [facility] Portal  MD Guidelines  Scanned Media  Benefits  MyChart    Future Appointments   Date Time Provider Rachel Laura   6/30/2022  3:20 PM Becky Jimenes MD PPS [facility]   7/8/2022 10:00 AM Channing Netregina, PTA [facility]   7/11/2022 11:00 AM Channing Mayo, PTA [facility]   7/15/2022 10:00 AM Tanesha Holiday, PT [facility]   7/18/2022  1:30 PM Tanesha Holiday, PT [facility]   7/20/2022 10:00 AM Tanesha Holiday, PT [facility]   7/25/2022 10:00 AM Tanesha Holiday, PT [facility]   7/27/2022 10:00 AM Tanesha Holiday, PT [facility]   8/1/2022 10:00 AM Tanesha Holiday, PT [facility]   8/3/2022 10:00 AM Tanesha Holiday, PT [facility]   8/8/2022 10:00 AM Tanesha Holiday, PT [facility]   8/11/2022 10:00 AM Tanesha Holiday, PT [facility]   9/12/2022 10:00 AM Tucker Vick MD [facility]

## 2022-06-30 ENCOUNTER — OFFICE VISIT (OUTPATIENT)
Dept: PULMONOLOGY | Age: 77
End: 2022-06-30
Payer: MEDICARE

## 2022-06-30 VITALS
TEMPERATURE: 97 F | HEIGHT: 70 IN | WEIGHT: 308 LBS | BODY MASS INDEX: 44.09 KG/M2 | OXYGEN SATURATION: 95 % | DIASTOLIC BLOOD PRESSURE: 85 MMHG | SYSTOLIC BLOOD PRESSURE: 118 MMHG | HEART RATE: 95 BPM | RESPIRATION RATE: 16 BRPM

## 2022-06-30 DIAGNOSIS — J84.9 ILD (INTERSTITIAL LUNG DISEASE) (HCC): ICD-10-CM

## 2022-06-30 DIAGNOSIS — E66.2 OBESITY HYPOVENTILATION SYNDROME (HCC): ICD-10-CM

## 2022-06-30 DIAGNOSIS — J96.11 CHRONIC RESPIRATORY FAILURE WITH HYPOXIA (HCC): Primary | ICD-10-CM

## 2022-06-30 DIAGNOSIS — R94.2 ABNORMAL DIFFUSION CAPACITY DETERMINED BY PULMONARY FUNCTION TEST: ICD-10-CM

## 2022-06-30 DIAGNOSIS — J98.4 RESTRICTIVE LUNG DISEASE: ICD-10-CM

## 2022-06-30 PROCEDURE — 1123F ACP DISCUSS/DSCN MKR DOCD: CPT | Performed by: INTERNAL MEDICINE

## 2022-06-30 PROCEDURE — 99214 OFFICE O/P EST MOD 30 MIN: CPT | Performed by: INTERNAL MEDICINE

## 2022-06-30 NOTE — PROGRESS NOTES
[facility]: FOLLOW-UP Patient Office Visit Note  [address] [place] Favre. [address], [address] [facility]  [phone]    Patient Name:  Marylu Andres  YOB: 1945            Date of Service:  6/30/2022    Chief Complaint   Patient presents with    Breathing Problem       History of Present Illness: This is a 78-year-old white male hospitalized in January 2022 with respiratory failure, atrial fib with RVR. Patient was discharged on 4 L oxygen at rest and 5 L with ambulation and trilogy noninvasive ventilation at bedtime. Seen in follow-up in the office in March and at that time O2 was recommended as 2 L with ambulation and no need for at rest due to improve oxygenation. He was to continue with trilogy. Complete PFTs were done and demonstrated severe restriction confirmed by lung volumes and there also was severe reduction in diffusion. Only he feels he is doing pretty well. He does have some dyspnea with exertion. He is wearing oxygen 2 L with exertion. No significant cough chest pain or wheezing. Past Medical History:   Diagnosis Date    A-fib [facility]) 07/19/2021    developed AFID after hospital admission for wound infection- started on Eliquis    Acute on chronic respiratory failure with hypoxia and hypercapnia ([address] 7/23/2021    Acute respiratory failure with hypercapnia ([address] 7/23/2021    Atrial fibrillation with RVR ([address] 7/19/2021    CAD (coronary artery disease)     [facility] Card.     Cellulitis 7/19/2021    Chronic kidney disease     stage 3- improved    COVID-19 vaccine series completed     Moderna Vaccine completed X2 doses    Current use of long term anticoagulation     Eliquis and Aspirin    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 mellitus, limited to breakdown of skin ([address] 9/27/2021    H/O heart artery stent     X1 placed in 1999    History of MI (myocardial infarction) 1999    stent placed X1    Hypercholesterolemia     Hypertension     Left ventricular dysfunction     echo revealed EF 30-35%, mildly dilated LA/RA and mild TR and MR.    Neuropathy     severe    Oxygen dependent     recently started on 2L NC continous- Sleep study to be scheduled-questionable SASHA    PICC (peripherally inserted central catheter) in place     PICC line in place to R arm    Staphylococcus aureus bacteremia 8/74/9033    Systolic CHF, acute on chronic ([facility] 7/19/2021    Type 2 diabetes mellitus ([facility]     oral agent/Pt does not monitor BS/no s.s of hypoglycemia/Last A1c: 6.7 on 7/13/21 per daughter       Patient Active Problem List   Diagnosis    Severe obesity (BMI 35.0-35.9 with comorbidity) ([facility]    Stage 3 chronic kidney disease ([facility]    Systolic congestive heart failure (HCC)    Onychomycosis    Chronic respiratory failure with hypoxia (HCC)    Atrial fibrillation (HCC)    Controlled type 2 diabetes mellitus with diabetic polyneuropathy, without long-term current use of insulin (HCC)    Type 2 diabetes mellitus with nephropathy (HCC)    Mixed axonal-demyelinating polyneuropathy    Corns and callus    Mixed hyperlipidemia    Obesity hypoventilation syndrome (HCC)    Benign hypertensive kidney disease with chronic kidney disease    CAD (coronary artery disease)    Bunion of unspecified foot    S/P BKA (below knee amputation) unilateral, right (HCC)    Acquired hammer toe of left foot    Obstructive sleep apnea    Dilated cardiomyopathy ([address]    Acute on chronic respiratory failure with hypercapnia ([facility]         Past Surgical History:   Procedure Laterality Date    AMPUTATION Right 2022    below knee    ORTHOPEDIC SURGERY  08/18/2021    BKA    RI CARDIAC SURG PROCEDURE UNLIST  1999    stent placement       Social History     Socioeconomic History    Marital status:      Spouse name: Not on file    Number of children: Not on file    Years of education: Not on file    Highest education level: Not on file Occupational History    Not on file   Tobacco Use    Smoking status: Never Smoker    Smokeless tobacco: Never Used   Vaping Use    Vaping Use: Never used   Substance and Sexual Activity    Alcohol use: No    Drug use: No    Sexual activity: Not Currently   Other Topics Concern    Not on file   Social History Narrative    Lives with his daughter     Social Determinants of Health     Financial Resource Strain:     Difficulty of Paying Living Expenses: Not on file   Food Insecurity:     Worried About Running Out of Food in the Last Year: Not on file    Sofie of Food in the Last Year: Not on file   Transportation Needs:     Lack of Transportation (Medical): Not on file    Lack of Transportation (Non-Medical):  Not on file   Physical Activity:     Days of Exercise per Week: Not on file    Minutes of Exercise per Session: Not on file   Stress:     Feeling of Stress : Not on file   Social Connections:     Frequency of Communication with Friends and Family: Not on file    Frequency of Social Gatherings with Friends and Family: Not on file    Attends Shinto Services: Not on file    Active Member of [address] [place] [address] or Organizations: Not on file    Attends Club or Organization Meetings: Not on file    Marital Status: Not on file   Intimate Partner Violence:     Fear of Current or Ex-Partner: Not on file    Emotionally Abused: Not on file    Physically Abused: Not on file    Sexually Abused: Not on file   Housing Stability:     Unable to Pay for Housing in the Last Year: Not on file    Number of [place] in the Last Year: Not on file    Unstable Housing in the Last Year: Not on file       Family History   Problem Relation Age of Onset    No Known Problems Paternal Grandmother     No Known Problems Paternal Grandfather     No Known Problems Maternal Grandfather     No Known Problems Maternal Grandmother     Cancer Father     Cancer Mother        No Known Allergies        Review of Systems    OBJECTIVE:  Physical Exam:  Vitals:    06/30/22 1510   BP: 118/85   Pulse: 95   Resp: 16   Temp: 97 °F (36.1 °C)   SpO2: 95%        GENERAL APPEARANCE:   The patient is normal weight and in no respiratory distress. HEENT:   PERRL. Conjunctivae unremarkable. Nasal mucosa is without epistaxis, exudate, or polyps. Gums and dentition are unremarkable. There is no oropharyngeal narrowing. TMs are clear. NECK/LYMPHATIC:   Symmetrical with no elevation of jugular venous pulsation. Trachea midline. No thyroid enlargement. No cervical adenopathy. LUNGS:   Normal respiratory effort with symmetrical lung expansion. Breath sounds crackles bilateral lung bases. HEART:   There is a regular rate and rhythm. No murmur, rub, or gallop. There is no edema in the lower extremities. ABDOMEN:   Soft and non-tender. No hepatosplenomegaly. Bowel sounds are normal.       NEURO:   The patient is alert and oriented to person, place, and time. Memory appears intact and mood is normal.  No gross sensorimotor deficits are present. DIAGNOSTIC TESTS:    CXR:    XR CHEST (2 VW) 03/09/2022    Narrative  PA AND LATERAL CHEST X-RAY. Clinical Indication: Chronic respiratory failure, hypoxia    Comparison: Chest x-ray dated 1/14/2022    Findings: 2 views of the chest submitted demonstrate the cardiac silhouette and  mediastinum to be unremarkable. There is no pleural effusion or pneumothorax. The lung parenchyma is clear. The included osseous structures are unremarkable. Impression  No acute cardiopulmonary abnormality. CT WITHOUT CONTRAST:    No results found for this or any previous visit from the past 365 days. CT WITH CONTRAST:    No results found for this or any previous visit from the past 365 days. CT HIGH RES:    No results found for this or any previous visit from the past 365 days.       CT PE PROTOCOL:    No results found for this or any previous visit from the past 365 days.        LDCT SCREENING:    No results found for this or any previous visit from the past 365 days. PET SCAN:    No results found for this or any previous visit from the past 365 days. Spirometry:    Severe restriction and abnormal diffusion    No flowsheet data found. Exercise oximetry:          ASSESSMENT:   Diagnosis Orders   1. Chronic respiratory failure with hypoxia (HCC)  CT CHEST WO CONTRAST   2. Restrictive lung disease     3. Abnormal diffusion capacity determined by pulmonary function test     4. ILD (interstitial lung disease) (HCC)     5. Obesity hypoventilation syndrome (HCC)         PLAN:  · Chest CT evaluate for ILD  · Continue O2, 2 L with exertion  · Continue Trelegy at bedtime  · Follow-up in 6 months    Orders Placed This Encounter   Procedures    CT CHEST WO CONTRAST     Standing Status:   Future     Standing Expiration Date:   7/30/2022       No orders of the defined types were placed in this encounter.         Electronically signed by  Rashmi Baumann MD

## 2022-07-08 ENCOUNTER — HOSPITAL ENCOUNTER (OUTPATIENT)
Dept: PHYSICAL THERAPY | Age: 77
Setting detail: RECURRING SERIES
Discharge: HOME | End: 2022-07-11
Payer: MEDICARE

## 2022-07-08 PROCEDURE — 97110 THERAPEUTIC EXERCISES: CPT

## 2022-07-08 ASSESSMENT — PAIN SCALES - GENERAL: PAINLEVEL_OUTOF10: 0

## 2022-07-08 NOTE — PROGRESS NOTES
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 Restrictions/Precautions:  Restrictions/Precautions: Cardiac; Fall Risk  No data recorded   Interventions Planned (Treatment may consist of any combination of the following):    Current Treatment Recommendations: Strengthening; ROM; Balance training; Functional mobility training; Transfer training; Endurance training; Gait training; Stair training; Neuromuscular re-education; Manual Therapy - Soft Tissue Mobilization; Manual Therapy - Joint Manipulation; Pain management; Return to work related activity; Home exercise program; Safety education & training; Patient/Caregiver education & training; Modalities;  Therapeutic activities     Subjective Comments:  Pt. reported having a catch in his left knee iniitially but better after nustep  Initial:}    0/10Post Session:       2/10  Medications Last Reviewed:  2022  Updated Objective Findings:  Baseline Vital Signs: 126/80mmHg; 90bpm; 95% SpO2  Today patients )@ SATS were 93% after exercises  Treatment   THERAPEUTIC EXERCISE: (55 minutes):    Exercises per grid below to improve mobility, strength, balance and coordination. Required mod visual, verbal, manual and tactile cues to promote proper body alignment, promote proper body posture, promote proper body mechanics and promote proper body breathing techniques. Progressed resistance, range, repetitions and complexity of movement as indicated. Date:  06/27/22 Date:  6/29/22 Date:  07/08/22   Activity/Exercise Parameters Parameters Parameters   NU step 8 minutes level 3  10 minutes level 3 X 10 mins  level 3   Ambulation  --- --- ---   Sit to stand 3 x 5 from low plinth with BUEs  4 x 5 with BUEs    3 x 5 from low plinth with BUEs    Hip abduction Black 3 x 10 bilateral  --- Black 3 x 10 unilateral standing at bar   Hip adduction --- --- ---   Long Arc Quad  3 x 10 5# 3 x 10 5#  3 x 10 5#    Hamstring Curl  3 x 10 5# black band 3 x 10 5# blue band  3 x 10 5# black band    Anti Rotation Press  2 x 15 black + blue bands seated  --- 3 x 10 blue band seated in chair   Row  --- 2 x 20 alternating blue and black unsupported sitting  2 x 20 black band and blue band doubled up  Together in sitting in chair   Hip Abduction Standing  3 x 10  1 x 10  3 x 10    Hip Flexion Standing  3 x 10  1 x 10  3 x 10    Toe Tapping   NEXT SESSION X 20 reps each LE    Ankle Plantarflexion   L only 3 x 10 black  LLE only 3x10 reps    Ankle Dorsiflexion   L only 3 x 10 blue  LLE only 3x10 reps blue             MANUAL THERAPY: (0 minutes):   Joint mobilization and Soft tissue mobilization was utilized and necessary because of the patient's restricted joint motion, painful spasm, loss of articular motion and restricted motion of soft tissue. MODALITIES: (0 minutes):        None today     Treatment/Session Summary:    · Treatment Assessment:  Pt. compliant with all exercises  · Communication/Consultation:  Spoke with patient in regards to HEP and setup .   · Equipment provided today:  None today · Recommendations/Intent for next treatment session: Next visit will focus on exercise program focused on improving cardiopulmonary and muscular endurance.      Total Treatment Billable Duration:  55 minutes   Time In: 1000  Time Out: [address] [place], [facility]       Charge Capture  }Post Session Pain  PT Visit Info  [facility] Portal  MD Guidelines  Scanned Media  Benefits  MyChart    Future Appointments   Date Time Provider Rachel Sanchez   7/11/2022 11:00 AM Alejandra Galvan, PTA [facility]   7/15/2022 10:00 AM Berdie Putty, PT [facility]   7/18/2022  1:30 PM Berdie Putty, PT [facility]   7/20/2022 10:00 AM Radha Thompson, PTA [facility]   7/25/2022 10:00 AM Berdie Putty, PT [facility]   7/27/2022 10:00 AM Berdie Putty, PT [facility]   8/1/2022 10:00 AM Berdie Putty, PT [facility]   8/3/2022 10:00 AM Berdie Putty, PT [facility]   8/8/2022 10:00 AM Berdie Putty, PT [facility]   8/11/2022 10:00 AM Berdie Putty, PT [facility]   9/12/2022 10:00 AM Daryl Hutchinson MD [facility]   1/5/2023 10:40 AM Rocío Snyder MD PPS [facility]

## 2022-07-11 ENCOUNTER — HOSPITAL ENCOUNTER (OUTPATIENT)
Dept: PHYSICAL THERAPY | Age: 77
Setting detail: RECURRING SERIES
Discharge: HOME | End: 2022-07-14
Payer: MEDICARE

## 2022-07-11 PROCEDURE — 97110 THERAPEUTIC EXERCISES: CPT

## 2022-07-11 ASSESSMENT — PAIN SCALES - GENERAL: PAINLEVEL_OUTOF10: 0

## 2022-07-11 NOTE — PROGRESS NOTES
improve mobility, strength, balance and coordination. Required mod visual, verbal, manual and tactile cues to promote proper body alignment, promote proper body posture, promote proper body mechanics and promote proper body breathing techniques. Progressed resistance, range, repetitions and complexity of movement as indicated. Date:  07/11//22 Date:  6/29/22 Date:  07/08/22   Activity/Exercise Parameters Parameters Parameters   NU step X 12  minutes level 3  10 minutes level 3 X 10 mins  level 3   Ambulation  --- --- ---   Sit to stand 3 x 5 from low plinth with BUEs  4 x 5 with BUEs    3 x 5 from low plinth with BUEs    Hip abduction Black 3 x 10 bilateral  --- Black 3 x 10 unilateral standing at bar   Hip adduction --- --- ---   Long Arc Quad  3 x 10 5# 3 x 10 5#  3 x 10 5#    Hamstring Curl  3 x 10 5# black band 3 x 10 5# blue band  3 x 10 5# black band    Anti Rotation Press  2 x 15 black + blue bands seated  --- 3 x 10 blue band seated in chair   Row  2x20 alternating blue and black band doubled sitting in chair  2 x 20 alternating blue and black unsupported sitting  2 x 20 black band and blue band doubled up  Together in sitting in chair   Hip Abduction Standing  3 x 10  1 x 10  3 x 10    Hip Flexion Standing  3 x 10  1 x 10  3 x 10    Toe Tapping  X 20 reps each LE NEXT SESSION X 20 reps each LE    Ankle Plantarflexion  L only 3x10 black  L only 3 x 10 black  LLE only 3x10 reps    Ankle Dorsiflexion  L only 3x10 reps blue L only 3 x 10 blue  LLE only 3x10 reps blue             MANUAL THERAPY: (0 minutes):   Joint mobilization and Soft tissue mobilization was utilized and necessary because of the patient's restricted joint motion, painful spasm, loss of articular motion and restricted motion of soft tissue.       MODALITIES: (0 minutes):        None today     Treatment/Session Summary:    · Treatment Assessment:  Pt. was compliant with all  exercises and reported no pain   · Communication/Consultation: Spoke with patient in regards to HEP and setup . · Equipment provided today:  None today   · Recommendations/Intent for next treatment session: Next visit will focus on exercise program focused on improving cardiopulmonary and muscular endurance.      Total Treatment Billable Duration:  55 minutes   Time In: 1100  Time Out: Gayla 1521 TAURUS, LUIS       Charge Capture  }Post Session Pain  PT Visit Info  [facility] Portal  MD Guidelines  Scanned Media  Benefits  MyChart    Future Appointments   Date Time Provider Rachel Sanchez   7/15/2022 10:00 AM Cordella Loving, PT [facility]   7/18/2022  1:30 PM Cordella Loving, PT [facility]   7/20/2022 10:00 AM Amtanialiangela Kurtz, PTA [facility]   7/25/2022 10:00 AM Cordella Loving, PT [facility]   7/27/2022 10:00 AM Cordella Loving, PT [facility]   8/1/2022 10:00 AM Cordella Loving, PT [facility]   8/3/2022 10:00 AM Cordella Loving, PT [facility]   8/8/2022 10:00 AM Cordella Loving, PT [facility]   8/11/2022 10:00 AM Cordella Loving, PT [facility]   9/12/2022 10:00 AM MD JESI GoffDG [facility]   1/5/2023 10:40 AM Fannie Montgomery MD PPS [facility]

## 2022-07-13 ENCOUNTER — APPOINTMENT (OUTPATIENT)
Dept: PHYSICAL THERAPY | Age: 77
End: 2022-07-13
Payer: MEDICARE

## 2022-07-15 ENCOUNTER — HOSPITAL ENCOUNTER (OUTPATIENT)
Dept: PHYSICAL THERAPY | Age: 77
Setting detail: RECURRING SERIES
Discharge: HOME | End: 2022-07-18
Payer: MEDICARE

## 2022-07-15 PROCEDURE — 97110 THERAPEUTIC EXERCISES: CPT

## 2022-07-15 ASSESSMENT — PAIN DESCRIPTION - ORIENTATION: ORIENTATION: LEFT

## 2022-07-15 ASSESSMENT — PAIN DESCRIPTION - DESCRIPTORS: DESCRIPTORS: SORE

## 2022-07-15 ASSESSMENT — PAIN SCALES - GENERAL: PAINLEVEL_OUTOF10: 3

## 2022-07-15 ASSESSMENT — PAIN DESCRIPTION - LOCATION: LOCATION: KNEE

## 2022-07-15 NOTE — PROGRESS NOTES
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 Restrictions/Precautions:  Restrictions/Precautions: Cardiac; Fall Risk  No data recorded   Interventions Planned (Treatment may consist of any combination of the following):    Current Treatment Recommendations: Strengthening; ROM; Balance training; Functional mobility training; Transfer training; Endurance training; Gait training; Stair training; Neuromuscular re-education; Manual Therapy - Soft Tissue Mobilization; Manual Therapy - Joint Manipulation; Pain management; Return to work related activity; Home exercise program; Safety education & training; Patient/Caregiver education & training; Modalities; Therapeutic activities     Subjective Comments:  Patient stating he is noticing improvements in his endurance and overall mobility.   Initial:}Left Knee 3/10Post Session:  Left  Knee 3/10  Medications Last Reviewed:  7/15/2022  Updated Objective Findings:   Baseline Vital Signs:  114/78mmHg; 79bpm; 95% SpO2; see progress note from today   Treatment   THERAPEUTIC EXERCISE: (53 minutes):    Exercises per grid below to improve mobility, strength, balance and coordination. Required mod visual, verbal, manual and tactile cues to promote proper body alignment, promote proper body posture, promote proper body mechanics and promote proper body breathing techniques. Progressed resistance, range, repetitions and complexity of movement as indicated. Date:  07/11//22 Date:  7/15/22 Date:  07/08/22   Activity/Exercise Parameters Parameters Parameters   NU step X 12  minutes level 3  10 minutes level 4 X 10 mins  level 3   Ambulation  --- --- ---   Sit to stand 3 x 5 from low plinth with BUEs  3 x 10 high plinth with BUEs; emphasis on full trunk extension and glute contraction at top    3 x 5 from low plinth with BUEs    Hip abduction Black 3 x 10 bilateral  Black 3 x 10 unilateral seated  Black 3 x 10 unilateral standing at bar   Hip adduction --- --- ---   Long Arc Quad  3 x 10 5# 3 x 10 4#  3 x 10 5#    Hamstring Curl  3 x 10 5# black band 3 x 10 blue band  3 x 10 5# black band    Anti Rotation Press  2 x 15 black + blue bands seated  --- 3 x 10 blue band seated in chair   Row  2x20 alternating blue and black band doubled sitting in chair  --- 2 x 20 black band and blue band doubled up  Together in sitting in chair   Hip Abduction Standing  3 x 10  --- 3 x 10    Hip Flexion Standing  3 x 10  --- 3 x 10    Toe Tapping  X 20 reps each LE --- X 20 reps each LE    Ankle Plantarflexion  L only 3x10 black  L only calf raises seated eccentric control 3 x 10  LLE only 3x10 reps    Ankle Dorsiflexion  L only 3x10 reps blue --- LLE only 3x10 reps blue             MANUAL THERAPY: (0 minutes):   Joint mobilization and Soft tissue mobilization was utilized and necessary because of the patient's restricted joint motion, painful spasm, loss of articular motion and restricted motion of soft tissue.       MODALITIES: (0 minutes):        None today      Treatment/Session Summary:    Treatment Assessment:  Requiring more rest periods today but overall improving. Communication/Consultation:  Spoke with patient in regards to HEP and setup . Equipment provided today:  None today   Recommendations/Intent for next treatment session: Next visit will focus on exercise program focused on improving cardiopulmonary and muscular endurance.      Total Treatment Billable Duration:  53 minutes   Time In: 1005  Time Out: [place], PT       Charge Capture  }Post Session Pain  PT Visit Info  [facility] Portal  MD Guidelines  Scanned Media  Benefits  MyChart    Future Appointments   Date Time Provider Rachel Sanchez   7/18/2022  1:30 PM Sajan Geller, PT [facility]   7/20/2022 10:00 AM Eliot Dougherty, PTA [facility]   7/25/2022 10:00 AM Sajan Geller, PT [facility]   7/25/2022  0:27 PM SFE CT 16 SLICE UNIT 1 SFERCT E   7/27/2022 10:00 AM Sajan Geller, PT [facility]   8/1/2022 10:00 AM Sajan Geller, PT [facility]   8/3/2022 10:00 AM Sajan Geller, PT [facility]   8/8/2022 10:00 AM Sajan Geller, PT [facility]   8/11/2022 10:00 AM Sajan Geller, PT [facility]   9/12/2022 10:00 AM Dl Wesley MD [facility]   1/5/2023 10:40 AM Phil Reed MD PPS [facility]

## 2022-07-18 ENCOUNTER — HOSPITAL ENCOUNTER (OUTPATIENT)
Dept: PHYSICAL THERAPY | Age: 77
Setting detail: RECURRING SERIES
Discharge: HOME | End: 2022-07-21
Payer: MEDICARE

## 2022-07-18 PROCEDURE — 97110 THERAPEUTIC EXERCISES: CPT

## 2022-07-18 NOTE — PROGRESS NOTES
Ann Johnson Pack  : 1945  Primary: [employer] [facility] Medicare Advantage Hmo  Secondary:  [address] @ [address] [place] [address]  Phone: [phone]  Fax: [phone] Plan Frequency: Twice per week for 8 eight weeks (Twice per week for eight weeks)    Plan of Care/Certification Expiration Date: 22    PT Visit Info:    No data recorded    OUTPATIENT PHYSICAL THERAPY:OP NOTE TYPE: Progress Report 2022               Episode  Appt Desk         Treatment Diagnosis:  Generalized Muscle Weakness (M62.81)  Difficulty in walking, Not elsewhere classified (R26.2)  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 Luda Henderson is a 77year old male presenting with an overall decline in balance, functional mobility, transfers, ability to ambulate, and overall function following R below knee amputation 2021 after spraining his ankle at work and then acquiring a wound from altered gait. His daughter is present with him today. He underwent an ankle reconstruction in the past but otherwise had no issues until spraining the ankle. 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 He completed six week of inpatient rehab and was making excellent progress. He states by the end of October he was discharged to home and had been practicing well with his prosthesis for about one week.  Home health has been treating him but he states he has been limited due to other medical issues and was hospitalized in January for fluid retention on his lungs. He states he went to [facility] for rehab following this and then was discharged home again. He states home health just ended a week or so ago and he had practiced with a straight cane a few times with his therapist. He reports he is still determined to make improvements and improve his mobility overall; and to return to part time work at [employer] if that is possible from a mobility standpoint. He and his daughter report that his cardiopulmonary endurance and balance are limiting factors for him as well and that he has fallen a few times in the last six months. He also reports having had a few falls while still working at [employer] due to tripping over objects. He does report a feeling of occasionally losing his balance and falling backward at random times. He also reports low back pain that is chronic but no other significant orthopedic complaints at this time. Patient presents with decreased balance, decreased proprioception, decreased functional strength, decreased cardiopulmonary endurance, decreased gait and transfer ability. Patient will benefit from skilled PT to build on gains and provide exercise progressions, a progressive resistance exercise program, balance exercises, gait and transfer training, manual therapy and modalities as needed to work towards therapeutic goals. PROGRESS NOTE 07/18/22: Mr. Xochitl Mabry has been for 9 sessions of physical therapy with success. He reports feeling better overall as far as his stamina and overall mobility. His TUG score has improved to 19 seconds from 21.5 seconds. His 30 second sit to stand has improved to 11 repetitions from 7. His LEFS has improved to 43/80 from 34/80. He continues to demonstrate gait deviations and evidence of imbalance.  He will benefit from additional skilled PT to provide exercise progressions geared towards functional strength, endurance, balance, and gait training to work towards remainder of therapeutic goals. Fall Risk Scale: Total Score: 65  Lam Fall Risk: High (45 and higher)           OBJECTIVE     Observation/Postural and Gait Assessment: Patient has tendency to stand and ambulate with significant external rotation and out toeing especially on the R.     Palpation: To be performed at future session as indicated. AROM: To be tested further at future visit as indicated. Strength:  Manual Muscle Test (out of 5) Left Right   Knee extension 5 5   Knee flexion 5 5   Hip flexion 5 4+   Ankle DF 5 ---   Ankle PF 4 ---     Passive Accessory Motion: Not performed due to nature of impairments; will be performed in the future as indicated. Balance Tests:  Modified CTSIB: 30/120 seconds conditions II-IV failure. 30 second sit to stand: plinth at 49cm with BUEs 7 repetitions. Four Stage Balance Test: Semi tandem stance bilaterally     07/18/22:  30 second sit to stand: 49cm from plinth 11 repetitions  Modified CTSIB: 30/120 seconds   Four stage: Semi Tandem     Neurological Screen:  Myotomes: Key muscle strength testing through bilateral LE is intact. Dermatomes: Sensation testing through bilateral lower quadrants for light touch is intact. Functional Mobility:  Patient ambulates with prosthesis and rolling front wheeled walker independently. Performs sit to and from stand transfers independently with effort. Demonstrates significant out toeing and externally rotated hip especially on the R side with forward flexed trunk. Patient able to ambulate one lap in the gym safely and independently; SpO2 dropped to 88% after one lap on portable oxygen tank, recovered to baseline within five minute rest period. 07/18/22: Patient continues to ambulate with gait deviations using rolling walker but improved cardiopulmonary stamina overall.  O2 sats remain 93% or above throughout session. ASSESSMENT   Problem List: (Impacting functional limitations): Body Structures, Functions, Activity Limitations Requiring Skilled Therapeutic Intervention: Decreased functional mobility ; Decreased ADL status; Decreased ROM; Decreased tolerance to work activity; Decreased strength; Decreased endurance; Decreased sensation; Decreased balance; Decreased coordination; Increased pain; Decreased posture     Therapy Prognosis:   Therapy Prognosis: Good        PLAN   Effective Dates: 06/13/2022 TO Plan of Care/Certification Expiration Date: 08/08/22   Frequency/Duration: Plan Frequency: Twice per week for 8 eight weeks (Twice per week for eight weeks)     Interventions Planned (Treatment may consist of any combination of the following):    Current Treatment Recommendations: Strengthening; ROM; Balance training; Functional mobility training; Transfer training; Endurance training; Gait training; Stair training; Neuromuscular re-education; Manual Therapy - Soft Tissue Mobilization; Manual Therapy - Joint Manipulation; Pain management; Return to work related activity; Home exercise program; Safety education & training; Patient/Caregiver education & training; Modalities; Therapeutic activities     Goals: (Goals have been discussed and agreed upon with patient.)  Short-Term Functional Goals: Time Frame: 06/13/2022 to 07/11/2022  Patient will ambulate for six minutes using front wheeled walker without requiring a seated rest period. (ONGOING)  Patient will maintain eyes closed static standing for 30 seconds on even surface safely with guarding. (ONGOING)  Patient will perform sit to stand transfer from standard chair height without BUEs for one repetition. (ONGOING)  Discharge Goals: Time Frame: 06/13/2022 to 08/08/2022   Patient will ambulate with straight cane safely and independently over even surfaces.  (ONGOING)  Patient will ambulate with rolling walker over uneven surfaces or when ambulating for prolonged distances. (ONGOING)  Patient will perform sit to stand and sit to and from supine transfers safely and independently without cues. (ONGOING)  Patient will improve TUG score to 14 seconds or less to indicate improved gait speed and decreased fall risk. (ONGOING)  Patient will improve modified CTSIB to 120/120 to indicate improved proprioception and vestibular function and a decreased fall risk. (ONGOING)  Patient will improve 30 second sit to stand score to 10 repetitions without BUEs. (ONGOING)  Patient will return to working at [employer] part time duty with modifications. (ONGOING)          Outcome Measure: Tool Used: Lower Extremity Functional Scale (LEFS)  Score:  Initial: 34/80 Most Recent: 43/80 (Date: 07/15/22)   Interpretation of Score: 20 questions each scored on a 5 point scale with 0 representing \"extreme difficulty or unable to perform\" and 4 representing \"no difficulty\". The lower the score, the greater the functional disability. 80/80 represents no disability. Minimal detectable change is 9 points. Tool Used: Timed Up and Go (TUG)  Score:  Initial: 20.4 seconds Most Recent: 19 seconds (Date: 07/18/22 )   Interpretation of Score: The test measures, in seconds, the time taken by an individual to stand up from a standard arm chair (seat height 46 cm [18 in], arm height 65 cm [25.6 in]), walk a distance of 3 meters (118 in, approx 10 ft), turn, walk back to the chair and sit down. If the individual takes longer than 14 seconds to complete TUG, this indicates risk for falls. Medical Necessity:   Patient is expected to demonstrate progress in strength, range of motion, balance, coordination and functional technique to increase independence with functional mobility and ADLs. Skilled intervention continues to be required due to need for exercise progressions tailored to patient needs, goals, and impairments .   Reason For Services/Other Comments:  Patient continues to require skilled intervention due to need for exercise progressions, manual therapy, plan of care modifications and exceptions tailored to patient presentation. Time In: 1333  Time Out: 90 [facility] therapy, I certify that the treatment plan above will be carried out by a therapist or under their direction. Thank you for this referral,  Livier Tapia, PT     Referring Physician Signature: Rosie Zapata PA-C No Signature is Required for this note.         Post Session Pain  Charge Capture  PT Visit Info  POC Link  Treatment Note Link  MD Guidelines  Aneeshhart

## 2022-07-18 NOTE — PROGRESS NOTES
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 Restrictions/Precautions:  Restrictions/Precautions: Cardiac; Fall Risk  No data recorded   Interventions Planned (Treatment may consist of any combination of the following):    Current Treatment Recommendations: Strengthening; ROM; Balance training; Functional mobility training; Transfer training; Endurance training; Gait training; Stair training; Neuromuscular re-education; Manual Therapy - Soft Tissue Mobilization; Manual Therapy - Joint Manipulation; Pain management; Return to work related activity; Home exercise program; Safety education & training; Patient/Caregiver education & training; Modalities; Therapeutic activities     Subjective Comments:  Patient stating he feels his L knee is catching occasionally but not really painful.   Initial:}    0/10Post Session:       0/10  Medications Last Reviewed:  2022  Updated Objective Findings:   Baseline Vital Signs:  114/78mmHg; 79bpm; 95% SpO2; See progress note from today   Treatment   THERAPEUTIC EXERCISE: (53 minutes): Assessment:  Patient tolerated session well; demonstrated RLE fatigue in single limb stance towards end of session. Cues with upright posture to increased hip extension on the R. Communication/Consultation:  Reviewed progress, exam findings, home exercise program with patient. Equipment provided today:  None today   Recommendations/Intent for next treatment session: Focus on adding more standing exercises; emphasis on upright posture to achieve hip extension.     Total Treatment Billable Duration:  53 minutes   Time In: 5772  Time Out: [address], PT       Charge Capture  }Post Session Pain  PT Visit [address] Portal  MD Guidelines  Scanned Media  Benefits  MyChart    Future Appointments   Date Time Provider Rachel Sanchez   7/20/2022 10:00 AM Shania Hernandez PTA [facility]   7/25/2022 10:00 AM Enedelia Cage, PT [facility]   7/25/2022  6:80 PM SFE CT 16 SLICE UNIT 1 SFERCT [facility]   7/27/2022 10:00 AM Enedelia Cage, PT [facility]   8/1/2022 10:00 AM Enedelia Cage, PT [facility]   8/3/2022 10:00 AM Enedelia Cage, PT [facility]   8/8/2022 10:00 AM Enedelia Cage, PT [facility]   8/11/2022 10:00 AM Enedelia Cage, PT [facility]   9/12/2022 10:00 AM Ashlee Amaya MD [facility]   1/5/2023 10:40 AM Verner Grey, MD [facility]PS [facility]

## 2022-07-19 ASSESSMENT — PAIN SCALES - GENERAL: PAINLEVEL_OUTOF10: 0

## 2022-07-20 ENCOUNTER — HOSPITAL ENCOUNTER (OUTPATIENT)
Dept: PHYSICAL THERAPY | Age: 77
Setting detail: RECURRING SERIES
Discharge: HOME | End: 2022-07-23
Payer: MEDICARE

## 2022-07-20 PROCEDURE — 97110 THERAPEUTIC EXERCISES: CPT

## 2022-07-20 ASSESSMENT — PAIN SCALES - GENERAL: PAINLEVEL_OUTOF10: 0

## 2022-07-20 NOTE — PROGRESS NOTES
Jayda Merchant  : 1945  Primary: [address]  Medic[employer] Advantage [employer]  Secondary:  [address] @ [address] [place] [address]  Phone: [phone]  Fax: [phone] Plan Frequency: Twice per week for 8 eight weeks (Twice per week for eight weeks)    Plan of Care/Certification Expiration Date: 22      PT Visit Info:   No data recorded    OUTPATIENT PHYSICAL THERAPY:OP NOTE TYPE: Treatment Note 2022       Episode  }Appt Desk              Treatment Diagnosis:  Generalized Muscle Weakness (M62.81)  Difficulty in walking, Not elsewhere classified (R26.2)  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 Restrictions/Precautions:  Restrictions/Precautions: Cardiac; Fall Risk  No data recorded   Interventions Planned (Treatment may consist of any combination of the following):    Current Treatment Recommendations: Strengthening; ROM; Balance training; Functional mobility training; Transfer training; Endurance training; Gait training; Stair training; Neuromuscular re-education; Manual Therapy - Soft Tissue Mobilization; Manual Therapy - Joint Manipulation; Pain management; Return to work related activity; Home exercise program; Safety education & training; Patient/Caregiver education & training; Modalities;  Therapeutic activities     Subjective Comments:  Patient reports feeling a little sore from previous session  Initial:}    0/10Post Session:       0/10  Medications Last Reviewed:  2022  Updated Objective Findings:   Baseline Vital Signs:  122/90mmHg; 85bpm; 98% SpO2  Treatment   THERAPEUTIC EXERCISE: (54 minutes):    Exercises per grid below to improve mobility, strength, balance and coordination. Required mod visual, verbal, manual and tactile cues to promote proper body alignment, promote proper body posture, promote proper body mechanics and promote proper body breathing techniques. Progressed resistance, range, repetitions and complexity of movement as indicated. Date:  7/20/22 Date:  7/15/22 Date:  07/18/22   Activity/Exercise Parameters Parameters Parameters   NU step X 10  minutes level 4 10 minutes level 4 X 6 mins  level 4   Ambulation  --- --- ---   Sit to stand 3x12 from medium plinth  3 x 10 high plinth with BUEs; emphasis on full trunk extension and glute contraction at top    3 x 10 from plinth medium height BUEs emphasis on speed today. Hip abduction Black 3 x 10 bilateral  Black 3 x 10 unilateral seated  Black 3 x 10 unilateral standing at bar   Hip adduction --- --- ---   Long Arc Quad  3 x 10 5#  3 x 10 4#  3 x 10 5#    Hamstring Curl  3 x 10 5# black band 3 x 10 blue band  3 x 10 5# black band    Anti Rotation Press  2 x 10 each side blue and black band seated  --- 2 x 10 each side blue and black band seated    Row  3x15 with black band --- ---   Hip Abduction Standing  3 x 10  --- 2 x 10    Hip Flexion Standing  2x10 --- 2 x 10    Toe Tapping   --- ---   Ankle Plantarflexion  LLE only 3 x 10 reps black band  L only calf raises seated eccentric control 3 x 10  LLE only 3 x 10 reps black band    Ankle Dorsiflexion   --- ---   Gait  X 2 trials transfer from standard chair, gait then turning in hallway. X 3 trials transfer from standard chair, gait then turning in hallway. MANUAL THERAPY: (0 minutes):   Joint mobilization and Soft tissue mobilization was utilized and necessary because of the patient's restricted joint motion, painful spasm, loss of articular motion and restricted motion of soft tissue.       MODALITIES: (0 minutes):        None today      Treatment/Session Summary:    Treatment Assessment:  Patient tolerated session well with ability to increase volume on sit to stand. Added standing balance in between sets of hip abduction with emphasis on active quad set. Communication/Consultation:  Reviewed progress, exam findings, home exercise program with patient. Equipment provided today:  None today   Recommendations/Intent for next treatment session: Focus on adding more standing exercises; emphasis on upright posture to achieve hip extension.     Total Treatment Billable Duration:  54 minutes   Time In: 1000  Time Out: [facility], PT       Charge Capture  }Post Session Pain  PT Visit Info  [facility]  MD Guidelines  Scanned Media  Benefits  MyChart    Future Appointments   Date Time Provider Rachel Sanchez   7/25/2022 10:00 AM Esha Rutledge, PT [facility]   7/25/2022  1:95 PM SFE CT 16 SLICE UNIT 1 SFERCT [facility]   7/27/2022 10:00 AM Esha Rutledge, PT [facility]   8/1/2022 10:00 AM Esha Rutledge, PT [facility]   8/3/2022 10:00 AM Esha Rutledge, PT [facility]   8/8/2022 10:00 AM Esha Rutledge, PT [facility]   8/11/2022 10:00 AM Esha Rutledge, PT [facility]   9/12/2022 10:00 AM Oliverio Rivera MD [facility]   1/5/2023 10:40 AM Jada Jimenez MD [facility] AM[facility]

## 2022-07-25 ENCOUNTER — HOSPITAL ENCOUNTER (OUTPATIENT)
Dept: PHYSICAL THERAPY | Age: 77
Setting detail: RECURRING SERIES
Discharge: HOME | End: 2022-07-28
Payer: MEDICARE

## 2022-07-25 ENCOUNTER — HOSPITAL ENCOUNTER (OUTPATIENT)
Dept: CT IMAGING | Age: 77
Discharge: HOME | End: 2022-07-28
Payer: MEDICARE

## 2022-07-25 DIAGNOSIS — J96.11 CHRONIC RESPIRATORY FAILURE WITH HYPOXIA (HCC): ICD-10-CM

## 2022-07-25 PROCEDURE — 97110 THERAPEUTIC EXERCISES: CPT

## 2022-07-25 PROCEDURE — 71250 CT THORAX DX C-: CPT

## 2022-07-25 ASSESSMENT — PAIN SCALES - GENERAL: PAINLEVEL_OUTOF10: 0

## 2022-07-25 NOTE — PROGRESS NOTES
Ruben Redman Pack  : 1945  Primary: [employer] Advantage Hmo  Secondary:  [address] @ [address] [place] [address]  Phone: [phone]  Fax: [phone] Plan Frequency: Twice per week for 8 eight weeks (Twice per week for eight weeks)    Plan of Care/Certification Expiration Date: 22      PT Visit Info:   No data recorded    OUTPATIENT PHYSICAL THERAPY:OP NOTE TYPE: Treatment Note 2022       Episode  }Appt Desk              Treatment Diagnosis:  Generalized Muscle Weakness (M62.81)  Difficulty in walking, Not elsewhere classified (R26.2)  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 Restrictions/Precautions:  Restrictions/Precautions: Cardiac; Fall Risk  No data recorded   Interventions Planned (Treatment may consist of any combination of the following):    Current Treatment Recommendations: Strengthening; ROM; Balance training; Functional mobility training; Transfer training; Endurance training; Gait training; Stair training; Neuromuscular re-education; Manual Therapy - Soft Tissue Mobilization; Manual Therapy - Joint Manipulation; Pain management; Return to work related activity; Home exercise program; Safety education & training; Patient/Caregiver education & training; Modalities; Therapeutic activities     Subjective Comments:  Patient reports consistent improvements; stating he feels his knee is catching occasionally but no pain.   Initial:}    0/10Post Session:       0/10  Medications Last Reviewed:  2022  Updated Objective Findings:   Baseline Vital Signs:  123/79mmHg; 79bpm; 97% SpO2  Treatment   THERAPEUTIC EXERCISE: (53 minutes):    Exercises per grid below to improve mobility, strength, balance and coordination. Required mod visual, verbal, manual and tactile cues to promote proper body alignment, promote proper body posture, promote proper body mechanics and promote proper body breathing techniques. Progressed resistance, range, repetitions and complexity of movement as indicated. Date:  7/20/22 Date:  7/25/22 Date:  07/18/22   Activity/Exercise Parameters Parameters Parameters   NU step X 10  minutes level 4 10 minutes level 5 X 6 mins  level 4   Sit to stand 3x12 from medium plinth  X 15 between exercises    3 x 10 from plinth medium height BUEs emphasis on speed today. Hip abduction Black 3 x 10 bilateral  Black 3 x 10 unilateral seated  Black 3 x 10 unilateral standing at bar   Hip adduction --- --- ---   Long Arc Quad  3 x 10 5#  --- 3 x 10 5#    Hamstring Curl  3 x 10 5# black band --- 3 x 10 5# black band    Anti Rotation Press  2 x 10 each side blue and black band seated  --- 2 x 10 each side blue and black band seated    Row  3x15 with black band --- ---   Hip Abduction Standing  3 x 10  3 x 10  2 x 10    Hip Flexion Standing  2x10 3 x 10 tapping 6 inch step at bar  2 x 10    Toe Tapping  --- --- ---   Ankle Plantarflexion  LLE only 3 x 10 reps black band  --- LLE only 3 x 10 reps black band    Ankle Dorsiflexion  --- --- ---   Gait  X 2 trials transfer from standard chair, gait then turning in hallway. 1 minute x 2 reps at bar; lateral walking X 3 trials transfer from standard chair, gait then turning in hallway.     Weight Shifts   2 x 10 each side each direction laterally and anterior to posterior with unilateral UE support to practice for gait and strengthening     Unsupported Standing (perform in front of plinth with gait belt; be prepared to help patient recover from LOB)   4 x 30 sec narrow base of support eyes open    4 x 30 sec each leg as lead semi tandem (staggered stance)         MANUAL THERAPY: (0 minutes):   Joint mobilization and Soft tissue mobilization was utilized and necessary because of the patient's restricted joint motion, painful spasm, loss of articular motion and restricted motion of soft tissue. MODALITIES: (0 minutes):        None today      Treatment/Session Summary:    Treatment Assessment:  Patient continues to improve in stamina and strength, Patient challenged by weight shifts and unsupported standing; demosntrated LOB x 2 recovered with therapist assist by sitting onto plinth. Communication/Consultation: Discussed rationale for exercise progressions, weight shifts, single limb stance and upright posture to improve hip extension and connection to patient goals. Equipment provided today:  None today   Recommendations/Intent for next treatment session: Focus on adding more standing exercises; emphasis on upright posture to achieve hip extension.     Total Treatment Billable Duration:  53 minutes   Time In: 1004  Time Out: Darwin Hernandez PT       Charge Capture  }Post Session Pain  PT Visit [facility] Portal  MD Guidelines  Scanned Media  Benefits  MyChart    Future Appointments   Date Time Provider Rachel Sanchez   7/27/2022 10:00 AM Dustin Cat, PT [facility]   8/1/2022 10:00 AM Dustin Cat, PT [facility]   8/3/2022 10:00 AM Berchentee Stephania, PT [facility]   8/8/2022 10:00 AM Beremmett Cat, PT [facility]   8/11/2022 10:00 AM Dustin Cat, PT [facility]   9/12/2022 10:00 AM Jr Nj MD [facility]   1/5/2023 10:40 AM Rocío Snyder MD [facility]

## 2022-07-27 ENCOUNTER — HOSPITAL ENCOUNTER (OUTPATIENT)
Dept: PHYSICAL THERAPY | Age: 77
Setting detail: RECURRING SERIES
Discharge: HOME | End: 2022-07-30
Payer: MEDICARE

## 2022-07-27 PROCEDURE — 97110 THERAPEUTIC EXERCISES: CPT

## 2022-07-27 ASSESSMENT — PAIN SCALES - GENERAL: PAINLEVEL_OUTOF10: 0

## 2022-07-27 NOTE — PROGRESS NOTES
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 Restrictions/Precautions:  Restrictions/Precautions: Cardiac; Fall Risk  No data recorded   Interventions Planned (Treatment may consist of any combination of the following):    Current Treatment Recommendations: Strengthening; ROM; Balance training; Functional mobility training; Transfer training; Endurance training; Gait training; Stair training; Neuromuscular re-education; Manual Therapy - Soft Tissue Mobilization; Manual Therapy - Joint Manipulation; Pain management; Return to work related activity; Home exercise program; Safety education & training; Patient/Caregiver education & training; Modalities; Therapeutic activities     Subjective Comments:  Patient reports no pain today; states he was sore after last session.   Initial:}    0/10Post Session:       0/10  Medications Last Reviewed:  2022  Updated Objective Findings:   Baseline Vital Signs:  130/80mmHg; 79bpm; 98% SpO2  Treatment   THERAPEUTIC EXERCISE: (53 minutes): Exercises per grid below to improve mobility, strength, balance and coordination. Required mod visual, verbal, manual and tactile cues to promote proper body alignment, promote proper body posture, promote proper body mechanics and promote proper body breathing techniques. Progressed resistance, range, repetitions and complexity of movement as indicated. Date:  7/20/22 Date:  7/25/22 Date:  07/27/22   Activity/Exercise Parameters Parameters Parameters   Nu Step X 10  minutes level 4 10 minutes level 5 10 minutes level 5    Sit to stand 3x12 from medium plinth  X 15 between exercises    X 15 between exercises    Hip abduction Black 3 x 10 bilateral  Black 3 x 10 unilateral seated  ---   Hip adduction --- --- ---   Long Arc Quad  3 x 10 5#  --- ---   Hamstring Curl  3 x 10 5# black band --- ---   Anti Rotation Press  2 x 10 each side blue and black band seated  --- ---   Row  3x15 with black band --- ---   Hip Abduction Standing  3 x 10  3 x 10  3 x 10    Hip Flexion Standing  2x10 3 x 10 tapping 6 inch step at bar  3 x 10 tapping 6 inch step at bar    Toe Tapping  --- --- ---   Ankle Plantarflexion  LLE only 3 x 10 reps black band  --- Seated calf raise LLE only 10# ankle weight on thigh 3 x 10    Ankle Dorsiflexion  --- --- ---   Gait  X 2 trials transfer from standard chair, gait then turning in hallway.  1 minute x 2 reps at bar; lateral walking 1 minute x 2 reps at bar; lateral walking   Weight Shifts   2 x 10 each side each direction laterally and anterior to posterior with unilateral UE support to practice for gait and strengthening  2 x 10 each side each direction laterally and anterior to posterior with bilateral UE support to practice for gait and strengthening    Unsupported Standing (perform in front of plinth with gait belt; be prepared to help patient recover from LOB)   4 x 30 sec narrow base of support eyes open    4 x 30 sec each leg as lead semi tandem (staggered stance)  1 x 30 sec narrow base of support eyes open    8 x 30 sec each leg as lead semi tandem (staggered stance)        MANUAL THERAPY: (0 minutes):   Joint mobilization and Soft tissue mobilization was utilized and necessary because of the patient's restricted joint motion, painful spasm, loss of articular motion and restricted motion of soft tissue. MODALITIES: (0 minutes):        None today      Treatment/Session Summary:    Treatment Assessment:  Patient demonstrating improved upright posture and tolerance to single limb stance this session.   Communication/Consultation: Discussed progress   Equipment provided today:  None today   Recommendations/Intent for next treatment session: Balance and standing exercise progressions     Total Treatment Billable Duration:  53 minutes   Time In: 1002  Time Out: Darwin Hernandez PT       Charge Capture  }Post Session Pain  PT Visit Info  [facility] Portal  MD Guidelines  Scanned Media  Benefits  MyChart    Future Appointments   Date Time Provider Rachel Sanchez   8/1/2022 10:00 AM Vinay Aquino, PT [facility]   8/3/2022 10:00 AM Vinay Aquino, PT [facility]   8/8/2022 10:00 AM Vinay Aquino PT [facility]   8/11/2022 10:00 AM Vinay Aquino, PT [facility]   9/12/2022 10:00 AM Andie Sagastume MD [facility]   1/5/2023 10:40 AM Yumiko Guzman MD [facility] AMB

## 2022-08-01 ENCOUNTER — HOSPITAL ENCOUNTER (OUTPATIENT)
Dept: PHYSICAL THERAPY | Age: 77
Setting detail: RECURRING SERIES
Discharge: HOME | End: 2022-08-04
Payer: MEDICARE

## 2022-08-01 PROCEDURE — 97110 THERAPEUTIC EXERCISES: CPT

## 2022-08-01 ASSESSMENT — PAIN SCALES - GENERAL: PAINLEVEL_OUTOF10: 0

## 2022-08-01 NOTE — PROGRESS NOTES
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 Restrictions/Precautions:  Restrictions/Precautions: Cardiac; Fall Risk  No data recorded   Interventions Planned (Treatment may consist of any combination of the following):    Current Treatment Recommendations: Strengthening; ROM; Balance training; Functional mobility training; Transfer training; Endurance training; Gait training; Stair training; Neuromuscular re-education; Manual Therapy - Soft Tissue Mobilization; Manual Therapy - Joint Manipulation; Pain management; Return to work related activity; Home exercise program; Safety education & training; Patient/Caregiver education & training; Modalities; Therapeutic activities     Subjective Comments:  Patient reporting knee has not been bothering him lately.   Initial:}    0/10Post Session:       0/10  Medications Last Reviewed:  2022  Updated Objective Findings:   Baseline Vital Signs:  141/83mmHg; 92bpm; 95% SpO2  Treatment   THERAPEUTIC EXERCISE: (55 minutes):    Exercises per grid below to improve mobility, strength, balance and coordination. Required mod visual, verbal, manual and tactile cues to promote proper body alignment, promote proper body posture, promote proper body mechanics and promote proper body breathing techniques. Progressed resistance, range, repetitions and complexity of movement as indicated.      Date:  8/01/22 Date:  7/25/22 Date:  07/27/22   Activity/Exercise Parameters Parameters Parameters   Nu Step 10 minutes level 5.5 10 minutes level 5 10 minutes level 5    Sit to stand X 10 from plinth between exercises  X 15 between exercises    X 15 between exercises    Hip abduction Seated 3 x 10 each side black band  Black 3 x 10 unilateral seated  ---   Hip Flexion Seated 2 x 10 seated black band  --- ---   Bradley Bamberger  --- --- ---   Hamstring Curl  --- --- ---   Anti Rotation Press  --- --- ---   Row  --- --- ---   Hip Abduction Standing  3 x 10  3 x 10  3 x 10    Hip Flexion Standing  3 x 10 tapping 6 inch step at bar  3 x 10 tapping 6 inch step at bar  3 x 10 tapping 6 inch step at bar    Ankle Plantarflexion  Seated calf raise LLE only 10# ankle weight on thigh 3 x 10  --- Seated calf raise LLE only 10# ankle weight on thigh 3 x 10    Ankle Dorsiflexion  --- --- ---   Gait  X 2 minutes lateral at bar no rest period today  1 minute x 2 reps at bar; lateral walking 1 minute x 2 reps at bar; lateral walking   Weight Shifts  2 x 10 each side each direction laterally and anterior to posterior with bilateral UE support to practice for gait and strengthening  2 x 10 each side each direction laterally and anterior to posterior with unilateral UE support to practice for gait and strengthening  2 x 10 each side each direction laterally and anterior to posterior with bilateral UE support to practice for gait and strengthening    Unsupported Standing (perform in front of plinth with gait belt; be prepared to help patient recover from LOB)  4 x 30 sec narrow CYNTHIA eyes open     4 x 30 sec semi tandem each leg leading 4 x 30 sec narrow base of support eyes open    4 x 30 sec each leg as lead semi tandem (staggered stance)  1 x 30 sec narrow base of support eyes open    8 x 30 sec each leg as lead semi tandem (staggered stance)        MANUAL THERAPY: (0 minutes):   Joint mobilization and Soft tissue mobilization was utilized and necessary because of the patient's restricted joint motion, painful spasm, loss of articular motion and restricted motion of soft tissue. MODALITIES: (0 minutes):        None today      Treatment/Session Summary:    Treatment Assessment:  Patient challenged by workout; no complaints. Communication/Consultation: None today   Equipment provided today:  None today   Recommendations/Intent for next treatment session: Balance and standing exercise progressions focused on single limb stance and functional strength.      Total Treatment Billable Duration:  55 minutes   Time In: 6079  Time Out: [employer], PT       Charge Capture  }Post Session Pain  PT Visit Info  [address] [facility] Portal  MD Guidelines  Scanned Media  Benefits  MyChart    Future Appointments   Date Time Provider Rachel Sanchez   8/3/2022 10:00 AM Kurt Tolbert, PT [facility]   8/8/2022 10:00 AM Kurt Tolbert, PT [facility]   8/11/2022 10:00 [facility]   9/12/2022 10:00 AM Carla Doyle MD [facility]   1/5/2023 10:40 AM Radha Rice MD [facility] AM[facility]

## 2022-08-03 ENCOUNTER — HOSPITAL ENCOUNTER (OUTPATIENT)
Dept: PHYSICAL THERAPY | Age: 77
Setting detail: RECURRING SERIES
Discharge: HOME | End: 2022-08-06
Payer: MEDICARE

## 2022-08-03 PROCEDURE — 97110 THERAPEUTIC EXERCISES: CPT

## 2022-08-03 NOTE — PROGRESS NOTES
Mónica Hernandez Pack  : 1945  Primary: [facility] Medicare Advantage [employer]  Secondary:  [address] @ [address] [place] [id number]  Phone: [phone]  Fax: [phone] Plan Frequency: Twice per week for 8 eight weeks (Twice per week for eight weeks)    Plan of Care/Certification Expiration Date: 22      PT Visit Info:   No data recorded    OUTPATIENT PHYSICAL THERAPY:OP NOTE TYPE: Treatment Note 8/3/2022       Episode  }Appt Desk              Treatment Diagnosis:  Generalized Muscle Weakness (M62.81)  Difficulty in walking, Not elsewhere classified (R26.2)  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 Restrictions/Precautions:  Restrictions/Precautions: Cardiac; Fall Risk  No data recorded   Interventions Planned (Treatment may consist of any combination of the following):    Current Treatment Recommendations: Strengthening; ROM; Balance training; Functional mobility training; Transfer training; Endurance training; Gait training; Stair training; Neuromuscular re-education; Manual Therapy - Soft Tissue Mobilization; Manual Therapy - Joint Manipulation; Pain management; Return to work related activity; Home exercise program; Safety education & training; Patient/Caregiver education & training; Modalities;  Therapeutic activities     Subjective Comments:  Patient reporting mild soreness after last workout but no pain  Initial:}     0/10Post Session:        0/10  Medications Last Reviewed:  8/3/2022  Updated Objective Findings:   Baseline Vital Signs:  139/80mmHg; 99bpm; 97% SpO2  Treatment   THERAPEUTIC EXERCISE: (60 minutes):    Exercises per grid below to improve mobility, strength, balance and coordination. Required mod visual, verbal, manual and tactile cues to promote proper body alignment, promote proper body posture, promote proper body mechanics and promote proper body breathing techniques. Progressed resistance, range, repetitions and complexity of movement as indicated. Date:  8/01/22 Date:  8/03/22 Date:  07/27/22   Activity/Exercise Parameters Parameters Parameters   Nu Step 10 minutes level 5.5 10 minutes level 5-6  10 minutes level 5    Sit to stand X 10 from plinth between exercises  X 15 between exercises    X 15 between exercises    Hip abduction Seated 3 x 10 each side black band  --- ---   Hip Flexion Seated 2 x 10 seated black band  --- ---   [employer]  --- 2 x 15 5#  ---   Hamstring Curl  --- --- ---   Anti Rotation Press  --- --- ---   Row  --- --- ---   Hip Abduction Standing  3 x 10  3 x 10  3 x 10    Hip Flexion Standing  3 x 10 tapping 6 inch step at bar  3 x 10 tapping 8 inch step at bar last set alternating 3 x 10 tapping 6 inch step at bar    Ankle Plantarflexion  Seated calf raise LLE only 10# ankle weight on thigh 3 x 10  3 x 10 LLE seated  Seated calf raise LLE only 10# ankle weight on thigh 3 x 10    Ankle Dorsiflexion  --- --- ---   Gait  X 2 minutes lateral at bar no rest period today  --- 1 minute x 2 reps at bar; lateral walking   Weight Shifts  2 x 10 each side each direction laterally and anterior to posterior with bilateral UE support to practice for gait and strengthening  2 x 10 each side each direction laterally and anterior to posterior with unilateral UE support to practice for gait and strengthening. Second set stopped at 8 reps going anteriorly due to fatigue.  2 x 10 each side each direction laterally and anterior to posterior with bilateral UE support to practice for gait and strengthening    Unsupported Standing (perform in front of plinth with gait belt; be prepared to help patient recover from LOB)  4 x 30 sec narrow CYNTHIA eyes open     4 x 30 sec semi tandem each leg leading 5 x 10 sec wide base of support eyes closed    5 x 30 sec each leg as lead semi tandem (staggered stance) eyes open  1 x 30 sec narrow base of support eyes open    8 x 30 sec each leg as lead semi tandem (staggered stance)        MANUAL THERAPY: (0 minutes):   Joint mobilization and Soft tissue mobilization was utilized and necessary because of the patient's restricted joint motion, painful spasm, loss of articular motion and restricted motion of soft tissue. MODALITIES: (0 minutes):        None today      Treatment/Session Summary:    Treatment Assessment:  Patient continues to improve in posture and overall exercise tolerance; especially with improved upright posture in SLS. Communication/Consultation: Patient and caregiver education/discussion on HEP, progress, therapy goals, safety. Equipment provided today:  None today   Recommendations/Intent for next treatment session: Balance and standing exercise progressions focused on single limb stance and functional strength.      Total Treatment Billable Duration: 60 minutes   Time In: 1000  Time Out: Sharla 49, PT       Charge Capture  }Post Session Pain  PT Visit Info  [facility] Portal  MD Guidelines  Scanned Media  Benefits  MyChart    Future Appointments   Date Time Provider Rachel Sanchez   8/8/2022 10:00 AM Freedom Esparza, PT [facility]   8/11/2022 10:00 AM Freedom Esparza, PT [facility]   9/12/2022 10:00 AM Sharyle Hazel, MD [facility]   1/5/2023 10:40 AM Terence Castillo MD [facility]

## 2022-08-08 ENCOUNTER — HOSPITAL ENCOUNTER (OUTPATIENT)
Dept: PHYSICAL THERAPY | Age: 77
Setting detail: RECURRING SERIES
Discharge: HOME | End: 2022-08-11
Payer: MEDICARE

## 2022-08-08 DIAGNOSIS — J98.4 RESTRICTIVE LUNG DISEASE: Primary | ICD-10-CM

## 2022-08-08 PROCEDURE — 97110 THERAPEUTIC EXERCISES: CPT

## 2022-08-08 NOTE — TELEPHONE ENCOUNTER
Daughter Ruston called  says that they are agreeable to the medications   Albuteral and musinex .  She said that could be called into pharmacy    [facility] INPATIENT [address] in [address]

## 2022-08-08 NOTE — PROGRESS NOTES
grid below to improve mobility, strength, balance and coordination. Required mod visual, verbal, manual and tactile cues to promote proper body alignment, promote proper body posture, promote proper body mechanics and promote proper body breathing techniques. Progressed resistance, range, repetitions and complexity of movement as indicated. Date:  8/01/22 Date:  8/03/22 Date:  8/08/22   Activity/Exercise Parameters Parameters Parameters   Nu Step 10 minutes level 5.5 10 minutes level 5-6  10 minutes level 6   Sit to stand X 10 from plinth between exercises  X 15 between exercises    X 15 between exercises    Hip abduction Seated 3 x 10 each side black band  --- ---   Hip Flexion Seated 2 x 10 seated black band  --- ---   Long Arc Quad  --- 2 x 15 5#  3 x 10 5#    Hamstring Curl  --- --- ---   Anti Rotation Press  --- --- ---   Row  --- --- ---   Hip Abduction Standing  3 x 10  3 x 10  3 x 10    Hip Flexion Standing  3 x 10 tapping 6 inch step at bar  3 x 10 tapping 8 inch step at bar last set alternating 3 x 20 tapping 8 inch step at bar first two sets then 6 inch alternating    Ankle Plantarflexion  Seated calf raise LLE only 10# ankle weight on thigh 3 x 10  3 x 10 LLE seated  Seated calf raise LLE only 10# ankle weight on thigh x 30    Gait  X 2 minutes lateral at bar no rest period today  --- 1 minute x 2 reps at bar; lateral walking    Weight Shifts  2 x 10 each side each direction laterally and anterior to posterior with bilateral UE support to practice for gait and strengthening  2 x 10 each side each direction laterally and anterior to posterior with unilateral UE support to practice for gait and strengthening. Second set stopped at 8 reps going anteriorly due to fatigue. 2 x 10 each side each direction laterally and anterior to posterior with bilateral UE support to practice for gait and strength.     Unsupported Standing (perform in front of plinth with gait belt; be prepared to help patient recover from LOB)  4 x 30 sec narrow CYNTHIA eyes open     4 x 30 sec semi tandem each leg leading 5 x 10 sec wide base of support eyes closed    5 x 30 sec each leg as lead semi tandem (staggered stance) eyes open  1 x 30 sec narrow base of support eyes open    2 x 30 sec each leg as lead semi tandem (staggered stance)     4 x 10 sec eyes closed wide CYNTHIA       MANUAL THERAPY: (0 minutes):   Joint mobilization and Soft tissue mobilization was utilized and necessary because of the patient's restricted joint motion, painful spasm, loss of articular motion and restricted motion of soft tissue. MODALITIES: (0 minutes):        None today      Treatment/Session Summary:    Treatment Assessment:  Continues to improve in stamina, posture, and functional strength. Better able to maintain balance with eyes closed today. Communication/Consultation: None today   Equipment provided today:  None today   Recommendations/Intent for next treatment session: Balance and standing exercise progressions focused on single limb stance and functional strength.      Total Treatment Billable Duration: 53 minutes   Time In: 7662  Time Out: 8[facility], PT       Charge Capture  }Post Session Pain  PT Visit [facility] Portal  MD Guidelines  Scanned Media  Benefits  MyChart    Future Appointments   Date Time Provider Rachel Sanchez   8/11/2022 10:00 AM Sumit Ferraro, PAULA [facility]   9/12/2022 10:00 AM Edel Mora MD [facility]   1/5/2023 10:40 AM Elvis Velez MD [facility]

## 2022-08-11 ENCOUNTER — HOSPITAL ENCOUNTER (OUTPATIENT)
Dept: PHYSICAL THERAPY | Age: 77
Setting detail: RECURRING SERIES
Discharge: HOME | End: 2022-08-14
Payer: MEDICARE

## 2022-08-11 PROCEDURE — 97110 THERAPEUTIC EXERCISES: CPT

## 2022-08-11 RX ORDER — ALBUTEROL SULFATE 90 UG/1
2 AEROSOL, METERED RESPIRATORY (INHALATION) EVERY 6 HOURS PRN
Qty: 1 EACH | Refills: 5 | Status: SHIPPED | OUTPATIENT
Start: 2022-08-11

## 2022-08-11 RX ORDER — GUAIFENESIN 600 MG/1
1200 TABLET, EXTENDED RELEASE ORAL 2 TIMES DAILY
Qty: 60 TABLET | Refills: 1 | Status: SHIPPED | OUTPATIENT
Start: 2022-08-11 | End: 2022-09-10

## 2022-08-11 NOTE — THERAPY RECERTIFICATION
Mari Nova Mayur  : 1945  Primary: [employer] Medicare Advantage Hmo  Secondary:  [address] @ [address] [facility] [place] [address]  Phone: [phone]  Fax: [phone] Plan Frequency: Twice per week for 8 eight weeks (Twice per week for eight weeks)    Plan of Care/Certification Expiration Date: 10/06/22      PT Visit Info:    No data recorded    OUTPATIENT PHYSICAL THERAPY:OP NOTE TYPE: Recertification                Episode  Appt Desk         Treatment Diagnosis:  Generalized Muscle Weakness (M62.81)  Difficulty in walking, Not elsewhere classified (R26.2)  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 Kendall Randhawa is a 77year old male presenting with an overall decline in balance, functional mobility, transfers, ability to ambulate, and overall function following R below knee amputation 2021 after spraining his ankle at work and then acquiring a wound from altered gait. His daughter is present with him today. He underwent an ankle reconstruction in the past but otherwise had no issues until spraining the ankle. 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 He completed six week of inpatient rehab and was making excellent progress. He states by the end of October he was discharged to home and had been practicing well with his prosthesis for about one week.  Home health has been treating him but he states he has been limited due to other medical issues and was hospitalized in January for fluid retention on his lungs. He states he went to [facility] for rehab following this and then was discharged home again. He states home health just ended a week or so ago and he had practiced with a straight cane a few times with his therapist. He reports he is still determined to make improvements and improve his mobility overall; and to return to part time work at [employer] if that is possible from a mobility standpoint. He and his daughter report that his cardiopulmonary endurance and balance are limiting factors for him as well and that he has fallen a few times in the last six months. He also reports having had a few falls while still working at [employer] due to tripping over objects. He does report a feeling of occasionally losing his balance and falling backward at random times. He also reports low back pain that is chronic but no other significant orthopedic complaints at this time. Patient presents with decreased balance, decreased proprioception, decreased functional strength, decreased cardiopulmonary endurance, decreased gait and transfer ability. Patient will benefit from skilled PT to build on gains and provide exercise progressions, a progressive resistance exercise program, balance exercises, gait and transfer training, manual therapy and modalities as needed to work towards therapeutic goals. THERAPY RECERTIFICATION 71/09/70: Mr. Jose Alvarado has been for 16 sessions of physical therapy from 6/13/22 to 8/11/22 with zero cancellations, zero no shows and with significant success. He reports feeling better overall as far as his stamina, upright posture, balance confidence, and overall mobility. His TUG score has improved to 17.1 seconds from 21.5 seconds indicating improved gait speed and decreased fall risk.  His modified CTSIB score has improved to 60/120 from 30/120 indicating improved balance, proprioception and vestibular function. His overall stamina has improved and is requiring fewer rest periods during sessions and improving in ability to maintain single limb stance and upright posture with gait and standing activities. He continues to demonstrate gait deviations and evidence of imbalance with unsupported standing. He will benefit from additional skilled PT to provide exercise progressions geared towards functional strength, endurance, balance, and gait training to work towards remainder of therapeutic goals. Fall Risk Scale: Total Score: 65  Lam Fall Risk: High (45 and higher)      OBJECTIVE     Observation/Postural and Gait Assessment: Patient has tendency to stand and ambulate with significant external rotation and out toeing especially on the R.     Palpation: To be performed at future session as indicated. AROM: To be tested further at future visit as indicated. Strength:  Manual Muscle Test (out of 5) Left Right   Knee extension 5 5   Knee flexion 5 5   Hip flexion 5 4+   Ankle DF 5 ---   Ankle PF 4 ---     Passive Accessory Motion: Not performed due to nature of impairments; will be performed in the future as indicated. Balance Tests:  Modified CTSIB: 30/120 seconds conditions II-IV failure. 30 second sit to stand: plinth at 49cm with BUEs 7 repetitions. Four Stage Balance Test: Semi tandem stance bilaterally       07/18/22:  30 second sit to stand: 49cm from plinth 11 repetitions  Modified CTSIB: 30/120 seconds   Four stage: Semi Tandem     08/12/22:  30 second sit to stand: NT  Modified CTSIB: 60/120 seconds  Four stage: Semi tandem     Neurological Screen:  Myotomes: Key muscle strength testing through bilateral LE is intact. Dermatomes: Sensation testing through bilateral lower quadrants for light touch is intact.     Functional Mobility:  Patient ambulates with prosthesis and rolling front wheeled walker independently. Performs sit to and from stand transfers independently with effort. Demonstrates significant out toeing and externally rotated hip especially on the R side with forward flexed trunk. Patient able to ambulate one lap in the gym safely and independently; SpO2 dropped to 88% after one lap on portable oxygen tank, recovered to baseline within five minute rest period. 07/18/22: Patient continues to ambulate with gait deviations using rolling walker but improved cardiopulmonary stamina overall. O2 sats remain 93% or above throughout session. ASSESSMENT   Problem List: (Impacting functional limitations): Body Structures, Functions, Activity Limitations Requiring Skilled Therapeutic Intervention: Decreased functional mobility ; Decreased ADL status; Decreased ROM; Decreased tolerance to work activity; Decreased strength; Decreased endurance; Decreased sensation; Decreased balance; Decreased coordination; Increased pain; Decreased posture     Therapy Prognosis:   Therapy Prognosis: Good        PLAN   Effective Dates: 06/13/2022 TO Plan of Care/Certification Expiration Date: 10/06/22     Frequency/Duration: Plan Frequency: Twice per week for 8 eight weeks (Twice per week for eight weeks)     Interventions Planned (Treatment may consist of any combination of the following):    Current Treatment Recommendations: Strengthening; ROM; Balance training; Functional mobility training; Transfer training; Endurance training; Gait training; Stair training; Neuromuscular re-education; Manual Therapy - Soft Tissue Mobilization; Manual Therapy - Joint Manipulation; Pain management; Return to work related activity; Home exercise program; Safety education & training; Patient/Caregiver education & training; Modalities;  Therapeutic activities     Goals: (Goals have been discussed and agreed upon with patient.)  Short-Term Functional Goals: Time Frame: 06/13/2022 to 07/11/2022  Patient will ambulate for six minutes using front wheeled walker without requiring a seated rest period. (ONGOING)  Patient will maintain eyes closed static standing for 30 seconds on even surface safely with guarding. (GOAL MET)  Patient will perform sit to stand transfer from standard chair height without BUEs for one repetition. (ONGOING  Discharge Goals: Time Frame: 06/13/2022 to 10/06/2022   Patient will ambulate with straight cane safely and independently over even surfaces. (ONGOING)  Patient will ambulate with rolling walker over uneven surfaces or when ambulating for prolonged distances. (ONGOING)  Patient will perform sit to stand and sit to and from supine transfers safely and independently without cues. (ONGOING)  Patient will improve TUG score to 14 seconds or less to indicate improved gait speed and decreased fall risk. (ONGOING)  Patient will improve modified CTSIB to 120/120 to indicate improved proprioception and vestibular function and a decreased fall risk. (ONGOING)  Patient will improve 30 second sit to stand score to 10 repetitions without BUEs. (ONGOING)  Patient will return to working at [employer] part time duty with modifications. (ONGOING)          Outcome Measure: Tool Used: Lower Extremity Functional Scale (LEFS)  Score:  Initial: 34/80 Most Recent: 43/80 (Date: 07/15/22)    31/80 (8/11/22)   Interpretation of Score: 20 questions each scored on a 5 point scale with 0 representing \"extreme difficulty or unable to perform\" and 4 representing \"no difficulty\". The lower the score, the greater the functional disability. 80/80 represents no disability. Minimal detectable change is 9 points. Tool Used: Timed Up and Go (TUG)  Score:  Initial: 20.4 seconds Most Recent: 19 seconds (Date: 07/18/22 )    17.1 seconds (08/11/22)   Interpretation of Score:  The test measures, in seconds, the time taken by an individual to stand up from a standard arm chair (seat height 46 cm [18 in], arm height 65 cm [25.6 in]), walk a distance of 3 meters (118 in, approx 10 ft), turn, walk back to the chair and sit down. If the individual takes longer than 14 seconds to complete TUG, this indicates risk for falls. Medical Necessity:   Patient is expected to demonstrate progress in strength, range of motion, balance, coordination and functional technique to increase independence with functional mobility and ADLs. Skilled intervention continues to be required due to need for exercise progressions tailored to patient needs, goals, and impairments . Reason For Services/Other Comments:  Patient continues to require skilled intervention due to need for exercise progressions, manual therapy, plan of care modifications and exceptions tailored to patient presentation. Regarding Karri Merchant's therapy, I certify that the treatment plan above will be carried out by a therapist or under their direction.   Thank you for this referral,  Mya Lebron, PT     Referring Physician Signature: Nessa Marcelino PA-C _______________________________ Date _____________        Post Session Pain  Charge Capture  PT Visit Info  POC Link  Treatment Note Link  MD Guidelines  MyChart

## 2022-08-11 NOTE — PROGRESS NOTES
Treatment   THERAPEUTIC EXERCISE: (53 minutes):    Exercises per grid below to improve mobility, strength, balance and coordination. Required mod visual, verbal, manual and tactile cues to promote proper body alignment, promote proper body posture, promote proper body mechanics and promote proper body breathing techniques. Progressed resistance, range, repetitions and complexity of movement as indicated. Date:  8/11/22 Date:  8/03/22 Date:  8/08/22   Activity/Exercise Parameters Parameters Parameters   Nu Step 10 minutes level 6 10 minutes level 5-6  10 minutes level 6   Sit to stand X 15 between exercises  X 15 between exercises    X 15 between exercises    Hip abduction Seated 2 x 10 each side black band  --- ---   Hip Flexion Seated --- --- ---   Long Arc Quad  --- 2 x 15 5#  3 x 10 5#    Hamstring Curl  --- --- ---   Anti Rotation Press  --- --- ---   Row  --- --- ---   Hip Abduction Standing  3 x 10  3 x 10  3 x 10    Hip Flexion Standing  3 x 20 tapping 6 inch step at bar alternating 3 x 10 tapping 8 inch step at bar last set alternating 3 x 20 tapping 8 inch step at bar first two sets then 6 inch alternating    Ankle Plantarflexion  Seated calf raise LLE only 10# ankle weight on thigh 3 x 10  3 x 10 LLE seated  Seated calf raise LLE only 10# ankle weight on thigh x 30    Gait  X 2 minutes lateral at bar no rest period today  --- 1 minute x 2 reps at bar; lateral walking    Weight Shifts  --- 2 x 10 each side each direction laterally and anterior to posterior with unilateral UE support to practice for gait and strengthening. Second set stopped at 8 reps going anteriorly due to fatigue. 2 x 10 each side each direction laterally and anterior to posterior with bilateral UE support to practice for gait and strength.     Unsupported Standing (perform in front of plinth with gait belt; be prepared to help patient recover from LOB)  4 x 30 sec wide CYNTHIA eyes closed    4 x 30 sec semi tandem each leg leading 5 x

## 2022-08-16 ENCOUNTER — HOSPITAL ENCOUNTER (OUTPATIENT)
Dept: PHYSICAL THERAPY | Age: 77
Setting detail: RECURRING SERIES
Discharge: HOME | End: 2022-08-19
Payer: MEDICARE

## 2022-08-16 PROCEDURE — 97110 THERAPEUTIC EXERCISES: CPT

## 2022-08-16 NOTE — PROGRESS NOTES
THERAPEUTIC EXERCISE: (55 minutes):    Exercises per grid below to improve mobility, strength, balance and coordination. Required mod visual, verbal, manual and tactile cues to promote proper body alignment, promote proper body posture, promote proper body mechanics and promote proper body breathing techniques. Progressed resistance, range, repetitions and complexity of movement as indicated. Date:  8/11/22 Date:  8/16/22 Date:  8/08/22   Activity/Exercise Parameters Parameters Parameters   Nu Step 10 minutes level 6 10 minutes level 6  10 minutes level 6   Sit to stand X 15 between exercises  X 10 between exercises    X 15 between exercises    Hip abduction Seated 2 x 10 each side black band  --- ---   Hip Flexion Seated --- --- ---   Karrie Must  --- --- 3 x 10 5#    Hamstring Curl  --- --- ---   Anti Rotation Press  --- --- ---   Row  --- --- ---   Hip Abduction Standing  3 x 10  3 x 10  3 x 10    Hip Flexion Standing  3 x 20 tapping 6 inch step at bar alternating 3 x 20 tapping 6 inch step at bar last set alternating 3 x 20 tapping 8 inch step at bar first two sets then 6 inch alternating    Ankle Plantarflexion  Seated calf raise LLE only 10# ankle weight on thigh 3 x 10  3 x 10 LLE seated 10# Seated calf raise LLE only 10# ankle weight on thigh x 30    Gait  X 2 minutes lateral at bar no rest period today  X 2 minutes lateral at bar  1 minute x 2 reps at bar; lateral walking    Weight Shifts  --- 2 x 10 each side each direction laterally and anterior to posterior with unilateral UE support to practice for gait and strengthening. 2 x 10 each side each direction laterally and anterior to posterior with bilateral UE support to practice for gait and strength.     Unsupported Standing (perform in front of plinth with gait belt; be prepared to help patient recover from LOB)  4 x 30 sec wide CYNTHIA eyes closed    4 x 30 sec semi tandem each leg leading 5 x 10 sec wide base of support eyes closed    2 x 30 sec each leg as lead semi tandem (staggered stance) eyes open     4 x 30 sec narrow CYNTHIA eyes open  1 x 30 sec narrow base of support eyes open    2 x 30 sec each leg as lead semi tandem (staggered stance)     4 x 10 sec eyes closed wide CYNTHIA   Gait  Standing from standard chair and walking around cone 20 feet x 4 trials  ---        MANUAL THERAPY: (0 minutes):   Joint mobilization and Soft tissue mobilization was utilized and necessary because of the patient's restricted joint motion, painful spasm, loss of articular motion and restricted motion of soft tissue. MODALITIES: (0 minutes):        None today      Treatment/Session Summary:    Treatment Assessment:  Tolerated session well; improving upright posture and fewer compensations with weight shifting. Communication/Consultation: None today   Equipment provided today:  None today   Recommendations/Intent for next treatment session: Balance and standing exercise progressions focused on single limb stance and functional strength.      Total Treatment Billable Duration: 55 minutes   Time In: 0900  Time Out: Sharla Friedman, PT       Charge Capture  }Post Session Pain  PT Visit [facility] Portal  MD Guidelines  Scanned Media  Benefits  MyChart    Future Appointments   Date Time Provider Rachel Sanchez   8/19/2022 10:00 AM Dustin Low, PT [facility]   8/23/2022  8:00 AM Dustin Low, PT [facility]   8/26/2022  2:30 PM Dustin Low, PT [facility]   8/29/2022 11:00 AM Janas Guard, PTA [facility]   9/2/2022  9:00 AM Janas Guard, PTA [facility]   9/6/2022  9:00 AM Dustin Low, PT [facility]   9/9/2022  9:00 AM Janas Guard, PTA [facility]   9/12/2022 10:00 AM Hetal Ruvalcaba MD [facility]   9/12/2022  1:30 PM Dustin Low, PT [facility]   9/15/2022  9:00 AM Dustin Pavrai, PT [facility]   9/19/2022 10:00 AM Dustin Low, PT [facility]   9/22/2022 10:00 AM Janas Guard, PTA [facility]   9/26/2022 10:00 AM Janas Guard, PTA [facility]   9/29/2022 10:00 AM Tana Jeff, PT [facility]   10/3/2022 10:00 AM Tana Jeff, PT [facility]   10/6/2022 10:00 AM Tana Jeff, PT [facility]   1/5/2023 10:40 AM Vero Iniguez MD [facility]

## 2022-08-19 ENCOUNTER — HOSPITAL ENCOUNTER (OUTPATIENT)
Dept: PHYSICAL THERAPY | Age: 77
Setting detail: RECURRING SERIES
Discharge: HOME | End: 2022-08-22
Payer: MEDICARE

## 2022-08-19 PROCEDURE — 97110 THERAPEUTIC EXERCISES: CPT

## 2022-08-19 NOTE — PROGRESS NOTES
Princess Gottlieb Pack  : 1945  Primary: [employer] Carab[employer]vantage Hmo  Secondary:  [address] @ [address] [place] [address]  Phone: [phone]  Fax: [phone] Plan Frequency: Twice per week for 8 eight weeks (Twice per week for eight weeks)    Plan of Care/Certification Expiration Date: 10/06/22      PT Visit Info:   No data recorded    OUTPATIENT PHYSICAL THERAPY:OP NOTE TYPE: Treatment Note 2022       Episode  }Appt Desk              Treatment Diagnosis:  Generalized Muscle Weakness (M62.81)  Difficulty in walking, Not elsewhere classified (R26.2)  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 Restrictions/Precautions:  Restrictions/Precautions: Cardiac; Fall Risk  No data recorded   Interventions Planned (Treatment may consist of any combination of the following):    Current Treatment Recommendations: Strengthening; ROM; Balance training; Functional mobility training; Transfer training; Endurance training; Gait training; Stair training; Neuromuscular re-education; Manual Therapy - Soft Tissue Mobilization; Manual Therapy - Joint Manipulation; Pain management; Return to work related activity; Home exercise program; Safety education & training; Patient/Caregiver education & training; Modalities; Therapeutic activities     Subjective Comments:  Patient stating he feels good this morning no complaints. Initial:}     0/10Post Session:        0/10  Medications Last Reviewed:  2022  Updated Objective Findings:   Baseline Vital Signs:  130/80mmHg; 90bpm; 94% SpO2.    Treatment   THERAPEUTIC EXERCISE: (55 minutes):    Exercises per grid below to improve mobility, strength, balance and coordination. Required mod visual, verbal, manual and tactile cues to promote proper body alignment, promote proper body posture, promote proper body mechanics and promote proper body breathing techniques. Progressed resistance, range, repetitions and complexity of movement as indicated. Date:  8/11/22 Date:  8/16/22 Date:  8/19/22   Activity/Exercise Parameters Parameters Parameters   Nu Step 10 minutes level 6 10 minutes level 6  10 minutes level 6   Sit to stand X 15 between exercises  X 10 between exercises    X 10 between exercises    Hip abduction Seated 2 x 10 each side black band  --- ---   Hip Flexion Seated --- --- ---   Long Arc Quad  --- --- 3 x 10 4#    Hamstring Curl  --- --- 3 x 10 each green   Anti Rotation Press  --- --- 2 x 15 each blue and black seated   Row  --- --- ---   Hip Abduction Standing  3 x 10  3 x 10  1 x 10    Hip Flexion Standing  3 x 20 tapping 6 inch step at bar alternating 3 x 20 tapping 6 inch step at bar last set alternating ---   Ankle Plantarflexion  Seated calf raise LLE only 10# ankle weight on thigh 3 x 10  3 x 10 LLE seated 10# Seated calf raise LLE only 10# ankle weight on thigh x 30    Gait  X 2 minutes lateral at bar no rest period today  X 2 minutes lateral at bar  ---   Weight Shifts  --- 2 x 10 each side each direction laterally and anterior to posterior with unilateral UE support to practice for gait and strengthening. 1 x 10 each side each direction laterally and anterior to posterior with bilateral UE support to practice for gait and strength.     Unsupported Standing (perform in front of plinth with gait belt; be prepared to help patient recover from LOB)  4 x 30 sec wide CYNTHIA eyes closed    4 x 30 sec semi tandem each leg leading 5 x 10 sec wide base of support eyes closed    2 x 30 sec each leg as lead semi tandem (staggered stance) eyes open     4 x 30 sec narrow CYNTHIA eyes open  ---   Gait  Standing from standard chair and walking around cone 20 feet x 4 trials  --- ---       MANUAL THERAPY: (0 minutes):   Joint mobilization and Soft tissue mobilization was utilized and necessary because of the patient's restricted joint motion, painful spasm, loss of articular motion and restricted motion of soft tissue. MODALITIES: (0 minutes):        None today      Treatment/Session Summary:    Treatment Assessment:  Fair tolerance to session today; patient more fatigued so modified to include more seated exercises. Reported still getting a good workout. Communication/Consultation: None today   Equipment provided today:  None today   Recommendations/Intent for next treatment session: Balance and standing exercise progressions focused on single limb stance and functional strength.      Total Treatment Billable Duration: 55 minutes   Time In: 1000  Time Out: Darwin Hernandez PT       Charge Capture  }Post Session Pain  PT Visit [facility] Portal  MD Guidelines  Scanned Media  Benefits  MyChart    Future Appointments   Date Time Provider Rachel Sanchez   8/23/2022  8:00 AM Jack Draper, PT [facility]   8/26/2022  2:30 PM Jack Draper, PT [facility]   8/29/2022 11:00 AM Durango Counts, PTA [facility]   9/2/2022  9:00 AM Durango Counts, PTA [facility]   9/6/2022  9:00 AM Cremaria a Draper, PT [facility]   9/9/2022  9:00 AM Durango Counts, PTA [facility]   9/12/2022 10:00 AM Maria Antonia Bedolla MD [facility]   9/12/2022  1:30 PM Cremaria a Draper, PT [facility]   9/15/2022  9:00 AM Cremaria a Draper, PT [facility]   9/19/2022 10:00 AM Creed Bonny, PT [facility]   9/22/2022 10:00 AM Durango Counts, PTA [facility]   9/26/2022 10:00 AM Durango Counts, PTA [facility]   9/29/2022 10:00 AM Cremaria a Draper, PT [facility]   10/3/2022 10:00 AM Jack Draper, PT [facility]   10/6/2022 10:00 AM Jack Draper PT [facility]   1/5/2023 10:40 AM Marcio Xie MD [facility]

## 2022-08-23 ENCOUNTER — HOSPITAL ENCOUNTER (OUTPATIENT)
Dept: PHYSICAL THERAPY | Age: 77
Setting detail: RECURRING SERIES
End: 2022-08-23
Payer: MEDICARE

## 2022-08-23 RX ORDER — APIXABAN 5 MG/1
TABLET, FILM COATED ORAL
Qty: 60 TABLET | Refills: 0 | OUTPATIENT
Start: 2022-08-23

## 2022-08-23 NOTE — PROGRESS NOTES
Physical Therapy  [address] at [facility] 8/23/2022     Patient fell down and reports he is uninjured. Confirmed next session.      Vance Arevalo PT, DPT

## 2022-08-26 ENCOUNTER — HOSPITAL ENCOUNTER (OUTPATIENT)
Dept: PHYSICAL THERAPY | Age: 77
Setting detail: RECURRING SERIES
Discharge: HOME | End: 2022-08-29
Payer: MEDICARE

## 2022-08-26 PROCEDURE — 97110 THERAPEUTIC EXERCISES: CPT

## 2022-08-26 NOTE — PROGRESS NOTES
Kishan Merchant  : 1945  Primary: Michael Casanova [employer]vantage Hmo  Secondary:  [address] @ [address] [place] [address]  Phone: [phone]  Fax: [phone] Plan Frequency: Twice per week for 8 eight weeks (Twice per week for eight weeks)    Plan of Care/Certification Expiration Date: 10/06/22      PT Visit Info:   No data recorded    OUTPATIENT PHYSICAL THERAPY:OP NOTE TYPE: Treatment Note 2022       Episode  }Appt Desk              Treatment Diagnosis:  Generalized Muscle Weakness (M62.81)  Difficulty in walking, Not elsewhere classified (R26.2)  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 Restrictions/Precautions:  Restrictions/Precautions: Cardiac; Fall Risk  No data recorded   Interventions Planned (Treatment may consist of any combination of the following):    Current Treatment Recommendations: Strengthening; ROM; Balance training; Functional mobility training; Transfer training; Endurance training; Gait training; Stair training; Neuromuscular re-education; Manual Therapy - Soft Tissue Mobilization; Manual Therapy - Joint Manipulation; Pain management; Return to work related activity; Home exercise program; Safety education & training; Patient/Caregiver education & training; Modalities; Therapeutic activities     Subjective Comments:  Patient stating he fell at home because prosthesis was not secure but he was uninjured; just minor scrape on his R hand. He states he has been measured for better fitting prosthesis and is waiting to hear back.   Initial:}     0/10Post Session:        0/10  Medications Last Reviewed: 8/26/2022  Updated Objective Findings:   Baseline Vital Signs:  130/63mmHg; 90bpm; 96% SpO2. Treatment   THERAPEUTIC EXERCISE: (53 minutes):    Exercises per grid below to improve mobility, strength, balance and coordination. Required mod visual, verbal, manual and tactile cues to promote proper body alignment, promote proper body posture, promote proper body mechanics and promote proper body breathing techniques. Progressed resistance, range, repetitions and complexity of movement as indicated. Date:  8/26/22 Date:  8/16/22 Date:  8/19/22   Activity/Exercise Parameters Parameters Parameters   Nu Step 10 minutes level 5-6 10 minutes level 6  10 minutes level 6   Sit to stand X 10 between exercises  X 10 between exercises    X 10 between exercises    Hip abduction --- --- ---   Hip Flexion Seated --- --- ---   Long Arc Quad  --- --- 3 x 10 4#    Hamstring Curl  --- --- 3 x 10 each green   Anti Rotation Press  --- --- 2 x 15 each blue and black seated   Row  --- --- ---   Hip Abduction Standing  3 x 10  3 x 10  1 x 10    Hip Flexion Standing  3 x 20 tapping 6 inch step at bar alternating 3 x 20 tapping 6 inch step at bar last set alternating ---   Ankle Plantarflexion  --- 3 x 10 LLE seated 10# Seated calf raise LLE only 10# ankle weight on thigh x 30    Gait  X 2 minutes lateral at bar no rest period today     X 2 minutes forward and backward one hand rail close guarding X 2 minutes lateral at bar  ---   Weight Shifts  --- 2 x 10 each side each direction laterally and anterior to posterior with unilateral UE support to practice for gait and strengthening. 1 x 10 each side each direction laterally and anterior to posterior with bilateral UE support to practice for gait and strength.     Unsupported Standing (perform in front of plinth with gait belt; be prepared to help patient recover from LOB)  4 x 10 sec wide CYNTHIA eyes closed    4 x 30 sec semi tandem each leg leading   5 x 10 sec wide base of support eyes closed    2 x 30 sec each leg as lead semi tandem (staggered stance) eyes open     4 x 30 sec narrow CYNTHIA eyes open  ---   Gait  --- --- ---       MANUAL THERAPY: (0 minutes):   Joint mobilization and Soft tissue mobilization was utilized and necessary because of the patient's restricted joint motion, painful spasm, loss of articular motion and restricted motion of soft tissue. MODALITIES: (0 minutes):        None today      Treatment/Session Summary:    Treatment Assessment:  Patient tolerated session well; more breaks required this week. Able to tolerate gait with one hand rail going forward. Communication/Consultation: None today   Equipment provided today:  None today   Recommendations/Intent for next treatment session: Balance and standing exercise progressions focused on single limb stance and functional strength.      Total Treatment Billable Duration: 53 minutes   Time In: 0329  Time Out: 12 Moira Hernandez PT       Charge Capture  }Post Session Pain  PT Visit Info  [employer] Portal  MD Guidelines  Scanned Media  Benefits  MyChart    Future Appointments   Date Time Provider Rachel Sanchez   8/29/2022 11:00 AM Kristie Tang PTA [facility]   9/2/2022  9:00 AM Kristie Tang, LUIS [facility]   9/6/2022  9:00 AM Evonne Portillo, PT [facility]   9/9/2022  9:00 AM Kristie Tang PTA [facility]   9/12/2022 10:00 AM Desiree Mckeon MD [facility]   9/12/2022  1:30 PM Evonne Portillo, PT [facility]   9/15/2022  9:00 AM Evonne Portillo, PT [facility]   9/19/2022 10:00 AM Evonne Portillo, PT [facility]   9/22/2022 10:00 AM Kristie Tang PTA [facility]   9/26/2022 10:00 AM Kristie Tang PTA [facility]   9/29/2022 10:00 AM Evonne Portillo, PT [facility]   10/3/2022 10:00 AM Evonne Portillo, PT [facility]   10/6/2022 10:00 AM Evonne Portillo, PT [facility]   1/5/2023 10:40 AM Loida Vicente MD [facility]

## 2022-08-30 ENCOUNTER — HOSPITAL ENCOUNTER (OUTPATIENT)
Dept: PHYSICAL THERAPY | Age: 77
Setting detail: RECURRING SERIES
Discharge: HOME | End: 2022-09-02
Payer: MEDICARE

## 2022-08-30 PROCEDURE — 97110 THERAPEUTIC EXERCISES: CPT

## 2022-08-30 ASSESSMENT — PAIN SCALES - GENERAL: PAINLEVEL_OUTOF10: 4

## 2022-08-30 NOTE — PROGRESS NOTES
Peña Merchant  : 1945  Primary: [facility] Medicare Advantage Hmo  Secondary:  [address] @ [address] [place] [address]  Phone: [phone]  Fax: [phone] Plan Frequency: Twice per week for 8 eight weeks (Twice per week for eight weeks)    Plan of Care/Certification Expiration Date: 10/06/22      PT Visit Info:   No data recorded    OUTPATIENT PHYSICAL THERAPY:OP NOTE TYPE: Treatment Note 2022       Episode  }Appt Desk              Treatment Diagnosis:  Generalized Muscle Weakness (M62.81)  Difficulty in walking, Not elsewhere classified (R26.2)  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 Restrictions/Precautions:  Restrictions/Precautions: Cardiac; Fall Risk  No data recorded   Interventions Planned (Treatment may consist of any combination of the following):    Current Treatment Recommendations: Strengthening; ROM; Balance training; Functional mobility training; Transfer training; Endurance training; Gait training; Stair training; Neuromuscular re-education; Manual Therapy - Soft Tissue Mobilization; Manual Therapy - Joint Manipulation; Pain management; Return to work related activity; Home exercise program; Safety education & training; Patient/Caregiver education & training; Modalities; Therapeutic activities     Subjective Comments:  Patient feeling good this morning with no complaints. Still waiting for new prosthesis to come in. Reports feeling tired after sessions, but doing well.   Initial:}    4 (socket- probably due to poor fitting prosthesis)0/10Post Session:       40/10   Medications Last Reviewed: 8/30/2022  Updated Objective Findings:   Baseline Vital Signs:  132/80mmHg; 87bpm; 100% SpO2. Treatment   THERAPEUTIC EXERCISE: (53 minutes):    Exercises per grid below to improve mobility, strength, balance and coordination. Required mod visual, verbal, manual and tactile cues to promote proper body alignment, promote proper body posture, promote proper body mechanics and promote proper body breathing techniques. Progressed resistance, range, repetitions and complexity of movement as indicated. Date:  8/26/22 Date:  8/30/22 Date:  8/19/22   Activity/Exercise Parameters Parameters Parameters   Nu Step 10 minutes level 5-6 10 minutes level 6  10 minutes level 6   Sit to stand X 10 between exercises  X 5 -10 between exercises    X 10 between exercises    Hip abduction --- --- ---   Hip Flexion Seated --- --- ---   Long Arc Quad  --- --- 3 x 10 4#    Hamstring Curl  --- --- 3 x 10 each green   Anti Rotation Press  --- --- 2 x 15 each blue and black seated   Row  --- --- ---   Hip Abduction Standing  3 x 10  3 x 10 each 1 x 10    Hip Flexion Standing  3 x 20 tapping 6 inch step at bar alternating --- ---   Ankle Plantarflexion  --- --- Seated calf raise LLE only 10# ankle weight on thigh x 30    Gait  X 2 minutes lateral at bar no rest period today     X 2 minutes forward and backward one hand rail close guarding X 3 minutes lateral at bar no rest period today     X 3minutes forward and backward one hand rail close guarding ---   Weight Shifts  --- 2 x 10 each side each direction laterally and anterior to posterior with unilateral UE support to practice for gait and strengthening. 1 x 10 each side each direction laterally and anterior to posterior with bilateral UE support to practice for gait and strength.     Unsupported Standing (perform in front of plinth with gait belt; be prepared to help patient recover from LOB)  4 x 10 sec wide CYNTHIA eyes closed    4 x 30 sec semi tandem each leg leading   5 x 10 sec wide base of support eyes closed    2 x 30 sec each leg as lead semi tandem (staggered stance) eyes open     4 x 30 sec narrow CYNTHIA eyes open  ---   Gait  --- --- ---       MANUAL THERAPY: (0 minutes):   Joint mobilization and Soft tissue mobilization was utilized and necessary because of the patient's restricted joint motion, painful spasm, loss of articular motion and restricted motion of soft tissue. MODALITIES: (0 minutes):        None today      Treatment/Session Summary:    Treatment Assessment:   Patient tolerated session well with few rest breaks between exercises. SpO2 stayed above 96% throughout session. Improving with balance activities and I feel he will improve even more with proper fitting prosthesis. .  Communication/Consultation: None today   Equipment provided today:  None today   Recommendations/Intent for next treatment session: Balance and standing exercise progressions focused on single limb stance and functional strength.      Total Treatment Billable Duration: 53 minutes   Time In: 7053  Time Out: 0900    Chaparro Fuller PT       Charge Capture  }Post Session Pain  PT Visit Info  [employer] Portal  MD Guidelines  Scanned Media  Benefits  MyChart    Future Appointments   Date Time Provider Rachel Sanchez   9/2/2022  9:00 AM Freedom Lunghector, PT [facility]   9/6/2022  9:00 AM Debhaley Lunger, PT [facility]   9/9/2022  9:00 AM Rex Carmichael, PTA [facility]   9/12/2022 10:00 AM Jose Nagel MD [facility]   9/12/2022  1:30 PM Debhaley Lunger, PT [facility]   9/15/2022  9:00 AM Debe Lunger, PT [facility]   9/19/2022 10:00 AM Debe Lunger, PT [facility]   9/22/2022 10:00 AM Rex Carmichael, PTA [facility]   9/26/2022 10:00 AM Rex Carmichael, PTA [facility]   9/29/2022 10:00 AM Debe Lunger, PT [facility]   10/3/2022 10:00 AM Debe Lunger, PT [facility]   10/6/2022 10:00 AM Debhaley Lunghector, PT [facility]   1/5/2023 10:40 AM Terence Castillo MD [facility]

## 2022-09-02 ENCOUNTER — HOSPITAL ENCOUNTER (OUTPATIENT)
Dept: PHYSICAL THERAPY | Age: 77
Setting detail: RECURRING SERIES
Discharge: HOME | End: 2022-09-05
Payer: MEDICARE

## 2022-09-02 PROCEDURE — 97110 THERAPEUTIC EXERCISES: CPT

## 2022-09-02 NOTE — PROGRESS NOTES
Celeste Coles Pack  : 1945  Primary: [facility] Medicare [employer]vantage [employer]  Secondary:  [address] @ [address] [place] [id number]  Phone: [phone]  Fax: [phone] Plan Frequency: Twice per week for 8 eight weeks (Twice per week for eight weeks)    Plan of Care/Certification Expiration Date: 10/06/22      PT Visit Info:   No data recorded    OUTPATIENT PHYSICAL THERAPY:OP NOTE TYPE: Treatment Note 2022       Episode  }Appt Desk              Treatment Diagnosis:  Generalized Muscle Weakness (M62.81)  Difficulty in walking, Not elsewhere classified (R26.2)  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 Restrictions/Precautions:  Restrictions/Precautions: Cardiac; Fall Risk  No data recorded   Interventions Planned (Treatment may consist of any combination of the following):    Current Treatment Recommendations: Strengthening; ROM; Balance training; Functional mobility training; Transfer training; Endurance training; Gait training; Stair training; Neuromuscular re-education; Manual Therapy - Soft Tissue Mobilization; Manual Therapy - Joint Manipulation; Pain management; Return to work related activity; Home exercise program; Safety education & training; Patient/Caregiver education & training; Modalities; Therapeutic activities     Subjective Comments:  Patient stating prosthesis feels secure but \"off\" and is ready eager to get new prosthesis but waiting to hear back. Initial:}     0/10Post Session:        0/10   Medications Last Reviewed:  2022  Updated Objective Findings:   Baseline Vital Signs:  126/80mmHg; 88bpm; 97% SpO2.    Treatment THERAPEUTIC EXERCISE: (53 minutes):    Exercises per grid below to improve mobility, strength, balance and coordination. Required mod visual, verbal, manual and tactile cues to promote proper body alignment, promote proper body posture, promote proper body mechanics and promote proper body breathing techniques. Progressed resistance, range, repetitions and complexity of movement as indicated. Date:  8/26/22 Date:  8/30/22 Date:  9/02/22   Activity/Exercise Parameters Parameters Parameters   Nu Step 10 minutes level 5-6 10 minutes level 6  10 minutes level 6   Sit to stand X 10 between exercises  X 5 -10 between exercises    X 10 between exercises    Hip abduction --- --- ---   Hip Flexion Seated --- --- ---   Long Arc Quad  --- --- 3 x 10 4#    Hamstring Curl  --- --- 3 x 10 each blue   Anti Rotation Press  --- --- 2 x 15 each blue and black seated   Row  --- --- Bilateral 2 x 20 alternating unsupported standing one black band    1 x 20 unilateral unsupported staggered standing two black bands   Hip Abduction Standing  3 x 10  3 x 10 each 3 x 10 each    Hip Flexion Standing  3 x 20 tapping 6 inch step at bar alternating --- 3 x 20 tapping 6 inch step at bar alternating   Ankle Plantarflexion  --- --- ---   Gait  X 2 minutes lateral at bar no rest period today     X 2 minutes forward and backward one hand rail close guarding 2 minutes lateral at bar  2 minutes lateral at bar no break today    Weight Shifts  --- 2 x 10 each side each direction laterally and anterior to posterior with unilateral UE support to practice for gait and strengthening.   ---   Unsupported Standing (perform in front of plinth with gait belt; be prepared to help patient recover from LOB)  4 x 10 sec wide CYNTHIA eyes closed    4 x 30 sec semi tandem each leg leading   5 x 10 sec wide base of support eyes closed    2 x 30 sec each leg as lead semi tandem (staggered stance) eyes open     4 x 30 sec narrow CYNTHIA eyes open  2 minutes unsupported narrow CYNTHIA       MANUAL THERAPY: (0 minutes):   Joint mobilization and Soft tissue mobilization was utilized and necessary because of the patient's restricted joint motion, painful spasm, loss of articular motion and restricted motion of soft tissue. MODALITIES: (0 minutes):        None today      Treatment/Session Summary:    Treatment Assessment:   Continues to improve in upright posture, strength, and stamina. .  Communication/Consultation: None today   Equipment provided today:  None today   Recommendations/Intent for next treatment session: Balance and standing exercise progressions focused on single limb stance and functional strength.      Total Treatment Billable Duration: 53 minutes   Time In: 7428  Time Out: 9833    Alvaro Rutledge, PT       Charge Capture  }Post Session Pain  PT Visit [facility] Portal  MD Guidelines  Scanned Media  Benefits  MyChart    Future Appointments   Date Time Provider Rachel Sanchez   9/6/2022  9:00 AM Alvaro Rutledge, PT [facility]   9/9/2022  9:00 AM Maranda Barreto, LUIS [facility]   9/12/2022 10:00 AM Melvi Lopez MD [facility]   9/12/2022  1:30 PM Alvaro Rutledge, PT [facility]   9/15/2022  9:00 AM Alvaro Rutledge, PT [facility]   9/19/2022 10:00 AM Alvaro Rutledge, PT [facility]   9/22/2022 10:00 AM Maranda Barreto PTA [facility]   9/26/2022 10:00 AM Maranda Barreto, PTA [facility]   9/29/2022 10:00 AM Alvaro Rutledge PT [facility]   10/3/2022 10:00 AM Alvaro Rutledge, PT [facility]   10/6/2022 10:00 AM Alvaro Rutledge PT [facility]   1/5/2023 10:40 AM Yanelis Killian MD [facility]

## 2022-09-06 ENCOUNTER — HOSPITAL ENCOUNTER (OUTPATIENT)
Dept: PHYSICAL THERAPY | Age: 77
Setting detail: RECURRING SERIES
Discharge: HOME | End: 2022-09-09
Payer: MEDICARE

## 2022-09-06 PROCEDURE — 97110 THERAPEUTIC EXERCISES: CPT

## 2022-09-06 NOTE — PROGRESS NOTES
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 Restrictions/Precautions:  Restrictions/Precautions: Cardiac; Fall Risk  No data recorded   Interventions Planned (Treatment may consist of any combination of the following):    Current Treatment Recommendations: Strengthening; ROM; Balance training; Functional mobility training; Transfer training; Endurance training; Gait training; Stair training; Neuromuscular re-education; Manual Therapy - Soft Tissue Mobilization; Manual Therapy - Joint Manipulation; Pain management; Return to work related activity; Home exercise program; Safety education & training; Patient/Caregiver education & training; Modalities; Therapeutic activities     Subjective Comments:  Patient reporting no pain or complaints over the weekend. Initial:}     0/10Post Session:        0/10   Medications Last Reviewed:  2022  Updated Objective Findings:   Baseline Vital Signs:  131/89mmHg; 97bpm; 99% SpO2.    Treatment   THERAPEUTIC EXERCISE: (53 minutes):    Exercises per grid below to improve mobility, strength, balance and coordination. Required mod visual, verbal, manual and tactile cues to promote proper body alignment, promote proper body posture, promote proper body mechanics and promote proper body breathing techniques. Progressed resistance, range, repetitions and complexity of movement as indicated. Date:  9/06/22 Date:  8/30/22 Date:  9/02/22   Activity/Exercise Parameters Parameters Parameters   Nu Step 10 minutes level 6 10 minutes level 6  10 minutes level 6   Sit to stand X 10 between exercises    3 x 5 from medium plinth with BUEs emphasis on eccentric control, upright posture at top and not using walker (close guarding mat table)  X 5 -10 between exercises    X 10 between exercises    Hip abduction --- --- ---   Hip Flexion Seated --- --- ---   Long Arc Quad  --- --- 3 x 10 4#    Hamstring Curl  3 x 10 each blue --- 3 x 10 each blue   Anti Rotation Press  2 x 15 each blue and black seated --- 2 x 15 each blue and black seated   Row  2 x 15 each arm two black bands unsupported staggered stance. --- Bilateral 2 x 20 alternating unsupported standing one black band    1 x 20 unilateral unsupported staggered standing two black bands   Hip Abduction Standing  3 x 10  3 x 10 each 3 x 10 each    Hip Flexion Standing  3 x 20 tapping 8 inch step at bar alternating third set 6inch --- 3 x 20 tapping 6 inch step at bar alternating   Ankle Plantarflexion  --- --- ---   Gait  --- 2 minutes lateral at bar  2 minutes lateral at bar no break today    Weight Shifts  2 x 10 each side lateral at bar today  2 x 10 each side each direction laterally and anterior to posterior with unilateral UE support to practice for gait and strengthening.   ---   Unsupported Standing (perform in front of plinth with gait belt; be prepared to help patient recover from LOB)      4 x 30 sec semi tandem each leg leading   5 x 10 sec wide base of support eyes closed    2 x 30 sec each leg as lead semi tandem (staggered stance) eyes open     4 x 30 sec narrow CYNTHIA eyes open  2 minutes unsupported narrow CYNTHIA       MANUAL THERAPY: (0 minutes):   Joint mobilization and Soft tissue mobilization was utilized and necessary because of the patient's restricted joint motion, painful spasm, loss of articular motion and restricted motion of soft tissue. MODALITIES: (0 minutes):        None today      Treatment/Session Summary:    Treatment Assessment:   Continues to improve in strength and stamina overall. .  Communication/Consultation: None today   Equipment provided today:  None today   Recommendations/Intent for next treatment session: Balance and standing exercise progressions focused on single limb stance and functional strength.      Total Treatment Billable Duration: 53 minutes   Time In: 5614  Time Out: 21 [facility], PT       Charge Capture  }Post Session Pain  PT Visit [address] Portal  MD Guidelines  Scanned Media  Benefits  MyChart    Future Appointments   Date Time Provider Rachel Sanchez   9/9/2022  9:00 AM Le Or, PTA [facility]   9/12/2022 10:00 AM Phong Harrington MD [facility]   9/12/2022  1:30 PM Vance Ringer, PT [facility]   9/15/2022  9:00 AM Vance Ringer, PT [facility]   9/19/2022 10:00 AM Vnace Ringer, PT [facility]   9/22/2022 10:00 AM Le Or, PTA [facility]   9/26/2022 10:00 AM Le Or, PTA [facility]   9/29/2022 10:00 AM Vance Ringer, PT [facility]   10/3/2022 10:00 AM Vance Ringer, PT [facility]   10/6/2022 10:00 AM Vance Ringer, PT [facility]   1/5/2023 10:40 AM Eddie Sneed MD [facility]

## 2022-09-09 ENCOUNTER — HOSPITAL ENCOUNTER (OUTPATIENT)
Dept: PHYSICAL THERAPY | Age: 77
Setting detail: RECURRING SERIES
Discharge: HOME | End: 2022-09-12
Payer: MEDICARE

## 2022-09-09 PROCEDURE — 97110 THERAPEUTIC EXERCISES: CPT

## 2022-09-09 ASSESSMENT — PAIN SCALES - GENERAL: PAINLEVEL_OUTOF10: 0

## 2022-09-09 NOTE — PROGRESS NOTES
Princess Gottlieb Pack  : 1945  Primary: [employer]age [employer]  Secondary:  [address] @ [address] [place] [id number]  Phone: [phone]  Fax: [phone] Plan Frequency: Twice per week for 8 eight weeks (Twice per week for eight weeks)    Plan of Care/Certification Expiration Date: 10/06/22      PT Visit Info:   No data recorded    OUTPATIENT PHYSICAL THERAPY:OP NOTE TYPE: Treatment Note 2022       Episode  }Appt Desk              Treatment Diagnosis:  Generalized Muscle Weakness (M62.81)  Difficulty in walking, Not elsewhere classified (R26.2)  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 Restrictions/Precautions:  Restrictions/Precautions: Cardiac; Fall Risk  No data recorded   Interventions Planned (Treatment may consist of any combination of the following):    Current Treatment Recommendations: Strengthening; ROM; Balance training; Functional mobility training; Transfer training; Endurance training; Gait training; Stair training; Neuromuscular re-education; Manual Therapy - Soft Tissue Mobilization; Manual Therapy - Joint Manipulation; Pain management; Return to work related activity; Home exercise program; Safety education & training; Patient/Caregiver education & training; Modalities;  Therapeutic activities     Subjective Comments:  Pt. reported no falls and getting along well  Initial:}    0/10Post Session:        0/10   Medications Last Reviewed:  2022  Updated Objective Findings:   Pt.'s blood pressure was 92/75 Pulse 98 and O2 SATS 98% on 2 liters of O2    Treatment   THERAPEUTIC EXERCISE: (55 minutes):

## 2022-09-12 ENCOUNTER — HOSPITAL ENCOUNTER (OUTPATIENT)
Dept: PHYSICAL THERAPY | Age: 77
Setting detail: RECURRING SERIES
Discharge: HOME | End: 2022-09-15
Payer: MEDICARE

## 2022-09-12 ENCOUNTER — OFFICE VISIT (OUTPATIENT)
Dept: CARDIOLOGY CLINIC | Age: 77
End: 2022-09-12
Payer: MEDICARE

## 2022-09-12 VITALS
SYSTOLIC BLOOD PRESSURE: 110 MMHG | BODY MASS INDEX: 43.12 KG/M2 | DIASTOLIC BLOOD PRESSURE: 70 MMHG | HEART RATE: 84 BPM | HEIGHT: 71 IN | WEIGHT: 308 LBS

## 2022-09-12 DIAGNOSIS — I48.11 LONGSTANDING PERSISTENT ATRIAL FIBRILLATION (HCC): ICD-10-CM

## 2022-09-12 DIAGNOSIS — N18.32 STAGE 3B CHRONIC KIDNEY DISEASE (HCC): ICD-10-CM

## 2022-09-12 DIAGNOSIS — E78.2 MIXED HYPERLIPIDEMIA: ICD-10-CM

## 2022-09-12 DIAGNOSIS — I42.0 DILATED CARDIOMYOPATHY (HCC): ICD-10-CM

## 2022-09-12 DIAGNOSIS — I25.10 CORONARY ARTERY DISEASE INVOLVING NATIVE CORONARY ARTERY OF NATIVE HEART WITHOUT ANGINA PECTORIS: Primary | ICD-10-CM

## 2022-09-12 DIAGNOSIS — I50.22 CHRONIC SYSTOLIC CONGESTIVE HEART FAILURE (HCC): ICD-10-CM

## 2022-09-12 PROCEDURE — 1123F ACP DISCUSS/DSCN MKR DOCD: CPT | Performed by: INTERNAL MEDICINE

## 2022-09-12 PROCEDURE — 97110 THERAPEUTIC EXERCISES: CPT

## 2022-09-12 PROCEDURE — 99214 OFFICE O/P EST MOD 30 MIN: CPT | Performed by: INTERNAL MEDICINE

## 2022-09-12 RX ORDER — GUAIFENESIN 1200 MG/1
TABLET, EXTENDED RELEASE ORAL 2 TIMES DAILY
COMMUNITY

## 2022-09-12 ASSESSMENT — ENCOUNTER SYMPTOMS
HEMATEMESIS: 0
EYE REDNESS: 0
WHEEZING: 0
ABDOMINAL PAIN: 0
STRIDOR: 0
DOUBLE VISION: 0
HEMOPTYSIS: 0
HOARSE VOICE: 0
HEMATOCHEZIA: 0

## 2022-09-12 NOTE — PROGRESS NOTES
fib to see if this would help with his cardiomyopathy and we set him up for a follow-up echo which continued to show moderately  to severely reduced LV systolic function with an EF of 30 to 35% with additional apical wall hypokinesis. Because of this cardiomyopathy without improvement, we set him up for a nuclear stress test which showed evidence of a large apical and inferior  myocardial infarction. He has known coronary artery disease with previous stenting but the details are unknown but based on these findings especially with no recovery in LV function despite adequate rate control for A. fib, his cardiomyopathy may be  associated with an ischemic etiology.    -We decided to hold off on coronary angiography in the absence of angina. CAD: Has no complaints of any angina whatsoever. Dyspnea on exertion has been stable and unchanged compared to when we last met with him.      cardiomyopathy: Has some chronic dyspnea on exertion-NYHA class II-III but most of his activity levels a also limited by his right below-knee amputation. He is using his prosthetic limb and ambulating with a walker. He feels that his breathing has improved  overall. Oxygen level improved substantially with him not needing oxygen even with exertion now.   -He denies significant lower extremity edema orthopnea PND. Using his BiPAP therapy when he sleeps. -Takes 20 mg of Lasix daily and 20 mg in the afternoon every other day. Weight has remained stable with this. Sleep apnea-using his trilogy CPAP mask especially at night but also needs to use it during the day when he naps. Hypotension: Doing well at this time and denies significant lightheadedness or dizziness with standing and he is now off Florinef completely. Hyperlipidemia: On atorvastatin therapy and tolerating this well. Denies myalgias.       Past Medical History, Past Surgical History, Family history, Social History, and Medications were all reviewed with the patient today and updated as necessary. No Known Allergies  Patient Active Problem List   Diagnosis    Severe obesity (BMI 35.0-35.9 with comorbidity) (HCC)    Stage 3 chronic kidney disease (HCC)    Systolic congestive heart failure (HCC)    Onychomycosis    Chronic respiratory failure with hypoxia (HCC)    Atrial fibrillation (HCC)    Controlled type 2 diabetes mellitus with diabetic polyneuropathy, without long-term current use of insulin (HCC)    Type 2 diabetes mellitus with nephropathy (HCC)    Mixed axonal-demyelinating polyneuropathy    Corns and callus    Mixed hyperlipidemia    Obesity hypoventilation syndrome (HCC)    Benign hypertensive kidney disease with chronic kidney disease    CAD (coronary artery disease)    Bunion of unspecified foot    S/P BKA (below knee amputation) unilateral, right (HCC)    Acquired hammer toe of left foot    Obstructive sleep apnea    Dilated cardiomyopathy ([address]    Acute on chronic respiratory failure with hypercapnia ([facility])     Past Medical History:   Diagnosis Date    A-fib ([address] 07/19/2021    developed AFID after hospital admission for wound infection- started on Eliquis    Acute on chronic respiratory failure with hypoxia and hypercapnia ([address] 7/23/2021    Acute respiratory failure with hypercapnia ([address] 7/23/2021    Atrial fibrillation with RVR ([address] 7/19/2021    CAD (coronary artery disease)     [facility].     Cellulitis 7/19/2021    Chronic kidney disease     stage 3- improved    COVID-19 vaccine series completed     Moderna Vaccine completed X2 doses    Current use of long term anticoagulation     Eliquis and Aspirin    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 mellitus, limited to breakdown of skin ([address] 9/27/2021    H/O heart artery stent     X1 placed in 1999    History of MI (myocardial infarction) 1999    stent placed X1    Hypercholesterolemia     Hypertension     Left ventricular dysfunction     echo revealed EF 30-35%, mildly dilated LA/RA and mild TR and MR.     Neuropathy     severe    Oxygen dependent     recently started on 2L NC continous- Sleep study to be scheduled-questionable SASHA    PICC (peripherally inserted central catheter) in place     PICC line in place to R arm    Staphylococcus aureus bacteremia 5/65/2628    Systolic CHF, acute on chronic ([facility] 7/19/2021    Type 2 diabetes mellitus ([facility]     oral agent/Pt does not monitor BS/no s.s of hypoglycemia/Last A1c: 6.7 on 7/13/21 per daughter     Past Surgical History:   Procedure Laterality Date    AMPUTATION Right 2022    below knee    ORTHOPEDIC SURGERY  08/18/2021    BKA    MT CARDIAC SURG PROCEDURE UNLIST  1999    stent placement     Family History   Problem Relation Age of Onset    No Known Problems Paternal Grandmother     No Known Problems Paternal Grandfather     No Known Problems Maternal Grandfather     No Known Problems Maternal Grandmother     Cancer Father     Cancer Mother      Social History     Tobacco Use    Smoking status: Never    Smokeless tobacco: Never   Substance Use Topics    Alcohol use: No     Current Outpatient Medications   Medication Sig Dispense Refill    guaiFENesin 1200 MG TB12 Take by mouth 2 times daily      albuterol sulfate HFA (PROAIR HFA) 108 (90 Base) MCG/ACT inhaler Inhale 2 puffs into the lungs every 6 hours as needed for Wheezing 1 each 5    furosemide (LASIX) 20 MG tablet Take 1 tablet by mouth 2 times daily as needed (swelling) (Patient taking differently: Take 20 mg by mouth 2 times daily) 60 tablet 5    apixaban (ELIQUIS) 5 MG TABS tablet Take 1 tablet by mouth every 12 hours 60 tablet 5    atorvastatin (LIPITOR) 40 MG tablet Take 1 tablet by mouth daily 30 tablet 5    ferrous sulfate (FE TABS 325) 325 (65 Fe) MG EC tablet Take 1 tablet by mouth 3 times daily (with meals) (Patient taking differently: Take 325 mg by mouth daily) 90 tablet 5    metoprolol succinate (TOPROL XL) 25 MG extended release tablet Take 1 tablet by mouth every evening 30 tablet 5    pregabalin (LYRICA) 200 MG capsule Take 1 capsule by mouth 2 times daily for 30 days. 60 capsule 5    tamsulosin (FLOMAX) 0.4 MG capsule Take 1 capsule by mouth daily 30 capsule 5    OXYGEN 2 lpm cont      acetaminophen (TYLENOL) 325 MG tablet Take 650 mg by mouth every 6 hours as needed      cyanocobalamin 1000 MCG tablet Take 1,000 mcg by mouth daily      polyethylene glycol (GLYCOLAX) 17 GM/SCOOP powder Take 17 g by mouth daily       No current facility-administered medications for this visit. Review of Systems   Constitutional: Negative for chills and fever. HENT:  Negative for ear discharge, hoarse voice and stridor. Eyes:  Negative for double vision and redness. Cardiovascular:  Positive for dyspnea on exertion. Negative for cyanosis and syncope. Respiratory:  Negative for hemoptysis and wheezing. Endocrine: Negative for polydipsia and polyphagia. Hematologic/Lymphatic: Negative for adenopathy. Skin:  Negative for itching and rash. Musculoskeletal:  Negative for joint swelling and muscle weakness. Gastrointestinal:  Negative for abdominal pain, hematemesis and hematochezia. Genitourinary:  Negative for flank pain and nocturia. Neurological:  Negative for focal weakness and seizures. Psychiatric/Behavioral:  Negative for altered mental status and suicidal ideas. Allergic/Immunologic: Negative for hives. PHYSICAL EXAM:    /70   Pulse 84   Ht 5' 11\" (1.803 m)   Wt (!) 308 lb (139.7 kg)   BMI 42.96 kg/m²      Physical Exam    General: Alert and oriented in no acute distress, obese, using walker to ambulate  HEENT: Head is normocephalic, atraumatic, pupils are equal bilaterally, throat appears to be clear  Neck: No significant jugular venous distention no cervical bruits  Cardiovascular: S1 and S2 heard, irregularly irregular heart rhythm, rates controlled, no significant murmurs rubs or gallops.    Respiratory: Both bases with diminished breath coarse bibasilar rales that do not clear with coughing  Abdomen: Soft, nontender, nondistended, bowel sounds present. Extremities: No cyanosis clubbing or edema  Peripheral pulses: Bilateral radial artery pulses are palpated. Left pedal pulses diminished but palpable. S/p amputation on the right side. Neuro: No facial droop and no gross focal motor deficits  Lymphatic: No significant cervical lymphadenopathy noted. Musculoskeletal: No significant redness or swelling noted in all exposed joints. Skin: No significant rashes noted the of the exposed regions. Medical problems and test results were reviewed with the patient today. No results found for this or any previous visit (from the past 672 hour(s)). Lab Results   Component Value Date/Time    CHOL 175 05/16/2022 09:42 AM    HDL 42 05/16/2022 09:42 AM    VLDL 40 05/16/2022 09:42 AM   ,hemoglobin, basic metabolic panel,   Lab Results   Component Value Date/Time    TSH 1.240 01/12/2022 05:40 AM    ,  Lab Results   Component Value Date/Time     05/23/2022 12:17 PM    K 5.0 05/23/2022 12:17 PM     05/23/2022 12:17 PM    CO2 37 05/23/2022 12:17 PM    BUN 33 05/23/2022 12:17 PM    GFRAA 40 05/23/2022 12:17 PM    GLOB 4.4 01/06/2022 11:59 AM    ALT 11 05/16/2022 09:42 AM    AST 15 05/16/2022 09:42 AM      Lab Results   Component Value Date    LDLCALC 93 05/16/2022 03/07/22    TRANSTHORACIC ECHOCARDIOGRAM (TTE) LIMITED (CONTRAST/BUBBLE/3D PRN) 03/08/2022 12:47 PM (Final)        Left Ventricle: Left ventricle size is normal. Moderate global hypokinesis present. Moderately reduced left ventricular systolic function with a visually estimated EF of 30 - 35%. Contrast used: Definity. Signed by: Yamil Rios DO on 3/8/2022 12:47 PM       ASSESSMENT and PLAN      Coronary artery disease involving native coronary artery of native heart without angina pectoris  -Stable no complaints of any angina at this point.   Nuclear stress test showed previous apical myocardial infarction but with no significant inducible ischemia. Being managed medically. On beta-blocker therapy. No angina. Also on statin therapy. Dilated cardiomyopathy (HCC)  - On metoprolol succinate 25 mg once daily. Not on an ACE inhibitor/Arni/aldosterone blocker because of low blood pressures. Also has chronic kidney disease    Chronic systolic congestive heart failure ([facility]  -Euvolemic currently today. Few scattered left-sided basal rales but improved with coughing. Longstanding persistent atrial fibrillation (HCC)  -Rate controlled with metoprolol and anticoagulated with Eliquis for thromboembolic prophylaxis. Mixed hyperlipidemia  -Continue atorvastatin therapy. Stage 3b chronic kidney disease ([facility]  -Being monitored-last GFR was below 35. Overall Impression  -He has done well from a cardiac perspective overall and at least is stable. He has ischemic cardiomyopathy with an EF around 35%. We have held off on an ICD at this point after confirming with him. Blood pressures are too low to consider Entresto/ARB therapy. Continue low-dose beta-blocker therapy. Heart rates are well controlled and he is appropriately anticoagulated with Eliquis for thromboembolic prophylaxis. We will continue to keep a close watch on him and see him in follow-up in about 3 months time. Return in about 3 months (around 12/12/2022) for cad, chf, afib. Thank you for allowing us to participate in the care of your patient. If you have any further questions, please do not hesitate to contact us.   Sincerely,        Damon Paige MD   9/12/2022

## 2022-09-12 NOTE — PROGRESS NOTES
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 Restrictions/Precautions:  Restrictions/Precautions: Cardiac; Fall Risk  No data recorded   Interventions Planned (Treatment may consist of any combination of the following):    Current Treatment Recommendations: Strengthening; ROM; Balance training; Functional mobility training; Transfer training; Endurance training; Gait training; Stair training; Neuromuscular re-education; Manual Therapy - Soft Tissue Mobilization; Manual Therapy - Joint Manipulation; Pain management; Return to work related activity; Home exercise program; Safety education & training; Patient/Caregiver education & training; Modalities;  Therapeutic activities     Subjective Comments:  Patient reporting no complaints this afternoon  Initial:}     0/10Post Session:        0/10   Medications Last Reviewed:  2022  Updated Objective Findings:   138/82mmHg Pulse 96 and O2 SATS 95% on 2 liters of O2    Treatment   THERAPEUTIC EXERCISE: (53 minutes):    Exercises per grid below to improve mobility, strength, balance and coordination. Required mod visual, verbal, manual and tactile cues to promote proper body alignment, promote proper body posture, promote proper body mechanics and promote proper body breathing techniques. Progressed resistance, range, repetitions and complexity of movement as indicated. Date:  9/06/22 Date:  9/9/22 Date:  9/12/22   Activity/Exercise Parameters Parameters Parameters   Nu Step 10 minutes level 6 10 minutes level 6  10 minutes level 6   Sit to stand X 10 between exercises    3 x 5 from medium plinth with BUEs emphasis on eccentric control, upright posture at top and not using walker (close guarding mat table)  X 10 between exercises    ---    Hip abduction --- Standing 2x10 reps at bar 2 x 15 each seated black band    Hip Flexion Seated --- Standing x 20 reps  30 sec x 3 black loop unsupported sitting alternating    Long Arc Quad  --- 3x10 reps  2 x 15 4#    Hamstring Curl  3 x 10 each blue Standing 3x10 reps  2 x 15 each blue 4#   Anti Rotation Press  2 x 15 each blue and black seated Black and blue each 2x15 reps one arm at a time  2 x 15 each blue and black seated   Row  2 x 15 each arm two black bands unsupported staggered stance. 2x15 reps each arm 2 black bands  2 x 15 seated blue + black band    Hip Abduction Standing  3 x 10  3 x 10 each 3 x 10 each at bar   Hip Flexion Standing  3 x 20 tapping 8 inch step at bar alternating third set 6inch --- 3 x 20 tapping 6 inch step at bar alternating    Ankle Plantarflexion  --- --- ---   Gait  --- 2 minutes lateral at bar  ---   Weight Shifts  2 x 10 each side lateral at bar today  2 x 10 each side each direction laterally and anterior to posterior with unilateral UE support to practice for gait and strengthening.   ---   Unsupported Standing (perform in front of plinth with gait belt; be prepared to help patient recover from LOB)      4 x 30 sec semi tandem each leg leading   5 x 10 sec wide base of support eyes closed    2 x 30 sec each leg as lead semi tandem (staggered stance) eyes open     4 x 30 sec narrow CYNTHIA eyes open  ---       MANUAL THERAPY: (0 minutes):   Joint mobilization and Soft tissue mobilization was utilized and necessary because of the patient's restricted joint motion, painful spasm, loss of articular motion and restricted motion of soft tissue. MODALITIES: (0 minutes):        None today      Treatment/Session Summary:    Treatment Assessment:   Requiring more frequent rest periods today but overall tolerated well. .  Communication/Consultation: None today   Equipment provided today:  None today   Recommendations/Intent for next treatment session: Balance and standing exercise progressions focused on single limb stance and functional strength.      Total Treatment Billable Duration: 55 minutes   Time In: 1330  Time Out: [address] Douglas, PT       Charge Capture  }Post Session Pain  PT Visit [id number] Portal  MD Guidelines  Scanned Media  Benefits  MyChart    Future Appointments   Date Time Provider Rachel Sanchez   9/15/2022  9:00 AM Hyla Chroman, PT [facility]   9/19/2022 10:00 AM Hyla Chroman, PT [facility]   9/22/2022 10:00 AM Cecile Marcus, PTA [facility]   9/26/2022 10:00 AM Cecile Marcus, PTA [facility]   9/29/2022 10:00 AM Hyla Chroman, PT [facility]   10/3/2022 10:00 AM Hyla Chroman, PT [facility]   10/6/2022 10:00 AM Hyla Chroman, PT [facility]   12/12/2022 10:45 AM Andra Pizarro MD [facility]   1/5/2023 10:40 AM Jaclyn Reynaga MD [facility]

## 2022-09-15 ENCOUNTER — HOSPITAL ENCOUNTER (OUTPATIENT)
Dept: PHYSICAL THERAPY | Age: 77
Setting detail: RECURRING SERIES
End: 2022-09-15
Payer: MEDICARE

## 2022-09-15 NOTE — PROGRESS NOTES
Physical Therapy  [address] [place] [address],[address] at [facility] 9/15/2022     Patient cancelled today's appointment due to getting a wound on leg examined.      Yg De La Cruz PT, DPT

## 2022-09-19 ENCOUNTER — HOSPITAL ENCOUNTER (OUTPATIENT)
Dept: PHYSICAL THERAPY | Age: 77
Setting detail: RECURRING SERIES
Discharge: HOME | End: 2022-09-22
Payer: MEDICARE

## 2022-09-19 PROCEDURE — 97110 THERAPEUTIC EXERCISES: CPT

## 2022-09-19 ASSESSMENT — PAIN SCALES - GENERAL: PAINLEVEL_OUTOF10: 0

## 2022-09-19 NOTE — PROGRESS NOTES
Kosta Tawnya Pack  : 1945  Primary: [employer]  Secondary:  [address] @ [address] [place] [id number]  Phone: [phone]  Fax: [phone] Plan Frequency: Twice per week for 8 eight weeks (Twice per week for eight weeks)    Plan of Care/Certification Expiration Date: 10/06/22      PT Visit Info:   No data recorded    OUTPATIENT PHYSICAL THERAPY:OP NOTE TYPE: Treatment Note 2022       Episode  }Appt Desk              Treatment Diagnosis:  Generalized Muscle Weakness (M62.81)  Difficulty in walking, Not elsewhere classified (R26.2)  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 Restrictions/Precautions:  Restrictions/Precautions: Cardiac; Fall Risk  No data recorded   Interventions Planned (Treatment may consist of any combination of the following):    Current Treatment Recommendations: Strengthening; ROM; Balance training; Functional mobility training; Transfer training; Endurance training; Gait training; Stair training; Neuromuscular re-education; Manual Therapy - Soft Tissue Mobilization; Manual Therapy - Joint Manipulation; Pain management; Return to work related activity; Home exercise program; Safety education & training; Patient/Caregiver education & training; Modalities; Therapeutic activities     Subjective Comments:Pt. Reported getting a better fitting prosthetic and socket soon. Pt. Reported getting a callous on the tip of his right residual limb.       Initial:}    00/10Post Session:        0/10   Medications Last Reviewed:  2022  Updated Objective Findings:   Pt.'s blood pressure 114/87 and pulse 92 Treatment   THERAPEUTIC EXERCISE: (55 minutes):    Exercises per grid below to improve mobility, strength, balance and coordination. Required mod visual, verbal, manual and tactile cues to promote proper body alignment, promote proper body posture, promote proper body mechanics and promote proper body breathing techniques. Progressed resistance, range, repetitions and complexity of movement as indicated. Date:  9/19/22 Date:  9/9/22 Date:  9/12/22   Activity/Exercise Parameters Parameters Parameters   Nu Step 10 minutes level 6 10 minutes level 6  10 minutes level 6   Sit to stand X 10 between exercises    3 x 5 from medium plinth with BUEs emphasis on eccentric control, upright posture at top and not using walker (close guarding mat table)  X 10 between exercises    ---    Hip abduction Seated with black band x 30 reps Standing 2x10 reps at bar 2 x 15 each seated black band    Hip Flexion Seated Seated with black band around BLE's x 30 reps  Standing x 20 reps  30 sec x 3 black loop unsupported sitting alternating    Long Arc Quad  X 30 reps black band 3x10 reps  2 x 15 4#    Hamstring Curl  3 x 10 each blue Standing 3x10 reps  2 x 15 each blue 4#   Anti Rotation Press  2 x 15 each blue and black seated Black and blue each 2x15 reps one arm at a time  2 x 15 each blue and black seated   Row  2 x 15 each arm two black bands unsupported staggered stance. 2x15 reps each arm 2 black bands  2 x 15 seated blue + black band    Hip Abduction Standing  2x15 reps each LE with black band 3 x 10 each 3 x 10 each at bar   Hip Flexion Standing  X 30 reps at bar --- 3 x 20 tapping 6 inch step at bar alternating    Gait  --- 2 minutes lateral at bar  ---   Weight Shifts  2 x 10 each side lateral at bar today  2 x 10 each side each direction laterally and anterior to posterior with unilateral UE support to practice for gait and strengthening.   ---   Unsupported Standing (perform in front of plinth with gait belt; be prepared to help patient recover from LOB)      4 x 30 sec semi tandem each leg leading   5 x 10 sec wide base of support eyes closed    2 x 30 sec each leg as lead semi tandem (staggered stance) eyes open     4 x 30 sec narrow CYNTHIA eyes open  ---       MANUAL THERAPY: (0 minutes):   Joint mobilization and Soft tissue mobilization was utilized and necessary because of the patient's restricted joint motion, painful spasm, loss of articular motion and restricted motion of soft tissue. MODALITIES: (0 minutes):        None today      Treatment/Session Summary:    Treatment Assessment: Pt. Requires 2 blue cushions in clinic chairs to stand safely with rolling walker. .  Communication/Consultation: None today   Equipment provided today:  None today   Recommendations/Intent for next treatment session: Balance and standing exercise progressions focused on single limb stance and functional strength.      Total Treatment Billable Duration: 55 minutes   Time In: 1000  Time Out: 1100    EYAD CONDON PTA       Charge Capture  }Post Session Pain  PT Visit Info  [facility] Portal  MD Guidelines  Scanned Media  Benefits  MyChart    Future Appointments   Date Time Provider Rachel Sanchez   9/22/2022 10:00 AM Huma Walker PTA [facility] [place]   9/26/2022 10:00 AM Huma Walker PTA [facility] [place]   9/29/2022 10:00 AM Gil Blanchard, PT [facility] [place]   10/3/2022 10:00 AM Gil Blanchard, PT [facility]   10/6/2022 10:00 AM Gil Blanchard PT [facility] [place]   12/12/2022 10:45 AM MD VIRGILIO Pablo [facility]   1/5/2023 10:40 AM Raudel Fairbanks MD [facility]

## 2022-09-22 ENCOUNTER — HOSPITAL ENCOUNTER (OUTPATIENT)
Dept: PHYSICAL THERAPY | Age: 77
Setting detail: RECURRING SERIES
Discharge: HOME | End: 2022-09-25
Payer: MEDICARE

## 2022-09-22 PROCEDURE — 97110 THERAPEUTIC EXERCISES: CPT

## 2022-09-22 ASSESSMENT — PAIN SCALES - GENERAL: PAINLEVEL_OUTOF10: 0

## 2022-09-22 NOTE — PROGRESS NOTES
Princess Gottlieb Pack  : 1945  Primary: Zachariah Caraballo [employer]vantage Hmo  Secondary:  Bhargav Beth @ [address] [facility] [address]  Phone: [phone]  Fax: [phone] Plan Frequency: Twice per week for 8 eight weeks (Twice per week for eight weeks)    Plan of Care/Certification Expiration Date: 10/06/22      PT Visit Info:   No data recorded    OUTPATIENT PHYSICAL THERAPY:OP NOTE TYPE: Treatment Note 2022       Episode  }Appt Desk              Treatment Diagnosis:  Generalized Muscle Weakness (M62.81)  Difficulty in walking, Not elsewhere classified (R26.2)  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 Restrictions/Precautions:  Restrictions/Precautions: Cardiac; Fall Risk  No data recorded   Interventions Planned (Treatment may consist of any combination of the following):    Current Treatment Recommendations: Strengthening; ROM; Balance training; Functional mobility training; Transfer training; Endurance training; Gait training; Stair training; Neuromuscular re-education; Manual Therapy - Soft Tissue Mobilization; Manual Therapy - Joint Manipulation; Pain management; Return to work related activity; Home exercise program; Safety education & training; Patient/Caregiver education & training; Modalities; Therapeutic activities     Subjective Comments:  Pt. continues to report no pain.   Initial:}    00/10Post Session:        0/10   Medications Last Reviewed:  2022  Updated Objective Findings:   Pt. 's blood pressure was 11/87 pulse 94    Treatment   THERAPEUTIC EXERCISE: (55 minutes):    Exercises per grid below to improve mobility, strength, balance and coordination. Required mod visual, verbal, manual and tactile cues to promote proper body alignment, promote proper body posture, promote proper body mechanics and promote proper body breathing techniques. Progressed resistance, range, repetitions and complexity of movement as indicated. Date:  9/19/22 Date:  9/22/22 Date:  9/12/22   Activity/Exercise Parameters Parameters Parameters   Nu Step 10 minutes level 6 10 minutes level 6  10 minutes level 6   Sit to stand X 10 between exercises    3 x 5 from medium plinth with BUEs emphasis on eccentric control, upright posture at top and not using walker (close guarding mat table)  X 10 between exercises    ---    Hip abduction Seated with black band x 30 reps Standing 2x10 reps at bar with 5 lb wt.s 2 x 15 each seated black band    Hip Flexion Seated Seated with black band around BLE's x 30 reps  Standing x 30 reps 5 lb wt. s 30 sec x 3 black loop unsupported sitting alternating    Long Arc Quad  X 30 reps black band 3x10 reps 5 lbs  2 x 15 4#    Hamstring Curl  3 x 10 each blue Seated  3x10 reps 5 lb s and blue band 2 x 15 each blue 4#   Anti Rotation Press  2 x 15 each blue and black seated Black and blue each 3x15 reps one arm at a time  2 x 15 each blue and black seated   Row  2 x 15 each arm two black bands unsupported staggered stance. 3x15 reps each arm 2 black bands  2 x 15 seated blue + black band    Hip Abduction Standing  2x15 reps each LE with black band 3 x 10 each 3 x 10 each at bar   Hip Flexion Standing  X 30 reps at bar X 30 reps 5 lb wt. s  3 x 20 tapping 6 inch step at bar alternating    Gait  --- 2 minutes lateral at bar  ---   Weight Shifts  2 x 10 each side lateral at bar today  2 x 10 each side each direction laterally and anterior to posterior with unilateral UE support to practice for gait and strengthening.   ---   Unsupported Standing (perform in front of plinth with gait belt; be prepared to help patient recover from LOB)      4 x 30 sec semi tandem each leg leading   5 x 10 sec wide base of support eyes closed    2 x 30 sec each leg as lead semi tandem (staggered stance) eyes open     4 x 30 sec narrow CYNTHIA eyes open  ---       MANUAL THERAPY: (0 minutes):   Joint mobilization and Soft tissue mobilization was utilized and necessary because of the patient's restricted joint motion, painful spasm, loss of articular motion and restricted motion of soft tissue. MODALITIES: (0 minutes):        None today      Treatment/Session Summary:    Treatment Assessment: Pt. Demonstrated decreased balance and eccentric control due to prosthetic limb being too loose. .  Communication/Consultation: None today   Equipment provided today:  None today   Recommendations/Intent for next treatment session: Balance and standing exercise progressions focused on single limb stance and functional strength.      Total Treatment Billable Duration: 55 minutes   Time In: 1000  Time Out: 1100    EYAD CONDON PTA       Charge Capture  }Post Session Pain  PT Visit Info  [employer] Portal  MD Guidelines  Scanned Media  Benefits  MyChart    Future Appointments   Date Time Provider Rachel Sanchez   9/26/2022 10:00 AM Shelby Castro PTA [facility]   9/29/2022 10:00 AM Vance Ringer, PT [facility]   10/3/2022 10:00 AM Vance Ringer, PT [facility]   10/6/2022 10:00 AM Vance Ringer, PT [facility]   12/12/2022 10:45 AM MD VIRGILIO Mckay [facility]   1/5/2023 10:40 AM Eddie Sneed MD [facility]

## 2022-09-22 NOTE — PROGRESS NOTES
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 Restrictions/Precautions:  Restrictions/Precautions: Cardiac; Fall Risk  No data recorded   Interventions Planned (Treatment may consist of any combination of the following):    Current Treatment Recommendations: Strengthening; ROM; Balance training; Functional mobility training; Transfer training; Endurance training; Gait training; Stair training; Neuromuscular re-education; Manual Therapy - Soft Tissue Mobilization; Manual Therapy - Joint Manipulation; Pain management; Return to work related activity; Home exercise program; Safety education & training; Patient/Caregiver education & training; Modalities; Therapeutic activities     Subjective Comments:  Pt. continues to report no pain.   Initial:}    00/10Post Session:        0/10   Medications Last Reviewed:  2022  Updated Objective Findings:   Pt. 's blood pressure was 11/87 pulse 94    Treatment   THERAPEUTIC EXERCISE: (55 minutes):    Exercises per grid below to improve mobility, strength, balance and coordination. Required mod visual, verbal, manual and tactile cues to promote proper body alignment, promote proper body posture, promote proper body mechanics and promote proper body breathing techniques. Progressed resistance, range, repetitions and complexity of movement as indicated. Date:  9/19/22 Date:  9/22/22 Date:  9/12/22   Activity/Exercise Parameters Parameters Parameters   Nu Step 10 minutes level 6 10 minutes level 6  10 minutes level 6   Sit to stand X 10 between exercises    3 x 5 from medium plinth with BUEs emphasis on eccentric control, upright posture at top and not using walker (close guarding mat table)  X 10 between exercises    ---    Hip abduction Seated with black band x 30 reps Standing 2x10 reps at bar with 5 lb wt.s 2 x 15 each seated black band    Hip Flexion Seated Seated with black band around BLE's x 30 reps  Standing x 30 reps 5 lb wt. s 30 sec x 3 black loop unsupported sitting alternating    Long Arc Quad  X 30 reps black band 3x10 reps 5 lbs  2 x 15 4#    Hamstring Curl  3 x 10 each blue Seated  3x10 reps 5 lb s and blue band 2 x 15 each blue 4#   Anti Rotation Press  2 x 15 each blue and black seated Black and blue each 3x15 reps one arm at a time  2 x 15 each blue and black seated   Row  2 x 15 each arm two black bands unsupported staggered stance. 3x15 reps each arm 2 black bands  2 x 15 seated blue + black band    Hip Abduction Standing  2x15 reps each LE with black band 3 x 10 each 3 x 10 each at bar   Hip Flexion Standing  X 30 reps at bar X 30 reps 5 lb wt. s  3 x 20 tapping 6 inch step at bar alternating    Gait  --- 2 minutes lateral at bar  ---   Weight Shifts  2 x 10 each side lateral at bar today  2 x 10 each side each direction laterally and anterior to posterior with unilateral UE support to practice for gait and strengthening.   ---   Unsupported Standing (perform in front of plinth with gait belt; be prepared to help patient recover from LOB)      4 x 30 sec semi tandem each leg leading   5 x 10 sec wide base of support eyes closed    2 x 30 sec each leg as lead semi tandem (staggered stance) eyes open     4 x 30 sec narrow CYNTHIA eyes open  ---       MANUAL THERAPY: (0 minutes):   Joint mobilization and Soft tissue mobilization was utilized and necessary because of the patient's restricted joint motion, painful spasm, loss of articular motion and restricted motion of soft tissue. MODALITIES: (0 minutes):        None today      Treatment/Session Summary:    Treatment Assessment: Pt. Demonstrated decreased balance and eccentric control due to prosthetic limb being too loose. .  Communication/Consultation: None today   Equipment provided today:  None today   Recommendations/Intent for next treatment session: Balance and standing exercise progressions focused on single limb stance and functional strength.      Total Treatment Billable Duration: 55 minutes   Time In: 1000  Time Out: 1100    EYAD CONDON PTA       Charge Capture  }Post Session Pain  PT Visit Info  [employer] Portal  MD Guidelines  Scanned Media  Benefits  MyChart    Future Appointments   Date Time Provider Rachel Sanchez   9/26/2022 10:00 AM Imani Purvis PTA [facility]   9/29/2022 10:00 AM Dustin Low, PT [facility]   10/3/2022 10:00 AM Dustin Low, PT [facility]   10/6/2022 10:00 AM Dustin Low, PT [facility]   12/12/2022 10:45 AM MD VIRGILIO Black [facility]   1/5/2023 10:40 AM Anat Watts MD [facility]

## 2022-09-26 ENCOUNTER — HOSPITAL ENCOUNTER (OUTPATIENT)
Dept: PHYSICAL THERAPY | Age: 77
Setting detail: RECURRING SERIES
Discharge: HOME | End: 2022-09-29
Payer: MEDICARE

## 2022-09-26 PROCEDURE — 97110 THERAPEUTIC EXERCISES: CPT

## 2022-09-26 ASSESSMENT — PAIN SCALES - GENERAL: PAINLEVEL_OUTOF10: 0

## 2022-09-26 NOTE — PROGRESS NOTES
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 Restrictions/Precautions:  Restrictions/Precautions: Cardiac; Fall Risk  No data recorded   Interventions Planned (Treatment may consist of any combination of the following):    Current Treatment Recommendations: Strengthening; ROM; Balance training; Functional mobility training; Transfer training; Endurance training; Gait training; Stair training; Neuromuscular re-education; Manual Therapy - Soft Tissue Mobilization; Manual Therapy - Joint Manipulation; Pain management; Return to work related activity; Home exercise program; Safety education & training; Patient/Caregiver education & training; Modalities;  Therapeutic activities     Subjective Comments:  Pt. reported being pleased so far with new prosthesis  Initial:}    0/10Post Session:        0/10   Medications Last Reviewed:  2022  Updated Objective Findings:   Pt.'s blood pressure was 106/81 and O2 SATS 97% pulse 97    Treatment   THERAPEUTIC EXERCISE: (55 minutes):    Exercises per be prepared to help patient recover from LOB)      4 x 30 sec semi tandem each leg leading   5 x 10 sec wide base of support eyes closed    2 x 30 sec each leg as lead semi tandem (staggered stance) eyes open     4 x 30 sec narrow CYNTHIA eyes open  5x10 sec narrow base & wide base         4x30 sec each leg semi tandem ( staggered stance)       MANUAL THERAPY: (0 minutes):   Joint mobilization and Soft tissue mobilization was utilized and necessary because of the patient's restricted joint motion, painful spasm, loss of articular motion and restricted motion of soft tissue. MODALITIES: (0 minutes):        None today      Treatment/Session Summary:    Treatment Assessment: Pt.'s right LE prosthesis was externally rotated and loose today. Pt. Had issues with balance and standing due to prosthesis not fitting correctly. .  Communication/Consultation: None today   Equipment provided today:  None today   Recommendations/Intent for next treatment session: Balance and standing exercise progressions focused on single limb stance and functional strength.      Total Treatment Billable Duration: 55 minutes   Time In: 1000  Time Out: 1100    EYAD CONDON PTA       Charge Capture  }Post Session Pain  PT Visit Info  [employer] Portal  MD Guidelines  Scanned Media  Benefits  MyChart    Future Appointments   Date Time Provider Rachel Sanchez   9/29/2022 10:00 AM Alysia Caruso PT [facility]   10/3/2022 10:00 AM Alysia Caruso PT [facility]   10/6/2022 10:00 AM Alysia Caruso PT [facility]   12/12/2022 10:45 AM MD VIRGILIO Small [facility]   1/5/2023 10:40 AM Jamaal Jacobson MD [facility]

## 2022-09-29 ENCOUNTER — HOSPITAL ENCOUNTER (OUTPATIENT)
Dept: PHYSICAL THERAPY | Age: 77
Setting detail: RECURRING SERIES
Discharge: HOME | End: 2022-10-02
Payer: MEDICARE

## 2022-09-29 PROCEDURE — 97110 THERAPEUTIC EXERCISES: CPT

## 2022-09-29 NOTE — PROGRESS NOTES
Velma Sims Pack  : 1945  Primary: Cynthia Jara Medicare Advantage Hmo  Secondary:  [address] @ [address] [place] [address]  Phone: [phone]  Fax: [phone] Plan Frequency: Twice per week for 8 eight weeks (Twice per week for eight weeks)    Plan of Care/Certification Expiration Date: 10/06/22      PT Visit Info:   No data recorded    OUTPATIENT PHYSICAL THERAPY:OP NOTE TYPE: Treatment Note 2022       Episode  }Appt Desk              Treatment Diagnosis:  Generalized Muscle Weakness (M62.81)  Difficulty in walking, Not elsewhere classified (R26.2)  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 Restrictions/Precautions:  Restrictions/Precautions: Cardiac; Fall Risk  No data recorded   Interventions Planned (Treatment may consist of any combination of the following):    Current Treatment Recommendations: Strengthening; ROM; Balance training; Functional mobility training; Transfer training; Endurance training; Gait training; Stair training; Neuromuscular re-education; Manual Therapy - Soft Tissue Mobilization; Manual Therapy - Joint Manipulation; Pain management; Return to work related activity; Home exercise program; Safety education & training; Patient/Caregiver education & training; Modalities; Therapeutic activities     Subjective Comments:  Patient reports no pain this morning; stating he has follow up Monday to adjust prosthesis but fit feels secure; just some soreness and more out toeing.   Initial:}     0/10Post Session:        0/10   Medications Last Reviewed:  2022  Updated Objective Findings:   Vital Signs: 136/88mmHg and O2 SATS 96% pulse 94; see progress note from today. Treatment   THERAPEUTIC EXERCISE: (53 minutes):    Exercises per grid below to improve mobility, strength, balance and coordination. Required mod visual, verbal, manual and tactile cues to promote proper body alignment, promote proper body posture, promote proper body mechanics and promote proper body breathing techniques. Progressed resistance, range, repetitions and complexity of movement as indicated. Date:  9/29/22 Date:  9/22/22 Date:  9/26/22   Activity/Exercise Parameters Parameters Parameters   Nu Step 10 minutes level 6 10 minutes level 6  10 minutes level 6   Sit to stand 3 x 10 from medium plinth with BUEs X 10 between exercises    --- 3x15 from elevated plinth   Hip abduction 3 x 10 standing at bar  Standing 2x10 reps at bar with 5 lb wt.s 2 x 15 each seated black band    Hip Flexion Seated 2 x 20 alternating standing tapping 6 inch step at bar  Standing x 30 reps 5 lb wt. s Seated black band x 30 reps 5 lb wt. s    Long Arc Quad  --- 3x10 reps 5 lbs  2 x 15 5#    Hamstring Curl  --- Seated  3x10 reps 5 lb s and blue band 2 x 15 each blue 5#   Anti Rotation Press  --- Black and blue each 3x15 reps one arm at a time  2 x 15 each blue and black seated   Row  ---  3x15 reps each arm 2 black bands  2 x 15 seated blue + black band    Hip Abduction Standing  --- 3 x 10 each 3 x 10 each at bar   Hip Flexion Standing  --- X 30 reps 5 lb wt.s     Gait  Sit to stand walking 20ft; turning x 3 trials  2 minutes lateral at bar  ---   Weight Shifts  --- 2 x 10 each side each direction laterally and anterior to posterior with unilateral UE support to practice for gait and strengthening.   X 30 reps side to side and forward & back at bar   Unsupported Standing (perform in front of plinth with gait belt; be prepared to help patient recover from LOB)  4 x 30 sec each side semi tandem staggered stance     6 x 15-30 seconds each round eyes closed close guarding gait belt donned    5 x 10 sec wide base of support eyes closed    2 x 30 sec each leg as lead semi tandem (staggered stance) eyes open     4 x 30 sec narrow CYNTHIA eyes open  5x10 sec narrow base & wide base         4x30 sec each leg semi tandem ( staggered stance)       MANUAL THERAPY: (0 minutes):   Joint mobilization and Soft tissue mobilization was utilized and necessary because of the patient's restricted joint motion, painful spasm, loss of articular motion and restricted motion of soft tissue. MODALITIES: (0 minutes):        None today      Treatment/Session Summary:    Treatment Assessment:   Patient challenged by session but continues to improve in stamina. .  Communication/Consultation: None today   Equipment provided today:  None today   Recommendations/Intent for next treatment session: Balance and standing exercise progressions focused on single limb stance and functional strength.      Total Treatment Billable Duration: 53 minutes   Time In: 1000  Time Out: Darwin Hernandez PT       Charge Capture  }Post Session Pain  PT Visit Info  [facility] Portal  MD Guidelines  Scanned Media  Benefits  MyChart    Future Appointments   Date Time Provider Rachel Sanchez   10/3/2022 10:00 AM Sanjuana Wei PT [facility]   10/6/2022 10:00 AM Sanjuana Wei PT [facility]   12/12/2022 10:45 AM MD VIRGILIO Sutton [facility]   1/5/2023 10:40 AM Jory West MD [facility]

## 2022-09-29 NOTE — PROGRESS NOTES
Jeanne Merchant  : 1945  Primary: Dae Ramirez [employer]  Secondary:  [address] @ [address] [place] [address]  Phone: [phone]  Fax: [phone] Plan Frequency: Twice per week for 8 eight weeks (Twice per week for eight weeks)    Plan of Care/Certification Expiration Date: 22 (Twice per week for eight weeks)      PT Visit Info:    No data recorded    OUTPATIENT PHYSICAL THERAPY:OP NOTE TYPE: Therapy Recertification                 Episode  Appt Desk         Treatment Diagnosis:  Generalized Muscle Weakness (M62.81)  Difficulty in walking, Not elsewhere classified (R26.2)  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 Shaniqua Hernandez is a 77year old male presenting with an overall decline in balance, functional mobility, transfers, ability to ambulate, and overall function following R below knee amputation 2021 after spraining his ankle at work and then acquiring a wound from altered gait. His daughter is present with him today. He underwent an ankle reconstruction in the past but otherwise had no issues until spraining the ankle. 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 He completed six week of inpatient rehab and was making excellent progress.  He states by the end of October he was discharged to home and had been practicing well with his prosthesis for about one week. Home health has been treating him but he states he has been limited due to other medical issues and was hospitalized in January for fluid retention on his lungs. He states he went to [facility] for rehab following this and then was discharged home again. He states home health just ended a week or so ago and he had practiced with a straight cane a few times with his therapist. He reports he is still determined to make improvements and improve his mobility overall; and to return to part time work at [employer] if that is possible from a mobility standpoint. He and his daughter report that his cardiopulmonary endurance and balance are limiting factors for him as well and that he has fallen a few times in the last six months. He also reports having had a few falls while still working at [employer] due to tripping over objects. He does report a feeling of occasionally losing his balance and falling backward at random times. He also reports low back pain that is chronic but no other significant orthopedic complaints at this time. Patient presents with decreased balance, decreased proprioception, decreased functional strength, decreased cardiopulmonary endurance, decreased gait and transfer ability. Patient will benefit from skilled PT to build on gains and provide exercise progressions, a progressive resistance exercise program, balance exercises, gait and transfer training, manual therapy and modalities as needed to work towards therapeutic goals. THERAPY RECERTIFICATION 10/15/98: Mr. Concha Booth has been for 28 sessions of physical therapy from 6/13/22 to 9/29/22 with two cancellations, zero no shows and with significant success. He reports feeling better overall as far as his stamina, upright posture, balance confidence, and overall mobility. His TUG score has improved to 16.4 seconds from 21.5 seconds indicating improved gait speed and decreased fall risk.  His modified CTSIB score has improved to 60/120 from 30/120 indicating improved balance, proprioception and vestibular function. His overall stamina has improved and is requiring fewer rest periods during sessions and improving in ability to maintain single limb stance and upright posture with gait and standing activities. He continues to demonstrate gait deviations and evidence of imbalance with unsupported standing. He has been limited in progress to some degree due to difficulties getting prosthesis fitting and adjustments. He will benefit from additional skilled PT to provide exercise progressions geared towards functional strength, endurance, balance, and gait training to work towards remainder of therapeutic goals. Without skilled PT he will remain at risk for future falls and injuries and decreased functional independence. OBJECTIVE     Observation/Postural and Gait Assessment: Patient has tendency to stand and ambulate with significant external rotation and out toeing especially on the R.     Palpation: To be performed at future session as indicated. AROM: To be tested further at future visit as indicated. Strength:  Manual Muscle Test (out of 5) Left Right   Knee extension 5 5   Knee flexion 5 5   Hip flexion 5 4+   Ankle DF 5 ---   Ankle PF 4 ---     Passive Accessory Motion: Not performed due to nature of impairments; will be performed in the future as indicated. Balance Tests:  Modified CTSIB: 30/120 seconds conditions II-IV failure. 30 second sit to stand: plinth at 49cm with BUEs 7 repetitions.                                 Four Stage Balance Test: Semi tandem stance bilaterally     22:  30 second sit to stand: 49cm from plinth 11 repetitions  Modified CTSIB: 30/120 seconds   Four stage: Semi Tandem     22:  30 second sit to stand: NT  Modified CTSIB: 60/120 seconds  Four stage: Semi tandem     22:  TU.4 seconds  30 second sit to stand: 9 repetitions 49cm from plinth Four stage: Semi tandem   Modified CTSIB: 60/120 seconds    Neurological Screen:  Myotomes: Key muscle strength testing through bilateral LE is intact. Dermatomes: Sensation testing through bilateral lower quadrants for light touch is intact. Functional Mobility:  Patient ambulates with prosthesis and rolling front wheeled walker independently. Performs sit to and from stand transfers independently with effort. Demonstrates significant out toeing and externally rotated hip especially on the R side with forward flexed trunk. Patient able to ambulate one lap in the gym safely and independently; SpO2 dropped to 88% after one lap on portable oxygen tank, recovered to baseline within five minute rest period. 07/18/22: Patient continues to ambulate with gait deviations using rolling walker but improved cardiopulmonary stamina overall. O2 sats remain 93% or above throughout session. ASSESSMENT   Problem List: (Impacting functional limitations): Body Structures, Functions, Activity Limitations Requiring Skilled Therapeutic Intervention: Decreased functional mobility ; Decreased ADL status; Decreased ROM; Decreased tolerance to work activity; Decreased strength; Decreased endurance; Decreased sensation; Decreased balance; Decreased coordination; Increased pain; Decreased posture     Therapy Prognosis:   Therapy Prognosis: Good        PLAN   Effective Dates: 06/13/2022 TO Plan of Care/Certification Expiration Date: 11/24/22 (Twice per week for eight weeks)     Frequency/Duration: Plan Frequency: Twice per week for 8 eight weeks (Twice per week for eight weeks)     Interventions Planned (Treatment may consist of any combination of the following):    Current Treatment Recommendations: Strengthening; ROM; Balance training; Functional mobility training; Transfer training;  Endurance training; Gait training; Stair training; Neuromuscular re-education; Manual Therapy - Soft Tissue Mobilization; Manual Therapy - Joint Manipulation; Pain management; Return to work related activity; Home exercise program; Safety education & training; Patient/Caregiver education & training; Modalities; Therapeutic activities     Goals: (Goals have been discussed and agreed upon with patient.)  Short-Term Functional Goals: Time Frame: 06/13/2022 to 07/11/2022  Patient will ambulate for six minutes using front wheeled walker without requiring a seated rest period. (GOAL MET)  Patient will maintain eyes closed static standing for 30 seconds on even surface safely with guarding. (GOAL MET)  Patient will perform sit to stand transfer from standard chair height without BUEs for one repetition. (ONGOING)  Discharge Goals: Time Frame: 06/13/2022 to 11/24/2022   Patient will ambulate with straight cane safely and independently over even surfaces. (ONGOING)  Patient will ambulate with rolling walker over uneven surfaces or when ambulating for prolonged distances. (ONGOING)  Patient will perform sit to stand and sit to and from supine transfers safely and independently without cues. (GOAL MET)  Patient will improve TUG score to 14 seconds or less to indicate improved gait speed and decreased fall risk. (ONGOING)  Patient will improve modified CTSIB to 120/120 to indicate improved proprioception and vestibular function and a decreased fall risk. (ONGOING)  Patient will improve 30 second sit to stand score to 10 repetitions without BUEs. (ONGOING)  Patient will return to working at [employer] part time duty with modifications. (ONGOING)          Outcome Measure: Tool Used: Lower Extremity Functional Scale (LEFS)  Score:  Initial: 34/80 Most Recent: 43/80 (Date: 07/15/22)    31/80 (8/11/22)    36/80 09/29/22   Interpretation of Score: 20 questions each scored on a 5 point scale with 0 representing \"extreme difficulty or unable to perform\" and 4 representing \"no difficulty\". The lower the score, the greater the functional disability.  80/80 represents no disability. Minimal detectable change is 9 points. Tool Used: Timed Up and Go (TUG)  Score:  Initial: 20.4 seconds Most Recent: 19 seconds (Date: 07/18/22 )    17.1 seconds (08/11/22)    16.4 seconds 09/29/22    Interpretation of Score: The test measures, in seconds, the time taken by an individual to stand up from a standard arm chair (seat height 46 cm [18 in], arm height 65 cm [25.6 in]), walk a distance of 3 meters (118 in, approx 10 ft), turn, walk back to the chair and sit down. If the individual takes longer than 14 seconds to complete TUG, this indicates risk for falls. Medical Necessity:   Patient is expected to demonstrate progress in strength, range of motion, balance, coordination and functional technique to increase independence with functional mobility and ADLs. Skilled intervention continues to be required due to need for exercise progressions tailored to patient needs, goals, and impairments . Reason For Services/Other Comments:  Patient continues to require skilled intervention due to need for exercise progressions, manual therapy, plan of care modifications and exceptions tailored to patient presentation. Regarding Marino Merchant's therapy, I certify that the treatment plan above will be carried out by a therapist or under their direction.   Thank you for this referral,  Isabel Finch, PAULA     Referring Physician Signature: Sahara Whittaker PA-C _______________________________ Date _____________        Post Session Pain  Charge Capture  PT Visit Info  POC Link  Treatment Note Link  MD Guidelines  MyChart

## 2022-10-03 ENCOUNTER — HOSPITAL ENCOUNTER (OUTPATIENT)
Dept: PHYSICAL THERAPY | Age: 77
Setting detail: RECURRING SERIES
Discharge: HOME | End: 2022-10-06
Payer: MEDICARE

## 2022-10-03 PROCEDURE — 97110 THERAPEUTIC EXERCISES: CPT

## 2022-10-03 NOTE — PROGRESS NOTES
Lu Bhakta Pack  : 1945  Primary: Bolling Kanner [employer]  Secondary:  [address] @ [address] [place] [id number]  Phone: [phone]  Fax: [phone] Plan Frequency: Twice per week for 8 eight weeks (Twice per week for eight weeks)    Plan of Care/Certification Expiration Date: 10/06/22      PT Visit Info:   No data recorded    OUTPATIENT PHYSICAL THERAPY:OP NOTE TYPE: Treatment Note 10/3/2022       Episode  }Appt Desk              Treatment Diagnosis:  Generalized Muscle Weakness (M62.81)  Difficulty in walking, Not elsewhere classified (R26.2)  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 Restrictions/Precautions:  Restrictions/Precautions: Cardiac; Fall Risk  No data recorded   Interventions Planned (Treatment may consist of any combination of the following):    Current Treatment Recommendations: Strengthening; ROM; Balance training; Functional mobility training; Transfer training; Endurance training; Gait training; Stair training; Neuromuscular re-education; Manual Therapy - Soft Tissue Mobilization; Manual Therapy - Joint Manipulation; Pain management; Return to work related activity; Home exercise program; Safety education & training; Patient/Caregiver education & training; Modalities; Therapeutic activities     Subjective Comments:  Patient stating he feels like his L knee is a bit weak this morning but not painful.   Initial:}     0/10Post Session:        010   Medications Last Reviewed:  10/3/2022  Updated Objective Findings:   Vital Signs: 137/81mmHg and SpO2 96% pulse 95     Treatment   THERAPEUTIC EXERCISE: (53 minutes):    Exercises per grid below to improve mobility, strength, balance and coordination. Required mod visual, verbal, manual and tactile cues to promote proper body alignment, promote proper body posture, promote proper body mechanics and promote proper body breathing techniques. Progressed resistance, range, repetitions and complexity of movement as indicated. Date:  9/29/22 Date:  10/03/22 Date:  9/26/22   Activity/Exercise Parameters Parameters Parameters   Nu Step 10 minutes level 6 10 minutes level 6  10 minutes level 6   Sit to stand 3 x 10 from medium plinth with BUEs 2 x 10 medium plinth emphasis on eccentric control and full trunk extension without using walker close guarding gait belt --- 3x15 from elevated plinth   Hip abduction 3 x 10 standing at bar  --- 2 x 15 each seated black band    Hip Flexion  2 x 20 alternating standing tapping 6 inch step at bar  2 x 20 6 inch step tapping alternating Seated black band x 30 reps 5 lb wt. s    Dominik Elizondo  --- --- 2 x 15 5#    Hamstring Curl  --- Seated 3 x 10 blue 2 x 15 each blue 5#   Anti Rotation Press  --- 2 x 15 black band unsupported sitting legs off of floor  2 x 15 each blue and black seated   Row  ---  2 x 15 black + blue band unsupported sitting legs off of floor  2 x 15 seated blue + black band    Hip Abduction Standing  --- 3 x 10 each 3 x 10 each at bar   Gait  Sit to stand walking 20ft; turning x 3 trials  2 minutes lateral at bar  ---   Weight Shifts  --- 2 x 10 each side each laterally unilateral UE support to practice for gait and strengthening.   X 30 reps side to side and forward & back at bar   Unsupported Standing (perform in front of plinth with gait belt; be prepared to help patient recover from LOB)  4 x 30 sec each side semi tandem staggered stance     6 x 15-30 seconds each round eyes closed close guarding gait belt donned    8 x 10 sec wide base of support eyes closed    2 x 30 sec each leg as lead semi tandem (staggered stance) eyes open     1 x 30 sec narrow CYNTHIA eyes open  5x10 sec narrow base & wide base         4x30 sec each leg semi tandem ( staggered stance)       MANUAL THERAPY: (0 minutes):   Joint mobilization and Soft tissue mobilization was utilized and necessary because of the patient's restricted joint motion, painful spasm, loss of articular motion and restricted motion of soft tissue. MODALITIES: (0 minutes):        None today      Treatment/Session Summary:    Treatment Assessment:   Patient stating he felt fatigued but good by end of session. .  Communication/Consultation: None today   Equipment provided today:  None today   Recommendations/Intent for next treatment session: Balance and standing exercise progressions focused on single limb stance and functional strength.      Total Treatment Billable Duration: 53 minutes   Time In: 1897  Time Out: [facility], PT       Charge Capture  }Post Session Pain  PT Visit [facility] Portal  MD Guidelines  Scanned Media  Benefits  MyChart    Future Appointments   Date Time Provider Rachel Sanchez   10/6/2022 10:00 AM Nai Roman, PT [facility]   12/12/2022 10:45 AM MD VIRGILIO Iniguez [facility]   1/5/2023 10:40 AM Hannah Queen MD PPS [facility]

## 2022-10-06 ENCOUNTER — HOSPITAL ENCOUNTER (OUTPATIENT)
Dept: PHYSICAL THERAPY | Age: 77
Setting detail: RECURRING SERIES
Discharge: HOME | End: 2022-10-09
Payer: MEDICARE

## 2022-10-06 PROCEDURE — 97110 THERAPEUTIC EXERCISES: CPT

## 2022-10-06 ASSESSMENT — PAIN SCALES - GENERAL: PAINLEVEL_OUTOF10: 0

## 2022-10-06 NOTE — PROGRESS NOTES
Herson Grandchild Pack  : 1945  Primary: Sha Bates [employer]mo  Secondary:  [address] @ [address] [place] [address]  Phone: [phone]  Fax: [phone] Plan Frequency: Twice per week for 8 eight weeks (Twice per week for eight weeks)    Plan of Care/Certification Expiration Date: 22 (Twice per week for eight weeks)      PT Visit Info:   No data recorded    OUTPATIENT PHYSICAL THERAPY:OP NOTE TYPE: Treatment Note 10/6/2022       Episode  }Appt Desk              Treatment Diagnosis:  Generalized Muscle Weakness (M62.81)  Difficulty in walking, Not elsewhere classified (R26.2)  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 Restrictions/Precautions:  Restrictions/Precautions: Cardiac; Fall Risk  No data recorded   Interventions Planned (Treatment may consist of any combination of the following):    Current Treatment Recommendations: Strengthening; ROM; Balance training; Functional mobility training; Transfer training; Endurance training; Gait training; Stair training; Neuromuscular re-education; Manual Therapy - Soft Tissue Mobilization; Manual Therapy - Joint Manipulation; Pain management; Return to work related activity; Home exercise program; Safety education & training; Patient/Caregiver education & training; Modalities;  Therapeutic activities     Subjective Comments:  Pt. reported left knee is bothering him now  Initial:}    0/10Post Session:        0/10   Medications Last Reviewed:  10/6/2022  Updated Objective Findings:   /90 pulse 94 and O2 SATS 94%    Treatment   THERAPEUTIC EXERCISE: (55 minutes):    Exercises per grid below to improve mobility, strength, balance and coordination. Required mod visual, verbal, manual and tactile cues to promote proper body alignment, promote proper body posture, promote proper body mechanics and promote proper body breathing techniques. Progressed resistance, range, repetitions and complexity of movement as indicated. Date:  9/29/22 Date:  10/03/22 Date:  10/6/22   Activity/Exercise Parameters Parameters Parameters   Nu Step 10 minutes level 6 10 minutes level 6  10 minutes level 6   Sit to stand 3 x 10 from medium plinth with BUEs 2 x 10 medium plinth emphasis on eccentric control and full trunk extension without using walker close guarding gait belt --- 3x15 from elevated plinth   Hip abduction 3 x 10 standing at bar  --- 2 x 15 each seated black band    Hip Flexion  2 x 20 alternating standing tapping 6 inch step at bar  2 x 20 6 inch step tapping alternating Seated black band x 30 reps 5 lb wt. s    [facility]  --- --- 2 x 15 5#    Hamstring Curl  --- Seated 3 x 10 blue 2 x 15 each blue 5#   Anti Rotation Press  --- 2 x 15 black band unsupported sitting legs off of floor  2 x 15 each blue and black seated   Row  ---  2 x 15 black + blue band unsupported sitting legs off of floor  2 x 15 seated blue + black band    Hip Abduction Standing  --- 3 x 10 each 3 x 10 each at bar   Gait  Sit to stand walking 20ft; turning x 3 trials  2 minutes lateral at bar  ---   Weight Shifts  --- 2 x 10 each side each laterally unilateral UE support to practice for gait and strengthening.   X 30 reps side to side and forward & back at bar   Unsupported Standing (perform in front of plinth with gait belt; be prepared to help patient recover from LOB)  4 x 30 sec each side semi tandem staggered stance     6 x 15-30 seconds each round eyes closed close guarding gait belt donned    8 x 10 sec wide base of support eyes closed    2 x 30 sec each leg as lead semi tandem (staggered stance) eyes open     1 x 30 sec narrow CYNTHIA eyes open  X10 reps x x10 sec hold narrow base & wide base         4x30 sec each leg semi tandem ( staggered stance)       MANUAL THERAPY: (0 minutes):   Joint mobilization and Soft tissue mobilization was utilized and necessary because of the patient's restricted joint motion, painful spasm, loss of articular motion and restricted motion of soft tissue. MODALITIES: (0 minutes):        None today      Treatment/Session Summary:    Treatment Assessment:   Pt. had to take multiple breaks with exercises today due to fatigue. Communication/Consultation: None today   Equipment provided today:  None today   Recommendations/Intent for next treatment session: Balance and standing exercise progressions focused on single limb stance and functional strength.      Total Treatment Billable Duration: 55 minutes   Time In: 1000  Time Out: 1100    EYAD CONDON PTA       Charge Capture  }Post Session Pain  PT Visit Info  [facility] Portal  MD Guidelines  Scanned Media  Benefits  MyChart    Future Appointments   Date Time Provider Rachel Sanchez   10/10/2022  3:30 PM Charline Espinosa [facility]   10/14/2022 11:00 AM [place], PT [facility]   10/17/2022 10:00 AM Geof[place], PT [facility]   10/20/2022 10:00 AM Charline Espinosa, [facility]   10/24/2022  2:30 PM Ariadne Xiao, [facility]   10/27/2022 10:00 AM Charline Espinosa, PTA [facility]   10/31/2022 10:00 AM Geofm [place], PT [facility]   11/3/2022 10:00 AM Geofm [place], PT [facility]   12/12/2022 10:45 AM Charan Holt MD [facility]   1/5/2023 10:40 AM Hannah Queen MD [facility]

## 2022-10-10 ENCOUNTER — HOSPITAL ENCOUNTER (OUTPATIENT)
Dept: PHYSICAL THERAPY | Age: 77
Setting detail: RECURRING SERIES
Discharge: HOME | End: 2022-10-13
Payer: MEDICARE

## 2022-10-10 PROCEDURE — 97110 THERAPEUTIC EXERCISES: CPT

## 2022-10-10 ASSESSMENT — PAIN SCALES - GENERAL: PAINLEVEL_OUTOF10: 3

## 2022-10-10 NOTE — PROGRESS NOTES
Deejay Fresh Pack  : 1945  Primary: Macey Mabry [employer]vantage Hmo  Secondary:  [address] @ [address] [place] [address]  Phone: [phone]  Fax: [phone] Plan Frequency: Twice per week for 8 eight weeks (Twice per week for eight weeks)    Plan of Care/Certification Expiration Date: 22 (Twice per week for eight weeks)      PT Visit Info:   No data recorded    OUTPATIENT PHYSICAL THERAPY:OP NOTE TYPE: Treatment Note 10/10/2022       Episode  }Appt Desk              Treatment Diagnosis:  Generalized Muscle Weakness (M62.81)  Difficulty in walking, Not elsewhere classified (R26.2)  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 Restrictions/Precautions:  Restrictions/Precautions: Cardiac; Fall Risk  No data recorded   Interventions Planned (Treatment may consist of any combination of the following):    Current Treatment Recommendations: Strengthening; ROM; Balance training; Functional mobility training; Transfer training; Endurance training; Gait training; Stair training; Neuromuscular re-education; Manual Therapy - Soft Tissue Mobilization; Manual Therapy - Joint Manipulation; Pain management; Return to work related activity; Home exercise program; Safety education & training; Patient/Caregiver education & training; Modalities; Therapeutic activities     Subjective Comments:  Pt. went to advanced prosthetic for an adjustments with his right leg. Pt. stated the added a number 5 sock to his other number 5 for a grand total of 10.   Initial:}    3/10Post Session:        010 fatigued  Medications Last Reviewed:  10/10/2022  Updated Objective Findings:   /86 pulse 83 O2 SATS  97%  Treatment   THERAPEUTIC EXERCISE: (55 minutes):    Exercises per grid below to improve mobility, strength, balance and coordination. Required mod visual, verbal, manual and tactile cues to promote proper body alignment, promote proper body posture, promote proper body mechanics and promote proper body breathing techniques. Progressed resistance, range, repetitions and complexity of movement as indicated. Date:  10/10/22 Date:  10/03/22 Date:  10/6/22   Activity/Exercise Parameters Parameters Parameters   Nu Step 10 minutes level 6 10 minutes level 6  10 minutes level 6   Sit to stand 3 x 10 from wide chair with 2 cushions with BUEs 2 x 10 medium plinth emphasis on eccentric control and full trunk extension without using walker close guarding gait belt --- 3x15 from elevated plinth   Hip abduction 3 x 10 standing at bar 5 lbs on each LE  --- 2 x 15 each seated black band    Hip Flexion  2x10 reps at bar alternating with 5 lb wt. s on each LE 2 x 20 6 inch step tapping alternating Seated black band x 30 reps 5 lb wt. s    Wells Mikhail  2x15 reps with 5 lb wts --- 2 x 15 5#    Hamstring Curl  Standing with 5 lb wt. s on each LE x 10 reps BLE's  Seated 3 x 10 blue 2 x 15 each blue 5#   Anti Rotation Press  --- 2 x 15 black band unsupported sitting legs off of floor  2 x 15 each blue and black seated   Row  2x15 black and blue band unsupported sitting  2 x 15 black + blue band unsupported sitting legs off of floor  2 x 15 seated blue + black band    Gait  Sit to stand walking 20ft; turning x 3 trials  2 minutes lateral at bar  ---   Weight Shifts  --- 2 x 10 each side each laterally unilateral UE support to practice for gait and strengthening.   X 30 reps side to side and forward & back at bar   Unsupported Standing (perform in front of plinth with gait belt; be prepared to help patient recover from LOB)  4 x 30 sec each side semi tandem staggered stance     6 x 15-30 seconds each round eyes closed close guarding gait belt donned    8 x 10 sec wide base of support eyes closed    2 x 30 sec each leg as lead semi tandem (staggered stance) eyes open     1 x 30 sec narrow CYNTHIA eyes open  X10 reps x x10 sec hold narrow base & wide base         4x30 sec each leg semi tandem ( staggered stance)       MANUAL THERAPY: (0 minutes):   Joint mobilization and Soft tissue mobilization was utilized and necessary because of the patient's restricted joint motion, painful spasm, loss of articular motion and restricted motion of soft tissue. MODALITIES: (0 minutes):        None today      Treatment/Session Summary:    Treatment Assessment:   Pt. continues to demonstrate progress with gait, balance, and exercises. Communication/Consultation: None today   Equipment provided today:  None today   Recommendations/Intent for next treatment session: Balance and standing exercise progressions focused on single limb stance and functional strength.      Total Treatment Billable Duration: 55 minutes   Time In: 9314  Time Out: [facility]       Charge Capture  }Post Session Pain  PT Visit Info  [facility] Portal  MD Guidelines  Scanned Media  Benefits  MyChart    Future Appointments   Date Time Provider Rachel Sanchez   10/14/2022 11:00 AM Yg De La Cruz, PT [facility]   10/17/2022 10:00 AM [facility]   10/20/2022 10:00 AM Norberto Florez, PTA [facility]   10/24/2022  2:30 PM Blanche Vargas, PTA [facility]   10/27/2022 10:00 AM Norberto Florez, PTA [facility]   10/31/2022 10:00 AM Yg De La Cruz, PT [facility]   11/3/2022 10:00 AM Yg De La Cruz, PT [facility]   12/12/2022 10:45 AM MD VIRGILIO Gresham [facility]   1/5/2023 10:40 AM Earlene Andrade MD [facility]

## 2022-10-14 ENCOUNTER — HOSPITAL ENCOUNTER (OUTPATIENT)
Dept: PHYSICAL THERAPY | Age: 77
Setting detail: RECURRING SERIES
Discharge: HOME | End: 2022-10-17
Payer: MEDICARE

## 2022-10-14 PROCEDURE — 97110 THERAPEUTIC EXERCISES: CPT

## 2022-10-14 NOTE — PROGRESS NOTES
Chandrika Maurice Pack  : 1945  Primary: Lea Holden [employer] Advantage Hmo  Secondary:  [address] @ [address] [place] [address]  Phone: [phone]  Fax: [phone] Plan Frequency: Twice per week for 8 eight weeks (Twice per week for eight weeks)    Plan of Care/Certification Expiration Date: 22 (Twice per week for eight weeks)      PT Visit Info:   No data recorded    OUTPATIENT PHYSICAL THERAPY:OP NOTE TYPE: Treatment Note 10/14/2022       Episode  }Appt Desk              Treatment Diagnosis:  Generalized Muscle Weakness (M62.81)  Difficulty in walking, Not elsewhere classified (R26.2)  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 Restrictions/Precautions:  Restrictions/Precautions: Cardiac; Fall Risk  No data recorded   Interventions Planned (Treatment may consist of any combination of the following):    Current Treatment Recommendations: Strengthening; ROM; Balance training; Functional mobility training; Transfer training; Endurance training; Gait training; Stair training; Neuromuscular re-education; Manual Therapy - Soft Tissue Mobilization; Manual Therapy - Joint Manipulation; Pain management; Return to work related activity; Home exercise program; Safety education & training; Patient/Caregiver education & training; Modalities; Therapeutic activities     Subjective Comments:  Patient stating prosthesis feels better after last adjustment.   Initial:}     0/10Post Session:        0/10 fatigued  Medications Last Reviewed:  10/14/2022  Updated Objective Findings:   /88mmHg 95 pulse O2 SATS  95%  Treatment   THERAPEUTIC EXERCISE: (53 minutes):    Exercises per grid below to improve mobility, strength, balance and coordination. Required mod visual, verbal, manual and tactile cues to promote proper body alignment, promote proper body posture, promote proper body mechanics and promote proper body breathing techniques. Progressed resistance, range, repetitions and complexity of movement as indicated. Date:  10/10/22 Date:  10/14/22 Date:  10/6/22   Activity/Exercise Parameters Parameters Parameters   Nu Step 10 minutes level 6 10 minutes level 5-6 10 minutes level 6   Sit to stand 3 x 10 from wide chair with 2 cushions with BUEs 2 x 10 medium plinth emphasis on eccentric control and full trunk extension without using walker close guarding gait belt --- 3x15 from elevated plinth   Hip abduction 3 x 10 standing at bar 5 lbs on each LE  3 x 10 emphasis on R knee and hip extension and upright trunk; eccentric control  2 x 15 each seated black band    Hip Flexion  2x10 reps at bar alternating with 5 lb wt. s on each LE 2 x 10 6 inch step tapping non alternating Seated black band x 30 reps 5 lb wt. s    Wells [place]  2x15 reps with 5 lb wts --- 2 x 15 5#    Hamstring Curl  Standing with 5 lb wt. s on each LE x 10 reps BLE's  --- 2 x 15 each blue 5#   Anti Rotation Press  --- --- 2 x 15 each blue and black seated   Row  2x15 black and blue band unsupported sitting  --- 2 x 15 seated blue + black band    Gait  Sit to stand walking 20ft; turning x 3 trials  Straight cane 10 ft for two trails; gait belt donned wheel chair follow with second PT.  ---   Weight Shifts  --- ---  X 30 reps side to side and forward & back at bar   Unsupported Standing (perform in front of plinth with gait belt; be prepared to help patient recover from LOB)  4 x 30 sec each side semi tandem staggered stance     6 x 15-30 seconds each round eyes closed close guarding gait belt donned    6 x 10 sec wide base of support eyes closed    3 x 30 sec each leg as lead semi tandem (staggered stance) eyes open  X10 reps x x10 sec hold narrow base & wide base         4x30 sec each leg semi tandem ( staggered stance)       MANUAL THERAPY: (0 minutes):   Joint mobilization and Soft tissue mobilization was utilized and necessary because of the patient's restricted joint motion, painful spasm, loss of articular motion and restricted motion of soft tissue. MODALITIES: (0 minutes):        None today      Treatment/Session Summary:    Treatment Assessment:   Patient challenged by session today; ambulating with CGA straight cane today; step to gait pattern. Communication/Consultation: None today   Equipment provided today:  None today   Recommendations/Intent for next treatment session: Balance and standing exercise progressions focused on single limb stance and functional strength.      Total Treatment Billable Duration: 53 minutes   Time In: 1110  Time Out: Tarun Lu 141, PT       Charge Capture  }Post Session Pain  PT Visit Info  [facility] Portal  MD Guidelines  Scanned Media  Benefits  MyChart    Future Appointments   Date Time Provider Rachel Sanchez   10/17/2022 10:00 AM Emily Alvarado, PT [facility]   10/20/2022 10:00 AM Shayla Valencia, PTA [facility]   10/24/2022  2:30 PM Dalton Stockton PTA [facility]   10/27/2022 10:00 AM Shayla Valencia, PTA [facility]   10/31/2022 10:00 AM Emily Alvarado, PT [facility]   11/3/2022 10:00 AM Emily Alvarado PT [facility]   12/12/2022 10:45 AM MD VIRGILIO Montiel [facility]   1/5/2023 10:40 AM Jovi Jasso MD [facility]PS [facility]

## 2022-10-17 ENCOUNTER — HOSPITAL ENCOUNTER (OUTPATIENT)
Dept: PHYSICAL THERAPY | Age: 77
Setting detail: RECURRING SERIES
Discharge: HOME | End: 2022-10-20
Payer: MEDICARE

## 2022-10-17 PROCEDURE — 97110 THERAPEUTIC EXERCISES: CPT

## 2022-10-17 NOTE — PROGRESS NOTES
Colleen Erwin Pack  : 1945  Primary: Tully Schirmer Medicare Advantage Hmo  Secondary:  [address] @ [address] [place] [address]  Phone: [phone]  Fax: [phone] Plan Frequency: Twice per week for 8 eight weeks (Twice per week for eight weeks)    Plan of Care/Certification Expiration Date: 22 (Twice per week for eight weeks)      PT Visit Info:   No data recorded    OUTPATIENT PHYSICAL THERAPY:OP NOTE TYPE: Treatment Note 10/17/2022       Episode  }Appt Desk              Treatment Diagnosis:  Generalized Muscle Weakness (M62.81)  Difficulty in walking, Not elsewhere classified (R26.2)  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 Restrictions/Precautions:  Restrictions/Precautions: Cardiac; Fall Risk  No data recorded   Interventions Planned (Treatment may consist of any combination of the following):    Current Treatment Recommendations: Strengthening; ROM; Balance training; Functional mobility training; Transfer training; Endurance training; Gait training; Stair training; Neuromuscular re-education; Manual Therapy - Soft Tissue Mobilization; Manual Therapy - Joint Manipulation; Pain management; Return to work related activity; Home exercise program; Safety education & training; Patient/Caregiver education & training; Modalities; Therapeutic activities     Subjective Comments:  Patient stating he fell yesterday due to recliner tipping over when he went to sit but denies injury or knee buckle. States prosthesis feels like it is fitting well.   Initial:}     0/10Post Session:        0/10 fatigued  Medications Last Reviewed: 10/17/2022  Updated Objective Findings:   /69 mmHg 97 pulse O2 SATS  95%  Treatment   THERAPEUTIC EXERCISE: (53 minutes):    Exercises per grid below to improve mobility, strength, balance and coordination. Required mod visual, verbal, manual and tactile cues to promote proper body alignment, promote proper body posture, promote proper body mechanics and promote proper body breathing techniques. Progressed resistance, range, repetitions and complexity of movement as indicated. Date:  10/10/22 Date:  10/14/22 Date:  10/17/22   Activity/Exercise Parameters Parameters Parameters   Nu Step 10 minutes level 6 10 minutes level 5-6 10 minutes level 5   Sit to stand 3 x 10 from wide chair with 2 cushions with BUEs 2 x 10 medium plinth emphasis on eccentric control and full trunk extension without using walker close guarding gait belt ---   Hip abduction 3 x 10 standing at bar 5 lbs on each LE  3 x 10 emphasis on R knee and hip extension and upright trunk; eccentric control  ---   Hip Flexion  2x10 reps at bar alternating with 5 lb wt. s on each LE 2 x 10 6 inch step tapping non alternating ---   Long Arc Quad  2x15 reps with 5 lb wts --- 3 x 10    Hamstring Curl  Standing with 5 lb wt. s on each LE x 10 reps BLE's  --- 3 x 10 green    Hamstring Stretch   --- --- 4 x 30 sec seated R only    Row  2x15 black and blue band unsupported sitting  --- ---   Gait  Sit to stand walking 20ft; turning x 3 trials  Straight cane 10 ft for two trails; gait belt donned wheel chair follow with second PT. Straight cane 10 ft for one trails; gait belt donned wheel chair follow with second PT.      Piriformis Stretch  --- ---  4 x 30 sec R only seated    Unsupported Standing (perform in front of plinth with gait belt; be prepared to help patient recover from LOB)  4 x 30 sec each side semi tandem staggered stance     6 x 15-30 seconds each round eyes closed close guarding gait belt donned    6 x 10 sec wide base of support eyes closed    3 x 30 sec each leg as lead semi tandem (staggered stance) eyes open  6 x 30 sec narrow CYNTHIA upright posture         4 x 30 sec each leg semi tandem ( staggered stance)       MANUAL THERAPY: (0 minutes):   Joint mobilization and Soft tissue mobilization was utilized and necessary because of the patient's restricted joint motion, painful spasm, loss of articular motion and restricted motion of soft tissue. MODALITIES: (0 minutes):        None today      Treatment/Session Summary:    Treatment Assessment:   Patient tolerated session well overall but more challenge at beginning of session with gait and standing exercises. Patient stating he just feels tired today. Vital signs remained WNL throughout. Communication/Consultation: None today   Equipment provided today:  None today   Recommendations/Intent for next treatment session: Balance and standing exercise progressions focused on single limb stance and functional strength.      Total Treatment Billable Duration: 53 minutes   Time In: 1000  Time Out: Darwin Hernandez PT       Charge Capture  }Post Session Pain  PT Visit Info  [facility] Portal  MD Guidelines  Scanned Media  Benefits  MyChart    Future Appointments   Date Time Provider Rachel Sanchez   10/20/2022 10:00 AM Norberto Florez [facility]   10/24/2022  2:30 PM Blanche Vargas PTA [facility] [place]   10/27/2022 10:00 AM Norberto Florez [facility]   10/31/2022 10:00 AM PAULA Hernández [facility]   11/3/2022 10:00 AM Yg De La Cruz PT [facility] [place]   12/12/2022 10:45 AM MD VIRGILIO Gresham [facility] AMB   1/5/2023 10:40 AM Earlene Andrade MD [facility]PS [facility] AM[facility]

## 2022-10-20 ENCOUNTER — HOSPITAL ENCOUNTER (OUTPATIENT)
Dept: PHYSICAL THERAPY | Age: 77
Setting detail: RECURRING SERIES
Discharge: HOME | End: 2022-10-23
Payer: MEDICARE

## 2022-10-20 PROCEDURE — 97110 THERAPEUTIC EXERCISES: CPT

## 2022-10-20 ASSESSMENT — PAIN SCALES - GENERAL: PAINLEVEL_OUTOF10: 0

## 2022-10-20 NOTE — PROGRESS NOTES
Rony Merchant  : 1945  Primary: Kip Sanchez Medicare Advantage Hmo  Secondary:  [address] @ [address] [place] [id number]  Phone: [phone]  Fax: [phone] Plan Frequency: Twice per week for 8 eight weeks (Twice per week for eight weeks)    Plan of Care/Certification Expiration Date: 22 (Twice per week for eight weeks)      PT Visit Info:   No data recorded    OUTPATIENT PHYSICAL THERAPY:OP NOTE TYPE: Treatment Note 10/20/2022       Episode  }Appt Desk              Treatment Diagnosis:  Generalized Muscle Weakness (M62.81)  Difficulty in walking, Not elsewhere classified (R26.2)  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 Restrictions/Precautions:  Restrictions/Precautions: Cardiac; Fall Risk  No data recorded   Interventions Planned (Treatment may consist of any combination of the following):    Current Treatment Recommendations: Strengthening; ROM; Balance training; Functional mobility training; Transfer training; Endurance training; Gait training; Stair training; Neuromuscular re-education; Manual Therapy - Soft Tissue Mobilization; Manual Therapy - Joint Manipulation; Pain management; Return to work related activity; Home exercise program; Safety education & training; Patient/Caregiver education & training; Modalities;  Therapeutic activities     Subjective Comments:  Pt. reported no falls and stated he goes for an adjustment on Monday Oct. 24 th  Initial:}    00/10Post Session:        0/10 fatigued  Medications Last Reviewed:  10/20/2022  Updated Objective Findings:   Pt.'s /66 O2 SATS 100%    Treatment   THERAPEUTIC EXERCISE: (55 minutes):    Exercises per grid below to improve mobility, strength, balance and coordination. Required mod visual, verbal, manual and tactile cues to promote proper body alignment, promote proper body posture, promote proper body mechanics and promote proper body breathing techniques. Progressed resistance, range, repetitions and complexity of movement as indicated. Date:  10/20/22 Date:  10/14/22 Date:  10/17/22   Activity/Exercise Parameters Parameters Parameters   Nu Step 10 minutes level 5 10 minutes level 5-6 10 minutes level 5   Sit to stand 3 x 10 from elevated plinth 2 x 10 medium plinth emphasis on eccentric control and full trunk extension without using walker close guarding gait belt ---   Hip abduction 3 x 10 standing at bar BLE's 3 x 10 emphasis on R knee and hip extension and upright trunk; eccentric control  ---   Hip Flexion  2x10 reps at bar alternating BLE's  2 x 10 6 inch step tapping non alternating ---   Long Arc Quad  2x15 reps with 5 lb wts --- 3 x 10    Hamstring Curl  Standing at bar BLE's x 20 reps  --- 3 x 10 green    Hamstring Stretch   --- --- 4 x 30 sec seated R only    Row  2x15 black and blue band unsupported sitting  --- ---   Gait  Sit to stand walking 20ft; turning x 3 trials  Straight cane 10 ft for two trails; gait belt donned wheel chair follow with second PT. Straight cane 10 ft for one trails; gait belt donned wheel chair follow with second PT.      Piriformis Stretch  --- ---  4 x 30 sec R only seated    Unsupported Standing (perform in front of plinth with gait belt; be prepared to help patient recover from LOB)  4 x 30 sec each side semi tandem staggered stance     6 x 15-30 seconds each round eyes closed close guarding gait belt donned    6 x 10 sec wide base of support eyes closed    3 x 30 sec each leg as lead semi tandem (staggered stance) eyes open  6 x 30 sec narrow CYNTHIA upright posture         4 x 30 sec each leg semi tandem ( staggered stance) MANUAL THERAPY: (0 minutes):   Joint mobilization and Soft tissue mobilization was utilized and necessary because of the patient's restricted joint motion, painful spasm, loss of articular motion and restricted motion of soft tissue. MODALITIES: (0 minutes):        None today      Treatment/Session Summary:    Treatment Assessment:   Pt. compliant with all exercises but required multiple mini breaks. Discussed with patients daughter about his prosthesis being off centered but after the adjustment on Monday he should do a lot better and she said she did not know of that appointment  Communication/Consultation: None today   Equipment provided today:  None today   Recommendations/Intent for next treatment session: Balance and standing exercise progressions focused on single limb stance and functional strength.      Total Treatment Billable Duration: 55 minutes   Time In: 1000  Time Out: 1100    EYAD CONDON PTA       Charge Capture  }Post Session Pain  PT Visit Info  [facility] Portal  MD Guidelines  Scanned Media  Benefits  MyChart    Future Appointments   Date Time Provider Rachel Sanchez   10/24/2022  2:30 PM Pratik Miller, LUIS [facility]   10/27/2022 10:00 AM Cam Snyder PTA [facility]   10/31/2022 10:00 AM Marina Seat, PT [facility]   11/3/2022 10:00 AM Marina Seat, PT [facility]   12/12/2022 10:45 AM MD VIRGILIO Keenan [facility]   1/5/2023 10:40 AM Laron Coronel MD [facility]

## 2022-10-24 ENCOUNTER — HOSPITAL ENCOUNTER (OUTPATIENT)
Dept: PHYSICAL THERAPY | Age: 77
Setting detail: RECURRING SERIES
Discharge: HOME | End: 2022-10-27
Payer: MEDICARE

## 2022-10-24 PROCEDURE — 97110 THERAPEUTIC EXERCISES: CPT

## 2022-10-24 ASSESSMENT — PAIN SCALES - GENERAL: PAINLEVEL_OUTOF10: 0

## 2022-10-24 NOTE — PROGRESS NOTES
Maria E Lindsey Pack  : 1945  Primary: Caitlin Espinoza [employer]  Secondary:  [address] @ [address] [place] [address]  Phone: [phone]  Fax: [phone] Plan Frequency: Twice per week for 8 eight weeks (Twice per week for eight weeks)    Plan of Care/Certification Expiration Date: 22 (Twice per week for eight weeks)      PT Visit Info:   No data recorded    OUTPATIENT PHYSICAL THERAPY:OP NOTE TYPE: Treatment Note 10/24/2022       Episode  }Appt Desk              Treatment Diagnosis:  Generalized Muscle Weakness (M62.81)  Difficulty in walking, Not elsewhere classified (R26.2)  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 Restrictions/Precautions:  Restrictions/Precautions: Cardiac; Fall Risk  No data recorded   Interventions Planned (Treatment may consist of any combination of the following):    Current Treatment Recommendations: Strengthening; ROM; Balance training; Functional mobility training; Transfer training; Endurance training; Gait training; Stair training; Neuromuscular re-education; Manual Therapy - Soft Tissue Mobilization; Manual Therapy - Joint Manipulation; Pain management; Return to work related activity; Home exercise program; Safety education & training; Patient/Caregiver education & training; Modalities; Therapeutic activities     Subjective Comments:  Patient reports no pain today. and no recent falls  Initial:}    0/10Post Session:       0/10 fatigued  Medications Last Reviewed:  10/24/2022  Updated Objective Findings:   Pt.'s /66 O2 SATS 100%    Treatment   THERAPEUTIC EXERCISE: (55 minutes): Exercises per grid below to improve mobility, strength, balance and coordination. Required mod visual, verbal, manual and tactile cues to promote proper body alignment, promote proper body posture, promote proper body mechanics and promote proper body breathing techniques. Progressed resistance, range, repetitions and complexity of movement as indicated. Date:  10/20/22 Date:  10/24/22 Date:  10/17/22   Activity/Exercise Parameters Parameters Parameters   Nu Step 10 minutes level 5 12 minutes level 5-6 10 minutes level 5   Sit to stand 3 x 10 from elevated plinth 4 x 10 elevated plinth ---   Hip abduction 3 x 10 standing at bar BLE's  ---   Hip Flexion  2x10 reps at bar alternating BLE's   ---   Long Arc Quad  2x15 reps with 5 lb wts 3 lbs 2 x 15  5 lbs 2 x 10 3 x 10    Hamstring Curl  Standing at bar BLE's x 20 reps  Black 3 x 10 3 x 10 green    Hamstring Stretch   --- 3 x 30 sec 4 x 30 sec seated R only    Row  2x15 black and blue band unsupported sitting  Black i seated 2 x 15 ---   Gait  Sit to stand walking 20ft; turning x 3 trials   Straight cane 10 ft for one trails; gait belt donned wheel chair follow with second PT. Piriformis Stretch  --- ---  4 x 30 sec R only seated    Unsupported Standing (perform in front of plinth with gait belt; be prepared to help patient recover from LOB)  4 x 30 sec each side semi tandem staggered stance     6 x 15-30 seconds each round eyes closed close guarding gait belt donned    6 x 20 sec wide base of support eyes closed    3 x 30 sec each leg as lead semi tandem (staggered stance) eyes open  6 x 30 sec narrow CYNTHIA upright posture         4 x 30 sec each leg semi tandem ( staggered stance)       MANUAL THERAPY: (0 minutes):   Joint mobilization and Soft tissue mobilization was utilized and necessary because of the patient's restricted joint motion, painful spasm, loss of articular motion and restricted motion of soft tissue.       MODALITIES: (0 minutes): None today      Treatment/Session Summary:    Treatment Assessment:   Fatigued after session  Communication/Consultation: None today   Equipment provided today:  None today   Recommendations/Intent for next treatment session: Balance and standing exercise progressions focused on single limb stance and functional strength.      Total Treatment Billable Duration: 53 minutes   Time In: 5973  Time Out: 1520    SIXTO PEREIRA PTA       Charge Capture  }Post Session Pain  PT Visit Info  [employer] Portal  MD Guidelines  Scanned Media  Benefits  MyChart    Future Appointments   Date Time Provider Rachel Sanchez   10/27/2022 10:00 AM Cam Snyder PTA [facility]   10/31/2022 10:00 AM Marina Seat, PT [facility]   11/3/2022 10:00 AM Marina Seat, PT [facility]   12/12/2022 10:45 AM Allison Hoover MD [facility]   1/5/2023 10:40 AM Laron Coronel MD [facility]

## 2022-10-27 ENCOUNTER — HOSPITAL ENCOUNTER (OUTPATIENT)
Dept: PHYSICAL THERAPY | Age: 77
Setting detail: RECURRING SERIES
Discharge: HOME | End: 2022-10-30
Payer: MEDICARE

## 2022-10-27 PROCEDURE — 97110 THERAPEUTIC EXERCISES: CPT

## 2022-10-27 ASSESSMENT — PAIN SCALES - GENERAL: PAINLEVEL_OUTOF10: 0

## 2022-10-27 NOTE — PROGRESS NOTES
Soila Merchant  : 1945  Primary: Luz Jennings [employer]  Secondary:  [address] @ [address] [place] [id number]  Phone: [phone]  Fax: [phone] Plan Frequency: Twice per week for 8 eight weeks (Twice per week for eight weeks)    Plan of Care/Certification Expiration Date: 22 (Twice per week for eight weeks)      PT Visit Info:   No data recorded    OUTPATIENT PHYSICAL THERAPY:OP NOTE TYPE: Treatment Note 10/27/2022       Episode  }Appt Desk              Treatment Diagnosis:  Generalized Muscle Weakness (M62.81)  Difficulty in walking, Not elsewhere classified (R26.2)  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 Restrictions/Precautions:  Restrictions/Precautions: Cardiac; Fall Risk  No data recorded   Interventions Planned (Treatment may consist of any combination of the following):    Current Treatment Recommendations: Strengthening; ROM; Balance training; Functional mobility training; Transfer training; Endurance training; Gait training; Stair training; Neuromuscular re-education; Manual Therapy - Soft Tissue Mobilization; Manual Therapy - Joint Manipulation; Pain management; Return to work related activity; Home exercise program; Safety education & training; Patient/Caregiver education & training; Modalities; Therapeutic activities     Subjective Comments:  Pt. reported no pain and stated morning appointments are better for him. Pt. is a morning person.   Initial:}    0/10Post Session:       0/10 fatigued  Medications Last Reviewed:  10/27/2022  Updated Objective Findings:   Pt.'s /79 pulse 99    Treatment tissue mobilization was utilized and necessary because of the patient's restricted joint motion, painful spasm, loss of articular motion and restricted motion of soft tissue. MODALITIES: (0 minutes):        None today      Treatment/Session Summary:    Treatment Assessment:   Pt. continues to require multiple mini rest breaks due to fatigue. Pt. stated he felt more of a good hamstring stretch in supine on plith with strap than in seated. Communication/Consultation: None today   Equipment provided today:  None today   Recommendations/Intent for next treatment session: Balance and standing exercise progressions focused on single limb stance and functional strength.      Total Treatment Billable Duration: 55 minutes   Time In: 1000  Time Out: 9121    EYAD CONDON PTA       Charge Capture  }Post Session Pain  PT Visit Info  [employer] Portal  MD Guidelines  Scanned Media  Benefits  MyChart    Future Appointments   Date Time Provider Rachel Sanchez   10/31/2022 10:00 AM Jacob Bhatt, PT [facility]   11/3/2022 10:00 AM Jacob Bhatt PT [facility]   12/12/2022 10:45 AM MD VIRGILIO Morin [facility]   1/5/2023 10:40 AM Rosa Maria Darling MD [facility]

## 2022-10-31 ENCOUNTER — HOSPITAL ENCOUNTER (OUTPATIENT)
Dept: PHYSICAL THERAPY | Age: 77
Setting detail: RECURRING SERIES
Discharge: HOME | End: 2022-11-03
Payer: MEDICARE

## 2022-10-31 PROCEDURE — 97110 THERAPEUTIC EXERCISES: CPT

## 2022-10-31 ASSESSMENT — PAIN SCALES - GENERAL: PAINLEVEL_OUTOF10: 0

## 2022-10-31 NOTE — PROGRESS NOTES
Keanu Calixto Pack  : 1945  Primary: Sruthi Rosales [employer] Hmo  Secondary:  [address] @ [address] [place] [address]  Phone: [phone]  Fax: [phone] Plan Frequency: Twice per week for 8 eight weeks (Twice per week for eight weeks)    Plan of Care/Certification Expiration Date: 22 (Twice per week for eight weeks)      PT Visit Info:   No data recorded    OUTPATIENT PHYSICAL THERAPY:OP NOTE TYPE: Treatment Note 10/31/2022       Episode  }Appt Desk              Treatment Diagnosis:  Generalized Muscle Weakness (M62.81)  Difficulty in walking, Not elsewhere classified (R26.2)  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 Restrictions/Precautions:  Restrictions/Precautions: Cardiac; Fall Risk  No data recorded   Interventions Planned (Treatment may consist of any combination of the following):    Current Treatment Recommendations: Strengthening; ROM; Balance training; Functional mobility training; Transfer training; Endurance training; Gait training; Stair training; Neuromuscular re-education; Manual Therapy - Soft Tissue Mobilization; Manual Therapy - Joint Manipulation; Pain management; Return to work related activity; Home exercise program; Safety education & training; Patient/Caregiver education & training; Modalities; Therapeutic activities     Subjective Comments:   Patient reports that his knee is fitting better today. He reports that he put two extra socks on and it seems to help prosthetic leg to feel better. Patient reports that he is only using oxygen when out and about. He is using 2 liters today.   Initial:} 0/10Post Session:       0/10   Medications Last Reviewed:  10/31/2022  Updated Objective Findings:   O2 During Session Pre 98% During Ranged 99% to 89%, Post 98%    Treatment   THERAPEUTIC EXERCISE: (55 minutes):    Exercises per grid below to improve mobility, strength, balance and coordination. Required mod visual, verbal, manual and tactile cues to promote proper body alignment, promote proper body posture, promote proper body mechanics and promote proper body breathing techniques. Progressed resistance, range, repetitions and complexity of movement as indicated.      Date:  10/20/22 Date:  10/24/22 Date:  10/31/2022   Activity/Exercise Parameters Parameters Parameters   Nu Step 10 minutes level 5 12 minutes level 5-6    Sit to stand 3 x 10 from elevated plinth 4 x 10 elevated plinth 4 x 10 Chair, Airex x 2    Hip abduction 3 x 10 standing at bar BLE's     Hip Flexion  2x10 reps at bar alternating BLE's      Long Arc Quad  2x15 reps with 5 lb wts 3 lbs 2 x 15  5 lbs 2 x 10    Hamstring Curl  Standing at bar BLE's x 20 reps  Black 3 x 10    Hamstring Stretch   --- 3 x 30 sec    Row  2x15 black and blue band unsupported sitting  Black i seated 2 x 15    Gait  Sit to stand walking 20ft; turning x 3 trials   Single Point Cane, CGA with Gait Belt 2 x 10 feet    3 x 150 feet with Rolling Walker SBA   Piriformis Stretch  --- ---     Unsupported Standing (perform in front of plinth with gait belt; be prepared to help patient recover from LOB) 4 x 30 sec each side semi tandem staggered stance     6 x 15-30 seconds each round eyes closed close guarding gait belt donned    6 x 20 sec wide base of support eyes closed    3 x 30 sec each leg as lead semi tandem (staggered stance) eyes open  Performed  in [employer] with Airex x 2, 6 x 20 sec Equal Weight Bearing, Eyes Open, NO UE Support   Core Strength/Reaching   Seated Blue Med Ball Flexion/Extension Up and Over Head to Wall and Down to Stool 3 x 10   Core Strength/Rotation   Seated Blue Med [employer] From Stools on Each Side 3 x 10       MANUAL THERAPY: (0 minutes):   Joint mobilization and Soft tissue mobilization was utilized and necessary because of the patient's restricted joint motion, painful spasm, loss of articular motion and restricted motion of soft tissue. MODALITIES: (0 minutes):        None today      Treatment/Session Summary:    Treatment Assessment:    Patient demonstrated improved strength and endurance. Patient was able to walk with cane straight 2 x 10 feet with CGA. He was educated regarding the importance of correct breathing, specifically with activity. His heart rate increased significantly when holding his breath. He was able to conserve energy with correct breathing. He also was instructed regarding making turns while walking, to be able to maintain balance and safety. Patient would benefit from continued progression of strength and stability to progress ambulation, function, and safety. Communication/Consultation: None today   Equipment provided today:  None today   Recommendations/Intent for next treatment session: Balance and standing exercise progressions focused on single limb stance and functional strength.      Total Treatment Billable Duration: 53 minutes   Time In: 1000  Time Out: 2408 [address], PT       Charge Capture  }Post Session Pain  PT Visit [facility] Portal  MD Guidelines  Scanned Media  Benefits  MyChart    Future Appointments   Date Time Provider Rachel Sanchez   11/3/2022 10:00 AM Xu Raya PT [facility]   12/12/2022 10:45 AM MD VIRGILIO Dhillon [facility]   1/5/2023 10:40 AM Lydia Bronson MD [facility]

## 2022-11-03 ENCOUNTER — HOSPITAL ENCOUNTER (OUTPATIENT)
Dept: PHYSICAL THERAPY | Age: 77
Setting detail: RECURRING SERIES
Discharge: HOME | End: 2022-11-06
Payer: MEDICARE

## 2022-11-03 PROCEDURE — 97110 THERAPEUTIC EXERCISES: CPT

## 2022-11-03 ASSESSMENT — PAIN SCALES - GENERAL: PAINLEVEL_OUTOF10: 0

## 2022-11-03 NOTE — PROGRESS NOTES
Ross Sanabria Pack  : 1945  Primary: Edgar Fuentes [employer] Hmo  Secondary:  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 Restrictions/Precautions:  Restrictions/Precautions: Cardiac; Fall Risk  No data recorded   Interventions Planned (Treatment may consist of any combination of the following):    Current Treatment Recommendations: Strengthening; ROM; Balance training; Functional mobility training; Transfer training; Endurance training; Gait training; Stair training; Neuromuscular re-education; Manual Therapy - Soft Tissue Mobilization; Manual Therapy - Joint Manipulation; Pain management; Return to work related activity; Home exercise program; Safety education & training; Patient/Caregiver education & training; Modalities; Therapeutic activities     Subjective Comments:   Patient reported his left knee prosthesis feels better since he has added additional socks. Pt. Stated the doctor for his prosthesis will make him a smaller sleeve when he goes next week.    Initial:}    010Post Session:       0/10   Medications Last Reviewed: 11/3/2022  Updated Objective Findings:   Pt,'s initial /79 pulse 99 & O2 SATS 98%    Treatment   THERAPEUTIC EXERCISE: (55 minutes):    Exercises per grid below to improve mobility, strength, balance and coordination. Required mod visual, verbal, manual and tactile cues to promote proper body alignment, promote proper body posture, promote proper body mechanics and promote proper body breathing techniques. Progressed resistance, range, repetitions and complexity of movement as indicated.      Date:  11/3/22 Date:  10/24/22 Date:  10/31/2022   Activity/Exercise Parameters Parameters Parameters   Nu Step 10 minutes level 5 12 minutes level 5-6    Sit to stand 3 x 10 from elevated plinth 4 x 10 elevated plinth 4 x 10 Chair, Airex x 2    Hip abduction 3 x 10 standing at bar BLE's     Hip Flexion  2x10 reps at bar alternating BLE's      Long Arc Quad  2x15 reps with 5 lb wts 3 lbs 2 x 15  5 lbs 2 x 10    Hamstring Curl  Standing at bar BLE's x 20 reps  Black 3 x 10    Hamstring Stretch   --- 3 x 30 sec    Row  2x15 black and blue band unsupported sitting  Black i seated 2 x 15    Gait  Sit to stand walking 20ft; turning x 3 trials   Single Point Cane, CGA with Gait Belt 2 x 10 feet    3 x 150 feet with Rolling Walker SBA   Piriformis Stretch  --- ---     Unsupported Standing (perform in front of plinth with gait belt; be prepared to help patient recover from LOB) 4 x 30 sec each side semi tandem staggered stance     6 x 15-30 seconds each round eyes closed close guarding gait belt donned    6 x 20 sec wide base of support eyes closed    3 x 30 sec each leg as lead semi tandem (staggered stance) eyes open  Performed  in [employer] with Airex x 2, 6 x 20 sec Equal Weight Bearing, Eyes Open, NO UE Support   Core Strength/Reaching   Seated Blue Med Ball Flexion/Extension Up and Over Head to Wall and Down to Stool 3 x 10   Core Strength/Rotation   Seated Blue Med [employer] From Stools on Each Side 3 x 10 MANUAL THERAPY: (0 minutes):   Joint mobilization and Soft tissue mobilization was utilized and necessary because of the patient's restricted joint motion, painful spasm, loss of articular motion and restricted motion of soft tissue. MODALITIES: (0 minutes):        None today      Treatment/Session Summary:    Treatment Assessment:    Patient required multiple rest breaks due to fatigue and shortness of breath due to mouth breathing. O2 SATS remained mid to upper 90's. Pt.. to go to prosthesis doctor next week. Communication/Consultation: None today   Equipment provided today:  None today   Recommendations/Intent for next treatment session: Balance and standing exercise progressions focused on single limb stance and functional strength.      Total Treatment Billable Duration: 55 minutes   Time In: 1000  Time Out: 1100    EYAD CONDON PTA       Charge Capture  }Post Session Pain  PT Visit Info  [employer] Portal  MD Guidelines  Scanned Media  Benefits  MyChart    Future Appointments   Date Time Provider Rachel Sanchez   11/10/2022 10:00 AM Xu Raya PT [facility]   12/5/2022 11:40 AM MD TROY CastilloP [facility]   12/12/2022 10:45 AM MD VIRGILIO Dhillon [facility]   1/5/2023 10:40 AM Lydia Bronson MD [facility]

## 2022-11-10 ENCOUNTER — HOSPITAL ENCOUNTER (OUTPATIENT)
Dept: PHYSICAL THERAPY | Age: 77
Setting detail: RECURRING SERIES
Discharge: HOME | End: 2022-11-13
Payer: MEDICARE

## 2022-11-10 PROCEDURE — 97110 THERAPEUTIC EXERCISES: CPT

## 2022-11-10 NOTE — PROGRESS NOTES
Colleen Merchant  : 1945  Primary: [employer]  Secondary:  [address] @ [address] [place] [address]  Phone: [phone]  Fax: [phone] Plan Frequency: Twice per week for 8 eight weeks (Twice per week for eight weeks)    Plan of Care/Certification Expiration Date: 23      PT Visit Info:    No data recorded    OUTPATIENT PHYSICAL THERAPY:OP NOTE TYPE: Therapy Recertification                 Episode  Appt Desk         Treatment Diagnosis:  Generalized Muscle Weakness (M62.81)  Difficulty in walking, Not elsewhere classified (R26.2)  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 Anson Amaral is a 77year old male presenting with an overall decline in balance, functional mobility, transfers, ability to ambulate, and overall function following R below knee amputation 2021 after spraining his ankle at work and then acquiring a wound from altered gait. His daughter is present with him today. He underwent an ankle reconstruction in the past but otherwise had no issues until spraining the ankle. 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 He completed six week of inpatient rehab and was making excellent progress. He states by the end of October he was discharged to home and had been practicing well with his prosthesis for about one week.  Home improved balance, proprioception and vestibular function. His overall stamina has improved and is requiring fewer rest periods during sessions and improving in ability to maintain single limb stance and upright posture with gait and standing activities. He continues to demonstrate gait deviations and evidence of imbalance with unsupported standing. He has been limited in progress to some degree due to difficulties getting prosthesis fitting and adjustments. He will benefit from additional skilled PT to provide exercise progressions geared towards functional strength, endurance, balance, and gait training to work towards remainder of therapeutic goals. Without skilled PT he will remain at risk for future falls and injuries and decreased functional independence. THERAPY RECERTIFICATION 13/62/70: Mr. Faviola Wood has been for 39 sessions of physical therapy from 6/13/22 to 11/10/2022  with two cancellations, zero no shows and with significant success. He now reports feeling improvements in endurance, mobility, strength, and pain tolerance. He has met 4 out of 10 goals since beginning physical therapy. The patient completes 11 repetitions during the 30-second sit-to-stand test. His TUG score has not improved significantly with a time of 17.7 seconds. The patient still presents with weakness, decreased functional tolerance, gait and ambulation deficits, risk of falls, and increased pain. Patient would like to progress to using a single point cane during ambulation from a wheeled walker. Cris Tucker will continue to benefit from home exercise program, therapeutic and postural strengthening exercises, manual therapeutic techniques as appropriate to address their current condition. Cris Tucker will continue to benefit from skilled PT (medically necessary) to address above deficits affecting participation in basic ADLs and overall functional tolerance.           OBJECTIVE     Observation/Postural and Gait Assessment: Patient has tendency to stand and ambulate with significant external rotation and out toeing especially on the R.     Palpation: To be performed at future session as indicated. AROM: To be tested further at future visit as indicated. Strength:  Manual Muscle Test (out of 5) Left Right   Knee extension 5 from 5 5   Knee flexion 5 from 5 5   Hip flexion 5 from 5 5 from 4+   Ankle DF 5 from 5 ---   Ankle PF 5 from 4 ---     Passive Accessory Motion: Not performed due to nature of impairments; will be performed in the future as indicated. Balance Tests:  Modified CTSIB: NT                               30 second sit to stand: plinth at 49cm with BUEs 11 repetitions. Four Stage Balance Test: Semi tandem stance bilaterally     22:  30 second sit to stand: 49cm from plinth 11 repetitions  Modified CTSIB: 30/120 seconds   Four stage: Semi Tandem     22:  30 second sit to stand: NT  Modified CTSIB: 60/120 seconds  Four stage: Semi tandem     22:  TU.4 seconds  30 second sit to stand: 9 repetitions 49cm from plinth   Four stage: Semi tandem   Modified CTSIB: 60/120 seconds    11/10/2022:   TU.7 seconds  30 seconds sit to stand: 10 repetitions 50 cm plinth   Four stage: semi tandem   Modified CTSIB: NT    Neurological Screen:  Myotomes: Key muscle strength testing through bilateral LE is intact. Dermatomes: Sensation testing through bilateral lower quadrants for light touch is intact. Functional Mobility:  Patient ambulates with prosthesis and rolling front wheeled walker independently. Performs sit to and from stand transfers independently with effort. Demonstrates significant out toeing and externally rotated hip especially on the R side with forward flexed trunk. Patient able to ambulate one lap in the gym safely and independently; SpO2 dropped to 88% after one lap on portable oxygen tank, recovered to baseline within five minute rest period. 07/18/22: Patient continues to ambulate with gait deviations using rolling walker but improved cardiopulmonary stamina overall. O2 sats remain 93% or above throughout session. ASSESSMENT   Problem List: (Impacting functional limitations): Body Structures, Functions, Activity Limitations Requiring Skilled Therapeutic Intervention: Decreased functional mobility ; Decreased ADL status; Decreased ROM; Decreased tolerance to work activity; Decreased strength; Decreased endurance; Decreased sensation; Decreased balance; Decreased coordination; Increased pain; Decreased posture     Therapy Prognosis:   Therapy Prognosis: Good        PLAN   Effective Dates: 06/13/2022 TO Plan of Care/Certification Expiration Date: 02/09/23     Frequency/Duration: Plan Frequency: Twice per week for 8 eight weeks (Twice per week for eight weeks)     Interventions Planned (Treatment may consist of any combination of the following):    Current Treatment Recommendations: Strengthening; ROM; Balance training; Functional mobility training; Transfer training; Endurance training; Gait training; Stair training; Neuromuscular re-education; Manual Therapy - Soft Tissue Mobilization; Manual Therapy - Joint Manipulation; Pain management; Return to work related activity; Home exercise program; Safety education & training; Patient/Caregiver education & training; Modalities; Therapeutic activities     Goals: (Goals have been discussed and agreed upon with patient.)  Short-Term Functional Goals: Time Frame: 06/13/2022 to 07/11/2022  Patient will ambulate for six minutes using front wheeled walker without requiring a seated rest period. (GOAL MET)  Patient will maintain eyes closed static standing for 30 seconds on even surface safely with guarding.  (GOAL MET)  Patient will perform sit to stand transfer from standard chair height without BUEs for one repetition. (ONGOING)  Discharge Goals: Time Frame: 06/13/2022 to 11/24/2022   Patient will ambulate with straight cane safely and independently over even surfaces. (ONGOING)  Patient will ambulate with rolling walker over uneven surfaces or when ambulating for prolonged distances. (ONGOING)  Patient will perform sit to stand and sit to and from supine transfers safely and independently without cues. (GOAL MET)  Patient will improve TUG score to 14 seconds or less to indicate improved gait speed and decreased fall risk. (ONGOING)  Patient will improve modified CTSIB to 120/120 to indicate improved proprioception and vestibular function and a decreased fall risk. (ONGOING)  Patient will improve 30 second sit to stand score to 10 repetitions without BUEs. (GOAL MET)  Patient will return to working at [employer] part time duty with modifications. (ONGOING)          Outcome Measure: Tool Used: Lower Extremity Functional Scale (LEFS)  Score:  Initial: 34/80 Most Recent: 43/80 (Date: 07/15/22)    31/80 (8/11/22)    36/80 09/29/22    28/80 11/10/2022    Interpretation of Score: 20 questions each scored on a 5 point scale with 0 representing \"extreme difficulty or unable to perform\" and 4 representing \"no difficulty\". The lower the score, the greater the functional disability. 80/80 represents no disability. Minimal detectable change is 9 points. Tool Used: Timed Up and Go (TUG)  Score:  Initial: 20.4 seconds Most Recent: 19 seconds (Date: 07/18/22 )    17.1 seconds (08/11/22)    16.4 seconds 09/29/22     17.7 seconds 11/10/2022    Interpretation of Score: The test measures, in seconds, the time taken by an individual to stand up from a standard arm chair (seat height 46 cm [18 in], arm height 65 cm [25.6 in]), walk a distance of 3 meters (118 in, approx 10 ft), turn, walk back to the chair and sit down. If the individual takes longer than 14 seconds to complete TUG, this indicates risk for falls.     Medical Necessity:   Patient is expected to demonstrate progress in strength, range of motion, balance, coordination and functional technique to increase independence with functional mobility and ADLs. Skilled intervention continues to be required due to need for exercise progressions tailored to patient needs, goals, and impairments . Reason For Services/Other Comments:  Patient continues to require skilled intervention due to need for exercise progressions, manual therapy, plan of care modifications and exceptions tailored to patient presentation. Regarding Ross Merchant's therapy, I certify that the treatment plan above will be carried out by a therapist or under their direction.   Thank you for this referral,  Jean-Pierre Hilario, PT     Referring Physician Signature: Eddie Salcedo PA-C _______________________________ Date _____________        Post Session Pain  Charge Capture  PT Visit Info  POC Link  Treatment Note Link  MD Guidelines  MyChart

## 2022-11-10 NOTE — PROGRESS NOTES
Phyllis Giraldo Pack  : 1945  Primary: [address]  [employer]  Secondary:  [address] @ [address] [place] [id number]  Phone: [phone]  Fax: [phone] Plan Frequency: Twice per week for 8 eight weeks (Twice per week for eight weeks)    Plan of Care/Certification Expiration Date: 23      PT Visit Info:   No data recorded    OUTPATIENT PHYSICAL THERAPY:OP NOTE TYPE: Treatment Note 11/10/2022       Episode  }Appt Desk              Treatment Diagnosis:  Generalized Muscle Weakness (M62.81)  Difficulty in walking, Not elsewhere classified (R26.2)  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 Restrictions/Precautions:  Restrictions/Precautions: Cardiac; Fall Risk  No data recorded   Interventions Planned (Treatment may consist of any combination of the following):    Current Treatment Recommendations: Strengthening; ROM; Balance training; Functional mobility training; Transfer training; Endurance training; Gait training; Stair training; Neuromuscular re-education; Manual Therapy - Soft Tissue Mobilization; Manual Therapy - Joint Manipulation; Pain management; Return to work related activity; Home exercise program; Safety education & training; Patient/Caregiver education & training; Modalities; Therapeutic activities     Subjective Comments:  Patient arrives to therapy with shortness of breath and fatigue.    Initial:}    0/10Post Session:       0/10   Medications Last Reviewed:  11/10/2022  Updated Objective Findings:   Patient's initial /70 pulse 99 & O2 SATS 88%, then 96% after rest; 98% throughout sessions    Treatment THERAPEUTIC EXERCISE: (54 minutes):    Exercises per grid below to improve mobility, strength, balance and coordination. Required mod visual, verbal, manual and tactile cues to promote proper body alignment, promote proper body posture, promote proper body mechanics and promote proper body breathing techniques. Progressed resistance, range, repetitions and complexity of movement as indicated.      Date:  11/3/22 Date:  11/11/22 Date:  10/31/2022   Activity/Exercise Parameters Parameters Parameters   Nu Step 10 minutes level 5 10 minutes level 5    Sit to stand 3 x 10 from elevated plinth 2 x 10 elevated plinth 4 x 10 Chair, Airex x 2    Hip abduction 3 x 10 standing at bar BLE's     Hip Flexion  2x10 reps at bar alternating BLE's      Long Arc Quad  2x15 reps with 5 lb wts     Hamstring Curl  Standing at bar BLE's x 20 reps      Hamstring Stretch   ---     Row  2x15 black and blue band unsupported sitting      Gait  Sit to stand walking 20ft; turning x 3 trials  1 lap, CGA, Wheeled walker Single [employer], CGA with Gait Belt 2 x 10 feet    3 x 150 feet with Rolling Walker SBA   Piriformis Stretch  --- ---     Unsupported Standing (perform in front of plinth with gait belt; be prepared to help patient recover from LOB) 4 x 30 sec each side semi tandem staggered stance     6 x 15-30 seconds each round eyes closed close guarding gait belt donned    --- Performed  in [employer] with Airex x 2, 6 x 20 sec Equal Weight Bearing, Eyes Open, NO UE Support   Core Strength/Reaching   Seated Blue Med Ball Flexion/Extension Up and Over Head to Wall and Down to Stool 3 x 10   Core Strength/Rotation   Seated Blue Med [employer] From Stools on Each Side 3 x 10   TUG  2 attempts    Balance  5 minutes 4-step: feet together and semi-tandem       Re-Certification assessment and measurements         MANUAL THERAPY: (0 minutes):   Joint mobilization and Soft tissue mobilization was utilized and necessary because of the patient's restricted joint motion, painful spasm, loss of articular motion and restricted motion of soft tissue. MODALITIES: (0 minutes):        None today      Treatment/Session Summary:    Treatment Assessment:   See recertification note from today   Communication/Consultation: None today   Equipment provided today:  None today   Recommendations/Intent for next treatment session: Balance and standing exercise progressions focused on single limb stance and functional strength.      Total Treatment Billable Duration: 54 minutes   Time In: 1000  Time Out: [address], PT       Charge Capture  }Post Session Pain  PT Visit [facility] Portal  MD Guidelines  Scanned Media  Benefits  MyChart    Future Appointments   Date Time Provider Rachel Sanchez   11/14/2022  9:00 AM Rosalea Agustina, PTA [facility]   11/17/2022 10:00 AM Rosalea Agustina, PTA [facility]   11/21/2022  9:00 AM Rosalea Agustina, PTA [facility]   11/28/2022  9:00 AM Rosalea Agustina, PTA [facility]   12/1/2022  9:00 AM Rosalea Agustina, PTA [facility]   12/5/2022  9:00 AM Alee Basket, PT [facility]   12/5/2022 11:40 AM Rex Ogden MD [facility]   12/8/2022  9:00 AM Alee Basket, PT [facility]   12/12/2022 10:45 AM Bethany Vides MD [facility]   12/12/2022  1:30 PM Rosalea Agustina, PTA [facility]   12/15/2022  9:00 AM Alee Basket, PT [facility]   12/19/2022  2:30 PM Alee Basket, PT [facility]   12/22/2022  9:00 AM Rosalea Agustina, PTA [facility]   12/27/2022  9:00 AM Rosalea Agustina, PTA [facility]   12/30/2022  9:00 AM Alee Basket, PT [facility]   1/3/2023  9:00 AM Rosalea Agustina, PTA [facility]   1/5/2023 10:40 AM Eliu Parks MD [facility]   1/6/2023 10:00 AM Alee Basket, PT [facility]

## 2022-11-11 ASSESSMENT — PAIN SCALES - GENERAL: PAINLEVEL_OUTOF10: 0

## 2022-11-14 ENCOUNTER — HOSPITAL ENCOUNTER (OUTPATIENT)
Dept: PHYSICAL THERAPY | Age: 77
Setting detail: RECURRING SERIES
Discharge: HOME | End: 2022-11-17
Payer: MEDICARE

## 2022-11-14 PROCEDURE — 97110 THERAPEUTIC EXERCISES: CPT

## 2022-11-14 ASSESSMENT — PAIN SCALES - GENERAL: PAINLEVEL_OUTOF10: 0

## 2022-11-14 NOTE — PROGRESS NOTES
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 Restrictions/Precautions:  Restrictions/Precautions: Cardiac; Fall Risk  No data recorded   Interventions Planned (Treatment may consist of any combination of the following):    Current Treatment Recommendations: Strengthening; ROM; Balance training; Functional mobility training; Transfer training; Endurance training; Gait training; Stair training; Neuromuscular re-education; Manual Therapy - Soft Tissue Mobilization; Manual Therapy - Joint Manipulation; Pain management; Return to work related activity; Home exercise program; Safety education & training; Patient/Caregiver education & training; Modalities;  Therapeutic activities     Subjective Comments:  Pt. reported no pain and has no issues   Initial:}    0/10Post Session:       0/10   Medications Last Reviewed:  2022  Updated Objective Findings:   Pt.'s initial BP was 128/80 pulse 62  and O2 SATS 96%    Treatment   THERAPEUTIC EXERCISE: (55 minutes):    Exercises per grid below to improve mobility, strength, balance and coordination. Required mod visual, verbal, manual and tactile cues to promote proper body alignment, promote proper body posture, promote proper body mechanics and promote proper body breathing techniques. Progressed resistance, range, repetitions and complexity of movement as indicated. Date:  11/3/22 Date:  11/11/22 Date:  11/14/22   Activity/Exercise Parameters Parameters Parameters   Nu Step 10 minutes level 5 10 minutes level 5 X 10 mins level 5    Sit to stand 3 x 10 from elevated plinth 2 x 10 elevated plinth 2xx 10 Chair, Airex x 2    Hip abduction 3 x 10 standing at bar BLE's 5 lb wt. s standing at bar 2x15 reps  X 20 reps 5 lb wt.s    Hip Flexion  2x10 reps at bar alternating BLE's  2x10 reps  X 20 reps 5 lb wt. s    [employer]  2x15 reps with 5 lb wts X 20 reps  X 20 reps with 5 lb wt.s    Hamstring Curl  Standing at bar BLE's x 20 reps   X 20 reps 5 lb wt.s    Hamstring Stretch   ---  4x30 sec hold strap seated    Row  2x15 black and blue band unsupported sitting   X 45 reps with black & blue band in chair sitting    Gait  Sit to stand walking 20ft; turning x 3 trials  1 lap, CGA, Wheeled walker    x 150 feet with Rolling Walker SBA   Piriformis Stretch  --- ---  ---------   Unsupported Standing (perform in front of plinth with gait belt; be prepared to help patient recover from LOB) 4 x 30 sec each side semi tandem staggered stance     6 x 15-30 seconds each round eyes closed close guarding gait belt donned    --- --------   Core Strength/Reaching   --------   Core Strength/Rotation   --------   TUG  2 attempts ------   Balance  5 minutes 4-step: feet together and semi-tandem  X 5 mins in intervals of variations of foot placement     Re-Certification assessment and measurements  ------------       MANUAL THERAPY: (0 minutes):   Joint mobilization and Soft tissue mobilization was utilized and necessary because of the patient's restricted joint motion, painful spasm, loss of articular motion and restricted motion of soft tissue. MODALITIES: (0 minutes):        None today      Treatment/Session Summary:    Treatment Assessment: Pt. Stated he liked performing standing exercises with 5 lb wt. s and was compliant       Communication/Consultation: None today   Equipment provided today:  None today   Recommendations/Intent for next treatment session: Balance and standing exercise progressions focused on single limb stance and functional strength.      Total Treatment Billable Duration: 55 minutes   Time In: 0900  Time Out: 1000    EYAD CONDON PTA       Charge Capture  }Post Session Pain  PT Visit Info  [facility] Portal  MD Guidelines  Scanned Media  Benefits  MyChart    Future Appointments   Date Time Provider Rachel Laura   11/17/2022 10:00 AM Leiva Board, PTA [facility]   11/21/2022  9:00 AM Leiva Board, PTA [facility]   11/28/2022  9:00 AM Leiva Board, PTA [facility]   12/1/2022  9:00 AM Leiva Board, PTA [facility]   12/5/2022  9:00 AM Isabel Finch, PT [facility]   12/5/2022 11:40 AM Wendy Mariscal MD [facility]   12/8/2022  9:00 AM Czech Frarai, PT [facility]   12/12/2022 10:45 AM Aniceto Bowden MD [facility]   12/12/2022  1:30 PM Leiva Board, PTA [facility]   12/15/2022  9:00 AM Czech Fray, PT [facility]   12/19/2022  2:30 PM Czech Fray, PT [facility]   12/22/2022  9:00 AM Leiva Board, PTA [facility]   12/27/2022  9:00 AM Leiva Board, PTA [facility]   12/30/2022  9:00 AM Czech Frarai, PT [facility]   1/3/2023  9:00 AM Leiva Board, PTA [facility]   1/5/2023 10:40 AM Tariq Panda MD [facility]   1/6/2023 10:00 AM Czech Stef, PT [facility]

## 2022-11-17 ENCOUNTER — HOSPITAL ENCOUNTER (OUTPATIENT)
Dept: PHYSICAL THERAPY | Age: 77
Setting detail: RECURRING SERIES
Discharge: HOME | End: 2022-11-20
Payer: MEDICARE

## 2022-11-17 PROCEDURE — 97110 THERAPEUTIC EXERCISES: CPT

## 2022-11-17 ASSESSMENT — PAIN SCALES - GENERAL: PAINLEVEL_OUTOF10: 0

## 2022-11-17 NOTE — PROGRESS NOTES
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 Restrictions/Precautions:  Restrictions/Precautions: Cardiac; Fall Risk  No data recorded   Interventions Planned (Treatment may consist of any combination of the following):    Current Treatment Recommendations: Strengthening; ROM; Balance training; Functional mobility training; Transfer training; Endurance training; Gait training; Stair training; Neuromuscular re-education; Manual Therapy - Soft Tissue Mobilization; Manual Therapy - Joint Manipulation; Pain management; Return to work related activity; Home exercise program; Safety education & training; Patient/Caregiver education & training; Modalities; Therapeutic activities     Subjective Comments:  Pt. reported no pain and no issues today.    Initial:}    0/10Post Session:       0/10   Medications Last Reviewed:  2022  Updated Objective Findings:  Pt.'s initial /73 pulse 61 O2 SATS 97%    Treatment   THERAPEUTIC EXERCISE: (55 minutes):    Exercises per grid below to improve mobility, strength, balance and coordination. Required mod visual, verbal, manual and tactile cues to promote proper body alignment, promote proper body posture, promote proper body mechanics and promote proper body breathing techniques. Progressed resistance, range, repetitions and complexity of movement as indicated. Date:  11/17/22 Date:  11/11/22 Date:  11/14/22   Activity/Exercise Parameters Parameters Parameters   Nu Step 10 minutes level 5 10 minutes level 5 X 10 mins level 5    Sit to stand 3 x 10 from elevated plinth 2 x 10 elevated plinth 2xx 10 Chair, Airex x 2    Hip abduction 3 x 10 standing at bar BLE's with 5 lb wt. s  5 lb wt. s standing at bar 2x15 reps  X 20 reps 5 lb wt.s    Hip Flexion  2x10 reps at bar alternating BLE's with 5 lb wt.s  2x10 reps  X 20 reps 5 lb wt. s    Wells [place]  2x15 reps with 5 lb wts X 20 reps  X 20 reps with 5 lb wt.s    Hamstring Curl  Standing at bar BLE's x 20 reps 5 lb wt. s  X 20 reps 5 lb wt.s    Hamstring Stretch   4x30 sec hold each seated strap  4x30 sec hold strap seated    Row  2x15 black and blue band unsupported sitting   X 45 reps with black & blue band in chair sitting    Gait  Side stepping at wall x 3 mins  1 lap, CGA, Wheeled walker    x 150 feet with Rolling Walker SBA   Piriformis Stretch  --- ---  ---------   Unsupported Standing (perform in front of plinth with gait belt; be prepared to help patient recover from LOB) 4 x 30 sec each side semi tandem staggered stance      --- --------   Core Strength/Reaching -----  --------   Core Strength/Rotation ---------  --------   TUG -------- 2 attempts ------   Balance  5 minutes 4-step: feet together and semi-tandem  X 5 mins in intervals of variations of foot placement     Re-Certification assessment and measurements  ------------       MANUAL THERAPY: (0 minutes):   Joint mobilization and Soft tissue mobilization was utilized and necessary because of the patient's restricted joint motion, painful spasm, loss of articular motion and restricted motion of soft tissue. MODALITIES: (0 minutes):        None today      Treatment/Session Summary:    Treatment Assessment: Pt. Continues to be compliant with all exercises and demonstrate improvements with endurance, balance, and strength      Communication/Consultation: None today   Equipment provided today:  None today   Recommendations/Intent for next treatment session: Balance and standing exercise progressions focused on single limb stance and functional strength.      Total Treatment Billable Duration: 55 minutes   Time In: 1000  Time Out: [address] [facility]       Charge Capture  }Post Session Pain  PT Visit Info  MedBridge Portal  MD Guidelines  Scanned Media  Benefits  MyChart    Future Appointments   Date Time Provider Rachel Sanchez   11/21/2022  9:00 AM Kole Cabrera, PTA [facility] [place] [facility]   11/28/2022  9:00 AM Kole Cabrera, PTA [facility] [place] [facility]   12/1/2022  9:00 AM Kole Cabrera, PTA [facility]   12/5/2022  9:00 AM Euna Algona, PT [facility]   12/5/2022 11:40 AM Dawna Murphy MD [facility]   12/8/2022  9:00 AM Euna Algona, PT [facility]   12/12/2022 10:45 AM Alice Hanna MD [facility]   12/12/2022  1:30 PM Kole Cabrera, PTA [facility]   12/15/2022  9:00 AM Euna Bao, PT [facility]   12/19/2022  2:30 PM Euna Bao, PT [facility]   12/22/2022  9:00 AM Kole Cabrera, PTA [facility]   12/27/2022  9:00 AM Kole Summeri, PTA [facility]   12/30/2022  9:00 AM Euna Algona, PT [facility]   1/3/2023  9:00 AM Kole Cabrera, PTA [facility]   1/5/2023 10:40 AM Phillip Mcdaniels MD [facility]   1/6/2023 10:00 AM Euna Algona, PT [facility]

## 2022-11-21 ENCOUNTER — HOSPITAL ENCOUNTER (OUTPATIENT)
Dept: PHYSICAL THERAPY | Age: 77
Setting detail: RECURRING SERIES
Discharge: HOME | End: 2022-11-24
Payer: MEDICARE

## 2022-11-21 PROCEDURE — 97110 THERAPEUTIC EXERCISES: CPT

## 2022-11-21 ASSESSMENT — PAIN SCALES - GENERAL: PAINLEVEL_OUTOF10: 0

## 2022-11-21 NOTE — PROGRESS NOTES
Jakob Carry Pack  : 1945  Primary: Goyo Cunningham Medicare Advantage Hmo  Secondary:  [address] @ [address] [place] [address]  Phone: [phone]  Fax: [phone] Plan Frequency: Twice per week for 8 eight weeks (Twice per week for eight weeks)    Plan of Care/Certification Expiration Date: 23      PT Visit Info:   No data recorded    OUTPATIENT PHYSICAL THERAPY:OP NOTE TYPE: Treatment Note 2022       Episode  }Appt Desk              Treatment Diagnosis:  Generalized Muscle Weakness (M62.81)  Difficulty in walking, Not elsewhere classified (R26.2)  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 Restrictions/Precautions:  Restrictions/Precautions: Cardiac; Fall Risk  No data recorded   Interventions Planned (Treatment may consist of any combination of the following):    Current Treatment Recommendations: Strengthening; ROM; Balance training; Functional mobility training; Transfer training; Endurance training; Gait training; Stair training; Neuromuscular re-education; Manual Therapy - Soft Tissue Mobilization; Manual Therapy - Joint Manipulation; Pain management; Return to work related activity; Home exercise program; Safety education & training; Patient/Caregiver education & training; Modalities;  Therapeutic activities     Subjective Comments:  Pt. continues to report no pain today   Initial:}    0/10Post Session:       0/10   Medications Last Reviewed:  2022  Updated Objective Findings:  Pt.'s initial /82 Pulse 93 O2 SATS 96%    Treatment   THERAPEUTIC EXERCISE: (55 minutes):    Exercises per grid below to improve mobility, strength, balance and coordination. Required mod visual, verbal, manual and tactile cues to promote proper body alignment, promote proper body posture, promote proper body mechanics and promote proper body breathing techniques. Progressed resistance, range, repetitions and complexity of movement as indicated. Date:  11/17/22 Date:  11/21/22 Date:  11/14/22   Activity/Exercise Parameters Parameters Parameters   Nu Step 10 minutes level 5 10 minutes level 5 X 10 mins level 5    Sit to stand 3 x 10 from elevated plinth 2 x 10 elevated plinth 2xx 10 Chair, Airex x 2    Hip abduction 3 x 10 standing at bar BLE's with 5 lb wt. s  5 lb wt. s standing at bar 2x15 reps  X 20 reps 5 lb wt.s    Hip Flexion  2x10 reps at bar alternating BLE's with 5 lb wt.s  2x10 reps standing at bar X 20 reps 5 lb wt. s    Wells [place]  2x15 reps with 5 lb wts X 20 reps 5 lb wt. s  X 20 reps with 5 lb wt.s    Hamstring Curl  Standing at bar BLE's x 20 reps 5 lb wt. s Standing at bar BLE's x 20 reps  X 20 reps 5 lb wt.s    Hamstring Stretch   4x30 sec hold each seated strap 4x30 sec hold strap seated  4x30 sec hold strap seated    Row  2x15 black and blue band unsupported sitting  X 30 reps blue and black bands X 45 reps with black & blue band in chair sitting    Gait  Side stepping at wall x 3 mins  1 lap, CGA, Wheeled walker    x 150 feet with Rolling Walker SBA   Piriformis Stretch  --- ---  ---------   Unsupported Standing (perform in front of plinth with gait belt; be prepared to help patient recover from LOB) 4 x 30 sec each side semi tandem staggered stance      --- --------   Core Strength/Reaching ----- -------- --------   Core Strength/Rotation --------- ---------- --------   TUG -------- 2 attempts ------   Balance  5 minutes 4-step: feet together and semi-tandem  X 5 mins in intervals of variations of foot placement     Re-Certification assessment and measurements  ------------       MANUAL THERAPY: (0 minutes):   Joint mobilization and Soft tissue mobilization was utilized and necessary because of the patient's restricted joint motion, painful spasm, loss of articular motion and restricted motion of soft tissue. NONE TODAY    MODALITIES: (0 minutes):        None today      Treatment/Session Summary:    Treatment Assessment: Pt. Continues to be compliant with all exercises and demonstrate improvements with endurance, balance, and strength      Communication/Consultation: None today   Equipment provided today:  None today   Recommendations/Intent for next treatment session: Balance and standing exercise progressions focused on single limb stance and functional strength.      Total Treatment Billable Duration: 55 minutes   Time In: 0900  Time Out: 1000    EYAD CONDON PTA       Charge Capture  }Post Session Pain  PT Visit Info  MedBridge Portal  MD Guidelines  Scanned Media  Benefits  [employer]    Future Appointments   Date Time Provider Rachel Sanchez   11/28/2022  9:00 AM Katia Dodge, PTA [facility] [place]   12/1/2022  9:00 AM Katia Dodge, PTA [facility] [place]   12/5/2022  9:00 AM Franchot Bending, PT [facility]   12/5/2022 11:40 AM Joce Collins MD [facility]   12/8/2022  9:00 AM Franchot Bending, PT [facility]   12/12/2022 10:45 AM Allyn Diop MD DS [facility]   12/12/2022  1:30 PM Katia Kathyi, PTA [facility]   12/15/2022  9:00 AM Franchot Bending, PT [facility]   12/19/2022  2:30 PM Franchot Bending, PT [facility]   12/22/2022  9:00 AM Katia Gianotti, PTA [facility]   12/27/2022  9:00 AM Katia Gianotti, PTA [facility]   12/30/2022  9:00 AM Franchot Bending, PT [facility]   1/3/2023  9:00 AM Katia Gianotti, PTA [facility]   1/5/2023 10:40 AM Lydia Bronson MD [facility]   1/6/2023 10:00 AM Franchot Bending, PT [facility]

## 2022-11-28 ENCOUNTER — HOSPITAL ENCOUNTER (OUTPATIENT)
Dept: PHYSICAL THERAPY | Age: 77
Setting detail: RECURRING SERIES
Discharge: HOME | End: 2022-12-01
Payer: MEDICARE

## 2022-11-28 PROCEDURE — 97110 THERAPEUTIC EXERCISES: CPT

## 2022-11-28 ASSESSMENT — PAIN SCALES - GENERAL: PAINLEVEL_OUTOF10: 0

## 2022-11-28 NOTE — PROGRESS NOTES
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 Restrictions/Precautions:  Restrictions/Precautions: Cardiac; Fall Risk  No data recorded   Interventions Planned (Treatment may consist of any combination of the following):    Current Treatment Recommendations: Strengthening; ROM; Balance training; Functional mobility training; Transfer training; Endurance training; Gait training; Stair training; Neuromuscular re-education; Manual Therapy - Soft Tissue Mobilization; Manual Therapy - Joint Manipulation; Pain management; Return to work related activity; Home exercise program; Safety education & training; Patient/Caregiver education & training; Modalities;  Therapeutic activities     Subjective Comments:  Pt. continues to report no pain today   Initial:}    0/10Post Session:       0/10   Medications Last Reviewed:  2022  Updated Objective Findings:  Pt.'s initial /78 pulse 83 and O2 SATS 97%    Treatment   THERAPEUTIC EXERCISE: (55 minutes):    Exercises per grid below to improve mobility, strength, balance and coordination. Required mod visual, verbal, manual and tactile cues to promote proper body alignment, promote proper body posture, promote proper body mechanics and promote proper body breathing techniques. Progressed resistance, range, repetitions and complexity of movement as indicated. Date:  11/17/22 Date:  11/21/22 Date:  11/28//22   Activity/Exercise Parameters Parameters Parameters   Nu Step 10 minutes level 5 10 minutes level 5 X 10 mins level 5    Sit to stand 3 x 10 from elevated plinth 2 x 10 elevated plinth 2x10 reps elevated plinth    Hip abduction 3 x 10 standing at bar BLE's with 5 lb wt. s  5 lb wt. s standing at bar 2x15 reps  2x10 reps standing at bar   Hip Flexion  2x10 reps at bar alternating BLE's with 5 lb wt.s  2x10 reps standing at bar X 20 reps standing at bar   Wells [place]  2x15 reps with 5 lb wts X 20 reps 5 lb wt. s  X 20 reps seated    Hamstring Curl  Standing at bar BLE's x 20 reps 5 lb wt. s Standing at bar BLE's x 20 reps  X 20 reps 5 lb wt.s    Hamstring Stretch   4x30 sec hold each seated strap 4x30 sec hold strap seated  4x20 sec hold strap seated BLE's   Row  2x15 black and blue band unsupported sitting  X 30 reps blue and black bands X 45 reps with black & blue band in chair sitting    Gait  Side stepping at wall x 3 mins  1 lap, CGA, Wheeled walker    x 150 feet with Rolling Walker SBA   Piriformis Stretch  --- ---  ---------   Unsupported Standing (perform in front of plinth with gait belt; be prepared to help patient recover from LOB) 4 x 30 sec each side semi tandem staggered stance      --- 4x30 second hold each side by side, staggered, and tandem each LE   Core Strength/Reaching ----- -------- --------   Core Strength/Rotation --------- ---------- --------   TUG -------- 2 attempts ------   Balance  5 minutes 4-step: feet together and semi-tandem  X 5 mins in intervals of variations of foot placement     Re-Certification assessment and measurements  ------------       MANUAL THERAPY: (0 minutes):   Joint mobilization and Soft tissue mobilization was utilized and necessary because of the patient's restricted joint motion, painful spasm, loss of articular motion and restricted motion of soft tissue. NONE TODAY    MODALITIES: (0 minutes):        None today      Treatment/Session Summary:    Treatment Assessment: Pt. Continues to be compliant with all exercises and demonstrate improvements with endurance, balance, and strength      Communication/Consultation: None today   Equipment provided today:  None today   Recommendations/Intent for next treatment session: Balance and standing exercise progressions focused on single limb stance and functional strength.      Total Treatment Billable Duration: 55 minutes   Time In: 0900  Time Out: 1000    EYAD CONDON PTA       Charge Capture  }Post Session Pain  PT Visit Info  [facility] Portal  MD Guidelines  Scanned Media  Benefits  [employer]    Future Appointments   Date Time Provider Rachel Sanchez   12/1/2022  9:00 AM Patsi Neighbours, PTA [facility]   12/5/2022  9:00 AM Dorthey [place], PT [facility]   12/5/2022 11:40 AM Tru Simons MD PFP [facility]   12/8/2022  9:00 AM Dorthey [place], PT [facility]   12/12/2022 10:45 AM Mayda Black MD DS [facility]   12/12/2022  1:30 PM Patsi Neighbours, PTA [facility]   12/15/2022  9:00 AM Dorthey Spry, PT [facility]   12/19/2022  2:30 PM Dorthey Spry, PT [facility]   12/22/2022  9:00 AM Patsi Neighbours, PTA [facility]   12/27/2022  9:00 AM Patsi Neighbours, PTA [facility]   12/30/2022  9:00 AM Dorthey Spry, PT [facility]   1/3/2023  9:00 AM Patsi Neighbours, PTA [facility]   1/5/2023 10:40 AM Lionel Mayo MD [facility]   1/6/2023 10:00 AM Dorthey Spry, PT [facility]

## 2022-12-01 ENCOUNTER — HOSPITAL ENCOUNTER (OUTPATIENT)
Dept: PHYSICAL THERAPY | Age: 77
Setting detail: RECURRING SERIES
Discharge: HOME | End: 2022-12-04
Payer: MEDICARE

## 2022-12-01 PROCEDURE — 97110 THERAPEUTIC EXERCISES: CPT

## 2022-12-01 ASSESSMENT — PAIN SCALES - GENERAL: PAINLEVEL_OUTOF10: 0

## 2022-12-01 NOTE — PROGRESS NOTES
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 Restrictions/Precautions:  Restrictions/Precautions: Cardiac; Fall Risk  No data recorded   Interventions Planned (Treatment may consist of any combination of the following):    Current Treatment Recommendations: Strengthening; ROM; Balance training; Functional mobility training; Transfer training; Endurance training; Gait training; Stair training; Neuromuscular re-education; Manual Therapy - Soft Tissue Mobilization; Manual Therapy - Joint Manipulation; Pain management; Return to work related activity; Home exercise program; Safety education & training; Patient/Caregiver education & training; Modalities; Therapeutic activities     Subjective Comments:  Pt. continues to report no pain   Initial:}    0/10Post Session:       0/10   Medications Last Reviewed:  2022  Updated Objective Findings:  Pt.'s initial /85 pulse 94 & O2 SATS 95%. Pt. Continues to require multiple rest breaks due to fatigue.   Pt.'s balance is still an issue that requires a gait belt. Treatment   THERAPEUTIC EXERCISE: (55 minutes):    Exercises per grid below to improve mobility, strength, balance and coordination. Required mod visual, verbal, manual and tactile cues to promote proper body alignment, promote proper body posture, promote proper body mechanics and promote proper body breathing techniques. Progressed resistance, range, repetitions and complexity of movement as indicated. Date:  12/1/22 Date:  11/21/22 Date:  11/28//22   Activity/Exercise Parameters Parameters Parameters   Nu Step 10 minutes level 5 10 minutes level 5 X 10 mins level 5    Sit to stand 3 x 10 from elevated plinth 2 x 10 elevated plinth 2x10 reps elevated plinth    Hip abduction 3 x 10 standing at bar BLE's with 5 lb wt. s  5 lb wt. s standing at bar 2x15 reps  2x10 reps standing at bar   Hip Flexion  2x10 reps at bar alternating BLE's with 5 lb wt.s  2x10 reps standing at bar X 20 reps standing at bar   [employer] Mikhail  2x15 reps with 5 lb wts X 20 reps 5 lb wt. s  X 20 reps seated    Hamstring Curl  Standing at bar BLE's x 20 reps 5 lb wt. s Standing at bar BLE's x 20 reps  X 20 reps 5 lb wt.s    Hamstring Stretch   4x30 sec hold each seated strap 4x30 sec hold strap seated  4x20 sec hold strap seated BLE's   Row  2x15 black and blue band unsupported sitting  X 30 reps blue and black bands X 45 reps with black & blue band in chair sitting    Gait  Side stepping at wall x 3 mins  1 lap, CGA, Wheeled walker    x 150 feet with Rolling Walker SBA   Piriformis Stretch  --- ---  ---------   Unsupported Standing (perform in front of plinth with gait belt; be prepared to help patient recover from LOB) 4 x 30 sec each side semi tandem staggered stance      --- 4x30 second hold each side by side, staggered, and tandem each LE   Core Strength/Reaching ----- -------- --------   Core Strength/Rotation --------- ---------- --------   TUG -------- 2 attempts ------   Balance X 5 mins with different variations with base of support 5 minutes 4-step: feet together and semi-tandem  X 5 mins in intervals of variations of foot placement     Re-Certification assessment and measurements  ------------       MANUAL THERAPY: (0 minutes): None today  Joint mobilization and Soft tissue mobilization was utilized and necessary because of the patient's restricted joint motion, painful spasm, loss of articular motion and restricted motion of soft tissue. MODALITIES: (0 minutes):        None today      Treatment/Session Summary:    Treatment Assessment: Pt. Continues to present right foot on prosthesis externally rotated and everted hindering his balance. Communication/Consultation: None today   Equipment provided today:  None today   Recommendations/Intent for next treatment session: Balance and standing exercise progressions focused on single limb stance and functional strength.      Total Treatment Billable Duration: 55 minutes   Time In: 9316  Time Out: 1000    EYAD CONDON PTA       Charge Capture  }Post Session Pain  PT Visit Info  [employer] Portal  MD Guidelines  Scanned Media  Benefits  MyChart    Future Appointments   Date Time Provider Rachel Garvini   12/5/2022  9:00 AM Selene Farrell PT [facility]   12/5/2022 11:40 AM Alecia Hogue MD PFP [facility]   12/8/2022  9:00 AM Selene Farrell, PT [facility]   12/12/2022 10:45 AM Stephie Ng MD [facility]   12/12/2022  1:30 PM Anna Chain, PTA [facility]   12/15/2022  9:00 AM Anna Chain, PTA [facility]   12/20/2022 10:00 AM Anna Chain, PTA [facility]   12/22/2022  9:00 AM Anna Chain, PTA [facility]   12/27/2022  9:00 AM Anna Chain, PTA [facility]   12/30/2022  9:00 AM Anna Chain, PTA [facility]   1/3/2023  9:00 AM Anna Chain, PTA [facility]   1/5/2023 10:40 AM Melissa Matamoros MD [facility]PS [facility]   1/6/2023 10:00 AM Franc Krause, PT [facility]

## 2022-12-05 ENCOUNTER — OFFICE VISIT (OUTPATIENT)
Dept: FAMILY MEDICINE CLINIC | Facility: CLINIC | Age: 77
End: 2022-12-05
Payer: MEDICARE

## 2022-12-05 ENCOUNTER — HOSPITAL ENCOUNTER (OUTPATIENT)
Dept: PHYSICAL THERAPY | Age: 77
Setting detail: RECURRING SERIES
Discharge: HOME | End: 2022-12-08
Payer: MEDICARE

## 2022-12-05 VITALS
HEART RATE: 95 BPM | BODY MASS INDEX: 44.1 KG/M2 | OXYGEN SATURATION: 97 % | WEIGHT: 315 LBS | HEIGHT: 71 IN | SYSTOLIC BLOOD PRESSURE: 114 MMHG | TEMPERATURE: 97.3 F | DIASTOLIC BLOOD PRESSURE: 70 MMHG

## 2022-12-05 DIAGNOSIS — J96.11 CHRONIC RESPIRATORY FAILURE WITH HYPOXIA (HCC): ICD-10-CM

## 2022-12-05 DIAGNOSIS — N40.1 BENIGN PROSTATIC HYPERPLASIA WITH LOWER URINARY TRACT SYMPTOMS, SYMPTOM DETAILS UNSPECIFIED: ICD-10-CM

## 2022-12-05 DIAGNOSIS — G62.89 MIXED AXONAL-DEMYELINATING POLYNEUROPATHY: ICD-10-CM

## 2022-12-05 DIAGNOSIS — E11.42 CONTROLLED TYPE 2 DIABETES MELLITUS WITH DIABETIC POLYNEUROPATHY, WITHOUT LONG-TERM CURRENT USE OF INSULIN (HCC): Primary | ICD-10-CM

## 2022-12-05 DIAGNOSIS — Z91.81 AT HIGH RISK FOR FALLS: ICD-10-CM

## 2022-12-05 DIAGNOSIS — E78.2 MIXED HYPERLIPIDEMIA: ICD-10-CM

## 2022-12-05 DIAGNOSIS — I42.0 DILATED CARDIOMYOPATHY (HCC): ICD-10-CM

## 2022-12-05 DIAGNOSIS — E66.2 MORBID (SEVERE) OBESITY WITH ALVEOLAR HYPOVENTILATION (HCC): ICD-10-CM

## 2022-12-05 DIAGNOSIS — I25.10 ATHEROSCLEROSIS OF NATIVE CORONARY ARTERY OF NATIVE HEART WITHOUT ANGINA PECTORIS: ICD-10-CM

## 2022-12-05 DIAGNOSIS — N18.30 STAGE 3 CHRONIC KIDNEY DISEASE, UNSPECIFIED WHETHER STAGE 3A OR 3B CKD (HCC): ICD-10-CM

## 2022-12-05 DIAGNOSIS — I48.11 LONGSTANDING PERSISTENT ATRIAL FIBRILLATION (HCC): ICD-10-CM

## 2022-12-05 PROCEDURE — 3044F HG A1C LEVEL LT 7.0%: CPT | Performed by: FAMILY MEDICINE

## 2022-12-05 PROCEDURE — 1123F ACP DISCUSS/DSCN MKR DOCD: CPT | Performed by: FAMILY MEDICINE

## 2022-12-05 PROCEDURE — 97110 THERAPEUTIC EXERCISES: CPT

## 2022-12-05 PROCEDURE — 99214 OFFICE O/P EST MOD 30 MIN: CPT | Performed by: FAMILY MEDICINE

## 2022-12-05 RX ORDER — POLYETHYLENE GLYCOL 3350 17 G/17G
17 POWDER, FOR SOLUTION ORAL DAILY
Qty: 17 G | Refills: 3 | Status: CANCELLED | OUTPATIENT
Start: 2022-12-05

## 2022-12-05 RX ORDER — ATORVASTATIN CALCIUM 40 MG/1
40 TABLET, FILM COATED ORAL DAILY
Qty: 90 TABLET | Refills: 3 | Status: SHIPPED | OUTPATIENT
Start: 2022-12-05

## 2022-12-05 RX ORDER — PREGABALIN 200 MG/1
200 CAPSULE ORAL 2 TIMES DAILY
Qty: 60 CAPSULE | Refills: 5 | Status: SHIPPED | OUTPATIENT
Start: 2022-12-05 | End: 2023-01-04

## 2022-12-05 RX ORDER — FUROSEMIDE 20 MG/1
20 TABLET ORAL 2 TIMES DAILY PRN
Qty: 60 TABLET | Refills: 5 | Status: SHIPPED | OUTPATIENT
Start: 2022-12-05

## 2022-12-05 RX ORDER — GUAIFENESIN 1200 MG/1
TABLET, EXTENDED RELEASE ORAL 2 TIMES DAILY
Qty: 14 TABLET | Refills: 3 | Status: CANCELLED | OUTPATIENT
Start: 2022-12-05

## 2022-12-05 RX ORDER — TAMSULOSIN HYDROCHLORIDE 0.4 MG/1
0.4 CAPSULE ORAL DAILY
Qty: 90 CAPSULE | Refills: 3 | Status: SHIPPED | OUTPATIENT
Start: 2022-12-05

## 2022-12-05 RX ORDER — ALBUTEROL SULFATE 90 UG/1
2 AEROSOL, METERED RESPIRATORY (INHALATION) EVERY 6 HOURS PRN
Qty: 1 EACH | Refills: 5 | Status: SHIPPED | OUTPATIENT
Start: 2022-12-05

## 2022-12-05 ASSESSMENT — PATIENT HEALTH QUESTIONNAIRE - PHQ9
2. FEELING DOWN, DEPRESSED OR HOPELESS: 0
SUM OF ALL RESPONSES TO PHQ QUESTIONS 1-9: 0
SUM OF ALL RESPONSES TO PHQ9 QUESTIONS 1 & 2: 0
SUM OF ALL RESPONSES TO PHQ QUESTIONS 1-9: 0
1. LITTLE INTEREST OR PLEASURE IN DOING THINGS: 0

## 2022-12-05 ASSESSMENT — ANXIETY QUESTIONNAIRES
GAD7 TOTAL SCORE: 0
2. NOT BEING ABLE TO STOP OR CONTROL WORRYING: 0
6. BECOMING EASILY ANNOYED OR IRRITABLE: 0
5. BEING SO RESTLESS THAT IT IS HARD TO SIT STILL: 0
4. TROUBLE RELAXING: 0
7. FEELING AFRAID AS IF SOMETHING AWFUL MIGHT HAPPEN: 0
3. WORRYING TOO MUCH ABOUT DIFFERENT THINGS: 0
IF YOU CHECKED OFF ANY PROBLEMS ON THIS QUESTIONNAIRE, HOW DIFFICULT HAVE THESE PROBLEMS MADE IT FOR YOU TO DO YOUR WORK, TAKE CARE OF THINGS AT HOME, OR GET ALONG WITH OTHER PEOPLE: NOT DIFFICULT AT ALL
1. FEELING NERVOUS, ANXIOUS, OR ON EDGE: 0

## 2022-12-05 ASSESSMENT — ENCOUNTER SYMPTOMS
SHORTNESS OF BREATH: 1
ABDOMINAL PAIN: 0
CONSTIPATION: 0
VOMITING: 0
BACK PAIN: 0
NAUSEA: 0
CHEST TIGHTNESS: 0
WHEEZING: 0
DIARRHEA: 0
COUGH: 0

## 2022-12-05 NOTE — PATIENT INSTRUCTIONS
Start jardiance for his heart. You can ask Cardiology about it but it will help his sugars too. Trey Estradas as will also help his kidneys. See which med his insurance co will pay for.

## 2022-12-05 NOTE — PROGRESS NOTES
[facility]  [address]  Phone [phone]  Fax:  [phone]    Patient: Jong Gauthier  YOB: 1945  Patient Age 69 y.o. Patient sex: male  Medical Record:  [id number]  Visit Date: 12/05/22    [facility] Clinic Note     Chief Complaint   Patient presents with    Diabetes     6 month diabetic estelle        History of Present Illness:    Pt here for 6 mos f/u on Dm. Has been in and out of the hospital for respiratory failure. Now on O2. Does see pulm. No hospitalizations in a while. He is still going to physical therapy for his leg. Diet is not as good. Has not been eating well per daughter. Snacking. Dicussed with daughter not to but the snacks. Discussed healthy diet. 1.  Dilated cardiomyopathy   - LVEF noted to be 30-35% dating back to 7/21/2021 echo- Hypotension limits ability to add CHF medical therapy.   -Echo from 1/11/2022 showed an EF at 3035%, moderately dilated left ventricle, mild MR, moderate left atrial enlargement. Has f/u with cards next week. -Echo from 3/8/2022 showed an EF of 30 to 35% with moderate global hypokinesis   2 Persistent atrial fibrillation   - On Eliquis, rates controlled   3. CAD-said he had a previous stent in the late 90s   - No coronary angiogram is available for review, on atorvastatin 40    --Nuclear stress test on 3/28/2022 showed large inferior defect-fixed. Additional moderate-sized defect of severe intensity involving the mid to distal anterior wall and apex also noted    4 Obstructive sleep apnea -  using nightly and working well. Managed by pulm. Obesity hypoventilation syndrome  - followed by pulm. Getting supplies Q6mos Using nightly      5 Stage 3 chronic kidney disease  - Due for labs. Metformin was stopped due to kidneys.       6 Controlled type 2 diabetes mellitus with diabetic polyneuropathy - was on metformin and stopped last time in the hospital.  He was then placed on glipizide which his daughter says she has stopped now. NO meds now. Not checking sugars. Due for eye doctor appt. Not seen in yrs. 7 Borderline hypotension - better now. Not on Florinef   8. S/p right below-knee amputation. - Due to [employer] food and wound with sepsis. Surg Aug 18/2021. Pt is a fall risk due to it. Seeing podiatry on reg basis. Has prosthesis but has had falls. Has grab bars in the bathroom. Walking with a walker. In PT still. Seeing prosthesis lab on reg basis. Diabetes  Pertinent negatives for hypoglycemia include no dizziness or headaches. Pertinent negatives for diabetes include no chest pain. Pt is living with his wife and daughter. Allergies:No Known Allergies    Current Medications:   Medications marked \"taking\" at this time:    Current Outpatient Medications:     albuterol sulfate HFA (PROAIR HFA) 108 (90 Base) MCG/ACT inhaler, Inhale 2 puffs into the lungs every 6 hours as needed for Wheezing, Disp: 1 each, Rfl: 5    apixaban (ELIQUIS) 5 MG TABS tablet, Take 1 tablet by mouth in the morning and 1 tablet in the evening., Disp: 90 tablet, Rfl: 3    atorvastatin (LIPITOR) 40 MG tablet, Take 1 tablet by mouth daily, Disp: 90 tablet, Rfl: 3    furosemide (LASIX) 20 MG tablet, Take 1 tablet by mouth 2 times daily as needed (swelling), Disp: 60 tablet, Rfl: 5    pregabalin (LYRICA) 200 MG capsule, Take 1 capsule by mouth 2 times daily for 30 days. , Disp: 60 capsule, Rfl: 5    tamsulosin (FLOMAX) 0.4 MG capsule, Take 1 capsule by mouth daily, Disp: 90 capsule, Rfl: 3    guaiFENesin 1200 MG TB12, Take by mouth 2 times daily, Disp: , Rfl:     metoprolol succinate (TOPROL XL) 25 MG extended release tablet, Take 1 tablet by mouth every evening, Disp: 30 tablet, Rfl: 5    OXYGEN, 2 lpm cont, Disp: , Rfl:     acetaminophen (TYLENOL) 325 MG tablet, Take 650 mg by mouth every 6 hours as needed, Disp: , Rfl:     polyethylene glycol (GLYCOLAX) 17 GM/SCOOP powder, Take 17 g by mouth daily, Disp: , Rfl:     ferrous sulfate (FE TABS 325) 325 (65 Fe) MG EC tablet, Take 1 tablet by mouth 3 times daily (with meals) (Patient not taking: Reported on 12/5/2022), Disp: 90 tablet, Rfl: 5    cyanocobalamin 1000 MCG tablet, Take 1,000 mcg by mouth daily (Patient not taking: Reported on 12/5/2022), Disp: , Rfl:     Current Problem List:   Patient Active Problem List   Diagnosis    Severe obesity (BMI 35.0-35.9 with comorbidity) ([facility] [address]    Stage 3 chronic kidney disease (HCC)    Systolic congestive heart failure (HCC)    Onychomycosis    Chronic respiratory failure with hypoxia (HCC)    Atrial fibrillation ([facility] [address]    Controlled type 2 diabetes mellitus with diabetic polyneuropathy, without long-term current use of insulin (HCC)    Type 2 diabetes mellitus with nephropathy (HCC)    Mixed axonal-demyelinating polyneuropathy    Corns and callus    Mixed hyperlipidemia    Morbid (severe) obesity with alveolar hypoventilation (HCC)    Benign hypertensive kidney disease with chronic kidney disease    Atherosclerosis of native coronary artery of native heart without angina pectoris    Bunion of unspecified foot    S/P BKA (below knee amputation) unilateral, right (HCC)    Acquired hammer toe of left foot    Obstructive sleep apnea    Dilated cardiomyopathy ([facility] [address]    Acute on chronic respiratory failure with hypercapnia (HCC)       Social History:   Social History     Tobacco Use    Smoking status: Never    Smokeless tobacco: Never   Substance Use Topics    Alcohol use: No       Family History:   Family History   Problem Relation Age of Onset    No Known Problems Paternal Grandmother     No Known Problems Paternal Grandfather     No Known Problems Maternal Grandfather     No Known Problems Maternal Grandmother     Cancer Father     Cancer Mother        Surgical History:  Past Surgical History:   Procedure Laterality Date    AMPUTATION Right 2022    below knee    ORTHOPEDIC SURGERY  08/18/2021    BKA    AK CARDIAC SURG PROCEDURE UNLIST  1999    stent placement       ROS  Review of Systems   Constitutional: Negative. HENT: Negative. Eyes:  Negative for visual disturbance. Respiratory:  Positive for shortness of breath. Negative for cough, chest tightness and wheezing. Cardiovascular:  Negative for chest pain, palpitations and leg swelling. Gastrointestinal:  Negative for abdominal pain, constipation, diarrhea, nausea and vomiting. Genitourinary:  Negative for dysuria, frequency and urgency. Musculoskeletal:  Positive for gait problem. Negative for back pain and joint swelling. Skin: Negative. Allergic/Immunologic: Negative for environmental allergies. Neurological:  Negative for dizziness and headaches. Hematological: Negative. Psychiatric/Behavioral:  Negative for behavioral problems and sleep disturbance. Visit Vitals  /70   Pulse 95   Temp 97.3 °F (36.3 °C)   Ht 5' 11\" (1.803 m)   Wt (!) 319 lb (144.7 kg)   SpO2 97%   BMI 44.49 kg/m²       Physical Exam  Physical Exam  Constitutional:       Appearance: Normal appearance. He is obese. HENT:      Head: Normocephalic and atraumatic. Nose: Nose normal.   Eyes:      Pupils: Pupils are equal, round, and reactive to light. Cardiovascular:      Rate and Rhythm: Normal rate. Rhythm irregular. Pulses: Normal pulses. Heart sounds: Normal heart sounds. Pulmonary:      Effort: Pulmonary effort is normal.      Breath sounds: Normal breath sounds. Abdominal:      General: Abdomen is flat. Bowel sounds are normal.      Palpations: Abdomen is soft. Musculoskeletal:      Cervical back: Normal range of motion and neck supple. Comments: Prosthesis in the right leg. Status post amputation   Lymphadenopathy:      Cervical: No cervical adenopathy. Skin:     General: Skin is warm and dry. Neurological:      General: No focal deficit present. Mental Status: He is alert.    Psychiatric:         Mood and Affect: Mood normal. ASSESSMENT & PLAN      I have reviewed the patient's past medical history, social history and family history and vitals. We have discussed treatment plan and follow up and given patient instructions. Patient's questions are answered and we will follow up as indicated. Rainer De Jesus was seen today for diabetes. Diagnoses and all orders for this visit:    Controlled type 2 diabetes mellitus with diabetic polyneuropathy, without long-term current use of insulin ([address] discussed diet and exercise with the patient. He is unable to ambulate well and exercises therefore problem. I discussed with his daughter his diet. She is the one who doing the shopping and recommended that she not buy snacks for him. He did have some weight loss when he was in rehab due to the fact that he could not snack. They are not checking his sugars. We will check his sugars today. He is currently not on any medications. -     Comprehensive Metabolic Panel; Future  -     Microalbumin / Creatinine Urine Ratio; Future  -     Hemoglobin A1C; Future  -     Hemoglobin A1C  -     Microalbumin / Creatinine Urine Ratio  -     Comprehensive Metabolic Panel    Mixed axonal-demyelinating polyneuropathy-on Lyrica which is helping him. This was renewed. -     pregabalin (LYRICA) 200 MG capsule; Take 1 capsule by mouth 2 times daily for 30 days. Stage 3 chronic kidney disease, unspecified whether stage 3a or 3b CKD ([facility] [address] -metformin was stopped in the hospital due to his kidney disease. We will recheck his kidneys today. Jaylon Minks may be a beneficial medication as may help his kidneys and heart. Chronic respiratory failure with hypoxia (HCC)-  -     albuterol sulfate HFA (PROAIR HFA) 108 (90 Base) MCG/ACT inhaler; Inhale 2 puffs into the lungs every 6 hours as needed for Wheezing  Patient is followed by pulmonary. He is on oxygen. He has had multiple hospitalizations for this.     Atherosclerosis of native coronary artery of native heart without angina pectoris -has upcoming appoint with cardiology. Currently stable but this can change rapidly for this patient. He has a low ejection fraction of 30 to 35% with moderate hypokinesis    Morbid (severe) obesity with alveolar hypoventilation (HCC) -discussed his recent weight gain. Discussed with the daughter his diet and not to allow him to snack. She does not shopping and recommended she not purchase any snacks. There are healthy snacks and recommendations were given for healthy snacks. Longstanding persistent atrial fibrillation [facility]) -patient is followed by cardiology for this. He is currently stable on his Eliquis. -     apixaban (ELIQUIS) 5 MG TABS tablet; Take 1 tablet by mouth in the morning and 1 tablet in the evening. Mixed hyperlipidemia -continue on Lipitor. We will check his labs today. He is tolerating the medication.  -     atorvastatin (LIPITOR) 40 MG tablet; Take 1 tablet by mouth daily  -     Lipid Panel; Future  -     Lipid Panel    Dilated cardiomyopathy ([facility] [address] -with EF of 30 to 35% on last echo in March. He is followed by cardiology. He has multiple exacerbations of CHF. He continues on his Lasix. -     furosemide (LASIX) 20 MG tablet; Take 1 tablet by mouth 2 times daily as needed (swelling)    Benign prostatic hyperplasia with lower urinary tract symptoms, symptom details unspecified -patient was given this diagnosis in the hospital.  Currently his daughter says there is no trouble with him urinating. She would like to stop his Flomax. I agree that he may not need it and to call us if he needs to restart it. -     tamsulosin (FLOMAX) 0.4 MG capsule; Take 1 capsule by mouth daily        At high risk for falls - due to amputation. Continues in PT. Ambulating with a walker. Has grab bars at home. Discussed taking his time and making sure he is not dizzy.   I did speak with the daughter about fall risks making sure there is no carpets in his past.       Return for diabetic follow up. 6 mos. Marcelino Calvert MD    On the basis of positive falls risk screening, assessment and plan is as follows: home safety tips provided.

## 2022-12-05 NOTE — PROGRESS NOTES
Chandrika Maurice Pack  : 1945  Primary: [employer]  Secondary:  [address] @ [address] [place] [id number]  Phone: [phone]  Fax: [phone] Plan Frequency: Twice per week for 8 eight weeks (Twice per week for eight weeks)    Plan of Care/Certification Expiration Date: 23      PT Visit Info:   No data recorded    OUTPATIENT PHYSICAL THERAPY:OP NOTE TYPE: Treatment Note 2022       Episode  }Appt Desk              Treatment Diagnosis:  Generalized Muscle Weakness (M62.81)  Difficulty in walking, Not elsewhere classified (R26.2)  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 Restrictions/Precautions:  Restrictions/Precautions: Cardiac; Fall Risk  No data recorded   Interventions Planned (Treatment may consist of any combination of the following):    Current Treatment Recommendations: Strengthening; ROM; Balance training; Functional mobility training; Transfer training; Endurance training; Gait training; Stair training; Neuromuscular re-education; Manual Therapy - Soft Tissue Mobilization; Manual Therapy - Joint Manipulation; Pain management; Return to work related activity; Home exercise program; Safety education & training; Patient/Caregiver education & training; Modalities; Therapeutic activities     Subjective Comments:  Patient and daughter stating patient still feels he is limited in progress due to ill fitting prosthesis.    Initial:}     0/10Post Session:        0/10   Medications Last Reviewed:  2022  Updated Objective Findings: /83mmHg pulse 88bpm  97% SpO2     Treatment   THERAPEUTIC EXERCISE: (53 minutes):    Exercises per grid below to improve mobility, strength, balance and coordination. Required mod visual, verbal, manual and tactile cues to promote proper body alignment, promote proper body posture, promote proper body mechanics and promote proper body breathing techniques. Progressed resistance, range, repetitions and complexity of movement as indicated. Date:  12/1/22 Date:  12/05/22 Date:  11/28/22   Activity/Exercise Parameters Parameters Parameters   Nu Step 10 minutes level 5 10 minutes level 5 X 10 mins level 5    Sit to stand 3 x 10 from elevated plinth 1 x 10 elevated plinth 2x10 reps elevated plinth    Hip abduction 3 x 10 standing at bar BLE's with 5 lb wt. s  3 x 10 each seated black band  2x10 reps standing at bar   Hip Flexion  2x10 reps at bar alternating BLE's with 5 lb wt. s  3 x 10 each seated black band  X 20 reps standing at [facility]  2x15 reps with 5 lb wts 3 x 10 5# R X 20 reps seated    Hamstring Curl  Standing at bar BLE's x 20 reps 5 lb wt. s 3 x 10 4# green seated R X 20 reps 5 lb wt.s    Hamstring Stretch   4x30 sec hold each seated strap 3 x 30 sec stroop R only  4x20 sec hold strap seated BLE's   Row  2x15 black and blue band unsupported sitting  --- X 45 reps with black & blue band in chair sitting    Gait  Side stepping at wall x 3 mins  ---    x 150 feet with Rolling Walker SBA   Piriformis Stretch  --- ---  ---------   Unsupported Standing (perform in front of plinth with gait belt; be prepared to help patient recover from LOB) 4 x 30 sec each side semi tandem staggered stance      4 x 30 sec each side semi tandem staggered stance 4x30 second hold each side by side, staggered, and tandem each LE   Core Strength/Reaching ----- -------- --------   Core Strength/Rotation --------- ---------- --------   TUG -------- --- ------   Balance X 5 mins with different variations with base of support --- X 5 mins in intervals of variations of foot placement MANUAL THERAPY: (0 minutes): None today  Joint mobilization and Soft tissue mobilization was utilized and necessary because of the patient's restricted joint motion, painful spasm, loss of articular motion and restricted motion of soft tissue. MODALITIES: (0 minutes):        None today      Treatment/Session Summary:    Treatment Assessment: Patient demonstrating fatigue challenged by session. Demonstrates difficulty with upright standing posture tendency to shift weight to the L and rotate trunk      Communication/Consultation: Spoke with patient and daughter today about following up with prosthetist, patient progress. Patient's daughter stating he is seeing prosthetist later this afternoon. Equipment provided today:  None today   Recommendations/Intent for next treatment session: Balance and standing exercise progressions focused on single limb stance and functional strength.      Total Treatment Billable Duration: 53 minutes   Time In: 6558  Time Out: 65 [facility], PT       Charge Capture  }Post Session Pain  PT Visit [facility] Portal  MD Guidelines  Scanned Media  Benefits  MyChart    Future Appointments   Date Time Provider Rachel Sanchez   12/8/2022  9:00 AM Angela Gore PT [facility]   12/12/2022 10:45 AM MD VIRGILIO Bhardwaj [facility]   12/12/2022  1:30 PM Kole Oddi, PTA [facility]   12/15/2022  9:00 AM Kole Oddi, PTA [facility]   12/20/2022 10:00 AM Okle Oddi, PTA [facility]   12/22/2022  9:00 AM Kole Oddi, PTA [facility]   12/27/2022  9:00 AM Kole Oddi, PTA [facility]   12/30/2022  9:00 AM Kole Oddi, PTA [facility]   1/3/2023  9:00 AM Kole Oddi, PTA [facility]   1/5/2023 10:40 AM Phillip Mcdaniels MD [facility]   1/6/2023 10:00 AM Spenser Cardenas PT [facility]   6/26/2023 10:00 AM Dawna Murphy MD [facility]

## 2022-12-06 LAB
ALBUMIN SERPL-MCNC: 3.4 G/DL (ref 3.2–4.6)
ALBUMIN/GLOB SERPL: 0.9 {RATIO} (ref 0.4–1.6)
ALP SERPL-CCNC: 189 U/L (ref 50–136)
ALT SERPL-CCNC: 27 U/L (ref 12–65)
ANION GAP SERPL CALC-SCNC: 3 MMOL/L (ref 2–11)
AST SERPL-CCNC: 26 U/L (ref 15–37)
BILIRUB SERPL-MCNC: 0.5 MG/DL (ref 0.2–1.1)
BUN SERPL-MCNC: 26 MG/DL (ref 8–23)
CALCIUM SERPL-MCNC: 9.4 MG/DL (ref 8.3–10.4)
CHLORIDE SERPL-SCNC: 108 MMOL/L (ref 101–110)
CHOLEST SERPL-MCNC: 189 MG/DL
CO2 SERPL-SCNC: 31 MMOL/L (ref 21–32)
CREAT SERPL-MCNC: 2.2 MG/DL (ref 0.8–1.5)
CREAT UR-MCNC: 53 MG/DL
EST. AVERAGE GLUCOSE BLD GHB EST-MCNC: 137 MG/DL
GLOBULIN SER CALC-MCNC: 3.7 G/DL (ref 2.8–4.5)
GLUCOSE SERPL-MCNC: 118 MG/DL (ref 65–100)
HBA1C MFR BLD: 6.4 % (ref 4.8–5.6)
HDLC SERPL-MCNC: 38 MG/DL (ref 40–60)
HDLC SERPL: 5 {RATIO}
LDLC SERPL CALC-MCNC: ABNORMAL MG/DL
LDLC SERPL DIRECT ASSAY-MCNC: 93 MG/DL
MICROALBUMIN UR-MCNC: 0.84 MG/DL (ref 0–3)
MICROALBUMIN/CREAT UR-RTO: 16 MG/G (ref 0–30)
POTASSIUM SERPL-SCNC: 3.8 MMOL/L (ref 3.5–5.1)
PROT SERPL-MCNC: 7.1 G/DL (ref 6.3–8.2)
SODIUM SERPL-SCNC: 142 MMOL/L (ref 133–143)
TRIGL SERPL-MCNC: 494 MG/DL (ref 35–150)
VLDLC SERPL CALC-MCNC: 98.8 MG/DL (ref 6–23)

## 2022-12-08 ENCOUNTER — HOSPITAL ENCOUNTER (OUTPATIENT)
Dept: PHYSICAL THERAPY | Age: 77
Setting detail: RECURRING SERIES
Discharge: HOME | End: 2022-12-11
Payer: MEDICARE

## 2022-12-08 PROCEDURE — 97110 THERAPEUTIC EXERCISES: CPT

## 2022-12-08 NOTE — PROGRESS NOTES
Willard Merchant  : 1945  Primary: [employer]les [employer] H[employer]  Secondary:  [address] @ [address] [place] [id number]  Phone: [phone]  Fax: [phone] Plan Frequency: Twice per week for 8 eight weeks (Twice per week for eight weeks)    Plan of Care/Certification Expiration Date: 23      PT Visit Info:   No data recorded    OUTPATIENT PHYSICAL THERAPY:OP NOTE TYPE: Treatment Note 2022       Episode  }Appt Desk              Treatment Diagnosis:  Generalized Muscle Weakness (M62.81)  Difficulty in walking, Not elsewhere classified (R26.2)  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 Restrictions/Precautions:  Restrictions/Precautions: Cardiac; Fall Risk  No data recorded   Interventions Planned (Treatment may consist of any combination of the following):    Current Treatment Recommendations: Strengthening; ROM; Balance training; Functional mobility training; Transfer training; Endurance training; Gait training; Stair training; Neuromuscular re-education; Manual Therapy - Soft Tissue Mobilization; Manual Therapy - Joint Manipulation; Pain management; Return to work related activity; Home exercise program; Safety education & training; Patient/Caregiver education & training; Modalities; Therapeutic activities     Subjective Comments:  Patient and daughter stating prosthetist adjusted prosthetic yesterday and already see improvements. They are now in process of having prosthesis remade.    Initial:}     0/10Post Session:        0/10   Medications Last Reviewed:  2022  Updated Objective Findings: /82 mmHg pulse 88bpm  97% SpO2     Treatment   THERAPEUTIC EXERCISE: (53 minutes):    Exercises per grid below to improve mobility, strength, balance and coordination. Required mod visual, verbal, manual and tactile cues to promote proper body alignment, promote proper body posture, promote proper body mechanics and promote proper body breathing techniques. Progressed resistance, range, repetitions and complexity of movement as indicated. Date:  12/1/22 Date:  12/05/22 Date:  12/08/22   Activity/Exercise Parameters Parameters Parameters   Nu Step 10 minutes level 5 10 minutes level 5 10 mins level 5    Sit to stand 3 x 10 from elevated plinth 1 x 10 elevated plinth 3 x 10 reps elevated plinth    Hip abduction 3 x 10 standing at bar BLE's with 5 lb wt. s  3 x 10 each seated black band  3 x 10 reps standing at bar   Hip Flexion  2x10 reps at bar alternating BLE's with 5 lb wt. s  3 x 10 each seated black band  3 x 10 reps standing at bar tapping 4 inch step    Long Arc Quad  2x15 reps with 5 lb wts 3 x 10 5# R ---   Hamstring Curl  Standing at bar BLE's x 20 reps 5 lb wt. s 3 x 10 4# green seated R ---   Hamstring Stretch   4x30 sec hold each seated strap 3 x 30 sec stroop R only  3 x 30 sec stroop R only    Row  2x15 black and blue band unsupported sitting  --- ---   Gait  Side stepping at wall x 3 mins  --- ---   Piriformis Stretch  --- ---  ---------   Unsupported Standing (perform in front of plinth with gait belt; be prepared to help patient recover from LOB) 4 x 30 sec each side semi tandem staggered stance      4 x 30 sec each side semi tandem staggered stance 4x30 second hold each  tandem each;  6 x 10 seconds eyes closed   Core Strength/Reaching ----- -------- --------   Core Strength/Rotation --------- ---------- --------   TUG -------- --- X 3 trials    Balance X 5 mins with different variations with base of support --- ---     MANUAL THERAPY: (0 minutes): None today  Joint mobilization and Soft tissue mobilization was utilized and necessary because of the patient's restricted joint motion, painful spasm, loss of articular motion and restricted motion of soft tissue. MODALITIES: (0 minutes):        None today      Treatment/Session Summary:    Treatment Assessment: Patient reported fatigue but improved form with exercises and gait today after prothesis adjustment. Communication/Consultation: Discussion of exam findings and progress note. Equipment provided today:  None today   Recommendations/Intent for next treatment session: Balance and standing exercise progressions focused on single limb stance and functional strength.      Total Treatment Billable Duration: 53 minutes   Time In: 0902  Time Out: 21 [facility], PT       Charge Capture  }Post Session Pain  PT Visit [facility] Portal  MD Guidelines  Scanned Media  Benefits  MyChart    Future Appointments   Date Time Provider [place]   12/12/2022 10:45 AM Manoj Andrews MD [facility]   12/12/2022  1:30 PM Welford Knock, PTA [facility]   12/15/2022  9:00 AM Welford Knock, PTA [facility]   12/20/2022 10:00 AM Welford Knock, PTA [facility]   12/22/2022  9:00 AM Welford Knock, PTA [facility]   12/27/2022  9:00 AM Welford Knock, PTA [facility]   12/30/2022  9:00 AM Welford Knock, PTA [facility]   1/3/2023  9:00 AM Welford Knock, PTA [facility]   1/5/2023 10:40 AM Jovi Jasso MD [facility]   1/6/2023 10:00 AM Sofy Aguillon, PT [facility]   6/26/2023 10:00 AM Jeanne Aguiar MD [facility]

## 2022-12-08 NOTE — PROGRESS NOTES
Remington Blandon Pack  : 1945  Primary: [employer]  Secondary:  [id number] @ [address] [place] [id number]  Phone: [phone]  Fax: [phone] Plan Frequency: Twice per week for 8 eight weeks (Twice per week for eight weeks)    Plan of Care/Certification Expiration Date: 23      PT Visit Info:    No data recorded    OUTPATIENT PHYSICAL THERAPY:OP NOTE TYPE: Therapy Progress Note  2022               Episode  Appt Desk         Treatment Diagnosis:  Generalized Muscle Weakness (M62.81)  Difficulty in walking, Not elsewhere classified (R26.2)  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 Chantelle Pinedo is a 77year old male presenting with an overall decline in balance, functional mobility, transfers, ability to ambulate, and overall function following R below knee amputation 2021 after spraining his ankle at work and then acquiring a wound from altered gait. His daughter is present with him today. He underwent an ankle reconstruction in the past but otherwise had no issues until spraining the ankle. 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 He completed six week of inpatient rehab and was making excellent progress. He states by the end of October he was discharged to home and had been practicing well with his prosthesis for about one week.  Home health has been treating him but he states he has been limited due to other medical issues and was hospitalized in January for fluid retention on his lungs. He states he went to [facility] for rehab following this and then was discharged home again. He states home health just ended a week or so ago and he had practiced with a straight cane a few times with his therapist. He reports he is still determined to make improvements and improve his mobility overall; and to return to part time work at [employer] if that is possible from a mobility standpoint. He and his daughter report that his cardiopulmonary endurance and balance are limiting factors for him as well and that he has fallen a few times in the last six months. He also reports having had a few falls while still working at [employer] due to tripping over objects. He does report a feeling of occasionally losing his balance and falling backward at random times. He also reports low back pain that is chronic but no other significant orthopedic complaints at this time. Patient presents with decreased balance, decreased proprioception, decreased functional strength, decreased cardiopulmonary endurance, decreased gait and transfer ability. Patient will benefit from skilled PT to build on gains and provide exercise progressions, a progressive resistance exercise program, balance exercises, gait and transfer training, manual therapy and modalities as needed to work towards therapeutic goals. PROGRESS NOTE 12/08/22: Mr. Fatemeh Andrade has been for 46 sessions of physical therapy from 6/13/22 to 12/08/2022 with significant success. He now reports feeling improvements in endurance, mobility, strength, and pain tolerance. The patient completes 12 repetitions during the 30-second sit-to-stand test. His TUG score has improved significantly with a time of 15 seconds.  The patient still presents with weakness, decreased functional tolerance, gait and ambulation deficits, risk of falls, and increased pain. Patient would like to progress to using a single point cane during ambulation from a wheeled walker. Velma Car will continue to benefit from home exercise program, therapeutic and postural strengthening exercises, manual therapeutic techniques as appropriate to address their current condition. Velma Car will continue to benefit from skilled PT (medically necessary) to address above deficits affecting participation in basic ADLs and overall functional tolerance. OBJECTIVE     Observation/Postural and Gait Assessment: Patient has tendency to stand and ambulate with significant external rotation and out toeing especially on the R.     Palpation: To be performed at future session as indicated. AROM: To be tested further at future visit as indicated. Strength:  Manual Muscle Test (out of 5) Left Right   Knee extension 5 from 5 5   Knee flexion 5 from 5 5   Hip flexion 5 from 5 5 from 4+   Ankle DF 5 from 5 ---   Ankle PF 5 from 4 ---     Passive Accessory Motion: Not performed due to nature of impairments; will be performed in the future as indicated. Balance Tests:  Modified CTSIB: NT                               30 second sit to stand: plinth at 49cm with BUEs 11 repetitions.                                 Four Stage Balance Test: Semi tandem stance bilaterally     22:  30 second sit to stand: 49cm from plinth 11 repetitions  Modified CTSIB: 30/120 seconds   Four stage: Semi Tandem     22:  30 second sit to stand: NT  Modified CTSIB: 60/120 seconds  Four stage: Semi tandem     22:  TU.4 seconds  30 second sit to stand: 9 repetitions 49cm from plinth   Four stage: Semi tandem   Modified CTSIB: 60/120 seconds    11/10/2022:   TU.7 seconds  30 seconds sit to stand: 10 repetitions 50 cm plinth   Four stage: semi tandem   Modified CTSIB: NT    22:  TUG score: 15 seconds   30 second sit to stand: 12 repetitions 50 cm plinth   Four Stage: Tandem stance  Modified CTSIB: 60/120 seconds     Neurological Screen:  Myotomes: Key muscle strength testing through bilateral LE is intact. Dermatomes: Sensation testing through bilateral lower quadrants for light touch is intact. Functional Mobility:  Patient ambulates with prosthesis and rolling front wheeled walker independently. Performs sit to and from stand transfers independently with effort. Demonstrates significant out toeing and externally rotated hip especially on the R side with forward flexed trunk. Patient able to ambulate one lap in the gym safely and independently; SpO2 dropped to 88% after one lap on portable oxygen tank, recovered to baseline within five minute rest period. 07/18/22: Patient continues to ambulate with gait deviations using rolling walker but improved cardiopulmonary stamina overall. O2 sats remain 93% or above throughout session. ASSESSMENT   Problem List: (Impacting functional limitations): Body Structures, Functions, Activity Limitations Requiring Skilled Therapeutic Intervention: Decreased functional mobility ; Decreased ADL status; Decreased ROM; Decreased tolerance to work activity; Decreased strength; Decreased endurance; Decreased sensation; Decreased balance; Decreased coordination; Increased pain; Decreased posture     Therapy Prognosis:   Therapy Prognosis: Good        PLAN   Effective Dates: 06/13/2022 TO Plan of Care/Certification Expiration Date: 02/09/23     Frequency/Duration: Plan Frequency: Twice per week for 8 eight weeks (Twice per week for eight weeks)     Interventions Planned (Treatment may consist of any combination of the following):    Current Treatment Recommendations: Strengthening; ROM; Balance training; Functional mobility training; Transfer training;  Endurance training; Gait training; Stair training; Neuromuscular re-education; Manual Therapy - Soft Tissue Mobilization; Manual Therapy - Joint Manipulation; Pain management; Return to work related activity; Home exercise program; Safety education & training; Patient/Caregiver education & training; Modalities; Therapeutic activities     Goals: (Goals have been discussed and agreed upon with patient.)  Short-Term Functional Goals: Time Frame: 06/13/2022 to 07/11/2022  Patient will ambulate for six minutes using front wheeled walker without requiring a seated rest period. (GOAL MET)  Patient will maintain eyes closed static standing for 30 seconds on even surface safely with guarding. (GOAL MET)  Patient will perform sit to stand transfer from standard chair height without BUEs for one repetition. (ONGOING)  Discharge Goals: Time Frame: 06/13/2022 to 11/24/2022   Patient will ambulate with straight cane safely and independently over even surfaces. (ONGOING)  Patient will ambulate with rolling walker over uneven surfaces or when ambulating for prolonged distances. (ONGOING)  Patient will perform sit to stand and sit to and from supine transfers safely and independently without cues. (GOAL MET)  Patient will improve TUG score to 14 seconds or less to indicate improved gait speed and decreased fall risk. (ONGOING)  Patient will improve modified CTSIB to 120/120 to indicate improved proprioception and vestibular function and a decreased fall risk. (ONGOING)  Patient will improve 30 second sit to stand score to 10 repetitions without BUEs. (ONGOING)  Patient will return to working at [employer] part time duty with modifications. (ONGOING)          Outcome Measure: Tool Used: Lower Extremity Functional Scale (LEFS)  Score:  Initial: 34/80 Most Recent: 43/80 (Date: 07/15/22)    31/80 (8/11/22)    36/80 09/29/22    28/80 11/10/2022    Interpretation of Score: 20 questions each scored on a 5 point scale with 0 representing \"extreme difficulty or unable to perform\" and 4 representing \"no difficulty\". The lower the score, the greater the functional disability. 80/80 represents no disability.   Minimal detectable change is 9 points. Tool Used: Timed Up and Go (TUG)  Score:  Initial: 20.4 seconds Most Recent: 19 seconds (Date: 07/18/22 )    17.1 seconds 08/11/22    16.4 seconds 09/29/22     17.7 seconds 11/10/2022     15 seconds 12/08/22    Interpretation of Score: The test measures, in seconds, the time taken by an individual to stand up from a standard arm chair (seat height 46 cm [18 in], arm height 65 cm [25.6 in]), walk a distance of 3 meters (118 in, approx 10 ft), turn, walk back to the chair and sit down. If the individual takes longer than 14 seconds to complete TUG, this indicates risk for falls. Medical Necessity:   Patient is expected to demonstrate progress in strength, range of motion, balance, coordination and functional technique to increase independence with functional mobility and ADLs. Skilled intervention continues to be required due to need for exercise progressions tailored to patient needs, goals, and impairments . Reason For Services/Other Comments:  Patient continues to require skilled intervention due to need for exercise progressions, manual therapy, plan of care modifications and exceptions tailored to patient presentation. Regarding Dylon Merchant's therapy, I certify that the treatment plan above will be carried out by a therapist or under their direction. Thank you for this referral,  Nik Condon, PT     Referring Physician Signature: Liborio López PA-C No Signature is Required for this note.         Post Session Pain  Charge Capture  PT Visit Info  POC Link  Treatment Note Link  MD Guidelines  [facility]

## 2022-12-12 ENCOUNTER — HOSPITAL ENCOUNTER (OUTPATIENT)
Dept: PHYSICAL THERAPY | Age: 77
Setting detail: RECURRING SERIES
Discharge: HOME | End: 2022-12-15
Payer: MEDICARE

## 2022-12-12 ENCOUNTER — OFFICE VISIT (OUTPATIENT)
Dept: CARDIOLOGY CLINIC | Age: 77
End: 2022-12-12
Payer: MEDICARE

## 2022-12-12 VITALS
WEIGHT: 315 LBS | SYSTOLIC BLOOD PRESSURE: 98 MMHG | DIASTOLIC BLOOD PRESSURE: 62 MMHG | HEART RATE: 68 BPM | BODY MASS INDEX: 44.1 KG/M2 | HEIGHT: 71 IN

## 2022-12-12 DIAGNOSIS — I48.11 LONGSTANDING PERSISTENT ATRIAL FIBRILLATION (HCC): ICD-10-CM

## 2022-12-12 DIAGNOSIS — I50.22 CHRONIC SYSTOLIC CONGESTIVE HEART FAILURE (HCC): ICD-10-CM

## 2022-12-12 DIAGNOSIS — I25.10 CORONARY ARTERY DISEASE INVOLVING NATIVE CORONARY ARTERY OF NATIVE HEART WITHOUT ANGINA PECTORIS: Primary | ICD-10-CM

## 2022-12-12 DIAGNOSIS — E78.2 MIXED HYPERLIPIDEMIA: ICD-10-CM

## 2022-12-12 DIAGNOSIS — I42.0 DILATED CARDIOMYOPATHY (HCC): ICD-10-CM

## 2022-12-12 DIAGNOSIS — N18.32 STAGE 3B CHRONIC KIDNEY DISEASE (HCC): ICD-10-CM

## 2022-12-12 PROCEDURE — 1123F ACP DISCUSS/DSCN MKR DOCD: CPT | Performed by: INTERNAL MEDICINE

## 2022-12-12 PROCEDURE — 99214 OFFICE O/P EST MOD 30 MIN: CPT | Performed by: INTERNAL MEDICINE

## 2022-12-12 PROCEDURE — 97110 THERAPEUTIC EXERCISES: CPT

## 2022-12-12 ASSESSMENT — ENCOUNTER SYMPTOMS
HEMATEMESIS: 0
HEMATOCHEZIA: 0
HEMOPTYSIS: 0
EYE REDNESS: 0
WHEEZING: 0
ABDOMINAL PAIN: 0
HOARSE VOICE: 0
STRIDOR: 0
DOUBLE VISION: 0

## 2022-12-12 ASSESSMENT — PAIN SCALES - GENERAL: PAINLEVEL_OUTOF10: 0

## 2022-12-12 NOTE — PROGRESS NOTES
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 Restrictions/Precautions:  Restrictions/Precautions: Cardiac; Fall Risk  No data recorded   Interventions Planned (Treatment may consist of any combination of the following):    Current Treatment Recommendations: Strengthening; ROM; Balance training; Functional mobility training; Transfer training; Endurance training; Gait training; Stair training; Neuromuscular re-education; Manual Therapy - Soft Tissue Mobilization; Manual Therapy - Joint Manipulation; Pain management; Return to work related activity; Home exercise program; Safety education & training; Patient/Caregiver education & training; Modalities;  Therapeutic activities     Subjective Comments:  Pt. reported no pain today with no issues   Initial:}    0/10Post Session:        0/10   Medications Last Reviewed:  2022  Updated Objective Findings: /78 mmHg pulse 78bpm  97% SpO2     Treatment   THERAPEUTIC EXERCISE: (55 minutes):    Exercises per grid below to improve mobility, strength, balance and coordination. Required mod visual, verbal, manual and tactile cues to promote proper body alignment, promote proper body posture, promote proper body mechanics and promote proper body breathing techniques. Progressed resistance, range, repetitions and complexity of movement as indicated. Date:  12/12/22 Date:  12/05/22 Date:  12/08/22   Activity/Exercise Parameters Parameters Parameters   Nu Step 10 minutes level 5 10 minutes level 5 10 mins level 5    Sit to stand 3 x 10 from elevated plinth 1 x 10 elevated plinth 3 x 10 reps elevated plinth    Hip abduction 3 x 10 standing at bar BLE's with 5 lb wt. s  3 x 10 each seated black band  3 x 10 reps standing at bar   Hip Flexion  2x10 reps at bar alternating BLE's with 5 lb wt. s  3 x 10 each seated black band  3 x 10 reps standing at bar tapping 4 inch step    Long Arc Quad  2x15 reps with 5 lb wts 3 x 10 5# R ---   Hamstring Curl  Standing at bar BLE's x 20 reps 5 lb wt. s 3 x 10 4# green seated R ---   Hamstring Stretch   4x30 sec hold each seated strap 3 x 30 sec stroop R only  3 x 30 sec stroop R only    Row  2x15 black and blue band unsupported sitting  --- ---   Gait  Side stepping at wall x 3 mins  --- ---   Piriformis Stretch  --- ---  ---------   Unsupported Standing (perform in front of plinth with gait belt; be prepared to help patient recover from LOB) 4 x 30 sec each side semi tandem staggered stance      4 x 30 sec each side semi tandem staggered stance 4x30 second hold each  tandem each;  6 x 10 seconds eyes closed   Core Strength/Reaching ----- -------- --------   Core Strength/Rotation --------- ---------- --------   TUG -------- --- X 3 trials    Balance X 5 mins with different variations with base of support --- ---     MANUAL THERAPY: (0 minutes): None today  Joint mobilization and Soft tissue mobilization was utilized and necessary because of the patient's restricted joint motion, painful spasm, loss of articular motion and restricted motion of soft tissue. MODALITIES: (0 minutes):        None today      Treatment/Session Summary:    Treatment Assessment: Patient was compliant with all exercises and continues to report no pain. Communication/Consultation: Discussion of exam findings and progress note. Equipment provided today:  None today   Recommendations/Intent for next treatment session: Balance and standing exercise progressions focused on single limb stance and functional strength.      Total Treatment Billable Duration: 55 minutes   Time In: 1330  Time Out: [address], [facility]       Charge Capture  }Post Session Pain  PT Visit Info  [facility]  MD Guidelines  Scanned Media  Benefits  MyChart    Future Appointments   Date Time Provider Rachel Sanchez   12/15/2022  9:00 AM Navneet Lopez PTA [facility]   12/20/2022 10:00 AM Navneet Lopez, LUIS [facility]   12/22/2022  9:00 AM Navneet Lopez, LUIS [facility]   12/27/2022  9:00 AM Navneet Lopez PTA [facility]   12/30/2022  9:00 AM Navneet Lopez PTA [facility]   1/3/2023  9:00 AM Navneet Lopez, [facility]   1/5/2023 10:40 AM Natalya Oliva MD [facility]   1/6/2023 10:00 AM Yanick Marroquin, PT [facility]   5/1/2023 11:00 AM Lauren Ponce MD [facility]   6/26/2023 10:00 AM Lula Hammonds MD [facility]

## 2022-12-12 NOTE — PROGRESS NOTES
[facility] CARDIOLOGY  [address], [address]  PHONE: [phone]          22    NAME:  Willard Merchant  : 1945  MRN: [id number]         SUBJECTIVE:   Vianca Farias is a 69 y.o. male seen for a visit regarding the following:     Chief Complaint   Patient presents with    Coronary Artery Disease     3mth follow up     Cardiomyopathy     3mth follow up           HPI:    Cardio problem list:   1. Dilated cardiomyopathy   - LVEF noted to be 30-35% dating back to 2021 echo- Hypotension limits ability to add CHF medical therapy.   -Echo from 2022 showed an EF at 3035%, moderately dilated left ventricle, mild MR, moderate left atrial enlargement.   -Echo from 3/8/2022 showed an EF of 30 to 35% with moderate global hypokinesis   2 Persistent atrial fibrillation   - On Eliquis, rates controlled   3. CAD-said he had a previous stent in the late    - No coronary angiogram is available for review, on atorvastatin 40    --Nuclear stress test on 3/28/2022 showed large inferior defect-fixed. Additional moderate-sized defect of severe intensity involving the mid to distal anterior wall and apex also noted    4 Obstructive sleep apnea      Obesity hypoventilation syndrome      5Stage 3 chronic kidney disease      6 Controlled type 2 diabetes mellitus with diabetic polyneuropathy   7 Borderline hypotension on Florinef   8. S/p right below-knee amputation. I saw Mr. Stanley Franks who is a pleasant 67-year-old gentleman in cardiovascular follow-up on  for CAD, cardiomyopathy-likely combination of ischemic and nonischemic, congestive heart failure, orthostatic hypotension,     When we last met with him,  we made no significant changes with his medical therapy  due to borderline hypotension but he is now off Florinef completely. He is on low-dose metoprolol succinate.   We had continued metoprolol for better rate control of his A. fib to see if this would help with his cardiomyopathy and we set him up for a follow-up echo which continued to show moderately  to severely reduced LV systolic function with an EF of 30 to 35% with additional apical wall hypokinesis. Because of this cardiomyopathy without improvement, we set him up for a nuclear stress test which showed evidence of a large apical and inferior  myocardial infarction. He has known coronary artery disease with previous stenting but the details are unknown but based on these findings especially with no recovery in LV function despite adequate rate control for A. fib, his cardiomyopathy may be  associated with an ischemic etiology.    -We decided to hold off on coronary angiography in the absence of angina and especially with a fixed defect noted on his nuclear stress test.      CAD: Has no complaints of any angina whatsoever. Dyspnea on exertion has been stable and unchanged compared to when we last met with him.      cardiomyopathy: Has some chronic dyspnea on exertion-NYHA class II-III but most of his activity levels a also limited by his right below-knee amputation. He is using his prosthetic limb and ambulating with a walker. He feels that his breathing has improved  overall. Oxygen level improved substantially with him not needing oxygen even with exertion now.   -He denies significant lower extremity edema orthopnea PND. Using his BiPAP therapy when he sleeps. -Takes 20 mg of Lasix daily and 20 mg in the afternoon every other day. Weight has remained stable with this. Sleep apnea-using his trilogy CPAP mask especially at night but also needs to use it during the day when he naps. Hypotension: Doing well at this time and denies significant lightheadedness or dizziness with standing and he is now off Florinef completely. Hyperlipidemia: On atorvastatin therapy and tolerating this well. Denies myalgias.       Past Medical History, Past Surgical History, Family history, Social History, and Medications were all reviewed with the patient today and updated as necessary. No Known Allergies  Patient Active Problem List   Diagnosis    Severe obesity (BMI 35.0-35.9 with comorbidity) (HCC)    Stage 3 chronic kidney disease (HCC)    Systolic congestive heart failure (HCC)    Onychomycosis    Chronic respiratory failure with hypoxia (HCC)    Atrial fibrillation (HCC)    Controlled type 2 diabetes mellitus with diabetic polyneuropathy, without long-term current use of insulin (HCC)    Type 2 diabetes mellitus with nephropathy (HCC)    Mixed axonal-demyelinating polyneuropathy    Corns and callus    Mixed hyperlipidemia    Morbid (severe) obesity with alveolar hypoventilation (HCC)    Benign hypertensive kidney disease with chronic kidney disease    Atherosclerosis of native coronary artery of native heart without angina pectoris    Bunion of unspecified foot    S/P BKA (below knee amputation) unilateral, right (HCC)    Acquired hammer toe of left foot    Obstructive sleep apnea    Dilated cardiomyopathy ([address]    Acute on chronic respiratory failure with hypercapnia ([address]     Past Medical History:   Diagnosis Date    A-fib ([address] 07/19/2021    developed AFID after hospital admission for wound infection- started on Eliquis    Acute on chronic respiratory failure with hypoxia and hypercapnia ([address] 7/23/2021    Acute respiratory failure with hypercapnia ([address] 7/23/2021    Atrial fibrillation with RVR ([address] 7/19/2021    CAD (coronary artery disease)     [facility].     Cellulitis 7/19/2021    Chronic kidney disease     stage 3- improved    COVID-19 vaccine series completed     Moderna Vaccine completed X2 doses    Current use of long term anticoagulation     Eliquis and Aspirin    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 mellitus, limited to breakdown of skin ([address] 9/27/2021    H/O heart artery stent     X1 placed in 1999    History of MI (myocardial infarction) 1999    stent placed X1 Hypercholesterolemia     Hypertension     Left ventricular dysfunction     echo revealed EF 30-35%, mildly dilated LA/RA and mild TR and MR. Neuropathy     severe    Oxygen dependent     recently started on 2L NC continous- Sleep study to be scheduled-questionable SASHA    PICC (peripherally inserted central catheter) in place     PICC line in place to R arm    Staphylococcus aureus bacteremia 7/45/8677    Systolic CHF, acute on chronic ([address] 7/19/2021    Type 2 diabetes mellitus ([address]     oral agent/Pt does not monitor BS/no s.s of hypoglycemia/Last A1c: 6.7 on 7/13/21 per daughter     Past Surgical History:   Procedure Laterality Date    AMPUTATION Right 2022    below knee    ORTHOPEDIC SURGERY  08/18/2021    BKA    GA CARDIAC SURG PROCEDURE UNLIST  1999    stent placement     Family History   Problem Relation Age of Onset    No Known Problems Paternal Grandmother     No Known Problems Paternal Grandfather     No Known Problems Maternal Grandfather     No Known Problems Maternal Grandmother     Cancer Father     Cancer Mother      Social History     Tobacco Use    Smoking status: Never    Smokeless tobacco: Never   Substance Use Topics    Alcohol use: No     Current Outpatient Medications   Medication Sig Dispense Refill    albuterol sulfate HFA (PROAIR HFA) 108 (90 Base) MCG/ACT inhaler Inhale 2 puffs into the lungs every 6 hours as needed for Wheezing 1 each 5    apixaban (ELIQUIS) 5 MG TABS tablet Take 1 tablet by mouth in the morning and 1 tablet in the evening. 90 tablet 3    atorvastatin (LIPITOR) 40 MG tablet Take 1 tablet by mouth daily 90 tablet 3    furosemide (LASIX) 20 MG tablet Take 1 tablet by mouth 2 times daily as needed (swelling) 60 tablet 5    pregabalin (LYRICA) 200 MG capsule Take 1 capsule by mouth 2 times daily for 30 days.  60 capsule 5    guaiFENesin 1200 MG TB12 Take by mouth 2 times daily      ferrous sulfate (FE TABS 325) 325 (65 Fe) MG EC tablet Take 1 tablet by mouth 3 times daily (with meals) (Patient taking differently: Take 325 mg by mouth 3 times daily (with meals) Taking few times weekly) 90 tablet 5    metoprolol succinate (TOPROL XL) 25 MG extended release tablet Take 1 tablet by mouth every evening 30 tablet 5    OXYGEN 2 lpm cont      acetaminophen (TYLENOL) 325 MG tablet Take 650 mg by mouth every 6 hours as needed      cyanocobalamin 1000 MCG tablet Take 1,000 mcg by mouth daily      polyethylene glycol (GLYCOLAX) 17 GM/SCOOP powder Take 17 g by mouth daily      tamsulosin (FLOMAX) 0.4 MG capsule Take 1 capsule by mouth daily (Patient not taking: Reported on 12/12/2022) 90 capsule 3     No current facility-administered medications for this visit. Review of Systems   Constitutional: Negative for chills and fever. HENT:  Negative for ear discharge, hoarse voice and stridor. Eyes:  Negative for double vision and redness. Cardiovascular:  Positive for dyspnea on exertion. Negative for cyanosis and syncope. Respiratory:  Negative for hemoptysis and wheezing. Endocrine: Negative for polydipsia and polyphagia. Hematologic/Lymphatic: Negative for adenopathy. Skin:  Negative for itching and rash. Musculoskeletal:  Negative for joint swelling and muscle weakness. Gastrointestinal:  Negative for abdominal pain, hematemesis and hematochezia. Genitourinary:  Negative for flank pain and nocturia. Neurological:  Negative for focal weakness and seizures. Psychiatric/Behavioral:  Negative for altered mental status and suicidal ideas. Allergic/Immunologic: Negative for hives.      PHYSICAL EXAM:    BP 98/62   Pulse 68   Ht 5' 11\" (1.803 m)   Wt (!) 316 lb (143.3 kg)   BMI 44.07 kg/m²      Physical Exam    General: Alert and oriented in no acute distress, obese, using walker to ambulate  HEENT: Head is normocephalic, atraumatic, pupils are equal bilaterally, throat appears to be clear  Neck: No significant jugular venous distention no cervical bruits  Cardiovascular: S1 and S2 heard, irregularly irregular heart rhythm, rates controlled, no significant murmurs rubs or gallops. Respiratory: Both bases with diminished breath coarse bibasilar rales that do not clear with coughing  Abdomen: Soft, nontender, nondistended, bowel sounds present. Extremities: No cyanosis clubbing or edema  Peripheral pulses: Bilateral radial artery pulses are palpated. Left pedal pulses diminished but palpable. S/p amputation on the right side. Neuro: No facial droop and no gross focal motor deficits  Lymphatic: No significant cervical lymphadenopathy noted. Musculoskeletal: No significant redness or swelling noted in all exposed joints. Skin: No significant rashes noted the of the exposed regions. Medical problems and test results were reviewed with the patient today.      Recent Results (from the past 672 hour(s))   Lipid Panel    Collection Time: 12/05/22 12:50 PM   Result Value Ref Range    Cholesterol, Total 189 <200 MG/DL    Triglycerides 494 (H) 35 - 150 MG/DL    HDL 38 (L) 40 - 60 MG/DL    LDL Calculated Not calculated due to elevated triglyceride level <100 MG/DL    VLDL Cholesterol Calculated 98.8 (H) 6.0 - 23.0 MG/DL    Chol/HDL Ratio 5.0     Hemoglobin A1C    Collection Time: 12/05/22 12:50 PM   Result Value Ref Range    Hemoglobin A1C 6.4 (H) 4.8 - 5.6 %    eAG 137 mg/dL   Microalbumin / Creatinine Urine Ratio    Collection Time: 12/05/22 12:50 PM   Result Value Ref Range    Microalbumin, Random Urine 0.84 0.0 - 3.0 MG/DL    Creatinine, Ur 53.00 mg/dL    Microalbumin Creatinine Ratio 16 0 - 30 mg/g   Comprehensive Metabolic Panel    Collection Time: 12/05/22 12:50 PM   Result Value Ref Range    Sodium 142 133 - 143 mmol/L    Potassium 3.8 3.5 - 5.1 mmol/L    Chloride 108 101 - 110 mmol/L    CO2 31 21 - 32 mmol/L    Anion Gap 3 2 - 11 mmol/L    Glucose 118 (H) 65 - 100 mg/dL    BUN 26 (H) 8 - 23 MG/DL    Creatinine 2.20 (H) 0.8 - 1.5 MG/DL    Est, Glom Filt Rate 30 (L) >60 ml/min/1.73m2    Calcium 9.4 8.3 - 10.4 MG/DL    Total Bilirubin 0.5 0.2 - 1.1 MG/DL    ALT 27 12 - 65 U/L    AST 26 15 - 37 U/L    Alk Phosphatase 189 (H) 50 - 136 U/L    Total Protein 7.1 6.3 - 8.2 g/dL    Albumin 3.4 3.2 - 4.6 g/dL    Globulin 3.7 2.8 - 4.5 g/dL    Albumin/Globulin Ratio 0.9 0.4 - 1.6     LDL Cholesterol, Direct    Collection Time: 12/05/22 12:50 PM   Result Value Ref Range    LDL Direct 93 <100 mg/dl       Lab Results   Component Value Date/Time    CHOL 189 12/05/2022 12:50 PM    HDL 38 12/05/2022 12:50 PM    VLDL 40 05/16/2022 09:42 AM   ,hemoglobin, basic metabolic panel,   Lab Results   Component Value Date/Time    TSH 1.240 01/12/2022 05:40 AM    ,  Lab Results   Component Value Date/Time     12/05/2022 12:50 PM    K 3.8 12/05/2022 12:50 PM     12/05/2022 12:50 PM    CO2 31 12/05/2022 12:50 PM    BUN 26 12/05/2022 12:50 PM    GFRAA 40 05/23/2022 12:17 PM    GLOB 3.7 12/05/2022 12:50 PM    ALT 27 12/05/2022 12:50 PM    AST 26 12/05/2022 12:50 PM      Lab Results   Component Value Date    LDLCALC Not calculated due to elevated triglyceride level 12/05/2022    LDLDIRECT 93 12/05/2022 03/07/22    TRANSTHORACIC ECHOCARDIOGRAM (TTE) LIMITED (CONTRAST/BUBBLE/3D PRN) 03/08/2022 12:47 PM (Final)        Left Ventricle: Left ventricle size is normal. Moderate global hypokinesis present. Moderately reduced left ventricular systolic function with a visually estimated EF of 30 - 35%. Contrast used: Definity. Signed by: Megha Sheppard DO on 3/8/2022 12:47 PM       ASSESSMENT and PLAN      Coronary artery disease involving native coronary artery of native heart without angina pectoris  -Stable no complaints of any angina at this point. Nuclear stress test showed previous apical myocardial infarction but with no significant inducible ischemia. Being managed medically. On beta-blocker therapy. No angina. Also on statin therapy.     Dilated cardiomyopathy ([facility]  - On metoprolol succinate 25 mg once daily. Not on an ACE inhibitor/Arni/aldosterone blocker because of low blood pressures. Also has chronic kidney disease. Okay to try low-dose Farxiga/SGLT2 drug    Chronic systolic congestive heart failure ([facility]  -Euvolemic currently today. On chronic oxygen supplementation but mainly with activity and not at rest    Longstanding persistent atrial fibrillation (HCC)  -Rate controlled with metoprolol and anticoagulated with Eliquis for thromboembolic prophylaxis. Mixed hyperlipidemia  -Continue atorvastatin therapy. Stage 3b chronic kidney disease ([facility]  -Being monitored-last GFR was below 35. Overall Impression  -He has done well from a cardiac perspective overall and is stable. He has ischemic cardiomyopathy with an EF around 35%. We have held off on an ICD at this point after confirming with him. Blood pressures are too low to consider Entresto/ARB therapy/spironolactone/MRA therapy. We have held off on an SGLT2 drug so far also because of borderline low pressures and with him being euvolemic. He was given [place] by his primary and I think this is reasonable to try once daily and if he actually feels better with it without it dropping his blood pressures and with no symptoms associated with UTI or any other infections, we could continue it. Continue low-dose beta-blocker therapy. Heart rates are well controlled and he is appropriately anticoagulated with Eliquis for thromboembolic prophylaxis. We will continue to keep a close watch on him and see him in follow-up in about 4 months time.    -He has gained weight and understands the need for weight loss. He has gained close to 16 pounds since he last met with us. Clearly does not look like fluid weight. Cutting out carbs in his diet is of utmost importance. Return in about 4 months (around 4/12/2023) for chf, cardiomyopathy, cad, afib.      Thank you for allowing us to participate in the care of your patient. If you have any further questions, please do not hesitate to contact us.   Sincerely,        Ariadna Rodriguez MD   12/12/2022

## 2022-12-15 ENCOUNTER — HOSPITAL ENCOUNTER (OUTPATIENT)
Dept: PHYSICAL THERAPY | Age: 77
Setting detail: RECURRING SERIES
Discharge: HOME | End: 2022-12-18
Payer: MEDICARE

## 2022-12-15 PROCEDURE — 97110 THERAPEUTIC EXERCISES: CPT

## 2022-12-15 ASSESSMENT — PAIN SCALES - GENERAL: PAINLEVEL_OUTOF10: 0

## 2022-12-15 NOTE — PROGRESS NOTES
Maria E Lindsey Pack  : 1945  Primary: [employer]  Secondary:  [address] @ [address] [place] [id number]  Phone: [phone]  Fax: [phone] Plan Frequency: Twice per week for 8 eight weeks (Twice per week for eight weeks)    Plan of Care/Certification Expiration Date: 23      PT Visit Info:   No data recorded    OUTPATIENT PHYSICAL THERAPY:OP NOTE TYPE: Treatment Note 12/15/2022       Episode  }Appt Desk              Treatment Diagnosis:  Generalized Muscle Weakness (M62.81)  Difficulty in walking, Not elsewhere classified (R26.2)  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 Restrictions/Precautions:  Restrictions/Precautions: Cardiac; Fall Risk  No data recorded   Interventions Planned (Treatment may consist of any combination of the following):    Current Treatment Recommendations: Strengthening; ROM; Balance training; Functional mobility training; Transfer training; Endurance training; Gait training; Stair training; Neuromuscular re-education; Manual Therapy - Soft Tissue Mobilization; Manual Therapy - Joint Manipulation; Pain management; Return to work related activity; Home exercise program; Safety education & training; Patient/Caregiver education & training; Modalities;  Therapeutic activities     Subjective Comments:  Pt. reported doing well without any issues   Initial:}    0/10Post Session:        0/10   Medications Last Reviewed:  12/15/2022  Updated Objective Findings: /88 mmHg pulse 79 bpm  95% SpO2     Treatment   THERAPEUTIC EXERCISE: (55 minutes):    Exercises per grid below to improve mobility, strength, balance and coordination. Required mod visual, verbal, manual and tactile cues to promote proper body alignment, promote proper body posture, promote proper body mechanics and promote proper body breathing techniques. Progressed resistance, range, repetitions and complexity of movement as indicated. Date:  12/12/22 Date:  12/15/22 Date:  12/08/22   Activity/Exercise Parameters Parameters Parameters   Nu Step 10 minutes level 5 10 minutes level 5 10 mins level 5    Sit to stand 3 x 10 from elevated plinth 3 x 10 elevated plinth 3 x 10 reps elevated plinth    Hip abduction 3 x 10 standing at bar BLE's with 5 lb wt. s  3 x 10 each standing 5 lb wt. s each  3 x 10 reps standing at bar   Hip Flexion  2x10 reps at bar alternating BLE's with 5 lb wt. s  3 x 10 each seated black band  3 x 10 reps standing at bar tapping 4 inch step    Long Arc Quad  2x15 reps with 5 lb wts 3 x 10 5# R ---   Hamstring Curl  Standing at bar BLE's x 20 reps 5 lb wt. s 3 x 10 5# green seated R ---   Hamstring Stretch   4x30 sec hold each seated strap 4x30 sec hold with strap seated  3 x 30 sec stroop R only    Row  2x15 black and blue band unsupported sitting  2x15 reps black & blue bands seated  ---   Gait  Side stepping at wall x 3 mins  --- ---   Piriformis Stretch  --- ---  ---------   Unsupported Standing (perform in front of plinth with gait belt; be prepared to help patient recover from LOB) 4 x 30 sec each side semi tandem staggered stance      4 x 30 sec each side semi tandem staggered stance 4x30 second hold each  tandem each;  6 x 10 seconds eyes closed   Core Strength/Reaching ----- -------- --------   Core Strength/Rotation --------- ---------- --------   TUG -------- --- X 3 trials    Balance X 5 mins with different variations with base of support --- ---     MANUAL THERAPY: (0 minutes): None today  Joint mobilization and Soft tissue mobilization was utilized and necessary because of the patient's restricted joint motion, painful spasm, loss of articular motion and restricted motion of soft tissue. MODALITIES: (0 minutes):        None today      Treatment/Session Summary:    Treatment Assessment: Patient was compliant with all exercises and continues to report no pain. Communication/Consultation: Discussion of exam findings and progress note. Equipment provided today:  None today   Recommendations/Intent for next treatment session: Balance and standing exercise progressions focused on single limb stance and functional strength.      Total Treatment Billable Duration: 55 minutes   Time In: 0900  Time Out: 1000    EYAD CONDON PTA       Charge Capture  }Post Session Pain  PT Visit Info  MedBridge Portal  MD Guidelines  Scanned Media  Benefits  MyChart    Future Appointments   Date Time Provider Rachel Sanchez   12/20/2022 10:00 AM Moises Chel, [facility]   12/22/2022  9:00 AM Moises Chel, [facility]   12/27/2022  9:00 AM Moises Chel, [facility]   12/30/2022  9:00 AM Moises Chel, [facility]   1/3/2023  9:00 AM Moises Chel, [facility]   1/5/2023 10:40 AM Caitlyn Crane MD [facility]   1/6/2023 10:00 AM Osmany Mcgill, PT [facility]   5/1/2023 11:00 AM Janet Hurd MD [facility]   6/26/2023 10:00 AM Odilia Cook MD [facility]

## 2022-12-20 ENCOUNTER — HOSPITAL ENCOUNTER (OUTPATIENT)
Dept: PHYSICAL THERAPY | Age: 77
Setting detail: RECURRING SERIES
Discharge: HOME | End: 2022-12-23
Payer: MEDICARE

## 2022-12-20 PROCEDURE — 97110 THERAPEUTIC EXERCISES: CPT

## 2022-12-20 ASSESSMENT — PAIN SCALES - GENERAL: PAINLEVEL_OUTOF10: 0

## 2022-12-20 NOTE — PROGRESS NOTES
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 Restrictions/Precautions:  Restrictions/Precautions: Cardiac; Fall Risk  No data recorded   Interventions Planned (Treatment may consist of any combination of the following):    Current Treatment Recommendations: Strengthening; ROM; Balance training; Functional mobility training; Transfer training; Endurance training; Gait training; Stair training; Neuromuscular re-education; Manual Therapy - Soft Tissue Mobilization; Manual Therapy - Joint Manipulation; Pain management; Return to work related activity; Home exercise program; Safety education & training; Patient/Caregiver education & training; Modalities;  Therapeutic activities     Subjective Comments:  Pt. reported no pain and going to see the prosthetic care doctor this morning and requested to leave early today   Initial:}    0/10Post Session:        0/10   Medications Last Reviewed:  2022  Updated Objective Findings: /85 pulse 94 and O2 SATS 94%    Treatment   THERAPEUTIC EXERCISE: (45 minutes):    Exercises per grid below to improve mobility, strength, balance and coordination. Required mod visual, verbal, manual and tactile cues to promote proper body alignment, promote proper body posture, promote proper body mechanics and promote proper body breathing techniques. Progressed resistance, range, repetitions and complexity of movement as indicated. Date:  12/12/22 Date:  12/15/22 Date:  12/20/22   Activity/Exercise Parameters Parameters Parameters   Nu Step 10 minutes level 5 10 minutes level 5 10 mins level 5    Sit to stand 3 x 10 from elevated plinth 3 x 10 elevated plinth 3 x 10 reps elevated plinth    Hip abduction 3 x 10 standing at bar BLE's with 5 lb wt. s  3 x 10 each standing 5 lb wt. s each  3 x 10 reps standing at bar   Hip Flexion  2x10 reps at bar alternating BLE's with 5 lb wt. s  3 x 10 each seated black band  3 x 10 reps standing at bar tapping 4 inch step    Long Arc Quad  2x15 reps with 5 lb wts 3 x 10 5# R 3x10 reps 5 lb wt.s    Hamstring Curl  Standing at bar BLE's x 20 reps 5 lb wt. s 3 x 10 5# green seated R 3x10 reps 5 lb wt.s    Hamstring Stretch   4x30 sec hold each seated strap 4x30 sec hold with strap seated  3 x 30 sec stroop R only    Row  2x15 black and blue band unsupported sitting  2x15 reps black & blue bands seated  ---   Gait  Side stepping at wall x 3 mins  --- ---   Piriformis Stretch  --- ---  ---------   Unsupported Standing (perform in front of plinth with gait belt; be prepared to help patient recover from LOB) 4 x 30 sec each side semi tandem staggered stance      4 x 30 sec each side semi tandem staggered stance 4x30 second hold each  tandem each;  6 x 10 seconds eyes closed   Core Strength/Reaching ----- -------- --------   Core Strength/Rotation --------- ---------- --------   TUG -------- --- X 3 trials    Balance X 5 mins with different variations with base of support --- ---     MANUAL THERAPY: (0 minutes): None today  Joint mobilization and Soft tissue mobilization was utilized and necessary because of the patient's restricted joint motion, painful spasm, loss of articular motion and restricted motion of soft tissue. MODALITIES: (0 minutes):        None today      Treatment/Session Summary:    Treatment Assessment: Patient was compliant with all exercises and continues to report no pain. Communication/Consultation: Discussion of exam findings and progress note. Equipment provided today:  None today   Recommendations/Intent for next treatment session: Balance and standing exercise progressions focused on single limb stance and functional strength.      Total Treatment Billable Duration: 45 minutes   Time In: 1000  Time Out: 214 [address] CONDON [facility]       Charge Capture  }Post Session Pain  PT Visit Info  Med[employer] Portal  MD Guidelines  Scanned Media  Benefits  MyChart    Future Appointments   Date Time Provider Rachel Sanchez   12/22/2022  9:00 AM [facility], [facility]   12/27/2022  [facility]   12/30/2022  9:00 AM [facility], [facility]   1/3/2023  9:00 AM [facility], [facility]   1/5/2023 10:40 AM Yeison Mcgee MD PPS [facility]   1/6/2023 10:00 AM Shanique Lion, PT [facility]   5/1/2023 11:00 AM Teena Lee MD [facility]   6/26/2023 10:00 AM Meera Denise MD PFP [facility]

## 2022-12-22 ENCOUNTER — HOSPITAL ENCOUNTER (OUTPATIENT)
Dept: PHYSICAL THERAPY | Age: 77
Setting detail: RECURRING SERIES
Discharge: HOME | End: 2022-12-25
Payer: MEDICARE

## 2022-12-22 PROCEDURE — 97110 THERAPEUTIC EXERCISES: CPT

## 2022-12-22 ASSESSMENT — PAIN SCALES - GENERAL: PAINLEVEL_OUTOF10: 0

## 2022-12-22 NOTE — PROGRESS NOTES
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 Restrictions/Precautions:  Restrictions/Precautions: Cardiac; Fall Risk  No data recorded   Interventions Planned (Treatment may consist of any combination of the following):    Current Treatment Recommendations: Strengthening; ROM; Balance training; Functional mobility training; Transfer training; Endurance training; Gait training; Stair training; Neuromuscular re-education; Manual Therapy - Soft Tissue Mobilization; Manual Therapy - Joint Manipulation; Pain management; Return to work related activity; Home exercise program; Safety education & training; Patient/Caregiver education & training; Modalities; Therapeutic activities     Subjective Comments:  Pt. reported no pain and getting along well. Pt, stated the prothesis doctor made a new mold for a new prosthectic LE   Initial:}    0/10Post Session:        0/10   Medications Last Reviewed:  2022  Updated Objective Findings: /84 pulse 95 and O2 SATS 94%    Treatment   THERAPEUTIC EXERCISE: (55 minutes):    Exercises per grid below to improve mobility, strength, balance and coordination. Required mod visual, verbal, manual and tactile cues to promote proper body alignment, promote proper body posture, promote proper body mechanics and promote proper body breathing techniques. Progressed resistance, range, repetitions and complexity of movement as indicated. Date:  12/22/22 Date:  12/15/22 Date:  12/20/22   Activity/Exercise Parameters Parameters Parameters   Nu Step 10 minutes level 5 10 minutes level 5 10 mins level 5    Sit to stand 3 x 10 from elevated plinth 3 x 10 elevated plinth 3 x 10 reps elevated plinth    Hip abduction 3 x 10 standing at bar BLE's with 5 lb wt. s  3 x 10 each standing 5 lb wt. s each  3 x 10 reps standing at bar   Hip Flexion  2x10 reps at bar alternating BLE's with 2.5 lb wt. s  3 x 10 each seated black band  3 x 10 reps standing at bar tapping 4 inch step    Long Arc Quad  2x15 reps with 2.5 lb  wts 3 x 10 5# R 3x10 reps 5 lb wt.s    Hamstring Curl  Standing at bar BLE's x 20 reps 2.5 lb wt. s 3 x 10 5# green seated R 3x10 reps 5 lb wt.s    Hamstring Stretch   4x30 sec hold each seated strap 4x30 sec hold with strap seated  3 x 30 sec stroop R only    Row  2x15 black and blue band unsupported sitting  2x15 reps black & blue bands seated  ---   Gait  Side stepping at wall x 3 mins  --- ---   Piriformis Stretch  --- ---  ---------   Unsupported Standing (perform in front of plinth with gait belt; be prepared to help patient recover from LOB) 4 x 30 sec each side semi tandem staggered stance      4 x 30 sec each side semi tandem staggered stance 4x30 second hold each  tandem each;  6 x 10 seconds eyes closed   Core Strength/Reaching ----- -------- --------   Core Strength/Rotation --------- ---------- --------   TUG -------- --- X 3 trials    Balance X 5 mins with different variations with base of support --- ---     MANUAL THERAPY: (0 minutes): None today  Joint mobilization and Soft tissue mobilization was utilized and necessary because of the patient's restricted joint motion, painful spasm, loss of articular motion and restricted motion of soft tissue. MODALITIES: (0 minutes):        None today      Treatment/Session Summary:    Treatment Assessment: Patient was compliant with all exercises and continues to report no pain. Communication/Consultation: Discussion of exam findings and progress note. Equipment provided today:  None today   Recommendations/Intent for next treatment session: Balance and standing exercise progressions focused on single limb stance and functional strength.      Total Treatment Billable Duration: 55 minutes   Time In: 0900  Time Out: 1000    EYAD CONDON PTA       Charge Capture  }Post Session Pain  PT Visit Info  [employer] Portal  MD Guidelines  Scanned Media  Benefits  MyChart    Future Appointments   Date Time Provider Rachel Sanchez   12/27/2022  9:00 AM Tasneem Chavez PTA [facility]   12/30/2022  9:00 AM Tasneem Chavez PTA [facility]   1/3/2023  9:00 AM Tasneem Chavez PTA [facility]   1/5/2023 10:40 AM Marleny Ramirez MD PPS [facility]   1/6/2023 10:00 AM Oz Cedillo, PT [facility]   5/1/2023 11:00 AM Cathryn Cortes MD [facility]   6/26/2023 10:00 AM Lily Hoff MD PFP [facility]

## 2022-12-27 ENCOUNTER — HOSPITAL ENCOUNTER (OUTPATIENT)
Dept: PHYSICAL THERAPY | Age: 77
Setting detail: RECURRING SERIES
Discharge: HOME | End: 2022-12-30
Payer: MEDICARE

## 2022-12-27 PROCEDURE — 97110 THERAPEUTIC EXERCISES: CPT

## 2022-12-27 ASSESSMENT — PAIN SCALES - GENERAL: PAINLEVEL_OUTOF10: 0

## 2022-12-27 NOTE — PROGRESS NOTES
Keiko Mercer Pack  : 1945  Primary: [facility] Medicare Advantage H[employer]  Secondary:  [address] @ [address] [place] [id number]  Phone: [phone]  Fax: [phone] Plan Frequency: Twice per week for 8 eight weeks (Twice per week for eight weeks)    Plan of Care/Certification Expiration Date: 23      PT Visit Info:   No data recorded    OUTPATIENT PHYSICAL THERAPY:OP NOTE TYPE: Treatment Note 2022       Episode  }Appt Desk              Treatment Diagnosis:  Generalized Muscle Weakness (M62.81)  Difficulty in walking, Not elsewhere classified (R26.2)  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 Restrictions/Precautions:  Restrictions/Precautions: Cardiac; Fall Risk  No data recorded   Interventions Planned (Treatment may consist of any combination of the following):    Current Treatment Recommendations: Strengthening; ROM; Balance training; Functional mobility training; Transfer training; Endurance training; Gait training; Stair training; Neuromuscular re-education; Manual Therapy - Soft Tissue Mobilization; Manual Therapy - Joint Manipulation; Pain management; Return to work related activity; Home exercise program; Safety education & training; Patient/Caregiver education & training; Modalities;  Therapeutic activities     Subjective Comments:  Pt. continues to report no pain and goes to get his new prosthetic leg today   Initial:}    0/10Post Session:        0/10   Medications Last Reviewed:  2022  Updated Objective Findings: /59 pulse 93 and O2 SATS 97%    Treatment   THERAPEUTIC EXERCISE: (55 minutes):    Exercises per grid below to improve mobility, strength, balance and coordination. Required mod visual, verbal, manual and tactile cues to promote proper body alignment, promote proper body posture, promote proper body mechanics and promote proper body breathing techniques. Progressed resistance, range, repetitions and complexity of movement as indicated. Date:  12/22/22 Date:  12/27/22 Date:  12/20/22   Activity/Exercise Parameters Parameters Parameters   Nu Step 10 minutes level 5 10 minutes level 5 10 mins level 5    Sit to stand 3 x 10 from elevated plinth 3 x 10 elevated plinth 3 x 10 reps elevated plinth    Hip abduction 3 x 10 standing at bar BLE's with 5 lb wt. s  3 x 10 each standing 5 lb wt. s each  3 x 10 reps standing at bar   Hip Flexion  2x10 reps at bar alternating BLE's with 2.5 lb wt. s  3 x 10 each seated black band  3 x 10 reps standing at bar tapping 4 inch step    Long Arc Quad  2x15 reps with 2.5 lb  wts 3 x 10 5# BLE's  3x10 reps 5 lb wt.s    Hamstring Curl  Standing at bar BLE's x 20 reps 2.5 lb wt. s 3 x 10 5# green seated BLE's 3x10 reps 5 lb wt.s    Hamstring Stretch   4x30 sec hold each seated strap 4x30 sec hold with strap seated  3 x 30 sec stroop R only    Row  2x15 black and blue band unsupported sitting  2x15 reps black & blue bands seated  ---   Gait  Side stepping at wall x 3 mins  --- ---   Unsupported Standing (perform in front of plinth with gait belt; be prepared to help patient recover from LOB) 4 x 30 sec each side semi tandem staggered stance      4 x 30 sec each side semi tandem staggered stance 4x30 second hold each  tandem each;  6 x 10 seconds eyes closed   Core Strength/Reaching ----- -------- --------   Core Strength/Rotation --------- ---------- --------   TUG -------- --- X 3 trials    Balance X 5 mins with different variations with base of support X 5 mins with different variations with base of support ---     MANUAL THERAPY: (0 minutes): None today  Joint mobilization and Soft tissue mobilization was utilized and necessary because of the patient's restricted joint motion, painful spasm, loss of articular motion and restricted motion of soft tissue. MODALITIES: (0 minutes):        None today      Treatment/Session Summary:    Treatment Assessment: Patient continues to report no pain through out session and continues to require multiple mini rest breaks due to fatigue    Communication/Consultation: Discussion of exam findings and progress note. Equipment provided today:  None today   Recommendations/Intent for next treatment session: Balance and standing exercise progressions focused on single limb stance and functional strength.      Total Treatment Billable Duration: 55 minutes   Time In: 0900  Time Out: 1000    EYAD CONDON PTA       Charge Capture  }Post Session Pain  PT Visit Info  [employer] Portal  MD Guidelines  Scanned Media  Benefits  MyChart    Future Appointments   Date Time Provider Rachel Sanchez   12/30/2022  9:00 AM Carol Jiang PTA [facility]   1/3/2023  9:00 AM Carol Jiang PTA [facility]   1/5/2023 10:40 AM Yanick Pagan MD PPS [facility]   1/6/2023 10:00 AM Carline Almeida, PT [facility]   5/1/2023 11:00 AM Cooper Ceron MD [facility]   6/26/2023 10:00 AM Evert Ray MD [facility]

## 2022-12-30 ENCOUNTER — HOSPITAL ENCOUNTER (OUTPATIENT)
Dept: PHYSICAL THERAPY | Age: 77
Setting detail: RECURRING SERIES
End: 2022-12-30
Payer: MEDICARE

## 2023-01-03 ENCOUNTER — HOSPITAL ENCOUNTER (OUTPATIENT)
Dept: PHYSICAL THERAPY | Age: 78
Setting detail: RECURRING SERIES
End: 2023-01-03
Payer: MEDICARE

## 2023-01-06 ENCOUNTER — HOSPITAL ENCOUNTER (OUTPATIENT)
Dept: PHYSICAL THERAPY | Age: 78
Setting detail: RECURRING SERIES
End: 2023-01-06
Payer: MEDICARE

## 2023-01-06 NOTE — PROGRESS NOTES
Physical Therapy  [address] [place] [address],[address] at [facility] 1/6/2023    Patient has Lavern.       Oralee Luiza PT, DPT, TPS

## 2023-01-12 ENCOUNTER — HOSPITAL ENCOUNTER (OUTPATIENT)
Dept: PHYSICAL THERAPY | Age: 78
Setting detail: RECURRING SERIES
Discharge: HOME | End: 2023-01-15
Payer: MEDICARE

## 2023-01-12 PROCEDURE — 97110 THERAPEUTIC EXERCISES: CPT

## 2023-01-12 NOTE — PROGRESS NOTES
Bryant Calixto Pack  : 1945  Primary: [facility] Medicare Advantage Hmo  Secondary:  [address] @ [address] [place] [address]  Phone: [phone]  Fax: [phone] Plan Frequency: Twice per week for 8 eight weeks (Twice per week for eight weeks)    Plan of Care/Certification Expiration Date: 23      PT Visit Info:    No data recorded    OUTPATIENT PHYSICAL THERAPY:OP NOTE TYPE: Therapy Progress Note  2023               Episode  Appt Desk         Treatment Diagnosis:  Generalized Muscle Weakness (M62.81)  Difficulty in walking, Not elsewhere classified (R26.2)  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 Raul Paez is a 77year old male presenting with an overall decline in balance, functional mobility, transfers, ability to ambulate, and overall function following R below knee amputation 2021 after spraining his ankle at work and then acquiring a wound from altered gait. His daughter is present with him today. He underwent an ankle reconstruction in the past but otherwise had no issues until spraining the ankle. 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 He completed six week of inpatient rehab and was making excellent progress. He states by the end of October he was discharged to home and had been practicing well with his prosthesis for about one week.  Home health has been treating him but he states he has been limited due to other medical issues and was hospitalized in January for fluid retention on his lungs. He states he went to [facility] for rehab following this and then was discharged home again. He states home health just ended a week or so ago and he had practiced with a straight cane a few times with his therapist. He reports he is still determined to make improvements and improve his mobility overall; and to return to part time work at [employer] if that is possible from a mobility standpoint. He and his daughter report that his cardiopulmonary endurance and balance are limiting factors for him as well and that he has fallen a few times in the last six months. He also reports having had a few falls while still working at [employer] due to tripping over objects. He does report a feeling of occasionally losing his balance and falling backward at random times. He also reports low back pain that is chronic but no other significant orthopedic complaints at this time. Patient presents with decreased balance, decreased proprioception, decreased functional strength, decreased cardiopulmonary endurance, decreased gait and transfer ability. Patient will benefit from skilled PT to build on gains and provide exercise progressions, a progressive resistance exercise program, balance exercises, gait and transfer training, manual therapy and modalities as needed to work towards therapeutic goals. PROGRESS NOTE 1/12/23:   Mr. Lam Francois has been for 46 sessions of physical therapy from 6/13/22 to 11/2/27/22 with significant success. However, he contracted covid soon after that and was unable to attend PT for about 2 weeks and has mild set back in strength. He also recently has been fitted with new knee prosthesis which seems to still need to be adjusted and pt can not fully straighten L knee in standing.  He returns today to continue PT and reports feeling improvements in endurance, mobility, strength, and pain tolerance since starting PT. He still has strong tendency to lean with standing work and is still at risk for falls. The patient went from 12 repetitions during the 30-second sit-to-stand test to 9 repetitions and his TUG score has went up 2 seconds to 17 seconds. The patient presents with weakness, decreased functional tolerance, gait and ambulation deficits, and risk of falls. Patient would like to progress to using a single point cane during ambulation from a wheeled walker. Lynn Phillips will continue to benefit from home exercise program, therapeutic and postural strengthening exercises, balance work and stretching for muscle imbalances. Mario Mayer continue to benefit from skilled PT (medically necessary) to address above deficits affecting participation in basic ADLs and overall functional tolerance. OBJECTIVE     Observation/Postural and Gait Assessment: Patient ambulates with RW and wears uses nasal oxygen tube. Has  tendency to stand and ambulate with significant external rotation and out toeing especially on the R.   Standing:  Pt unable to stand with L knee straight and has strong tendency to have a FB trunk. Appears that new prosthetic needs to be lengthened further. Palpation: tight to L psoas    AROM: L hip extension = 0 degrees; L knee extension needs to be assessed    Strength:  Manual Muscle Test (out of 5) Left Right   Knee extension 5 from 5 5   Knee flexion 5 from 5 5   Hip flexion 5 from 5 5    Ankle DF 5 from 5 ---   Ankle PF 5 from 5 ---     Balance Tests:  Modified CTSIB: NT                               30 second sit to stand: plinth at 49cm with BUEs 11 repetitions.                                 Four Stage Balance Test: Semi tandem stance bilaterally     07/18/22:  30 second sit to stand: 49cm from plinth 11 repetitions  Modified CTSIB: 30/120 seconds   Four stage: Semi Tandem     08/12/22:  30 second sit to stand: NT  Modified CTSIB: 60/120 seconds  Four stage: Semi tandem     22:  TU.4 seconds  30 second sit to stand: 9 repetitions 49cm from plinth   Four stage: Semi tandem   Modified CTSIB: 60/120 seconds    11/10/2022:   TU.7 seconds  30 seconds sit to stand: 10 repetitions 50 cm plinth   Four stage: semi tandem   Modified CTSIB: NT    22:  TUG score: 15 seconds   30 second sit to stand: 12 repetitions 50 cm plinth   Four Stage: Tandem stance  Modified CTSIB: 60/120 seconds     23  TUG score: 17 seconds  30 second sit to stand: 9 repetitions 50 cm plinth   Four Stage: Tandem stance  Modified CTSIB: T    Neurological Screen:  Myotomes: Key muscle strength testing through bilateral LE is intact. Dermatomes: Sensation testing through bilateral lower quadrants for light touch is intact. Functional Mobility:  Patient ambulates with prosthesis and rolling front wheeled walker independently. Performs sit to and from stand transfers independently with effort and use of push of with hands. Demonstrates inabilite to stand up straight- R prosthesis is likely to low; currently ambulating and standing with increased trunk flexion. When walking still has significant out toeing and externally rotated hip especially on the R side with forward flexed trunk. ASSESSMENT   Problem List: (Impacting functional limitations): Body Structures, Functions, Activity Limitations Requiring Skilled Therapeutic Intervention: Decreased functional mobility ; Decreased ADL status; Decreased ROM; Decreased tolerance to work activity; Decreased strength; Decreased endurance; Decreased sensation; Decreased balance; Decreased coordination;  Increased pain; Decreased posture     Therapy Prognosis:   Therapy Prognosis: Good        PLAN   Effective Dates: 2022 TO Plan of Care/Certification Expiration Date: 23     Frequency/Duration: Plan Frequency: Twice per week for 8 eight weeks (Twice per week for eight weeks)     Interventions Planned (Treatment may consist of any combination of the following):    Current Treatment Recommendations: Strengthening; ROM; Balance training; Functional mobility training; Transfer training; Endurance training; Gait training; Stair training; Neuromuscular re-education; Manual Therapy - Soft Tissue Mobilization; Manual Therapy - Joint Manipulation; Pain management; Return to work related activity; Home exercise program; Safety education & training; Patient/Caregiver education & training; Modalities; Therapeutic activities     Goals: (Goals have been discussed and agreed upon with patient.)  Short-Term Functional Goals: Time Frame: 06/13/2022 to 07/11/2022  Patient will ambulate for six minutes using front wheeled walker without requiring a seated rest period. (GOAL MET)  Patient will maintain eyes closed static standing for 30 seconds on even surface safely with guarding. (GOAL MET)  Patient will perform sit to stand transfer from standard chair height without BUEs for one repetition. (ONGOING)  Discharge Goals: Time Frame: 06/13/2022 to 11/24/2022   Patient will ambulate with straight cane safely and independently over even surfaces. (ONGOING) 1/12/23  Patient will ambulate with rolling walker over uneven surfaces or when ambulating for prolonged distances. (ONGOING)   Patient will perform sit to stand and sit to and from supine transfers safely and independently without cues. (GOAL MET)  Patient will improve TUG score to 14 seconds or less to indicate improved gait speed and decreased fall risk. (ONGOING)- mild set back 1/12/23  Patient will improve modified CTSIB to 120/120 to indicate improved proprioception and vestibular function and a decreased fall risk. (ONGOING)  Patient will improve 30 second sit to stand score to 10 repetitions without BUEs. (ONGOING)- mild set back 1/12/23  Patient will return to working at [employer] part time duty with modifications. (ONGOING)          Outcome Measure:    Tool Used: Lower Extremity Functional Scale (LEFS)  Score:  Initial: 34/80 Most Recent: 43/80 (Date: 07/15/22)    31/80 (8/11/22)    36/80 09/29/22    28/80 11/10/2022     TBA   Interpretation of Score: 20 questions each scored on a 5 point scale with 0 representing \"extreme difficulty or unable to perform\" and 4 representing \"no difficulty\". The lower the score, the greater the functional disability. 80/80 represents no disability. Minimal detectable change is 9 points. Tool Used: Timed Up and Go (TUG)  Score:  Initial: 20.4 seconds Most Recent: 19 seconds (Date: 07/18/22 )    17.1 seconds 08/11/22    16.4 seconds 09/29/22     17.7 seconds 11/10/2022     15 seconds 12/08/22      17 seconds 1/12/23   Interpretation of Score: The test measures, in seconds, the time taken by an individual to stand up from a standard arm chair (seat height 46 cm [18 in], arm height 65 cm [25.6 in]), walk a distance of 3 meters (118 in, approx 10 ft), turn, walk back to the chair and sit down. If the individual takes longer than 14 seconds to complete TUG, this indicates risk for falls. Medical Necessity:   Patient is expected to demonstrate progress in strength, range of motion, balance, coordination and functional technique to increase independence with functional mobility and ADLs. Skilled intervention continues to be required due to need for exercise progressions tailored to patient needs, goals, and impairments . Reason For Services/Other Comments:  Patient continues to require skilled intervention due to need for exercise progressions, manual therapy, plan of care modifications and exceptions tailored to patient presentation. Regarding Marjorie Merchant's therapy, I certify that the treatment plan above will be carried out by a therapist or under their direction. Thank you for this referral,  Ernst Rock PT     Referring Physician Signature: Federico Greene PA-C No Signature is Required for this note.         Post Session Pain  Charge Capture  PT Visit Info  POC Link  Treatment Note Link  MD Guidelines  MyChart

## 2023-01-12 NOTE — PROGRESS NOTES
Mario Merchant  : 1945  Primary: [employer] Ad[employer]age [employer]mo  Secondary:  [facility] [place]  Jaime GARCIA  [facility] [address]  Phone: [phone]  Fax: [phone] Plan Frequency: Twice per week for 8 eight weeks (Twice per week for eight weeks)    Plan of Care/Certification Expiration Date: 23      PT Visit Info:   No data recorded    OUTPATIENT PHYSICAL THERAPY:OP NOTE TYPE: Treatment Note 2023       Episode  }Appt Desk              Treatment Diagnosis:  Generalized Muscle Weakness (M62.81)  Difficulty in walking, Not elsewhere classified (R26.2)  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  Restrictions/Precautions:  Restrictions/Precautions: Cardiac; Fall Risk  No data recorded   Interventions Planned (Treatment may consist of any combination of the following):    Current Treatment Recommendations: Strengthening; ROM; Balance training; Functional mobility training; Transfer training; Endurance training; Gait training; Stair training; Neuromuscular re-education; Manual Therapy - Soft Tissue Mobilization; Manual Therapy - Joint Manipulation; Pain management; Return to work related activity; Home exercise program; Safety education & training; Patient/Caregiver education & training; Modalities; Therapeutic activities     Subjective Comments:      Initial:}     /10Post Session:        0/10   Medications Last Reviewed:  2023  Updated Objective Findings:  /72  pulse 9 and O2 SATS 93%    Treatment   THERAPEUTIC EXERCISE: (53 minutes):    Exercises per grid below to improve mobility, strength, balance and coordination.   Required mod visual, verbal, manual and tactile cues to promote proper body alignment, promote proper body posture, promote proper body mechanics and promote proper body breathing techniques. Progressed resistance, range, repetitions and complexity of movement as indicated. NOTE: place R LE on ~ 2 in board with standing exercises because of longer L LE today. Date:  12/22/22 Date:  12/27/22 Date:  12/20/22 Date  1/12/23   Activity/Exercise Parameters Parameters Parameters    Nu Step 10 minutes level 5 10 minutes level 5 10 mins level 5  10 mins level 5    Sit to stand 3 x 10 from elevated plinth 3 x 10 elevated plinth 3 x 10 reps elevated plinth  3 x 10 reps elevated plinth    Hip abduction 3 x 10 standing at bar BLE's with 5 lb wt. s  3 x 10 each standing 5 lb wt. s each  3 x 10 reps standing at bar 3 x 10 reps standing at bar   Hip Flexion  2x10 reps at bar alternating BLE's with 2.5 lb wt. s  3 x 10 each seated black band  3 x 10 reps standing at bar tapping 4 inch step  3 x 10 reps standing at bar tapping 4 inch step    Long Arc Quad  2x15 reps with 2.5 lb  wts 3 x 10 5# BLE's  3x10 reps 5 lb wt. s  3x10 reps 5 lb wt.s    Hamstring Curl  Standing at bar BLE's x 20 reps 2.5 lb wt. s 3 x 10 5# green seated BLE's 3x10 reps 5 lb wt.s     Hamstring Stretch   4x30 sec hold each seated strap 4x30 sec hold with strap seated  3 x 30 sec stroop R only     Row  2x15 black and blue band unsupported sitting  2x15 reps black & blue bands seated  ---    Gait  Side stepping at wall x 3 mins  --- ---    Unsupported Standing (perform in front of plinth with gait belt; be prepared to help patient recover from LOB) 4 x 30 sec each side semi tandem staggered stance      4 x 30 sec each side semi tandem staggered stance 4x30 second hold each  tandem each;  6 x 10 seconds eyes closed    Core Strength/Reaching ----- -------- --------    Core Strength/Rotation --------- ---------- --------    TUG -------- --- X 3 trials  X 2 t anthonyls Balance X 5 mins with different variations with base of support X 5 mins with different variations with base of support --- X 5 mins with different variations with base of support   Standing RAFFY and L   psoas stretch    Standing with R foot on board:  Pelvis rock front 5 sec hold x 10    Standing with  L foot back  30 sec x 2     MANUAL THERAPY: (0 minutes): None today  Joint mobilization and Soft tissue mobilization was utilized and necessary because of the patient's restricted joint motion, painful spasm, loss of articular motion and restricted motion of soft tissue. MODALITIES: (0 minutes):        None today      Treatment/Session Summary:    Treatment Assessment: Pt needed a board under R LE today for standing exercises because of decreased R LE length compared to L. Tight in L psoas. Communication/Consultation: Discussion of exam findings and progress note. Equipment provided today:  None today   Recommendations/Intent for next treatment session: Balance and standing exercise progressions focused on single limb stance and functional strength.      Total Treatment Billable Duration: 55 minutes   Time In: 2363  Time Out: 1545    Alyssa Villafana PT       Charge Capture  }Post Session Pain  PT Visit Info  MedBridge Portal  MD Guidelines  Scanned Media  Benefits  MyChart    Future Appointments   Date Time Provider Rachel Sanchez   2/20/2023 11:20 AM Katya Hudson MD [facility]   5/1/2023 11:00 AM MD VIRGILIO Archer [facility]   6/26/2023 10:00 AM Bertis Habermann, MD [facility]

## 2023-01-16 ENCOUNTER — HOSPITAL ENCOUNTER (OUTPATIENT)
Dept: PHYSICAL THERAPY | Age: 78
Setting detail: RECURRING SERIES
Discharge: HOME | End: 2023-01-19
Payer: MEDICARE

## 2023-01-16 PROCEDURE — 97110 THERAPEUTIC EXERCISES: CPT

## 2023-01-16 ASSESSMENT — PAIN SCALES - GENERAL: PAINLEVEL_OUTOF10: 3

## 2023-01-16 NOTE — PROGRESS NOTES
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 Restrictions/Precautions:  Restrictions/Precautions: Cardiac; Fall Risk  No data recorded   Interventions Planned (Treatment may consist of any combination of the following):    Current Treatment Recommendations: Strengthening; ROM; Balance training; Functional mobility training; Transfer training; Endurance training; Gait training; Stair training; Neuromuscular re-education; Manual Therapy - Soft Tissue Mobilization; Manual Therapy - Joint Manipulation; Pain management; Return to work related activity; Home exercise program; Safety education & training; Patient/Caregiver education & training; Modalities;  Therapeutic activities     Subjective Comments:  Pt. reported increased pain in left knee today   Initial:}    3/10Post Session:        0/10   Medications Last Reviewed:  2023  Updated Objective Findings: /85 pulse 85, O2 SATS 94%    Treatment   THERAPEUTIC EXERCISE: (55 minutes):    Exercises per grid below to improve mobility, strength, balance and coordination. Required mod visual, verbal, manual and tactile cues to promote proper body alignment, promote proper body posture, promote proper body mechanics and promote proper body breathing techniques. Progressed resistance, range, repetitions and complexity of movement as indicated. NOTE: place R LE on ~ 2 in board with standing exercises because of longer L LE today. Date:  1/16/23 Date:  12/27/22 Date  1/12/23   Activity/Exercise Parameters Parameters    Nu Step 10 minutes level 5 10 minutes level 5 10 mins level 5    Sit to stand 3 x 10 from elevated plinth 3 x 10 elevated plinth 3 x 10 reps elevated plinth    Hip abduction 3 x 10 standing at bar BLE's with 5 lb wt. s  3 x 10 each standing 5 lb wt. s each  3 x 10 reps standing at bar   Hip Flexion  2x10 reps at bar alternating BLE's with 2.5 lb wt. s  3 x 10 each seated black band  3 x 10 reps standing at bar tapping 4 inch step    Long Arc Quad  2x15 reps with 2.5 lb  wts 3 x 10 5# BLE's  3x10 reps 5 lb wt.s    Hamstring Curl  Standing at bar BLE's x 20 reps 2.5 lb wt. s 3 x 10 5# green seated BLE's    Hamstring Stretch   4x30 sec hold each seated strap 4x30 sec hold with strap seated     Row  2x15 black and blue band unsupported sitting  2x15 reps black & blue bands seated     Gait  Side stepping at wall x 3 mins  ---    Unsupported Standing (perform in front of plinth with gait belt; be prepared to help patient recover from LOB) 4 x 30 sec each side semi tandem staggered stance      4 x 30 sec each side semi tandem staggered stance    Core Strength/Reaching ----- --------    Core Strength/Rotation --------- ----------    TUG -------- --- X 2 t rials   Balance X 5 mins with different variations with base of support X 5 mins with different variations with base of support X 5 mins with different variations with base of support   Standing RAFFY and L   psoas stretch   Standing with R foot on board:  Pelvis rock front 5 sec hold x 10    Standing with  L foot back  30 sec x 2     MANUAL THERAPY: (0 minutes): None today  Joint mobilization and Soft tissue mobilization was utilized and necessary because of the patient's restricted joint motion, painful spasm, loss of articular motion and restricted motion of soft tissue. MODALITIES: (0 minutes):        None today      Treatment/Session Summary:    Treatment Assessment: Pt continues to have balance issues due to the prosthesis not fitting correctly   Communication/Consultation: Discussion of exam findings and progress note. Equipment provided today:  None today   Recommendations/Intent for next treatment session: Balance and standing exercise progressions focused on single limb stance and functional strength.      Total Treatment Billable Duration: 55 minutes   Time In: 1330  Time Out: 3947    KTSSG M BUNCH, PTA       Charge Capture  }Post Session Pain  PT Visit Info  [facility]Tal[facility] Portal  MD Guidelines  Scanned Media  Benefits  MyChart    Future Appointments   Date Time Provider Rachel Sanchez   1/19/2023 11:00 AM Endy Loja, PT [facility]   1/23/2023 10:00 AM Angela Mercado, PTA [facility]   1/26/2023  9:00 AM Endy Loja, PT [facility]   1/30/2023 10:00 AM Angela Mercado, PTA [facility]   2/2/2023 10:00 AM Endy Loja, PT [facility]   2/6/2023 10:00 AM Angela Mercado, PTA [facility]   2/9/2023 10:00 AM Endy Loja, PT [facility]   2/20/2023 11:20 AM Srinivas Medellin MD [facility]   5/1/2023 11:00 AM MD VIRGILIO Samuels [facility]   6/26/2023 10:00 AM Christina Moreon MD [facility]

## 2023-01-19 ENCOUNTER — HOSPITAL ENCOUNTER (OUTPATIENT)
Dept: PHYSICAL THERAPY | Age: 78
Setting detail: RECURRING SERIES
Discharge: HOME | End: 2023-01-22
Payer: MEDICARE

## 2023-01-19 PROCEDURE — 97116 GAIT TRAINING THERAPY: CPT

## 2023-01-19 PROCEDURE — 97110 THERAPEUTIC EXERCISES: CPT

## 2023-01-19 NOTE — PROGRESS NOTES
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  Restrictions/Precautions:  Restrictions/Precautions: Cardiac; Fall Risk  No data recorded   Interventions Planned (Treatment may consist of any combination of the following):    Current Treatment Recommendations: Strengthening; ROM; Balance training; Functional mobility training; Transfer training; Endurance training; Gait training; Stair training; Neuromuscular re-education; Manual Therapy - Soft Tissue Mobilization; Manual Therapy - Joint Manipulation; Pain management; Return to work related activity; Home exercise program; Safety education & training; Patient/Caregiver education & training; Modalities; Therapeutic activities     Subjective Comments:  has pain behind knee with sit to stand. Prosthetist is coming to session today.   Initial:}     /10Post Session:        /10 no VAS today. Pt stated no pain in general.  Medications Last Reviewed:  2023  Updated Objective Findings: /79 pulse 77, O2 SATS 94%    Treatment   Prosthetist  comes to appointment today to watch pt move. GAIT TRAINING: (12 min) Started appointment with walking with RW, CG with work on upright posture 45\" x 3 reps    THERAPEUTIC EXERCISE: (43 minutes):    Exercises per grid below to improve mobility, strength, balance and coordination. Required mod visual, verbal, manual and tactile cues to promote proper body alignment, promote proper body posture, promote proper body mechanics and promote proper body breathing techniques. Progressed resistance, range, repetitions and complexity of movement as indicated. Date:  1/16/23 Date:  12/27/22 Date  1/12/23 Date  1/19/23   Activity/Exercise Parameters Parameters     Nu Step 10 minutes level 5 10 minutes level 5 10 mins level 5  5 min level 5   Sit to stand 3 x 10 from elevated plinth 3 x 10 elevated plinth 3 x 10 reps elevated plinth  3 x 10 reps elevated plinth    Hip abduction 3 x 10 standing at bar BLE's with 5 lb wt. s  3 x 10 each standing 5 lb wt. s each  3 x 10 reps standing at bar Slow with going back to standing on L  2X10 L standing at bar     10x R   Hip Flexion  2x10 reps at bar alternating BLE's with 2.5 lb wt. s  3 x 10 each seated black band  3 x 10 reps standing at bar tapping 4 inch step  Standing on R, L hip flexion to 90 and then into extension  2x10   standing at [facility]  2x15 reps with 2.5 lb  wts 3 x 10 5# BLE's  3x10 reps 5 lb wt. s  3x10 reps 5 lb wt.s    Hamstring Curl  Standing at bar BLE's x 20 reps 2.5 lb wt. s 3 x 10 5# green seated BLE's     Hamstring Stretch   4x30 sec hold each seated strap 4x30 sec hold with strap seated      Row  2x15 black and blue band unsupported sitting  2x15 reps black & blue bands seated      Gait  Side stepping at wall x 3 mins  ---     Unsupported Standing (perform in front of plinth with gait belt; be prepared to help patient recover from LOB) 4 x 30 sec each side semi tandem staggered stance      4 x 30 sec each side semi tandem staggered stance  4 x 30 sec each side semi tandem staggered stance: R foot back with perterbations    standing at bar    Core Strength/Reaching ----- --------  With Lumbar Protective Mechanism  Isotonic hold for trunk flexion and extension  R foot back in standing  1 min ea way    standing at bar    Core Strength/Rotation --------- ----------     TUG -------- --- X 2 t rials    Balance X 5 mins with different variations with base of support X 5 mins with different variations with base of support X 5 mins with different variations with base of support    Standing RAFFY and L   psoas stretch   Standing with R foot on board:  Pelvis rock front 5 sec hold x 10    Standing with  L foot back  30 sec x 2 Pelvis Rock forward  5 sec x 10    Standing at bar with L foot back  With manual cueing ~ 2 min     MANUAL THERAPY: (0 minutes): None today  Joint mobilization and Soft tissue mobilization was utilized and necessary because of the patient's restricted joint motion, painful spasm, loss of articular motion and restricted motion of soft tissue. MODALITIES: (0 minutes):        None today      Treatment/Session Summary:    Treatment Assessment: Pt had better posture with walking with prosthesis once it was extended. May have some L hip flexion contracture that needs to continued to be stretched. Communication/Consultation: None    Equipment provided today:  None today   Recommendations/Intent for next treatment session: Balance and standing exercise progressions focused on single limb stance and functional strength. Continue stretching of L psoas.     Total Treatment Billable Duration: 55 minutes   Time In: 1100  Time Out: [facility] [facility], PT       Charge Capture  }Post Session Pain  PT Visit Info  MedBridge Portal  MD Guidelines  Scanned Media  Benefits  MyChart    Future Appointments   Date Time Provider Rachel Sanchez   1/23/2023 10:00 AM Juliet Thrasher [facility]   1/26/2023  9:00 AM Ruby Gamboa, PT [facility]   1/30/2023 10:00 AM Cecile Merino, PTA [facility]   2/2/2023 10:00 [facility]   2/6/2023 10:00 AM [facility]   2/9/2023 10:00 AM Darling Tobar, PT [facility]   2/20/2023 11:20 AM Jaclyn Reynaga MD [facility]PS [facility]   5/1/2023 11:00 AM Andra Pizarro MD [facility]   6/26/2023 10:00 AM Lionel Brody MD PFP [facility]

## 2023-01-23 ENCOUNTER — HOSPITAL ENCOUNTER (OUTPATIENT)
Dept: PHYSICAL THERAPY | Age: 78
Setting detail: RECURRING SERIES
Discharge: HOME | End: 2023-01-26
Payer: MEDICARE

## 2023-01-23 PROCEDURE — 97110 THERAPEUTIC EXERCISES: CPT

## 2023-01-23 ASSESSMENT — PAIN SCALES - GENERAL: PAINLEVEL_OUTOF10: 5

## 2023-01-23 NOTE — PROGRESS NOTES
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 Restrictions/Precautions:  Restrictions/Precautions: Cardiac; Fall Risk  No data recorded   Interventions Planned (Treatment may consist of any combination of the following):    Current Treatment Recommendations: Strengthening; ROM; Balance training; Functional mobility training; Transfer training; Endurance training; Gait training; Stair training; Neuromuscular re-education; Manual Therapy - Soft Tissue Mobilization; Manual Therapy - Joint Manipulation; Pain management; Return to work related activity; Home exercise program; Safety education & training; Patient/Caregiver education & training; Modalities; Therapeutic activities     Subjective Comments:  Pt. complained of anterior right knee pain (Pt.  Reported falling over the weekend taking his shirt over his head into a pile of clothes  Initial:}    5/10Post Session:       5/10   Medications Last Reviewed:  2023  Updated Objective Findings: /83 pulse 83 O2 SATS 93%    Treatment Prosthetist comes to appointment today to watch pt move. GAIT TRAINING: (12 min) Started appointment with walking with RW CG with work on upright posture 45\" x 3 reps    THERAPEUTIC EXERCISE: ( minutes):    Exercises per grid below to improve mobility, strength, balance and coordination. Required mod visual, verbal, manual and tactile cues to promote proper body alignment, promote proper body posture, promote proper body mechanics and promote proper body breathing techniques. Progressed resistance, range, repetitions and complexity of movement as indicated. Date:  1/16/23 Date:  1/23/23 Date  1/19/23   Activity/Exercise Parameters Parameters    Nu Step 10 minutes level 5 10 minutes level 5 5 min level 5   Sit to stand 3 x 10 from elevated plinth 3 x 10 elevated plinth 3 x 10 reps elevated plinth    Hip abduction 3 x 10 standing at bar BLE's with 5 lb wt. s  3 x 10 each standing  each  Slow with going back to standing on L  2X10 L standing at bar     10x R   Hip Flexion  2x10 reps at bar alternating BLE's with 2.5 lb wt. s  3 x 10 each seated black band  Standing on R, L hip flexion to 90 and then into extension  2x10   standing at [facility]  2x15 reps with 2.5 lb  wts 3 x 10 BLE's  3x10 reps 5 lb wt.s    Hamstring Curl  Standing at bar BLE's x 20 reps 2.5 lb wt.s 2x10 reps bilaterally     Hamstring Stretch   4x30 sec hold each seated strap 4x30 sec hold with strap seated     Row  2x15 black and blue band unsupported sitting  2x15 reps black & blue bands seated     Gait  Side stepping at wall x 3 mins  Side stepping at wall x 3 mins     Unsupported Standing (perform in front of plinth with gait belt; be prepared to help patient recover from LOB) 4 x 30 sec each side semi tandem staggered stance      4 x 30 sec each side semi tandem staggered stance 4 x 30 sec each side semi tandem staggered stance: R foot back with perterbations    standing at bar    Core Strength/Reaching ----- -------- With Lumbar Protective Mechanism  Isotonic hold for trunk flexion and extension  R foot back in standing  1 min ea way    standing at bar    Balance X 5 mins with different variations with base of support X 5 mins with different variations with base of support    Standing RAFFY and L   psoas stretch   Pelvis Rock forward  5 sec x 10    Standing at bar with L foot back  With manual cueing ~ 2 min     MANUAL THERAPY: (0 minutes): None today  Joint mobilization and Soft tissue mobilization was utilized and necessary because of the patient's restricted joint motion, painful spasm, loss of articular motion and restricted motion of soft tissue. MODALITIES: (0 minutes):        None today      Treatment/Session Summary:    Treatment Assessment: Pt was compliant with all exercises and had increased balance and posture today. Communication/Consultation: None    Equipment provided today:  None today   Recommendations/Intent for next treatment session: Balance and standing exercise progressions focused on single limb stance and functional strength. Continue stretching of L psoas.     Total Treatment Billable Duration: 55 minutes   Time In: 1000  Time Out: 1100    EYAD CONDON PTA       Charge Capture  }Post Session Pain  PT Visit Info  MedBridge Portal  MD Guidelines  Scanned Media  Benefits  MyChart    Future Appointments   Date Time Provider Rachel Sanchez   1/26/2023  9:00 AM Chelsie Aguilar, PT [facility]   1/30/2023 10:00 AM Katherin August, PTA [facility]   2/2/2023 10:00 AM Chelsie Aguilar, PT [facility]   2/6/2023 10:00 AM Katherin August, PTA [facility]   2/9/2023 10:00 AM Chelsie Aguilar, PT [facility]   2/20/2023 11:20 AM Debora Paez MD [facility]   5/1/2023 11:00 AM MD VIRGILIO Faustin [facility]   6/26/2023 10:00 AM Elmo Scruggs MD [facility]

## 2023-01-26 ENCOUNTER — HOSPITAL ENCOUNTER (OUTPATIENT)
Dept: PHYSICAL THERAPY | Age: 78
Setting detail: RECURRING SERIES
Discharge: HOME | End: 2023-01-29
Payer: MEDICARE

## 2023-01-26 PROCEDURE — 97110 THERAPEUTIC EXERCISES: CPT

## 2023-01-26 NOTE — PROGRESS NOTES
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 Restrictions/Precautions:  Restrictions/Precautions: Cardiac; Fall Risk  No data recorded   Interventions Planned (Treatment may consist of any combination of the following):    Current Treatment Recommendations: Strengthening; ROM; Balance training; Functional mobility training; Transfer training; Endurance training; Gait training; Stair training; Neuromuscular re-education; Manual Therapy - Soft Tissue Mobilization; Manual Therapy - Joint Manipulation; Pain management; Return to work related activity; Home exercise program; Safety education & training; Patient/Caregiver education & training; Modalities; Therapeutic activities     Subjective Comments:   Pt states that he is walking better. Back of knee is feeling better. Initial:}     /10  0/10 for R knee.  L knee mild pain with Post Session:        /10   Medications Last Reviewed:  2023  Updated Objective Findings: BP /56 pulse 68 O2 STATS 99%     Treatment          THERAPEUTIC EXERCISE: ( 53 minutes):    Exercises per grid below to improve mobility, strength, balance and coordination. Required mod visual, verbal, manual and tactile cues to promote proper body alignment, promote proper body posture, promote proper body mechanics and promote proper body breathing techniques. Progressed resistance, range, repetitions and complexity of movement as indicated. Activity/Exercise Parameters Parameters     Nu Step 10 minutes level 5 10 minutes level 5 5 min level 5 8 min lev 5   Sit to stand 3 x 10 from elevated plinth 3 x 10 elevated plinth 3 x 10 reps elevated plinth  3 x 10 reps elevated plinth     Last 10 with arm reach forward, no push off   Hip abduction 3 x 10 standing at bar BLE's with 5 lb wt. s  3 x 10 each standing  each  Slow with going back to standing on L  2X10 L standing at bar     10x R Slow   2X10 RAFFY standing at bar     Cues and breaks as needed for form   Hip Flexion  2x10 reps at bar alternating BLE's with 2.5 lb wt. s  3 x 10 each seated black band  Standing on R, L hip flexion to 90 and then into extension  2x10   standing at bar  Standing on R, L hip flexion to 90 and then into extension  2x10   standing at [facility]  2x15 reps with 2.5 lb  wts 3 x 10 BLE's  3x10 reps 5 lb wt. s  3x10 reps 5 lb wt. s   RAFFY   Hamstring Curl  Standing at bar BLE's x 20 reps 2.5 lb wt.s 2x10 reps bilaterally      Hamstring Stretch   4x30 sec hold each seated strap 4x30 sec hold with strap seated      Row  2x15 black and blue band unsupported sitting  2x15 reps black & blue bands seated   HORZ ABD and ROW in standing with CG- blue band 20s ea way   Gait  Side stepping at wall x 3 mins  Side stepping at wall x 3 mins      Unsupported Standing (perform in front of plinth with gait belt; be prepared to help patient recover from LOB) 4 x 30 sec each side semi tandem staggered stance      4 x 30 sec each side semi tandem staggered stance 4 x 30 sec each side semi tandem staggered stance: R foot back with perterbations    standing at bar  1 min RAFFY in semi tandem staggered with pertterbations   Core Strength/Reaching ----- -------- With Lumbar Protective Mechanism  Isotonic hold for trunk flexion and extension  R foot back in standing  1 min ea way    standing at bar  Rolling from supine to side lye L with cueing  7x    Lumbar Protective Mechanism  With trunk flex and ext  1 min ea way standing at the bar   Balance X 5 mins with different variations with base of support X 5 mins with different variations with base of support  With pulling up pants through out session. Standing RAFFY and L   psoas stretch   Pelvis Rock forward  5 sec x 10    Standing at bar with L foot back  With manual cueing ~ 2 min Side lye R psoas stretch    Standing at bar with L foot back  With manual cueing ~ 1min     MANUAL THERAPY: (0 minutes): None today  Joint mobilization and Soft tissue mobilization was utilized and necessary because of the patient's restricted joint motion, painful spasm, loss of articular motion and restricted motion of soft tissue. MODALITIES: (0 minutes):        None today      Treatment/Session Summary:    Treatment Assessment: Pt stated he could feel core working today with balance exercises. Doing better with standing work. Communication/Consultation: None    Equipment provided today:  None today   Recommendations/Intent for next treatment session: Balance and standing exercise progressions focused on single limb stance and functional strength. Continue stretching of L psoas.     Total Treatment Billable Duration: 55 minutes   Time In: 0900  Time Out: Tania Hurtado, PT       Charge Capture  }Post Session Pain  PT Visit Info  [employer] Portal  MD Guidelines  Scanned Media  Benefits  MyChart    Future Appointments   Date Time Provider Rachel Sanchez   1/30/2023 10:00 AM Coco Lam [facility]   2/2/2023 10:00 AM Nereida Garner PT [facility]   2/6/2023 10:00 AM Collins Lion Zenon Lanes, PTA [facility]   2/9/2023 10:00 AM Trino Villarreal, PT [facility]   2/20/2023 11:20 AM Melissa Matamoros MD [facility]   5/1/2023 11:00 AM Stephie Ng MD [facility]   6/26/2023 10:00 AM Alecia Hogue MD [facility]

## 2023-01-30 ENCOUNTER — HOSPITAL ENCOUNTER (OUTPATIENT)
Dept: PHYSICAL THERAPY | Age: 78
Setting detail: RECURRING SERIES
Discharge: HOME | End: 2023-02-02
Payer: MEDICARE

## 2023-01-30 PROCEDURE — 97110 THERAPEUTIC EXERCISES: CPT

## 2023-01-30 ASSESSMENT — PAIN SCALES - GENERAL: PAINLEVEL_OUTOF10: 2

## 2023-01-30 NOTE — PROGRESS NOTES
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 Restrictions/Precautions:  Restrictions/Precautions: Cardiac; Fall Risk  No data recorded   Interventions Planned (Treatment may consist of any combination of the following):    Current Treatment Recommendations: Strengthening; ROM; Balance training; Functional mobility training; Transfer training; Endurance training; Gait training; Stair training; Neuromuscular re-education; Manual Therapy - Soft Tissue Mobilization; Manual Therapy - Joint Manipulation; Pain management; Return to work related activity; Home exercise program; Safety education & training; Patient/Caregiver education & training; Modalities; Therapeutic activities     Subjective Comments:  Pt. reported pain in left knee with certain movements  Initial:}    2/10  Post Session:       0/10   Medications Last Reviewed:  2023  Updated Objective Findings: Pt. Demonstrated increased balance and posture today with gait with cane and rolling walker.      Treatment THERAPEUTIC EXERCISE: ( 55 minutes):    Exercises per grid below to improve mobility, strength, balance and coordination. Required mod visual, verbal, manual and tactile cues to promote proper body alignment, promote proper body posture, promote proper body mechanics and promote proper body breathing techniques. Progressed resistance, range, repetitions and complexity of movement as indicated.     Dates: 1/30/23 1/23/23 1/27/23   Activity/Exercise Parameters Parameters Parameters   Nu Step 10 minutes level 5 10 minutes level 5 5 min level 5   Sit to stand 3 x 10 from elevated plinth 3 x 10 elevated plinth 3 x 10 reps elevated plinth    Hip abduction  Standing 3x10 reps BLE's  3 x 10 each standing  each  Slow with going back to standing on L  2X10 L standing at bar     10x R   Hip Flexion  3x10 reps at bar alternating BLE's  3 x 10 each seated black band  Standing on R, L hip flexion to 90 and then into extension  2x10   standing at bar    Long Arc Quad  3x10 reps  3 x 10 BLE's  3x10 reps 5 lb wt.s    Hamstring Curl  Standing at bar BLE's x 30  2x10 reps bilaterally     Hamstring Stretch   4x30 sec hold each seated strap 4x30 sec hold with strap seated     Row  =------ 2x15 reps black & blue bands seated           Gait  20 feet x 2 with cane and gait belt followed by wheel chair and an extra therapist, ten patient ambulated 100 feet x 2  feet with rolling walker  Side stepping at wall x 3 mins     Unsupported Standing (perform in front of plinth with gait belt; be prepared to help patient recover from LOB) 4 x 30 sec each side semi tandem staggered stance      4 x 30 sec each side semi tandem staggered stance 4 x 30 sec each side semi tandem staggered stance: R foot back with perterbations    standing at bar    Core Strength/Reaching ----- -------- With Lumbar Protective Mechanism  Isotonic hold for trunk flexion and extension  R foot back in standing  1 min ea way    standing at bar    Balance X 5 mins with different variations with base of support X 5 mins with different variations with base of support    Standing RAFFY and L   psoas stretch  ----------- Pelvis Rock forward  5 sec x 10    Standing at bar with L foot back  With manual cueing ~ 2 min     MANUAL THERAPY: (0 minutes): None today  Joint mobilization and Soft tissue mobilization was utilized and necessary because of the patient's restricted joint motion, painful spasm, loss of articular motion and restricted motion of soft tissue. MODALITIES: (0 minutes):        None today      Treatment/Session Summary:    Treatment Assessment: Pt. Was thrilled after walking with cane and the prosthesis felt good with gait and exercises. Pt. Still requires multiple rest breaks due to fatigue. Pt.'s 02 SATS were 96%  after gait without 02 on just room air. Communication/Consultation: None    Equipment provided today:  None today   Recommendations/Intent for next treatment session: Balance and standing exercise progressions focused on single limb stance and functional strength. Continue stretching of L psoas.     Total Treatment Billable Duration: 55 minutes   Time In: 1000  Time Out: 1100    EYAD CONDON PTA       Charge Capture  }Post Session Pain  PT Visit Info  MedBridge Portal  MD Guidelines  Scanned Media  Benefits  MyChart    Future Appointments   Date Time Provider Rachel Sanchez   [facility]2023 [facility]   2/6/2023 10:00 AM Katia Dodge PTA [facility]   2/9/2023 10:00 AM [facility] Vineet, PT [facility]   2/20/2023 11:20 AM Lydia Bronson MD [facility]   5/1/2023 11:00 AM MD VIRGILIO Dhillon [facility]   6/26/2023 10:00 AM Joce Collins MD [facility]

## 2023-02-02 ENCOUNTER — HOSPITAL ENCOUNTER (OUTPATIENT)
Dept: PHYSICAL THERAPY | Age: 78
Setting detail: RECURRING SERIES
Discharge: HOME | End: 2023-02-05
Payer: MEDICARE

## 2023-02-02 PROCEDURE — 97110 THERAPEUTIC EXERCISES: CPT

## 2023-02-02 NOTE — PROGRESS NOTES
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 Restrictions/Precautions:  Restrictions/Precautions: Cardiac; Fall Risk  No data recorded   Interventions Planned (Treatment may consist of any combination of the following):    Current Treatment Recommendations: Strengthening; ROM; Balance training; Functional mobility training; Transfer training; Endurance training; Gait training; Stair training; Neuromuscular re-education; Manual Therapy - Soft Tissue Mobilization; Manual Therapy - Joint Manipulation; Pain management; Return to work related activity; Home exercise program; Safety education & training; Patient/Caregiver education & training; Modalities; Therapeutic activities     Subjective Comments:  liked working with the cane. Initial:}     /10  Post Session:        /10   Medications Last Reviewed:  2023  Updated Objective Findings: Pt. Demonstrated increased balance and posture today with gait with cane and rolling walker.    Vitals : BP = 105/65, Oxygen stat = 96%   Treatment THERAPEUTIC EXERCISE: ( 55 minutes):    Exercises per grid below to improve mobility, strength, balance and coordination. Required mod visual, verbal, manual and tactile cues to promote proper body alignment, promote proper body posture, promote proper body mechanics and promote proper body breathing techniques. Progressed resistance, range, repetitions and complexity of movement as indicated. Dates: 1/30/23 1/23/23 1/27/23 2/2/23   Activity/Exercise Parameters Parameters Parameters    Nu Step 10 minutes level 5 10 minutes level 5 5 min level 5 8 min lev %   Sit to stand 3 x 10 from elevated plinth 3 x 10 elevated plinth 3 x 10 reps elevated plinth  3 x 10 reps elevated plinth   Arms reaching out  CG   Hip abduction  Standing 3x10 reps BLE's  3 x 10 each standing  each  Slow with going back to standing on L  2X10 L standing at bar     10x R 10 x Lside   2x 5 R side  slow and working on core, L hand on bar only    With both hands on bar  R 10x   Hip Flexion  3x10 reps at bar alternating BLE's  3 x 10 each seated black band  Standing on R, L hip flexion to 90 and then into extension  2x10   standing at bar  Standing on R, L hip flexion to 90 and then into extension  2x10   standing at bar    Long Arc Quad  3x10 reps  3 x 10 BLE's  3x10 reps 5 lb wt. s  x10 reps 5 lb wt.s    Hamstring Curl  Standing at bar BLE's x 30  2x10 reps bilaterally      Hamstring Stretch   4x30 sec hold each seated strap 4x30 sec hold with strap seated      Row  =------ 2x15 reps black & blue bands seated      Pre gait    At bar  L arm on bar only  R hip elevation with step forward- passive assist for hip as needed  10x2   Gait  20 feet x 2 with cane and gait belt followed by wheel chair and an extra therapist, ten patient ambulated 100 feet x 2  feet with rolling walker  Side stepping at wall x 3 mins   20 feet x 1 with cane and gait belt followed by wheel chair and an extra therapist   Unsupported Standing (perform in front of plinth with gait belt; be prepared to help patient recover from LOB) 4 x 30 sec each side semi tandem staggered stance      4 x 30 sec each side semi tandem staggered stance 4 x 30 sec each side semi tandem staggered stance: R foot back with perterbations    standing at bar  4 x 30 sec semi tandem staggered stance: R foot back with perterbations    standing at bar    Core Strength/Reaching ----- -------- With Lumbar Protective Mechanism  Isotonic hold for trunk flexion and extension  R foot back in standing  1 min ea way    standing at bar  With Lumbar Protective Mechanism  Isotonic hold for trunk flexionand extension  R foot back in standing  1 min    standing at bar    Balance X 5 mins with different variations with base of support X 5 mins with different variations with base of support     Standing RAFFY and L   psoas stretch  ----------- Pelvis Rock forward  5 sec x 10    Standing at bar with L foot back  With manual cueing ~ 2 min      MANUAL THERAPY: (0 minutes): None today  Joint mobilization and Soft tissue mobilization was utilized and necessary because of the patient's restricted joint motion, painful spasm, loss of articular motion and restricted motion of soft tissue. MODALITIES: (0 minutes):        None today      Treatment/Session Summary:    Treatment Assessment: weak to R hip ABD and  R hip elevation that needs more work as he tries to progress to walking with cane. Not able to coordinate walking as well with cane today and stated he had stomach discomfort/not feeling well this week.    Communication/Consultation: None    Equipment provided today:  None today   Recommendations/Intent for next treatment session: Balance and standing exercise progressions focused on single limb stance and functional strength, especially R hip ABD and  R hip elevation       Total Treatment Billable Duration: 55 minutes   Time In: 1000  Time Out: [address], PT       Charge Capture  }Post Session Pain  PT Visit Info  [facility] Portal  MD Guidelines  Scanned Media  Benefits  MyChart    Future Appointments   Date Time Provider [place]   2/6/2023 10:00 AM Chio Johnson, [place] [facility]   2/9/2023 10:00 AM Briseida Kurtz PT [facility]   2/20/2023 11:20 AM Mikaela Triplett MD [facility]   5/1/2023 11:00 AM Petra Meek MD [facility]   6/26/2023 10:00 AM Mirela Rodriguez MD [facility]

## 2023-02-06 ENCOUNTER — HOSPITAL ENCOUNTER (OUTPATIENT)
Dept: PHYSICAL THERAPY | Age: 78
Setting detail: RECURRING SERIES
Discharge: HOME | End: 2023-02-09
Payer: MEDICARE

## 2023-02-06 PROCEDURE — 97110 THERAPEUTIC EXERCISES: CPT

## 2023-02-06 ASSESSMENT — PAIN SCALES - GENERAL: PAINLEVEL_OUTOF10: 2

## 2023-02-06 ASSESSMENT — PAIN DESCRIPTION - LOCATION: LOCATION: KNEE

## 2023-02-06 ASSESSMENT — PAIN DESCRIPTION - ORIENTATION: ORIENTATION: LEFT

## 2023-02-06 ASSESSMENT — PAIN DESCRIPTION - DESCRIPTORS: DESCRIPTORS: DULL;ACHING

## 2023-02-06 NOTE — PROGRESS NOTES
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  Restrictions/Precautions:  Restrictions/Precautions: Cardiac; Fall Risk  No data recorded   Interventions Planned (Treatment may consist of any combination of the following):    Current Treatment Recommendations: Strengthening; ROM; Balance training; Functional mobility training; Transfer training; Endurance training; Gait training; Stair training; Neuromuscular re-education; Manual Therapy - Soft Tissue Mobilization; Manual Therapy - Joint Manipulation; Pain management; Return to work related activity; Home exercise program; Safety education & training; Patient/Caregiver education & training; Modalities; Therapeutic activities     Subjective Comments:Pt. Reported a dull aching pain with use left LE      Initial:}Left Knee 2/10  Post Session:  Left  Knee 0/10   Medications Last Reviewed:  2023  Updated Objective Findings: Pt. Had increased left knee pain with gait  Vitals : BP = 113/86 pulse 68  Oxygen stat 100%  Treatment       THERAPEUTIC EXERCISE: ( 55 minutes):    Exercises per grid below to improve mobility, strength, balance and coordination. Required mod visual, verbal, manual and tactile cues to promote proper body alignment, promote proper body posture, promote proper body mechanics and promote proper body breathing techniques. Progressed resistance, range, repetitions and complexity of movement as indicated. Dates: 1/30/23 2/6/23 2/2/23   Activity/Exercise Parameters Parameters    Nu Step 10 minutes level 5 10 minutes level 5 8 min lev %   Sit to stand 3 x 10 from elevated plinth 3 x 10 elevated plinth 3 x 10 reps elevated plinth   Arms reaching out  CG   Hip abduction  Standing 3x10 reps BLE's  3 x 10 each standing  each  10 x Lside   2x 5 R side  slow and working on core, L hand on bar only    With both hands on bar  R 10x   Hip Flexion  3x10 reps at bar alternating BLE's  3 x 10 each seated black band  Standing on R, L hip flexion to 90 and then into extension  2x10   standing at bar    Long Arc Quad  3x10 reps  3 x 10 BLE's  x10 reps 5 lb wt.s    Hamstring Curl  Standing at bar BLE's x 30  2x10 reps bilaterally     Hamstring Stretch   4x30 sec hold each seated strap 4x30 sec hold with strap seated     Row  =------ 2x15 reps black & blue bands seated     Pre gait ------- ------- At bar  L arm on bar only  R hip elevation with step forward- passive assist for hip as needed  10x2   Gait  20 feet x 2 with cane and gait belt followed by wheel chair and an extra therapist, ten patient ambulated 100 feet x 2  feet with rolling walker  X 50 feet with cane, gait belt and wc to follow.  Rolling walker 150 x 2  20 feet x 1 with cane and gait belt followed by wheel chair and an extra therapist   Unsupported Standing (perform in front of plinth with gait belt; be prepared to help patient recover from LOB) 4 x 30 sec each side semi tandem staggered stance      4 x 30 sec each side semi tandem staggered stance 4 x 30 sec semi tandem staggered stance: R foot back with perterbations    standing at bar    Core Strength/Reaching ----- -------- With Lumbar Protective Mechanism  Isotonic hold for trunk flexionand extension  R foot back in standing  1 min    standing at bar    Balance X 5 mins with different variations with base of support X 5 mins with different variations with base of support    Standing RAFFY and L   psoas stretch  -----------      MANUAL THERAPY: (0 minutes): None today  Joint mobilization and Soft tissue mobilization was utilized and necessary because of the patient's restricted joint motion, painful spasm, loss of articular motion and restricted motion of soft tissue.      MODALITIES: (0 minutes):        None today      Treatment/Session Summary:    Treatment Assessment: Left LE pain caused patient not able to ambulate long with cane due to patient stating it felt like it was going to buckle  Communication/Consultation: None    Equipment provided today:  None today   Recommendations/Intent for next treatment session: Balance and standing exercise progressions focused on single limb stance and functional strength, especially R hip ABD and  R hip elevation       Total Treatment Billable Duration: 55 minutes   Time In: 1005  Time Out: [facility]       Charge Capture  }Post Session Pain  PT Visit Info  [place] Portal  MD Guidelines  Scanned Media  Benefits  MyChart    Future Appointments   Date Time Provider [place]   2/9/2023 10:00 AM Ольга Grace PT [facility]   2/20/2023 11:20 AM Robin Carlisle MD [facility]   5/1/2023 11:00 AM MD VIRGILIO Saunders [facility]   6/26/2023 10:00 AM Jaycee Nicole MD [facility]

## 2023-02-09 ENCOUNTER — HOSPITAL ENCOUNTER (OUTPATIENT)
Dept: PHYSICAL THERAPY | Age: 78
Setting detail: RECURRING SERIES
Discharge: HOME | End: 2023-02-12
Payer: MEDICARE

## 2023-02-09 PROCEDURE — 97110 THERAPEUTIC EXERCISES: CPT

## 2023-02-09 NOTE — PROGRESS NOTES
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 Devan Maria is a 77year old male presenting with an overall decline in balance, functional mobility, transfers, ability to ambulate, and overall function following R below knee amputation 2021 after spraining his ankle at work and then acquiring a wound from altered gait. His daughter is present with him today. He underwent an ankle reconstruction in the past but otherwise had no issues until spraining the ankle. 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 He completed six week of inpatient rehab and was making excellent progress. He states by the end of October he was discharged to home and had been practicing well with his prosthesis for about one week.  Home health has been treating him but he states he has been limited due to other medical issues and was hospitalized in January for fluid retention on his lungs. He states he went to [facility] for rehab following this and then was discharged home again. He states home health just ended a week or so ago and he had practiced with a straight cane a few times with his therapist. He reports he is still determined to make improvements and improve his mobility overall; and to return to part time work at [employer] if that is possible from a mobility standpoint. He and his daughter report that his cardiopulmonary endurance and balance are limiting factors for him as well and that he has fallen a few times in the last six months. He also reports having had a few falls while still working at [employer] due to tripping over objects. He does report a feeling of occasionally losing his balance and falling backward at random times. He also reports low back pain that is chronic but no other significant orthopedic complaints at this time. Patient presents with decreased balance, decreased proprioception, decreased functional strength, decreased cardiopulmonary endurance, decreased gait and transfer ability. Patient will benefit from skilled PT to build on gains and provide exercise progressions, a progressive resistance exercise program, balance exercises, gait and transfer training, manual therapy and modalities as needed to work towards therapeutic goals. PROGRESS NOTE 2/9/23:   Mr. Kely Hendricks has been for 54 sessions of physical therapy from 6/13/22 to 2/9/23 with good progress and then a set back due to covid. He recently got a new prosthetic for R LE and once it was adjusted to the right height, he had much improvement in his quality of gait with walking using RW. He has made good progress in strength and walking. His TUG score is now down to 12 to 13 seconds.   His balance is still not stable enough for situations like how he initially fell prior to coming to PT.  We believe this could improve with further PT, especially finding improved balance strategies if he were to start to fall.  He would like to progress to walking with a cane. Although he has been able to do this in the clinic for short distances, his balance and strength are not sufficient for that task at this time.  Mario Merchant should continue to benefit from home exercise program, therapeutic and postural strengthening exercises, and balance work.  Mario Merchant should continue to benefit from skilled PT to address above deficits affecting participation in basic ADLs and overall functional tolerance.      OBJECTIVE     Observation/Postural and Gait Assessment:   Walking: Patient ambulates with RW and wears uses nasal oxygen tube. His gait is more normalized now and he is able to walk closer to an upright posture.   Standing:  Pt now stands with more level hips since getting adjustment to prosthesis.    Palpation: not today    AROM: L hip extension = 0 degrees    Strength: RAFFY knee and L ankle DF/PF= WNL  RAFFY hip flexion in standing 4/5  Lumbar Protective Mechanism: for ability to brace trunk- in flexion = 1/5 RAFFY sides    Balance Tests:    Standing eyes open, flat ground: Able to stand independently with normal stance at least 1 min.  With perturbations- has tendency to start to fall backwards.   Standing eyes closed, flat ground (CG).  Able to stand independently for about 7 seconds but struggled with starting to fall backwards and only able to continue with eyes closed for 13 seconds.   Modified tandem stance = at least 30 sec RAFFY     22:  30 second sit to stand: 49cm from plinth 11 repetitions  Modified CTSIB: 30/120 seconds   Four stage: Semi Tandem     22:  30 second sit to stand: NT  Modified CTSIB: 60/120 seconds  Four stage: Semi tandem     22:  TU.4 seconds  30 second sit to stand: 9 repetitions 49cm from plinth   Four stage: Semi tandem   Modified CTSIB:  60/120 seconds    11/10/2022:   TU.7 seconds  30 seconds sit to stand: 10 repetitions 50 cm plinth   Four stage: semi tandem   Modified CTSIB: NT    22:  TUG score: 15 seconds   30 second sit to stand: 12 repetitions 50 cm plinth   Four Stage: Tandem stance  Modified CTSIB: 60/120 seconds     23  TUG score: 17 seconds  30 second sit to stand: 9 repetitions 50 cm plinth   Four Stage: Tandem stance  Modified CTSIB: NT    23  TUG score: 13 seconds  30 second sit to stand: 8 repetitions 50 cm CHAIR (not as easy to push off with hands)  Four Stage: Tandem stance  Modified CTSIB: NT    Neurological Screen:  Myotomes: Key muscle strength testing through bilateral LE is intact. Dermatomes: Sensation testing through bilateral lower quadrants for light touch is intact. Functional Mobility:  Patient ambulates with prosthesis and rolling front wheeled walker independently and with improved quality of gait. Performs sit to and from stand transfers independently with effort and use of push of with hands Needs CG for walking with a cane and can only walk very short distances. ASSESSMENT   Problem List: (Impacting functional limitations): Body Structures, Functions, Activity Limitations Requiring Skilled Therapeutic Intervention: Decreased functional mobility ; Decreased ADL status; Decreased ROM; Decreased tolerance to work activity; Decreased strength; Decreased endurance; Decreased sensation; Decreased balance; Decreased coordination;  Increased pain; Decreased posture     Therapy Prognosis:   Therapy Prognosis: Good        PLAN   Effective Dates: 23 TO Plan of Care/Certification Expiration Date: 04/10/23     Frequency/Duration: Plan Frequency: Twice per week for 8 eight weeks (Twice per week for eight weeks)     Interventions Planned (Treatment may consist of any combination of the following):    Current Treatment Recommendations: Strengthening; ROM; Balance training; Functional mobility training; Transfer training; Endurance training; Gait training; Stair training; Neuromuscular re-education; Manual Therapy - Soft Tissue Mobilization; Manual Therapy - Joint Manipulation; Pain management; Return to work related activity; Home exercise program; Safety education & training; Patient/Caregiver education & training; Modalities; Therapeutic activities     Goals: (Goals have been discussed and agreed upon with patient.)  Short-Term Functional Goals: Time Frame: 06/13/2022 to 07/11/2022  Patient will ambulate for six minutes using front wheeled walker without requiring a seated rest period. (GOAL MET)  Patient will maintain eyes closed static standing for 30 seconds on even surface safely with guarding. (GOAL MET)  Patient will perform sit to stand transfer from standard chair height without BUEs for one repetition. (PROGRESSED) 2/9/23  Discharge Goals: Time Frame: 06/13/2022 to 11/24/2022   Patient will ambulate with straight cane safely and independently over even surfaces. (PROGRESSED) 2/9/23  Patient will ambulate with rolling walker over uneven surfaces or when ambulating for prolonged distances. MET 2/9/23- able to walk 30 minutes with RW per pt report. Patient will perform sit to stand and sit to and from supine transfers safely and independently without cues. (GOAL MET)  Patient will improve TUG score to 14 seconds or less to indicate improved gait speed and decreased fall risk. MET 2/9/23  Patient will improve modified CTSIB to 120/120 to indicate improved proprioception and vestibular function and a decreased fall risk. (ONGOING)  Patient will improve 30 second sit to stand score to 10 repetitions without BUEs. (PROGRESSED) 2/9/23  Patient will return to working at [employer] part time duty with modifications. (ONGOING)          Outcome Measure:    Tool Used: Lower Extremity Functional Scale (LEFS)  Score:  Initial: 34/80 Most Recent: 43/80 (Date: 07/15/22)    31/80 (8/11/22)    36/80 09/29/22    28/80 11/10/2022     TBA 2/9/23   Interpretation of Score: 20 questions each scored on a 5 point scale with 0 representing \"extreme difficulty or unable to perform\" and 4 representing \"no difficulty\". The lower the score, the greater the functional disability. 80/80 represents no disability. Minimal detectable change is 9 points. Tool Used: Timed Up and Go (TUG)  Score:  Initial: 20.4 seconds Most Recent: 19 seconds (Date: 07/18/22 )    17.1 seconds 08/11/22    16.4 seconds 09/29/22     17.7 seconds 11/10/2022     15 seconds 12/08/22      17 seconds 1/12/23    13 seconds 2/9/23   Interpretation of Score: The test measures, in seconds, the time taken by an individual to stand up from a standard arm chair (seat height 46 cm [18 in], arm height 65 cm [25.6 in]), walk a distance of 3 meters (118 in, approx 10 ft), turn, walk back to the chair and sit down. If the individual takes longer than 14 seconds to complete TUG, this indicates risk for falls. Medical Necessity:   Patient is expected to demonstrate progress in strength, range of motion, balance, coordination and functional technique to increase independence with functional mobility and ADLs. Skilled intervention continues to be required due to need for exercise progressions tailored to patient needs, goals, and impairments . Working towards improved safety with balance as well. Reason For Services/Other Comments:  Patient continues to require skilled intervention due to need for exercise progressions, manual therapy, plan of care modifications and exceptions tailored to patient presentation. Regarding Jitendra Merchant's therapy, I certify that the treatment plan above will be carried out by a therapist or under their direction.   Thank you for this referral,  Chelsie Aguilar PT     Referring Physician Signature: Soila Nieto PA-C _______________________________ Date _____________        Post Session Pain  Charge Capture  PT Visit Info  POC Link  Treatment Note Link  MD Guidelines  MyChart

## 2023-02-09 NOTE — PROGRESS NOTES
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 Restrictions/Precautions:  Restrictions/Precautions: Cardiac; Fall Risk  No data recorded   Interventions Planned (Treatment may consist of any combination of the following):    Current Treatment Recommendations: Strengthening; ROM; Balance training; Functional mobility training; Transfer training; Endurance training; Gait training; Stair training; Neuromuscular re-education; Manual Therapy - Soft Tissue Mobilization; Manual Therapy - Joint Manipulation; Pain management; Return to work related activity; Home exercise program; Safety education & training; Patient/Caregiver education & training; Modalities; Therapeutic activities     Subjective Comments:Pt with no c/o today. Wants to work up to walking with a cane if possible.       Initial:}     /10  Post Session:        /10 no pain today  Medications Last Reviewed:  2023  Updated Objective Findings: see recertification  Vitals : BP = 118/85 pulse -- Oxygen stat 96%  Treatment          THERAPEUTIC EXERCISE: ( 55 minutes):    Exercises per grid below to improve mobility, strength, balance and coordination. Required mod visual, verbal, manual and tactile cues to promote proper body alignment, promote proper body posture, promote proper body mechanics and promote proper body breathing techniques. Progressed resistance, range, repetitions and complexity of movement as indicated. Dates: 1/30/23 2/6/23 2/2/23 2/9/23   Activity/Exercise Parameters Parameters     Nu Step 10 minutes level 5 10 minutes level 5 8 min lev % 10 minutes level 5   Sit to stand 3 x 10 from elevated plinth 3 x 10 elevated plinth 3 x 10 reps elevated plinth   Arms reaching out  CG 10x, 8x , 5x reps chair + 2 cushions  Arms reaching out  CG   Hip abduction  Standing 3x10 reps BLE's  3 x 10 each standing  each  10 x Lside   2x 5 R side  slow and working on core, L hand on bar only    With both hands on bar  R 10x 3x2 R side  slow and working on core, L hand on bar only    With both hands on bar  R 10x  L 10x   Hip Flexion  3x10 reps at bar alternating BLE's  3 x 10 each seated black band  Standing on R, L hip flexion to 90 and then into extension  2x10   standing at bar  Standing on R, L hip flexion to 90 and then into extension  20x2   standing at bar     L hand on bar only  R hip flexion  3x 2   Long Arc Quad  3x10 reps  3 x 10 BLE's  x10 reps 5 lb wt. s  x10 reps 7.5 lb wt.s    Hamstring Curl  Standing at bar BLE's x 30  2x10 reps bilaterally      Hamstring Stretch   4x30 sec hold each seated strap 4x30 sec hold with strap seated      Row  =------ 2x15 reps black & blue bands seated      Pre gait ------- ------- At bar  L arm on bar only  R hip elevation with step forward- passive assist for hip as needed  10x2    Gait  20 feet x 2 with cane and gait belt followed by wheel chair and an extra therapist, ten patient ambulated 100 feet x 2  feet with rolling walker  X 50 feet with cane, gait belt and wc to follow.  Rolling walker 150 x 2  20 feet x 1 with cane and gait belt followed by wheel chair and an extra therapist 50 to 60 feet x 2   with cane, gait belt and wc to follow. Unsupported Standing (perform in front of plinth with gait belt; be prepared to help patient recover from LOB) 4 x 30 sec each side semi tandem staggered stance      4 x 30 sec each side semi tandem staggered stance 4 x 30 sec semi tandem staggered stance: R foot back with perterbations    standing at bar  Standing with eyes closed, CS  30 sec trial    Regular stance with peterbations  1 min   Core Strength/Reaching ----- -------- With Lumbar Protective Mechanism  Isotonic hold for trunk flexionand extension  R foot back in standing  1 min    standing at bar     Balance X 5 mins with different variations with base of support X 5 mins with different variations with base of support     Standing RAFFY and L   psoas stretch  -----------       MANUAL THERAPY: (0 minutes): None today  Joint mobilization and Soft tissue mobilization was utilized and necessary because of the patient's restricted joint motion, painful spasm, loss of articular motion and restricted motion of soft tissue. MODALITIES: (0 minutes):        None today      Treatment/Session Summary:    Treatment Assessment: Able to walk 50-60 ft with cane with no c/o L knee pain (with CG and WC following).    Communication/Consultation: None    Equipment provided today:  None today   Recommendations/Intent for next treatment session: Balance and standing exercise progressions focused on single limb stance and functional strength, especially R hip ABD and  R hip elevation       Total Treatment Billable Duration: 53 minutes   Time In: 1002  Time Out: [address], PT       Charge Capture  }Post Session Pain  PT Visit Info  [facility] Portal  MD Guidelines  Scanned Media  Benefits  MyChart    Future Appointments   Date Time Provider Rachel Garvini   2/20/2023 11:20 AM Yanelis Killian MD [facility]   5/1/2023 11:00 AM Ana Arellano MD [facility]   6/26/2023 10:00 AM Malena Ruffin MD P [facility]

## 2023-02-10 NOTE — THERAPY RECERTIFICATION
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 Hannah Vaughan is a 77year old male presenting with an overall decline in balance, functional mobility, transfers, ability to ambulate, and overall function following R below knee amputation 2021 after spraining his ankle at work and then acquiring a wound from altered gait. His daughter is present with him today. He underwent an ankle reconstruction in the past but otherwise had no issues until spraining the ankle. 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 He completed six week of inpatient rehab and was making excellent progress. He states by the end of October he was discharged to home and had been practicing well with his prosthesis for about one week.  Home health has been treating him but he states he has been limited due to other medical issues and was hospitalized in January for fluid retention on his lungs. He states he went to [facility] for rehab following this and then was discharged home again. He states home health just ended a week or so ago and he had practiced with a straight cane a few times with his therapist. He reports he is still determined to make improvements and improve his mobility overall; and to return to part time work at [employer] if that is possible from a mobility standpoint. He and his daughter report that his cardiopulmonary endurance and balance are limiting factors for him as well and that he has fallen a few times in the last six months. He also reports having had a few falls while still working at [employer] due to tripping over objects. He does report a feeling of occasionally losing his balance and falling backward at random times. He also reports low back pain that is chronic but no other significant orthopedic complaints at this time. Patient presents with decreased balance, decreased proprioception, decreased functional strength, decreased cardiopulmonary endurance, decreased gait and transfer ability. Patient will benefit from skilled PT to build on gains and provide exercise progressions, a progressive resistance exercise program, balance exercises, gait and transfer training, manual therapy and modalities as needed to work towards therapeutic goals. PROGRESS NOTE 2/9/23:   Mr. Raymond Saleem has been for 54 sessions of physical therapy from 6/13/22 to 2/9/23 with good progress and then a set back due to covid. He recently got a new prosthetic for R LE and once it was adjusted to the right height, he had much improvement in his quality of gait with walking using RW. He has made good progress in strength and walking. His TUG score is now down to 12 to 13 seconds.   His balance is still not stable enough for situations like how he initially fell prior to coming to PT. We believe this could improve with further PT, especially finding improved balance strategies if he were to start to fall. He would like to progress to walking with a cane. Although he has been able to do this in the clinic for short distances, his balance and strength are not sufficient for that task at this time. Nita Merchant should continue to benefit from home exercise program, therapeutic and postural strengthening exercises, and balance work. Nita Merchant should continue to benefit from skilled PT to address above deficits affecting participation in basic ADLs and overall functional tolerance. OBJECTIVE     Observation/Postural and Gait Assessment:   Walking: Patient ambulates with RW and wears uses nasal oxygen tube. His gait is more normalized now and he is able to walk closer to an upright posture. Standing:  Pt now stands with more level hips since getting adjustment to prosthesis. Palpation: not today    AROM: L hip extension = 0 degrees    Strength: RAFFY knee and L ankle DF/PF= WNL  RAFFY hip flexion in standing 4/5  Lumbar Protective Mechanism: for ability to brace trunk- in flexion = 1/5 RAFFY sides    Balance Tests:    Standing eyes open, flat ground: Able to stand independently with normal stance at least 1 min. With perturbations- has tendency to start to fall backwards. Standing eyes closed, flat ground (CG). Able to stand independently for about 7 seconds but struggled with starting to fall backwards and only able to continue with eyes closed for 13 seconds.    Modified tandem stance = at least 30 sec RAFFY     22:  30 second sit to stand: 49cm from plinth 11 repetitions  Modified CTSIB: 30/120 seconds   Four stage: Semi Tandem     22:  30 second sit to stand: NT  Modified CTSIB: 60/120 seconds  Four stage: Semi tandem     22:  TU.4 seconds  30 second sit to stand: 9 repetitions 49cm from plinth   Four stage: Semi tandem   Modified CTSIB: 60/120 seconds    11/10/2022:   TU.7 seconds  30 seconds sit to stand: 10 repetitions 50 cm plinth   Four stage: semi tandem   Modified CTSIB: NT    22:  TUG score: 15 seconds   30 second sit to stand: 12 repetitions 50 cm plinth   Four Stage: Tandem stance  Modified CTSIB: 60/120 seconds     23  TUG score: 17 seconds  30 second sit to stand: 9 repetitions 50 cm plinth   Four Stage: Tandem stance  Modified CTSIB: NT    23  TUG score: 13 seconds  30 second sit to stand: 8 repetitions 50 cm CHAIR (not as easy to push off with hands)  Four Stage: Tandem stance  Modified CTSIB: NT    Neurological Screen:  Myotomes: Key muscle strength testing through bilateral LE is intact. Dermatomes: Sensation testing through bilateral lower quadrants for light touch is intact. Functional Mobility:  Patient ambulates with prosthesis and rolling front wheeled walker independently and with improved quality of gait. Performs sit to and from stand transfers independently with effort and use of push of with hands Needs CG for walking with a cane and can only walk very short distances. ASSESSMENT   Problem List: (Impacting functional limitations): Body Structures, Functions, Activity Limitations Requiring Skilled Therapeutic Intervention: Decreased functional mobility ; Decreased ADL status; Decreased ROM; Decreased tolerance to work activity; Decreased strength; Decreased endurance; Decreased sensation; Decreased balance; Decreased coordination;  Increased pain; Decreased posture     Therapy Prognosis:   Therapy Prognosis: Good        PLAN   Effective Dates: 23 TO Plan of Care/Certification Expiration Date: 04/10/23     Frequency/Duration: Plan Frequency: Twice per week for 8 eight weeks (Twice per week for eight weeks)     Interventions Planned (Treatment may consist of any combination of the following):    Current Treatment Recommendations: Strengthening; ROM; Balance training; Functional mobility training; Transfer training; Endurance training; Gait training; Stair training; Neuromuscular re-education; Manual Therapy - Soft Tissue Mobilization; Manual Therapy - Joint Manipulation; Pain management; Return to work related activity; Home exercise program; Safety education & training; Patient/Caregiver education & training; Modalities; Therapeutic activities     Goals: (Goals have been discussed and agreed upon with patient.)  Short-Term Functional Goals: Time Frame: 06/13/2022 to 07/11/2022  Patient will ambulate for six minutes using front wheeled walker without requiring a seated rest period. (GOAL MET)  Patient will maintain eyes closed static standing for 30 seconds on even surface safely with guarding. (GOAL MET)  Patient will perform sit to stand transfer from standard chair height without BUEs for one repetition. (PROGRESSED) 2/9/23  Discharge Goals: Time Frame: 06/13/2022 to 11/24/2022   Patient will ambulate with straight cane safely and independently over even surfaces. (PROGRESSED) 2/9/23  Patient will ambulate with rolling walker over uneven surfaces or when ambulating for prolonged distances. MET 2/9/23- able to walk 30 minutes with RW per pt report. Patient will perform sit to stand and sit to and from supine transfers safely and independently without cues. (GOAL MET)  Patient will improve TUG score to 14 seconds or less to indicate improved gait speed and decreased fall risk. MET 2/9/23  Patient will improve modified CTSIB to 120/120 to indicate improved proprioception and vestibular function and a decreased fall risk. (ONGOING)  Patient will improve 30 second sit to stand score to 10 repetitions without BUEs. (PROGRESSED) 2/9/23  Patient will return to working at [employer] part time duty with modifications. (ONGOING)          Outcome Measure:    Tool Used: Lower Extremity Functional Scale (LEFS)  Score:  Initial: 34/80 Most Recent: 43/80 (Date: 07/15/22)    31/80 (8/11/22)    36/80 09/29/22    28/80 11/10/2022     TBA 2/9/23   Interpretation of Score: 20 questions each scored on a 5 point scale with 0 representing \"extreme difficulty or unable to perform\" and 4 representing \"no difficulty\". The lower the score, the greater the functional disability. 80/80 represents no disability. Minimal detectable change is 9 points. Tool Used: Timed Up and Go (TUG)  Score:  Initial: 20.4 seconds Most Recent: 19 seconds (Date: 07/18/22 )    17.1 seconds 08/11/22    16.4 seconds 09/29/22     17.7 seconds 11/10/2022     15 seconds 12/08/22      17 seconds 1/12/23    13 seconds 2/9/23   Interpretation of Score: The test measures, in seconds, the time taken by an individual to stand up from a standard arm chair (seat height 46 cm [18 in], arm height 65 cm [25.6 in]), walk a distance of 3 meters (118 in, approx 10 ft), turn, walk back to the chair and sit down. If the individual takes longer than 14 seconds to complete TUG, this indicates risk for falls. Medical Necessity:   Patient is expected to demonstrate progress in strength, range of motion, balance, coordination and functional technique to increase independence with functional mobility and ADLs. Skilled intervention continues to be required due to need for exercise progressions tailored to patient needs, goals, and impairments . Working towards improved safety with balance as well. Reason For Services/Other Comments:  Patient continues to require skilled intervention due to need for exercise progressions, manual therapy, plan of care modifications and exceptions tailored to patient presentation. Regarding Afia Merchant's therapy, I certify that the treatment plan above will be carried out by a therapist or under their direction.   Thank you for this referral,  Abdifatah Bhatt, PT     Referring Physician Signature: Dmitry Laguna PA-C _______________________________ Date _____________        Post Session Pain  Charge Capture  PT Visit Info  POC Link  Treatment Note Link  MD Guidelines  MyChart

## 2023-02-13 ENCOUNTER — HOSPITAL ENCOUNTER (OUTPATIENT)
Dept: PHYSICAL THERAPY | Age: 78
Setting detail: RECURRING SERIES
Discharge: HOME | End: 2023-02-16
Payer: MEDICARE

## 2023-02-13 PROCEDURE — 97110 THERAPEUTIC EXERCISES: CPT

## 2023-02-13 ASSESSMENT — PAIN SCALES - GENERAL: PAINLEVEL_OUTOF10: 0

## 2023-02-13 NOTE — PROGRESS NOTES
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 Restrictions/Precautions:  Restrictions/Precautions: Cardiac; Fall Risk  No data recorded   Interventions Planned (Treatment may consist of any combination of the following):    Current Treatment Recommendations: Strengthening; ROM; Balance training; Functional mobility training; Transfer training; Endurance training; Gait training; Stair training; Neuromuscular re-education; Manual Therapy - Soft Tissue Mobilization; Manual Therapy - Joint Manipulation; Pain management; Return to work related activity; Home exercise program; Safety education & training; Patient/Caregiver education & training; Modalities; Therapeutic activities     Subjective Comments: Pt. Reported not being on top of his game today by having a late afternoon appointment as opposed to mid morning like he is use to having.       Initial:}    0/10  Post Session:       210 no pain today  Medications Last Reviewed:  2023  Updated Objective Findings: /82 pulse 83 O2 SATS 94%  Treatment THERAPEUTIC EXERCISE: ( 55 minutes):    Exercises per grid below to improve mobility, strength, balance and coordination. Required mod visual, verbal, manual and tactile cues to promote proper body alignment, promote proper body posture, promote proper body mechanics and promote proper body breathing techniques. Progressed resistance, range, repetitions and complexity of movement as indicated. Dates: 2/6/23 2/9/23 2/13/23   Activity/Exercise Parameters  parameters   Nu Step 10 minutes level 5 10 minutes level 5 Level 5 x 10 mins    Sit to stand 3 x 10 elevated plinth 10x, 8x , 5x reps chair + 2 cushions  Arms reaching out  CG X10 reps with cushion in chair   Hip abduction 3 x 10 each standing  each  3x2 R side  slow and working on core, L hand on bar only    With both hands on bar  R 10x  L 10x X 20 reps BLE' s standing at bar   Hip Flexion  3 x 10 each seated black band  Standing on R, L hip flexion to 90 and then into extension  20x2   standing at bar     L hand on bar only  R hip flexion  3x 2 Standing at bar x 20 reps    Long Arc Quad  3 x 10 BLE's  x10 reps 7.5 lb wt. s  X 20 reps BLE's    Hamstring Curl  2x10 reps bilaterally   X20 reps standing at bar BLE's    Hamstring Stretch   4x30 sec hold with strap seated   4x30 sec hold with strap   Row  2x15 reps black & blue bands seated   ----   Pre gait -------  ------   Gait  X 50 feet with cane, gait belt and wc to follow. Rolling walker 150 x 2  50 to 60 feet x 2   with cane, gait belt and wc to follow.                        X 50 feet with cane and 250 feet with rolling walker    Unsupported Standing (perform in front of plinth with gait belt; be prepared to help patient recover from LOB) 4 x 30 sec each side semi tandem staggered stance Standing with eyes closed, CS  30 sec trial    Regular stance with peterbations  1 min    Core Strength/Reaching --------     Balance X 5 mins with different variations with base of support     Standing RAFFY and L psoas stretch -----------       MANUAL THERAPY: (0 minutes): None today  Joint mobilization and Soft tissue mobilization was utilized and necessary because of the patient's restricted joint motion, painful spasm, loss of articular motion and restricted motion of soft tissue. MODALITIES: (0 minutes):        None today      Treatment/Session Summary:    Treatment Assessment: Pt. Stated he did not like these afternoon appointments and today was an off day.   Communication/Consultation: None    Equipment provided today:  None today   Recommendations/Intent for next treatment session: Balance and standing exercise progressions focused on single limb stance and functional strength, especially R hip ABD and  R hip elevation       Total Treatment Billable Duration: 55 minutes   Time In: 1530  Time Out: 1630    EYAD CONDON PTA       Charge Capture  }Post Session Pain  PT Visit Info  Dominique Kern MD Guidelines  Scanned Media  Benefits  MyChart    Future Appointments   Date Time Provider Rachel Sanchez   2/17/2023  3:30 PM Cristine Rodriguez, [facility]   2/20/2023 11:20 AM Pritesh Mantilla MD [facility]   2/20/2023  1:30 PM Donniabdi Rodriguez, PTA [facility]   2/24/2023 12:30 PM Hoa Amaya, PT [facility]   2/27/2023 11:00 AM Donniabdi Rodriguez, PTA [facility]   3/2/2023 11:00 AM Hoa Amaya, PT [facility]   3/6/2023 10:00 AM Donnita Jennifer, PTA [facility]   3/9/2023 11:00 AM Hoa Amaya, PT [facility]   3/13/2023 11:00 AM Donnita Jennifer, PTA [facility]   3/16/2023 10:00 AM Hoa Amaya, PT [facility]   3/20/2023 11:00 AM Donnita Jennifer, PTA [facility]   3/23/2023 11:00 AM Hoa Amaya, PT [facility]   3/27/2023 11:00 AM Donnita Jennifer, PTA [facility]   3/30/2023 11:00 AM Hoa Amaya, PT [facility]   4/3/2023 10:00 AM Cristine Rodriguez, PTA [facility]   4/6/2023 11:00 AM Hoa Amaya, PT [facility]   5/1/2023 11:00 AM Didier Granados MD [facility]   6/26/2023 10:00 AM [address] Yessenia Marroquin MD [facility]

## 2023-02-17 ENCOUNTER — HOSPITAL ENCOUNTER (OUTPATIENT)
Dept: PHYSICAL THERAPY | Age: 78
Setting detail: RECURRING SERIES
Discharge: HOME | End: 2023-02-20
Payer: MEDICARE

## 2023-02-17 PROCEDURE — 97110 THERAPEUTIC EXERCISES: CPT

## 2023-02-17 ASSESSMENT — PAIN SCALES - GENERAL: PAINLEVEL_OUTOF10: 0

## 2023-02-17 NOTE — PROGRESS NOTES
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  Restrictions/Precautions:  Restrictions/Precautions: Cardiac; Fall Risk  No data recorded   Interventions Planned (Treatment may consist of any combination of the following):    Current Treatment Recommendations: Strengthening; ROM; Balance training; Functional mobility training; Transfer training; Endurance training; Gait training; Stair training; Neuromuscular re-education; Manual Therapy - Soft Tissue Mobilization; Manual Therapy - Joint Manipulation; Pain management; Return to work related activity; Home exercise program; Safety education & training; Patient/Caregiver education & training; Modalities; Therapeutic activities     Subjective Comments: Pt. Reported no pain today and generally feeling better.      Initial:}    0/10  Post Session:       2/10 no pain today  Medications Last Reviewed:  2023  Updated Objective Findings: /83 pulse 78 O2 SATS 98%  Treatment          THERAPEUTIC EXERCISE: ( 55 minutes):    Exercises per grid below to  improve mobility, strength, balance and coordination. Required mod visual, verbal, manual and tactile cues to promote proper body alignment, promote proper body posture, promote proper body mechanics and promote proper body breathing techniques. Progressed resistance, range, repetitions and complexity of movement as indicated. Dates: 2/17/23 2/9/23 2/13/23   Activity/Exercise Parameters  parameters   Nu Step 10 minutes level 5 10 minutes level 5 Level 5 x 10 mins    Sit to stand 3 x 10 elevated plinth 10x, 8x , 5x reps chair + 2 cushions  Arms reaching out  CG X10 reps with cushion in chair   Hip abduction 3 x 10 each standing  each  3x2 R side  slow and working on core, L hand on bar only    With both hands on bar  R 10x  L 10x X 20 reps BLE' s standing at bar   Hip Flexion    Standing on R, L hip flexion to 90 and then into extension  20x2   standing at bar     L hand on bar only  R hip flexion  3x 2 Standing at bar x 20 reps    Long Arc Quad  3 x 10 BLE's  x10 reps 7.5 lb wt. s  X 20 reps BLE's    Hamstring Curl  2x10 reps bilaterally   X20 reps standing at bar BLE's    Hamstring Stretch   4x30 sec hold with strap seated   4x30 sec hold with strap   Row  2x15 reps black  bands seated   ----   Pre gait -------  ------   Gait   Rolling walker 150 x 2  50 to 60 feet x 2   with cane, gait belt and wc to follow.                        X 50 feet with cane and 250 feet with rolling walker    Unsupported Standing (perform in front of plinth with gait belt; be prepared to help patient recover from LOB) 4 x 30 sec each side semi tandem staggered stance at bar Standing with eyes closed, CS  30 sec trial    Regular stance with peterbations  1 min    Core Strength/Reaching --------     Balance X 5 mins with different variations with base of support     Standing RAFFY and L   psoas stretch -----------       MANUAL THERAPY: (0 minutes): None today  Joint mobilization and Soft tissue mobilization was utilized and necessary because of the patient's restricted joint motion, painful spasm, loss of articular motion and restricted motion of soft tissue. MODALITIES: (0 minutes):        None today      Treatment/Session Summary:    Treatment Assessment: Patient tolerated session well.  Some fatigue after exercises  Communication/Consultation: None    Equipment provided today:  None today   Recommendations/Intent for next treatment session: Balance and standing exercise progressions focused on single limb stance and functional strength, especially R hip ABD and  R hip elevation       Total Treatment Billable Duration: 55 minutes   Time In: 1100  Time Out: 1157    SIXTO PEREIRA PTA       Charge Capture  }Post Session Pain  PT Visit Info  MedBridge Portal  MD Guidelines  Scanned Media  Benefits  [email or web address]    Future Appointments   Date Time Provider Rachel Sanchez   2/20/2023 11:20 AM Jovi Jasso MD [facility]   2/20/2023  1:30 PM Welford Knock, PTA [facility]   2/24/2023 12:30 PM Gautam Hopper, PT [facility]   2/27/2023 11:00 AM Welford Knock, PTA [facility]   3/2/2023 11:00 AM Gautam Hopper, PT [facility]   3/6/2023 10:00 AM Welford Knock, PTA [facility]   3/9/2023 11:00 AM Gautam Hopper, PT [facility]   3/13/2023 11:00 AM Welford Knock, PTA [facility]   3/16/2023 10:00 AM Gatuam Hopper, PT [facility]   3/20/2023 11:00 AM Welford Knock, PTA [facility]   3/23/2023 11:00 AM Gautam Hopper, PT [facility]   3/27/2023 11:00 AM Welford Knock, PTA [facility]   3/30/2023 11:00 AM Gautam Hopper, PT [facility]   4/3/2023 10:00 AM Welford Knock, PTA [facility]   4/6/2023 11:00 AM Gautam Hopper, PT [facility]   5/1/2023 11:00 AM Tex Murguia MD [facility]   6/26/2023 10:00 AM Jeanne Aguiar MD [facility]

## 2023-02-20 ENCOUNTER — OFFICE VISIT (OUTPATIENT)
Dept: PULMONOLOGY | Age: 78
End: 2023-02-20
Payer: MEDICARE

## 2023-02-20 ENCOUNTER — HOSPITAL ENCOUNTER (OUTPATIENT)
Dept: PHYSICAL THERAPY | Age: 78
Setting detail: RECURRING SERIES
Discharge: HOME | End: 2023-02-23
Payer: MEDICARE

## 2023-02-20 VITALS
SYSTOLIC BLOOD PRESSURE: 112 MMHG | OXYGEN SATURATION: 95 % | HEART RATE: 64 BPM | BODY MASS INDEX: 47.29 KG/M2 | DIASTOLIC BLOOD PRESSURE: 66 MMHG | HEIGHT: 68 IN | TEMPERATURE: 97.8 F | WEIGHT: 312 LBS | RESPIRATION RATE: 14 BRPM

## 2023-02-20 DIAGNOSIS — E66.2 OBESITY HYPOVENTILATION SYNDROME (HCC): ICD-10-CM

## 2023-02-20 DIAGNOSIS — J98.4 RESTRICTIVE LUNG DISEASE: Primary | ICD-10-CM

## 2023-02-20 DIAGNOSIS — G47.33 OSA (OBSTRUCTIVE SLEEP APNEA): ICD-10-CM

## 2023-02-20 DIAGNOSIS — R94.2 ABNORMAL DIFFUSION CAPACITY DETERMINED BY PULMONARY FUNCTION TEST: ICD-10-CM

## 2023-02-20 DIAGNOSIS — J47.9 BRONCHIECTASIS WITHOUT COMPLICATION (HCC): ICD-10-CM

## 2023-02-20 DIAGNOSIS — J96.11 CHRONIC RESPIRATORY FAILURE WITH HYPOXIA (HCC): ICD-10-CM

## 2023-02-20 PROBLEM — J84.9 ILD (INTERSTITIAL LUNG DISEASE) (HCC): Status: ACTIVE | Noted: 2023-02-20

## 2023-02-20 PROCEDURE — 97110 THERAPEUTIC EXERCISES: CPT

## 2023-02-20 PROCEDURE — 1123F ACP DISCUSS/DSCN MKR DOCD: CPT | Performed by: INTERNAL MEDICINE

## 2023-02-20 PROCEDURE — 99214 OFFICE O/P EST MOD 30 MIN: CPT | Performed by: INTERNAL MEDICINE

## 2023-02-20 ASSESSMENT — PAIN SCALES - GENERAL: PAINLEVEL_OUTOF10: 2

## 2023-02-20 NOTE — PROGRESS NOTES
[facility]: FOLLOW-UP Patient Office Visit Note  [address] Rula Huffman. [address], [address] [facility]  [phone]    Patient Name:  Kam Jackson  YOB: 1945            Date of Service:  2/20/2023    Chief Complaint   Patient presents with    Follow-up       History of Present Illness: This is a 60-year-old white male hospitalized in January 2022 with respiratory failure, atrial fib with RVR. Patient was discharged on 4 L oxygen at rest and 5 L with ambulation and trilogy noninvasive ventilation at bedtime. Seen in follow-up in the office in March and at that time O2 was recommended as 2 L with ambulation and no need for at rest due to improve oxygenation. He was to continue with trilogy. Complete PFTs were done and demonstrated severe restriction confirmed by lung volumes and there also was severe reduction in diffusion  After his last visit patient had a chest CT scan which demonstrated some cylindrical bronchiectasis. A nonenlarged    Esophageal lymph node was seen. Patient was placed on Mucinex. He has albuterol which she is using as needed but rarely uses it. He currently says that he is doing well and denies shortness of breath. He gets around with a walker and does use oxygen 1-1/2 L with ambulation and uses 2 L with the trilogy machine at night. He denies cough. There is occasional wheezing which his daughter hears but he does not hear. Past Medical History:   Diagnosis Date    A-fib ([address] 07/19/2021    developed AFID after hospital admission for wound infection- started on Eliquis    Acute on chronic respiratory failure with hypoxia and hypercapnia ([address] 7/23/2021    Acute respiratory failure with hypercapnia ([address] 7/23/2021    Atrial fibrillation with RVR ([address] 7/19/2021    CAD (coronary artery disease)     [facility].     Cellulitis 7/19/2021    Chronic kidney disease     stage 3- improved    COVID-19 vaccine series completed     Moderna Vaccine completed X2 doses    Current use of long term anticoagulation     Eliquis and Aspirin    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 mellitus, limited to breakdown of skin ([address] 9/27/2021    H/O heart artery stent     X1 placed in 1999    History of MI (myocardial infarction) 1999    stent placed X1    Hypercholesterolemia     Hypertension     Left ventricular dysfunction     echo revealed EF 30-35%, mildly dilated LA/RA and mild TR and MR.     Neuropathy     severe    Oxygen dependent     recently started on 2L NC continous- Sleep study to be scheduled-questionable SASHA    PICC (peripherally inserted central catheter) in place     PICC line in place to R arm    Staphylococcus aureus bacteremia 4/01/6840    Systolic CHF, acute on chronic ([address] 7/19/2021    Type 2 diabetes mellitus ([address]     oral agent/Pt does not monitor BS/no s.s of hypoglycemia/Last A1c: 6.7 on 7/13/21 per daughter       Patient Active Problem List   Diagnosis    Severe obesity (BMI 35.0-35.9 with comorbidity) ([address]    Stage 3 chronic kidney disease (HCC)    Systolic congestive heart failure (HCC)    Onychomycosis    Chronic respiratory failure with hypoxia (HCC)    Atrial fibrillation (HCC)    Controlled type 2 diabetes mellitus with diabetic polyneuropathy, without long-term current use of insulin (HCC)    Type 2 diabetes mellitus with nephropathy (HCC)    Mixed axonal-demyelinating polyneuropathy    Corns and callus    Mixed hyperlipidemia    Morbid (severe) obesity with alveolar hypoventilation (HCC)    Benign hypertensive kidney disease with chronic kidney disease    Atherosclerosis of native coronary artery of native heart without angina pectoris    Bunion of unspecified foot    S/P BKA (below knee amputation) unilateral, right (HCC)    Acquired hammer toe of left foot    Obstructive sleep apnea    Dilated cardiomyopathy ([address]    Acute on chronic respiratory failure with hypercapnia (HCC)    ILD (interstitial lung disease) [facility])         Past Surgical History:   Procedure Laterality Date    AMPUTATION Right 2022    below knee    ORTHOPEDIC SURGERY  08/18/2021    BKA    AR UNLISTED PROCEDURE CARDIAC SURGERY  1999    stent placement       Social History     Socioeconomic History    Marital status:      Spouse name: Not on file    Number of children: Not on file    Years of education: Not on file    Highest education level: Not on file   Occupational History    Not on file   Tobacco Use    Smoking status: Never    Smokeless tobacco: Never   Vaping Use    Vaping Use: Never used   Substance and Sexual Activity    Alcohol use: No    Drug use: No    Sexual activity: Not Currently   Other Topics Concern    Not on file   Social History Narrative    Lives with his daughter     Social Determinants of Health     Financial Resource Strain: Not on file   Food Insecurity: Not on file   Transportation Needs: Not on file   Physical Activity: Not on file   Stress: Not on file   Social Connections: Not on file   Intimate Partner Violence: Not on file   Housing Stability: Not on file       Family History   Problem Relation Age of Onset    No Known Problems Paternal Grandmother     No Known Problems Paternal Grandfather     No Known Problems Maternal Grandfather     No Known Problems Maternal Grandmother     Cancer Father     Cancer Mother        No Known Allergies        Review of Systems    OBJECTIVE:  Physical Exam:  Vitals:    02/20/23 1059   BP: 112/66   Pulse: 64   Resp: 14   Temp: 97.8 °F (36.6 °C)   SpO2: 95%        GENERAL APPEARANCE:   The patient is normal weight and in no respiratory distress. HEENT:   PERRL. Conjunctivae unremarkable. Nasal mucosa is without epistaxis, exudate, or polyps. Gums and dentition are unremarkable. There is no oropharyngeal narrowing. TMs are clear. NECK/LYMPHATIC:   Symmetrical with no elevation of jugular venous pulsation. Trachea midline. No thyroid enlargement.   No cervical adenopathy. LUNGS:   Normal respiratory effort with symmetrical lung expansion. Breath sounds few crackles on the R.     HEART:   There is a regular rate and rhythm. No murmur, rub, or gallop. There is no edema in the lower extremities. R bka     ABDOMEN:   Soft and non-tender. No hepatosplenomegaly. Bowel sounds are normal.       NEURO:   The patient is alert and oriented to person, place, and time. Memory appears intact and mood is normal.  No gross sensorimotor deficits are present. Current Outpatient Medications   Medication Instructions    acetaminophen (TYLENOL) 650 mg, Oral, EVERY 6 HOURS PRN    albuterol sulfate HFA (PROAIR HFA) 108 (90 Base) MCG/ACT inhaler 2 puffs, Inhalation, EVERY 6 HOURS PRN    apixaban (ELIQUIS) 5 mg, Oral, EVERY 12 HOURS    atorvastatin (LIPITOR) 40 mg, Oral, DAILY    cyanocobalamin 1,000 mcg, Oral, DAILY    ferrous sulfate (FE TABS 325) 325 mg, Oral, 3 TIMES DAILY WITH MEALS    furosemide (LASIX) 20 mg, Oral, 2 TIMES DAILY PRN    guaiFENesin 1200 MG TB12 Oral, 2 TIMES DAILY    metoprolol succinate (TOPROL XL) 25 mg, Oral, EVERY EVENING    OXYGEN 2 lpm cont    polyethylene glycol (GLYCOLAX) 17 g, Oral, DAILY    pregabalin (LYRICA) 200 mg, Oral, 2 TIMES DAILY    tamsulosin (FLOMAX) 0.4 mg, Oral, DAILY         DIAGNOSTIC TESTS:    CXR:      XR CHEST (2 VW) 03/09/2022    Narrative  PA AND LATERAL CHEST X-RAY. Clinical Indication: Chronic respiratory failure, hypoxia    Comparison: Chest x-ray dated 1/14/2022    Findings: 2 views of the chest submitted demonstrate the cardiac silhouette and  mediastinum to be unremarkable. There is no pleural effusion or pneumothorax. The lung parenchyma is clear. The included osseous structures are unremarkable. Impression  No acute cardiopulmonary abnormality. CT WITHOUT CONTRAST:    No results found for this or any previous visit from the past 365 days.       CT WITH CONTRAST:    No results found for this or any previous visit from the past 365 days. CT HIGH RES:      CT CHEST HIGH RESOLUTION 07/25/2022    Narrative  EXAM: Chest CT without contrast, ILD protocol. INDICATION: Patient with restrictive lung disease, further evaluation for  interstitial lung disease. No tobacco use. COMPARISON: Chest radiograph dated March 09, 2022. Multiple axial images were obtained through the chest.  Radiation dose reduction  techniques were used for this study: All CT scans performed at this facility  use one or all of the following: Automated exposure control, adjustment of the  mA and/or kVp according to patient's size, iterative reconstruction. FINDINGS:  LUNGS/PLEURA:  Central airways are clear. There is cylindrical bronchiectasis of the bilateral  lower lobes. No pneumothorax. Trace left pleural effusion. No evidence of  honeycombing. No evidence of a cystic or nodular lung disease. No significant  interlobular septal thickening evident. MEDIASTINUM:  Moderate coronary artery calcific atherosclerosis. Mild atherosclerosis of the  thoracic aorta. Imaged portion of the thyroid gland is unremarkable. Prominent 9  mm paraesophageal lymph node although without pathologic enlargement. No hilar  adenopathy. Heart appears normal in size. Unremarkable CT appearance of the  esophagus. BONES AND SOFT TISSUES:  Subcutaneous soft tissues are within normal limits. No acute or aggressive  osseous abnormality. UPPER ABDOMEN:  Unremarkable. Impression  1. No acute abnormality within the thorax. No evidence of interstitial lung  disease. 2. Cylindrical bronchiectasis of the bilateral lower lobes. 3. Prominent paraesophageal lymph node measuring 9 mm without pathologic  enlargement. CT PE PROTOCOL:    No results found for this or any previous visit from the past 365 days. LDCT SCREENING:    No results found for this or any previous visit from the past 365 days.       PET SCAN:    No results found for this or any previous visit from the past 365 days. Spirometry:      No flowsheet data found. Exercise oximetry:          ASSESSMENT:   Diagnosis Orders   1. Restrictive lung disease -much of this is probably related to the patient's obesity, no evidence of pulmonary fibrosis on CT scan       2. Body mass index (BMI) 45.0-49.9, adult ([facility]        3. Chronic respiratory failure with hypoxia (HCC) O2 at bedtime and with ambulation       4. Obesity hypoventilation syndrome (HCC) using Triligy at bedtime       5. SASHA (obstructive sleep apnea)        6. Abnormal diffusion capacity determined by pulmonary function test        7        bronchiectasis    PLAN:  Continue albuterol as needed  Can change Mucinex to as needed instead of routine  Follow-up in 6 months  Continue O2 with ambulation  Continue Trelegy plus O2 at bedtime  Weight loss needed    No orders of the defined types were placed in this encounter. No orders of the defined types were placed in this encounter.         Electronically signed by  Kim Arango MD

## 2023-02-20 NOTE — PROGRESS NOTES
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 Restrictions/Precautions:  Restrictions/Precautions: Cardiac; Fall Risk  No data recorded   Interventions Planned (Treatment may consist of any combination of the following):    Current Treatment Recommendations: Strengthening; ROM; Balance training; Functional mobility training; Transfer training; Endurance training; Gait training; Stair training; Neuromuscular re-education; Manual Therapy - Soft Tissue Mobilization; Manual Therapy - Joint Manipulation; Pain management; Return to work related activity; Home exercise program; Safety education & training; Patient/Caregiver education & training; Modalities; Therapeutic activities     Subjective Comments: Pt. Reported already feeling fatigue from having an appointment earlier. Initial:}    2/10  Post Session:       2/10   Medications Last Reviewed:  2023  Updated Objective Findings: 107/73 blood pressure and 66 pulse.   O2 SATS 98%  Treatment          THERAPEUTIC EXERCISE: ( 55 minutes):    Exercises per grid below to improve mobility, strength, balance and coordination. Required mod visual, verbal, manual and tactile cues to promote proper body alignment, promote proper body posture, promote proper body mechanics and promote proper body breathing techniques. Progressed resistance, range, repetitions and complexity of movement as indicated. Dates: 2/17/23 2/20/23 2/13/23   Activity/Exercise Parameters Parameters parameters   Nu Step 10 minutes level 5 10 minutes level 5 Level 5 x 10 mins    Sit to stand 3 x 10 elevated plinth 3x10 elevated plinth X10 reps with cushion in chair   Hip abduction 3 x 10 each standing  each  3x10 reps standing at bar BLE's  X 20 reps BLE' s standing at bar   Hip Flexion    Standing on R, L hip flexion to 90 and then into extension  20x2   standing at bar     L hand on bar only  R hip flexion  3x 2 Standing at bar x 20 reps    Long Arc Quad  3 x 10 BLE's  3x10 reps BLE's 3 lb wt. s  X 20 reps BLE's    Hamstring Curl  2x10 reps bilaterally  X 30 reps BLE's 3 lb wt. s  X20 reps standing at bar BLE's    Hamstring Stretch   4x30 sec hold with strap seated  --------- 4x30 sec hold with strap   Row  2x15 reps black  bands seated  --------- ----   Pre gait ------- ------ ------   Gait   Rolling walker 150 x 2  250 feet rolling walker                        X 50 feet with cane and 250 feet with rolling walker    Unsupported Standing (perform in front of plinth with gait belt; be prepared to help patient recover from LOB) 4 x 30 sec each side semi tandem staggered stance at bar Standing narrow base of support, wide base of support, & tandem  stance at bar     Core Strength/Reaching -------- ------    Balance X 5 mins with different variations with base of support     Standing RAFFY and L   psoas stretch ----------- ------      MANUAL THERAPY: (0 minutes): None today  Joint mobilization and Soft tissue mobilization was utilized and necessary because of the patient's restricted joint motion, painful spasm, loss of articular motion and restricted motion of soft tissue.      MODALITIES: (0 minutes):        None today      Treatment/Session Summary:    Treatment Assessment: Patient required multiple rest breaks due to fatigue   Communication/Consultation: None    Equipment provided today:  None today   Recommendations/Intent for next treatment session: Balance and standing exercise progressions focused on single limb stance and functional strength, especially R hip ABD and  R hip elevation       Total Treatment Billable Duration: 55 minutes   Time In: 1330  Time Out: [address], [facility]       Charge Capture  }Post Session Pain  PT Visit Info  MedBridge Portal  MD Guidelines  Scanned Media  Benefits  MyChart    Future Appointments   Date Time Provider Rachel Sanchez   2/24/2023 12:30 PM Nate Ba, PT [facility]   2/27/2023 11:00 AM Nancy Mcgee, PTA [facility]   3/2/2023 11:00 AM Nate Ba, PT [facility]   3/6/2023 10:00 AM Nancy Mcgee, PTA [facility]   3/9/2023 11:00 AM Nate Perkinjose raul, PT [facility]   3/13/2023 11:00 AM Nancy Mcgee, PTA [facility]   3/16/2023 10:00 AM [place] Perking, PT [facility]   3/20/2023 11:00 AM Nancy Mcgee, PTA [facility]   3/23/2023 11:00 AM [place] Perking, PT [facility]   3/27/2023 11:00 AM Nancy Mcgee, PTA [facility]   3/30/2023 11:00 AM Nate Perking, PT [facility]   4/3/2023 10:00 AM Nancy Mcgee, PTA [facility]   4/6/2023 11:00 AM Nate Perkinjose raul, PT [facility]   5/1/2023 11:00 AM MD VIRGILIO Mcmahon [facility]   6/26/2023 10:00 AM Gabo Romero MD [facility]

## 2023-02-24 ENCOUNTER — HOSPITAL ENCOUNTER (OUTPATIENT)
Dept: PHYSICAL THERAPY | Age: 78
Setting detail: RECURRING SERIES
End: 2023-02-24
Payer: MEDICARE

## 2023-02-27 ENCOUNTER — HOSPITAL ENCOUNTER (OUTPATIENT)
Dept: PHYSICAL THERAPY | Age: 78
Setting detail: RECURRING SERIES
Discharge: HOME | End: 2023-03-02
Payer: MEDICARE

## 2023-02-27 PROCEDURE — 97110 THERAPEUTIC EXERCISES: CPT

## 2023-02-27 ASSESSMENT — PAIN SCALES - GENERAL: PAINLEVEL_OUTOF10: 3

## 2023-02-27 NOTE — PROGRESS NOTES
Zhanna Cardinal Pack  : 1945  Primary: Guy Kelly [employer]vantage Hmo  Secondary:  [address] @ [address] [place] [address]  Phone: [phone]  Fax: [phone] Plan Frequency: Twice per week for 8 eight weeks (Twice per week for eight weeks)    Plan of Care/Certification Expiration Date: 04/10/23      PT Visit Info:      OUTPATIENT PHYSICAL LVCAVMQ:EMERY NOTE TYPE: Treatment Note 2023       Episode  }Appt Desk              Treatment Diagnosis:  Generalized Muscle Weakness (M62.81)  Difficulty in walking, Not elsewhere classified (R26.2)  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 Restrictions/Precautions:  Restrictions/Precautions: Cardiac; Fall Risk  No data recorded   Interventions Planned (Treatment may consist of any combination of the following):    Current Treatment Recommendations: Strengthening; ROM; Balance training; Functional mobility training; Transfer training; Endurance training; Gait training; Stair training; Neuromuscular re-education; Manual Therapy - Soft Tissue Mobilization; Manual Therapy - Joint Manipulation; Pain management; Return to work related activity; Home exercise program; Safety education & training; Patient/Caregiver education & training; Modalities; Therapeutic activities     Subjective Comments: Pt. Reported already feeling fatigue from having an appointment earlier. Initial:}    3/10  Post Session:       3/10   Medications Last Reviewed:  2023  Updated Objective Findings: 100/70 blood pressure and 66 pulse.   O2 SATS 98%  Treatment          THERAPEUTIC EXERCISE: ( 55 minutes):    Exercises per grid below to improve mobility, strength, balance and coordination. Required mod visual, verbal, manual and tactile cues to promote proper body alignment, promote proper body posture, promote proper body mechanics and promote proper body breathing techniques. Progressed resistance, range, repetitions and complexity of movement as indicated. Dates: 2/17/23 2/20/23 2//27/23   Activity/Exercise Parameters Parameters parameters   Nu Step 10 minutes level 5 10 minutes level 5 Level 5 x 10 mins    Sit to stand 3 x 10 elevated plinth 3x10 elevated plinth X10 reps with cushion in chair   Hip abduction 3 x 10 each standing  each  3x10 reps standing at bar BLE's  X 20 reps BLE' s standing at bar   Hip Flexion    Standing on R, L hip flexion to 90 and then into extension  20x2   standing at bar     L hand on bar only  R hip flexion  3x 2 Standing at bar x 20 reps with 3 lb wt. s    Long Arc Quad  3 x 10 BLE's  3x10 reps BLE's 3 lb wt. s  X 20 reps BLE's    Hamstring Curl  2x10 reps bilaterally  X 30 reps BLE's 3 lb wt. s  X20 reps standing at bar BLE's woth 3 lb wt/s    Hamstring Stretch   4x30 sec hold with strap seated  --------- 4x30 sec hold with strap   Row  2x15 reps black  bands seated  --------- ----   Pre gait ------- ------ ------   Gait   Rolling walker 150 x 2  250 feet rolling walker                        X 50 feet with cane and 250 feet with rolling walker    Unsupported Standing (perform in front of plinth with gait belt; be prepared to help patient recover from LOB) 4 x 30 sec each side semi tandem staggered stance at bar Standing narrow base of support, wide base of support, & tandem  stance at bar  Standing narrow base, wide base and tandem stance at bar with gait belt on x 8 mins      MANUAL THERAPY: (0 minutes): None today  Joint mobilization and Soft tissue mobilization was utilized and necessary because of the patient's restricted joint motion, painful spasm, loss of articular motion and restricted motion of soft tissue.     MODALITIES: (0 minutes):        None today      Treatment/Session Summary:    Treatment Assessment: Patient continues to require multiple mini rest breaks due to fatigue   Communication/Consultation: discussed with patient and his daughter to not scheduled appointments before his therapy sessions due to patient cannot give his full potential due to fatigue  Equipment provided today:  None today   Recommendations/Intent for next treatment session: Balance and standing exercise progressions focused on single limb stance and functional strength, especially R hip ABD and  R hip elevation       Total Treatment Billable Duration: 55 minutes   Time In: 1100  Time Out: 1200    MÓNICA CONDON, PTA       Charge Capture  }Post Session Pain  PT Visit Info  [facility] Portal  MD Guidelines  Scanned Media  Benefits  MyChart    Future Appointments   Date Time Provider Department Center   3/2/2023 11:00 AM Leslee B Shekhar, PT [facility]   3/6/2023 10:00 AM Mónica M [place], PTA [facility]   3/9/2023 11:00 AM Leslee B Shekhar, PT [facility]   3/13/2023 11:00 AM Mónica M [place], PTA [facility]   3/16/2023 10:00 AM Leslee B Shekhar, PT [facility]   3/20/2023 11:00 AM Mónica M [place], PTA [facility]   3/23/2023 11:00 AM Leslee B Shekhar, PT [facility]   3/27/2023 11:00 AM Mónica M [place], PTA [facility]   3/30/2023 11:00 AM Leslee B Shekhar, PT [facility]   4/3/2023 10:00 AM Mónica M [place], PTA [facility]   4/6/2023 11:00 AM Leslee B Shekhar, PT [facility]   5/1/2023 11:00 AM Miles Nguyễn MD [facility]   6/23/2023 10:00 AM Sandy Melo MD [facility]

## 2023-02-27 NOTE — PROGRESS NOTES
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 Restrictions/Precautions:  Restrictions/Precautions: Cardiac; Fall Risk  No data recorded   Interventions Planned (Treatment may consist of any combination of the following):    Current Treatment Recommendations: Strengthening; ROM; Balance training; Functional mobility training; Transfer training; Endurance training; Gait training; Stair training; Neuromuscular re-education; Manual Therapy - Soft Tissue Mobilization; Manual Therapy - Joint Manipulation; Pain management; Return to work related activity; Home exercise program; Safety education & training; Patient/Caregiver education & training; Modalities; Therapeutic activities     Subjective Comments: Pt. Reported coming straight from eye doctor with dilation and walked a lot yesterday at [address] with walker and still sore and fatigued from yesterday. Pt. Stated his pain was from his left knee.       Initial:}    3/10  Post Session:       3/10   Medications Last Reviewed:  2023  Updated Objective Findings: 100/70 blood pressure and 66 pulse. O2 SATS 98%  Treatment          THERAPEUTIC EXERCISE: ( 55 minutes):    Exercises per grid below to improve mobility, strength, balance and coordination. Required mod visual, verbal, manual and tactile cues to promote proper body alignment, promote proper body posture, promote proper body mechanics and promote proper body breathing techniques. Progressed resistance, range, repetitions and complexity of movement as indicated. Dates: 2/27/23 2/20/23 2/13/23   Activity/Exercise Parameters Parameters parameters   Nu Step 10 minutes level 5 10 minutes level 5 Level 5 x 10 mins    Sit to stand 3 x 10 elevated plinth 3x10 elevated plinth X10 reps with cushion in chair   Hip abduction 3 x 10 each standing  each  3x10 reps standing at bar BLE's  X 20 reps BLE' s standing at bar   Hip Flexion   Standing at bar with 3 lb wt. s  Standing on R, L hip flexion to 90 and then into extension  20x2   standing at bar     L hand on bar only  R hip flexion  3x 2 Standing at bar x 20 reps    Long Arc Quad  3 x 10 BLE's with 3 lb wt. s  3x10 reps BLE's 3 lb wt. s  X 20 reps BLE's    Hamstring Curl  2x10 reps bilaterally with 3 lb wt. s X 30 reps BLE's 3 lb wt. s  X20 reps standing at bar BLE's    Hamstring Stretch   4x30 sec hold with strap seated  --------- 4x30 sec hold with strap   Row  2x15 reps black  bands seated  --------- ----   Pre gait ------- ------ ------   Gait   Rolling walker 150 x 2  250 feet rolling walker                        X 50 feet with cane and 250 feet with rolling walker    Unsupported Standing (perform in front of plinth with gait belt; be prepared to help patient recover from LOB) 4 x 30 sec each side semi tandem staggered stance at bar Standing narrow base of support, wide base of support, & tandem  stance at bar     Core Strength/Reaching -------- ------    Balance X 5 mins with different variations with base of support     Standing RAFFY and L   psoas stretch ----------- ------      MANUAL THERAPY: (0 minutes): None today  Joint mobilization and Soft tissue mobilization was utilized and necessary because of the patient's restricted joint motion, painful spasm, loss of articular motion and restricted motion of soft tissue.      MODALITIES: (0 minutes):        None today      Treatment/Session Summary:    Treatment Assessment: Patient required multiple rest breaks due to fatigue   Communication/Consultation: None    Equipment provided today:  None today   Recommendations/Intent for next treatment session: Balance and standing exercise progressions focused on single limb stance and functional strength, especially R hip ABD and  R hip elevation       Total Treatment Billable Duration: 55 minutes   Time In: 1100  Time Out: 1200    EYAD CONDON PTA       Charge Capture  }Post Session Pain  PT Visit Info  [facility] Portal  MD Guidelines  Scanned Media  Benefits  MyChart    Future Appointments   Date Time Provider Rachel Sanchez   3/6/2023 10:00 AM Christy White PTA [facility]   3/9/2023 11:00 AM Nicolas Cadet, PT [facility]   3/13/2023 11:00 AM Christy White PTA [facility]   3/16/2023 10:00 AM Nicolas Cadet, PT [facility]   3/20/2023 11:00 AM Christy White, PTA [facility]   3/23/2023 11:00 AM iNcolas Cadet, PT [facility]   3/27/2023 11:00 AM Christy White, PTA [facility]   3/30/2023 11:00 AM Nicolas Cadet, PT [facility]   4/3/2023 10:00 AM Christy White, PTA [facility]   4/6/2023 11:00 AM Nicolas Cadet, PT [facility]   5/1/2023 11:00 AM Griselda Railing, MD [facility]   6/23/2023 10:00 AM Beto Rivera MD [facility]

## 2023-02-27 NOTE — PROGRESS NOTES
Helen Cheese Pack  : 1945  Primary: Lalito Rossi Medicare Advantage Hmo  Secondary:  [facility]  [address] [facility], [address] [place] [address], [facility], [address]  [phone]   [phone]        Pt unable to come to visit due to lack of transport    Kathleen Jean, PT MSPT

## 2023-03-02 ENCOUNTER — HOSPITAL ENCOUNTER (OUTPATIENT)
Dept: PHYSICAL THERAPY | Age: 78
Setting detail: RECURRING SERIES
Discharge: HOME | End: 2023-03-05
Payer: MEDICARE

## 2023-03-02 PROCEDURE — 97110 THERAPEUTIC EXERCISES: CPT

## 2023-03-02 NOTE — PROGRESS NOTES
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 Restrictions/Precautions:  Restrictions/Precautions: Cardiac; Fall Risk  No data recorded   Interventions Planned (Treatment may consist of any combination of the following):    Current Treatment Recommendations: Strengthening; ROM; Balance training; Functional mobility training; Transfer training; Endurance training; Gait training; Stair training; Neuromuscular re-education; Manual Therapy - Soft Tissue Mobilization; Manual Therapy - Joint Manipulation; Pain management; Return to work related activity; Home exercise program; Safety education & training; Patient/Caregiver education & training; Modalities; Therapeutic activities     Subjective Comments: Pt pleased with his progress and able to stand between 2 walking bars at orthopedist and take 8 steps with no AD. Mild L knee pain     Initial:}      2/10  L knee pain Post Session:        1-2/10 Lknee pain  Medications Last Reviewed:  3/2/2023  Updated Objective Findings: 114/74 blood pressure and 66 pulse.   O2 SATS 98%  Treatment          THERAPEUTIC EXERCISE: ( 55 minutes):    Exercises per grid below to improve mobility, strength, balance and coordination. Required mod visual, verbal, manual and tactile cues to promote proper body alignment, promote proper body posture, promote proper body mechanics and promote proper body breathing techniques. Progressed resistance, range, repetitions and complexity of movement as indicated. Dates: 2/17/23 2/20/23 2//27/23 3/2/23   Activity/Exercise Parameters Parameters parameters    Nu Step 10 minutes level 5 10 minutes level 5 Level 5 x 10 mins  Lev 5 x 10 min   Sit to stand 3 x 10 elevated plinth 3x10 elevated plinth X10 reps with cushion in chair X10 low plinnth  No push off   Hip abduction 3 x 10 each standing  each  3x10 reps standing at bar BLE's  X 20 reps BLE' s standing at bar 3#  3x10 reps standing at bar BLE's    Hip Flexion    Standing on R, L hip flexion to 90 and then into extension  20x2   standing at bar     L hand on bar only  R hip flexion  3x 2 Standing at bar x 20 reps with 3 lb wt. s  Standing on R, L hip flexion to 90 and then into extension  10x2  standing at bar     L hand on bar only  R hip flexion  10x2   Long Arc Quad  3 x 10 BLE's  3x10 reps BLE's 3 lb wt. s  X 20 reps BLE's   3 # x 10  5# x 15  8# x10  RAFFY LE   Hamstring Curl  2x10 reps bilaterally  X 30 reps BLE's 3 lb wt. s  X20 reps standing at bar BLE's woth 3 lb wt/s  3# x10  RAFFY   Hamstring Stretch   4x30 sec hold with strap seated  --------- 4x30 sec hold with strap    Row  2x15 reps black  bands seated  --------- ----    Pre gait ------- ------ ------    Gait   Rolling walker 150 x 2  250 feet rolling walker                        X 50 feet with cane and 250 feet with rolling walker  X 50 ft  With quad cane with gait belt and WC following   Unsupported Standing (perform in front of plinth with gait belt; be prepared to help patient recover from LOB) 4 x 30 sec each side semi tandem staggered stance at bar Standing narrow base of support, wide base of support, & tandem  stance at bar  Standing narrow base, wide base and tandem stance at bar with gait belt on x 8 mins  Standing narrow base, and modified tandem stance at bar with gait belt  1 min x 2 all 3 positions or 6 min with mild perterbation     MANUAL THERAPY: (0 minutes): None today  Joint mobilization and Soft tissue mobilization was utilized and necessary because of the patient's restricted joint motion, painful spasm, loss of articular motion and restricted motion of soft tissue. MODALITIES: (0 minutes):        None today      Treatment/Session Summary:    Treatment Assessment: Patient took no sitting rest breaks with standing unsupported work today, but did place hands on bar after each minute. Very fatigued by last set. Good tolerance to increase in weights.    Communication/Consultation: reinforced with patient and his daughter to not scheduled appointments before his therapy sessions due to patient cannot give his full potential due to fatigue  Equipment provided today:  None today   Recommendations/Intent for next treatment session: Balance and standing exercise progressions focused on single limb stance and functional strength, especially R hip ABD and  R hip elevation       Total Treatment Billable Duration: 55 minutes   Time In: 1107  Time Out: [facility], PT       Charge Capture  }Post Session Pain  PT Visit Info  [employer] Portal  MD [address]    Future Appointments   Date Time Provider Rachel Sanchez   3/6/2023 10:00 AM Lit Hyatt PTA [facility]   3/9/2023 11:00 AM Toby Days, PT [facility]   3/13/2023 11:00 AM LUIS Carney O   3/16/2023 10:00 AM Toby Days, PT [facility]   3/20/2023 11:00 AM Lit Hyatt PTA [facility]   3/23/2023 11:00 AM Toby Days, PT [facility]   3/27/2023 11:00 AM LUIS Carney O   3/30/2023 11:00 AM Gypsy Keys, PT [facility]   4/3/2023 10:00 AM Kristie Tang PTA [facility]   4/6/2023 11:00 AM Gypsy Keys, PT [facility]   5/1/2023 11:00 AM MD VIRGILIO Flores [facility] AMB   6/23/2023 10:00 AM Julianna Nieves MD [facility]

## 2023-03-06 ENCOUNTER — HOSPITAL ENCOUNTER (OUTPATIENT)
Dept: PHYSICAL THERAPY | Age: 78
Setting detail: RECURRING SERIES
Discharge: HOME | End: 2023-03-09
Payer: MEDICARE

## 2023-03-06 PROCEDURE — 97110 THERAPEUTIC EXERCISES: CPT

## 2023-03-06 ASSESSMENT — PAIN SCALES - GENERAL: PAINLEVEL_OUTOF10: 0

## 2023-03-06 NOTE — PROGRESS NOTES
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 Restrictions/Precautions:  Restrictions/Precautions: Cardiac; Fall Risk  No data recorded   Interventions Planned (Treatment may consist of any combination of the following):    Current Treatment Recommendations: Strengthening; ROM; Balance training; Functional mobility training; Transfer training; Endurance training; Gait training; Stair training; Neuromuscular re-education; Manual Therapy - Soft Tissue Mobilization; Manual Therapy - Joint Manipulation; Pain management; Return to work related activity; Home exercise program; Safety education & training; Patient/Caregiver education & training; Modalities; Therapeutic activities     Subjective Comments: Pt pleased with his progress and able to stand between 2 walking bars at orthopedist and take 8 steps with no AD.  Mild L knee pain  Pt. reported no real pain just soreness and fatigue in her left knee  Initial:}    0 2/10  L knee pain Post Session:        1-2/10 Lknee pain  Medications Last Reviewed: 3/6/2023  Updated Objective Findings: /63 pulse 68, O2 SATS 96%  Treatment          THERAPEUTIC EXERCISE: ( 55 minutes):    Exercises per grid below to improve mobility, strength, balance and coordination. Required mod visual, verbal, manual and tactile cues to promote proper body alignment, promote proper body posture, promote proper body mechanics and promote proper body breathing techniques. Progressed resistance, range, repetitions and complexity of movement as indicated. Dates: 3/6/23 2//27/23 3/2/23   Activity/Exercise Parameters parameters Parameters   Hip abduction 3x10 reps standing at bar BLE's  X 20 reps BLE' s standing at bar 3#  3x10 reps standing at bar BLE's    Hip Flexion  Standing at bar marching x 20 reps with 3 lb wt. s on each LE  Standing at bar x 20 reps with 3 lb wt. s  Standing on R, L hip flexion to 90 and then into extension  10x2  standing at bar     L hand on bar only  R hip flexion  10x2   Long Arc Quad  3x10 reps BLE's 3 lb wt. s  X 20 reps BLE's   3 # x 10  5# x 15  8# x10  RAFFY LE   Hamstring Curl  X 30 reps BLE's 3 lb wt. s  X20 reps standing at bar BLE's woth 3 lb wt/s  3# x10  RAFFY   Hamstring Stretch   --------- 4x30 sec hold with strap    Row  --------- ----    Pre gait ------ ------    Gait  250 feet rolling walker            30 feet with cane and wall with gait belt and standard cane with CGA and therapist following with wheelchair            X 50 feet with cane and 250 feet with rolling walker  X 50 ft  With quad cane with gait belt and WC following   Unsupported Standing (perform in front of plinth with gait belt; be prepared to help patient recover from LOB) Standing narrow base of support, wide base of support, & tandem  stance at bar x 10 mins  Standing narrow base, wide base and tandem stance at bar with gait belt on x 8 mins  Standing narrow base, and modified tandem stance at bar with gait belt  1 min x 2 all 3 positions or 6 min with mild perterbation     MANUAL THERAPY: (0 minutes): None today  Joint mobilization and Soft tissue mobilization was utilized and necessary because of the patient's restricted joint motion, painful spasm, loss of articular motion and restricted motion of soft tissue. MODALITIES: (0 minutes):        None today      Treatment/Session Summary:    Treatment Assessment: Patient was compliant with all exercises and gait training but still requires multiple mini rest breaks due to fatigue.    Communication/Consultation: reinforced with patient and his daughter to not scheduled appointments before his therapy sessions due to patient cannot give his full potential due to fatigue  Equipment provided today:  None today   Recommendations/Intent for next treatment session: Balance and standing exercise progressions focused on single limb stance and functional strength, especially R hip ABD and  R hip elevation       Total Treatment Billable Duration: 55 minutes   Time In: 0955  Time Out: [address] [facility] [place]       Charge Capture  }Post Session Pain  PT Visit Info  [facility]  MD Guidelines  Scanned Media  Benefits  MyChart    Future Appointments   Date Time Provider Rachel Sanchez   3/9/2023 11:00 AM Charissa Gallo, PT [facility]   3/13/2023 11:00 AM Yaz Palma, LUIS [facility]   3/16/2023 10:00 AM Charissa Gallo, PT [facility]   3/20/2023 11:00 AM Yaz Palma, PTA [facility]   3/23/2023 11:00 AM Charissa Gallo, PT [facility]   3/27/2023 11:00 AM Yaz Palma, PTA [facility]   3/30/2023 11:00 AM Charissa Gallo, PT [facility]   4/3/2023 10:00 AM Yaz Palma PTA [facility]   4/6/2023 11:00 AM Charissa Gallo, PT [facility]   5/1/2023 11:00 AM MD VIRGILIO Goff [facility] AMB   6/23/2023 10:00 AM MD SARA Ruggiero [facility] AMB

## 2023-03-09 ENCOUNTER — HOSPITAL ENCOUNTER (OUTPATIENT)
Dept: PHYSICAL THERAPY | Age: 78
Setting detail: RECURRING SERIES
Discharge: HOME | End: 2023-03-12
Payer: MEDICARE

## 2023-03-09 PROCEDURE — 97110 THERAPEUTIC EXERCISES: CPT

## 2023-03-09 NOTE — PROGRESS NOTES
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 Phong Salazar is a 77year old male presenting with an overall decline in balance, functional mobility, transfers, ability to ambulate, and overall function following R below knee amputation 2021 after spraining his ankle at work and then acquiring a wound from altered gait. His daughter is present with him today. He underwent an ankle reconstruction in the past but otherwise had no issues until spraining the ankle. 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 He completed six week of inpatient rehab and was making excellent progress. He states by the end of October he was discharged to home and had been practicing well with his prosthesis for about one week.  Home health has been treating him but he states he has been limited due to other medical issues and was hospitalized in January for fluid retention on his lungs. He states he went to [facility] for rehab following this and then was discharged home again. He states home health just ended a week or so ago and he had practiced with a straight cane a few times with his therapist. He reports he is still determined to make improvements and improve his mobility overall; and to return to part time work at [employer] if that is possible from a mobility standpoint. He and his daughter report that his cardiopulmonary endurance and balance are limiting factors for him as well and that he has fallen a few times in the last six months. He also reports having had a few falls while still working at [employer] due to tripping over objects. He does report a feeling of occasionally losing his balance and falling backward at random times. He also reports low back pain that is chronic but no other significant orthopedic complaints at this time. Patient presents with decreased balance, decreased proprioception, decreased functional strength, decreased cardiopulmonary endurance, decreased gait and transfer ability. Patient will benefit from skilled PT to build on gains and provide exercise progressions, a progressive resistance exercise program, balance exercises, gait and transfer training, manual therapy and modalities as needed to work towards therapeutic goals. PROGRESS NOTE 2/9/23:   Mr. Anson Amaral has been for 54 sessions of physical therapy from 6/13/22 to 2/9/23 with good progress and then a set back due to covid. He recently got a new prosthetic for R LE and once it was adjusted to the right height, he had much improvement in his quality of gait with walking using RW. He has made good progress in strength and walking. His TUG score is now down to 12 to 13 seconds.   His balance is still not stable enough for situations like how he initially fell prior to coming to PT. We believe this could improve with further PT, especially finding improved balance strategies if he were to start to fall. He would like to progress to walking with a cane. Although he has been able to do this in the clinic for short distances, his balance and strength are not sufficient for that task at this time. Chandrika Merchant should continue to benefit from home exercise program, therapeutic and postural strengthening exercises, and balance work. Chandrika Merchant should continue to benefit from skilled PT to address above deficits affecting participation in basic ADLs and overall functional tolerance. PROGRESS NOTE 3/9/23: mr Merchant has completed 63 sessions from 6/13/22 to 3/9/23 and continues to make progress towards his goal of improving balance and walking with a cane. His balance with his eyes closed has improved to >30 sec but this is with much effort. He lacks strength and endurance to walk with cane at this time but works hard in the clinic. Chandrika Merchant should continue to benefit from home exercise program, therapeutic and postural strengthening exercises, and balance work. OBJECTIVE     Observation/Postural and Gait Assessment:   Walking: Patient ambulates with RW and wears uses nasal oxygen tube. His gait is more normalized now and he is able to walk closer to an upright posture. Standing:  Pt now stands with more level hips since getting adjustment to prosthesis. Palpation: not today    AROM: L hip extension = 0 degrees    Strength: RAFFY knee and L ankle DF/PF= WNL  RAFFY hip flexion in standing 4/5  Lumbar Protective Mechanism: for ability to brace trunk- in flexion = 1/5 RAFFY sides    Balance Tests:    Standing eyes open, flat ground: Able to stand independently with normal stance at least 1 min. With perturbations- has tendency to start to fall backwards. Standing eyes closed, flat ground (CG).   Able to stand independently for 35 seconds , still struggles with starting to fall backwards  but able to self right better. Modified tandem stance = at least 60 sec RAFFY          1/12/23  TUG score: 17 seconds  30 second sit to stand: 9 repetitions 50 cm plinth     2/9/23  TUG score: 13 seconds  30 second sit to stand: 8 repetitions 50 cm CHAIR (not as easy to push off with hands)    3/9/23   TUG score: 15 seconds    Neurological Screen:  Myotomes: Key muscle strength testing through bilateral LE is intact. Dermatomes: Sensation testing through bilateral lower quadrants for light touch is intact. Functional Mobility:  Patient ambulates with prosthesis and rolling front wheeled walker independently and with improved quality of gait. Performs sit to and from stand transfers independently with effort and use of push of with hands Needs CG for walking with a cane and can only walk very short distances. ASSESSMENT   Problem List: (Impacting functional limitations): Body Structures, Functions, Activity Limitations Requiring Skilled Therapeutic Intervention: Decreased functional mobility ; Decreased ADL status; Decreased ROM; Decreased tolerance to work activity; Decreased strength; Decreased endurance; Decreased sensation; Decreased balance; Decreased coordination; Increased pain; Decreased posture     Therapy Prognosis:   Therapy Prognosis: Good        PLAN   Effective Dates: 02/9/23 TO Plan of Care/Certification Expiration Date: 04/10/23     Frequency/Duration: Plan Frequency: Twice per week for 8 eight weeks (Twice per week for eight weeks)     Interventions Planned (Treatment may consist of any combination of the following):    Current Treatment Recommendations: Strengthening; ROM; Balance training; Functional mobility training; Transfer training;  Endurance training; Gait training; Stair training; Neuromuscular re-education; Manual Therapy - Soft Tissue Mobilization; Manual Therapy - Joint Manipulation; Pain management; Return to work related activity; Home exercise program; Safety education & training; Patient/Caregiver education & training; Modalities; Therapeutic activities     Goals: (Goals have been discussed and agreed upon with patient.)  Short-Term Functional Goals: Time Frame: 06/13/2022 to 07/11/2022  Patient will ambulate for six minutes using front wheeled walker without requiring a seated rest period. (GOAL MET)  Patient will maintain eyes closed static standing for 30 seconds on even surface safely with guarding. (GOAL MET)  Patient will perform sit to stand transfer from standard chair height without BUEs for one repetition. (PROGRESSED) 2/9/23  Discharge Goals: Time Frame: 06/13/2022 to 11/24/2022   Patient will ambulate with straight cane safely and independently over even surfaces. (PROGRESSED) 2/9/23  Patient will ambulate with rolling walker over uneven surfaces or when ambulating for prolonged distances. MET 2/9/23- able to walk 30 minutes with RW per pt report. Patient will perform sit to stand and sit to and from supine transfers safely and independently without cues. (GOAL MET)  Patient will improve TUG score to 14 seconds or less to indicate improved gait speed and decreased fall risk. MET 2/9/23  Patient will improve modified CTSIB to 120/120 to indicate improved proprioception and vestibular function and a decreased fall risk. (ONGOING)  Patient will improve 30 second sit to stand score to 10 repetitions without BUEs. (PROGRESSED) 2/9/23  Patient will return to working at  [employer] part time duty with modifications. (ONGOING)          Outcome Measure: Tool Used: Lower Extremity Functional Scale (LEFS)  Score:  Initial: 34/80 Most Recent: 43/80 (Date: 07/15/22)    31/80 (8/11/22)    36/80 09/29/22    28/80 11/10/2022     TBA 2/9/23   Interpretation of Score: 20 questions each scored on a 5 point scale with 0 representing \"extreme difficulty or unable to perform\" and 4 representing \"no difficulty\".   The lower the score, the greater the functional disability. 80/80 represents no disability. Minimal detectable change is 9 points. Tool Used: Timed Up and Go (TUG)  Score:  Initial: 20.4 seconds Most Recent: 19 seconds (Date: 07/18/22 )    17.1 seconds 08/11/22    16.4 seconds 09/29/22     17.7 seconds 11/10/2022     15 seconds 12/08/22      17 seconds 1/12/23    13 seconds 2/9/23    15 seconds 3/9/23   Interpretation of Score: The test measures, in seconds, the time taken by an individual to stand up from a standard arm chair (seat height 46 cm [18 in], arm height 65 cm [25.6 in]), walk a distance of 3 meters (118 in, approx 10 ft), turn, walk back to the chair and sit down. If the individual takes longer than 14 seconds to complete TUG, this indicates risk for falls. Medical Necessity:   Patient is expected to demonstrate progress in strength, range of motion, balance, coordination and functional technique to increase independence with functional mobility and ADLs. Skilled intervention continues to be required due to need for exercise progressions tailored to patient needs, goals, and impairments . Working towards improved safety with balance as well. Reason For Services/Other Comments:  Patient continues to require skilled intervention due to need for exercise progressions, manual therapy, plan of care modifications and exceptions tailored to patient presentation. Regarding Kennedi Merchant's therapy, I certify that the treatment plan above will be carried out by a therapist or under their direction.   Thank you for this referral,  John Wilks, PT          Post Session Pain  Charge Capture  PT Visit Info  POC Link  Treatment Note Link  MD Kacy Escobar

## 2023-03-09 NOTE — PROGRESS NOTES
Herson Grandchild Pack  : 1945  Primary: [place] ken [employer] H[employer]  Secondary:  [address] @ [address] [place] [address]  Phone: [phone]  Fax: [phone] Plan Frequency: Twice per week for 8 eight weeks (Twice per week for eight weeks)    Plan of Care/Certification Expiration Date: 04/10/23      PT Visit Info:      OUTPATIENT PHYSICAL GRISELP:FE NOTE TYPE: Treatment Note 3/9/2023       Episode  }Appt Desk              Treatment Diagnosis:  Generalized Muscle Weakness (M62.81)  Difficulty in walking, Not elsewhere classified (R26.2)  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 Restrictions/Precautions:  Restrictions/Precautions: Cardiac; Fall Risk  No data recorded   Interventions Planned (Treatment may consist of any combination of the following):    Current Treatment Recommendations: Strengthening; ROM; Balance training; Functional mobility training; Transfer training; Endurance training; Gait training; Stair training; Neuromuscular re-education; Manual Therapy - Soft Tissue Mobilization; Manual Therapy - Joint Manipulation; Pain management; Return to work related activity; Home exercise program; Safety education & training; Patient/Caregiver education & training; Modalities;  Therapeutic activities     Subjective Comments: Pt feels like he is walking better with      Initial:}      2/10  L knee pain Post Session:        2/10 Lknee pain  Medications Last Reviewed:  3/9/2023  Updated Objective Findings: /68 pulse 68, O2 SATS 97%  Treatment          THERAPEUTIC EXERCISE: ( 55 minutes):    Exercises per grid below to improve mobility, strength, balance and coordination. Required mod visual, verbal, manual and tactile cues to promote proper body alignment, promote proper body posture, promote proper body mechanics and promote proper body breathing techniques. Progressed resistance, range, repetitions and complexity of movement as indicated. Dates: 3/9/23    Activity/Exercise Parameters    NU STEP Lev 6 x 10 min    Hip abduction 3#  3x10 reps standing at bar BLE's     Hip Flexion  Standing on R, L hip flexion to 90 and then into extension  10x2  standing at bar     L hand on bar only  R hip flexion  10x2    Long Arc Quad  7.5 # 2x12  RAFFY LE    Hip ext Knee bent  L only  3# 10x    Hamstring Curl  3# x10  RAFFY    Hamstring Stretch       Row      Pre gait     Gait  X 50 ft  With quad cane with gait belt and WC following    Unsupported Standing (perform in front of plinth with gait belt; be prepared to help patient recover from LOB) Standing narrow base, and modified tandem stance at bar with gait belt  1 min x 2 all 3 positions or 6 min with mild perterbation on second set      MANUAL THERAPY: (0 minutes): None today  Joint mobilization and Soft tissue mobilization was utilized and necessary because of the patient's restricted joint motion, painful spasm, loss of articular motion and restricted motion of soft tissue. MODALITIES: (0 minutes):        None today      Treatment/Session Summary:    Treatment Assessment: Patient able to balance 35 sec with eyes closed today.    Communication/Consultation:not today  Equipment provided today:  None today   Recommendations/Intent for next treatment session: Balance and standing exercise progressions focused on single limb stance and functional strength, especially R hip ABD and  R hip anterior elevation       Total Treatment Billable Duration: 55 minutes   Time In: 1107  Time Out: 304 [facility], PT       Charge Capture  }Post Session Pain  PT Visit Juan Kern MD Guidelines  Scanned Media  Benefits  MyChart    Future Appointments   Date Time Provider Rachel Laura   3/13/2023 11:00 AM Lianna Ovalle, [facility]   3/16/2023 10:00 AM Lidya Mishra, PT [facility]   3/20/2023 11:00 AM Lianna Ovalle, [facility]   3/23/2023 11:00 AM Lidya Mishra, PT [facility]   3/27/2023 11:00 AM Lianna Ovalle, [facility]   3/30/2023 11:00 AM Lidya Mishra, PT [facility]   4/3/2023 10:00 AM Lianna Ovalle, [facility]   4/6/2023 11:00 AM Lidya Mishra, PT [facility]   5/1/2023 11:00 AM Zoya Urias MD [facility]   6/23/2023 10:00 AM Juan Borrego MD [facility]

## 2023-03-13 ENCOUNTER — HOSPITAL ENCOUNTER (OUTPATIENT)
Dept: PHYSICAL THERAPY | Age: 78
Setting detail: RECURRING SERIES
Discharge: HOME | End: 2023-03-16
Payer: MEDICARE

## 2023-03-13 PROCEDURE — 97110 THERAPEUTIC EXERCISES: CPT

## 2023-03-13 ASSESSMENT — PAIN SCALES - GENERAL: PAINLEVEL_OUTOF10: 0

## 2023-03-13 NOTE — PROGRESS NOTES
Colleen Erwin Pack  : 1945  Primary: [employer] Hmo  Secondary:  [address] [facility] [address] [address]  Phone: [phone]  Fax: [phone] Plan Frequency: Twice per week for 8 eight weeks (Twice per week for eight weeks)    Plan of Care/Certification Expiration Date: 04/10/23      PT Visit Info:      OUTPATIENT PHYSICAL ROLFEKI:OV NOTE TYPE: Treatment Note 3/13/2023       Episode  }Appt Desk              Treatment Diagnosis:  Generalized Muscle Weakness (M62.81)  Difficulty in walking, Not elsewhere classified (R26.2)  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 Restrictions/Precautions:  Restrictions/Precautions: Cardiac; Fall Risk  No data recorded   Interventions Planned (Treatment may consist of any combination of the following):    Current Treatment Recommendations: Strengthening; ROM; Balance training; Functional mobility training; Transfer training; Endurance training; Gait training; Stair training; Neuromuscular re-education; Manual Therapy - Soft Tissue Mobilization; Manual Therapy - Joint Manipulation; Pain management; Return to work related activity; Home exercise program; Safety education & training; Patient/Caregiver education & training; Modalities;  Therapeutic activities     Subjective Comments:   Pt. reported balance and gait much better since getting new shoes  Initial:}    0 2/10  L knee pain Post Session:        2/10 Lknee pain  Medications Last Reviewed:  3/13/2023  Updated Objective Findings: better balance and gait today  Treatment          THERAPEUTIC EXERCISE: ( 55 minutes):    Exercises per grid below to improve mobility, strength, balance and coordination. Required mod visual, verbal, manual and tactile cues to promote proper body alignment, promote proper body posture, promote proper body mechanics and promote proper body breathing techniques. Progressed resistance, range, repetitions and complexity of movement as indicated. Dates: 3/9/23 3/13/23    Activity/Exercise Parameters Parameters    NU STEP Lev 6 x 10 min Level 6 x 11 mins     Hip abduction 3#  3x10 reps standing at bar BLE's  4 lb wt. s 3x10 reps standing at  bar BLE's     Hip Flexion  Standing on R, L hip flexion to 90 and then into extension  10x2  standing at bar     L hand on bar only  R hip flexion  10x2 Standing at bar with 4 lb wt. s BLE's holding onto bar while exercising opposite LE with one hand only x 20 reps     Long Arc Quad  7.5 # 2x12  RAFFY LE 7.5 lb wt. s 3x10 reps BLE's     Side stepping  At bar x 15 feet x 6 reps    Hip ext Knee bent  L only  3# 10x -----    Hamstring Curl  3# x10  RAFFY 4 lb wt. s BLE's x 20 reps    Hamstring Stretch    4x30 sec hold     Row   ------    Pre gait  -------    Gait  X 50 ft  With quad cane with gait belt and WC following --------    Unsupported Standing (perform in front of plinth with gait belt; be prepared to help patient recover from LOB) Standing narrow base, and modified tandem stance at bar with gait belt  1 min x 2 all 3 positions or 6 min with mild perterbation on second set Standing at bar with gait belt with modified tandem stance, NBOS, & wide base of support x 4 mins total      MANUAL THERAPY: (0 minutes): None today  Joint mobilization and Soft tissue mobilization was utilized and necessary because of the patient's restricted joint motion, painful spasm, loss of articular motion and restricted motion of soft tissue.      MODALITIES: (0 minutes):        None today      Treatment/Session Summary:    Treatment Assessment: Patient was compliant with   All exercises with improved gait and and balance.     Communication/Consultation:not today  Equipment provided today:  None today   Recommendations/Intent for next treatment session: Balance and standing exercise progressions focused on single limb stance and functional strength, especially R hip ABD and  R hip anterior elevation       Total Treatment Billable Duration: 55 minutes   Time In: 1100  Time Out: 1200    EYAD CONDON PTA       Charge Capture  }Post Session Pain  PT Visit Info  Dominique Portal  MD Guidelines  Scanned Media  Benefits  MyChart    Future Appointments   Date Time Provider Rachel Sanchez   3/16/2023 10:00 AM Shaniqua Class, PT [facility]   3[facility]20[facility]2023 11:00 [facility]   3/23/2023 11:00 AM Shaniqua Class, PT [facility]   [facility]   3/30/2023 11:00 AM Shaniqua Class, PT [facility]   4/3/2023 10:00 AM [facility], [facility]   4/6/2023 11:00 AM Shaniqua Class, PT [facility]   5/1/2023 11:00 AM Teena Lee MD [facility]   6/23/2023 10:00 AM Meera Denise MD [facility]

## 2023-03-16 ENCOUNTER — HOSPITAL ENCOUNTER (OUTPATIENT)
Dept: PHYSICAL THERAPY | Age: 78
Setting detail: RECURRING SERIES
Discharge: HOME | End: 2023-03-19
Payer: MEDICARE

## 2023-03-16 PROCEDURE — 97110 THERAPEUTIC EXERCISES: CPT

## 2023-03-16 NOTE — PROGRESS NOTES
and coordination. Required mod visual, verbal, manual and tactile cues to promote proper body alignment, promote proper body posture, promote proper body mechanics and promote proper body breathing techniques. Progressed resistance, range, repetitions and complexity of movement as indicated. Dates: 3/9/23 3/13/23 3/16/23   Activity/Exercise Parameters Parameters    NU STEP Lev 6 x 10 min Level 6 x 11 mins  Lev 6 x 10 min   Hip abduction 3#  3x10 reps standing at bar BLE's  4 lb wt. s 3x10 reps standing at  bar BLE's  4 lb wt. s 3x15 reps standing at  bar BLE's    Hip Flexion  Standing on R, L hip flexion to 90 and then into extension  10x2  standing at bar     L hand on bar only  R hip flexion  10x2 Standing at bar with 4 lb wt. s BLE's holding onto bar while exercising opposite LE with one hand only x 20 reps  L hand on bar only  R hip flexion  4#  10x2    Standing on R, L hip flexion to 90 and then into extension  4#  10x2  standing at bar    Long Arc Quad  7.5 # 2x12  RAFFY LE 7.5 lb wt. s 3x10 reps BLE's  7.5#  3x15 RAFFY   Side stepping  At bar x 15 feet x 6 reps At bar x 15 feet x 6 reps   Hip ext Knee bent  L only  3# 10x -----    Hamstring Curl  3# x10  RAFFY 4 lb wt. s BLE's x 20 reps 4#  3x15 RAFFY   Hamstring Stretch    4x30 sec hold     Row   ------    Pre gait  -------    Gait  X 50 ft  With quad cane with gait belt and WC following -------- X 50 ft  With quad cane with gait belt and WC following   Unsupported Standing (perform in front of plinth with gait belt; be prepared to help patient recover from LOB) Standing narrow base, and modified tandem stance at bar with gait belt  1 min x 2 all 3 positions or 6 min with mild perterbation on second set Standing at bar with gait belt with modified tandem stance, NBOS, & wide base of support x 4 mins total Standing , and modified tandem RAFFY sides and eyes closed for normal stance at bar with gait belt  1 min x 2 all 3 positions or 6 min with mild perterbation on sec

## 2023-03-20 ENCOUNTER — HOSPITAL ENCOUNTER (OUTPATIENT)
Dept: PHYSICAL THERAPY | Age: 78
Setting detail: RECURRING SERIES
Discharge: HOME | End: 2023-03-23
Payer: MEDICARE

## 2023-03-20 PROCEDURE — 97110 THERAPEUTIC EXERCISES: CPT

## 2023-03-20 NOTE — PROGRESS NOTES
strength, balance and coordination. Required mod visual, verbal, manual and tactile cues to promote proper body alignment, promote proper body posture, promote proper body mechanics and promote proper body breathing techniques. Progressed resistance, range, repetitions and complexity of movement as indicated. Dates: 3/20/23 3/13/23 3/16/23   Activity/Exercise Parameters Parameters    NU STEP Lev 6 x 15 mins Level 6 x 11 mins  Lev 6 x 10 min   Hip abduction 4 lbs  3x10 reps standing at bar BLE's  4 lb wt. s 3x10 reps standing at  bar BLE's  4 lb wt. s 3x15 reps standing at  bar BLE's    Hip Flexion  Standing on R, L hip flexion to 90 and then into extension  10x2  standing at bar     L hand on bar only  R hip flexion  10x2 Standing at bar with 4 lb wt. s BLE's holding onto bar while exercising opposite LE with one hand only x 20 reps  L hand on bar only  R hip flexion  4#  10x2    Standing on R, L hip flexion to 90 and then into extension  4#  10x2  standing at bar    Long Arc Quad  7.5 # 2x12  RAFFY LE 7.5 lb wt. s 3x10 reps BLE's  7.5#  3x15 RAFFY   Side stepping At bar 15 feet x 6 reps  At bar x 15 feet x 6 reps At bar x 15 feet x 6 reps   Hip ext X 10 reps each LE  4lb wt. s  -----    Hamstring Curl  4# x10  RAFFY 4 lb wt. s BLE's x 20 reps 4#  3x15 RAFFY   Hamstring Stretch   4x30 sec hold  4x30 sec hold     Row  ----- ------    Pre gait  -------    Gait  X 50 ft  With quad cane with gait belt and WC following -------- X 50 ft  With quad cane with gait belt and WC following   Unsupported Standing (perform in front of plinth with gait belt; be prepared to help patient recover from LOB) Various bases of support, modified tandem x 4 mins total  Standing at bar with gait belt with modified tandem stance, NBOS, & wide base of support x 4 mins total Standing , and modified tandem RAFFY sides and eyes closed for normal stance at bar with gait belt  1 min x 2 all 3 positions or 6 min with mild perterbation on sec     MANUAL

## 2023-03-23 ENCOUNTER — HOSPITAL ENCOUNTER (OUTPATIENT)
Dept: PHYSICAL THERAPY | Age: 78
Setting detail: RECURRING SERIES
Discharge: HOME | End: 2023-03-26
Payer: MEDICARE

## 2023-03-23 PROCEDURE — 97110 THERAPEUTIC EXERCISES: CPT

## 2023-03-23 NOTE — PROGRESS NOTES
session end to do more than 50ft. Will try walking at start of sessions. Communication/Consultation:not today  Equipment provided today:  None today   Recommendations/Intent for next treatment session: Balance and standing exercise progressions focused on single limb stance and functional strength, especially R hip ABD and  R hip anterior elevation. Walking with cane at start of sessions.      Total Treatment Billable Duration: 55 minutes   Time In: 2093  Time Out: 1202    Blondell Foot, PT       Charge Capture  }Post Session Pain  PT Visit Info  [employer] Portal  MD Guidelines  Scanned Media  Benefits  MyChart    Future Appointments   Date Time Provider Rachel Sanchez   3/27/2023 11:00 AM Matt Miller, [facility]   3/30/2023 11:00 AM Blondell Foot, PT [facility]   4/3/2023 10:00 AM Matt Miller, [facility]   4/6/2023 11:00 AM Blondell Foot, PT [facility]   5/1/2023 11:00 AM Shawn Casarez MD [facility]   6/23/2023 10:00 AM Leelee Addison MD [facility]

## 2023-03-27 ENCOUNTER — HOSPITAL ENCOUNTER (OUTPATIENT)
Dept: PHYSICAL THERAPY | Age: 78
Setting detail: RECURRING SERIES
Discharge: HOME | End: 2023-03-30
Payer: MEDICARE

## 2023-03-27 PROCEDURE — 97110 THERAPEUTIC EXERCISES: CPT

## 2023-03-27 ASSESSMENT — PAIN SCALES - GENERAL: PAINLEVEL_OUTOF10: 2

## 2023-03-27 NOTE — PROGRESS NOTES
utilized and necessary because of the patient's restricted joint motion, painful spasm, loss of articular motion and restricted motion of soft tissue. MODALITIES: (0 minutes):        None today      Treatment/Session Summary:    Treatment Assessment: Patient had to take several rest breaks due to fatigue and left knee pain . Communication/Consultation: Pt. Advised to talk to physician about pain in left knee   Equipment provided today:  None today   Recommendations/Intent for next treatment session: Balance and standing exercise progressions focused on single limb stance and functional strength, especially R hip ABD and  R hip anterior elevation. Walking with cane at start of sessions.      Total Treatment Billable Duration: 55 minutes   Time In: 1100  Time Out: 1200    EYAD CONDON PTA       Charge Capture  }Post Session Pain  PT Visit Info  [facility] Portal  MD Guidelines  Scanned Media  Benefits  MyChart    Future Appointments   Date Time Provider Rachel Sanchez   3/30/2023 11:00 AM Yashira Wolfe, PT [facility]   4/3/2023 10:00 AM Bart Bradley PTA [facility]   4/6/2023 11:00 AM Yashira Wolfe PT [facility]   5/1/2023 11:00 AM MD VIRGILIO Nassar [facility]   6/23/2023 10:00 AM Gaby Merlos MD [facility]

## 2023-03-30 ENCOUNTER — HOSPITAL ENCOUNTER (OUTPATIENT)
Dept: PHYSICAL THERAPY | Age: 78
Setting detail: RECURRING SERIES
End: 2023-03-30
Payer: MEDICARE

## 2023-03-30 PROCEDURE — 97110 THERAPEUTIC EXERCISES: CPT

## 2023-03-30 NOTE — PROGRESS NOTES
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 Restrictions/Precautions:  Restrictions/Precautions: Cardiac; Fall Risk  No data recorded   Interventions Planned (Treatment may consist of any combination of the following):    Current Treatment Recommendations: Strengthening; ROM; Balance training; Functional mobility training; Transfer training; Endurance training; Gait training; Stair training; Neuromuscular re-education; Manual Therapy - Soft Tissue Mobilization; Manual Therapy - Joint Manipulation; Pain management; Return to work related activity; Home exercise program; Safety education & training; Patient/Caregiver education & training; Modalities; Therapeutic activities     Subjective Comments: Pt's daughter attends beginning of session today. She and the pt state there is a problem with his oxygen tank but pulmonologist stated he was OK to exercise as long as he felt OK.       Initial:}      2/10   Post Session:        2/10   Medications Last Reviewed:  3/30/2023  Updated Objective Findings: BP balanced. Communication/Consultation: Pt states he will talk to physician about pain in left knee   Equipment provided today:  None today   Recommendations/Intent for next treatment session: Balance and standing exercise progressions focused on single limb stance and functional strength, especially R hip ABD and  R hip anterior elevation. Walking with cane at start of sessions.      Total Treatment Billable Duration: 55 minutes   Time In: 1100  Time Out: PAULA Elias       Charge Capture  }Post Session Pain  PT Visit Info  [employer] Portal  MD Guidelines  Scanned Media  Benefits  MyChart    Future Appointments   Date Time Provider Rachel Sanchez   4/3/2023 10:00 AM Coco Hanley [facility]   4/6/2023 11:00 AM Jaqui Pryor PT [facility]   5/1/2023 11:00 AM MD VIRGILIO Del Rio [facility]   6/23/2023 10:00 AM Marcelino Calvert MD [facility]

## 2023-04-03 ENCOUNTER — HOSPITAL ENCOUNTER (OUTPATIENT)
Dept: PHYSICAL THERAPY | Age: 78
Setting detail: RECURRING SERIES
Discharge: HOME | End: 2023-04-06
Payer: MEDICARE

## 2023-04-03 PROCEDURE — 97110 THERAPEUTIC EXERCISES: CPT

## 2023-04-03 ASSESSMENT — PAIN SCALES - GENERAL: PAINLEVEL_OUTOF10: 0

## 2023-04-03 NOTE — PROGRESS NOTES
today    MODALITIES: (0 minutes):        None today      Treatment/Session Summary:    Treatment Assessment: Patient was compliant with all exercises keeping O2 sats 91 % and  above. Pt. Had multiple mini rest breaks due to fatigue. Pt. Reminded to not mouth breathe and inhale through his nose and exhale through pursed lips slowly. Communication/Consultation: Pt states he will talk to physician about pain in left knee   Equipment provided today:  None today   Recommendations/Intent for next treatment session: Balance and standing exercise progressions focused on single limb stance and functional strength, especially R hip ABD and  R hip anterior elevation. Walking with cane at start of sessions.      Total Treatment Billable Duration: 55 minutes   Time In: 1100  Time Out: 1200    EYAD CONDON PTA       Charge Capture  }Post Session Pain  PT Visit Info  [employer] Portal  MD Guidelines  Scanned Media  Benefits  MyChart    Future Appointments   Date Time Provider Rachel Sanchez   4/6/2023 11:00 AM Misael Beal, PT [facility]   5/1/2023 11:00 AM MD VIRGILIO Campbell [facility]   6/23/2023 10:00 AM Paola Burgos MD [facility]

## 2023-04-13 ENCOUNTER — HOSPITAL ENCOUNTER (OUTPATIENT)
Dept: PHYSICAL THERAPY | Age: 78
Setting detail: RECURRING SERIES
Discharge: HOME | End: 2023-04-16
Payer: MEDICARE

## 2023-04-13 PROCEDURE — 97110 THERAPEUTIC EXERCISES: CPT

## 2023-04-13 ASSESSMENT — PAIN SCALES - GENERAL: PAINLEVEL_OUTOF10: 0

## 2023-04-14 ENCOUNTER — HOSPITAL ENCOUNTER (OUTPATIENT)
Dept: PHYSICAL THERAPY | Age: 78
Setting detail: RECURRING SERIES
End: 2023-04-14
Payer: MEDICARE

## 2023-04-17 ENCOUNTER — HOSPITAL ENCOUNTER (OUTPATIENT)
Dept: PHYSICAL THERAPY | Age: 78
Setting detail: RECURRING SERIES
Discharge: HOME | End: 2023-04-20
Payer: MEDICARE

## 2023-04-17 PROCEDURE — 97110 THERAPEUTIC EXERCISES: CPT

## 2023-04-20 ENCOUNTER — HOSPITAL ENCOUNTER (OUTPATIENT)
Dept: PHYSICAL THERAPY | Age: 78
Setting detail: RECURRING SERIES
Discharge: HOME | End: 2023-04-23
Payer: MEDICARE

## 2023-04-20 PROCEDURE — 97110 THERAPEUTIC EXERCISES: CPT

## 2023-04-24 ENCOUNTER — HOSPITAL ENCOUNTER (OUTPATIENT)
Dept: PHYSICAL THERAPY | Age: 78
Setting detail: RECURRING SERIES
Discharge: HOME | End: 2023-04-27
Payer: MEDICARE

## 2023-04-24 PROCEDURE — 97110 THERAPEUTIC EXERCISES: CPT

## 2023-04-24 ASSESSMENT — PAIN SCALES - GENERAL: PAINLEVEL_OUTOF10: 2

## 2023-04-24 NOTE — PROGRESS NOTES
strength, balance and coordination. Required mod visual, verbal, manual and tactile cues to promote proper body alignment, promote proper body posture, promote proper body mechanics and promote proper body breathing techniques. Progressed resistance, range, repetitions and complexity of movement as indicated. Dates: 4/20/23 4/13/23 4/24/23   Activity/Exercise      NU STEP --- Level 6 x 8 mins  Level 6 x 10 mins   Hip abduction 4 lb wt. s 3x15 reps standing at  bar BLE's  Standing no wt. 2x10 reps each due to fatigue     Hip Flexion L hand on bar only  R hip flexion  4#  15x2    Standing on R, L hip flexion to 90 and then into extension  4#  15x2  standing at bar (second set L hand only on bar) At bar x 20 reps no weight BLE's  At bar x 20 reps with 4 lb wt. s BLE's    Long Arc Quad   3x15 BLE's  3x15 reps BLE's 4 lb wt.s    Sit to stand  No use of UE  X 4 with PT assist/CG  (In chair with 2 cushions) X 10 reps with assist due to pt.'s inability to stand without an arm rest or walker  X 10 reps with no assist just arm rest   Side stepping At bar x 15 feet x 4 reps At bar x 15 feet x 4 reps  At bar x 15 feet x 4    Row   Seated blue & black bands double x 20 reps rows & low rows seated  ------   Pre gait/falls prevention work Continued education for getting up from [employer] to step forward on foot- facing bar, R only  With CG 10x Posture education, safety with transfers, and not losing balance. X 5 mins at bar forward facing at bar    Gait  Standard cane with gait belt and wc following x ~65 ft, then ~20 ft with 1/2 turns x 2 No cane today due to fatigue and balance issues, Pt. Ambulated 50 feet x 4 reps with rolling walker focusing on upright posture.  Cane and wall x 20 ftx 3    Unsupported Standing (perform in front of plinth with gait belt; be prepared to help patient recover from LOB) Standing , facing bar with eyes closed  1 min x 2  Standing at mat table reaching various positions to tap ball

## 2023-04-27 ENCOUNTER — HOSPITAL ENCOUNTER (OUTPATIENT)
Dept: PHYSICAL THERAPY | Age: 78
Setting detail: RECURRING SERIES
Discharge: HOME | End: 2023-04-30
Payer: MEDICARE

## 2023-04-27 PROCEDURE — 97110 THERAPEUTIC EXERCISES: CPT

## 2023-04-27 PROCEDURE — 97530 THERAPEUTIC ACTIVITIES: CPT

## 2023-05-01 ENCOUNTER — OFFICE VISIT (OUTPATIENT)
Dept: CARDIOLOGY CLINIC | Age: 78
End: 2023-05-01

## 2023-05-01 ENCOUNTER — HOSPITAL ENCOUNTER (OUTPATIENT)
Dept: PHYSICAL THERAPY | Age: 78
Setting detail: RECURRING SERIES
Discharge: HOME | End: 2023-05-04
Payer: MEDICARE

## 2023-05-01 VITALS
DIASTOLIC BLOOD PRESSURE: 74 MMHG | WEIGHT: 310.2 LBS | HEIGHT: 68 IN | HEART RATE: 72 BPM | BODY MASS INDEX: 47.01 KG/M2 | SYSTOLIC BLOOD PRESSURE: 126 MMHG

## 2023-05-01 DIAGNOSIS — I42.0 DILATED CARDIOMYOPATHY (HCC): ICD-10-CM

## 2023-05-01 DIAGNOSIS — I50.22 CHRONIC SYSTOLIC CONGESTIVE HEART FAILURE (HCC): ICD-10-CM

## 2023-05-01 DIAGNOSIS — E78.2 MIXED HYPERLIPIDEMIA: ICD-10-CM

## 2023-05-01 DIAGNOSIS — I25.10 CORONARY ARTERY DISEASE INVOLVING NATIVE CORONARY ARTERY OF NATIVE HEART WITHOUT ANGINA PECTORIS: Primary | ICD-10-CM

## 2023-05-01 DIAGNOSIS — I48.21 PERMANENT ATRIAL FIBRILLATION (HCC): ICD-10-CM

## 2023-05-01 PROCEDURE — 97110 THERAPEUTIC EXERCISES: CPT

## 2023-05-01 RX ORDER — METOPROLOL SUCCINATE 25 MG/1
25 TABLET, EXTENDED RELEASE ORAL EVERY EVENING
Qty: 90 TABLET | Refills: 3 | Status: SHIPPED | OUTPATIENT
Start: 2023-05-01

## 2023-05-01 RX ORDER — FUROSEMIDE 20 MG/1
20 TABLET ORAL 2 TIMES DAILY PRN
Qty: 180 TABLET | Refills: 3 | Status: SHIPPED | OUTPATIENT
Start: 2023-05-01

## 2023-05-01 RX ORDER — ATORVASTATIN CALCIUM 40 MG/1
40 TABLET, FILM COATED ORAL DAILY
Qty: 90 TABLET | Refills: 3 | Status: SHIPPED | OUTPATIENT
Start: 2023-05-01

## 2023-05-01 ASSESSMENT — ENCOUNTER SYMPTOMS
HEMATEMESIS: 0
DOUBLE VISION: 0
ABDOMINAL PAIN: 0
STRIDOR: 0
HOARSE VOICE: 0
HEMOPTYSIS: 0
WHEEZING: 0
EYE REDNESS: 0
HEMATOCHEZIA: 0

## 2023-05-01 ASSESSMENT — PAIN SCALES - GENERAL: PAINLEVEL_OUTOF10: 0

## 2023-05-01 NOTE — PROGRESS NOTES
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 Restrictions/Precautions:  Restrictions/Precautions: Cardiac; Fall Risk  No data recorded   Interventions Planned (Treatment may consist of any combination of the following):    Current Treatment Recommendations: Strengthening; ROM; Balance training; Functional mobility training; Transfer training; Endurance training; Gait training; Stair training; Neuromuscular re-education; Manual Therapy - Soft Tissue Mobilization; Manual Therapy - Joint Manipulation; Pain management; Return to work related activity; Home exercise program; Safety education & training; Patient/Caregiver education & training; Modalities; Therapeutic activities     Subjective Comments:Pt reported no pain today but stated his arms and upper body was weak when trying to transfer from floor and was difficult for  him to perform.      Initial:}    0/10   Post Session:       2/10   Medications Last Reviewed:  2023  Updated Objective Findings:  Pt 's O2 SATS dropped to 86% after performing on Nustep x 10 mins level 6 and had to wear that

## 2023-05-01 NOTE — PROGRESS NOTES
[address] [address] [address], [address] [facility], [address] [place] [address]  PHONE: [phone]          23    NAME:  Larry Merchant  : 1945  MRN: [id number]         SUBJECTIVE:   Kelly Verde is a 69 y.o. male seen for a visit regarding the following:     Chief Complaint   Patient presents with    Follow-up     4 month    Congestive Heart Failure    Atrial Fibrillation    Hyperlipidemia    Cardiomyopathy    Coronary Artery Disease           HPI:    Cardio problem list:   1. Dilated cardiomyopathy   - LVEF noted to be 30-35% dating back to 2021 echo- Hypotension limits ability to add CHF medical therapy.   -Echo from 2022 showed an EF at 3035%, moderately dilated left ventricle, mild MR, moderate left atrial enlargement.   -Echo from 3/8/2022 showed an EF of 30 to 35% with moderate global hypokinesis   2 Persistent atrial fibrillation   - On Eliquis, rates controlled   3. CAD-said he had a previous stent in the late    - No coronary angiogram is available for review, on atorvastatin 40    --Nuclear stress test on 3/28/2022 showed large inferior defect-fixed. Additional moderate-sized defect of severe intensity involving the mid to distal anterior wall and apex also noted    4 Obstructive sleep apnea      Obesity hypoventilation syndrome      5Stage 3 chronic kidney disease      6 Controlled type 2 diabetes mellitus with diabetic polyneuropathy   7 Borderline hypotension on Florinef   8. S/p right below-knee amputation. I saw Mr. Dora West who is a pleasant 45-year-old gentleman in cardiovascular follow-up on  for CAD, cardiomyopathy-likely combination of ischemic and nonischemic, congestive heart failure, orthostatic hypotension,     When we last met with him,  we made no significant changes with his medical therapy. Background: Due to borderline hypotension we have not used too many cardiomyopathy meds but he is now off Florinef completely.   He is on low-dose metoprolol

## 2023-05-04 ENCOUNTER — HOSPITAL ENCOUNTER (OUTPATIENT)
Dept: PHYSICAL THERAPY | Age: 78
Setting detail: RECURRING SERIES
Discharge: HOME | End: 2023-05-07
Payer: MEDICARE

## 2023-05-04 PROCEDURE — 97110 THERAPEUTIC EXERCISES: CPT

## 2023-05-08 ENCOUNTER — HOSPITAL ENCOUNTER (OUTPATIENT)
Dept: PHYSICAL THERAPY | Age: 78
Setting detail: RECURRING SERIES
Discharge: HOME | End: 2023-05-11
Payer: MEDICARE

## 2023-05-08 PROCEDURE — 97110 THERAPEUTIC EXERCISES: CPT

## 2023-05-08 ASSESSMENT — PAIN SCALES - GENERAL: PAINLEVEL_OUTOF10: 0

## 2023-05-08 NOTE — PROGRESS NOTES
resting 5 mins increased to 97%    Treatment    THERAPEUTIC ACTIVITY (0 min) not today  THERAPEUTIC EXERCISE: ( 55 minutes):    Exercises per grid below to improve mobility, strength, balance and coordination. Required mod visual, verbal, manual and tactile cues to promote proper body alignment, promote proper body posture, promote proper body mechanics and promote proper body breathing techniques. Progressed resistance, range, repetitions and complexity of movement as indicated. Dates: 5/1/23 5/4/23 5/8/23   Activity/Exercise      NU STEP Level 6 x 8 mins  -- Level 6 x 10 mins    Hip abduction Standing no wt. 2x10 reps each due to fatigue   Standing 4 lb wt.s 2x15 reps    Hip Flexion At bar x 20 reps no weight BLE's  Sitting 4# 10x 2 RAFFY      At bar x 10x2 reps with 4 lb wt. s BLE's  Standing at bar 4 lb wt. s BLE's x 30 reps    Core   Sitting  RAFFY shoulder flexion 4G ball  5x3     TA with all exercises   Long Arc Quad  3x15 BLE's  -- X 30 reps BLE's 4 lb wt.s    Sit to stand  X 10 reps with assist due to pt.'s inability to stand without an arm rest or walker  X 10 reps with CS  arm each forward  On mat table slightly elevated  5x3 X 10 reps from chair with cushion    Side stepping At bar x 15 feet x 4 reps  At bar   4 steps x 4 ea way  4lb on LE At bar 4x 10 reps no weights   Rows Seated at cable machine 12 lbs 2x10 reps  Seated at cable machine 10 lbs x10 reps   15 lbs x 10 Seated at cable machine 30 lbs bilateral Ue's    Lat pulls 25 lbs 3x10 reps seated  30 lbs 30 xreps seated  30 lbs at cable machine seated x 30 reps    Bicep curls  20 lb 2x10 reps seated 20 lb 2x10 reps seated 10 lbs seated at cable machine x 30 reps    Tricep extension  10 lbs x 2x10 reps seated 10 lbs x 2x10 reps seated 10 lb dumbell seated 2x10 reps                Pre gait/falls prevention work Posture education, safety with transfers, and not losing balance.   Mini lunges facing bar   5x ea side    Balance on RAFFY feet eyes closed 1 min

## 2023-05-12 ENCOUNTER — HOSPITAL ENCOUNTER (OUTPATIENT)
Dept: PHYSICAL THERAPY | Age: 78
Setting detail: RECURRING SERIES
Discharge: HOME | End: 2023-05-15
Payer: MEDICARE

## 2023-05-12 PROCEDURE — 97110 THERAPEUTIC EXERCISES: CPT

## 2023-05-15 ENCOUNTER — HOSPITAL ENCOUNTER (OUTPATIENT)
Dept: PHYSICAL THERAPY | Age: 78
Setting detail: RECURRING SERIES
Discharge: HOME | End: 2023-05-18
Payer: MEDICARE

## 2023-05-15 PROCEDURE — 97110 THERAPEUTIC EXERCISES: CPT

## 2023-05-15 NOTE — PROGRESS NOTES
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 Restrictions/Precautions:  Restrictions/Precautions: Cardiac; Fall Risk  No data recorded   Interventions Planned (Treatment may consist of any combination of the following):    Current Treatment Recommendations: Strengthening; ROM; Balance training; Functional mobility training; Transfer training; Endurance training; Gait training; Stair training; Neuromuscular re-education; Manual Therapy - Soft Tissue Mobilization; Manual Therapy - Joint Manipulation; Pain management; Return to work related activity; Home exercise program; Safety education & training; Patient/Caregiver education & training; Modalities; Therapeutic activities     Subjective Comments: Patient state he feels stronger      Initial:}     0/10   Post Session:     0/10     Medications Last Reviewed:  5/15/2023  Updated Objective Findings: O2 SATS were 96% after reattaching canula- had initially measured at 90%.  BP = 130/85    Treatment    THERAPEUTIC ACTIVITY (0 min) not today  THERAPEUTIC EXERCISE: ( 54 minutes):    Exercises per grid

## 2023-05-19 ENCOUNTER — HOSPITAL ENCOUNTER (OUTPATIENT)
Dept: PHYSICAL THERAPY | Age: 78
Setting detail: RECURRING SERIES
Discharge: HOME | End: 2023-05-22
Payer: MEDICARE

## 2023-05-19 PROCEDURE — 97110 THERAPEUTIC EXERCISES: CPT

## 2023-05-22 ENCOUNTER — HOSPITAL ENCOUNTER (OUTPATIENT)
Dept: PHYSICAL THERAPY | Age: 78
Setting detail: RECURRING SERIES
Discharge: HOME | End: 2023-05-25
Payer: MEDICARE

## 2023-05-22 PROCEDURE — 97110 THERAPEUTIC EXERCISES: CPT

## 2023-05-22 ASSESSMENT — PAIN SCALES - GENERAL: PAINLEVEL_OUTOF10: 2

## 2023-05-22 NOTE — PROGRESS NOTES
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 Restrictions/Precautions:  Restrictions/Precautions: Cardiac; Fall Risk  No data recorded   Interventions Planned (Treatment may consist of any combination of the following):    Current Treatment Recommendations: Strengthening; ROM; Balance training; Functional mobility training; Transfer training; Endurance training; Gait training; Stair training; Neuromuscular re-education; Manual Therapy - Soft Tissue Mobilization; Manual Therapy - Joint Manipulation; Pain management; Return to work related activity; Home exercise program; Safety education & training; Patient/Caregiver education & training; Modalities; Therapeutic activities     Subjective Comments: Patient continues to report left knee pain      Initial:}   (left knee) 10   Post Session:     2/10 (left knee)    Medications Last Reviewed:  2023  Updated Objective Findings: O2 SATS 99%, BP = 128/87 Pt.  On 1.5 liters of O2     Treatment    THERAPEUTIC ACTIVITY (0 min) not today  THERAPEUTIC EXERCISE: ( 55 minutes):    Exercises per grid below

## 2023-05-26 ENCOUNTER — HOSPITAL ENCOUNTER (OUTPATIENT)
Dept: PHYSICAL THERAPY | Age: 78
Setting detail: RECURRING SERIES
Discharge: HOME | End: 2023-05-29
Payer: MEDICARE

## 2023-05-26 PROCEDURE — 97110 THERAPEUTIC EXERCISES: CPT

## 2023-05-30 ENCOUNTER — HOSPITAL ENCOUNTER (OUTPATIENT)
Dept: PHYSICAL THERAPY | Age: 78
Setting detail: RECURRING SERIES
Discharge: HOME | End: 2023-06-02
Payer: MEDICARE

## 2023-05-30 PROCEDURE — 97110 THERAPEUTIC EXERCISES: CPT

## 2023-05-30 NOTE — PROGRESS NOTES
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 Restrictions/Precautions:  Restrictions/Precautions: Cardiac; Fall Risk  No data recorded   Interventions Planned (Treatment may consist of any combination of the following):    Current Treatment Recommendations: Strengthening; ROM; Balance training; Functional mobility training; Transfer training; Endurance training; Gait training; Stair training; Neuromuscular re-education; Manual Therapy - Soft Tissue Mobilization; Manual Therapy - Joint Manipulation; Pain management; Return to work related activity; Home exercise program; Safety education & training; Patient/Caregiver education & training; Modalities; Therapeutic activities     Subjective Comments: Patient reports no pain today other than intermittent bilateral elbow discomfort he attributes to walking a lot when visiting his sister at nursing home      Initial:}     0/10   Post Session:     0/10     Medications Last Reviewed:  2023  Updated Objective Findings: O2 SATS 96%, BP = 109/88 Pt. On 2.0 liters of .  Quick improvement in O2 reading

## 2023-06-02 ENCOUNTER — HOSPITAL ENCOUNTER (OUTPATIENT)
Dept: PHYSICAL THERAPY | Age: 78
Setting detail: RECURRING SERIES
Discharge: HOME | End: 2023-06-05
Payer: MEDICARE

## 2023-06-02 PROCEDURE — 97110 THERAPEUTIC EXERCISES: CPT

## 2023-06-05 ENCOUNTER — TELEPHONE (OUTPATIENT)
Dept: PULMONOLOGY | Age: 78
End: 2023-06-05

## 2023-06-19 ENCOUNTER — OFFICE VISIT (OUTPATIENT)
Dept: FAMILY MEDICINE CLINIC | Facility: CLINIC | Age: 78
End: 2023-06-19
Payer: MEDICARE

## 2023-06-19 VITALS
HEIGHT: 68 IN | DIASTOLIC BLOOD PRESSURE: 76 MMHG | HEART RATE: 74 BPM | WEIGHT: 315 LBS | BODY MASS INDEX: 47.74 KG/M2 | SYSTOLIC BLOOD PRESSURE: 130 MMHG | OXYGEN SATURATION: 96 % | TEMPERATURE: 97.9 F

## 2023-06-19 DIAGNOSIS — Z00.00 MEDICARE ANNUAL WELLNESS VISIT, SUBSEQUENT: Primary | ICD-10-CM

## 2023-06-19 DIAGNOSIS — I25.119 ATHEROSCLEROSIS OF NATIVE CORONARY ARTERY OF NATIVE HEART WITH ANGINA PECTORIS (HCC): ICD-10-CM

## 2023-06-19 DIAGNOSIS — N18.30 STAGE 3 CHRONIC KIDNEY DISEASE, UNSPECIFIED WHETHER STAGE 3A OR 3B CKD (HCC): ICD-10-CM

## 2023-06-19 DIAGNOSIS — I25.10 ATHEROSCLEROSIS OF NATIVE CORONARY ARTERY OF NATIVE HEART WITHOUT ANGINA PECTORIS: ICD-10-CM

## 2023-06-19 DIAGNOSIS — Z89.511 ACQUIRED ABSENCE OF RIGHT LEG BELOW KNEE (HCC): ICD-10-CM

## 2023-06-19 DIAGNOSIS — I48.21 PERMANENT ATRIAL FIBRILLATION (HCC): ICD-10-CM

## 2023-06-19 DIAGNOSIS — E78.2 MIXED HYPERLIPIDEMIA: ICD-10-CM

## 2023-06-19 DIAGNOSIS — E11.42 CONTROLLED TYPE 2 DIABETES MELLITUS WITH DIABETIC POLYNEUROPATHY, WITHOUT LONG-TERM CURRENT USE OF INSULIN (HCC): ICD-10-CM

## 2023-06-19 DIAGNOSIS — G62.89 MIXED AXONAL-DEMYELINATING POLYNEUROPATHY: ICD-10-CM

## 2023-06-19 LAB
ALBUMIN SERPL-MCNC: 3.3 G/DL (ref 3.2–4.6)
ALBUMIN/GLOB SERPL: 0.9 (ref 0.4–1.6)
ALP SERPL-CCNC: 187 U/L (ref 50–136)
ALT SERPL-CCNC: 20 U/L (ref 12–65)
ANION GAP SERPL CALC-SCNC: 6 MMOL/L (ref 2–11)
AST SERPL-CCNC: 20 U/L (ref 15–37)
BILIRUB SERPL-MCNC: 0.4 MG/DL (ref 0.2–1.1)
BUN SERPL-MCNC: 35 MG/DL (ref 8–23)
CALCIUM SERPL-MCNC: 9.1 MG/DL (ref 8.3–10.4)
CHLORIDE SERPL-SCNC: 104 MMOL/L (ref 101–110)
CHOLEST SERPL-MCNC: 191 MG/DL
CO2 SERPL-SCNC: 32 MMOL/L (ref 21–32)
CREAT SERPL-MCNC: 2.1 MG/DL (ref 0.8–1.5)
GLOBULIN SER CALC-MCNC: 3.8 G/DL (ref 2.8–4.5)
GLUCOSE SERPL-MCNC: 101 MG/DL (ref 65–100)
HDLC SERPL-MCNC: 37 MG/DL (ref 40–60)
HDLC SERPL: 5.2
LDLC SERPL CALC-MCNC: ABNORMAL MG/DL
POTASSIUM SERPL-SCNC: 4 MMOL/L (ref 3.5–5.1)
PROT SERPL-MCNC: 7.1 G/DL (ref 6.3–8.2)
SODIUM SERPL-SCNC: 142 MMOL/L (ref 133–143)
TRIGL SERPL-MCNC: 587 MG/DL (ref 35–150)
VLDLC SERPL CALC-MCNC: 117.4 MG/DL (ref 6–23)

## 2023-06-19 PROCEDURE — G0439 PPPS, SUBSEQ VISIT: HCPCS | Performed by: FAMILY MEDICINE

## 2023-06-19 PROCEDURE — 1123F ACP DISCUSS/DSCN MKR DOCD: CPT | Performed by: FAMILY MEDICINE

## 2023-06-19 RX ORDER — PREGABALIN 200 MG/1
200 CAPSULE ORAL 2 TIMES DAILY
Qty: 60 CAPSULE | Refills: 5 | Status: SHIPPED | OUTPATIENT
Start: 2023-06-19 | End: 2023-07-19

## 2023-06-19 SDOH — ECONOMIC STABILITY: INCOME INSECURITY: HOW HARD IS IT FOR YOU TO PAY FOR THE VERY BASICS LIKE FOOD, HOUSING, MEDICAL CARE, AND HEATING?: NOT HARD AT ALL

## 2023-06-19 SDOH — ECONOMIC STABILITY: HOUSING INSECURITY
IN THE LAST 12 MONTHS, WAS THERE A TIME WHEN YOU DID NOT HAVE A STEADY PLACE TO SLEEP OR SLEPT IN A SHELTER (INCLUDING NOW)?: NO

## 2023-06-19 SDOH — ECONOMIC STABILITY: FOOD INSECURITY: WITHIN THE PAST 12 MONTHS, THE FOOD YOU BOUGHT JUST DIDN'T LAST AND YOU DIDN'T HAVE MONEY TO GET MORE.: NEVER TRUE

## 2023-06-19 SDOH — ECONOMIC STABILITY: FOOD INSECURITY: WITHIN THE PAST 12 MONTHS, YOU WORRIED THAT YOUR FOOD WOULD RUN OUT BEFORE YOU GOT MONEY TO BUY MORE.: NEVER TRUE

## 2023-06-19 ASSESSMENT — PATIENT HEALTH QUESTIONNAIRE - PHQ9
1. LITTLE INTEREST OR PLEASURE IN DOING THINGS: 0
SUM OF ALL RESPONSES TO PHQ QUESTIONS 1-9: 0
2. FEELING DOWN, DEPRESSED OR HOPELESS: 0
SUM OF ALL RESPONSES TO PHQ9 QUESTIONS 1 & 2: 0

## 2023-06-19 ASSESSMENT — ENCOUNTER SYMPTOMS
COUGH: 0
NAUSEA: 0
VOMITING: 0
ABDOMINAL PAIN: 0
CHEST TIGHTNESS: 0
DIARRHEA: 0
CONSTIPATION: 0
SHORTNESS OF BREATH: 1
BACK PAIN: 0
WHEEZING: 0

## 2023-06-19 NOTE — PROGRESS NOTES
[facility]  Rachel Galeas, G[address]  Phone [phone]  Fax:  [phone]    Patient: Yaz Goode  YOB: 1945  Patient Age 69 y.o. Patient sex: male  Medical Record:  [id number]  Visit Date: 06/19/23    Family Practice Clinic Note     Chief Complaint   Patient presents with    Follow-up     6 month    Medicare AWV       History of Present Illness:    Pt here for 6 mos f/u on Dm. Has been in and out of the hospital for respiratory failure. NO hospitalizations since last visit. Now on O2. Does see pulm. No hospitalizations in a while. He is still going to physical therapy for his leg. Diet is not as good. Has not been eating well per daughter. Snacking. Dicussed with daughter not to but the snacks. Discussed healthy diet. Obesity - Wt is up and down. Completed therapy now and has to walk with his walker and his prosthetic leg. Goal was to walk with a cane. But gave up that goal and was unrealistic. Having problems with gas and diarrhea. Since covid stools changed- more diarrhea. Last eye exam in March - Has cataracts. Does not want surg. For now vision is good. 1.  Dilated cardiomyopathy   - LVEF noted to be 30-35% dating back to 7/21/2021 echo- Hypotension limits ability to add CHF medical therapy.   -Echo from 1/11/2022 showed an EF at 3035%, moderately dilated left ventricle, mild MR, moderate left atrial enlargement. Has f/u with cards next week. -Echo from 3/8/2022 showed an EF of 30 to 35% with moderate global hypokinesis   2 Persistent atrial fibrillation   - On Eliquis, rates controlled   3. CAD-said he had a previous stent in the late 90s   - No coronary angiogram is available for review, on atorvastatin 40    --Nuclear stress test on 3/28/2022 showed large inferior defect-fixed.   Additional moderate-sized defect of severe intensity involving the mid to distal anterior wall and apex also noted    4 Obstructive sleep apnea -

## 2023-06-19 NOTE — PATIENT INSTRUCTIONS
flow is completely blocked. A high-fat diet, smoking, and other factors increase the risk of heart disease. Your doctor has found that you have a chance of having heart disease. You can do lots of things to keep your heart healthy. It may not be easy, but you can change your diet, exercise more, and quit smoking. These steps really work to lower your chance of heart disease. Follow-up care is a key part of your treatment and safety. Be sure to make and go to all appointments, and call your doctor if you are having problems. It's also a good idea to know your test results and keep a list of the medicines you take. How can you care for yourself at home? Diet    Use less salt when you cook and eat. This helps lower your blood pressure. Taste food before salting. Add only a little salt when you think you need it. With time, your taste buds will adjust to less salt.     Eat fewer snack items, fast foods, canned soups, and other high-salt, high-fat, processed foods.     Read food labels and try to avoid saturated and trans fats. They increase your risk of heart disease by raising cholesterol levels.     Limit the amount of solid fat-butter, margarine, and shortening-you eat. Use olive, peanut, or canola oil when you cook. Bake, broil, and steam foods instead of frying them.     Eat a variety of fruit and vegetables every day. Dark green, deep orange, red, or yellow fruits and vegetables are especially good for you. Examples include spinach, carrots, peaches, and berries.     Foods high in fiber can reduce your cholesterol and provide important vitamins and minerals. High-fiber foods include whole-grain cereals and breads, oatmeal, beans, brown rice, citrus fruits, and apples.     Eat lean proteins. Heart-healthy proteins include seafood, lean meats and poultry, eggs, beans, peas, nuts, seeds, and soy products.     Limit drinks and foods with added sugar. These include candy, desserts, and soda pop.    Lifestyle

## 2023-06-20 ENCOUNTER — TELEPHONE (OUTPATIENT)
Dept: FAMILY MEDICINE CLINIC | Facility: CLINIC | Age: 78
End: 2023-06-20

## 2023-06-20 LAB — LDLC SERPL DIRECT ASSAY-MCNC: 91 MG/DL

## 2023-08-02 NOTE — PROGRESS NOTES
Bedside and Verbal shift change report given to [facility] (oncoming nurse) by Blanka Torres RN (offgoing nurse). Report included the following information SBAR, Kardex, Intake/Output, MAR, Recent Results, Med Rec Status and Cardiac Rhythm AFib 94 with PVC. Remains on AirVo 57% 50 liters SpO2 93%.  No distress noted 4 (moderate pain)

## 2023-09-18 ENCOUNTER — OFFICE VISIT (OUTPATIENT)
Dept: PULMONOLOGY | Age: 78
End: 2023-09-18
Payer: MEDICARE

## 2023-09-18 VITALS
BODY MASS INDEX: 45.1 KG/M2 | TEMPERATURE: 97.1 F | WEIGHT: 315 LBS | SYSTOLIC BLOOD PRESSURE: 115 MMHG | RESPIRATION RATE: 16 BRPM | DIASTOLIC BLOOD PRESSURE: 65 MMHG | HEIGHT: 70 IN | OXYGEN SATURATION: 98 % | HEART RATE: 75 BPM

## 2023-09-18 DIAGNOSIS — G47.33 OSA (OBSTRUCTIVE SLEEP APNEA): ICD-10-CM

## 2023-09-18 DIAGNOSIS — E66.2 MORBID (SEVERE) OBESITY WITH ALVEOLAR HYPOVENTILATION (HCC): ICD-10-CM

## 2023-09-18 DIAGNOSIS — J96.11 CHRONIC RESPIRATORY FAILURE WITH HYPOXIA (HCC): ICD-10-CM

## 2023-09-18 DIAGNOSIS — J47.9 BRONCHIECTASIS WITHOUT COMPLICATION (HCC): ICD-10-CM

## 2023-09-18 DIAGNOSIS — J98.4 RESTRICTIVE LUNG DISEASE: Primary | ICD-10-CM

## 2023-09-18 LAB
EXPIRATORY TIME: NORMAL
FEF 25-75% %PRED-PRE: NORMAL
FEF 25-75% PRED: NORMAL
FEF 25-75%-PRE: NORMAL
FEV1 %PRED-PRE: 43 %
FEV1 PRED: NORMAL
FEV1/FVC %PRED-PRE: NORMAL
FEV1/FVC PRED: 83 %
FEV1/FVC: NORMAL
FEV1: 1.29 L
FVC %PRED-PRE: 37 %
FVC PRED: NORMAL
FVC: 1.55 L
PEF %PRED-PRE: NORMAL
PEF PRED: NORMAL
PEF-PRE: NORMAL

## 2023-09-18 PROCEDURE — 99214 OFFICE O/P EST MOD 30 MIN: CPT | Performed by: INTERNAL MEDICINE

## 2023-09-18 PROCEDURE — 1123F ACP DISCUSS/DSCN MKR DOCD: CPT | Performed by: INTERNAL MEDICINE

## 2023-09-18 PROCEDURE — 94010 BREATHING CAPACITY TEST: CPT | Performed by: INTERNAL MEDICINE

## 2023-09-18 ASSESSMENT — PULMONARY FUNCTION TESTS
FEV1/FVC_PREDICTED: 83
FEV1: 1.29
FVC_PERCENT_PREDICTED_PRE: 37
FEV1_PERCENT_PREDICTED_PRE: 43
FVC: 1.55

## 2023-09-18 NOTE — PROGRESS NOTES
artery stent     X1 placed in 1999    History of MI (myocardial infarction) 1999    stent placed X1    Hypercholesterolemia     Hypertension     Left ventricular dysfunction     echo revealed EF 30-35%, mildly dilated LA/RA and mild TR and MR.     Neuropathy     severe    Oxygen dependent     recently started on 2L NC continous- Sleep study to be scheduled-questionable SASHA    PICC (peripherally inserted central catheter) in place     PICC line in place to R arm    Staphylococcus aureus bacteremia 6/51/8401    Systolic CHF, acute on chronic ([address]) 7/19/2021    Type 2 diabetes mellitus ([address])     oral agent/Pt does not monitor BS/no s.s of hypoglycemia/Last A1c: 6.7 on 7/13/21 per daughter       Patient Active Problem List   Diagnosis    Severe obesity (BMI 35.0-35.9 with comorbidity) ([address])    Stage 3 chronic kidney disease (HCC)    Systolic congestive heart failure (HCC)    Onychomycosis    Chronic respiratory failure with hypoxia (HCC)    Atrial fibrillation (HCC)    Controlled type 2 diabetes mellitus with diabetic polyneuropathy, without long-term current use of insulin (HCC)    Type 2 diabetes mellitus with nephropathy (HCC)    Mixed axonal-demyelinating polyneuropathy    Corns and callus    Mixed hyperlipidemia    Morbid (severe) obesity with alveolar hypoventilation (HCC)    Benign hypertensive kidney disease with chronic kidney disease    Atherosclerosis of native coronary artery of native heart without angina pectoris    Bunion of unspecified foot    S/P BKA (below knee amputation) unilateral, right (HCC)    Acquired hammer toe of left foot    Obstructive sleep apnea    Dilated cardiomyopathy ([address])    Acute on chronic respiratory failure with hypercapnia ([address])    ILD (interstitial lung disease) ([address])         Past Surgical History:   Procedure Laterality Date    AMPUTATION Right 2022    below knee    ORTHOPEDIC SURGERY  08/18/2021    BKA     [address]    stent placement

## 2023-09-28 ENCOUNTER — HOSPITAL ENCOUNTER (OUTPATIENT)
Dept: GENERAL RADIOLOGY | Age: 78
Discharge: HOME | End: 2023-09-28
Payer: MEDICARE

## 2023-09-28 ENCOUNTER — OFFICE VISIT (OUTPATIENT)
Dept: FAMILY MEDICINE CLINIC | Facility: CLINIC | Age: 78
End: 2023-09-28
Payer: MEDICARE

## 2023-09-28 VITALS
HEIGHT: 70 IN | WEIGHT: 315 LBS | DIASTOLIC BLOOD PRESSURE: 90 MMHG | SYSTOLIC BLOOD PRESSURE: 114 MMHG | BODY MASS INDEX: 45.1 KG/M2 | TEMPERATURE: 97.9 F | HEART RATE: 76 BPM | OXYGEN SATURATION: 91 %

## 2023-09-28 DIAGNOSIS — L03.011 CELLULITIS OF FINGER OF RIGHT HAND: ICD-10-CM

## 2023-09-28 DIAGNOSIS — E11.42 CONTROLLED TYPE 2 DIABETES MELLITUS WITH DIABETIC POLYNEUROPATHY, WITHOUT LONG-TERM CURRENT USE OF INSULIN (HCC): ICD-10-CM

## 2023-09-28 DIAGNOSIS — L03.011 CELLULITIS OF FINGER OF RIGHT HAND: Primary | ICD-10-CM

## 2023-09-28 LAB — HBA1C MFR BLD: 7 %

## 2023-09-28 PROCEDURE — 73120 X-RAY EXAM OF HAND: CPT

## 2023-09-28 PROCEDURE — 99213 OFFICE O/P EST LOW 20 MIN: CPT | Performed by: FAMILY MEDICINE

## 2023-09-28 PROCEDURE — 1123F ACP DISCUSS/DSCN MKR DOCD: CPT | Performed by: FAMILY MEDICINE

## 2023-09-28 PROCEDURE — 83036 HEMOGLOBIN GLYCOSYLATED A1C: CPT | Performed by: FAMILY MEDICINE

## 2023-09-28 RX ORDER — AMOXICILLIN AND CLAVULANATE POTASSIUM 875; 125 MG/1; MG/1
1 TABLET, FILM COATED ORAL 2 TIMES DAILY
Qty: 14 TABLET | Refills: 0 | Status: SHIPPED | OUTPATIENT
Start: 2023-09-28 | End: 2023-10-05

## 2023-09-28 ASSESSMENT — PATIENT HEALTH QUESTIONNAIRE - PHQ9
1. LITTLE INTEREST OR PLEASURE IN DOING THINGS: 0
2. FEELING DOWN, DEPRESSED OR HOPELESS: 0
SUM OF ALL RESPONSES TO PHQ9 QUESTIONS 1 & 2: 0
SUM OF ALL RESPONSES TO PHQ QUESTIONS 1-9: 0

## 2023-09-28 NOTE — PROGRESS NOTES
[address], [address]  Phone [phone]  Fax:  [phone]    Patient: Sonia Stinson  YOB: 1945  Patient Age 69 y.o. Patient sex: male  Medical Record:  [id number]  Visit Date: 09/28/23    [facility] Practice Clinic Note     Chief Complaint   Patient presents with    Finger Pain     Right index finger red x 2 days, blister appeared yesterday & increased in size today       History of Present Illness:  Pt here for  c/o infection in R index finger - Started last Sun with redness. Denies injury. About 2 days ago it blew up and formed a blister on the top of the finger. - not draining. Painful. No fever/chills. Has tried to hot pack it and put ice on it. Not helping. He does state that the blister appeared before he started a hot pack it. PT is a dm - not checking his sugars. Last A1c in Dec 2022 was 6.4.   R bka due to Dm        Dm. Has been in and out of the hospital for respiratory failure. NO hospitalizations since last visit. Now on O2. Does see pulm. No hospitalizations in a while. He was going to physical therapy for his R leg-he does have a prosthesis on the right leg. Diet is not as good. Has not been eating well per daughter. Snacking. Discussed healthy diet. Obesity - Wt is up and down. UP 20# since Feb 2022. Completed therapy now and has to walk with his walker and his prosthetic leg. Goal was to walk with a cane. But gave up that goal and was unrealistic. Having problems with gas and diarrhea. Since covid stools changed- more diarrhea. Last eye exam in March - Has cataracts. Does not want surg. For now vision is good. 1.  Dilated cardiomyopathy   - LVEF noted to be 30-35% dating back to 7/21/2021 echo- Hypotension limits ability to add CHF medical therapy.   -Echo from 1/11/2022 showed an EF at 3035%, moderately dilated left ventricle, mild MR, moderate left atrial enlargement. Has f/u with cards next week.

## 2023-09-29 ENCOUNTER — TELEPHONE (OUTPATIENT)
Dept: FAMILY MEDICINE CLINIC | Facility: CLINIC | Age: 78
End: 2023-09-29

## 2023-09-29 ENCOUNTER — OFFICE VISIT (OUTPATIENT)
Dept: FAMILY MEDICINE CLINIC | Facility: CLINIC | Age: 78
End: 2023-09-29
Payer: MEDICARE

## 2023-09-29 ENCOUNTER — TELEPHONE (OUTPATIENT)
Dept: ORTHOPEDIC SURGERY | Age: 78
End: 2023-09-29

## 2023-09-29 VITALS
OXYGEN SATURATION: 93 % | BODY MASS INDEX: 45.1 KG/M2 | WEIGHT: 315 LBS | SYSTOLIC BLOOD PRESSURE: 125 MMHG | HEART RATE: 73 BPM | TEMPERATURE: 98.1 F | DIASTOLIC BLOOD PRESSURE: 76 MMHG | HEIGHT: 70 IN

## 2023-09-29 DIAGNOSIS — M86.9 OSTEOMYELITIS OF RIGHT HAND, UNSPECIFIED TYPE (HCC): ICD-10-CM

## 2023-09-29 DIAGNOSIS — M86.9 OSTEOMYELITIS OF RIGHT HAND, UNSPECIFIED TYPE (HCC): Primary | ICD-10-CM

## 2023-09-29 LAB
BASOPHILS # BLD: 0.1 K/UL (ref 0–0.2)
BASOPHILS NFR BLD: 1 % (ref 0–2)
DIFFERENTIAL METHOD BLD: ABNORMAL
EOSINOPHIL # BLD: 0.3 K/UL (ref 0–0.8)
EOSINOPHIL NFR BLD: 3 % (ref 0.5–7.8)
ERYTHROCYTE [DISTWIDTH] IN BLOOD BY AUTOMATED COUNT: 15.4 % (ref 11.9–14.6)
HCT VFR BLD AUTO: 46.3 % (ref 41.1–50.3)
HGB BLD-MCNC: 14.4 G/DL (ref 13.6–17.2)
IMM GRANULOCYTES # BLD AUTO: 0.1 K/UL (ref 0–0.5)
IMM GRANULOCYTES NFR BLD AUTO: 1 % (ref 0–5)
LYMPHOCYTES # BLD: 3.3 K/UL (ref 0.5–4.6)
LYMPHOCYTES NFR BLD: 38 % (ref 13–44)
MCH RBC QN AUTO: 29.8 PG (ref 26.1–32.9)
MCHC RBC AUTO-ENTMCNC: 31.1 G/DL (ref 31.4–35)
MCV RBC AUTO: 95.7 FL (ref 82–102)
MONOCYTES # BLD: 0.8 K/UL (ref 0.1–1.3)
MONOCYTES NFR BLD: 10 % (ref 4–12)
NEUTS SEG # BLD: 4.2 K/UL (ref 1.7–8.2)
NEUTS SEG NFR BLD: 48 % (ref 43–78)
NRBC # BLD: 0 K/UL (ref 0–0.2)
PLATELET # BLD AUTO: 216 K/UL (ref 150–450)
PMV BLD AUTO: 12 FL (ref 9.4–12.3)
RBC # BLD AUTO: 4.84 M/UL (ref 4.23–5.6)
URATE SERPL-MCNC: 8.7 MG/DL (ref 2.6–6)
WBC # BLD AUTO: 8.7 K/UL (ref 4.3–11.1)

## 2023-09-29 PROCEDURE — 99213 OFFICE O/P EST LOW 20 MIN: CPT | Performed by: FAMILY MEDICINE

## 2023-09-29 PROCEDURE — 1123F ACP DISCUSS/DSCN MKR DOCD: CPT | Performed by: FAMILY MEDICINE

## 2023-09-29 NOTE — PROGRESS NOTES
[address], [address] [place] [address]  Phone [phone]  Fax:  [phone]    Patient: Bhargavi Sims  YOB: 1945  Patient Age 69 y.o. Patient sex: male  Medical Record:  [id number]  Visit Date: 09/29/23    [facility] Note     No chief complaint on file. History of Present Illness:  Pt here for follow-up on infection in R index finger - Started last Sun with redness. Denies injury. About 2 days ago it blew up and formed a blister on the top of the finger. Not painful as he has neuropathy in his fingers from diabetes. No fever/chills. The blister did rupture last night. It did have copious amounts of fluid and blood come out of it. He did start antibiotics. I put him on Augmentin 875 twice daily. They did go and get their x-ray completed. IMPRESSION:  Soft tissue swelling along the distal ulnar aspect of the right  second finger with either erosive or destructive bone changes at the DIP joint. Osteomyelitis or gout with tophi should be considered. The blister itself did look like a tophus. He has had gout in the past.   The swelling has decreased since the blister ruptured. It is still draining some serous fluid. PT is a dm - not checking his sugars. Last A1c in Dec 2022 was 6.4.   R bka due to Dm          Dm. Has been in and out of the hospital for respiratory failure. NO hospitalizations since last visit. Now on O2. Does see pulm. No hospitalizations in a while. He was going to physical therapy for his R leg-he does have a prosthesis on the right leg. Diet is not as good. Has not been eating well per daughter. Snacking. Discussed healthy diet. Obesity - Wt is up and down. UP 20# since Feb 2022. Completed therapy now and has to walk with his walker and his prosthetic leg. Goal was to walk with a cane. But gave up that goal and was unrealistic. Having problems with gas and diarrhea.  Since covid stools changed- more

## 2023-09-29 NOTE — TELEPHONE ENCOUNTER
Call to pts daughter Felicia Hernandez (on ABEL). Reviewed [employer] message with Felicia Hernandez. I also reviewed message with yesica Stiles to bring pt in for eval. Before the weekend. Reviewed appt time with daughter. She VU.

## 2023-09-29 NOTE — TELEPHONE ENCOUNTER
----- Message from Berna. Pack sent at 9/29/2023 10:28 AM EDT -----  Regarding: Finger  Contact: [phone]  This is Sohail Pacer daughter. We had the xray done yesterday, and last night the blister burst. We soaked it in Epsom salt, and I bandaged it as you told me to. It now looks like it is filled with blood and is lightly oozing blood. The swelling looks better except the half of his finger where the blister is. He has had 2 doses of antibiotics. Please let me know what we need to do next. Thank you.

## 2023-09-29 NOTE — TELEPHONE ENCOUNTER
Dr Robin Vick phone# [phone] if any questions but wants this patient seen either today, Monday no later than tues. RT index finger osteomylitis (on abx's) not helping is open patient is diabetic. Says very urgent needs to been seen.

## 2023-10-01 DIAGNOSIS — M1A.09X1 IDIOPATHIC CHRONIC GOUT OF MULTIPLE SITES WITH TOPHUS: Primary | ICD-10-CM

## 2023-10-01 RX ORDER — ALLOPURINOL 100 MG/1
100 TABLET ORAL DAILY
Qty: 90 TABLET | Refills: 1 | Status: SHIPPED | OUTPATIENT
Start: 2023-10-01

## 2023-10-02 ENCOUNTER — TELEPHONE (OUTPATIENT)
Dept: FAMILY MEDICINE CLINIC | Facility: CLINIC | Age: 78
End: 2023-10-02

## 2023-10-02 ENCOUNTER — OFFICE VISIT (OUTPATIENT)
Dept: ORTHOPEDIC SURGERY | Age: 78
End: 2023-10-02
Payer: MEDICARE

## 2023-10-02 VITALS — WEIGHT: 315 LBS | HEIGHT: 70 IN | BODY MASS INDEX: 45.1 KG/M2

## 2023-10-02 DIAGNOSIS — M1A.9XX1 TOPHACEOUS GOUT: Primary | ICD-10-CM

## 2023-10-02 PROCEDURE — 99205 OFFICE O/P NEW HI 60 MIN: CPT | Performed by: ORTHOPAEDIC SURGERY

## 2023-10-02 PROCEDURE — 1123F ACP DISCUSS/DSCN MKR DOCD: CPT | Performed by: ORTHOPAEDIC SURGERY

## 2023-10-02 NOTE — H&P (VIEW-ONLY)
Orthopaedic Hand Clinic Note    Name: Martita Huber  YOB: 1945  Gender: male  MRN: [id number]      CC: Patient referred for evaluation of upper extremity pain    HPI: Martita Huber is a 69 y.o. male with a chief complaint of right index finger redness and swelling which started about a week ago. A blister seemed to pop up on it, and eventually ruptured on Thursday. Since then the swelling and redness have decreased. He has no pain, but says he can't feel his fingers due to diabetic neuropathy. He does have a history of gout. His PCP placed him on augmentin and allopurinol. .      ROS/Meds/PSH/PMH/FH/SH: I personally reviewed the patients standard intake form. Pertinents are discussed in the HPI    Physical Examination:    Musculoskeletal Exam:  Examination on the right upper extremity demonstrates cap refill < 5 seconds in all fingers, there is a gouty tophus which has ulcerated through the skin at the dorsal aspect of the index finger DIP joint. There is diffuse swelling and erythema of the index finger to the level of the PIP joint. He is able to move the finger, range of motion is limited only by soft tissue swelling. There is no pain with palpation or range of motion. Imaging / Electrodiagnostic Tests:     I independently reviewed and interpreted right index finger radiographs. They demonstrate degenerative and erosive changes of the distal interphalangeal joint      Assessment:     ICD-10-CM    1. Tophaceous gout  M1A. 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 We discussed observation, therapy, antiinflammatory medications and other pertinent treatment modalities. I reviewed his labwork, which demonstrated significantly elevated uric acid levels, normal WBC count    After discussing in detail the patient elects to proceed with surgical treatment with debridement of the gouty tophus, as it has ulcerated through the skin.  There is a possibility

## 2023-10-02 NOTE — PROGRESS NOTES
Orthopaedic Hand Clinic Note    Name: Luan Draper  YOB: 1945  Gender: male  MRN: [id number]      CC: Patient referred for evaluation of upper extremity pain    HPI: Luan Draper is a 69 y.o. male with a chief complaint of right index finger redness and swelling which started about a week ago. A blister seemed to pop up on it, and eventually ruptured on Thursday. Since then the swelling and redness have decreased. He has no pain, but says he can't feel his fingers due to diabetic neuropathy. He does have a history of gout. His PCP placed him on augmentin and allopurinol. .      ROS/Meds/PSH/PMH/FH/SH: I personally reviewed the patients standard intake form. Pertinents are discussed in the HPI    Physical Examination:    Musculoskeletal Exam:  Examination on the right upper extremity demonstrates cap refill < 5 seconds in all fingers, there is a gouty tophus which has ulcerated through the skin at the dorsal aspect of the index finger DIP joint. There is diffuse swelling and erythema of the index finger to the level of the PIP joint. He is able to move the finger, range of motion is limited only by soft tissue swelling. There is no pain with palpation or range of motion. Imaging / Electrodiagnostic Tests:     I independently reviewed and interpreted right index finger radiographs. They demonstrate degenerative and erosive changes of the distal interphalangeal joint      Assessment:     ICD-10-CM    1. Tophaceous gout  M1A. 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 We discussed observation, therapy, antiinflammatory medications and other pertinent treatment modalities. I reviewed his labwork, which demonstrated significantly elevated uric acid levels, normal WBC count    After discussing in detail the patient elects to proceed with surgical treatment with debridement of the gouty tophus, as it has ulcerated through the skin.  There is a possibility

## 2023-10-02 NOTE — TELEPHONE ENCOUNTER
Call to [facility] (on ABEL) reviewed resulted labs and need for Allopurinol. Reviewed instructions to take. Felicia Hernandez states she would  rx today for pt. Instructed them to call with any concerns or issues. She VU.

## 2023-10-02 NOTE — TELEPHONE ENCOUNTER
----- Message from Alvin Lamb MD sent at 10/1/2023  2:07 PM EDT -----  Please call pt. UA levels elevated. I ordered allopurinol 100 mg to take daily. Likely the cause of his finger infection.   Needs to take the medication daily

## 2023-10-03 ENCOUNTER — TELEPHONE (OUTPATIENT)
Dept: ORTHOPEDIC SURGERY | Age: 78
End: 2023-10-03

## 2023-10-03 DIAGNOSIS — M1A.9XX1 TOPHACEOUS GOUT: Primary | ICD-10-CM

## 2023-10-03 NOTE — TELEPHONE ENCOUNTER
I spoke with Marques Osmins, Mr. Lorrie Manuel daughter, and gave her the sx and post-op information. She voiced understanding.

## 2023-10-04 NOTE — PERIOP NOTE
Phone pre-assessment completed. Pt requested that his daughter Griffin Freedman complete the assessment as he states he is a poor historian and does not know all of his medications. Verified name&  . Order to obtain consent not found in EHR, however patient's daughter confirms case posting. Type 1B surgery,  assessment complete. Orders not received. Labs per surgeon: unknown  Labs per anesthesia protocol: HGB, potassium    3/30/22 Echo LVEF 30-35%, Abnormal Stress Results 3/7/22, EKG 23, Cardiology note23 and Pulmonology Note 23 sent for anesthesiologist to review and approve to proceed with surgery and to indicate any additional orders. Chart marked for charge nurse. Daughter answered medical/surgical history questions at their best of ability. All prior to admission medications documented in EPIC. Instructed to take ONLY the following medications the day of surgery according to anesthesia guidelines with a small sip of water: Eliquis. Pregablin. Daughter verbalizes understanding for patient to continue all medications the evening prior to surgery as regularly scheduled. If you have never been diagnosed with liver disease, take Acetaminophen 1000mg in the morning and then again before bed one day prior to surgery date. Prescription meds to hold:none, however daughter was informed that patient should NOT take furosemide on morning of surgery. Daughter states the surgeon instructed for patient to continue Eliquis. VERBALIZES UNDERSTANDING TO HOLD ALL VITAMINS AND SUPPLEMENTS and NSAIDS (aspirin, naproxen, ibuprofen) IMMEDIATELY PER ANESTHESIA PROTOCOL.     Instructed on the following:    > Arrive at [facility] ([address])[facility] [address]   >Time of arrival to be called the day before by 1700  > NPO after midnight including gum, mints, and ice chips  > Responsible adult must drive patient to the hospital, stay during surgery, and patient will need supervision 24

## 2023-10-05 ENCOUNTER — ANESTHESIA EVENT (OUTPATIENT)
Dept: SURGERY | Age: 78
End: 2023-10-05
Payer: MEDICARE

## 2023-10-05 RX ORDER — HYDROMORPHONE HYDROCHLORIDE 2 MG/ML
0.25 INJECTION, SOLUTION INTRAMUSCULAR; INTRAVENOUS; SUBCUTANEOUS EVERY 5 MIN PRN
Status: CANCELLED | OUTPATIENT
Start: 2023-10-05

## 2023-10-05 RX ORDER — OXYCODONE HYDROCHLORIDE 5 MG/1
5 TABLET ORAL
Status: CANCELLED | OUTPATIENT
Start: 2023-10-05 | End: 2023-10-06

## 2023-10-05 RX ORDER — PROCHLORPERAZINE EDISYLATE 5 MG/ML
5 INJECTION INTRAMUSCULAR; INTRAVENOUS
Status: CANCELLED | OUTPATIENT
Start: 2023-10-05 | End: 2023-10-06

## 2023-10-05 NOTE — PERIOP NOTE
Preop department called to notify patient of arrival time for scheduled procedure. Instructions given to   - Arrive at [address] [facility]. - Remain NPO after midnight, unless otherwise indicated, including gum, mints, and ice chips. - Have a responsible adult to drive patient to the hospital, stay during surgery, and patient will need supervision 24 hours after anesthesia. - Use antibacterial soap in shower the night before surgery and on the morning of surgery.        Was patient contacted: yes  Voicemail left:  patients Daughter Verona Perez  Numbers contacted: [phone]   Arrival time: 3119

## 2023-10-06 ENCOUNTER — ANESTHESIA (OUTPATIENT)
Dept: SURGERY | Age: 78
End: 2023-10-06
Payer: MEDICARE

## 2023-10-06 ENCOUNTER — HOSPITAL ENCOUNTER (OUTPATIENT)
Age: 78
Setting detail: OUTPATIENT SURGERY
Discharge: HOME | End: 2023-10-06
Attending: ORTHOPAEDIC SURGERY | Admitting: ORTHOPAEDIC SURGERY
Payer: MEDICARE

## 2023-10-06 VITALS
HEART RATE: 97 BPM | DIASTOLIC BLOOD PRESSURE: 64 MMHG | SYSTOLIC BLOOD PRESSURE: 96 MMHG | TEMPERATURE: 97.6 F | WEIGHT: 315 LBS | RESPIRATION RATE: 26 BRPM | OXYGEN SATURATION: 90 % | BODY MASS INDEX: 45.1 KG/M2 | HEIGHT: 70 IN

## 2023-10-06 DIAGNOSIS — M1A.9XX1 TOPHACEOUS GOUT: Primary | ICD-10-CM

## 2023-10-06 LAB
EKG ATRIAL RATE: 62 BPM
EKG DIAGNOSIS: NORMAL
EKG Q-T INTERVAL: 404 MS
EKG QRS DURATION: 146 MS
EKG QTC CALCULATION (BAZETT): 488 MS
EKG R AXIS: -28 DEGREES
EKG T AXIS: 27 DEGREES
EKG VENTRICULAR RATE: 88 BPM
GLUCOSE BLD STRIP.AUTO-MCNC: 138 MG/DL (ref 65–100)
POTASSIUM BLD-SCNC: 7.5 MMOL/L (ref 3.5–5.1)
POTASSIUM SERPL-SCNC: 4.1 MMOL/L (ref 3.5–5.1)
SERVICE CMNT-IMP: ABNORMAL

## 2023-10-06 PROCEDURE — 2709999900 HC NON-CHARGEABLE SUPPLY: Performed by: ORTHOPAEDIC SURGERY

## 2023-10-06 PROCEDURE — 26080 EXPLORE/TREAT FINGER JOINT: CPT | Performed by: ORTHOPAEDIC SURGERY

## 2023-10-06 PROCEDURE — 3600000012 HC SURGERY LEVEL 2 ADDTL 15MIN: Performed by: ORTHOPAEDIC SURGERY

## 2023-10-06 PROCEDURE — 87075 CULTR BACTERIA EXCEPT BLOOD: CPT

## 2023-10-06 PROCEDURE — 2580000003 HC RX 258: Performed by: ANESTHESIOLOGY

## 2023-10-06 PROCEDURE — 7100000010 HC PHASE II RECOVERY - FIRST 15 MIN: Performed by: ORTHOPAEDIC SURGERY

## 2023-10-06 PROCEDURE — 2500000003 HC RX 250 WO HCPCS: Performed by: NURSE ANESTHETIST, CERTIFIED REGISTERED

## 2023-10-06 PROCEDURE — 7100000000 HC PACU RECOVERY - FIRST 15 MIN: Performed by: ORTHOPAEDIC SURGERY

## 2023-10-06 PROCEDURE — 3600000002 HC SURGERY LEVEL 2 BASE: Performed by: ORTHOPAEDIC SURGERY

## 2023-10-06 PROCEDURE — 6360000002 HC RX W HCPCS: Performed by: ORTHOPAEDIC SURGERY

## 2023-10-06 PROCEDURE — 93005 ELECTROCARDIOGRAM TRACING: CPT | Performed by: ANESTHESIOLOGY

## 2023-10-06 PROCEDURE — 3700000001 HC ADD 15 MINUTES (ANESTHESIA): Performed by: ORTHOPAEDIC SURGERY

## 2023-10-06 PROCEDURE — 2500000003 HC RX 250 WO HCPCS: Performed by: ORTHOPAEDIC SURGERY

## 2023-10-06 PROCEDURE — 7100000001 HC PACU RECOVERY - ADDTL 15 MIN: Performed by: ORTHOPAEDIC SURGERY

## 2023-10-06 PROCEDURE — 87205 SMEAR GRAM STAIN: CPT

## 2023-10-06 PROCEDURE — 3700000000 HC ANESTHESIA ATTENDED CARE: Performed by: ORTHOPAEDIC SURGERY

## 2023-10-06 PROCEDURE — 87070 CULTURE OTHR SPECIMN AEROBIC: CPT

## 2023-10-06 PROCEDURE — 87176 TISSUE HOMOGENIZATION CULTR: CPT

## 2023-10-06 PROCEDURE — 82962 GLUCOSE BLOOD TEST: CPT

## 2023-10-06 PROCEDURE — 93010 ELECTROCARDIOGRAM REPORT: CPT | Performed by: INTERNAL MEDICINE

## 2023-10-06 PROCEDURE — 6360000002 HC RX W HCPCS: Performed by: NURSE ANESTHETIST, CERTIFIED REGISTERED

## 2023-10-06 PROCEDURE — 84132 ASSAY OF SERUM POTASSIUM: CPT

## 2023-10-06 RX ORDER — SODIUM CHLORIDE 0.9 % (FLUSH) 0.9 %
5-40 SYRINGE (ML) INJECTION PRN
Status: DISCONTINUED | OUTPATIENT
Start: 2023-10-06 | End: 2023-10-06 | Stop reason: HOSPADM

## 2023-10-06 RX ORDER — SODIUM CHLORIDE 0.9 % (FLUSH) 0.9 %
5-40 SYRINGE (ML) INJECTION EVERY 12 HOURS SCHEDULED
Status: DISCONTINUED | OUTPATIENT
Start: 2023-10-06 | End: 2023-10-06 | Stop reason: HOSPADM

## 2023-10-06 RX ORDER — SODIUM CHLORIDE, SODIUM LACTATE, POTASSIUM CHLORIDE, CALCIUM CHLORIDE 600; 310; 30; 20 MG/100ML; MG/100ML; MG/100ML; MG/100ML
INJECTION, SOLUTION INTRAVENOUS CONTINUOUS
Status: DISCONTINUED | OUTPATIENT
Start: 2023-10-06 | End: 2023-10-06 | Stop reason: HOSPADM

## 2023-10-06 RX ORDER — PROPOFOL 10 MG/ML
INJECTION, EMULSION INTRAVENOUS PRN
Status: DISCONTINUED | OUTPATIENT
Start: 2023-10-06 | End: 2023-10-06 | Stop reason: SDUPTHER

## 2023-10-06 RX ORDER — LIDOCAINE HYDROCHLORIDE 10 MG/ML
1 INJECTION, SOLUTION INFILTRATION; PERINEURAL
Status: DISCONTINUED | OUTPATIENT
Start: 2023-10-06 | End: 2023-10-06 | Stop reason: HOSPADM

## 2023-10-06 RX ORDER — DEXMEDETOMIDINE HYDROCHLORIDE 100 UG/ML
INJECTION, SOLUTION INTRAVENOUS PRN
Status: DISCONTINUED | OUTPATIENT
Start: 2023-10-06 | End: 2023-10-06 | Stop reason: SDUPTHER

## 2023-10-06 RX ORDER — BUPIVACAINE HYDROCHLORIDE 2.5 MG/ML
INJECTION, SOLUTION EPIDURAL; INFILTRATION; INTRACAUDAL PRN
Status: DISCONTINUED | OUTPATIENT
Start: 2023-10-06 | End: 2023-10-06 | Stop reason: ALTCHOICE

## 2023-10-06 RX ORDER — MIDAZOLAM HYDROCHLORIDE 2 MG/2ML
2 INJECTION, SOLUTION INTRAMUSCULAR; INTRAVENOUS
Status: DISCONTINUED | OUTPATIENT
Start: 2023-10-06 | End: 2023-10-06 | Stop reason: HOSPADM

## 2023-10-06 RX ORDER — PHENYLEPHRINE HYDROCHLORIDE 10 MG/ML
INJECTION INTRAVENOUS PRN
Status: DISCONTINUED | OUTPATIENT
Start: 2023-10-06 | End: 2023-10-06 | Stop reason: SDUPTHER

## 2023-10-06 RX ORDER — HYDROCODONE BITARTRATE AND ACETAMINOPHEN 5; 325 MG/1; MG/1
1 TABLET ORAL EVERY 6 HOURS PRN
Qty: 15 TABLET | Refills: 0 | Status: SHIPPED | OUTPATIENT
Start: 2023-10-06 | End: 2023-10-11

## 2023-10-06 RX ORDER — SODIUM CHLORIDE 9 MG/ML
INJECTION, SOLUTION INTRAVENOUS PRN
Status: DISCONTINUED | OUTPATIENT
Start: 2023-10-06 | End: 2023-10-06 | Stop reason: HOSPADM

## 2023-10-06 RX ORDER — LIDOCAINE HYDROCHLORIDE 10 MG/ML
INJECTION, SOLUTION INFILTRATION; PERINEURAL PRN
Status: DISCONTINUED | OUTPATIENT
Start: 2023-10-06 | End: 2023-10-06 | Stop reason: ALTCHOICE

## 2023-10-06 RX ORDER — FENTANYL CITRATE 50 UG/ML
100 INJECTION, SOLUTION INTRAMUSCULAR; INTRAVENOUS
Status: DISCONTINUED | OUTPATIENT
Start: 2023-10-06 | End: 2023-10-06 | Stop reason: HOSPADM

## 2023-10-06 RX ADMIN — PROPOFOL 20 MG: 10 INJECTION, EMULSION INTRAVENOUS at 10:17

## 2023-10-06 RX ADMIN — SODIUM CHLORIDE, POTASSIUM CHLORIDE, SODIUM LACTATE AND CALCIUM CHLORIDE: 600; 310; 30; 20 INJECTION, SOLUTION INTRAVENOUS at 09:27

## 2023-10-06 RX ADMIN — Medication 3000 MG: at 10:15

## 2023-10-06 RX ADMIN — PHENYLEPHRINE HYDROCHLORIDE 100 MCG: 10 INJECTION INTRAVENOUS at 10:29

## 2023-10-06 RX ADMIN — DEXMEDETOMIDINE 24 MCG: 100 INJECTION, SOLUTION, CONCENTRATE INTRAVENOUS at 10:14

## 2023-10-06 RX ADMIN — PHENYLEPHRINE HYDROCHLORIDE 100 MCG: 10 INJECTION INTRAVENOUS at 10:32

## 2023-10-06 RX ADMIN — PROPOFOL 20 MG: 10 INJECTION, EMULSION INTRAVENOUS at 10:16

## 2023-10-06 ASSESSMENT — PAIN SCALES - GENERAL
PAINLEVEL_OUTOF10: 0

## 2023-10-06 ASSESSMENT — PAIN - FUNCTIONAL ASSESSMENT: PAIN_FUNCTIONAL_ASSESSMENT: 0-10

## 2023-10-06 NOTE — ANESTHESIA PRE PROCEDURE
Lab Results   Component Value Date/Time     06/19/2023 02:53 PM    K 4.0 06/19/2023 02:53 PM     06/19/2023 02:53 PM    CO2 32 06/19/2023 02:53 PM    BUN 35 06/19/2023 02:53 PM    CREATININE 2.10 06/19/2023 02:53 PM    GFRAA 40 05/23/2022 12:17 PM    AGRATIO 1.3 05/16/2022 09:42 AM    LABGLOM 32 06/19/2023 02:53 PM    LABGLOM 39 05/16/2022 09:42 AM    GLUCOSE 101 06/19/2023 02:53 PM    PROT 7.1 06/19/2023 02:53 PM    CALCIUM 9.1 06/19/2023 02:53 PM    BILITOT 0.4 06/19/2023 02:53 PM    ALKPHOS 187 06/19/2023 02:53 PM    ALKPHOS 177 05/16/2022 09:42 AM    AST 20 06/19/2023 02:53 PM    ALT 20 06/19/2023 02:53 PM       POC Tests: No results for input(s): \"POCGLU\", \"POCNA\", \"POCK\", \"POCCL\", \"POCBUN\", \"POCHEMO\", \"POCHCT\" in the last 72 hours.     Coags:   Lab Results   Component Value Date/Time    APTT 34.9 08/18/2021 06:56 PM       HCG (If Applicable): No results found for: \"PREGTESTUR\", \"PREGSERUM\", \"HCG\", \"HCGQUANT\"     ABGs:   Lab Results   Component Value Date/Time    PHART 7.36 01/09/2022 02:21 AM    PO2ART 70 01/09/2022 02:21 AM    BZD4XXS 58.9 01/09/2022 02:21 AM    GLI1ACO 33.3 01/09/2022 02:21 AM    BEART 6.1 01/09/2022 02:21 AM        Type & Screen (If Applicable):  No results found for: \"LABABO\", \"LABRH\"    Drug/Infectious Status (If Applicable):  Lab Results   Component Value Date/Time    HEPCAB <0.1 04/17/2017 10:34 AM       COVID-19 Screening (If Applicable):   Lab Results   Component Value Date/Time    COVID19 Not detected 01/18/2022 09:30 AM    COVID19 Please find results under separate order 09/14/2021 05:50 AM    COVID19 Not detected 09/14/2021 05:50 AM    COVID19 Not Detected 08/16/2021 12:00 AM    COVID19 Performed 08/16/2021 12:00 AM           Anesthesia Evaluation    Airway: Mallampati: III  TM distance: >3 FB   Neck ROM: full  Comment: Short thick neck  Mouth opening: > = 3 FB   Dental:    (+) poor dentition      Pulmonary:   (+) sleep apnea (BiPAP):  decreased breath sounds:

## 2023-10-08 LAB
BACTERIA SPEC CULT: NORMAL
GRAM STN SPEC: NORMAL
GRAM STN SPEC: NORMAL
SERVICE CMNT-IMP: NORMAL

## 2023-10-11 LAB
BACTERIA SPEC CULT: NORMAL
SERVICE CMNT-IMP: NORMAL

## 2023-10-16 ENCOUNTER — OFFICE VISIT (OUTPATIENT)
Dept: ORTHOPEDIC SURGERY | Age: 78
End: 2023-10-16

## 2023-10-16 DIAGNOSIS — M1A.9XX1 TOPHACEOUS GOUT: Primary | ICD-10-CM

## 2023-10-16 LAB
BACTERIA SPEC CULT: NORMAL
BACTERIA SPEC CULT: NORMAL
GRAM STN SPEC: NORMAL
GRAM STN SPEC: NORMAL
SERVICE CMNT-IMP: NORMAL
SERVICE CMNT-IMP: NORMAL

## 2023-10-16 NOTE — PROGRESS NOTES
Orthopaedic Hand Surgery Note    Name: Adriane Jung  YOB: 1945  Gender: male  MRN: [id number]    Post Operative Visit: Right index finger incision and debridement - Right    HPI: Patient is status post Right index finger incision and debridement - Right on 10/6/2023. Patient reports well controlled pain, no drainage. Swelling has been improving. Physical Examination:  Surgical incision well healed. Sutures are in place. There is no erythema or drainage. Sensation is intact in all fingers. Motor exam reveals no deficits. There is instability of the DIP joint, but he has no pain with DIP motion    Imaging:     none    Assessment:   1. Tophaceous gout         Status post Right index finger incision and debridement - Right on 10/6/2023    Plan:  We discussed the post operative course and progression. Sutures were removed today. Finger swelling has substantially improved . Cultures demonstrated no growth. He does not require any continued antibiotics. We discussed that gout caused significant erosive changes of the joint, and if the instability is bothersome to him he may wish to consider a DIP arthrodesis. He does not think this will be an issue for him.  He will follow up as needed        Lesley Ruiz MD  10/16/23  12:21 PM

## 2023-11-13 ENCOUNTER — OFFICE VISIT (OUTPATIENT)
Age: 78
End: 2023-11-13
Payer: MEDICARE

## 2023-11-13 ENCOUNTER — TELEPHONE (OUTPATIENT)
Dept: FAMILY MEDICINE CLINIC | Facility: CLINIC | Age: 78
End: 2023-11-13

## 2023-11-13 VITALS
WEIGHT: 315 LBS | DIASTOLIC BLOOD PRESSURE: 74 MMHG | HEART RATE: 88 BPM | HEIGHT: 70 IN | BODY MASS INDEX: 45.1 KG/M2 | SYSTOLIC BLOOD PRESSURE: 110 MMHG

## 2023-11-13 DIAGNOSIS — E78.2 MIXED HYPERLIPIDEMIA: ICD-10-CM

## 2023-11-13 DIAGNOSIS — M10.9 ACUTE GOUT OF LEFT HAND, UNSPECIFIED CAUSE: ICD-10-CM

## 2023-11-13 DIAGNOSIS — I50.22 CHRONIC SYSTOLIC CONGESTIVE HEART FAILURE (HCC): ICD-10-CM

## 2023-11-13 DIAGNOSIS — I42.0 DILATED CARDIOMYOPATHY (HCC): ICD-10-CM

## 2023-11-13 DIAGNOSIS — I48.21 PERMANENT ATRIAL FIBRILLATION (HCC): ICD-10-CM

## 2023-11-13 DIAGNOSIS — E66.2 MORBID (SEVERE) OBESITY WITH ALVEOLAR HYPOVENTILATION (HCC): ICD-10-CM

## 2023-11-13 DIAGNOSIS — I25.10 CORONARY ARTERY DISEASE INVOLVING NATIVE CORONARY ARTERY OF NATIVE HEART WITHOUT ANGINA PECTORIS: Primary | ICD-10-CM

## 2023-11-13 PROCEDURE — 1123F ACP DISCUSS/DSCN MKR DOCD: CPT | Performed by: INTERNAL MEDICINE

## 2023-11-13 PROCEDURE — 99214 OFFICE O/P EST MOD 30 MIN: CPT | Performed by: INTERNAL MEDICINE

## 2023-11-13 RX ORDER — COLCHICINE 0.6 MG/1
0.6 TABLET ORAL 2 TIMES DAILY PRN
Qty: 60 TABLET | Refills: 3 | Status: SHIPPED | OUTPATIENT
Start: 2023-11-13

## 2023-11-13 ASSESSMENT — ENCOUNTER SYMPTOMS
DOUBLE VISION: 0
HEMATOCHEZIA: 0
HOARSE VOICE: 0
STRIDOR: 0
EYE REDNESS: 0
ABDOMINAL PAIN: 0
HEMOPTYSIS: 0
WHEEZING: 0
HEMATEMESIS: 0

## 2023-11-13 NOTE — TELEPHONE ENCOUNTER
----- Message from [employer]. Pack sent at 11/13/2023  9:20 AM EST -----  Regarding: Finger  Contact: [phone]  This is Mario's daughter, Julisa Jameson. He had surgery on his finger on Oct 6 due to a ruptured gout blister. He has healed great. However, he has a finger on the other hand that looks like it has gout now. It is red and swollen. There is no blister yet. He's been on 100mg Allopurinol since 10/1. Please let me know what I need to do so we can hopefully avoid the blister and surgery again.    Thank you,  Beverley Cullen

## 2023-11-13 NOTE — PROGRESS NOTES
[address] [facility]  [address] [facility], [facility], [address]  PHONE: [phone]          23    NAME:  Kelly Merchant  : 1945  MRN: [id number]         SUBJECTIVE:   Martha Winkler is a 69 y.o. male seen for a visit regarding the following:     Chief Complaint   Patient presents with    Coronary Artery Disease    Congestive Heart Failure    Atrial Fibrillation    Hyperlipidemia    Cardiomyopathy           HPI:    Cardio problem list:   1. Dilated cardiomyopathy   - LVEF noted to be 30-35% dating back to 2021 echo- Hypotension limits ability to add CHF medical therapy.   -Echo from 2022 showed an EF at 3035%, moderately dilated left ventricle, mild MR, moderate left atrial enlargement.   -Echo from 3/8/2022 showed an EF of 30 to 35% with moderate global hypokinesis   2 Persistent atrial fibrillation   - On Eliquis, rates controlled   3. CAD-said he had a previous stent in the late    - No coronary angiogram is available for review, on atorvastatin 40    --Nuclear stress test on 3/28/2022 showed large inferior defect-fixed. Additional moderate-sized defect of severe intensity involving the mid to distal anterior wall and apex also noted    4 Obstructive sleep apnea      Obesity hypoventilation syndrome      5Stage 3 chronic kidney disease      6 Controlled type 2 diabetes mellitus with diabetic polyneuropathy   7 Borderline hypotension on Florinef   8. S/p right below-knee amputation. I saw Mr. Jaelyn Vidal who is a pleasant 30-year-old gentleman in cardiovascular follow-up on  for CAD, cardiomyopathy-likely combination of ischemic and nonischemic, congestive heart failure, orthostatic hypotension,     When we last met with him,  we made no significant changes with his medical therapy. Background: Due to borderline hypotension we have not used too many cardiomyopathy meds but he is now off Florinef completely. He is on low-dose metoprolol succinate.   We had

## 2023-11-14 ENCOUNTER — OFFICE VISIT (OUTPATIENT)
Dept: FAMILY MEDICINE CLINIC | Facility: CLINIC | Age: 78
End: 2023-11-14
Payer: MEDICARE

## 2023-11-14 VITALS
WEIGHT: 315 LBS | DIASTOLIC BLOOD PRESSURE: 78 MMHG | HEIGHT: 70 IN | SYSTOLIC BLOOD PRESSURE: 114 MMHG | TEMPERATURE: 98 F | HEART RATE: 78 BPM | OXYGEN SATURATION: 92 % | BODY MASS INDEX: 45.1 KG/M2

## 2023-11-14 DIAGNOSIS — Z23 NEED FOR IMMUNIZATION AGAINST INFLUENZA: ICD-10-CM

## 2023-11-14 DIAGNOSIS — N18.32 STAGE 3B CHRONIC KIDNEY DISEASE (HCC): ICD-10-CM

## 2023-11-14 DIAGNOSIS — E11.42 CONTROLLED TYPE 2 DIABETES MELLITUS WITH DIABETIC POLYNEUROPATHY, WITHOUT LONG-TERM CURRENT USE OF INSULIN (HCC): ICD-10-CM

## 2023-11-14 DIAGNOSIS — I42.0 DILATED CARDIOMYOPATHY (HCC): ICD-10-CM

## 2023-11-14 DIAGNOSIS — Z23 NEED FOR PROPHYLACTIC VACCINATION AND INOCULATION AGAINST INFLUENZA: ICD-10-CM

## 2023-11-14 DIAGNOSIS — M10.049 ACUTE IDIOPATHIC GOUT OF HAND, UNSPECIFIED LATERALITY: Primary | ICD-10-CM

## 2023-11-14 LAB
ANION GAP SERPL CALC-SCNC: 5 MMOL/L (ref 2–11)
BUN SERPL-MCNC: 30 MG/DL (ref 8–23)
CALCIUM SERPL-MCNC: 9.2 MG/DL (ref 8.3–10.4)
CHLORIDE SERPL-SCNC: 106 MMOL/L (ref 101–110)
CO2 SERPL-SCNC: 30 MMOL/L (ref 21–32)
CREAT SERPL-MCNC: 2 MG/DL (ref 0.8–1.5)
GLUCOSE SERPL-MCNC: 101 MG/DL (ref 65–100)
POTASSIUM SERPL-SCNC: 4 MMOL/L (ref 3.5–5.1)
SODIUM SERPL-SCNC: 141 MMOL/L (ref 133–143)
URATE SERPL-MCNC: 7.2 MG/DL (ref 2.6–6)

## 2023-11-14 PROCEDURE — 99214 OFFICE O/P EST MOD 30 MIN: CPT | Performed by: FAMILY MEDICINE

## 2023-11-14 PROCEDURE — G0008 ADMIN INFLUENZA VIRUS VAC: HCPCS | Performed by: FAMILY MEDICINE

## 2023-11-14 PROCEDURE — 90694 VACC AIIV4 NO PRSRV 0.5ML IM: CPT | Performed by: FAMILY MEDICINE

## 2023-11-14 PROCEDURE — 1123F ACP DISCUSS/DSCN MKR DOCD: CPT | Performed by: FAMILY MEDICINE

## 2023-11-14 RX ORDER — FEBUXOSTAT 40 MG/1
40 TABLET, FILM COATED ORAL DAILY
Qty: 30 TABLET | Refills: 2 | Status: SHIPPED | OUTPATIENT
Start: 2023-11-14

## 2023-11-14 RX ORDER — PREDNISONE 20 MG/1
40 TABLET ORAL DAILY
Qty: 10 TABLET | Refills: 0 | Status: SHIPPED | OUTPATIENT
Start: 2023-11-14 | End: 2023-11-19

## 2023-11-14 ASSESSMENT — PATIENT HEALTH QUESTIONNAIRE - PHQ9
SUM OF ALL RESPONSES TO PHQ QUESTIONS 1-9: 0
2. FEELING DOWN, DEPRESSED OR HOPELESS: 0
SUM OF ALL RESPONSES TO PHQ QUESTIONS 1-9: 0
SUM OF ALL RESPONSES TO PHQ9 QUESTIONS 1 & 2: 0
1. LITTLE INTEREST OR PLEASURE IN DOING THINGS: 0

## 2023-11-14 ASSESSMENT — ENCOUNTER SYMPTOMS
WHEEZING: 0
SHORTNESS OF BREATH: 0

## 2023-11-14 NOTE — PROGRESS NOTES
polyethylene glycol (GLYCOLAX) 17 GM/SCOOP powder, Take 17 g by mouth as needed (constipation), Disp: , Rfl:     Current Problem List:   Patient Active Problem List   Diagnosis    Severe obesity (BMI 35.0-35.9 with comorbidity) ([address])    Stage 3 chronic kidney disease (HCC)    Systolic congestive heart failure (HCC)    Onychomycosis    Chronic respiratory failure with hypoxia (HCC)    Atrial fibrillation ([address])    Controlled type 2 diabetes mellitus with diabetic polyneuropathy, without long-term current use of insulin (HCC)    Type 2 diabetes mellitus with nephropathy (HCC)    Mixed axonal-demyelinating polyneuropathy    Corns and callus    Mixed hyperlipidemia    Morbid (severe) obesity with alveolar hypoventilation (HCC)    Benign hypertensive kidney disease with chronic kidney disease    Atherosclerosis of native coronary artery of native heart without angina pectoris    Bunion of unspecified foot    S/P BKA (below knee amputation) unilateral, right (HCC)    Acquired hammer toe of left foot    Obstructive sleep apnea    Dilated cardiomyopathy ([address])    Acute on chronic respiratory failure with hypercapnia (HCC)    ILD (interstitial lung disease) ([address])    Tophaceous gout    Acute idiopathic gout of hand       Social History:   Social History     Tobacco Use    Smoking status: Never    Smokeless tobacco: Never   Substance Use Topics    Alcohol use: No       Family History:   Family History   Problem Relation Age of Onset    No Known Problems Paternal Grandmother     No Known Problems Paternal Grandfather     No Known Problems Maternal Grandfather     No Known Problems Maternal Grandmother     Cancer Father     Cancer Mother     Diabetes Brother        Surgical History:  Past Surgical History:   Procedure Laterality Date    AMPUTATION Right 08/18/2021    below knee    ANKLE SURGERY Right 1974    HAND SURGERY Right 10/6/2023    Right index finger incision and debridement performed by Shandra Ghotra MD at [address] [place] [address]

## 2023-11-19 DIAGNOSIS — N18.32 STAGE 3B CHRONIC KIDNEY DISEASE (HCC): Primary | ICD-10-CM

## 2023-12-11 ENCOUNTER — OFFICE VISIT (OUTPATIENT)
Dept: FAMILY MEDICINE CLINIC | Facility: CLINIC | Age: 78
End: 2023-12-11
Payer: MEDICARE

## 2023-12-11 VITALS
TEMPERATURE: 97.3 F | BODY MASS INDEX: 45.1 KG/M2 | OXYGEN SATURATION: 94 % | WEIGHT: 315 LBS | HEIGHT: 70 IN | DIASTOLIC BLOOD PRESSURE: 74 MMHG | HEART RATE: 75 BPM | SYSTOLIC BLOOD PRESSURE: 126 MMHG

## 2023-12-11 DIAGNOSIS — E11.42 CONTROLLED TYPE 2 DIABETES MELLITUS WITH DIABETIC POLYNEUROPATHY, WITHOUT LONG-TERM CURRENT USE OF INSULIN (HCC): Primary | ICD-10-CM

## 2023-12-11 DIAGNOSIS — N18.32 STAGE 3B CHRONIC KIDNEY DISEASE (HCC): ICD-10-CM

## 2023-12-11 DIAGNOSIS — E78.2 MIXED HYPERLIPIDEMIA: ICD-10-CM

## 2023-12-11 DIAGNOSIS — I48.21 PERMANENT ATRIAL FIBRILLATION (HCC): ICD-10-CM

## 2023-12-11 DIAGNOSIS — M1A.09X1 IDIOPATHIC CHRONIC GOUT OF MULTIPLE SITES WITH TOPHUS: ICD-10-CM

## 2023-12-11 DIAGNOSIS — E66.01 CLASS 3 SEVERE OBESITY DUE TO EXCESS CALORIES WITH SERIOUS COMORBIDITY AND BODY MASS INDEX (BMI) OF 45.0 TO 49.9 IN ADULT (HCC): ICD-10-CM

## 2023-12-11 DIAGNOSIS — G47.33 OBSTRUCTIVE SLEEP APNEA (ADULT) (PEDIATRIC): ICD-10-CM

## 2023-12-11 DIAGNOSIS — E11.42 CONTROLLED TYPE 2 DIABETES MELLITUS WITH DIABETIC POLYNEUROPATHY, WITHOUT LONG-TERM CURRENT USE OF INSULIN (HCC): ICD-10-CM

## 2023-12-11 DIAGNOSIS — I42.0 DILATED CARDIOMYOPATHY (HCC): ICD-10-CM

## 2023-12-11 LAB
ALBUMIN SERPL-MCNC: 3.5 G/DL (ref 3.2–4.6)
ALBUMIN/GLOB SERPL: 0.9 (ref 0.4–1.6)
ALP SERPL-CCNC: 186 U/L (ref 50–136)
ALT SERPL-CCNC: 30 U/L (ref 12–65)
ANION GAP SERPL CALC-SCNC: 8 MMOL/L (ref 2–11)
AST SERPL-CCNC: 31 U/L (ref 15–37)
BILIRUB SERPL-MCNC: 0.6 MG/DL (ref 0.2–1.1)
BUN SERPL-MCNC: 24 MG/DL (ref 8–23)
CALCIUM SERPL-MCNC: 9.7 MG/DL (ref 8.3–10.4)
CALCIUM SERPL-MCNC: 9.8 MG/DL (ref 8.3–10.4)
CHLORIDE SERPL-SCNC: 102 MMOL/L (ref 103–113)
CHOLEST SERPL-MCNC: 210 MG/DL
CO2 SERPL-SCNC: 31 MMOL/L (ref 21–32)
CREAT SERPL-MCNC: 1.9 MG/DL (ref 0.8–1.5)
CREAT UR-MCNC: 40 MG/DL
EST. AVERAGE GLUCOSE BLD GHB EST-MCNC: 160 MG/DL
GLOBULIN SER CALC-MCNC: 3.7 G/DL (ref 2.8–4.5)
GLUCOSE SERPL-MCNC: 123 MG/DL (ref 65–100)
HBA1C MFR BLD: 7.2 % (ref 4.8–5.6)
HDLC SERPL-MCNC: 43 MG/DL (ref 40–60)
HDLC SERPL: 4.9
LDLC SERPL CALC-MCNC: ABNORMAL MG/DL
LDLC SERPL DIRECT ASSAY-MCNC: 99 MG/DL
MICROALBUMIN UR-MCNC: 1.73 MG/DL
MICROALBUMIN/CREAT UR-RTO: 43 MG/G (ref 0–30)
POTASSIUM SERPL-SCNC: 4 MMOL/L (ref 3.5–5.1)
PROT SERPL-MCNC: 7.2 G/DL (ref 6.3–8.2)
PTH-INTACT SERPL-MCNC: 51.3 PG/ML (ref 18.5–88)
SODIUM SERPL-SCNC: 141 MMOL/L (ref 136–146)
TRIGL SERPL-MCNC: 599 MG/DL (ref 35–150)
URATE SERPL-MCNC: 4.5 MG/DL (ref 2.6–6)
VLDLC SERPL CALC-MCNC: 119.8 MG/DL (ref 6–23)

## 2023-12-11 PROCEDURE — 1123F ACP DISCUSS/DSCN MKR DOCD: CPT | Performed by: FAMILY MEDICINE

## 2023-12-11 PROCEDURE — 99214 OFFICE O/P EST MOD 30 MIN: CPT | Performed by: FAMILY MEDICINE

## 2023-12-11 ASSESSMENT — ENCOUNTER SYMPTOMS
BLOOD IN STOOL: 0
DIARRHEA: 0
WHEEZING: 0
CONSTIPATION: 0
SHORTNESS OF BREATH: 1
COUGH: 0

## 2023-12-11 NOTE — PROGRESS NOTES
Date    CHOL 191 06/19/2023    TRIG 587 (H) 06/19/2023    HDL 37 (L) 06/19/2023    LDLCALC Not calculated due to elevated triglyceride level 06/19/2023    LABVLDL 117.4 (H) 06/19/2023    VLDL 40 05/16/2022    CHOLHDLRATIO 5.2 06/19/2023      Hemoglobin A1C   Date Value Ref Range Status   12/05/2022 6.4 (H) 4.8 - 5.6 % Final      Lab Results   Component Value Date     11/14/2023    K 4.0 11/14/2023     11/14/2023    CO2 30 11/14/2023    BUN 30 (H) 11/14/2023    CREATININE 2.00 (H) 11/14/2023    GLUCOSE 101 (H) 11/14/2023    CALCIUM 9.2 11/14/2023    PROT 7.1 06/19/2023    LABALBU 3.3 06/19/2023    BILITOT 0.4 06/19/2023    ALKPHOS 187 (H) 06/19/2023    AST 20 06/19/2023    ALT 20 06/19/2023    LABGLOM 34 (L) 11/14/2023    GFRAA 40 (L) 05/23/2022    AGRATIO 1.3 05/16/2022    GLOB 3.8 06/19/2023          ASSESSMENT & PLAN      I have reviewed the patient's past medical history, social history and family history and vitals. We have discussed treatment plan and follow up and given patient instructions. Patient's questions are answered and we will follow up as indicated. Jaleel Black was seen today for follow-up. Diagnoses and all orders for this visit:    Controlled type 2 diabetes mellitus with diabetic polyneuropathy, without long-term current use of insulin (HCC)-check A1c today. He is not currently on any medications for his diabetes. He does not check his sugars often but his last check was good fasting.  -Hemoglobin A1c  -CMP  -Microalbumin to creatinine ratio    Stage 3b chronic kidney disease (HCC)-check labs today. Referrals placed to renal.  I think he does need follow-up with renal given his comorbidities and risk of CKD progression  -CMP, PTH intact, microalbumin. Dilated cardiomyopathy (HCC)-stable and followed by cardiology. Last seen in November. Mixed hyperlipidemia-check labs today. Cont Lipitor 40 mg.   -Lipid panel    Permanent atrial fibrillation ([address])- Stable On eliquis.

## 2023-12-17 DIAGNOSIS — E11.42 CONTROLLED TYPE 2 DIABETES MELLITUS WITH DIABETIC POLYNEUROPATHY, WITHOUT LONG-TERM CURRENT USE OF INSULIN (HCC): Primary | ICD-10-CM

## 2024-02-01 ENCOUNTER — TELEPHONE (OUTPATIENT)
Dept: PULMONOLOGY | Age: 79
End: 2024-02-01

## 2024-02-05 DIAGNOSIS — M10.049 ACUTE IDIOPATHIC GOUT OF HAND, UNSPECIFIED LATERALITY: ICD-10-CM

## 2024-02-05 RX ORDER — FEBUXOSTAT 40 MG/1
40 TABLET, FILM COATED ORAL DAILY
Qty: 90 TABLET | Refills: 3 | Status: SHIPPED | OUTPATIENT
Start: 2024-02-05

## 2024-05-20 ENCOUNTER — OFFICE VISIT (OUTPATIENT)
Age: 79
End: 2024-05-20
Payer: MEDICARE

## 2024-05-20 VITALS
SYSTOLIC BLOOD PRESSURE: 108 MMHG | DIASTOLIC BLOOD PRESSURE: 66 MMHG | WEIGHT: 315 LBS | HEIGHT: 70 IN | BODY MASS INDEX: 45.1 KG/M2 | HEART RATE: 68 BPM

## 2024-05-20 DIAGNOSIS — E78.2 MIXED HYPERLIPIDEMIA: ICD-10-CM

## 2024-05-20 DIAGNOSIS — I25.10 CORONARY ARTERY DISEASE INVOLVING NATIVE CORONARY ARTERY OF NATIVE HEART WITHOUT ANGINA PECTORIS: ICD-10-CM

## 2024-05-20 DIAGNOSIS — E66.2 MORBID (SEVERE) OBESITY WITH ALVEOLAR HYPOVENTILATION (HCC): ICD-10-CM

## 2024-05-20 DIAGNOSIS — I42.0 DILATED CARDIOMYOPATHY (HCC): ICD-10-CM

## 2024-05-20 DIAGNOSIS — I48.21 PERMANENT ATRIAL FIBRILLATION (HCC): Primary | ICD-10-CM

## 2024-05-20 PROCEDURE — 1123F ACP DISCUSS/DSCN MKR DOCD: CPT | Performed by: INTERNAL MEDICINE

## 2024-05-20 PROCEDURE — 99214 OFFICE O/P EST MOD 30 MIN: CPT | Performed by: INTERNAL MEDICINE

## 2024-05-20 ASSESSMENT — ENCOUNTER SYMPTOMS
HOARSE VOICE: 0
EYE REDNESS: 0
HEMATEMESIS: 0
STRIDOR: 0
HEMOPTYSIS: 0
HEMATOCHEZIA: 0
ABDOMINAL PAIN: 0
WHEEZING: 0
DOUBLE VISION: 0

## 2024-05-20 NOTE — PROGRESS NOTES
fibrillation (HCC)  -Rate controlled with metoprolol and anticoagulated with Eliquis for thromboembolic prophylaxis.    Mixed hyperlipidemia  -Continue atorvastatin therapy.    Stage 3b/4 chronic kidney disease (HCC)  -Being monitored-last GFR was below 35-follows with nephrology closely.    Acute gout:  -Uric acid levels have become much better.  He is now on Uloric.  No further gout attacks since we last saw him.       Overall Impression  -He has done well from a cardiac perspective overall since he last saw us.  He is down by 7 pounds.  Appears euvolemic on exam on his current small dose of Lasix 20 mg twice daily.  Jardiance was added but the cost of this med has been prohibitive in some ways.  His daughter said that they will try and get in touch with [facility] to get both Eliquis and Jardiance from there.  Farxiga can be more reasonable than Jardiance through [facility] and certainly an alternative at 10 mg daily.  I will see him in follow-up in 6 months time.      Return in about 6 months (around 11/20/2024) for cad, htn, hld, chf, cardiomyopathy.     Thank you for allowing us to participate in the care of your patient.  If you have any further questions, please do not hesitate to contact us.  Sincerely,        Miles Reeves MD   5/20/2024

## 2024-06-11 ENCOUNTER — OFFICE VISIT (OUTPATIENT)
Dept: FAMILY MEDICINE CLINIC | Facility: CLINIC | Age: 79
End: 2024-06-11
Payer: MEDICARE

## 2024-06-11 VITALS
DIASTOLIC BLOOD PRESSURE: 64 MMHG | OXYGEN SATURATION: 95 % | SYSTOLIC BLOOD PRESSURE: 110 MMHG | HEIGHT: 70 IN | WEIGHT: 310 LBS | HEART RATE: 78 BPM | TEMPERATURE: 97 F | BODY MASS INDEX: 44.38 KG/M2

## 2024-06-11 DIAGNOSIS — I50.22 CHRONIC SYSTOLIC CONGESTIVE HEART FAILURE (HCC): ICD-10-CM

## 2024-06-11 DIAGNOSIS — J47.9 BRONCHIECTASIS WITHOUT COMPLICATION (HCC): ICD-10-CM

## 2024-06-11 DIAGNOSIS — G62.89 MIXED AXONAL-DEMYELINATING POLYNEUROPATHY: ICD-10-CM

## 2024-06-11 DIAGNOSIS — M1A.9XX1 TOPHACEOUS GOUT: ICD-10-CM

## 2024-06-11 DIAGNOSIS — E11.42 CONTROLLED TYPE 2 DIABETES MELLITUS WITH DIABETIC POLYNEUROPATHY, WITHOUT LONG-TERM CURRENT USE OF INSULIN (HCC): ICD-10-CM

## 2024-06-11 DIAGNOSIS — Z89.511 ACQUIRED ABSENCE OF RIGHT LEG BELOW KNEE (HCC): ICD-10-CM

## 2024-06-11 DIAGNOSIS — Z00.00 MEDICARE ANNUAL WELLNESS VISIT, SUBSEQUENT: Primary | ICD-10-CM

## 2024-06-11 DIAGNOSIS — E78.2 MIXED HYPERLIPIDEMIA: ICD-10-CM

## 2024-06-11 DIAGNOSIS — I25.119 ATHEROSCLEROSIS OF NATIVE CORONARY ARTERY OF NATIVE HEART WITH ANGINA PECTORIS (HCC): ICD-10-CM

## 2024-06-11 DIAGNOSIS — N18.32 STAGE 3B CHRONIC KIDNEY DISEASE (HCC): ICD-10-CM

## 2024-06-11 PROBLEM — J96.11 CHRONIC RESPIRATORY FAILURE WITH HYPOXIA (HCC): Status: RESOLVED | Noted: 2021-09-18 | Resolved: 2024-06-11

## 2024-06-11 PROBLEM — J96.22 ACUTE ON CHRONIC RESPIRATORY FAILURE WITH HYPERCAPNIA (HCC): Status: RESOLVED | Noted: 2022-01-06 | Resolved: 2024-06-11

## 2024-06-11 LAB
ALBUMIN SERPL-MCNC: 3.5 G/DL (ref 3.2–4.6)
ALBUMIN/GLOB SERPL: 1 (ref 1–1.9)
ALP SERPL-CCNC: 198 U/L (ref 40–129)
ALT SERPL-CCNC: 15 U/L (ref 12–65)
ANION GAP SERPL CALC-SCNC: 13 MMOL/L (ref 9–18)
AST SERPL-CCNC: 24 U/L (ref 15–37)
BILIRUB SERPL-MCNC: 0.6 MG/DL (ref 0–1.2)
BUN SERPL-MCNC: 29 MG/DL (ref 8–23)
CALCIUM SERPL-MCNC: 9.7 MG/DL (ref 8.8–10.2)
CHLORIDE SERPL-SCNC: 104 MMOL/L (ref 98–107)
CHOLEST SERPL-MCNC: 176 MG/DL (ref 0–200)
CO2 SERPL-SCNC: 26 MMOL/L (ref 20–28)
CREAT SERPL-MCNC: 1.88 MG/DL (ref 0.8–1.3)
EST. AVERAGE GLUCOSE BLD GHB EST-MCNC: 165 MG/DL
GLOBULIN SER CALC-MCNC: 3.4 G/DL (ref 2.3–3.5)
GLUCOSE SERPL-MCNC: 106 MG/DL (ref 70–99)
HBA1C MFR BLD: 7.4 % (ref 0–5.6)
HDLC SERPL-MCNC: 35 MG/DL (ref 40–60)
HDLC SERPL: 5 (ref 0–5)
LDLC SERPL CALC-MCNC: ABNORMAL MG/DL (ref 0–100)
LDLC SERPL DIRECT ASSAY-MCNC: 80 MG/DL (ref 0–100)
POTASSIUM SERPL-SCNC: 4.5 MMOL/L (ref 3.5–5.1)
PROT SERPL-MCNC: 7 G/DL (ref 6.3–8.2)
SODIUM SERPL-SCNC: 143 MMOL/L (ref 136–145)
TRIGL SERPL-MCNC: 417 MG/DL (ref 0–150)
VLDLC SERPL CALC-MCNC: 83 MG/DL (ref 6–23)

## 2024-06-11 PROCEDURE — 3051F HG A1C>EQUAL 7.0%<8.0%: CPT | Performed by: FAMILY MEDICINE

## 2024-06-11 PROCEDURE — G0439 PPPS, SUBSEQ VISIT: HCPCS | Performed by: FAMILY MEDICINE

## 2024-06-11 PROCEDURE — 1123F ACP DISCUSS/DSCN MKR DOCD: CPT | Performed by: FAMILY MEDICINE

## 2024-06-11 PROCEDURE — 99214 OFFICE O/P EST MOD 30 MIN: CPT | Performed by: FAMILY MEDICINE

## 2024-06-11 RX ORDER — PREGABALIN 100 MG/1
100 CAPSULE ORAL 2 TIMES DAILY
Qty: 180 CAPSULE | Refills: 3 | Status: SHIPPED | OUTPATIENT
Start: 2024-06-11 | End: 2025-06-06

## 2024-06-11 ASSESSMENT — PATIENT HEALTH QUESTIONNAIRE - PHQ9
SUM OF ALL RESPONSES TO PHQ QUESTIONS 1-9: 0
SUM OF ALL RESPONSES TO PHQ QUESTIONS 1-9: 0
2. FEELING DOWN, DEPRESSED OR HOPELESS: NOT AT ALL
SUM OF ALL RESPONSES TO PHQ QUESTIONS 1-9: 0
SUM OF ALL RESPONSES TO PHQ QUESTIONS 1-9: 0
SUM OF ALL RESPONSES TO PHQ9 QUESTIONS 1 & 2: 0
1. LITTLE INTEREST OR PLEASURE IN DOING THINGS: NOT AT ALL

## 2024-06-11 ASSESSMENT — ENCOUNTER SYMPTOMS
COUGH: 0
ABDOMINAL PAIN: 0
WHEEZING: 0
DIARRHEA: 0
SHORTNESS OF BREATH: 0
RESPIRATORY NEGATIVE: 1
CONSTIPATION: 0
GASTROINTESTINAL NEGATIVE: 1

## 2024-06-11 ASSESSMENT — LIFESTYLE VARIABLES
HOW OFTEN DO YOU HAVE A DRINK CONTAINING ALCOHOL: NEVER
HOW MANY STANDARD DRINKS CONTAINING ALCOHOL DO YOU HAVE ON A TYPICAL DAY: PATIENT DOES NOT DRINK

## 2024-06-11 NOTE — PATIENT INSTRUCTIONS
drive yourself.  Watch closely for changes in your health, and be sure to contact your doctor if you have any problems.  Where can you learn more?  Go to https://www.[employer].net/patientEd and enter F075 to learn more about \"A Healthy Heart: Care Instructions.\"  Current as of: June 24, 2023  Content Version: 14.1  © 1994-9990 [employer].   Care instructions adapted under license by [employer]. If you have questions about a medical condition or this instruction, always ask your healthcare professional. [employer] disclaims any warranty or liability for your use of this information.      Personalized Preventive Plan for Mario Merchant - 6/11/2024  Medicare offers a range of preventive health benefits. Some of the tests and screenings are paid in full while other may be subject to a deductible, co-insurance, and/or copay.    Some of these benefits include a comprehensive review of your medical history including lifestyle, illnesses that may run in your family, and various assessments and screenings as appropriate.    After reviewing your medical record and screening and assessments performed today your provider may have ordered immunizations, labs, imaging, and/or referrals for you.  A list of these orders (if applicable) as well as your Preventive Care list are included within your After Visit Summary for your review.    Other Preventive Recommendations:    A preventive eye exam performed by an eye specialist is recommended every 1-2 years to screen for glaucoma; cataracts, macular degeneration, and other eye disorders.  A preventive dental visit is recommended every 6 months.  Try to get at least 150 minutes of exercise per week or 10,000 steps per day on a pedometer .  Order or download the FREE \"Exercise & Physical Activity: Your Everyday Guide\" from [employer]. Call 1-309.818.9456 or search [employer] online.  You need 8766-1005 mg of

## 2024-06-11 NOTE — PROGRESS NOTES
[facility]  [address]  [place] [address]  Phone [phone]  Fax:  [phone]    Patient: Mario Merchant  YOB: 1945  Patient Age 78 y.o.  Patient sex: male  Medical Record:  [id number]  Visit Date: 06/11/24    [facility] Clinic Note     Chief Complaint   Patient presents with    Medicare AWV       History of Present Illness:  Pt here for follow-Last seen in Dec for f/u      Coronary artery disease involving native coronary artery of native heart without angina pectoris  -Stable no complaints of any angina at this point.  Nuclear stress test showed previous apical myocardial infarction but with no significant inducible ischemia.  Being managed medically.  On beta-blocker therapy.  No angina.   Also on statin therapy.     Dilated cardiomyopathy (HCC)- seen by Cards in May 2024. Stable.   - On metoprolol succinate 25 mg once daily.  Not on an ACE inhibitor/Arni/aldosterone blocker because of low blood pressures.  Also has chronic kidney disease.       Chronic systolic congestive heart failure (HCC)  -Euvolemic currently today.  On chronic oxygen supplementation but mainly with activity and not at rest    -Echo from 3/8/2022 showed an EF of 30 to 35% with moderate global hypokinesis  Longstanding persistent atrial fibrillation (HCC)  -Rate controlled with metoprolol and anticoagulated with Eliquis for thromboembolic prophylaxis.          Gout - He has had gout - currently no joint swelling.   Last ua level  Dec was 4.5 -  Was on allopurinol and also on colchicine. He does have CKD and started on uloric 40 mg as kidney function too low for allopurinol   Gout on R index finger. Had surgery on finger by Dr Gore.  Last seen post op 10/16 - sutures removed.  Had severe erosion in the joint.     NO recent attacks.  Fingers doing better.      Xray -   IMPRESSION:  Soft tissue swelling along the distal ulnar aspect of the right  second finger with either erosive or destructive

## 2024-06-12 DIAGNOSIS — I48.21 PERMANENT ATRIAL FIBRILLATION (HCC): ICD-10-CM

## 2024-06-12 DIAGNOSIS — E11.42 CONTROLLED TYPE 2 DIABETES MELLITUS WITH DIABETIC POLYNEUROPATHY, WITHOUT LONG-TERM CURRENT USE OF INSULIN (HCC): ICD-10-CM

## 2024-06-12 NOTE — TELEPHONE ENCOUNTER
Requested Prescriptions     Signed Prescriptions Disp Refills    empagliflozin (JARDIANCE) 10 MG tablet 90 tablet 3     Sig: Take 1 tablet by mouth daily     Authorizing Provider: IZZY RIGGINS     Ordering User: MADAY PICKARD    apixaban (ELIQUIS) 5 MG TABS tablet 180 tablet 3     Sig: Take 1 tablet by mouth in the morning and 1 tablet in the evening.     Authorizing Provider: IZZY RIGGINS     Ordering User: MADAY PICKARD

## 2024-07-01 ENCOUNTER — OFFICE VISIT (OUTPATIENT)
Dept: PULMONOLOGY | Age: 79
End: 2024-07-01
Payer: MEDICARE

## 2024-07-01 VITALS
WEIGHT: 313 LBS | HEIGHT: 69 IN | SYSTOLIC BLOOD PRESSURE: 117 MMHG | BODY MASS INDEX: 46.36 KG/M2 | OXYGEN SATURATION: 95 % | DIASTOLIC BLOOD PRESSURE: 72 MMHG | HEART RATE: 96 BPM | RESPIRATION RATE: 14 BRPM

## 2024-07-01 DIAGNOSIS — E66.2 MORBID (SEVERE) OBESITY WITH ALVEOLAR HYPOVENTILATION (HCC): ICD-10-CM

## 2024-07-01 DIAGNOSIS — R94.2 ABNORMAL DIFFUSION CAPACITY DETERMINED BY PULMONARY FUNCTION TEST: ICD-10-CM

## 2024-07-01 DIAGNOSIS — G47.33 OSA (OBSTRUCTIVE SLEEP APNEA): ICD-10-CM

## 2024-07-01 DIAGNOSIS — J98.4 RESTRICTIVE LUNG DISEASE: Primary | ICD-10-CM

## 2024-07-01 DIAGNOSIS — J96.11 CHRONIC RESPIRATORY FAILURE WITH HYPOXIA (HCC): ICD-10-CM

## 2024-07-01 DIAGNOSIS — E66.2 OBESITY HYPOVENTILATION SYNDROME (HCC): ICD-10-CM

## 2024-07-01 PROCEDURE — 99213 OFFICE O/P EST LOW 20 MIN: CPT | Performed by: INTERNAL MEDICINE

## 2024-07-01 PROCEDURE — 1123F ACP DISCUSS/DSCN MKR DOCD: CPT | Performed by: INTERNAL MEDICINE

## 2024-07-01 NOTE — PROGRESS NOTES
Name:  Mario Merchant  YOB: 1945   MRN: [id number]      Office Visit: 7/1/2024       Assessment & Plan    (Medical Decision Making)    Impression: 79 y.o. male with obesity and respiratory failure    1. Restrictive lung disease  Noted on complete PFTs    2. Chronic respiratory failure with hypoxia (HCC)  Only using oxygen at night with trilogy    3. SASHA (obstructive sleep apnea)  Being treated    4. Morbid (severe) obesity with alveolar hypoventilation (HCC)  Still with BMI of 46, weight loss needed    5. Obesity hypoventilation syndrome (HCC)  Chronic hypercapnia    6. Abnormal diffusion capacity determined by pulmonary function test  Noted on previous pulmonary functions    No orders of the defined types were placed in this encounter.    No orders of the defined types were placed in this encounter.    Follow-up and Dispositions    Return in about 1 year (around 7/1/2025).       Dwight Avila Jr, MD      No specialty comments available.    No problem-specific Assessment & Plan notes found for this encounter.             Total time for encounter on day of encounter was 32 minutes.  This time includes chart prep, review of tests/procedures, review of other provider's notes, documentation and counseling patient regarding disease process and medications.   _________________________________________________________________________    HISTORY OF PRESENT ILLNESS:    Mr. Mario Merchant is a 79 y.o. male who is seen at [facility] for  Follow-up (Restrictive lung disease)       This is a 78-year-old white male hospitalized in January 2022 with respiratory failure, atrial fib with RVR.  Patient was discharged on 4 L oxygen at rest and 5 L with ambulation and trilogy noninvasive ventilation at bedtime.  Seen in follow-up in the office in March and at that time O2 was recommended as 2 L with ambulation and no need for at rest due to improve oxygenation.  He was to continue with trilogy.  Complete

## 2024-11-25 ENCOUNTER — OFFICE VISIT (OUTPATIENT)
Age: 79
End: 2024-11-25

## 2024-11-25 VITALS
DIASTOLIC BLOOD PRESSURE: 74 MMHG | WEIGHT: 313 LBS | SYSTOLIC BLOOD PRESSURE: 110 MMHG | HEIGHT: 70 IN | BODY MASS INDEX: 44.81 KG/M2 | HEART RATE: 79 BPM

## 2024-11-25 DIAGNOSIS — E78.2 MIXED HYPERLIPIDEMIA: ICD-10-CM

## 2024-11-25 DIAGNOSIS — I25.10 CORONARY ARTERY DISEASE INVOLVING NATIVE CORONARY ARTERY OF NATIVE HEART WITHOUT ANGINA PECTORIS: Primary | ICD-10-CM

## 2024-11-25 DIAGNOSIS — I42.0 DILATED CARDIOMYOPATHY (HCC): ICD-10-CM

## 2024-11-25 DIAGNOSIS — I48.21 PERMANENT ATRIAL FIBRILLATION (HCC): ICD-10-CM

## 2024-11-25 DIAGNOSIS — G47.33 OBSTRUCTIVE SLEEP APNEA: ICD-10-CM

## 2024-11-25 DIAGNOSIS — I50.22 CHRONIC SYSTOLIC CONGESTIVE HEART FAILURE (HCC): ICD-10-CM

## 2024-11-25 DIAGNOSIS — E66.2 MORBID (SEVERE) OBESITY WITH ALVEOLAR HYPOVENTILATION: ICD-10-CM

## 2024-11-25 RX ORDER — METOPROLOL SUCCINATE 25 MG/1
25 TABLET, EXTENDED RELEASE ORAL EVERY EVENING
Qty: 90 TABLET | Refills: 3 | Status: SHIPPED | OUTPATIENT
Start: 2024-11-25

## 2024-11-25 RX ORDER — ATORVASTATIN CALCIUM 40 MG/1
40 TABLET, FILM COATED ORAL NIGHTLY
Qty: 90 TABLET | Refills: 3 | Status: SHIPPED | OUTPATIENT
Start: 2024-11-25

## 2024-11-25 RX ORDER — FUROSEMIDE 20 MG/1
20 TABLET ORAL 2 TIMES DAILY PRN
Qty: 180 TABLET | Refills: 3 | Status: SHIPPED | OUTPATIENT
Start: 2024-11-25

## 2024-11-25 ASSESSMENT — ENCOUNTER SYMPTOMS
HOARSE VOICE: 0
WHEEZING: 0
DOUBLE VISION: 0
HEMATOCHEZIA: 0
EYE REDNESS: 0
ABDOMINAL PAIN: 0
STRIDOR: 0
HEMATEMESIS: 0
HEMOPTYSIS: 0

## 2024-11-25 NOTE — PROGRESS NOTES
[facility] CARDIOLOGY  [address] [place] [address], [address]  [place] [address]  PHONE: [phone]          24    NAME:  Mario Merchant  : 1945  MRN: [id number]         SUBJECTIVE:   Mario Merchant is a 79 y.o. male seen for a visit regarding the following:     Chief Complaint   Patient presents with    Atrial Fibrillation    Coronary Artery Disease           HPI:    Cardio problem list:   1.  Dilated cardiomyopathy   - LVEF noted to be 30-35% dating back to 2021 echo- Hypotension limits ability to add CHF medical therapy.   -Echo from 2022 showed an EF at 3035%, moderately dilated left ventricle, mild MR, moderate left atrial enlargement.   -Echo from 3/8/2022 showed an EF of 30 to 35% with moderate global hypokinesis   2 Persistent atrial fibrillation   - On Eliquis, rates controlled   3. CAD-said he had a previous stent in the late    - No coronary angiogram is available for review, on atorvastatin 40    --Nuclear stress test on 3/28/2022 showed large inferior defect-fixed.  Additional moderate-sized defect of severe intensity involving the mid to distal anterior wall and apex also noted    4 Obstructive sleep apnea      Obesity hypoventilation syndrome      5Stage 3- 4 chronic kidney disease      6 Controlled type 2 diabetes mellitus with diabetic polyneuropathy   7 Borderline hypotension on Florinef   8.  S/p right below-knee amputation.          I saw Mr. Merchant who is a pleasant 79-year-old gentleman in cardiovascular follow-up on  for CAD, cardiomyopathy-likely combination of ischemic and nonischemic, congestive heart failure, orthostatic hypotension,     When we last met with him,  we made no significant changes with his medical therapy.  He has lost another couple pounds since he saw us.    Background: Due to borderline hypotension we have not used too many cardiomyopathy meds but he is now off Florinef completely.  He is on low-dose metoprolol succinate.  We had continued

## 2024-12-08 SDOH — ECONOMIC STABILITY: INCOME INSECURITY: HOW HARD IS IT FOR YOU TO PAY FOR THE VERY BASICS LIKE FOOD, HOUSING, MEDICAL CARE, AND HEATING?: NOT VERY HARD

## 2024-12-08 SDOH — ECONOMIC STABILITY: FOOD INSECURITY: WITHIN THE PAST 12 MONTHS, YOU WORRIED THAT YOUR FOOD WOULD RUN OUT BEFORE YOU GOT MONEY TO BUY MORE.: NEVER TRUE

## 2024-12-08 SDOH — ECONOMIC STABILITY: FOOD INSECURITY: WITHIN THE PAST 12 MONTHS, THE FOOD YOU BOUGHT JUST DIDN'T LAST AND YOU DIDN'T HAVE MONEY TO GET MORE.: NEVER TRUE

## 2024-12-08 SDOH — ECONOMIC STABILITY: TRANSPORTATION INSECURITY
IN THE PAST 12 MONTHS, HAS LACK OF TRANSPORTATION KEPT YOU FROM MEETINGS, WORK, OR FROM GETTING THINGS NEEDED FOR DAILY LIVING?: NO

## 2024-12-09 ENCOUNTER — OFFICE VISIT (OUTPATIENT)
Dept: FAMILY MEDICINE CLINIC | Facility: CLINIC | Age: 79
End: 2024-12-09

## 2024-12-09 VITALS
OXYGEN SATURATION: 94 % | TEMPERATURE: 97.8 F | HEART RATE: 57 BPM | WEIGHT: 307 LBS | SYSTOLIC BLOOD PRESSURE: 110 MMHG | HEIGHT: 70 IN | DIASTOLIC BLOOD PRESSURE: 75 MMHG | BODY MASS INDEX: 43.95 KG/M2

## 2024-12-09 DIAGNOSIS — J47.9 BRONCHIECTASIS WITHOUT COMPLICATION (HCC): ICD-10-CM

## 2024-12-09 DIAGNOSIS — E11.21 TYPE 2 DIABETES MELLITUS WITH NEPHROPATHY (HCC): ICD-10-CM

## 2024-12-09 DIAGNOSIS — G47.33 OBSTRUCTIVE SLEEP APNEA (ADULT) (PEDIATRIC): ICD-10-CM

## 2024-12-09 DIAGNOSIS — M1A.9XX1 TOPHACEOUS GOUT: ICD-10-CM

## 2024-12-09 DIAGNOSIS — N18.32 STAGE 3B CHRONIC KIDNEY DISEASE (HCC): ICD-10-CM

## 2024-12-09 DIAGNOSIS — E11.21 TYPE 2 DIABETES MELLITUS WITH NEPHROPATHY (HCC): Primary | ICD-10-CM

## 2024-12-09 DIAGNOSIS — E66.01 CLASS 3 SEVERE OBESITY DUE TO EXCESS CALORIES WITH SERIOUS COMORBIDITY AND BODY MASS INDEX (BMI) OF 40.0 TO 44.9 IN ADULT: ICD-10-CM

## 2024-12-09 DIAGNOSIS — Z23 NEED FOR INFLUENZA VACCINATION: ICD-10-CM

## 2024-12-09 DIAGNOSIS — I48.21 PERMANENT ATRIAL FIBRILLATION (HCC): ICD-10-CM

## 2024-12-09 DIAGNOSIS — I50.22 CHRONIC SYSTOLIC CONGESTIVE HEART FAILURE (HCC): ICD-10-CM

## 2024-12-09 DIAGNOSIS — I25.119 ATHEROSCLEROSIS OF NATIVE CORONARY ARTERY OF NATIVE HEART WITH ANGINA PECTORIS (HCC): ICD-10-CM

## 2024-12-09 DIAGNOSIS — E66.813 CLASS 3 SEVERE OBESITY DUE TO EXCESS CALORIES WITH SERIOUS COMORBIDITY AND BODY MASS INDEX (BMI) OF 40.0 TO 44.9 IN ADULT: ICD-10-CM

## 2024-12-09 DIAGNOSIS — E78.2 MIXED HYPERLIPIDEMIA: ICD-10-CM

## 2024-12-09 LAB
EST. AVERAGE GLUCOSE BLD GHB EST-MCNC: 185 MG/DL
HBA1C MFR BLD: 8.1 % (ref 0–5.6)

## 2024-12-09 RX ORDER — FEBUXOSTAT 40 MG/1
40 TABLET, FILM COATED ORAL DAILY
Qty: 90 TABLET | Refills: 3 | Status: SHIPPED | OUTPATIENT
Start: 2024-12-09

## 2024-12-09 ASSESSMENT — ENCOUNTER SYMPTOMS
WHEEZING: 0
COUGH: 0
DIARRHEA: 0
CONSTIPATION: 0
BACK PAIN: 0
CHEST TIGHTNESS: 0
SHORTNESS OF BREATH: 0
ABDOMINAL PAIN: 0

## 2024-12-09 NOTE — PROGRESS NOTES
[facility]  [address] [place] [address]  Phone [phone]  Fax:  [phone]    Patient: Mario Merchant  YOB: 1945  Patient Age 79 y.o.  Patient sex: male  Medical Record:  [id number]  Visit Date: 12/09/24    [facility] Clinic Note     Chief Complaint   Patient presents with    Follow-up     6 month, feeling fine, wants a flu shot       History of Present Illness:  Pt here for follow-Last seen in [place] for f/u  -here for follow-up on chronic problems.  He is here with his daughter today.  He does ambulate with a walker due to his BKA on the right.  He has had a couple of falls.  He does need assistance in getting up.     Coronary artery disease involving native coronary artery of native heart without angina pectoris- Last seen by Cards 11/25/24  -Stable no complaints of any angina at this point.  Nuclear stress test showed previous apical myocardial infarction but with no significant inducible ischemia.  Being managed medically.  On beta-blocker therapy.  No angina.   Also on statin therapy.     Dilated cardiomyopathy (HCC)-  Stable.   - On metoprolol succinate 25 mg once daily.  Not on an ACE inhibitor/Arni/aldosterone blocker because of low blood pressures.  Also has chronic kidney disease.       Chronic systolic congestive heart failure (HCC)  -Euvolemic currently today.  On chronic oxygen supplementation but mainly with activity and not at rest    -Echo from 3/8/2022 showed an EF of 30 to 35% with moderate global hypokinesis  Longstanding persistent atrial fibrillation (HCC)  -Rate controlled with metoprolol and anticoagulated with Eliquis for thromboembolic prophylaxis.              Gout - No recent attacks. Controlled. -  currently no joint swelling.   Last ua level  Dec was 4.5 -  Was on allopurinol and also on colchicine. NO colchicine now.  He is on due to his CKD.   Gout on R index finger. Had surgery on finger by Dr Gore.    Had severe erosion in the joint.

## 2024-12-20 DIAGNOSIS — G62.89 MIXED AXONAL-DEMYELINATING POLYNEUROPATHY: ICD-10-CM

## 2024-12-20 RX ORDER — PREGABALIN 100 MG/1
100 CAPSULE ORAL 2 TIMES DAILY
Qty: 180 CAPSULE | Refills: 3 | OUTPATIENT
Start: 2024-12-20

## 2024-12-23 DIAGNOSIS — G62.89 MIXED AXONAL-DEMYELINATING POLYNEUROPATHY: ICD-10-CM

## 2024-12-23 RX ORDER — PREGABALIN 100 MG/1
100 CAPSULE ORAL 2 TIMES DAILY
Qty: 180 CAPSULE | Refills: 1 | OUTPATIENT
Start: 2024-12-23 | End: 2025-12-18

## 2024-12-23 RX ORDER — PREGABALIN 100 MG/1
100 CAPSULE ORAL 2 TIMES DAILY
Qty: 180 CAPSULE | Refills: 1 | Status: SHIPPED | OUTPATIENT
Start: 2024-12-23 | End: 2025-06-21

## 2024-12-23 NOTE — TELEPHONE ENCOUNTER
PHARMACY  [id number] - [place], [place] - [address] [address]     MED REFILL  pregabalin (LYRICA) 100 MG capsule

## 2024-12-26 DIAGNOSIS — E11.21 TYPE 2 DIABETES MELLITUS WITH NEPHROPATHY (HCC): Primary | ICD-10-CM

## 2024-12-26 RX ORDER — GLIPIZIDE 2.5 MG/1
2.5 TABLET, EXTENDED RELEASE ORAL DAILY
Qty: 90 TABLET | Refills: 1 | Status: SHIPPED | OUTPATIENT
Start: 2024-12-26

## 2025-04-20 ENCOUNTER — HOSPITAL ENCOUNTER (INPATIENT)
Age: 80
LOS: 10 days | Discharge: NURSING, REHAB OR LONG-TERM CARE | DRG: 500 | End: 2025-04-30
Attending: EMERGENCY MEDICINE | Admitting: HOSPITALIST
Payer: MEDICARE

## 2025-04-20 ENCOUNTER — APPOINTMENT (OUTPATIENT)
Dept: GENERAL RADIOLOGY | Age: 80
DRG: 500 | End: 2025-04-20
Payer: MEDICARE

## 2025-04-20 DIAGNOSIS — I50.22 CHRONIC SYSTOLIC CONGESTIVE HEART FAILURE (HCC): ICD-10-CM

## 2025-04-20 DIAGNOSIS — I42.0 DILATED CARDIOMYOPATHY (HCC): ICD-10-CM

## 2025-04-20 DIAGNOSIS — R78.81 BACTEREMIA: ICD-10-CM

## 2025-04-20 DIAGNOSIS — K86.89 MASS OF HEAD OF PANCREAS: ICD-10-CM

## 2025-04-20 DIAGNOSIS — R60.0 LEG EDEMA, LEFT: ICD-10-CM

## 2025-04-20 DIAGNOSIS — L08.9 RIGHT FOOT INFECTION: ICD-10-CM

## 2025-04-20 DIAGNOSIS — L03.115 CELLULITIS OF RIGHT LOWER EXTREMITY: Primary | ICD-10-CM

## 2025-04-20 PROBLEM — N18.9 ACUTE KIDNEY INJURY SUPERIMPOSED ON CHRONIC KIDNEY DISEASE: Status: ACTIVE | Noted: 2025-04-20

## 2025-04-20 PROBLEM — L03.90 CELLULITIS: Status: ACTIVE | Noted: 2025-04-20

## 2025-04-20 PROBLEM — N17.9 ACUTE KIDNEY INJURY SUPERIMPOSED ON CHRONIC KIDNEY DISEASE: Status: ACTIVE | Noted: 2025-04-20

## 2025-04-20 LAB
ALBUMIN SERPL-MCNC: 3 G/DL (ref 3.2–4.6)
ALBUMIN/GLOB SERPL: 0.6 (ref 1–1.9)
ALP SERPL-CCNC: 162 U/L (ref 40–129)
ALT SERPL-CCNC: 16 U/L (ref 8–55)
ANION GAP SERPL CALC-SCNC: 11 MMOL/L (ref 7–16)
AST SERPL-CCNC: 30 U/L (ref 15–37)
BASOPHILS # BLD: 0.07 K/UL (ref 0–0.2)
BASOPHILS NFR BLD: 0.5 % (ref 0–2)
BILIRUB SERPL-MCNC: 0.8 MG/DL (ref 0–1.2)
BUN SERPL-MCNC: 28 MG/DL (ref 8–23)
CALCIUM SERPL-MCNC: 9.4 MG/DL (ref 8.8–10.2)
CHLORIDE SERPL-SCNC: 102 MMOL/L (ref 98–107)
CO2 SERPL-SCNC: 27 MMOL/L (ref 20–29)
CREAT SERPL-MCNC: 2.38 MG/DL (ref 0.8–1.3)
DIFFERENTIAL METHOD BLD: ABNORMAL
EKG ATRIAL RATE: 164 BPM
EKG DIAGNOSIS: NORMAL
EKG Q-T INTERVAL: 358 MS
EKG QRS DURATION: 146 MS
EKG QTC CALCULATION (BAZETT): 464 MS
EKG R AXIS: -38 DEGREES
EKG T AXIS: 32 DEGREES
EKG VENTRICULAR RATE: 101 BPM
EOSINOPHIL # BLD: 0.12 K/UL (ref 0–0.8)
EOSINOPHIL NFR BLD: 0.8 % (ref 0.5–7.8)
ERYTHROCYTE [DISTWIDTH] IN BLOOD BY AUTOMATED COUNT: 16.1 % (ref 11.9–14.6)
GLOBULIN SER CALC-MCNC: 4.6 G/DL (ref 2.3–3.5)
GLUCOSE BLD STRIP.AUTO-MCNC: 97 MG/DL (ref 65–100)
GLUCOSE SERPL-MCNC: 114 MG/DL (ref 70–99)
HCT VFR BLD AUTO: 49.5 % (ref 41.1–50.3)
HGB BLD-MCNC: 15.7 G/DL (ref 13.6–17.2)
IMM GRANULOCYTES # BLD AUTO: 0.08 K/UL (ref 0–0.5)
IMM GRANULOCYTES NFR BLD AUTO: 0.6 % (ref 0–5)
LACTATE SERPL-SCNC: 1.6 MMOL/L (ref 0.5–2)
LACTATE SERPL-SCNC: 1.9 MMOL/L (ref 0.5–2)
LYMPHOCYTES # BLD: 3.35 K/UL (ref 0.5–4.6)
LYMPHOCYTES NFR BLD: 23.4 % (ref 13–44)
MCH RBC QN AUTO: 29.6 PG (ref 26.1–32.9)
MCHC RBC AUTO-ENTMCNC: 31.7 G/DL (ref 31.4–35)
MCV RBC AUTO: 93.2 FL (ref 82–102)
MONOCYTES # BLD: 1.65 K/UL (ref 0.1–1.3)
MONOCYTES NFR BLD: 11.5 % (ref 4–12)
NEUTS SEG # BLD: 9.07 K/UL (ref 1.7–8.2)
NEUTS SEG NFR BLD: 63.2 % (ref 43–78)
NRBC # BLD: 0 K/UL (ref 0–0.2)
PLATELET # BLD AUTO: 288 K/UL (ref 150–450)
PMV BLD AUTO: 11 FL (ref 9.4–12.3)
POTASSIUM SERPL-SCNC: 4.5 MMOL/L (ref 3.5–5.1)
PROCALCITONIN SERPL-MCNC: 0.14 NG/ML (ref 0–0.1)
PROT SERPL-MCNC: 7.6 G/DL (ref 6.3–8.2)
RBC # BLD AUTO: 5.31 M/UL (ref 4.23–5.6)
SERVICE CMNT-IMP: NORMAL
SODIUM SERPL-SCNC: 140 MMOL/L (ref 136–145)
WBC # BLD AUTO: 14.3 K/UL (ref 4.3–11.1)

## 2025-04-20 PROCEDURE — 71046 X-RAY EXAM CHEST 2 VIEWS: CPT

## 2025-04-20 PROCEDURE — 84145 PROCALCITONIN (PCT): CPT

## 2025-04-20 PROCEDURE — 2580000003 HC RX 258: Performed by: INTERNAL MEDICINE

## 2025-04-20 PROCEDURE — 6360000002 HC RX W HCPCS: Performed by: EMERGENCY MEDICINE

## 2025-04-20 PROCEDURE — 2500000003 HC RX 250 WO HCPCS: Performed by: EMERGENCY MEDICINE

## 2025-04-20 PROCEDURE — 2500000003 HC RX 250 WO HCPCS: Performed by: INTERNAL MEDICINE

## 2025-04-20 PROCEDURE — 93005 ELECTROCARDIOGRAM TRACING: CPT | Performed by: EMERGENCY MEDICINE

## 2025-04-20 PROCEDURE — 6370000000 HC RX 637 (ALT 250 FOR IP): Performed by: INTERNAL MEDICINE

## 2025-04-20 PROCEDURE — 87205 SMEAR GRAM STAIN: CPT

## 2025-04-20 PROCEDURE — 85025 COMPLETE CBC W/AUTO DIFF WBC: CPT

## 2025-04-20 PROCEDURE — 87040 BLOOD CULTURE FOR BACTERIA: CPT

## 2025-04-20 PROCEDURE — 94760 N-INVAS EAR/PLS OXIMETRY 1: CPT

## 2025-04-20 PROCEDURE — 82962 GLUCOSE BLOOD TEST: CPT

## 2025-04-20 PROCEDURE — 99285 EMERGENCY DEPT VISIT HI MDM: CPT

## 2025-04-20 PROCEDURE — 87186 SC STD MICRODIL/AGAR DIL: CPT

## 2025-04-20 PROCEDURE — 87154 CUL TYP ID BLD PTHGN 6+ TRGT: CPT

## 2025-04-20 PROCEDURE — 83605 ASSAY OF LACTIC ACID: CPT

## 2025-04-20 PROCEDURE — 80053 COMPREHEN METABOLIC PANEL: CPT

## 2025-04-20 PROCEDURE — 93010 ELECTROCARDIOGRAM REPORT: CPT | Performed by: INTERNAL MEDICINE

## 2025-04-20 PROCEDURE — 36415 COLL VENOUS BLD VENIPUNCTURE: CPT

## 2025-04-20 PROCEDURE — 1100000000 HC RM PRIVATE

## 2025-04-20 PROCEDURE — 6360000002 HC RX W HCPCS: Performed by: INTERNAL MEDICINE

## 2025-04-20 PROCEDURE — 94660 CPAP INITIATION&MGMT: CPT

## 2025-04-20 PROCEDURE — 5A09457 ASSISTANCE WITH RESPIRATORY VENTILATION, 24-96 CONSECUTIVE HOURS, CONTINUOUS POSITIVE AIRWAY PRESSURE: ICD-10-PCS | Performed by: INTERNAL MEDICINE

## 2025-04-20 RX ORDER — PREGABALIN 100 MG/1
100 CAPSULE ORAL 2 TIMES DAILY
Status: DISCONTINUED | OUTPATIENT
Start: 2025-04-20 | End: 2025-04-30 | Stop reason: HOSPADM

## 2025-04-20 RX ORDER — POTASSIUM CHLORIDE 7.45 MG/ML
10 INJECTION INTRAVENOUS PRN
Status: DISCONTINUED | OUTPATIENT
Start: 2025-04-20 | End: 2025-04-30 | Stop reason: HOSPADM

## 2025-04-20 RX ORDER — DEXTROSE MONOHYDRATE 100 MG/ML
INJECTION, SOLUTION INTRAVENOUS CONTINUOUS PRN
Status: DISCONTINUED | OUTPATIENT
Start: 2025-04-20 | End: 2025-04-30 | Stop reason: HOSPADM

## 2025-04-20 RX ORDER — SODIUM CHLORIDE 0.9 % (FLUSH) 0.9 %
5-40 SYRINGE (ML) INJECTION EVERY 12 HOURS SCHEDULED
Status: DISCONTINUED | OUTPATIENT
Start: 2025-04-20 | End: 2025-04-30 | Stop reason: HOSPADM

## 2025-04-20 RX ORDER — ACETAMINOPHEN 325 MG/1
650 TABLET ORAL EVERY 6 HOURS PRN
Status: DISCONTINUED | OUTPATIENT
Start: 2025-04-20 | End: 2025-04-30 | Stop reason: HOSPADM

## 2025-04-20 RX ORDER — SODIUM CHLORIDE 0.9 % (FLUSH) 0.9 %
5-40 SYRINGE (ML) INJECTION PRN
Status: DISCONTINUED | OUTPATIENT
Start: 2025-04-20 | End: 2025-04-30 | Stop reason: HOSPADM

## 2025-04-20 RX ORDER — ONDANSETRON 2 MG/ML
4 INJECTION INTRAMUSCULAR; INTRAVENOUS EVERY 6 HOURS PRN
Status: DISCONTINUED | OUTPATIENT
Start: 2025-04-20 | End: 2025-04-30 | Stop reason: HOSPADM

## 2025-04-20 RX ORDER — POTASSIUM CHLORIDE 1500 MG/1
40 TABLET, EXTENDED RELEASE ORAL PRN
Status: DISCONTINUED | OUTPATIENT
Start: 2025-04-20 | End: 2025-04-30 | Stop reason: HOSPADM

## 2025-04-20 RX ORDER — POLYETHYLENE GLYCOL 3350 17 G/17G
17 POWDER, FOR SOLUTION ORAL DAILY PRN
Status: DISCONTINUED | OUTPATIENT
Start: 2025-04-20 | End: 2025-04-30 | Stop reason: HOSPADM

## 2025-04-20 RX ORDER — MAGNESIUM SULFATE IN WATER 40 MG/ML
2000 INJECTION, SOLUTION INTRAVENOUS PRN
Status: DISCONTINUED | OUTPATIENT
Start: 2025-04-20 | End: 2025-04-30 | Stop reason: HOSPADM

## 2025-04-20 RX ORDER — ACETAMINOPHEN 650 MG/1
650 SUPPOSITORY RECTAL EVERY 6 HOURS PRN
Status: DISCONTINUED | OUTPATIENT
Start: 2025-04-20 | End: 2025-04-30 | Stop reason: HOSPADM

## 2025-04-20 RX ORDER — SODIUM CHLORIDE 9 MG/ML
INJECTION, SOLUTION INTRAVENOUS PRN
Status: DISCONTINUED | OUTPATIENT
Start: 2025-04-20 | End: 2025-04-30 | Stop reason: HOSPADM

## 2025-04-20 RX ORDER — ONDANSETRON 4 MG/1
4 TABLET, ORALLY DISINTEGRATING ORAL EVERY 8 HOURS PRN
Status: DISCONTINUED | OUTPATIENT
Start: 2025-04-20 | End: 2025-04-30 | Stop reason: HOSPADM

## 2025-04-20 RX ORDER — METOPROLOL SUCCINATE 25 MG/1
25 TABLET, EXTENDED RELEASE ORAL EVERY EVENING
Status: DISCONTINUED | OUTPATIENT
Start: 2025-04-20 | End: 2025-04-21

## 2025-04-20 RX ORDER — ALBUTEROL SULFATE 0.83 MG/ML
2.5 SOLUTION RESPIRATORY (INHALATION) EVERY 4 HOURS PRN
Status: DISCONTINUED | OUTPATIENT
Start: 2025-04-20 | End: 2025-04-30 | Stop reason: HOSPADM

## 2025-04-20 RX ORDER — INSULIN LISPRO 100 [IU]/ML
0-4 INJECTION, SOLUTION INTRAVENOUS; SUBCUTANEOUS
Status: DISCONTINUED | OUTPATIENT
Start: 2025-04-20 | End: 2025-04-30 | Stop reason: HOSPADM

## 2025-04-20 RX ORDER — ATORVASTATIN CALCIUM 40 MG/1
40 TABLET, FILM COATED ORAL NIGHTLY
Status: DISCONTINUED | OUTPATIENT
Start: 2025-04-21 | End: 2025-04-30 | Stop reason: HOSPADM

## 2025-04-20 RX ORDER — FEBUXOSTAT 40 MG/1
40 TABLET, FILM COATED ORAL DAILY
Status: DISCONTINUED | OUTPATIENT
Start: 2025-04-21 | End: 2025-04-30 | Stop reason: HOSPADM

## 2025-04-20 RX ORDER — IBUPROFEN 600 MG/1
1 TABLET ORAL PRN
Status: DISCONTINUED | OUTPATIENT
Start: 2025-04-20 | End: 2025-04-30 | Stop reason: HOSPADM

## 2025-04-20 RX ADMIN — PREGABALIN 100 MG: 100 CAPSULE ORAL at 22:52

## 2025-04-20 RX ADMIN — CEFAZOLIN 2000 MG: 10 INJECTION, POWDER, FOR SOLUTION INTRAVENOUS at 21:35

## 2025-04-20 RX ADMIN — APIXABAN 5 MG: 5 TABLET, FILM COATED ORAL at 22:52

## 2025-04-20 RX ADMIN — Medication 2500 MG: at 23:43

## 2025-04-20 RX ADMIN — PIPERACILLIN AND TAZOBACTAM 4500 MG: 4; .5 INJECTION, POWDER, LYOPHILIZED, FOR SOLUTION INTRAVENOUS at 22:58

## 2025-04-20 RX ADMIN — SODIUM CHLORIDE, PRESERVATIVE FREE 10 ML: 5 INJECTION INTRAVENOUS at 22:52

## 2025-04-20 ASSESSMENT — LIFESTYLE VARIABLES
HOW MANY STANDARD DRINKS CONTAINING ALCOHOL DO YOU HAVE ON A TYPICAL DAY: PATIENT DOES NOT DRINK
HOW OFTEN DO YOU HAVE A DRINK CONTAINING ALCOHOL: NEVER

## 2025-04-20 ASSESSMENT — PAIN SCALES - GENERAL
PAINLEVEL_OUTOF10: 6
PAINLEVEL_OUTOF10: 0

## 2025-04-20 ASSESSMENT — PAIN DESCRIPTION - ORIENTATION: ORIENTATION: RIGHT

## 2025-04-20 ASSESSMENT — PAIN - FUNCTIONAL ASSESSMENT: PAIN_FUNCTIONAL_ASSESSMENT: 0-10

## 2025-04-20 ASSESSMENT — PAIN DESCRIPTION - LOCATION: LOCATION: LEG

## 2025-04-20 NOTE — ED TRIAGE NOTES
Patient arrived with a complaint of swelling of the amputee right leg. Patient site is red and swollen. Drainage present with yellowish color.

## 2025-04-21 ENCOUNTER — APPOINTMENT (OUTPATIENT)
Dept: GENERAL RADIOLOGY | Age: 80
DRG: 500 | End: 2025-04-21
Payer: MEDICARE

## 2025-04-21 ENCOUNTER — APPOINTMENT (OUTPATIENT)
Dept: NON INVASIVE DIAGNOSTICS | Age: 80
DRG: 500 | End: 2025-04-21
Payer: MEDICARE

## 2025-04-21 DIAGNOSIS — L08.9 RIGHT FOOT INFECTION: ICD-10-CM

## 2025-04-21 LAB
ACB COMPLEX DNA BLD POS QL NAA+NON-PROBE: NOT DETECTED
ACCESSION NUMBER, LLC1M: ABNORMAL
ANION GAP SERPL CALC-SCNC: 11 MMOL/L (ref 7–16)
APPEARANCE UR: CLEAR
B FRAGILIS DNA BLD POS QL NAA+NON-PROBE: NOT DETECTED
BACTERIA URNS QL MICRO: NEGATIVE /HPF
BASOPHILS # BLD: 0.05 K/UL (ref 0–0.2)
BASOPHILS NFR BLD: 0.4 % (ref 0–2)
BILIRUB UR QL: NEGATIVE
BIOFIRE TEST COMMENT: ABNORMAL
BUN SERPL-MCNC: 27 MG/DL (ref 8–23)
C ALBICANS DNA BLD POS QL NAA+NON-PROBE: NOT DETECTED
C AURIS DNA BLD POS QL NAA+NON-PROBE: NOT DETECTED
C GATTII+NEOFOR DNA BLD POS QL NAA+N-PRB: NOT DETECTED
C GLABRATA DNA BLD POS QL NAA+NON-PROBE: NOT DETECTED
C KRUSEI DNA BLD POS QL NAA+NON-PROBE: NOT DETECTED
C PARAP DNA BLD POS QL NAA+NON-PROBE: NOT DETECTED
C TROPICLS DNA BLD POS QL NAA+NON-PROBE: NOT DETECTED
CALCIUM SERPL-MCNC: 8.8 MG/DL (ref 8.8–10.2)
CHLORIDE SERPL-SCNC: 103 MMOL/L (ref 98–107)
CO2 SERPL-SCNC: 24 MMOL/L (ref 20–29)
COLOR UR: ABNORMAL
CREAT SERPL-MCNC: 2.21 MG/DL (ref 0.8–1.3)
DIFFERENTIAL METHOD BLD: ABNORMAL
E CLOAC COMP DNA BLD POS NAA+NON-PROBE: NOT DETECTED
E COLI DNA BLD POS QL NAA+NON-PROBE: NOT DETECTED
E FAECALIS DNA BLD POS QL NAA+NON-PROBE: NOT DETECTED
E FAECIUM DNA BLD POS QL NAA+NON-PROBE: NOT DETECTED
ECHO AO ASC DIAM: 3.1 CM
ECHO AO ASCENDING AORTA INDEX: 1.26 CM/M2
ECHO AO ROOT DIAM: 3.6 CM
ECHO AO ROOT INDEX: 1.46 CM/M2
ECHO AV AREA PEAK VELOCITY: 1.9 CM2
ECHO AV AREA VTI: 1.9 CM2
ECHO AV AREA/BSA PEAK VELOCITY: 0.8 CM2/M2
ECHO AV AREA/BSA VTI: 0.8 CM2/M2
ECHO AV MEAN GRADIENT: 4 MMHG
ECHO AV MEAN GRADIENT: 4 MMHG
ECHO AV MEAN VELOCITY: 0.9 M/S
ECHO AV PEAK GRADIENT: 6 MMHG
ECHO AV PEAK VELOCITY: 1.2 M/S
ECHO AV VELOCITY RATIO: 0.67
ECHO AV VTI: 20.5 CM
ECHO BSA: 2.64 M2
ECHO LA AREA 2C: 23.4 CM2
ECHO LA AREA 4C: 20.9 CM2
ECHO LA DIAMETER INDEX: 1.87 CM/M2
ECHO LA DIAMETER: 4.6 CM
ECHO LA MAJOR AXIS: 5.9 CM
ECHO LA MINOR AXIS: 6.3 CM
ECHO LA TO AORTIC ROOT RATIO: 1.28
ECHO LA VOL BP: 67 ML (ref 18–58)
ECHO LA VOL MOD A2C: 70 ML (ref 18–58)
ECHO LA VOL MOD A4C: 60 ML (ref 18–58)
ECHO LA VOL/BSA BIPLANE: 27 ML/M2 (ref 16–34)
ECHO LA VOLUME INDEX MOD A2C: 28 ML/M2 (ref 16–34)
ECHO LA VOLUME INDEX MOD A4C: 24 ML/M2 (ref 16–34)
ECHO LV E' LATERAL VELOCITY: 9.9 CM/S
ECHO LV E' SEPTAL VELOCITY: 8.81 CM/S
ECHO LV EDV A2C: 91 ML
ECHO LV EDV A4C: 101 ML
ECHO LV EDV INDEX A4C: 41 ML/M2
ECHO LV EDV NDEX A2C: 37 ML/M2
ECHO LV EF PHYSICIAN: 40 %
ECHO LV EJECTION FRACTION A2C: 66 %
ECHO LV EJECTION FRACTION A4C: 65 %
ECHO LV ESV A2C: 31 ML
ECHO LV ESV A4C: 35 ML
ECHO LV ESV INDEX A2C: 13 ML/M2
ECHO LV ESV INDEX A4C: 14 ML/M2
ECHO LV FRACTIONAL SHORTENING: 37 % (ref 28–44)
ECHO LV INTERNAL DIMENSION DIASTOLE INDEX: 1.99 CM/M2
ECHO LV INTERNAL DIMENSION DIASTOLIC: 4.9 CM (ref 4.2–5.9)
ECHO LV INTERNAL DIMENSION SYSTOLIC INDEX: 1.26 CM/M2
ECHO LV INTERNAL DIMENSION SYSTOLIC: 3.1 CM
ECHO LV IVSD: 0.8 CM (ref 0.6–1)
ECHO LV MASS 2D: 120.8 G (ref 88–224)
ECHO LV MASS INDEX 2D: 49.1 G/M2 (ref 49–115)
ECHO LV POSTERIOR WALL DIASTOLIC: 0.7 CM (ref 0.6–1)
ECHO LV RELATIVE WALL THICKNESS RATIO: 0.29
ECHO LVOT AREA: 3.1 CM2
ECHO LVOT AV VTI INDEX: 0.6
ECHO LVOT DIAM: 2 CM
ECHO LVOT MEAN GRADIENT: 1 MMHG
ECHO LVOT PEAK GRADIENT: 2 MMHG
ECHO LVOT PEAK VELOCITY: 0.8 M/S
ECHO LVOT STROKE VOLUME INDEX: 15.8 ML/M2
ECHO LVOT SV: 38.9 ML
ECHO LVOT VTI: 12.4 CM
ECHO MV A VELOCITY: 0.41 M/S
ECHO MV AREA VTI: 1.3 CM2
ECHO MV E DECELERATION TIME (DT): 223 MS
ECHO MV E VELOCITY: 0.94 M/S
ECHO MV E/A RATIO: 2.29
ECHO MV E/E' LATERAL: 9.49
ECHO MV E/E' RATIO (AVERAGED): 10.08
ECHO MV E/E' SEPTAL: 10.67
ECHO MV LVOT VTI INDEX: 2.39
ECHO MV MAX VELOCITY: 1 M/S
ECHO MV MEAN GRADIENT: 1 MMHG
ECHO MV MEAN VELOCITY: 0.6 M/S
ECHO MV PEAK GRADIENT: 4 MMHG
ECHO MV VTI: 29.6 CM
ECHO PV ACCELERATION TIME (AT): 116 MS
ECHO PV MAX VELOCITY: 1.2 M/S
ECHO PV PEAK GRADIENT: 5 MMHG
ECHO RV BASAL DIMENSION: 2.3 CM
ECHO RV LONGITUDINAL DIMENSION: 4.8 CM
ECHO RV MID DIMENSION: 1.6 CM
ECHO TV REGURGITANT MAX VELOCITY: 1.91 M/S
ECHO TV REGURGITANT PEAK GRADIENT: 15 MMHG
ECHO TV REGURGITANT PEAK GRADIENT: 15 MMHG
ENTEROBACTERALES DNA BLD POS NAA+N-PRB: NOT DETECTED
EOSINOPHIL # BLD: 0.19 K/UL (ref 0–0.8)
EOSINOPHIL NFR BLD: 1.4 % (ref 0.5–7.8)
EPI CELLS #/AREA URNS HPF: ABNORMAL /HPF
ERYTHROCYTE [DISTWIDTH] IN BLOOD BY AUTOMATED COUNT: 16 % (ref 11.9–14.6)
EST. AVERAGE GLUCOSE BLD GHB EST-MCNC: 150 MG/DL
GLUCOSE BLD STRIP.AUTO-MCNC: 108 MG/DL (ref 65–100)
GLUCOSE BLD STRIP.AUTO-MCNC: 112 MG/DL (ref 65–100)
GLUCOSE BLD STRIP.AUTO-MCNC: 115 MG/DL (ref 65–100)
GLUCOSE BLD STRIP.AUTO-MCNC: 116 MG/DL (ref 65–100)
GLUCOSE BLD STRIP.AUTO-MCNC: 141 MG/DL (ref 65–100)
GLUCOSE SERPL-MCNC: 120 MG/DL (ref 70–99)
GLUCOSE UR STRIP.AUTO-MCNC: >1000 MG/DL
GP B STREP DNA BLD POS QL NAA+NON-PROBE: NOT DETECTED
HAEM INFLU DNA BLD POS QL NAA+NON-PROBE: NOT DETECTED
HBA1C MFR BLD: 6.8 % (ref 0–5.6)
HCT VFR BLD AUTO: 43.8 % (ref 41.1–50.3)
HGB BLD-MCNC: 14.3 G/DL (ref 13.6–17.2)
HGB UR QL STRIP: NEGATIVE
HYALINE CASTS URNS QL MICRO: ABNORMAL /LPF
IMM GRANULOCYTES # BLD AUTO: 0.07 K/UL (ref 0–0.5)
IMM GRANULOCYTES NFR BLD AUTO: 0.5 % (ref 0–5)
K OXYTOCA DNA BLD POS QL NAA+NON-PROBE: NOT DETECTED
KETONES UR QL STRIP.AUTO: NEGATIVE MG/DL
KLEBSIELLA SP DNA BLD POS QL NAA+NON-PRB: NOT DETECTED
KLEBSIELLA SP DNA BLD POS QL NAA+NON-PRB: NOT DETECTED
L MONOCYTOG DNA BLD POS QL NAA+NON-PROBE: NOT DETECTED
LEUKOCYTE ESTERASE UR QL STRIP.AUTO: NEGATIVE
LYMPHOCYTES # BLD: 4.01 K/UL (ref 0.5–4.6)
LYMPHOCYTES NFR BLD: 29.2 % (ref 13–44)
MCH RBC QN AUTO: 29.6 PG (ref 26.1–32.9)
MCHC RBC AUTO-ENTMCNC: 32.6 G/DL (ref 31.4–35)
MCV RBC AUTO: 90.7 FL (ref 82–102)
MECA+MECC+MREJ ISLT/SPM QL: NOT DETECTED
MONOCYTES # BLD: 1.6 K/UL (ref 0.1–1.3)
MONOCYTES NFR BLD: 11.6 % (ref 4–12)
N MEN DNA BLD POS QL NAA+NON-PROBE: NOT DETECTED
NEUTS SEG # BLD: 7.83 K/UL (ref 1.7–8.2)
NEUTS SEG NFR BLD: 56.9 % (ref 43–78)
NITRITE UR QL STRIP.AUTO: NEGATIVE
NRBC # BLD: 0 K/UL (ref 0–0.2)
P AERUGINOSA DNA BLD POS NAA+NON-PROBE: NOT DETECTED
PH UR STRIP: 5.5 (ref 5–9)
PLATELET # BLD AUTO: 244 K/UL (ref 150–450)
PMV BLD AUTO: 11 FL (ref 9.4–12.3)
POTASSIUM SERPL-SCNC: 4 MMOL/L (ref 3.5–5.1)
PROT UR STRIP-MCNC: 30 MG/DL
PROTEUS SP DNA BLD POS QL NAA+NON-PROBE: NOT DETECTED
RBC # BLD AUTO: 4.83 M/UL (ref 4.23–5.6)
RBC #/AREA URNS HPF: ABNORMAL /HPF
RESISTANT GENE TARGETS: ABNORMAL
S AUREUS DNA BLD POS QL NAA+NON-PROBE: DETECTED
S AUREUS+CONS DNA BLD POS NAA+NON-PROBE: DETECTED
S EPIDERMIDIS DNA BLD POS QL NAA+NON-PRB: NOT DETECTED
S LUGDUNENSIS DNA BLD POS QL NAA+NON-PRB: NOT DETECTED
S MALTOPHILIA DNA BLD POS QL NAA+NON-PRB: NOT DETECTED
S MARCESCENS DNA BLD POS NAA+NON-PROBE: NOT DETECTED
S PNEUM DNA BLD POS QL NAA+NON-PROBE: NOT DETECTED
S PYO DNA BLD POS QL NAA+NON-PROBE: NOT DETECTED
SALMONELLA DNA BLD POS QL NAA+NON-PROBE: NOT DETECTED
SERVICE CMNT-IMP: ABNORMAL
SODIUM SERPL-SCNC: 139 MMOL/L (ref 136–145)
SP GR UR REFRACTOMETRY: 1.02 (ref 1–1.02)
STREPTOCOCCUS DNA BLD POS NAA+NON-PROBE: NOT DETECTED
UROBILINOGEN UR QL STRIP.AUTO: 1 EU/DL (ref 0.2–1)
WBC # BLD AUTO: 13.8 K/UL (ref 4.3–11.1)
WBC URNS QL MICRO: ABNORMAL /HPF

## 2025-04-21 PROCEDURE — 93306 TTE W/DOPPLER COMPLETE: CPT | Performed by: INTERNAL MEDICINE

## 2025-04-21 PROCEDURE — 2580000003 HC RX 258: Performed by: INTERNAL MEDICINE

## 2025-04-21 PROCEDURE — 94760 N-INVAS EAR/PLS OXIMETRY 1: CPT

## 2025-04-21 PROCEDURE — 2500000003 HC RX 250 WO HCPCS: Performed by: INTERNAL MEDICINE

## 2025-04-21 PROCEDURE — 36415 COLL VENOUS BLD VENIPUNCTURE: CPT

## 2025-04-21 PROCEDURE — C8929 TTE W OR WO FOL WCON,DOPPLER: HCPCS

## 2025-04-21 PROCEDURE — 97530 THERAPEUTIC ACTIVITIES: CPT

## 2025-04-21 PROCEDURE — 97165 OT EVAL LOW COMPLEX 30 MIN: CPT

## 2025-04-21 PROCEDURE — 1100000000 HC RM PRIVATE

## 2025-04-21 PROCEDURE — 6370000000 HC RX 637 (ALT 250 FOR IP)

## 2025-04-21 PROCEDURE — 6360000002 HC RX W HCPCS: Performed by: INTERNAL MEDICINE

## 2025-04-21 PROCEDURE — 73590 X-RAY EXAM OF LOWER LEG: CPT

## 2025-04-21 PROCEDURE — 82962 GLUCOSE BLOOD TEST: CPT

## 2025-04-21 PROCEDURE — 81001 URINALYSIS AUTO W/SCOPE: CPT

## 2025-04-21 PROCEDURE — 83036 HEMOGLOBIN GLYCOSYLATED A1C: CPT

## 2025-04-21 PROCEDURE — 97112 NEUROMUSCULAR REEDUCATION: CPT

## 2025-04-21 PROCEDURE — 6370000000 HC RX 637 (ALT 250 FOR IP): Performed by: INTERNAL MEDICINE

## 2025-04-21 PROCEDURE — 97161 PT EVAL LOW COMPLEX 20 MIN: CPT

## 2025-04-21 PROCEDURE — 85025 COMPLETE CBC W/AUTO DIFF WBC: CPT

## 2025-04-21 PROCEDURE — 6360000004 HC RX CONTRAST MEDICATION

## 2025-04-21 PROCEDURE — 94660 CPAP INITIATION&MGMT: CPT

## 2025-04-21 PROCEDURE — 51798 US URINE CAPACITY MEASURE: CPT

## 2025-04-21 PROCEDURE — 80048 BASIC METABOLIC PNL TOTAL CA: CPT

## 2025-04-21 RX ORDER — METOPROLOL SUCCINATE 25 MG/1
25 TABLET, EXTENDED RELEASE ORAL EVERY EVENING
Status: DISCONTINUED | OUTPATIENT
Start: 2025-04-21 | End: 2025-04-30 | Stop reason: HOSPADM

## 2025-04-21 RX ORDER — TAMSULOSIN HYDROCHLORIDE 0.4 MG/1
0.4 CAPSULE ORAL DAILY
Status: DISCONTINUED | OUTPATIENT
Start: 2025-04-21 | End: 2025-04-30 | Stop reason: HOSPADM

## 2025-04-21 RX ADMIN — APIXABAN 5 MG: 5 TABLET, FILM COATED ORAL at 09:46

## 2025-04-21 RX ADMIN — ATORVASTATIN CALCIUM 40 MG: 40 TABLET, FILM COATED ORAL at 19:51

## 2025-04-21 RX ADMIN — SODIUM CHLORIDE, PRESERVATIVE FREE 10 ML: 5 INJECTION INTRAVENOUS at 19:51

## 2025-04-21 RX ADMIN — PREGABALIN 100 MG: 100 CAPSULE ORAL at 09:46

## 2025-04-21 RX ADMIN — METOPROLOL SUCCINATE 25 MG: 25 TABLET, EXTENDED RELEASE ORAL at 09:46

## 2025-04-21 RX ADMIN — TAMSULOSIN HYDROCHLORIDE 0.4 MG: 0.4 CAPSULE ORAL at 17:45

## 2025-04-21 RX ADMIN — PIPERACILLIN AND TAZOBACTAM 4500 MG: 4; .5 INJECTION, POWDER, LYOPHILIZED, FOR SOLUTION INTRAVENOUS at 21:33

## 2025-04-21 RX ADMIN — PIPERACILLIN AND TAZOBACTAM 4500 MG: 4; .5 INJECTION, POWDER, LYOPHILIZED, FOR SOLUTION INTRAVENOUS at 13:24

## 2025-04-21 RX ADMIN — APIXABAN 5 MG: 5 TABLET, FILM COATED ORAL at 19:51

## 2025-04-21 RX ADMIN — SULFUR HEXAFLUORIDE 5 ML: KIT at 15:57

## 2025-04-21 RX ADMIN — METOPROLOL SUCCINATE 25 MG: 25 TABLET, EXTENDED RELEASE ORAL at 17:43

## 2025-04-21 RX ADMIN — FEBUXOSTAT 40 MG: 40 TABLET, FILM COATED ORAL at 09:46

## 2025-04-21 RX ADMIN — PIPERACILLIN AND TAZOBACTAM 4500 MG: 4; .5 INJECTION, POWDER, LYOPHILIZED, FOR SOLUTION INTRAVENOUS at 04:32

## 2025-04-21 RX ADMIN — SODIUM CHLORIDE, PRESERVATIVE FREE 10 ML: 5 INJECTION INTRAVENOUS at 09:46

## 2025-04-21 RX ADMIN — PREGABALIN 100 MG: 100 CAPSULE ORAL at 19:51

## 2025-04-21 ASSESSMENT — PAIN SCALES - GENERAL
PAINLEVEL_OUTOF10: 0

## 2025-04-21 NOTE — ED NOTES
TRANSFER - OUT REPORT:    Verbal report given to KULDEEP Kumar on Mario Merchant  being transferred to [facility] for routine progression of patient care       Report consisted of patient's Situation, Background, Assessment and   Recommendations(SBAR).     Information from the following report(s) Nurse Handoff Report was reviewed with the receiving nurse.    Kinder Fall Assessment:                           Lines:   Peripheral IV 04/20/25 Posterior;Right Forearm (Active)   Site Assessment Clean, dry & intact 04/20/25 1854   Line Status Blood return noted;Flushed;Specimen collected 04/20/25 1854   Phlebitis Assessment No symptoms 04/20/25 1854   Infiltration Assessment 0 04/20/25 1854   Dressing Status Clean, dry & intact;New dressing applied 04/20/25 1854   Dressing Type Transparent 04/20/25 1854       Peripheral IV 04/20/25 Left;Posterior Forearm (Active)   Site Assessment Clean, dry & intact 04/20/25 1855   Line Status Blood return noted;Flushed;Specimen collected 04/20/25 1855   Phlebitis Assessment No symptoms 04/20/25 1855   Infiltration Assessment 0 04/20/25 1855   Dressing Status Clean, dry & intact 04/20/25 1855   Dressing Type Transparent 04/20/25 1855        Opportunity for questions and clarification was provided.      Patient transported with:  Oz Bowling RN  04/20/25 [id number]

## 2025-04-21 NOTE — ED PROVIDER NOTES
Emergency Department Provider Note       PCP: Sandy Melo MD   Age: 79 y.o.   Sex: male     DISPOSITION Admitted 04/20/2025 09:01:16 PM                ICD-10-CM    1. Cellulitis of right lower extremity  L03.115           Medical Decision Making     Cellulitis to right leg BKA stump.  This has been ongoing for about 2 days.  Lactic acid is normal and Pro-Donta is only slightly elevated.  Lites does have elevated white count.  Unable to wear his prosthetic secondary to discomfort.  Will definitely need admission and IV antibiotics and close ongoing evaluation.  Lab work and antibiotics are initiated in our department.  Cultures are also done.     1 acute complicated illness or injury.  Shared medical decision making was utilized in creating the patients health plan today.    I independently ordered and reviewed each unique test.  I reviewed external records: Recent care    The patients assessment required an independent historian: Daughter.  The reason they were needed is her concerns and very capable observation.      The patient was admitted and I have discussed patient management with the admitting provider.          History     Patient comes in accompanied by family.  He has now had 2 days of redness increasing discomfort and some swelling to his distal BKA stump.  There is a small blister in the area that has had a scant amount of yellow drainage.  Unaware of any fevers or shaking chills.  Patient is known to have atrial fibs and is on Eliquis and metoprolol for this.  No other sores or lesions and does not have a history of recurring soft tissue infections.  He is diabetic.    The history is provided by the patient and a relative.       ROS     Review of Systems   Constitutional:  Negative for chills and fever.   Cardiovascular:  Negative for chest pain and palpitations.   Gastrointestinal: Negative.    Neurological: Negative.    Psychiatric/Behavioral:  Negative for decreased concentration and dysphoric

## 2025-04-21 NOTE — CARE COORDINATION
Pt chart reviewed for discharge planning.  LMSW met with pt and daughter at bedside, verified demographic information/health insurance.  Pt lives with his daughter who is supportive.  PCP was confirmed, last seen Dec 2024.  Pt reports that he uses his RLE prosthesis, RW and sometimes W/C for mobility.  Pt has had [facility] in the past and requests referral back if HH is indicated.  CM staff will follow pt plan of care and assist with supportive care referrals pending pt clinical progress.  Please consult case management if specific needs arise.      04/21/25 1750   Service Assessment   Patient Orientation Alert and Oriented   Cognition Alert   History Provided By Patient;Child/Family;Medical Record   Primary Caregiver Self   Accompanied By/Relationship daughter   Support Systems Children;Spouse/Significant Other   Patient's Healthcare Decision Maker is: Legal Next of Kin   PCP Verified by CM Yes   Last Visit to PCP Within last 6 months  (12/9/2024)   Prior Functional Level Independent in ADLs/IADLs;Other (see comment)  (modified indenpendent with prosthesis and DME)   Current Functional Level Assistance with the following:;Mobility   Can patient return to prior living arrangement Yes   Ability to make needs known: Good   Family able to assist with home care needs: Yes   Would you like for me to discuss the discharge plan with any other family members/significant others, and if so, who? Yes  (daughter)   Financial Resources Medicare  ([employer] Medicare)   Community Resources None   CM/SW Referral Other (see comment)  (none)   Social/Functional History   Lives With Family   Type of Home House   Home Layout One level   Home Access Stairs to enter without rails   Entrance Stairs - Number of Steps 1   Bathroom Shower/Tub Tub/Shower unit   Bathroom Equipment Shower chair   Home Equipment Walker - Rolling   Occupation Retired   Discharge Planning   Type of Residence House   Living Arrangements Children   Current Services

## 2025-04-21 NOTE — H&P
Hospitalist History and Physical   Admit Date:  2025  6:43 PM   Name:  Mario Merchant   Age:  79 y.o.  Sex:  male  :  1945   MRN:  [id number]   Room:  [place] [address]    Presenting/Chief Complaint: Wound Infection     Reason(s) for Admission: Cellulitis [L03.90]     History of Present Illness:     Mario Merchant is a 79 y.o. male with medical history of :    -BMI 44  -SASHA on trilogy   -afib on eliquis  -HTN  -Dm2  -right BKA  Dr Alford, has prosthesis   -HTN  -CKD3   -HFrEF 30-35%  -CAD  -gout      Evaluated with edema right BKA with redness  Had ulcer   Changed out compression for prosthesis, now unable to get prosthesis on due to swelling of stump   No pain- has neuropathy  Has open draining lesion with redness for a few days   Per daughter Michelle he does not complain  No fever  Has chronic extremity motion and movement - no apparent related meds       Lactate ok  Blood cultures pending   WBC 14.3k       S/p ancef in ED       Followed with nephrology for CKD- baseline 1.8-2  Has not been drinking so he does not have to get up for urination   Can urinate       Daughter Michelle bedside  FULL CODE but wants to think about this           Assessment & Plan:     Principal Problem:    Cellulitis  Plan:     S/P BKA (below knee amputation) unilateral, right (HCC)  Plan:   Admit medical bed  D1 vancomycin, s/p ancef, add zosyn instead  Followup blood cultures   Ortho consult to Dr. Alford tomorrow  Xray RLE  Prosthetics consult   PT,OT          Acute kidney injury superimposed on chronic kidney disease  Plan:     Stage 3 chronic kidney disease (HCC)  Plan:   Hold lasix  Creatinine up to 2.38  Repeat tomorrow            Systolic congestive heart failure (HCC)  Plan:   Resume home meds as tolerant   Need to resume lasix pending creatinine           Atrial fibrillation (HCC)  Plan:   Eliquis  Metoprolol as tolerant            Type 2 diabetes mellitus with nephropathy (HCC)  Plan:     Controlled type 2 diabetes

## 2025-04-22 ENCOUNTER — APPOINTMENT (OUTPATIENT)
Dept: CT IMAGING | Age: 80
DRG: 500 | End: 2025-04-22
Payer: MEDICARE

## 2025-04-22 ENCOUNTER — ANESTHESIA EVENT (OUTPATIENT)
Dept: SURGERY | Age: 80
End: 2025-04-22
Payer: MEDICARE

## 2025-04-22 LAB
ANION GAP SERPL CALC-SCNC: 12 MMOL/L (ref 7–16)
BASOPHILS # BLD: 0.07 K/UL (ref 0–0.2)
BASOPHILS NFR BLD: 0.6 % (ref 0–2)
BUN SERPL-MCNC: 27 MG/DL (ref 8–23)
CALCIUM SERPL-MCNC: 8.7 MG/DL (ref 8.8–10.2)
CHLORIDE SERPL-SCNC: 104 MMOL/L (ref 98–107)
CO2 SERPL-SCNC: 22 MMOL/L (ref 20–29)
CREAT SERPL-MCNC: 2.18 MG/DL (ref 0.8–1.3)
DIFFERENTIAL METHOD BLD: ABNORMAL
EOSINOPHIL # BLD: 0.29 K/UL (ref 0–0.8)
EOSINOPHIL NFR BLD: 2.4 % (ref 0.5–7.8)
ERYTHROCYTE [DISTWIDTH] IN BLOOD BY AUTOMATED COUNT: 15.9 % (ref 11.9–14.6)
GLUCOSE BLD STRIP.AUTO-MCNC: 103 MG/DL (ref 65–100)
GLUCOSE BLD STRIP.AUTO-MCNC: 136 MG/DL (ref 65–100)
GLUCOSE BLD STRIP.AUTO-MCNC: 153 MG/DL (ref 65–100)
GLUCOSE BLD STRIP.AUTO-MCNC: 97 MG/DL (ref 65–100)
GLUCOSE SERPL-MCNC: 115 MG/DL (ref 70–99)
HCT VFR BLD AUTO: 41.2 % (ref 41.1–50.3)
HGB BLD-MCNC: 13.6 G/DL (ref 13.6–17.2)
IMM GRANULOCYTES # BLD AUTO: 0.06 K/UL (ref 0–0.5)
IMM GRANULOCYTES NFR BLD AUTO: 0.5 % (ref 0–5)
LYMPHOCYTES # BLD: 2.85 K/UL (ref 0.5–4.6)
LYMPHOCYTES NFR BLD: 24 % (ref 13–44)
MCH RBC QN AUTO: 30.2 PG (ref 26.1–32.9)
MCHC RBC AUTO-ENTMCNC: 33 G/DL (ref 31.4–35)
MCV RBC AUTO: 91.4 FL (ref 82–102)
MONOCYTES # BLD: 1.25 K/UL (ref 0.1–1.3)
MONOCYTES NFR BLD: 10.5 % (ref 4–12)
NEUTS SEG # BLD: 7.34 K/UL (ref 1.7–8.2)
NEUTS SEG NFR BLD: 62 % (ref 43–78)
NRBC # BLD: 0 K/UL (ref 0–0.2)
PLATELET # BLD AUTO: 285 K/UL (ref 150–450)
PMV BLD AUTO: 11.1 FL (ref 9.4–12.3)
POTASSIUM SERPL-SCNC: 3.8 MMOL/L (ref 3.5–5.1)
RBC # BLD AUTO: 4.51 M/UL (ref 4.23–5.6)
SERVICE CMNT-IMP: ABNORMAL
SERVICE CMNT-IMP: NORMAL
SODIUM SERPL-SCNC: 138 MMOL/L (ref 136–145)
VANCOMYCIN SERPL-MCNC: 24 UG/ML
WBC # BLD AUTO: 11.9 K/UL (ref 4.3–11.1)

## 2025-04-22 PROCEDURE — 82962 GLUCOSE BLOOD TEST: CPT

## 2025-04-22 PROCEDURE — 97530 THERAPEUTIC ACTIVITIES: CPT

## 2025-04-22 PROCEDURE — 87075 CULTR BACTERIA EXCEPT BLOOD: CPT

## 2025-04-22 PROCEDURE — 97112 NEUROMUSCULAR REEDUCATION: CPT

## 2025-04-22 PROCEDURE — 6360000002 HC RX W HCPCS: Performed by: HOSPITALIST

## 2025-04-22 PROCEDURE — 2500000003 HC RX 250 WO HCPCS: Performed by: INTERNAL MEDICINE

## 2025-04-22 PROCEDURE — 51702 INSERT TEMP BLADDER CATH: CPT

## 2025-04-22 PROCEDURE — 87040 BLOOD CULTURE FOR BACTERIA: CPT

## 2025-04-22 PROCEDURE — 6370000000 HC RX 637 (ALT 250 FOR IP): Performed by: NURSE PRACTITIONER

## 2025-04-22 PROCEDURE — 2580000003 HC RX 258

## 2025-04-22 PROCEDURE — 80048 BASIC METABOLIC PNL TOTAL CA: CPT

## 2025-04-22 PROCEDURE — 6360000002 HC RX W HCPCS: Performed by: INTERNAL MEDICINE

## 2025-04-22 PROCEDURE — 2500000003 HC RX 250 WO HCPCS: Performed by: HOSPITALIST

## 2025-04-22 PROCEDURE — 85025 COMPLETE CBC W/AUTO DIFF WBC: CPT

## 2025-04-22 PROCEDURE — 87070 CULTURE OTHR SPECIMN AEROBIC: CPT

## 2025-04-22 PROCEDURE — 6370000000 HC RX 637 (ALT 250 FOR IP)

## 2025-04-22 PROCEDURE — 36415 COLL VENOUS BLD VENIPUNCTURE: CPT

## 2025-04-22 PROCEDURE — 1100000000 HC RM PRIVATE

## 2025-04-22 PROCEDURE — 87205 SMEAR GRAM STAIN: CPT

## 2025-04-22 PROCEDURE — 87186 SC STD MICRODIL/AGAR DIL: CPT

## 2025-04-22 PROCEDURE — 6370000000 HC RX 637 (ALT 250 FOR IP): Performed by: INTERNAL MEDICINE

## 2025-04-22 PROCEDURE — 2580000003 HC RX 258: Performed by: INTERNAL MEDICINE

## 2025-04-22 PROCEDURE — 51798 US URINE CAPACITY MEASURE: CPT

## 2025-04-22 PROCEDURE — 97535 SELF CARE MNGMENT TRAINING: CPT

## 2025-04-22 PROCEDURE — 80202 ASSAY OF VANCOMYCIN: CPT

## 2025-04-22 PROCEDURE — 73700 CT LOWER EXTREMITY W/O DYE: CPT

## 2025-04-22 PROCEDURE — 6360000002 HC RX W HCPCS

## 2025-04-22 RX ORDER — MAGNESIUM HYDROXIDE/ALUMINUM HYDROXICE/SIMETHICONE 120; 1200; 1200 MG/30ML; MG/30ML; MG/30ML
30 SUSPENSION ORAL EVERY 6 HOURS PRN
Status: DISCONTINUED | OUTPATIENT
Start: 2025-04-22 | End: 2025-04-30 | Stop reason: HOSPADM

## 2025-04-22 RX ADMIN — CEFAZOLIN 2000 MG: 10 INJECTION, POWDER, FOR SOLUTION INTRAVENOUS at 20:52

## 2025-04-22 RX ADMIN — FEBUXOSTAT 40 MG: 40 TABLET, FILM COATED ORAL at 07:45

## 2025-04-22 RX ADMIN — PREGABALIN 100 MG: 100 CAPSULE ORAL at 07:45

## 2025-04-22 RX ADMIN — PIPERACILLIN AND TAZOBACTAM 4500 MG: 4; .5 INJECTION, POWDER, LYOPHILIZED, FOR SOLUTION INTRAVENOUS at 05:29

## 2025-04-22 RX ADMIN — PREGABALIN 100 MG: 100 CAPSULE ORAL at 20:52

## 2025-04-22 RX ADMIN — SODIUM CHLORIDE, PRESERVATIVE FREE 10 ML: 5 INJECTION INTRAVENOUS at 20:52

## 2025-04-22 RX ADMIN — VANCOMYCIN HYDROCHLORIDE 1000 MG: 1 INJECTION, POWDER, LYOPHILIZED, FOR SOLUTION INTRAVENOUS at 00:30

## 2025-04-22 RX ADMIN — APIXABAN 5 MG: 5 TABLET, FILM COATED ORAL at 07:45

## 2025-04-22 RX ADMIN — SODIUM CHLORIDE, PRESERVATIVE FREE 10 ML: 5 INJECTION INTRAVENOUS at 07:45

## 2025-04-22 RX ADMIN — ATORVASTATIN CALCIUM 40 MG: 40 TABLET, FILM COATED ORAL at 20:52

## 2025-04-22 RX ADMIN — CEFAZOLIN 2000 MG: 10 INJECTION, POWDER, FOR SOLUTION INTRAVENOUS at 12:09

## 2025-04-22 RX ADMIN — ALUMINUM HYDROXIDE, MAGNESIUM HYDROXIDE, AND SIMETHICONE 30 ML: 200; 200; 20 SUSPENSION ORAL at 23:48

## 2025-04-22 RX ADMIN — TAMSULOSIN HYDROCHLORIDE 0.4 MG: 0.4 CAPSULE ORAL at 07:45

## 2025-04-22 ASSESSMENT — PAIN SCALES - GENERAL
PAINLEVEL_OUTOF10: 0
PAINLEVEL_OUTOF10: 0

## 2025-04-22 NOTE — PERIOP NOTE
Patient to come to OPC for surgery tomorrow @ 0900. RN aware. NPO after midnight. Ok to give lyrica & flomax in the morning.

## 2025-04-22 NOTE — ANESTHESIA PRE PROCEDURE
Department of Anesthesiology  Preprocedure Note       Name:  Mario Merchant   Age:  79 y.o.  :  1945                                          MRN:  [id number]         Date:  2025      Surgeon: Surgeon(s):  Kannan Alford MD    Procedure: Procedure(s):  I & D Right Leg- supine    Medications prior to admission:   Prior to Admission medications    Medication Sig Start Date End Date Taking? Authorizing Provider   glipiZIDE (GLUCOTROL XL) 2.5 MG extended release tablet Take 1 tablet by mouth daily 24  Yes Sandy Melo MD   pregabalin (LYRICA) 100 MG capsule Take 1 capsule by mouth 2 times daily for 180 days. Max Daily Amount: 200 mg 24 Yes Sandy Melo MD   febuxostat (ULORIC) 40 MG TABS tablet Take 1 tablet by mouth daily 24  Yes Sandy Melo MD   furosemide (LASIX) 20 MG tablet Take 1 tablet by mouth 2 times daily as needed (swelling)  Patient taking differently: Take 1 tablet by mouth 2 times daily as needed (swelling) Takes 20 mg mornings and 20 mg every other night 24  Yes Miles Nguyễn MD   atorvastatin (LIPITOR) 40 MG tablet Take 1 tablet by mouth at bedtime 24  Yes Miles Nguyễn MD   metoprolol succinate (TOPROL XL) 25 MG extended release tablet Take 1 tablet by mouth every evening 24  Yes Miles Nguyễn MD   empagliflozin (JARDIANCE) 10 MG tablet Take 1 tablet by mouth daily 24  Yes Miles Nguyễn MD   apixaban (ELIQUIS) 5 MG TABS tablet Take 1 tablet by mouth in the morning and 1 tablet in the evening. 24  Yes Miles Nguyễn MD   colchicine (COLCRYS) 0.6 MG tablet Take 1 tablet by mouth 2 times daily as needed for Pain (for acute gout) 23   Miles Nguyễn MD   albuterol sulfate HFA (PROAIR HFA) 108 (90 Base) MCG/ACT inhaler Inhale 2 puffs into the lungs every 6 hours as needed for Wheezing 22   Sandy Melo MD   OXYGEN 2 lpm prn    Automatic Reconciliation, Ar

## 2025-04-22 NOTE — CARE COORDINATION
RN CM met with the patient and daughter Michelle at the bedside and discussed discharge planning including the recommendation for home health services. The patient was provided with a list of home health agencies and their quality metrics. They would like to use [facility] and stated the patient has used them in the past. She is interested in possibly applying for long term care.

## 2025-04-22 NOTE — WOUND CARE
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    Patient lying supine in bed with daughter at bedside. Alert and awake. He relates that for years since his BKA he develops a callus over the area which is now a wound (distal anterior tibia). He has worked with the prosthetists and tried several options but none have seemed to work so far. He saw the prosthetist this morning who is going to work with Dr. Alford on a solution. Infectious disease at bedside as well during assessment and gave orders to collect culture and sensitivity.     The wound itself is a cluster of three small round openings. There is a good amount of purulent tan drainage expressed with light pressure over the tibia. Surrounding the wound is a good amount of edema and erythema. After cleansing the wound with wound cleanser, a culture was obtained of the purulence. Because of some april-wound maceration, I recommend hydrofiber Ag and a silicone foam border. This was applied to the wound this visit using clean technique and patient tolerated well. Plan is for wash out tomorrow with Dr. Alford - will default to post op orders from Dr. Alford for wound care tomorrow.

## 2025-04-22 NOTE — CARE COORDINATION
Discharge planning was discussed with the Interdisciplinary Team. The patient is pending a procedure tomorrow. He is being followed by Infectious Disease. Discharge planning is pending his clinical course in the hospital.

## 2025-04-23 ENCOUNTER — ANESTHESIA (OUTPATIENT)
Dept: SURGERY | Age: 80
End: 2025-04-23
Payer: MEDICARE

## 2025-04-23 ENCOUNTER — APPOINTMENT (OUTPATIENT)
Dept: GENERAL RADIOLOGY | Age: 80
DRG: 500 | End: 2025-04-23
Payer: MEDICARE

## 2025-04-23 LAB
ANION GAP SERPL CALC-SCNC: 12 MMOL/L (ref 7–16)
APPEARANCE UR: ABNORMAL
BACTERIA SPEC CULT: ABNORMAL
BACTERIA SPEC CULT: ABNORMAL
BACTERIA URNS QL MICRO: 0 /HPF
BASOPHILS # BLD: 0.04 K/UL (ref 0–0.2)
BASOPHILS NFR BLD: 0.5 % (ref 0–2)
BILIRUB UR QL: ABNORMAL
BUN SERPL-MCNC: 25 MG/DL (ref 8–23)
CALCIUM SERPL-MCNC: 8.8 MG/DL (ref 8.8–10.2)
CASTS URNS QL MICRO: 0 /LPF
CHLORIDE SERPL-SCNC: 107 MMOL/L (ref 98–107)
CO2 SERPL-SCNC: 23 MMOL/L (ref 20–29)
COLOR UR: ABNORMAL
CREAT SERPL-MCNC: 2.04 MG/DL (ref 0.8–1.3)
CRYSTALS URNS QL MICRO: 0 /LPF
DIFFERENTIAL METHOD BLD: ABNORMAL
EOSINOPHIL # BLD: 0.28 K/UL (ref 0–0.8)
EOSINOPHIL NFR BLD: 3.3 % (ref 0.5–7.8)
EPI CELLS #/AREA URNS HPF: ABNORMAL /HPF
ERYTHROCYTE [DISTWIDTH] IN BLOOD BY AUTOMATED COUNT: 15.9 % (ref 11.9–14.6)
GLUCOSE BLD STRIP.AUTO-MCNC: 104 MG/DL (ref 65–100)
GLUCOSE BLD STRIP.AUTO-MCNC: 104 MG/DL (ref 65–100)
GLUCOSE BLD STRIP.AUTO-MCNC: 105 MG/DL (ref 65–100)
GLUCOSE BLD STRIP.AUTO-MCNC: 112 MG/DL (ref 65–100)
GLUCOSE BLD STRIP.AUTO-MCNC: 118 MG/DL (ref 65–100)
GLUCOSE SERPL-MCNC: 123 MG/DL (ref 70–99)
GLUCOSE UR STRIP.AUTO-MCNC: >1000 MG/DL
GRAM STN SPEC: ABNORMAL
HCT VFR BLD AUTO: 40.9 % (ref 41.1–50.3)
HGB BLD-MCNC: 13.4 G/DL (ref 13.6–17.2)
HGB UR QL STRIP: ABNORMAL
IMM GRANULOCYTES # BLD AUTO: 0.05 K/UL (ref 0–0.5)
IMM GRANULOCYTES NFR BLD AUTO: 0.6 % (ref 0–5)
KETONES UR QL STRIP.AUTO: NEGATIVE MG/DL
LEUKOCYTE ESTERASE UR QL STRIP.AUTO: ABNORMAL
LYMPHOCYTES # BLD: 2.53 K/UL (ref 0.5–4.6)
LYMPHOCYTES NFR BLD: 29.7 % (ref 13–44)
MCH RBC QN AUTO: 29.1 PG (ref 26.1–32.9)
MCHC RBC AUTO-ENTMCNC: 32.8 G/DL (ref 31.4–35)
MCV RBC AUTO: 88.9 FL (ref 82–102)
MONOCYTES # BLD: 0.9 K/UL (ref 0.1–1.3)
MONOCYTES NFR BLD: 10.6 % (ref 4–12)
MUCOUS THREADS URNS QL MICRO: 0 /LPF
NEUTS SEG # BLD: 4.71 K/UL (ref 1.7–8.2)
NEUTS SEG NFR BLD: 55.3 % (ref 43–78)
NITRITE UR QL STRIP.AUTO: NEGATIVE
NRBC # BLD: 0 K/UL (ref 0–0.2)
OTHER OBSERVATIONS: ABNORMAL
PH UR STRIP: 5 (ref 5–9)
PLATELET # BLD AUTO: 257 K/UL (ref 150–450)
PMV BLD AUTO: 10.3 FL (ref 9.4–12.3)
POTASSIUM SERPL-SCNC: 3.8 MMOL/L (ref 3.5–5.1)
PROT UR STRIP-MCNC: 100 MG/DL
RBC # BLD AUTO: 4.6 M/UL (ref 4.23–5.6)
RBC #/AREA URNS HPF: >100 /HPF
SERVICE CMNT-IMP: ABNORMAL
SODIUM SERPL-SCNC: 141 MMOL/L (ref 136–145)
SP GR UR REFRACTOMETRY: 1.03 (ref 1–1.02)
URINE CULTURE IF INDICATED: ABNORMAL
UROBILINOGEN UR QL STRIP.AUTO: 1 EU/DL (ref 0.2–1)
WBC # BLD AUTO: 8.5 K/UL (ref 4.3–11.1)
WBC URNS QL MICRO: ABNORMAL /HPF

## 2025-04-23 PROCEDURE — 27603 DRAIN LOWER LEG LESION: CPT | Performed by: ORTHOPAEDIC SURGERY

## 2025-04-23 PROCEDURE — 80048 BASIC METABOLIC PNL TOTAL CA: CPT

## 2025-04-23 PROCEDURE — 6360000002 HC RX W HCPCS: Performed by: PHYSICIAN ASSISTANT

## 2025-04-23 PROCEDURE — 2500000003 HC RX 250 WO HCPCS: Performed by: ANESTHESIOLOGY

## 2025-04-23 PROCEDURE — 3600000012 HC SURGERY LEVEL 2 ADDTL 15MIN: Performed by: ORTHOPAEDIC SURGERY

## 2025-04-23 PROCEDURE — 6370000000 HC RX 637 (ALT 250 FOR IP): Performed by: NURSE PRACTITIONER

## 2025-04-23 PROCEDURE — 6360000002 HC RX W HCPCS: Performed by: ANESTHESIOLOGY

## 2025-04-23 PROCEDURE — 2500000003 HC RX 250 WO HCPCS: Performed by: HOSPITALIST

## 2025-04-23 PROCEDURE — 1100000000 HC RM PRIVATE

## 2025-04-23 PROCEDURE — 81001 URINALYSIS AUTO W/SCOPE: CPT

## 2025-04-23 PROCEDURE — 3E0T3BZ INTRODUCTION OF ANESTHETIC AGENT INTO PERIPHERAL NERVES AND PLEXI, PERCUTANEOUS APPROACH: ICD-10-PCS | Performed by: ANESTHESIOLOGY

## 2025-04-23 PROCEDURE — 7100000001 HC PACU RECOVERY - ADDTL 15 MIN: Performed by: ORTHOPAEDIC SURGERY

## 2025-04-23 PROCEDURE — 3700000000 HC ANESTHESIA ATTENDED CARE: Performed by: ORTHOPAEDIC SURGERY

## 2025-04-23 PROCEDURE — 2580000003 HC RX 258: Performed by: HOSPITALIST

## 2025-04-23 PROCEDURE — 6370000000 HC RX 637 (ALT 250 FOR IP): Performed by: INTERNAL MEDICINE

## 2025-04-23 PROCEDURE — 3700000001 HC ADD 15 MINUTES (ANESTHESIA): Performed by: ORTHOPAEDIC SURGERY

## 2025-04-23 PROCEDURE — 87075 CULTR BACTERIA EXCEPT BLOOD: CPT

## 2025-04-23 PROCEDURE — 6360000002 HC RX W HCPCS: Performed by: NURSE ANESTHETIST, CERTIFIED REGISTERED

## 2025-04-23 PROCEDURE — 11043 DBRDMT MUSC&/FSCA 1ST 20/<: CPT | Performed by: ORTHOPAEDIC SURGERY

## 2025-04-23 PROCEDURE — 87205 SMEAR GRAM STAIN: CPT

## 2025-04-23 PROCEDURE — 7100000000 HC PACU RECOVERY - FIRST 15 MIN: Performed by: ORTHOPAEDIC SURGERY

## 2025-04-23 PROCEDURE — 82962 GLUCOSE BLOOD TEST: CPT

## 2025-04-23 PROCEDURE — 87070 CULTURE OTHR SPECIMN AEROBIC: CPT

## 2025-04-23 PROCEDURE — 6360000002 HC RX W HCPCS: Performed by: ORTHOPAEDIC SURGERY

## 2025-04-23 PROCEDURE — 2500000003 HC RX 250 WO HCPCS: Performed by: INTERNAL MEDICINE

## 2025-04-23 PROCEDURE — 64445 NJX AA&/STRD SCIATIC NRV IMG: CPT | Performed by: ANESTHESIOLOGY

## 2025-04-23 PROCEDURE — 3600000002 HC SURGERY LEVEL 2 BASE: Performed by: ORTHOPAEDIC SURGERY

## 2025-04-23 PROCEDURE — 64447 NJX AA&/STRD FEMORAL NRV IMG: CPT | Performed by: ANESTHESIOLOGY

## 2025-04-23 PROCEDURE — 85025 COMPLETE CBC W/AUTO DIFF WBC: CPT

## 2025-04-23 PROCEDURE — 87186 SC STD MICRODIL/AGAR DIL: CPT

## 2025-04-23 PROCEDURE — 36415 COLL VENOUS BLD VENIPUNCTURE: CPT

## 2025-04-23 PROCEDURE — 2709999900 HC NON-CHARGEABLE SUPPLY: Performed by: ORTHOPAEDIC SURGERY

## 2025-04-23 PROCEDURE — 0KBS0ZZ EXCISION OF RIGHT LOWER LEG MUSCLE, OPEN APPROACH: ICD-10-PCS | Performed by: ORTHOPAEDIC SURGERY

## 2025-04-23 PROCEDURE — 6370000000 HC RX 637 (ALT 250 FOR IP)

## 2025-04-23 PROCEDURE — 6360000002 HC RX W HCPCS: Performed by: HOSPITALIST

## 2025-04-23 RX ORDER — PROCHLORPERAZINE EDISYLATE 5 MG/ML
5 INJECTION INTRAMUSCULAR; INTRAVENOUS
Status: DISCONTINUED | OUTPATIENT
Start: 2025-04-23 | End: 2025-04-23 | Stop reason: HOSPADM

## 2025-04-23 RX ORDER — ONDANSETRON 2 MG/ML
INJECTION INTRAMUSCULAR; INTRAVENOUS
Status: DISCONTINUED | OUTPATIENT
Start: 2025-04-23 | End: 2025-04-23 | Stop reason: SDUPTHER

## 2025-04-23 RX ORDER — OXYCODONE HYDROCHLORIDE 5 MG/1
5 TABLET ORAL PRN
Status: DISCONTINUED | OUTPATIENT
Start: 2025-04-23 | End: 2025-04-23 | Stop reason: HOSPADM

## 2025-04-23 RX ORDER — SODIUM CHLORIDE, SODIUM LACTATE, POTASSIUM CHLORIDE, CALCIUM CHLORIDE 600; 310; 30; 20 MG/100ML; MG/100ML; MG/100ML; MG/100ML
INJECTION, SOLUTION INTRAVENOUS CONTINUOUS
Status: DISCONTINUED | OUTPATIENT
Start: 2025-04-23 | End: 2025-04-23

## 2025-04-23 RX ORDER — SODIUM CHLORIDE 9 MG/ML
INJECTION, SOLUTION INTRAVENOUS PRN
Status: DISCONTINUED | OUTPATIENT
Start: 2025-04-23 | End: 2025-04-23 | Stop reason: HOSPADM

## 2025-04-23 RX ORDER — FENTANYL CITRATE 50 UG/ML
100 INJECTION, SOLUTION INTRAMUSCULAR; INTRAVENOUS
Status: DISCONTINUED | OUTPATIENT
Start: 2025-04-23 | End: 2025-04-23 | Stop reason: HOSPADM

## 2025-04-23 RX ORDER — ONDANSETRON 2 MG/ML
4 INJECTION INTRAMUSCULAR; INTRAVENOUS
Status: DISCONTINUED | OUTPATIENT
Start: 2025-04-23 | End: 2025-04-23 | Stop reason: HOSPADM

## 2025-04-23 RX ORDER — SODIUM CHLORIDE 0.9 % (FLUSH) 0.9 %
5-40 SYRINGE (ML) INJECTION EVERY 12 HOURS SCHEDULED
Status: DISCONTINUED | OUTPATIENT
Start: 2025-04-23 | End: 2025-04-23 | Stop reason: HOSPADM

## 2025-04-23 RX ORDER — OXYCODONE HYDROCHLORIDE 5 MG/1
10 TABLET ORAL PRN
Status: DISCONTINUED | OUTPATIENT
Start: 2025-04-23 | End: 2025-04-23 | Stop reason: HOSPADM

## 2025-04-23 RX ORDER — ROPIVACAINE HYDROCHLORIDE 5 MG/ML
INJECTION, SOLUTION EPIDURAL; INFILTRATION; PERINEURAL
Status: DISCONTINUED | OUTPATIENT
Start: 2025-04-23 | End: 2025-04-23 | Stop reason: SDUPTHER

## 2025-04-23 RX ORDER — SODIUM CHLORIDE 0.9 % (FLUSH) 0.9 %
5-40 SYRINGE (ML) INJECTION PRN
Status: DISCONTINUED | OUTPATIENT
Start: 2025-04-23 | End: 2025-04-23 | Stop reason: HOSPADM

## 2025-04-23 RX ORDER — SODIUM CHLORIDE, SODIUM LACTATE, POTASSIUM CHLORIDE, CALCIUM CHLORIDE 600; 310; 30; 20 MG/100ML; MG/100ML; MG/100ML; MG/100ML
INJECTION, SOLUTION INTRAVENOUS CONTINUOUS
Status: DISCONTINUED | OUTPATIENT
Start: 2025-04-23 | End: 2025-04-23 | Stop reason: HOSPADM

## 2025-04-23 RX ORDER — NALOXONE HYDROCHLORIDE 0.4 MG/ML
INJECTION, SOLUTION INTRAMUSCULAR; INTRAVENOUS; SUBCUTANEOUS PRN
Status: DISCONTINUED | OUTPATIENT
Start: 2025-04-23 | End: 2025-04-23 | Stop reason: HOSPADM

## 2025-04-23 RX ORDER — MIDAZOLAM HYDROCHLORIDE 2 MG/2ML
2 INJECTION, SOLUTION INTRAMUSCULAR; INTRAVENOUS
Status: COMPLETED | OUTPATIENT
Start: 2025-04-23 | End: 2025-04-23

## 2025-04-23 RX ORDER — DIPHENHYDRAMINE HYDROCHLORIDE 50 MG/ML
12.5 INJECTION, SOLUTION INTRAMUSCULAR; INTRAVENOUS
Status: DISCONTINUED | OUTPATIENT
Start: 2025-04-23 | End: 2025-04-23 | Stop reason: HOSPADM

## 2025-04-23 RX ORDER — LIDOCAINE HYDROCHLORIDE 10 MG/ML
1 INJECTION, SOLUTION INFILTRATION; PERINEURAL
Status: DISCONTINUED | OUTPATIENT
Start: 2025-04-23 | End: 2025-04-23 | Stop reason: HOSPADM

## 2025-04-23 RX ORDER — VANCOMYCIN HYDROCHLORIDE 1 G/20ML
INJECTION, POWDER, LYOPHILIZED, FOR SOLUTION INTRAVENOUS PRN
Status: DISCONTINUED | OUTPATIENT
Start: 2025-04-23 | End: 2025-04-23 | Stop reason: ALTCHOICE

## 2025-04-23 RX ORDER — PROPOFOL 10 MG/ML
INJECTION, EMULSION INTRAVENOUS
Status: DISCONTINUED | OUTPATIENT
Start: 2025-04-23 | End: 2025-04-23 | Stop reason: SDUPTHER

## 2025-04-23 RX ADMIN — PREGABALIN 100 MG: 100 CAPSULE ORAL at 20:31

## 2025-04-23 RX ADMIN — ATORVASTATIN CALCIUM 40 MG: 40 TABLET, FILM COATED ORAL at 20:31

## 2025-04-23 RX ADMIN — CEFAZOLIN 2000 MG: 10 INJECTION, POWDER, FOR SOLUTION INTRAVENOUS at 20:32

## 2025-04-23 RX ADMIN — PROPOFOL 120 MCG/KG/MIN: 10 INJECTION, EMULSION INTRAVENOUS at 12:01

## 2025-04-23 RX ADMIN — MEPIVACAINE HYDROCHLORIDE 10 ML: 15 INJECTION, SOLUTION EPIDURAL; INFILTRATION at 10:26

## 2025-04-23 RX ADMIN — SODIUM CHLORIDE, PRESERVATIVE FREE 10 ML: 5 INJECTION INTRAVENOUS at 20:32

## 2025-04-23 RX ADMIN — ONDANSETRON 4 MG: 2 INJECTION, SOLUTION INTRAMUSCULAR; INTRAVENOUS at 12:18

## 2025-04-23 RX ADMIN — SODIUM CHLORIDE, PRESERVATIVE FREE 10 ML: 5 INJECTION INTRAVENOUS at 07:30

## 2025-04-23 RX ADMIN — Medication 3000 MG: at 12:14

## 2025-04-23 RX ADMIN — MIDAZOLAM HYDROCHLORIDE 1 MG: 1 INJECTION, SOLUTION INTRAMUSCULAR; INTRAVENOUS at 10:27

## 2025-04-23 RX ADMIN — CEFAZOLIN 2000 MG: 10 INJECTION, POWDER, FOR SOLUTION INTRAVENOUS at 05:04

## 2025-04-23 RX ADMIN — SODIUM CHLORIDE, SODIUM LACTATE, POTASSIUM CHLORIDE, AND CALCIUM CHLORIDE: .6; .31; .03; .02 INJECTION, SOLUTION INTRAVENOUS at 08:45

## 2025-04-23 RX ADMIN — ROPIVACAINE HYDROCHLORIDE 20 ML: 5 INJECTION, SOLUTION EPIDURAL; INFILTRATION; PERINEURAL at 10:26

## 2025-04-23 RX ADMIN — ALUMINUM HYDROXIDE, MAGNESIUM HYDROXIDE, AND SIMETHICONE 30 ML: 200; 200; 20 SUSPENSION ORAL at 17:16

## 2025-04-23 RX ADMIN — Medication 10 ML: at 10:38

## 2025-04-23 RX ADMIN — ROPIVACAINE HYDROCHLORIDE 10 ML: 5 INJECTION, SOLUTION EPIDURAL; INFILTRATION; PERINEURAL at 10:33

## 2025-04-23 RX ADMIN — METOPROLOL SUCCINATE 25 MG: 25 TABLET, EXTENDED RELEASE ORAL at 17:12

## 2025-04-23 ASSESSMENT — PAIN SCALES - GENERAL: PAINLEVEL_OUTOF10: 0

## 2025-04-23 ASSESSMENT — PAIN - FUNCTIONAL ASSESSMENT: PAIN_FUNCTIONAL_ASSESSMENT: 0-10

## 2025-04-23 NOTE — CARE COORDINATION
The patient is currently off the unit. The list of skilled nursing facilities and their quality metrics was left on the bedside table. He is pending a procedure today.

## 2025-04-23 NOTE — ANESTHESIA POSTPROCEDURE EVALUATION
Department of Anesthesiology  Postprocedure Note    Patient: Mario Merchant  MRN: [id number]  YOB: 1945  Date of evaluation: 4/23/2025    Procedure Summary       Date: 04/23/25 Room / Location: [facility] OP OR 01 / [facility]    Anesthesia Start: 1153 Anesthesia Stop: 1250    Procedure: I & D Right Leg- supine (Right: Leg Upper) Diagnosis:       Right foot infection      (Right foot infection [L08.9])    Surgeons: Kannan Alford MD Responsible Provider: Ean Rodriguez MD    Anesthesia Type: TIVA ASA Status: 4            Anesthesia Type: No value filed.    Iveth Phase I: Iveth Score: 8    Iveth Phase II:      Anesthesia Post Evaluation    Patient location during evaluation: PACU  Patient participation: complete - patient participated  Level of consciousness: awake and alert  Airway patency: patent  Nausea & Vomiting: no nausea and no vomiting  Cardiovascular status: hemodynamically stable  Respiratory status: acceptable, nonlabored ventilation and spontaneous ventilation  Hydration status: euvolemic  Comments: BP (!) 142/66   Pulse 89   Temp 98.1 °F (36.7 °C)   Resp 15   Ht 1.778 m (5' 10\")   Wt (!) 137.6 kg (303 lb 4.8 oz)   SpO2 97%   BMI 43.52 kg/m²     Multimodal analgesia pain management approach  Pain management: adequate and satisfactory to patient        No notable events documented.

## 2025-04-23 NOTE — PERIOP NOTE
TRANSFER - OUT REPORT:    Verbal report given to KULDEPE Infante on Mario Guanakito Pack  being transferred to [facility] for routine post-op       Report consisted of patient’s Situation, Background, Assessment and   Recommendations(SBAR).     Information from the following report(s) Nurse Handoff Report, Adult Overview, Surgery Report, Intake/Output, MAR, and Recent Results was reviewed with the receiving nurse.    Lines:   Peripheral IV 04/20/25 Posterior;Right Forearm (Active)   Site Assessment Clean, dry & intact 04/23/25 1250   Line Status Infusing 04/23/25 1250   Line Care Connections checked and tightened 04/23/25 1015   Phlebitis Assessment No symptoms 04/23/25 1250   Infiltration Assessment 0 04/23/25 1250   Alcohol Cap Used No 04/23/25 1250   Dressing Status Clean, dry & intact 04/23/25 1250   Dressing Type Transparent 04/23/25 1250       Peripheral IV 04/20/25 Left;Posterior Forearm (Active)   Site Assessment Clean, dry & intact 04/23/25 1250   Line Status Infusing 04/23/25 1250   Line Care Connections checked and tightened 04/23/25 1014   Phlebitis Assessment No symptoms 04/23/25 1250   Infiltration Assessment 0 04/23/25 1250   Alcohol Cap Used No 04/23/25 1250   Dressing Status Clean, dry & intact 04/23/25 1250   Dressing Type Transparent 04/23/25 1250        Opportunity for questions and clarification was provided.      Patient transported with:   O2 @ 2 liters    VTE prophylaxis orders have been written for Mario Guanakito Pack.    Patient and family given floor number and nurses name.  Family updated re: pt status after security code verified.

## 2025-04-23 NOTE — CARE COORDINATION
Discharge planning was discussed with the Interdisciplinary Team. The patient may require IV antibiotics. Short term rehabilitation has been recommended. He will require insurance pre-certification.

## 2025-04-23 NOTE — CARE COORDINATION
RN CM met with the patient, son and daughter at the bedside and discussed discharge planning including possible short term rehabilitation. They stated the physician recommended short term rehabilitation and they are in agreement with the recommendation. They requested to review the list today and stated they will contact case management tomorrow with their facility selections. The patient will require insurance pre-certification.

## 2025-04-23 NOTE — ANESTHESIA PROCEDURE NOTES
Peripheral Block    Patient location during procedure: holding area  Reason for block: post-op pain management and at surgeon's request  Start time: 4/23/2025 10:26 AM  End time: 4/23/2025 10:31 AM  Staffing  Performed: anesthesiologist   Anesthesiologist: Ean Rodriguez MD  Performed by: Ean Rodriguez MD  Authorized by: Ean Rodriguez MD    Preanesthetic Checklist  Completed: patient identified, IV checked, site marked, risks and benefits discussed, surgical/procedural consents, equipment checked, pre-op evaluation, timeout performed, anesthesia consent given, oxygen available and monitors applied/VS acknowledged  Peripheral Block   Prep: ChloraPrep  Provider prep: mask and sterile gloves  Patient monitoring: cardiac monitor, continuous pulse ox, continuous capnometry, frequent blood pressure checks, IV access, oxygen and responsive to questions  Block type: Sciatic  Popliteal  Laterality: right  Injection technique: single-shot  Guidance: nerve stimulator and ultrasound guided    Needle   Needle type: insulated echogenic nerve stimulator needle   Needle gauge: 21 G  Needle localization: anatomical landmarks, ultrasound guidance and nerve stimulator  Needle length: 10 cm  Assessment   Injection assessment: negative aspiration for heme, no paresthesia on injection, local visualized surrounding nerve on ultrasound and no intravascular symptoms  Slow fractionated injection: yes  Hemodynamics: stable  Outcomes: uncomplicated    Additional Notes  Time out at 1026    20 mL 0.5% ropivacaine and 10 mL 1.5% mepivacaine injected in incremental doses of 5 mL    Local anesthetic was visualized surrounding the nerve/block plane under real time ultrasound guidance.  An image was obtained and placed on the chart.  The relevant block area was scanned before, during, and after the local anesthetic injection and no gross abnormalities were observed.   Medications Administered  ropivacaine (NAROPIN) injection 0.5% -

## 2025-04-23 NOTE — ANESTHESIA PROCEDURE NOTES
Peripheral Block    Patient location during procedure: holding area  Reason for block: post-op pain management and at surgeon's request  Start time: 4/23/2025 10:33 AM  End time: 4/23/2025 10:35 AM  Staffing  Performed: anesthesiologist   Anesthesiologist: Ean Rodriguez MD  Performed by: Ean Rodriguez MD  Authorized by: Ean Rodriguez MD    Preanesthetic Checklist  Completed: patient identified, IV checked, site marked, risks and benefits discussed, surgical/procedural consents, equipment checked, pre-op evaluation, timeout performed, anesthesia consent given, oxygen available and monitors applied/VS acknowledged  Peripheral Block   Patient position: supine  Prep: ChloraPrep  Provider prep: mask and sterile gloves  Patient monitoring: cardiac monitor, continuous pulse ox, frequent blood pressure checks, IV access, oxygen and responsive to questions  Block type: Femoral  Adductor canal  Laterality: right  Injection technique: single-shot  Guidance: ultrasound guided  Local infiltration: lidocaine  Infiltration strength: 1 %  Local infiltration: lidocaine  Dose: 1 mL    Needle   Needle type: insulated echogenic nerve stimulator needle   Needle gauge: 21 G  Needle localization: anatomical landmarks and ultrasound guidance  Needle length: 8 cm  Assessment   Injection assessment: negative aspiration for heme, no paresthesia on injection, local visualized surrounding nerve on ultrasound and no intravascular symptoms  Hemodynamics: stable  Outcomes: uncomplicated    Additional Notes  Time out at 1033    Local anesthetic was visualized surrounding the nerve/block plane under real time ultrasound guidance.  An image was obtained and placed on the chart.  The relevant block area was scanned before, during, and after the local anesthetic injection and no gross abnormalities were observed.   Medications Administered  mepivacaine (CARBOCAINE) injection 1.5% - Perineural   10 mL - 4/23/2025 10:33:00

## 2025-04-24 LAB
ANION GAP SERPL CALC-SCNC: 11 MMOL/L (ref 7–16)
BACTERIA SPEC CULT: ABNORMAL
BASOPHILS # BLD: 0.03 K/UL (ref 0–0.2)
BASOPHILS NFR BLD: 0.4 % (ref 0–2)
BUN SERPL-MCNC: 21 MG/DL (ref 8–23)
CALCIUM SERPL-MCNC: 8.7 MG/DL (ref 8.8–10.2)
CHLORIDE SERPL-SCNC: 107 MMOL/L (ref 98–107)
CO2 SERPL-SCNC: 23 MMOL/L (ref 20–29)
CREAT SERPL-MCNC: 1.77 MG/DL (ref 0.8–1.3)
DIFFERENTIAL METHOD BLD: ABNORMAL
EOSINOPHIL # BLD: 0.23 K/UL (ref 0–0.8)
EOSINOPHIL NFR BLD: 2.7 % (ref 0.5–7.8)
ERYTHROCYTE [DISTWIDTH] IN BLOOD BY AUTOMATED COUNT: 15.8 % (ref 11.9–14.6)
GLUCOSE BLD STRIP.AUTO-MCNC: 102 MG/DL (ref 65–100)
GLUCOSE BLD STRIP.AUTO-MCNC: 116 MG/DL (ref 65–100)
GLUCOSE BLD STRIP.AUTO-MCNC: 92 MG/DL (ref 65–100)
GLUCOSE BLD STRIP.AUTO-MCNC: 96 MG/DL (ref 65–100)
GLUCOSE SERPL-MCNC: 109 MG/DL (ref 70–99)
GRAM STN SPEC: ABNORMAL
HCT VFR BLD AUTO: 40.6 % (ref 41.1–50.3)
HCT VFR BLD AUTO: 40.8 % (ref 41.1–50.3)
HGB BLD-MCNC: 12.7 G/DL (ref 13.6–17.2)
HGB BLD-MCNC: 12.9 G/DL (ref 13.6–17.2)
IMM GRANULOCYTES # BLD AUTO: 0.05 K/UL (ref 0–0.5)
IMM GRANULOCYTES NFR BLD AUTO: 0.6 % (ref 0–5)
LYMPHOCYTES # BLD: 2.09 K/UL (ref 0.5–4.6)
LYMPHOCYTES NFR BLD: 24.5 % (ref 13–44)
MCH RBC QN AUTO: 29.1 PG (ref 26.1–32.9)
MCHC RBC AUTO-ENTMCNC: 31.3 G/DL (ref 31.4–35)
MCV RBC AUTO: 92.9 FL (ref 82–102)
MONOCYTES # BLD: 0.92 K/UL (ref 0.1–1.3)
MONOCYTES NFR BLD: 10.8 % (ref 4–12)
NEUTS SEG # BLD: 5.2 K/UL (ref 1.7–8.2)
NEUTS SEG NFR BLD: 61 % (ref 43–78)
NRBC # BLD: 0 K/UL (ref 0–0.2)
PLATELET # BLD AUTO: 267 K/UL (ref 150–450)
PMV BLD AUTO: 10.2 FL (ref 9.4–12.3)
POTASSIUM SERPL-SCNC: 4.1 MMOL/L (ref 3.5–5.1)
RBC # BLD AUTO: 4.37 M/UL (ref 4.23–5.6)
SERVICE CMNT-IMP: ABNORMAL
SERVICE CMNT-IMP: NORMAL
SERVICE CMNT-IMP: NORMAL
SODIUM SERPL-SCNC: 141 MMOL/L (ref 136–145)
WBC # BLD AUTO: 8.5 K/UL (ref 4.3–11.1)

## 2025-04-24 PROCEDURE — 2580000003 HC RX 258: Performed by: HOSPITALIST

## 2025-04-24 PROCEDURE — 6370000000 HC RX 637 (ALT 250 FOR IP): Performed by: HOSPITALIST

## 2025-04-24 PROCEDURE — 2500000003 HC RX 250 WO HCPCS: Performed by: HOSPITALIST

## 2025-04-24 PROCEDURE — 82962 GLUCOSE BLOOD TEST: CPT

## 2025-04-24 PROCEDURE — 1100000000 HC RM PRIVATE

## 2025-04-24 PROCEDURE — 97530 THERAPEUTIC ACTIVITIES: CPT

## 2025-04-24 PROCEDURE — 80048 BASIC METABOLIC PNL TOTAL CA: CPT

## 2025-04-24 PROCEDURE — 6370000000 HC RX 637 (ALT 250 FOR IP): Performed by: INTERNAL MEDICINE

## 2025-04-24 PROCEDURE — 6370000000 HC RX 637 (ALT 250 FOR IP)

## 2025-04-24 PROCEDURE — 6360000002 HC RX W HCPCS: Performed by: HOSPITALIST

## 2025-04-24 PROCEDURE — 85018 HEMOGLOBIN: CPT

## 2025-04-24 PROCEDURE — 36415 COLL VENOUS BLD VENIPUNCTURE: CPT

## 2025-04-24 PROCEDURE — 85014 HEMATOCRIT: CPT

## 2025-04-24 PROCEDURE — 97112 NEUROMUSCULAR REEDUCATION: CPT

## 2025-04-24 PROCEDURE — 85025 COMPLETE CBC W/AUTO DIFF WBC: CPT

## 2025-04-24 PROCEDURE — 2500000003 HC RX 250 WO HCPCS: Performed by: INTERNAL MEDICINE

## 2025-04-24 RX ORDER — FUROSEMIDE 20 MG/1
20 TABLET ORAL 2 TIMES DAILY
Status: DISCONTINUED | OUTPATIENT
Start: 2025-04-24 | End: 2025-04-30 | Stop reason: HOSPADM

## 2025-04-24 RX ORDER — LOSARTAN POTASSIUM 25 MG/1
25 TABLET ORAL DAILY
Status: DISCONTINUED | OUTPATIENT
Start: 2025-04-24 | End: 2025-04-25

## 2025-04-24 RX ORDER — 0.9 % SODIUM CHLORIDE 0.9 %
500 INTRAVENOUS SOLUTION INTRAVENOUS ONCE
Status: COMPLETED | OUTPATIENT
Start: 2025-04-24 | End: 2025-04-24

## 2025-04-24 RX ADMIN — SODIUM CHLORIDE 500 ML: 0.9 INJECTION, SOLUTION INTRAVENOUS at 15:03

## 2025-04-24 RX ADMIN — EMPAGLIFLOZIN 10 MG: 10 TABLET, FILM COATED ORAL at 11:09

## 2025-04-24 RX ADMIN — PREGABALIN 100 MG: 100 CAPSULE ORAL at 08:34

## 2025-04-24 RX ADMIN — SODIUM CHLORIDE, PRESERVATIVE FREE 10 ML: 5 INJECTION INTRAVENOUS at 20:45

## 2025-04-24 RX ADMIN — FEBUXOSTAT 40 MG: 40 TABLET, FILM COATED ORAL at 08:34

## 2025-04-24 RX ADMIN — PREGABALIN 100 MG: 100 CAPSULE ORAL at 20:45

## 2025-04-24 RX ADMIN — CEFAZOLIN 2000 MG: 10 INJECTION, POWDER, FOR SOLUTION INTRAVENOUS at 20:45

## 2025-04-24 RX ADMIN — SODIUM CHLORIDE, PRESERVATIVE FREE 10 ML: 5 INJECTION INTRAVENOUS at 08:35

## 2025-04-24 RX ADMIN — LOSARTAN POTASSIUM 25 MG: 25 TABLET, FILM COATED ORAL at 11:09

## 2025-04-24 RX ADMIN — CEFAZOLIN 2000 MG: 10 INJECTION, POWDER, FOR SOLUTION INTRAVENOUS at 12:26

## 2025-04-24 RX ADMIN — CEFAZOLIN 2000 MG: 10 INJECTION, POWDER, FOR SOLUTION INTRAVENOUS at 05:34

## 2025-04-24 RX ADMIN — FUROSEMIDE 20 MG: 20 TABLET ORAL at 11:09

## 2025-04-24 RX ADMIN — ATORVASTATIN CALCIUM 40 MG: 40 TABLET, FILM COATED ORAL at 20:45

## 2025-04-24 RX ADMIN — TAMSULOSIN HYDROCHLORIDE 0.4 MG: 0.4 CAPSULE ORAL at 08:34

## 2025-04-24 RX ADMIN — APIXABAN 5 MG: 5 TABLET, FILM COATED ORAL at 11:09

## 2025-04-24 ASSESSMENT — PAIN SCALES - GENERAL
PAINLEVEL_OUTOF10: 0
PAINLEVEL_OUTOF10: 0

## 2025-04-24 NOTE — CARE COORDINATION
Call received from unit secretary with patient/family STR choices. Referrals sent in [facility] to [facility], [facility] and [facility]. Per Dr. Alford documentation on 04/23/2025, \"From a orthopedic standpoint next Monday, 4/28/2025 all of the gauze and drains will be removed. Once antibiotics have been specialized and appropriately recommended by infectious disease the patient can be discharged. There is no more plan for orthopedic surgery.\" Patient will need insurance authorization once bed offer accepted and selected.

## 2025-04-25 LAB
ANION GAP SERPL CALC-SCNC: 11 MMOL/L (ref 7–16)
BACTERIA SPEC CULT: ABNORMAL
BACTERIA SPEC CULT: NORMAL
BASOPHILS # BLD: 0.05 K/UL (ref 0–0.2)
BASOPHILS NFR BLD: 0.7 % (ref 0–2)
BUN SERPL-MCNC: 23 MG/DL (ref 8–23)
CALCIUM SERPL-MCNC: 8.7 MG/DL (ref 8.8–10.2)
CHLORIDE SERPL-SCNC: 106 MMOL/L (ref 98–107)
CO2 SERPL-SCNC: 23 MMOL/L (ref 20–29)
CREAT SERPL-MCNC: 2 MG/DL (ref 0.8–1.3)
DIFFERENTIAL METHOD BLD: ABNORMAL
EOSINOPHIL # BLD: 0.25 K/UL (ref 0–0.8)
EOSINOPHIL NFR BLD: 3.4 % (ref 0.5–7.8)
ERYTHROCYTE [DISTWIDTH] IN BLOOD BY AUTOMATED COUNT: 16 % (ref 11.9–14.6)
GLUCOSE BLD STRIP.AUTO-MCNC: 101 MG/DL (ref 65–100)
GLUCOSE BLD STRIP.AUTO-MCNC: 111 MG/DL (ref 65–100)
GLUCOSE BLD STRIP.AUTO-MCNC: 119 MG/DL (ref 65–100)
GLUCOSE BLD STRIP.AUTO-MCNC: 94 MG/DL (ref 65–100)
GLUCOSE SERPL-MCNC: 103 MG/DL (ref 70–99)
GRAM STN SPEC: ABNORMAL
GRAM STN SPEC: ABNORMAL
HCT VFR BLD AUTO: 37.4 % (ref 41.1–50.3)
HCT VFR BLD AUTO: 38.8 % (ref 41.1–50.3)
HCT VFR BLD AUTO: 40.6 % (ref 41.1–50.3)
HGB BLD-MCNC: 12 G/DL (ref 13.6–17.2)
HGB BLD-MCNC: 12.3 G/DL (ref 13.6–17.2)
HGB BLD-MCNC: 13.2 G/DL (ref 13.6–17.2)
IMM GRANULOCYTES # BLD AUTO: 0.04 K/UL (ref 0–0.5)
IMM GRANULOCYTES NFR BLD AUTO: 0.6 % (ref 0–5)
LYMPHOCYTES # BLD: 1.92 K/UL (ref 0.5–4.6)
LYMPHOCYTES NFR BLD: 26.4 % (ref 13–44)
MCH RBC QN AUTO: 29.6 PG (ref 26.1–32.9)
MCHC RBC AUTO-ENTMCNC: 31.7 G/DL (ref 31.4–35)
MCV RBC AUTO: 93.3 FL (ref 82–102)
MONOCYTES # BLD: 0.92 K/UL (ref 0.1–1.3)
MONOCYTES NFR BLD: 12.7 % (ref 4–12)
NEUTS SEG # BLD: 4.08 K/UL (ref 1.7–8.2)
NEUTS SEG NFR BLD: 56.2 % (ref 43–78)
NRBC # BLD: 0 K/UL (ref 0–0.2)
PLATELET # BLD AUTO: 257 K/UL (ref 150–450)
PMV BLD AUTO: 10.2 FL (ref 9.4–12.3)
POTASSIUM SERPL-SCNC: 4.1 MMOL/L (ref 3.5–5.1)
RBC # BLD AUTO: 4.16 M/UL (ref 4.23–5.6)
SERVICE CMNT-IMP: ABNORMAL
SERVICE CMNT-IMP: NORMAL
SERVICE CMNT-IMP: NORMAL
SODIUM SERPL-SCNC: 140 MMOL/L (ref 136–145)
WBC # BLD AUTO: 7.3 K/UL (ref 4.3–11.1)

## 2025-04-25 PROCEDURE — 2500000003 HC RX 250 WO HCPCS: Performed by: HOSPITALIST

## 2025-04-25 PROCEDURE — 80048 BASIC METABOLIC PNL TOTAL CA: CPT

## 2025-04-25 PROCEDURE — 6370000000 HC RX 637 (ALT 250 FOR IP): Performed by: HOSPITALIST

## 2025-04-25 PROCEDURE — 85014 HEMATOCRIT: CPT

## 2025-04-25 PROCEDURE — 85025 COMPLETE CBC W/AUTO DIFF WBC: CPT

## 2025-04-25 PROCEDURE — 36415 COLL VENOUS BLD VENIPUNCTURE: CPT

## 2025-04-25 PROCEDURE — 85018 HEMOGLOBIN: CPT

## 2025-04-25 PROCEDURE — 6360000002 HC RX W HCPCS: Performed by: HOSPITALIST

## 2025-04-25 PROCEDURE — 2500000003 HC RX 250 WO HCPCS: Performed by: INTERNAL MEDICINE

## 2025-04-25 PROCEDURE — 6370000000 HC RX 637 (ALT 250 FOR IP): Performed by: NURSE PRACTITIONER

## 2025-04-25 PROCEDURE — 99221 1ST HOSP IP/OBS SF/LOW 40: CPT | Performed by: PHYSICIAN ASSISTANT

## 2025-04-25 PROCEDURE — 1100000000 HC RM PRIVATE

## 2025-04-25 PROCEDURE — 6370000000 HC RX 637 (ALT 250 FOR IP)

## 2025-04-25 PROCEDURE — 82962 GLUCOSE BLOOD TEST: CPT

## 2025-04-25 PROCEDURE — 94660 CPAP INITIATION&MGMT: CPT

## 2025-04-25 PROCEDURE — 6370000000 HC RX 637 (ALT 250 FOR IP): Performed by: INTERNAL MEDICINE

## 2025-04-25 RX ADMIN — TAMSULOSIN HYDROCHLORIDE 0.4 MG: 0.4 CAPSULE ORAL at 09:15

## 2025-04-25 RX ADMIN — SODIUM CHLORIDE, PRESERVATIVE FREE 10 ML: 5 INJECTION INTRAVENOUS at 09:16

## 2025-04-25 RX ADMIN — CEFAZOLIN 2000 MG: 10 INJECTION, POWDER, FOR SOLUTION INTRAVENOUS at 04:58

## 2025-04-25 RX ADMIN — ATORVASTATIN CALCIUM 40 MG: 40 TABLET, FILM COATED ORAL at 20:41

## 2025-04-25 RX ADMIN — PREGABALIN 100 MG: 100 CAPSULE ORAL at 20:41

## 2025-04-25 RX ADMIN — ALUMINUM HYDROXIDE, MAGNESIUM HYDROXIDE, AND SIMETHICONE 30 ML: 200; 200; 20 SUSPENSION ORAL at 09:25

## 2025-04-25 RX ADMIN — CEFAZOLIN 2000 MG: 10 INJECTION, POWDER, FOR SOLUTION INTRAVENOUS at 12:21

## 2025-04-25 RX ADMIN — CEFAZOLIN 2000 MG: 10 INJECTION, POWDER, FOR SOLUTION INTRAVENOUS at 20:41

## 2025-04-25 RX ADMIN — SODIUM CHLORIDE, PRESERVATIVE FREE 10 ML: 5 INJECTION INTRAVENOUS at 20:41

## 2025-04-25 RX ADMIN — FEBUXOSTAT 40 MG: 40 TABLET, FILM COATED ORAL at 09:15

## 2025-04-25 RX ADMIN — PREGABALIN 100 MG: 100 CAPSULE ORAL at 09:15

## 2025-04-25 RX ADMIN — EMPAGLIFLOZIN 10 MG: 10 TABLET, FILM COATED ORAL at 09:15

## 2025-04-25 NOTE — CARE COORDINATION
Chart reviewed and discussed in IDT.    Spoke with [facility] who is a facility who will take Q8h abx. They would like to continue watching him and the progress he makes with PT.   Will continue to follow up.

## 2025-04-25 NOTE — WOUND CARE
Patient seen by wound care per Dr. Alford to remove Penrose drain and 15 cm of iodoform packing from the right distal tibial wound BKA.    Patient lying in bed supine, HOB slightly elevated, alert and awake, with daughter at bedside. Patient reports no complaints. The post op dressing to the right lower leg is intact and without strikethrough. I removed all of the dressing and discarded then cleansed the entire lower leg with an antimicrobial wound cleanser. The Penrose drain was removed without difficulty. Approximately 15 cm of iodoform packing was carefully removed from the tibial wound. 2 cm of remaining iodoform packing left out of the wound for easy retrieval and removal next Monday per Dr. Alford. The right BKA wounds were then redressed using clean technique with a dry dressing and secured with ACE bandage. The patient tolerated well.

## 2025-04-25 NOTE — PLAN OF CARE
Problem: Chronic Conditions and Co-morbidities  Goal: Patient's chronic conditions and co-morbidity symptoms are monitored and maintained or improved  Outcome: Progressing     Problem: Pain  Goal: Verbalizes/displays adequate comfort level or baseline comfort level  Outcome: Progressing     Problem: Safety - Adult  Goal: Free from fall injury  Outcome: Progressing     Problem: ABCDS Injury Assessment  Goal: Absence of physical injury  Outcome: Progressing     Problem: Neurosensory - Adult  Goal: Achieves stable or improved neurological status  Outcome: Progressing  Goal: Achieves maximal functionality and self care  Outcome: Progressing     Problem: Respiratory - Adult  Goal: Achieves optimal ventilation and oxygenation  Outcome: Progressing     Problem: Cardiovascular - Adult  Goal: Maintains optimal cardiac output and hemodynamic stability  Outcome: Progressing  Goal: Absence of cardiac dysrhythmias or at baseline  Outcome: Progressing     Problem: Skin/Tissue Integrity - Adult  Goal: Skin integrity remains intact  Description: 1.  Monitor for areas of redness and/or skin breakdown2.  Assess vascular access sites hourly3.  Every 4-6 hours minimum:  Change oxygen saturation probe site4.  Every 4-6 hours:  If on nasal continuous positive airway pressure, respiratory therapy assess nares and determine need for appliance change or resting period  Outcome: Progressing  Goal: Incisions, wounds, or drain sites healing without S/S of infection  Outcome: Progressing     Problem: Musculoskeletal - Adult  Goal: Return mobility to safest level of function  Outcome: Progressing  Goal: Return ADL status to a safe level of function  Outcome: Progressing     Problem: Infection - Adult  Goal: Absence of infection at discharge  Outcome: Progressing  Goal: Absence of infection during hospitalization  Outcome: Progressing  Goal: Absence of fever/infection during anticipated neutropenic period  Outcome: Progressing     Problem:

## 2025-04-26 ENCOUNTER — APPOINTMENT (OUTPATIENT)
Dept: CT IMAGING | Age: 80
DRG: 500 | End: 2025-04-26
Payer: MEDICARE

## 2025-04-26 LAB
ANION GAP SERPL CALC-SCNC: 11 MMOL/L (ref 7–16)
BASOPHILS # BLD: 0.05 K/UL (ref 0–0.2)
BASOPHILS NFR BLD: 0.7 % (ref 0–2)
BUN SERPL-MCNC: 19 MG/DL (ref 8–23)
CALCIUM SERPL-MCNC: 9.1 MG/DL (ref 8.8–10.2)
CHLORIDE SERPL-SCNC: 107 MMOL/L (ref 98–107)
CO2 SERPL-SCNC: 22 MMOL/L (ref 20–29)
CREAT SERPL-MCNC: 1.64 MG/DL (ref 0.8–1.3)
DIFFERENTIAL METHOD BLD: ABNORMAL
EOSINOPHIL # BLD: 0.21 K/UL (ref 0–0.8)
EOSINOPHIL NFR BLD: 3 % (ref 0.5–7.8)
ERYTHROCYTE [DISTWIDTH] IN BLOOD BY AUTOMATED COUNT: 15.9 % (ref 11.9–14.6)
GLUCOSE BLD STRIP.AUTO-MCNC: 100 MG/DL (ref 65–100)
GLUCOSE BLD STRIP.AUTO-MCNC: 102 MG/DL (ref 65–100)
GLUCOSE BLD STRIP.AUTO-MCNC: 102 MG/DL (ref 65–100)
GLUCOSE BLD STRIP.AUTO-MCNC: 106 MG/DL (ref 65–100)
GLUCOSE SERPL-MCNC: 95 MG/DL (ref 70–99)
HCT VFR BLD AUTO: 41.9 % (ref 41.1–50.3)
HGB BLD-MCNC: 13.2 G/DL (ref 13.6–17.2)
IMM GRANULOCYTES # BLD AUTO: 0.04 K/UL (ref 0–0.5)
IMM GRANULOCYTES NFR BLD AUTO: 0.6 % (ref 0–5)
LYMPHOCYTES # BLD: 2.13 K/UL (ref 0.5–4.6)
LYMPHOCYTES NFR BLD: 30.3 % (ref 13–44)
MCH RBC QN AUTO: 28.9 PG (ref 26.1–32.9)
MCHC RBC AUTO-ENTMCNC: 31.5 G/DL (ref 31.4–35)
MCV RBC AUTO: 91.9 FL (ref 82–102)
MONOCYTES # BLD: 0.88 K/UL (ref 0.1–1.3)
MONOCYTES NFR BLD: 12.5 % (ref 4–12)
NEUTS SEG # BLD: 3.71 K/UL (ref 1.7–8.2)
NEUTS SEG NFR BLD: 52.9 % (ref 43–78)
NRBC # BLD: 0 K/UL (ref 0–0.2)
PLATELET # BLD AUTO: 269 K/UL (ref 150–450)
PMV BLD AUTO: 10.5 FL (ref 9.4–12.3)
POTASSIUM SERPL-SCNC: 3.7 MMOL/L (ref 3.5–5.1)
RBC # BLD AUTO: 4.56 M/UL (ref 4.23–5.6)
SERVICE CMNT-IMP: ABNORMAL
SERVICE CMNT-IMP: NORMAL
SODIUM SERPL-SCNC: 140 MMOL/L (ref 136–145)
WBC # BLD AUTO: 7 K/UL (ref 4.3–11.1)

## 2025-04-26 PROCEDURE — 80048 BASIC METABOLIC PNL TOTAL CA: CPT

## 2025-04-26 PROCEDURE — 6360000002 HC RX W HCPCS: Performed by: HOSPITALIST

## 2025-04-26 PROCEDURE — 2500000003 HC RX 250 WO HCPCS: Performed by: HOSPITALIST

## 2025-04-26 PROCEDURE — 1100000000 HC RM PRIVATE

## 2025-04-26 PROCEDURE — 6370000000 HC RX 637 (ALT 250 FOR IP): Performed by: NURSE PRACTITIONER

## 2025-04-26 PROCEDURE — 6370000000 HC RX 637 (ALT 250 FOR IP): Performed by: HOSPITALIST

## 2025-04-26 PROCEDURE — 2500000003 HC RX 250 WO HCPCS: Performed by: INTERNAL MEDICINE

## 2025-04-26 PROCEDURE — 6370000000 HC RX 637 (ALT 250 FOR IP)

## 2025-04-26 PROCEDURE — 85025 COMPLETE CBC W/AUTO DIFF WBC: CPT

## 2025-04-26 PROCEDURE — 74176 CT ABD & PELVIS W/O CONTRAST: CPT

## 2025-04-26 PROCEDURE — 99232 SBSQ HOSP IP/OBS MODERATE 35: CPT | Performed by: PHYSICIAN ASSISTANT

## 2025-04-26 PROCEDURE — 6370000000 HC RX 637 (ALT 250 FOR IP): Performed by: INTERNAL MEDICINE

## 2025-04-26 PROCEDURE — 36415 COLL VENOUS BLD VENIPUNCTURE: CPT

## 2025-04-26 PROCEDURE — 82962 GLUCOSE BLOOD TEST: CPT

## 2025-04-26 RX ADMIN — CEFAZOLIN 2000 MG: 10 INJECTION, POWDER, FOR SOLUTION INTRAVENOUS at 20:15

## 2025-04-26 RX ADMIN — CEFAZOLIN 2000 MG: 10 INJECTION, POWDER, FOR SOLUTION INTRAVENOUS at 11:41

## 2025-04-26 RX ADMIN — ALUMINUM HYDROXIDE, MAGNESIUM HYDROXIDE, AND SIMETHICONE 30 ML: 200; 200; 20 SUSPENSION ORAL at 11:41

## 2025-04-26 RX ADMIN — FEBUXOSTAT 40 MG: 40 TABLET, FILM COATED ORAL at 08:09

## 2025-04-26 RX ADMIN — PREGABALIN 100 MG: 100 CAPSULE ORAL at 08:09

## 2025-04-26 RX ADMIN — SODIUM CHLORIDE, PRESERVATIVE FREE 10 ML: 5 INJECTION INTRAVENOUS at 20:15

## 2025-04-26 RX ADMIN — ATORVASTATIN CALCIUM 40 MG: 40 TABLET, FILM COATED ORAL at 20:15

## 2025-04-26 RX ADMIN — PREGABALIN 100 MG: 100 CAPSULE ORAL at 20:15

## 2025-04-26 RX ADMIN — EMPAGLIFLOZIN 10 MG: 10 TABLET, FILM COATED ORAL at 08:09

## 2025-04-26 RX ADMIN — SODIUM CHLORIDE, PRESERVATIVE FREE 10 ML: 5 INJECTION INTRAVENOUS at 08:09

## 2025-04-26 RX ADMIN — TAMSULOSIN HYDROCHLORIDE 0.4 MG: 0.4 CAPSULE ORAL at 08:09

## 2025-04-26 RX ADMIN — CEFAZOLIN 2000 MG: 10 INJECTION, POWDER, FOR SOLUTION INTRAVENOUS at 04:58

## 2025-04-26 ASSESSMENT — PAIN SCALES - GENERAL: PAINLEVEL_OUTOF10: 0

## 2025-04-26 NOTE — PLAN OF CARE
Problem: Chronic Conditions and Co-morbidities  Goal: Patient's chronic conditions and co-morbidity symptoms are monitored and maintained or improved  4/25/2025 2300 by Khalif De Jesus RN  Outcome: Progressing  4/25/2025 1038 by Nicki Zambrano RN  Outcome: Progressing     Problem: Pain  Goal: Verbalizes/displays adequate comfort level or baseline comfort level  4/25/2025 2300 by Khalif De Jesus RN  Outcome: Progressing  4/25/2025 1038 by Nicki Zambrano RN  Outcome: Progressing     Problem: Safety - Adult  Goal: Free from fall injury  4/25/2025 2300 by Khalif De Jesus RN  Outcome: Progressing  4/25/2025 1038 by Nicki Zambrano RN  Outcome: Progressing     Problem: ABCDS Injury Assessment  Goal: Absence of physical injury  4/25/2025 2300 by Khalif De Jesus RN  Outcome: Progressing  4/25/2025 1038 by Nicki Zambrano RN  Outcome: Progressing     Problem: Neurosensory - Adult  Goal: Achieves stable or improved neurological status  4/25/2025 2300 by Khalif De Jesus RN  Outcome: Progressing  4/25/2025 1038 by Nicki Zambrano RN  Outcome: Progressing  Goal: Achieves maximal functionality and self care  4/25/2025 2300 by Khalif De Jesus RN  Outcome: Progressing  4/25/2025 1038 by Nicki Zambrano RN  Outcome: Progressing     Problem: Respiratory - Adult  Goal: Achieves optimal ventilation and oxygenation  4/25/2025 2300 by Khalif De Jesus RN  Outcome: Progressing  4/25/2025 1038 by Nicki Zambrano RN  Outcome: Progressing     Problem: Cardiovascular - Adult  Goal: Maintains optimal cardiac output and hemodynamic stability  4/25/2025 2300 by Khalif De Jesus RN  Outcome: Progressing  4/25/2025 1038 by Nicki Zambrano RN  Outcome: Progressing  Goal: Absence of cardiac dysrhythmias or at baseline  4/25/2025 2300 by Khalif De Jesus RN  Outcome: Progressing  4/25/2025 1038 by Nicki Zambrano RN  Outcome: Progressing     Problem: Skin/Tissue Integrity - Adult  Goal: Skin integrity remains

## 2025-04-27 ENCOUNTER — APPOINTMENT (OUTPATIENT)
Dept: ULTRASOUND IMAGING | Age: 80
DRG: 500 | End: 2025-04-27
Payer: MEDICARE

## 2025-04-27 PROBLEM — N28.1 COMPLEX RENAL CYST: Status: ACTIVE | Noted: 2025-04-27

## 2025-04-27 PROBLEM — K86.89 PANCREATIC MASS: Status: ACTIVE | Noted: 2025-04-27

## 2025-04-27 LAB
BACTERIA SPEC CULT: NORMAL
GLUCOSE BLD STRIP.AUTO-MCNC: 103 MG/DL (ref 65–100)
GLUCOSE BLD STRIP.AUTO-MCNC: 128 MG/DL (ref 65–100)
GLUCOSE BLD STRIP.AUTO-MCNC: 89 MG/DL (ref 65–100)
GLUCOSE BLD STRIP.AUTO-MCNC: 93 MG/DL (ref 65–100)
GLUCOSE BLD STRIP.AUTO-MCNC: 95 MG/DL (ref 65–100)
SERVICE CMNT-IMP: ABNORMAL
SERVICE CMNT-IMP: ABNORMAL
SERVICE CMNT-IMP: NORMAL

## 2025-04-27 PROCEDURE — 6370000000 HC RX 637 (ALT 250 FOR IP): Performed by: HOSPITALIST

## 2025-04-27 PROCEDURE — 2500000003 HC RX 250 WO HCPCS: Performed by: INTERNAL MEDICINE

## 2025-04-27 PROCEDURE — 1100000000 HC RM PRIVATE

## 2025-04-27 PROCEDURE — 6370000000 HC RX 637 (ALT 250 FOR IP): Performed by: NURSE PRACTITIONER

## 2025-04-27 PROCEDURE — 6360000002 HC RX W HCPCS: Performed by: HOSPITALIST

## 2025-04-27 PROCEDURE — 6370000000 HC RX 637 (ALT 250 FOR IP)

## 2025-04-27 PROCEDURE — 2500000003 HC RX 250 WO HCPCS: Performed by: HOSPITALIST

## 2025-04-27 PROCEDURE — 6370000000 HC RX 637 (ALT 250 FOR IP): Performed by: INTERNAL MEDICINE

## 2025-04-27 PROCEDURE — 82962 GLUCOSE BLOOD TEST: CPT

## 2025-04-27 PROCEDURE — 51702 INSERT TEMP BLADDER CATH: CPT

## 2025-04-27 RX ADMIN — SODIUM CHLORIDE, PRESERVATIVE FREE 10 ML: 5 INJECTION INTRAVENOUS at 08:16

## 2025-04-27 RX ADMIN — PREGABALIN 100 MG: 100 CAPSULE ORAL at 19:57

## 2025-04-27 RX ADMIN — CEFAZOLIN 2000 MG: 10 INJECTION, POWDER, FOR SOLUTION INTRAVENOUS at 12:18

## 2025-04-27 RX ADMIN — FEBUXOSTAT 40 MG: 40 TABLET, FILM COATED ORAL at 08:15

## 2025-04-27 RX ADMIN — CEFAZOLIN 2000 MG: 10 INJECTION, POWDER, FOR SOLUTION INTRAVENOUS at 20:00

## 2025-04-27 RX ADMIN — TAMSULOSIN HYDROCHLORIDE 0.4 MG: 0.4 CAPSULE ORAL at 08:15

## 2025-04-27 RX ADMIN — EMPAGLIFLOZIN 10 MG: 10 TABLET, FILM COATED ORAL at 08:15

## 2025-04-27 RX ADMIN — PREGABALIN 100 MG: 100 CAPSULE ORAL at 08:16

## 2025-04-27 RX ADMIN — ATORVASTATIN CALCIUM 40 MG: 40 TABLET, FILM COATED ORAL at 19:57

## 2025-04-27 RX ADMIN — CEFAZOLIN 2000 MG: 10 INJECTION, POWDER, FOR SOLUTION INTRAVENOUS at 04:00

## 2025-04-27 RX ADMIN — ALUMINUM HYDROXIDE, MAGNESIUM HYDROXIDE, AND SIMETHICONE 30 ML: 200; 200; 20 SUSPENSION ORAL at 14:47

## 2025-04-27 RX ADMIN — SODIUM CHLORIDE, PRESERVATIVE FREE 10 ML: 5 INJECTION INTRAVENOUS at 19:57

## 2025-04-27 ASSESSMENT — PAIN SCALES - GENERAL
PAINLEVEL_OUTOF10: 0

## 2025-04-28 ENCOUNTER — APPOINTMENT (OUTPATIENT)
Dept: ULTRASOUND IMAGING | Age: 80
DRG: 500 | End: 2025-04-28
Payer: MEDICARE

## 2025-04-28 PROBLEM — K86.89 MASS OF HEAD OF PANCREAS: Status: ACTIVE | Noted: 2025-04-20

## 2025-04-28 LAB
ALBUMIN SERPL-MCNC: 2.5 G/DL (ref 3.2–4.6)
ALBUMIN/GLOB SERPL: 0.6 (ref 1–1.9)
ALP SERPL-CCNC: 119 U/L (ref 40–129)
ALT SERPL-CCNC: <5 U/L (ref 8–55)
AST SERPL-CCNC: 32 U/L (ref 15–37)
BACTERIA SPEC CULT: NORMAL
BILIRUB DIRECT SERPL-MCNC: 0.1 MG/DL (ref 0–0.3)
BILIRUB SERPL-MCNC: 0.3 MG/DL (ref 0–1.2)
ERYTHROCYTE [DISTWIDTH] IN BLOOD BY AUTOMATED COUNT: 16 % (ref 11.9–14.6)
GLOBULIN SER CALC-MCNC: 4.1 G/DL (ref 2.3–3.5)
GLUCOSE BLD STRIP.AUTO-MCNC: 107 MG/DL (ref 65–100)
GLUCOSE BLD STRIP.AUTO-MCNC: 113 MG/DL (ref 65–100)
GLUCOSE BLD STRIP.AUTO-MCNC: 98 MG/DL (ref 65–100)
GLUCOSE BLD STRIP.AUTO-MCNC: 99 MG/DL (ref 65–100)
HCT VFR BLD AUTO: 41.2 % (ref 41.1–50.3)
HGB BLD-MCNC: 13.5 G/DL (ref 13.6–17.2)
MCH RBC QN AUTO: 29.2 PG (ref 26.1–32.9)
MCHC RBC AUTO-ENTMCNC: 32.8 G/DL (ref 31.4–35)
MCV RBC AUTO: 89.2 FL (ref 82–102)
NRBC # BLD: 0 K/UL (ref 0–0.2)
PLATELET # BLD AUTO: 291 K/UL (ref 150–450)
PMV BLD AUTO: 10 FL (ref 9.4–12.3)
PROT SERPL-MCNC: 6.6 G/DL (ref 6.3–8.2)
RBC # BLD AUTO: 4.62 M/UL (ref 4.23–5.6)
SERVICE CMNT-IMP: ABNORMAL
SERVICE CMNT-IMP: ABNORMAL
SERVICE CMNT-IMP: NORMAL
WBC # BLD AUTO: 7.3 K/UL (ref 4.3–11.1)

## 2025-04-28 PROCEDURE — 1100000000 HC RM PRIVATE

## 2025-04-28 PROCEDURE — 6370000000 HC RX 637 (ALT 250 FOR IP): Performed by: HOSPITALIST

## 2025-04-28 PROCEDURE — 97535 SELF CARE MNGMENT TRAINING: CPT

## 2025-04-28 PROCEDURE — 85027 COMPLETE CBC AUTOMATED: CPT

## 2025-04-28 PROCEDURE — 6370000000 HC RX 637 (ALT 250 FOR IP): Performed by: STUDENT IN AN ORGANIZED HEALTH CARE EDUCATION/TRAINING PROGRAM

## 2025-04-28 PROCEDURE — 93971 EXTREMITY STUDY: CPT

## 2025-04-28 PROCEDURE — 86301 IMMUNOASSAY TUMOR CA 19-9: CPT

## 2025-04-28 PROCEDURE — 2500000003 HC RX 250 WO HCPCS: Performed by: HOSPITALIST

## 2025-04-28 PROCEDURE — 2500000003 HC RX 250 WO HCPCS: Performed by: INTERNAL MEDICINE

## 2025-04-28 PROCEDURE — 6360000002 HC RX W HCPCS: Performed by: HOSPITALIST

## 2025-04-28 PROCEDURE — 6370000000 HC RX 637 (ALT 250 FOR IP)

## 2025-04-28 PROCEDURE — 93971 EXTREMITY STUDY: CPT | Performed by: RADIOLOGY

## 2025-04-28 PROCEDURE — 99222 1ST HOSP IP/OBS MODERATE 55: CPT | Performed by: STUDENT IN AN ORGANIZED HEALTH CARE EDUCATION/TRAINING PROGRAM

## 2025-04-28 PROCEDURE — 97530 THERAPEUTIC ACTIVITIES: CPT

## 2025-04-28 PROCEDURE — 80076 HEPATIC FUNCTION PANEL: CPT

## 2025-04-28 PROCEDURE — APPSS60 APP SPLIT SHARED TIME 46-60 MINUTES: Performed by: NURSE PRACTITIONER

## 2025-04-28 PROCEDURE — 99223 1ST HOSP IP/OBS HIGH 75: CPT | Performed by: INTERNAL MEDICINE

## 2025-04-28 PROCEDURE — 99223 1ST HOSP IP/OBS HIGH 75: CPT | Performed by: SURGERY

## 2025-04-28 PROCEDURE — 6370000000 HC RX 637 (ALT 250 FOR IP): Performed by: INTERNAL MEDICINE

## 2025-04-28 PROCEDURE — 82962 GLUCOSE BLOOD TEST: CPT

## 2025-04-28 PROCEDURE — 36415 COLL VENOUS BLD VENIPUNCTURE: CPT

## 2025-04-28 RX ORDER — DIPHENHYDRAMINE HCL 25 MG
50 CAPSULE ORAL EVERY 8 HOURS PRN
Status: DISCONTINUED | OUTPATIENT
Start: 2025-04-28 | End: 2025-04-28

## 2025-04-28 RX ORDER — BETAMETHASONE DIPROPIONATE 0.5 MG/G
CREAM TOPICAL EVERY 24 HOURS
Status: DISCONTINUED | OUTPATIENT
Start: 2025-04-28 | End: 2025-04-30 | Stop reason: HOSPADM

## 2025-04-28 RX ORDER — CETIRIZINE HYDROCHLORIDE 10 MG/1
10 TABLET ORAL DAILY
Status: DISCONTINUED | OUTPATIENT
Start: 2025-04-28 | End: 2025-04-30 | Stop reason: HOSPADM

## 2025-04-28 RX ADMIN — PREGABALIN 100 MG: 100 CAPSULE ORAL at 20:13

## 2025-04-28 RX ADMIN — CEFAZOLIN 2000 MG: 10 INJECTION, POWDER, FOR SOLUTION INTRAVENOUS at 04:00

## 2025-04-28 RX ADMIN — METOPROLOL SUCCINATE 25 MG: 25 TABLET, EXTENDED RELEASE ORAL at 18:26

## 2025-04-28 RX ADMIN — EMPAGLIFLOZIN 10 MG: 10 TABLET, FILM COATED ORAL at 08:16

## 2025-04-28 RX ADMIN — FUROSEMIDE 20 MG: 20 TABLET ORAL at 18:26

## 2025-04-28 RX ADMIN — FEBUXOSTAT 40 MG: 40 TABLET, FILM COATED ORAL at 08:16

## 2025-04-28 RX ADMIN — TAMSULOSIN HYDROCHLORIDE 0.4 MG: 0.4 CAPSULE ORAL at 08:16

## 2025-04-28 RX ADMIN — CETIRIZINE HYDROCHLORIDE 10 MG: 10 TABLET, FILM COATED ORAL at 13:54

## 2025-04-28 RX ADMIN — SODIUM CHLORIDE, PRESERVATIVE FREE 10 ML: 5 INJECTION INTRAVENOUS at 20:13

## 2025-04-28 RX ADMIN — PREGABALIN 100 MG: 100 CAPSULE ORAL at 08:16

## 2025-04-28 RX ADMIN — ATORVASTATIN CALCIUM 40 MG: 40 TABLET, FILM COATED ORAL at 20:13

## 2025-04-28 RX ADMIN — CEFAZOLIN 2000 MG: 10 INJECTION, POWDER, FOR SOLUTION INTRAVENOUS at 13:55

## 2025-04-28 RX ADMIN — CEFAZOLIN 2000 MG: 10 INJECTION, POWDER, FOR SOLUTION INTRAVENOUS at 20:14

## 2025-04-28 RX ADMIN — SODIUM CHLORIDE, PRESERVATIVE FREE 10 ML: 5 INJECTION INTRAVENOUS at 08:17

## 2025-04-28 RX ADMIN — BETAMETHASONE DIPROPIONATE: 0.5 CREAM, AUGMENTED TOPICAL at 13:54

## 2025-04-28 ASSESSMENT — ENCOUNTER SYMPTOMS
BLOOD IN STOOL: 0
DIARRHEA: 0
ABDOMINAL PAIN: 0
NAUSEA: 1
CONSTIPATION: 0
VOMITING: 0

## 2025-04-28 ASSESSMENT — PAIN SCALES - GENERAL
PAINLEVEL_OUTOF10: 0

## 2025-04-28 NOTE — PERIOP NOTE
Preop department called to notify patient of arrival time for scheduled procedure. Instructions given to   - Arrive at [facility] Entrance [address].  - Nothing to eat after midnight unless otherwise indicated. No gum, mints, or ice chips. You may have clear liquids two hours prior to arrival to the hospital.   - Have a responsible adult to drive patient to the hospital, stay during surgery, and patient will need supervision 24 hours after anesthesia.   - Use antibacterial soap in shower the night before surgery and on the morning of surgery.       Was patient contacted: yes, floor nurse  Voicemail left: n/a  Numbers contacted: [phone]   Arrival time: 1415  Time to stop clear liquids: 1215

## 2025-04-28 NOTE — PLAN OF CARE
Problem: Chronic Conditions and Co-morbidities  Goal: Patient's chronic conditions and co-morbidity symptoms are monitored and maintained or improved  4/28/2025 0716 by Stephie Lange RN  Outcome: Progressing  4/27/2025 1930 by Diamond Stark RN  Outcome: Progressing  Flowsheets (Taken 4/27/2025 1915)  Care Plan - Patient's Chronic Conditions and Co-Morbidity Symptoms are Monitored and Maintained or Improved:   Monitor and assess patient's chronic conditions and comorbid symptoms for stability, deterioration, or improvement   Collaborate with multidisciplinary team to address chronic and comorbid conditions and prevent exacerbation or deterioration   Update acute care plan with appropriate goals if chronic or comorbid symptoms are exacerbated and prevent overall improvement and discharge     Problem: Pain  Goal: Verbalizes/displays adequate comfort level or baseline comfort level  4/28/2025 0716 by Stephie Lange RN  Outcome: Progressing  4/27/2025 1930 by Diamond Stark RN  Outcome: Progressing  Flowsheets (Taken 4/27/2025 1915)  Verbalizes/displays adequate comfort level or baseline comfort level:   Encourage patient to monitor pain and request assistance   Assess pain using appropriate pain scale   Administer analgesics based on type and severity of pain and evaluate response   Implement non-pharmacological measures as appropriate and evaluate response     Problem: Safety - Adult  Goal: Free from fall injury  4/27/2025 1930 by Diamond Stark RN  Outcome: Progressing  Flowsheets (Taken 4/27/2025 1915)  Free From Fall Injury: Instruct family/caregiver on patient safety     Problem: ABCDS Injury Assessment  Goal: Absence of physical injury  4/27/2025 1930 by Diamond Stark RN  Outcome: Progressing  Flowsheets (Taken 4/27/2025 1915)  Absence of Physical Injury: Implement safety measures based on patient assessment     Problem: Neurosensory - Adult  Goal: Achieves stable or improved neurological status  4/27/2025

## 2025-04-28 NOTE — CARE COORDINATION
Chart reviewed by MARIANO RODRIGUES. Patient is medically stable for discharge, awaiting SNF placement. According to SNF liaison, patient has been extended a bed offer, however, bed will tentatively be available  4/30-5/1 at the earliest. MARIANO RODRIGUES will initiate pre-cert when appropriate.    Disposition: STR at a SNF with IV Antibiotics- [facility].

## 2025-04-28 NOTE — WOUND CARE
Patient seen by wound care for right BKA wound.    Patient sitting in chair, alert and awake. I removed the remaining 15 cm of packing per Dr. Alford and covered the wound with Xeroform and dry sterile dressing, then wrapped with ACE bandage - patient tolerated well. No erythema or swelling to area noted. Drainage small amount of serosanguinous - no purulence noted. I communicated with Dr. Alford via [employer] and received verbal orders to leave this dressing in place for 2 weeks. May reinforce for dislodgement.

## 2025-04-29 ENCOUNTER — ANESTHESIA EVENT (OUTPATIENT)
Dept: SURGERY | Age: 80
DRG: 500 | End: 2025-04-29
Payer: MEDICARE

## 2025-04-29 ENCOUNTER — ANESTHESIA (OUTPATIENT)
Dept: SURGERY | Age: 80
DRG: 500 | End: 2025-04-29
Payer: MEDICARE

## 2025-04-29 LAB
ALBUMIN SERPL-MCNC: 2.5 G/DL (ref 3.2–4.6)
ALBUMIN/GLOB SERPL: 0.6 (ref 1–1.9)
ALP SERPL-CCNC: 127 U/L (ref 40–129)
ALT SERPL-CCNC: <5 U/L (ref 8–55)
ANION GAP SERPL CALC-SCNC: 12 MMOL/L (ref 7–16)
AST SERPL-CCNC: 37 U/L (ref 15–37)
BASOPHILS # BLD: 0.03 K/UL (ref 0–0.2)
BASOPHILS NFR BLD: 0.4 % (ref 0–2)
BILIRUB SERPL-MCNC: 0.3 MG/DL (ref 0–1.2)
BUN SERPL-MCNC: 18 MG/DL (ref 8–23)
CALCIUM SERPL-MCNC: 9.3 MG/DL (ref 8.8–10.2)
CANCER AG19-9 SERPL-ACNC: 33.5 U/ML (ref 0–35)
CHLORIDE SERPL-SCNC: 107 MMOL/L (ref 98–107)
CO2 SERPL-SCNC: 24 MMOL/L (ref 20–29)
CREAT SERPL-MCNC: 1.49 MG/DL (ref 0.8–1.3)
DIFFERENTIAL METHOD BLD: ABNORMAL
EOSINOPHIL # BLD: 0.27 K/UL (ref 0–0.8)
EOSINOPHIL NFR BLD: 3.8 % (ref 0.5–7.8)
ERYTHROCYTE [DISTWIDTH] IN BLOOD BY AUTOMATED COUNT: 16 % (ref 11.9–14.6)
GLOBULIN SER CALC-MCNC: 4.1 G/DL (ref 2.3–3.5)
GLUCOSE BLD STRIP.AUTO-MCNC: 103 MG/DL (ref 65–100)
GLUCOSE BLD STRIP.AUTO-MCNC: 104 MG/DL (ref 65–100)
GLUCOSE BLD STRIP.AUTO-MCNC: 94 MG/DL (ref 65–100)
GLUCOSE BLD STRIP.AUTO-MCNC: 97 MG/DL (ref 65–100)
GLUCOSE SERPL-MCNC: 102 MG/DL (ref 70–99)
HCT VFR BLD AUTO: 42.6 % (ref 41.1–50.3)
HGB BLD-MCNC: 13.8 G/DL (ref 13.6–17.2)
IMM GRANULOCYTES # BLD AUTO: 0.06 K/UL (ref 0–0.5)
IMM GRANULOCYTES NFR BLD AUTO: 0.8 % (ref 0–5)
LYMPHOCYTES # BLD: 2.11 K/UL (ref 0.5–4.6)
LYMPHOCYTES NFR BLD: 29.6 % (ref 13–44)
MAGNESIUM SERPL-MCNC: 2 MG/DL (ref 1.8–2.4)
MCH RBC QN AUTO: 29.2 PG (ref 26.1–32.9)
MCHC RBC AUTO-ENTMCNC: 32.4 G/DL (ref 31.4–35)
MCV RBC AUTO: 90.3 FL (ref 82–102)
MONOCYTES # BLD: 0.87 K/UL (ref 0.1–1.3)
MONOCYTES NFR BLD: 12.2 % (ref 4–12)
NEUTS SEG # BLD: 3.8 K/UL (ref 1.7–8.2)
NEUTS SEG NFR BLD: 53.2 % (ref 43–78)
NRBC # BLD: 0 K/UL (ref 0–0.2)
PLATELET # BLD AUTO: 287 K/UL (ref 150–450)
PMV BLD AUTO: 10 FL (ref 9.4–12.3)
POTASSIUM SERPL-SCNC: 3.5 MMOL/L (ref 3.5–5.1)
PROT SERPL-MCNC: 6.5 G/DL (ref 6.3–8.2)
RBC # BLD AUTO: 4.72 M/UL (ref 4.23–5.6)
SERVICE CMNT-IMP: ABNORMAL
SERVICE CMNT-IMP: ABNORMAL
SERVICE CMNT-IMP: NORMAL
SERVICE CMNT-IMP: NORMAL
SODIUM SERPL-SCNC: 142 MMOL/L (ref 136–145)
WBC # BLD AUTO: 7.1 K/UL (ref 4.3–11.1)

## 2025-04-29 PROCEDURE — 3600000004 HC SURGERY LEVEL 4 BASE: Performed by: INTERNAL MEDICINE

## 2025-04-29 PROCEDURE — 6370000000 HC RX 637 (ALT 250 FOR IP): Performed by: INTERNAL MEDICINE

## 2025-04-29 PROCEDURE — 3700000000 HC ANESTHESIA ATTENDED CARE: Performed by: INTERNAL MEDICINE

## 2025-04-29 PROCEDURE — 6370000000 HC RX 637 (ALT 250 FOR IP): Performed by: STUDENT IN AN ORGANIZED HEALTH CARE EDUCATION/TRAINING PROGRAM

## 2025-04-29 PROCEDURE — 82962 GLUCOSE BLOOD TEST: CPT

## 2025-04-29 PROCEDURE — 6360000002 HC RX W HCPCS: Performed by: HOSPITALIST

## 2025-04-29 PROCEDURE — 51798 US URINE CAPACITY MEASURE: CPT

## 2025-04-29 PROCEDURE — 51701 INSERT BLADDER CATHETER: CPT

## 2025-04-29 PROCEDURE — 6360000002 HC RX W HCPCS: Performed by: STUDENT IN AN ORGANIZED HEALTH CARE EDUCATION/TRAINING PROGRAM

## 2025-04-29 PROCEDURE — 6360000002 HC RX W HCPCS: Performed by: ANESTHESIOLOGY

## 2025-04-29 PROCEDURE — 2500000003 HC RX 250 WO HCPCS: Performed by: INTERNAL MEDICINE

## 2025-04-29 PROCEDURE — 0DJ08ZZ INSPECTION OF UPPER INTESTINAL TRACT, VIA NATURAL OR ARTIFICIAL OPENING ENDOSCOPIC: ICD-10-PCS | Performed by: INTERNAL MEDICINE

## 2025-04-29 PROCEDURE — 2500000003 HC RX 250 WO HCPCS: Performed by: HOSPITALIST

## 2025-04-29 PROCEDURE — 2500000003 HC RX 250 WO HCPCS: Performed by: STUDENT IN AN ORGANIZED HEALTH CARE EDUCATION/TRAINING PROGRAM

## 2025-04-29 PROCEDURE — 7100000000 HC PACU RECOVERY - FIRST 15 MIN: Performed by: INTERNAL MEDICINE

## 2025-04-29 PROCEDURE — 2580000003 HC RX 258

## 2025-04-29 PROCEDURE — 6360000002 HC RX W HCPCS

## 2025-04-29 PROCEDURE — 83735 ASSAY OF MAGNESIUM: CPT

## 2025-04-29 PROCEDURE — 94640 AIRWAY INHALATION TREATMENT: CPT

## 2025-04-29 PROCEDURE — 7100000001 HC PACU RECOVERY - ADDTL 15 MIN: Performed by: INTERNAL MEDICINE

## 2025-04-29 PROCEDURE — 3600000014 HC SURGERY LEVEL 4 ADDTL 15MIN: Performed by: INTERNAL MEDICINE

## 2025-04-29 PROCEDURE — 2580000003 HC RX 258: Performed by: STUDENT IN AN ORGANIZED HEALTH CARE EDUCATION/TRAINING PROGRAM

## 2025-04-29 PROCEDURE — 3700000001 HC ADD 15 MINUTES (ANESTHESIA): Performed by: INTERNAL MEDICINE

## 2025-04-29 PROCEDURE — 6370000000 HC RX 637 (ALT 250 FOR IP)

## 2025-04-29 PROCEDURE — 2709999900 HC NON-CHARGEABLE SUPPLY: Performed by: INTERNAL MEDICINE

## 2025-04-29 PROCEDURE — 36415 COLL VENOUS BLD VENIPUNCTURE: CPT

## 2025-04-29 PROCEDURE — 6370000000 HC RX 637 (ALT 250 FOR IP): Performed by: HOSPITALIST

## 2025-04-29 PROCEDURE — 1100000000 HC RM PRIVATE

## 2025-04-29 PROCEDURE — 94664 DEMO&/EVAL PT USE INHALER: CPT

## 2025-04-29 PROCEDURE — 85025 COMPLETE CBC W/AUTO DIFF WBC: CPT

## 2025-04-29 PROCEDURE — 80053 COMPREHEN METABOLIC PANEL: CPT

## 2025-04-29 RX ORDER — LEVALBUTEROL INHALATION SOLUTION 1.25 MG/3ML
2.5 SOLUTION RESPIRATORY (INHALATION) ONCE
Status: COMPLETED | OUTPATIENT
Start: 2025-04-29 | End: 2025-04-29

## 2025-04-29 RX ORDER — SODIUM CHLORIDE, SODIUM LACTATE, POTASSIUM CHLORIDE, CALCIUM CHLORIDE 600; 310; 30; 20 MG/100ML; MG/100ML; MG/100ML; MG/100ML
INJECTION, SOLUTION INTRAVENOUS
Status: DISCONTINUED | OUTPATIENT
Start: 2025-04-29 | End: 2025-04-29 | Stop reason: SDUPTHER

## 2025-04-29 RX ORDER — FINASTERIDE 5 MG/1
5 TABLET, FILM COATED ORAL DAILY
Status: DISCONTINUED | OUTPATIENT
Start: 2025-04-29 | End: 2025-04-30 | Stop reason: HOSPADM

## 2025-04-29 RX ORDER — PROPOFOL 10 MG/ML
INJECTION, EMULSION INTRAVENOUS
Status: DISCONTINUED | OUTPATIENT
Start: 2025-04-29 | End: 2025-04-29 | Stop reason: SDUPTHER

## 2025-04-29 RX ORDER — CIPROFLOXACIN 2 MG/ML
INJECTION, SOLUTION INTRAVENOUS
Status: DISCONTINUED | OUTPATIENT
Start: 2025-04-29 | End: 2025-04-29 | Stop reason: SDUPTHER

## 2025-04-29 RX ORDER — SODIUM CHLORIDE, SODIUM LACTATE, POTASSIUM CHLORIDE, CALCIUM CHLORIDE 600; 310; 30; 20 MG/100ML; MG/100ML; MG/100ML; MG/100ML
INJECTION, SOLUTION INTRAVENOUS CONTINUOUS
Status: DISCONTINUED | OUTPATIENT
Start: 2025-04-29 | End: 2025-04-29 | Stop reason: HOSPADM

## 2025-04-29 RX ADMIN — NAFCILLIN 12000 MG: 10 INJECTION, POWDER, FOR SOLUTION INTRAVENOUS at 15:05

## 2025-04-29 RX ADMIN — SODIUM CHLORIDE, PRESERVATIVE FREE 10 ML: 5 INJECTION INTRAVENOUS at 08:19

## 2025-04-29 RX ADMIN — METOPROLOL SUCCINATE 25 MG: 25 TABLET, EXTENDED RELEASE ORAL at 18:44

## 2025-04-29 RX ADMIN — BETAMETHASONE DIPROPIONATE: 0.5 CREAM, AUGMENTED TOPICAL at 18:46

## 2025-04-29 RX ADMIN — PREGABALIN 100 MG: 100 CAPSULE ORAL at 08:19

## 2025-04-29 RX ADMIN — CETIRIZINE HYDROCHLORIDE 10 MG: 10 TABLET, FILM COATED ORAL at 08:19

## 2025-04-29 RX ADMIN — EMPAGLIFLOZIN 10 MG: 10 TABLET, FILM COATED ORAL at 08:19

## 2025-04-29 RX ADMIN — LEVALBUTEROL HYDROCHLORIDE 2.5 MG: 1.25 SOLUTION RESPIRATORY (INHALATION) at 15:06

## 2025-04-29 RX ADMIN — PREGABALIN 100 MG: 100 CAPSULE ORAL at 21:14

## 2025-04-29 RX ADMIN — CIPROFLOXACIN 400 MG: 400 INJECTION, SOLUTION INTRAVENOUS at 16:32

## 2025-04-29 RX ADMIN — SODIUM CHLORIDE, SODIUM LACTATE, POTASSIUM CHLORIDE, AND CALCIUM CHLORIDE: 600; 310; 30; 20 INJECTION, SOLUTION INTRAVENOUS at 16:14

## 2025-04-29 RX ADMIN — SODIUM CHLORIDE, PRESERVATIVE FREE 10 ML: 5 INJECTION INTRAVENOUS at 21:14

## 2025-04-29 RX ADMIN — PROPOFOL 100 MCG/KG/MIN: 10 INJECTION, EMULSION INTRAVENOUS at 16:21

## 2025-04-29 RX ADMIN — ATORVASTATIN CALCIUM 40 MG: 40 TABLET, FILM COATED ORAL at 21:14

## 2025-04-29 RX ADMIN — TAMSULOSIN HYDROCHLORIDE 0.4 MG: 0.4 CAPSULE ORAL at 08:19

## 2025-04-29 RX ADMIN — FUROSEMIDE 20 MG: 20 TABLET ORAL at 08:19

## 2025-04-29 RX ADMIN — CEFAZOLIN 2000 MG: 10 INJECTION, POWDER, FOR SOLUTION INTRAVENOUS at 04:01

## 2025-04-29 RX ADMIN — PROPOFOL 20 MG: 10 INJECTION, EMULSION INTRAVENOUS at 16:24

## 2025-04-29 RX ADMIN — FUROSEMIDE 20 MG: 20 TABLET ORAL at 18:44

## 2025-04-29 RX ADMIN — PROPOFOL 30 MG: 10 INJECTION, EMULSION INTRAVENOUS at 16:20

## 2025-04-29 RX ADMIN — FEBUXOSTAT 40 MG: 40 TABLET, FILM COATED ORAL at 08:19

## 2025-04-29 RX ADMIN — APIXABAN 5 MG: 5 TABLET, FILM COATED ORAL at 21:14

## 2025-04-29 ASSESSMENT — PAIN SCALES - GENERAL
PAINLEVEL_OUTOF10: 0
PAINLEVEL_OUTOF10: 0

## 2025-04-29 NOTE — PLAN OF CARE
Problem: Chronic Conditions and Co-morbidities  Goal: Patient's chronic conditions and co-morbidity symptoms are monitored and maintained or improved  Outcome: Progressing  Flowsheets (Taken 4/28/2025 2007)  Care Plan - Patient's Chronic Conditions and Co-Morbidity Symptoms are Monitored and Maintained or Improved: Monitor and assess patient's chronic conditions and comorbid symptoms for stability, deterioration, or improvement     Problem: Pain  Goal: Verbalizes/displays adequate comfort level or baseline comfort level  Outcome: Progressing     Problem: Safety - Adult  Goal: Free from fall injury  Outcome: Progressing  Flowsheets (Taken 4/28/2025 2007)  Free From Fall Injury: Instruct family/caregiver on patient safety     Problem: ABCDS Injury Assessment  Goal: Absence of physical injury  Outcome: Progressing     Problem: Neurosensory - Adult  Goal: Achieves stable or improved neurological status  Outcome: Progressing  Goal: Achieves maximal functionality and self care  Outcome: Progressing     Problem: Respiratory - Adult  Goal: Achieves optimal ventilation and oxygenation  Outcome: Progressing     Problem: Cardiovascular - Adult  Goal: Maintains optimal cardiac output and hemodynamic stability  Outcome: Progressing  Goal: Absence of cardiac dysrhythmias or at baseline  Outcome: Progressing     Problem: Skin/Tissue Integrity - Adult  Goal: Skin integrity remains intact  Description: 1.  Monitor for areas of redness and/or skin breakdown2.  Assess vascular access sites hourly3.  Every 4-6 hours minimum:  Change oxygen saturation probe site4.  Every 4-6 hours:  If on nasal continuous positive airway pressure, respiratory therapy assess nares and determine need for appliance change or resting period  Outcome: Progressing  Goal: Incisions, wounds, or drain sites healing without S/S of infection  Outcome: Progressing     Problem: Musculoskeletal - Adult  Goal: Return mobility to safest level of function  Outcome:

## 2025-04-29 NOTE — CARE COORDINATION
Chart reviewed by MARIANO RODRIGUES. Patient awaiting SNF placement. Pre-cert initiated.       Disposition: Patient to discharge to [facility] within 48-72 hours.       Update 4:48pm- Patient approved for STR. Awaiting bed to be available with [facility]. D/C tentatively within 48-72 hours.

## 2025-04-29 NOTE — ANESTHESIA PRE PROCEDURE
Department of Anesthesiology  Preprocedure Note       Name:  Mario Merchant   Age:  79 y.o.  :  1945                                          MRN:  [id number]         Date:  2025      Surgeon: Surgeon(s):  Michael Villagran MD    Procedure: Procedure(s):  ESOPHAGOGASTRODUODENOSCOPY ULTRASOUND    Medications prior to admission:   Prior to Admission medications    Medication Sig Start Date End Date Taking? Authorizing Provider   glipiZIDE (GLUCOTROL XL) 2.5 MG extended release tablet Take 1 tablet by mouth daily 24  Yes Sandy Melo MD   pregabalin (LYRICA) 100 MG capsule Take 1 capsule by mouth 2 times daily for 180 days. Max Daily Amount: 200 mg 24 Yes Sandy Melo MD   febuxostat (ULORIC) 40 MG TABS tablet Take 1 tablet by mouth daily 24  Yes Sandy Melo MD   furosemide (LASIX) 20 MG tablet Take 1 tablet by mouth 2 times daily as needed (swelling)  Patient taking differently: Take 1 tablet by mouth 2 times daily as needed (swelling) Takes 20 mg mornings and 20 mg every other night 24  Yes Miles Nguyễn MD   atorvastatin (LIPITOR) 40 MG tablet Take 1 tablet by mouth at bedtime 24  Yes Miles Nguyễn MD   metoprolol succinate (TOPROL XL) 25 MG extended release tablet Take 1 tablet by mouth every evening 24  Yes Miles Nguyễn MD   empagliflozin (JARDIANCE) 10 MG tablet Take 1 tablet by mouth daily 24  Yes Miles Nguyễn MD   apixaban (ELIQUIS) 5 MG TABS tablet Take 1 tablet by mouth in the morning and 1 tablet in the evening. 24  Yes Miles Nguyễn MD   colchicine (COLCRYS) 0.6 MG tablet Take 1 tablet by mouth 2 times daily as needed for Pain (for acute gout) 23   Miles Nguyễn MD   albuterol sulfate HFA (PROAIR HFA) 108 (90 Base) MCG/ACT inhaler Inhale 2 puffs into the lungs every 6 hours as needed for Wheezing 22   Sandy Melo MD   OXYGEN 2 lpm prn    Automatic Reconciliation,

## 2025-04-29 NOTE — OP NOTE
Operative Note      Patient: Mario Merchant  YOB: 1945  MRN: [id number]    Date of Procedure: 4/23/2025    Pre-Op Diagnosis Codes:      * Right leg infection    Post-Op Diagnosis: Same       Procedure(s):  I & D Right Leg- supine    Surgeon(s):  Kannan Alford MD    Assistant:   * No surgical staff found *    Anesthesia: Regional    Estimated Blood Loss (mL): less than 100     Complications: None    Specimens:   ID Type Source Tests Collected by Time Destination   1 : Superficial right leg abscess Swab Leg CULTURE, ANAEROBIC, CULTURE, WOUND (WITH GRAM STAIN) Kannan Alford MD 4/23/2025 1216    2 : Superficial right leg abscess Swab Leg CULTURE, ANAEROBIC, CULTURE, WOUND (WITH GRAM STAIN) Kannan Alford MD 4/23/2025 1221    3 : Deep right leg abscess Swab Leg CULTURE, ANAEROBIC, CULTURE, WOUND (WITH GRAM STAIN) Kannan Alford MD 4/23/2025 1221        Implants:  * No implants in log *      Drains:   Open Drain 04/23/25 Right;Anterior Knee (Active)       Urinary Catheter 04/22/25 2 Way (Active)   $ Urethral catheter insertion $ Not inserted for procedure 04/22/25 1135   Catheter Indications Urinary retention (acute or chronic), continuous bladder irrigation or bladder outlet obstruction 04/23/25 0730   Site Assessment No urethral drainage 04/23/25 0730   Urine Color Pink 04/23/25 0730   Urine Appearance Clear 04/23/25 0730   Urine Odor Other (Comment) 04/23/25 0730   Collection Container Standard 04/23/25 0730   Securement Method Securing device (Describe) 04/23/25 0730   Catheter Care  Perineal wipes 04/22/25 1135   Catheter Best Practices  Drainage tube clipped to bed;Catheter secured to thigh;Tamper seal intact;Bag below bladder;Bag not on floor;Lack of dependent loop in tubing;Drainage bag less than half full 04/23/25 0730   Status Draining;Patent 04/23/25 0730   Output (mL) 200 mL 04/23/25 0509       Findings:  Infection Present At Time Of Surgery (PATOS) (choose all levels that have 
atrophic. The pancreatic duct was identified. The PD measured <1mm mm in the neck, body and tail.     The scope was then introduced further toward the gastric antrum. The gallbladder was identified. Gallbladder was not clearly visualized  . The scope was then introduced into the duodenal bulb, where the common bile duct was identified. The CBD measured 3 mm. The portal vein was idenitified. The portal confluence was normal.   A pancreatic head lesion was noted. This is more cystic appearing rather than a soft tissue lesion. The lesion is multi-septated and is anechoic. No obvious intramural nodules noted.     I attempted to aspirate this lesion with a 19g FNA needle, however the angulation of the needle in relation to the cystic lesion was going to make a successful biopsy difficult    The scope was pulled back, and the procedure was subsequently ended.    Impression:  --pancreatic head/uncinate cystic lesion.    Recommendations:  --Repeat EUS in 2-3 months as an outpatient.    Michael Villagran MD  Gastroenterology and Interventional Endoscopy

## 2025-04-30 ENCOUNTER — TELEPHONE (OUTPATIENT)
Dept: UROLOGY | Age: 80
End: 2025-04-30

## 2025-04-30 VITALS
HEART RATE: 85 BPM | WEIGHT: 293.8 LBS | HEIGHT: 70 IN | DIASTOLIC BLOOD PRESSURE: 57 MMHG | OXYGEN SATURATION: 97 % | RESPIRATION RATE: 20 BRPM | SYSTOLIC BLOOD PRESSURE: 95 MMHG | TEMPERATURE: 98.2 F | BODY MASS INDEX: 42.06 KG/M2

## 2025-04-30 DIAGNOSIS — N28.9 RENAL LESION: Primary | ICD-10-CM

## 2025-04-30 LAB
ANION GAP SERPL CALC-SCNC: 17 MMOL/L (ref 7–16)
BASOPHILS # BLD: 0.05 K/UL (ref 0–0.2)
BASOPHILS NFR BLD: 0.7 % (ref 0–2)
BUN SERPL-MCNC: 19 MG/DL (ref 8–23)
CALCIUM SERPL-MCNC: 8.8 MG/DL (ref 8.8–10.2)
CHLORIDE SERPL-SCNC: 105 MMOL/L (ref 98–107)
CO2 SERPL-SCNC: 19 MMOL/L (ref 20–29)
CREAT SERPL-MCNC: 1.63 MG/DL (ref 0.8–1.3)
DIFFERENTIAL METHOD BLD: ABNORMAL
EOSINOPHIL # BLD: 0.19 K/UL (ref 0–0.8)
EOSINOPHIL NFR BLD: 2.5 % (ref 0.5–7.8)
ERYTHROCYTE [DISTWIDTH] IN BLOOD BY AUTOMATED COUNT: 16.4 % (ref 11.9–14.6)
GLUCOSE BLD STRIP.AUTO-MCNC: 101 MG/DL (ref 65–100)
GLUCOSE BLD STRIP.AUTO-MCNC: 92 MG/DL (ref 65–100)
GLUCOSE SERPL-MCNC: 103 MG/DL (ref 70–99)
HCT VFR BLD AUTO: 41 % (ref 41.1–50.3)
HGB BLD-MCNC: 13.5 G/DL (ref 13.6–17.2)
IMM GRANULOCYTES # BLD AUTO: 0.05 K/UL (ref 0–0.5)
IMM GRANULOCYTES NFR BLD AUTO: 0.7 % (ref 0–5)
LYMPHOCYTES # BLD: 2.68 K/UL (ref 0.5–4.6)
LYMPHOCYTES NFR BLD: 35.5 % (ref 13–44)
MCH RBC QN AUTO: 29.3 PG (ref 26.1–32.9)
MCHC RBC AUTO-ENTMCNC: 32.9 G/DL (ref 31.4–35)
MCV RBC AUTO: 89.1 FL (ref 82–102)
MONOCYTES # BLD: 0.84 K/UL (ref 0.1–1.3)
MONOCYTES NFR BLD: 11.1 % (ref 4–12)
NEUTS SEG # BLD: 3.73 K/UL (ref 1.7–8.2)
NEUTS SEG NFR BLD: 49.5 % (ref 43–78)
NRBC # BLD: 0 K/UL (ref 0–0.2)
PHOSPHATE SERPL-MCNC: 3 MG/DL (ref 2.5–4.5)
PLATELET # BLD AUTO: 225 K/UL (ref 150–450)
PMV BLD AUTO: 10.1 FL (ref 9.4–12.3)
POTASSIUM SERPL-SCNC: 3.7 MMOL/L (ref 3.5–5.1)
RBC # BLD AUTO: 4.6 M/UL (ref 4.23–5.6)
SERVICE CMNT-IMP: ABNORMAL
SERVICE CMNT-IMP: NORMAL
SODIUM SERPL-SCNC: 141 MMOL/L (ref 136–145)
WBC # BLD AUTO: 7.5 K/UL (ref 4.3–11.1)

## 2025-04-30 PROCEDURE — 6370000000 HC RX 637 (ALT 250 FOR IP): Performed by: HOSPITALIST

## 2025-04-30 PROCEDURE — 6370000000 HC RX 637 (ALT 250 FOR IP): Performed by: INTERNAL MEDICINE

## 2025-04-30 PROCEDURE — 6360000002 HC RX W HCPCS: Performed by: STUDENT IN AN ORGANIZED HEALTH CARE EDUCATION/TRAINING PROGRAM

## 2025-04-30 PROCEDURE — 2500000003 HC RX 250 WO HCPCS: Performed by: INTERNAL MEDICINE

## 2025-04-30 PROCEDURE — 6370000000 HC RX 637 (ALT 250 FOR IP): Performed by: STUDENT IN AN ORGANIZED HEALTH CARE EDUCATION/TRAINING PROGRAM

## 2025-04-30 PROCEDURE — C1751 CATH, INF, PER/CENT/MIDLINE: HCPCS

## 2025-04-30 PROCEDURE — 82962 GLUCOSE BLOOD TEST: CPT

## 2025-04-30 PROCEDURE — 80048 BASIC METABOLIC PNL TOTAL CA: CPT

## 2025-04-30 PROCEDURE — 84100 ASSAY OF PHOSPHORUS: CPT

## 2025-04-30 PROCEDURE — 85025 COMPLETE CBC W/AUTO DIFF WBC: CPT

## 2025-04-30 PROCEDURE — 99231 SBSQ HOSP IP/OBS SF/LOW 25: CPT | Performed by: INTERNAL MEDICINE

## 2025-04-30 PROCEDURE — 05HF33Z INSERTION OF INFUSION DEVICE INTO LEFT CEPHALIC VEIN, PERCUTANEOUS APPROACH: ICD-10-PCS | Performed by: INTERNAL MEDICINE

## 2025-04-30 PROCEDURE — 6370000000 HC RX 637 (ALT 250 FOR IP)

## 2025-04-30 PROCEDURE — APPSS30 APP SPLIT SHARED TIME 16-30 MINUTES: Performed by: NURSE PRACTITIONER

## 2025-04-30 PROCEDURE — 2500000003 HC RX 250 WO HCPCS: Performed by: STUDENT IN AN ORGANIZED HEALTH CARE EDUCATION/TRAINING PROGRAM

## 2025-04-30 PROCEDURE — 99232 SBSQ HOSP IP/OBS MODERATE 35: CPT | Performed by: SURGERY

## 2025-04-30 PROCEDURE — 36415 COLL VENOUS BLD VENIPUNCTURE: CPT

## 2025-04-30 PROCEDURE — 76937 US GUIDE VASCULAR ACCESS: CPT

## 2025-04-30 PROCEDURE — 36410 VNPNXR 3YR/> PHY/QHP DX/THER: CPT

## 2025-04-30 RX ORDER — CEPHALEXIN 500 MG/1
500 CAPSULE ORAL 4 TIMES DAILY
Qty: 56 CAPSULE | Refills: 0 | Status: SHIPPED | OUTPATIENT
Start: 2025-04-30 | End: 2025-04-30 | Stop reason: HOSPADM

## 2025-04-30 RX ORDER — FINASTERIDE 5 MG/1
5 TABLET, FILM COATED ORAL DAILY
Qty: 30 TABLET | Refills: 3 | Status: SHIPPED | OUTPATIENT
Start: 2025-05-01

## 2025-04-30 RX ORDER — BETAMETHASONE DIPROPIONATE 0.5 MG/G
CREAM TOPICAL DAILY
Qty: 1 EACH | Refills: 0 | Status: SHIPPED | OUTPATIENT
Start: 2025-04-30 | End: 2025-05-30

## 2025-04-30 RX ORDER — TAMSULOSIN HYDROCHLORIDE 0.4 MG/1
0.4 CAPSULE ORAL DAILY
Qty: 30 CAPSULE | Refills: 3 | Status: SHIPPED | OUTPATIENT
Start: 2025-05-01

## 2025-04-30 RX ORDER — CEFAZOLIN SODIUM 1 G/3ML
2000 INJECTION, POWDER, FOR SOLUTION INTRAMUSCULAR; INTRAVENOUS EVERY 8 HOURS
Qty: 42 EACH | Refills: 0 | Status: SHIPPED | OUTPATIENT
Start: 2025-04-30 | End: 2025-05-07

## 2025-04-30 RX ORDER — CEFADROXIL 1000 MG/1
1 TABLET ORAL 2 TIMES DAILY
Qty: 28 TABLET | Refills: 0 | Status: SHIPPED | OUTPATIENT
Start: 2025-05-08 | End: 2025-05-22

## 2025-04-30 RX ADMIN — CETIRIZINE HYDROCHLORIDE 10 MG: 10 TABLET, FILM COATED ORAL at 09:02

## 2025-04-30 RX ADMIN — BETAMETHASONE DIPROPIONATE: 0.5 CREAM, AUGMENTED TOPICAL at 14:09

## 2025-04-30 RX ADMIN — PREGABALIN 100 MG: 100 CAPSULE ORAL at 09:02

## 2025-04-30 RX ADMIN — APIXABAN 5 MG: 5 TABLET, FILM COATED ORAL at 09:02

## 2025-04-30 RX ADMIN — FINASTERIDE 5 MG: 5 TABLET, FILM COATED ORAL at 09:02

## 2025-04-30 RX ADMIN — EMPAGLIFLOZIN 10 MG: 10 TABLET, FILM COATED ORAL at 09:02

## 2025-04-30 RX ADMIN — FUROSEMIDE 20 MG: 20 TABLET ORAL at 09:02

## 2025-04-30 RX ADMIN — SODIUM CHLORIDE, PRESERVATIVE FREE 10 ML: 5 INJECTION INTRAVENOUS at 09:03

## 2025-04-30 RX ADMIN — CEFAZOLIN 2000 MG: 10 INJECTION, POWDER, FOR SOLUTION INTRAVENOUS at 14:08

## 2025-04-30 RX ADMIN — TAMSULOSIN HYDROCHLORIDE 0.4 MG: 0.4 CAPSULE ORAL at 09:02

## 2025-04-30 RX ADMIN — FEBUXOSTAT 40 MG: 40 TABLET, FILM COATED ORAL at 09:02

## 2025-04-30 ASSESSMENT — PAIN SCALES - GENERAL: PAINLEVEL_OUTOF10: 0

## 2025-04-30 NOTE — PROGRESS NOTES
[facility]   Progress Note    Patient: Mario Merchant MRN: [id number]  SSN: [id number]    YOB: 1945  Age: 79 y.o.  Sex: male      Admit Date: 4/20/2025    LOS: 3 days     Plan of Care:   1: WB status - NWB  2: DVT px - per ortho it is ok for DVT px  3: ABX - per ID  4: I had a thoroughly informed the patient of the plan for treatment.    I discussed non operative treatment initially.  We discussed  continued antibiotics and attempted nonoperative management .  At this point Non surgical treatment has not relieved the patient symptoms    We discussed multiple surgical options.  We discussed surgery to be irrigation debridement with potential antibiotic cement placement and potential multiple debridements.  He understands that he has an infection that is not getting better and that he is at risk for having a higher issue and a bigger issue.  I think urgent treatment with an I&D is appropriate.  He understands this and agrees.   I discussed with this patient  the risk associated with the outlined surgery.  These risk include infection, delayed wound healing, hardware failure, painful hardware, recurring wounds, recurring pain, damage to surrounding structures such as nerves, vessels, tendons, ligaments, and joints.  I discussed how no surgery is perfect and this surgery may not be successful.  There is also risk of anesthesia such as nerve damage from local anesthetic, damage to the airway or mouth structures, respiratory distress, cardiac disease exacerbation, potential arrhythmias, and even death.  The patient verbalized understanding of each of these risk  The patient was thoroughly informed regarding the Treatment Plan.  Through shared decision making the patient elected to proceed with surgery and consented to the procedure.           Subjective:     Resting in the bed.  No acute distress    Objective:     Vitals:    04/23/25 1035 04/23/25 1040 04/23/25 1045 
             [facility]   Progress Note    Patient: Mario Merchant MRN: [id number]  SSN: [id number]    YOB: 1945  Age: 79 y.o.  Sex: male      Admit Date: 4/20/2025    LOS: 10 days     Plan of Care:   1: WB status - NWB RLE until wound heals (most likely June 1)  2: DVT px -  Per primary team Eliquis  3: ABX -nafcillin-defer to infectious disease recommendations versus hospitalist recommendations.  4: Incisional right lower extremity dressing: Dress right lower extremity incisional wound with Tegaderm and Telfa dressing.    From a orthopedic standpoint no further intervention is needed.  His abscess has been decompressed and all drains have been removed.  Wound looks good today.  I will see him back in approximate 2 weeks in my office for wound check and suture removal.         Subjective:     Resting in room.  No acute distress    Objective:     Vitals:    04/29/25 1810 04/29/25 1832 04/29/25 2345 04/30/25 0337   BP: (!) 113/59 115/77 101/69 (!) 122/92   Pulse: 91 (!) 101 89 65   Resp:  18 18 19   Temp:  97.5 °F (36.4 °C) 97.5 °F (36.4 °C) 97.5 °F (36.4 °C)   TempSrc:  Oral Axillary Axillary   SpO2:  94% 93% 96%   Weight:       Height:            Intake and Output:  Current Shift: No intake/output data recorded.  Last three shifts: 04/28 1901 - 04/30 0700  In: 200 [I.V.:200]  Out: 2775 [Urine:2775]    Physical Exam:   Right lower extremity below the knee amputation site clean and dry.  All drains have been removed.  I visually inspected the wound.  No signs of drainage.  Erythema has dramatically improved.  No signs of fluid collection or abscess.    Lab/Data Review:  Lab Results   Component Value Date    WBC 7.5 04/30/2025    HGB 13.5 (L) 04/30/2025    HCT 41.0 (L) 04/30/2025    MCV 89.1 04/30/2025     04/30/2025     Lab Results   Component Value Date/Time     04/30/2025 03:52 AM    K 3.7 04/30/2025 03:52 AM     04/30/2025 03:52 AM    CO2 19 04/30/2025 03:52 
       Hospitalist Progress Note   Admit Date:  2025  6:43 PM   Name:  Mario Merchant   Age:  79 y.o.  Sex:  male  :  1945   MRN:  [id number]   Room:  [facility]    Presenting/Chief Complaint: Wound Infection     Reason(s) for Admission: Cellulitis [L03.90]  Cellulitis of right lower extremity [L03.115]     Hospital Course:   Mario Merchant is a 79 y.o. 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  Additionally developed an ulcer on his right stump with drainage.  In the ER white blood cell count was 14,300, and patient tachycardic heart rate 102.  Blood culture shows Staph aureus positivity.  Patient started on broad-spectrum antibiotics on admission.  Wound care consult and orthopedics consulted.  Plans for surgical washout in OR today.    Subjective & 24hr Events:   Patient is resting in bed.  No fever no chills.  No chest pain.  No shortness of breath.  No nausea no vomiting.  Still has some purulent drainage from his right BKA stump.  Daughter is at bedside.  Updated regarding patient condition and current plan of care.    Assessment & Plan:     Principal Problem:    Sepsis secondary to right lower extremity stump cellulitis  MSSA bacteremia  - Meets sepsis criteria due to leukocytosis, tachycardia.  Blood cultures positive for MSSA.   ID on board.  Appreciate recommendations.  Transthoracic echocardiogram was done which shows mildly reduced left-ventricular systolic function with EF of 40 to 45%.  Normal wall thickness.  Left ventricle mildly dilated.  No visible vegetations.  - Recheck blood culture from  so far negative.  Continue IV cefazolin.  ID recommends total of 2 weeks of IV antibiotics.  May need oral antibiotics after 
       Hospitalist Progress Note   Admit Date:  2025  6:43 PM   Name:  Mario Merchant   Age:  79 y.o.  Sex:  male  :  1945   MRN:  [id number]   Room:  [facility]/    Presenting/Chief Complaint: Wound Infection     Reason(s) for Admission: Cellulitis [L03.90]  Cellulitis of right lower extremity [L03.115]     Hospital Course:   Mario Merchant is a 79 y.o. 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  Additionally developed an ulcer on his right stump with drainage.  In the ER white blood cell count was 14,300, and patient tachycardic heart rate 102.  Blood cultures on admission positive for MSSA.  ID consulted.  ID recommends 2 weeks of Ancef 2 g IV every 8 hours with end of therapy 2025 followed by 2 weeks of oral Duricef 1 g p.o. twice daily with end of therapy 2025.  Wound care consult and orthopedics consulted.  Patient underwent surgical washout in OR .  Recommend placement of midline over a tunneled line given short course of IV antibiotics.  Case management working on short-term rehab placement.  ID does not follow patient at short-term rehab.  Patient needs weekly CBC with differential, CMP.  Outpatient appointment with ID scheduled for 2025 at 8:40 AM with Dr. Wong.    Subjective & 24hr Events:   Patient is resting in bed.  He was restarted on Eliquis yesterday.  Unfortunately had hematuria in the afternoon.  Eliquis held.  No fever no chills.  No chest pain.  No shortness of breath.  No nausea no vomiting.  He also developed hypotension yesterday after starting losartan.  This was discontinued.    Assessment & Plan:     Principal Problem:    Sepsis secondary to right lower extremity stump cellulitis postop day 2 status 
       Hospitalist Progress Note   Admit Date:  2025  6:43 PM   Name:  Mario Merchant   Age:  79 y.o.  Sex:  male  :  1945   MRN:  [id number]   Room:  [facility]    Presenting/Chief Complaint: Wound Infection     Reason(s) for Admission: Cellulitis [L03.90]  Cellulitis of right lower extremity [L03.115]     Hospital Course:   Mario Merchant is a 79 y.o. male with medical history of   Atrial fibrillation on Eliquis  Obstructive sleep apnea on home trilogy  Hypertension  Diabetes mellitus type 2  Right-sided below-knee amputation in  based prosthesis  CKD stage IIIb  Congestive heart failure with reduced ejection fraction, last ejection fraction was 30 to 35%  CAD  Gout    who presented with right stump redness and warmth.  Patient also had ulcer on the right stump with drainage.     Patient was found to have leukocytosis and tachycardia.   Blood culture on admission was positive for MSSA.     Patient was seen by ID consultant.  Recommendation is to continue Ancef 2 g IV every 8 hours until May 7, 2025 followed by 2 weeks of oral Duricef 1 g p.o. twice a day until May 21, 2025.     Patient is seen by wound care consultant and orthopedics service.   Patient underwent washout surgery on 2025.     Outpatient appointment is with ID consultant on May 21, 2025 at 8:40 AM with Dr. Wong.     Subjective & 24hr Events:     Patient is seen and examined at bedside.    Patient has no fever now.   No respiratory distress.   No abdominal pain.   No discharge from right leg stump.       Assessment & Plan:     Principal Problem:    Cellulitis  Plan:   Continue Ancef while patient is in the hospital.   Then we will plan on switching to oral antibiotic, later.   End of overall treatment with antibiotics is May 21, 2025.     Active Problems:    ILD (interstitial lung disease) ([facility])  Plan:   This adds to complexity of care.        Acute kidney injury superimposed on chronic kidney disease    Stage 3 chronic 
       Hospitalist Progress Note   Admit Date:  2025  6:43 PM   Name:  Mario Merchant   Age:  79 y.o.  Sex:  male  :  1945   MRN:  [id number]   Room:  [facility]/    Presenting/Chief Complaint: Wound Infection     Reason(s) for Admission: Cellulitis [L03.90]  Cellulitis of right lower extremity [L03.115]     Hospital Course:   Mario Merchant is a 79 y.o. 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  Additionally developed an ulcer on his right stump with drainage.  In the ER white blood cell count was 14,300, and patient tachycardic heart rate 102.  Blood culture shows Staph aureus positivity.  Patient started on broad-spectrum antibiotics on admission.  Wound care consult and orthopedics consulted.    Subjective & 24hr Events:   No overnight events reported.  Afebrile overnight.  Reports right stump clear fluid drainage.  Denied any other complaints this morning.  Blood cultures returned positive for Staph aureus.      Assessment & Plan:     Principal Problem:  Sepsis secondary to right lower extremity stump cellulitis  Staph aureus bacteremia  - Meets sepsis criteria due to leukocytosis, tachycardia, source  - On broad-spectrum antibiotics vancomycin and Zosyn  - Blood cultures with Staph aureus, follow final culture sensitivity  - Check TTE  - Recheck blood culture in a.m.  - Wound care consulted  - PT OT  - Orthopedic consult    ALEXANDRA on CKD 3B  - Holding home Lasix  - Creatinine stable  - Follow-up repeat BMP in a.m.    Heart failure with reduced ejection fraction  - Not in acute exacerbation  - Continue goal-directed beta-blocker.  Unable to do ACE ARB/MRA/SGLT2i currently due to renal function    Atrial fibrillation on anticoagulation  - Secondary 
       Hospitalist Progress Note   Admit Date:  2025  6:43 PM   Name:  Mario Merchant   Age:  79 y.o.  Sex:  male  :  1945   MRN:  [id number]   Room:  [facility]/    Presenting/Chief Complaint: Wound Infection     Reason(s) for Admission: Cellulitis [L03.90]  Cellulitis of right lower extremity [L03.115]     Hospital Course:   Mario Merchant is a 79 y.o. 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  Additionally developed an ulcer on his right stump with drainage.  In the ER white blood cell count was 14,300, and patient tachycardic heart rate 102.  Blood cultures on admission positive for MSSA.  ID consulted.  ID recommends 2 weeks of Ancef 2 g IV every 8 hours with end of therapy 2025 followed by 2 weeks of oral Duricef 1 g p.o. twice daily with end of therapy 2025.  Wound care consult and orthopedics consulted.  Patient underwent surgical washout in OR .  Recommend placement of midline over a tunneled line given short course of IV antibiotics.  Case management working on short-term rehab placement.  ID does not follow patient at short-term rehab.  Patient needs weekly CBC with differential, CMP.  Outpatient appointment with ID scheduled for 2025 at 8:40 AM with Dr. Wong.  Patient has hematuria and therefore Eliquis was held.  Urology consulted.  CT abdomen and pelvis was done and shows complex renal cysts and pancreatic lesion.  Oncology, GI consulted.    Subjective & 24hr Events:   Patient is resting in bed.  Denies any fever or chills.  No chest pain or shortness of breath.  No nausea no vomiting.  Son and daughter at bedside.  Updated regarding patient condition and current plan of care.  Family prefers patient to have 
       Hospitalist Progress Note   Admit Date:  2025  6:43 PM   Name:  Mario Merchant   Age:  79 y.o.  Sex:  male  :  1945   MRN:  [id number]   Room:  [facility]/    Presenting/Chief Complaint: Wound Infection     Reason(s) for Admission: Cellulitis [L03.90]  Cellulitis of right lower extremity [L03.115]     Hospital Course:   Mario Merchant is a 79 y.o. 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  Additionally developed an ulcer on his right stump with drainage.  In the ER white blood cell count was 14,300, and patient tachycardic heart rate 102.  Blood culture shows Staph aureus positivity.  Patient started on broad-spectrum antibiotics on admission.  Wound care consult and orthopedics consulted.    Subjective & 24hr Events:   Patient is resting in bed.  No fever no chills.  No chest pain.  Still has some drainage from the right BKA stump.  No shortness of breath.  No nausea no vomiting.    Assessment & Plan:     Principal Problem:  Sepsis secondary to right lower extremity stump cellulitis  MSSA bacteremia  - Meets sepsis criteria due to leukocytosis, tachycardia.  Blood cultures positive for MSSA.  - Antibiotics switched to cefazolin.   ID consulted.  Appreciate recommendations.  Transthoracic echocardiogram was done which shows mildly reduced left-ventricular systolic function with EF of 40 to 45%.  Normal wall thickness.  Left ventricle mildly dilated.  No visible vegetations.  - Recheck blood culture from this am  pending..  - Wound care consulted  - PT OT  - Orthopedics consulted.  Appreciate recommendations.   CT tibia/fib ordered.   Plans for washout of the right BKA tomorrow.  N.p.o. after midnight.  Eliquis held.    ALEXANDRA on CKD 
    [facility]        Inpatient Hematology / Oncology Progress Note    Reason for Consult:  Cellulitis [L03.90]  Cellulitis of right lower extremity [L03.115]  Referring Physician:  Lexy Murphy MD    24 Hour Events:  Afebrile, VSS  S/p EGD/EUS on 4/29 with pancreatic head cystic lesion  GI plans to repeat EUS in 2-3 mos as OP    Placed urology referral for renal lesion      ROS:  Constitutional: Negative for fever, chills.  CV: Negative for chest pain, palpitations, edema.  Respiratory: Negative for dyspnea, cough, wheezing.  GI: Negative for nausea, abdominal pain, diarrhea.    10 point review of systems is otherwise negative with the exception of the elements mentioned above in the HPI.           No Known Allergies  Past Medical History:   Diagnosis Date    A-fib ([facility]) 07/19/2021    developed AFID after hospital admission for wound infection- started on Eliquis    Acute on chronic respiratory failure with hypoxia and hypercapnia ([facility]) 7/23/2021    Acute respiratory failure with hypercapnia ([facility]) 7/23/2021    Anesthesia complication     Hx  restrictive lung disease and respiratory failure- on ventilator x 24 hrs s/p BKA 8/18/22-    Atrial fibrillation ([facility])     Atrial fibrillation with RVR ([facility]) 7/19/2021    Below-knee amputation ([facility])     R BKA    Blood loss anemia     s/p BKA 8/2021- denies hx of blood transfusions, took oral Fe for a year or so    CAD (coronary artery disease)     MI & stent 1999, additional MI after, no addtional interventions    Cardiomyopathy ([facility])     3/2022 Echo LVE 30-35%    Cellulitis 7/19/2021    Chronic kidney disease     stage 3-    COVID-19 vaccine series completed     Moderna Vaccine completed X2 doses    Current use of long term anticoagulation     Eliquis and Aspirin    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 mellitus, limited to breakdown of skin ([facility]) 9/27/2021    H/O echocardiogram 03/2022    LVEF 30-35%    H/O heart artery stent 
   04/20/25 2316   NIV Type   $NIV $Daily Charge   Ventilator ID [id number]   NIV Started/Stopped On   Equipment Type ResMed   Mode CPAP   Mask Type Under the nose   Mask Size Medium   Assessment   Pulse 90   Respirations 18   SpO2 96 %   Comfort Level Good   Using Accessory Muscles No   Mask Compliance Good   Skin Assessment Clean, dry, & intact   Breath Sounds   Respiratory Pattern Regular   Settings/Measurements   CPAP/EPAP 14 cmH2O   FiO2  21 %   Mask Leak (lpm)   (mask fits well)   Patient's Home Machine No       
   04/24/25 2349   NIV Type   Ventilator ID home trilogy   NIV Started/Stopped On   Equipment Type home trilogy   Mode AVAPS   Mask Type Full face mask   Mask Size Medium   Assessment   Comfort Level Good   Using Accessory Muscles No   Mask Compliance Good   Skin Assessment Clean, dry, & intact   Breath Sounds   Respiratory Pattern Regular   Breath Sounds Bilateral Diminished   Settings/Measurements   PIP Observed 26 cm H20   CPAP/EPAP 14 cmH2O   Vt (Measured) 887 mL   FiO2  21 %   Minute Volume (L/min) 14.7 Liters   Mask Leak (lpm) 22 lpm   Patient's Home Machine Yes (type/vendor)   Electrical Safety Check Performed Yes   Alarm Settings   Alarms On Y       
  ACUTE PHYSICAL THERAPY GOALS:   (Developed with and agreed upon by patient and/or caregiver.)     (1.) Mario Merchant  will move from supine to sit and sit to supine  with SUPERVISION within 7 treatment day(s).    (2.) Mario Merchant will transfer from bed to chair and chair to bed with SUPERVISION using the least restrictive device within 7 treatment day(s).    (3.) Mario Merchant will ambulate with CONTACT GUARD ASSIST for 5 feet with the least restrictive device within 7 treatment day(s).   (4.) Mario Merchant will perform standing static and dynamic balance activities x 10 minutes with STAND BY ASSIST to improve safety within 7 treatment day(s).  (5.) Mario Merchant will ascend and descend 1 stairs using no hand rail(s) with MINIMAL ASSIST to improve functional mobility and safety within 7 treatment day(s).  (6.) Mario Merchant will perform therapeutic exercises x 10 min for HEP with SUPERVISION to improve strength, endurance, and functional mobility within 7 treatment day(s).         PHYSICAL THERAPY: Daily Note AM   (Link to Caseload Tracking: PT Visit Days : 4  Time In/Out PT Charge Capture  Rehab Caseload Tracker  Orders    Mario Merchant is a 79 y.o. male   PRIMARY DIAGNOSIS: Cellulitis  Cellulitis [L03.90]  Cellulitis of right lower extremity [L03.115]  Procedure(s) (LRB):  I & D Right Leg- supine (Right)  5 Days Post-Op  Inpatient: Payor: [employer] MEDICARE / Plan: [employer] MEDICARE-ADVANTAGE PPO / Product Type: Medicare /     ASSESSMENT:     REHAB RECOMMENDATIONS:   Recommendation to date pending progress:  Setting:  Short-term Rehab     Equipment:    To Be Determined     ASSESSMENT:  Mr. Merchant supine on arrival and agreeable to participate.  Today pt able to perform sup to sit with SBA for safety, required extra time to perform act, pt then able to sit EOB unsupported and perform ADL act with SBA for safety.  Pt then able to stand to RW with mod A x2 secondary to decreased strength, stood on 
  ACUTE PHYSICAL THERAPY GOALS:   (Developed with and agreed upon by patient and/or caregiver.)     (1.) Mario Merchant  will move from supine to sit and sit to supine  with SUPERVISION within 7 treatment day(s).    (2.) Mario Merchant will transfer from bed to chair and chair to bed with SUPERVISION using the least restrictive device within 7 treatment day(s).    (3.) Mario Merchant will ambulate with CONTACT GUARD ASSIST for 5 feet with the least restrictive device within 7 treatment day(s).   (4.) Mario Merchant will perform standing static and dynamic balance activities x 10 minutes with STAND BY ASSIST to improve safety within 7 treatment day(s).  (5.) Mario Merchant will ascend and descend 1 stairs using no hand rail(s) with MINIMAL ASSIST to improve functional mobility and safety within 7 treatment day(s).  (6.) Mario Merchant will perform therapeutic exercises x 10 min for HEP with SUPERVISION to improve strength, endurance, and functional mobility within 7 treatment day(s).         PHYSICAL THERAPY: Daily Note PM   (Link to Caseload Tracking: PT Visit Days : 2  Time In/Out PT Charge Capture  Rehab Caseload Tracker  Orders    Mario Merchant is a 79 y.o. male   PRIMARY DIAGNOSIS: Cellulitis  Cellulitis [L03.90]  Cellulitis of right lower extremity [L03.115]  Procedure(s) (LRB):  I & D Right Leg- supine (Right)     Inpatient: Payor: [employer]ICARE / Plan: [employer] MEDICARE-ADVANTAGE PPO / Product Type: Medicare /     ASSESSMENT:     REHAB RECOMMENDATIONS:   Recommendation to date pending progress:  Setting:  Home Health Therapy -- possibly STR    Equipment:    To Be Determined     ASSESSMENT:  Mr. Merchant was supine in bed on arrival and agreeable to therapy. He states he is tired and doesn't feel great today. Supine to sit on edge of bed with min A x2. Sitting balance required close SBA with moments of slight posterior leans. Attempted to scoot to chair but pt requiring more assist today. Worked on 
  Pt placed on his home Trilogy on room air. The full face mask is sealed properly and leak is only 30%. Pt voices no discomfort with the mask at this time. VSS. No signs of distress noted. Call light within reach.   
  [facility]        2025         Post Op day: 1 Day Post-Op Procedure(s) (LRB):  I & D Right Leg- supine (Right)      Admit Date: 2025  Admit Diagnosis: Cellulitis [L03.90]  Cellulitis of right lower extremity [L03.115]       Principle Problem: Cellulitis.           Subjective: Doing well, No complaints, No SOB, No Chest Pain, No Nausea or Vomiting     Objective:   Vital Signs are Stable, No Acute Distress, Alert,  Dressing is Dry,  Neurovascular exam is normal.     Assessment / Plan :  Patient Active Problem List   Diagnosis    Severe obesity (BMI 35.0-35.9 with comorbidity) ([facility])    Stage 3 chronic kidney disease ([facility])    Systolic congestive heart failure ([facility])    Onychomycosis    Atrial fibrillation ([facility])    Controlled type 2 diabetes mellitus with diabetic polyneuropathy, without long-term current use of insulin (Formerly [facility])    Type 2 diabetes mellitus with nephropathy (Formerly [facility])    Mixed axonal-demyelinating polyneuropathy    Corns and callus    Mixed hyperlipidemia    Morbid (severe) obesity with alveolar hypoventilation (Formerly [facility])    Benign hypertensive kidney disease with chronic kidney disease    Atherosclerosis of native coronary artery of native heart without angina pectoris    Bunion of unspecified foot    S/P BKA (below knee amputation) unilateral, right (HCC)    Acquired hammer toe of left foot    Obstructive sleep apnea    Dilated cardiomyopathy (Formerly [facility])    ILD (interstitial lung disease) (Formerly [facility])    Tophaceous gout    Acute idiopathic gout of hand    Cellulitis    BMI 40.0-44.9, adult (Formerly [facility])    Acute kidney injury superimposed on chronic kidney disease    Right foot infection    Patient Vitals for the past 8 hrs:   BP Temp Temp src Pulse Resp SpO2   25 1432 (!) 94/52 97.3 °F (36.3 °C) Oral 79 18 96 %   25 1102 104/70 98.4 °F (36.9 °C) Oral 99 18 94 %   25 0725 107/70 98.1 °F (36.7 °C) Oral 91 18 90 %    Temp (24hrs), Av.1 °F (36.7 °C), Min:97.3 °F (36.3 °C), Max:99.3 °F (37.4 
4 Eyes Skin Assessment     NAME:  Mario Merchant  YOB: 1945  MEDICAL RECORD NUMBER:  [id number]    The patient is being assessed for  Admission    I agree that at least one RN has performed a thorough Head to Toe Skin Assessment on the patient. ALL assessment sites listed below have been assessed.      Areas assessed by both nurses:    Head, Face, Ears, Shoulders, Back, Chest, Arms, Elbows, Hands, Sacrum. Buttock, Coccyx, Ischium, and Legs. Feet and Heels        Does the Patient have a Wound? Yes wound(s) were present on assessment. LDA wound assessment was Initiated and completed by RN       Saran Prevention initiated by RN: Yes  Wound Care Orders initiated by RN: No    Pressure Injury (Stage 3,4, Unstageable, DTI, NWPT, and Complex wounds) if present, place Wound referral order by RN under : Yes    New Ostomies, if present place, Ostomy referral order under : No     Nurse 1 eSignature: Electronically signed by Diamond Stark RN on 4/20/25 at 11:24 PM EDT    **SHARE this note so that the co-signing nurse can place an eSignature**    Nurse 2 eSignature: Electronically signed by Carole Manjarrez RN on 4/20/25 at 11:26 PM EDT ye  
4 Eyes Skin Assessment     NAME:  Mario Merchant  YOB: 1945  MEDICAL RECORD NUMBER:  [id number]    The patient is being assessed for  Admission    I agree that at least one RN has performed a thorough Head to Toe Skin Assessment on the patient. ALL assessment sites listed below have been assessed.      Areas assessed by both nurses:    Head, Face, Ears, Shoulders, Back, Chest, Arms, Elbows, Hands, Sacrum. Buttock, Coccyx, Ischium, Legs. Feet and Heels, and Under Medical Devices         Does the Patient have a Wound? Yes wound(s) were present on assessment. 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    
ACUTE OCCUPATIONAL THERAPY GOALS:   (Developed with and agreed upon by patient and/or caregiver.)  1. Patient will complete lower body bathing and dressing with SBA and adaptive equipment as needed.   2. Patient will complete toileting with modified independence.   3. Patient will tolerate 30 minutes of OT treatment with 1-2 rest breaks to increase activity tolerance for ADLs.   4. Patient will complete functional transfers with least restrictive device and modified independence.  5. Patient will tolerate standing to the rolling walker for 90 seconds to improve standing tolerance for ADL.  6. Patient will complete 15 minutes of therapeutic exercises to improve strength for ADL/functional transfers.      Timeframe: 7 visits        OCCUPATIONAL THERAPY: Daily Note PM   OT Visit Days: 2   Time In/Out  OT Charge Capture  Rehab Caseload Tracker  OT Orders    Mario Merchant is a 79 y.o. male   PRIMARY DIAGNOSIS: Cellulitis  Cellulitis [L03.90]  Cellulitis of right lower extremity [L03.115]  Procedure(s) (LRB):  I & D Right Leg- supine (Right)     Inpatient: Payor: [facility] MEDICARE / Plan: [facility] MEDICARE-ADVANTAGE PPO / Product Type: Medicare /     ASSESSMENT:     REHAB RECOMMENDATIONS:   Recommendation to date pending progress:  Setting:  Short-term Rehab    Equipment:    To Be Determined     ASSESSMENT:  Mr. Merchant presents more lethargic today and reports not feeling as well. Pt needed more assistance with bed mobility needing minimal assistance x 2. Pt also with decreased ability to scoot and wasn't able to scoot over into the recliner chair but did eventually demonstrate some ability to scoot to the head of the bed. Pt's sitting balance not as good today with pt closely supervised while completing upper body bathing/dressing sitting edge of bed. Pt has dorsey catheter now and is limited by discomfort from this as well. Pt not as communicative today and appeared to fatigue with activity. Pt is NWB in the R LE and 
ACUTE OCCUPATIONAL THERAPY GOALS:   (Developed with and agreed upon by patient and/or caregiver.)  1. Patient will complete lower body bathing and dressing with SBA and adaptive equipment as needed.   2. Patient will complete toileting with modified independence.   3. Patient will tolerate 30 minutes of OT treatment with 1-2 rest breaks to increase activity tolerance for ADLs.   4. Patient will complete functional transfers with least restrictive device and modified independence.  5. Patient will tolerate standing to the rolling walker for 90 seconds to improve standing tolerance for ADL.  6. Patient will complete 15 minutes of therapeutic exercises to improve strength for ADL/functional transfers.      Timeframe: 7 visits        OCCUPATIONAL THERAPY: Daily Note PM   OT Visit Days: 3   Time In/Out  OT Charge Capture  Rehab Caseload Tracker  OT Orders    Mario Merchant is a 79 y.o. male   PRIMARY DIAGNOSIS: Cellulitis  Cellulitis [L03.90]  Cellulitis of right lower extremity [L03.115]  Procedure(s) (LRB):  I & D Right Leg- supine (Right)  1 Day Post-Op  Inpatient: Payor: [employer] MEDICARE / Plan: [facility] MEDICARE-ADVANTAGE PPO / Product Type: Medicare /     ASSESSMENT:     REHAB RECOMMENDATIONS:   Recommendation to date pending progress:  Setting:  Short-term Rehab    Equipment:    To Be Determined     ASSESSMENT:  Mr. Merchant is progressing well towards OT goals. Today, pt was received supine in bed. S/p I&D of R BKA site. NWB RLE. Completed bed mobility with modA. MaxA for LB dressing. Slide board transfer to chair min-modA x2. Pt declined further bADLs on this date. Discussed with RN how to safely transfer back to bed. Recommend STR at discharge. Mr. Merchant continues to demonstrate overall deficits in strength, balance, activity tolerance, and ADL performance. Continue OT efforts and POC in order to address functional deficits and OT goals stated above.        SUBJECTIVE:     Mr. Merchant states, \"It's hard to find the 
ACUTE PHYSICAL THERAPY GOALS:   (Developed with and agreed upon by patient and/or caregiver.)    (1.) Mario Merchant  will move from supine to sit and sit to supine  with SUPERVISION within 7 treatment day(s).    (2.) Mario Merchant will transfer from bed to chair and chair to bed with SUPERVISION using the least restrictive device within 7 treatment day(s).    (3.) Mario Merchant will ambulate with CONTACT GUARD ASSIST for 5 feet with the least restrictive device within 7 treatment day(s).   (4.) Mario Merchant will perform standing static and dynamic balance activities x 10 minutes with STAND BY ASSIST to improve safety within 7 treatment day(s).  (5.) Mario Merchant will ascend and descend 1 stairs using no hand rail(s) with MINIMAL ASSIST to improve functional mobility and safety within 7 treatment day(s).  (6.) Mario Merchant will perform therapeutic exercises x 10 min for HEP with SUPERVISION to improve strength, endurance, and functional mobility within 7 treatment day(s).      PHYSICAL THERAPY Initial Assessment, Daily Note, and AM  (Link to Caseload Tracking: PT Visit Days : 1  Acknowledge Orders  Time In/Out  PT Charge Capture  Rehab Caseload Tracker    Mario Merchant is a 79 y.o. male   PRIMARY DIAGNOSIS: Cellulitis  Cellulitis [L03.90]  Cellulitis of right lower extremity [L03.115]       Reason for Referral: Generalized Muscle Weakness (M62.81)  Difficulty in walking, Not elsewhere classified (R26.2)  Inpatient: Payor: [facility] MEDICARE / Plan: T MEDICARE-ADVANTAGE PPO / Product Type: Medicare /     ASSESSMENT:     REHAB RECOMMENDATIONS:   Recommendation to date pending progress:  Setting:  Home Health Therapy    Equipment:    None     ASSESSMENT:  Mr. Merchant is a 79 year old male who presents to hospital secondary to above diagnosis. PMHX of R BKA 2021, has prosthetic, unable to wear at this time, Ortho has recommended NWB RLE per note. Pt supine in room with daughter at , reports that 
Admit Date: 4/20/2025    Subjective:     Patient has no new complaints.     Objective:     Patient Vitals for the past 8 hrs:   BP Temp Temp src Pulse Resp   04/26/25 1121 117/69 97.7 °F (36.5 °C) Oral 92 16     04/26 0701 - 04/26 1900  In: -   Out: 150 [Urine:150]  04/24 1901 - 04/26 0700  In: 240 [P.O.:240]  Out: 1975 [Urine:1975]    Physical Exam:  GENERAL ASSESSMENT: alert, oriented to person, place and time, no acute distress and no anxiety, depression or agitation  Chest: Easy work of breathing  CVS exam: RRR  ABDOMEN: not done  Neurological exam reveals alert, oriented, normal speech, no focal findings or movement disorder noted.  FEMALE GENITOURINARY EXAM: not done  MALE GENITAL EXAM: dorsey draining clear yellow urine        Data Review   Recent Results (from the past 24 hours)   POCT Glucose    Collection Time: 04/25/25  4:29 PM   Result Value Ref Range    POC Glucose 119 (H) 65 - 100 mg/dL    Performed by: Francois    POCT Glucose    Collection Time: 04/25/25  7:19 PM   Result Value Ref Range    POC Glucose 111 (H) 65 - 100 mg/dL    Performed by: Olga    CBC with Auto Differential    Collection Time: 04/26/25  7:08 AM   Result Value Ref Range    WBC 7.0 4.3 - 11.1 K/uL    RBC 4.56 4.23 - 5.6 M/uL    Hemoglobin 13.2 (L) 13.6 - 17.2 g/dL    Hematocrit 41.9 41.1 - 50.3 %    MCV 91.9 82 - 102 FL    MCH 28.9 26.1 - 32.9 PG    MCHC 31.5 31.4 - 35.0 g/dL    RDW 15.9 (H) 11.9 - 14.6 %    Platelets 269 150 - 450 K/uL    MPV 10.5 9.4 - 12.3 FL    nRBC 0.00 0.0 - 0.2 K/uL    Differential Type AUTOMATED      Neutrophils % 52.9 43.0 - 78.0 %    Lymphocytes % 30.3 13.0 - 44.0 %    Monocytes % 12.5 (H) 4.0 - 12.0 %    Eosinophils % 3.0 0.5 - 7.8 %    Basophils % 0.7 0.0 - 2.0 %    Immature Granulocytes % 0.6 0.0 - 5.0 %    Neutrophils Absolute 3.71 1.70 - 8.20 K/UL    Lymphocytes Absolute 2.13 0.50 - 4.60 K/UL    Monocytes Absolute 0.88 0.10 - 1.30 K/UL    Eosinophils Absolute 0.21 0.00 - 0.80 
Advance Care Planning     Advance Care Planning Inpatient Note  Spiritual Care Department    Today's Date: 4/30/2025  Unit: [facility]    Received request from family.  Upon review of chart and communication with care team, patient's decision making abilities are not in question.. Patient and Child/Children was/were present in the room during visit.    Goals of ACP Conversation:  Discuss advance care planning documents  Facilitate a discussion related to patient's goals of care as they align with the patient's values and beliefs.    Assessment:   Patient was in bed awake with Daughter at bedside.  introduced self.  asked Daughter to witness HC POA. Daughter agreed. Daughter asked about HC POA. Patient and Family are planing to discuss ACP planning after discharge.  offered to bring by HC POA. Patient was preparing for discharge.  gave Daughter HC POA.  educated Daughter on HC POA and how to complete. Patient, Daughter, and  discussed importance of ACP conversations and HC POA. Patient and Family are planing to discuss ACP planning.  gave Patient and Daughter the  number.     Interventions:  Provided education on documents for clarity and greater understanding  Discussed and provided education on state decision maker hierarchy  Encouraged ongoing ACP conversation with future decision makers and loved ones  Patient DECLINED ACP conversation    Electronically signed by KATI BLOUNT on 4/30/2025 at 1:06 PM             SPIRITUAL HEALTH   Note for Initial Spiritual Assessment                   Room # 518/01    Name: Mario Merchant           Age: 79 y.o.    Gender: male          MRN: [id number]  Religious: Sikh       Preferred Language: English    Date: 04/30/25  Visit Time: Begin Time: 1150 End Time : 1222 Complexity of Encounter: Moderate      Visit Summary:  Patient was in bed awake with Daughter at bedside.  
Clarified with Hospitalist Angelita Marsh if dorsey cath should be discontinued as urology stated in note if urine clear will dc cath but signed off. Hx renal mass. No order given to remove states reach out to urology but notified him that they have signed off. Informed him we can attempt voiding trial in a.m. with it being so late if they end up with difficult reinsert will have limited resources overnight. States ok fine do voiding trial in A.M. Informed him I will place order to dc dorsey at 6a to start voiding trial as long as bleeding doesn't occur overnight. Message read.   
Coe was placed on 4/22 for retention-- I was in room when MD was rounding-- MD stated to watch the urine R/T pink color but if remained pink would consult urology  
EOS notes:    Afebrile.  Output from dorsey cath pinkish to light red during the night;this AM noted clear yellow.  
EOS notes:    Coe cath output clear yellow overnight;D/C  at 0600.  Pending result of US LLE done this AM.  Pending surgical consult.    
EOS notes:    Foam dressing to wound on R BKA stump applied;pending wound care consult.  For XRAY of RLE.  
General Surgery consult received on pt Mario Merchant to evaluate for pancreatic mass. Pt will be seem 4/28/25 by Dr Andrade. Please call for questions or concerns.     Kimberly Frommel, NP  
I discussed with the PA who saw the patient again today and reviewed the CT scan.    There apparently is still draining pus from the residual limb.  The CT scan does not show any sort of large abscess however CT scans are not ideal for evaluating abscesses.    At this point the patient was made n.p.o. at midnight and plan for irrigation debridement right BKA tomorrow  
I reviewed the chart and examine the images in the chart. I spoke with the PA Cory King.    It appears the patient has a draining wound from his prosthesis site. His white count is elevated. He cannot get an MRI with contrast due to poor kidney function.     I will order a CT of his tibia and fibula and we will plan to make him NPO for a washout on Wednesday. We will reevaluate tomorrow to make sure there’s no rapid improvement but at this point it does feel like he’s going to need to wash out on Wednesday. 
Infectious Disease Progress Note      Today's Date: 4/23/2025   Admit Date: 4/20/2025  YOB: 1945    Impression/Plan:   MSSA Bacteremia (4/20/25 1/4 bottle); repeat BCX 4/22 NGTD  Source: right leg stump wound with abscess   TTE was technical difficult study but no mention of vegetation  Source control: s/p I&D of right BKA stump   Continue with Ancef 2g IV q8h, monitor renal function closey for adjustment of dosing  Repeat BCX NGTD at 1 day   ID will follow along    Right leg BKA stump infection with abscess/SSTI   Unable to do MRI due to renal function. CT tib/fib wo contrast noted-unhelpful.   Wound care evaluated and noted abscess on stump.   Abscess culture swab growing staph aureus   S/p I&D of right leg stump--deep cx sent-will follow along  ID will follow along      Afib on Eliquis  ALEXANDRA on CKD3: impacts dosing of abx  DM type 2: A1c of 6.8%  Obesity     Anti-infectives:   Vancomycin 4/20-  Ancef 4/20; 4/21--  Zosyn 4/20    Subjective:   Interval Events:     Afebrile. WBC down trending to 8.5. Cr improving. Seen in PACU. Doing okay. Daughter and son in law updated at bedside. Denies nausea/vomiting     Patient is a 79 y.o. male with PMH of SASHA, Afib on Eliquis, HTN, DM2, HTN, CKD3, HFrEF, CAD, gout, s/p right BKA who presented to [facility] with R BKA stump with worsening erythema and swelling and pain noted on Sunday. Pt has had issues with callous formation and rubbing of prosthetics on his stump lately. Pt was not unable to wear prosthetics due to discomfort. Pt has known neuropathy so he has altered sensation.  In ED, WBC of 14.3 with procal of 0.14, Cr of 2.38 from 1.8 baseline. Pt was given vancomycin, ancef and Zosyn. Orthopedics was consulted and noted there is a draining wound from his stump site. He cannot get MRI with contrast due to his renal function.   CT Tibia fibula wo contrast was ordered and pending. There is plan for a washout on 4/23. Pt denies any fevers or chills or any systemic 
Infectious Disease Progress Note      Today's Date: 4/28/2025   Admit Date: 4/20/2025  YOB: 1945    Impression/Plan:   Exanthematous Rash  - Re-consulted 4/28/25 due to rash noted by staff. Patient denies any symptoms or complaints related to the rash and did not notice it. The appearance is typical of a drug eruption.    Plan/Recs:  - Only recent new medication is Cefazolin, which is a decently common etiology of this type of rash. I would try to treat through since it's not bothering him and is relatively mild at this point.  - Continue Cefazolin.  - Start Cetirizine 10mg daily. Will avoid sedating antihistamines.  - Start Betamethasone 0.05% cream once daily.  - If the rash progresses or he develops new allergic symptoms, will need to switch to Nafcillin.      MSSA Bacteremia (4/20/25 1/4 bottle); repeat BCX 4/22 NGTD  Source: right leg stump wound with abscess   TTE was technical difficult study but no mention of vegetation  Source control: s/p I&D of right BKA stump   Continue Ancef 2g IV q8h, renal function improving. Continue current dose. Repeat BCX negative.   Plan for 2 weeks of Ancef 2g IV q8h EOT 5/7 followed by 2 weeks of oral Duricef 1g po BID EOT 5/21/25  ID office follow up scheduled for 5/21/25 at 8:40 am.    Abx plan:  Ancef 2g IV q8h EOT 5/7/25, followed by Duricef 1g po BID until ID follow up   Routine line care   Routine labs: CBC with diff, Creatinine     Right leg BKA stump infection with abscess/SSTI   Unable to do MRI due to renal function. CT tib/fib wo contrast noted-unhelpful.   Wound care evaluated and noted abscess on stump.   Abscess culture swab growing staph aureus   S/p I&D of right leg stump--deep cx sent-growing staph aureus    Continue abx above.      Afib on Eliquis  ALEXANDRA on CKD3: improving; impacts dosing of abx  DM type 2: A1c of 6.8%  Obesity       Anti-infectives:   Vancomycin 4/20-  Ancef 4/20; 4/21--  Zosyn 4/20    Subjective:   Interval Events:   Asked to 
Mario Merchant is 79 y.o. y/o male     Patient admitted for R BKA infection/MSSA infection. CT AP to evaluate hematuria incidentally found pancreatic head mass and para-aortic lymphadenopathy. No imaging for comparison.    EUS on 4/29: multi-septated anechoic pancreatic head cyst; no obvious soft tissue mass. Unable to reach for FNA due to angulation; will repeat EUS in 2-3 months.     Today: Patient resting at bedside; daughter also present. No complaints. Discussed plans for repeat procedures.    Labs: CA 19-9 WNL,     PE:   Vitals:    04/30/25 0809   BP: 112/60   Pulse: 86   Resp: 20   Temp: 97.5 °F (36.4 °C)   SpO2: 95%      General:  The patient appears well-nourished, and is in no acute distress.    HEENT:  Normocephalic, atraumatic. No sclerae icterus.   Neurologic:  Alert and oriented x3.  Psychiatric: Appropriate mood and affect.    Assessment and Plan:   #Pancreatic head lesion: Cystic appearing on EUS; unable to reach for FNA. Planning repeat EUS in 2-3 months. CA 19-9 WNL this admission. Will have  call to set up repeat EUS.     Electronically signed by Steffen Resendiz PA-C.    
Nutrition Assessment  Assessment Type: Initial  Reason for visit:  Length of Stay  Malnutrition Screening Tool Score: 0    Nutrition Intervention:   Food and/or Nutrient Delivery:   Meals and Snacks:  Diet: Continue NPO and advance as medically appropriate  Medical Food Supplements:   Medical food supplement therapy:  None Deferred NPO  Would benefit from addition of Ensure Maylin Protein and Darryl once diet resumed     Malnutrition Assessment:  Academy/A.S.P.E.N Clinical Malnutrition Criteria  Malnutrition Status: At risk for malnutrition (Prolonged admission with fair appetite/intake)  Nutrition Focused Physical Exam: Unremarkable     Nutrition Assessment:  Food/Nutrition Related History: Patient reports a baseline great appetite. His wife cooks and he typically eats 3 meals per day. He also utilizes a protein supplement but cannot recall the name.      Do You Have Any Cultural, Restorationism, or Ethnic Food Preferences?: No   Weight History: 5/20/24 315# (cards), 10/28/24 317# (neph), 11/25//24 313# (cards), 12/9/24 307# (IM). Patient does endorse some weight loss. Variances could have some fluid status association, but does appear to be trending down. Reflective of a 7.3% weight loss in 6 months.   Nutrition Background:       PMH: obesity, afib, HTN, DM, BKA, CKD, CHF, CAD. He presentd with edema and redness of right BKA. Admitted with cellulitis. S/p I&D 4/23. Incidental findings of pancreatic mass on CT. Plan for ERCP today.   Nutrition Monitoring/Evaluation:  Patient up to side of bed. States he has been eating fair. States that he does not feel like his appetite has been impacted but does admit that he is not eating like he would at home. He states he thinks maybe the medicine or the CT findings might be impacting his appetite/desire to eat.      Current Nutrition Therapies:  Diet NPO    Current Intake:   Average Meal Intake: 51-75%        Anthropometric Measures:  Height: 177.8 cm (5' 10\")  Current Body Wt: 
Ortho plan of care  No more plans for surgery.  1- NWB RLE until wound heals - expect 3 weeks minimum  2- wound care nurse will come by today, remove dressing, remove penrose drain and pull out approximately 15 cm of iodoform gauze. On Monday, the remainder of the gauze will be pulled.  3- ABX: Plan for 2 weeks of Ancef 2g IV q8h EOT 5/7 followed by 2 weeks of oral Duricef 1g po BID EOT 5/21/25  4- DVT px: I recommend restart Eliquis however I defer to primary team as they will manage her issues.   5- I spoke with daughter and family. Patient is unable to ambulate without his prosthesis. He is very unsafe, weak, and a fall risk. I will not let him use his prosthesis for at least three weeks. Based on this, I do find it appropriate for him to go to a rehab/skilled nursing facility as I do not think he will be safe at home.       Progress Note:  DX: MSSA bacteremia second to R BKA stump infection  Washout 4/23    In person I came and saw him. I personally saw the patient today. He is resting well in his room and sleeping. No complaints  Regarding his right knee, the dressing is dry intact. The dressing was not removed, but there is no sign of ascending erythema or redness. There was two days ago, but now it has diminished.     Clinically, he is improving, his white count is improving.    Results       Procedure Component Value Units Date/Time    Culture, Anaerobic [[id number]] Collected: 04/23/25 1309    Order Status: Sent Specimen: Swab from Leg Updated: 04/23/25 1318    Culture, Wound (with Gram Stain) [[id number]]  (Abnormal)  (Susceptibility) Collected: 04/23/25 1308    Order Status: Completed Specimen: Swab from Leg Updated: 04/25/25 0611     Special Requests --        ABSCESS  LEG  RIGHT  DEEP       Gram Stain OCCASIONAL WBCS SEEN         NO DEFINITE ORGANISM SEEN        Culture       LIGHT Staphylococcus aureus          Susceptibility        Staphylococcus aureus      BACTERIAL SUSCEPTIBILITY PANEL VISH      
Patient has large, raised red rash covering his back.  Patient states the rash is new, and  it \"itches a little\".  Dr. Garcia notified via [employer] serve.  He states he will order Benadryl and for RN to notify ID.   
Physical Therapy Note:    Attempted to see patient this AM for physical therapy treatment  session. Patient was off the floor for procedure. Will follow and re-attempt as schedule permits/patient available. Thank you,    Anna Yost, [facility]     Rehab Caseload Tracker    
Physical Therapy Note:    Attempted to see patient this PM for physical therapy treatment  session. Patient off the floor at this time. Will follow and re-attempt as schedule permits/patient available. Thank you,    Escobar Wiley, PT     Rehab Caseload Tracker   
Pink-Red urine noted in dorsey bag. Natalie Mariano NP notified and up to bedside. No new orders, continue to monitor. Plan of care ongoing.    0515- Jon Grant MD notified about continued abnormal urine in dorsey bag. Orders for UA placed. Urine sample sent to lab.  
Postop plan:  1-continue antibiotics based upon intraoperative cultures.  Appreciate infectious disease plan  2-strict nonweightbearing  3-okay per orthopedics to use anticoagulation  4-no plans for future surgery  5-wound care has been consulted.  The plan will be to remove the Penrose drain on Friday, as well as approximately 15 cm of the iodoform gauze need to be removed on Friday.  The remaining iodoform gauze we would then removed the next Monday by wound care.  A soft dressing can then be applied.    From a orthopedic standpoint next Monday, 4/28/2025 all of the gauze and drains will be removed.  Once antibiotics have been specialized and appropriately recommended by infectious disease the patient can be discharged.  There is no more plan for orthopedic surgery  
Progress Note    Patient: Mario Merchant MRN: [id number]  SSN: [id number]    YOB: 1945  Age: 79 y.o.  Sex: male      Admit Date: 4/20/2025    LOS: 2 days     Subjective:     Doing okay today. Pain stable.   Daughter is at bedside and reports years of on and off wounds to his anterior tibia.    Objective:     Vitals:    04/21/25 2327 04/22/25 0349 04/22/25 0719 04/22/25 1051   BP: 99/72 100/63 103/86 102/65   Pulse: 96 96 (!) 104 98   Resp: 18 18 20 18   Temp: 98.8 °F (37.1 °C) 98.6 °F (37 °C) 97.9 °F (36.6 °C) 97.3 °F (36.3 °C)   TempSrc: Axillary Oral Oral Oral   SpO2: 93% 92% 93% 94%   Weight:       Height:            Intake and Output:  Current Shift: 04/22 0701 - 04/22 1900  In: 240 [P.O.:240]  Out: 200 [Urine:200]  Last three shifts: 04/20 1901 - 04/22 0700  In: 1770.9 [P.O.:1080]  Out: 2562 [Urine:2562]    alert and oriented to person place time and situation    Right lower Extremity  - drainage from wound today. No improvement in erythema today  - Sensation: sensation intact to distal stump   - Motor: good hip flexion  - Digits warm, well-perfused, brisk cap refill    Lab/Data Review:  Recent Labs     04/22/25  0525   HGB 13.6   HCT 41.2          Assessment:     ORTHO INJURIES:  R BKA wound    ORTHO PROCEDURES:    Plan:     Plan for OR tomorrow with Dr. Alford for I&D of wound  NPO p MN    Appreciate wound care assistance.   Wound care:  Per wound team until OR  Multimodal pain control per orders   WB status: NWB RLE  PT/OT for mobility/ADLs   Diet:  ADULT DIET; Regular; 3 carb choices (45 gm/meal); Low Fat/Low Chol/High Fiber/PEDRO  Diet NPO Exceptions are: Sips of Water with Meds  DVT prophylaxis: Defer to primary team; then  mg daily x 1 mo    Dispo: pending    F/up w/ Dr Alford in two weeks    Signed By: JHOANA Caruso     April 22, 2025           
TRANSFER - OUT REPORT:    Verbal report given to Lexy on Mario Calabrese Pack  being transferred to [place] for routine progression of patient care       Report consisted of patient's Situation, Background, Assessment and   Recommendations(SBAR).     Information from the following report(s) Nurse Handoff Report, MAR, and Neuro Assessment was reviewed with the receiving nurse.        Opportunity for questions and clarification was provided.      Patient transported with:  Home breathing machine, midline       
This RN attempted twice to call report to [facility] facility before transport came to  pt. Liaison said the nurse receiving pt will call back. RN has still not received a call back after pt left floor. [facility] liaison notified and aware.  
VANCO DAILY FOLLOW UP NOTE  [facility]   Pharmacy Pharmacokinetic Monitoring Service - Vancomycin    Consulting Provider: Dr. 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  Target Concentration: Goal AUC/VISH 400-600 mg*hr/L  Day of Therapy: 3  Additional Antimicrobials: pip/tazo    Pertinent Laboratory Values:   Wt Readings from Last 1 Encounters:   04/21/25 (!) 137.7 kg (303 lb 9.6 oz)     Temp Readings from Last 1 Encounters:   04/22/25 97.9 °F (36.6 °C) (Oral)     Recent Labs     04/20/25  1852 04/20/25  2217 04/21/25  0516 04/22/25  0525   BUN 28*  --  27* 27*   CREATININE 2.38*  --  2.21* 2.18*   WBC 14.3*  --  13.8* 11.9*   PROCAL 0.14*  --   --   --    LACTSEPSIS 1.6 1.9  --   --      Estimated Creatinine Clearance: 39 mL/min (A) (based on SCr of 2.18 mg/dL (H)).    Lab Results   Component Value Date/Time    VANCOTROUGH 14.2 07/21/2021 02:21 PM    VANCORANDOM 24.0 04/22/2025 05:25 AM       MRSA Nasal Swab: N/A. Non-respiratory infection    Assessment:  Date/Time Dose Concentration AUC         Note: Serum concentrations collected for AUC dosing may appear elevated if collected in close proximity to the dose administered, this is not necessarily an indication of toxicity    Plan:  Dosing recommendations based on Bayesian software  Given improvement in renal function, will schedule vancomycin 1750 mg q48h.   Anticipated AUC of 537 and trough concentration of 15.7 at steady state  Renal labs as indicated   Vancomycin concentrations will be ordered as clinically appropriate  Pharmacy will continue to monitor patient and adjust therapy as indicated    Thank you for the consult,  PAULIE VALLADARES RPH     
VANCO NOTE RENAL INSUFFICIENCY PATIENT   [facility]   Pharmacy Pharmacokinetic Monitoring Service - Vancomycin    Consulting Provider: Megan Mendoza MD    Indication: SSTI  Target Concentration: Random level <= 20 mg/L  Day of Therapy: 2  Additional Antimicrobials: pip/tazo    Pertinent Laboratory Values:   Wt Readings from Last 1 Encounters:   04/20/25 133.4 kg (294 lb 1.6 oz)     Temp Readings from Last 1 Encounters:   04/21/25 98.6 °F (37 °C) (Oral)     Recent Labs     04/20/25  1852 04/20/25  2217 04/21/25  0516   BUN 28*  --  27*   CREATININE 2.38*  --  2.21*   WBC 14.3*  --  13.8*   PROCAL 0.14*  --   --    LACTSEPSIS 1.6 1.9  --        Lab Results   Component Value Date/Time    ARLENE 14.2 07/21/2021 02:21 PM       MRSA Nasal Swab: N/A. Non-respiratory infection    Assessment:  Date:  Dose/Freq Admin Times Level/Time:   4/20 2500 mg x1 2343    4/21 1000 mg x1 (0000)    4/22   Rd @ (0400)                 Plan:  Concentration-guided dosing due to renal impairment  No level obtained for this morning. Will give vancomycin IV 1000 mg x1 today, and continue to monitor renal function.   Vancomycin concentrations will be ordered for 04/22 @ 0600.   Pharmacy will continue to monitor patient and adjust therapy as indicated    Thank you for the consult,  PAULIE VALLADARES RPH     
VANCO NOTE RENAL INSUFFICIENCY PATIENT   [facility]   Pharmacy Pharmacokinetic Monitoring Service - Vancomycin    Consulting Provider: Megan Mendoza MD    Indication: SSTI  Target Concentration: Random level <= 20 mg/L  Day of Therapy: 1  Additional Antimicrobials: pip/tazo    Pertinent Laboratory Values:   Wt Readings from Last 1 Encounters:   04/20/25 133.4 kg (294 lb 1.6 oz)     Temp Readings from Last 1 Encounters:   04/20/25 98.4 °F (36.9 °C) (Oral)     Recent Labs     04/20/25  1852 04/20/25  2217   BUN 28*  --    CREATININE 2.38*  --    WBC 14.3*  --    PROCAL 0.14*  --    LACTSEPSIS 1.6 1.9       Lab Results   Component Value Date/Time    JACKIOTROUGH 14.2 07/21/2021 02:21 PM       MRSA Nasal Swab: N/A. Non-respiratory infection    Assessment:  Date:  Dose/Freq Admin Times Level/Time:                                   Plan:  Concentration-guided dosing due to renal impairment  Start vancomycin 2500mg IV x 1  Vancomycin concentrations will be ordered as clinically appropriate  Pharmacy will continue to monitor patient and adjust therapy as indicated    Thank you for the consult,  Kristin Krueger RP   
[facility]  [address], [address]  [place], [place] [id number] [phone]    Office Note/H&P/Consult Note   Mario Merchant   MRN: [id number]     : 1945        HPI: The patient is lying in bed.  No acute events overnight.  States that he is feeling well.  He denies any abdominal pain.  He denies any nausea or emesis.  He had a EUS performed yesterday.         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    Blood loss anemia     s/p BKA 2021- denies hx of blood transfusions, took oral Fe for a year or so    CAD (coronary artery disease)     MI & stent , additional MI after, no addtional interventions    Cardiomyopathy ([facility])     3/2022 Echo LVE 30-35%    Cellulitis 2021    Chronic kidney disease     stage 3-    COVID-19 vaccine series completed     Moderna Vaccine completed X2 doses    Current use of long term anticoagulation     Eliquis and Aspirin    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 mellitus, limited to breakdown of skin ([facility]) 2021    H/O echocardiogram 2022    LVEF 30-35%    H/O heart artery stent 1999    X1 placed in     History of bleeding peptic ulcer     History of MI (myocardial infarction)     stent placed X1    Hypercholesterolemia     Hypertension     Left ventricular dysfunction     echo revealed EF 30-35%, mildly dilated LA/RA and mild TR and MR.    Morbid obesity ([facility]) 10/04/2023    BMI 45.92    Neuropathy     severe    Obesity 
with min to mod of 2.  Probably would have done better if he had pants on and things were not sticking.  Nursing notified about how to assist him back to bed.  Gave her 2 options.  Either sheet transfer or use slide board.  It was left in the room.  Progressing towards goals.   I+D yesterday.      SUBJECTIVE:   Mr. Merchant states, \"I have no motivation\"    Social/Functional Lives With: Family  Type of Home: House  Home Layout: One level  Home Access: Stairs to enter without rails  Entrance Stairs - Number of Steps: 1  Bathroom Shower/Tub: Tub/Shower unit  Bathroom Equipment: Shower chair  Home Equipment: Walker - Rolling  Has the patient had two or more falls in the past year or any fall with injury in the past year?: Yes  Occupation: Retired  OBJECTIVE:     PAIN: VITALS / O2: PRECAUTION / LINES / DRAINS:   Pre Treatment:  catheter - not rated          Post Treatment: not rated Vitals        Oxygen    None    RESTRICTIONS/PRECAUTIONS:  Restrictions/Precautions  Restrictions/Precautions: Fall Risk, Weight Bearing  Lower Extremity Weight Bearing Restrictions  Right Lower Extremity Weight Bearing: Non Weight Bearing  Restrictions/Precautions: Fall Risk, Weight Bearing     MOBILITY:   I Mod I S SBA CGA Min Mod Max Total  NT x2 Comments:   Bed Mobility    Rolling [] [] [] [] [] [] [] [] [] [] []    Supine to Sit [] [] [] [] [] [x] [] [] [] [] [x]    Scooting [] [] [] [] [] [x] [] [] [] [] [x]    Sit to Supine [] [] [] [] [] [] [] [] [] [x] []    Transfers    Sit to Stand [] [] [] [] [] [] [] [] [] [x] []    Bed to Chair [] [] [] [] [] [x] [x] [] [] [] [x] Slide board transfer   Stand to Sit [] [] [] [] [] [] [] [] [] [x] []     [] [] [] [] [] [] [] [] [] [] []    I=Independent, Mod I=Modified Independent, S=Supervision, SBA=Standby Assistance, CGA=Contact Guard Assistance,   Min=Minimal Assistance, Mod=Moderate Assistance, Max=Maximal Assistance, Total=Total Assistance, NT=Not Tested    BALANCE: Good Fair+ Fair Fair- Poor 
Body   Bathing [] [] [] [x] [] [] [] [] [] []     Lower Body Bathing [] [] [] [] [] [] [x] [] [] []  Assistance for his bottom and lower part of his leg   Toileting [] [] [] [] [] [] [] [] [] [x]    Upper Body Dressing [] [] [] [x] [] [] [] [] [] [] Donning a gown    Lower Body Dressing [] [] [] [] [] [] [] [x] [] [] Sock    Feeding [] [] [] [] [] [] [] [] [] [x]    Grooming [] [] [x] [] [] [] [] [] [] [] Washing face    Personal Device Care [] [] [] [] [] [] [] [] [] []    Functional Mobility [] [] [] [x] [] [] [] [] [] [] slide board transfer to the recliner    I=Independent, Mod I=Modified Independent, S=Supervision/Setup, SBA=Standby Assistance, CGA=Contact Guard Assistance, Min=Minimal Assistance, Mod=Moderate Assistance, Max=Maximal Assistance, Total=Total Assistance, NT=Not Tested    BALANCE: Good Fair+ Fair Fair- Poor NT Comments   Sitting Static [] [x] [] [] [] []    Sitting Dynamic [] [] [x] [] [] []              Standing Static [] [] [] [x] [x] []    Standing Dynamic [] [] [] [] [] [x]        PLAN:     FREQUENCY/DURATION   OT Plan of Care: 3 times/week for duration of hospital stay or until stated goals are met, whichever comes first.    TREATMENT:     TREATMENT:   Co-Treatment between OT & PT necessary due to patient's decreased overall endurance/tolerance levels, as well as need for high level skilled assistance to complete functional transfers/mobility and functional tasks  Self Care (38 minutes): Patient participated in upper body bathing, lower body bathing, upper body dressing, lower body dressing, grooming, bed mobility, functional transfer, adaptive equipment, assistive device, and compensatory technique in unsupported sitting and standing with moderate visual, verbal, and tactile cueing to increase independence, decrease assistance required, increase activity tolerance, and increase safety awareness. The patient was educated on strategies to improve safety, recommended equipment, and transfer 
IMPAIRED   (See Comments)   UE AROM [x] []   UE PROM [x] []   Strength []  Generalized weakness     Posture / Balance []  Fair+ sitting; fair- to poor standing    Sensation []  Hx of neuropathy in B LE   Coordination []       Tone []       Edema [] Increased in the R BKA   Activity Tolerance []  Limited by fatigue; restrictions of the R LE   Hand Dominance R [] L []      COGNITION/  PERCEPTION: INTACT IMPAIRED   (See Comments)   Orientation [x]     Vision [x]     Hearing [x]     Cognition  []  Follows some simple commands; cognition appears mildly impaired    Perception []       MOBILITY: I Mod I S SBA CGA Min Mod Max Total  NT x2 Comments:   Bed Mobility    Rolling [] [] [] [x] [] [] [] [] [] [] []    Supine to Sit [] [] [] [x] [] [] [] [] [] [] []    Scooting [] [] [] [x] [] [] [] [] [] [] []    Sit to Supine [] [] [] [x] [] [] [] [] [] [] []    Transfers    Sit to Stand [] [] [] [] [] [x] [x] [] [] [] [x] To the rolling walker (NWB R LE- prosthetic NOT used with standing)   Bed to Chair [] [] [] [] [] [] [] [] [] [] []    Stand to Sit [] [] [] [] [] [x] [x] [] [] [] [x]    Tub/Shower [] [] [] [] [] [] [] [] [] [] []     Toilet [] [] [] [] [] [] [] [] [] [] []      [] [] [] [] [] [] [] [] [] [] []    I=Independent, Mod I=Modified Independent, S=Supervision/Setup, SBA=Standby Assistance, CGA=Contact Guard Assistance, Min=Minimal Assistance, Mod=Moderate Assistance, Max=Maximal Assistance, Total=Total Assistance, NT=Not Tested    ACTIVITIES OF DAILY LIVING: I Mod I S SBA CGA Min Mod Max Total NT Comments   BASIC ADLs:              Upper Body Bathing  [] [] [] [] [] [] [] [] [] []     Lower Body Bathing [] [] [] [] [] [] [] [] [] []     Toileting [] [] [] [] [] [] [] [] [] []    Upper Body Dressing [] [] [] [] [] [] [] [] [] []    Lower Body Dressing [] [] [] [] [] [] [] [] [] []    Feeding [] [] [] [] [] [] [] [] [] []    Grooming [] [] [] [] [] [] [] [] [] []    Personal Device Care [] [] [] [] [] [] [] [] [] []  
  Culture, Anaerobic    Collection Time: 04/22/25 10:29 AM    Specimen: Abscess   Result Value Ref Range    Special Requests NO SPECIAL REQUESTS      Culture        SUBCULTURE IS NECESSARY TO DETERMINE PRESENCE OR ABSENCE OF ANAEROBIC BACTERIA IN THIS CULTURE.  FURTHER REPORT TO FOLLOW AFTER INCUBATION OF SUBCULTURE.   POCT Glucose    Collection Time: 04/22/25 10:59 AM   Result Value Ref Range    POC Glucose 103 (H) 65 - 100 mg/dL    Performed by: BandaEscobarValeriaPCT    POCT Glucose    Collection Time: 04/22/25  4:05 PM   Result Value Ref Range    POC Glucose 136 (H) 65 - 100 mg/dL    Performed by: BandaEscobarValeriaPCT    POCT Glucose    Collection Time: 04/22/25  8:19 PM   Result Value Ref Range    POC Glucose 153 (H) 65 - 100 mg/dL    Performed by: Little Green WindmillAliciaPCT    Urinalysis with Reflex to Culture    Collection Time: 04/23/25  5:58 AM    Specimen: Urine   Result Value Ref Range    Color, UA RED      Appearance CLOUDY      Specific Gravity, UA 1.027 (H) 1.001 - 1.023      pH, Urine 5.0 5.0 - 9.0      Protein,  (A) NEG mg/dL    Glucose, Ur >1000 (A) NEG mg/dL    Ketones, Urine Negative NEG mg/dL    Bilirubin, Urine SMALL (A) NEG      Blood, Urine LARGE (A) NEG      Urobilinogen, Urine 1.0 0.2 - 1.0 EU/dL    Nitrite, Urine Negative NEG      Leukocyte Esterase, Urine SMALL (A) NEG      WBC, UA 5-10 0 /hpf    RBC, UA >100 0 /hpf    BACTERIA, URINE 0 0 /hpf    Urine Culture if Indicated CULTURE NOT INDICATED BY UA RESULT      Epithelial Cells, UA 0-3 0 /hpf    Casts 0 0 /lpf    Crystals 0 0 /LPF    Mucus, UA 0 0 /lpf    Other observations RESULTS VERIFIED MANUALLY     CBC with Auto Differential    Collection Time: 04/23/25  6:33 AM   Result Value Ref Range    WBC 8.5 4.3 - 11.1 K/uL    RBC 4.60 4.23 - 5.6 M/uL    Hemoglobin 13.4 (L) 13.6 - 17.2 g/dL    Hematocrit 40.9 (L) 41.1 - 50.3 %    MCV 88.9 82 - 102 FL    MCH 29.1 26.1 - 32.9 PG    MCHC 32.8 31.4 - 35.0 g/dL    RDW 15.9 (H) 11.9 - 14.6 %    
Facility-Administered Medications   Medication Dose Route Frequency    [Held by provider] furosemide (LASIX) tablet 20 mg  20 mg Oral BID    [Held by provider] apixaban (ELIQUIS) tablet 5 mg  5 mg Oral 2 times per day    empagliflozin (JARDIANCE) tablet 10 mg  10 mg Oral Daily    ceFAZolin (ANCEF) 2000 mg in sterile water 20 mL IV syringe  2,000 mg IntraVENous q8h    aluminum & magnesium hydroxide-simethicone (MAALOX PLUS) 200-200-20 MG/5ML suspension 30 mL  30 mL Oral Q6H PRN    [Held by provider] metoprolol succinate (TOPROL XL) extended release tablet 25 mg  25 mg Oral QPM    tamsulosin (FLOMAX) capsule 0.4 mg  0.4 mg Oral Daily    glucose chewable tablet 16 g  4 tablet Oral PRN    dextrose bolus 10% 125 mL  125 mL IntraVENous PRN    Or    dextrose bolus 10% 250 mL  250 mL IntraVENous PRN    Glucagon Emergency KIT 1 mg  1 mg SubCUTAneous PRN    dextrose 10 % infusion   IntraVENous Continuous PRN    insulin lispro (HUMALOG,ADMELOG) injection vial 0-4 Units  0-4 Units SubCUTAneous 4x Daily AC & HS    albuterol (PROVENTIL) (2.5 MG/3ML) 0.083% nebulizer solution 2.5 mg  2.5 mg Nebulization Q4H PRN    atorvastatin (LIPITOR) tablet 40 mg  40 mg Oral Nightly    febuxostat (ULORIC) tablet 40 mg  40 mg Oral Daily    pregabalin (LYRICA) capsule 100 mg  100 mg Oral BID    sodium chloride flush 0.9 % injection 5-40 mL  5-40 mL IntraVENous 2 times per day    sodium chloride flush 0.9 % injection 5-40 mL  5-40 mL IntraVENous PRN    0.9 % sodium chloride infusion   IntraVENous PRN    potassium chloride (KLOR-CON M) extended release tablet 40 mEq  40 mEq Oral PRN    Or    potassium bicarb-citric acid (EFFER-K) effervescent tablet 40 mEq  40 mEq Oral PRN    Or    potassium chloride 10 mEq/100 mL IVPB (Peripheral Line)  10 mEq IntraVENous PRN    magnesium sulfate 2000 mg in 50 mL IVPB premix  2,000 mg IntraVENous PRN    ondansetron (ZOFRAN-ODT) disintegrating tablet 4 mg  4 mg Oral Q8H PRN    Or    ondansetron (ZOFRAN) injection 4 
Soft, non-tender. bowel sounds normal. non-distended   Extremities: R BKA stump; postop dressing noted.   Skin: Skin color, texture, turgor normal. no acute rash or lesions   Lymph nodes: Cervical and supraclavicular normal   Musculoskeletal: R BKA; postop dressing noted.    Lines/Devices:  Intact, no erythema, drainage or tenderness   Psych: Alert and oriented, normal mood affect given the setting     CBC:  Recent Labs     04/23/25  0633 04/24/25  0505 04/24/25  1611 04/25/25  0011 04/25/25  0619   WBC 8.5 8.5  --   --  7.3   HGB 13.4* 12.7* 12.9* 12.0* 12.3*   HCT 40.9* 40.6* 40.8* 37.4* 38.8*    267  --   --  257       BMP:  Recent Labs     04/23/25  0633 04/24/25  0505 04/25/25  0619   BUN 25* 21 23    141 140   K 3.8 4.1 4.1    107 106   CO2 23 23 23       LFTS:  No results for input(s): \"ALT\", \"TP\" in the last 72 hours.    Invalid input(s): \"TBILI\", \"SGOT\", \"AP\", \"ALB\"      Data Review:   Recent Results (from the past 24 hours)   POCT Glucose    Collection Time: 04/24/25 11:10 AM   Result Value Ref Range    POC Glucose 92 65 - 100 mg/dL    Performed by: Olga    Hemoglobin and Hematocrit    Collection Time: 04/24/25  4:11 PM   Result Value Ref Range    Hemoglobin 12.9 (L) 13.6 - 17.2 g/dL    Hematocrit 40.8 (L) 41.1 - 50.3 %   POCT Glucose    Collection Time: 04/24/25  4:38 PM   Result Value Ref Range    POC Glucose 116 (H) 65 - 100 mg/dL    Performed by: Michelle    POCT Glucose    Collection Time: 04/24/25  9:38 PM   Result Value Ref Range    POC Glucose 102 (H) 65 - 100 mg/dL    Performed by: Ramo    Hemoglobin and Hematocrit    Collection Time: 04/25/25 12:11 AM   Result Value Ref Range    Hemoglobin 12.0 (L) 13.6 - 17.2 g/dL    Hematocrit 37.4 (L) 41.1 - 50.3 %   CBC with Auto Differential    Collection Time: 04/25/25  6:19 AM   Result Value Ref Range    WBC 7.3 4.3 - 11.1 K/uL    RBC 4.16 (L) 4.23 - 5.6 M/uL    Hemoglobin 12.3 (L) 13.6 - 17.2 g/dL    
   AST 37 15 - 37 U/L    Alk Phosphatase 127 40 - 129 U/L    Total Protein 6.5 6.3 - 8.2 g/dL    Albumin 2.5 (L) 3.2 - 4.6 g/dL    Globulin 4.1 (H) 2.3 - 3.5 g/dL    Albumin/Globulin Ratio 0.6 (L) 1.0 - 1.9     Magnesium    Collection Time: 04/29/25  6:03 AM   Result Value Ref Range    Magnesium 2.0 1.8 - 2.4 mg/dL   POCT Glucose    Collection Time: 04/29/25  7:46 AM   Result Value Ref Range    POC Glucose 94 65 - 100 mg/dL    Performed by: BandSouthscobarValeriaPCT    POCT Glucose    Collection Time: 04/29/25 11:14 AM   Result Value Ref Range    POC Glucose 97 65 - 100 mg/dL    Performed by: WandascobarValeriaPCT        No results for input(s): \"COVID19\" in the last 72 hours.    Current Meds:  Current Facility-Administered Medications   Medication Dose Route Frequency    cetirizine (ZYRTEC) tablet 10 mg  10 mg Oral Daily    augmented betamethasone dipropionate (DIPROLENE-AF) 0.05 % cream   Topical Q24H    furosemide (LASIX) tablet 20 mg  20 mg Oral BID    [Held by provider] apixaban (ELIQUIS) tablet 5 mg  5 mg Oral 2 times per day    empagliflozin (JARDIANCE) tablet 10 mg  10 mg Oral Daily    ceFAZolin (ANCEF) 2000 mg in sterile water 20 mL IV syringe  2,000 mg IntraVENous q8h    aluminum & magnesium hydroxide-simethicone (MAALOX PLUS) 200-200-20 MG/5ML suspension 30 mL  30 mL Oral Q6H PRN    metoprolol succinate (TOPROL XL) extended release tablet 25 mg  25 mg Oral QPM    tamsulosin (FLOMAX) capsule 0.4 mg  0.4 mg Oral Daily    glucose chewable tablet 16 g  4 tablet Oral PRN    dextrose bolus 10% 125 mL  125 mL IntraVENous PRN    Or    dextrose bolus 10% 250 mL  250 mL IntraVENous PRN    Glucagon Emergency KIT 1 mg  1 mg SubCUTAneous PRN    dextrose 10 % infusion   IntraVENous Continuous PRN    insulin lispro (HUMALOG,ADMELOG) injection vial 0-4 Units  0-4 Units SubCUTAneous 4x Daily AC & HS    albuterol (PROVENTIL) (2.5 MG/3ML) 0.083% nebulizer solution 2.5 mg  2.5 mg Nebulization Q4H PRN    atorvastatin (LIPITOR) 
   Monocytes % 10.8 4.0 - 12.0 %    Eosinophils % 2.7 0.5 - 7.8 %    Basophils % 0.4 0.0 - 2.0 %    Immature Granulocytes % 0.6 0.0 - 5.0 %    Neutrophils Absolute 5.20 1.70 - 8.20 K/UL    Lymphocytes Absolute 2.09 0.50 - 4.60 K/UL    Monocytes Absolute 0.92 0.10 - 1.30 K/UL    Eosinophils Absolute 0.23 0.00 - 0.80 K/UL    Basophils Absolute 0.03 0.00 - 0.20 K/UL    Immature Granulocytes Absolute 0.05 0.0 - 0.5 K/UL   Basic Metabolic Panel w/ Reflex to MG    Collection Time: 04/24/25  5:05 AM   Result Value Ref Range    Sodium 141 136 - 145 mmol/L    Potassium 4.1 3.5 - 5.1 mmol/L    Chloride 107 98 - 107 mmol/L    CO2 23 20 - 29 mmol/L    Anion Gap 11 7 - 16 mmol/L    Glucose 109 (H) 70 - 99 mg/dL    BUN 21 8 - 23 MG/DL    Creatinine 1.77 (H) 0.80 - 1.30 MG/DL    Est, Glom Filt Rate 39 (L) >60 ml/min/1.73m2    Calcium 8.7 (L) 8.8 - 10.2 MG/DL   POCT Glucose    Collection Time: 04/24/25  7:26 AM   Result Value Ref Range    POC Glucose 96 65 - 100 mg/dL    Performed by: Olga         Microbiology:  Reviewed    Studies:  Reviewed    Signed By: Eddie Meyer, APRN - CNP     April 24, 2025           Spent 40 min seeing patient today. More than 50% of the time documented was spent in face-to-face contact with the patient and in the care of the patient on the floor/unit where the patient is located. Plan of care above discussed with ID attending, Dr. Thomason.

## 2025-04-30 NOTE — DISCHARGE SUMMARY
Requests --        ABSCESS  LEG  RIGHT  SUPERFICIAL       Culture       NO ANAEROBIC GROWTH IN 5 DAYS          Culture, Wound (with Gram Stain) [[id number]]  (Abnormal) Collected: 04/23/25 1306    Order Status: Completed Specimen: Swab from Leg Updated: 04/24/25 1036     Special Requests --        ABSCESS  LEG  RIGHT  SUPERFICIAL 2       Gram Stain MANY WBCS SEEN         FEW Gram positive cocci        Culture       MODERATE Staphylococcus aureus For Susceptibility Refer to Culture [id number]          Culture, Wound (with Gram Stain) [[id number]]  (Abnormal) Collected: 04/23/25 1305    Order Status: Completed Specimen: Swab from Leg Updated: 04/24/25 1035     Special Requests --        ABSCESS  LEG  RIGHT  SUPERFICIAL       Gram Stain MODERATE WBCS SEEN               MODERATE Gram positive cocci           Culture       MODERATE Staphylococcus aureus For Susceptibility Refer to Culture [id number]          Culture, Anaerobic [[id number]] Collected: 04/23/25 1219    Order Status: Canceled Specimen: Swab from Leg     Culture, Wound (with Gram Stain) [[id number]] Collected: 04/23/25 1219    Order Status: Canceled Specimen: Swab from Leg     Culture, Wound (with Gram Stain) [[id number]]  (Abnormal)  (Susceptibility) Collected: 04/22/25 1029    Order Status: Completed Specimen: Abscess Updated: 04/24/25 0853     Special Requests NO SPECIAL REQUESTS        Gram Stain MANY WBCS SEEN         FEW Gram positive cocci        Culture       HEAVY Staphylococcus aureus          Susceptibility        Staphylococcus aureus      BACTERIAL SUSCEPTIBILITY PANEL VISH      oxacillin <=0.25 ug/mL Sensitive      rifampin <=0.5 ug/mL Sensitive  [1]       tetracycline <=1 ug/mL Sensitive      trimethoprim-sulfamethoxazole <=10 ug/mL Sensitive      vancomycin 1 ug/mL Sensitive                   [1]  Rifampin is not to be used for mono-therapy.                    Culture, Anaerobic [[id number]] Collected: 04/22/25 1029    Order Status: Completed

## 2025-04-30 NOTE — CARE COORDINATION
Patient discharged to [facility] to complete STR. Transport scheduled with [facility] @1430. Patient's room number is Kristan 12 and report [phone]. Primary nurse notified. No other discharge needs identified.     Patient to complete 2 weeks of Ancef 2 g IV every 8 hours at SNF with end of therapy 5/7/2025, as ID requested.        04/21/25 1750   Service Assessment   Patient Orientation Alert and Oriented   Cognition Alert   History Provided By Patient;Child/Family;Medical Record   Primary Caregiver Self   Accompanied By/Relationship daughter   Support Systems Children;Spouse/Significant Other   Patient's Healthcare Decision Maker is: Legal Next of Kin   PCP Verified by CM Yes   Last Visit to PCP Within last 6 months  (12/9/2024)   Prior Functional Level Independent in ADLs/IADLs;Other (see comment)  (modified indenpendent with prosthesis and DME)   Current Functional Level Assistance with the following:;Mobility   Can patient return to prior living arrangement Yes   Ability to make needs known: Good   Family able to assist with home care needs: Yes   Would you like for me to discuss the discharge plan with any other family members/significant others, and if so, who? Yes  (daughter)   Financial Resources Medicare  ([employer] Medicare)   Community Resources None   CM/SW Referral Other (see comment)  (SNF referrals placed.)   Social/Functional History   Lives With Family   Type of Home House   Home Layout One level   Home Access Stairs to enter without rails   Entrance Stairs - Number of Steps 1   Bathroom Shower/Tub Tub/Shower unit   Bathroom Equipment Shower chair   Home Equipment Walker - Rolling   Occupation Retired   Discharge Planning   Type of Residence House   Living Arrangements Children   Current Services Prior To Admission Durable Medical Equipment   Current DME Prior to Arrival Walker;Wheelchair;Other (Comment)  (RLE prosthesis)   Potential Assistance Needed Home Care   DME Ordered? No

## 2025-04-30 NOTE — CONSULTS
CONSULT                      Date: 4/25/2025        Patient Name: Mario Merchant     YOB: 1945      Age:  79 y.o.      History of Present Illness     79 y.o.   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  Additionally developed an ulcer on his right stump with drainage.  In the ER white blood cell count was 14,300, and patient tachycardic heart rate 102.  Blood cultures on admission positive for MSSA.  ID consulted.  ID recommends 2 weeks of Ancef 2 g IV every 8 hours with end of therapy 5/7/2025 followed by 2 weeks of oral Duricef 1 g p.o. twice daily with end of therapy 5/21/2025.  Wound care consult and orthopedics consulted.  Patient underwent surgical washout in OR 4/23.  Recommend placement of midline over a tunneled line given short course of IV antibiotics.  Case management working on short-term rehab placement.  ID does not follow patient at short-term rehab.  Patient needs weekly CBC with differential, CMP.  Outpatient appointment with ID scheduled for 5/21/2025 at 8:40 AM with Dr. Wong.   -Copied from H&P    Urology consult for hematuria. Pt noted to have hematuria in dorsey bag 2 days ago, UA sent and not reflexed to culture. He resumed a dose of eliquis yesterday and hematuria worsened. Eliquis was held and hematuria resolved today. He had another dose of eliquis this afternoon and hematuria began to worsen again.  Pt seen in our office in 2022 by Jaky Hathaway NP, noted to have BPH at the time and started on flomax.    Past Medical History     Past Medical History:   Diagnosis Date    A-fib (HCC) 07/19/2021    developed AFID after hospital admission for wound infection- started on Eliquis    Acute on chronic respiratory failure 
Consult Note            Date:4/28/2025        Patient Name:Mario Merchant     YOB: 1945     Age:79 y.o.    Inpatient consult to GI  Consult performed by: Steffen Resendiz PA  Consult ordered by: Josue Rocha PA-C          Chief Complaint     Chief Complaint   Patient presents with    Wound Infection        History Obtained From   patient    History of Present Illness   Patient is a 79 year old male, with a PMHx of DM, BKA, ILD, CKD Stage 3, CHF (EF 40-45%), AFIB, CAD, MI who was admitted for R BKA ulcerated wound/MSSA bacteremia. GI consulted for pancreatic head mass vs lymph node. Had R BKA wash out surgery with ortho back on April 23. He has been on Eliquis this admission. Started having hematuria a few days ago, urology following and completed CT AP which incidentally showed soft tissue mass at head of pancreas (tumor vs lymph node), april-aortic lymphadenopathy, atrophic pancreas. No recent abdominal imaging for comparison; surgery consulted but requesting possible EUS. No acute obstruction; Had mildly elevated ALP on 4/20 with normal bilirubin/AST/ALT but not checked since then.    Eliquis held since 4/23 due to hematuria    Patient reports poor appetite and about 10-12 lb weight loss over the past few months. He denies any previous issues with pancreas, liver, gallbladder. Non smoker and no ETOH. No abdominal pain, vomiting. Dad had liver CA (from cirrhosis?).     Labs: Pending hepatic panel/CBC this AM. ALP was elevated on admission, but normal TB/AST/ALT. Pending CA 19-9.    Imaging: CT ABDOMEN PELVIS WO CONTRAST Additional Contrast? None  Result Date: 4/26/2025  1. Right nephrolithiasis with no evidence of urinary tract obstruction at this time. 2. Bilateral complex renal cysts including a particularly large exophytic focus on the left. If not previously evaluated, contrasted CT is suggested to exclude malignant features given the patient's unexplained hematuria. 3. Atrophic 
H&P/Consult Note/Progress Note/Office Note:   Mario Merchant  MRN: [id number]  :1945  Age:79 y.o.    HPI: Mario Merchant is a 79 y.o. male who is seen in consultation for pancreatic mass.     This patient was recently admitted on 2025 after he presented with an abscess on his BKA stump.  He had a washout of this in the operating room on 2025.  After his operation the patient was found to have hematuria.  He had a CT scan performed that incidentally noted an atrophic pancreas with fatty changes, and a soft tissue mass contiguous with the surface of the pancreatic head measuring 2.9 x 2.1 x 2.8 cm.     The patient denies any abdominal pain at this time.  He states that he has had a decreased appetite.  He is a type II diabetic.     This patient does have a very significant past medical history.  He has history of coronary artery disease with stent placement, he also has atrial fibrillation and is on Eliquis.  He has congestive heart failure with most recent ejection fraction of 30-35%.  He has chronic kidney disease stage IIIb with his creatinine usually around 2.  He is morbidly obese with a BMI of 43.             Past Medical History:   Diagnosis Date    A-fib ([facility]) 2021    developed AFID after hospital admission for wound infection- started on Eliquis    Acute on chronic respiratory failure with hypoxia and hypercapnia ([facility]) 2021    Acute respiratory failure with hypercapnia ([facility]) 2021    Anesthesia complication     Hx  restrictive lung disease and respiratory failure- on ventilator x 24 hrs s/p BKA 22-    Atrial fibrillation ([facility])     Atrial fibrillation with RVR ([facility]) 2021    Below-knee amputation ([facility])     R BKA    Blood loss anemia     s/p BKA 2021- denies hx of blood transfusions, took oral Fe for a year or so    CAD (coronary artery disease)     MI & stent , additional MI after, no addtional interventions    Cardiomyopathy ([facility])     3/2022 Echo LVE 30-35% 
Orthopaedic Trauma Service  Consultation Note    Patient ID:  Mario Merchant  79 y.o.  male  1945   [id number]    Presenting/Chief Complaint: Wound Infection    Date of Admission: 4/20/2025  6:43 PM   Reason(s) for Admission:   Cellulitis [L03.90]  Cellulitis of right lower extremity [L03.115]     Date of Consultation: April 21, 2025  Referring Physician: Pascual Melgoza MD    Hospital Problems:  Principal Problem:    Cellulitis  Active Problems:    ILD (interstitial lung disease) ([facility])    Stage 3 chronic kidney disease ([facility])    Systolic congestive heart failure (HCC)    Atrial fibrillation ([facility])    Controlled type 2 diabetes mellitus with diabetic polyneuropathy, without long-term current use of insulin (HCC)    Type 2 diabetes mellitus with nephropathy ([facility])    Benign hypertensive kidney disease with chronic kidney disease    Atherosclerosis of native coronary artery of native heart without angina pectoris    S/P BKA (below knee amputation) unilateral, right ([facility])    Obstructive sleep apnea    Dilated cardiomyopathy ([facility])    Tophaceous gout    BMI 40.0-44.9, adult    Acute kidney injury superimposed on chronic kidney disease  Resolved Problems:    * No resolved hospital problems. *    Assessment & Plan     Wound with surrounding cellulitis to anterior distal BKA stump  Will discuss with Dr. Alford and trauma team    For now continue IV antibiotics.     Multimodal pain control  WB status: NWB RLE  PT/OT evals   Diet:  ADULT DIET; Regular; 3 carb choices (45 gm/meal); Low Fat/Low Chol/High Fiber/PEDRO  DVT prophylaxis: Defer to primary team    History of Present Illness   Mario Merchant is a 79 y.o. male who presented with 3 days of erythema and wound to right BKA stump. Patient is a poor historian. Notes that he has not had any increase in pain. Has neuropathy at baseline. Notes he came to the hospital at the urging of his children.  Has used prosthetic on RLE without any issue for several years.   -Sig 
Please see progress note from same date for updated recs.    Madhu Araiza MD  
Ultrasound was used to find the vein which was compressible and without any ultrasound features of an artery or nerve bundle. A single lumen Midline was placed to the left cephalic (refer to IV assessment flowsheet for further documentation). No immediate complications noted. Patient tolerated well.     Line is okay to use: yes        
Dimension 1.6 cm    TR Max Velocity 1.91 m/s    TR Peak Gradient 15 mmHg    TR Peak Gradient 15 mmHg    Body Surface Area 2.64 m2    Fractional Shortening 2D 37 28 - 44 %    LV ESV Index A4C 14 mL/m2    LV EDV Index A4C 41 mL/m2    LV ESV Index A2C 13 mL/m2    LV EDV Index A2C 37 mL/m2    LVIDd Index 1.99 cm/m2    LVIDs Index 1.26 cm/m2    LV RWT Ratio 0.29     LV Mass 2D 120.8 88 - 224 g    LV Mass 2D Index 49.1 49 - 115 g/m2    MV E/A 2.29     E/E' Ratio (Averaged) 10.08     E/E' Lateral 9.49     E/E' Septal 10.67     LA Volume Index BP 27 16 - 34 ml/m2    LVOT Stroke Volume Index 15.8 mL/m2    LA Volume Index MOD A2C 28 16 - 34 ml/m2    LA Volume Index MOD A4C 24 16 - 34 ml/m2    LA Size Index 1.87 cm/m2    LA/AO Root Ratio 1.28     Ao Root Index 1.46 cm/m2    Ascending Aorta Index 1.26 cm/m2    AV Velocity Ratio 0.67     LVOT:AV VTI Index 0.60     LEOLA/BSA VTI 0.8 cm2/m2    LEOLA/BSA Peak Velocity 0.8 cm2/m2    MV:LVOT VTI Index 2.39     EF Physician 40 %   POCT Glucose    Collection Time: 04/21/25  4:12 PM   Result Value Ref Range    POC Glucose 115 (H) 65 - 100 mg/dL    Performed by: SaeaPCT    POCT Glucose    Collection Time: 04/21/25  8:25 PM   Result Value Ref Range    POC Glucose 141 (H) 65 - 100 mg/dL    Performed by: ArianaciaPCT    CBC with Auto Differential    Collection Time: 04/22/25  5:25 AM   Result Value Ref Range    WBC 11.9 (H) 4.3 - 11.1 K/uL    RBC 4.51 4.23 - 5.6 M/uL    Hemoglobin 13.6 13.6 - 17.2 g/dL    Hematocrit 41.2 41.1 - 50.3 %    MCV 91.4 82 - 102 FL    MCH 30.2 26.1 - 32.9 PG    MCHC 33.0 31.4 - 35.0 g/dL    RDW 15.9 (H) 11.9 - 14.6 %    Platelets 285 150 - 450 K/uL    MPV 11.1 9.4 - 12.3 FL    nRBC 0.00 0.0 - 0.2 K/uL    Differential Type AUTOMATED      Neutrophils % 62.0 43.0 - 78.0 %    Lymphocytes % 24.0 13.0 - 44.0 %    Monocytes % 10.5 4.0 - 12.0 %    Eosinophils % 2.4 0.5 - 7.8 %    Basophils % 0.6 0.0 - 2.0 %    Immature Granulocytes % 0.5 0.0 - 5.0 %    
Gastric diverticulum.  6. Bony changes of the left hemipelvis that could be related to Paget's disease  or fibrous dysplasia. Occult metastatic bone disease is felt to be less likely.  7. Diffuse lumbar degenerative disc disease, DJD and spondylosis deformans.  8. Aortoiliac atherosclerosis.        Electronically signed by Rand Confer        ASSESSMENT:  Principal Problem:    Cellulitis  Active Problems:    ILD (interstitial lung disease) (HCC)    Stage 3 chronic kidney disease (HCC)    Systolic congestive heart failure (HCC)    Atrial fibrillation (HCC)    Controlled type 2 diabetes mellitus with diabetic polyneuropathy, without long-term current use of insulin (HCC)    Type 2 diabetes mellitus with nephropathy (HCC)    Benign hypertensive kidney disease with chronic kidney disease    Atherosclerosis of native coronary artery of native heart without angina pectoris    S/P BKA (below knee amputation) unilateral, right (HCC)    Obstructive sleep apnea    Dilated cardiomyopathy (HCC)    Tophaceous gout    BMI 40.0-44.9, adult (HCC)    Acute kidney injury superimposed on chronic kidney disease    Right foot infection    Complex renal cyst    Pancreatic mass  Resolved Problems:    * No resolved hospital problems. *    Mr. Merchant is a 79 y.o. male admitted on 4/20/2025. The primary encounter diagnosis was Cellulitis of right lower extremity. Diagnoses of Bacteremia, Right foot infection, and Leg edema, left were also pertinent to this visit..      His PMH Afib on Eliquis, SASHA, HTN, DM, R BKA 2021, CKD3b, CHF, CAD, and gout.  He presented to ED with c/o R stump redness/warmth and ulcer with drainage.    Bcx +MSSA, on Ancef.  He c/o hematuria.  UA not c/w infx.  CT AP w/o contrast with R nephrolithiasis; b/l complex renal cysts including a large exophytic focus on the L; atrophic pancreas with fatty change with a soft tissue mass contiguous with the surface of the pancreatic head measuring 2.9 x 2.1 x 2.8 cm ?panc tumor and

## 2025-04-30 NOTE — TELEPHONE ENCOUNTER
Received discharge message \"Pt seen in hospital with hematuria and renal mass, will need appt in 2 weeks for office cystoscopy with Dr Mcgee, or anyone else if he is unavailable. Thanks!\" Called pt and spoke with daughter to schedule appt for 05/21/2025.

## 2025-04-30 NOTE — PLAN OF CARE
Problem: Chronic Conditions and Co-morbidities  Goal: Patient's chronic conditions and co-morbidity symptoms are monitored and maintained or improved  Outcome: Progressing  Flowsheets (Taken 4/29/2025 1939)  Care Plan - Patient's Chronic Conditions and Co-Morbidity Symptoms are Monitored and Maintained or Improved: Monitor and assess patient's chronic conditions and comorbid symptoms for stability, deterioration, or improvement     Problem: Pain  Goal: Verbalizes/displays adequate comfort level or baseline comfort level  Outcome: Progressing     Problem: Safety - Adult  Goal: Free from fall injury  Outcome: Progressing  Flowsheets (Taken 4/29/2025 1939)  Free From Fall Injury: Instruct family/caregiver on patient safety     Problem: ABCDS Injury Assessment  Goal: Absence of physical injury  Outcome: Progressing     Problem: Neurosensory - Adult  Goal: Achieves stable or improved neurological status  Outcome: Progressing  Goal: Achieves maximal functionality and self care  Outcome: Progressing     Problem: Respiratory - Adult  Goal: Achieves optimal ventilation and oxygenation  4/29/2025 2007 by Erica Christensen RN  Outcome: Progressing  4/29/2025 1510 by Wilmer Rae RCP  Outcome: Progressing     Problem: Cardiovascular - Adult  Goal: Maintains optimal cardiac output and hemodynamic stability  Outcome: Progressing  Goal: Absence of cardiac dysrhythmias or at baseline  Outcome: Progressing     Problem: Skin/Tissue Integrity - Adult  Goal: Skin integrity remains intact  Description: 1.  Monitor for areas of redness and/or skin breakdown2.  Assess vascular access sites hourly3.  Every 4-6 hours minimum:  Change oxygen saturation probe site4.  Every 4-6 hours:  If on nasal continuous positive airway pressure, respiratory therapy assess nares and determine need for appliance change or resting period  Outcome: Progressing  Goal: Incisions, wounds, or drain sites healing without S/S of infection  Outcome:

## 2025-04-30 NOTE — Clinical Note
Mario Merchant                                                                [address] [place] [address] [address] [place] [address]         4/30/25     Dear Mr. Merchant:  MRN:[id number]    This message is regarding a referral that needs to be scheduled. We were unable to reach you by phone; however, your referral is available for review in [facility] under insurance in the drop down menu. We will be making further attempts to contact you to get this scheduled.       Thank you,  Dandre [facility]

## 2025-05-01 ENCOUNTER — CARE COORDINATION (OUTPATIENT)
Dept: CARE COORDINATION | Facility: CLINIC | Age: 80
End: 2025-05-01

## 2025-05-01 NOTE — CARE COORDINATION
Transition of care outreach postponed for 14 days due to patient's discharge to [facility]  S[facility].

## 2025-05-05 ENCOUNTER — TELEPHONE (OUTPATIENT)
Dept: GASTROENTEROLOGY | Age: 80
End: 2025-05-05

## 2025-05-05 ENCOUNTER — CLINICAL DOCUMENTATION (OUTPATIENT)
Dept: PULMONOLOGY | Age: 80
End: 2025-05-05

## 2025-05-05 NOTE — PROGRESS NOTES
Fax[facility] signed oxygen orders and [facility] at [phone].

## 2025-05-06 ENCOUNTER — TELEPHONE (OUTPATIENT)
Dept: ORTHOPEDIC SURGERY | Age: 80
End: 2025-05-06

## 2025-05-12 ENCOUNTER — TELEPHONE (OUTPATIENT)
Dept: PULMONOLOGY | Age: 80
End: 2025-05-12

## 2025-05-12 NOTE — TELEPHONE ENCOUNTER
Called patient to schedule next appointment with Dr. Avila. Patients daughter stated the he is currently in a rehab facility due to an injury to his leg so once she figures out when he will be released she will call to schedule. Sending letter as reminder      Return in about 1 year (around 7/1/2025).

## 2025-05-13 ENCOUNTER — OFFICE VISIT (OUTPATIENT)
Dept: ORTHOPEDIC SURGERY | Age: 80
End: 2025-05-13

## 2025-05-13 DIAGNOSIS — G89.18 POST-OP PAIN: Primary | ICD-10-CM

## 2025-05-13 PROCEDURE — 99024 POSTOP FOLLOW-UP VISIT: CPT | Performed by: PHYSICIAN ASSISTANT

## 2025-05-13 NOTE — PROGRESS NOTES
Name: Mario Merchant  YOB: 1945  Gender: male  MRN: [id number]    Plan:    Continue nonweightbearing of right lower extremity  May begin process with   Continue Duricef 1 g twice daily until 5/21/2025 per infectious disease recommendations  Infectious disease follow-up on 5/21/2025    Follow up in 3 week(s)without XR          Procedure: I&D Right BKA stump    Surgery Date: 4/23/2025      Subjective: Denies fevers chills or infection.  Denies any signs of spreading erythema.  Doing well.  Denies any significant pain.  Is currently at [facility].    ROS: Patient Denies fever/chills, headache, visual changes, chest pain, shortness of breath, and nausea/vomiting/diarrhea     Exam:     Wounds: appears to be healing well with good reapproximation, healing well , sutures in place and were removed without difficulty    Vascular: BLE: 2+ DP pulse, toes wwp w/ BCR<2s    Imaging:   No imaging reviewed

## 2025-05-15 ENCOUNTER — CARE COORDINATION (OUTPATIENT)
Dept: CARE COORDINATION | Facility: CLINIC | Age: 80
End: 2025-05-15

## 2025-05-15 NOTE — CARE COORDINATION
Care Transitions Post-Acute Facility Update Call    5/15/2025    Patient: Mario Merchant Patient : 1945   MRN: [id number]  Reason for Admission: Cellulitis   Discharge Date: 25 RARS: Readmission Risk Score: 15.3    Verified with staff patient remains currently admitted at [facility] for STR.  Waiting on DME.  No discharge date is noted.  Will reassess for discharge in 7 days.

## 2025-05-22 ENCOUNTER — CARE COORDINATION (OUTPATIENT)
Dept: CARE COORDINATION | Facility: CLINIC | Age: 80
End: 2025-05-22

## 2025-05-22 NOTE — CARE COORDINATION
Care Transitions Post-Acute Facility Update Call    2025    Patient: Mario Merchant Patient : 1945   MRN: [id number]  Reason for Admission: Cellulitis   Discharge Date: 25 RARS: Readmission Risk Score: 15.3       Verified with staff patient remains currently admitted at [facility] for STR.  Plan for discharge 25.  CTN will be notified for transitions of care.

## 2025-05-27 ENCOUNTER — CARE COORDINATION (OUTPATIENT)
Dept: CARE COORDINATION | Facility: CLINIC | Age: 80
End: 2025-05-27

## 2025-05-27 NOTE — CARE COORDINATION
Care Transitions Note    Initial Call - Call within 2 business days of discharge: Yes    Attempted to reach patient/family for transitions of care follow up. Unable to reach patient.    Outreach Attempts:   HIPAA compliant voicemail left for patient, family.     Patient: Mario Merchant    Patient : 1945   MRN: [id number]    Reason for Admission: Cellulitis of right lower extremity  Discharge Date: 25  RURS: Readmission Risk Score: 15.3    Last Discharge Facility       Date Complaint Diagnosis Description Type Department Provider    25 Wound Infection Cellulitis of right lower extremity ... ED to Hosp-Admission (Discharged) (ADMITTED) [id number] Lexy Murphy MD; Chito Rodarte...            Was this an external facility discharge? Yes, Discharged from [facility]    Follow Up Appointment:   Patient has hospital follow up appointment scheduled within 7 days of discharge.    Future Appointments         Provider Specialty Dept Phone    6/3/2025 9:40 AM (Arrive by 9:25 AM) Winnie, JHOANA Huang Orthopedic Surgery [phone]    2025 10:00 AM Sandy Melo MD Family Medicine [phone]            Plan for follow-up on next business day.      Lianna Abraham RN

## 2025-05-28 ENCOUNTER — CARE COORDINATION (OUTPATIENT)
Dept: CARE COORDINATION | Facility: CLINIC | Age: 80
End: 2025-05-28

## 2025-05-28 NOTE — CARE COORDINATION
Care Transitions Note    Initial Call - Call within 2 business days of discharge: Yes    Attempted to reach patient, family, daughter  for transitions of care follow up. Unable to reach patient/family.    Outreach Attempts:   Multiple attempts to contact patient at phone numbers on file.   HIPAA compliant voicemail left for patient.   BAE System[employer] message sent.     Patient: Mario Merchant  Patient : 1945   MRN: [id number]    Reason for Admission: Cellulitis of right lower extremity ...  Discharge Date: 25  RURS: Readmission Risk Score: 15.3    Last Discharge Facility       Date Complaint Diagnosis Description Type Department Provider    25 Wound Infection Cellulitis of right lower extremity ... ED to Hosp-Admission (Discharged) (ADMITTED) [id number] Lexy Murphy MD; Chito Rodarte...            Was this an external facility discharge?Yes, [facility]    Follow Up Appointment:   Patient has hospital follow up appointment scheduled   Future Appointments         Provider Specialty Dept Phone    6/3/2025 9:40 AM (Arrive by 9:25 AM) Val Zhou PA Orthopedic Surgery [phone]    2025 10:00 AM Sandy Melo MD Family Medicine [phone]        Lianna Abraham RN

## 2025-05-29 ENCOUNTER — TELEPHONE (OUTPATIENT)
Dept: GASTROENTEROLOGY | Age: 80
End: 2025-05-29

## 2025-05-29 NOTE — TELEPHONE ENCOUNTER
Attempted to contact pt with regards to scheduling a FU EUS as recommended by Dr. Villagran following inpatient procedure 4/29/2025.      Daughter answered the phone.  She states that her dad has been in a rehab facility following his hospitalization, and he has had multiple things scheduled that she has had to take care.  He is due to come home next week.    RN instructed daughter that she could put the repeat procedure off for a while until her dad is home and doing clinically better prior to rescheduling with us.  RN will place reminder and touch base with daughter in approximately a month.

## 2025-06-03 ENCOUNTER — OFFICE VISIT (OUTPATIENT)
Dept: ORTHOPEDIC SURGERY | Age: 80
End: 2025-06-03

## 2025-06-03 DIAGNOSIS — M25.572 ACUTE LEFT ANKLE PAIN: ICD-10-CM

## 2025-06-03 DIAGNOSIS — G89.18 POST-OP PAIN: Primary | ICD-10-CM

## 2025-06-03 DIAGNOSIS — M25.572 CHRONIC PAIN OF LEFT ANKLE: ICD-10-CM

## 2025-06-03 DIAGNOSIS — M1A.9XX1 TOPHACEOUS GOUT: ICD-10-CM

## 2025-06-03 DIAGNOSIS — G89.29 CHRONIC PAIN OF LEFT ANKLE: ICD-10-CM

## 2025-06-03 PROCEDURE — 99024 POSTOP FOLLOW-UP VISIT: CPT | Performed by: PHYSICIAN ASSISTANT

## 2025-06-03 NOTE — PROGRESS NOTES
Name: Mario Merchant  YOB: 1945  Gender: male  MRN: [id number]    Plan:    May go back and his old prosthesis for now  Order given today for new below-knee prosthesis with socket  Order given for formal physical therapy once he is discharged from [facility] rehab    Follow up in 8 week(s)without XR          Procedure: I&D Right BKA stump    Surgery Date: 4/23/2025      Subjective: Denies fevers chills or infection.  Denies any signs of spreading erythema.  Doing well.  Denies any significant pain.  Is currently at [facility].    6/30/2025-patient states that he is doing well.  His wound is completely healed.  He has been using the .  He saw infectious disease on 5/21 and they had taken him off his antibiotic therapy.  He is scheduled to be discharged from [facility] on Saturday.  He has spoken with [facility] and they believe that he is good to go back into his old prosthesis for now and are working with him to get a new prosthesis made.    ROS: Patient Denies fever/chills, headache, visual changes, chest pain, shortness of breath, and nausea/vomiting/diarrhea     Exam:     Wounds: appears to be healing well with good reapproximation, healed    Vascular: BLE: 2+ DP pulse, toes wwp w/ BCR<2s    Imaging:   No imaging reviewed

## 2025-06-11 NOTE — THERAPY EVALUATION
Mario Merchant  : 1945  Primary: [employer] Medicare-advantage Ppo (Medicare Managed)  Secondary:  [facility] [place]  Jaime [address]Z  [facility] [address]  Phone: [phone]  Fax: [phone] Plan Frequency: 2 visits per week for 12 weeks (With plan to decreased frequency to 1 visit per week as symptoms and functional progression allow.)    Plan of Care/Certification Expiration Date: 25 (Start of POC 2025)        Plan of Care/Certification Expiration Date:  Plan of Care/Certification Expiration Date: 25 (Start of POC 2025)    Frequency/Duration: Plan Frequency: 2 visits per week for 12 weeks (With plan to decreased frequency to 1 visit per week as symptoms and functional progression allow.)      Time In/Out:   Time In: 1105  Time Out: 1147      PT Visit Info:    Progress Note Counter: 1      Visit Count:  1                OUTPATIENT PHYSICAL THERAPY:             Initial Assessment 2025               Episode (Right leg general weakness)         Treatment Diagnosis:    Post-op pain  Tophaceous gout  Pain in right leg  Muscle weakness (generalized)  Difficulty in walking, not elsewhere classified  Medical/Referring Diagnosis:    Post-op pain  Tophaceous gout  Referring Physician:  Val Zhou PA MD Orders:  PT Eval and Treat   Return MD Appt:  TBD by patient  Date of Onset:  Onset Date: 25  Allergies:  Patient has no known allergies.  Restrictions/Precautions:    CAD, CKD, DM type 2, CHF.      Medications Last Reviewed: 2025     SUBJECTIVE   History of Injury/Illness (Reason for Referral):  Mr. Merchant is a 80 y/o male with complaints of R limb pain, generalized weakness, reduced balance and difficulty walking following surgical intervention to address infected right leg. Pt has history of right below knee amputation and was admitted to hospital with suspected infection in 2025. Pt was treated in hospital

## 2025-06-11 NOTE — PROGRESS NOTES
Mario Merchant  : 1945  Primary: [employer] Medicare-advantage Ppo (Medicare Managed)  Secondary:  [facility] [place]  Jaime DI[address]Z  [facility] [address]  Phone: [phone]  Fax: [phone] Plan Frequency: 2 visits per week for 12 weeks (With plan to decreased frequency to 1 visit per week as symptoms and functional progression allow.)  Plan of Care/Certification Expiration Date: 25 (Start of POC 2025)        Plan of Care/Certification Expiration Date:  Plan of Care/Certification Expiration Date: 25 (Start of POC 2025)    Frequency/Duration: Plan Frequency: 2 visits per week for 12 weeks (With plan to decreased frequency to 1 visit per week as symptoms and functional progression allow.)      Time In/Out:   Time In: 1105  Time Out: 1147      PT Visit Info:    Progress Note Counter: 1      Visit Count:  1    OUTPATIENT PHYSICAL THERAPY:   Treatment Note 2025       Episode  (Right leg general weakness)               Treatment Diagnosis:   Post-op pain  Tophaceous gout  Pain in right leg  Muscle weakness (generalized)  Difficulty in walking, not elsewhere classified  Medical/Referring Diagnosis:    Post-op pain  Tophaceous gout  Referring Physician:  Val Zhou PA MD Orders:  PT Eval and Treat  Return MD Appt:  TBD by patient   Date of Onset:  Onset Date: 25    Allergies:   Patient has no known allergies.  Restrictions/Precautions:   CAD, CKD, DM type 2, CHF.       Interventions Planned (Treatment may consist of any combination of the following):     See Assessment Note    Subjective Comments:   Pt reports \"feeling good\" today at start of session; he is eager to begin working on his strength with goal of walking with his walker again.  Initial Pain Level:     0/10  Post Session Pain Level:      0/10  Medications Last Reviewed: 2025  Updated Objective Findings:  See Evaluation Note from today  Treatment   THERAPEUTIC

## 2025-06-12 ENCOUNTER — HOSPITAL ENCOUNTER (OUTPATIENT)
Dept: PHYSICAL THERAPY | Age: 80
Setting detail: RECURRING SERIES
Discharge: HOME | End: 2025-06-15
Payer: MEDICARE

## 2025-06-12 DIAGNOSIS — M62.81 MUSCLE WEAKNESS (GENERALIZED): ICD-10-CM

## 2025-06-12 DIAGNOSIS — G89.18 POST-OP PAIN: Primary | ICD-10-CM

## 2025-06-12 DIAGNOSIS — R26.2 DIFFICULTY IN WALKING, NOT ELSEWHERE CLASSIFIED: ICD-10-CM

## 2025-06-12 DIAGNOSIS — M79.604 PAIN IN RIGHT LEG: ICD-10-CM

## 2025-06-12 DIAGNOSIS — M1A.9XX1 TOPHACEOUS GOUT: ICD-10-CM

## 2025-06-12 PROCEDURE — 97110 THERAPEUTIC EXERCISES: CPT

## 2025-06-12 PROCEDURE — 97162 PT EVAL MOD COMPLEX 30 MIN: CPT

## 2025-06-12 ASSESSMENT — PAIN SCALES - GENERAL: PAINLEVEL_OUTOF10: 0

## 2025-06-16 ENCOUNTER — OFFICE VISIT (OUTPATIENT)
Dept: FAMILY MEDICINE CLINIC | Facility: CLINIC | Age: 80
End: 2025-06-16
Payer: MEDICARE

## 2025-06-16 ENCOUNTER — HOSPITAL ENCOUNTER (OUTPATIENT)
Dept: PHYSICAL THERAPY | Age: 80
Setting detail: RECURRING SERIES
Discharge: HOME | End: 2025-06-18
Payer: MEDICARE

## 2025-06-16 ENCOUNTER — APPOINTMENT (OUTPATIENT)
Dept: PHYSICAL THERAPY | Age: 80
End: 2025-06-16
Payer: MEDICARE

## 2025-06-16 VITALS
OXYGEN SATURATION: 94 % | DIASTOLIC BLOOD PRESSURE: 62 MMHG | HEIGHT: 70 IN | HEART RATE: 65 BPM | TEMPERATURE: 97.9 F | BODY MASS INDEX: 40.09 KG/M2 | SYSTOLIC BLOOD PRESSURE: 103 MMHG | WEIGHT: 280 LBS

## 2025-06-16 DIAGNOSIS — Z00.00 MEDICARE ANNUAL WELLNESS VISIT, SUBSEQUENT: Primary | ICD-10-CM

## 2025-06-16 DIAGNOSIS — G62.89 MIXED AXONAL-DEMYELINATING POLYNEUROPATHY: ICD-10-CM

## 2025-06-16 DIAGNOSIS — N28.1 RENAL CYSTS, ACQUIRED, BILATERAL: ICD-10-CM

## 2025-06-16 DIAGNOSIS — E78.2 MIXED HYPERLIPIDEMIA: ICD-10-CM

## 2025-06-16 DIAGNOSIS — J96.11 CHRONIC RESPIRATORY FAILURE WITH HYPOXIA (HCC): ICD-10-CM

## 2025-06-16 DIAGNOSIS — Z00.00 MEDICARE ANNUAL WELLNESS VISIT, SUBSEQUENT: ICD-10-CM

## 2025-06-16 DIAGNOSIS — G47.33 OBSTRUCTIVE SLEEP APNEA (ADULT) (PEDIATRIC): ICD-10-CM

## 2025-06-16 DIAGNOSIS — K86.89 MASS OF HEAD OF PANCREAS: ICD-10-CM

## 2025-06-16 DIAGNOSIS — N18.32 STAGE 3B CHRONIC KIDNEY DISEASE (HCC): ICD-10-CM

## 2025-06-16 DIAGNOSIS — I50.22 CHRONIC SYSTOLIC CONGESTIVE HEART FAILURE (HCC): ICD-10-CM

## 2025-06-16 DIAGNOSIS — Z89.511 S/P BKA (BELOW KNEE AMPUTATION) UNILATERAL, RIGHT (HCC): ICD-10-CM

## 2025-06-16 DIAGNOSIS — E66.813 CLASS 3 SEVERE OBESITY DUE TO EXCESS CALORIES WITH SERIOUS COMORBIDITY AND BODY MASS INDEX (BMI) OF 40.0 TO 44.9 IN ADULT (HCC): ICD-10-CM

## 2025-06-16 DIAGNOSIS — L03.115 CELLULITIS AND ABSCESS OF RIGHT LOWER EXTREMITY: ICD-10-CM

## 2025-06-16 DIAGNOSIS — Z91.81 AT HIGH RISK FOR FALLS: ICD-10-CM

## 2025-06-16 DIAGNOSIS — E11.21 TYPE 2 DIABETES MELLITUS WITH NEPHROPATHY (HCC): ICD-10-CM

## 2025-06-16 DIAGNOSIS — Z71.89 ACP (ADVANCE CARE PLANNING): ICD-10-CM

## 2025-06-16 DIAGNOSIS — I48.21 PERMANENT ATRIAL FIBRILLATION (HCC): ICD-10-CM

## 2025-06-16 DIAGNOSIS — L02.415 CELLULITIS AND ABSCESS OF RIGHT LOWER EXTREMITY: ICD-10-CM

## 2025-06-16 LAB
CHOLEST SERPL-MCNC: 133 MG/DL (ref 0–200)
HDLC SERPL-MCNC: 38 MG/DL (ref 40–60)
HDLC SERPL: 3.5 (ref 0–5)
LDLC SERPL CALC-MCNC: 61 MG/DL (ref 0–100)
TRIGL SERPL-MCNC: 172 MG/DL (ref 0–150)
VLDLC SERPL CALC-MCNC: 34 MG/DL (ref 6–23)

## 2025-06-16 PROCEDURE — 99215 OFFICE O/P EST HI 40 MIN: CPT | Performed by: FAMILY MEDICINE

## 2025-06-16 PROCEDURE — G2211 COMPLEX E/M VISIT ADD ON: HCPCS | Performed by: FAMILY MEDICINE

## 2025-06-16 PROCEDURE — G0439 PPPS, SUBSEQ VISIT: HCPCS | Performed by: FAMILY MEDICINE

## 2025-06-16 PROCEDURE — 97761 PROSTHETIC TRAING 1ST ENC: CPT

## 2025-06-16 PROCEDURE — 1126F AMNT PAIN NOTED NONE PRSNT: CPT | Performed by: FAMILY MEDICINE

## 2025-06-16 PROCEDURE — 1159F MED LIST DOCD IN RCRD: CPT | Performed by: FAMILY MEDICINE

## 2025-06-16 PROCEDURE — 1123F ACP DISCUSS/DSCN MKR DOCD: CPT | Performed by: FAMILY MEDICINE

## 2025-06-16 PROCEDURE — 1160F RVW MEDS BY RX/DR IN RCRD: CPT | Performed by: FAMILY MEDICINE

## 2025-06-16 PROCEDURE — 97530 THERAPEUTIC ACTIVITIES: CPT

## 2025-06-16 PROCEDURE — 3044F HG A1C LEVEL LT 7.0%: CPT | Performed by: FAMILY MEDICINE

## 2025-06-16 RX ORDER — PREGABALIN 100 MG/1
100 CAPSULE ORAL 2 TIMES DAILY
Qty: 180 CAPSULE | Refills: 1 | Status: SHIPPED | OUTPATIENT
Start: 2025-06-16 | End: 2025-12-13

## 2025-06-16 SDOH — HEALTH STABILITY: PHYSICAL HEALTH: ON AVERAGE, HOW MANY DAYS PER WEEK DO YOU ENGAGE IN MODERATE TO STRENUOUS EXERCISE (LIKE A BRISK WALK)?: 0 DAYS

## 2025-06-16 ASSESSMENT — ENCOUNTER SYMPTOMS
COUGH: 0
BACK PAIN: 0
ABDOMINAL PAIN: 0
WHEEZING: 0
SHORTNESS OF BREATH: 0
CONSTIPATION: 0
DIARRHEA: 0
CHEST TIGHTNESS: 0

## 2025-06-16 ASSESSMENT — LIFESTYLE VARIABLES
HOW OFTEN DO YOU HAVE A DRINK CONTAINING ALCOHOL: 1
HOW OFTEN DO YOU HAVE A DRINK CONTAINING ALCOHOL: NEVER
HOW OFTEN DO YOU HAVE SIX OR MORE DRINKS ON ONE OCCASION: 1
HOW MANY STANDARD DRINKS CONTAINING ALCOHOL DO YOU HAVE ON A TYPICAL DAY: PATIENT DOES NOT DRINK
HOW MANY STANDARD DRINKS CONTAINING ALCOHOL DO YOU HAVE ON A TYPICAL DAY: 0

## 2025-06-16 ASSESSMENT — PATIENT HEALTH QUESTIONNAIRE - PHQ9
SUM OF ALL RESPONSES TO PHQ QUESTIONS 1-9: 1
1. LITTLE INTEREST OR PLEASURE IN DOING THINGS: NOT AT ALL
SUM OF ALL RESPONSES TO PHQ QUESTIONS 1-9: 1
SUM OF ALL RESPONSES TO PHQ QUESTIONS 1-9: 1
2. FEELING DOWN, DEPRESSED OR HOPELESS: SEVERAL DAYS
SUM OF ALL RESPONSES TO PHQ QUESTIONS 1-9: 1
SUM OF ALL RESPONSES TO PHQ9 QUESTIONS 1 & 2: 1
1. LITTLE INTEREST OR PLEASURE IN DOING THINGS: NOT AT ALL
2. FEELING DOWN, DEPRESSED OR HOPELESS: SEVERAL DAYS

## 2025-06-16 NOTE — THERAPY EVALUATION
in sitting: 3+ Tested in sitting: 3+   Hip extension NT NT   5 times sit to stand Completed 2 reps with min A then stopped due to fatigue/safety reasons.      Balance: Timed up and go: unable to complete.  Tinetti balance test: Balance score: 2/16; gait score: NT    Special Tests:  NT.    Neurological Screen:  Myotomes: Key muscle strength testing for bilateral LE is normal.  Dermatomes: Sensation testing through bilateral LE for light touch is diminished.   Reflexes: Patellar (L4) and achilles (S1) are NT and NT.     Functional Mobility:  Sit to stand: Min A with front wheeled walker/gait belt donned. Verbal cues to improve coordination of trunk with BLEs, excessive trunk flexion observed to initiate sit to stand.  Supine to sit: Min A, verbal cues for log roll transfer technique.    Outcome Measure:   Tool Used: Lower Extremity Functional Scale (LEFS)  Score:  Initial: 20/80 Most Recent: X/80 (Date: -- )   Interpretation of Score: 20 questions each scored on a 5 point scale with 0 representing \"extreme difficulty or unable to perform\" and 4 representing \"no difficulty\".  The lower the score, the greater the functional disability. 80/80 represents no disability.  Minimal detectable change is 9 points.    ASSESSMENT   Initial Assessment:  Mr. Merchant is a 80 y/o male with complaints of R limb pain, generalized weakness, reduced balance and difficulty walking following surgical intervention to address infected right leg. Pt has history of right below knee amputation and was admitted to hospital with suspected infection in April 2025. Pt was treated in hospital which included surgical clean out of right leg and then discharged to rehab facility. Pt now referred by MD for outpatient PT treatment. Primary complaints includes unable to walk with his walker and difficulty with functional transfers to low chair heights which he was modified independent prior level of function. Pt will benefit from skilled PT treatment to

## 2025-06-16 NOTE — PROGRESS NOTES
you have a Living Will?: (!) (Patient-Rptd) No    Intervention:  Has appt for f/u since hospital d/c                      Objective   Vitals:    06/16/25 1040   BP: 103/62   BP Site: Left Lower Arm   Patient Position: Sitting   Pulse: 65   Temp: 97.9 °F (36.6 °C)   TempSrc: Temporal   SpO2: 94%   Weight: 127 kg (280 lb)   Height: 1.778 m (5' 10\")      Body mass index is 40.18 kg/m².                  No Known Allergies  Prior to Visit Medications    Medication Sig Taking? Authorizing Provider   pregabalin (LYRICA) 100 MG capsule Take 1 capsule by mouth 2 times daily for 180 days. Max Daily Amount: 200 mg Yes Sandy Melo MD   finasteride (PROSCAR) 5 MG tablet Take 1 tablet by mouth daily Yes Lexy Murphy MD   tamsulosin (FLOMAX) 0.4 MG capsule Take 1 capsule by mouth daily Yes Lexy Murphy MD   glipiZIDE (GLUCOTROL XL) 2.5 MG extended release tablet Take 1 tablet by mouth daily Yes Sandy Melo MD   febuxostat (ULORIC) 40 MG TABS tablet Take 1 tablet by mouth daily Yes Sandy Melo MD   furosemide (LASIX) 20 MG tablet Take 1 tablet by mouth 2 times daily as needed (swelling)  Patient taking differently: Take 1 tablet by mouth 2 times daily as needed (swelling) Takes 20 mg mornings and 20 mg every other night Yes Miles Nguyễn MD   atorvastatin (LIPITOR) 40 MG tablet Take 1 tablet by mouth at bedtime Yes Miles Nguyễn MD   metoprolol succinate (TOPROL XL) 25 MG extended release tablet Take 1 tablet by mouth every evening Yes Miles Nguyễn MD   empagliflozin (JARDIANCE) 10 MG tablet Take 1 tablet by mouth daily Yes Miles Nguyễn MD   apixaban (ELIQUIS) 5 MG TABS tablet Take 1 tablet by mouth in the morning and 1 tablet in the evening. Yes Miles Nguyễn MD   colchicine (COLCRYS) 0.6 MG tablet Take 1 tablet by mouth 2 times daily as needed for Pain (for acute gout) Yes Miles Nguyễn MD   albuterol sulfate HFA (PROAIR HFA) 108 (90 Base) MCG/ACT inhaler

## 2025-06-16 NOTE — PATIENT INSTRUCTIONS
Due for eye exam - get that scheduled.       Preventing Falls: Care Instructions  Injuries and health problems such as trouble walking or poor eyesight can increase your risk of falling. So can some medicines. But there are things you can do to help prevent falls. You can exercise to get stronger. You can also arrange your home to make it safer.    Talk to your doctor about the medicines you take. Ask if any of them increase the risk of falls and whether they can be changed or stopped.   Try to exercise regularly. It can help improve your strength and balance. This can help lower your risk of falling.         Practice fall safety and prevention.   Wear low-heeled shoes that fit well and give your feet good support. Talk to your doctor if you have foot problems that make this hard.  Carry a cellphone or wear a medical alert device that you can use to call for help.  Use stepladders instead of chairs to reach high objects. Don't climb if you're at risk for falls. Ask for help, if needed.  Wear the correct eyeglasses, if you need them.        Make your home safer.   Remove rugs, cords, clutter, and furniture from walkways.  Keep your house well lit. Use night-lights in hallways and bathrooms.  Install and use sturdy handrails on stairways.  Wear nonskid footwear, even inside. Don't walk barefoot or in socks without shoes.        Be safe outside.   Use handrails, curb cuts, and ramps whenever possible.  Keep your hands free by using a shoulder bag or backpack.  Try to walk in well-lit areas. Watch out for uneven ground, changes in pavement, and debris.  Be careful in the winter. Walk on the grass or gravel when sidewalks are slippery. Use de-icer on steps and walkways. Add non-slip devices to shoes.    Put grab bars and nonskid mats in your shower or tub and near the toilet. Try to use a shower chair or bath bench when bathing.   Get into a tub or shower by putting in your weaker leg first. Get out with your strong side

## 2025-06-16 NOTE — PROGRESS NOTES
per grid below to improve mobility, strength, balance, and coordination.  Required moderate visual, verbal, manual, and tactile cues to promote proper body alignment, promote proper body poster and proper body mechanics.  Progressed resistance, range, repetitions and complexity of movement as indicated.     Date:  6/12/2025 Date:   Date:     Activity/Exercise Parameters Parameters Parameters   QS/Glute Set 30y5fle holds     SAQ 1x10 each     SLR 1x10 each, with manual assist from PT     Hip adduction Seated, yellow ball, 1x10     Hp abduction Seated, Red TB, 1x10      Marching Seated, 1x10 each     Sit to stand With gait belt and walker from raised table, 1x2, Min A.     Pt education HEP instruction, supine to sit, sit to stand and table to WC transfer instruction and assistance.     [facility] access code: [email or web address]     Time spent with patient reviewing proper muscle recruitment and technique with exercises.    HEP: As above; handouts given to patient for all exercises.      Treatment/Session Summary:    Treatment Assessment:   Pt demonstrates excellent tolerance to exercises performed today; discussed plan to potentially transfer PT clinics due to lack of parallel bars at [place] office which presents patient safety concern with prolonged standing and walking activities; pt and patient's daughter Michelle verbalize agreement to plan and demonstrate good understanding of HEP at end of session.  Communication/Consultation:  Therapy Evaluation sent to referring provider  Equipment provided today:  HEP  Recommendations/Intent for next treatment session: Next visit will focus on pain/swelling control, improve BLE strength, improve functional mobility, progress balance/gait/functional activities tolerance as patient tolerates.    >Total Treatment Billable Duration:  40 minutes, 15 min eval, 25 min therex        Lorraine Pate PT         Charge Capture  Events  Past[employer] Portal  Appt Desk  Attendance Report

## 2025-06-23 ENCOUNTER — HOSPITAL ENCOUNTER (OUTPATIENT)
Dept: PHYSICAL THERAPY | Age: 80
Setting detail: RECURRING SERIES
Discharge: HOME | End: 2025-06-25
Payer: MEDICARE

## 2025-06-23 PROCEDURE — 97110 THERAPEUTIC EXERCISES: CPT

## 2025-06-23 NOTE — PROGRESS NOTES
Mario Merchant  : 1945  Primary: [employer] Medicare-advantage Ppo (Medicare Managed)  Secondary:  [facility] [place] [address] [place] [address]  Phone: [phone]  Fax: [phone] Plan Frequency: 2 visits per week for 12 weeks (With plan to decreased frequency to 1 visit per week as symptoms and functional progression allow.)  Plan of Care/Certification Expiration Date: 25 (Start of POC 2025)        Plan of Care/Certification Expiration Date:  Plan of Care/Certification Expiration Date: 25 (Start of POC 2025)    Frequency/Duration:   Plan Frequency: 2 visits per week for 12 weeks (With plan to decreased frequency to 1 visit per week as symptoms and functional progression allow.)      Time In/Out:   Time In: 1503  Time Out: 1602      PT Visit Info:    Total # of Visits to Date: 3  Progress Note Counter: 3      Visit Count:  3    OUTPATIENT PHYSICAL THERAPY:   Treatment Note 2025       Episode  (Right leg BKA/weakness)               Treatment Diagnosis:   Post-op pain  Tophaceous gout  Pain in right leg  Muscle weakness (generalized)  Difficulty in walking, not elsewhere classified  Medical/Referring Diagnosis:    Post-op pain  Tophaceous gout  Referring Physician:  Val Zhou PA MD Orders:  PT Eval and Treat  Return MD Appt:  TBD by patient   Date of Onset:  Onset Date: 25    Allergies:   Patient has no known allergies.  Restrictions/Precautions:   CAD, CKD, DM type 2, CHF.       Interventions Planned (Treatment may consist of any combination of the following):     See Assessment Note    Subjective Comments: He just left the prosthetist and was fitted for his new socket, he is also getting a new ankle and foot - to inc the stiffness at the ankle for inc stability. He has been walking at home with his RW - chair to the bedroom, and he was able to take the 1 8\" step down with RLE leading and RW.     1 8\" CLAUDIA home, no

## 2025-06-25 ENCOUNTER — RESULTS FOLLOW-UP (OUTPATIENT)
Dept: FAMILY MEDICINE CLINIC | Facility: CLINIC | Age: 80
End: 2025-06-25

## 2025-06-26 ENCOUNTER — HOSPITAL ENCOUNTER (OUTPATIENT)
Dept: PHYSICAL THERAPY | Age: 80
Setting detail: RECURRING SERIES
Discharge: HOME | End: 2025-06-28
Payer: MEDICARE

## 2025-06-26 PROCEDURE — 97110 THERAPEUTIC EXERCISES: CPT

## 2025-06-26 PROCEDURE — 97761 PROSTHETIC TRAING 1ST ENC: CPT

## 2025-06-26 NOTE — PROGRESS NOTES
neutral.   Hip adduction Seated, yellow ball, 1x10      Hp abduction Seated, Red TB, 1x10   Side lying: 2x10 B (HEP, 3x/wk) 2 x 10 B sidelying    Hip Extension    Standing in // bars: 1x10 B  with foot tap, BUE, cues to stand tall and engage glutes    HSC   Standing in // bars: 1x10 B, BUE, cues to stand tall    Marching Seated, 1x10 each   X10 cues breathing.   Sit to stand With gait belt and walker from raised table, 1x2, Min A.  2x10 from 25\" mat table, CGA, cues for hip ext in standing    Pt education HEP instruction, supine to sit, sit to stand and table to WC transfer instruction and assistance.      [facility] access code: [email or web address]     Time spent with patient reviewing proper muscle recruitment and technique with exercises.    HEP: As above; handouts given to patient for all exercises.    Treatment/Session Summary:    Treatment Assessment:  Fatigued at end of today's visit. Working hard on exercises.   Communication/Consultation:  None today  Equipment provided today:  HEP  Recommendations/Intent for next treatment session: Next visit will focus on pain/swelling control, improve BLE strength, improve functional mobility, progress balance/gait/functional activities tolerance as patient tolerates.    >Total Treatment Billable Duration:  55 minutes  Time In: 1003  Time Out: 1103     Lorraine Pate PT      Charge Capture  Events  [employer] Portal  Appt Desk  Attendance Report     Future Appointments   Date Time Provider Department Center   6/30/2025 10:00 AM Katherine Cervantes PT [facility]   7/2/2025 10:00 AM Katherine Cervantes PT [facility]   7/7/2025 10:15 AM Lorraine Pate PT [facility]   7/10/2025 10:00 AM Lorraine Pate PT [facility]   7/29/2025  9:40 AM Kannan Alford MD [facility]   12/22/2025 11:20 AM Sandy Melo MD [facility] DE[facility]

## 2025-06-30 ENCOUNTER — APPOINTMENT (OUTPATIENT)
Dept: PHYSICAL THERAPY | Age: 80
End: 2025-06-30
Payer: MEDICARE

## 2025-07-02 ENCOUNTER — HOSPITAL ENCOUNTER (OUTPATIENT)
Dept: PHYSICAL THERAPY | Age: 80
Setting detail: RECURRING SERIES
Discharge: HOME | End: 2025-07-04
Payer: MEDICARE

## 2025-07-02 PROCEDURE — 97110 THERAPEUTIC EXERCISES: CPT

## 2025-07-02 NOTE — PROGRESS NOTES
Mario Merchant  : 1945  Primary: [employer] Medicare-advantage Ppo (Medicare Managed)  Secondary:  [facility] [place] [address] [place] [address]  Phone: [phone]  Fax: [phone] Plan Frequency: 2 visits per week for 12 weeks (With plan to decreased frequency to 1 visit per week as symptoms and functional progression allow.)  Plan of Care/Certification Expiration Date: 25 (Start of POC 2025)        Plan of Care/Certification Expiration Date:  Plan of Care/Certification Expiration Date: 25 (Start of POC 2025)    Frequency/Duration:   Plan Frequency: 2 visits per week for 12 weeks (With plan to decreased frequency to 1 visit per week as symptoms and functional progression allow.)      Time In/Out:   Time In: 1000  Time Out: 1100      PT Visit Info:    Total # of Visits to Date: 5  Progress Note Counter: 5      Visit Count:  5   OUTPATIENT PHYSICAL THERAPY:   Treatment Note 2025       Episode  (Right leg BKA/weakness)               Treatment Diagnosis:   Post-op pain  Tophaceous gout  Pain in right leg  Muscle weakness (generalized)  Difficulty in walking, not elsewhere classified  Medical/Referring Diagnosis:    Post-op pain  Tophaceous gout  Referring Physician:  Val Zhou PA MD Orders:  PT Eval and Treat  Return MD Appt:  TBD by patient   Date of Onset:  Onset Date: 25  Allergies:   Patient has no known allergies.  Restrictions/Precautions:   CAD, CKD, DM type 2, CHF.     Interventions Planned (Treatment may consist of any combination of the following):   See Assessment Note    Subjective Comments: he is now mostly walking with the RW at home and only using MWC for coming to therapy. He has been able to go up / down the step to enter his home 2x now with daughter SBA. He is still having trouble with getting up from lower surfaces (lower than the height of the MWC with pad).    Initial Pain Level:   0   /10  Post

## 2025-07-06 NOTE — PROGRESS NOTES
Mario Merchant  : 1945  Primary: [employer] Medicare-advantage Ppo (Medicare Managed)  Secondary:  [facility] [place] [address] [place]  [facility] [address]  Phone: [phone]  Fax: [phone] Plan Frequency: 2 visits per week for 12 weeks (With plan to decreased frequency to 1 visit per week as symptoms and functional progression allow.)  Plan of Care/Certification Expiration Date: 25 (Start of POC 2025)        Plan of Care/Certification Expiration Date:  Plan of Care/Certification Expiration Date: 25 (Start of POC 2025)    Frequency/Duration:   Plan Frequency: 2 visits per week for 12 weeks (With plan to decreased frequency to 1 visit per week as symptoms and functional progression allow.)      Time In/Out:   Time In: 1015  Time Out: 1100      PT Visit Info:    Total # of Visits to Date: 6  Progress Note Counter: 3      Visit Count:  6   OUTPATIENT PHYSICAL THERAPY:   Treatment Note 2025       Episode  (Right leg BKA/weakness)               Treatment Diagnosis:   Post-op pain  Tophaceous gout  Pain in right leg  Muscle weakness (generalized)  Difficulty in walking, not elsewhere classified  Medical/Referring Diagnosis:    Post-op pain  Tophaceous gout  Referring Physician:  Val Zhou PA MD Orders:  PT Eval and Treat  Return MD Appt:  TBD by patient   Date of Onset:  Onset Date: 25  Allergies:   Patient has no known allergies.  Restrictions/Precautions:   CAD, CKD, DM type 2, CHF.     Interventions Planned (Treatment may consist of any combination of the following):   See Assessment Note    Subjective Comments: Not using the wheel chair around the house.  Use it when I come in here because of these chairs.  Not sure I could get up.  Use a lift recliner at home.      Initial Pain Level:   0   /10  Post Session Pain Level:    0   /10    Medications Last Reviewed: 2025    Updated Objective Findings:  None Today    Treatment

## 2025-07-07 ENCOUNTER — HOSPITAL ENCOUNTER (OUTPATIENT)
Dept: PHYSICAL THERAPY | Age: 80
Setting detail: RECURRING SERIES
Discharge: HOME | End: 2025-07-09
Payer: MEDICARE

## 2025-07-07 PROCEDURE — 97530 THERAPEUTIC ACTIVITIES: CPT

## 2025-07-10 ENCOUNTER — HOSPITAL ENCOUNTER (OUTPATIENT)
Dept: PHYSICAL THERAPY | Age: 80
Setting detail: RECURRING SERIES
Discharge: HOME | End: 2025-07-12
Payer: MEDICARE

## 2025-07-10 PROCEDURE — 97110 THERAPEUTIC EXERCISES: CPT

## 2025-07-10 PROCEDURE — 97116 GAIT TRAINING THERAPY: CPT

## 2025-07-10 NOTE — PROGRESS NOTES
Mario Merchant  : 1945  Primary: [employer] Medicare-advantage Ppo (Medicare Managed)  Secondary:  [facility] [place] [address] [place] [address]  Phone: [phone]  Fax: [phone] Plan Frequency: 2 visits per week for 12 weeks (With plan to decreased frequency to 1 visit per week as symptoms and functional progression allow.)  Plan of Care/Certification Expiration Date: 25 (Start of POC 2025)        Plan of Care/Certification Expiration Date:  Plan of Care/Certification Expiration Date: 25 (Start of POC 2025)    Frequency/Duration:   Plan Frequency: 2 visits per week for 12 weeks (With plan to decreased frequency to 1 visit per week as symptoms and functional progression allow.)      Time In/Out: 1003  Time In: 1003  Time Out: 1100      PT Visit Info:    Total # of Visits to Date: 7  Progress Note Counter: 4      Visit Count:  7   OUTPATIENT PHYSICAL THERAPY:   Treatment Note 7/10/2025       Episode  (Right leg BKA/weakness)               Treatment Diagnosis:   Post-op pain  Tophaceous gout  Pain in right leg  Muscle weakness (generalized)  Difficulty in walking, not elsewhere classified  Medical/Referring Diagnosis:    Post-op pain  Tophaceous gout  Referring Physician:  Val Zhou PA MD Orders:  PT Eval and Treat  Return MD Appt:  TBD by patient   Date of Onset:  Onset Date: 25  Allergies:   Patient has no known allergies.  Restrictions/Precautions:   CAD, CKD, DM type 2, CHF.     Interventions Planned (Treatment may consist of any combination of the following):   See Assessment Note    Subjective Comments: I have never walked inside the walker before.  Chelly kruger one told me that I was not walking in it right.  No one for 3 years ever switched the walker lower. I have never been able to walk farther than 50'.  I am so excited.    Prosthetic care yesterday because the pads came out for some reason Monday and so tried the

## 2025-07-15 ENCOUNTER — TELEPHONE (OUTPATIENT)
Dept: GASTROENTEROLOGY | Age: 80
End: 2025-07-15

## 2025-07-15 NOTE — TELEPHONE ENCOUNTER
Called pts daughter to check status on pts rehab and to see if they were ready to schedule EUS.  There was n/a.  LVM with RN direct line to c/b and discuss.

## 2025-07-17 ENCOUNTER — HOSPITAL ENCOUNTER (OUTPATIENT)
Dept: PHYSICAL THERAPY | Age: 80
Setting detail: RECURRING SERIES
Discharge: HOME | End: 2025-07-19
Payer: MEDICARE

## 2025-07-17 PROCEDURE — 97116 GAIT TRAINING THERAPY: CPT

## 2025-07-17 PROCEDURE — 97110 THERAPEUTIC EXERCISES: CPT

## 2025-07-17 NOTE — PROGRESS NOTES
Session Pain Level:    0   /10    Medications Last Reviewed: 7/17/2025    Updated Objective Findings:  None Today    Treatment   GAIT TRAINING: (10 minutes):  Gait training to improve and/or restore physical functioning as related to mobility, strength, balance, coordination.  Ambulated 140 feet with supervision/set-up and occ cues for slowing down.      THERAPEUTIC EXERCISE: (44 minutes): Exercises per grid below to improve mobility, strength, balance, and coordination.  Required moderate visual, verbal, manual, and tactile cues to promote proper body alignment, promote proper body poster and proper body mechanics.  Progressed resistance, range, repetitions and complexity of movement as indicated.     Date:  6/23/2025 Date:  6/26/2025 Date:  7/2/25 Date:  7/7/2025 Date:  7/10/2025 Date:  7/17/2025   Activity/Exercise Parameters        Pt/family education  Review of plans for new prosthesis.    Review of techniques for sit to stand.  Encouraged not using the lift feature of his lift chair.  Options re: walking program and maintaining.  Dtr.  off sat sun Monday\  Well walkers  Mall  Ingles    Recumbent Stepper for LE strength and endurance Larger NuStep  Level 1  Seat at 11  8 minutes  Larger NuStep  Level 1  Seat at 12  8 minutes   Larger NuStep  Level 1  Seat at 12  8 minutes  0.43 miles   Standing Yes            145' slightly winded and shoulders sore.  Moving hastily - unsteady walker placement.  Reviewed energy conservation.  Rested  Walked 145' again. Slower speed - much improved balance.  Much less winded.  Pt was able to see the difference.  Lowered walker one more. Walking in and out to the car x ~50'.  \"Before when the walker was wrong when I walked to the car my arms would be gone.    Walked into clinic from car ~50'  Walker 140' around track to room.   Walking in and out to the car x ~50'.    Walker 120' around track after therapy.       Walking in// bars With RW and CGA today:  26' in gym - sturdy at

## 2025-07-21 ENCOUNTER — HOSPITAL ENCOUNTER (OUTPATIENT)
Dept: PHYSICAL THERAPY | Age: 80
Setting detail: RECURRING SERIES
Discharge: HOME | End: 2025-07-23
Payer: MEDICARE

## 2025-07-21 PROCEDURE — 97116 GAIT TRAINING THERAPY: CPT

## 2025-07-21 PROCEDURE — 97110 THERAPEUTIC EXERCISES: CPT

## 2025-07-21 PROCEDURE — 97761 PROSTHETIC TRAING 1ST ENC: CPT

## 2025-07-21 NOTE — PROGRESS NOTES
Mario Merchant  : 1945  Primary: [employer] Medicare-advantage Ppo (Medicare Managed)  Secondary:  [facility] [place] [address] [place] [address]  Phone: [phone]  Fax: [phone] Plan Frequency: 2 visits per week for 12 weeks (With plan to decreased frequency to 1 visit per week as symptoms and functional progression allow.)  Plan of Care/Certification Expiration Date: 25 (Start of POC 2025)        Plan of Care/Certification Expiration Date:  Plan of Care/Certification Expiration Date: 25 (Start of POC 2025)    Frequency/Duration:   Plan Frequency: 2 visits per week for 12 weeks (With plan to decreased frequency to 1 visit per week as symptoms and functional progression allow.)      Time In/Out:   Time In: 1300  Time Out: 1345      PT Visit Info:    Total # of Visits to Date: 9  Progress Note Counter: 6      Visit Count:  9   OUTPATIENT PHYSICAL THERAPY:   Treatment Note 2025       Episode  (Right leg BKA/weakness)               Treatment Diagnosis:   Post-op pain  Tophaceous gout  Pain in right leg  Muscle weakness (generalized)  Difficulty in walking, not elsewhere classified  Medical/Referring Diagnosis:    Post-op pain  Tophaceous gout  Referring Physician:  Val Zhou PA MD Orders:  PT Eval and Treat  Return MD Appt:  TBD by patient   Date of Onset:  Onset Date: 25  Allergies:   Patient has no known allergies.  Restrictions/Precautions:   CAD, CKD, DM type 2, CHF.     Interventions Planned (Treatment may consist of any combination of the following):   See Assessment Note    Subjective Comments:  Walked a few times with Tanmay.  He wants you to check me.  (Walked about 5 x 50' - in and out of restaurant and into here).      Not doing a lot of endurance walking.  Used to work at [employer] and misses it and wanted to go back and visit but couldn't walk far enough arms hurt.  Just been thinking about the longest part

## 2025-07-22 NOTE — ANESTHESIA POSTPROCEDURE EVALUATION
DERMATOLOGY NOTE  FOLLOW-UP PATIENT VISIT    CHIEF COMPLAINT: Office Visit and Eczema    HISTORY OF PRESENT ILLNESS: The patient is a very pleasant 67 year old female seen today in follow up for eczema on the face and neck.      Patient denies any other new, changing, pruritic, painful, burning, bleeding, or otherwise concerning skin lesions.     DermHx:  None    FamHx:  There is a family history of skin cancer in : n/a (none)    ALLERGIES:  ALLERGIES:   Allergen Reactions   • Codeine Other (See Comments)     Nightmares.   • Penicillins HIVES     Hives   • Tramadol Other (See Comments)     Hallucinations   • Aspirin      Pseudohemophilia   • Doxycycline GI UPSET   • Oxycodone Other (See Comments)     Hallucinations \"with oxycodone\". \"I am fine taking Hydrocodone\"    • Simvastatin MYALGIA      PHYSICAL EXAM:  The patient is pleasant, cooperative, and in no acute distress.  Exam included head, neck,  R & L upper extremities and hands.  Exam was normal/unremarkable excepting the below findings:  -clear    ASSESSMENT AND PLAN:    1. Other atopic dermatitis  Chronic, well controlled. She has previously failed clobetasol, multiple rounds of oral prednisone, tacrolimus, triamcinolone. She cannot take methotrexate given alcohol and NSAID intake. She cannot complete phototherapy given work schedule. Patient will continue Dupixent (dupilumab) subcutaneously 300 mg given every other week. Patient reminded about side effects of conjunctivitis and keratitis, and to call if these side effects were to occur.    On 7/22/2025, Kwesi tamibed the services personally performed by Cornelio Calderón MD     I, Dr. Cornelio Calderón, attest that I have reviewed the scribe's note and edited as appropriate. I also personally performed the history of present illness, clinical impressions and plan.       Department of Anesthesiology  Postprocedure Note    Patient: Mario Merchant  MRN: [id number]  YOB: 1945  Date of evaluation: 4/29/2025    Procedure Summary       Date: 04/29/25 Room / Location: [facility] MAIN OR  / [facility] MAIN OR    Anesthesia Start: 1614 Anesthesia Stop: 1656    Procedure: ESOPHAGOGASTRODUODENOSCOPY ULTRASOUND Diagnosis:       Mass of head of pancreas      (Mass of head of pancreas [K86.89])    Providers: Michael Villagran MD Responsible Provider: Darryl Dior MD    Anesthesia Type: TIVA ASA Status: 3            Anesthesia Type: No value filed.    Iveth Phase I: Iveth Score: 10    Iveth Phase II: Iveth Score: 10    Anesthesia Post Evaluation    Patient location during evaluation: PACU  Patient participation: complete - patient participated  Level of consciousness: awake and alert  Airway patency: patent  Nausea & Vomiting: no nausea and no vomiting  Cardiovascular status: hemodynamically stable  Respiratory status: acceptable, nonlabored ventilation and spontaneous ventilation  Hydration status: euvolemic  Comments: BP (!) 104/50   Pulse (!) 101   Temp 98 °F (36.7 °C) (Temporal)   Resp 16   Ht 1.778 m (5' 10\")   Wt 133.3 kg (293 lb 12.8 oz)   SpO2 95%   BMI 42.16 kg/m²     Multimodal analgesia pain management approach  Pain management: adequate and satisfactory to patient    No notable events documented.

## 2025-07-24 ENCOUNTER — HOSPITAL ENCOUNTER (OUTPATIENT)
Dept: PHYSICAL THERAPY | Age: 80
Setting detail: RECURRING SERIES
Discharge: HOME | End: 2025-07-26
Payer: MEDICARE

## 2025-07-24 PROCEDURE — 97116 GAIT TRAINING THERAPY: CPT

## 2025-07-24 PROCEDURE — 97530 THERAPEUTIC ACTIVITIES: CPT

## 2025-07-24 PROCEDURE — 97110 THERAPEUTIC EXERCISES: CPT

## 2025-07-24 NOTE — PROGRESS NOTES
Mario Merchant  : 1945  Primary: [employer] Medicare-advantage Ppo (Medicare Managed)  Secondary:  [facility] [place] [address]  [facility] [address]  Phone: [phone]  Fax: [phone] Plan Frequency: 2 visits per week for 12 weeks (With plan to decreased frequency to 1 visit per week as symptoms and functional progression allow.)  Plan of Care/Certification Expiration Date: 25 (Start of POC 2025)        Plan of Care/Certification Expiration Date:  Plan of Care/Certification Expiration Date: 25 (Start of POC 2025)    Frequency/Duration:   Plan Frequency: 2 visits per week for 12 weeks (With plan to decreased frequency to 1 visit per week as symptoms and functional progression allow.)      Time In/Out: 905  Time In: 0905  Time Out: 1003      PT Visit Info:    Total # of Visits to Date: 10  Progress Note Counter: 7      Visit Count:  10   OUTPATIENT PHYSICAL THERAPY:   Treatment Note 2025       Episode  (Right leg BKA/weakness)               Treatment Diagnosis:   Post-op pain  Tophaceous gout  Pain in right leg  Muscle weakness (generalized)  Difficulty in walking, not elsewhere classified  Medical/Referring Diagnosis:    Post-op pain  Tophaceous gout  Referring Physician:  Val Zhou PA MD Orders:  PT Eval and Treat  Return MD Appt:  TBD by patient   Date of Onset:  Onset Date: 25  Allergies:   Patient has no known allergies.  Restrictions/Precautions:   CAD, CKD, DM type 2, CHF.     Interventions Planned (Treatment may consist of any combination of the following):   See Assessment Note    Subjective Comments:  Brought cushion to increase the chair height.  It's better than lugging that w/c.  Pros fitting better today.  See Tanmay 2025.  Man if I could walk with a cane that would be nice.    Initial Pain Level:   0   /10  Post Session Pain Level:    0   /10    Medications Last Reviewed: 2025    Updated Objective

## 2025-07-28 ENCOUNTER — HOSPITAL ENCOUNTER (OUTPATIENT)
Dept: PHYSICAL THERAPY | Age: 80
Setting detail: RECURRING SERIES
Discharge: HOME | End: 2025-07-30
Payer: MEDICARE

## 2025-07-28 PROCEDURE — 97530 THERAPEUTIC ACTIVITIES: CPT

## 2025-07-28 NOTE — PROGRESS NOTES
Mario Merchant  : 1945  Primary: [employer] Medicare-advantage Ppo (Medicare Managed)  Secondary:  [facility] [place] [address]  [facility] [address]  Phone: [phone]  Fax: [phone] Plan Frequency: 2 visits per week for 12 weeks (With plan to decreased frequency to 1 visit per week as symptoms and functional progression allow.)  Plan of Care/Certification Expiration Date: 25 (Start of POC 2025)        Plan of Care/Certification Expiration Date:  Plan of Care/Certification Expiration Date: 25 (Start of POC 2025)    Frequency/Duration:   Plan Frequency: 2 visits per week for 12 weeks (With plan to decreased frequency to 1 visit per week as symptoms and functional progression allow.)      Time In/Out:   Time In: 1015  Time Out: 1100      PT Visit Info:    Total # of Visits to Date: 11  Progress Note Counter: 8      Visit Count:  11   OUTPATIENT PHYSICAL THERAPY:   Treatment Note 2025       Episode  (Right leg BKA/weakness)               Treatment Diagnosis:   Post-op pain  Tophaceous gout  Pain in right leg  Muscle weakness (generalized)  Difficulty in walking, not elsewhere classified  Medical/Referring Diagnosis:    Post-op pain  Tophaceous gout  Referring Physician:  Val Zhou PA MD Orders:  PT Eval and Treat  Return MD Appt:  TBD by patient   Date of Onset:  Onset Date: 25  Allergies:   Patient has no known allergies.  Restrictions/Precautions:   CAD, CKD, DM type 2, CHF.     Interventions Planned (Treatment may consist of any combination of the following):   See Assessment Note    Subjective Comments:  Brought cushion to increase the chair height.  It's better than lugging that w/c.  Pros fitting better today.  See Tanmay 2025.  Man if I could walk with a cane that would be nice.    Initial Pain Level:   0   /10  Post Session Pain Level:    0   /10    Medications Last Reviewed: 2025    Updated Objective

## 2025-07-28 NOTE — PROGRESS NOTES
Mario Merchant  : 1945  Primary: [employer] Medicare-advantage Ppo (Medicare Managed)  Secondary:  [facility] [place] [address] [place]  [facility] [address]  Phone: [phone]  Fax: [phone] Plan Frequency: 2 visits per week for 12 weeks (With plan to decreased frequency to 1 visit per week as symptoms and functional progression allow.)    Plan of Care/Certification Expiration Date: 25 (Start of POC 2025)        Plan of Care/Certification Expiration Date:  Plan of Care/Certification Expiration Date: 25 (Start of POC 2025)    Frequency/Duration: Plan Frequency: 2 visits per week for 12 weeks (With plan to decreased frequency to 1 visit per week as symptoms and functional progression allow.)      Time In/Out:   Time In: 1015  Time Out: 1100      PT Visit Info:    Total # of Visits to Date: 11  Progress Note Counter: 8      Visit Count:  11                OUTPATIENT PHYSICAL THERAPY:             Progress Report 2025               Episode (Right leg BKA/weakness)         Treatment Diagnosis:    Post-op pain  Right BKA infection (HCC)   History of right below knee amputation (HCC)   Tophaceous gout  Pain in right leg  Muscle weakness (generalized)  Difficulty in walking, not elsewhere classified  Medical/Referring Diagnosis:    Post-op pain  Tophaceous gout  Referring Physician:  Val Zhou PA MD Orders:  PT Eval and Treat   Return MD Appt:  TBD by patient  Date of Onset:  Onset Date: 25  Allergies:  Patient has no known allergies.  Restrictions/Precautions:    CAD, CKD, DM type 2, CHF.      Medications Last Reviewed: 2025     SUBJECTIVE   History of Injury/Illness (Reason for Referral):  79 year old male comes to therapy after healed sore with surgical intervention on Right residual below knee limb.  History AKA  rehab x 1 year.  Developed sore 2025 NWB until 6/3/2025 cleared by surgeon.  Standing at walker.

## 2025-07-30 ENCOUNTER — HOSPITAL ENCOUNTER (OUTPATIENT)
Dept: PHYSICAL THERAPY | Age: 80
Setting detail: RECURRING SERIES
Discharge: HOME | End: 2025-08-01
Payer: MEDICARE

## 2025-07-30 PROCEDURE — 97530 THERAPEUTIC ACTIVITIES: CPT

## 2025-07-30 PROCEDURE — 97110 THERAPEUTIC EXERCISES: CPT

## 2025-07-30 NOTE — PROGRESS NOTES
Mario Merchant  : 1945  Primary: [employer] Medicare-advantage Ppo (Medicare Managed)  Secondary:  [facility] [place] [address] [place] [address]  Phone: [phone]  Fax: [phone] Plan Frequency: 2 visits per week for 12 weeks (With plan to decreased frequency to 1 visit per week as symptoms and functional progression allow.)  Plan of Care/Certification Expiration Date: 25 (Start of POC 2025)        Plan of Care/Certification Expiration Date:  Plan of Care/Certification Expiration Date: 25 (Start of POC 2025)    Frequency/Duration:   Plan Frequency: 2 visits per week for 12 weeks (With plan to decreased frequency to 1 visit per week as symptoms and functional progression allow.)      Time In/Out:   Time In: 1016  Time Out: 1059      PT Visit Info:    Total # of Visits to Date: 12  Progress Note Counter: 1      Visit Count:  12   OUTPATIENT PHYSICAL THERAPY:   Treatment Note 2025       Episode  (Right leg BKA/weakness)               Treatment Diagnosis:   Post-op pain  Tophaceous gout  Pain in right leg  Muscle weakness (generalized)  Difficulty in walking, not elsewhere classified  Medical/Referring Diagnosis:    Post-op pain  Tophaceous gout  Referring Physician:  Val Zhou PA MD Orders:  PT Eval and Treat  Return MD Appt:  TBD by patient   Date of Onset:  Onset Date: 25  Allergies:   Patient has no known allergies.  Restrictions/Precautions:   CAD, CKD, DM type 2, CHF.     Interventions Planned (Treatment may consist of any combination of the following):   See Assessment Note    Subjective Comments:    Doing fine today.  No pain.  2 socks on.  See him Monday and hope to get that straightened out (prosthesis poss a little long)  Initial Pain Level:   0   /10  Post Session Pain Level:    0   /10    Medications Last Reviewed: 2025    Updated Objective Findings:  Brought cushion to increase the chair

## 2025-08-04 ENCOUNTER — HOSPITAL ENCOUNTER (OUTPATIENT)
Dept: PHYSICAL THERAPY | Age: 80
Setting detail: RECURRING SERIES
Discharge: HOME | End: 2025-08-06
Payer: MEDICARE

## 2025-08-04 PROCEDURE — 97530 THERAPEUTIC ACTIVITIES: CPT

## 2025-08-04 PROCEDURE — 97110 THERAPEUTIC EXERCISES: CPT

## 2025-08-04 ASSESSMENT — PAIN SCALES - GENERAL: PAINLEVEL_OUTOF10: 0

## 2025-08-05 ENCOUNTER — OFFICE VISIT (OUTPATIENT)
Dept: ORTHOPEDIC SURGERY | Age: 80
End: 2025-08-05

## 2025-08-05 DIAGNOSIS — Z89.511 S/P BKA (BELOW KNEE AMPUTATION) UNILATERAL, RIGHT (HCC): Primary | ICD-10-CM

## 2025-08-07 ENCOUNTER — HOSPITAL ENCOUNTER (OUTPATIENT)
Dept: PHYSICAL THERAPY | Age: 80
Setting detail: RECURRING SERIES
Discharge: HOME | End: 2025-08-09
Payer: MEDICARE

## 2025-08-07 PROCEDURE — 97110 THERAPEUTIC EXERCISES: CPT

## 2025-08-07 PROCEDURE — 97530 THERAPEUTIC ACTIVITIES: CPT

## 2025-08-11 ENCOUNTER — HOSPITAL ENCOUNTER (OUTPATIENT)
Dept: PHYSICAL THERAPY | Age: 80
Setting detail: RECURRING SERIES
Discharge: HOME | End: 2025-08-13
Payer: MEDICARE

## 2025-08-11 PROCEDURE — 97110 THERAPEUTIC EXERCISES: CPT

## 2025-08-11 PROCEDURE — 97530 THERAPEUTIC ACTIVITIES: CPT

## 2025-08-14 ENCOUNTER — HOSPITAL ENCOUNTER (OUTPATIENT)
Dept: PHYSICAL THERAPY | Age: 80
Setting detail: RECURRING SERIES
Discharge: HOME | End: 2025-08-16
Payer: MEDICARE

## 2025-08-14 DIAGNOSIS — E11.21 TYPE 2 DIABETES MELLITUS WITH NEPHROPATHY (HCC): ICD-10-CM

## 2025-08-14 PROCEDURE — 97530 THERAPEUTIC ACTIVITIES: CPT

## 2025-08-14 PROCEDURE — 97110 THERAPEUTIC EXERCISES: CPT

## 2025-08-14 RX ORDER — GLIPIZIDE 2.5 MG/1
2.5 TABLET, EXTENDED RELEASE ORAL DAILY
Qty: 90 TABLET | Refills: 3 | Status: SHIPPED | OUTPATIENT
Start: 2025-08-14

## 2025-08-18 ENCOUNTER — HOSPITAL ENCOUNTER (OUTPATIENT)
Dept: PHYSICAL THERAPY | Age: 80
Setting detail: RECURRING SERIES
Discharge: HOME | End: 2025-08-20
Payer: MEDICARE

## 2025-08-18 PROCEDURE — 97530 THERAPEUTIC ACTIVITIES: CPT

## 2025-08-18 PROCEDURE — 97110 THERAPEUTIC EXERCISES: CPT

## 2025-08-21 ENCOUNTER — HOSPITAL ENCOUNTER (OUTPATIENT)
Dept: PHYSICAL THERAPY | Age: 80
Setting detail: RECURRING SERIES
Discharge: HOME | End: 2025-08-23
Payer: MEDICARE

## 2025-08-21 PROCEDURE — 97110 THERAPEUTIC EXERCISES: CPT

## 2025-08-21 PROCEDURE — 97530 THERAPEUTIC ACTIVITIES: CPT

## 2025-08-25 ENCOUNTER — HOSPITAL ENCOUNTER (OUTPATIENT)
Dept: PHYSICAL THERAPY | Age: 80
Setting detail: RECURRING SERIES
Discharge: HOME | End: 2025-08-27
Payer: MEDICARE

## 2025-08-25 PROCEDURE — 97530 THERAPEUTIC ACTIVITIES: CPT

## 2025-08-28 ENCOUNTER — HOSPITAL ENCOUNTER (OUTPATIENT)
Dept: PHYSICAL THERAPY | Age: 80
Setting detail: RECURRING SERIES
Discharge: HOME | End: 2025-08-30
Payer: MEDICARE

## 2025-08-28 PROCEDURE — 97116 GAIT TRAINING THERAPY: CPT

## 2025-08-28 PROCEDURE — 97110 THERAPEUTIC EXERCISES: CPT

## 2025-08-28 PROCEDURE — 97530 THERAPEUTIC ACTIVITIES: CPT

## (undated) DEVICE — HAND PACK: Brand: MEDLINE INDUSTRIES, INC.

## (undated) DEVICE — NEEDLE ASPIR 19GA CHN 2.8MM SHTH DIA1.73MM CO CHROM ENDOSCP

## (undated) DEVICE — MOUTHPIECE ENDOSCP L CTRL OPN AND SIDE PORTS DISP

## (undated) DEVICE — BANDAGE COMPR W1INXL5YD THN LTWT POR SELF ADH TAN COBAN

## (undated) DEVICE — Device

## (undated) DEVICE — ENDOSCOPIC KIT 1.1+ OP4 CA DE 2 GWN AAMI LEVEL 3

## (undated) DEVICE — SYRINGE IRRIG 60ML SFT PLIABLE BLB EZ TO GRP 1 HND USE W/

## (undated) DEVICE — TRAY PREP DRY W/ PREM GLV 2 APPL 6 SPNG 2 UNDPD 1 OVERWRAP

## (undated) DEVICE — LOWER EXTREMITY: Brand: MEDLINE INDUSTRIES, INC.

## (undated) DEVICE — SUT ETHLN 3-0 18IN PS2 BLK --

## (undated) DEVICE — CONNECTOR TBNG OD5-7MM O2 END DISP

## (undated) DEVICE — SUTURE VCRL SZ 2-0 L18IN ABSRB UD CT-1 L36MM 1/2 CIR J839D

## (undated) DEVICE — BANDAGE,ELASTIC,ESMARK,STERILE,6"X9',LF: Brand: MEDLINE

## (undated) DEVICE — STOCKINETTE,IMPERVIOUS,12X48,STERILE: Brand: MEDLINE

## (undated) DEVICE — DRESSING NEG PRSS M 18X12.5X3.3CM POLYUR FOR WND THER VAC

## (undated) DEVICE — ZIMMER® STERILE DISPOSABLE TOURNIQUET CUFF WITH PLC, DUAL PORT, SINGLE BLADDER, 18 IN. (46 CM)

## (undated) DEVICE — SINGLE PORT MANIFOLD: Brand: NEPTUNE 2

## (undated) DEVICE — SPONGE LAP 18X18IN STRL -- 5/PK

## (undated) DEVICE — BANDAGE COMPR L5YDXW2IN FOAM CO FLX

## (undated) DEVICE — SUT ETHLN 2-0 18IN FS BLK --

## (undated) DEVICE — PAD,ABDOMINAL,5"X9",ST,LF,25/BX: Brand: MEDLINE INDUSTRIES, INC.

## (undated) DEVICE — SUTURE VCRL SZ 2-0 L27IN ABSRB UD L36MM CP-1 1/2 CIR REV J266H

## (undated) DEVICE — AIRLIFE™ OXYGEN TUBING 7 FEET (2.1 M) CRUSH RESISTANT OXYGEN TUBING, VINYL TIPPED: Brand: AIRLIFE™

## (undated) DEVICE — GLOVE SURG SZ 85 L12IN FNGR ORTHO 126MIL CRM LTX FREE

## (undated) DEVICE — PADDING CAST COHESIVE 4 YDX3 IN HND TEARABLE COTTON SPEC 100

## (undated) DEVICE — KENDALL RADIOLUCENT FOAM MONITORING ELECTRODE RECTANGULAR SHAPE: Brand: KENDALL

## (undated) DEVICE — STERILE PVP: Brand: MEDLINE INDUSTRIES, INC.

## (undated) DEVICE — GLOVE SURG SZ 65 L12IN FNGR THK79MIL GRN LTX FREE

## (undated) DEVICE — PADDING CAST W3INXL4YD COT BLEND MIC PLEAT UNDERCAST SPEC

## (undated) DEVICE — BANDAGE COBAN 6 IN WND 6INX5YD FOAM

## (undated) DEVICE — CUFF REPROC TRNQT DPSB W/PLC BROWN 34IN

## (undated) DEVICE — ZIMMER® STERILE DISPOSABLE TOURNIQUET CUFF WITH PROTECTIVE SLEEVE, DUAL PORT, SINGLE BLADDER, 34 IN. (86 CM)

## (undated) DEVICE — BASIC SINGLE BASIN-LF: Brand: MEDLINE INDUSTRIES, INC.

## (undated) DEVICE — DISPOSABLE BIPOLAR CODE, 12' (3.66 M): Brand: CONMED

## (undated) DEVICE — SUTURE PERMAHAND SZ 2-0 L12X18IN NONABSORBABLE BLK SILK A185H

## (undated) DEVICE — GLOVE SURG SZ 65 THK91MIL LTX FREE SYN POLYISOPRENE

## (undated) DEVICE — 3M™ COBAN™ SELF-ADHERENT WRAP, 1586S, STERILE, 6 IN X 5 YD (15 CM X 4,5 M), 12 ROLLS/CASE: Brand: 3M™ COBAN™

## (undated) DEVICE — SUTURE MCRYL SZ 2-0 L27IN ABSRB UD CP-1 1 L36MM 1/2 CIR REV Y266H

## (undated) DEVICE — DRESSING,GAUZE,XEROFORM,CURAD,5"X9",ST: Brand: CURAD

## (undated) DEVICE — SUTURE ETHLN SZ 4-0 L18IN NONABSORBABLE BLK L19MM PS-2 3/8 1667H

## (undated) DEVICE — GLOVE ORANGE PI 7 1/2   MSG9075

## (undated) DEVICE — BANDAGE,ELASTIC,ESMARK,STERILE,4"X9',LF: Brand: MEDLINE

## (undated) DEVICE — SUTURE PERMAHAND SZ 0 L30IN NONABSORBABLE BLK SILK BRAID A306H

## (undated) DEVICE — GOWN,SIRUS,NONRNF,SETINSLV,XL,20/CS: Brand: MEDLINE

## (undated) DEVICE — FOOT ANKLE PACK: Brand: MEDLINE INDUSTRIES, INC.

## (undated) DEVICE — SPLINT THMB W4XL30IN FBRGLS PD PRECUT LTWT DURABLE FAST SET

## (undated) DEVICE — CLOTH PRE OP W9XL10.5IN 2% CHG FRAGRANCE RNS FREE READYPREP

## (undated) DEVICE — DRAPE,U/SHT,SPLIT,FILM,60X84,STERILE: Brand: MEDLINE

## (undated) DEVICE — SOLUTION IRRIG 1000ML 0.9% SOD CHL USP POUR PLAS BTL

## (undated) DEVICE — SUTURE VCRL SZ 0 L27IN ABSRB UD L36MM CP-1 1/2 CIR REV CUT J267H

## (undated) DEVICE — STRYKER PERFORMANCE SERIES SAGITTAL BLADE: Brand: STRYKER PERFORMANCE SERIES

## (undated) DEVICE — GLOVE SURG SZ 8 L12IN FNGR THK79MIL GRN LTX FREE

## (undated) DEVICE — PREP SKN CHLRAPRP APL 26ML STR --

## (undated) DEVICE — GAUZE,SPONGE,4"X4",12PLY,WOVEN,NS,LF: Brand: MEDLINE

## (undated) DEVICE — DRESSING,GAUZE,PETROLATUM,CURAD,1"X8",ST: Brand: CURAD

## (undated) DEVICE — CANISTER NEG PRSS 500ML WHT SENSATRAC TBNG CLMP CONN

## (undated) DEVICE — REM POLYHESIVE ADULT PATIENT RETURN ELECTRODE: Brand: VALLEYLAB

## (undated) DEVICE — BUTTON SWITCH PENCIL BLADE ELECTRODE, HOLSTER: Brand: EDGE

## (undated) DEVICE — SUTURE ABSORBABLE BRAIDED 0 CT-1 8X27 IN UD VICRYL JJ41G

## (undated) DEVICE — DRESSING PETRO W3XL8IN OIL EMUL N ADH GZ KNIT IMPREG CELOS

## (undated) DEVICE — DISPOSABLE BIOPSY VALVE MAJ-1555: Brand: SINGLE USE BIOPSY VALVE (STERILE)

## (undated) DEVICE — SOLUTION IV 1000ML 0.9% SOD CHL

## (undated) DEVICE — HANDPIECE SET WITH COAXIAL HIGH FLOW TIP AND SUCTION TUBE: Brand: INTERPULSE

## (undated) DEVICE — SUTURE VCRL SZ 3-0 L27IN ABSRB UD L26MM SH 1/2 CIR J416H